# Patient Record
Sex: MALE | Race: WHITE | NOT HISPANIC OR LATINO | Employment: OTHER | URBAN - METROPOLITAN AREA
[De-identification: names, ages, dates, MRNs, and addresses within clinical notes are randomized per-mention and may not be internally consistent; named-entity substitution may affect disease eponyms.]

---

## 2017-01-31 ENCOUNTER — HOSPITAL ENCOUNTER (OUTPATIENT)
Dept: CARDIOLOGY | Facility: CLINIC | Age: 72
Discharge: HOME | End: 2017-01-31
Payer: MEDICARE

## 2017-01-31 ENCOUNTER — OFFICE VISIT (OUTPATIENT)
Dept: CARDIOLOGY | Facility: CLINIC | Age: 72
End: 2017-01-31
Payer: MEDICARE

## 2017-01-31 VITALS
SYSTOLIC BLOOD PRESSURE: 145 MMHG | WEIGHT: 235.88 LBS | BODY MASS INDEX: 31.26 KG/M2 | DIASTOLIC BLOOD PRESSURE: 74 MMHG | HEIGHT: 73 IN | HEART RATE: 67 BPM

## 2017-01-31 DIAGNOSIS — M51.36 DDD (DEGENERATIVE DISC DISEASE), LUMBAR: ICD-10-CM

## 2017-01-31 DIAGNOSIS — I10 HTN (HYPERTENSION), BENIGN: ICD-10-CM

## 2017-01-31 DIAGNOSIS — G70.00 MYASTHENIA GRAVIS: ICD-10-CM

## 2017-01-31 DIAGNOSIS — E66.09 NON MORBID OBESITY DUE TO EXCESS CALORIES: ICD-10-CM

## 2017-01-31 DIAGNOSIS — E03.4 HYPOTHYROIDISM DUE TO ACQUIRED ATROPHY OF THYROID: ICD-10-CM

## 2017-01-31 DIAGNOSIS — E78.5 HYPERLIPIDEMIA: ICD-10-CM

## 2017-01-31 DIAGNOSIS — N52.01 ERECTILE DYSFUNCTION DUE TO ARTERIAL INSUFFICIENCY: ICD-10-CM

## 2017-01-31 DIAGNOSIS — E78.2 MIXED HYPERLIPIDEMIA: ICD-10-CM

## 2017-01-31 DIAGNOSIS — G62.9 NEUROPATHY: ICD-10-CM

## 2017-01-31 DIAGNOSIS — I10 HTN (HYPERTENSION), BENIGN: Primary | ICD-10-CM

## 2017-01-31 PROCEDURE — 1159F MED LIST DOCD IN RCRD: CPT | Mod: S$GLB,,, | Performed by: INTERNAL MEDICINE

## 2017-01-31 PROCEDURE — 1157F ADVNC CARE PLAN IN RCRD: CPT | Mod: S$GLB,,, | Performed by: INTERNAL MEDICINE

## 2017-01-31 PROCEDURE — 3077F SYST BP >= 140 MM HG: CPT | Mod: S$GLB,,, | Performed by: INTERNAL MEDICINE

## 2017-01-31 PROCEDURE — 1160F RVW MEDS BY RX/DR IN RCRD: CPT | Mod: S$GLB,,, | Performed by: INTERNAL MEDICINE

## 2017-01-31 PROCEDURE — 93000 ELECTROCARDIOGRAM COMPLETE: CPT | Mod: S$GLB,,, | Performed by: INTERNAL MEDICINE

## 2017-01-31 PROCEDURE — 99999 PR PBB SHADOW E&M-EST. PATIENT-LVL III: CPT | Mod: PBBFAC,,, | Performed by: INTERNAL MEDICINE

## 2017-01-31 PROCEDURE — 1126F AMNT PAIN NOTED NONE PRSNT: CPT | Mod: S$GLB,,, | Performed by: INTERNAL MEDICINE

## 2017-01-31 PROCEDURE — 99499 UNLISTED E&M SERVICE: CPT | Mod: S$GLB,,, | Performed by: INTERNAL MEDICINE

## 2017-01-31 PROCEDURE — 99215 OFFICE O/P EST HI 40 MIN: CPT | Mod: S$GLB,,, | Performed by: INTERNAL MEDICINE

## 2017-01-31 PROCEDURE — 3078F DIAST BP <80 MM HG: CPT | Mod: S$GLB,,, | Performed by: INTERNAL MEDICINE

## 2017-01-31 NOTE — PATIENT INSTRUCTIONS
Discussed diet , achieving and maintaining ideal body weight, and exercise.   We reviewed meds in detail.  Reassured  Discussed goals  Increase Carvedilol to whole and half-need more meds if BP still > 125

## 2017-01-31 NOTE — PROGRESS NOTES
Subjective:   Patient ID:  Maximilian Tavera Jr. is a 71 y.o. male who presents for follow-up of Hyperlipidemia (1 yr fu) and Hypertension      HPI: Patient is here for evaluation and treatment of hypertension.   The patient has no chest pain, SOB, TIA, palpitations, syncope or pre-syncope.Patient does not exercise as much as directed especially with sciatica.BP typically lower than today AND 130S.        Review of Systems   Constitution: Negative for chills, decreased appetite, diaphoresis, fever, weakness, malaise/fatigue, night sweats, weight gain and weight loss.   HENT: Negative for congestion, headaches, hoarse voice, nosebleeds, sore throat and tinnitus.    Eyes: Negative for blurred vision, double vision, vision loss in left eye, vision loss in right eye, visual disturbance and visual halos.   Cardiovascular: Negative for chest pain, claudication, cyanosis, dyspnea on exertion, irregular heartbeat, leg swelling, near-syncope, orthopnea, palpitations, paroxysmal nocturnal dyspnea and syncope.   Respiratory: Negative for cough, hemoptysis, shortness of breath, sleep disturbances due to breathing, snoring, sputum production and wheezing.    Endocrine: Negative for cold intolerance, heat intolerance, polydipsia, polyphagia and polyuria.   Hematologic/Lymphatic: Negative for adenopathy and bleeding problem. Does not bruise/bleed easily.   Skin: Negative for color change, dry skin, flushing, itching, nail changes, poor wound healing, rash, skin cancer, suspicious lesions and unusual hair distribution.   Musculoskeletal: Positive for back pain. Negative for arthritis, falls, gout, joint pain, joint swelling, muscle cramps, muscle weakness, myalgias and stiffness.   Gastrointestinal: Negative for abdominal pain, anorexia, change in bowel habit, constipation, diarrhea, dysphagia, heartburn, hematemesis, hematochezia, melena and vomiting.   Genitourinary: Negative for decreased libido, dysuria, hematuria, hesitancy and  "urgency.   Neurological: Negative for excessive daytime sleepiness, dizziness, focal weakness, light-headedness, loss of balance, numbness, paresthesias, seizures, sensory change, tremors and vertigo.   Psychiatric/Behavioral: Negative for altered mental status, depression, hallucinations, memory loss, substance abuse and suicidal ideas. The patient does not have insomnia and is not nervous/anxious.    Allergic/Immunologic: Negative for environmental allergies and hives.       Objective:   Visit Vitals    BP (!) 145/74 (BP Location: Left arm, Patient Position: Sitting, BP Method: Automatic)    Pulse 67    Ht 6' 1" (1.854 m)    Wt 107 kg (235 lb 14.3 oz)    BMI 31.12 kg/m2        Physical Exam   Constitutional: He is oriented to person, place, and time. He appears well-developed and well-nourished. No distress.   HENT:   Head: Normocephalic.   Eyes: EOM are normal. Pupils are equal, round, and reactive to light.   Neck: Normal range of motion. No thyromegaly present.   Cardiovascular: Normal rate, regular rhythm, normal heart sounds and intact distal pulses.  Exam reveals no gallop and no friction rub.    No murmur heard.  Pulses:       Carotid pulses are 3+ on the right side, and 3+ on the left side.       Radial pulses are 3+ on the right side, and 3+ on the left side.        Femoral pulses are 3+ on the right side, and 3+ on the left side.       Popliteal pulses are 3+ on the right side, and 3+ on the left side.        Dorsalis pedis pulses are 3+ on the right side, and 3+ on the left side.        Posterior tibial pulses are 3+ on the right side, and 3+ on the left side.   Pulmonary/Chest: Effort normal and breath sounds normal. No respiratory distress. He has no wheezes. He has no rales. He exhibits no tenderness.   Abdominal: Soft. He exhibits no distension and no mass. There is no tenderness.   Musculoskeletal: Normal range of motion.   Lymphadenopathy:     He has no cervical adenopathy.   Neurological: He " is alert and oriented to person, place, and time.   Skin: Skin is warm. He is not diaphoretic. No cyanosis. Nails show no clubbing.   Psychiatric: He has a normal mood and affect. His speech is normal and behavior is normal. Judgment and thought content normal. Cognition and memory are normal.       Assessment:     1. HTN (hypertension), benign    2. Mixed hyperlipidemia    3. Non morbid obesity due to excess calories    4. Hypothyroidism due to acquired atrophy of thyroid    5. Erectile dysfunction due to arterial insufficiency    6. DDD (degenerative disc disease), lumbar    7. Myasthenia gravis    8. Neuropathy        Plan:   Discussed diet , achieving and maintaining ideal body weight, and exercise.   We reviewed meds in detail.  Reassured  Discussed goals  Increase Carvedilol to whole and half-need more meds if BP still > 125    Maximilian was seen today for hyperlipidemia and hypertension.    Diagnoses and all orders for this visit:    HTN (hypertension), benign  -     CT Cardiac Scoring; Future; Expected date: 2/1/17  -     CAR CT Cardiac Scoring; Future; Expected date: 2/1/17  -     Comprehensive metabolic panel; Future; Expected date: 1/31/18  -     EKG 12-lead; Future; Expected date: 1/31/18    Mixed hyperlipidemia  -     CT Cardiac Scoring; Future; Expected date: 2/1/17  -     CAR CT Cardiac Scoring; Future; Expected date: 2/1/17  -     Lipid panel; Future; Expected date: 1/31/18  -     Comprehensive metabolic panel; Future; Expected date: 1/31/18  -     EKG 12-lead; Future; Expected date: 1/31/18    Non morbid obesity due to excess calories  -     CT Cardiac Scoring; Future; Expected date: 2/1/17  -     CAR CT Cardiac Scoring; Future; Expected date: 2/1/17  -     Comprehensive metabolic panel; Future; Expected date: 1/31/18    Hypothyroidism due to acquired atrophy of thyroid  -     Comprehensive metabolic panel; Future; Expected date: 1/31/18  -     TSH; Future; Expected date: 1/31/18  -     T4, free;  Future; Expected date: 1/31/18    Erectile dysfunction due to arterial insufficiency    DDD (degenerative disc disease), lumbar    Myasthenia gravis  -     EKG 12-lead; Future; Expected date: 1/31/18    Neuropathy            Return in about 1 year (around 1/31/2018) for with ECG and labs;CAC soon.

## 2017-01-31 NOTE — MR AVS SNAPSHOT
[facility]  [address]  [facility] [address]  Phone: [phone]                  Maximilian Tavera Jr.   2017 11:30 AM   Office Visit    Description:  Male : 1945   Provider:  Miguel Altamirano MD   Department:  [facility]           Reason for Visit     Hyperlipidemia     Hypertension           Diagnoses this Visit        Comments    HTN (hypertension), benign    -  Primary     Mixed hyperlipidemia         Non morbid obesity due to excess calories         Hypothyroidism due to acquired atrophy of thyroid         Erectile dysfunction due to arterial insufficiency         DDD (degenerative disc disease), lumbar         Myasthenia gravis         Neuropathy                To Do List           Future Appointments        Provider Department Dept Phone    3/21/2017 10:00 AM MD Brian Moreno Counts include 234 beds at the [facility] - Neurology [phone]    2017 8:00 AM Brian Marroquin MD [facility] [phone]      Goals (5 Years of Data)     None      [facility] On Call     [facility] On Call Nurse Care Line -  Assistance  Registered nurses in the [facility] On Call Center provide clinical advisement, health education, appointment booking, and other advisory services.  Call for this free service at [phone].             Medications           Message regarding Medications     Verify the changes and/or additions to your medication regime listed below are the same as discussed with your clinician today.  If any of these changes or additions are incorrect, please notify your healthcare provider.        STOP taking these medications     methylPREDNISolone (MEDROL DOSEPACK) 4 mg tablet use as directed           Verify that the below list of medications is an accurate representation of the medications you are currently taking.  If none reported, the list may be blank. If incorrect, please contact your healthcare provider. Carry this list with you in case of emergency.            Current Medications     albuterol 90 mcg/actuation inhaler Inhale 2 puffs into the lungs every 6 (six) hours as needed.    atorvastatin (LIPITOR) 80 MG tablet Take 1 tablet (80 mg total) by mouth once daily. Every day    calcium citrate-vitamin D (CITRACAL + D) 315-200 mg-unit per tablet Twice a day    carvedilol (COREG) 25 MG tablet Take .5-1 tablets by mouth twice daily or as directed    cetirizine (ZYRTEC) 10 MG tablet Take 1 tablet by mouth daily as needed.    chlorthalidone (HYGROTEN) 25 MG Tab TAKE ONE TABLET BY MOUTH ONCE DAILY.    cholecalciferol, vitamin D3, (VITAMIN D) 2,000 unit Cap Every day    coenzyme Q10 (CO Q-10) 100 mg capsule Take 400 mg by mouth once daily.     cyanocobalamin, vitamin B-12, (VITAMIN B-12) 5,000 mcg Subl Every day    doxycycline (MONODOX) 100 MG capsule Take 1 capsule (100 mg total) by mouth 2 (two) times daily.    fluticasone (FLONASE) 50 mcg/actuation nasal spray 1 spray by Each Nare route 2 (two) times daily as needed for Rhinitis.    gabapentin (NEURONTIN) 300 MG capsule Start 1 cap at night for 5 days, then bid thereafter.    ibuprofen (ADVIL,MOTRIN) 800 MG tablet Take 1 tablet (800 mg total) by mouth daily as needed for Pain.    levothyroxine (SYNTHROID) 50 MCG tablet Take 1 tablet (50 mcg total) by mouth once daily.    lisinopril (PRINIVIL,ZESTRIL) 40 MG tablet Take 1 tablet (40 mg total) by mouth once daily.    methylsulfonylmethane (MSM) 1,000 mg Tab Every day    milk thistle 200 mg Cap Twice a day    omega-3 fatty acids 1,000 mg Cap Twice a day    potassium chloride (KLOR-CON) 8 MEQ TbSR Take 1 tablet (8 mEq total) by mouth 2 (two) times daily.    sildenafil (VIAGRA) 100 MG tablet Take 1 tablet (100 mg total) by mouth daily as needed for Erectile Dysfunction. Every day PRN           Clinical Reference Information           Vital Signs - Last Recorded  Most recent update: 1/31/2017 11:05 AM by Michelle Lal MA    BP Pulse Ht Wt BMI    (!) 145/74 (BP Location: Left arm,  "Patient Position: Sitting, BP Method: Automatic) 67 6' 1" (1.854 m) 107 kg (235 lb 14.3 oz) 31.12 kg/m2      Blood Pressure          Most Recent Value    Right Arm BP - Sitting  157/90    Left Arm BP - Sitting  145/74    BP  (!)  145/74      Allergies as of 1/31/2017     No Known Drug Allergies      Immunizations Administered on Date of Encounter - 1/31/2017     None      Instructions    Discussed diet , achieving and maintaining ideal body weight, and exercise.   We reviewed meds in detail.  Reassured  Discussed goals         "

## 2017-02-08 ENCOUNTER — HOSPITAL ENCOUNTER (OUTPATIENT)
Dept: RADIOLOGY | Facility: HOSPITAL | Age: 72
Discharge: HOME | End: 2017-02-08
Attending: INTERNAL MEDICINE
Payer: MEDICARE

## 2017-02-08 DIAGNOSIS — E66.09 NON MORBID OBESITY DUE TO EXCESS CALORIES: ICD-10-CM

## 2017-02-08 DIAGNOSIS — E78.2 MIXED HYPERLIPIDEMIA: ICD-10-CM

## 2017-02-08 DIAGNOSIS — I10 HTN (HYPERTENSION), BENIGN: ICD-10-CM

## 2017-02-08 PROCEDURE — 75571 CT HRT W/O DYE W/CA TEST: CPT | Mod: TC

## 2017-02-08 PROCEDURE — 75571 CT HRT W/O DYE W/CA TEST: CPT | Mod: 26,,, | Performed by: RADIOLOGY

## 2017-02-10 ENCOUNTER — OFFICE VISIT (OUTPATIENT)
Dept: PAIN MEDICINE | Facility: CLINIC | Age: 72
End: 2017-02-10
Payer: MEDICARE

## 2017-02-10 VITALS
HEIGHT: 73 IN | HEART RATE: 63 BPM | DIASTOLIC BLOOD PRESSURE: 77 MMHG | BODY MASS INDEX: 31.14 KG/M2 | WEIGHT: 235 LBS | SYSTOLIC BLOOD PRESSURE: 137 MMHG

## 2017-02-10 DIAGNOSIS — M51.36 DDD (DEGENERATIVE DISC DISEASE), LUMBAR: Primary | ICD-10-CM

## 2017-02-10 DIAGNOSIS — M54.16 LUMBAR RADICULITIS: ICD-10-CM

## 2017-02-10 PROCEDURE — 3075F SYST BP GE 130 - 139MM HG: CPT | Mod: S$GLB,,, | Performed by: ANESTHESIOLOGY

## 2017-02-10 PROCEDURE — 1125F AMNT PAIN NOTED PAIN PRSNT: CPT | Mod: S$GLB,,, | Performed by: ANESTHESIOLOGY

## 2017-02-10 PROCEDURE — 1159F MED LIST DOCD IN RCRD: CPT | Mod: S$GLB,,, | Performed by: ANESTHESIOLOGY

## 2017-02-10 PROCEDURE — 1160F RVW MEDS BY RX/DR IN RCRD: CPT | Mod: S$GLB,,, | Performed by: ANESTHESIOLOGY

## 2017-02-10 PROCEDURE — 1157F ADVNC CARE PLAN IN RCRD: CPT | Mod: S$GLB,,, | Performed by: ANESTHESIOLOGY

## 2017-02-10 PROCEDURE — 99999 PR PBB SHADOW E&M-EST. PATIENT-LVL III: CPT | Mod: PBBFAC,,, | Performed by: ANESTHESIOLOGY

## 2017-02-10 PROCEDURE — 99214 OFFICE O/P EST MOD 30 MIN: CPT | Mod: S$GLB,,, | Performed by: ANESTHESIOLOGY

## 2017-02-10 PROCEDURE — 3078F DIAST BP <80 MM HG: CPT | Mod: S$GLB,,, | Performed by: ANESTHESIOLOGY

## 2017-02-10 NOTE — PROGRESS NOTES
PCP: Brian Marroquin MD      CC: right leg pain    Interval history: Mr. Tavera is known patient to our clinic.  He has history of lumbar radiculitis.  He is s/p right L4-5 and L5-S1 TFESI on 10/9/2014 and reports 75% relief of his low back and right leg pain.  This provided him relief for over a year.  Pain has since returned.   Pain is a throbbing pain in her posterior thigh and travels down to his ankle.  He denies any lower back pain.  No leg weakness or numbness.  Pain worsens with standing and walking.  Pain is relieved with rest.  He takes ibuprofen with mild benefits.  He desires repeat procedure.   Prior HPI:   Mr. Tavera is a 68 year old male with PMH of HTN referred by Dr. Campbell for right leg pain.  He states having constant right leg pain for the past two years.  Pain is a throbbing pain in her posterior thigh and travels down to his ankle.  He denies any lower back pain.  No leg weakness or numbness.  Pain worsens with standing and walking.  Pain is relieved with rest.  He takes ibuprofen with mild benefits.  Physical therapy has not been helpful.      Pain intervention history: s/p right L4-5 and L5-S1 TFESI on 10/9/2014 and reports 75% relief of his low back and right leg pain.     ROS:  CONSTITUTIONAL: No fevers, chills, night sweats, wt. loss, appetite changes  SKIN: no rashes or itching  ENT: No headaches, head trauma, vision changes, or eye pain  LYMPH NODES: None noticed   CV: No chest pain, palpitations.   RESP: No shortness of breath, dyspnea on exertion, cough, wheezing, or hemoptysis  GI: No nausea, emesis, diarrhea, constipation, melena, hematochezia, pain.    : No dysuria, hematuria, urgency, or frequency   HEME: No easy bruising, bleeding problems  PSYCHIATRIC: No depression, anxiety, psychosis, hallucinations.  NEURO: No seizures, memory loss, dizziness or difficulty sleeping  MSK: + right leg pain      Past Medical History   Diagnosis Date    Arthritis     Back pain     Cataract   "   Chest pain, musculoskeletal     Hyperlipidemia     Hypertension     Hypothyroidism     Knee fracture     Myasthenia gravis     Polyneuropathy     Squamous cell carcinoma 2014     left forearm    Thyroid disease      Past Surgical History   Procedure Laterality Date    Appendectomy      Tonsillectomy      Neck surgery      Hemorrhoid surgery      Knee arthroplasty Bilateral     Cataract extraction w/  intraocular lens implant Bilateral      Family History   Problem Relation Age of Onset    Cataracts Mother     Heart disease Mother      CHF    Hypertension Mother     Hyperlipidemia Mother     Cataracts Father     Glaucoma Father     Heart disease Father     Hyperlipidemia Sister     Hypertension Sister     No Known Problems Daughter     No Known Problems Daughter     Collagen disease Neg Hx     Amblyopia Neg Hx     Blindness Neg Hx     Macular degeneration Neg Hx     Retinal detachment Neg Hx     Strabismus Neg Hx     Cancer Neg Hx      Social History     Social History    Marital status:      Spouse name: N/A    Number of children: N/A    Years of education: N/A     Social History Main Topics    Smoking status: Passive Smoke Exposure - Never Smoker    Smokeless tobacco: Never Used      Comment: when a child    Alcohol use Yes      Comment: rarely    Drug use: No    Sexual activity: Yes     Partners: Female     Other Topics Concern    None     Social History Narrative         Medications/Allergies: See med card    Vitals:    02/10/17 1133   BP: 137/77   Pulse: 63   Weight: 106.6 kg (235 lb)   Height: 6' 1" (1.854 m)   PainSc:   6   PainLoc: Leg         Physical exam:    GENERAL: A and O x3, the patient appears well groomed and is in no acute distress.  Skin: No rashes or obvious lesions  HEENT: normocephalic, atraumatic  CARDIOVASCULAR:  Palpable peripheral pulses  LUNGS: easy work of breathing  ABDOMEN: soft, nontender   UPPER EXTREMITIES: Normal alignment, normal " range of motion, no atrophy, no skin changes,  hair growth and nail growth normal and equal bilaterally. No swelling, no tenderness.    LOWER EXTREMITIES:  Normal alignment, normal range of motion, no atrophy, no skin changes,  hair growth and nail growth normal and equal bilaterally. No swelling, no tenderness.    LUMBAR SPINE  Lumbar spine: ROM is full with flexion extension and oblique extension with no increased pain.    Porter's test causes no increased pain on either side.    Supine straight leg raise is negative bilaterally.    Internal and external rotation of the hip causes no increased pain on either side.  Myofascial exam: No tenderness to palpation across lumbar paraspinous muscles.      MENTAL STATUS: normal orientation, speech, language, and fund of knowledge for social situation.  Emotional state appropriate.    CRANIAL NERVES:  II:  PERRL bilaterally,   III,IV,VI: EOMI.    V:  Facial sensation equal bilaterally  VII:  Facial motor function normal.  VIII:  Hearing equal to finger rub bilaterally  IX/X: Gag normal, palate symmetric  XI:  Shoulder shrug equal, head turn equal  XII:  Tongue midline without fasciculations      MOTOR: Tone and bulk: normal bilateral upper and lower Strength: normal    IP ADD ABD Quad TA Gas HAM  R 5 5 5 5 5 5 5  L 5 5 5 5 5 5 5    SENSATION: Light touch and pinprick intact bilaterally  REFLEXES: normal, symmetric, nonbrisk.  Toes down, no clonus. No hoffmans.  GAIT: normal rise, base, steps, and arm swing.        Imaging:  None    Assessment:  Mr. Tavera is referred by Dr. Campbell for right leg pain  1. DDD (degenerative disc disease), lumbar    2. Lumbar radiculitis          Plan:  - I have stressed the importance of physical activity and exercise to improve overall health  - Schedule repeat right L4-5 and L5-S1 TFESI to help with right leg pain   - Follow up after procedure

## 2017-02-13 DIAGNOSIS — M54.16 LUMBAR RADICULOPATHY: Primary | ICD-10-CM

## 2017-02-14 ENCOUNTER — PATIENT MESSAGE (OUTPATIENT)
Dept: CARDIOLOGY | Facility: CLINIC | Age: 72
End: 2017-02-14

## 2017-02-15 DIAGNOSIS — I35.0 AORTIC VALVE STENOSIS, UNSPECIFIED ETIOLOGY: Primary | ICD-10-CM

## 2017-02-15 PROBLEM — R93.1 AGATSTON CAC SCORE, <100: Status: ACTIVE | Noted: 2017-02-15

## 2017-02-15 PROBLEM — I35.9 AORTIC VALVE DISEASE: Status: ACTIVE | Noted: 2017-02-15

## 2017-02-21 ENCOUNTER — SURGERY (OUTPATIENT)
Age: 72
End: 2017-02-21

## 2017-02-21 ENCOUNTER — HOSPITAL ENCOUNTER (OUTPATIENT)
Facility: AMBULARY SURGERY CENTER | Age: 72
Discharge: HOME | End: 2017-02-21
Attending: ANESTHESIOLOGY | Admitting: ANESTHESIOLOGY
Payer: MEDICARE

## 2017-02-21 DIAGNOSIS — M51.36 DDD (DEGENERATIVE DISC DISEASE), LUMBAR: Primary | ICD-10-CM

## 2017-02-21 PROCEDURE — 64483 NJX AA&/STRD TFRM EPI L/S 1: CPT | Performed by: ANESTHESIOLOGY

## 2017-02-21 PROCEDURE — 64483 NJX AA&/STRD TFRM EPI L/S 1: CPT | Mod: RT,,, | Performed by: ANESTHESIOLOGY

## 2017-02-21 PROCEDURE — 64484 NJX AA&/STRD TFRM EPI L/S EA: CPT | Performed by: ANESTHESIOLOGY

## 2017-02-21 PROCEDURE — 64484 NJX AA&/STRD TFRM EPI L/S EA: CPT | Mod: RT,,, | Performed by: ANESTHESIOLOGY

## 2017-02-21 RX ORDER — ALPRAZOLAM 1 MG/1
1 TABLET, ORALLY DISINTEGRATING ORAL
Status: DISCONTINUED | OUTPATIENT
Start: 2017-02-21 | End: 2017-02-21

## 2017-02-21 RX ORDER — LIDOCAINE HYDROCHLORIDE 10 MG/ML
INJECTION, SOLUTION EPIDURAL; INFILTRATION; INTRACAUDAL; PERINEURAL
Status: DISCONTINUED | OUTPATIENT
Start: 2017-02-21 | End: 2017-02-21 | Stop reason: HOSPADM

## 2017-02-21 RX ORDER — DEXAMETHASONE SODIUM PHOSPHATE 10 MG/ML
INJECTION INTRAMUSCULAR; INTRAVENOUS
Status: DISPENSED
Start: 2017-02-21 | End: 2017-02-22

## 2017-02-21 RX ORDER — LIDOCAINE HYDROCHLORIDE 10 MG/ML
INJECTION, SOLUTION EPIDURAL; INFILTRATION; INTRACAUDAL; PERINEURAL
Status: DISPENSED
Start: 2017-02-21 | End: 2017-02-22

## 2017-02-21 RX ORDER — BUPIVACAINE HYDROCHLORIDE 2.5 MG/ML
INJECTION, SOLUTION EPIDURAL; INFILTRATION; INTRACAUDAL
Status: DISCONTINUED | OUTPATIENT
Start: 2017-02-21 | End: 2017-02-21 | Stop reason: HOSPADM

## 2017-02-21 RX ORDER — DEXAMETHASONE SODIUM PHOSPHATE 10 MG/ML
INJECTION INTRAMUSCULAR; INTRAVENOUS
Status: DISCONTINUED | OUTPATIENT
Start: 2017-02-21 | End: 2017-02-21 | Stop reason: HOSPADM

## 2017-02-21 RX ORDER — SODIUM CHLORIDE, SODIUM LACTATE, POTASSIUM CHLORIDE, CALCIUM CHLORIDE 600; 310; 30; 20 MG/100ML; MG/100ML; MG/100ML; MG/100ML
INJECTION, SOLUTION INTRAVENOUS ONCE AS NEEDED
Status: DISCONTINUED | OUTPATIENT
Start: 2017-02-21 | End: 2017-02-21 | Stop reason: HOSPADM

## 2017-02-21 RX ORDER — ALPRAZOLAM 0.25 MG/1
1 TABLET ORAL
Status: COMPLETED | OUTPATIENT
Start: 2017-02-21 | End: 2017-02-21

## 2017-02-21 RX ADMIN — LIDOCAINE HYDROCHLORIDE 10 ML: 10 INJECTION, SOLUTION EPIDURAL; INFILTRATION; INTRACAUDAL; PERINEURAL at 12:02

## 2017-02-21 RX ADMIN — DEXAMETHASONE SODIUM PHOSPHATE 10 MG: 10 INJECTION INTRAMUSCULAR; INTRAVENOUS at 12:02

## 2017-02-21 RX ADMIN — BUPIVACAINE HYDROCHLORIDE 3 ML: 2.5 INJECTION, SOLUTION EPIDURAL; INFILTRATION; INTRACAUDAL at 12:02

## 2017-02-21 RX ADMIN — ALPRAZOLAM 1 MG: 0.25 TABLET ORAL at 11:02

## 2017-02-21 NOTE — IP AVS SNAPSHOT
[facility]  [address] [facility] [address]           Patient Discharge Instructions     Our goal is to set you up for success. This packet includes information on your condition, medications, and your home care. It will help you to care for yourself so you don't get sicker and need to go back to the hospital.     Please ask your nurse if you have any questions.        There are many details to remember when preparing to leave the hospital. Here is what you will need to do:    1. Take your medicine. If you are prescribed medications, review your Medication List in the following pages. You may have new medications to  at the pharmacy and others that you'll need to stop taking. Review the instructions for how and when to take your medications. Talk with your doctor or nurses if you are unsure of what to do.     2. Go to your follow-up appointments. Specific follow-up information is listed in the following pages. Your may be contacted by a transition nurse or clinical provider about future appointments. Be sure we have all of the phone numbers to reach you, if needed. Please contact your provider's office if you are unable to make an appointment.     3. Watch for warning signs. Your doctor or nurse will give you detailed warning signs to watch for and when to call for assistance. These instructions may also include educational information about your condition. If you experience any of warning signs to your health, call your doctor.               [facility]  Unless otherwise directed by your provider, please contact [facility], our nurse care line that is available for 24/7 assistance.     [phone] (toll-free)    Registered nurses in the [facility] provide clinical advisement, health education, appointment booking, and other advisory services.                    ** Verify the list of medication(s) below is accurate and up  to date. Carry this with you in case of emergency. If your medications have changed, please notify your healthcare provider.             Medication List      CONTINUE taking these medications        Additional Info                      atorvastatin 80 MG tablet   Commonly known as:  LIPITOR   Quantity:  90 tablet   Refills:  3   Dose:  80 mg    Instructions:  Take 1 tablet (80 mg total) by mouth once daily. Every day     Begin Date    AM    Noon    PM    Bedtime       carvedilol 25 MG tablet   Commonly known as:  COREG   Quantity:  180 tablet   Refills:  3    Instructions:  Take .5-1 tablets by mouth twice daily or as directed     Begin Date    AM    Noon    PM    Bedtime       cetirizine 10 MG tablet   Commonly known as:  ZYRTEC   Refills:  0   Dose:  1 tablet    Instructions:  Take 1 tablet by mouth daily as needed.     Begin Date    AM    Noon    PM    Bedtime       chlorthalidone 25 MG Tab   Commonly known as:  HYGROTEN   Quantity:  90 tablet   Refills:  3   Comments:  **Patient requests 90 days supply**    Instructions:  TAKE ONE TABLET BY MOUTH ONCE DAILY.     Begin Date    AM    Noon    PM    Bedtime       CITRACAL PLUS D 315-200 mg-unit per tablet   Refills:  0   Generic drug:  calcium citrate-vitamin D3 315-200 mg    Instructions:  Twice a day     Begin Date    AM    Noon    PM    Bedtime       CO Q-10 100 mg capsule   Refills:  0   Dose:  400 mg   Generic drug:  coenzyme Q10    Instructions:  Take 400 mg by mouth once daily.     Begin Date    AM    Noon    PM    Bedtime       fluticasone 50 mcg/actuation nasal spray   Commonly known as:  FLONASE   Quantity:  3 Bottle   Refills:  3   Dose:  1 spray    Instructions:  1 spray by Each Nare route 2 (two) times daily as needed for Rhinitis.     Begin Date    AM    Noon    PM    Bedtime       gabapentin 300 MG capsule   Commonly known as:  NEURONTIN   Quantity:  90 capsule   Refills:  11    Instructions:  Start 1 cap at night for 5 days, then bid thereafter.      Begin Date    AM    Noon    PM    Bedtime       ibuprofen 800 MG tablet   Commonly known as:  ADVIL,MOTRIN   Quantity:  30 tablet   Refills:  11   Dose:  800 mg    Instructions:  Take 1 tablet (800 mg total) by mouth daily as needed for Pain.     Begin Date    AM    Noon    PM    Bedtime       levothyroxine 50 MCG tablet   Commonly known as:  SYNTHROID   Quantity:  90 tablet   Refills:  3   Dose:  50 mcg    Instructions:  Take 1 tablet (50 mcg total) by mouth once daily.     Begin Date    AM    Noon    PM    Bedtime       lisinopril 40 MG tablet   Commonly known as:  PRINIVIL,ZESTRIL   Quantity:  90 tablet   Refills:  3   Dose:  40 mg    Instructions:  Take 1 tablet (40 mg total) by mouth once daily.     Begin Date    AM    Noon    PM    Bedtime       milk thistle 200 mg Cap   Refills:  0    Instructions:  Twice a day     Begin Date    AM    Noon    PM    Bedtime       MSM 1,000 mg Tab   Refills:  0   Generic drug:  methylsulfonylmethane    Instructions:  Every day     Begin Date    AM    Noon    PM    Bedtime       omega-3 fatty acids 1,000 mg Cap   Refills:  0    Instructions:  Twice a day     Begin Date    AM    Noon    PM    Bedtime       potassium chloride 8 MEQ Tbsr   Commonly known as:  KLOR-CON   Quantity:  180 tablet   Refills:  3   Dose:  8 mEq    Instructions:  Take 1 tablet (8 mEq total) by mouth 2 (two) times daily.     Begin Date    AM    Noon    PM    Bedtime       sildenafil 100 MG tablet   Commonly known as:  VIAGRA   Quantity:  10 tablet   Refills:  11   Dose:  100 mg    Instructions:  Take 1 tablet (100 mg total) by mouth daily as needed for Erectile Dysfunction. Every day PRN     Begin Date    AM    Noon    PM    Bedtime       VITAMIN B-12 5,000 mcg Subl   Refills:  0   Generic drug:  cyanocobalamin (vitamin B-12)    Instructions:  Every day     Begin Date    AM    Noon    PM    Bedtime       VITAMIN D 2,000 unit Cap   Refills:  0   Generic drug:  cholecalciferol (vitamin D3)    Instructions:   Every day     Begin Date    AM    Noon    PM    Bedtime                  Please bring to all follow up appointments:    1. A copy of your discharge instructions.  2. All medicines you are currently taking in their original bottles.  3. Identification and insurance card.    Please arrive 15 minutes ahead of scheduled appointment time.    Please call 24 hours in advance if you must reschedule your appointment and/or time.        Your Scheduled Appointments     Mar 03, 2017 10:15 AM CST   Color Flow Doppler Echo with ECHO, [facility] - Echo/Stress Lab ([facility] )    [address]  [place] [address]   [phone]            Mar 21, 2017 10:00 AM CDT   Neurology - Established Patient with MD Brian Moreno [facility] - Neurology ([facility] )    [address]  [place] [address]   [phone]            Mar 22, 2017 10:00 AM CDT   Established Patient Visit with JEFFY Eldridge - Pain Management ([facility] [address]  [facility] [address]   [phone]            May 08, 2017  8:00 AM CDT   Established Patient Visit with MD Jay Mikell - Family Medicine ([facility]    [address] [place] [address] E  [facility] [address]   [phone]                Discharge Instructions     Future Orders    Activity as tolerated     Call MD for:  difficulty breathing or increased cough     Call MD for:  persistent nausea and vomiting or diarrhea     Call MD for:  redness, tenderness, or signs of infection (pain, swelling, redness, odor or green/yellow discharge around incision site)     Call MD for:  severe persistent headache     Call MD for:  severe uncontrolled pain     Call MD for:  temperature >100.4     Diet general     Questions:    Total calories:      Fat restriction, if any:      Protein restriction, if any:      Na restriction, if any:      Fluid restriction:      Additional restrictions:          Discharge  "Instructions       Pain injection instructions:       No driving for 24 hrs   Activity as tolerated- gradually increase activities.  Dont lift over 10 lbs for 24 hrs  No heat at injection sites x 2 days  Use ice for mild swelling and for comfort.  May shower today. No baths for two days.      Resume Aspirin, Plavix, or Coumadin the day after the procedure unless otherwise instructed.   If diabetic,monitor your glucose carefully as steroids can increase glucose level    Seek immediate medical help for:   Severe increase in your usual pain or appearance of new pain.  Prolonged or increasing weakness or numbness in the legs or arms.  Fever above 101 ,Drainage,redness,active bleeding, or increased swelling at the injection site.  Headache, shortness of breath, chest pain, or breathing problems.            Primary Diagnosis     Your primary diagnosis was:  Degeneration Of Lumbar Or Lumbosacral Intervertebral Disc      Admission Information     Date & Time Provider Department CSN    2/21/2017 11:06 AM Devan Watt MD [facility] [id number]      Care Providers     Provider Role Specialty Primary office phone    Devan Watt MD Attending Provider Pain Medicine [phone]    Devan Watt MD Surgeon  Pain Medicine [phone]      Your Vitals Were     BP Pulse Temp Resp Height Weight    121/62 62 97.9 °F (36.6 °C) (Oral) 18 6' 1" (1.854 m) 106.6 kg (235 lb)    SpO2 BMI             94% 31 kg/m2         Recent Lab Values     No lab values to display.      Allergies as of 2/21/2017        Reactions    No Known Drug Allergies       Smoking Cessation     If you would like to quit smoking:   You may be eligible for free services if you are a [place] resident and started smoking cigarettes before September 1, 1988.  Call the [employer]oking [employer]ess[employer] ([employer]) toll free at (984) 702-3454 or (725) 623-4809.   Call 2-793-QUIT-NOW if you do not meet the above criteria.            Language Assistance Services     " ATTENTION: Language assistance services are available, free of charge. Please call [phone].      ATENCIÓN: Si habla español, tiene a mojica disposición servicios gratuitos de asistencia lingüística. Llame al [phone].     CHÚ Ý: N?u b?n nói Ti?ng Vi?t, có các d?ch v? h? tr? ngôn ng? mi?n phí dành cho b?n. G?i s? [phone].         [facility] complies with applicable Federal civil rights laws and does not discriminate on the basis of race, color, national origin, age, disability, or sex.

## 2017-02-21 NOTE — DISCHARGE SUMMARY
[facility]  Discharge Note  Short Stay    Admit Date: 2/21/2017    Discharge Date and Time: 2/21/2017    Attending Physician: Devan Watt MD     Discharge Provider: Devan Watt    Diagnoses:  Active Hospital Problems    Diagnosis  POA    *DDD (degenerative disc disease), lumbar [M51.36]  Yes      Resolved Hospital Problems    Diagnosis Date Resolved POA   No resolved problems to display.       Hospital Course: Lumbar SONIA  Discharged Condition: Good    Final Diagnoses:   Active Hospital Problems    Diagnosis  POA    *DDD (degenerative disc disease), lumbar [M51.36]  Yes      Resolved Hospital Problems    Diagnosis Date Resolved POA   No resolved problems to display.       Disposition: Home or Self Care    Follow up/Patient Instructions:    Medications:  Reconciled Home Medications:   Current Discharge Medication List      CONTINUE these medications which have NOT CHANGED    Details   carvedilol (COREG) 25 MG tablet Take .5-1 tablets by mouth twice daily or as directed  Qty: 180 tablet, Refills: 3    Associated Diagnoses: HTN (hypertension), benign      chlorthalidone (HYGROTEN) 25 MG Tab TAKE ONE TABLET BY MOUTH ONCE DAILY.  Qty: 90 tablet, Refills: 3    Comments: **Patient requests 90 days supply**      levothyroxine (SYNTHROID) 50 MCG tablet Take 1 tablet (50 mcg total) by mouth once daily.  Qty: 90 tablet, Refills: 3    Associated Diagnoses: Congenital hypothyroidism without goiter; Unspecified hypothyroidism      atorvastatin (LIPITOR) 80 MG tablet Take 1 tablet (80 mg total) by mouth once daily. Every day  Qty: 90 tablet, Refills: 3    Associated Diagnoses: Hyperlipidemia      calcium citrate-vitamin D (CITRACAL + D) 315-200 mg-unit per tablet Twice a day      cetirizine (ZYRTEC) 10 MG tablet Take 1 tablet by mouth daily as needed.      cholecalciferol, vitamin D3, (VITAMIN D) 2,000 unit Cap Every day      coenzyme Q10 (CO Q-10) 100 mg capsule Take 400 mg by mouth once daily.       cyanocobalamin,  vitamin B-12, (VITAMIN B-12) 5,000 mcg Subl Every day      fluticasone (FLONASE) 50 mcg/actuation nasal spray 1 spray by Each Nare route 2 (two) times daily as needed for Rhinitis.  Qty: 3 Bottle, Refills: 3    Associated Diagnoses: Allergic rhinitis, unspecified allergic rhinitis type      gabapentin (NEURONTIN) 300 MG capsule Start 1 cap at night for 5 days, then bid thereafter.  Qty: 90 capsule, Refills: 11      ibuprofen (ADVIL,MOTRIN) 800 MG tablet Take 1 tablet (800 mg total) by mouth daily as needed for Pain.  Qty: 30 tablet, Refills: 11      lisinopril (PRINIVIL,ZESTRIL) 40 MG tablet Take 1 tablet (40 mg total) by mouth once daily.  Qty: 90 tablet, Refills: 3    Associated Diagnoses: Essential hypertension; HTN (hypertension), benign      methylsulfonylmethane (MSM) 1,000 mg Tab Every day      milk thistle 200 mg Cap Twice a day      omega-3 fatty acids 1,000 mg Cap Twice a day      potassium chloride (KLOR-CON) 8 MEQ TbSR Take 1 tablet (8 mEq total) by mouth 2 (two) times daily.  Qty: 180 tablet, Refills: 3    Associated Diagnoses: HTN (hypertension), benign      sildenafil (VIAGRA) 100 MG tablet Take 1 tablet (100 mg total) by mouth daily as needed for Erectile Dysfunction. Every day PRN  Qty: 10 tablet, Refills: 11             Discharge Procedure Orders  Diet general     Activity as tolerated     Call MD for:  temperature >100.4     Call MD for:  persistent nausea and vomiting or diarrhea     Call MD for:  severe uncontrolled pain     Call MD for:  redness, tenderness, or signs of infection (pain, swelling, redness, odor or green/yellow discharge around incision site)     Call MD for:  difficulty breathing or increased cough     Call MD for:  severe persistent headache          Follow up with MD in 2-3 weeks    Discharge Procedure Orders (must include Diet, Follow-up, Activity):    Discharge Procedure Orders (must include Diet, Follow-up, Activity)  Diet general     Activity as tolerated     Call MD for:   temperature >100.4     Call MD for:  persistent nausea and vomiting or diarrhea     Call MD for:  severe uncontrolled pain     Call MD for:  redness, tenderness, or signs of infection (pain, swelling, redness, odor or green/yellow discharge around incision site)     Call MD for:  difficulty breathing or increased cough     Call MD for:  severe persistent headache

## 2017-02-21 NOTE — INTERVAL H&P NOTE
The patient has been examined and the H&P has been reviewed:    I concur with the findings and no changes have occurred since H&P was written.   ASA 3    Anesthesia/Surgery risks, benefits and alternative options discussed and understood by patient/family.          There are no hospital problems to display for this patient.

## 2017-02-21 NOTE — OP NOTE
PROCEDURE DATE: 2/21/2017    PROCEDURE: Right L4-5 and L5-S1 transforaminal epidural steroid injection under fluoroscopy    DIAGNOSIS: Lumbar disc displacement without myelopathy  Post op diagnosis: Same    PHYSICIAN: Devan Watt MD    MEDICATIONS INJECTED:  Dexamethasone 5mg (0.5ml) and 1.5ml 0.25% bupivicaine at each nerve root.     LOCAL ANESTHETIC INJECTED:  Lidocaine 1%. 2 ml per site.    SEDATION MEDICATIONS: RN IV sedation    ESTIMATED BLOOD LOSS:  None    COMPLICATIONS:  None    TECHNIQUE:   A time-out was taken to identify patient and procedure side prior to starting the procedure. The patient was placed in a prone position, prepped and draped in the usual sterile fashion using ChloraPrep and sterile towels.  The area to be injected was determined under fluoroscopic guidance in AP and oblique view.  Local anesthetic was given by raising a wheal and going down to the hub of a 25-gauge 1.5 inch needle.  In oblique view, a 5 inch 22-gauge bent-tip spinal needle was introduced towards 6 oclock position of the pedicle of each above named nerve root level.  The needle was walked medially then hinged into the neural foramen and position was confirmed in AP and lateral views.  1ml contrast dye was injected to confirm appropriate placement and that there was no vascular uptake.  After negative aspiration for blood or CSF, the medication was then injected. This was performed at the right L4-5 and L5-S1 level(s). The patient tolerated the procedure well.    The patient was monitored after the procedure.  Patient was given post procedure and discharge instructions to follow at home. The patient was discharged in a stable condition.

## 2017-02-21 NOTE — PLAN OF CARE
Patient unsteady on feet. Patient instructed to sit at edge of bed. His wife will be driving him home. They do not have stairs at home.

## 2017-02-23 VITALS
RESPIRATION RATE: 18 BRPM | DIASTOLIC BLOOD PRESSURE: 69 MMHG | HEIGHT: 73 IN | SYSTOLIC BLOOD PRESSURE: 120 MMHG | OXYGEN SATURATION: 92 % | WEIGHT: 235 LBS | TEMPERATURE: 99 F | BODY MASS INDEX: 31.14 KG/M2 | HEART RATE: 62 BPM

## 2017-03-02 ENCOUNTER — PATIENT MESSAGE (OUTPATIENT)
Dept: CARDIOLOGY | Facility: CLINIC | Age: 72
End: 2017-03-02

## 2017-03-02 ENCOUNTER — OFFICE VISIT (OUTPATIENT)
Dept: FAMILY MEDICINE | Facility: CLINIC | Age: 72
End: 2017-03-02
Payer: MEDICARE

## 2017-03-02 ENCOUNTER — TELEPHONE (OUTPATIENT)
Dept: CARDIOLOGY | Facility: CLINIC | Age: 72
End: 2017-03-02

## 2017-03-02 ENCOUNTER — DOCUMENTATION ONLY (OUTPATIENT)
Dept: FAMILY MEDICINE | Facility: CLINIC | Age: 72
End: 2017-03-02

## 2017-03-02 VITALS
HEIGHT: 73 IN | SYSTOLIC BLOOD PRESSURE: 126 MMHG | DIASTOLIC BLOOD PRESSURE: 72 MMHG | WEIGHT: 234.38 LBS | BODY MASS INDEX: 31.06 KG/M2 | HEART RATE: 87 BPM | TEMPERATURE: 100 F

## 2017-03-02 DIAGNOSIS — J40 BRONCHITIS: Primary | ICD-10-CM

## 2017-03-02 DIAGNOSIS — J40 BRONCHITIS: ICD-10-CM

## 2017-03-02 DIAGNOSIS — R05.9 COUGH: Primary | ICD-10-CM

## 2017-03-02 LAB
CTP QC/QA: YES
FLUAV AG NPH QL: NEGATIVE
FLUBV AG NPH QL: NEGATIVE

## 2017-03-02 PROCEDURE — 3078F DIAST BP <80 MM HG: CPT | Mod: S$GLB,,, | Performed by: PHYSICIAN ASSISTANT

## 2017-03-02 PROCEDURE — 1160F RVW MEDS BY RX/DR IN RCRD: CPT | Mod: S$GLB,,, | Performed by: PHYSICIAN ASSISTANT

## 2017-03-02 PROCEDURE — 99213 OFFICE O/P EST LOW 20 MIN: CPT | Mod: S$GLB,,, | Performed by: PHYSICIAN ASSISTANT

## 2017-03-02 PROCEDURE — 3074F SYST BP LT 130 MM HG: CPT | Mod: S$GLB,,, | Performed by: PHYSICIAN ASSISTANT

## 2017-03-02 PROCEDURE — 1125F AMNT PAIN NOTED PAIN PRSNT: CPT | Mod: S$GLB,,, | Performed by: PHYSICIAN ASSISTANT

## 2017-03-02 PROCEDURE — 99999 PR PBB SHADOW E&M-EST. PATIENT-LVL IV: CPT | Mod: PBBFAC,,, | Performed by: PHYSICIAN ASSISTANT

## 2017-03-02 PROCEDURE — 1159F MED LIST DOCD IN RCRD: CPT | Mod: S$GLB,,, | Performed by: PHYSICIAN ASSISTANT

## 2017-03-02 PROCEDURE — 1157F ADVNC CARE PLAN IN RCRD: CPT | Mod: S$GLB,,, | Performed by: PHYSICIAN ASSISTANT

## 2017-03-02 PROCEDURE — 87804 INFLUENZA ASSAY W/OPTIC: CPT | Mod: QW,S$GLB,, | Performed by: PHYSICIAN ASSISTANT

## 2017-03-02 RX ORDER — METHYLPREDNISOLONE 4 MG/1
4 TABLET ORAL DAILY
Qty: 21 TABLET | Refills: 0 | Status: SHIPPED | OUTPATIENT
Start: 2017-03-02 | End: 2017-03-11

## 2017-03-02 RX ORDER — AZITHROMYCIN 250 MG/1
TABLET, FILM COATED ORAL
Qty: 6 TABLET | Refills: 0 | Status: SHIPPED | OUTPATIENT
Start: 2017-03-02 | End: 2017-03-07

## 2017-03-02 RX ORDER — PROMETHAZINE HYDROCHLORIDE AND CODEINE PHOSPHATE 6.25; 1 MG/5ML; MG/5ML
5 SOLUTION ORAL EVERY 4 HOURS PRN
Qty: 180 ML | Refills: 0 | Status: SHIPPED | OUTPATIENT
Start: 2017-03-02 | End: 2017-03-11

## 2017-03-02 NOTE — PROGRESS NOTES
Subjective:       Patient ID: Maximilian Tavera Jr. is a 71 y.o. male.    Chief Complaint: Cough; Sore Throat; and Headache    Cough   This is a new problem. The current episode started yesterday. The problem has been rapidly worsening. The cough is productive of sputum. Associated symptoms include chills, ear congestion, ear pain, a fever, headaches, nasal congestion, postnasal drip, rhinorrhea, a sore throat and wheezing. Pertinent negatives include no chest pain, eye redness or shortness of breath. The symptoms are aggravated by lying down and exercise. He has tried OTC cough suppressant, ipratropium inhaler and steroid inhaler for the symptoms. The treatment provided mild relief. His past medical history is significant for asthma.     Review of Systems   Constitutional: Positive for chills and fever. Negative for activity change, appetite change and fatigue.   HENT: Positive for congestion, ear pain, postnasal drip, rhinorrhea, sinus pressure and sore throat. Negative for ear discharge, facial swelling, hearing loss, mouth sores, nosebleeds, tinnitus and trouble swallowing.    Eyes: Negative for discharge, redness and visual disturbance.   Respiratory: Positive for cough and wheezing. Negative for chest tightness and shortness of breath.    Cardiovascular: Negative for chest pain, palpitations and leg swelling.   Gastrointestinal: Negative for abdominal pain, nausea and vomiting.   Musculoskeletal: Negative for neck stiffness.   Neurological: Positive for headaches.       Objective:      Physical Exam   Constitutional: He appears well-developed and well-nourished. No distress.   HENT:   Head: Normocephalic and atraumatic.   Right Ear: External ear normal.   Left Ear: External ear normal.   Mouth/Throat: Uvula is midline and mucous membranes are normal. No uvula swelling. No oropharyngeal exudate, posterior oropharyngeal edema, posterior oropharyngeal erythema or tonsillar abscesses.   Eyes: Conjunctivae and EOM  are normal. Pupils are equal, round, and reactive to light. Right eye exhibits no discharge. Left eye exhibits no discharge.   Neck: Normal range of motion. Neck supple. No thyromegaly present.   Cardiovascular: Normal rate, regular rhythm and normal heart sounds.  Exam reveals no gallop and no friction rub.    No murmur heard.  Pulmonary/Chest: Effort normal and breath sounds normal. No respiratory distress. He has no wheezes. He has no rales.   Abdominal: Soft. Bowel sounds are normal. There is no tenderness.   Lymphadenopathy:     He has no cervical adenopathy.   Skin: He is not diaphoretic.       Assessment:       1. Cough    2. Bronchitis        Plan:       Maximilian was seen today for cough, sore throat and headache.    Diagnoses and all orders for this visit:    Cough  -     POCT Influenza A/B    Bronchitis  -     methylPREDNISolone (MEDROL, COLLEEN,) 4 mg tablet; Take 1 tablet (4 mg total) by mouth once daily. follow package directions  -     azithromycin (Z-COLLEEN) 250 MG tablet; Take 2 tablets by mouth on day 1; Take 1 tablet by mouth on days 2-5    Promethazine with Codeine

## 2017-03-02 NOTE — PROGRESS NOTES
Pre-Visit Chart Review  For Appointment Scheduled on 03/02/2017      Health Maintenance Due   Topic Date Due    Pneumococcal (65+) (2 of 2 - PPSV23) 03/01/2017    Colonoscopy  03/01/2017

## 2017-03-02 NOTE — TELEPHONE ENCOUNTER
Returned patient's call. He has a severe cold and has to cancel the echocardiogram that was scheduled for tomorrow (has a severe cough). He will call us when he feels better to reschedule.

## 2017-03-02 NOTE — TELEPHONE ENCOUNTER
----- Message from Obdulia Monahan MA sent at 3/2/2017  8:17 AM CST -----  Contact: Pt called  Please call patient at [phone].  He would like to speak to you in reference to being scheudled for an Echo on 03/03/2017.  He states he have a cold and is coughing a lot.  He can't lie still.  He would like to know whether or not he should reschedule his appointment.  LOV 01/31/2017    Thank You,  Marcia

## 2017-03-11 ENCOUNTER — HOSPITAL ENCOUNTER (OUTPATIENT)
Dept: RADIOLOGY | Facility: CLINIC | Age: 72
Discharge: HOME | End: 2017-03-11
Attending: FAMILY MEDICINE
Payer: MEDICARE

## 2017-03-11 ENCOUNTER — OFFICE VISIT (OUTPATIENT)
Dept: FAMILY MEDICINE | Facility: CLINIC | Age: 72
End: 2017-03-11
Payer: MEDICARE

## 2017-03-11 VITALS
HEART RATE: 69 BPM | BODY MASS INDEX: 30.71 KG/M2 | TEMPERATURE: 98 F | DIASTOLIC BLOOD PRESSURE: 76 MMHG | WEIGHT: 232.81 LBS | SYSTOLIC BLOOD PRESSURE: 134 MMHG

## 2017-03-11 DIAGNOSIS — J40 BRONCHITIS: ICD-10-CM

## 2017-03-11 DIAGNOSIS — J40 BRONCHITIS: Primary | ICD-10-CM

## 2017-03-11 PROCEDURE — 1157F ADVNC CARE PLAN IN RCRD: CPT | Mod: S$GLB,,, | Performed by: FAMILY MEDICINE

## 2017-03-11 PROCEDURE — 99999 PR PBB SHADOW E&M-EST. PATIENT-LVL III: CPT | Mod: PBBFAC,,, | Performed by: FAMILY MEDICINE

## 2017-03-11 PROCEDURE — 99214 OFFICE O/P EST MOD 30 MIN: CPT | Mod: S$GLB,,, | Performed by: FAMILY MEDICINE

## 2017-03-11 PROCEDURE — 71020 XR CHEST PA AND LATERAL: CPT | Mod: TC,PO

## 2017-03-11 PROCEDURE — 3078F DIAST BP <80 MM HG: CPT | Mod: S$GLB,,, | Performed by: FAMILY MEDICINE

## 2017-03-11 PROCEDURE — 3075F SYST BP GE 130 - 139MM HG: CPT | Mod: S$GLB,,, | Performed by: FAMILY MEDICINE

## 2017-03-11 PROCEDURE — 1160F RVW MEDS BY RX/DR IN RCRD: CPT | Mod: S$GLB,,, | Performed by: FAMILY MEDICINE

## 2017-03-11 PROCEDURE — 1159F MED LIST DOCD IN RCRD: CPT | Mod: S$GLB,,, | Performed by: FAMILY MEDICINE

## 2017-03-11 PROCEDURE — 71020 XR CHEST PA AND LATERAL: CPT | Mod: 26,,, | Performed by: RADIOLOGY

## 2017-03-11 PROCEDURE — 1126F AMNT PAIN NOTED NONE PRSNT: CPT | Mod: S$GLB,,, | Performed by: FAMILY MEDICINE

## 2017-03-11 RX ORDER — AMOXICILLIN AND CLAVULANATE POTASSIUM 500; 125 MG/1; MG/1
1 TABLET, FILM COATED ORAL 3 TIMES DAILY
Qty: 30 TABLET | Refills: 0 | Status: SHIPPED | OUTPATIENT
Start: 2017-03-11 | End: 2017-03-22 | Stop reason: SDUPTHER

## 2017-03-11 RX ORDER — PROMETHAZINE HYDROCHLORIDE AND CODEINE PHOSPHATE 6.25; 1 MG/5ML; MG/5ML
5 SOLUTION ORAL EVERY 4 HOURS PRN
Qty: 180 ML | Refills: 0 | Status: SHIPPED | OUTPATIENT
Start: 2017-03-11 | End: 2017-03-22 | Stop reason: SDUPTHER

## 2017-03-11 NOTE — PATIENT INSTRUCTIONS
Bronchitis, Antibiotic Treatment (Adult)    Bronchitis is an infection of the air passages (bronchial tubes) in your lungs. It often occurs when you have a cold. This illness is contagious during the first few days and is spread through the air by coughing and sneezing, or by direct contact (touching the sick person and then touching your own eyes, nose, or mouth).  Symptoms of bronchitis include cough with mucus (phlegm) and low-grade fever. Bronchitis usually lasts 7 to 14 days. Mild cases can be treated with simple home remedies. More severe infection is treated with an antibiotic.  Home care  Follow these guidelines when caring for yourself at home:  · If your symptoms are severe, rest at home for the first 2 to 3 days. When you go back to your usual activities, don't let yourself get too tired.  · Do not smoke. Also avoid being exposed to secondhand smoke.  · You may use over-the-counter medicines to control fever or pain, unless another medicine was prescribed. (Note: If you have chronic liver or kidney disease or have ever had a stomach ulcer or gastrointestinal bleeding, talk with your healthcare provider before using these medicines. Also talk to your provider if you are taking medicine to prevent blood clots.) Aspirin should never be given to anyone younger than 18 years of age who is ill with a viral infection or fever. It may cause severe liver or brain damage.  · Your appetite may be poor, so a light diet is fine. Avoid dehydration by drinking 6 to 8 glasses of fluids per day (such as water, soft drinks, sports drinks, juices, tea, or soup). Extra fluids will help loosen secretions in the nose and lungs.  · Over-the-counter cough, cold, and sore-throat medicines will not shorten the length of the illness, but they may be helpful to reduce symptoms. (Note: Do not use decongestants if you have high blood pressure.)  · Finish all antibiotic medicine. Do this even if you are feeling better after only a  few days.  Follow-up care  Follow up with your healthcare provider, or as advised. If you had an X-ray or ECG (electrocardiogram), a specialist will review it. You will be notified of any new findings that may affect your care.  Note: If you are age 65 or older, or if you have a chronic lung disease or condition that affects your immune system, or you smoke, talk to your healthcare provider about having pneumococcal vaccinations and a yearly influenza vaccination (flu shot).  When to seek medical advice  Call your healthcare provider right away if any of these occur:  · Fever of 100.4°F (38°C) or higher  · Coughing up increased amounts of colored sputum  · Weakness, drowsiness, headache, facial pain, ear pain, or a stiff neck  Call 911, or get immediate medical care  Contact emergency services right away if any of these occur.  · Coughing up blood  · Worsening weakness, drowsiness, headache, or stiff neck  · Trouble breathing, wheezing, or pain with breathing  Date Last Reviewed: 9/13/2015 © 2000-2016 [employer]. [address] [place] [address], [place] [address]. All rights reserved. This information is not intended as a substitute for professional medical care. Always follow your healthcare professional's instructions.

## 2017-03-11 NOTE — PROGRESS NOTES
SUBJECTIVE:   Maximilian Tavera Jr. is a 71 y.o. male who complains of coryza, sore throat and productive cough for 15 days. He denies a history of anorexia, chest pain, dizziness, fatigue and fevers. He denies a history of asthma. Patient does not smoke cigarettes.     OBJECTIVE:  Vitals as noted above.  Appearance: alert, well appearing, and in no distress, oriented to person, place, and time and acyanotic, in no respiratory distress.   ENT- ENT exam normal, no neck nodes or sinus tenderness, neck without nodes, throat normal without erythema or exudate and nasal mucosa congested.   Chest - clear to auscultation, no wheezes, rales or rhonchi, symmetric air entry, no tachypnea, retractions or cyanosis.    ASSESSMENT:   bronchitis  Bronchitis  -     X-Ray Chest PA And Lateral; Future  -     amoxicillin-clavulanate 500-125mg (AUGMENTIN) 500-125 mg Tab; Take 1 tablet (500 mg total) by mouth 3 (three) times daily.  Dispense: 30 tablet; Refill: 0  -     promethazine-codeine 6.25-10 mg/5 ml (PHENERGAN WITH CODEINE) 6.25-10 mg/5 mL syrup; Take 5 mLs by mouth every 4 (four) hours as needed for Cough.  Dispense: 180 mL; Refill: 0        PLAN:  Symptomatic therapy suggested: push fluids, rest, gargle warm salt water and return office visit prn if symptoms persist or worsen. Call or return to clinic prn if these symptoms worsen or fail to improve as anticipated.

## 2017-03-11 NOTE — MR AVS SNAPSHOT
[place] [facility]  [address]  [place] [address]  Phone: [phone]  Fax: [phone]                  Maximilian Tavera Jr.   3/11/2017 9:00 AM   Office Visit    Description:  Male : 1945   Provider:  Brian Marroquin MD   Department:  [place] [facility]           Reason for Visit     Cough     Sore Throat     Hypertension           Diagnoses this Visit        Comments    Bronchitis    -  Primary            To Do List           Future Appointments        Provider Department Dept Phone    3/21/2017 10:00 AM Lance Gill MD [facility] [phone]    3/22/2017 10:00 AM Michelle Tejeda PA-C [place] [facility] [phone]    2017 8:00 AM Brian Marroquin MD [facility] [phone]      Goals (5 Years of Data)     None       These Medications        Disp Refills Start End    amoxicillin-clavulanate 500-125mg (AUGMENTIN) 500-125 mg Tab 30 tablet 0 3/11/2017 3/21/2017    Take 1 tablet (500 mg total) by mouth 3 (three) times daily. - Oral    Pharmacy: [facility] [address] [place], [place] [address], [place] [address] [facility] [address] [place] [address] Ph #: [phone]       promethazine-codeine 6.25-10 mg/5 ml (PHENERGAN WITH CODEINE) 6.25-10 mg/5 mL syrup 180 mL 0 3/11/2017 3/21/2017    Take 5 mLs by mouth every 4 (four) hours as needed for Cough. - Oral    Pharmacy: [facility] [id number]  [place], [place] - [address] [facility] [address] Ph #: [phone]         [facility]     [facility] Nurse Care Line -  Assistance  Registered nurses in the [facility] provide clinical advisement, health education, appointment booking, and other advisory services.  Call for this free service at [phone].             Medications           Message regarding Medications     Verify the changes and/or additions to your medication regime listed below are the same as discussed with your clinician today.  If any of  these changes or additions are incorrect, please notify your healthcare provider.        START taking these NEW medications        Refills    amoxicillin-clavulanate 500-125mg (AUGMENTIN) 500-125 mg Tab 0    Sig: Take 1 tablet (500 mg total) by mouth 3 (three) times daily.    Class: Normal    Route: Oral      STOP taking these medications     methylPREDNISolone (MEDROL, COLLEEN,) 4 mg tablet Take 1 tablet (4 mg total) by mouth once daily. follow package directions           Verify that the below list of medications is an accurate representation of the medications you are currently taking.  If none reported, the list may be blank. If incorrect, please contact your healthcare provider. Carry this list with you in case of emergency.           Current Medications     atorvastatin (LIPITOR) 80 MG tablet Take 1 tablet (80 mg total) by mouth once daily. Every day    calcium citrate-vitamin D (CITRACAL + D) 315-200 mg-unit per tablet Twice a day    carvedilol (COREG) 25 MG tablet Take .5-1 tablets by mouth twice daily or as directed    cetirizine (ZYRTEC) 10 MG tablet Take 1 tablet by mouth daily as needed.    chlorthalidone (HYGROTEN) 25 MG Tab TAKE ONE TABLET BY MOUTH ONCE DAILY.    cholecalciferol, vitamin D3, (VITAMIN D) 2,000 unit Cap Every day    coenzyme Q10 (CO Q-10) 100 mg capsule Take 400 mg by mouth once daily.     cyanocobalamin, vitamin B-12, (VITAMIN B-12) 5,000 mcg Subl Every day    fluticasone (FLONASE) 50 mcg/actuation nasal spray 1 spray by Each Nare route 2 (two) times daily as needed for Rhinitis.    gabapentin (NEURONTIN) 300 MG capsule Start 1 cap at night for 5 days, then bid thereafter.    ibuprofen (ADVIL,MOTRIN) 800 MG tablet Take 1 tablet (800 mg total) by mouth daily as needed for Pain.    levothyroxine (SYNTHROID) 50 MCG tablet Take 1 tablet (50 mcg total) by mouth once daily.    lisinopril (PRINIVIL,ZESTRIL) 40 MG tablet Take 1 tablet (40 mg total) by mouth once daily.    methylsulfonylmethane  (MSM) 1,000 mg Tab Every day    milk thistle 200 mg Cap Twice a day    omega-3 fatty acids 1,000 mg Cap Twice a day    potassium chloride (KLOR-CON) 8 MEQ TbSR Take 1 tablet (8 mEq total) by mouth 2 (two) times daily.    sildenafil (VIAGRA) 100 MG tablet Take 1 tablet (100 mg total) by mouth daily as needed for Erectile Dysfunction. Every day PRN    amoxicillin-clavulanate 500-125mg (AUGMENTIN) 500-125 mg Tab Take 1 tablet (500 mg total) by mouth 3 (three) times daily.    promethazine-codeine 6.25-10 mg/5 ml (PHENERGAN WITH CODEINE) 6.25-10 mg/5 mL syrup Take 5 mLs by mouth every 4 (four) hours as needed for Cough.           Clinical Reference Information           Your Vitals Were     BP Pulse Temp Weight BMI    134/76 (BP Location: Right arm, Patient Position: Sitting, BP Method: Automatic) 69 98.3 °F (36.8 °C) (Oral) 105.6 kg (232 lb 12.9 oz) 30.71 kg/m2      Blood Pressure          Most Recent Value    BP  134/76      Allergies as of 3/11/2017     No Known Drug Allergies      Immunizations Administered on Date of Encounter - 3/11/2017     None      Orders Placed During Today's Visit     Future Labs/Procedures Expected by Expires    X-Ray Chest PA And Lateral  3/11/2017 3/11/2018      Instructions      Bronchitis, Antibiotic Treatment (Adult)    Bronchitis is an infection of the air passages (bronchial tubes) in your lungs. It often occurs when you have a cold. This illness is contagious during the first few days and is spread through the air by coughing and sneezing, or by direct contact (touching the sick person and then touching your own eyes, nose, or mouth).  Symptoms of bronchitis include cough with mucus (phlegm) and low-grade fever. Bronchitis usually lasts 7 to 14 days. Mild cases can be treated with simple home remedies. More severe infection is treated with an antibiotic.  Home care  Follow these guidelines when caring for yourself at home:  · If your symptoms are severe, rest at home for the first 2  to 3 days. When you go back to your usual activities, don't let yourself get too tired.  · Do not smoke. Also avoid being exposed to secondhand smoke.  · You may use over-the-counter medicines to control fever or pain, unless another medicine was prescribed. (Note: If you have chronic liver or kidney disease or have ever had a stomach ulcer or gastrointestinal bleeding, talk with your healthcare provider before using these medicines. Also talk to your provider if you are taking medicine to prevent blood clots.) Aspirin should never be given to anyone younger than 18 years of age who is ill with a viral infection or fever. It may cause severe liver or brain damage.  · Your appetite may be poor, so a light diet is fine. Avoid dehydration by drinking 6 to 8 glasses of fluids per day (such as water, soft drinks, sports drinks, juices, tea, or soup). Extra fluids will help loosen secretions in the nose and lungs.  · Over-the-counter cough, cold, and sore-throat medicines will not shorten the length of the illness, but they may be helpful to reduce symptoms. (Note: Do not use decongestants if you have high blood pressure.)  · Finish all antibiotic medicine. Do this even if you are feeling better after only a few days.  Follow-up care  Follow up with your healthcare provider, or as advised. If you had an X-ray or ECG (electrocardiogram), a specialist will review it. You will be notified of any new findings that may affect your care.  Note: If you are age 65 or older, or if you have a chronic lung disease or condition that affects your immune system, or you smoke, talk to your healthcare provider about having pneumococcal vaccinations and a yearly influenza vaccination (flu shot).  When to seek medical advice  Call your healthcare provider right away if any of these occur:  · Fever of 100.4°F (38°C) or higher  · Coughing up increased amounts of colored sputum  · Weakness, drowsiness, headache, facial pain, ear pain, or a  stiff neck  Call 911, or get immediate medical care  Contact emergency services right away if any of these occur.  · Coughing up blood  · Worsening weakness, drowsiness, headache, or stiff neck  · Trouble breathing, wheezing, or pain with breathing  Date Last Reviewed: 9/13/2015  © 3009-8644 [employer]. [address] [place] [address]. All rights reserved. This information is not intended as a substitute for professional medical care. Always follow your healthcare professional's instructions.             Language Assistance Services     ATTENTION: Language assistance services are available, free of charge. Please call [phone].      ATENCIÓN: Si habla español, tiene a mojica disposición servicios gratuitos de asistencia lingüística. Llame al [phone].     CHÚ Ý: N?u b?n nói Ti?ng Vi?t, có các d?ch v? h? tr? ngôn ng? mi?n phí dành cho b?n. G?i s? [phone].         [facility] complies with applicable Federal civil rights laws and does not discriminate on the basis of race, color, national origin, age, disability, or sex.

## 2017-03-21 ENCOUNTER — OFFICE VISIT (OUTPATIENT)
Dept: NEUROLOGY | Facility: CLINIC | Age: 72
End: 2017-03-21
Payer: MEDICARE

## 2017-03-21 VITALS
SYSTOLIC BLOOD PRESSURE: 118 MMHG | WEIGHT: 232.38 LBS | HEIGHT: 73 IN | DIASTOLIC BLOOD PRESSURE: 71 MMHG | BODY MASS INDEX: 30.8 KG/M2 | HEART RATE: 65 BPM

## 2017-03-21 DIAGNOSIS — G70.00 MYASTHENIA GRAVIS: Primary | ICD-10-CM

## 2017-03-21 PROCEDURE — 3078F DIAST BP <80 MM HG: CPT | Mod: S$GLB,,, | Performed by: PSYCHIATRY & NEUROLOGY

## 2017-03-21 PROCEDURE — 1157F ADVNC CARE PLAN IN RCRD: CPT | Mod: S$GLB,,, | Performed by: PSYCHIATRY & NEUROLOGY

## 2017-03-21 PROCEDURE — 1126F AMNT PAIN NOTED NONE PRSNT: CPT | Mod: S$GLB,,, | Performed by: PSYCHIATRY & NEUROLOGY

## 2017-03-21 PROCEDURE — 99214 OFFICE O/P EST MOD 30 MIN: CPT | Mod: S$GLB,,, | Performed by: PSYCHIATRY & NEUROLOGY

## 2017-03-21 PROCEDURE — 99999 PR PBB SHADOW E&M-EST. PATIENT-LVL III: CPT | Mod: PBBFAC,,, | Performed by: PSYCHIATRY & NEUROLOGY

## 2017-03-21 PROCEDURE — 3074F SYST BP LT 130 MM HG: CPT | Mod: S$GLB,,, | Performed by: PSYCHIATRY & NEUROLOGY

## 2017-03-21 PROCEDURE — 1159F MED LIST DOCD IN RCRD: CPT | Mod: S$GLB,,, | Performed by: PSYCHIATRY & NEUROLOGY

## 2017-03-21 PROCEDURE — 1160F RVW MEDS BY RX/DR IN RCRD: CPT | Mod: S$GLB,,, | Performed by: PSYCHIATRY & NEUROLOGY

## 2017-03-21 PROCEDURE — 99499 UNLISTED E&M SERVICE: CPT | Mod: S$GLB,,, | Performed by: PSYCHIATRY & NEUROLOGY

## 2017-03-21 NOTE — LETTER
March 21, 2017      Brian Marroquin MD  [address] [facility] [address]           [facility]  [address]  [place] [address]  Phone: [phone]  Fax: [phone]          Patient: Maximilian Tavera Jr.   MR Number: [id number]   YOB: 1945   Date of Visit: 3/21/2017       Dear Dr. Brian Marroquin:    Thank you for referring Maximilian Tavera to me for evaluation. Attached you will find relevant portions of my assessment and plan of care.    If you have questions, please do not hesitate to call me. I look forward to following Maximilian Tavera along with you.    Sincerely,    Lance Gill MD    Enclosure  CC:  No Recipients    If you would like to receive this communication electronically, please contact [email or web address] or [phone] to request more information on [employer] access.    For providers and/or their staff who would like to refer a patient to [facility], please contact us through our one-stop-shop provider referral line, [facility], at [phone].    If you feel you have received this communication in error or would no longer like to receive these types of communications, please e-mail [email or web address]

## 2017-03-21 NOTE — PROGRESS NOTES
"[facility], Department of Neurology   [facility] [place] [address]  Phone:[phone]  Fax: [phone]    Patient Name: Maximilian Tavera Jr.  : 1945  MRN:  [id number]    3/21/2017     chief complaint: myasthenia gravis     PCP: Brian Marroquin MD.    DX: Ab+ MG dx'ed 3/2015  Thymic status: CT chest 4/15 negative for thymoma   Maintenance: prednisone 7 mg qOD   Last dose change: 16: 8mg qOD -> 7mg qOD; : 10mg qOD to 8mg qOD; 6/15 prednisone 5 mg qd --> 10 mg q OD   Rescue: none   Prior immunemodulatory agents: none     Interval 3/21/17: Doing well from an MG standpoint on low-dose prednisone. He still has a occasional diplopia at night when looking down. Has rare choking - mostly liquids - when he's not paying attention. Had a number of URIs last year. Also noticed that his sense of taste is off, although sense of smell is preserved.     Interval history 16:  Doing well. Last visit, dropped prednisone from 20 to 8mg qOD. No new symptoms. Still having diplopia on downgaze and only rarely at other times. No chewing or swallowing issues.    Interval history 16  Doing well. Still with mild baseline diplopia and rare difficulty with swallowing (this does not last more than a few seconds). No issues with speaking, breathing, or generalized weakness. Heat "wears me out".       HPI 16  Maximilian Tavera Jr. is a 69 yo male with Ab+ myasthenia gravis. Doing well, stable. Occasional diplopia, mostly with laying back too far in his recliner while watching tv. Mentions 6-8 mos ago began noticing rhinorrhea with eating. Denies ptosis, difficulty chewing/swallowing/breathing. Denies weight changes, sweats, alternating diarrhea/constipation.     Prednisone 10 mg q OD    Interval hx  16:  Maximilian Tavera Jr. is a 69 yo  male with myasthenia gravis. Still has occasional diplopia, but is improved- occuring less frequently. Occurs most when watching TV. Occasional "funny " "feeling" when drinking soft drinks. Denies weakness, SOB, dysarthria.     Taking prednisone 5mg/day.   Stopped Mestion- GI side effects     Interval history 6/17/15:  Taking mestinon at 1/2, 1/2, 1 and not finding much improvement in diplopia, and having a decent amount of loose stools and gas.     HPI: Maximilian Tavera Jr. is a 69 y.o. male with 7-8mos of diplopia. Was seen by Dr Jimenez, who did some blood work and found that he was Ab+ for AchR. He was referred here for further care.     No prior symptoms. No difficulty chewing/swallowing, breathing. Arm/leg strength is fine. No orthopnea.     Diplopia is most noticeable when reading or watching tv. Driving is occasionally problematic. No clearly diurnal. No real problem with ptosis.     He has not tried medications for this. He has prisms, which help.     Looks like he started carvedilol in 3/14.     Medications:   Current Outpatient Prescriptions   Medication Sig Dispense Refill    amoxicillin-clavulanate 500-125mg (AUGMENTIN) 500-125 mg Tab Take 1 tablet (500 mg total) by mouth 3 (three) times daily. 30 tablet 0    atorvastatin (LIPITOR) 80 MG tablet Take 1 tablet (80 mg total) by mouth once daily. Every day 90 tablet 3    calcium citrate-vitamin D (CITRACAL + D) 315-200 mg-unit per tablet Twice a day      carvedilol (COREG) 25 MG tablet Take .5-1 tablets by mouth twice daily or as directed 180 tablet 3    cetirizine (ZYRTEC) 10 MG tablet Take 1 tablet by mouth daily as needed.      chlorthalidone (HYGROTEN) 25 MG Tab TAKE ONE TABLET BY MOUTH ONCE DAILY. 90 tablet 3    cholecalciferol, vitamin D3, (VITAMIN D) 2,000 unit Cap Every day      coenzyme Q10 (CO Q-10) 100 mg capsule Take 400 mg by mouth once daily.       cyanocobalamin, vitamin B-12, (VITAMIN B-12) 5,000 mcg Subl Every day      fluticasone (FLONASE) 50 mcg/actuation nasal spray 1 spray by Each Nare route 2 (two) times daily as needed for Rhinitis. 3 Bottle 3    gabapentin (NEURONTIN) " 300 MG capsule Start 1 cap at night for 5 days, then bid thereafter. 90 capsule 11    ibuprofen (ADVIL,MOTRIN) 800 MG tablet Take 1 tablet (800 mg total) by mouth daily as needed for Pain. 30 tablet 11    levothyroxine (SYNTHROID) 50 MCG tablet Take 1 tablet (50 mcg total) by mouth once daily. 90 tablet 3    lisinopril (PRINIVIL,ZESTRIL) 40 MG tablet Take 1 tablet (40 mg total) by mouth once daily. 90 tablet 3    methylsulfonylmethane (MSM) 1,000 mg Tab Every day      milk thistle 200 mg Cap Twice a day      omega-3 fatty acids 1,000 mg Cap Twice a day      potassium chloride (KLOR-CON) 8 MEQ TbSR Take 1 tablet (8 mEq total) by mouth 2 (two) times daily. 180 tablet 3    promethazine-codeine 6.25-10 mg/5 ml (PHENERGAN WITH CODEINE) 6.25-10 mg/5 mL syrup Take 5 mLs by mouth every 4 (four) hours as needed for Cough. 180 mL 0    sildenafil (VIAGRA) 100 MG tablet Take 1 tablet (100 mg total) by mouth daily as needed for Erectile Dysfunction. Every day PRN 10 tablet 11    [DISCONTINUED] esomeprazole (NEXIUM) 40 MG capsule Take 1 capsule (40 mg total) by mouth before breakfast. 30 capsule 0     No current facility-administered medications for this visit.        Allergies:  Review of patient's allergies indicates:   Allergen Reactions    No known drug allergies        PMHx:  Past Medical History:   Diagnosis Date    Arthritis     Back pain     Cataract     Chest pain, musculoskeletal     Hyperlipidemia     Hypertension     Hypothyroidism     Knee fracture     Myasthenia gravis     Polyneuropathy     Squamous cell carcinoma 2014    left forearm    Thyroid disease      Past Surgical History:   Procedure Laterality Date    APPENDECTOMY      CATARACT EXTRACTION W/  INTRAOCULAR LENS IMPLANT Bilateral     HEMORRHOID SURGERY      KNEE ARTHROPLASTY Bilateral     NECK SURGERY      TONSILLECTOMY         Fhx:  Family History   Problem Relation Age of Onset    Cataracts Mother     Heart disease Mother      " CHF    Hypertension Mother     Hyperlipidemia Mother     Cataracts Father     Glaucoma Father     Heart disease Father     Hyperlipidemia Sister     Hypertension Sister     No Known Problems Daughter     No Known Problems Daughter     Collagen disease Neg Hx     Amblyopia Neg Hx     Blindness Neg Hx     Macular degeneration Neg Hx     Retinal detachment Neg Hx     Strabismus Neg Hx     Cancer Neg Hx        Shx:   Social History     Social History    Marital status:      Spouse name: N/A    Number of children: N/A    Years of education: N/A     Occupational History    Not on file.     Social History Main Topics    Smoking status: Passive Smoke Exposure - Never Smoker    Smokeless tobacco: Never Used      Comment: when a child    Alcohol use Yes      Comment: rarely    Drug use: No    Sexual activity: Yes     Partners: Female     Other Topics Concern    Not on file     Social History Narrative       ROS:  Review of Systems (Questions asked; positives or additions noted in BOLD)  Gen Malaise/fatigue, weight change   HEENT Hearing loss, blurred vision, diplopia   Card CP, palpitations   Pulm SOB, cough    Vasc Easy bruising, easy bleeding    GI Nausea, vomiting, constipation    Frequency, urgency   M/S Joint pain, myalgias, falls    Endocrine Temperature intolerance, polyuria, polydipsia   Neuro Dizziness, tremors, seizures, focal weakness, Others- as noted in HPI   Psych Memory loss, depression, anxiety, hallucinations     PHYSICAL EXAM:     PHYSICAL EXAM:   /71  Pulse 65  Ht 6' 1" (1.854 m)  Wt 105.4 kg (232 lb 5.8 oz)  BMI 30.66 kg/m2   GEN:  NAD, well-developed, well-groomed.  HEENT: No cervical lymphadenopathy noted.  CARDIOVASCULAR: Radial pulses 2+ bilaterally. No LE edema noted.  NEURO:  Mental Status: Awake, alert, and oriented. Normal attention and concentration.  Speech is fluent and appropriate language function.    Cranial Nerves:  II Pupils are round and " reactive to direct and consensual light and accommodation. Visual fields full to confrontation.   III, IV, VI Extraocular eye movements full bilaterally. No ptosis noted. He has mild orbicularis oculi bilaterally.   V Normal facial sensation to pinprick and light touch.   VII Symmetric facial expressions.   VIII Hearing intact to finger rub bilaterally.   IX, X Palate elevates midline and symmetrically.   XI Shoulder elevation is symmetric and full strength bilaterally. Neck rotation strength is normal bilaterally.   XII Tongue is midline.     Sensory: Sensation is normal to light touch in the upper and lower extremities bilaterally.    Motor: Extremities demonstrate normal bulk and tone in all four limbs. Mild left ocular weakness with sustained upward gaze. Good orbicularis oculi and orbicularis oris strength.  Strength-       RUE: 5/5                LUE: 5/5                RLE: 5/5                LLE: 5/5     Deep Tendon Relfexes: 2+ and symmetric in the upper and lower extremities bilaterally. Babinski mute bilaterally.    Coordination: Finger to nose WNL.     Gait: Normal casual gait.      ASSESSMENT:     ICD-10-CM ICD-9-CM   1. Myasthenia gravis G70.00 358.00       PLAN:  Ab+ MG dx'ed 3/15, stable.     -maintain prednisone 7mg q OD  -RTC 5 mos      Jam Gill MD, AURA, FAAN  Department of Neurology   [facility] [place], [place]

## 2017-03-22 ENCOUNTER — OFFICE VISIT (OUTPATIENT)
Dept: PAIN MEDICINE | Facility: CLINIC | Age: 72
End: 2017-03-22
Payer: MEDICARE

## 2017-03-22 VITALS
WEIGHT: 232 LBS | HEART RATE: 69 BPM | DIASTOLIC BLOOD PRESSURE: 68 MMHG | BODY MASS INDEX: 30.75 KG/M2 | SYSTOLIC BLOOD PRESSURE: 108 MMHG | HEIGHT: 73 IN

## 2017-03-22 DIAGNOSIS — M54.16 LUMBAR RADICULOPATHY: Primary | ICD-10-CM

## 2017-03-22 DIAGNOSIS — M51.36 DDD (DEGENERATIVE DISC DISEASE), LUMBAR: ICD-10-CM

## 2017-03-22 PROCEDURE — 99999 PR PBB SHADOW E&M-EST. PATIENT-LVL III: CPT | Mod: PBBFAC,,, | Performed by: PHYSICIAN ASSISTANT

## 2017-03-22 PROCEDURE — 3074F SYST BP LT 130 MM HG: CPT | Mod: S$GLB,,, | Performed by: PHYSICIAN ASSISTANT

## 2017-03-22 PROCEDURE — 1157F ADVNC CARE PLAN IN RCRD: CPT | Mod: S$GLB,,, | Performed by: PHYSICIAN ASSISTANT

## 2017-03-22 PROCEDURE — 1159F MED LIST DOCD IN RCRD: CPT | Mod: S$GLB,,, | Performed by: PHYSICIAN ASSISTANT

## 2017-03-22 PROCEDURE — 3078F DIAST BP <80 MM HG: CPT | Mod: S$GLB,,, | Performed by: PHYSICIAN ASSISTANT

## 2017-03-22 PROCEDURE — 1125F AMNT PAIN NOTED PAIN PRSNT: CPT | Mod: S$GLB,,, | Performed by: PHYSICIAN ASSISTANT

## 2017-03-22 PROCEDURE — 1160F RVW MEDS BY RX/DR IN RCRD: CPT | Mod: S$GLB,,, | Performed by: PHYSICIAN ASSISTANT

## 2017-03-22 PROCEDURE — 99214 OFFICE O/P EST MOD 30 MIN: CPT | Mod: S$GLB,,, | Performed by: PHYSICIAN ASSISTANT

## 2017-03-22 NOTE — PROGRESS NOTES
PCP: Brian Marroquin MD      CC: right leg pain    Interval history: Mr. Tavera is a 71 y.o. male with lumbar radiculopathy who presents today for f/u s/p right L4-5 and L5-S1 TFESI on 2/21/17 and reports 70% relief of his low back and right leg pain.  Previous SONIA provided him relief for over a year.     Pain was throbbing in  posterior thigh and traveled down to his ankle.  He didn't have any lower back pain.  No leg weakness or numbness.   Pain is relieved with rest.  He takes ibuprofen with mild benefits.  Pain today is rated 3/10.  Prior HPI:   Mr. Tavera is a 68 year old male with PMH of HTN referred by Dr. Campbell for right leg pain.  He states having constant right leg pain for the past two years.  Pain is a throbbing pain in her posterior thigh and travels down to his ankle.  He denies any lower back pain.  No leg weakness or numbness.  Pain worsens with standing and walking.  Pain is relieved with rest.  He takes ibuprofen with mild benefits.  Physical therapy has not been helpful.      Pain intervention history: s/p right L4-5 and L5-S1 TFESI on 10/9/2014 and reports 75% relief of his low back and right leg pain.   s/p right L4-5 and L5-S1 TFESI on 2/21/17 and reports 70% relief of his right leg pain.  ROS:  CONSTITUTIONAL: No fevers, chills, night sweats, wt. loss, appetite changes  SKIN: no rashes or itching  ENT: No headaches, head trauma, vision changes, or eye pain  LYMPH NODES: None noticed   CV: No chest pain, palpitations.   RESP: No shortness of breath, dyspnea on exertion, cough, wheezing, or hemoptysis  GI: No nausea, emesis, diarrhea, constipation, melena, hematochezia, pain.    : No dysuria, hematuria, urgency, or frequency   HEME: No easy bruising, bleeding problems  PSYCHIATRIC: No depression, anxiety, psychosis, hallucinations.  NEURO: No seizures, memory loss, dizziness or difficulty sleeping  MSK: + right leg pain      Past Medical History:   Diagnosis Date    Arthritis     Back pain  "    Cataract     Chest pain, musculoskeletal     Hyperlipidemia     Hypertension     Hypothyroidism     Knee fracture     Myasthenia gravis     Polyneuropathy     Squamous cell carcinoma 2014    left forearm    Thyroid disease      Past Surgical History:   Procedure Laterality Date    APPENDECTOMY      CATARACT EXTRACTION W/  INTRAOCULAR LENS IMPLANT Bilateral     HEMORRHOID SURGERY      KNEE ARTHROPLASTY Bilateral     NECK SURGERY      TONSILLECTOMY       Family History   Problem Relation Age of Onset    Cataracts Mother     Heart disease Mother      CHF    Hypertension Mother     Hyperlipidemia Mother     Cataracts Father     Glaucoma Father     Heart disease Father     Hyperlipidemia Sister     Hypertension Sister     No Known Problems Daughter     No Known Problems Daughter     Collagen disease Neg Hx     Amblyopia Neg Hx     Blindness Neg Hx     Macular degeneration Neg Hx     Retinal detachment Neg Hx     Strabismus Neg Hx     Cancer Neg Hx      Social History     Social History    Marital status:      Spouse name: N/A    Number of children: N/A    Years of education: N/A     Social History Main Topics    Smoking status: Passive Smoke Exposure - Never Smoker    Smokeless tobacco: Never Used      Comment: when a child    Alcohol use Yes      Comment: rarely    Drug use: No    Sexual activity: Yes     Partners: Female     Other Topics Concern    None     Social History Narrative         Medications/Allergies: See med card    Vitals:    03/22/17 1003   BP: 108/68   Pulse: 69   Weight: 105.2 kg (232 lb)   Height: 6' 1" (1.854 m)   PainSc:   3   PainLoc: Back         Physical exam:    GENERAL: A and O x3, the patient appears well groomed and is in no acute distress.  Skin: No rashes or obvious lesions  HEENT: normocephalic, atraumatic  CARDIOVASCULAR:  RRR  LUNGS: non labored breathing  ABDOMEN: soft, nontender   UPPER EXTREMITIES: Normal alignment, normal range of " motion, no atrophy, no skin changes,  hair growth and nail growth normal and equal bilaterally. No swelling, no tenderness.    LOWER EXTREMITIES:  Normal alignment, normal range of motion, no atrophy, no skin changes,  hair growth and nail growth normal and equal bilaterally. No swelling, no tenderness.    LUMBAR SPINE  Lumbar spine: ROM is full with flexion extension and oblique extension with no increased pain.    Porter's test causes no increased pain on either side.    Supine straight leg raise is negative bilaterally.    Internal and external rotation of the hip causes no increased pain on either side.  Myofascial exam: No tenderness to palpation across lumbar paraspinous muscles.      MENTAL STATUS: normal orientation, speech, language, and fund of knowledge for social situation.  Emotional state appropriate.    CRANIAL NERVES:  II:  PERRL bilaterally,   III,IV,VI: EOMI.    V:  Facial sensation equal bilaterally  VII:  Facial motor function normal.  VIII:  Hearing equal to finger rub bilaterally  IX/X: Gag normal, palate symmetric  XI:  Shoulder shrug equal, head turn equal  XII:  Tongue midline without fasciculations      MOTOR: Tone and bulk: normal bilateral upper and lower Strength: normal    IP ADD ABD Quad TA Gas HAM  R 5 5 5 5 5 5 5  L 5 5 5 5 5 5 5    SENSATION: Light touch and pinprick intact bilaterally  REFLEXES: normal, symmetric, nonbrisk.  Toes down, no clonus. No hoffmans.  GAIT: normal rise, base, steps, and arm swing.        Imaging:  None    Assessment:  Mr. Tavera is a 71 y.o. male with   1. Lumbar radiculopathy    2. DDD (degenerative disc disease), lumbar        Plan:  1.  I have stressed the importance of physical activity and exercise to improve overall health  2. Will monitor progress and consider repeat right L4-5 and L5-S1 TFESI if right leg pain worsens  3. F/u prn

## 2017-03-26 ENCOUNTER — PATIENT MESSAGE (OUTPATIENT)
Dept: CARDIOLOGY | Facility: CLINIC | Age: 72
End: 2017-03-26

## 2017-03-29 ENCOUNTER — PATIENT MESSAGE (OUTPATIENT)
Dept: CARDIOLOGY | Facility: CLINIC | Age: 72
End: 2017-03-29

## 2017-03-29 DIAGNOSIS — I10 HTN (HYPERTENSION), BENIGN: ICD-10-CM

## 2017-03-30 RX ORDER — POTASSIUM CHLORIDE 600 MG/1
8 TABLET, FILM COATED, EXTENDED RELEASE ORAL 2 TIMES DAILY
Qty: 180 TABLET | Refills: 3 | Status: SHIPPED | OUTPATIENT
Start: 2017-03-30 | End: 2018-01-14 | Stop reason: SDUPTHER

## 2017-04-03 DIAGNOSIS — E78.5 HYPERLIPIDEMIA: ICD-10-CM

## 2017-04-03 DIAGNOSIS — I10 HTN (HYPERTENSION), BENIGN: ICD-10-CM

## 2017-04-03 DIAGNOSIS — I10 ESSENTIAL HYPERTENSION: ICD-10-CM

## 2017-04-03 RX ORDER — LISINOPRIL 40 MG/1
40 TABLET ORAL DAILY
Qty: 90 TABLET | Refills: 3 | Status: SHIPPED | OUTPATIENT
Start: 2017-04-03 | End: 2018-01-14 | Stop reason: SDUPTHER

## 2017-04-03 RX ORDER — ATORVASTATIN CALCIUM 80 MG/1
80 TABLET, FILM COATED ORAL DAILY
Qty: 90 TABLET | Refills: 3 | Status: SHIPPED | OUTPATIENT
Start: 2017-04-03 | End: 2018-01-14 | Stop reason: SDUPTHER

## 2017-04-05 ENCOUNTER — PATIENT MESSAGE (OUTPATIENT)
Dept: NEUROLOGY | Facility: CLINIC | Age: 72
End: 2017-04-05

## 2017-04-10 ENCOUNTER — HOSPITAL ENCOUNTER (OUTPATIENT)
Dept: CARDIOLOGY | Facility: CLINIC | Age: 72
Discharge: HOME | End: 2017-04-10
Payer: MEDICARE

## 2017-04-10 ENCOUNTER — TELEPHONE (OUTPATIENT)
Dept: CARDIOLOGY | Facility: CLINIC | Age: 72
End: 2017-04-10

## 2017-04-10 DIAGNOSIS — I35.0 AORTIC VALVE STENOSIS, UNSPECIFIED ETIOLOGY: ICD-10-CM

## 2017-04-10 LAB
AORTIC VALVE REGURGITATION: NORMAL
DIASTOLIC DYSFUNCTION: NO
ESTIMATED PA SYSTOLIC PRESSURE: 29
GLOBAL PERICARDIAL EFFUSION: NORMAL
MITRAL VALVE MOBILITY: NORMAL
MITRAL VALVE REGURGITATION: NORMAL
RETIRED EF AND QEF - SEE NOTES: 60 (ref 55–65)
TRICUSPID VALVE REGURGITATION: NORMAL

## 2017-04-10 PROCEDURE — 93306 TTE W/DOPPLER COMPLETE: CPT | Mod: S$GLB,,, | Performed by: INTERNAL MEDICINE

## 2017-04-10 NOTE — TELEPHONE ENCOUNTER
Dr Altamirano reviewed the echo on patient and said that the heart looks good. Patient verbalized understanding.

## 2017-04-21 RX ORDER — CHLORTHALIDONE 25 MG/1
TABLET ORAL
Qty: 90 TABLET | Refills: 3 | Status: SHIPPED | OUTPATIENT
Start: 2017-04-21 | End: 2018-01-14 | Stop reason: SDUPTHER

## 2017-04-26 ENCOUNTER — DOCUMENTATION ONLY (OUTPATIENT)
Dept: FAMILY MEDICINE | Facility: CLINIC | Age: 72
End: 2017-04-26

## 2017-04-26 NOTE — PROGRESS NOTES
Pre-Visit Chart Review  For Appointment Scheduled on 05*/08/2017    Health Maintenance Due   Topic Date Due    Pneumococcal (65+) (2 of 2 - PPSV23) 03/01/2017    Colonoscopy  03/01/2017

## 2017-04-30 DIAGNOSIS — I10 HTN (HYPERTENSION), BENIGN: ICD-10-CM

## 2017-04-30 RX ORDER — CARVEDILOL 25 MG/1
TABLET ORAL
Qty: 180 TABLET | Refills: 3 | Status: SHIPPED | OUTPATIENT
Start: 2017-04-30 | End: 2018-01-14 | Stop reason: SDUPTHER

## 2017-05-02 RX ORDER — PREDNISONE 1 MG/1
TABLET ORAL
Qty: 135 TABLET | Refills: 3 | Status: SHIPPED | OUTPATIENT
Start: 2017-05-02 | End: 2018-02-13 | Stop reason: SDUPTHER

## 2017-05-02 RX ORDER — PREDNISONE 5 MG/1
TABLET ORAL
Qty: 45 TABLET | Refills: 3 | Status: SHIPPED | OUTPATIENT
Start: 2017-05-02 | End: 2017-05-29 | Stop reason: SDUPTHER

## 2017-05-08 ENCOUNTER — OFFICE VISIT (OUTPATIENT)
Dept: FAMILY MEDICINE | Facility: CLINIC | Age: 72
End: 2017-05-08
Payer: MEDICARE

## 2017-05-08 VITALS
SYSTOLIC BLOOD PRESSURE: 120 MMHG | BODY MASS INDEX: 31.26 KG/M2 | DIASTOLIC BLOOD PRESSURE: 76 MMHG | WEIGHT: 235.88 LBS | HEIGHT: 73 IN | HEART RATE: 63 BPM | TEMPERATURE: 98 F

## 2017-05-08 DIAGNOSIS — M51.36 DDD (DEGENERATIVE DISC DISEASE), LUMBAR: ICD-10-CM

## 2017-05-08 DIAGNOSIS — N52.1 ERECTILE DISORDER DUE TO MEDICAL CONDITION IN MALE PATIENT: ICD-10-CM

## 2017-05-08 DIAGNOSIS — E03.4 HYPOTHYROIDISM DUE TO ACQUIRED ATROPHY OF THYROID: ICD-10-CM

## 2017-05-08 DIAGNOSIS — I10 HTN (HYPERTENSION), BENIGN: ICD-10-CM

## 2017-05-08 DIAGNOSIS — E66.09 NON MORBID OBESITY DUE TO EXCESS CALORIES: ICD-10-CM

## 2017-05-08 DIAGNOSIS — G62.9 NEUROPATHY: Primary | ICD-10-CM

## 2017-05-08 DIAGNOSIS — R93.1 AGATSTON CAC SCORE, <100: ICD-10-CM

## 2017-05-08 PROCEDURE — 3078F DIAST BP <80 MM HG: CPT | Mod: S$GLB,,, | Performed by: FAMILY MEDICINE

## 2017-05-08 PROCEDURE — 3074F SYST BP LT 130 MM HG: CPT | Mod: S$GLB,,, | Performed by: FAMILY MEDICINE

## 2017-05-08 PROCEDURE — 1125F AMNT PAIN NOTED PAIN PRSNT: CPT | Mod: S$GLB,,, | Performed by: FAMILY MEDICINE

## 2017-05-08 PROCEDURE — 99214 OFFICE O/P EST MOD 30 MIN: CPT | Mod: S$GLB,,, | Performed by: FAMILY MEDICINE

## 2017-05-08 PROCEDURE — 99999 PR PBB SHADOW E&M-EST. PATIENT-LVL III: CPT | Mod: PBBFAC,,, | Performed by: FAMILY MEDICINE

## 2017-05-08 PROCEDURE — 1160F RVW MEDS BY RX/DR IN RCRD: CPT | Mod: S$GLB,,, | Performed by: FAMILY MEDICINE

## 2017-05-08 PROCEDURE — 99499 UNLISTED E&M SERVICE: CPT | Mod: S$GLB,,, | Performed by: FAMILY MEDICINE

## 2017-05-08 PROCEDURE — 1157F ADVNC CARE PLAN IN RCRD: CPT | Mod: S$GLB,,, | Performed by: FAMILY MEDICINE

## 2017-05-08 PROCEDURE — 1159F MED LIST DOCD IN RCRD: CPT | Mod: S$GLB,,, | Performed by: FAMILY MEDICINE

## 2017-05-08 RX ORDER — TADALAFIL 20 MG/1
20 TABLET ORAL DAILY
Qty: 9 TABLET | Refills: 11 | Status: SHIPPED | OUTPATIENT
Start: 2017-05-08 | End: 2017-11-14 | Stop reason: ALTCHOICE

## 2017-05-08 RX ORDER — SILDENAFIL 100 MG/1
100 TABLET, FILM COATED ORAL DAILY PRN
Qty: 10 TABLET | Refills: 11 | Status: SHIPPED | OUTPATIENT
Start: 2017-05-08 | End: 2017-11-14

## 2017-05-08 NOTE — MR AVS SNAPSHOT
[facility]  [address]  [place] [address]  Phone: [phone]  Fax: [phone]                  Maximilian Tavera Jr.   2017 11:20 AM   Office Visit    Description:  Male : 1945   Provider:  Brian Marroquin MD   Department:  [facility]           Reason for Visit     Hypertension     Sciatica           Diagnoses this Visit        Comments    Neuropathy    -  Primary     HTN (hypertension), benign         Hypothyroidism due to acquired atrophy of thyroid         Non morbid obesity due to excess calories         DDD (degenerative disc disease), lumbar         Agatston CAC score, <100         Erectile disorder due to medical condition in male patient                To Do List           Future Appointments        Provider Department Dept Phone    2017 10:45 AM [facility] [phone]    2017 10:40 AM Brian Marroquin MD [facility] [phone]      Goals (5 Years of Data)     None      Follow-Up and Disposition     Return in about 6 months (around 2017).       These Medications        Disp Refills Start End    sildenafil (VIAGRA) 100 MG tablet 10 tablet 11 2017     Take 1 tablet (100 mg total) by mouth daily as needed for Erectile Dysfunction. Every day PRN - Oral    Pharmacy: [facility] - [place] [id number] [facility] Ph #: [phone]       tadalafil (CIALIS) 20 MG Tab 9 tablet 11 2017    Take 1 tablet (20 mg total) by mouth once daily. - Oral    Pharmacy: [facility] - [place] [id number] [facility] Ph #: [phone]         [facility]     [facility] Care [facility] -  Assistance  Unless otherwise directed by your provider, please contact [facility], our nurse care line that is available for  assistance.     Registered nurses in the [facility] provide: appointment scheduling, clinical advisement,  health education, and other advisory services.  Call: [phone] (toll free)               Medications           Message regarding Medications     Verify the changes and/or additions to your medication regime listed below are the same as discussed with your clinician today.  If any of these changes or additions are incorrect, please notify your healthcare provider.        START taking these NEW medications        Refills    tadalafil (CIALIS) 20 MG Tab 11    Sig: Take 1 tablet (20 mg total) by mouth once daily.    Class: Print    Route: Oral           Verify that the below list of medications is an accurate representation of the medications you are currently taking.  If none reported, the list may be blank. If incorrect, please contact your healthcare provider. Carry this list with you in case of emergency.           Current Medications     atorvastatin (LIPITOR) 80 MG tablet Take 1 tablet (80 mg total) by mouth once daily. Every day    calcium citrate-vitamin D (CITRACAL + D) 315-200 mg-unit per tablet Twice a day    carvedilol (COREG) 25 MG tablet TAKE 1/2 TO 1 TABLET TWICE DAILY  OR AS DIRECTED    cetirizine (ZYRTEC) 10 MG tablet Take 1 tablet by mouth daily as needed.    chlorthalidone (HYGROTEN) 25 MG Tab TAKE 1 TABLET ONE TIME DAILY    cholecalciferol, vitamin D3, (VITAMIN D) 2,000 unit Cap Every day    coenzyme Q10 (CO Q-10) 100 mg capsule Take 400 mg by mouth once daily.     cyanocobalamin, vitamin B-12, (VITAMIN B-12) 5,000 mcg Subl Every day    fluticasone (FLONASE) 50 mcg/actuation nasal spray 1 spray by Each Nare route 2 (two) times daily as needed for Rhinitis.    gabapentin (NEURONTIN) 300 MG capsule Start 1 cap at night for 5 days, then bid thereafter.    ibuprofen (ADVIL,MOTRIN) 800 MG tablet Take 1 tablet (800 mg total) by mouth daily as needed for Pain.    levothyroxine (SYNTHROID) 50 MCG tablet Take 1 tablet (50 mcg total) by mouth once daily.    lisinopril (PRINIVIL,ZESTRIL) 40 MG tablet Take  "1 tablet (40 mg total) by mouth once daily.    methylsulfonylmethane (MSM) 1,000 mg Tab Every day    milk thistle 200 mg Cap Twice a day    omega-3 fatty acids 1,000 mg Cap Twice a day    potassium chloride (KLOR-CON) 8 MEQ TbSR Take 1 tablet (8 mEq total) by mouth 2 (two) times daily.    predniSONE (DELTASONE) 1 MG tablet TAKE 3 TABLETS  EVERY OTHER DAY    predniSONE (DELTASONE) 5 MG tablet TAKE 1 TABLET EVERY OTHER DAY    sildenafil (VIAGRA) 100 MG tablet Take 1 tablet (100 mg total) by mouth daily as needed for Erectile Dysfunction. Every day PRN    tadalafil (CIALIS) 20 MG Tab Take 1 tablet (20 mg total) by mouth once daily.           Clinical Reference Information           Your Vitals Were     BP Pulse Temp Height Weight BMI    120/76 (BP Location: Right arm, Patient Position: Sitting, BP Method: Automatic) 63 98 °F (36.7 °C) (Oral) 6' 1" (1.854 m) 107 kg (235 lb 14.3 oz) 31.12 kg/m2      Blood Pressure          Most Recent Value    BP  120/76      Allergies as of 5/8/2017     No Known Drug Allergies      Immunizations Administered on Date of Encounter - 5/8/2017     None      Orders Placed During Today's Visit      Normal Orders This Visit    MICROALBUMIN / CREATININE RATIO URINE     Future Labs/Procedures Expected by Expires    Basic metabolic panel  5/8/2017 7/7/2018    CBC auto differential  5/8/2017 7/7/2018    T3, free  5/8/2017 7/7/2018    TSH  5/8/2017 7/7/2018      Instructions      Relieving Back Pain  Back pain is a common problem. You can strain back muscles by lifting too much weight or just by moving the wrong way. Back strain can be uncomfortable, even painful. And it can take weeks or months to improve. To help yourself feel better and prevent future back strains, try these tips.  Important Note: Do not give aspirin to children or teens without first discussing it with your healthcare provider.      ? Ice    Ice reduces muscle pain and swelling. It helps most during the first 24 to 48 hours " after an injury.  · Wrap an ice pack or a bag of frozen peas in a thin towel. (Never place ice directly on your skin.)  · Place the ice where your back hurts the most.  · Dont ice for more than 20 minutes at a time.  · You can use ice several times a day.  ? Medicines  Over-the-counter pain relievers can include acetaminophen and anti-inflammatory medicines, which includes aspirin or ibuprofen. They can help ease discomfort. Some also reduce swelling.  · Tell your healthcare provider about any medicines you are already taking.  · Take medicines only as directed.  ? Heat  After the first 48 hours, heat can relax sore muscles and improve blood flow.  · Try a warm bath or shower. Or use a heating pad set on low. To prevent a burn, keep a cloth between you and the heating pad.  · Dont use a heating pad for more than 15 minutes at a time. Never sleep on a heating pad.  Date Last Reviewed: 9/1/2015  © 0910-3252 [employer]. [address] [place] [address]. All rights reserved. This information is not intended as a substitute for professional medical care. Always follow your healthcare professional's instructions.        Exercises for Shoulder Flexibility: Back Scratch  Improving your flexibility can reduce pain. Stretching exercises also can help increase your range of pain-free motion. Breathe normally when you exercise. Try to use smooth, fluid movements. Never force a stretch.  Note: Follow any special instructions you are given. If you feel pain, stop the exercise. If the pain continues after stopping, call your healthcare provider:  · Stand straight, placing the back of your hand on the side you want to stretch flat against your lower back.  · Throw one end of a towel over your shoulder. Grab it behind your back with your other hand.  · Pull down gently on the towel with your front arm. Let your back arm slide up as high as is comfortable. Youll feel a stretch in your shoulder. Hold the  stretch for a few seconds.  · Repeat 3 to 5 times. Build up to holding each stretch for 30 to 60 seconds.  For your safety, check with your healthcare provider before starting an exercise program.   Date Last Reviewed: 8/26/2015 © 2000-2016 [employer]. [address] [place] [address]. All rights reserved. This information is not intended as a substitute for professional medical care. Always follow your healthcare professional's instructions.        Back Exercises: Hip Lift        To start, lie on your back with your knees bent and feet flat on the floor. Dont press your neck or lower back to the floor. Breathe deeply. You should feel comfortable and relaxed in this position:  · Tighten your abdomen and buttocks.  · Slowly raise your hips upward. Be careful not to arch your back.  · Hold for 5 seconds. Lower your hips to the floor.  · Repeat 10 times.  For your safety, check with your healthcare provider before starting an exercise program.   Date Last Reviewed: 8/16/2015 © 2000-2016 [employer] [address] [place] [address]. All rights reserved. This information is not intended as a substitute for professional medical care. Always follow your healthcare professional's instructions.        Back Exercises: Leg Pull        To start, lie on your back with your knees bent and feet flat on the floor. Dont press your neck or lower back to the floor. Breathe deeply. You should feel comfortable and relaxed in this position.  · Pull one knee to your chest.  · Hold for 30 to 60 seconds. Return to starting position.  · Repeat 2 times.  · Switch legs.  · For a double leg pull, pull both legs to your chest at the same time. Repeat 2 times.  For your safety, check with your healthcare provider before starting an exercise program.   Date Last Reviewed: 8/16/2015 © 2000-2016 [employer] [address] [place] [address]. All rights reserved. This  information is not intended as a substitute for professional medical care. Always follow your healthcare professional's instructions.        Back Exercises: Knee Lift        To start, lie on your back with your knees bent and feet flat on the floor. Dont press your neck or lower back to the floor. Breathe deeply. You should feel comfortable and relaxed in this position:  · Start by tightening your abdominal muscles.  · Lift one bent knee off the floor 2 to 4 inches.  · Hold for 10 seconds. Return to start position.  · Repeat 3 times.  · Switch legs.  Date Last Reviewed: 8/16/2015  © 9559-8573 [employer]. [address] [place] [address]. All rights reserved. This information is not intended as a substitute for professional medical care. Always follow your healthcare professional's instructions.        Back Exercises: Seated Rotation  To start, sit in a chair with your feet flat on the floor. Shift your weight slightly forward to avoid rounding your back. Relax, and keep your ears, shoulders, and hips aligned:  · Fold your arms and elbows just below shoulder height.  · Turn from the waist with hips forward. Turn your head last. Do not push through the pain.  · Hold for a count of 10 to 30. Return to starting position.  · Repeat 3 to 5 times on one side. Then switch sides.    Date Last Reviewed: 10/11/2015  © 1865-4329 [employer]. [address] [place] [address]. All rights reserved. This information is not intended as a substitute for professional medical care. Always follow your healthcare professional's instructions.        Back Exercises: Pelvic Tilt    To start, lie on your back with your knees bent and feet flat on the floor. Dont press your neck or lower back to the floor. Breathe deeply. You should feel comfortable and relaxed in this position:  · Tighten your stomach and buttocks, and press your lower back toward the floor. This should be a small, subtle  movement. This should not increase your pain.  · Hold for 5 to 15 seconds. Release.  · Repeat 2 to 5 times.  Date Last Reviewed: 10/11/2015  © 0341-3685 [employer]. [address] [place] [address]. All rights reserved. This information is not intended as a substitute for professional medical care. Always follow your healthcare professional's instructions.        Back Exercises: Side Stretch      To start, sit in a chair with your feet flat on the floor. Shift your weight slightly forward to avoid rounding your back. Relax. Keep your ears, shoulders, and hips aligned:  · Stretch your right arm overhead.  · Slowly bend to the left. Dont twist your torso. Stay within your pain limits.  · Hold for 20 seconds. Return to starting position.  · Repeat 2 to 5 times. Then, switch to the other side.  Date Last Reviewed: 10/13/2015  © 1610-9003 [employer]. [address] [place] [address]. All rights reserved. This information is not intended as a substitute for professional medical care. Always follow your healthcare professional's instructions.        Wall Squats  This exercise stretches and strengthens your lower body to help your back. As you work out, dont rush or strain.   · Stand with hips and shoulders touching a wall. Keep your feet hip-width apart and your ears, shoulders, hips, and feet in a line. If needed, place a rolled-up towel behind the small of your back.  · Step forward about 2 feet, keeping your back against the wall. Slide down into a sitting position. Dont let your hips go below your knees.  · Hold for 5 seconds, then slide up. As you get stronger, hold the position longer.   · Repeat ___ times per day.  Date Last Reviewed: 8/16/2015  © 2691-9927 [employer]. [address] [place] [address]. All rights reserved. This information is not intended as a substitute for professional medical care. Always follow your healthcare  professional's instructions.             Language Assistance Services     ATTENTION: Language assistance services are available, free of charge. Please call [phone].      ATENCIÓN: Si habla aamir, tiene a mojica disposición servicios gratuitos de asistencia lingüística. Llame al [phone].     CHÚ Ý: N?u b?n nói Ti?ng Vi?t, có các d?ch v? h? tr? ngôn ng? mi?n phí dành cho b?n. G?i s? [phone].         [facility] complies with applicable Federal civil rights laws and does not discriminate on the basis of race, color, national origin, age, disability, or sex.

## 2017-05-08 NOTE — PROGRESS NOTES
Answers for HPI/ROS submitted by the patient on 5/7/2017   neck pain: No, No  unexpected weight change: No  hearing loss: No  rhinorrhea: No  trouble swallowing: No  eye discharge: No  visual disturbance: No  chest tightness: No  wheezing: No  chest pain: No  palpitations: No  blood in stool: No  constipation: No  vomiting: No  diarrhea: No  polydipsia: No  polyuria: Yes  difficulty urinating: No  urgency: No  hematuria: No  joint swelling: No  headaches: Yes  weakness: No  confusion: No  dysphoric mood: No  Subjective:   Maximilian Tavera Jr. is a 71 y.o. male with hypertension.  Current Outpatient Prescriptions   Medication Sig Dispense Refill    atorvastatin (LIPITOR) 80 MG tablet Take 1 tablet (80 mg total) by mouth once daily. Every day 90 tablet 3    calcium citrate-vitamin D (CITRACAL + D) 315-200 mg-unit per tablet Twice a day      carvedilol (COREG) 25 MG tablet TAKE 1/2 TO 1 TABLET TWICE DAILY  OR AS DIRECTED 180 tablet 3    cetirizine (ZYRTEC) 10 MG tablet Take 1 tablet by mouth daily as needed.      chlorthalidone (HYGROTEN) 25 MG Tab TAKE 1 TABLET ONE TIME DAILY 90 tablet 3    cholecalciferol, vitamin D3, (VITAMIN D) 2,000 unit Cap Every day      coenzyme Q10 (CO Q-10) 100 mg capsule Take 400 mg by mouth once daily.       cyanocobalamin, vitamin B-12, (VITAMIN B-12) 5,000 mcg Subl Every day      fluticasone (FLONASE) 50 mcg/actuation nasal spray 1 spray by Each Nare route 2 (two) times daily as needed for Rhinitis. 3 Bottle 3    gabapentin (NEURONTIN) 300 MG capsule Start 1 cap at night for 5 days, then bid thereafter. 90 capsule 11    ibuprofen (ADVIL,MOTRIN) 800 MG tablet Take 1 tablet (800 mg total) by mouth daily as needed for Pain. 30 tablet 11    levothyroxine (SYNTHROID) 50 MCG tablet Take 1 tablet (50 mcg total) by mouth once daily. 90 tablet 3    lisinopril (PRINIVIL,ZESTRIL) 40 MG tablet Take 1 tablet (40 mg total) by mouth once daily. 90 tablet 3    methylsulfonylmethane (MSM)  1,000 mg Tab Every day      milk thistle 200 mg Cap Twice a day      omega-3 fatty acids 1,000 mg Cap Twice a day      potassium chloride (KLOR-CON) 8 MEQ TbSR Take 1 tablet (8 mEq total) by mouth 2 (two) times daily. 180 tablet 3    predniSONE (DELTASONE) 1 MG tablet TAKE 3 TABLETS  EVERY OTHER  tablet 3    predniSONE (DELTASONE) 5 MG tablet TAKE 1 TABLET EVERY OTHER DAY 45 tablet 3    sildenafil (VIAGRA) 100 MG tablet Take 1 tablet (100 mg total) by mouth daily as needed for Erectile Dysfunction. Every day PRN 10 tablet 11    tadalafil (CIALIS) 20 MG Tab Take 1 tablet (20 mg total) by mouth once daily. 9 tablet 11    [DISCONTINUED] esomeprazole (NEXIUM) 40 MG capsule Take 1 capsule (40 mg total) by mouth before breakfast. 30 capsule 0     No current facility-administered medications for this visit.       Hypertension ROS: taking medications as instructed, no medication side effects noted, home BP monitoring in range of 120-130's systolic over 78-80's diastolic, no TIA's, no chest pain on exertion, no dyspnea on exertion, no swelling of ankles, no orthostatic dizziness or lightheadedness, no orthopnea or paroxysmal nocturnal dyspnea, no palpitations, no intermittent claudication symptoms and thigh pain rt at standing, relieved  after walking 20., is relieved by resting.   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     Objective:   /76 (BP Location: Right arm, Patient Position: Sitting, BP Method: Automatic)  Pulse 63  Temp 98 °F (36.7  "°C) (Oral)   Ht 6' 1" (1.854 m)  Wt 107 kg (235 lb 14.3 oz)  BMI 31.12 kg/m2   Appearance alert, well appearing, and in no distress, oriented to person, place, and time, obese and acyanotic, in no respiratory distress.  General exam BP noted to be well controlled today in office, S1, S2 normal, no gallop, no murmur, chest clear, no JVD, no HSM, no edema, fundi - discs sharp, no papilledema, no background diabetic retinopathy, no hemorrhages or exudates and no hypertensive retinopathy, CVS exam  - normal rate, regular rhythm, normal S1, S2, no murmurs, rubs, clicks or gallops, normal rate and regular rhythm, S1 and S2 normal, no murmurs noted, no gallops noted, no JVD.   Lab review: labs are reviewed, up to date and normal.     Assessment:    Hypertension well controlled, improved, needs to follow diet more regularly and needs to exercise..   Neuropathy    HTN (hypertension), benign  -     Basic metabolic panel; Future; Expected date: 5/8/17  -     CBC auto differential; Future; Expected date: 5/8/17  -     MICROALBUMIN / CREATININE RATIO URINE    Hypothyroidism due to acquired atrophy of thyroid  -     TSH; Future; Expected date: 5/8/17  -     T3, free; Future; Expected date: 5/8/17    Non morbid obesity due to excess calories    DDD (degenerative disc disease), lumbar    Agatston CAC score, <100    Erectile disorder due to medical condition in male patient  -     sildenafil (VIAGRA) 100 MG tablet; Take 1 tablet (100 mg total) by mouth daily as needed for Erectile Dysfunction. Every day PRN  Dispense: 10 tablet; Refill: 11  -     tadalafil (CIALIS) 20 MG Tab; Take 1 tablet (20 mg total) by mouth once daily.  Dispense: 9 tablet; Refill: 11      The patient desires Viagra to treat his erectile dysfunction. History and physical exam has not disclosed any obvious treatable cause of this complaint. He is informed that Viagra is sometimes not covered by insurance. It is available on a fee-for-service cost basis, and is " relatively expensive. He can start with 50 mg dose, and increase to 100 mg if necessary. The method of use 1 hour prior to anticipated intercourse is explained. He should not use any more than one tablet in a 24 hour period. The side effects of possible headache, flushing, dyspepsia and transient changes in vision have been explained.     The patient is not taking nitrates, and denies he has access to nitrates in any form at any time. I have counseled him that taking Viagra with nitrates of any form can cause death. Additionally, Viagra serum concentrations can be increased by the following: cimetidine, erythromycin, itraconazole or ketoconazole. This patient does not take these drugs, but I have counseled him to avoid Viagra if he does take any of these.    We have also discussed the fact that there have been some deaths in patients after taking Viagra, felt due to the exertion of intercourse rather than the drug itself. The patient is aware of this, and accepts whatever unknown degree of risk there is in this aspect.  Patient readiness: acceptance and barriers:readiness    During the course of the visit the patient was educated and counseled about the following:     Hypertension:   Dietary sodium restriction.  Regular aerobic exercise.  Check blood pressures daily and record.  Obesity:   General weight loss/lifestyle modification strategies discussed (elicit support from others; identify saboteurs; non-food rewards, etc).  Behavioral treatment: Slim Fast.    Goals: Hypertension: Reduce Blood Pressure    Did patient meet goals/outcomes: Yes    The following self management tools provided: blood pressure log  excercise log    Patient Instructions (the written plan) was given to the patient/family.     Time spent with patient: 30 minutes            Plan:   Current treatment plan is effective, no change in therapy.  Lab results and schedule of future lab studies reviewed with patient.  Reviewed diet, exercise and  weight control.  Copy of written low fat low cholesterol diet provided and reviewed.  Cardiovascular risk and specific lipid/LDL goals reviewed.  Use of aspirin to prevent MI and TIA's discussed.  Herbal and alternative therapies discussed with patient.  Follow up: 4 months and as needed..

## 2017-05-08 NOTE — PATIENT INSTRUCTIONS
Relieving Back Pain  Back pain is a common problem. You can strain back muscles by lifting too much weight or just by moving the wrong way. Back strain can be uncomfortable, even painful. And it can take weeks or months to improve. To help yourself feel better and prevent future back strains, try these tips.  Important Note: Do not give aspirin to children or teens without first discussing it with your healthcare provider.      ? Ice    Ice reduces muscle pain and swelling. It helps most during the first 24 to 48 hours after an injury.  · Wrap an ice pack or a bag of frozen peas in a thin towel. (Never place ice directly on your skin.)  · Place the ice where your back hurts the most.  · Dont ice for more than 20 minutes at a time.  · You can use ice several times a day.  ? Medicines  Over-the-counter pain relievers can include acetaminophen and anti-inflammatory medicines, which includes aspirin or ibuprofen. They can help ease discomfort. Some also reduce swelling.  · Tell your healthcare provider about any medicines you are already taking.  · Take medicines only as directed.  ? Heat  After the first 48 hours, heat can relax sore muscles and improve blood flow.  · Try a warm bath or shower. Or use a heating pad set on low. To prevent a burn, keep a cloth between you and the heating pad.  · Dont use a heating pad for more than 15 minutes at a time. Never sleep on a heating pad.  Date Last Reviewed: 9/1/2015  © 8080-3837 [employer]. [address] [place] [address], [place] [address]. All rights reserved. This information is not intended as a substitute for professional medical care. Always follow your healthcare professional's instructions.        Exercises for Shoulder Flexibility: Back Scratch  Improving your flexibility can reduce pain. Stretching exercises also can help increase your range of pain-free motion. Breathe normally when you exercise. Try to use smooth, fluid movements. Never force a  stretch.  Note: Follow any special instructions you are given. If you feel pain, stop the exercise. If the pain continues after stopping, call your healthcare provider:  · Stand straight, placing the back of your hand on the side you want to stretch flat against your lower back.  · Throw one end of a towel over your shoulder. Grab it behind your back with your other hand.  · Pull down gently on the towel with your front arm. Let your back arm slide up as high as is comfortable. Youll feel a stretch in your shoulder. Hold the stretch for a few seconds.  · Repeat 3 to 5 times. Build up to holding each stretch for 30 to 60 seconds.  For your safety, check with your healthcare provider before starting an exercise program.   Date Last Reviewed: 8/26/2015  © 7068-4613 [employer]. [address] [place] [address]. All rights reserved. This information is not intended as a substitute for professional medical care. Always follow your healthcare professional's instructions.        Back Exercises: Hip Lift        To start, lie on your back with your knees bent and feet flat on the floor. Dont press your neck or lower back to the floor. Breathe deeply. You should feel comfortable and relaxed in this position:  · Tighten your abdomen and buttocks.  · Slowly raise your hips upward. Be careful not to arch your back.  · Hold for 5 seconds. Lower your hips to the floor.  · Repeat 10 times.  For your safety, check with your healthcare provider before starting an exercise program.   Date Last Reviewed: 8/16/2015  © 4292-1820 [employer]. [address] [place] [address]. All rights reserved. This information is not intended as a substitute for professional medical care. Always follow your healthcare professional's instructions.        Back Exercises: Leg Pull        To start, lie on your back with your knees bent and feet flat on the floor. Dont press your neck or lower back to the  floor. Breathe deeply. You should feel comfortable and relaxed in this position.  · Pull one knee to your chest.  · Hold for 30 to 60 seconds. Return to starting position.  · Repeat 2 times.  · Switch legs.  · For a double leg pull, pull both legs to your chest at the same time. Repeat 2 times.  For your safety, check with your healthcare provider before starting an exercise program.   Date Last Reviewed: 8/16/2015  © 2168-7222 [employer]. [address] [place] [address]. All rights reserved. This information is not intended as a substitute for professional medical care. Always follow your healthcare professional's instructions.        Back Exercises: Knee Lift        To start, lie on your back with your knees bent and feet flat on the floor. Dont press your neck or lower back to the floor. Breathe deeply. You should feel comfortable and relaxed in this position:  · Start by tightening your abdominal muscles.  · Lift one bent knee off the floor 2 to 4 inches.  · Hold for 10 seconds. Return to start position.  · Repeat 3 times.  · Switch legs.  Date Last Reviewed: 8/16/2015  © 5206-4949 [employer]. [address] [place] [address]. All rights reserved. This information is not intended as a substitute for professional medical care. Always follow your healthcare professional's instructions.        Back Exercises: Seated Rotation  To start, sit in a chair with your feet flat on the floor. Shift your weight slightly forward to avoid rounding your back. Relax, and keep your ears, shoulders, and hips aligned:  · Fold your arms and elbows just below shoulder height.  · Turn from the waist with hips forward. Turn your head last. Do not push through the pain.  · Hold for a count of 10 to 30. Return to starting position.  · Repeat 3 to 5 times on one side. Then switch sides.    Date Last Reviewed: 10/11/2015  © 7802-8827 [employer]. [address] [place] [address],  [place] [address]. All rights reserved. This information is not intended as a substitute for professional medical care. Always follow your healthcare professional's instructions.        Back Exercises: Pelvic Tilt    To start, lie on your back with your knees bent and feet flat on the floor. Dont press your neck or lower back to the floor. Breathe deeply. You should feel comfortable and relaxed in this position:  · Tighten your stomach and buttocks, and press your lower back toward the floor. This should be a small, subtle movement. This should not increase your pain.  · Hold for 5 to 15 seconds. Release.  · Repeat 2 to 5 times.  Date Last Reviewed: 10/11/2015  © 0290-6513 [employer]. [address] [place] [address]. All rights reserved. This information is not intended as a substitute for professional medical care. Always follow your healthcare professional's instructions.        Back Exercises: Side Stretch      To start, sit in a chair with your feet flat on the floor. Shift your weight slightly forward to avoid rounding your back. Relax. Keep your ears, shoulders, and hips aligned:  · Stretch your right arm overhead.  · Slowly bend to the left. Dont twist your torso. Stay within your pain limits.  · Hold for 20 seconds. Return to starting position.  · Repeat 2 to 5 times. Then, switch to the other side.  Date Last Reviewed: 10/13/2015  © 8282-1255 [employer]. [address] [place] [address]. All rights reserved. This information is not intended as a substitute for professional medical care. Always follow your healthcare professional's instructions.        Wall Squats  This exercise stretches and strengthens your lower body to help your back. As you work out, dont rush or strain.   · Stand with hips and shoulders touching a wall. Keep your feet hip-width apart and your ears, shoulders, hips, and feet in a line. If needed, place a rolled-up towel behind the  small of your back.  · Step forward about 2 feet, keeping your back against the wall. Slide down into a sitting position. Dont let your hips go below your knees.  · Hold for 5 seconds, then slide up. As you get stronger, hold the position longer.   · Repeat ___ times per day.  Date Last Reviewed: 8/16/2015  © 7070-1772 [employer]. [address] [place] [address], [place] [address]. All rights reserved. This information is not intended as a substitute for professional medical care. Always follow your healthcare professional's instructions.

## 2017-05-11 NOTE — PROGRESS NOTES
Patient, Maximilian Tavera Jr. (MRN #[id number]), presented with a recent Platelet count less than 150 K/uL consistent with the definition of thrombocytopenia (ICD10 - D69.6).    Platelets   Date Value Ref Range Status   09/12/2016 138 (L) 150 - 350 K/uL Final     The patient's thrombocytopenia was monitored, evaluated, addressed and/or treated. This addendum to the medical record is made on 05/11/2017.

## 2017-05-16 ENCOUNTER — LAB VISIT (OUTPATIENT)
Dept: LAB | Facility: HOSPITAL | Age: 72
End: 2017-05-16
Attending: FAMILY MEDICINE
Payer: MEDICARE

## 2017-05-16 ENCOUNTER — TELEPHONE (OUTPATIENT)
Dept: CARDIOLOGY | Facility: CLINIC | Age: 72
End: 2017-05-16

## 2017-05-16 ENCOUNTER — PATIENT MESSAGE (OUTPATIENT)
Dept: CARDIOLOGY | Facility: CLINIC | Age: 72
End: 2017-05-16

## 2017-05-16 DIAGNOSIS — E03.4 HYPOTHYROIDISM DUE TO ACQUIRED ATROPHY OF THYROID: ICD-10-CM

## 2017-05-16 DIAGNOSIS — I10 HTN (HYPERTENSION), BENIGN: ICD-10-CM

## 2017-05-16 LAB
ANION GAP SERPL CALC-SCNC: 9 MMOL/L
BASOPHILS # BLD AUTO: 0.02 K/UL
BASOPHILS NFR BLD: 0.3 %
BUN SERPL-MCNC: 13 MG/DL
CALCIUM SERPL-MCNC: 9.1 MG/DL
CHLORIDE SERPL-SCNC: 105 MMOL/L
CO2 SERPL-SCNC: 27 MMOL/L
CREAT SERPL-MCNC: 0.9 MG/DL
DIFFERENTIAL METHOD: ABNORMAL
EOSINOPHIL # BLD AUTO: 0.2 K/UL
EOSINOPHIL NFR BLD: 3.4 %
ERYTHROCYTE [DISTWIDTH] IN BLOOD BY AUTOMATED COUNT: 13.6 %
EST. GFR  (AFRICAN AMERICAN): >60 ML/MIN/1.73 M^2
EST. GFR  (NON AFRICAN AMERICAN): >60 ML/MIN/1.73 M^2
GLUCOSE SERPL-MCNC: 105 MG/DL
HCT VFR BLD AUTO: 44.7 %
HGB BLD-MCNC: 14.7 G/DL
LYMPHOCYTES # BLD AUTO: 1.6 K/UL
LYMPHOCYTES NFR BLD: 24 %
MCH RBC QN AUTO: 29.9 PG
MCHC RBC AUTO-ENTMCNC: 32.9 %
MCV RBC AUTO: 91 FL
MONOCYTES # BLD AUTO: 0.6 K/UL
MONOCYTES NFR BLD: 8.7 %
NEUTROPHILS # BLD AUTO: 4.3 K/UL
NEUTROPHILS NFR BLD: 63.5 %
PLATELET # BLD AUTO: 132 K/UL
PMV BLD AUTO: 11.3 FL
POTASSIUM SERPL-SCNC: 3.6 MMOL/L
RBC # BLD AUTO: 4.92 M/UL
SODIUM SERPL-SCNC: 141 MMOL/L
T3FREE SERPL-MCNC: 2.3 PG/ML
TSH SERPL DL<=0.005 MIU/L-ACNC: 2.16 UIU/ML
WBC # BLD AUTO: 6.79 K/UL

## 2017-05-16 PROCEDURE — 36415 COLL VENOUS BLD VENIPUNCTURE: CPT | Mod: PO

## 2017-05-16 PROCEDURE — 85025 COMPLETE CBC W/AUTO DIFF WBC: CPT

## 2017-05-16 PROCEDURE — 80048 BASIC METABOLIC PNL TOTAL CA: CPT

## 2017-05-16 PROCEDURE — 84481 FREE ASSAY (FT-3): CPT

## 2017-05-16 PROCEDURE — 84443 ASSAY THYROID STIM HORMONE: CPT

## 2017-05-16 NOTE — TELEPHONE ENCOUNTER
Called patient to give him results but no answer. Left detailed  message on recorder and also sent message through [facility].    Notes Recorded by Miguel Altamirano MD on 5/16/2017 at 3:08 PM  K low increase to 3 per day if on 2

## 2017-05-28 ENCOUNTER — PATIENT MESSAGE (OUTPATIENT)
Dept: NEUROLOGY | Facility: CLINIC | Age: 72
End: 2017-05-28

## 2017-05-28 DIAGNOSIS — G70.00 MYASTHENIA GRAVIS: Primary | ICD-10-CM

## 2017-05-29 RX ORDER — PREDNISONE 5 MG/1
5 TABLET ORAL EVERY OTHER DAY
Qty: 45 TABLET | Refills: 3 | Status: CANCELLED | OUTPATIENT
Start: 2017-05-29

## 2017-05-29 RX ORDER — PREDNISONE 5 MG/1
5 TABLET ORAL EVERY OTHER DAY
Qty: 45 TABLET | Refills: 3 | Status: SHIPPED | OUTPATIENT
Start: 2017-05-29 | End: 2018-02-13 | Stop reason: SDUPTHER

## 2017-05-29 RX ORDER — PREDNISONE 5 MG/1
TABLET ORAL
Qty: 45 TABLET | Refills: 3 | OUTPATIENT
Start: 2017-05-29

## 2017-07-12 DIAGNOSIS — E03.1 CONGENITAL HYPOTHYROIDISM WITHOUT GOITER: ICD-10-CM

## 2017-07-12 DIAGNOSIS — E03.9 UNSPECIFIED HYPOTHYROIDISM: ICD-10-CM

## 2017-07-12 DIAGNOSIS — J30.9 ALLERGIC RHINITIS: ICD-10-CM

## 2017-07-12 RX ORDER — LEVOTHYROXINE SODIUM 50 UG/1
TABLET ORAL
Qty: 90 TABLET | Refills: 3 | Status: SHIPPED | OUTPATIENT
Start: 2017-07-12 | End: 2018-05-03 | Stop reason: SDUPTHER

## 2017-07-14 RX ORDER — FLUTICASONE PROPIONATE 50 MCG
SPRAY, SUSPENSION (ML) NASAL
Qty: 48 G | Refills: 3 | Status: SHIPPED | OUTPATIENT
Start: 2017-07-14 | End: 2018-09-07 | Stop reason: SDUPTHER

## 2017-07-27 ENCOUNTER — OFFICE VISIT (OUTPATIENT)
Dept: FAMILY MEDICINE | Facility: CLINIC | Age: 72
End: 2017-07-27
Payer: MEDICARE

## 2017-07-27 ENCOUNTER — DOCUMENTATION ONLY (OUTPATIENT)
Dept: FAMILY MEDICINE | Facility: CLINIC | Age: 72
End: 2017-07-27

## 2017-07-27 VITALS
HEART RATE: 60 BPM | SYSTOLIC BLOOD PRESSURE: 126 MMHG | HEIGHT: 73 IN | WEIGHT: 237.88 LBS | DIASTOLIC BLOOD PRESSURE: 74 MMHG | TEMPERATURE: 99 F | BODY MASS INDEX: 31.53 KG/M2

## 2017-07-27 DIAGNOSIS — I10 HTN (HYPERTENSION), BENIGN: ICD-10-CM

## 2017-07-27 DIAGNOSIS — E66.9 OBESITY, UNSPECIFIED OBESITY SEVERITY, UNSPECIFIED OBESITY TYPE: ICD-10-CM

## 2017-07-27 DIAGNOSIS — S39.012A LUMBAR STRAIN, INITIAL ENCOUNTER: Primary | ICD-10-CM

## 2017-07-27 PROCEDURE — 1159F MED LIST DOCD IN RCRD: CPT | Mod: S$GLB,,, | Performed by: PHYSICIAN ASSISTANT

## 2017-07-27 PROCEDURE — 1125F AMNT PAIN NOTED PAIN PRSNT: CPT | Mod: S$GLB,,, | Performed by: PHYSICIAN ASSISTANT

## 2017-07-27 PROCEDURE — 99214 OFFICE O/P EST MOD 30 MIN: CPT | Mod: S$GLB,,, | Performed by: PHYSICIAN ASSISTANT

## 2017-07-27 PROCEDURE — 99999 PR PBB SHADOW E&M-EST. PATIENT-LVL V: CPT | Mod: PBBFAC,,, | Performed by: PHYSICIAN ASSISTANT

## 2017-07-27 RX ORDER — ASPIRIN 81 MG
81 TABLET, DELAYED RELEASE (ENTERIC COATED) ORAL DAILY
COMMUNITY
Start: 2017-07-01 | End: 2018-10-17

## 2017-07-27 RX ORDER — CEPHALEXIN 500 MG/1
500 CAPSULE ORAL EVERY 6 HOURS
COMMUNITY
Start: 2017-07-21 | End: 2017-08-31

## 2017-07-27 RX ORDER — BLACK COHOSH ROOT 200 MG
CAPSULE ORAL
COMMUNITY
Start: 2017-07-01 | End: 2017-07-27 | Stop reason: SDUPTHER

## 2017-07-27 RX ORDER — TIZANIDINE 4 MG/1
4 TABLET ORAL EVERY 8 HOURS
Qty: 30 TABLET | Refills: 0 | Status: SHIPPED | OUTPATIENT
Start: 2017-07-27 | End: 2017-08-06

## 2017-07-27 RX ORDER — NICOTINE POLACRILEX 2 MG
LOZENGE BUCCAL
COMMUNITY
Start: 2017-07-01 | End: 2017-07-27 | Stop reason: SDUPTHER

## 2017-07-27 RX ORDER — NAPROXEN 500 MG/1
500 TABLET ORAL 2 TIMES DAILY WITH MEALS
Qty: 28 TABLET | Refills: 0 | Status: SHIPPED | OUTPATIENT
Start: 2017-07-27 | End: 2017-08-10

## 2017-07-27 NOTE — PATIENT INSTRUCTIONS
Controlling High Blood Pressure  High blood pressure (hypertension) is often called the silent killer. This is because many people who have it dont know it. High blood pressure is defined as 140/90 mm Hg or higher. Know your blood pressure and remember to check it regularly. Doing so can save your life. Here are some things you can do to help control your blood pressure.    Choose heart-healthy foods  · Select low-salt, low-fat foods. Limit sodium intake to 2,400 mg per day or the amount suggested by your healthcare provider.  · Limit canned, dried, cured, packaged, and fast foods. These can contain a lot of salt.  · Eat 8 to 10 servings of fruits and vegetables every day.  · Choose lean meats, fish, or chicken.  · Eat whole-grain pasta, brown rice, and beans.  · Eat 2 to 3 servings of low-fat or fat-free dairy products.  · Ask your doctor about the DASH eating plan. This plan helps reduce blood pressure.  · When you go to a restaurant, ask that your meal be prepared with no added salt.  Maintain a healthy weight  · Ask your healthcare provider how many calories to eat a day. Then stick to that number.  · Ask your healthcare provider what weight range is healthiest for you. If you are overweight, a weight loss of only 3% to 5% of your body weight can help lower blood pressure. Generally, a good weight loss goal is to lose 10% of your body weight in a year.  · Limit snacks and sweets.  · Get regular exercise.  Get up and get active  · Choose activities you enjoy. Find ones you can do with friends or family. This includes bicycling, dancing, walking, and jogging.  · Park farther away from building entrances.  · Use stairs instead of the elevator.  · When you can, walk or bike instead of driving.  · [place] leaves, garden, or do household repairs.  · Be active at a moderate to vigorous level of physical activity for at least 40 minutes for a minimum of 3 to 4 days a week.   Manage stress  · Make time to relax and enjoy  life. Find time to laugh.  · Communicate your concerns with your loved ones and your healthcare provider.  · Visit with family and friends, and keep up with hobbies.  Limit alcohol and quit smoking  · Men should have no more than 2 drinks per day.  · Women should have no more than 1 drink per day.  · Talk with your healthcare provider about quitting smoking. Smoking significantly increases your risk for heart disease and stroke. Ask your healthcare provider about community smoking cessation programs and other options.  Medicines  If lifestyle changes arent enough, your healthcare provider may prescribe high blood pressure medicine. Take all medicines as prescribed. If you have any questions about your medicines, ask your healthcare provider before stopping or changing them.   Date Last Reviewed: 4/27/2016 © 2000-2016 [employer]. [address] [place] [address], [place] [address]. All rights reserved. This information is not intended as a substitute for professional medical care. Always follow your healthcare professional's instructions.        Weight Management: Getting Started  Healthy bodies come in all shapes and sizes. Not all bodies are made to be thin. For some people, a healthy weight is higher than the average weight listed on weight charts. Your healthcare provider can help you decide on a healthy weight for you.    Reasons to lose weight  Losing weight can help with some health problems, such as high blood pressure, heart disease, diabetes, sleep apnea, and arthritis. You may also feel more energy.  Set your long-term goal  Your goal doesn't even have to be a specific weight. You may decide on a fitness goal (such as being able to walk 10 miles a week), or a health goal (such as lowering your blood pressure). Choose a goal that is measurable and reasonable, so you know when you've reached it. A goal of reaching a BMI of less than 25 is not always reasonable (or possible).   Make an action  plan  Habits dont change overnight. Setting your goals too high can leave you feeling discouraged if you cant reach them. Be realistic. Choose one or two small changes you can make now. Set an action plan for how you are going to make these changes. When you can stick to this plan, keep making a few more small changes. Taking small steps will help you stay on the path to success.  Track your progress  Write down your goals. Then, keep a daily record of your progress. Write down what you eat and how active you are. This record lets you look back on how much youve done. It may also help when youre feeling frustrated. Reward yourself for success. Even if you dont reach every goal, give yourself credit for what you do get done.  Get support  Encouragement from others can help make losing weight easier. Ask your family members and friends for support. They may even want to join you. Also look to your healthcare provider, registered dietitian, and  for help. Your local hospital can give you more information about nutrition, exercise, and weight loss.  Date Last Reviewed: 1/31/2016 © 2000-2016 [employer]. [address] [place] [address]. All rights reserved. This information is not intended as a substitute for professional medical care. Always follow your healthcare professional's instructions.        Walking for Fitness  Fitness walking has something for everyone, even people who are already fit. Walking is one of the safest ways to condition your body aerobically. It can boost energy, help you lose weight, and reduce stress.    Physical benefits  · Walking strengthens your heart and lungs, and tones your muscles.  · When walking, your feet land with less impact than in other sports. This reduces chances of muscle, bone, and joint injury.  · Regular walking improves your cholesterol levels and lowers your risk of heart disease. And it helps you control your blood sugar if  you have diabetes.  · Walking is a weight-bearing activity, which helps maintain bone density. This can help prevent osteoporosis.  Personal rewards  · Taking walks can help you relax and manage stress. And fitness walking may make you feel better about yourself.  · Walking can help you sleep better at night and make you less likely to be depressed.  · Regular walking may help maintain your memory as you get older.  · Walking is a great way to spend extra time with friends and family members. Be sure to invite your dog along!  Q&A about fitness walking  Q: Will walking keep me fit?  A: Yes. Regular walking at the right pace gives you all the benefits of other aerobic activities, such as jogging and swimming.  Q: Will walking help me lose weight and keep it off?  A: Yes. Per mile, walking can burn as many calories as jogging. Your health care provider can help work walking into your weight-loss plan.  Q: Is walking safe for my health?  A: Yes. Walking is safe if you have high blood pressure, diabetes, heart disease, or other conditions. Talk to your health care provider before you start.  Date Last Reviewed: 5/9/2015  © 5031-4194 [employer]. [address] [place] [address], [place] [address]. All rights reserved. This information is not intended as a substitute for professional medical care. Always follow your healthcare professional's instructions.

## 2017-07-27 NOTE — PROGRESS NOTES
Pre-Visit Chart Review  For Appointment Scheduled on 07/27/17    Health Maintenance Due   Topic Date Due    Pneumococcal (65+) (2 of 2 - PPSV23) 03/01/2017    Colonoscopy  03/01/2017

## 2017-07-27 NOTE — PROGRESS NOTES
Subjective:       Patient ID: Maximilian Tavera Jr. is a 71 y.o. male.    Chief Complaint: Back Pain (lower back pain mostly on right side )    Back Pain   This is a new problem. The current episode started in the past 7 days. The problem occurs constantly. The problem has been waxing and waning since onset. The pain is present in the lumbar spine. The quality of the pain is described as aching. The pain does not radiate. The pain is at a severity of 7/10. The pain is the same all the time. The symptoms are aggravated by bending, position, lying down, sitting and twisting. Pertinent negatives include no abdominal pain, chest pain, dysuria, fever, headaches or weakness.     Review of Systems   Constitutional: Positive for activity change. Negative for appetite change, chills, fatigue and fever.   HENT: Negative for hearing loss.    Respiratory: Negative for cough, shortness of breath and wheezing.    Cardiovascular: Negative for chest pain, palpitations and leg swelling.   Gastrointestinal: Negative for abdominal pain, constipation, diarrhea, nausea and vomiting.   Genitourinary: Negative for dysuria, flank pain and urgency.   Musculoskeletal: Positive for back pain. Negative for arthralgias.   Neurological: Negative for dizziness, weakness, light-headedness and headaches.       Objective:      Vitals:    07/27/17 0914   BP: 126/74   Pulse: 60   Temp: 98.5 °F (36.9 °C)     Physical Exam   Constitutional: He is oriented to person, place, and time. He appears well-developed.   Obese body habitus.   HENT:   Head: Normocephalic and atraumatic.   Eyes: Conjunctivae and EOM are normal. Pupils are equal, round, and reactive to light.   Cardiovascular: Normal rate, regular rhythm, normal heart sounds and intact distal pulses.    Pulmonary/Chest: Effort normal and breath sounds normal.   Musculoskeletal:        Lumbar back: He exhibits decreased range of motion, tenderness (right lumbar paraspinal muscles) and spasm. He  exhibits no bony tenderness, no swelling and no edema.   Patient declined full musculoskeletal exam due to pain.   Neurological: He is alert and oriented to person, place, and time.   Skin: Skin is warm and dry.   Psychiatric: He has a normal mood and affect.       Assessment:       1. Lumbar strain, initial encounter    2. HTN (hypertension), benign    3. Obesity, unspecified obesity severity, unspecified obesity type        Plan:       Lumbar strain, initial encounter  -     tizanidine (ZANAFLEX) 4 MG tablet; Take 1 tablet (4 mg total) by mouth every 8 (eight) hours.  Dispense: 30 tablet; Refill: 0  -     naproxen (EC NAPROSYN) 500 MG EC tablet; Take 1 tablet (500 mg total) by mouth 2 (two) times daily with meals.  Dispense: 28 tablet; Refill: 0  - Apply superficial heat x 2 daily  - Bed rest is not recommended  - Return to clinic if symptoms worsen or do not improve with treatment in 3-4 weeks    HTN (hypertension), benign        - Controlled, continue current medication    Obesity, unspecified obesity severity, unspecified obesity type        - See below    Patient readiness: acceptance and barriers:none    During the course of the visit the patient was educated and counseled about the following:     Hypertension:   Medication: no change.  Obesity:   Not addressed at today's urgent care visit.     Goals: Hypertension: Reduce Blood Pressure and Obesity: Reduce calorie intake and BMI    Did patient meet goals/outcomes: Yes    The following self management tools provided: declined    Patient Instructions (the written plan) was given to the patient/family.     Time spent with patient: 15 minutes

## 2017-08-21 ENCOUNTER — TELEPHONE (OUTPATIENT)
Dept: PAIN MEDICINE | Facility: CLINIC | Age: 72
End: 2017-08-21

## 2017-08-23 DIAGNOSIS — M54.16 LUMBAR RADICULOPATHY: Primary | ICD-10-CM

## 2017-08-30 RX ORDER — GABAPENTIN 300 MG/1
CAPSULE ORAL
Qty: 180 CAPSULE | Refills: 11 | Status: SHIPPED | OUTPATIENT
Start: 2017-08-30 | End: 2017-11-14 | Stop reason: SDUPTHER

## 2017-08-30 NOTE — PROGRESS NOTES
"[facility], Department of Neurology   [facility] [place] [address]  Phone:[phone]  Fax: [phone]    Patient Name: Maximilian Tavera Jr.  : 1945  MRN:  [id number]    2017     chief complaint: myasthenia gravis     PCP: Brian Marroquin MD.    DX: Ab+ MG dx'ed 3/2015  Thymic status: CT chest 4/15 negative for thymoma   Maintenance: prednisone 7 mg qOD   Last dose change: 16: 8mg qOD -> 7mg qOD; : 10mg qOD to 8mg qOD; 6/15 prednisone 5 mg qd --> 10 mg q OD   Rescue: none   Prior immunemodulatory agents: none     Interval history 17:   Mr Tavera is a 72yo male with Ab+ myasthenia gravis diagnosed in , maintained on 7mg prednisone qOD. Has been doing well since his last visit. Minimal diplopia at the end of the day. Has some generalized fatigue when it's hot, but tries to do all his chores early in the day. Has started exercising.      Interval 3/21/17: Doing well from an MG standpoint on low-dose prednisone. He still has a occasional diplopia at night when looking down. Has rare choking - mostly liquids - when he's not paying attention. Had a number of URIs last year. Also noticed that his sense of taste is off, although sense of smell is preserved.     Interval history 16:  Doing well. Last visit, dropped prednisone from 20 to 8mg qOD. No new symptoms. Still having diplopia on downgaze and only rarely at other times. No chewing or swallowing issues.    Interval history 16  Doing well. Still with mild baseline diplopia and rare difficulty with swallowing (this does not last more than a few seconds). No issues with speaking, breathing, or generalized weakness. Heat "wears me out".       HPI 16  Maximilian Tavera Jr. is a 71 yo male with Ab+ myasthenia gravis. Doing well, stable. Occasional diplopia, mostly with laying back too far in his recliner while watching tv. Mentions 6-8 mos ago began noticing rhinorrhea with eating. Denies " "ptosis, difficulty chewing/swallowing/breathing. Denies weight changes, sweats, alternating diarrhea/constipation.     Prednisone 10 mg q OD    Interval hx  2/12/16:  Maximilian Tavera Jr. is a 69 yo  male with myasthenia gravis. Still has occasional diplopia, but is improved- occuring less frequently. Occurs most when watching TV. Occasional "funny feeling" when drinking soft drinks. Denies weakness, SOB, dysarthria.     Taking prednisone 5mg/day.   Stopped Mestion- GI side effects     Interval history 6/17/15:  Taking mestinon at 1/2, 1/2, 1 and not finding much improvement in diplopia, and having a decent amount of loose stools and gas.     HPI: Maximilian Tavera Jr. is a 69 y.o. male with 7-8mos of diplopia. Was seen by Dr Jimenez, who did some blood work and found that he was Ab+ for AchR. He was referred here for further care.     No prior symptoms. No difficulty chewing/swallowing, breathing. Arm/leg strength is fine. No orthopnea.     Diplopia is most noticeable when reading or watching tv. Driving is occasionally problematic. No clearly diurnal. No real problem with ptosis.     He has not tried medications for this. He has prisms, which help.     Looks like he started carvedilol in 3/14.     Medications:   Current Outpatient Prescriptions   Medication Sig Dispense Refill    ASPIRIN LOW DOSE 81 mg EC tablet Take 81 mg by mouth once daily.       atorvastatin (LIPITOR) 80 MG tablet Take 1 tablet (80 mg total) by mouth once daily. Every day 90 tablet 3    calcium citrate-vitamin D (CITRACAL + D) 315-200 mg-unit per tablet Take 1 tablet by mouth once daily. Twice a day      carvedilol (COREG) 25 MG tablet TAKE 1/2 TO 1 TABLET TWICE DAILY  OR AS DIRECTED 180 tablet 3    cetirizine (ZYRTEC) 10 MG tablet Take 1 tablet by mouth daily as needed.      chlorthalidone (HYGROTEN) 25 MG Tab TAKE 1 TABLET ONE TIME DAILY 90 tablet 3    cholecalciferol, vitamin D3, (VITAMIN D) 2,000 unit Cap 1 capsule once " daily. Every day      coenzyme Q10 (CO Q-10) 100 mg capsule Take 400 mg by mouth once daily.       cyanocobalamin, vitamin B-12, (VITAMIN B-12) 5,000 mcg Subl Take 5,000 mcg by mouth once daily. Every day      fluticasone (FLONASE) 50 mcg/actuation nasal spray USE 1 SPRAY IN EACH NOSTRIL TWICE DAILY AS NEEDED  FOR  RHINITIS 48 g 3    gabapentin (NEURONTIN) 300 MG capsule TAKE 1 CAPSULE EVERY NIGHT  FOR  5  DAYS  THEN TWICE DAILY  THEREAFTER 180 capsule 11    ibuprofen (ADVIL,MOTRIN) 800 MG tablet Take 1 tablet (800 mg total) by mouth daily as needed for Pain. 30 tablet 11    levothyroxine (SYNTHROID) 50 MCG tablet TAKE 1 TABLET ONE TIME DAILY 90 tablet 3    lisinopril (PRINIVIL,ZESTRIL) 40 MG tablet Take 1 tablet (40 mg total) by mouth once daily. 90 tablet 3    methylsulfonylmethane (MSM) 1,000 mg Tab Take 1,000 mg by mouth once daily. Every day      milk thistle 200 mg Cap Take 200 mg by mouth 2 (two) times daily. Twice a day      multivitamin with minerals tablet Take 1 tablet by mouth once daily.       omega-3 fatty acids 1,000 mg Cap Twice a day      potassium chloride (KLOR-CON) 8 MEQ TbSR Take 1 tablet (8 mEq total) by mouth 2 (two) times daily. 180 tablet 3    predniSONE (DELTASONE) 1 MG tablet TAKE 3 TABLETS  EVERY OTHER DAY (Patient taking differently: TAKE 2 TABLETS  EVERY OTHER DAY) 135 tablet 3    predniSONE (DELTASONE) 5 MG tablet Take 1 tablet (5 mg total) by mouth every other day. 45 tablet 3    sildenafil (VIAGRA) 100 MG tablet Take 1 tablet (100 mg total) by mouth daily as needed for Erectile Dysfunction. Every day PRN 10 tablet 11    tadalafil (CIALIS) 20 MG Tab Take 1 tablet (20 mg total) by mouth once daily. 9 tablet 11     No current facility-administered medications for this visit.        Allergies:  Review of patient's allergies indicates:   Allergen Reactions    No known drug allergies        PMHx:  Past Medical History:   Diagnosis Date    Arthritis     Back pain      Cataract     Chest pain, musculoskeletal     Hyperlipidemia     Hypertension     Hypothyroidism     Knee fracture     Myasthenia gravis     Polyneuropathy     Squamous cell carcinoma 2014    left forearm    Thyroid disease      Past Surgical History:   Procedure Laterality Date    APPENDECTOMY      CATARACT EXTRACTION W/  INTRAOCULAR LENS IMPLANT Bilateral     HEMORRHOID SURGERY      KNEE ARTHROPLASTY Bilateral     NECK SURGERY      TONSILLECTOMY         Fhx:  Family History   Problem Relation Age of Onset    Cataracts Mother     Heart disease Mother      CHF    Hypertension Mother     Hyperlipidemia Mother     Cataracts Father     Glaucoma Father     Heart disease Father     Hyperlipidemia Sister     Hypertension Sister     No Known Problems Daughter     No Known Problems Daughter     Collagen disease Neg Hx     Amblyopia Neg Hx     Blindness Neg Hx     Macular degeneration Neg Hx     Retinal detachment Neg Hx     Strabismus Neg Hx     Cancer Neg Hx        Shx:   Social History     Social History    Marital status:      Spouse name: N/A    Number of children: N/A    Years of education: N/A     Occupational History    Not on file.     Social History Main Topics    Smoking status: Passive Smoke Exposure - Never Smoker    Smokeless tobacco: Never Used      Comment: when a child    Alcohol use Yes      Comment: rarely    Drug use: No    Sexual activity: Yes     Partners: Female     Other Topics Concern    Not on file     Social History Narrative    No narrative on file       ROS:  Review of Systems (Questions asked; positives or additions noted in BOLD)  Gen Malaise/fatigue, weight change   HEENT Hearing loss, blurred vision, diplopia   Card CP, palpitations   Pulm SOB, cough    Vasc Easy bruising, easy bleeding    GI Nausea, vomiting, constipation    Frequency, urgency   M/S Joint pain, myalgias, falls    Endocrine Temperature intolerance, polyuria, polydipsia  "  Neuro Dizziness, tremors, seizures, focal weakness, Others- as noted in HPI   Psych Memory loss, depression, anxiety, hallucinations       PHYSICAL EXAM:   /78   Pulse 65   Ht 6' 1" (1.854 m)   Wt 107.2 kg (236 lb 5.3 oz)   BMI 31.18 kg/m²    GEN:  NAD, well-developed, well-groomed.  HEENT: No cervical lymphadenopathy noted.  CARDIOVASCULAR: Radial pulses 2+ bilaterally. No LE edema noted.  NEURO:  Mental Status: Awake, alert, and oriented. Normal attention and concentration.  Speech is fluent and appropriate language function.    Cranial Nerves:  II Pupils are round and reactive to direct and consensual light and accommodation. Visual fields full to confrontation.   III, IV, VI Extraocular eye movements full bilaterally. No ptosis noted. He has mild orbicularis oculi bilaterally.   V Normal facial sensation to pinprick and light touch.   VII Symmetric facial expressions.   VIII Hearing intact to finger rub bilaterally.   IX, X Palate elevates midline and symmetrically.   XI Shoulder elevation is symmetric and full strength bilaterally. Neck rotation strength is normal bilaterally.   XII Tongue is midline.     Sensory: Sensation is normal to light touch in the upper and lower extremities bilaterally.    Motor: Extremities demonstrate normal bulk and tone in all four limbs. Mild left ocular weakness with sustained upward gaze. Good orbicularis oculi and orbicularis oris strength.  Strength-       RUE: 5/5                LUE: 5/5                RLE: 5/5                LLE: 5/5     Deep Tendon Relfexes: 2+ and symmetric in the upper and lower extremities bilaterally. Babinski mute bilaterally.    Coordination: Finger to nose WNL.     Gait: Normal casual gait.      ASSESSMENT:     ICD-10-CM ICD-9-CM   1. Myasthenia gravis, AChR antibody positive G70.00 358.00       PLAN:  Ab+ MG dx'ed 3/15, stable.     -maintain prednisone 7mg q OD  -follow up with his ophthalmologist semi-annually  -RTC 6 mos      Jam " MD Bridget, AURA, FAAN  Department of Neurology   [facility] [place], [place]

## 2017-08-31 ENCOUNTER — OFFICE VISIT (OUTPATIENT)
Dept: NEUROLOGY | Facility: CLINIC | Age: 72
End: 2017-08-31
Payer: MEDICARE

## 2017-08-31 VITALS
HEIGHT: 73 IN | SYSTOLIC BLOOD PRESSURE: 115 MMHG | WEIGHT: 236.31 LBS | HEART RATE: 65 BPM | BODY MASS INDEX: 31.32 KG/M2 | DIASTOLIC BLOOD PRESSURE: 78 MMHG

## 2017-08-31 DIAGNOSIS — G70.00 MYASTHENIA GRAVIS, ACHR ANTIBODY POSITIVE: Primary | ICD-10-CM

## 2017-08-31 PROCEDURE — 3074F SYST BP LT 130 MM HG: CPT | Mod: S$GLB,,, | Performed by: PSYCHIATRY & NEUROLOGY

## 2017-08-31 PROCEDURE — 3078F DIAST BP <80 MM HG: CPT | Mod: S$GLB,,, | Performed by: PSYCHIATRY & NEUROLOGY

## 2017-08-31 PROCEDURE — 1159F MED LIST DOCD IN RCRD: CPT | Mod: S$GLB,,, | Performed by: PSYCHIATRY & NEUROLOGY

## 2017-08-31 PROCEDURE — 3008F BODY MASS INDEX DOCD: CPT | Mod: S$GLB,,, | Performed by: PSYCHIATRY & NEUROLOGY

## 2017-08-31 PROCEDURE — 99215 OFFICE O/P EST HI 40 MIN: CPT | Mod: S$GLB,,, | Performed by: PSYCHIATRY & NEUROLOGY

## 2017-08-31 PROCEDURE — 99999 PR PBB SHADOW E&M-EST. PATIENT-LVL III: CPT | Mod: PBBFAC,,, | Performed by: PSYCHIATRY & NEUROLOGY

## 2017-08-31 PROCEDURE — 1125F AMNT PAIN NOTED PAIN PRSNT: CPT | Mod: S$GLB,,, | Performed by: PSYCHIATRY & NEUROLOGY

## 2017-08-31 PROCEDURE — 99499 UNLISTED E&M SERVICE: CPT | Mod: S$GLB,,, | Performed by: PSYCHIATRY & NEUROLOGY

## 2017-08-31 NOTE — LETTER
August 31, 2017      Brian Marroquin MD  [address] [facility] [address]           [facility]  [address] [facility] [address]  Phone: [phone]  Fax: [phone]          Patient: Maximilian Tavera Jr.   MR Number: [id number]   YOB: 1945   Date of Visit: 8/31/2017       Dear Dr. Brian Marroquin:    Thank you for referring Maximilian Tavera to me for evaluation. Attached you will find relevant portions of my assessment and plan of care.    If you have questions, please do not hesitate to call me. I look forward to following Maximilian Tavera along with you.    Sincerely,    Lance Gill MD    Enclosure  CC:  No Recipients    If you would like to receive this communication electronically, please contact [email or web address] or [phone] to request more information on [employer] access.    For providers and/or their staff who would like to refer a patient to [facility], please contact us through our one-stop-shop provider referral line, [facility], at [phone].    If you feel you have received this communication in error or would no longer like to receive these types of communications, please e-mail [email or web address]

## 2017-09-01 ENCOUNTER — SURGERY (OUTPATIENT)
Age: 72
End: 2017-09-01

## 2017-09-01 ENCOUNTER — HOSPITAL ENCOUNTER (OUTPATIENT)
Facility: AMBULARY SURGERY CENTER | Age: 72
Discharge: HOME | End: 2017-09-01
Attending: ANESTHESIOLOGY | Admitting: ANESTHESIOLOGY
Payer: MEDICARE

## 2017-09-01 DIAGNOSIS — M51.36 DDD (DEGENERATIVE DISC DISEASE), LUMBAR: Primary | ICD-10-CM

## 2017-09-01 PROCEDURE — 64484 NJX AA&/STRD TFRM EPI L/S EA: CPT | Mod: RT,,, | Performed by: ANESTHESIOLOGY

## 2017-09-01 PROCEDURE — 64484 NJX AA&/STRD TFRM EPI L/S EA: CPT | Performed by: ANESTHESIOLOGY

## 2017-09-01 PROCEDURE — 64483 NJX AA&/STRD TFRM EPI L/S 1: CPT | Performed by: ANESTHESIOLOGY

## 2017-09-01 PROCEDURE — 64483 NJX AA&/STRD TFRM EPI L/S 1: CPT | Mod: RT,,, | Performed by: ANESTHESIOLOGY

## 2017-09-01 PROCEDURE — 99152 MOD SED SAME PHYS/QHP 5/>YRS: CPT | Mod: ,,, | Performed by: ANESTHESIOLOGY

## 2017-09-01 RX ORDER — DEXAMETHASONE SODIUM PHOSPHATE 10 MG/ML
INJECTION INTRAMUSCULAR; INTRAVENOUS
Status: DISCONTINUED | OUTPATIENT
Start: 2017-09-01 | End: 2017-09-01 | Stop reason: HOSPADM

## 2017-09-01 RX ORDER — BUPIVACAINE HYDROCHLORIDE 2.5 MG/ML
INJECTION, SOLUTION EPIDURAL; INFILTRATION; INTRACAUDAL
Status: DISCONTINUED | OUTPATIENT
Start: 2017-09-01 | End: 2017-09-01 | Stop reason: HOSPADM

## 2017-09-01 RX ORDER — LIDOCAINE HYDROCHLORIDE 10 MG/ML
INJECTION, SOLUTION EPIDURAL; INFILTRATION; INTRACAUDAL; PERINEURAL
Status: DISCONTINUED | OUTPATIENT
Start: 2017-09-01 | End: 2017-09-01 | Stop reason: HOSPADM

## 2017-09-01 RX ORDER — SODIUM CHLORIDE, SODIUM LACTATE, POTASSIUM CHLORIDE, CALCIUM CHLORIDE 600; 310; 30; 20 MG/100ML; MG/100ML; MG/100ML; MG/100ML
INJECTION, SOLUTION INTRAVENOUS ONCE AS NEEDED
Status: DISCONTINUED | OUTPATIENT
Start: 2017-09-01 | End: 2017-09-01 | Stop reason: HOSPADM

## 2017-09-01 RX ORDER — ALPRAZOLAM 0.25 MG/1
1 TABLET ORAL ONCE AS NEEDED
Status: COMPLETED | OUTPATIENT
Start: 2017-09-01 | End: 2017-09-01

## 2017-09-01 RX ADMIN — ALPRAZOLAM 1 MG: 0.25 TABLET ORAL at 11:09

## 2017-09-01 RX ADMIN — BUPIVACAINE HYDROCHLORIDE 3 ML: 2.5 INJECTION, SOLUTION EPIDURAL; INFILTRATION; INTRACAUDAL at 12:09

## 2017-09-01 RX ADMIN — DEXAMETHASONE SODIUM PHOSPHATE 10 MG: 10 INJECTION INTRAMUSCULAR; INTRAVENOUS at 12:09

## 2017-09-01 RX ADMIN — LIDOCAINE HYDROCHLORIDE 3 ML: 10 INJECTION, SOLUTION EPIDURAL; INFILTRATION; INTRACAUDAL; PERINEURAL at 12:09

## 2017-09-01 NOTE — OP NOTE
PROCEDURE DATE: 9/1/2017    PROCEDURE: Right L4-5 and L5-S1 transforaminal epidural steroid injection under fluoroscopy    DIAGNOSIS: Lumbar disc displacement without myelopathy  Post op diagnosis: Same    PHYSICIAN: Devan Watt MD    MEDICATIONS INJECTED:  Dexamethasone 5mg (0.5ml) and 1.5ml 0.25% bupivicaine at each nerve root.     LOCAL ANESTHETIC INJECTED:  Lidocaine 1%. 2 ml per site.    SEDATION MEDICATIONS: RN IV sedation    ESTIMATED BLOOD LOSS:  None    COMPLICATIONS:  None    TECHNIQUE:   A time-out was taken to identify patient and procedure side prior to starting the procedure. The patient was placed in a prone position, prepped and draped in the usual sterile fashion using ChloraPrep and sterile towels.  The area to be injected was determined under fluoroscopic guidance in AP and oblique view.  Local anesthetic was given by raising a wheal and going down to the hub of a 25-gauge 1.5 inch needle.  In oblique view, a 3.5 inch 22-gauge bent-tip spinal needle was introduced towards 6 oclock position of the pedicle of each above named nerve root level.  The needle was walked medially then hinged into the neural foramen and position was confirmed in AP and lateral views.  1ml contrast dye was injected to confirm appropriate placement and that there was no vascular uptake.  After negative aspiration for blood or CSF, the medication was then injected. This was performed at the right L4-5 and L5-S1 level(s). The patient tolerated the procedure well.    The patient was monitored after the procedure.  Patient was given post procedure and discharge instructions to follow at home. The patient was discharged in a stable condition.

## 2017-09-01 NOTE — DISCHARGE INSTRUCTIONS
Pain injection instructions:     Steroids take about a week to relieve pain.  Initially you may get pain relief from the local anesthetic but this may wear off before the steroid works.    No driving for 24 hrs   Activity as tolerated- gradually increase activities.  Dont lift over 10 lbs for 24 hrs   No heat at injection sites x 2 days  Use ice for mild swelling and for comfort.  May shower today. No baths for two days.      Resume Aspirin, Plavix, or Coumadin the day after the procedure unless otherwise instructed.   If diabetic,monitor your glucose carefully as steroids can increase glucose level    Seek immediate medical help for:   Severe increase in your usual pain or appearance of new pain.  Prolonged or increasing weakness or numbness in the legs or arms.    - Numbing medicine was injected that affects nerves that carry information from       muscles to brain and vice versa.  This numbness can last 4-6 hrs so be very careful walking.    Fever above 101 ,Drainage,redness,active bleeding, or increased swelling at the injection site.  Headache, shortness of breath, chest pain, or breathing problems.

## 2017-09-01 NOTE — DISCHARGE SUMMARY
[facility]  Discharge Note  Short Stay    Admit Date: 9/1/2017    Discharge Date and Time: 9/1/2017    Attending Physician: Devan Watt MD     Discharge Provider: Devan Watt    Diagnoses:  Active Hospital Problems    Diagnosis  POA    *DDD (degenerative disc disease), lumbar [M51.36]  Yes      Resolved Hospital Problems    Diagnosis Date Resolved POA   No resolved problems to display.       Hospital Course: Lumbar SONIA  Discharged Condition: Good    Final Diagnoses:   Active Hospital Problems    Diagnosis  POA    *DDD (degenerative disc disease), lumbar [M51.36]  Yes      Resolved Hospital Problems    Diagnosis Date Resolved POA   No resolved problems to display.       Disposition: Home or Self Care    Follow up/Patient Instructions:    Medications:  Reconciled Home Medications:   Current Discharge Medication List      CONTINUE these medications which have NOT CHANGED    Details   carvedilol (COREG) 25 MG tablet TAKE 1/2 TO 1 TABLET TWICE DAILY  OR AS DIRECTED  Qty: 180 tablet, Refills: 3    Associated Diagnoses: HTN (hypertension), benign      chlorthalidone (HYGROTEN) 25 MG Tab TAKE 1 TABLET ONE TIME DAILY  Qty: 90 tablet, Refills: 3      levothyroxine (SYNTHROID) 50 MCG tablet TAKE 1 TABLET ONE TIME DAILY  Qty: 90 tablet, Refills: 3    Associated Diagnoses: Congenital hypothyroidism without goiter; Unspecified hypothyroidism      ASPIRIN LOW DOSE 81 mg EC tablet Take 81 mg by mouth once daily.       atorvastatin (LIPITOR) 80 MG tablet Take 1 tablet (80 mg total) by mouth once daily. Every day  Qty: 90 tablet, Refills: 3    Associated Diagnoses: Hyperlipidemia      calcium citrate-vitamin D (CITRACAL + D) 315-200 mg-unit per tablet Take 1 tablet by mouth once daily. Twice a day      cetirizine (ZYRTEC) 10 MG tablet Take 1 tablet by mouth daily as needed.      cholecalciferol, vitamin D3, (VITAMIN D) 2,000 unit Cap 1 capsule once daily. Every day      coenzyme Q10 (CO Q-10) 100 mg capsule Take 400 mg by  mouth once daily.       cyanocobalamin, vitamin B-12, (VITAMIN B-12) 5,000 mcg Subl Take 5,000 mcg by mouth once daily. Every day      fluticasone (FLONASE) 50 mcg/actuation nasal spray USE 1 SPRAY IN EACH NOSTRIL TWICE DAILY AS NEEDED  FOR  RHINITIS  Qty: 48 g, Refills: 3    Associated Diagnoses: Allergic rhinitis      gabapentin (NEURONTIN) 300 MG capsule TAKE 1 CAPSULE EVERY NIGHT  FOR  5  DAYS  THEN TWICE DAILY  THEREAFTER  Qty: 180 capsule, Refills: 11      ibuprofen (ADVIL,MOTRIN) 800 MG tablet Take 1 tablet (800 mg total) by mouth daily as needed for Pain.  Qty: 30 tablet, Refills: 11      lisinopril (PRINIVIL,ZESTRIL) 40 MG tablet Take 1 tablet (40 mg total) by mouth once daily.  Qty: 90 tablet, Refills: 3    Associated Diagnoses: Essential hypertension; HTN (hypertension), benign      methylsulfonylmethane (MSM) 1,000 mg Tab Take 1,000 mg by mouth once daily. Every day      milk thistle 200 mg Cap Take 200 mg by mouth 2 (two) times daily. Twice a day      multivitamin with minerals tablet Take 1 tablet by mouth once daily.       omega-3 fatty acids 1,000 mg Cap Twice a day      potassium chloride (KLOR-CON) 8 MEQ TbSR Take 1 tablet (8 mEq total) by mouth 2 (two) times daily.  Qty: 180 tablet, Refills: 3    Associated Diagnoses: HTN (hypertension), benign      !! predniSONE (DELTASONE) 1 MG tablet TAKE 3 TABLETS  EVERY OTHER DAY  Qty: 135 tablet, Refills: 3      !! predniSONE (DELTASONE) 5 MG tablet Take 1 tablet (5 mg total) by mouth every other day.  Qty: 45 tablet, Refills: 3      sildenafil (VIAGRA) 100 MG tablet Take 1 tablet (100 mg total) by mouth daily as needed for Erectile Dysfunction. Every day PRN  Qty: 10 tablet, Refills: 11    Associated Diagnoses: Erectile disorder due to medical condition in male patient      tadalafil (CIALIS) 20 MG Tab Take 1 tablet (20 mg total) by mouth once daily.  Qty: 9 tablet, Refills: 11    Associated Diagnoses: Erectile disorder due to medical condition in male  patient       !! - Potential duplicate medications found. Please discuss with provider.          Discharge Procedure Orders  Diet general     Activity as tolerated     Call MD for:  temperature >100.4     Call MD for:  persistent nausea and vomiting or diarrhea     Call MD for:  severe uncontrolled pain     Call MD for:  redness, tenderness, or signs of infection (pain, swelling, redness, odor or green/yellow discharge around incision site)     Call MD for:  difficulty breathing or increased cough     Call MD for:  severe persistent headache          Follow up with MD in 2-3 weeks    Discharge Procedure Orders (must include Diet, Follow-up, Activity):    Discharge Procedure Orders (must include Diet, Follow-up, Activity)  Diet general     Activity as tolerated     Call MD for:  temperature >100.4     Call MD for:  persistent nausea and vomiting or diarrhea     Call MD for:  severe uncontrolled pain     Call MD for:  redness, tenderness, or signs of infection (pain, swelling, redness, odor or green/yellow discharge around incision site)     Call MD for:  difficulty breathing or increased cough     Call MD for:  severe persistent headache

## 2017-09-01 NOTE — PLAN OF CARE
Stable, States ready to go home, koko po fluids, pain tolerable, able to lift legs off bed and stand, ambulatory to car

## 2017-09-01 NOTE — INTERVAL H&P NOTE
The patient has been examined and the H&P has been reviewed:    I concur with the findings and no changes have occurred since H&P was written.   This patient has been cleared for surgery in an ambulatory surgical facility    ASA 3,  MP3  No history of anesthetic complications  Plan for RN IV sedation      Anesthesia/Surgery risks, benefits and alternative options discussed and understood by patient/family.          Active Hospital Problems    Diagnosis  POA    DDD (degenerative disc disease), lumbar [M51.36]  Yes      Resolved Hospital Problems    Diagnosis Date Resolved POA   No resolved problems to display.

## 2017-09-01 NOTE — H&P (VIEW-ONLY)
"[facility], Department of Neurology   [facility] [place] [address]  Phone:[phone]  Fax: [phone]    Patient Name: Maximilian Tavera Jr.  : 1945  MRN:  [id number]    2017     chief complaint: myasthenia gravis     PCP: Brian Marroquin MD.    DX: Ab+ MG dx'ed 3/2015  Thymic status: CT chest 4/15 negative for thymoma   Maintenance: prednisone 7 mg qOD   Last dose change: 16: 8mg qOD -> 7mg qOD; : 10mg qOD to 8mg qOD; 6/15 prednisone 5 mg qd --> 10 mg q OD   Rescue: none   Prior immunemodulatory agents: none     Interval history 17:   Mr Tavera is a 72yo male with Ab+ myasthenia gravis diagnosed in , maintained on 7mg prednisone qOD. Has been doing well since his last visit. Minimal diplopia at the end of the day. Has some generalized fatigue when it's hot, but tries to do all his chores early in the day. Has started exercising.      Interval 3/21/17: Doing well from an MG standpoint on low-dose prednisone. He still has a occasional diplopia at night when looking down. Has rare choking - mostly liquids - when he's not paying attention. Had a number of URIs last year. Also noticed that his sense of taste is off, although sense of smell is preserved.     Interval history 16:  Doing well. Last visit, dropped prednisone from 20 to 8mg qOD. No new symptoms. Still having diplopia on downgaze and only rarely at other times. No chewing or swallowing issues.    Interval history 16  Doing well. Still with mild baseline diplopia and rare difficulty with swallowing (this does not last more than a few seconds). No issues with speaking, breathing, or generalized weakness. Heat "wears me out".       HPI 16  Maximilian Tavera Jr. is a 71 yo male with Ab+ myasthenia gravis. Doing well, stable. Occasional diplopia, mostly with laying back too far in his recliner while watching tv. Mentions 6-8 mos ago began noticing rhinorrhea with eating. Denies " "ptosis, difficulty chewing/swallowing/breathing. Denies weight changes, sweats, alternating diarrhea/constipation.     Prednisone 10 mg q OD    Interval hx  2/12/16:  Maximilian Tavera Jr. is a 69 yo  male with myasthenia gravis. Still has occasional diplopia, but is improved- occuring less frequently. Occurs most when watching TV. Occasional "funny feeling" when drinking soft drinks. Denies weakness, SOB, dysarthria.     Taking prednisone 5mg/day.   Stopped Mestion- GI side effects     Interval history 6/17/15:  Taking mestinon at 1/2, 1/2, 1 and not finding much improvement in diplopia, and having a decent amount of loose stools and gas.     HPI: Maximilian Tavera Jr. is a 69 y.o. male with 7-8mos of diplopia. Was seen by Dr Jimenez, who did some blood work and found that he was Ab+ for AchR. He was referred here for further care.     No prior symptoms. No difficulty chewing/swallowing, breathing. Arm/leg strength is fine. No orthopnea.     Diplopia is most noticeable when reading or watching tv. Driving is occasionally problematic. No clearly diurnal. No real problem with ptosis.     He has not tried medications for this. He has prisms, which help.     Looks like he started carvedilol in 3/14.     Medications:   Current Outpatient Prescriptions   Medication Sig Dispense Refill    ASPIRIN LOW DOSE 81 mg EC tablet Take 81 mg by mouth once daily.       atorvastatin (LIPITOR) 80 MG tablet Take 1 tablet (80 mg total) by mouth once daily. Every day 90 tablet 3    calcium citrate-vitamin D (CITRACAL + D) 315-200 mg-unit per tablet Take 1 tablet by mouth once daily. Twice a day      carvedilol (COREG) 25 MG tablet TAKE 1/2 TO 1 TABLET TWICE DAILY  OR AS DIRECTED 180 tablet 3    cetirizine (ZYRTEC) 10 MG tablet Take 1 tablet by mouth daily as needed.      chlorthalidone (HYGROTEN) 25 MG Tab TAKE 1 TABLET ONE TIME DAILY 90 tablet 3    cholecalciferol, vitamin D3, (VITAMIN D) 2,000 unit Cap 1 capsule once " daily. Every day      coenzyme Q10 (CO Q-10) 100 mg capsule Take 400 mg by mouth once daily.       cyanocobalamin, vitamin B-12, (VITAMIN B-12) 5,000 mcg Subl Take 5,000 mcg by mouth once daily. Every day      fluticasone (FLONASE) 50 mcg/actuation nasal spray USE 1 SPRAY IN EACH NOSTRIL TWICE DAILY AS NEEDED  FOR  RHINITIS 48 g 3    gabapentin (NEURONTIN) 300 MG capsule TAKE 1 CAPSULE EVERY NIGHT  FOR  5  DAYS  THEN TWICE DAILY  THEREAFTER 180 capsule 11    ibuprofen (ADVIL,MOTRIN) 800 MG tablet Take 1 tablet (800 mg total) by mouth daily as needed for Pain. 30 tablet 11    levothyroxine (SYNTHROID) 50 MCG tablet TAKE 1 TABLET ONE TIME DAILY 90 tablet 3    lisinopril (PRINIVIL,ZESTRIL) 40 MG tablet Take 1 tablet (40 mg total) by mouth once daily. 90 tablet 3    methylsulfonylmethane (MSM) 1,000 mg Tab Take 1,000 mg by mouth once daily. Every day      milk thistle 200 mg Cap Take 200 mg by mouth 2 (two) times daily. Twice a day      multivitamin with minerals tablet Take 1 tablet by mouth once daily.       omega-3 fatty acids 1,000 mg Cap Twice a day      potassium chloride (KLOR-CON) 8 MEQ TbSR Take 1 tablet (8 mEq total) by mouth 2 (two) times daily. 180 tablet 3    predniSONE (DELTASONE) 1 MG tablet TAKE 3 TABLETS  EVERY OTHER DAY (Patient taking differently: TAKE 2 TABLETS  EVERY OTHER DAY) 135 tablet 3    predniSONE (DELTASONE) 5 MG tablet Take 1 tablet (5 mg total) by mouth every other day. 45 tablet 3    sildenafil (VIAGRA) 100 MG tablet Take 1 tablet (100 mg total) by mouth daily as needed for Erectile Dysfunction. Every day PRN 10 tablet 11    tadalafil (CIALIS) 20 MG Tab Take 1 tablet (20 mg total) by mouth once daily. 9 tablet 11     No current facility-administered medications for this visit.        Allergies:  Review of patient's allergies indicates:   Allergen Reactions    No known drug allergies        PMHx:  Past Medical History:   Diagnosis Date    Arthritis     Back pain      Cataract     Chest pain, musculoskeletal     Hyperlipidemia     Hypertension     Hypothyroidism     Knee fracture     Myasthenia gravis     Polyneuropathy     Squamous cell carcinoma 2014    left forearm    Thyroid disease      Past Surgical History:   Procedure Laterality Date    APPENDECTOMY      CATARACT EXTRACTION W/  INTRAOCULAR LENS IMPLANT Bilateral     HEMORRHOID SURGERY      KNEE ARTHROPLASTY Bilateral     NECK SURGERY      TONSILLECTOMY         Fhx:  Family History   Problem Relation Age of Onset    Cataracts Mother     Heart disease Mother      CHF    Hypertension Mother     Hyperlipidemia Mother     Cataracts Father     Glaucoma Father     Heart disease Father     Hyperlipidemia Sister     Hypertension Sister     No Known Problems Daughter     No Known Problems Daughter     Collagen disease Neg Hx     Amblyopia Neg Hx     Blindness Neg Hx     Macular degeneration Neg Hx     Retinal detachment Neg Hx     Strabismus Neg Hx     Cancer Neg Hx        Shx:   Social History     Social History    Marital status:      Spouse name: N/A    Number of children: N/A    Years of education: N/A     Occupational History    Not on file.     Social History Main Topics    Smoking status: Passive Smoke Exposure - Never Smoker    Smokeless tobacco: Never Used      Comment: when a child    Alcohol use Yes      Comment: rarely    Drug use: No    Sexual activity: Yes     Partners: Female     Other Topics Concern    Not on file     Social History Narrative    No narrative on file       ROS:  Review of Systems (Questions asked; positives or additions noted in BOLD)  Gen Malaise/fatigue, weight change   HEENT Hearing loss, blurred vision, diplopia   Card CP, palpitations   Pulm SOB, cough    Vasc Easy bruising, easy bleeding    GI Nausea, vomiting, constipation    Frequency, urgency   M/S Joint pain, myalgias, falls    Endocrine Temperature intolerance, polyuria, polydipsia  "  Neuro Dizziness, tremors, seizures, focal weakness, Others- as noted in HPI   Psych Memory loss, depression, anxiety, hallucinations       PHYSICAL EXAM:   /78   Pulse 65   Ht 6' 1" (1.854 m)   Wt 107.2 kg (236 lb 5.3 oz)   BMI 31.18 kg/m²    GEN:  NAD, well-developed, well-groomed.  HEENT: No cervical lymphadenopathy noted.  CARDIOVASCULAR: Radial pulses 2+ bilaterally. No LE edema noted.  NEURO:  Mental Status: Awake, alert, and oriented. Normal attention and concentration.  Speech is fluent and appropriate language function.    Cranial Nerves:  II Pupils are round and reactive to direct and consensual light and accommodation. Visual fields full to confrontation.   III, IV, VI Extraocular eye movements full bilaterally. No ptosis noted. He has mild orbicularis oculi bilaterally.   V Normal facial sensation to pinprick and light touch.   VII Symmetric facial expressions.   VIII Hearing intact to finger rub bilaterally.   IX, X Palate elevates midline and symmetrically.   XI Shoulder elevation is symmetric and full strength bilaterally. Neck rotation strength is normal bilaterally.   XII Tongue is midline.     Sensory: Sensation is normal to light touch in the upper and lower extremities bilaterally.    Motor: Extremities demonstrate normal bulk and tone in all four limbs. Mild left ocular weakness with sustained upward gaze. Good orbicularis oculi and orbicularis oris strength.  Strength-       RUE: 5/5                LUE: 5/5                RLE: 5/5                LLE: 5/5     Deep Tendon Relfexes: 2+ and symmetric in the upper and lower extremities bilaterally. Babinski mute bilaterally.    Coordination: Finger to nose WNL.     Gait: Normal casual gait.      ASSESSMENT:     ICD-10-CM ICD-9-CM   1. Myasthenia gravis, AChR antibody positive G70.00 358.00       PLAN:  Ab+ MG dx'ed 3/15, stable.     -maintain prednisone 7mg q OD  -follow up with his ophthalmologist semi-annually  -RTC 6 mos      Jam " MD Bridget, AURA, FAAN  Department of Neurology   [facility] [place], [place]

## 2017-09-05 VITALS
WEIGHT: 237.88 LBS | SYSTOLIC BLOOD PRESSURE: 157 MMHG | TEMPERATURE: 98 F | RESPIRATION RATE: 20 BRPM | HEART RATE: 72 BPM | OXYGEN SATURATION: 96 % | BODY MASS INDEX: 31.53 KG/M2 | DIASTOLIC BLOOD PRESSURE: 82 MMHG | HEIGHT: 73 IN

## 2017-09-12 DIAGNOSIS — M25.561 RIGHT KNEE PAIN, UNSPECIFIED CHRONICITY: Primary | ICD-10-CM

## 2017-09-14 ENCOUNTER — OFFICE VISIT (OUTPATIENT)
Dept: ORTHOPEDICS | Facility: CLINIC | Age: 72
End: 2017-09-14
Payer: MEDICARE

## 2017-09-14 ENCOUNTER — HOSPITAL ENCOUNTER (OUTPATIENT)
Dept: RADIOLOGY | Facility: HOSPITAL | Age: 72
Discharge: HOME | End: 2017-09-14
Attending: ORTHOPAEDIC SURGERY
Payer: MEDICARE

## 2017-09-14 VITALS
HEIGHT: 73 IN | DIASTOLIC BLOOD PRESSURE: 67 MMHG | HEART RATE: 73 BPM | BODY MASS INDEX: 31.28 KG/M2 | SYSTOLIC BLOOD PRESSURE: 122 MMHG | WEIGHT: 236 LBS

## 2017-09-14 DIAGNOSIS — M17.0 PRIMARY OSTEOARTHRITIS OF BOTH KNEES: Primary | ICD-10-CM

## 2017-09-14 DIAGNOSIS — M25.561 RIGHT KNEE PAIN, UNSPECIFIED CHRONICITY: ICD-10-CM

## 2017-09-14 PROCEDURE — 1125F AMNT PAIN NOTED PAIN PRSNT: CPT | Mod: S$GLB,,, | Performed by: ORTHOPAEDIC SURGERY

## 2017-09-14 PROCEDURE — 99999 PR PBB SHADOW E&M-EST. PATIENT-LVL III: CPT | Mod: PBBFAC,,, | Performed by: ORTHOPAEDIC SURGERY

## 2017-09-14 PROCEDURE — 3008F BODY MASS INDEX DOCD: CPT | Mod: S$GLB,,, | Performed by: ORTHOPAEDIC SURGERY

## 2017-09-14 PROCEDURE — 73564 X-RAY EXAM KNEE 4 OR MORE: CPT | Mod: TC,50,PN

## 2017-09-14 PROCEDURE — 3078F DIAST BP <80 MM HG: CPT | Mod: S$GLB,,, | Performed by: ORTHOPAEDIC SURGERY

## 2017-09-14 PROCEDURE — 3074F SYST BP LT 130 MM HG: CPT | Mod: S$GLB,,, | Performed by: ORTHOPAEDIC SURGERY

## 2017-09-14 PROCEDURE — 1159F MED LIST DOCD IN RCRD: CPT | Mod: S$GLB,,, | Performed by: ORTHOPAEDIC SURGERY

## 2017-09-14 PROCEDURE — 99213 OFFICE O/P EST LOW 20 MIN: CPT | Mod: 25,S$GLB,, | Performed by: ORTHOPAEDIC SURGERY

## 2017-09-14 PROCEDURE — 20610 DRAIN/INJ JOINT/BURSA W/O US: CPT | Mod: 50,S$GLB,, | Performed by: ORTHOPAEDIC SURGERY

## 2017-09-14 PROCEDURE — 73564 X-RAY EXAM KNEE 4 OR MORE: CPT | Mod: 26,50,, | Performed by: RADIOLOGY

## 2017-09-14 RX ORDER — TRIAMCINOLONE ACETONIDE 40 MG/ML
40 INJECTION, SUSPENSION INTRA-ARTICULAR; INTRAMUSCULAR
Status: DISCONTINUED | OUTPATIENT
Start: 2017-09-14 | End: 2017-09-14 | Stop reason: HOSPADM

## 2017-09-14 RX ADMIN — TRIAMCINOLONE ACETONIDE 40 MG: 40 INJECTION, SUSPENSION INTRA-ARTICULAR; INTRAMUSCULAR at 03:09

## 2017-09-14 NOTE — PROCEDURES
Large Joint Aspiration/Injection  Date/Time: 9/14/2017 3:50 PM  Performed by: PEG REGAN  Authorized by: PEG REGAN     Consent Done?:  Yes (Verbal)  Indications:  Pain  Procedure site marked: Yes    Timeout: Prior to procedure the correct patient, procedure, and site was verified      Location:  Knee  Site:  R knee and L knee  Prep: Patient was prepped and draped in usual sterile fashion    Needle size:  20 G  Approach:  Anterolateral  Medications:  40 mg triamcinolone acetonide 40 mg/mL  Patient tolerance:  Patient tolerated the procedure well with no immediate complications

## 2017-09-14 NOTE — PROGRESS NOTES
Past Medical History:   Diagnosis Date    Arthritis     Back pain     Cataract     Chest pain, musculoskeletal     Hyperlipidemia     Hypertension     Hypothyroidism     Knee fracture     Myasthenia gravis     Polyneuropathy     Squamous cell carcinoma 2014    left forearm    Thyroid disease        Past Surgical History:   Procedure Laterality Date    APPENDECTOMY      CATARACT EXTRACTION W/  INTRAOCULAR LENS IMPLANT Bilateral     HEMORRHOID SURGERY      KNEE ARTHROPLASTY Bilateral     NECK SURGERY      TONSILLECTOMY         Current Outpatient Prescriptions   Medication Sig    ASPIRIN LOW DOSE 81 mg EC tablet Take 81 mg by mouth once daily.     atorvastatin (LIPITOR) 80 MG tablet Take 1 tablet (80 mg total) by mouth once daily. Every day    calcium citrate-vitamin D (CITRACAL + D) 315-200 mg-unit per tablet Take 1 tablet by mouth once daily. Twice a day    carvedilol (COREG) 25 MG tablet TAKE 1/2 TO 1 TABLET TWICE DAILY  OR AS DIRECTED    cetirizine (ZYRTEC) 10 MG tablet Take 1 tablet by mouth daily as needed.    chlorthalidone (HYGROTEN) 25 MG Tab TAKE 1 TABLET ONE TIME DAILY    cholecalciferol, vitamin D3, (VITAMIN D) 2,000 unit Cap 1 capsule once daily. Every day    coenzyme Q10 (CO Q-10) 100 mg capsule Take 400 mg by mouth once daily.     cyanocobalamin, vitamin B-12, (VITAMIN B-12) 5,000 mcg Subl Take 5,000 mcg by mouth once daily. Every day    fluticasone (FLONASE) 50 mcg/actuation nasal spray USE 1 SPRAY IN EACH NOSTRIL TWICE DAILY AS NEEDED  FOR  RHINITIS    gabapentin (NEURONTIN) 300 MG capsule TAKE 1 CAPSULE EVERY NIGHT  FOR  5  DAYS  THEN TWICE DAILY  THEREAFTER    ibuprofen (ADVIL,MOTRIN) 800 MG tablet Take 1 tablet (800 mg total) by mouth daily as needed for Pain.    levothyroxine (SYNTHROID) 50 MCG tablet TAKE 1 TABLET ONE TIME DAILY    lisinopril (PRINIVIL,ZESTRIL) 40 MG tablet Take 1 tablet (40 mg total) by mouth once daily.    methylsulfonylmethane (MSM) 1,000 mg Tab  Take 1,000 mg by mouth once daily. Every day    milk thistle 200 mg Cap Take 200 mg by mouth 2 (two) times daily. Twice a day    multivitamin with minerals tablet Take 1 tablet by mouth once daily.     omega-3 fatty acids 1,000 mg Cap Twice a day    potassium chloride (KLOR-CON) 8 MEQ TbSR Take 1 tablet (8 mEq total) by mouth 2 (two) times daily.    predniSONE (DELTASONE) 1 MG tablet TAKE 3 TABLETS  EVERY OTHER DAY (Patient taking differently: TAKE 2 TABLETS  EVERY OTHER DAY)    predniSONE (DELTASONE) 5 MG tablet Take 1 tablet (5 mg total) by mouth every other day.    sildenafil (VIAGRA) 100 MG tablet Take 1 tablet (100 mg total) by mouth daily as needed for Erectile Dysfunction. Every day PRN    tadalafil (CIALIS) 20 MG Tab Take 1 tablet (20 mg total) by mouth once daily.     No current facility-administered medications for this visit.        Review of patient's allergies indicates:   Allergen Reactions    No known drug allergies        Family History   Problem Relation Age of Onset    Cataracts Mother     Heart disease Mother      CHF    Hypertension Mother     Hyperlipidemia Mother     Cataracts Father     Glaucoma Father     Heart disease Father     Hyperlipidemia Sister     Hypertension Sister     No Known Problems Daughter     No Known Problems Daughter     Collagen disease Neg Hx     Amblyopia Neg Hx     Blindness Neg Hx     Macular degeneration Neg Hx     Retinal detachment Neg Hx     Strabismus Neg Hx     Cancer Neg Hx        Social History     Social History    Marital status:      Spouse name: N/A    Number of children: N/A    Years of education: N/A     Occupational History    Not on file.     Social History Main Topics    Smoking status: Passive Smoke Exposure - Never Smoker    Smokeless tobacco: Never Used      Comment: when a child    Alcohol use Yes      Comment: rarely    Drug use: No    Sexual activity: Yes     Partners: Female     Other Topics Concern     Not on file     Social History Narrative    No narrative on file       Chief Complaint:   Chief Complaint   Patient presents with    Knee Pain     bilateral       History of present illness: 71-year-old male who I've seen in the past he comes back in for bilateral knee pain.  Patient's right knee is the worst of the 2.  Left knee gives out at times.  We did a Euflexxa series on him back in August 2014 that lasted up until a few months ago.  Pain is currently a 4 out of 10.  They're about to go on [place].      Review of Systems:    Constitution: Negative for chills, fever, and sweats.  Negative for unexplained weight loss.    HENT:  Negative for headaches and blurry vision.    Cardiovascular:Negative for chest pain or irregular heart beat. Negative for hypertension.    Respiratory:  Negative for cough and shortness of breath.    Gastrointestinal: Negative for abdominal pain, heartburn, melena, nausea, and vomitting.    Genitourinary:  Negative bladder incontinence and dysuria.    Musculoskeletal:  See HPI    Neurological: Negative for numbness.    Psychiatric/Behavioral: Negative for depression.  The patient is not nervous/anxious.      Endocrine: Negative for polyuria    Hematologic/Lymphatic: Negative for bleeding problem.  Does not bruise/bleed easily.    Skin: Negative for poor would healing and rash      Physical Examination:    Vital Signs:  There were no vitals filed for this visit.    Body mass index is 31.14 kg/m².    This a well-developed, well nourished patient in no acute distress.  They are alert and oriented and cooperative to examination.  Pt. walks without an antalgic gait.      Examination of bilateral knees shows no rashes or erythema. There are no masses ecchymosis or effusion. Patient has full range of motion from 0-130°. Patient is moderately tender to palpation over lateral joint line and nontender to palpation over the medial joint line. Patient has a - Lachman exam, -  anterior drawer exam, and - posterior drawer exam. - Fabián's exam. Knee is stable to varus and valgus stress. 5 out of 5 motor strength. Palpable distal pulses. Intact light touch sensation. Negative Patellofemoral crepitus      X-rays: X-rays of both knees are ordered and reviewed which show complete lateral joint space narrowing of the right knee and moderate medial joint space narrowing of the left knee.  Old patella fracture on the left.     Assessment:: Bilateral knee arthritis    Plan:  I reviewed the findings and the x-rays with him today.  Patient did very well from the Euflexxa last time.  He leaves to send to be able to complete a series.  I recommended cortisone in both knees today.  We will do Euflexxa in both knees when he returns from his vacation.    This note was created using Dragon voice recognition software that occasionally misinterpreted phrases or words.    Consult note is delivered via Epic messaging service.

## 2017-09-18 ENCOUNTER — IMMUNIZATION (OUTPATIENT)
Dept: FAMILY MEDICINE | Facility: CLINIC | Age: 72
End: 2017-09-18
Payer: MEDICARE

## 2017-09-18 PROCEDURE — G0008 ADMIN INFLUENZA VIRUS VAC: HCPCS | Mod: S$GLB,,, | Performed by: INTERNAL MEDICINE

## 2017-09-18 PROCEDURE — 90662 IIV NO PRSV INCREASED AG IM: CPT | Mod: S$GLB,,, | Performed by: INTERNAL MEDICINE

## 2017-10-09 ENCOUNTER — OFFICE VISIT (OUTPATIENT)
Dept: ORTHOPEDICS | Facility: CLINIC | Age: 72
End: 2017-10-09
Payer: MEDICARE

## 2017-10-09 DIAGNOSIS — M17.0 PRIMARY OSTEOARTHRITIS OF BOTH KNEES: Primary | ICD-10-CM

## 2017-10-09 PROCEDURE — 99499 UNLISTED E&M SERVICE: CPT | Mod: S$GLB,,, | Performed by: ORTHOPAEDIC SURGERY

## 2017-10-09 PROCEDURE — 20610 DRAIN/INJ JOINT/BURSA W/O US: CPT | Mod: 50,S$GLB,, | Performed by: ORTHOPAEDIC SURGERY

## 2017-10-09 PROCEDURE — 99999 PR PBB SHADOW E&M-EST. PATIENT-LVL I: CPT | Mod: PBBFAC,,, | Performed by: ORTHOPAEDIC SURGERY

## 2017-10-09 RX ORDER — HYALURONATE SODIUM 20 MG/2 ML
20 SYRINGE (ML) INTRAARTICULAR
Status: DISCONTINUED | OUTPATIENT
Start: 2017-10-09 | End: 2017-10-09 | Stop reason: HOSPADM

## 2017-10-09 RX ADMIN — Medication 20 MG: at 11:10

## 2017-10-09 NOTE — PROGRESS NOTES
Past Medical History:   Diagnosis Date    Arthritis     Back pain     Cataract     Chest pain, musculoskeletal     Hyperlipidemia     Hypertension     Hypothyroidism     Knee fracture     Myasthenia gravis     Polyneuropathy     Squamous cell carcinoma 2014    left forearm    Thyroid disease        Past Surgical History:   Procedure Laterality Date    APPENDECTOMY      CATARACT EXTRACTION W/  INTRAOCULAR LENS IMPLANT Bilateral     HEMORRHOID SURGERY      KNEE ARTHROPLASTY Bilateral     NECK SURGERY      TONSILLECTOMY         Current Outpatient Prescriptions   Medication Sig    ASPIRIN LOW DOSE 81 mg EC tablet Take 81 mg by mouth once daily.     atorvastatin (LIPITOR) 80 MG tablet Take 1 tablet (80 mg total) by mouth once daily. Every day    calcium citrate-vitamin D (CITRACAL + D) 315-200 mg-unit per tablet Take 1 tablet by mouth once daily. Twice a day    carvedilol (COREG) 25 MG tablet TAKE 1/2 TO 1 TABLET TWICE DAILY  OR AS DIRECTED    cetirizine (ZYRTEC) 10 MG tablet Take 1 tablet by mouth daily as needed.    chlorthalidone (HYGROTEN) 25 MG Tab TAKE 1 TABLET ONE TIME DAILY    cholecalciferol, vitamin D3, (VITAMIN D) 2,000 unit Cap 1 capsule once daily. Every day    coenzyme Q10 (CO Q-10) 100 mg capsule Take 400 mg by mouth once daily.     cyanocobalamin, vitamin B-12, (VITAMIN B-12) 5,000 mcg Subl Take 5,000 mcg by mouth once daily. Every day    fluticasone (FLONASE) 50 mcg/actuation nasal spray USE 1 SPRAY IN EACH NOSTRIL TWICE DAILY AS NEEDED  FOR  RHINITIS    gabapentin (NEURONTIN) 300 MG capsule TAKE 1 CAPSULE EVERY NIGHT  FOR  5  DAYS  THEN TWICE DAILY  THEREAFTER    ibuprofen (ADVIL,MOTRIN) 800 MG tablet Take 1 tablet (800 mg total) by mouth daily as needed for Pain.    levothyroxine (SYNTHROID) 50 MCG tablet TAKE 1 TABLET ONE TIME DAILY    lisinopril (PRINIVIL,ZESTRIL) 40 MG tablet Take 1 tablet (40 mg total) by mouth once daily.    methylsulfonylmethane (MSM) 1,000 mg Tab  Take 1,000 mg by mouth once daily. Every day    milk thistle 200 mg Cap Take 200 mg by mouth 2 (two) times daily. Twice a day    multivitamin with minerals tablet Take 1 tablet by mouth once daily.     omega-3 fatty acids 1,000 mg Cap Twice a day    potassium chloride (KLOR-CON) 8 MEQ TbSR Take 1 tablet (8 mEq total) by mouth 2 (two) times daily.    predniSONE (DELTASONE) 1 MG tablet TAKE 3 TABLETS  EVERY OTHER DAY (Patient taking differently: TAKE 2 TABLETS  EVERY OTHER DAY)    predniSONE (DELTASONE) 5 MG tablet Take 1 tablet (5 mg total) by mouth every other day.    sildenafil (VIAGRA) 100 MG tablet Take 1 tablet (100 mg total) by mouth daily as needed for Erectile Dysfunction. Every day PRN    tadalafil (CIALIS) 20 MG Tab Take 1 tablet (20 mg total) by mouth once daily.     No current facility-administered medications for this visit.        Review of patient's allergies indicates:   Allergen Reactions    No known drug allergies        Family History   Problem Relation Age of Onset    Cataracts Mother     Heart disease Mother      CHF    Hypertension Mother     Hyperlipidemia Mother     Cataracts Father     Glaucoma Father     Heart disease Father     Hyperlipidemia Sister     Hypertension Sister     No Known Problems Daughter     No Known Problems Daughter     Collagen disease Neg Hx     Amblyopia Neg Hx     Blindness Neg Hx     Macular degeneration Neg Hx     Retinal detachment Neg Hx     Strabismus Neg Hx     Cancer Neg Hx        Social History     Social History    Marital status:      Spouse name: N/A    Number of children: N/A    Years of education: N/A     Occupational History    Not on file.     Social History Main Topics    Smoking status: Passive Smoke Exposure - Never Smoker    Smokeless tobacco: Never Used      Comment: when a child    Alcohol use Yes      Comment: rarely    Drug use: No    Sexual activity: Yes     Partners: Female     Other Topics Concern     Not on file     Social History Narrative    No narrative on file       Chief Complaint:   No chief complaint on file.      Interval illness: 71-year-old male who I've seen in the past he comes back in for bilateral knee pain.  Patient's right knee is the worst of the 2.  Left knee gives out at times.  We did a Euflexxa series on him back in August 2014 that lasted up until a few months ago.  Pain is currently a 4 out of 10.  They're about to go on [place].      History of present illness: The cortisone injections really helped him.  Here 4 Euflexxa.  Pain of 2 out of 10.      Review of Systems:    Constitution: Negative for chills, fever, and sweats.  Negative for unexplained weight loss.    HENT:  Negative for headaches and blurry vision.    Cardiovascular:Negative for chest pain or irregular heart beat. Negative for hypertension.    Respiratory:  Negative for cough and shortness of breath.    Gastrointestinal: Negative for abdominal pain, heartburn, melena, nausea, and vomitting.    Genitourinary:  Negative bladder incontinence and dysuria.    Musculoskeletal:  See HPI    Neurological: Negative for numbness.    Psychiatric/Behavioral: Negative for depression.  The patient is not nervous/anxious.      Endocrine: Negative for polyuria    Hematologic/Lymphatic: Negative for bleeding problem.  Does not bruise/bleed easily.    Skin: Negative for poor would healing and rash      Physical Examination:    Vital Signs:  There were no vitals filed for this visit.    There is no height or weight on file to calculate BMI.    This a well-developed, well nourished patient in no acute distress.  They are alert and oriented and cooperative to examination.  Pt. walks without an antalgic gait.      Examination of bilateral knees shows no rashes or erythema. There are no masses ecchymosis or effusion. Patient has full range of motion from 0-130°. Patient is moderately tender to palpation over lateral joint line  and nontender to palpation over the medial joint line. Patient has a - Lachman exam, - anterior drawer exam, and - posterior drawer exam. - Fabián's exam. Knee is stable to varus and valgus stress. 5 out of 5 motor strength. Palpable distal pulses. Intact light touch sensation. Negative Patellofemoral crepitus      X-rays: X-rays of both knees are reviewed which show complete lateral joint space narrowing of the right knee and moderate medial joint space narrowing of the left knee.  Old patella fracture on the left.     Assessment:: Bilateral knee arthritis    Plan:  I injected both knees with Euflexxa 1 of 3.  Follow-up next week.    This note was created using Dragon voice recognition software that occasionally misinterpreted phrases or words.    Consult note is delivered via Epic messaging service.

## 2017-10-09 NOTE — PROCEDURES
Large Joint Aspiration/Injection  Date/Time: 10/9/2017 11:02 AM  Performed by: PEG REGAN  Authorized by: PEG REGAN     Consent Done?:  Yes (Verbal)  Indications:  Pain  Procedure site marked: Yes    Timeout: Prior to procedure the correct patient, procedure, and site was verified      Location:  Knee  Site:  L knee and R knee  Prep: Patient was prepped and draped in usual sterile fashion    Needle size:  20 G  Approach:  Anterolateral  Medications:  20 mg EUFLEXXA 10 mg/mL(mw 2.4 -3.6 million); 20 mg EUFLEXXA 10 mg/mL(mw 2.4 -3.6 million)  Patient tolerance:  Patient tolerated the procedure well with no immediate complications

## 2017-10-16 ENCOUNTER — OFFICE VISIT (OUTPATIENT)
Dept: ORTHOPEDICS | Facility: CLINIC | Age: 72
End: 2017-10-16
Payer: MEDICARE

## 2017-10-16 VITALS — WEIGHT: 236 LBS | BODY MASS INDEX: 31.28 KG/M2 | HEIGHT: 73 IN

## 2017-10-16 DIAGNOSIS — M17.0 PRIMARY OSTEOARTHRITIS OF BOTH KNEES: Primary | ICD-10-CM

## 2017-10-16 PROCEDURE — 99999 PR PBB SHADOW E&M-EST. PATIENT-LVL II: CPT | Mod: PBBFAC,,, | Performed by: ORTHOPAEDIC SURGERY

## 2017-10-16 PROCEDURE — 99499 UNLISTED E&M SERVICE: CPT | Mod: S$GLB,,, | Performed by: ORTHOPAEDIC SURGERY

## 2017-10-16 PROCEDURE — 20610 DRAIN/INJ JOINT/BURSA W/O US: CPT | Mod: 50,S$GLB,, | Performed by: ORTHOPAEDIC SURGERY

## 2017-10-16 RX ADMIN — Medication 20 MG: at 09:10

## 2017-10-17 RX ORDER — HYALURONATE SODIUM 20 MG/2 ML
20 SYRINGE (ML) INTRAARTICULAR
Status: DISCONTINUED | OUTPATIENT
Start: 2017-10-16 | End: 2017-10-17 | Stop reason: HOSPADM

## 2017-10-17 NOTE — PROGRESS NOTES
Past Medical History:   Diagnosis Date    Arthritis     Back pain     Cataract     Chest pain, musculoskeletal     Hyperlipidemia     Hypertension     Hypothyroidism     Knee fracture     Myasthenia gravis     Polyneuropathy     Squamous cell carcinoma 2014    left forearm    Thyroid disease        Past Surgical History:   Procedure Laterality Date    APPENDECTOMY      CATARACT EXTRACTION W/  INTRAOCULAR LENS IMPLANT Bilateral     HEMORRHOID SURGERY      KNEE ARTHROPLASTY Bilateral     NECK SURGERY      TONSILLECTOMY         Current Outpatient Prescriptions   Medication Sig    ASPIRIN LOW DOSE 81 mg EC tablet Take 81 mg by mouth once daily.     atorvastatin (LIPITOR) 80 MG tablet Take 1 tablet (80 mg total) by mouth once daily. Every day    calcium citrate-vitamin D (CITRACAL + D) 315-200 mg-unit per tablet Take 1 tablet by mouth once daily. Twice a day    carvedilol (COREG) 25 MG tablet TAKE 1/2 TO 1 TABLET TWICE DAILY  OR AS DIRECTED    cetirizine (ZYRTEC) 10 MG tablet Take 1 tablet by mouth daily as needed.    chlorthalidone (HYGROTEN) 25 MG Tab TAKE 1 TABLET ONE TIME DAILY    cholecalciferol, vitamin D3, (VITAMIN D) 2,000 unit Cap 1 capsule once daily. Every day    coenzyme Q10 (CO Q-10) 100 mg capsule Take 400 mg by mouth once daily.     cyanocobalamin, vitamin B-12, (VITAMIN B-12) 5,000 mcg Subl Take 5,000 mcg by mouth once daily. Every day    fluticasone (FLONASE) 50 mcg/actuation nasal spray USE 1 SPRAY IN EACH NOSTRIL TWICE DAILY AS NEEDED  FOR  RHINITIS    gabapentin (NEURONTIN) 300 MG capsule TAKE 1 CAPSULE EVERY NIGHT  FOR  5  DAYS  THEN TWICE DAILY  THEREAFTER    ibuprofen (ADVIL,MOTRIN) 800 MG tablet Take 1 tablet (800 mg total) by mouth daily as needed for Pain.    levothyroxine (SYNTHROID) 50 MCG tablet TAKE 1 TABLET ONE TIME DAILY    lisinopril (PRINIVIL,ZESTRIL) 40 MG tablet Take 1 tablet (40 mg total) by mouth once daily.    methylsulfonylmethane (MSM) 1,000 mg Tab  Take 1,000 mg by mouth once daily. Every day    milk thistle 200 mg Cap Take 200 mg by mouth 2 (two) times daily. Twice a day    multivitamin with minerals tablet Take 1 tablet by mouth once daily.     omega-3 fatty acids 1,000 mg Cap Twice a day    potassium chloride (KLOR-CON) 8 MEQ TbSR Take 1 tablet (8 mEq total) by mouth 2 (two) times daily.    predniSONE (DELTASONE) 1 MG tablet TAKE 3 TABLETS  EVERY OTHER DAY (Patient taking differently: TAKE 2 TABLETS  EVERY OTHER DAY)    predniSONE (DELTASONE) 5 MG tablet Take 1 tablet (5 mg total) by mouth every other day.    sildenafil (VIAGRA) 100 MG tablet Take 1 tablet (100 mg total) by mouth daily as needed for Erectile Dysfunction. Every day PRN    tadalafil (CIALIS) 20 MG Tab Take 1 tablet (20 mg total) by mouth once daily.     No current facility-administered medications for this visit.        Review of patient's allergies indicates:   Allergen Reactions    No known drug allergies        Family History   Problem Relation Age of Onset    Cataracts Mother     Heart disease Mother      CHF    Hypertension Mother     Hyperlipidemia Mother     Cataracts Father     Glaucoma Father     Heart disease Father     Hyperlipidemia Sister     Hypertension Sister     No Known Problems Daughter     No Known Problems Daughter     Collagen disease Neg Hx     Amblyopia Neg Hx     Blindness Neg Hx     Macular degeneration Neg Hx     Retinal detachment Neg Hx     Strabismus Neg Hx     Cancer Neg Hx        Social History     Social History    Marital status:      Spouse name: N/A    Number of children: N/A    Years of education: N/A     Occupational History    Not on file.     Social History Main Topics    Smoking status: Passive Smoke Exposure - Never Smoker    Smokeless tobacco: Never Used      Comment: when a child    Alcohol use Yes      Comment: rarely    Drug use: No    Sexual activity: Yes     Partners: Female     Other Topics Concern     Not on file     Social History Narrative    No narrative on file       Chief Complaint:   Chief Complaint   Patient presents with    Knee Pain     bilateral knee - Euflexxa 2/3        Interval illness: 71-year-old male who I've seen in the past he comes back in for bilateral knee pain.  Patient's right knee is the worst of the 2.  Left knee gives out at times.  We did a Euflexxa series on him back in August 2014 that lasted up until a few months ago.  Pain is currently a 4 out of 10.  They're about to go [place] [employer].      History of present illness: The cortisone injections really helped him.  Here 4 Euflexxa.  Pain of 2 out of 10.      Review of Systems:    Constitution: Negative for chills, fever, and sweats.  Negative for unexplained weight loss.    HENT:  Negative for headaches and blurry vision.    Cardiovascular:Negative for chest pain or irregular heart beat. Negative for hypertension.    Respiratory:  Negative for cough and shortness of breath.    Gastrointestinal: Negative for abdominal pain, heartburn, melena, nausea, and vomitting.    Genitourinary:  Negative bladder incontinence and dysuria.    Musculoskeletal:  See HPI    Neurological: Negative for numbness.    Psychiatric/Behavioral: Negative for depression.  The patient is not nervous/anxious.      Endocrine: Negative for polyuria    Hematologic/Lymphatic: Negative for bleeding problem.  Does not bruise/bleed easily.    Skin: Negative for poor would healing and rash      Physical Examination:    Vital Signs:  There were no vitals filed for this visit.    Body mass index is 31.14 kg/m².    This a well-developed, well nourished patient in no acute distress.  They are alert and oriented and cooperative to examination.  Pt. walks without an antalgic gait.      Examination of bilateral knees shows no rashes or erythema. There are no masses ecchymosis or effusion. Patient has full range of motion from 0-130°. Patient is moderately  tender to palpation over lateral joint line and nontender to palpation over the medial joint line. Patient has a - Lachman exam, - anterior drawer exam, and - posterior drawer exam. - Fabián's exam. Knee is stable to varus and valgus stress. 5 out of 5 motor strength. Palpable distal pulses. Intact light touch sensation. Negative Patellofemoral crepitus      X-rays: X-rays of both knees are reviewed which show complete lateral joint space narrowing of the right knee and moderate medial joint space narrowing of the left knee.  Old patella fracture on the left.     Assessment:: Bilateral knee arthritis    Plan:  I injected both knees with Euflexxa 2 of 3.  Follow-up next week.    This note was created using Dragon voice recognition software that occasionally misinterpreted phrases or words.    Consult note is delivered via Epic messaging service.

## 2017-10-17 NOTE — PROCEDURES
Large Joint Aspiration/Injection  Date/Time: 10/16/2017 9:33 AM  Performed by: PEG REGAN  Authorized by: PEG REGAN     Consent Done?:  Yes (Verbal)  Indications:  Pain  Procedure site marked: Yes    Timeout: Prior to procedure the correct patient, procedure, and site was verified      Location:  Knee  Site:  L knee and R knee  Prep: Patient was prepped and draped in usual sterile fashion    Needle size:  20 G  Approach:  Anteromedial  Medications:  20 mg EUFLEXXA 10 mg/mL(mw 2.4 -3.6 million); 20 mg EUFLEXXA 10 mg/mL(mw 2.4 -3.6 million)  Patient tolerance:  Patient tolerated the procedure well with no immediate complications

## 2017-10-23 ENCOUNTER — OFFICE VISIT (OUTPATIENT)
Dept: ORTHOPEDICS | Facility: CLINIC | Age: 72
End: 2017-10-23
Payer: MEDICARE

## 2017-10-23 DIAGNOSIS — M17.0 PRIMARY OSTEOARTHRITIS OF BOTH KNEES: Primary | ICD-10-CM

## 2017-10-23 PROCEDURE — 99499 UNLISTED E&M SERVICE: CPT | Mod: S$GLB,,, | Performed by: ORTHOPAEDIC SURGERY

## 2017-10-23 PROCEDURE — 20610 DRAIN/INJ JOINT/BURSA W/O US: CPT | Mod: 50,S$GLB,, | Performed by: ORTHOPAEDIC SURGERY

## 2017-10-23 PROCEDURE — 99999 PR PBB SHADOW E&M-EST. PATIENT-LVL III: CPT | Mod: PBBFAC,,, | Performed by: ORTHOPAEDIC SURGERY

## 2017-10-23 RX ORDER — HYALURONATE SODIUM 20 MG/2 ML
20 SYRINGE (ML) INTRAARTICULAR
Status: DISCONTINUED | OUTPATIENT
Start: 2017-10-23 | End: 2017-10-23 | Stop reason: HOSPADM

## 2017-10-23 RX ADMIN — Medication 20 MG: at 03:10

## 2017-10-23 NOTE — PROGRESS NOTES
Past Medical History:   Diagnosis Date    Arthritis     Back pain     Cataract     Chest pain, musculoskeletal     Hyperlipidemia     Hypertension     Hypothyroidism     Knee fracture     Myasthenia gravis     Polyneuropathy     Squamous cell carcinoma 2014    left forearm    Thyroid disease        Past Surgical History:   Procedure Laterality Date    APPENDECTOMY      CATARACT EXTRACTION W/  INTRAOCULAR LENS IMPLANT Bilateral     HEMORRHOID SURGERY      KNEE ARTHROPLASTY Bilateral     NECK SURGERY      TONSILLECTOMY         Current Outpatient Prescriptions   Medication Sig    ASPIRIN LOW DOSE 81 mg EC tablet Take 81 mg by mouth once daily.     atorvastatin (LIPITOR) 80 MG tablet Take 1 tablet (80 mg total) by mouth once daily. Every day    calcium citrate-vitamin D (CITRACAL + D) 315-200 mg-unit per tablet Take 1 tablet by mouth once daily. Twice a day    carvedilol (COREG) 25 MG tablet TAKE 1/2 TO 1 TABLET TWICE DAILY  OR AS DIRECTED    cetirizine (ZYRTEC) 10 MG tablet Take 1 tablet by mouth daily as needed.    chlorthalidone (HYGROTEN) 25 MG Tab TAKE 1 TABLET ONE TIME DAILY    cholecalciferol, vitamin D3, (VITAMIN D) 2,000 unit Cap 1 capsule once daily. Every day    coenzyme Q10 (CO Q-10) 100 mg capsule Take 400 mg by mouth once daily.     cyanocobalamin, vitamin B-12, (VITAMIN B-12) 5,000 mcg Subl Take 5,000 mcg by mouth once daily. Every day    fluticasone (FLONASE) 50 mcg/actuation nasal spray USE 1 SPRAY IN EACH NOSTRIL TWICE DAILY AS NEEDED  FOR  RHINITIS    gabapentin (NEURONTIN) 300 MG capsule TAKE 1 CAPSULE EVERY NIGHT  FOR  5  DAYS  THEN TWICE DAILY  THEREAFTER    ibuprofen (ADVIL,MOTRIN) 800 MG tablet Take 1 tablet (800 mg total) by mouth daily as needed for Pain.    levothyroxine (SYNTHROID) 50 MCG tablet TAKE 1 TABLET ONE TIME DAILY    lisinopril (PRINIVIL,ZESTRIL) 40 MG tablet Take 1 tablet (40 mg total) by mouth once daily.    methylsulfonylmethane (MSM) 1,000 mg Tab  Take 1,000 mg by mouth once daily. Every day    milk thistle 200 mg Cap Take 200 mg by mouth 2 (two) times daily. Twice a day    multivitamin with minerals tablet Take 1 tablet by mouth once daily.     omega-3 fatty acids 1,000 mg Cap Twice a day    potassium chloride (KLOR-CON) 8 MEQ TbSR Take 1 tablet (8 mEq total) by mouth 2 (two) times daily.    predniSONE (DELTASONE) 1 MG tablet TAKE 3 TABLETS  EVERY OTHER DAY (Patient taking differently: TAKE 2 TABLETS  EVERY OTHER DAY)    predniSONE (DELTASONE) 5 MG tablet Take 1 tablet (5 mg total) by mouth every other day.    sildenafil (VIAGRA) 100 MG tablet Take 1 tablet (100 mg total) by mouth daily as needed for Erectile Dysfunction. Every day PRN    tadalafil (CIALIS) 20 MG Tab Take 1 tablet (20 mg total) by mouth once daily.     No current facility-administered medications for this visit.        Review of patient's allergies indicates:   Allergen Reactions    No known drug allergies        Family History   Problem Relation Age of Onset    Cataracts Mother     Heart disease Mother      CHF    Hypertension Mother     Hyperlipidemia Mother     Cataracts Father     Glaucoma Father     Heart disease Father     Hyperlipidemia Sister     Hypertension Sister     No Known Problems Daughter     No Known Problems Daughter     Collagen disease Neg Hx     Amblyopia Neg Hx     Blindness Neg Hx     Macular degeneration Neg Hx     Retinal detachment Neg Hx     Strabismus Neg Hx     Cancer Neg Hx        Social History     Social History    Marital status:      Spouse name: N/A    Number of children: N/A    Years of education: N/A     Occupational History    Not on file.     Social History Main Topics    Smoking status: Passive Smoke Exposure - Never Smoker    Smokeless tobacco: Never Used      Comment: when a child    Alcohol use Yes      Comment: rarely    Drug use: No    Sexual activity: Yes     Partners: Female     Other Topics Concern     Not on file     Social History Narrative    No narrative on file       Chief Complaint:   No chief complaint on file.      Interval illness: 71-year-old male who I've seen in the past he comes back in for bilateral knee pain.  Patient's right knee is the worst of the 2.  Left knee gives out at times.  We did a Euflexxa series on him back in August 2014 that lasted up until a few months ago.  Pain is currently a 4 out of 10.  They're about to go on [employer].      History of present illness: The cortisone injections really helped him.  Here for Euflexxa.  Pain of 2 out of 10.      Review of Systems:    Constitution: Negative for chills, fever, and sweats.  Negative for unexplained weight loss.    HENT:  Negative for headaches and blurry vision.    Cardiovascular:Negative for chest pain or irregular heart beat. Negative for hypertension.    Respiratory:  Negative for cough and shortness of breath.    Gastrointestinal: Negative for abdominal pain, heartburn, melena, nausea, and vomitting.    Genitourinary:  Negative bladder incontinence and dysuria.    Musculoskeletal:  See HPI    Neurological: Negative for numbness.    Psychiatric/Behavioral: Negative for depression.  The patient is not nervous/anxious.      Endocrine: Negative for polyuria    Hematologic/Lymphatic: Negative for bleeding problem.  Does not bruise/bleed easily.    Skin: Negative for poor would healing and rash      Physical Examination:    Vital Signs:  There were no vitals filed for this visit.    There is no height or weight on file to calculate BMI.    This a well-developed, well nourished patient in no acute distress.  They are alert and oriented and cooperative to examination.  Pt. walks without an antalgic gait.      Examination of bilateral knees shows no rashes or erythema. There are no masses ecchymosis or effusion. Patient has full range of motion from 0-130°. Patient is moderately tender to palpation over lateral joint  line and nontender to palpation over the medial joint line. Patient has a - Lachman exam, - anterior drawer exam, and - posterior drawer exam. - Fabián's exam. Knee is stable to varus and valgus stress. 5 out of 5 motor strength. Palpable distal pulses. Intact light touch sensation. Negative Patellofemoral crepitus      X-rays: X-rays of both knees are reviewed which show complete lateral joint space narrowing of the right knee and moderate medial joint space narrowing of the left knee.  Old patella fracture on the left.     Assessment:: Bilateral knee arthritis    Plan:  I injected both knees with Euflexxa 3 of 3.  Follow-up about 6 weeks if needed.    This note was created using Dragon voice recognition software that occasionally misinterpreted phrases or words.    Consult note is delivered via Epic messaging service.

## 2017-10-23 NOTE — PROCEDURES
Large Joint Aspiration/Injection  Date/Time: 10/23/2017 3:45 PM  Performed by: PEG REGAN  Authorized by: PEG REGAN     Consent Done?:  Yes (Verbal)  Indications:  Pain  Procedure site marked: Yes    Timeout: Prior to procedure the correct patient, procedure, and site was verified      Location:  Knee  Site:  L knee and R knee  Prep: Patient was prepped and draped in usual sterile fashion    Needle size:  20 G  Approach:  Anterolateral  Medications:  20 mg EUFLEXXA 10 mg/mL(mw 2.4 -3.6 million); 20 mg EUFLEXXA 10 mg/mL(mw 2.4 -3.6 million)  Patient tolerance:  Patient tolerated the procedure well with no immediate complications

## 2017-11-09 DIAGNOSIS — Z12.11 COLON CANCER SCREENING: Primary | ICD-10-CM

## 2017-11-13 ENCOUNTER — DOCUMENTATION ONLY (OUTPATIENT)
Dept: FAMILY MEDICINE | Facility: CLINIC | Age: 72
End: 2017-11-13

## 2017-11-13 NOTE — PROGRESS NOTES
Pre-Visit Chart Review  For Appointment Scheduled on 11/14/2017    Health Maintenance Due   Topic Date Due    Pneumococcal (65+) (2 of 2 - PPSV23) 03/01/2017    Colonoscopy  03/01/2017    Lipid Panel  09/12/2017

## 2017-11-14 ENCOUNTER — OFFICE VISIT (OUTPATIENT)
Dept: FAMILY MEDICINE | Facility: CLINIC | Age: 72
End: 2017-11-14
Payer: MEDICARE

## 2017-11-14 VITALS
HEIGHT: 73 IN | HEART RATE: 75 BPM | SYSTOLIC BLOOD PRESSURE: 135 MMHG | DIASTOLIC BLOOD PRESSURE: 69 MMHG | BODY MASS INDEX: 31.26 KG/M2 | TEMPERATURE: 99 F | WEIGHT: 235.88 LBS

## 2017-11-14 DIAGNOSIS — M51.36 DDD (DEGENERATIVE DISC DISEASE), LUMBAR: ICD-10-CM

## 2017-11-14 DIAGNOSIS — I10 HTN (HYPERTENSION), BENIGN: ICD-10-CM

## 2017-11-14 DIAGNOSIS — E03.4 HYPOTHYROIDISM DUE TO ACQUIRED ATROPHY OF THYROID: ICD-10-CM

## 2017-11-14 DIAGNOSIS — M17.0 PRIMARY OSTEOARTHRITIS OF BOTH KNEES: ICD-10-CM

## 2017-11-14 DIAGNOSIS — N52.1 ERECTILE DISORDER DUE TO MEDICAL CONDITION IN MALE PATIENT: Primary | ICD-10-CM

## 2017-11-14 DIAGNOSIS — N39.0 URINARY TRACT INFECTION WITHOUT HEMATURIA, SITE UNSPECIFIED: Primary | ICD-10-CM

## 2017-11-14 PROCEDURE — 99214 OFFICE O/P EST MOD 30 MIN: CPT | Mod: S$GLB,,, | Performed by: FAMILY MEDICINE

## 2017-11-14 PROCEDURE — 99999 PR PBB SHADOW E&M-EST. PATIENT-LVL III: CPT | Mod: PBBFAC,,, | Performed by: FAMILY MEDICINE

## 2017-11-14 PROCEDURE — 99499 UNLISTED E&M SERVICE: CPT | Mod: S$GLB,,, | Performed by: FAMILY MEDICINE

## 2017-11-14 RX ORDER — GABAPENTIN 300 MG/1
300 CAPSULE ORAL 2 TIMES DAILY
Qty: 180 CAPSULE | Refills: 11
Start: 2017-11-14 | End: 2018-09-07 | Stop reason: SDUPTHER

## 2017-11-14 RX ORDER — SILDENAFIL CITRATE 20 MG/1
TABLET ORAL
Qty: 30 TABLET | Refills: 3 | Status: SHIPPED | OUTPATIENT
Start: 2017-11-14 | End: 2020-11-06 | Stop reason: SDUPTHER

## 2017-11-14 NOTE — PATIENT INSTRUCTIONS
Arthritis: Exercise     Look for exercise classes for arthritis in your community.     Exercise is important to your overall health. It is especially important in people with arthritis. Regular exercise can:  · Keep your heart and blood vessels healthy  · Help with weight management, or weight loss  · Improve your mood  · Help prevent and manage health problems such as:  ¨ Diabetes  ¨ High blood pressure  ¨ High cholesterol  ¨ Depression  In people with arthritis, it offers all of those benefits and it can:  · Lessen pain and stiffness  · Strengthen muscles that support your joints  · Help you to be able to do the things you enjoy  Exercise and arthritis  Exercise is an important part of any arthritis treatment plan. A complete program consists of the following three types of exercises:  · Aerobic exercises for cardiovascular health and overall fitness.   · Strengthening exercises to build up muscles to help prevent injury and keep joints stable.  · Range-of-motion exercises to keep muscles and joints flexible.  Getting started  Talk with your healthcare provider about what is safe for you. Make sure you:  · Learn how to do exercises properly and safely. Consider talking with a physical therapist or  used to working with people with arthritis.  · Start gradually and build. If you haven't been exercising, start slowly. Don't exercise too hard or too long.  · Create a routine. Set aside specific times for exercise every day.  · Warm up carefully. Take 5 to 10 minutes at the beginning and end of exercising to warm up and cool down. Just do the same exercises at a slower pace for 5 to 10 minutes.  · Work at a comfortable, smooth pace. Move your joints gently to prevent injury.  · Pay attention to your body. Don't exercise a painful or swollen joint; switch to another activity. Follow the 2-hour pain rule: You did too much if your joint or muscle pain lasts 2 hours or more after exercising, or is worse the next  day. This doesn't mean you should stop exercising. Just do less.  Aerobic exercise  Aerobic exercise improves overall health and helps control weight. Choose those that don't add extra stress to your joints. For example, walking, swimming, or bicycling.  Most people should exercise for at least 30 minutes. most days of the week. You don't have to exercise all at once. Try exercising for 10 minutes, 3 times a day, for example.  Strengthening exercises  Strengthening your muscles help to protect your joints and prevent injuries. Try to do strengthening exercises 2 to 3 times a week:  · These exercises can be done with exercise or resistance bands (inexpensive exercise aids that add resistance), or with light weights. Some people use soup cans as weights.  · Isometric exercises are done by tightening the muscles without moving the joint. This may be a good way to strengthen the muscles around a stiff joint.  A physical therapist or  can teach you how to do these exercises.  Range-of-motion (ROM) exercises  Range-of-motion (ROM) exercises allow you to move each of your joints in every way they are intended to move. You should do ROM exercises for each joint 2 to 3 times a day. This will help you maintain full use of all of your joints.  Sample ROM exercises  The following are just a sample of ROM exercises--one for your neck, shoulders, elbows, hips, knees, and ankles. To completely move each joint through its full range of motion, you will have to do a few exercises for each joint. A physical therapist or  can teach you how to do full ROM exercises for each joint.   Repeat each for these exercises 5 to 10 times. Make sure you move slowly:  1. Neck turns. Sit in a straight-backed chair. Look straight ahead. Slowly turn your head to the right, then return it to center. Repeat. Do the same thing, turning your head to the left. Repeat.  2. Shoulder raise. Lie on your back or sit in a chair. Raise one arm over  your head, keeping your elbow straight. Keep the arm close to your ear. Return it slowly to your side. Repeat with your other arm.  3. Elbow stretch. Sit in a chair. If you are able, put both arms out to your sides to form a T. Slowly touch your shoulders with the tips of your fingers. Then return to the T-position. Repeat.  4. Hip stretch. Lie on your back with your legs straight and about 6 inches apart. With your foot flexed, slide your leg out to the side, then slide it back to the starting position. Repeat with your other leg.  5. Knee bend. Sit in a chair with your legs bent at the knees in front of you. Straighten one leg as much as you can, then bring it back to the floor. Repeat this 5 to 10 times. Then do the same thing with the other leg.   6. Ankle stretch. Sit with your feet flat on the floor. Lift your toes off of the floor while your heels stay down. Repeat. Then lift your heels off the floor while your toes stay down. Repeat.  Other exercise  Many other exercise and activities benefit people with arthritis. It is most important to find exercise and activities that you enjoy. You might try:  · Yoga, including chair yoga, helps to keep your joints strong and flexible.   · Nicolas Chi, an ancient type of exercise with slow, gentle movements  · Water exercise, including water walking  For more information on exercise for arthritis go to the [employer] website: www.arthritis.org.  Date Last Reviewed: 2/14/2016  © 1374-2643 [employer]. [address] [place] [address], [place] [address]. All rights reserved. This information is not intended as a substitute for professional medical care. Always follow your healthcare professional's instructions.

## 2017-11-14 NOTE — PROGRESS NOTES
Subjective:       Patient ID: Maximilian Tavera Jr. is a 71 y.o. male.    Chief Complaint: Hypertension    Hypertension   This is a chronic problem. The problem is controlled. Associated symptoms include headaches. Pertinent negatives include no anxiety, blurred vision, chest pain, malaise/fatigue, neck pain, orthopnea or palpitations.     Review of Systems   Constitutional: Negative for activity change, malaise/fatigue and unexpected weight change.   HENT: Negative for hearing loss, rhinorrhea and trouble swallowing.    Eyes: Negative for blurred vision, discharge and visual disturbance.   Respiratory: Negative for chest tightness and wheezing.    Cardiovascular: Negative for chest pain, palpitations and orthopnea.   Gastrointestinal: Negative for blood in stool, constipation, diarrhea and vomiting.   Endocrine: Positive for polyuria. Negative for polydipsia.   Genitourinary: Positive for urgency. Negative for difficulty urinating and hematuria.   Musculoskeletal: Positive for arthralgias. Negative for joint swelling and neck pain.   Neurological: Positive for headaches. Negative for weakness.   Psychiatric/Behavioral: Negative for confusion and dysphoric mood.       Patient Active Problem List   Diagnosis    Hypothyroid    Hyperlipidemia    HTN (hypertension), benign    Neuropathy    Carpal tunnel syndrome    ED (erectile dysfunction)    DDD (degenerative disc disease), lumbar    Diplopia    Cataract, left eye    Pseudophakia, right eye    Obesity    Myasthenia gravis, AChR antibody positive    Umbilical hernia without obstruction and without gangrene    Agatston CAC score, <100    Aortic valve disease    Primary osteoarthritis of both knees       Objective:      Physical Exam   Constitutional: He is oriented to person, place, and time. He appears well-developed and well-nourished.   Cardiovascular: Normal rate, regular rhythm and normal heart sounds.    Pulmonary/Chest: Effort normal and  breath sounds normal.   Abdominal: Soft. Bowel sounds are normal.   Musculoskeletal: He exhibits no edema.        Right knee: He exhibits decreased range of motion and effusion.        Left knee: He exhibits decreased range of motion and effusion.   Neurological: He is alert and oriented to person, place, and time.   Skin: Skin is warm and dry.   Psychiatric: He has a normal mood and affect.   Nursing note and vitals reviewed.      Lab Results   Component Value Date    WBC 6.79 05/16/2017    HGB 14.7 05/16/2017    HCT 44.7 05/16/2017     (L) 05/16/2017    CHOL 138 09/12/2016    TRIG 108 09/12/2016    HDL 33 (L) 09/12/2016    ALT 21 09/12/2016    AST 16 09/12/2016     05/16/2017    K 3.6 05/16/2017     05/16/2017    CREATININE 0.9 05/16/2017    BUN 13 05/16/2017    CO2 27 05/16/2017    TSH 2.161 05/16/2017    PSA 1.1 11/18/2014     The 10-year ASCVD risk score (Lodi RENEE Jr., et al., 2013) is: 27.6%    Values used to calculate the score:      Age: 71 years      Sex: Male      Is Non- : No      Diabetic: No      Tobacco smoker: No      Systolic Blood Pressure: 135 mmHg      Is BP treated: Yes      HDL Cholesterol: 33 mg/dL      Total Cholesterol: 138 mg/dL    Assessment:       1. Erectile disorder due to medical condition in male patient    2. Hypothyroidism due to acquired atrophy of thyroid    3. Primary osteoarthritis of both knees    4. DDD (degenerative disc disease), lumbar    5. HTN (hypertension), benign        Plan:       Erectile disorder due to medical condition in male patient  -     sildenafil (REVATIO) 20 mg Tab; Take 1 to 3 tabs daily as needed.  Dispense: 30 tablet; Refill: 3    Hypothyroidism due to acquired atrophy of thyroid    Primary osteoarthritis of both knees    DDD (degenerative disc disease), lumbar  -     gabapentin (NEURONTIN) 300 MG capsule; Take 1 capsule (300 mg total) by mouth 2 (two) times daily.  Dispense: 180 capsule; Refill: 11    HTN  (hypertension), benign  -     Lipid panel; Future; Expected date: 11/28/2017  -     Comprehensive metabolic panel; Future; Expected date: 11/28/2017  -     CBC auto differential; Future; Expected date: 11/28/2017  -     MICROALBUMIN / CREATININE RATIO URINE; Future; Expected date: 11/28/2017  -     URINALYSIS  -     gabapentin (NEURONTIN) 300 MG capsule; Take 1 capsule (300 mg total) by mouth 2 (two) times daily.  Dispense: 180 capsule; Refill: 11      Patient readiness: acceptance and barriers:none    During the course of the visit the patient was educated and counseled about the following:     Hypertension:   Medication: no change.  Dietary sodium restriction.  Regular aerobic exercise.  Check blood pressures daily and record.  Obesity:   General weight loss/lifestyle modification strategies discussed (elicit support from others; identify saboteurs; non-food rewards, etc).    Goals: Hypertension: Reduce Blood Pressure and Obesity: Reduce calorie intake and BMI    Did patient meet goals/outcomes: Yes    The following self management tools provided: blood pressure log  excercise log    Patient Instructions (the written plan) was given to the patient/family.     Time spent with patient: 30 minutes          30-minute visit. 20 minutes spent counseling patient on diet, exercise, and weight loss.  This has been fully explained to the patient, who indicates understanding.

## 2017-11-17 ENCOUNTER — DOCUMENTATION ONLY (OUTPATIENT)
Dept: FAMILY MEDICINE | Facility: CLINIC | Age: 72
End: 2017-11-17

## 2017-11-17 NOTE — PROGRESS NOTES
Pre-Visit Chart Review  For Appointment Scheduled on 11/20/2017      Health Maintenance Due   Topic Date Due    Pneumococcal (65+) (2 of 2 - PPSV23) 03/01/2017    Colonoscopy  03/01/2017    Lipid Panel  09/12/2017

## 2017-11-20 ENCOUNTER — LAB VISIT (OUTPATIENT)
Dept: LAB | Facility: HOSPITAL | Age: 72
End: 2017-11-20
Attending: FAMILY MEDICINE
Payer: MEDICARE

## 2017-11-20 ENCOUNTER — OFFICE VISIT (OUTPATIENT)
Dept: FAMILY MEDICINE | Facility: CLINIC | Age: 72
End: 2017-11-20
Payer: MEDICARE

## 2017-11-20 VITALS
HEIGHT: 73 IN | SYSTOLIC BLOOD PRESSURE: 98 MMHG | HEART RATE: 67 BPM | DIASTOLIC BLOOD PRESSURE: 57 MMHG | TEMPERATURE: 98 F | WEIGHT: 235.88 LBS | BODY MASS INDEX: 31.26 KG/M2

## 2017-11-20 DIAGNOSIS — I10 HTN (HYPERTENSION), BENIGN: ICD-10-CM

## 2017-11-20 DIAGNOSIS — E78.2 MIXED HYPERLIPIDEMIA: ICD-10-CM

## 2017-11-20 DIAGNOSIS — Z23 NEED FOR VACCINATION FOR STREP PNEUMONIAE: ICD-10-CM

## 2017-11-20 DIAGNOSIS — M51.36 DDD (DEGENERATIVE DISC DISEASE), LUMBAR: ICD-10-CM

## 2017-11-20 DIAGNOSIS — Z00.00 ENCOUNTER FOR PREVENTIVE HEALTH EXAMINATION: Primary | ICD-10-CM

## 2017-11-20 DIAGNOSIS — E03.4 HYPOTHYROIDISM DUE TO ACQUIRED ATROPHY OF THYROID: ICD-10-CM

## 2017-11-20 DIAGNOSIS — G70.00 MYASTHENIA GRAVIS, ACHR ANTIBODY POSITIVE: ICD-10-CM

## 2017-11-20 DIAGNOSIS — I70.0 ABDOMINAL AORTIC ATHEROSCLEROSIS: ICD-10-CM

## 2017-11-20 DIAGNOSIS — Z12.11 COLON CANCER SCREENING: ICD-10-CM

## 2017-11-20 LAB
ALBUMIN SERPL BCP-MCNC: 3.5 G/DL
ALP SERPL-CCNC: 75 U/L
ALT SERPL W/O P-5'-P-CCNC: 19 U/L
ANION GAP SERPL CALC-SCNC: 7 MMOL/L
AST SERPL-CCNC: 18 U/L
BASOPHILS # BLD AUTO: 0.01 K/UL
BASOPHILS NFR BLD: 0.2 %
BILIRUB SERPL-MCNC: 1.2 MG/DL
BUN SERPL-MCNC: 15 MG/DL
CALCIUM SERPL-MCNC: 9.1 MG/DL
CHLORIDE SERPL-SCNC: 101 MMOL/L
CHOLEST SERPL-MCNC: 159 MG/DL
CHOLEST/HDLC SERPL: 5 {RATIO}
CO2 SERPL-SCNC: 31 MMOL/L
CREAT SERPL-MCNC: 1 MG/DL
DIFFERENTIAL METHOD: ABNORMAL
EOSINOPHIL # BLD AUTO: 0.2 K/UL
EOSINOPHIL NFR BLD: 3.3 %
ERYTHROCYTE [DISTWIDTH] IN BLOOD BY AUTOMATED COUNT: 13.2 %
EST. GFR  (AFRICAN AMERICAN): >60 ML/MIN/1.73 M^2
EST. GFR  (NON AFRICAN AMERICAN): >60 ML/MIN/1.73 M^2
GLUCOSE SERPL-MCNC: 107 MG/DL
HCT VFR BLD AUTO: 43.3 %
HDLC SERPL-MCNC: 32 MG/DL
HDLC SERPL: 20.1 %
HGB BLD-MCNC: 14.5 G/DL
IMM GRANULOCYTES # BLD AUTO: 0.03 K/UL
IMM GRANULOCYTES NFR BLD AUTO: 0.6 %
LDLC SERPL CALC-MCNC: 100.6 MG/DL
LYMPHOCYTES # BLD AUTO: 1 K/UL
LYMPHOCYTES NFR BLD: 20.4 %
MCH RBC QN AUTO: 29.7 PG
MCHC RBC AUTO-ENTMCNC: 33.5 G/DL
MCV RBC AUTO: 89 FL
MONOCYTES # BLD AUTO: 0.6 K/UL
MONOCYTES NFR BLD: 12.3 %
NEUTROPHILS # BLD AUTO: 3.2 K/UL
NEUTROPHILS NFR BLD: 63.2 %
NONHDLC SERPL-MCNC: 127 MG/DL
NRBC BLD-RTO: 0 /100 WBC
PLATELET # BLD AUTO: 155 K/UL
PMV BLD AUTO: 10.8 FL
POTASSIUM SERPL-SCNC: 3.7 MMOL/L
PROT SERPL-MCNC: 6.8 G/DL
RBC # BLD AUTO: 4.89 M/UL
SODIUM SERPL-SCNC: 139 MMOL/L
TRIGL SERPL-MCNC: 132 MG/DL
WBC # BLD AUTO: 5.11 K/UL

## 2017-11-20 PROCEDURE — G0009 ADMIN PNEUMOCOCCAL VACCINE: HCPCS | Mod: S$GLB,,, | Performed by: FAMILY MEDICINE

## 2017-11-20 PROCEDURE — 80061 LIPID PANEL: CPT

## 2017-11-20 PROCEDURE — 99499 UNLISTED E&M SERVICE: CPT | Mod: S$GLB,,, | Performed by: PHYSICIAN ASSISTANT

## 2017-11-20 PROCEDURE — 90732 PPSV23 VACC 2 YRS+ SUBQ/IM: CPT | Mod: S$GLB,,, | Performed by: FAMILY MEDICINE

## 2017-11-20 PROCEDURE — 80053 COMPREHEN METABOLIC PANEL: CPT

## 2017-11-20 PROCEDURE — 85025 COMPLETE CBC W/AUTO DIFF WBC: CPT

## 2017-11-20 PROCEDURE — 36415 COLL VENOUS BLD VENIPUNCTURE: CPT | Mod: PO

## 2017-11-20 PROCEDURE — G0439 PPPS, SUBSEQ VISIT: HCPCS | Mod: S$GLB,,, | Performed by: PHYSICIAN ASSISTANT

## 2017-11-20 PROCEDURE — 99999 PR PBB SHADOW E&M-EST. PATIENT-LVL V: CPT | Mod: PBBFAC,,, | Performed by: PHYSICIAN ASSISTANT

## 2017-11-20 NOTE — PROGRESS NOTES
"Maximilian Tavera presented for a  Medicare AWV and comprehensive Health Risk Assessment today. The following components were reviewed and updated:    · Medical history  · Family History  · Social history  · Allergies and Current Medications  · Health Risk Assessment  · Health Maintenance  · Care Team     ** See Completed Assessments for Annual Wellness Visit within the encounter summary.**       The following assessments were completed:  · Living Situation  · CAGE  · Depression Screening  · Timed Get Up and Go  · Whisper Test  · Cognitive Function Screening  · Nutrition Screening  · ADL Screening  · PAQ Screening    Vitals:    11/20/17 1315   BP: (!) 98/57   BP Location: Right arm   Patient Position: Sitting   BP Method: Large (Automatic)   Pulse: 67   Temp: 97.9 °F (36.6 °C)   TempSrc: Oral   Weight: 107 kg (235 lb 14.3 oz)   Height: 6' 1" (1.854 m)     Body mass index is 31.12 kg/m².  Physical Exam   Constitutional: He is oriented to person, place, and time. He appears well-developed and well-nourished. No distress.   HENT:   Head: Normocephalic and atraumatic.   Eyes: Conjunctivae are normal. Pupils are equal, round, and reactive to light.   Neck: Normal range of motion. Neck supple. No JVD present. No thyromegaly present.   Cardiovascular: Normal rate and regular rhythm.  Exam reveals no gallop and no friction rub.    No murmur heard.  Pulmonary/Chest: Effort normal. No respiratory distress. He has no wheezes. He has no rales. He exhibits no tenderness.   Neurological: He is alert and oriented to person, place, and time.   Skin: He is not diaphoretic.             Diagnoses and health risks identified today and associated recommendations/orders:    Maximilian was seen today for health risk assessment.    Diagnoses and all orders for this visit:    Encounter for preventive health examination    Myasthenia gravis, AChR antibody positive  Comments:  stable; continue medications; followed by neuro    Abdominal aortic " atherosclerosis  Comments:  stable; continue to monitor    Hypothyroidism due to acquired atrophy of thyroid  Comments:  stble; continue medications    Mixed hyperlipidemia  Comments:  stable; continue meds; continue to monitor    HTN (hypertension), benign  Comments:  controlled; continue medications; continue to monitor    DDD (degenerative disc disease), lumbar  Comments:  stable; continue to monitor    Need for vaccination for Strep pneumoniae  -     (In Office Administered) Pneumococcal Polysaccharide Vaccine (23 Valent) (SQ/IM)    Colon cancer screening  -     Ambulatory referral to Gastroenterology        Provided Maximilian with a 5-10 year written screening schedule and personal prevention plan. Recommendations were developed using the USPSTF age appropriate recommendations. Education, counseling, and referrals were provided as needed. After Visit Summary printed and given to patient which includes a list of additional screenings\tests needed.    No Follow-up on file.    KEYA Pedraza     Patient readiness: acceptance and barriers:none    During the course of the visit the patient was educated and counseled about the following:     Hypertension:   Regular aerobic exercise.  Obesity:   General weight loss/lifestyle modification strategies discussed (elicit support from others; identify saboteurs; non-food rewards, etc).    Goals: Hypertension: Reduce Blood Pressure and Obesity: Reduce calorie intake and BMI    Did patient meet goals/outcomes: No    The following self management tools provided: blood pressure log    Patient Instructions (the written plan) was given to the patient/family.     Time spent with patient: 55 minutes

## 2017-11-20 NOTE — PATIENT INSTRUCTIONS
Weight Management: Getting Started  Healthy bodies come in all shapes and sizes. Not all bodies are made to be thin. For some people, a healthy weight is higher than the average weight listed on weight charts. Your healthcare provider can help you decide on a healthy weight for you.    Reasons to lose weight  Losing weight can help with some health problems, such as high blood pressure, heart disease, diabetes, sleep apnea, and arthritis. You may also feel more energy.  Set your long-term goal  Your goal doesn't even have to be a specific weight. You may decide on a fitness goal (such as being able to walk 10 miles a week), or a health goal (such as lowering your blood pressure). Choose a goal that is measurable and reasonable, so you know when you've reached it. A goal of reaching a BMI of less than 25 is not always reasonable (or possible).   Make an action plan  Habits dont change overnight. Setting your goals too high can leave you feeling discouraged if you cant reach them. Be realistic. Choose one or two small changes you can make now. Set an action plan for how you are going to make these changes. When you can stick to this plan, keep making a few more small changes. Taking small steps will help you stay on the path to success.  Track your progress  Write down your goals. Then, keep a daily record of your progress. Write down what you eat and how active you are. This record lets you look back on how much youve done. It may also help when youre feeling frustrated. Reward yourself for success. Even if you dont reach every goal, give yourself credit for what you do get done.  Get support  Encouragement from others can help make losing weight easier. Ask your family members and friends for support. They may even want to join you. Also look to your healthcare provider, registered dietitian, and  for help. Your local hospital can give you more information about nutrition, exercise, and  weight loss.  Date Last Reviewed: 1/31/2016 © 2000-2017 [employer]. [address] [place] [address], [place] [address]. All rights reserved. This information is not intended as a substitute for professional medical care. Always follow your healthcare professional's instructions.        Walking for Fitness  Fitness walking has something for everyone, even people who are already fit. Walking is one of the safest ways to condition your body aerobically. It can boost energy, help you lose weight, and reduce stress.    Physical benefits  · Walking strengthens your heart and lungs, and tones your muscles.  · When walking, your feet land with less impact than in other sports. This reduces chances of muscle, bone, and joint injury.  · Regular walking improves your cholesterol levels and lowers your risk of heart disease. And it helps you control your blood sugar if you have diabetes.  · Walking is a weight-bearing activity, which helps maintain bone density. This can help prevent osteoporosis.  Personal rewards  · Taking walks can help you relax and manage stress. And fitness walking may make you feel better about yourself.  · Walking can help you sleep better at night and make you less likely to be depressed.  · Regular walking may help maintain your memory as you get older.  · Walking is a great way to spend extra time with friends and family members. Be sure to invite your dog along!  Q&A about fitness walking  Q: Will walking keep me fit?  A: Yes. Regular walking at the right pace gives you all the benefits of other aerobic activities, such as jogging and swimming.  Q: Will walking help me lose weight and keep it off?  A: Yes. Per mile, walking can burn as many calories as jogging. Your health care provider can help work walking into your weight-loss plan.  Q: Is walking safe for my health?  A: Yes. Walking is safe if you have high blood pressure, diabetes, heart disease, or other conditions. Talk to your  healthcare provider before you start.  Date Last Reviewed: 4/1/2017  © 6780-0841 [employer]. [address] [place] [address], [place] [address]. All rights reserved. This information is not intended as a substitute for professional medical care. Always follow your healthcare professional's instructions.          Counseling and Referral of Other Preventative  (Italic type indicates deductible and co-insurance are waived)    Patient Name: Maximilian Tvaera  Today's Date: 11/20/2017      SERVICE LIMITATIONS RECOMMENDATION    Vaccines    · Pneumococcal (once after 65)    · Influenza (annually)    · Hepatitis B (if medium/high risk)    · Prevnar 13      Hepatitis B medium/high risk factors:       - End-stage renal disease       - Hemophiliacs who received Factor VII or         IX concentrates       - Clients of institutions for the mentally             retarded       - Persons who live in the same house as          a HepB carrier       - Homosexual men       - Illicit injectable drug abusers     Pneumococcal: Done, no repeat necessary   Second vaccine done today     Influenza: Done, repeat in one year     Hepatitis B: N/A     Prevnar 13: N/A    Prostate cancer screening (annually to age 75)     Prostate specific antigen (PSA) Shared decision making with Provider. Sometimes a co-pay may be required if the patient decides to have this test. The USPSTF no longer recommends prostate cancer screening routinely in medicine: every 1 year    Colorectal cancer screening (to age 75)    · Fecal occult blood test (annual)  · Flexible sigmoidoscopy (5y)  · Screening colonoscopy (10y)  · Barium enema   Last done 3/1/12, recommend to repeat every 5  years    Diabetes self-management training (no USPSTF recommendations)  Requires referral by treating physician for patient with diabetes or renal disease. 10 hours of initial DSMT sessions of no less than 30 minutes each in a continuous 12-month period. 2 hours of follow-up DSMT in  subsequent years.  N/A    Glaucoma screening (no USPSTF recommendation)  Diabetes mellitus, family history   , age 50 or over    American, age 65 or over  Done this year, repeat every year    Medical nutrition therapy for diabetes or renal disease (no recommended schedule)  Requires referral by treating physician for patient with diabetes or renal disease or kidney transplant within the past 3 years.  Can be provided in same year as diabetes self-management training (DSMT), and CMS recommends medical nutrition therapy take place after DSMT. Up to 3 hours for initial year and 2 hours in subsequent years.  N/A    Cardiovascular screening blood tests (every 5 years)  · Fasting lipid panel  Order as a panel if possible  Last done 9/12/16, recommend to repeat every 1  years    Diabetes screening tests (at least every 3 years, Medicare covers annually or at 6-month intervals for prediabetic patients)  · Fasting blood sugar (FBS) or glucose tolerance test (GTT)  Patient must be diagnosed with one of the following:       - Hypertension       - Dyslipidemia       - Obesity (BMI 30kg/m2)       - Previous elevated impaired FBS or GTT       ... or any two of the following:       - Overweight (BMI 25 but <30)       - Family history of diabetes       - Age 65 or older       - History of gestational diabetes or birth of baby weighing more than 9 pounds  N/A    Abdominal aortic aneurysm screening (once)  · Sonogram   Limited to patients who meet one of the following criteria:       - Men who are 65-75 years old and have smoked more than 100 cigarette in their lifetime       - Anyone with a family history of abdominal aortic aneurysm       - Anyone recommended for screening by the USPSTF  N/A    HIV screening (annually for increased risk patients)  · HIV-1 and HIV-2 by EIA, or TAMMY, rapid antibody test or oral mucosa transudate  Patients must be at increased risk for HIV infection per USPSTF guidelines or  pregnant. Tests covered annually for patient at increased risk or as requested by the patient. Pregnant patients may receive up to 3 tests during pregnancy.  Risks discussed, screening is not recommended    Smoking cessation counseling (up to 8 sessions per year)  Patients must be asymptomatic of tobacco-related conditions to receive as a preventative service.  Non-smoker    Subsequent annual wellness visit  At least 12 months since last AWV  Return in one year     The following information is provided to all patients.  This information is to help you find resources for any of the problems found today that may be affecting your health:                Living healthy guide: www.[facility].[place].[facility]      Understanding Diabetes: www.diabetes.org      Eating healthy: www.cdc.gov/healthyweight      CDC home safety checklist: www.cdc.gov/steadi/patient.html      Agency on Aging: www.goea.[place].[facility]      Alcoholics anonymous (AA): www.aa.org      Physical Activity: www.aristides.nih.gov/yr3vufq      Tobacco use: www.quitwithusla.org

## 2017-11-20 NOTE — Clinical Note
Primary Care Providers: Brian Marroquin MD, MD (General)  Your patient was seen today for a HRA visit. Gap(s) in care (HEDIS gaps) have been identified during this visit that require additional testing and possible follow up.  Orders Placed This Encounter     (In Office Administered) Pneumococcal Polysaccharide Vaccine (23 Valent) (SQ/IM)     Ambulatory referral to Gastroenterology         Referral Priority:Routine         Referral Type:Consultation         Referral Reason:Specialty Services Required         Requested Specialty:Gastroenterology         Number of Visits Requested:1   These orders were placed using [facility] approved protocol and any results will be forwarded to your office for appropriate follow up. I have included a copy of my visit note; please review the note and feel free to contact me with any questions.   Thank you for allowing me to participate in the care of your patients. KEYA Pedraza

## 2018-01-09 ENCOUNTER — PES CALL (OUTPATIENT)
Dept: ADMINISTRATIVE | Facility: CLINIC | Age: 73
End: 2018-01-09

## 2018-01-12 ENCOUNTER — OFFICE VISIT (OUTPATIENT)
Dept: CARDIOLOGY | Facility: CLINIC | Age: 73
End: 2018-01-12
Payer: MEDICARE

## 2018-01-12 ENCOUNTER — HOSPITAL ENCOUNTER (OUTPATIENT)
Dept: CARDIOLOGY | Facility: CLINIC | Age: 73
Discharge: HOME | End: 2018-01-12
Payer: MEDICARE

## 2018-01-12 VITALS
HEART RATE: 68 BPM | HEIGHT: 73 IN | SYSTOLIC BLOOD PRESSURE: 140 MMHG | DIASTOLIC BLOOD PRESSURE: 68 MMHG | BODY MASS INDEX: 32.23 KG/M2 | WEIGHT: 243.19 LBS

## 2018-01-12 DIAGNOSIS — E78.2 MIXED HYPERLIPIDEMIA: ICD-10-CM

## 2018-01-12 DIAGNOSIS — I10 HTN (HYPERTENSION), BENIGN: ICD-10-CM

## 2018-01-12 DIAGNOSIS — G70.00 MYASTHENIA GRAVIS, ACHR ANTIBODY POSITIVE: ICD-10-CM

## 2018-01-12 DIAGNOSIS — I70.0 ABDOMINAL AORTIC ATHEROSCLEROSIS: ICD-10-CM

## 2018-01-12 DIAGNOSIS — G62.9 NEUROPATHY: ICD-10-CM

## 2018-01-12 DIAGNOSIS — G70.00 MYASTHENIA GRAVIS: ICD-10-CM

## 2018-01-12 DIAGNOSIS — R93.1 AGATSTON CAC SCORE, <100: Primary | ICD-10-CM

## 2018-01-12 DIAGNOSIS — I45.10 RBBB: ICD-10-CM

## 2018-01-12 DIAGNOSIS — M51.36 DDD (DEGENERATIVE DISC DISEASE), LUMBAR: ICD-10-CM

## 2018-01-12 DIAGNOSIS — E03.4 HYPOTHYROIDISM DUE TO ACQUIRED ATROPHY OF THYROID: ICD-10-CM

## 2018-01-12 DIAGNOSIS — I35.9 AORTIC VALVE DISEASE: ICD-10-CM

## 2018-01-12 DIAGNOSIS — N52.01 ERECTILE DYSFUNCTION DUE TO ARTERIAL INSUFFICIENCY: ICD-10-CM

## 2018-01-12 DIAGNOSIS — E66.09 CLASS 1 OBESITY DUE TO EXCESS CALORIES WITH SERIOUS COMORBIDITY IN ADULT, UNSPECIFIED BMI: ICD-10-CM

## 2018-01-12 PROCEDURE — 99215 OFFICE O/P EST HI 40 MIN: CPT | Mod: S$GLB,,, | Performed by: INTERNAL MEDICINE

## 2018-01-12 PROCEDURE — 93000 ELECTROCARDIOGRAM COMPLETE: CPT | Mod: S$GLB,,, | Performed by: INTERNAL MEDICINE

## 2018-01-12 PROCEDURE — 99999 PR PBB SHADOW E&M-EST. PATIENT-LVL V: CPT | Mod: PBBFAC,,, | Performed by: INTERNAL MEDICINE

## 2018-01-12 PROCEDURE — 99499 UNLISTED E&M SERVICE: CPT | Mod: S$GLB,,, | Performed by: INTERNAL MEDICINE

## 2018-01-12 RX ORDER — EZETIMIBE 10 MG/1
10 TABLET ORAL DAILY
Qty: 90 TABLET | Refills: 3 | Status: SHIPPED | OUTPATIENT
Start: 2018-01-12 | End: 2018-08-24

## 2018-01-12 NOTE — PATIENT INSTRUCTIONS
Discussed diet , achieving and maintaining ideal body weight, and exercise.   We reviewed meds in detail.  Reassured-discussed goals, options , and plan.  Add half of Zetia, wt loss, and start exercising again.

## 2018-01-12 NOTE — PROGRESS NOTES
Subjective:   Patient ID:  Maximilian Tavera Jr. is a 72 y.o. male who presents for follow-up of CAD risk    HPI:The patient is here for CAD risk factors.    The patient has no chest pain, SOB, TIA, palpitations, syncope or pre-syncope.Patient does not exercise as much as directed but back better now and about to start. BP usually 120s, but drove in traffic early this am from [place].Gained weight over holidays        Review of Systems   Constitution: Positive for weight gain. Negative for chills, decreased appetite, diaphoresis, fever, weakness, malaise/fatigue, night sweats and weight loss.   HENT: Negative for congestion, hoarse voice, nosebleeds, sore throat and tinnitus.    Eyes: Negative for blurred vision, double vision, vision loss in left eye, vision loss in right eye, visual disturbance and visual halos.   Cardiovascular: Negative for chest pain, claudication, cyanosis, dyspnea on exertion, irregular heartbeat, leg swelling, near-syncope, orthopnea, palpitations, paroxysmal nocturnal dyspnea and syncope.   Respiratory: Negative for cough, hemoptysis, shortness of breath, sleep disturbances due to breathing, snoring, sputum production and wheezing.    Endocrine: Negative for cold intolerance, heat intolerance, polydipsia, polyphagia and polyuria.   Hematologic/Lymphatic: Negative for adenopathy and bleeding problem. Does not bruise/bleed easily.   Skin: Negative for color change, dry skin, flushing, itching, nail changes, poor wound healing, rash, skin cancer, suspicious lesions and unusual hair distribution.   Musculoskeletal: Positive for arthritis and back pain. Negative for falls, gout, joint pain, joint swelling, muscle cramps, muscle weakness, myalgias and stiffness.   Gastrointestinal: Negative for abdominal pain, anorexia, change in bowel habit, constipation, diarrhea, dysphagia, heartburn, hematemesis, hematochezia, melena and vomiting.   Genitourinary: Negative for decreased libido,  "dysuria, hematuria, hesitancy and urgency.   Neurological: Positive for numbness and paresthesias. Negative for excessive daytime sleepiness, dizziness, focal weakness, headaches, light-headedness, loss of balance, seizures, sensory change, tremors and vertigo.   Psychiatric/Behavioral: Negative for altered mental status, depression, hallucinations, memory loss, substance abuse and suicidal ideas. The patient does not have insomnia and is not nervous/anxious.    Allergic/Immunologic: Negative for environmental allergies and hives.       Objective: BP (!) 140/68 (BP Location: Left arm, Patient Position: Sitting, BP Method: X-Large (Automatic))   Pulse 68   Ht 6' 1" (1.854 m)   Wt 110.3 kg (243 lb 2.7 oz)   BMI 32.08 kg/m²      Physical Exam   Constitutional: He is oriented to person, place, and time. He appears well-developed and well-nourished. No distress.   HENT:   Head: Normocephalic.   Eyes: EOM are normal. Pupils are equal, round, and reactive to light.   Neck: Normal range of motion. No thyromegaly present.   Cardiovascular: Normal rate, regular rhythm, normal heart sounds and intact distal pulses.  Exam reveals no gallop and no friction rub.    No murmur heard.  Pulses:       Carotid pulses are 3+ on the right side, and 3+ on the left side.       Radial pulses are 3+ on the right side, and 3+ on the left side.        Femoral pulses are 3+ on the right side, and 3+ on the left side.       Popliteal pulses are 3+ on the right side, and 3+ on the left side.        Dorsalis pedis pulses are 3+ on the right side, and 3+ on the left side.        Posterior tibial pulses are 3+ on the right side, and 3+ on the left side.   Pulmonary/Chest: Effort normal and breath sounds normal. No respiratory distress. He has no wheezes. He has no rales. He exhibits no tenderness.   Abdominal: Soft. He exhibits no distension and no mass. There is no tenderness.   Musculoskeletal: Normal range of motion.   Lymphadenopathy:     He " has no cervical adenopathy.   Neurological: He is alert and oriented to person, place, and time.   Skin: Skin is warm. He is not diaphoretic. No cyanosis. Nails show no clubbing.   Psychiatric: He has a normal mood and affect. His speech is normal and behavior is normal. Judgment and thought content normal. Cognition and memory are normal.       Assessment:     1. Agatston CAC score, <100    2. Abdominal aortic atherosclerosis    3. Aortic valve disease    4. HTN (hypertension), benign    5. Mixed hyperlipidemia    6. Hypothyroidism due to acquired atrophy of thyroid    7. Neuropathy    8. Class 1 obesity due to excess calories with serious comorbidity in adult, unspecified BMI    9. Myasthenia gravis, AChR antibody positive    10. Erectile dysfunction due to arterial insufficiency    11. DDD (degenerative disc disease), lumbar    12. RBBB        Plan:   Discussed diet , achieving and maintaining ideal body weight, and exercise.   We reviewed meds in detail.  Reassured-discussed goals, options , and plan.  Add half of Zetia, wt loss, and start exercising again.      Maximilian was seen today for htn (hypertension), benign.    Diagnoses and all orders for this visit:    Agatston CAC score, <100  -     ezetimibe (ZETIA) 10 mg tablet; Take 1 tablet (10 mg total) by mouth once daily.  -     Comprehensive metabolic panel; Standing    Abdominal aortic atherosclerosis  -     ezetimibe (ZETIA) 10 mg tablet; Take 1 tablet (10 mg total) by mouth once daily.  -     Lipid panel; Standing  -     Comprehensive metabolic panel; Standing    Aortic valve disease    HTN (hypertension), benign  -     Lipid panel; Standing    Mixed hyperlipidemia  -     ezetimibe (ZETIA) 10 mg tablet; Take 1 tablet (10 mg total) by mouth once daily.  -     Lipid panel; Standing    Hypothyroidism due to acquired atrophy of thyroid  -     Comprehensive metabolic panel; Standing  -     TSH; Future; Expected date: 04/12/2019  -     T4, free; Future; Expected  date: 04/12/2019    Neuropathy    Class 1 obesity due to excess calories with serious comorbidity in adult, unspecified BMI    Myasthenia gravis, AChR antibody positive    Erectile dysfunction due to arterial insufficiency    DDD (degenerative disc disease), lumbar    RBBB            Return in about 15 months (around 4/12/2019) for with labs; labs 4 months.

## 2018-01-14 ENCOUNTER — PATIENT MESSAGE (OUTPATIENT)
Dept: CARDIOLOGY | Facility: CLINIC | Age: 73
End: 2018-01-14

## 2018-01-14 DIAGNOSIS — E78.5 HYPERLIPIDEMIA: ICD-10-CM

## 2018-01-14 DIAGNOSIS — I10 HTN (HYPERTENSION), BENIGN: ICD-10-CM

## 2018-01-14 DIAGNOSIS — I10 ESSENTIAL HYPERTENSION: ICD-10-CM

## 2018-01-14 RX ORDER — POTASSIUM CHLORIDE 600 MG/1
TABLET, FILM COATED, EXTENDED RELEASE ORAL
Qty: 180 TABLET | Refills: 3 | Status: SHIPPED | OUTPATIENT
Start: 2018-01-14 | End: 2018-04-19

## 2018-01-14 RX ORDER — LISINOPRIL 40 MG/1
TABLET ORAL
Qty: 90 TABLET | Refills: 3 | Status: SHIPPED | OUTPATIENT
Start: 2018-01-14 | End: 2018-04-09 | Stop reason: SDUPTHER

## 2018-01-14 RX ORDER — CHLORTHALIDONE 25 MG/1
TABLET ORAL
Qty: 90 TABLET | Refills: 3 | Status: SHIPPED | OUTPATIENT
Start: 2018-01-14 | End: 2018-10-12 | Stop reason: SDUPTHER

## 2018-01-14 RX ORDER — ATORVASTATIN CALCIUM 80 MG/1
TABLET, FILM COATED ORAL
Qty: 90 TABLET | Refills: 3 | Status: SHIPPED | OUTPATIENT
Start: 2018-01-14 | End: 2018-03-29 | Stop reason: SDUPTHER

## 2018-01-14 RX ORDER — CARVEDILOL 25 MG/1
TABLET ORAL
Qty: 180 TABLET | Refills: 3 | Status: SHIPPED | OUTPATIENT
Start: 2018-01-14 | End: 2018-04-07 | Stop reason: SDUPTHER

## 2018-02-13 DIAGNOSIS — G70.00 MYASTHENIA GRAVIS: Primary | ICD-10-CM

## 2018-02-14 RX ORDER — PREDNISONE 1 MG/1
TABLET ORAL
Qty: 135 TABLET | Refills: 3 | Status: ON HOLD | OUTPATIENT
Start: 2018-02-14 | End: 2018-11-10 | Stop reason: HOSPADM

## 2018-02-14 RX ORDER — PREDNISONE 5 MG/1
TABLET ORAL
Qty: 45 TABLET | Refills: 3 | Status: SHIPPED | OUTPATIENT
Start: 2018-02-14 | End: 2018-12-17 | Stop reason: SDUPTHER

## 2018-02-27 ENCOUNTER — OFFICE VISIT (OUTPATIENT)
Dept: NEUROLOGY | Facility: CLINIC | Age: 73
End: 2018-02-27
Payer: MEDICARE

## 2018-02-27 VITALS
HEART RATE: 61 BPM | WEIGHT: 239.44 LBS | DIASTOLIC BLOOD PRESSURE: 74 MMHG | HEIGHT: 72 IN | SYSTOLIC BLOOD PRESSURE: 128 MMHG | BODY MASS INDEX: 32.43 KG/M2

## 2018-02-27 DIAGNOSIS — G56.03 CARPAL TUNNEL SYNDROME ON BOTH SIDES: ICD-10-CM

## 2018-02-27 DIAGNOSIS — Z79.52 ON PREDNISONE THERAPY: ICD-10-CM

## 2018-02-27 DIAGNOSIS — G70.00 MYASTHENIA GRAVIS, ACHR ANTIBODY POSITIVE: Primary | ICD-10-CM

## 2018-02-27 PROCEDURE — 99214 OFFICE O/P EST MOD 30 MIN: CPT | Mod: S$GLB,,, | Performed by: PSYCHIATRY & NEUROLOGY

## 2018-02-27 PROCEDURE — 99999 PR PBB SHADOW E&M-EST. PATIENT-LVL III: CPT | Mod: PBBFAC,,, | Performed by: PSYCHIATRY & NEUROLOGY

## 2018-02-27 PROCEDURE — 1125F AMNT PAIN NOTED PAIN PRSNT: CPT | Mod: S$GLB,,, | Performed by: PSYCHIATRY & NEUROLOGY

## 2018-02-27 PROCEDURE — 99499 UNLISTED E&M SERVICE: CPT | Mod: S$GLB,,, | Performed by: PSYCHIATRY & NEUROLOGY

## 2018-02-27 PROCEDURE — 1159F MED LIST DOCD IN RCRD: CPT | Mod: S$GLB,,, | Performed by: PSYCHIATRY & NEUROLOGY

## 2018-02-27 PROCEDURE — 3008F BODY MASS INDEX DOCD: CPT | Mod: S$GLB,,, | Performed by: PSYCHIATRY & NEUROLOGY

## 2018-02-27 NOTE — PROGRESS NOTES
"[facility], Department of Neurology   [facility] [place] [address]  Phone:[phone]  Fax: [phone]    Patient Name: Maximilian Tavera Jr.  : 1945  MRN:  [id number]    2018     chief complaint: myasthenia gravis     PCP: Brian Marroquin MD.    DX: Ab+ MG dx'ed 3/2015  Thymic status: CT chest 4/15 negative for thymoma   Maintenance: prednisone 7 mg qOD   Last dose change: 16: 8mg qOD -> 7mg qOD; : 10mg qOD to 8mg qOD; 6/15 prednisone 5 mg qd --> 10 mg q OD   Rescue: none   Prior immunemodulatory agents: none     Interval history 18:  Doing well currently with respect to MG. Mild diplopia at end of day.     Waking up with numbness in his hands, bilateral. Relieved with movement. No numbness or weakness during the day.    Occasional tongue biting.     Interval history 17:   Mr Tavera is a 72yo male with Ab+ myasthenia gravis diagnosed in , maintained on 7mg prednisone qOD. Has been doing well since his last visit. Minimal diplopia at the end of the day. Has some generalized fatigue when it's hot, but tries to do all his chores early in the day. Has started exercising.      Interval 3/21/17: Doing well from an MG standpoint on low-dose prednisone. He still has a occasional diplopia at night when looking down. Has rare choking - mostly liquids - when he's not paying attention. Had a number of URIs last year. Also noticed that his sense of taste is off, although sense of smell is preserved.     Interval history 16:  Doing well. Last visit, dropped prednisone from 20 to 8mg qOD. No new symptoms. Still having diplopia on downgaze and only rarely at other times. No chewing or swallowing issues.    Interval history 16  Doing well. Still with mild baseline diplopia and rare difficulty with swallowing (this does not last more than a few seconds). No issues with speaking, breathing, or generalized weakness. Heat "wears me out".       HPI " "5/17/16  Maximilian Tavera Jr. is a 69 yo male with Ab+ myasthenia gravis. Doing well, stable. Occasional diplopia, mostly with laying back too far in his recliner while watching tv. Mentions 6-8 mos ago began noticing rhinorrhea with eating. Denies ptosis, difficulty chewing/swallowing/breathing. Denies weight changes, sweats, alternating diarrhea/constipation.     Prednisone 10 mg q OD    Interval hx  2/12/16:  Maximilian Tavera Jr. is a 69 yo  male with myasthenia gravis. Still has occasional diplopia, but is improved- occuring less frequently. Occurs most when watching TV. Occasional "funny feeling" when drinking soft drinks. Denies weakness, SOB, dysarthria.     Taking prednisone 5mg/day.   Stopped Mestion- GI side effects     Interval history 6/17/15:  Taking mestinon at 1/2, 1/2, 1 and not finding much improvement in diplopia, and having a decent amount of loose stools and gas.     HPI: Maximilian Tavera Jr. is a 69 y.o. male with 7-8mos of diplopia. Was seen by Dr Jimenez, who did some blood work and found that he was Ab+ for AchR. He was referred here for further care.     No prior symptoms. No difficulty chewing/swallowing, breathing. Arm/leg strength is fine. No orthopnea.     Diplopia is most noticeable when reading or watching tv. Driving is occasionally problematic. No clearly diurnal. No real problem with ptosis.     He has not tried medications for this. He has prisms, which help.     Looks like he started carvedilol in 3/14.     Medications:   Current Outpatient Prescriptions   Medication Sig Dispense Refill    ASPIRIN LOW DOSE 81 mg EC tablet Take 81 mg by mouth once daily.       atorvastatin (LIPITOR) 80 MG tablet TAKE 1 TABLET EVERY DAY 90 tablet 3    calcium citrate-vitamin D (CITRACAL + D) 315-200 mg-unit per tablet Take 1 tablet by mouth once daily. Twice a day      carvedilol (COREG) 25 MG tablet TAKE 1/2 TO 1 TABLET TWICE DAILY  OR AS DIRECTED 180 tablet 3    cetirizine " (ZYRTEC) 10 MG tablet Take 1 tablet by mouth daily as needed.      chlorthalidone (HYGROTEN) 25 MG Tab TAKE 1 TABLET EVERY DAY 90 tablet 3    cholecalciferol, vitamin D3, (VITAMIN D) 2,000 unit Cap 1 capsule once daily. Every day      coenzyme Q10 (CO Q-10) 100 mg capsule Take 400 mg by mouth once daily.       cyanocobalamin, vitamin B-12, (VITAMIN B-12) 5,000 mcg Subl Take 5,000 mcg by mouth once daily. Every day      fluticasone (FLONASE) 50 mcg/actuation nasal spray USE 1 SPRAY IN EACH NOSTRIL TWICE DAILY AS NEEDED  FOR  RHINITIS 48 g 3    gabapentin (NEURONTIN) 300 MG capsule Take 1 capsule (300 mg total) by mouth 2 (two) times daily. 180 capsule 11    ibuprofen (ADVIL,MOTRIN) 800 MG tablet Take 1 tablet (800 mg total) by mouth daily as needed for Pain. 30 tablet 11    levothyroxine (SYNTHROID) 50 MCG tablet TAKE 1 TABLET ONE TIME DAILY 90 tablet 3    lisinopril (PRINIVIL,ZESTRIL) 40 MG tablet TAKE 1 TABLET EVERY DAY 90 tablet 3    methylsulfonylmethane (MSM) 1,000 mg Tab Take 1,000 mg by mouth once daily. Every day      milk thistle 200 mg Cap Take 200 mg by mouth 2 (two) times daily. Twice a day      omega-3 fatty acids 1,000 mg Cap Twice a day      potassium chloride (KLOR-CON) 8 MEQ TbSR TAKE 1 TABLET TWICE DAILY 180 tablet 3    predniSONE (DELTASONE) 1 MG tablet TAKE 3 TABLETS  EVERY OTHER  tablet 3    predniSONE (DELTASONE) 5 MG tablet TAKE 1 TABLET EVERY OTHER DAY 45 tablet 3    sildenafil (REVATIO) 20 mg Tab Take 1 to 3 tabs daily as needed. 30 tablet 3    ezetimibe (ZETIA) 10 mg tablet Take 1 tablet (10 mg total) by mouth once daily. 90 tablet 3     No current facility-administered medications for this visit.        Allergies:  Review of patient's allergies indicates:   Allergen Reactions    No known drug allergies        PMHx:  Past Medical History:   Diagnosis Date    Arthritis     Back pain     Carpal tunnel syndrome 2/25/2013    Cataract     Chest pain, musculoskeletal      Hyperlipidemia     Hypertension     Hypothyroidism     Knee fracture     Myasthenia gravis     Polyneuropathy     Squamous cell carcinoma 2014    left forearm    Thyroid disease      Past Surgical History:   Procedure Laterality Date    APPENDECTOMY      CATARACT EXTRACTION W/  INTRAOCULAR LENS IMPLANT Bilateral     HEMORRHOID SURGERY      KNEE ARTHROPLASTY Bilateral     NECK SURGERY      TONSILLECTOMY         Fhx:  Family History   Problem Relation Age of Onset    Cataracts Mother     Heart disease Mother      CHF    Hypertension Mother     Hyperlipidemia Mother     Cataracts Father     Glaucoma Father     Heart disease Father     Hyperlipidemia Sister     Hypertension Sister     No Known Problems Daughter     No Known Problems Daughter     Collagen disease Neg Hx     Amblyopia Neg Hx     Blindness Neg Hx     Macular degeneration Neg Hx     Retinal detachment Neg Hx     Strabismus Neg Hx     Cancer Neg Hx        Shx:   Social History     Social History    Marital status:      Spouse name: N/A    Number of children: N/A    Years of education: N/A     Occupational History    Not on file.     Social History Main Topics    Smoking status: Passive Smoke Exposure - Never Smoker    Smokeless tobacco: Never Used      Comment: when a child    Alcohol use Yes      Comment: rarely    Drug use: No    Sexual activity: Yes     Partners: Female     Other Topics Concern    Not on file     Social History Narrative    No narrative on file       ROS:  Review of Systems (Questions asked; positives or additions noted in BOLD)  Gen Malaise/fatigue, weight change   HEENT Hearing loss, blurred vision, diplopia   Card CP, palpitations   Pulm SOB, cough    Vasc Easy bruising, easy bleeding    GI Nausea, vomiting, constipation    Frequency, urgency   M/S Joint pain, myalgias, falls    Endocrine Temperature intolerance, polyuria, polydipsia   Neuro Dizziness, tremors, seizures, focal  weakness, Others- as noted in HPI   Psych Memory loss, depression, anxiety, hallucinations       PHYSICAL EXAM:   /74   Pulse 61   Ht 6' (1.829 m)   Wt 108.6 kg (239 lb 6.7 oz)   BMI 32.47 kg/m²    GEN:  NAD, well-developed, well-groomed.  HEENT: No cervical lymphadenopathy noted.  CARDIOVASCULAR: Radial pulses 2+ bilaterally. No LE edema noted.  NEURO:  Mental Status: Awake, alert, and oriented. Normal attention and concentration.  Speech is fluent and appropriate language function.    Cranial Nerves:  II Pupils are round and reactive to direct and consensual light and accommodation. Visual fields full to confrontation.   III, IV, VI Extraocular eye movements full bilaterally. No ptosis noted. He has mild orbicularis oculi weakness bilaterally.   V Normal facial sensation to pinprick and light touch.   VII Symmetric facial expressions.   VIII Hearing intact to finger rub bilaterally.   IX, X Palate elevates midline and symmetrically.   XI Shoulder elevation is symmetric and full strength bilaterally. Neck rotation strength is normal bilaterally.   XII Tongue is midline.     Sensory: Sensation is normal to light touch in the upper and lower extremities bilaterally.    Motor: Extremities demonstrate normal bulk and tone in all four limbs. Mild left ocular weakness with sustained upward gaze. Good orbicularis oculi and orbicularis oris strength.  Strength-       RUE: 5/5                LUE: 5/5                RLE: 5/5                LLE: 5/5     Deep Tendon Relfexes: 2+ and symmetric in the upper and lower extremities bilaterally. Babinski mute bilaterally.    Coordination: Finger to nose WNL.     Gait: Normal casual gait.      ASSESSMENT and PLAN:   Problem List Items Addressed This Visit     Myasthenia gravis, AChR antibody positive - Primary    Overview     Ab+, thymoma negative, generalized MG, dx'ed 2015  Prednisone qOD; mestinon does not help symptoms  Rescue: none  Prior Immune: none         Current  Assessment & Plan     Maintain current prednisone dose of 7mg qOD  RTC 6 mos         On prednisone therapy    Carpal tunnel syndrome on both sides    Current Assessment & Plan     Recommend bilateral wrist splints to wear at night.                   Jam Gill MD, AURA, FAAN  Department of Neurology   [facility] [place], [place]

## 2018-03-23 ENCOUNTER — TELEPHONE (OUTPATIENT)
Dept: PAIN MEDICINE | Facility: CLINIC | Age: 73
End: 2018-03-23

## 2018-03-23 NOTE — TELEPHONE ENCOUNTER
----- Message from Nataliia Garner sent at 3/23/2018 11:11 AM CDT -----  Contact: [phone]  Patient is requesting a call back from the nurse want to know should he come in for treatment.  Please call the patient upon request at phone number [phone].

## 2018-03-26 ENCOUNTER — OFFICE VISIT (OUTPATIENT)
Dept: PAIN MEDICINE | Facility: CLINIC | Age: 73
End: 2018-03-26
Payer: MEDICARE

## 2018-03-26 VITALS
SYSTOLIC BLOOD PRESSURE: 128 MMHG | BODY MASS INDEX: 31.68 KG/M2 | HEART RATE: 72 BPM | DIASTOLIC BLOOD PRESSURE: 79 MMHG | HEIGHT: 73 IN | WEIGHT: 239 LBS

## 2018-03-26 DIAGNOSIS — M54.16 LUMBAR RADICULITIS: ICD-10-CM

## 2018-03-26 DIAGNOSIS — M51.36 DDD (DEGENERATIVE DISC DISEASE), LUMBAR: Primary | ICD-10-CM

## 2018-03-26 PROCEDURE — 99999 PR PBB SHADOW E&M-EST. PATIENT-LVL IV: CPT | Mod: PBBFAC,,, | Performed by: ANESTHESIOLOGY

## 2018-03-26 PROCEDURE — 3078F DIAST BP <80 MM HG: CPT | Mod: CPTII,S$GLB,, | Performed by: ANESTHESIOLOGY

## 2018-03-26 PROCEDURE — 99214 OFFICE O/P EST MOD 30 MIN: CPT | Mod: S$GLB,,, | Performed by: ANESTHESIOLOGY

## 2018-03-26 PROCEDURE — 3074F SYST BP LT 130 MM HG: CPT | Mod: CPTII,S$GLB,, | Performed by: ANESTHESIOLOGY

## 2018-03-26 NOTE — PROGRESS NOTES
PCP: Brian Marroquin MD      CC: right leg pain    Interval history: Mr. Tavera is a 72 y.o. male with lumbar radiculopathy who returns to our clinic.  He developed recurrence of his low back and right leg pain.  Previous SONIA provided him relief for over 5 months.     Pain was throbbing in  posterior thigh and traveled down to his ankle.  He does state having more lower back pain as well this time around.  No leg weakness or numbness.   Pain is relieved with rest.  He takes ibuprofen with mild benefits.    Prior HPI:   Mr. Tavera is a 68 year old male with PMH of HTN referred by Dr. Campbell for right leg pain.  He states having constant right leg pain for the past two years.  Pain is a throbbing pain in her posterior thigh and travels down to his ankle.  He denies any lower back pain.  No leg weakness or numbness.  Pain worsens with standing and walking.  Pain is relieved with rest.  He takes ibuprofen with mild benefits.  Physical therapy has not been helpful.      Pain intervention history: s/p right L4-5 and L5-S1 TFESI on 10/9/2014 and reports 75% relief of his low back and right leg pain.   s/p right L4-5 and L5-S1 TFESI on 2/21/17 and 9/2017 reports 70% relief of his right leg pain.      ROS:  CONSTITUTIONAL: No fevers, chills, night sweats, wt. loss, appetite changes  SKIN: no rashes or itching  ENT: No headaches, head trauma, vision changes, or eye pain  LYMPH NODES: None noticed   CV: No chest pain, palpitations.   RESP: No shortness of breath, dyspnea on exertion, cough, wheezing, or hemoptysis  GI: No nausea, emesis, diarrhea, constipation, melena, hematochezia, pain.    : No dysuria, hematuria, urgency, or frequency   HEME: No easy bruising, bleeding problems  PSYCHIATRIC: No depression, anxiety, psychosis, hallucinations.  NEURO: No seizures, memory loss, dizziness or difficulty sleeping  MSK: + right leg pain      Past Medical History:   Diagnosis Date    Arthritis     Back pain     Carpal tunnel  "syndrome 2/25/2013    Cataract     Chest pain, musculoskeletal     Hyperlipidemia     Hypertension     Hypothyroidism     Knee fracture     Myasthenia gravis     Polyneuropathy     Squamous cell carcinoma 2014    left forearm    Thyroid disease      Past Surgical History:   Procedure Laterality Date    APPENDECTOMY      CATARACT EXTRACTION W/  INTRAOCULAR LENS IMPLANT Bilateral     HEMORRHOID SURGERY      KNEE ARTHROPLASTY Bilateral     NECK SURGERY      TONSILLECTOMY       Family History   Problem Relation Age of Onset    Cataracts Mother     Heart disease Mother      CHF    Hypertension Mother     Hyperlipidemia Mother     Cataracts Father     Glaucoma Father     Heart disease Father     Hyperlipidemia Sister     Hypertension Sister     No Known Problems Daughter     No Known Problems Daughter     Collagen disease Neg Hx     Amblyopia Neg Hx     Blindness Neg Hx     Macular degeneration Neg Hx     Retinal detachment Neg Hx     Strabismus Neg Hx     Cancer Neg Hx      Social History     Social History    Marital status:      Spouse name: N/A    Number of children: N/A    Years of education: N/A     Social History Main Topics    Smoking status: Passive Smoke Exposure - Never Smoker    Smokeless tobacco: Never Used      Comment: when a child    Alcohol use Yes      Comment: rarely    Drug use: No    Sexual activity: Yes     Partners: Female     Other Topics Concern    None     Social History Narrative    None         Medications/Allergies: See med card    Vitals:    03/26/18 1055   BP: 128/79   Pulse: 72   Weight: 108.4 kg (239 lb)   Height: 6' 1" (1.854 m)   PainSc:   6   PainLoc: Back         Physical exam:    GENERAL: A and O x3, the patient appears well groomed and is in no acute distress.  Skin: No rashes or obvious lesions  HEENT: normocephalic, atraumatic  CARDIOVASCULAR:  RRR  LUNGS: non labored breathing  ABDOMEN: soft, nontender   UPPER EXTREMITIES: Normal " alignment, normal range of motion, no atrophy, no skin changes,  hair growth and nail growth normal and equal bilaterally. No swelling, no tenderness.    LOWER EXTREMITIES:  Normal alignment, normal range of motion, no atrophy, no skin changes,  hair growth and nail growth normal and equal bilaterally. No swelling, no tenderness.    LUMBAR SPINE  Lumbar spine: ROM is full with flexion extension and oblique extension with no increased pain.    Porter's test causes no increased pain on either side.    Supine straight leg raise is negative bilaterally.    Internal and external rotation of the hip causes no increased pain on either side.  Myofascial exam: No tenderness to palpation across lumbar paraspinous muscles.      MENTAL STATUS: normal orientation, speech, language, and fund of knowledge for social situation.  Emotional state appropriate.    CRANIAL NERVES:  II:  PERRL bilaterally,   III,IV,VI: EOMI.    V:  Facial sensation equal bilaterally  VII:  Facial motor function normal.  VIII:  Hearing equal to finger rub bilaterally  IX/X: Gag normal, palate symmetric  XI:  Shoulder shrug equal, head turn equal  XII:  Tongue midline without fasciculations      MOTOR: Tone and bulk: normal bilateral upper and lower Strength: normal    IP ADD ABD Quad TA Gas HAM  R 5 5 5 5 5 5 5  L 5 5 5 5 5 5 5    SENSATION: Light touch and pinprick intact bilaterally  REFLEXES: normal, symmetric, nonbrisk.  Toes down, no clonus. No hoffmans.  GAIT: normal rise, base, steps, and arm swing.        Imaging:  None    Assessment:  Mr. Tavera is a 72 y.o. male with   1. DDD (degenerative disc disease), lumbar    2. Lumbar radiculitis        Plan:  1.  I have stressed the importance of physical activity and exercise to improve overall health  2. I think that the patient's back pain and radicular leg symptoms are due to degenerative disc disease and have recommended a lumbar epidural steroid injection to the Right L4-5 and L5-S1 level(s).  3.  May consider lumbar MRI if above is not helpful  4. Follow up after procedure

## 2018-03-27 ENCOUNTER — DOCUMENTATION ONLY (OUTPATIENT)
Dept: FAMILY MEDICINE | Facility: CLINIC | Age: 73
End: 2018-03-27

## 2018-03-27 NOTE — PROGRESS NOTES
Pre-Visit Chart Review  For Appointment Scheduled on 03/29/2018    Health Maintenance Due   Topic Date Due    Colonoscopy  03/01/2017

## 2018-03-29 ENCOUNTER — OFFICE VISIT (OUTPATIENT)
Dept: FAMILY MEDICINE | Facility: CLINIC | Age: 73
End: 2018-03-29
Payer: MEDICARE

## 2018-03-29 ENCOUNTER — HOSPITAL ENCOUNTER (OUTPATIENT)
Dept: RADIOLOGY | Facility: CLINIC | Age: 73
Discharge: HOME | End: 2018-03-29
Attending: FAMILY MEDICINE
Payer: MEDICARE

## 2018-03-29 VITALS
BODY MASS INDEX: 31.85 KG/M2 | HEIGHT: 73 IN | TEMPERATURE: 99 F | DIASTOLIC BLOOD PRESSURE: 73 MMHG | WEIGHT: 240.31 LBS | SYSTOLIC BLOOD PRESSURE: 119 MMHG | HEART RATE: 73 BPM

## 2018-03-29 DIAGNOSIS — M54.50 LOW BACK PAIN, NON-SPECIFIC: ICD-10-CM

## 2018-03-29 DIAGNOSIS — Z12.5 PROSTATE CANCER SCREENING: Primary | ICD-10-CM

## 2018-03-29 DIAGNOSIS — E78.2 MIXED HYPERLIPIDEMIA: ICD-10-CM

## 2018-03-29 DIAGNOSIS — M54.16 LUMBAR RADICULOPATHY: Primary | ICD-10-CM

## 2018-03-29 PROCEDURE — 3074F SYST BP LT 130 MM HG: CPT | Mod: CPTII,S$GLB,, | Performed by: FAMILY MEDICINE

## 2018-03-29 PROCEDURE — 99999 PR PBB SHADOW E&M-EST. PATIENT-LVL IV: CPT | Mod: PBBFAC,,, | Performed by: FAMILY MEDICINE

## 2018-03-29 PROCEDURE — 3078F DIAST BP <80 MM HG: CPT | Mod: CPTII,S$GLB,, | Performed by: FAMILY MEDICINE

## 2018-03-29 PROCEDURE — 99499 UNLISTED E&M SERVICE: CPT | Mod: S$GLB,,, | Performed by: FAMILY MEDICINE

## 2018-03-29 PROCEDURE — 72100 X-RAY EXAM L-S SPINE 2/3 VWS: CPT | Mod: TC,FY,PO

## 2018-03-29 PROCEDURE — 72100 X-RAY EXAM L-S SPINE 2/3 VWS: CPT | Mod: 26,,, | Performed by: RADIOLOGY

## 2018-03-29 PROCEDURE — 99214 OFFICE O/P EST MOD 30 MIN: CPT | Mod: S$GLB,,, | Performed by: FAMILY MEDICINE

## 2018-03-29 RX ORDER — NAPROXEN 500 MG/1
500 TABLET ORAL 2 TIMES DAILY WITH MEALS
Qty: 60 TABLET | Refills: 3 | Status: SHIPPED | OUTPATIENT
Start: 2018-03-29 | End: 2018-03-29 | Stop reason: SDUPTHER

## 2018-03-29 RX ORDER — TIZANIDINE 4 MG/1
4 TABLET ORAL EVERY 8 HOURS
Qty: 30 TABLET | Refills: 2 | Status: SHIPPED | OUTPATIENT
Start: 2018-03-29 | End: 2018-03-29 | Stop reason: SDUPTHER

## 2018-03-29 RX ORDER — TIZANIDINE 4 MG/1
4 TABLET ORAL EVERY 8 HOURS
Qty: 30 TABLET | Refills: 1 | Status: SHIPPED | OUTPATIENT
Start: 2018-03-29 | End: 2018-03-29 | Stop reason: SDUPTHER

## 2018-03-29 RX ORDER — NAPROXEN 500 MG/1
500 TABLET ORAL 2 TIMES DAILY WITH MEALS
Qty: 60 TABLET | Refills: 1 | Status: SHIPPED | OUTPATIENT
Start: 2018-03-29 | End: 2018-03-29 | Stop reason: SDUPTHER

## 2018-03-29 RX ORDER — ATORVASTATIN CALCIUM 80 MG/1
40 TABLET, FILM COATED ORAL DAILY
Qty: 90 TABLET | Refills: 3
Start: 2018-03-29 | End: 2018-10-12 | Stop reason: SDUPTHER

## 2018-03-29 NOTE — PATIENT INSTRUCTIONS
Back Care Tips    Caring for your back  These are things you can do to prevent a recurrence of acute back pain and to reduce symptoms from chronic back pain:  · Maintain a healthy weight. If you are overweight, losing weight will help most types of back pain.  · Exercise is an important part of recovery from most types of back pain. The muscles behind and in front of the spine support the back. This means strengthening both the back muscles and the abdominal muscles will provide better support for your spine.   · Swimming and brisk walking are good overall exercises to improve your fitness level.  · Practice safe lifting methods (below).  · Practice good posture when sitting, standing and walking. Avoid prolonged sitting. This puts more stress on the lower back than standing or walking.  · Wear quality shoes with sufficient arch support. Foot and ankle alignment can affect back symptoms. Women should avoid wearing high heels.  · Therapeutic massage can help relax the back muscles without stretching them.  · During the first 24 to 72 hours after an acute injury or flare-up of chronic back pain, apply an ice pack to the painful area for 20 minutes and then remove it for 20 minutes, over a period of 60 to 90 minutes, or several times a day. As a safety precaution, do not use a heating pad at bedtime. Sleeping on a heating pad can lead to skin burns or tissue damage.  · You can alternate ice and heat therapies.  Medications  Talk to your healthcare provider before using medicines, especially if you have other medical problems or are taking other medicines.  · You may use acetaminophen or ibuprofen to control pain, unless your healthcare provider prescribed other pain medicine. If you have chronic conditions like diabetes, liver or kidney disease, stomach ulcers, or gastrointestinal bleeding, or are taking blood thinners, talk with your healthcare provider before taking any medicines.  · Be careful if you are given  prescription pain medicines, narcotics, or medicine for muscle spasm. They can cause drowsiness, affect your coordination, reflexes, and judgment. Do not drive or operate heavy machinery while taking these types of medicines. Take prescription pain medicine only as prescribed by your healthcare provider.  Lumbar stretch  Here is a simple stretching exercise that will help relax muscle spasm and keep your back more limber. If exercise makes your back pain worse, dont do it.  · Lie on your back with your knees bent and both feet on the ground.  · Slowly raise your left knee to your chest as you flatten your lower back against the floor. Hold for 5 seconds.  · Relax and repeat the exercise with your right knee.  · Do 10 of these exercises for each leg.  Safe lifting method  · Dont bend over at the waist to lift an object off the floor.  Instead, bend your knees and hips in a squat.   · Keep your back and head upright  · Hold the object close to your body, directly in front of you.  · Straighten your legs to lift the object.   · Lower the object to the floor in the reverse fashion.  · If you must slide something across the floor, push it.  Posture tips  Sitting  Sit in chairs with straight backs or low-back support. Keep your knees lower than your hips, with your feet flat on the floor.  When driving, sit up straight. Adjust the seat forward so you are not leaning toward the steering wheel.  A small pillow or rolled towel behind your lower back may help if you are driving long distances.   Standing  When standing for long periods, shift most of your weight to one leg at a time. Alternate legs every few minutes.   Sleeping  The best way to sleep is on your side with your knees bent. Put a low pillow under your head to support your neck in a neutral spine position. Avoid thick pillows that bend your neck to one side. Put a pillow between your legs to further relax your lower back. If you sleep on your back, put pillows  under your knees to support your legs in a slightly flexed position. Use a firm mattress. If your mattress sags, replace it, or use a 1/2-inch plywood board under the mattress to add support.  Follow-up care  Follow up with your healthcare provider, or as advised.  If X-rays, a CT scan or an MRI scan were taken, they will be reviewed by a radiologist. You will be notified of any new findings that may affect your care.  Call 911  Seek emergency medical care if any of the following occur:  · Trouble breathing  · Confusion  · Very drowsy  · Fainting or loss of consciousness  · Rapid or very slow heart rate  · Loss of  bowel or bladder control  When to seek medical care  Call your healthcare provider if any of the following occur:  · Pain becomes worse or spreads to your arms or legs  · Weakness or numbness in one or both arms or legs  · Numbness in the groin area  Date Last Reviewed: 6/1/2016  © 7482-1219 [employer]. [address] [place] [address]. All rights reserved. This information is not intended as a substitute for professional medical care. Always follow your healthcare professional's instructions.        Back Contusion     You have a contusion to your back. A contusion is also called a bruise. There is swelling and some bleeding under the skin. The skin may be purplish. You may have muscle aching and stiffness in the area of the bruise. There are no broken bones.  Contusions heal on their own, without further treatment. However, pain and skin discoloration may take weeks to months to go away.   Home care  · Rest. Avoid heavy lifting, strenuous exertion, or any activity that causes pain.  · Ice the area to reduce pain and swelling. Put ice cubes in a plastic bag or use a cold pack. (Wrap the cold source in a thin towel. Do not place it directly on your skin.) Ice the injured area for 20 minutes every 1-2 hours the first day. Continue with ice packs 3-4 times a day for the next two days,  then as needed for the relief of pain and swelling.  · Take any prescribed pain medication. If none was prescribed, take acetaminophen, ibuprofen, or naproxen to control pain.  Follow-up care  Follow up with your healthcare provider, or as directed. Call if you are not better in 1-2 weeks.  When to seek medical advice  Call your healthcare provider for any of the following:  · New or worsening pain  · Increased swelling around the bruise  · Pain spreads to one or both legs  · Weakness or numbness in one or both legs   · Loss of bowel or bladder control  · Numbness in the groin or genital area  · Fever of 100.4°F (38ºC) or higher, or as directed by your healthcare provider  Date Last Reviewed: 6/26/2015  © 3456-0452 [employer]. [address] [place] [address], [place] [address]. All rights reserved. This information is not intended as a substitute for professional medical care. Always follow your healthcare professional's instructions.        Exercises to Strengthen Your Lower Back  Strong lower back and abdominal muscles work together to support your spine. The exercises below will help strengthen the lower back. It is important that you begin exercising slowly and increase levels gradually.  Always begin any exercise program with stretching. If you feel pain while doing any of these exercises, stop and talk to your doctor about a more specific exercise program that better suits your condition.   Low back stretch  The point of stretching is to make you more flexible and increase your range of motion. Stretch only as much as you are able. Stretch slowly. Do not push your stretch to the limit. If at any point you feel pain while stretching, this is your (temporary) limit.  · Lie on your back with your knees bent and both feet on the ground.  · Slowly raise your left knee to your chest as you flatten your lower back against the floor. Hold for 5 seconds.  · Relax and repeat the exercise with your right  knee.  · Do 10 of these exercises for each leg.  · Repeat hugging both knees to your chest at the same time.  Building lower back strength  Start your exercise routine with 10 to 30 minutes a day, 1 to 3 times a day.  Initial exercises  Lying on your back:  1. Ankle pumps: Move your foot up and down, towards your head, and then away. Repeat 10 times with each foot.  2. Heel slides: Slowly bend your knee, drawing the heel of your foot towards you. Then slide your heel/foot from you, straightening your knee. Do not lift your foot off the floor (this is not a leg lift).  3. Abdominal contraction: Bend your knees and put your hands on your stomach. Tighten your stomach muscles. Hold for 5 seconds, then relax. Repeat 10 times.  4. Straight leg raise: Bend one leg at the knee and keep the other leg straight. Tighten your stomach muscles. Slowly lift your straight leg 6 to 12 inches off the floor and hold for up to 5 seconds. Repeat 10 times on each side.  Standin. Wall squats: Stand with your back against the wall. Move your feet about 12 inches away from the wall. Tighten your stomach muscles, and slowly bend your knees until they are at about a 45 degree angle. Do not go down too far. Hold about 5 seconds. Then slowly return to your starting position. Repeat 10 times.  2. Heel raises: Stand facing the wall. Slowly raise the heels of your feet up and down, while keeping your toes on the floor. If you have trouble balancing, you can touch the wall with your hands. Repeat 10 times.  More advanced exercises  When you feel comfortable enough, try these exercises.  1. Kneeling lumbar extension: Begin on your hands and knees. At the same time, raise and straighten your right arm and left leg until they are parallel to the ground. Hold for 2 seconds and come back slowly to a starting position. Repeat with left arm and right leg, alternating 10 times.  2. Prone lumbar extension: Lie face down, arms extended overhead, palms  on the floor. At the same time, raise your right arm and left leg as high as comfortably possible. Hold for 10 seconds and slowly return to start. Repeat with left arm and right leg, alternating 10 times. Gradually build up to 20 times. (Advanced: Repeat this exercise raising both arms and both legs a few inches off the floor at the same time. Hold for 5 seconds and release.)  3. Pelvic tilt: Lie on the floor on your back with your knees bent at 90 degrees. Your feet should be flat on the floor. Inhale, exhale, then slowly contract your abdominal muscles bringing your navel toward your spine. Let your pelvis rock back until your lower back is flat on the floor. Hold for 10 seconds while breathing smoothly.  4. Abdominal crunch: Perform a pelvic tilt (above) flattening your lower back against the floor. Holding the tension in your abdominal muscles, take another breath and raise your shoulder blades off the ground (this is not a full sit-up). Keep your head in line with your body (dont bend your neck forward). Hold for 2 seconds, then slowly lower.  Date Last Reviewed: 6/1/2016  © 9960-8194 [employer]. [address] [place] [address]. All rights reserved. This information is not intended as a substitute for professional medical care. Always follow your healthcare professional's instructions.        Controlling Your Cholesterol  Cholesterol is a waxy substance. It travels in your blood through the blood vessels. When you have high cholesterol, it builds up in the walls of the blood vessels. This makes the vessels narrower. Blood flow decreases. You are then at greater risk for having a heart attack or a stroke.  Good and bad cholesterol  Lipids are fats. Blood is mostly water. Fat and water don't mix. So our bodies need lipoproteins (lipids inside a protein shell) to carry the lipids. The protein shell carries its lipids through the bloodstream. There are two main kinds of  "lipoproteins:  · LDL (low-density lipoprotein) is known as "bad cholesterol." It mainly carries cholesterol. It delivers this cholesterol to body cells. Excess LDL cholesterol will build up in artery walls. This increases your risk for heart disease and stroke.  · HDL (high-density lipoprotein) is known as "good cholesterol." This protein shell collects excess cholesterol that LDLs have left behind on blood vessel walls. That's why high levels of HDL cholesterol can decrease your risk of heart disease and stroke.  Controlling cholesterol levels  Total cholesterol includes LDL and HDL cholesterol, as well as other fats in the bloodstream. If your total cholesterol is high, follow the steps below to help lower your total cholesterol level:  · Eat less unhealthy fat:  ¨ Cut back on saturated fats and trans (also called hydrogenated) fats by selecting lean cuts of meat, low-fat dairy, and using oils instead of solid fats. Limit baked goods, processed meats, and fried foods. A diet thats high in these fats increases your bad cholesterol. It's not enough to just cut back on foods containing cholesterol.  ¨ Eat about 2 servings of fish per week. Most fish contain omega-3 fatty acids. These help lower blood cholesterol.  ¨ Eat more whole grains and soluble fiber (such as oat bran). These lower overall cholesterol.  · Be active:  ¨ Choose an activity you enjoy. Walking, swimming, and riding a bike are some good ways to be active.  ¨ Start at a level where you feel comfortable. Increase your time and pace a little each week.  ¨ Work up to 40 minutes of moderate to high intensity physical activity at least 3 to 4 days per week.  ¨ Remember, some activity is better than none.  ¨ If you haven't been exercising regularly, start slowly. Check with your doctor to make sure the exercise plan is right for you.  · Quit smoking. Quitting smoking can improve your lipid levels. It also lowers your risk for heart disease and " stroke.  · Weight management. If you are overweight or obese, your health care provider will work with you to lose weight and lower your BMI (body mass index) to a normal or near-normal level. Making diet changes and increasing physical activity can help.  · Take medication as directed. Many people need medication to get their LDL levels to a safe level. Medication to lower cholesterol levels is effective and safe. (But taking medication is not a substitute for exercise or watching your diet!) Your doctor can tell you whether you might benefit from a cholesterol-lowering medication.  Date Last Reviewed: 5/11/2015  © 9698-0931 [employer]. [address] [place] [address], [place] [address]. All rights reserved. This information is not intended as a substitute for professional medical care. Always follow your healthcare professional's instructions.        Lifestyle Changes to Control Cholesterol  You can control your cholesterol through diet, exercise, weight management, quitting smoking, stress management, and taking your medicines right. These things can also lower your risk for cardiovascular disease.    Eating healthy  Your healthcare provider will give you information on diet changes you may need to make. Your provider may recommend that you see a registered dietitian for help with diet changes. Changes may include:  · Cutting back on the amount of fat and cholesterol in your meals  · Eating less salt (sodium). This is especially important if you have high blood pressure.  · Eating more fresh vegetables and fruits  · Eating lean proteins such as fish, poultry, beans, and peas  · Eating less red meat and processed meats  · Using low-fat dairy products  · Using vegetable and nut oils in limited amounts  · Limiting how many sweets and processed foods like chips, cookies, and baked goods that you eat   · Limiting how many sugar-sweetened beverages you drink  · Limiting how often you eat out  Getting  exercise  Regular exercise is a good way to help your body control cholesterol. Regular exercise can help in many ways. It can:  · Raise your good cholesterol  · Help lower your bad cholesterol  · Let blood flow better through your body  · Give more oxygen to your muscles and tissues  · Help you manage your weight  · Help your heart pump better  · Lower your blood pressure  Your healthcare provider may recommend that you get more physical activity if you haven't been active. Your provider may recommend that you get moderate to vigorous physical activity for at least 40 minutes each day. You should do this for at least 3 to 4 days each week. A few examples of moderate to vigorous activity are:  · Walking at a brisk pace. This is about 3 to 4 miles per hour.  · Jogging or running  · Swimming or water aerobics  · Hiking  · Dancing  · Martial arts  · Tennis  · Riding a bicycle or stationary bike  · Dancing  Managing your weight  If you are overweight or obese, your healthcare provider will work with you to help you lose weight and lower your BMI (body mass index). Making diet changes and getting more physical activity can help. Changing your diet will help you lose weight more easily than adding exercise.  Quitting smoking  Smoking and other tobacco use can raise cholesterol and make it harder to control. Quitting is tough. But millions of people have given up tobacco for good. You can quit, too! Think about some of the reasons below to quit smoking. Do any of them make you think twice about your smoking habit?  Stop smoking because it:  · Keeps your cholesterol high, even if you make all the other changes youre supposed to  · Damages your body. It especially harms your heart, lungs, skin, and blood vessels.  · Makes you more likely to have a heart attack (acute myocardial infarction), stroke, or cancer  · Stains your teeth  · Makes your skin, clothes, and breath smell bad  · Costs a lot of money  Controlling  stress   Learn ways to control stress. This will help you deal with stress in your home and work life. Controlling stress can greatly lower your risk of getting cardiovascular disease.  Making the most of medicines  Healthy eating and exercise are a good start to keeping your cholesterol down. But you may need some extra help from medicine. If your doctor prescribes medicine, be sure to take it exactly as directed. Remember:  · Tell your healthcare provider about all other medicines you take. This includes vitamins and herbs.  · Tell your healthcare provider if you have any side effects after starting to take a medicine. Examples of side effects to watch for include muscle aches, weakness, blurred vision, rust-colored urine, yellowing of eyes or skin (jaundice), and headache.  · Dont skip a dose or stop taking your medicine because you feel better or because your cholesterol numbers go down. Never stop taking your medicine unless your healthcare provider has told you its OK.  · Ask your healthcare provider if you have any questions about your medicines.  High risk groups  Some people may need to take medicines called statins to control their cholesterol. This is in addition to eating a healthy diet and getting regular exercise.  Statins can help you stay healthy. They can also help prevent a heart attack or stroke. You may need to take a statin if you are in one of these groups:  · Adults who have had a heart attack or stroke. Or adults who have had peripheral vascular disease, a ministroke (transient ischemic attack), or stable or unstable angina. This group also includes people who have had a procedure to restore blood flow through a blocked artery. These procedures include percutaneous coronary intervention, angioplasty, stent, and open-heart bypass surgery.  · Adults who have diabetes. Or adults who are at higher risk of having a heart attack or stroke and have an LDL cholesterol level of 70 to 189  "mg/dL  · Adults who are 21 years old or older and have an LDL cholesterol level of 190 mg/dL or higher.  If you are in a high-risk group, talk with your healthcare provider about your treatment goals. Make sure you understand why these goals are important, based on your own health history and your family history of heart disease or high cholesterol.  Make a plan to have regular cholesterol checks. You may need to fast before getting this test. Also ask your provider about any side effects your medicines may cause. Let your provider know about any side effects you have. You may need to take more than one medicine to reach the cholesterol goals that you and your provider decide on.  Date Last Reviewed: 10/1/2016  © 7057-7081 [employer]. [address] [place] [address], [place] [address]. All rights reserved. This information is not intended as a substitute for professional medical care. Always follow your healthcare professional's instructions.        Facts About Dietary Fat     Olive oil is a good source of unsaturated fat.     Eating less saturated and trans fat is one of the best things you can do for your heart. Start by finding out which fats are better to use. Then always try to use as little "bad" fat as you can.  Why eat less fat?  · Cutting down on the fat you eat can lower your blood cholesterol levels. This may help prevent clogged arteries from buildup of plaque.  · A low-fat diet can help you lose excess weight. Doing so can lower your blood pressure and reduce your chances of getting diabetes.  · A low-fat diet reduces your risk for stroke and for some cancers.  Unsaturated fat is most healthy  · When you must add fat, use unsaturated fat.  · Unsaturated fats come from plants. They include olive, canola, peanut, corn, avocado, safflower, and sunflower oils.  · Liquid (squeezable) margarine is also mostly unsaturated fat.  · In moderate amounts, unsaturated fat can even be good for your " heart.  Saturated fat is less healthy  · Avoid eating saturated fat because it raises your blood cholesterol levels.  · Most saturated fat comes from animals. Foods such as butter, lard, cheese, cream, whole milk, and fatty cuts of meat are high in saturated fat.  · Some oils, such as palm and coconut oils, are also saturated fats.  Trans fat is least healthy  · Also avoid trans fat whenever possible. Even if it's not listed on the food label, look for it in the ingredients in the form of hydrogenated or partially hydrogenated oils.  · This is found in snack foods, shortening, french fries, and stick margarines.  Add flavor without fat  · Sprinkle herbs on fish, chicken, and meat, and in soups.  · Try herbs, lemon juice, or flavored vinegar on vegetables.  · Add chopped onions, garlic, and peppers to flavor beans and rice.   Date Last Reviewed: 5/11/2015  © 8561-4642 [employer]. [address] [place] [address], [place] [address]. All rights reserved. This information is not intended as a substitute for professional medical care. Always follow your healthcare professional's instructions.        Preventing Cancer  Many cancer cases are linked to lifestyle. Healthy lifestyle choices can help lower your risk for cancer and many other diseases. They can also improve your overall health.    Stop smoking  · Talk with your healthcare provider about aids for quitting, such as nicotine patches and some prescription medicines.  · Get help from ex-smokers.  · Create a plan for quitting.  · Pick a quit date and stick to it.  Stay at a healthy weight  · Get to and stay at a healthy weight all your life.  · If you're overweight, losing even a little weight is good for you.  Keep active  · Get regular physical activity.  · Take walks, garden, or do other activities you enjoy each day.  · Do errands on foot or bike, not by car.  · Join a walking or biking club.  · Limit the time you spend sitting to do things like watch TV,  play video games, or use a computer.  Eat a healthy diet  · Eat fewer red meats and processed meats.  · Eat at least 2.5 cups of fruits and vegetables daily, especially leafy greens.  · Eat more whole grains instead of refined grain products.  · Limit alcohol to 2 drinks a day for men and 1 drink a day for women.  · Limit high-calorie foods and drinks.  · Read food labels to be more aware of calories and portion sizes.  Protect yourself from hazards  · When outside during the day, use sunscreen that is broad-spectrum and SPF 30 or greater.  · When out in sunlight, wear a hat and sunglasses.  · Seek shade in the middle of the day when the sun is hottest.  · Be aware of all hazardous products at work or in your home.  · When working with hazardous products, wear protective clothing  Talk with your provider about cancer screenings  Regular screening can help prevent some types of cancer, such as cervical and colorectal cancer. Regular screening for these cancers can find and remove abnormal areas before they become cancer. For some other types of cancer, screening may help find cancer early, when it's small. This is when treatment is most likely to be effective. Here are some ways you can screen for certain cancers:  · Breast cancer. Breast self-awareness and mammogram.  · Skin cancer. Self-exam, professional exam, biopsy of any changes that might be cancer.  · Cervical cancer. Pap test, HPV test.  · Colorectal cancer. Screening for blood or DNA in stool, colonoscopy or other tests to look inside the colon.  · Prostate cancer. PSA blood test with or without a digital rectal exam.  · Testicular cancer. Self-exam, professional exam.  Talk with your healthcare provider about your family history and your cancer risk. Together you can decide on the cancer screening plan that's best for you.  Date Last Reviewed: 11/18/2015  © 6883-8760 [employer]. [address] [place] [address], [place] [address]. All rights reserved.  This information is not intended as a substitute for professional medical care. Always follow your healthcare professional's instructions.        Back Safety: Sleeping Positions  Good posture protects your back when you sit, stand, and walk. It is also important while sleeping. Keep your ears, shoulders, and hips in line. Try the tips below. Also, be sure to follow any guidelines from your health care provider.  If you lie on your back  Safe sleeping     Place pillows under your legs from your thighs to your ankles.     Ask your healthcare provider how firm your mattress should be.  · Find a position that keeps your back aligned and comfortable.  · Fill gaps between your body and the mattress with pillows.  · Never sleep on your back without bending your legs.  · Never sleep on your stomach.  If you lie on your side  Turning in bed     Support your upper body and top leg with pillows.    · If you change positions, you will need to move your pillows. This can become so natural that you hardly wake up.  · When you turn in bed, move your whole body as one unit.  · Tighten your stomach muscles. Bend your knees slightly toward your chest.  · Roll to one side, keeping your ears, shoulders, and hips in line. Keep a pillow between your legs.  · Be careful not to bend or twist at the waist.  Date Last Reviewed: 8/31/2015  © 4406-7001 [employer]. [address] [place] [address], [place] [address]. All rights reserved. This information is not intended as a substitute for professional medical care. Always follow your healthcare professional's instructions.        Back Safety: Lifting  Lifting can strain or even injure your back. Follow these tips to keep your back safe while you bend, lift, and carry.      Protect Your Back While Lifting       Step 1:  · Face the object.  · With your back straight, get down on one knee.  · If you can, tilt the object so one side lifts off the ground.  · Keep the object close to you. Step  2:  · Tighten your stomach muscles.  · Use your legs, arms, and buttocks to lift, not your back.  · Avoid twisting.  · Lift the object to your knee.  · Grasp the object firmly. Step 3:  · Lift with your arms and legs, not your back.  · Move quickly to help make this easier.   To carry an object:  · Hold it close to your body.  · Bend your knees slightly as you walk. The heavier the object, the more you should bend your knees.  · Get help with heavy or unbalanced objects.  Date Last Reviewed: 8/31/2015 © 2000-2017 [employer] [address] [place] [address]. All rights reserved. This information is not intended as a substitute for professional medical care. Always follow your healthcare professional's instructions.        Back Safety: Bending  Bending can strain or even injure your back. Follow the tips below to move safely and protect your back as you perform everyday activities.  Bending over    · Keep your feet shoulder-width apart.  · Move your whole body as one unit.  · Bend at your hips and knees, not at your waist.  · Flatten your stomach and tighten your leg muscles.  · To keep your spine straight, let your buttocks move out behind you. Dont try to tuck them under.  · If you need to, place one hand on a sturdy object for support.     Bending to the floor  · Lower yourself to one knee. If you can, rest one hand on a sturdy object to help lower yourself.  · Rest one arm on your raised knee.  · Dont bend at the waist.  · Do not hunch your back or neck to reach to the floor. Instead, bend more at your hips and knees to get closer.  Date Last Reviewed: 8/31/2015 © 2000-2017 [employer] [address] [place] [address]. All rights reserved. This information is not intended as a substitute for professional medical care. Always follow your healthcare professional's instructions.        Back Safety: Standing    Good posture decreases back pain by reducing strain on  your muscles. Remember to check your posture, using the self-help tips below, every time you move or adjust position.  Standing  · To help keep your spine straight, line up your ears, shoulders, and hips.  · Stand with your feet shoulder-width apart. Or, place one foot slightly in front of the other.  · Keep your knees relaxed and stomach muscles slightly flattened.  Bending over  · Bend at your hips and knees.  · Dont bend at your waist or round your back.  · Rest your weight on your arms if possible.  Working  · When standing for a long time, put one foot on a footrest. This may help ease strain on your back. The footrest should be about 5 to 8 inches high.  · When reaching for objects over your head, use a stepladder. When you cant, be sure to tighten your stomach muscles to keep your back from arching.   Date Last Reviewed: 9/1/2015  © 3787-3101 [employer]. [address] [place] [address]. All rights reserved. This information is not intended as a substitute for professional medical care. Always follow your healthcare professional's instructions.        Losing Weight for Heart Health  Excess weight is a major risk factor for heart disease. Losing weight has many benefits including lowering your blood pressure, improving your cholesterol level, and decreasing your risk for diseases such as diabetes and heart disease. It may help keep your arteries open so that your heart can get the oxygen-rich blood it needs. All in all, losing weight makes you healthier.     Exercise with a friend. When activity is fun, you're more likely to stick with it.   Calories and weight loss  · Calories are the fuel your body burns for energy. You get the calories you need from the food you eat. For healthy weight loss, women should eat at least 1,200 calories a day, men at least 1,500.  · When you eat more calories than you need, your body stores the extra calories as fat. One pound of fat equals 3,500  calories.  · To lose weight, try to reduce your total calorie intake by 500 calories. To do this, eat 250 calories less each day. Add activity to burn the other 250 calories. Walking 2.5 miles burns about 250 calories. Other more intense activities can burn more calories in the time you spend doing them, such as swimming and running. It is important to understand that reducing calorie intake is much more effective at weight loss than is exercise.  · Eat a variety of healthy foods to get the nutrients you need.  Tips for losing weight  · Drink 8 to 10 glasses of water a day.  · Dont skip meals. Instead, eat smaller portions.  · Eat your meals earlier in the day.  · Cut out sugary drinks such as soda and fruit juices.  · Make your later meals lighter than your earlier meals.   Brisk activity is best  Brisk activity gets your heart pumping faster and it makes it healthier. Its also a great way to burn calories. In fact, your body may keep burning calories for hours after you stop a brisk activity:  · Begin by walking 10 minutes most days.  · Add more time and speed to your walk. Build up as you feel able.  · Aim for 3 to 4 sessions of aerobic exercise a week. Each session should last about 40 minutes and include moderate to vigorous physical activity.  · The most important part of the activity is that you break a sweat. This indicates your heart is working hard enough to burn fat.  Date Last Reviewed: 6/1/2016  © 6593-9207 [employer]. [address] [place] [address], [place] [address]. All rights reserved. This information is not intended as a substitute for professional medical care. Always follow your healthcare professional's instructions.        Relieving Tension in Your Back  Being relaxed helps keep your mind healthy and your back ready to move. Take short breaks often. Walk around. Stretch. Switch tasks. Also give the following a try.  Make time to relax. Start by setting aside 5 minutes daily.   Deep  breathing    Deep breathing is a simple way to reduce stress. You can do it almost any time you need to relax.  · Inhale slowly through your nose. Let your lungs and stomach expand.  · Hold your breath for 2 to 3 seconds.  · Exhale slowly through your mouth until your lungs feel empty. Repeat 3 to 4 times.  Relieve tension  Muscle tension can create tender spots called trigger points. The tips below may help relieve muscle tension.  · Press the trigger point if you can reach it. If not, lie on a soft tennis ball, or ask a friend to press the spot. Use steady pressure for 10 to 15 seconds. Breathe deeply. Repeat a few times.  · Massage trigger points with ice for 2 to 5 minutes. Press lightly at first. Slowly increase firmness.  Date Last Reviewed: 10/18/2015  © 1619-5970 [employer]. [address] [place] [address]. All rights reserved. This information is not intended as a substitute for professional medical care. Always follow your healthcare professional's instructions.        Back Exercises: Knee Lift         To start, lie on your back with your knees bent and feet flat on the floor. Dont press your neck or lower back to the floor. Breathe deeply. You should feel comfortable and relaxed in this position:  · Start by tightening your abdominal muscles.  · Lift one bent knee off the floor 2 to 4 inches.  · Hold for 10 seconds. Return to start position.  · Repeat 3 times.  · Switch legs.  Date Last Reviewed: 8/16/2015  © 3650-3659 [employer]. [address] [place] [address]. All rights reserved. This information is not intended as a substitute for professional medical care. Always follow your healthcare professional's instructions.        Back Exercises: Hip Lift    To start, lie on your back with your knees bent and feet flat on the floor. Dont press your neck or lower back to the floor. Breathe deeply. You should feel comfortable and relaxed in this  position:  · Tighten your abdomen and buttocks.  · Slowly raise your hips upward. Be careful not to arch your back.  · Hold for 5 seconds. Lower your hips to the floor.  · Repeat 10 times.  For your safety, check with your healthcare provider before starting an exercise program.   Date Last Reviewed: 8/16/2015  © 1247-5588 [employer]. [address] [place] [address]. All rights reserved. This information is not intended as a substitute for professional medical care. Always follow your healthcare professional's instructions.        Lumbar Rotation    1. Lie on your back on the floor, with your knees bent and your feet flat on the floor. Dont press your neck or lower back to the floor.  2. Lean both of your knees to one side. Turn your head in the opposite direction. Keep your shoulders flat on the floor. Be gentle and dont push through pain.  3. Hold for 20 seconds. Then slowly move your knees and head in the other direction.  4. Repeat 2 to 5 times, or as instructed.   Date Last Reviewed: 3/10/2016  © 3037-6701 [employer]. [address] [place] [address]. All rights reserved. This information is not intended as a substitute for professional medical care. Always follow your healthcare professional's instructions.        Back Exercises: Seated Rotation    To start, sit in a chair with your feet flat on the floor. Shift your weight slightly forward to avoid rounding your back. Relax, and keep your ears, shoulders, and hips aligned:  · Fold your arms and elbows just below shoulder height.  · Turn from the waist with hips forward. Turn your head last. Do not push through the pain.  · Hold for a count of 10 to 30. Return to starting position.  · Repeat 3 to 5 times on one side. Then switch sides.  Date Last Reviewed: 10/11/2015  © 4772-6896 [employer]. [address] [place] [address]. All rights reserved. This information is not intended as a  substitute for professional medical care. Always follow your healthcare professional's instructions.        Back Exercises: Side Stretch      To start, sit in a chair with your feet flat on the floor. Shift your weight slightly forward to avoid rounding your back. Relax. Keep your ears, shoulders, and hips aligned:  · Stretch your right arm overhead.  · Slowly bend to the left. Dont twist your torso. Stay within your pain limits.  · Hold for 20 seconds. Return to starting position.  · Repeat 2 to 5 times. Then, switch to the other side.  Date Last Reviewed: 10/13/2015  © 8906-1816 [employer]. [address] [place] [address]. All rights reserved. This information is not intended as a substitute for professional medical care. Always follow your healthcare professional's instructions.        Wall Squats    This exercise stretches and strengthens your lower body to help your back. As you work out, dont rush or strain.  · Stand with hips and shoulders touching a wall. Keep your feet hip-width apart and your ears, shoulders, hips, and feet in a line. If needed, place a rolled-up towel behind the small of your back.  · Step forward about 2 feet, keeping your back against the wall. Slide down into a sitting position. Dont let your hips go below your knees.  · Hold for 5 seconds, then slide up. As you get stronger, hold the position longer.   · Repeat ___ times per day.  Date Last Reviewed: 8/16/2015  © 5327-9535 [employer]. [address] [place] [address]. All rights reserved. This information is not intended as a substitute for professional medical care. Always follow your healthcare professional's instructions.        Back Exercises: Pelvic Tilt    To start, lie on your back with your knees bent and feet flat on the floor. Dont press your neck or lower back to the floor. Breathe deeply. You should feel comfortable and relaxed in this position:  · Tighten your stomach  and buttocks, and press your lower back toward the floor. This should be a small, subtle movement. This should not increase your pain.  · Hold for 5 to 15 seconds. Release.  · Repeat 2 to 5 times.  Date Last Reviewed: 10/11/2015  © 3909-9453 [employer]. [address] [place] [address]. All rights reserved. This information is not intended as a substitute for professional medical care. Always follow your healthcare professional's instructions.        Back Exercises: Lower Back Stretch    To start, sit in a chair with your feet flat on the floor. Shift your weight slightly forward. Relax, and keep your ears, shoulders, and hips aligned.  · Sit with your feet well apart.  · Bend forward and touch the floor with the backs of your hands. Relax and let your body drop.  · Hold for 20 seconds. Return to starting position.  · Repeat 2 times.   Date Last Reviewed: 8/16/2015  © 8810-3501 [employer]. [address] [place] [address]. All rights reserved. This information is not intended as a substitute for professional medical care. Always follow your healthcare professional's instructions.

## 2018-03-30 RX ORDER — TIZANIDINE 4 MG/1
TABLET ORAL
Qty: 270 TABLET | Refills: 0 | Status: SHIPPED | OUTPATIENT
Start: 2018-03-30 | End: 2018-08-14 | Stop reason: ALTCHOICE

## 2018-03-30 RX ORDER — NAPROXEN 500 MG/1
TABLET ORAL
Qty: 180 TABLET | Refills: 0 | Status: SHIPPED | OUTPATIENT
Start: 2018-03-30 | End: 2018-05-14 | Stop reason: SINTOL

## 2018-03-31 NOTE — PROGRESS NOTES
Subjective:       Patient ID: Maximilian Tavera Jr. is a 72 y.o. male.    Chief Complaint: No chief complaint on file.    He haqs ddd of lumbar spine and has been with Dr Watt and had decided to hold with the [facility].      Back Pain   This is a chronic problem. The current episode started more than 1 month ago. The problem occurs constantly. The problem is unchanged. The pain is present in the lumbar spine and sacro-iliac. The quality of the pain is described as shooting and stabbing. The pain radiates to the right thigh. The pain is at a severity of 7/10. The pain is moderate. The pain is worse during the day. The symptoms are aggravated by bending and standing. Pertinent negatives include no abdominal pain, bladder incontinence, bowel incontinence, chest pain, dysuria, headaches, numbness, tingling or weakness. Risk factors include poor posture, sedentary lifestyle and obesity. He has tried NSAIDs, muscle relaxant and heat for the symptoms. The treatment provided mild relief.     Review of Systems   Constitutional: Negative for fatigue and unexpected weight change.   Respiratory: Negative for chest tightness and shortness of breath.    Cardiovascular: Negative for chest pain, palpitations and leg swelling.   Gastrointestinal: Negative for abdominal pain and bowel incontinence.   Genitourinary: Negative for bladder incontinence and dysuria.   Musculoskeletal: Positive for back pain. Negative for arthralgias.   Neurological: Negative for dizziness, tingling, syncope, weakness, light-headedness, numbness and headaches.       Patient Active Problem List   Diagnosis    Hypothyroid    Hyperlipidemia    HTN (hypertension), benign    Neuropathy    ED (erectile dysfunction)    DDD (degenerative disc disease), lumbar    Diplopia    Cataract, left eye    Pseudophakia, right eye    Obesity    Myasthenia gravis, AChR antibody positive    Umbilical hernia without obstruction and without gangrene    [facility]  score, <100    Aortic valve disease    Primary osteoarthritis of both knees    Abdominal aortic atherosclerosis    RBBB    On prednisone therapy    Carpal tunnel syndrome on both sides       Objective:      Physical Exam   Constitutional: He is oriented to person, place, and time. He appears well-developed and well-nourished.   Cardiovascular: Normal rate, regular rhythm and normal heart sounds.    Pulmonary/Chest: Effort normal and breath sounds normal.   Musculoskeletal: He exhibits no edema.   Back exam: limited range of motion, pain with motion noted during exam, tenderness noted rt sacral at S 1,, positive straight-leg raise rt side, normal reflexes and strength bilateral lower extremities, sensory exam intact bilateral lower extremities.     Neurological: He is alert and oriented to person, place, and time.   Skin: Skin is warm and dry.   Psychiatric: He has a normal mood and affect.   Nursing note and vitals reviewed.      Lab Results   Component Value Date    WBC 5.11 11/20/2017    HGB 14.5 11/20/2017    HCT 43.3 11/20/2017     11/20/2017    CHOL 156 03/29/2018    TRIG 250 (H) 03/29/2018    HDL 33 (L) 03/29/2018    ALT 21 03/29/2018    AST 21 03/29/2018     03/29/2018    K 3.4 (L) 03/29/2018     03/29/2018    CREATININE 1.1 03/29/2018    BUN 17 03/29/2018    CO2 30 (H) 03/29/2018    TSH 2.161 05/16/2017    PSA 2.4 03/29/2018     The 10-year ASCVD risk score (Adsilvana DURAN Jr., et al., 2013) is: 25.2%    Values used to calculate the score:      Age: 72 years      Sex: Male      Is Non- : No      Diabetic: No      Tobacco smoker: No      Systolic Blood Pressure: 119 mmHg      Is BP treated: Yes      HDL Cholesterol: 33 mg/dL      Total Cholesterol: 156 mg/dL    Assessment:       1. Prostate cancer screening    2. Low back pain, non-specific    3. Mixed hyperlipidemia        Plan:       Prostate cancer screening  -     PSA, SCREENING; Future; Expected date:  03/29/2018    Low back pain, non-specific  -     X-Ray Lumbar Spine AP And Lateral; Future; Expected date: 03/29/2018  -     Discontinue: tiZANidine (ZANAFLEX) 4 MG tablet; Take 1 tablet (4 mg total) by mouth every 8 (eight) hours.  Dispense: 30 tablet; Refill: 2  -     Discontinue: naproxen (EC NAPROSYN) 500 MG EC tablet; Take 1 tablet (500 mg total) by mouth 2 (two) times daily with meals.  Dispense: 60 tablet; Refill: 3  -     Cancel: Ambulatory Referral to Physical/Occupational Therapy  -     Ambulatory Referral to Physical/Occupational Therapy    Mixed hyperlipidemia  -     atorvastatin (LIPITOR) 80 MG tablet; Take 0.5 tablets (40 mg total) by mouth once daily.  Dispense: 90 tablet; Refill: 3  -     Lipid panel; Future; Expected date: 03/29/2018  -     CK; Future; Expected date: 03/29/2018  -     Comprehensive metabolic panel; Future; Expected date: 03/29/2018      Patient readiness: acceptance and barriers:readiness    During the course of the visit the patient was educated and counseled about the following:     Hypertension:   Screening for causes of secondary hypertension: GFR (chronic kidney disease).  Dietary sodium restriction.  Regular aerobic exercise.  Check blood pressures daily and record.  Obesity:   General weight loss/lifestyle modification strategies discussed (elicit support from others; identify saboteurs; non-food rewards, etc).  Regular aerobic exercise program discussed.    Goals: Hypertension: Reduce Blood Pressure and Obesity: Reduce calorie intake and BMI    Did patient meet goals/outcomes: Yes    The following self management tools provided: blood pressure log    Patient Instructions (the written plan) was given to the patient/family.     Time spent with patient: 30 minutes    Barriers to medications present (no )    Adverse reactions to current medications (yes)    Over the counter medications reviewed (yes)        30-minute visit. 20 minutes spent counseling patient on diet, exercise,  and weight loss.  This has been fully explained to the patient, who indicates understanding.

## 2018-04-03 ENCOUNTER — TELEPHONE (OUTPATIENT)
Dept: FAMILY MEDICINE | Facility: CLINIC | Age: 73
End: 2018-04-03

## 2018-04-03 ENCOUNTER — DOCUMENTATION ONLY (OUTPATIENT)
Dept: FAMILY MEDICINE | Facility: CLINIC | Age: 73
End: 2018-04-03

## 2018-04-03 NOTE — TELEPHONE ENCOUNTER
----- Message from Michael Duffy sent at 4/3/2018 10:33 AM CDT -----  Contact: Pt  Pt is calling states he has a weakened immune system. Pt states he needs to be seen today. Pt an be reached at [phone] (home)

## 2018-04-03 NOTE — TELEPHONE ENCOUNTER
"Patient states he is experiencing dry cough and headache associated with sinus pressure. Denies fever. States symptoms started on yesterday evening. Patient states "I have a compromised immune system and have to stay on top of these types of symptoms."  No available appointments noted today. Appointment in [place] [facility] offered and accepted by patient on tomorrow's date.   "

## 2018-04-04 ENCOUNTER — OFFICE VISIT (OUTPATIENT)
Dept: FAMILY MEDICINE | Facility: CLINIC | Age: 73
End: 2018-04-04
Payer: MEDICARE

## 2018-04-04 VITALS
BODY MASS INDEX: 32.06 KG/M2 | TEMPERATURE: 99 F | HEART RATE: 70 BPM | WEIGHT: 241.88 LBS | HEIGHT: 73 IN | DIASTOLIC BLOOD PRESSURE: 69 MMHG | SYSTOLIC BLOOD PRESSURE: 120 MMHG | OXYGEN SATURATION: 95 %

## 2018-04-04 DIAGNOSIS — J02.9 SORE THROAT: ICD-10-CM

## 2018-04-04 DIAGNOSIS — J40 BRONCHITIS: ICD-10-CM

## 2018-04-04 DIAGNOSIS — J02.0 STREP SORE THROAT: Primary | ICD-10-CM

## 2018-04-04 DIAGNOSIS — Z79.52 LONG TERM SYSTEMIC STEROID USER: ICD-10-CM

## 2018-04-04 LAB
CTP QC/QA: YES
S PYO RRNA THROAT QL PROBE: NEGATIVE

## 2018-04-04 PROCEDURE — 87880 STREP A ASSAY W/OPTIC: CPT | Mod: QW,,, | Performed by: NURSE PRACTITIONER

## 2018-04-04 PROCEDURE — 99213 OFFICE O/P EST LOW 20 MIN: CPT | Mod: S$GLB,,, | Performed by: NURSE PRACTITIONER

## 2018-04-04 PROCEDURE — 3078F DIAST BP <80 MM HG: CPT | Mod: CPTII,S$GLB,, | Performed by: NURSE PRACTITIONER

## 2018-04-04 PROCEDURE — 3074F SYST BP LT 130 MM HG: CPT | Mod: CPTII,S$GLB,, | Performed by: NURSE PRACTITIONER

## 2018-04-04 RX ORDER — AMOXICILLIN 875 MG/1
875 TABLET, FILM COATED ORAL 2 TIMES DAILY
Qty: 20 TABLET | Refills: 0 | Status: SHIPPED | OUTPATIENT
Start: 2018-04-04 | End: 2018-04-07 | Stop reason: HOSPADM

## 2018-04-04 RX ORDER — PROMETHAZINE HYDROCHLORIDE AND CODEINE PHOSPHATE 6.25; 1 MG/5ML; MG/5ML
5 SOLUTION ORAL EVERY 6 HOURS PRN
Qty: 240 ML | Refills: 0 | Status: SHIPPED | OUTPATIENT
Start: 2018-04-04 | End: 2018-04-18 | Stop reason: ALTCHOICE

## 2018-04-04 NOTE — PROGRESS NOTES
Subjective:       Patient ID: Maximilian Tavera Jr. is a 72 y.o. male.    Chief Complaint: Cough and Sore Throat    Cough   This is a recurrent problem. The current episode started yesterday. The problem has been unchanged. The problem occurs hourly. The cough is non-productive. Associated symptoms include headaches, myalgias, nasal congestion, a sore throat, shortness of breath and wheezing. Pertinent negatives include no chest pain, chills, ear congestion, ear pain, fever, heartburn, hemoptysis, postnasal drip, rash, rhinorrhea, sweats or weight loss. The symptoms are aggravated by other. He has tried rest for the symptoms. The treatment provided mild relief. His past medical history is significant for bronchitis.   Sore Throat    This is a new problem. The current episode started in the past 7 days. The problem has been rapidly worsening. Maximum temperature: is immune suppressed, but has 99.1 temperature. Associated symptoms include coughing, headaches, a hoarse voice, shortness of breath and trouble swallowing. Pertinent negatives include no ear pain. He has had no exposure to strep. He has tried gargles for the symptoms. The treatment provided mild relief.     Is chronically immune suppressed due to having myasthenia gravis and is on oral steroids every other day.   Review of Systems   Constitutional: Negative for chills, fever and weight loss.   HENT: Positive for hoarse voice, sore throat and trouble swallowing. Negative for ear pain, postnasal drip and rhinorrhea.    Respiratory: Positive for cough, shortness of breath and wheezing. Negative for hemoptysis.    Cardiovascular: Negative for chest pain.   Gastrointestinal: Negative for heartburn.   Musculoskeletal: Positive for myalgias.   Skin: Negative for rash.   Neurological: Positive for headaches.       Objective:      Physical Exam   Constitutional: He is oriented to person, place, and time. He appears well-developed and well-nourished. No distress.    HENT:   Head: Normocephalic and atraumatic.   Oropharynx mildly erythematous.    Eyes: Conjunctivae are normal. Right eye exhibits no discharge. Left eye exhibits no discharge. No scleral icterus.   Cardiovascular: Normal rate, regular rhythm and normal heart sounds.  Exam reveals no gallop and no friction rub.    No murmur heard.  Pulmonary/Chest: Effort normal and breath sounds normal. No respiratory distress. He has no wheezes. He has no rales.   Neurological: He is alert and oriented to person, place, and time.   Skin: Skin is warm and dry. He is not diaphoretic.   Psychiatric: He has a normal mood and affect. His behavior is normal.   Nursing note and vitals reviewed.      Assessment:       1. Strep sore throat    2. Bronchitis    3. Sore throat    4. Long term systemic steroid user        Plan:       Strep sore throat  -     amoxicillin (AMOXIL) 875 MG tablet; Take 1 tablet (875 mg total) by mouth 2 (two) times daily.  Dispense: 20 tablet; Refill: 0    Bronchitis  -     promethazine-codeine 6.25-10 mg/5 ml (PHENERGAN WITH CODEINE) 6.25-10 mg/5 mL syrup; Take 5 mLs by mouth every 6 (six) hours as needed for Cough.  Dispense: 240 mL; Refill: 0    Sore throat  -     POCT Rapid Strep A    Long term systemic steroid user         1 week if not better

## 2018-04-05 ENCOUNTER — PATIENT MESSAGE (OUTPATIENT)
Dept: FAMILY MEDICINE | Facility: CLINIC | Age: 73
End: 2018-04-05

## 2018-04-06 ENCOUNTER — TELEPHONE (OUTPATIENT)
Dept: CARDIOLOGY | Facility: CLINIC | Age: 73
End: 2018-04-06

## 2018-04-07 ENCOUNTER — OFFICE VISIT (OUTPATIENT)
Dept: FAMILY MEDICINE | Facility: CLINIC | Age: 73
End: 2018-04-07
Payer: MEDICARE

## 2018-04-07 ENCOUNTER — HOSPITAL ENCOUNTER (OUTPATIENT)
Dept: RADIOLOGY | Facility: CLINIC | Age: 73
Discharge: HOME | End: 2018-04-07
Attending: NURSE PRACTITIONER
Payer: MEDICARE

## 2018-04-07 VITALS
HEART RATE: 43 BPM | TEMPERATURE: 99 F | SYSTOLIC BLOOD PRESSURE: 109 MMHG | BODY MASS INDEX: 32.11 KG/M2 | DIASTOLIC BLOOD PRESSURE: 53 MMHG | HEIGHT: 73 IN | OXYGEN SATURATION: 94 % | WEIGHT: 242.31 LBS

## 2018-04-07 DIAGNOSIS — R94.31 ABNORMAL EKG: ICD-10-CM

## 2018-04-07 DIAGNOSIS — R00.1 BRADYCARDIA: ICD-10-CM

## 2018-04-07 DIAGNOSIS — I10 HTN (HYPERTENSION), BENIGN: ICD-10-CM

## 2018-04-07 DIAGNOSIS — J18.9 PNEUMONIA DUE TO INFECTIOUS ORGANISM, UNSPECIFIED LATERALITY, UNSPECIFIED PART OF LUNG: Primary | ICD-10-CM

## 2018-04-07 DIAGNOSIS — R06.02 SHORTNESS OF BREATH: ICD-10-CM

## 2018-04-07 DIAGNOSIS — G70.00 MYASTHENIA GRAVIS, ACHR ANTIBODY POSITIVE: ICD-10-CM

## 2018-04-07 PROCEDURE — 71046 X-RAY EXAM CHEST 2 VIEWS: CPT | Mod: TC,FY,PO

## 2018-04-07 PROCEDURE — 99499 UNLISTED E&M SERVICE: CPT | Mod: S$PBB,,, | Performed by: NURSE PRACTITIONER

## 2018-04-07 PROCEDURE — 93000 ELECTROCARDIOGRAM COMPLETE: CPT | Mod: S$GLB,,, | Performed by: INTERNAL MEDICINE

## 2018-04-07 PROCEDURE — 99999 PR PBB SHADOW E&M-EST. PATIENT-LVL V: CPT | Mod: PBBFAC,,, | Performed by: NURSE PRACTITIONER

## 2018-04-07 PROCEDURE — 99214 OFFICE O/P EST MOD 30 MIN: CPT | Mod: S$GLB,,, | Performed by: NURSE PRACTITIONER

## 2018-04-07 PROCEDURE — 3074F SYST BP LT 130 MM HG: CPT | Mod: CPTII,S$GLB,, | Performed by: NURSE PRACTITIONER

## 2018-04-07 PROCEDURE — 71046 X-RAY EXAM CHEST 2 VIEWS: CPT | Mod: 26,,, | Performed by: RADIOLOGY

## 2018-04-07 PROCEDURE — 3078F DIAST BP <80 MM HG: CPT | Mod: CPTII,S$GLB,, | Performed by: NURSE PRACTITIONER

## 2018-04-07 RX ORDER — CARVEDILOL 25 MG/1
12.5 TABLET ORAL 2 TIMES DAILY
Qty: 180 TABLET | Refills: 3
Start: 2018-04-07 | End: 2019-01-08 | Stop reason: ALTCHOICE

## 2018-04-07 RX ORDER — AMOXICILLIN AND CLAVULANATE POTASSIUM 562.5; 437.5; 62.5 MG/1; MG/1; MG/1
2 TABLET, FILM COATED, EXTENDED RELEASE ORAL 2 TIMES DAILY
Qty: 20 TABLET | Refills: 0 | Status: SHIPPED | OUTPATIENT
Start: 2018-04-07 | End: 2018-04-12

## 2018-04-07 RX ORDER — AZITHROMYCIN 250 MG/1
TABLET, FILM COATED ORAL
Qty: 6 TABLET | Refills: 0 | Status: SHIPPED | OUTPATIENT
Start: 2018-04-07 | End: 2018-04-12

## 2018-04-07 NOTE — TELEPHONE ENCOUNTER
Results of recent labs given to patient. He verbalized understanding. He prefers to stay with the 8 meq tablets. Will send refill Rx to [facility] at patient's request.    Notes recorded by Miguel Altamirano MD on 4/1/2018 at 8:56 PM CDT  TGs high so keep sweets and carbs low. K very low and lower than before-triple the K dose for a week and then double-it may be easier to get 20 meq pills long term instead of the 8's. Repeat chem 7 in 4 weeks.

## 2018-04-07 NOTE — PROGRESS NOTES
Subjective:       Patient ID: Maximilian Tavera Jr. is a 72 y.o. male.    Chief Complaint: Cough and Headache    Mr. Tavera presents to the clinic today for cough, headache, shortness of breath. He started with cough and sore throat on 4/4 and was prescribed amoxicillin 875 BID and promethazine DM cough syrup.  He began to feel worse after this and is now short of breath, with chills and subjective fever.  Also of note, his heart rate is 43 (usually this is in the 70's) and he does take carvedilol 25 mg just at night time.  He sees Dr. Altamirano, Cardiology.  He endorses occasional light headedness.      Cough   This is a new problem. The current episode started in the past 7 days. The problem has been rapidly worsening. The problem occurs every few minutes. The cough is non-productive. Associated symptoms include chills, a fever, headaches, a sore throat, shortness of breath and wheezing. Pertinent negatives include no chest pain, hemoptysis or sweats. He has tried prescription cough suppressant (amoxicillin) for the symptoms. The treatment provided no relief. There is no history of asthma or COPD.     Review of Systems   Constitutional: Positive for chills and fever.   HENT: Positive for sore throat.    Respiratory: Positive for cough, shortness of breath and wheezing. Negative for hemoptysis.    Cardiovascular: Negative for chest pain and palpitations.   Neurological: Positive for headaches.       Objective:      Physical Exam   Constitutional: He is oriented to person, place, and time. He appears well-developed and well-nourished. No distress.   HENT:   Head: Normocephalic and atraumatic.   Right Ear: External ear normal.   Left Ear: External ear normal.   Mouth/Throat: Oropharynx is clear and moist. No oropharyngeal exudate.   Eyes: Pupils are equal, round, and reactive to light. Right eye exhibits no discharge. Left eye exhibits no discharge.   Neck: Neck supple. No thyromegaly present.   Cardiovascular:  Regular rhythm.  Bradycardia present.  Exam reveals no gallop and no friction rub.    No murmur heard.  Pulmonary/Chest: Effort normal. No respiratory distress. He has no wheezes. He has rales (RLL).   Abdominal: Soft. He exhibits no distension. There is no tenderness.   Lymphadenopathy:     He has no cervical adenopathy.   Neurological: He is alert and oriented to person, place, and time. Coordination normal.   Skin: Skin is warm and dry.   Psychiatric: He has a normal mood and affect. His behavior is normal. Thought content normal.   Vitals reviewed.          Current Outpatient Prescriptions:     amoxicillin (AMOXIL) 875 MG tablet, Take 1 tablet (875 mg total) by mouth 2 (two) times daily., Disp: 20 tablet, Rfl: 0    ASPIRIN LOW DOSE 81 mg EC tablet, Take 81 mg by mouth once daily. , Disp: , Rfl:     atorvastatin (LIPITOR) 80 MG tablet, Take 0.5 tablets (40 mg total) by mouth once daily., Disp: 90 tablet, Rfl: 3    calcium citrate-vitamin D (CITRACAL + D) 315-200 mg-unit per tablet, Take 1 tablet by mouth once daily. Twice a day, Disp: , Rfl:     carvedilol (COREG) 25 MG tablet, TAKE 1/2 TO 1 TABLET TWICE DAILY  OR AS DIRECTED, Disp: 180 tablet, Rfl: 3    cetirizine (ZYRTEC) 10 MG tablet, Take 1 tablet by mouth daily as needed., Disp: , Rfl:     chlorthalidone (HYGROTEN) 25 MG Tab, TAKE 1 TABLET EVERY DAY, Disp: 90 tablet, Rfl: 3    cholecalciferol, vitamin D3, (VITAMIN D) 2,000 unit Cap, 1 capsule once daily. Every day, Disp: , Rfl:     coenzyme Q10 (CO Q-10) 100 mg capsule, Take 400 mg by mouth once daily. , Disp: , Rfl:     cyanocobalamin, vitamin B-12, (VITAMIN B-12) 5,000 mcg Subl, Take 5,000 mcg by mouth once daily. Every day, Disp: , Rfl:     ezetimibe (ZETIA) 10 mg tablet, Take 1 tablet (10 mg total) by mouth once daily., Disp: 90 tablet, Rfl: 3    fluticasone (FLONASE) 50 mcg/actuation nasal spray, USE 1 SPRAY IN EACH NOSTRIL TWICE DAILY AS NEEDED  FOR  RHINITIS, Disp: 48 g, Rfl: 3     gabapentin (NEURONTIN) 300 MG capsule, Take 1 capsule (300 mg total) by mouth 2 (two) times daily., Disp: 180 capsule, Rfl: 11    levothyroxine (SYNTHROID) 50 MCG tablet, TAKE 1 TABLET ONE TIME DAILY, Disp: 90 tablet, Rfl: 3    lisinopril (PRINIVIL,ZESTRIL) 40 MG tablet, TAKE 1 TABLET EVERY DAY, Disp: 90 tablet, Rfl: 3    methylsulfonylmethane (MSM) 1,000 mg Tab, Take 1,000 mg by mouth once daily. Every day, Disp: , Rfl:     milk thistle 200 mg Cap, Take 200 mg by mouth 2 (two) times daily. Twice a day, Disp: , Rfl:     naproxen (EC NAPROSYN) 500 MG EC tablet, TAKE 1 TABLET(500 MG) BY MOUTH TWICE DAILY WITH MEALS, Disp: 180 tablet, Rfl: 0    omega-3 fatty acids 1,000 mg Cap, Twice a day, Disp: , Rfl:     potassium chloride (KLOR-CON) 8 MEQ TbSR, TAKE 1 TABLET TWICE DAILY, Disp: 180 tablet, Rfl: 3    predniSONE (DELTASONE) 1 MG tablet, TAKE 3 TABLETS  EVERY OTHER DAY, Disp: 135 tablet, Rfl: 3    predniSONE (DELTASONE) 5 MG tablet, TAKE 1 TABLET EVERY OTHER DAY, Disp: 45 tablet, Rfl: 3    promethazine-codeine 6.25-10 mg/5 ml (PHENERGAN WITH CODEINE) 6.25-10 mg/5 mL syrup, Take 5 mLs by mouth every 6 (six) hours as needed for Cough., Disp: 240 mL, Rfl: 0    sildenafil (REVATIO) 20 mg Tab, Take 1 to 3 tabs daily as needed., Disp: 30 tablet, Rfl: 3    tiZANidine (ZANAFLEX) 4 MG tablet, TAKE 1 TABLET(4 MG) BY MOUTH EVERY 8 HOURS, Disp: 270 tablet, Rfl: 0  Assessment:       1. Pneumonia due to infectious organism, unspecified laterality, unspecified part of lung    2. Bradycardia    3. HTN (hypertension), benign    4. Myasthenia gravis, AChR antibody positive    5. Abnormal EKG        Plan:       Maximilian was seen today for cough and headache.    Diagnoses and all orders for this visit:    Pneumonia due to infectious organism, unspecified laterality, unspecified part of lung  Patient has taken azithromycin in the past without problems.  Advised can rarely cause worsening of MG.  Follow up 4/9/18.  -     X-Ray  Chest PA And Lateral; Future  -     amoxicillin-clavulanate 1,000-62.5 mg (AUGMENTIN XR) 1,000-62.5 mg per tablet; Take 2 tablets by mouth 2 (two) times daily.  -     azithromycin (Z-COLLEEN) 250 MG tablet; Take 2 tablets by mouth on day 1; Take 1 tablet by mouth on days 2-5    Bradycardia  Having occasional lightheadedness.  Take carvedilol 12.5 mg BID for now until appt with Cardiology.  -     IN OFFICE EKG 12-LEAD (to [place])  -     Ambulatory referral to Cardiology    HTN (hypertension), benign  BP lower than usual today.  Hold lisinopril for BP <120/80.  Could be due to dehydration from pneumonia--hydrate aggressively.  -     carvedilol (COREG) 25 MG tablet; Take 0.5 tablets (12.5 mg total) by mouth 2 (two) times daily.    Myasthenia gravis, AChR antibody positive  Stable, follow up Neurology.    Abnormal EKG  -     Ambulatory referral to Cardiology        Patient readiness: acceptance and barriers:none    During the course of the visit the patient was educated and counseled about the following:     Hypertension:   Medication: see note.    Goals: Hypertension: Reduce Blood Pressure    Did patient meet goals/outcomes: Yes    The following self management tools provided: declined    Patient Instructions (the written plan) was given to the patient/family.     Time spent with patient: 30 minutes    Barriers to medications present (yes )    Adverse reactions to current medications (no)    Over the counter medications reviewed (Yes)

## 2018-04-09 ENCOUNTER — OFFICE VISIT (OUTPATIENT)
Dept: FAMILY MEDICINE | Facility: CLINIC | Age: 73
End: 2018-04-09
Payer: MEDICARE

## 2018-04-09 VITALS
WEIGHT: 238.13 LBS | HEIGHT: 73 IN | HEART RATE: 72 BPM | DIASTOLIC BLOOD PRESSURE: 61 MMHG | RESPIRATION RATE: 16 BRPM | OXYGEN SATURATION: 95 % | SYSTOLIC BLOOD PRESSURE: 99 MMHG | TEMPERATURE: 99 F | BODY MASS INDEX: 31.56 KG/M2

## 2018-04-09 DIAGNOSIS — J18.9 PNEUMONIA OF RIGHT LUNG DUE TO INFECTIOUS ORGANISM, UNSPECIFIED PART OF LUNG: Primary | ICD-10-CM

## 2018-04-09 DIAGNOSIS — I10 ESSENTIAL HYPERTENSION: ICD-10-CM

## 2018-04-09 DIAGNOSIS — I95.9 HYPOTENSION, UNSPECIFIED HYPOTENSION TYPE: ICD-10-CM

## 2018-04-09 DIAGNOSIS — I10 HTN (HYPERTENSION), BENIGN: ICD-10-CM

## 2018-04-09 DIAGNOSIS — R06.2 WHEEZING: ICD-10-CM

## 2018-04-09 PROCEDURE — 3078F DIAST BP <80 MM HG: CPT | Mod: CPTII,S$GLB,, | Performed by: PHYSICIAN ASSISTANT

## 2018-04-09 PROCEDURE — 94640 AIRWAY INHALATION TREATMENT: CPT | Mod: S$GLB,,, | Performed by: FAMILY MEDICINE

## 2018-04-09 PROCEDURE — 99214 OFFICE O/P EST MOD 30 MIN: CPT | Mod: S$GLB,,, | Performed by: PHYSICIAN ASSISTANT

## 2018-04-09 PROCEDURE — 99499 UNLISTED E&M SERVICE: CPT | Mod: S$PBB,,, | Performed by: PHYSICIAN ASSISTANT

## 2018-04-09 PROCEDURE — 3074F SYST BP LT 130 MM HG: CPT | Mod: CPTII,S$GLB,, | Performed by: PHYSICIAN ASSISTANT

## 2018-04-09 PROCEDURE — 99999 PR PBB SHADOW E&M-EST. PATIENT-LVL III: CPT | Mod: PBBFAC,,, | Performed by: PHYSICIAN ASSISTANT

## 2018-04-09 RX ORDER — LISINOPRIL 40 MG/1
20 TABLET ORAL DAILY
Qty: 90 TABLET | Refills: 3
Start: 2018-04-09 | End: 2018-10-12 | Stop reason: SDUPTHER

## 2018-04-09 RX ORDER — ALBUTEROL SULFATE 90 UG/1
2 AEROSOL, METERED RESPIRATORY (INHALATION) EVERY 6 HOURS PRN
Qty: 1 EACH | Refills: 1 | Status: SHIPPED | OUTPATIENT
Start: 2018-04-09 | End: 2018-10-16 | Stop reason: SDUPTHER

## 2018-04-09 RX ORDER — ALBUTEROL SULFATE 1.25 MG/3ML
1.25 SOLUTION RESPIRATORY (INHALATION)
Status: COMPLETED | OUTPATIENT
Start: 2018-04-09 | End: 2018-04-09

## 2018-04-09 RX ADMIN — ALBUTEROL SULFATE 1.25 MG: 1.25 SOLUTION RESPIRATORY (INHALATION) at 02:04

## 2018-04-09 NOTE — PROGRESS NOTES
2 Patent identifiers used (name and ). Administered 1.25mg Albuterol Nebulizer Solution. Patient tolerated well. No SOB noted.

## 2018-04-09 NOTE — PROGRESS NOTES
Subjective:       Patient ID: Maximilian Tavera Jr. is a 72 y.o. male.    Chief Complaint: Follow-up (F/U Pneumonia)    Mr. Crump comes to clinic today for follow up of pneumonia. He reports he is feeling better. He has been afebrile. He denies chills. He reports he continues to cough; he also admits to wheezing. The patient is tolerating the medication well. The patient also complains of sciatic pain for which he is followed by Dr. Watt. He was to have an epidural injection but he had to cancel this due to his constant coughing related to his pneumonia.       Review of Systems   Constitutional: Negative for activity change, appetite change and fever.   HENT: Negative for postnasal drip, rhinorrhea and sinus pressure.    Eyes: Negative for visual disturbance.   Respiratory: Positive for cough and wheezing. Negative for shortness of breath.    Cardiovascular: Negative for chest pain.   Gastrointestinal: Negative for abdominal distention and abdominal pain.   Genitourinary: Negative for difficulty urinating and dysuria.   Musculoskeletal: Positive for arthralgias and back pain. Negative for myalgias.   Neurological: Negative for headaches.   Hematological: Negative for adenopathy.   Psychiatric/Behavioral: The patient is not nervous/anxious.        Objective:      Physical Exam   Constitutional: He is oriented to person, place, and time.   HENT:   Mouth/Throat: Oropharynx is clear and moist.   Eyes: Conjunctivae are normal. Pupils are equal, round, and reactive to light.   Cardiovascular: Normal rate and regular rhythm.    Pulmonary/Chest: Effort normal. He has wheezes.   Wheezes of upper lung fields. Wheezing improved after breathing treatment.    Abdominal: Soft. Bowel sounds are normal. There is no tenderness.   Musculoskeletal: He exhibits no edema.   Lymphadenopathy:     He has no cervical adenopathy.   Neurological: He is alert and oriented to person, place, and time.   Skin: No erythema.   Psychiatric: His  behavior is normal.       Assessment:       1. Hypotension, unspecified hypotension type    2. Essential hypertension    3. HTN (hypertension), benign    4. Pneumonia of right lung due to infectious organism, unspecified part of lung    5. Wheezing        Plan:   Maximilian was seen today for follow-up.    Diagnoses and all orders for this visit:    Pneumonia of right lung due to infectious organism, unspecified part of lung  -     X-Ray Chest PA And Lateral; Future  -     albuterol nebulizer solution 1.25 mg; Take 3 mLs (1.25 mg total) by nebulization one time.  -     albuterol 90 mcg/actuation inhaler; Inhale 2 puffs into the lungs every 6 (six) hours as needed for Wheezing.  Recheck cxr in 1 month  Follow up in 7-10 days for reassessment after completion of antibiotics.   Essential hypertension  -     lisinopril (PRINIVIL,ZESTRIL) 40 MG tablet; Take 0.5 tablets (20 mg total) by mouth once daily.    HTN (hypertension), benign  -     lisinopril (PRINIVIL,ZESTRIL) 40 MG tablet; Take 0.5 tablets (20 mg total) by mouth once daily.    Hypotension, unspecified hypotension type  -     lisinopril (PRINIVIL,ZESTRIL) 40 MG tablet; Take 0.5 tablets (20 mg total) by mouth once daily.  Decrease lisinopril to 20mg  Wheezing  -     albuterol nebulizer solution 1.25 mg; Take 3 mLs (1.25 mg total) by nebulization one time.  -     albuterol 90 mcg/actuation inhaler; Inhale 2 puffs into the lungs every 6 (six) hours as needed for Wheezing.

## 2018-04-12 ENCOUNTER — PES CALL (OUTPATIENT)
Dept: ADMINISTRATIVE | Facility: CLINIC | Age: 73
End: 2018-04-12

## 2018-04-18 ENCOUNTER — OFFICE VISIT (OUTPATIENT)
Dept: FAMILY MEDICINE | Facility: CLINIC | Age: 73
End: 2018-04-18
Payer: MEDICARE

## 2018-04-18 ENCOUNTER — TELEPHONE (OUTPATIENT)
Dept: CARDIOLOGY | Facility: CLINIC | Age: 73
End: 2018-04-18

## 2018-04-18 VITALS
SYSTOLIC BLOOD PRESSURE: 118 MMHG | OXYGEN SATURATION: 98 % | WEIGHT: 234.13 LBS | HEART RATE: 75 BPM | DIASTOLIC BLOOD PRESSURE: 75 MMHG | TEMPERATURE: 98 F | BODY MASS INDEX: 31.03 KG/M2 | RESPIRATION RATE: 14 BRPM | HEIGHT: 73 IN

## 2018-04-18 DIAGNOSIS — R06.2 WHEEZING: ICD-10-CM

## 2018-04-18 DIAGNOSIS — R05.9 COUGH: ICD-10-CM

## 2018-04-18 DIAGNOSIS — J40 BRONCHITIS: ICD-10-CM

## 2018-04-18 DIAGNOSIS — J18.9 PNEUMONIA OF RIGHT LUNG DUE TO INFECTIOUS ORGANISM, UNSPECIFIED PART OF LUNG: Primary | ICD-10-CM

## 2018-04-18 PROCEDURE — 3074F SYST BP LT 130 MM HG: CPT | Mod: CPTII,S$GLB,, | Performed by: PHYSICIAN ASSISTANT

## 2018-04-18 PROCEDURE — 99999 PR PBB SHADOW E&M-EST. PATIENT-LVL V: CPT | Mod: PBBFAC,,, | Performed by: PHYSICIAN ASSISTANT

## 2018-04-18 PROCEDURE — 99213 OFFICE O/P EST LOW 20 MIN: CPT | Mod: S$GLB,,, | Performed by: PHYSICIAN ASSISTANT

## 2018-04-18 PROCEDURE — 3078F DIAST BP <80 MM HG: CPT | Mod: CPTII,S$GLB,, | Performed by: PHYSICIAN ASSISTANT

## 2018-04-18 NOTE — PATIENT INSTRUCTIONS
Walking for Fitness  Fitness walking has something for everyone, even people who are already fit. Walking is one of the safest ways to condition your body aerobically. It can boost energy, help you lose weight, and reduce stress.    Physical benefits  · Walking strengthens your heart and lungs, and tones your muscles.  · When walking, your feet land with less impact than in other sports. This reduces chances of muscle, bone, and joint injury.  · Regular walking improves your cholesterol levels and lowers your risk of heart disease. And it helps you control your blood sugar if you have diabetes.  · Walking is a weight-bearing activity, which helps maintain bone density. This can help prevent osteoporosis.  Personal rewards  · Taking walks can help you relax and manage stress. And fitness walking may make you feel better about yourself.  · Walking can help you sleep better at night and make you less likely to be depressed.  · Regular walking may help maintain your memory as you get older.  · Walking is a great way to spend extra time with friends and family members. Be sure to invite your dog along!  Q&A about fitness walking  Q: Will walking keep me fit?  A: Yes. Regular walking at the right pace gives you all the benefits of other aerobic activities, such as jogging and swimming.  Q: Will walking help me lose weight and keep it off?  A: Yes. Per mile, walking can burn as many calories as jogging. Your health care provider can help work walking into your weight-loss plan.  Q: Is walking safe for my health?  A: Yes. Walking is safe if you have high blood pressure, diabetes, heart disease, or other conditions. Talk to your healthcare provider before you start.  Date Last Reviewed: 4/1/2017 © 2000-2017 [employer]. [address] [place] [address], [place] [address]. All rights reserved. This information is not intended as a substitute for professional medical care. Always follow your healthcare professional's  instructions.          Established High Blood Pressure    High blood pressure (hypertension) is a chronic disease. Often, healthcare providers dont know what causes it. But it can be caused by certain health conditions and medicines.  If you have high blood pressure, you may not have any symptoms. If you do have symptoms, they may include headache, dizziness, changes in your vision, chest pain, and shortness of breath. But even without symptoms, high blood pressure thats not treated raises your risk for heart attack and stroke. High blood pressure is a serious health risk and shouldnt be ignored.  A blood pressure reading is made up of two numbers: a higher number over a lower number. The top number is the systolic pressure. The bottom number is the diastolic pressure. A normal blood pressure is a systolic pressure of  less than 120 over a diastolic pressure of less than 80. You will see your blood pressure readings written together. For example, a person with a systolic pressure of 188 and a diastolic pressure of 78 will have 118/78 written in the medical record.  High blood pressure is when either the top number is 140 or higher, or the bottom number is 90 or higher. This must be the result when taking your blood pressure a number of times. The blood pressures between normal and high are called prehypertension.  Home care  If you have high blood pressure, you should do what is listed below to lower your blood pressure. If you are taking medicines for high blood pressure, these methods may reduce or end your need for medicines in the future.  · Begin a weight-loss program if you are overweight.  · Cut back on how much salt you get in your diet. Heres how to do this:  ¨ Dont eat foods that have a lot of salt. These include olives, pickles, smoked meats, and salted potato chips.  ¨ Dont add salt to your food at the table.  ¨ Use only small amounts of salt when cooking.  · Start an exercise program. Talk with  your healthcare provider about the type of exercise program that would be best for you. It doesn't have to be hard. Even brisk walking for 20 minutes 3 times a week is a good form of exercise.  · Dont take medicines that stimulate the heart. This includes many over-the-counter cold and sinus decongestant pills and sprays, as well as diet pills. Check the warnings about hypertension on the label. Before buying any over-the-counter medicines or supplements, always ask the pharmacist about the product's potential interaction with your high blood pressure and your high blood pressure medicines.  · Stimulants such as amphetamine or cocaine could be deadly for someone with high blood pressure. Never take these.  · Limit how much caffeine you get in your diet. Switch to caffeine-free products.  · Stop smoking. If you are a long-time smoker, this can be hard. Talk to your healthcare provider about medicines and nicotine replacement options to help you. Also, enroll in a stop-smoking program to make it more likely that you will quit for good.  · Learn how to handle stress. This is an important part of any program to lower blood pressure. Learn about relaxation methods like meditation, yoga, or biofeedback.  · If your provider prescribed medicines, take them exactly as directed. Missing doses may cause your blood pressure get out of control.  · If you miss a dose or doses, check with your healthcare provider or pharmacist about what to do.  · Consider buying an automatic blood pressure machine. Ask your provider for a recommendation. You can get one of these at most pharmacies.     The American Heart Association recommends the following guidelines for home blood pressure monitoring:  · Don't smoke or drink coffee for 30 minutes before taking your blood pressure.  · Go to the bathroom before the test.  · Relax for 5 minutes before taking the measurement.  · Sit with your back supported (don't sit on a couch or soft chair);  keep your feet on the floor uncrossed. Place your arm on a solid flat surface (like a table) with the upper part of the arm at heart level. Place the middle of the cuff directly above the eye of the elbow. Check the monitor's instruction manual for an illustration.  · Take multiple readings. When you measure, take 2 to 3 readings one minute apart and record all of the results.  · Take your blood pressure at the same time every day, or as your healthcare provider recommends.  · Record the date, time, and blood pressure reading.  · Take the record with you to your next medical appointment. If your blood pressure monitor has a built-in memory, simply take the monitor with you to your next appointment.  · Call your provider if you have several high readings. Don't be frightened by a single high blood pressure reading, but if you get several high readings, check in with your healthcare provider.  · Note: When blood pressure reaches a systolic (top number) of 180 or higher OR diastolic (bottom number) of 110 or higher, seek emergency medical treatment.  Follow-up care  You will need to see your healthcare provider regularly. This is to check your blood pressure and to make changes to your medicines. Make a follow-up appointment as directed. Bring the record of your home blood pressure readings to the appointment.  When to seek medical advice  Call your healthcare provider right away if any of these occur:  · Blood pressure reaches a systolic (upper number) of 180 or higher OR a diastolic (bottom number) of 110 or higher  · Chest pain or shortness of breath  · Severe headache  · Throbbing or rushing sound in the ears  · Nosebleed  · Sudden severe pain in your belly (abdomen)  · Extreme drowsiness, confusion, or fainting  · Dizziness or spinning sensation (vertigo)  · Weakness of an arm or leg or one side of the face  · You have problems speaking or seeing   Date Last Reviewed: 12/1/2016  © 7830-9793 [employer],  [employer]. [address] [place] [address]. All rights reserved. This information is not intended as a substitute for professional medical care. Always follow your healthcare professional's instructions.            Weight Management: Getting Started  Healthy bodies come in all shapes and sizes. Not all bodies are made to be thin. For some people, a healthy weight is higher than the average weight listed on weight charts. Your healthcare provider can help you decide on a healthy weight for you.    Reasons to lose weight  Losing weight can help with some health problems, such as high blood pressure, heart disease, diabetes, sleep apnea, and arthritis. You may also feel more energy.  Set your long-term goal  Your goal doesn't even have to be a specific weight. You may decide on a fitness goal (such as being able to walk 10 miles a week), or a health goal (such as lowering your blood pressure). Choose a goal that is measurable and reasonable, so you know when you've reached it. A goal of reaching a BMI of less than 25 is not always reasonable (or possible).   Make an action plan  Habits dont change overnight. Setting your goals too high can leave you feeling discouraged if you cant reach them. Be realistic. Choose one or two small changes you can make now. Set an action plan for how you are going to make these changes. When you can stick to this plan, keep making a few more small changes. Taking small steps will help you stay on the path to success.  Track your progress  Write down your goals. Then, keep a daily record of your progress. Write down what you eat and how active you are. This record lets you look back on how much youve done. It may also help when youre feeling frustrated. Reward yourself for success. Even if you dont reach every goal, give yourself credit for what you do get done.  Get support  Encouragement from others can help make losing weight easier. Ask your family members and friends for  support. They may even want to join you. Also look to your healthcare provider, registered dietitian, and  for help. Your local hospital can give you more information about nutrition, exercise, and weight loss.  Date Last Reviewed: 1/31/2016  © 5071-0165 [employer], [employer]. [address] [place] [address], [place] [address]. All rights reserved. This information is not intended as a substitute for professional medical care. Always follow your healthcare professional's instructions.

## 2018-04-18 NOTE — PROGRESS NOTES
Subjective:       Patient ID: Maximilian Tavera Jr. is a 72 y.o. male.    Chief Complaint: Follow-up (10day f/u)    Mr. Tavera comes to clinic today for follow up of pneumonia. The patient is recovering well. He reports he is feeling significantly better. The patient reports that he does continue to cough; this is intermittent and improving. The patient also reports wheezing is improving with use of albuterol inhaler.       Cough   This is a recurrent problem. The current episode started 1 to 4 weeks ago. The problem has been gradually improving. The problem occurs every few hours. The cough is non-productive. Associated symptoms include headaches, myalgias and wheezing. Pertinent negatives include no chest pain, chills, ear congestion, ear pain, fever, heartburn, hemoptysis, nasal congestion, postnasal drip, rash, rhinorrhea, sore throat, shortness of breath, sweats or weight loss. The symptoms are aggravated by other. He has tried prescription cough suppressant for the symptoms. The treatment provided moderate relief. His past medical history is significant for bronchitis and pneumonia.     Review of Systems   Constitutional: Negative for chills, fever and weight loss.   HENT: Negative for ear pain, postnasal drip, rhinorrhea and sore throat.    Respiratory: Positive for cough and wheezing. Negative for hemoptysis and shortness of breath.         Cough and wheezing improved   Cardiovascular: Negative for chest pain.   Gastrointestinal: Negative for heartburn.   Musculoskeletal: Positive for myalgias.   Skin: Negative for rash.   Neurological: Positive for headaches.        Headache x 1 resolved with tylenol       Objective:      Physical Exam   Constitutional: He is oriented to person, place, and time.   HENT:   Mouth/Throat: Oropharynx is clear and moist.   Eyes: Conjunctivae are normal. Pupils are equal, round, and reactive to light.   Cardiovascular: Normal rate and regular rhythm.    Pulmonary/Chest:  Effort normal. He has no wheezes.   Wheezes now resolved   Abdominal: Soft. Bowel sounds are normal. There is no tenderness.   Musculoskeletal: He exhibits no edema.   Lymphadenopathy:     He has no cervical adenopathy.   Neurological: He is alert and oriented to person, place, and time.   Skin: No erythema.   Psychiatric: His behavior is normal.       Assessment:       1. Pneumonia of right lung due to infectious organism, unspecified part of lung    2. Wheezing    3. Cough        Plan:   Maximilian was seen today for follow-up.    Diagnoses and all orders for this visit:    Pneumonia of right lung due to infectious organism, unspecified part of lung  Patient recovering well  Patient advised to seek urgent medical attention if new concerning symptoms develop or if worsening of cough develops. Patient verbalized understanding  If CXR not clear at follow up, will need CT chest  Wheezing  Resolved  Albuterol as needed  Cough  Cough syrup rx sent to Dr. Marroquin

## 2018-04-19 ENCOUNTER — PATIENT MESSAGE (OUTPATIENT)
Dept: CARDIOLOGY | Facility: CLINIC | Age: 73
End: 2018-04-19

## 2018-04-19 DIAGNOSIS — R00.2 PALPITATIONS: Primary | ICD-10-CM

## 2018-04-19 RX ORDER — POTASSIUM CHLORIDE 600 MG/1
8 TABLET, FILM COATED, EXTENDED RELEASE ORAL 3 TIMES DAILY
COMMUNITY
End: 2018-04-19 | Stop reason: SDUPTHER

## 2018-04-19 RX ORDER — PROMETHAZINE HYDROCHLORIDE AND CODEINE PHOSPHATE 6.25; 1 MG/5ML; MG/5ML
5 SOLUTION ORAL EVERY 6 HOURS PRN
Qty: 240 ML | Refills: 0 | Status: SHIPPED | OUTPATIENT
Start: 2018-04-19 | End: 2018-08-14 | Stop reason: ALTCHOICE

## 2018-04-19 NOTE — TELEPHONE ENCOUNTER
Results of recent labs given to patient prior to today.he has made the adjustments on his potassium . Is taking two 8 meq tablets three times a day ( see tel. Note from 4/6 / he prefers to keep the 8 meq tablets instead of the 20 mg for now ). He will call or message this week to let us know when to book the next chem7 and ekg Dr. crump wants. He lives in [place] so can't come any time.       MD Amanda García MA; Bethany Son MA             Release-ECG reading is incorrect as there is not ST but sinus rate is well < 100 in the 80s-90ish but there is very substantial VIN. Also, I see recent K is low. Please get KCL 20 meq and take 3 per day for 5 days and then 2 per day for 7 days then one daily. In 3 weeks, repeat chem 7 and ECG with rhythm strip and if still a lot of ectopy ( I realize he has infection now), we may repeat CFD, and get

## 2018-04-20 DIAGNOSIS — R00.2 PALPITATIONS: Primary | ICD-10-CM

## 2018-04-20 RX ORDER — POTASSIUM CHLORIDE 600 MG/1
8 TABLET, FILM COATED, EXTENDED RELEASE ORAL 3 TIMES DAILY
Qty: 540 TABLET | Refills: 3 | Status: SHIPPED | OUTPATIENT
Start: 2018-04-20 | End: 2018-04-20 | Stop reason: SDUPTHER

## 2018-04-22 RX ORDER — POTASSIUM CHLORIDE 600 MG/1
TABLET, FILM COATED, EXTENDED RELEASE ORAL
Qty: 540 TABLET | Refills: 3 | Status: SHIPPED | OUTPATIENT
Start: 2018-04-22 | End: 2018-12-17 | Stop reason: SDUPTHER

## 2018-05-01 ENCOUNTER — TELEPHONE (OUTPATIENT)
Dept: FAMILY MEDICINE | Facility: CLINIC | Age: 73
End: 2018-05-01

## 2018-05-01 DIAGNOSIS — M51.36 DDD (DEGENERATIVE DISC DISEASE), LUMBAR: Primary | ICD-10-CM

## 2018-05-01 NOTE — TELEPHONE ENCOUNTER
----- Message from Carolyn Bassett sent at 5/1/2018  9:28 AM CDT -----  Contact: [facility]  A referral needed through Dr. Marroquin for physical therapy for them to continue. Please process through his [employer]. Please contact [phone] if any other questions are needed and fax is [phone]. She said their tax Id [id number] and NPI [id number] if necessary. Thanks!

## 2018-05-03 DIAGNOSIS — E03.1 CONGENITAL HYPOTHYROIDISM WITHOUT GOITER: ICD-10-CM

## 2018-05-03 DIAGNOSIS — M54.16 LUMBAR RADICULOPATHY: Primary | ICD-10-CM

## 2018-05-03 DIAGNOSIS — E03.9 HYPOTHYROIDISM: ICD-10-CM

## 2018-05-03 RX ORDER — LEVOTHYROXINE SODIUM 50 UG/1
TABLET ORAL
Qty: 90 TABLET | Refills: 3 | Status: SHIPPED | OUTPATIENT
Start: 2018-05-03 | End: 2019-03-17 | Stop reason: SDUPTHER

## 2018-05-06 ENCOUNTER — PATIENT MESSAGE (OUTPATIENT)
Dept: CARDIOLOGY | Facility: CLINIC | Age: 73
End: 2018-05-06

## 2018-05-14 ENCOUNTER — HOSPITAL ENCOUNTER (OUTPATIENT)
Dept: RADIOLOGY | Facility: CLINIC | Age: 73
Discharge: HOME | End: 2018-05-14
Attending: PHYSICIAN ASSISTANT
Payer: MEDICARE

## 2018-05-14 ENCOUNTER — OFFICE VISIT (OUTPATIENT)
Dept: FAMILY MEDICINE | Facility: CLINIC | Age: 73
End: 2018-05-14
Payer: MEDICARE

## 2018-05-14 VITALS
HEART RATE: 80 BPM | BODY MASS INDEX: 31.47 KG/M2 | WEIGHT: 237.44 LBS | HEIGHT: 73 IN | DIASTOLIC BLOOD PRESSURE: 73 MMHG | OXYGEN SATURATION: 96 % | TEMPERATURE: 98 F | RESPIRATION RATE: 14 BRPM | SYSTOLIC BLOOD PRESSURE: 131 MMHG

## 2018-05-14 DIAGNOSIS — J18.9 PNEUMONIA OF RIGHT LUNG DUE TO INFECTIOUS ORGANISM, UNSPECIFIED PART OF LUNG: ICD-10-CM

## 2018-05-14 DIAGNOSIS — R00.2 PALPITATIONS: ICD-10-CM

## 2018-05-14 DIAGNOSIS — M54.41 CHRONIC BILATERAL LOW BACK PAIN WITH BILATERAL SCIATICA: ICD-10-CM

## 2018-05-14 DIAGNOSIS — G89.29 CHRONIC BILATERAL LOW BACK PAIN WITH BILATERAL SCIATICA: ICD-10-CM

## 2018-05-14 DIAGNOSIS — M54.42 CHRONIC BILATERAL LOW BACK PAIN WITH BILATERAL SCIATICA: ICD-10-CM

## 2018-05-14 DIAGNOSIS — I10 HTN (HYPERTENSION), BENIGN: ICD-10-CM

## 2018-05-14 DIAGNOSIS — J18.9 PNEUMONIA DUE TO INFECTIOUS ORGANISM, UNSPECIFIED LATERALITY, UNSPECIFIED PART OF LUNG: Primary | ICD-10-CM

## 2018-05-14 PROCEDURE — 3078F DIAST BP <80 MM HG: CPT | Mod: CPTII,S$GLB,, | Performed by: PHYSICIAN ASSISTANT

## 2018-05-14 PROCEDURE — 93005 ELECTROCARDIOGRAM TRACING: CPT | Mod: ,,, | Performed by: INTERNAL MEDICINE

## 2018-05-14 PROCEDURE — 93010 ELECTROCARDIOGRAM REPORT: CPT | Mod: ,,, | Performed by: INTERNAL MEDICINE

## 2018-05-14 PROCEDURE — 71046 X-RAY EXAM CHEST 2 VIEWS: CPT | Mod: TC,FY,PO

## 2018-05-14 PROCEDURE — 99214 OFFICE O/P EST MOD 30 MIN: CPT | Mod: S$GLB,,, | Performed by: PHYSICIAN ASSISTANT

## 2018-05-14 PROCEDURE — 99499 UNLISTED E&M SERVICE: CPT | Mod: S$PBB,,, | Performed by: PHYSICIAN ASSISTANT

## 2018-05-14 PROCEDURE — 99999 PR PBB SHADOW E&M-EST. PATIENT-LVL V: CPT | Mod: PBBFAC,,, | Performed by: PHYSICIAN ASSISTANT

## 2018-05-14 PROCEDURE — 3075F SYST BP GE 130 - 139MM HG: CPT | Mod: CPTII,S$GLB,, | Performed by: PHYSICIAN ASSISTANT

## 2018-05-14 PROCEDURE — 71046 X-RAY EXAM CHEST 2 VIEWS: CPT | Mod: 26,,, | Performed by: RADIOLOGY

## 2018-05-14 RX ORDER — IBUPROFEN 800 MG/1
800 TABLET ORAL DAILY PRN
Qty: 30 TABLET | Refills: 0 | Status: ON HOLD | OUTPATIENT
Start: 2018-05-14 | End: 2018-11-10 | Stop reason: HOSPADM

## 2018-05-14 RX ORDER — IBUPROFEN 800 MG/1
800 TABLET ORAL DAILY PRN
Qty: 90 TABLET | Refills: 0 | Status: SHIPPED | OUTPATIENT
Start: 2018-05-14 | End: 2018-05-14

## 2018-05-14 NOTE — PATIENT INSTRUCTIONS
Walking for Fitness  Fitness walking has something for everyone, even people who are already fit. Walking is one of the safest ways to condition your body aerobically. It can boost energy, help you lose weight, and reduce stress.    Physical benefits  · Walking strengthens your heart and lungs, and tones your muscles.  · When walking, your feet land with less impact than in other sports. This reduces chances of muscle, bone, and joint injury.  · Regular walking improves your cholesterol levels and lowers your risk of heart disease. And it helps you control your blood sugar if you have diabetes.  · Walking is a weight-bearing activity, which helps maintain bone density. This can help prevent osteoporosis.  Personal rewards  · Taking walks can help you relax and manage stress. And fitness walking may make you feel better about yourself.  · Walking can help you sleep better at night and make you less likely to be depressed.  · Regular walking may help maintain your memory as you get older.  · Walking is a great way to spend extra time with friends and family members. Be sure to invite your dog along!  Q&A about fitness walking  Q: Will walking keep me fit?  A: Yes. Regular walking at the right pace gives you all the benefits of other aerobic activities, such as jogging and swimming.  Q: Will walking help me lose weight and keep it off?  A: Yes. Per mile, walking can burn as many calories as jogging. Your health care provider can help work walking into your weight-loss plan.  Q: Is walking safe for my health?  A: Yes. Walking is safe if you have high blood pressure, diabetes, heart disease, or other conditions. Talk to your healthcare provider before you start.  Date Last Reviewed: 4/1/2017 © 2000-2017 [employer]. [address] [place] [address], [place] [address]. All rights reserved. This information is not intended as a substitute for professional medical care. Always follow your healthcare professional's  instructions.            Established High Blood Pressure    High blood pressure (hypertension) is a chronic disease. Often, healthcare providers dont know what causes it. But it can be caused by certain health conditions and medicines.  If you have high blood pressure, you may not have any symptoms. If you do have symptoms, they may include headache, dizziness, changes in your vision, chest pain, and shortness of breath. But even without symptoms, high blood pressure thats not treated raises your risk for heart attack and stroke. High blood pressure is a serious health risk and shouldnt be ignored.  A blood pressure reading is made up of two numbers: a higher number over a lower number. The top number is the systolic pressure. The bottom number is the diastolic pressure. A normal blood pressure is a systolic pressure of  less than 120 over a diastolic pressure of less than 80. You will see your blood pressure readings written together. For example, a person with a systolic pressure of 188 and a diastolic pressure of 78 will have 118/78 written in the medical record.  High blood pressure is when either the top number is 140 or higher, or the bottom number is 90 or higher. This must be the result when taking your blood pressure a number of times. The blood pressures between normal and high are called prehypertension.  Home care  If you have high blood pressure, you should do what is listed below to lower your blood pressure. If you are taking medicines for high blood pressure, these methods may reduce or end your need for medicines in the future.  · Begin a weight-loss program if you are overweight.  · Cut back on how much salt you get in your diet. Heres how to do this:  ¨ Dont eat foods that have a lot of salt. These include olives, pickles, smoked meats, and salted potato chips.  ¨ Dont add salt to your food at the table.  ¨ Use only small amounts of salt when cooking.  · Start an exercise program. Talk with  your healthcare provider about the type of exercise program that would be best for you. It doesn't have to be hard. Even brisk walking for 20 minutes 3 times a week is a good form of exercise.  · Dont take medicines that stimulate the heart. This includes many over-the-counter cold and sinus decongestant pills and sprays, as well as diet pills. Check the warnings about hypertension on the label. Before buying any over-the-counter medicines or supplements, always ask the pharmacist about the product's potential interaction with your high blood pressure and your high blood pressure medicines.  · Stimulants such as amphetamine or cocaine could be deadly for someone with high blood pressure. Never take these.  · Limit how much caffeine you get in your diet. Switch to caffeine-free products.  · Stop smoking. If you are a long-time smoker, this can be hard. Talk to your healthcare provider about medicines and nicotine replacement options to help you. Also, enroll in a stop-smoking program to make it more likely that you will quit for good.  · Learn how to handle stress. This is an important part of any program to lower blood pressure. Learn about relaxation methods like meditation, yoga, or biofeedback.  · If your provider prescribed medicines, take them exactly as directed. Missing doses may cause your blood pressure get out of control.  · If you miss a dose or doses, check with your healthcare provider or pharmacist about what to do.  · Consider buying an automatic blood pressure machine. Ask your provider for a recommendation. You can get one of these at most pharmacies.     The American Heart Association recommends the following guidelines for home blood pressure monitoring:  · Don't smoke or drink coffee for 30 minutes before taking your blood pressure.  · Go to the bathroom before the test.  · Relax for 5 minutes before taking the measurement.  · Sit with your back supported (don't sit on a couch or soft chair);  keep your feet on the floor uncrossed. Place your arm on a solid flat surface (like a table) with the upper part of the arm at heart level. Place the middle of the cuff directly above the eye of the elbow. Check the monitor's instruction manual for an illustration.  · Take multiple readings. When you measure, take 2 to 3 readings one minute apart and record all of the results.  · Take your blood pressure at the same time every day, or as your healthcare provider recommends.  · Record the date, time, and blood pressure reading.  · Take the record with you to your next medical appointment. If your blood pressure monitor has a built-in memory, simply take the monitor with you to your next appointment.  · Call your provider if you have several high readings. Don't be frightened by a single high blood pressure reading, but if you get several high readings, check in with your healthcare provider.  · Note: When blood pressure reaches a systolic (top number) of 180 or higher OR diastolic (bottom number) of 110 or higher, seek emergency medical treatment.  Follow-up care  You will need to see your healthcare provider regularly. This is to check your blood pressure and to make changes to your medicines. Make a follow-up appointment as directed. Bring the record of your home blood pressure readings to the appointment.  When to seek medical advice  Call your healthcare provider right away if any of these occur:  · Blood pressure reaches a systolic (upper number) of 180 or higher OR a diastolic (bottom number) of 110 or higher  · Chest pain or shortness of breath  · Severe headache  · Throbbing or rushing sound in the ears  · Nosebleed  · Sudden severe pain in your belly (abdomen)  · Extreme drowsiness, confusion, or fainting  · Dizziness or spinning sensation (vertigo)  · Weakness of an arm or leg or one side of the face  · You have problems speaking or seeing   Date Last Reviewed: 12/1/2016  © 5395-7073 [employer],  [employer]. [address] [place] [address]. All rights reserved. This information is not intended as a substitute for professional medical care. Always follow your healthcare professional's instructions.            Weight Management: Getting Started  Healthy bodies come in all shapes and sizes. Not all bodies are made to be thin. For some people, a healthy weight is higher than the average weight listed on weight charts. Your healthcare provider can help you decide on a healthy weight for you.    Reasons to lose weight  Losing weight can help with some health problems, such as high blood pressure, heart disease, diabetes, sleep apnea, and arthritis. You may also feel more energy.  Set your long-term goal  Your goal doesn't even have to be a specific weight. You may decide on a fitness goal (such as being able to walk 10 miles a week), or a health goal (such as lowering your blood pressure). Choose a goal that is measurable and reasonable, so you know when you've reached it. A goal of reaching a BMI of less than 25 is not always reasonable (or possible).   Make an action plan  Habits dont change overnight. Setting your goals too high can leave you feeling discouraged if you cant reach them. Be realistic. Choose one or two small changes you can make now. Set an action plan for how you are going to make these changes. When you can stick to this plan, keep making a few more small changes. Taking small steps will help you stay on the path to success.  Track your progress  Write down your goals. Then, keep a daily record of your progress. Write down what you eat and how active you are. This record lets you look back on how much youve done. It may also help when youre feeling frustrated. Reward yourself for success. Even if you dont reach every goal, give yourself credit for what you do get done.  Get support  Encouragement from others can help make losing weight easier. Ask your family members and friends for  support. They may even want to join you. Also look to your healthcare provider, registered dietitian, and  for help. Your local hospital can give you more information about nutrition, exercise, and weight loss.  Date Last Reviewed: 1/31/2016  © 7442-6263 [employer], [employer]. [address] [place] [address], [place] [address]. All rights reserved. This information is not intended as a substitute for professional medical care. Always follow your healthcare professional's instructions.

## 2018-05-14 NOTE — PROGRESS NOTES
Subjective:       Patient ID: Maximilian Tavera Jr. is a 72 y.o. male.    Chief Complaint: Follow-up (6wk f/u)    Mr. Tavera comes to clinic today for follow up. The patient had a repeat CXR completed today which revealed resolution of pneumonia. The patient is scheduled for an epidural with Dr. Watt this week to help with low back pain and sciatica. He also requests a refill of ibuprofen 800mg. He reports he takes this as needed once daily. The patient requests a refill as he is leaving for a trip to [place] in a few weeks and would like to have the medication if needed. The patient did go to the initial PT assessment. The patient reports that he did not go back for additional sessions as he was awaiting approval from [employer]. The patient now has approval and he plans to restart physical therapy. The patient has an order for a repeat EKG from his cardiologist, Dr. Altamirano. He requests to have this completed today in our clinic so he does not have to drive to main [place]. .       Review of Systems   Constitutional: Negative for activity change, appetite change and fever.   HENT: Negative for postnasal drip, rhinorrhea and sinus pressure.    Eyes: Negative for visual disturbance.   Respiratory: Negative for cough and shortness of breath.    Cardiovascular: Negative for chest pain.   Gastrointestinal: Negative for abdominal distention and abdominal pain.   Genitourinary: Negative for difficulty urinating and dysuria.   Musculoskeletal: Positive for back pain and myalgias. Negative for arthralgias.   Neurological: Negative for headaches.   Hematological: Negative for adenopathy.   Psychiatric/Behavioral: The patient is not nervous/anxious.        Objective:      Physical Exam   Constitutional: He is oriented to person, place, and time.   HENT:   Mouth/Throat: Oropharynx is clear and moist.   Eyes: Conjunctivae are normal. Pupils are equal, round, and reactive to light.   Cardiovascular: Normal rate and regular rhythm.     Pulmonary/Chest: Effort normal. He has no wheezes.   Wheezes now resolved   Abdominal: Soft. Bowel sounds are normal. There is no tenderness.   Musculoskeletal: He exhibits no edema.   Abnormal gait due to low back pain with sciatica.  Pain in low back with changing position    Lymphadenopathy:     He has no cervical adenopathy.   Neurological: He is alert and oriented to person, place, and time.   Skin: No erythema.   Psychiatric: His behavior is normal.       Assessment:       1. Pneumonia due to infectious organism, unspecified laterality, unspecified part of lung    2. HTN (hypertension), benign    3. Chronic bilateral low back pain with bilateral sciatica        Plan:   Maximilian was seen today for follow-up.    Diagnoses and all orders for this visit:    Pneumonia due to infectious organism, unspecified laterality, unspecified part of lung  Resolved  Patient's symptoms resolved  HTN (hypertension), benign  Well controlled  Low salt diet  Continue current medication  Chronic bilateral low back pain with bilateral sciatica  Follow up with Dr. Watt  Follow up with PT  Other orders  -     ibuprofen (ADVIL,MOTRIN) 800 MG tablet; Take 1 tablet (800 mg total) by mouth daily as needed for Pain. Take sparingly. Take with food     EKG ordered by Dr. Altamirano completed in clinic today.

## 2018-05-17 ENCOUNTER — HOSPITAL ENCOUNTER (OUTPATIENT)
Facility: AMBULARY SURGERY CENTER | Age: 73
Discharge: HOME | End: 2018-05-17
Attending: ANESTHESIOLOGY | Admitting: ANESTHESIOLOGY
Payer: MEDICARE

## 2018-05-17 ENCOUNTER — SURGERY (OUTPATIENT)
Age: 73
End: 2018-05-17

## 2018-05-17 DIAGNOSIS — M51.36 DDD (DEGENERATIVE DISC DISEASE), LUMBAR: Primary | ICD-10-CM

## 2018-05-17 DIAGNOSIS — M54.16 LUMBAR RADICULITIS: ICD-10-CM

## 2018-05-17 PROCEDURE — 64484 NJX AA&/STRD TFRM EPI L/S EA: CPT | Performed by: ANESTHESIOLOGY

## 2018-05-17 PROCEDURE — 64483 NJX AA&/STRD TFRM EPI L/S 1: CPT | Performed by: ANESTHESIOLOGY

## 2018-05-17 PROCEDURE — 64484 NJX AA&/STRD TFRM EPI L/S EA: CPT | Mod: RT,,, | Performed by: ANESTHESIOLOGY

## 2018-05-17 PROCEDURE — 64483 NJX AA&/STRD TFRM EPI L/S 1: CPT | Mod: RT,,, | Performed by: ANESTHESIOLOGY

## 2018-05-17 PROCEDURE — 99152 MOD SED SAME PHYS/QHP 5/>YRS: CPT | Mod: ,,, | Performed by: ANESTHESIOLOGY

## 2018-05-17 RX ORDER — SODIUM CHLORIDE, SODIUM LACTATE, POTASSIUM CHLORIDE, CALCIUM CHLORIDE 600; 310; 30; 20 MG/100ML; MG/100ML; MG/100ML; MG/100ML
INJECTION, SOLUTION INTRAVENOUS ONCE AS NEEDED
Status: DISCONTINUED | OUTPATIENT
Start: 2018-05-17 | End: 2018-05-17 | Stop reason: HOSPADM

## 2018-05-17 RX ORDER — LIDOCAINE HYDROCHLORIDE 10 MG/ML
INJECTION, SOLUTION EPIDURAL; INFILTRATION; INTRACAUDAL; PERINEURAL
Status: DISCONTINUED
Start: 2018-05-17 | End: 2018-05-17 | Stop reason: HOSPADM

## 2018-05-17 RX ORDER — BUPIVACAINE HYDROCHLORIDE 2.5 MG/ML
INJECTION, SOLUTION EPIDURAL; INFILTRATION; INTRACAUDAL
Status: DISCONTINUED
Start: 2018-05-17 | End: 2018-05-17 | Stop reason: HOSPADM

## 2018-05-17 RX ORDER — ALPRAZOLAM 0.25 MG/1
1 TABLET ORAL
Status: COMPLETED | OUTPATIENT
Start: 2018-05-17 | End: 2018-05-17

## 2018-05-17 RX ORDER — ALPRAZOLAM 1 MG/1
TABLET ORAL
Status: DISPENSED
Start: 2018-05-17 | End: 2018-05-17

## 2018-05-17 RX ORDER — LIDOCAINE HYDROCHLORIDE 10 MG/ML
INJECTION, SOLUTION EPIDURAL; INFILTRATION; INTRACAUDAL; PERINEURAL
Status: DISCONTINUED | OUTPATIENT
Start: 2018-05-17 | End: 2018-05-17 | Stop reason: HOSPADM

## 2018-05-17 RX ORDER — DEXAMETHASONE SODIUM PHOSPHATE 10 MG/ML
INJECTION INTRAMUSCULAR; INTRAVENOUS
Status: DISCONTINUED | OUTPATIENT
Start: 2018-05-17 | End: 2018-05-17 | Stop reason: HOSPADM

## 2018-05-17 RX ORDER — BUPIVACAINE HYDROCHLORIDE 2.5 MG/ML
INJECTION, SOLUTION EPIDURAL; INFILTRATION; INTRACAUDAL
Status: DISCONTINUED | OUTPATIENT
Start: 2018-05-17 | End: 2018-05-17 | Stop reason: HOSPADM

## 2018-05-17 RX ORDER — DEXAMETHASONE SODIUM PHOSPHATE 10 MG/ML
INJECTION INTRAMUSCULAR; INTRAVENOUS
Status: DISCONTINUED
Start: 2018-05-17 | End: 2018-05-17 | Stop reason: HOSPADM

## 2018-05-17 RX ADMIN — ALPRAZOLAM 1 MG: 0.25 TABLET ORAL at 11:05

## 2018-05-17 RX ADMIN — BUPIVACAINE HYDROCHLORIDE 3 ML: 2.5 INJECTION, SOLUTION EPIDURAL; INFILTRATION; INTRACAUDAL at 12:05

## 2018-05-17 RX ADMIN — LIDOCAINE HYDROCHLORIDE 5 ML: 10 INJECTION, SOLUTION EPIDURAL; INFILTRATION; INTRACAUDAL; PERINEURAL at 12:05

## 2018-05-17 RX ADMIN — DEXAMETHASONE SODIUM PHOSPHATE 10 MG: 10 INJECTION INTRAMUSCULAR; INTRAVENOUS at 12:05

## 2018-05-17 NOTE — H&P (VIEW-ONLY)
Subjective:       Patient ID: Maximilian Tavera Jr. is a 72 y.o. male.    Chief Complaint: Follow-up (6wk f/u)    Mr. Tavera comes to clinic today for follow up. The patient had a repeat CXR completed today which revealed resolution of pneumonia. The patient is scheduled for an epidural with Dr. Watt this week to help with low back pain and sciatica. He also requests a refill of ibuprofen 800mg. He reports he takes this as needed once daily. The patient requests a refill as he is leaving for a trip to [place] in a few weeks and would like to have the medication if needed. The patient did go to the initial PT assessment. The patient reports that he did not go back for additional sessions as he was awaiting approval from [employer]. The patient now has approval and he plans to restart physical therapy. The patient has an order for a repeat EKG from his cardiologist, Dr. Altamirano. He requests to have this completed today in our clinic so he does not have to drive to main [place]. .       Review of Systems   Constitutional: Negative for activity change, appetite change and fever.   HENT: Negative for postnasal drip, rhinorrhea and sinus pressure.    Eyes: Negative for visual disturbance.   Respiratory: Negative for cough and shortness of breath.    Cardiovascular: Negative for chest pain.   Gastrointestinal: Negative for abdominal distention and abdominal pain.   Genitourinary: Negative for difficulty urinating and dysuria.   Musculoskeletal: Positive for back pain and myalgias. Negative for arthralgias.   Neurological: Negative for headaches.   Hematological: Negative for adenopathy.   Psychiatric/Behavioral: The patient is not nervous/anxious.        Objective:      Physical Exam   Constitutional: He is oriented to person, place, and time.   HENT:   Mouth/Throat: Oropharynx is clear and moist.   Eyes: Conjunctivae are normal. Pupils are equal, round, and reactive to light.   Cardiovascular: Normal rate and regular rhythm.     Pulmonary/Chest: Effort normal. He has no wheezes.   Wheezes now resolved   Abdominal: Soft. Bowel sounds are normal. There is no tenderness.   Musculoskeletal: He exhibits no edema.   Abnormal gait due to low back pain with sciatica.  Pain in low back with changing position    Lymphadenopathy:     He has no cervical adenopathy.   Neurological: He is alert and oriented to person, place, and time.   Skin: No erythema.   Psychiatric: His behavior is normal.       Assessment:       1. Pneumonia due to infectious organism, unspecified laterality, unspecified part of lung    2. HTN (hypertension), benign    3. Chronic bilateral low back pain with bilateral sciatica        Plan:   Maximilian was seen today for follow-up.    Diagnoses and all orders for this visit:    Pneumonia due to infectious organism, unspecified laterality, unspecified part of lung  Resolved  Patient's symptoms resolved  HTN (hypertension), benign  Well controlled  Low salt diet  Continue current medication  Chronic bilateral low back pain with bilateral sciatica  Follow up with Dr. Watt  Follow up with PT  Other orders  -     ibuprofen (ADVIL,MOTRIN) 800 MG tablet; Take 1 tablet (800 mg total) by mouth daily as needed for Pain. Take sparingly. Take with food     EKG ordered by Dr. Altamirano completed in clinic today.

## 2018-05-17 NOTE — PLAN OF CARE
Pt states ready to go home , stable, koko po fluids, ambulatory, denies pain, Leg raises performed in bed without diff and instructed on fall risk. PT and spouse verbalized understanding

## 2018-05-17 NOTE — OP NOTE
PROCEDURE DATE: 5/17/2018    PROCEDURE: Right L4-5 and L5-S1 transforaminal epidural steroid injection under fluoroscopy    DIAGNOSIS: Lumbar disc displacement without myelopathy  Post op diagnosis: Same    PHYSICIAN: Devan Watt MD    MEDICATIONS INJECTED:  Dexamethasone 5mg (0.5ml) and 1.5ml 0.25% bupivicaine at each nerve root.     LOCAL ANESTHETIC INJECTED:  Lidocaine 1%. 2 ml per site.    SEDATION MEDICATIONS: RN IV sedation    ESTIMATED BLOOD LOSS:  None    COMPLICATIONS:  None    TECHNIQUE:   A time-out was taken to identify patient and procedure side prior to starting the procedure. The patient was placed in a prone position, prepped and draped in the usual sterile fashion using ChloraPrep and sterile towels.  The area to be injected was determined under fluoroscopic guidance in AP and oblique view.  Local anesthetic was given by raising a wheal and going down to the hub of a 25-gauge 1.5 inch needle.  In oblique view, a 5 inch 22-gauge bent-tip spinal needle was introduced towards 6 oclock position of the pedicle of each above named nerve root level.  The needle was walked medially then hinged into the neural foramen and position was confirmed in AP and lateral views.  1ml contrast dye was injected to confirm appropriate placement and that there was no vascular uptake.  After negative aspiration for blood or CSF, the medication was then injected. This was performed at the right L4-5 and L5-S1 level(s). The patient tolerated the procedure well.    The patient was monitored after the procedure.  Patient was given post procedure and discharge instructions to follow at home. The patient was discharged in a stable condition.

## 2018-05-17 NOTE — DISCHARGE SUMMARY
[facility]  Discharge Note  Short Stay    Admit Date: 5/17/2018    Discharge Date and Time: 5/17/2018    Attending Physician: Devan Watt MD     Discharge Provider: Devan Watt    Diagnoses:  Active Hospital Problems    Diagnosis  POA    *Lumbar radiculitis [M54.16]  Yes      Resolved Hospital Problems    Diagnosis Date Resolved POA   No resolved problems to display.       Hospital Course: Lumbar SONIA  Discharged Condition: Good    Final Diagnoses:   Active Hospital Problems    Diagnosis  POA    *Lumbar radiculitis [M54.16]  Yes      Resolved Hospital Problems    Diagnosis Date Resolved POA   No resolved problems to display.       Disposition: Home or Self Care    Follow up/Patient Instructions:    Medications:  Reconciled Home Medications:      Medication List      CONTINUE taking these medications    albuterol 90 mcg/actuation inhaler  Inhale 2 puffs into the lungs every 6 (six) hours as needed for Wheezing.     ASPIRIN LOW DOSE 81 MG EC tablet  Generic drug:  aspirin  Take 81 mg by mouth once daily.     atorvastatin 80 MG tablet  Commonly known as:  LIPITOR  Take 0.5 tablets (40 mg total) by mouth once daily.     carvedilol 25 MG tablet  Commonly known as:  COREG  Take 0.5 tablets (12.5 mg total) by mouth 2 (two) times daily.     cetirizine 10 MG tablet  Commonly known as:  ZYRTEC  Take 1 tablet by mouth daily as needed.     chlorthalidone 25 MG Tab  Commonly known as:  HYGROTEN  TAKE 1 TABLET EVERY DAY     CITRACAL PLUS D 315-200 mg-unit per tablet  Generic drug:  calcium citrate-vitamin D3 315-200 mg  Take 1 tablet by mouth once daily. Twice a day     CO Q-10 100 mg capsule  Generic drug:  coenzyme Q10  Take 400 mg by mouth once daily.     ezetimibe 10 mg tablet  Commonly known as:  ZETIA  Take 1 tablet (10 mg total) by mouth once daily.     fluticasone 50 mcg/actuation nasal spray  Commonly known as:  FLONASE  USE 1 SPRAY IN EACH NOSTRIL TWICE DAILY AS NEEDED  FOR  RHINITIS     gabapentin 300 MG  capsule  Commonly known as:  NEURONTIN  Take 1 capsule (300 mg total) by mouth 2 (two) times daily.     ibuprofen 800 MG tablet  Commonly known as:  ADVIL,MOTRIN  Take 1 tablet (800 mg total) by mouth daily as needed for Pain.     levothyroxine 50 MCG tablet  Commonly known as:  SYNTHROID  TAKE 1 TABLET EVERY DAY     lisinopril 40 MG tablet  Commonly known as:  PRINIVIL,ZESTRIL  Take 0.5 tablets (20 mg total) by mouth once daily.     milk thistle 200 mg Cap  Take 200 mg by mouth 2 (two) times daily. Twice a day     MSM 1,000 mg Tab  Generic drug:  methylsulfonylmethane  Take 1,000 mg by mouth once daily. Every day     omega-3 fatty acids 1,000 mg Cap  Twice a day     potassium chloride 8 MEQ Tbsr  Commonly known as:  KLOR-CON  Two tablets by mouth three times a day or as directed     * predniSONE 1 MG tablet  Commonly known as:  DELTASONE  TAKE 3 TABLETS  EVERY OTHER DAY     * predniSONE 5 MG tablet  Commonly known as:  DELTASONE  TAKE 1 TABLET EVERY OTHER DAY     promethazine-codeine 6.25-10 mg/5 ml 6.25-10 mg/5 mL syrup  Commonly known as:  PHENERGAN with CODEINE  Take 5 mLs by mouth every 6 (six) hours as needed for Cough.     sildenafil (antihypertensive) 20 mg Tab  Commonly known as:  REVATIO  Take 1 to 3 tabs daily as needed.     tiZANidine 4 MG tablet  Commonly known as:  ZANAFLEX  TAKE 1 TABLET(4 MG) BY MOUTH EVERY 8 HOURS     VITAMIN B-12 5,000 mcg Subl  Generic drug:  cyanocobalamin (vitamin B-12)  Take 5,000 mcg by mouth once daily. Every day     VITAMIN D 2,000 unit Cap  Generic drug:  cholecalciferol (vitamin D3)  1 capsule once daily. Every day        * This list has 2 medication(s) that are the same as other medications prescribed for you. Read the directions carefully, and ask your doctor or other care provider to review them with you.                Discharge Procedure Orders  Call MD for:  temperature >100.4     Call MD for:  persistent nausea and vomiting or diarrhea     Call MD for:  severe  uncontrolled pain     Call MD for:  redness, tenderness, or signs of infection (pain, swelling, redness, odor or green/yellow discharge around incision site)     Call MD for:  difficulty breathing or increased cough     Call MD for:  severe persistent headache          Follow up with MD in 2-3 weeks    Discharge Procedure Orders (must include Diet, Follow-up, Activity):    Discharge Procedure Orders (must include Diet, Follow-up, Activity)  Call MD for:  temperature >100.4     Call MD for:  persistent nausea and vomiting or diarrhea     Call MD for:  severe uncontrolled pain     Call MD for:  redness, tenderness, or signs of infection (pain, swelling, redness, odor or green/yellow discharge around incision site)     Call MD for:  difficulty breathing or increased cough     Call MD for:  severe persistent headache

## 2018-05-17 NOTE — DISCHARGE INSTRUCTIONS
Recovery After Procedural Sedation (Adult)  You have been given medicine by vein to make you sleep during your surgery. This may have included both a pain medicine and sleeping medicine. Most of the effects have worn off. But you may still have some drowsiness for the next 6 to 8 hours.  Home care  Follow these guidelines when you get home:  · For the next 8 hours, you should be watched by a responsible adult. This person should make sure your condition is not getting worse.  · Don't drink any alcohol for the next 24 hours.  · Don't drive, operate dangerous machinery, or make important business or personal decisions during the next 24 hours.  Note: Your healthcare provider may tell you not to take any medicine by mouth for pain or sleep in the next 4 hours. These medicines may react with the medicines you were given in the hospital. This could cause a much stronger response than usual.  Follow-up care  Follow up with your healthcare provider if you are not alert and back to your usual level of activity within 12 hours.  When to seek medical advice  Call your healthcare provider right away if any of these occur:  · Drowsiness gets worse  · Weakness or dizziness gets worse  · Repeated vomiting  · You can't be awakened   Date Last Reviewed: 10/18/2016  © 7090-1535 [employer]. [address] [place] [address], [place] [address]. All rights reserved. This information is not intended as a substitute for professional medical care. Always follow your healthcare professional's instructions.      Pain injection instructions:     Steroids take about a 2 weeks to relieve pain.  Initially you may get pain relief from the local anesthetic but this may wear off before the steroid works.    No driving for 24 hrs.   Activity as tolerated- gradually increase activities.  Dont lift over 10 lbs for 24 hrs   No heat at injection sites x 2 days. No heating pads, hot tubs, saunas, or swimming in any body of water or pool for 2  days.  Use ice pack for mild swelling and for comfort , apply for 20 minutes, remove for 20 minute intervals. No direct contact of ice itself  to skin.  May shower today. No tub baths for two days.      Resume Aspirin, Plavix, or Coumadin the day after the procedure unless otherwise instructed.   If diabetic,monitor your glucose carefully as steroids can increase your glucose level    Seek immediate medical help for:   Severe increase in your usual pain or appearance of new pain.  Prolonged (mor than 8 hours) or increasing weakness or numbness in the legs or arms.    - Numbing medicine was injected that affects nerves that carry information from       muscles to the  brain and the brain to the muscles.  This numbness can last 6-8 hrs so be very careful and get assistance when standing or walking.    Fever above 101 ,Drainage,redness,active bleeding, or increased swelling at the injection site.  Headache, shortness of breath, chest pain, or breathing problems.

## 2018-05-21 VITALS
DIASTOLIC BLOOD PRESSURE: 81 MMHG | WEIGHT: 237.44 LBS | HEIGHT: 73 IN | HEART RATE: 69 BPM | RESPIRATION RATE: 18 BRPM | TEMPERATURE: 98 F | BODY MASS INDEX: 31.47 KG/M2 | SYSTOLIC BLOOD PRESSURE: 141 MMHG | OXYGEN SATURATION: 94 %

## 2018-06-28 ENCOUNTER — OFFICE VISIT (OUTPATIENT)
Dept: PAIN MEDICINE | Facility: CLINIC | Age: 73
End: 2018-06-28
Payer: MEDICARE

## 2018-06-28 VITALS
SYSTOLIC BLOOD PRESSURE: 143 MMHG | WEIGHT: 237 LBS | HEIGHT: 73 IN | DIASTOLIC BLOOD PRESSURE: 74 MMHG | HEART RATE: 67 BPM | BODY MASS INDEX: 31.41 KG/M2

## 2018-06-28 DIAGNOSIS — M54.16 LUMBAR RADICULOPATHY: Primary | ICD-10-CM

## 2018-06-28 DIAGNOSIS — M79.18 MYOFASCIAL PAIN: ICD-10-CM

## 2018-06-28 DIAGNOSIS — M51.36 DDD (DEGENERATIVE DISC DISEASE), LUMBAR: ICD-10-CM

## 2018-06-28 PROCEDURE — 99213 OFFICE O/P EST LOW 20 MIN: CPT | Mod: S$GLB,,, | Performed by: PHYSICIAN ASSISTANT

## 2018-06-28 PROCEDURE — 3078F DIAST BP <80 MM HG: CPT | Mod: CPTII,S$GLB,, | Performed by: PHYSICIAN ASSISTANT

## 2018-06-28 PROCEDURE — 3077F SYST BP >= 140 MM HG: CPT | Mod: CPTII,S$GLB,, | Performed by: PHYSICIAN ASSISTANT

## 2018-06-28 PROCEDURE — 99999 PR PBB SHADOW E&M-EST. PATIENT-LVL V: CPT | Mod: PBBFAC,,, | Performed by: PHYSICIAN ASSISTANT

## 2018-06-28 NOTE — PROGRESS NOTES
PCP: Brian Marroquin MD      CC: right leg pain    Interval history: Mr. Tavera is a 72 y.o. male with lumbar radiculopathy who presents today for f/u s/p right TFESI at L4-5 and L5-S1. Reports 40-50% improvement of right LE pain. Low back pain has worsened on the right.   Previous SONIA provided him relief for over 5 months.    No leg weakness or numbness.   Pain is relieved with rest.  He takes ibuprofen with mild benefits.  He takes Gabapentin 300 mg TID with no relief. He has a new c/o left hand numbness in his 4th and 5th digit. He woke up with numbness a couple of weeks ago. Numbness has not improved. Pain today is rated 6/10.      Prior HPI:   Mr. Tavera is a 68 year old male with PMH of HTN referred by Dr. Campbell for right leg pain.  He states having constant right leg pain for the past two years.  Pain is a throbbing pain in her posterior thigh and travels down to his ankle.  He denies any lower back pain.  No leg weakness or numbness.  Pain worsens with standing and walking.  Pain is relieved with rest.  He takes ibuprofen with mild benefits.  Physical therapy has not been helpful.      Pain intervention history: s/p right L4-5 and L5-S1 TFESI on 10/9/2014 and reports 75% relief of his low back and right leg pain.   s/p right L4-5 and L5-S1 TFESI on 2/21/17 and 9/2017 reports 70% relief of his right leg pain.  S/p right IESI at L4-5 and L5-S1 on 5/17/2018     ROS:  CONSTITUTIONAL: No fevers, chills, night sweats, wt. loss, appetite changes  SKIN: no rashes or itching  ENT: No headaches, head trauma, vision changes, or eye pain  LYMPH NODES: None noticed   CV: No chest pain, palpitations.   RESP: No shortness of breath, dyspnea on exertion, cough, wheezing, or hemoptysis  GI: No nausea, emesis, diarrhea, constipation, melena, hematochezia, pain.    : No dysuria, hematuria, urgency, or frequency   HEME: No easy bruising, bleeding problems  PSYCHIATRIC: No depression, anxiety, psychosis,  "hallucinations.  NEURO: No seizures, memory loss, dizziness or difficulty sleeping  MSK: + right leg pain      Past Medical History:   Diagnosis Date    Arthritis     Back pain     Carpal tunnel syndrome 2/25/2013    Cataract     Chest pain, musculoskeletal     Hyperlipidemia     Hypertension     Hypothyroidism     Knee fracture     Myasthenia gravis     Polyneuropathy     Squamous cell carcinoma 2014    left forearm    Thyroid disease      Past Surgical History:   Procedure Laterality Date    APPENDECTOMY      CATARACT EXTRACTION W/  INTRAOCULAR LENS IMPLANT Bilateral     HEMORRHOID SURGERY      KNEE ARTHROPLASTY Bilateral     NECK SURGERY      TONSILLECTOMY       Family History   Problem Relation Age of Onset    Cataracts Mother     Heart disease Mother         CHF    Hypertension Mother     Hyperlipidemia Mother     Cataracts Father     Glaucoma Father     Heart disease Father     Hyperlipidemia Sister     Hypertension Sister     No Known Problems Daughter     No Known Problems Daughter     Collagen disease Neg Hx     Amblyopia Neg Hx     Blindness Neg Hx     Macular degeneration Neg Hx     Retinal detachment Neg Hx     Strabismus Neg Hx     Cancer Neg Hx      Social History     Social History    Marital status:      Spouse name: N/A    Number of children: N/A    Years of education: N/A     Social History Main Topics    Smoking status: Passive Smoke Exposure - Never Smoker    Smokeless tobacco: Never Used      Comment: when a child    Alcohol use Yes      Comment: rarely    Drug use: No    Sexual activity: Yes     Partners: Female     Other Topics Concern    None     Social History Narrative    None         Medications/Allergies: See med card    Vitals:    06/28/18 1003   BP: (!) 143/74   Pulse: 67   Weight: 107.5 kg (237 lb)   Height: 6' 1" (1.854 m)   PainSc:   6   PainLoc: Back         Physical exam:    GENERAL: A and O x3, the patient appears well groomed " and is in no acute distress.  Skin: No rashes or obvious lesions  HEENT: normocephalic, atraumatic  CARDIOVASCULAR:  RRR  LUNGS: non labored breathing  ABDOMEN: soft, nontender   UPPER EXTREMITIES: Normal alignment, normal range of motion, no atrophy, no skin changes,  hair growth and nail growth normal and equal bilaterally. No swelling, no tenderness.    LOWER EXTREMITIES:  Normal alignment, normal range of motion, no atrophy, no skin changes,  hair growth and nail growth normal and equal bilaterally. No swelling, no tenderness.    LUMBAR SPINE  Lumbar spine: ROM is full with flexion extension and oblique extension with no increased pain.    Porter's test causes no increased pain on either side.    Supine straight leg raise is negative bilaterally.    Internal and external rotation of the hip causes no increased pain on either side.  Myofascial exam: No tenderness to palpation across lumbar paraspinous muscles.      MENTAL STATUS: normal orientation, speech, language, and fund of knowledge for social situation.  Emotional state appropriate.    CRANIAL NERVES:  II:  PERRL bilaterally,   III,IV,VI: EOMI.    V:  Facial sensation equal bilaterally  VII:  Facial motor function normal.  VIII:  Hearing equal to finger rub bilaterally  IX/X: Gag normal, palate symmetric  XI:  Shoulder shrug equal, head turn equal  XII:  Tongue midline without fasciculations      MOTOR: Tone and bulk: normal bilateral upper and lower Strength: normal    IP ADD ABD Quad TA Gas HAM  R 5 5 5 5 5 5 5  L 5 5 5 5 5 5 5    SENSATION: Light touch and pinprick intact bilaterally  REFLEXES: normal, symmetric, nonbrisk.  Toes down, no clonus. No hoffmans.  GAIT: normal rise, base, steps, and arm swing.      Imaging:  None    Assessment:  Mr. Tavera is a 72 y.o. male with   1. Lumbar radiculopathy    2. DDD (degenerative disc disease), lumbar    3. Myofascial pain      Plan:  1.  I have stressed the importance of physical activity and exercise to  improve overall health  2. Monitor progress and consider repeat lumbar epidural steroid injection to the Right L4-5 and L5-S1 level(s) in the future.  3. Order Lumbar MRI  4. Increase Gabapentin to 1200  Mg in 3 divided doses.   5. Ordered PT to focus on back. Will be beneifical to prevent further muscle atrophy and increase mobility and strength   6. If no improvement in Left UE hand numbness, will order cervical EMG to evaluate  7. F/u s/p completion of PT. Will call with MRI results.

## 2018-07-02 ENCOUNTER — PATIENT MESSAGE (OUTPATIENT)
Dept: NEUROLOGY | Facility: CLINIC | Age: 73
End: 2018-07-02

## 2018-07-02 DIAGNOSIS — G56.03 BILATERAL CARPAL TUNNEL SYNDROME: Primary | ICD-10-CM

## 2018-07-02 NOTE — TELEPHONE ENCOUNTER
Patient states that he just returned from 3 week trip in [place]. The first night that he arrived in [place] the pain started. Mr. Yao states that he is not experiencing the neck pain so he's not sure if problem is related to previous complaint of 2/27/18.

## 2018-07-06 NOTE — TELEPHONE ENCOUNTER
Called to speak with patient. Still having bilateral hand numbness when sleeping.     Also has had numbness in left 4th and 5th digits since he took a 20min nap 3-4 weeks ago. No weakness and doesn't extend into forearm.     Imp: query cts and ulnar neuropathies     Plan: wrist splints and emg    Jam Gill MD, ARUA, FAAN  Department of Neurology   [facility] [place], [place]

## 2018-07-06 NOTE — TELEPHONE ENCOUNTER
Spoke with patient regarding EMG appt on Monday, July 9th at 8:30AM. Patient stated that he does not have his calendar with him and the appt is too early in the morning for him. Patient informed that RN will speak with Aravind EMG Tech, regarding appt and follow-up with him on Monday.

## 2018-07-18 ENCOUNTER — PATIENT MESSAGE (OUTPATIENT)
Dept: PAIN MEDICINE | Facility: CLINIC | Age: 73
End: 2018-07-18

## 2018-07-18 NOTE — TELEPHONE ENCOUNTER
Eva spoke to pt regarding referral being [facility] due to pt having MRI at an outside facility.

## 2018-07-22 ENCOUNTER — PATIENT MESSAGE (OUTPATIENT)
Dept: PAIN MEDICINE | Facility: CLINIC | Age: 73
End: 2018-07-22

## 2018-07-22 DIAGNOSIS — M54.12 CERVICAL RADICULOPATHY: Primary | ICD-10-CM

## 2018-07-23 NOTE — TELEPHONE ENCOUNTER
Ok to order Cervical MRI to evaluate cervical Radiculitis. It also looks like he is going to have a EMG with Dr. Gill.

## 2018-07-23 NOTE — TELEPHONE ENCOUNTER
Pt called in c/o neck pain. He would like to have a MRI of his neck the same day as his lower back. He is scheduled this Friday.

## 2018-07-25 ENCOUNTER — TELEPHONE (OUTPATIENT)
Dept: PAIN MEDICINE | Facility: CLINIC | Age: 73
End: 2018-07-25

## 2018-07-25 NOTE — TELEPHONE ENCOUNTER
----- Message from Rocio Phillips sent at 7/25/2018  1:30 PM CDT -----  Contact: self  Needs a call back regarding stand up MRI of the neck. Please call back at [phone] ([phone])

## 2018-07-26 ENCOUNTER — PATIENT MESSAGE (OUTPATIENT)
Dept: PAIN MEDICINE | Facility: CLINIC | Age: 73
End: 2018-07-26

## 2018-08-01 ENCOUNTER — PATIENT MESSAGE (OUTPATIENT)
Dept: PAIN MEDICINE | Facility: CLINIC | Age: 73
End: 2018-08-01

## 2018-08-03 ENCOUNTER — DOCUMENTATION ONLY (OUTPATIENT)
Dept: PAIN MEDICINE | Facility: CLINIC | Age: 73
End: 2018-08-03

## 2018-08-03 NOTE — PROGRESS NOTES
Lumbar MRI: Disc bulging or herniation and facet arthropathy at all levels. We can repeat SONIA on right with the addition of L3 level.    Cervical MRI: Central canal stenosis above level of fusion at C3/4 and C4/5. We can try a Cervical SONIA for symptomatic relief. Recommend an evaluation with neurosurgery as well.

## 2018-08-06 ENCOUNTER — PATIENT MESSAGE (OUTPATIENT)
Dept: PAIN MEDICINE | Facility: CLINIC | Age: 73
End: 2018-08-06

## 2018-08-06 DIAGNOSIS — M54.12 CERVICAL RADICULOPATHY: Primary | ICD-10-CM

## 2018-08-07 NOTE — TELEPHONE ENCOUNTER
Per patient request cancelled procedure on 8/20/18 and placed referral to Dr. Ryan Medina. Patient accepted and voiced understanding.

## 2018-08-09 ENCOUNTER — TELEPHONE (OUTPATIENT)
Dept: SPINE | Facility: CLINIC | Age: 73
End: 2018-08-09

## 2018-08-09 ENCOUNTER — OFFICE VISIT (OUTPATIENT)
Dept: NEUROLOGY | Facility: CLINIC | Age: 73
End: 2018-08-09
Payer: MEDICARE

## 2018-08-09 VITALS
SYSTOLIC BLOOD PRESSURE: 142 MMHG | HEART RATE: 68 BPM | BODY MASS INDEX: 29.57 KG/M2 | DIASTOLIC BLOOD PRESSURE: 85 MMHG | HEIGHT: 73 IN | WEIGHT: 223.13 LBS

## 2018-08-09 DIAGNOSIS — G70.00 MYASTHENIA GRAVIS, ACHR ANTIBODY POSITIVE: ICD-10-CM

## 2018-08-09 DIAGNOSIS — G70.00 MYASTHENIA GRAVIS: Primary | ICD-10-CM

## 2018-08-09 DIAGNOSIS — M48.02 CERVICAL STENOSIS OF SPINE: ICD-10-CM

## 2018-08-09 PROCEDURE — 3077F SYST BP >= 140 MM HG: CPT | Mod: CPTII,S$GLB,, | Performed by: PSYCHIATRY & NEUROLOGY

## 2018-08-09 PROCEDURE — 99214 OFFICE O/P EST MOD 30 MIN: CPT | Mod: S$GLB,,, | Performed by: PSYCHIATRY & NEUROLOGY

## 2018-08-09 PROCEDURE — 3079F DIAST BP 80-89 MM HG: CPT | Mod: CPTII,S$GLB,, | Performed by: PSYCHIATRY & NEUROLOGY

## 2018-08-09 PROCEDURE — 99999 PR PBB SHADOW E&M-EST. PATIENT-LVL III: CPT | Mod: PBBFAC,,, | Performed by: PSYCHIATRY & NEUROLOGY

## 2018-08-09 RX ORDER — PREDNISONE 20 MG/1
20 TABLET ORAL DAILY
Qty: 30 TABLET | Refills: 1 | Status: SHIPPED | OUTPATIENT
Start: 2018-08-09 | End: 2019-01-08 | Stop reason: ALTCHOICE

## 2018-08-09 NOTE — PROGRESS NOTES
[facility], Department of Neurology   [facility] [place] [address]  Phone:[phone]  Fax: [phone]    Patient Name: Maximilian Tavera Jr.  : 1945  MRN:  [id number]    2018     chief complaint: myasthenia gravis     PCP: Brian Marroquin MD.    DX: Ab+ MG dx'ed 3/2015  Thymic status: CT chest 4/15 negative for thymoma   Maintenance: prednisone 7 mg qOD; mestinon SE - diarrhea  Last dose change: 16: 8mg qOD -> 7mg qOD; : 10mg qOD to 8mg qOD; 6/15 prednisone 5 mg qd --> 10 mg q OD   Rescue: none   Prior immunemodulatory agents: none     Interval history 18:  Underwent MRI Cspine and Lspine for new onset neck pain, chronic hand numbness and LBP. 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     Having trouble with head drop, and some mild chewing issues. No diplopia, ptosis or UE/LE weakness. Still on prednisone 7mg qOD. Has numbness in arms that wakes him up at night.    Interval history 18:  Doing well currently with respect to MG. Mild diplopia at end of day.     Waking up with numbness in his hands, bilateral. Relieved with movement. No numbness or weakness during the day.    Occasional tongue biting.     Interval history 17:   Mr Tavera is a 70yo male with Ab+ myasthenia gravis diagnosed in , maintained on 7mg prednisone qOD. Has been doing well since his last visit. Minimal diplopia at the end of the day. Has some generalized fatigue when it's hot, but tries to do all his chores early in the day. Has started exercising.      Interval 3/21/17: Doing well from an MG standpoint on low-dose prednisone. He still has a occasional diplopia at night when looking down. Has rare choking - mostly liquids - when he's not paying attention. Had a number of URIs last year. Also noticed that his sense of taste is off, although sense of smell is preserved.     Interval history 16:  Doing well. Last visit, dropped  "prednisone from 20 to 8mg qOD. No new symptoms. Still having diplopia on downgaze and only rarely at other times. No chewing or swallowing issues.    Interval history 8/23/16  Doing well. Still with mild baseline diplopia and rare difficulty with swallowing (this does not last more than a few seconds). No issues with speaking, breathing, or generalized weakness. Heat "wears me out".       HPI 5/17/16  Maximilian Tavera Jr. is a 71 yo male with Ab+ myasthenia gravis. Doing well, stable. Occasional diplopia, mostly with laying back too far in his recliner while watching tv. Mentions 6-8 mos ago began noticing rhinorrhea with eating. Denies ptosis, difficulty chewing/swallowing/breathing. Denies weight changes, sweats, alternating diarrhea/constipation.     Prednisone 10 mg q OD    Interval hx  2/12/16:  Maximilian Tavera Jr. is a 71 yo  male with myasthenia gravis. Still has occasional diplopia, but is improved- occuring less frequently. Occurs most when watching TV. Occasional "funny feeling" when drinking soft drinks. Denies weakness, SOB, dysarthria.     Taking prednisone 5mg/day.   Stopped Mestion- GI side effects     Interval history 6/17/15:  Taking mestinon at 1/2, 1/2, 1 and not finding much improvement in diplopia, and having a decent amount of loose stools and gas.     HPI: Maximilian Tavera Jr. is a 69 y.o. male with 7-8mos of diplopia. Was seen by Dr Jimenez, who did some blood work and found that he was Ab+ for AchR. He was referred here for further care.     No prior symptoms. No difficulty chewing/swallowing, breathing. Arm/leg strength is fine. No orthopnea.     Diplopia is most noticeable when reading or watching tv. Driving is occasionally problematic. No clearly diurnal. No real problem with ptosis.     He has not tried medications for this. He has prisms, which help.     Looks like he started carvedilol in 3/14.     Medications:   Current Outpatient Prescriptions   Medication Sig " Dispense Refill    albuterol 90 mcg/actuation inhaler Inhale 2 puffs into the lungs every 6 (six) hours as needed for Wheezing. 1 each 1    ASPIRIN LOW DOSE 81 mg EC tablet Take 81 mg by mouth once daily.       atorvastatin (LIPITOR) 80 MG tablet Take 0.5 tablets (40 mg total) by mouth once daily. 90 tablet 3    calcium citrate-vitamin D (CITRACAL + D) 315-200 mg-unit per tablet Take 1 tablet by mouth once daily. Twice a day      carvedilol (COREG) 25 MG tablet Take 0.5 tablets (12.5 mg total) by mouth 2 (two) times daily. 180 tablet 3    cetirizine (ZYRTEC) 10 MG tablet Take 1 tablet by mouth daily as needed.      chlorthalidone (HYGROTEN) 25 MG Tab TAKE 1 TABLET EVERY DAY 90 tablet 3    cholecalciferol, vitamin D3, (VITAMIN D) 2,000 unit Cap 1 capsule once daily. Every day      coenzyme Q10 (CO Q-10) 100 mg capsule Take 400 mg by mouth once daily.       cyanocobalamin, vitamin B-12, (VITAMIN B-12) 5,000 mcg Subl Take 5,000 mcg by mouth once daily. Every day      fluticasone (FLONASE) 50 mcg/actuation nasal spray USE 1 SPRAY IN EACH NOSTRIL TWICE DAILY AS NEEDED  FOR  RHINITIS 48 g 3    gabapentin (NEURONTIN) 300 MG capsule Take 1 capsule (300 mg total) by mouth 2 (two) times daily. 180 capsule 11    ibuprofen (ADVIL,MOTRIN) 800 MG tablet Take 1 tablet (800 mg total) by mouth daily as needed for Pain. 30 tablet 0    levothyroxine (SYNTHROID) 50 MCG tablet TAKE 1 TABLET EVERY DAY 90 tablet 3    lisinopril (PRINIVIL,ZESTRIL) 40 MG tablet Take 0.5 tablets (20 mg total) by mouth once daily. 90 tablet 3    methylsulfonylmethane (MSM) 1,000 mg Tab Take 1,000 mg by mouth once daily. Every day      milk thistle 200 mg Cap Take 200 mg by mouth 2 (two) times daily. Twice a day      omega-3 fatty acids 1,000 mg Cap Twice a day      potassium chloride (KLOR-CON) 8 MEQ TbSR Two tablets by mouth three times a day or as directed 540 tablet 3    predniSONE (DELTASONE) 1 MG tablet TAKE 3 TABLETS  EVERY OTHER DAY  135 tablet 3    predniSONE (DELTASONE) 5 MG tablet TAKE 1 TABLET EVERY OTHER DAY 45 tablet 3    promethazine-codeine 6.25-10 mg/5 ml (PHENERGAN WITH CODEINE) 6.25-10 mg/5 mL syrup Take 5 mLs by mouth every 6 (six) hours as needed for Cough. 240 mL 0    sildenafil (REVATIO) 20 mg Tab Take 1 to 3 tabs daily as needed. 30 tablet 3    tiZANidine (ZANAFLEX) 4 MG tablet TAKE 1 TABLET(4 MG) BY MOUTH EVERY 8 HOURS 270 tablet 0    ezetimibe (ZETIA) 10 mg tablet Take 1 tablet (10 mg total) by mouth once daily. 90 tablet 3    predniSONE (DELTASONE) 20 MG tablet Take 1 tablet (20 mg total) by mouth once daily. 30 tablet 1     No current facility-administered medications for this visit.        Allergies:  Review of patient's allergies indicates:   Allergen Reactions    No known drug allergies        PMHx:  Past Medical History:   Diagnosis Date    Arthritis     Back pain     Carpal tunnel syndrome 2/25/2013    Cataract     Chest pain, musculoskeletal     Hyperlipidemia     Hypertension     Hypothyroidism     Knee fracture     Myasthenia gravis     Polyneuropathy     Squamous cell carcinoma 2014    left forearm    Thyroid disease      Past Surgical History:   Procedure Laterality Date    APPENDECTOMY      CATARACT EXTRACTION W/  INTRAOCULAR LENS IMPLANT Bilateral     HEMORRHOID SURGERY      KNEE ARTHROPLASTY Bilateral     NECK SURGERY      TONSILLECTOMY         Fhx:  Family History   Problem Relation Age of Onset    Cataracts Mother     Heart disease Mother         CHF    Hypertension Mother     Hyperlipidemia Mother     Cataracts Father     Glaucoma Father     Heart disease Father     Hyperlipidemia Sister     Hypertension Sister     No Known Problems Daughter     No Known Problems Daughter     Collagen disease Neg Hx     Amblyopia Neg Hx     Blindness Neg Hx     Macular degeneration Neg Hx     Retinal detachment Neg Hx     Strabismus Neg Hx     Cancer Neg Hx        Shx:   Social  "History     Social History    Marital status:      Spouse name: N/A    Number of children: N/A    Years of education: N/A     Occupational History    Not on file.     Social History Main Topics    Smoking status: Passive Smoke Exposure - Never Smoker    Smokeless tobacco: Never Used      Comment: when a child    Alcohol use Yes      Comment: rarely    Drug use: No    Sexual activity: Yes     Partners: Female     Other Topics Concern    Not on file     Social History Narrative    No narrative on file       ROS:  Review of Systems (Questions asked; positives or additions noted in BOLD)  Gen Malaise/fatigue, weight change   HEENT Hearing loss, blurred vision, diplopia; Head drop   Card CP, palpitations   Pulm SOB, cough    Vasc Easy bruising, easy bleeding    GI Nausea, vomiting, constipation    Frequency, urgency   M/S Joint pain, myalgias, falls    Endocrine Temperature intolerance, polyuria, polydipsia   Neuro Dizziness, tremors, seizures, focal weakness, Others- as noted in HPI   Psych Memory loss, depression, anxiety, hallucinations       PHYSICAL EXAM:   BP (!) 142/85   Pulse 68   Ht 6' 1" (1.854 m)   Wt 101.2 kg (223 lb 1.7 oz)   BMI 29.44 kg/m²    GEN:  NAD, well-developed, well-groomed.  HEENT: No cervical lymphadenopathy noted.  CARDIOVASCULAR: Radial pulses 2+ bilaterally. No LE edema noted.  NEURO:  Mental Status: Awake, alert, and oriented. Normal attention and concentration.  Speech is fluent and appropriate language function.    Cranial Nerves:  II Pupils are round and reactive to direct and consensual light and accommodation. Visual fields full to confrontation.   III, IV, VI Extraocular eye movements full bilaterally. Mild left ptosis noted. He has mild orbicularis oculi weakness bilaterally.   V Normal facial sensation to pinprick and light touch.   VII Symmetric facial expressions.   VIII Hearing intact to finger rub bilaterally.   IX, X Palate elevates midline and " symmetrically.   XI Shoulder elevation is symmetric and full strength bilaterally. Neck rotation strength is normal bilaterally. Mild neck extension weakness   XII Tongue is midline.     Sensory: Sensation is normal to light touch in the upper and lower extremities bilaterally.    Motor: Extremities demonstrate normal bulk and tone in all four limbs. Mild left ocular weakness with sustained upward gaze. Good orbicularis oculi and orbicularis oris strength.  Strength-       RUE: 5/5                LUE: 5/5                RLE: 5/5                LLE: 5/5     Deep Tendon Relfexes: 2+ and symmetric in the upper and lower extremities bilaterally. Babinski mute bilaterally.    Coordination: Finger to nose WNL.     Gait: Normal casual gait.      ASSESSMENT and PLAN:   Problem List Items Addressed This Visit     Myasthenia gravis, AChR antibody positive    Overview     Ab+, thymoma negative, generalized MG, dx'ed 2015  Prednisone qOD; mestinon does not help symptoms  Rescue: none  Prior Immune: none         Current Assessment & Plan     Increase prednisone to 20mg, query whether MG is flaring up a bit (head drop).            Relevant Medications    predniSONE (DELTASONE) 20 MG tablet    Cervical stenosis of spine    Current Assessment & Plan     Refer to Dr Turner for evaluation. Asked patient to bring films with him to that appointment           Other Visit Diagnoses     Myasthenia gravis    -  Primary    Relevant Medications    predniSONE (DELTASONE) 20 MG tablet              Jam Gill MD, AURA, FAAN  Department of Neurology   [facility] [place], [place]

## 2018-08-09 NOTE — LETTER
August 9, 2018      Brian Marroquin MD  [address] [facility] [address]           [facility]  [address] [facility] [address]  Phone: [phone]  Fax: [phone]          Patient: Maximilian Tavera Jr.   MR Number: [id number]   YOB: 1945   Date of Visit: 8/9/2018       Dear Dr. Brian Marroquin:    Thank you for referring Maximilian Tavera to me for evaluation. Attached you will find relevant portions of my assessment and plan of care.    If you have questions, please do not hesitate to call me. I look forward to following Maximilian Tavera along with you.    Sincerely,    Lance Gill MD    Enclosure  CC:  No Recipients    If you would like to receive this communication electronically, please contact [email or web address] or [phone] to request more information on [employer] access.    For providers and/or their staff who would like to refer a patient to [facility], please contact us through our one-stop-shop provider referral line, [facility], at [phone].    If you feel you have received this communication in error or would no longer like to receive these types of communications, please e-mail [email or web address]

## 2018-08-10 ENCOUNTER — DOCUMENTATION ONLY (OUTPATIENT)
Dept: FAMILY MEDICINE | Facility: CLINIC | Age: 73
End: 2018-08-10

## 2018-08-10 NOTE — PROGRESS NOTES
Pre-Visit Chart Review  For Appointment Scheduled on 08/13/2018    Health Maintenance Due   Topic Date Due    Influenza Vaccine  08/01/2018

## 2018-08-13 ENCOUNTER — OFFICE VISIT (OUTPATIENT)
Dept: FAMILY MEDICINE | Facility: CLINIC | Age: 73
End: 2018-08-13
Payer: MEDICARE

## 2018-08-13 VITALS
SYSTOLIC BLOOD PRESSURE: 118 MMHG | HEIGHT: 73 IN | TEMPERATURE: 98 F | OXYGEN SATURATION: 95 % | RESPIRATION RATE: 17 BRPM | BODY MASS INDEX: 29.51 KG/M2 | HEART RATE: 75 BPM | DIASTOLIC BLOOD PRESSURE: 60 MMHG | WEIGHT: 222.69 LBS

## 2018-08-13 VITALS
SYSTOLIC BLOOD PRESSURE: 118 MMHG | HEART RATE: 75 BPM | BODY MASS INDEX: 29.51 KG/M2 | WEIGHT: 222.69 LBS | HEIGHT: 73 IN | TEMPERATURE: 98 F | DIASTOLIC BLOOD PRESSURE: 60 MMHG

## 2018-08-13 DIAGNOSIS — M48.02 CERVICAL STENOSIS OF SPINE: ICD-10-CM

## 2018-08-13 DIAGNOSIS — I77.1 TORTUOUS AORTA: ICD-10-CM

## 2018-08-13 DIAGNOSIS — E78.2 MIXED HYPERLIPIDEMIA: ICD-10-CM

## 2018-08-13 DIAGNOSIS — M51.36 DDD (DEGENERATIVE DISC DISEASE), LUMBAR: ICD-10-CM

## 2018-08-13 DIAGNOSIS — I10 HTN (HYPERTENSION), BENIGN: ICD-10-CM

## 2018-08-13 DIAGNOSIS — G62.9 NEUROPATHY: ICD-10-CM

## 2018-08-13 DIAGNOSIS — G70.00 MYASTHENIA GRAVIS, ACHR ANTIBODY POSITIVE: Primary | ICD-10-CM

## 2018-08-13 DIAGNOSIS — Z00.00 ENCOUNTER FOR PREVENTIVE HEALTH EXAMINATION: Primary | ICD-10-CM

## 2018-08-13 DIAGNOSIS — I70.0 ABDOMINAL AORTIC ATHEROSCLEROSIS: ICD-10-CM

## 2018-08-13 DIAGNOSIS — E03.4 HYPOTHYROIDISM DUE TO ACQUIRED ATROPHY OF THYROID: ICD-10-CM

## 2018-08-13 DIAGNOSIS — G70.00 MYASTHENIA GRAVIS, ACHR ANTIBODY POSITIVE: ICD-10-CM

## 2018-08-13 PROBLEM — M54.16 LUMBAR RADICULITIS: Status: RESOLVED | Noted: 2018-05-17 | Resolved: 2018-08-13

## 2018-08-13 PROCEDURE — 3074F SYST BP LT 130 MM HG: CPT | Mod: CPTII,S$GLB,, | Performed by: PHYSICIAN ASSISTANT

## 2018-08-13 PROCEDURE — 3078F DIAST BP <80 MM HG: CPT | Mod: CPTII,S$GLB,, | Performed by: PHYSICIAN ASSISTANT

## 2018-08-13 PROCEDURE — 99214 OFFICE O/P EST MOD 30 MIN: CPT | Mod: S$GLB,,, | Performed by: PHYSICIAN ASSISTANT

## 2018-08-13 PROCEDURE — 99999 PR PBB SHADOW E&M-EST. PATIENT-LVL V: CPT | Mod: PBBFAC,,, | Performed by: PHYSICIAN ASSISTANT

## 2018-08-13 PROCEDURE — G0439 PPPS, SUBSEQ VISIT: HCPCS | Mod: S$GLB,,, | Performed by: PHYSICIAN ASSISTANT

## 2018-08-13 NOTE — PROGRESS NOTES
"Maximilian Tavera presented for a  Medicare AWV and comprehensive Health Risk Assessment today. The following components were reviewed and updated:    · Medical history  · Family History  · Social history  · Allergies and Current Medications  · Health Risk Assessment  · Health Maintenance  · Care Team     ** See Completed Assessments for Annual Wellness Visit within the encounter summary.**       The following assessments were completed:  · Living Situation  · CAGE  · Depression Screening  · Timed Get Up and Go  · Whisper Test  · Cognitive Function Screening  · Nutrition Screening  · ADL Screening  · PAQ Screening    Vitals:    08/13/18 0935   BP: 118/60   BP Location: Right arm   Patient Position: Sitting   BP Method: Large (Manual)   Pulse: 75   Temp: 98.4 °F (36.9 °C)   TempSrc: Oral   Weight: 101 kg (222 lb 10.6 oz)   Height: 6' 1" (1.854 m)     Body mass index is 29.38 kg/m².  Physical Exam   Constitutional: He is oriented to person, place, and time. He appears well-developed and well-nourished. No distress.   HENT:   Head: Normocephalic and atraumatic.   Neck: Normal range of motion. Neck supple. No JVD present.   Pulmonary/Chest: Effort normal.   Neurological: He is alert and oriented to person, place, and time.   Skin: He is not diaphoretic.               Diagnoses and health risks identified today and associated recommendations/orders:    Maximilian was seen today for medicare awv.    Diagnoses and all orders for this visit:    Abdominal aortic atherosclerosis  Comments:  stable, continue to monitor    Myasthenia gravis, AChR antibody positive  Comments:  uncontrolled, followed by neurology    Tortuous aorta  Comments:  stable, continue to monitor    Hypothyroidism due to acquired atrophy of thyroid  Comments:  controlled, continue meds    Mixed hyperlipidemia  Comments:  controlled, contineu regimen    HTN (hypertension), benign  Comments:  controlled, continue meds    Neuropathy  Comments:  uncontrolled, followed " by back and spine    DDD (degenerative disc disease), lumbar  Comments:  uncontrolled, followed by back and spine    Cervical stenosis of spine  Comments:  uncontrolled, followed by back and spine        Provided Maximilian with a 5-10 year written screening schedule and personal prevention plan. Recommendations were developed using the USPSTF age appropriate recommendations. Education, counseling, and referrals were provided as needed. After Visit Summary printed and given to patient which includes a list of additional screenings\tests needed.    No Follow-up on file.    KEYA Pedraza     Patient readiness: acceptance and barriers:none    During the course of the visit the patient was educated and counseled about the following:     Hypertension:   Regular aerobic exercise.    Goals: Hypertension: Reduce Blood Pressure    Did patient meet goals/outcomes: Yes    The following self management tools provided: blood pressure log    Patient Instructions (the written plan) was given to the patient/family.     Time spent with patient: 55 minutes    Barriers to medications present (no )    Adverse reactions to current medications (no)    Over the counter medications reviewed (Yes)        I offered to discuss end of life issues, including information on how to make advance directives that the patient could use to name someone who would make medical decisions on their behalf if they became too ill to make themselves.    ___Patient declined  _X_Patient is interested, I provided paper work and offered to discuss.

## 2018-08-13 NOTE — PROGRESS NOTES
Subjective:       Patient ID: Maximilian Tavera Jr. is a 72 y.o. male.    Chief Complaint: Follow-up (3 month )    Mr. Crump comes to clinic today for follow up. He has recently been having more difficulty with his MG. The patient is followed by Dr. Gill, neurologist. The patient is having problems with head drop and chewing. It is felt that this is multifactorial- possibly MG exacerbation vs cervical spine stenosis. The patient's prednisone was increased to 20mg daily. The patient was also referred to neurosurgeon, Dr. Turner. The patient will follow up with him later this week to review his recent cervical and lumbar MRI. ( see impressions below). The patient reports he is currently attending PT; this is helping his lumbar spine. PT for his cervical spine has not started at this time due to recent issues with the head drop. The patient is due for colonoscopy; he is scheduled to see Dr. Hernandez tomorrow.  The patient is followed by cardiologist, Dr. Altamirano. The patient has lost approximately 20 pounds since April. The patient reports that this was not intentional. He does report that when he pneumonia he had a reduced appetite. The patient also reports that since the MG exacerbation, his appetite has been suppressed despite the increase in his prednisone dose.           MRI lumbar spine (see full report scanned into media)    MRI cervical spine (see full report scanned into media)    Review of Systems   Constitutional: Positive for activity change and unexpected weight change.   HENT: Positive for trouble swallowing. Negative for hearing loss and rhinorrhea.    Eyes: Negative for discharge and visual disturbance.   Respiratory: Negative for chest tightness and wheezing.    Cardiovascular: Negative for chest pain and palpitations.   Gastrointestinal: Negative for blood in stool, constipation, diarrhea and vomiting.   Endocrine: Negative for polydipsia.   Genitourinary: Positive for urgency. Negative for  difficulty urinating and hematuria.   Musculoskeletal: Positive for neck pain. Negative for arthralgias and joint swelling.   Neurological: Positive for headaches. Negative for weakness.   Psychiatric/Behavioral: Negative for confusion and dysphoric mood.       Objective:      Physical Exam   Constitutional: He is oriented to person, place, and time.   22 lb weight loss since April   HENT:   Mouth/Throat: Oropharynx is clear and moist. No oropharyngeal exudate.   Eyes: Conjunctivae are normal. Pupils are equal, round, and reactive to light.   Cardiovascular: Normal rate and regular rhythm.   Pulmonary/Chest: Effort normal and breath sounds normal. He has no wheezes.   Abdominal: Soft. Bowel sounds are normal. There is no tenderness.   Musculoskeletal: He exhibits no edema.   Pain in low back with changing position (improving)   Lymphadenopathy:     He has no cervical adenopathy.   Neurological: He is alert and oriented to person, place, and time.   Skin: No erythema.   Psychiatric: His behavior is normal.       Assessment:       1. Myasthenia gravis, AChR antibody positive    2. HTN (hypertension), benign    3. DDD (degenerative disc disease), lumbar    4. Cervical stenosis of spine        Plan:       Maximilina was seen today for follow-up.    Diagnoses and all orders for this visit:    Myasthenia gravis, AChR antibody positive  Continue follow up with neurology as scheduled  HTN (hypertension), benign  Well controlled  Low salt diet  Continue current medication  DDD (degenerative disc disease), lumbar  Continue PT  Follow up with Dr. Watt  Cervical stenosis of spine  Follow up with Dr. Myers, neurosurgery to review MRI reports

## 2018-08-13 NOTE — Clinical Note
Primary Care Providers:Brian Marroquin MD, MD (General)Your patient was seen today for a HRA visit. Gap(s) in care (HEDIS gaps) have been identified during this visit that require additional testing and possible follow up.No orders of the defined types were placed in this encounter.These orders were placed using [facility] approved protocol and any results will be forwarded to your office for appropriate follow up. I have included a copy of my visit note; please review the note and feel free to contact me with any questions. Thank you for allowing me to participate in the care of your patients.KEYA Pedraza

## 2018-08-13 NOTE — PATIENT INSTRUCTIONS
Counseling and Referral of Other Preventative  (Italic type indicates deductible and co-insurance are waived)    Patient Name: Maximilian Tavera  Today's Date: 8/13/2018    Health Maintenance       Date Due Completion Date    Colonoscopy 08/14/2018 (Originally 3/1/2017) 3/1/2012    Influenza Vaccine 09/03/2018 (Originally 8/1/2018) 9/18/2017    Lipid Panel 05/14/2019 5/14/2018    TETANUS VACCINE 08/31/2025 8/31/2015        No orders of the defined types were placed in this encounter.    The following information is provided to all patients.  This information is to help you find resources for any of the problems found today that may be affecting your health:                Living healthy guide: www.[facility].[place].gov      Understanding Diabetes: www.diabetes.org      Eating healthy: www.cdc.gov/healthyweight      [facility] home safety checklist: www.cdc.gov/steadi/patient.html      Agency on Aging: www.goea.[place].[facility]      Alcoholics anonymous (AA): www.aa.org      Physical Activity: www.aristides.nih.gov/ou4jhig      Tobacco use: www.quitwithusla.org

## 2018-08-14 ENCOUNTER — OFFICE VISIT (OUTPATIENT)
Dept: GASTROENTEROLOGY | Facility: CLINIC | Age: 73
End: 2018-08-14
Payer: MEDICARE

## 2018-08-14 VITALS
BODY MASS INDEX: 29.77 KG/M2 | HEIGHT: 73 IN | HEART RATE: 80 BPM | DIASTOLIC BLOOD PRESSURE: 83 MMHG | WEIGHT: 224.63 LBS | SYSTOLIC BLOOD PRESSURE: 134 MMHG

## 2018-08-14 DIAGNOSIS — G70.00 MYASTHENIA GRAVIS: ICD-10-CM

## 2018-08-14 DIAGNOSIS — R68.81 EARLY SATIETY: ICD-10-CM

## 2018-08-14 DIAGNOSIS — R13.10 DYSPHAGIA, UNSPECIFIED TYPE: ICD-10-CM

## 2018-08-14 DIAGNOSIS — R10.30 LOWER ABDOMINAL PAIN: Primary | ICD-10-CM

## 2018-08-14 DIAGNOSIS — R19.4 CHANGE IN BOWEL HABITS: ICD-10-CM

## 2018-08-14 DIAGNOSIS — R10.13 EPIGASTRIC PAIN: ICD-10-CM

## 2018-08-14 PROCEDURE — 99204 OFFICE O/P NEW MOD 45 MIN: CPT | Mod: S$GLB,,, | Performed by: INTERNAL MEDICINE

## 2018-08-14 PROCEDURE — 3075F SYST BP GE 130 - 139MM HG: CPT | Mod: CPTII,S$GLB,, | Performed by: INTERNAL MEDICINE

## 2018-08-14 PROCEDURE — 99999 PR PBB SHADOW E&M-EST. PATIENT-LVL III: CPT | Mod: PBBFAC,,, | Performed by: INTERNAL MEDICINE

## 2018-08-14 PROCEDURE — 3079F DIAST BP 80-89 MM HG: CPT | Mod: CPTII,S$GLB,, | Performed by: INTERNAL MEDICINE

## 2018-08-14 NOTE — PROGRESS NOTES
Subjective:       Patient ID: Maximilian Tavera Jr. is a 72 y.o. male.    This is an established patient who has seen Dr. Esteban in the past.    Chief Complaint: Abdominal pain    Abdominal Pain   This is a new problem. The current episode started more than 1 month ago. The onset quality is undetermined. The problem occurs intermittently. Duration: variable. The pain is located in the generalized abdominal region (lower more so than upper abdomen, crampy). The pain is at a severity of 5/10. The pain is moderate. The quality of the pain is aching and cramping. The abdominal pain does not radiate. Associated symptoms include constipation. Pertinent negatives include no arthralgias, diarrhea (intermittent), fever, myalgias, nausea or vomiting. Nothing aggravates the pain. The pain is relieved by bowel movements. He has tried nothing for the symptoms. The treatment provided mild relief. Prior diagnostic workup includes lower endoscopy (Last colonoscopy about 5 years ago with one hyperplastic polyp noted.  Last EGD in 2001.). His past medical history is significant for GERD.     Review of Systems   Constitutional: Negative for chills, fatigue and fever.   HENT: Positive for trouble swallowing.    Respiratory: Negative for cough, shortness of breath and wheezing.    Cardiovascular: Negative for chest pain and palpitations.   Gastrointestinal: Positive for abdominal pain and constipation. Negative for diarrhea (intermittent), nausea and vomiting.        + change in bowel habits     Musculoskeletal: Negative for arthralgias and myalgias.   Skin: Negative for color change and rash.   Neurological: Positive for weakness (known history of MG). Negative for dizziness and numbness.   Psychiatric/Behavioral: Negative for confusion. The patient is not nervous/anxious.    All other systems reviewed and are negative.      Objective:       Vitals:    08/14/18 1415   BP: 134/83   Pulse: 80   Weight: 101.9 kg (224 lb 10.4 oz)  "  Height: 6' 1" (1.854 m)       Physical Exam   Constitutional: He is oriented to person, place, and time. He appears well-developed and well-nourished.   HENT:   Head: Normocephalic and atraumatic.   Mouth/Throat: Oropharynx is clear and moist.   Eyes: Conjunctivae are normal. Pupils are equal, round, and reactive to light. No scleral icterus.   Neck: Normal range of motion. Neck supple. No thyromegaly present.   Cardiovascular: Normal rate and regular rhythm.   No murmur heard.  Pulmonary/Chest: Effort normal and breath sounds normal. He has no wheezes.   Abdominal: Soft. Bowel sounds are normal. He exhibits no distension. There is tenderness (mild, epigastric).   Musculoskeletal: He exhibits no edema.   Lymphadenopathy:     He has no cervical adenopathy.   Neurological: He is alert and oriented to person, place, and time.   Skin: Skin is warm and dry. No rash noted. No erythema.   Psychiatric: He has a normal mood and affect. His behavior is normal.   Vitals reviewed.        Lab Results   Component Value Date    WBC 5.11 11/20/2017    HGB 14.5 11/20/2017    HCT 43.3 11/20/2017    MCV 89 11/20/2017     11/20/2017       CMP  Sodium   Date Value Ref Range Status   05/14/2018 143 136 - 145 mmol/L Final     Potassium   Date Value Ref Range Status   05/14/2018 3.9 3.5 - 5.1 mmol/L Final     Chloride   Date Value Ref Range Status   05/14/2018 107 95 - 110 mmol/L Final     CO2   Date Value Ref Range Status   05/14/2018 28 23 - 29 mmol/L Final     Glucose   Date Value Ref Range Status   05/14/2018 120 (H) 70 - 110 mg/dL Final     BUN, Bld   Date Value Ref Range Status   05/14/2018 16 8 - 23 mg/dL Final     Creatinine   Date Value Ref Range Status   05/14/2018 0.9 0.5 - 1.4 mg/dL Final     Calcium   Date Value Ref Range Status   05/14/2018 9.4 8.7 - 10.5 mg/dL Final     Total Protein   Date Value Ref Range Status   05/14/2018 6.3 6.0 - 8.4 g/dL Final     Albumin   Date Value Ref Range Status   05/14/2018 3.5 3.5 - " 5.2 g/dL Final     Total Bilirubin   Date Value Ref Range Status   05/14/2018 1.3 (H) 0.1 - 1.0 mg/dL Final     Comment:     For infants and newborns, interpretation of results should be based  on gestational age, weight and in agreement with clinical  observations.  Premature Infant recommended reference ranges:  Up to 24 hours.............<8.0 mg/dL  Up to 48 hours............<12.0 mg/dL  3-5 days..................<15.0 mg/dL  6-29 days.................<15.0 mg/dL       Alkaline Phosphatase   Date Value Ref Range Status   05/14/2018 72 55 - 135 U/L Final     AST   Date Value Ref Range Status   05/14/2018 21 10 - 40 U/L Final     ALT   Date Value Ref Range Status   05/14/2018 20 10 - 44 U/L Final     Anion Gap   Date Value Ref Range Status   05/14/2018 8 8 - 16 mmol/L Final     eGFR if    Date Value Ref Range Status   05/14/2018 >60.0 >60 mL/min/1.73 m^2 Final     eGFR if non    Date Value Ref Range Status   05/14/2018 >60.0 >60 mL/min/1.73 m^2 Final     Comment:     Calculation used to obtain the estimated glomerular filtration  rate (eGFR) is the CKD-EPI equation.          CXR was independently visualized and reviewed by me and showed interval improvement from prior pneumonia.    Old records from Dr. Esteban reviewed and are as summarized above in the HPI.    Assessment:       1. Lower abdominal pain    2. Change in bowel habits    3. Dysphagia, unspecified type    4. Myasthenia gravis    5. Epigastric pain    6. Early satiety        Plan:       1.  Colonoscopy for change in bowel habits  2.  Schedule EGD for evaluation of above  3.  Further recommendations to follow after above.

## 2018-08-16 ENCOUNTER — OFFICE VISIT (OUTPATIENT)
Dept: NEUROSURGERY | Facility: CLINIC | Age: 73
End: 2018-08-16
Payer: MEDICARE

## 2018-08-16 VITALS
WEIGHT: 225.69 LBS | HEART RATE: 81 BPM | DIASTOLIC BLOOD PRESSURE: 79 MMHG | BODY MASS INDEX: 29.78 KG/M2 | TEMPERATURE: 99 F | SYSTOLIC BLOOD PRESSURE: 165 MMHG

## 2018-08-16 DIAGNOSIS — G95.9 CERVICAL MYELOPATHY: Primary | ICD-10-CM

## 2018-08-16 PROCEDURE — 99999 PR PBB SHADOW E&M-EST. PATIENT-LVL III: CPT | Mod: PBBFAC,,, | Performed by: NEUROLOGICAL SURGERY

## 2018-08-16 PROCEDURE — 3077F SYST BP >= 140 MM HG: CPT | Mod: CPTII,S$GLB,, | Performed by: NEUROLOGICAL SURGERY

## 2018-08-16 PROCEDURE — 99499 UNLISTED E&M SERVICE: CPT | Mod: S$GLB,,, | Performed by: NEUROLOGICAL SURGERY

## 2018-08-16 PROCEDURE — 3078F DIAST BP <80 MM HG: CPT | Mod: CPTII,S$GLB,, | Performed by: NEUROLOGICAL SURGERY

## 2018-08-16 PROCEDURE — 99204 OFFICE O/P NEW MOD 45 MIN: CPT | Mod: S$GLB,,, | Performed by: NEUROLOGICAL SURGERY

## 2018-08-16 NOTE — LETTER
August 19, 2018      Michelle Tejeda PA-C  [address] [place] [address] [address]  [address]  [place] [address]           [facility] Neurosurgery 7th Fl  [address]  [facility] [address]  Phone: [phone]          Patient: Maximilian Tavera Jr.   MR Number: [id number]   YOB: 1945   Date of Visit: 8/16/2018       Dear Michelle Tejeda:    Thank you for referring Maximilian Tavera to me for evaluation. Attached you will find relevant portions of my assessment and plan of care.    If you have questions, please do not hesitate to call me. I look forward to following Maximilian Tavera along with you.    Sincerely,    Kishore Turner MD    Enclosure  CC:  No Recipients    If you would like to receive this communication electronically, please contact [email or web address] or [phone] to request more information on [employer] access.    For providers and/or their staff who would like to refer a patient to [facility], please contact us through our one-stop-shop provider referral line, [facility], at [phone].    If you feel you have received this communication in error or would no longer like to receive these types of communications, please e-mail [email or web address]

## 2018-08-16 NOTE — PROGRESS NOTES
Dear KEYA Leong,       Thank you for referring this patient to my clinic. The full details of my evaluation will also be forthcoming to you by letter.    CHIEF COMPLAINT:  Consult for low back pain    I, Khris Hunter, attest that this documentation has been prepared under the direction and in the presence of Kishore Turner MD.    HPI:  Maximilian Tavera Jr. is a 72 y.o.  male with history of myasthenia gravis, who is referred to me by Michelle Tejeda for evaluation of low back pain. Pt reports neck and back pain. He states that he has myasthenia gravis and began having difficulty holding his head up in May. He saw Dr. Gill who increased his prednisone dosage to from 7mg every other day to 20mg daily with some improvement in head drop. He was diagnosed with myasthenia gravis 3-4 years ago and is primarily affected by head drop. He notes two previous neck surgeries in 1991 and 1995 from a left and right approach. He did not experience any significant swallowing difficulty or hoarseness following his prior surgeries.  His neck pain began in May and worsens throughout the day. Pt denies any shooting arm pain, b/b dysfunction, or recent falls. He notes numbness in his 4th and 5th fingers of his left hand. He states that he feels unsteady on his feet and experiences intermittent dizziness. He has also noticed dropping objects with his hands. Pt has been participating in physical therapy for his back with some improvement. He received an SONIA in May for his right sciatic pain with significant relief. Pt has been experiencing low back pain for several years. His right sciatic pain is worse than his low back pain, however the RLE pain has improved since the SONIA. His RLE pain radiates down his posterior thigh sometimes reaching his posterior calf.       Review of patient's allergies indicates:   Allergen Reactions    No known drug allergies        Past Medical History:   Diagnosis Date    Arthritis      Back pain     Carpal tunnel syndrome 2/25/2013    Cataract     Cataract, left eye 11/10/2014    Chest pain, musculoskeletal     Hyperlipidemia     Hypertension     Hypothyroidism     Knee fracture     Myasthenia gravis     Neuropathy 1/3/2013    Obesity 1/29/2015    Polyneuropathy     Squamous cell carcinoma 2014    left forearm    Thyroid disease      Past Surgical History:   Procedure Laterality Date    APPENDECTOMY      CATARACT EXTRACTION W/  INTRAOCULAR LENS IMPLANT Bilateral     HEMORRHOID SURGERY      KNEE ARTHROPLASTY Bilateral     NECK SURGERY      TONSILLECTOMY       Family History   Problem Relation Age of Onset    Cataracts Mother     Heart disease Mother         CHF    Hypertension Mother     Hyperlipidemia Mother     Cataracts Father     Glaucoma Father     Heart disease Father     Hyperlipidemia Sister     Hypertension Sister     No Known Problems Daughter     No Known Problems Daughter     Collagen disease Neg Hx     Amblyopia Neg Hx     Blindness Neg Hx     Macular degeneration Neg Hx     Retinal detachment Neg Hx     Strabismus Neg Hx     Cancer Neg Hx      Social History     Tobacco Use    Smoking status: Never Smoker    Smokeless tobacco: Never Used    Tobacco comment: when a child   Substance Use Topics    Alcohol use: Yes     Comment: rarely    Drug use: No        Review of Systems   Constitutional: Negative.    HENT: Negative.    Eyes: Negative.    Respiratory: Negative.    Cardiovascular: Negative.    Gastrointestinal: Negative.    Endocrine: Negative.    Genitourinary: Negative.    Musculoskeletal: Positive for back pain, myalgias (RLE) and neck pain. Negative for gait problem.   Skin: Negative.    Allergic/Immunologic: Negative.    Neurological: Positive for dizziness (intermittent) and numbness (4th & 5th fingers of left hand). Negative for weakness, light-headedness and headaches.        Imbalance       Hand clumsiness   Hematological: Negative.     Psychiatric/Behavioral: Negative.        OBJECTIVE:   Vital Signs:  Temp: 98.7 °F (37.1 °C) (08/16/18 1249)  Pulse: 81 (08/16/18 1249)  BP: (!) 165/79 (08/16/18 1249)    Physical Exam:    Vital signs: All nursing notes and vital signs reviewed -- afebrile, vital signs stable.  Constitutional: Patient sitting comfortably in chair. Appears well developed and well nourished.  Skin: Exposed areas are intact without abnormal markings, rashes or other lesions.    HEENT: Normocephalic. Normal conjunctivae.  Cardiovascular: Normal rate and regular rhythm.  Respiratory: Chest wall rises and falls symmetrically, without signs of respiratory distress.  Abdomen: Soft and non-tender.  Extremities: Warm and without edema. Calves supple, non-tender.  Psych/Behavior: Normal affect.    Neurological:    Mental status: Alert and oriented. Conversational and appropriate.       Cranial Nerves: Grossly intact.     Motor:    Upper:  Deltoids Triceps Biceps WE WF     R 5/5 5/5 5/5 5/5 5/5 5/5    L 5/5 5/5 5/5 5/5 5/5 5/5      Lower:  HF KE KF DF PF EHL    R 5/5 5/5 5/5 5/5 5/5 5/5    L 5/5 5/5 5/5 5/5 5/5 5/5     Sensory: Intact sensation to light touch in all extremities. Romberg negative.    Reflexes:          DTR: 2+ symmetrically throughout.     Mcneill's: Negative.     Babinski's: Negative.     Clonus: Negative.    Cerebellar: Finger-to-nose and rapid alternating movements normal. Gait stable, fluid.    Spine:    Posture: Head well aligned over pelvis in front and side views.  No focal or global spinal deformity visible on inspection. Shoulders and hips even. No obvious leg length discrepancy. No scapula winging.    Bending: Full ROM with forward, back and lateral bending. No rib prominence with forward bend.    Cervical:      ROM: Full with flexion, extension, lateral rotation and ear-to-shoulder bend.      Midline TTP: Negative.     Spurling's test: Negative.     Lhermitte's: Negative.    Thoracic:     Midline TTP:  Negative    Lumbar:     Midline TTP: Negative     Straight Leg Test: Negative     Crossed Straight Leg Test: Negative     Sciatic notch tenderness: Negative.    Other:     SI joint TTP: Negative.     Greater trochanter TTP: Negative.     Tenderness with external/internal hip rotation: Negative.    Diagnostic Results:  All imaging was independently reviewed by me.    MRI C-spine, dated 7/30/2018:  1. Adjacent level disease at C3/4 and C4/5 with disc herniations resulting in severe spinal canal stenosis and cord compression    MRI L-spine, dated 7/30/2018:  1. Multilevel degenerative changes throughout entirety of lumbar spine  2. Moderate to severe central canal stenosis    ASSESSMENT/PLAN:     Maximilian Tavera Jr. is a 71 y/o male with chronic neck and back pain and neurogenic claudication. His MRI cervical shows significant adjacent segment disease with spinal cord compression. We feel that his neck should be addressed prior to the lumbar spine. At this time, pt would like to follow up in 4-6 weeks to discuss surgery. We instructed him to follow up sooner if he notices any worsening imbalance, b/b dysfunction or weakness.     The patient understands and agrees with the plan of care. All questions were answered.     1. RTC 6 weeks       I, Dr. Kishore Turner personally performed the services described in this documentation. All medical record entries made by the scribe, Khris Hunter, were at my direction and in my presence.  I have reviewed the chart and agree that the record reflects my personal performance and is accurate and complete.      Kishore Turner M.D.  Department of Neurosurgery  [facility]      .

## 2018-08-20 ENCOUNTER — TELEPHONE (OUTPATIENT)
Dept: GASTROENTEROLOGY | Facility: CLINIC | Age: 73
End: 2018-08-20

## 2018-08-24 ENCOUNTER — OFFICE VISIT (OUTPATIENT)
Dept: FAMILY MEDICINE | Facility: CLINIC | Age: 73
End: 2018-08-24
Payer: MEDICARE

## 2018-08-24 VITALS
HEART RATE: 78 BPM | BODY MASS INDEX: 29.69 KG/M2 | SYSTOLIC BLOOD PRESSURE: 126 MMHG | WEIGHT: 224 LBS | HEIGHT: 73 IN | OXYGEN SATURATION: 94 % | TEMPERATURE: 99 F | DIASTOLIC BLOOD PRESSURE: 79 MMHG

## 2018-08-24 DIAGNOSIS — J32.1 FRONTAL SINUSITIS, UNSPECIFIED CHRONICITY: Primary | ICD-10-CM

## 2018-08-24 DIAGNOSIS — B37.0 THRUSH: ICD-10-CM

## 2018-08-24 DIAGNOSIS — J40 BRONCHITIS: ICD-10-CM

## 2018-08-24 PROCEDURE — 99213 OFFICE O/P EST LOW 20 MIN: CPT | Mod: S$GLB,,, | Performed by: NURSE PRACTITIONER

## 2018-08-24 RX ORDER — FLUCONAZOLE 100 MG/1
TABLET ORAL
Qty: 11 TABLET | Refills: 0 | Status: SHIPPED | OUTPATIENT
Start: 2018-08-24 | End: 2018-10-01

## 2018-08-24 RX ORDER — PROMETHAZINE HYDROCHLORIDE AND CODEINE PHOSPHATE 6.25; 1 MG/5ML; MG/5ML
5 SOLUTION ORAL EVERY 4 HOURS PRN
Qty: 240 ML | Refills: 0 | Status: SHIPPED | OUTPATIENT
Start: 2018-08-24 | End: 2018-09-03

## 2018-08-24 RX ORDER — AMOXICILLIN AND CLAVULANATE POTASSIUM 875; 125 MG/1; MG/1
1 TABLET, FILM COATED ORAL 2 TIMES DAILY
Qty: 20 TABLET | Refills: 0 | Status: SHIPPED | OUTPATIENT
Start: 2018-08-24 | End: 2018-09-03

## 2018-08-24 NOTE — PROGRESS NOTES
Subjective:       Patient ID: Maximilian Tavera Jr. is a 72 y.o. male.    Chief Complaint: Cough; Sore Throat; and Chest Congestion    Sinusitis   This is a new problem. The current episode started 1 to 4 weeks ago. Maximum temperature: low grade fever 99. Associated symptoms include congestion, coughing, sinus pressure and a sore throat. Pertinent negatives include no shortness of breath. (Thick greenish drainage with epistaxis. ) Past treatments include nothing.   Has cough and feels as though there is something in his throat stopping him from swallowing well.   Review of Systems   HENT: Positive for congestion, sinus pressure and sore throat.    Respiratory: Positive for cough. Negative for shortness of breath.        Objective:      Physical Exam   Constitutional: He is oriented to person, place, and time. He appears well-developed and well-nourished. No distress.   HENT:   Head: Normocephalic and atraumatic.   Right Ear: External ear normal.   Left Ear: External ear normal.   Mouth/Throat: No oropharyngeal exudate.   TMs pearly grey with light reflex bilaterally.   Oropharynx mild erythema with white coating on tongue. Nares congested.    Eyes: Conjunctivae are normal. Right eye exhibits no discharge. Left eye exhibits no discharge. No scleral icterus.   Neck: Normal range of motion. Neck supple.   Cardiovascular: Normal rate, regular rhythm and normal heart sounds. Exam reveals no gallop and no friction rub.   No murmur heard.  Pulmonary/Chest: Effort normal and breath sounds normal. No respiratory distress. He has no wheezes. He has no rales.   Lymphadenopathy:     He has no cervical adenopathy.   Neurological: He is alert and oriented to person, place, and time.   Skin: Skin is warm and dry. He is not diaphoretic.   Psychiatric: He has a normal mood and affect. His behavior is normal.   Nursing note and vitals reviewed.      Assessment:       1. Frontal sinusitis, unspecified chronicity    2. Bronchitis     3. Thrush        Plan:       Frontal sinusitis, unspecified chronicity  -     amoxicillin-clavulanate 875-125mg (AUGMENTIN) 875-125 mg per tablet; Take 1 tablet by mouth 2 (two) times daily. 1 tab po with food bid for 10 days  Dispense: 20 tablet; Refill: 0    Bronchitis  -     promethazine-codeine 6.25-10 mg/5 ml (PHENERGAN WITH CODEINE) 6.25-10 mg/5 mL syrup; Take 5 mLs by mouth every 4 (four) hours as needed for Cough.  Dispense: 240 mL; Refill: 0    Thrush  -     fluconazole (DIFLUCAN) 100 MG tablet; 2 tabs po 1st day, then 1 tab po remaining 9 days. Do not take atorvastin while on this medication.  Dispense: 11 tablet; Refill: 0       1 week if not improved

## 2018-09-05 ENCOUNTER — PATIENT MESSAGE (OUTPATIENT)
Dept: NEUROLOGY | Facility: CLINIC | Age: 73
End: 2018-09-05

## 2018-09-07 DIAGNOSIS — I10 HTN (HYPERTENSION), BENIGN: ICD-10-CM

## 2018-09-07 DIAGNOSIS — M51.36 DDD (DEGENERATIVE DISC DISEASE), LUMBAR: ICD-10-CM

## 2018-09-07 DIAGNOSIS — J30.9 ALLERGIC RHINITIS: ICD-10-CM

## 2018-09-07 RX ORDER — FLUTICASONE PROPIONATE 50 MCG
SPRAY, SUSPENSION (ML) NASAL
Qty: 48 G | Refills: 3 | Status: SHIPPED | OUTPATIENT
Start: 2018-09-07

## 2018-09-07 RX ORDER — GABAPENTIN 300 MG/1
CAPSULE ORAL
Qty: 180 CAPSULE | Refills: 11 | Status: SHIPPED | OUTPATIENT
Start: 2018-09-07 | End: 2019-01-11

## 2018-09-10 NOTE — TELEPHONE ENCOUNTER
Patient informed per Dr. Hernández to decrease Prednisone to 10mg daily. Call back in one month to discuss decreasing to 7mg every other day. Mr. Yao verbalized understanding.

## 2018-09-12 ENCOUNTER — HOSPITAL ENCOUNTER (OUTPATIENT)
Facility: HOSPITAL | Age: 73
Discharge: HOME | End: 2018-09-12
Attending: INTERNAL MEDICINE | Admitting: INTERNAL MEDICINE
Payer: MEDICARE

## 2018-09-12 ENCOUNTER — ANESTHESIA EVENT (OUTPATIENT)
Dept: ENDOSCOPY | Facility: HOSPITAL | Age: 73
End: 2018-09-12
Payer: MEDICARE

## 2018-09-12 ENCOUNTER — ANESTHESIA (OUTPATIENT)
Dept: ENDOSCOPY | Facility: HOSPITAL | Age: 73
End: 2018-09-12
Payer: MEDICARE

## 2018-09-12 VITALS
BODY MASS INDEX: 29.16 KG/M2 | OXYGEN SATURATION: 99 % | HEART RATE: 67 BPM | WEIGHT: 220 LBS | SYSTOLIC BLOOD PRESSURE: 135 MMHG | TEMPERATURE: 98 F | DIASTOLIC BLOOD PRESSURE: 74 MMHG | HEIGHT: 73 IN | RESPIRATION RATE: 16 BRPM

## 2018-09-12 DIAGNOSIS — R10.13 EPIGASTRIC PAIN: ICD-10-CM

## 2018-09-12 DIAGNOSIS — K29.70 GASTRITIS, PRESENCE OF BLEEDING UNSPECIFIED, UNSPECIFIED CHRONICITY, UNSPECIFIED GASTRITIS TYPE: Primary | ICD-10-CM

## 2018-09-12 PROCEDURE — 88342 IMHCHEM/IMCYTCHM 1ST ANTB: CPT | Performed by: PATHOLOGY

## 2018-09-12 PROCEDURE — 88305 TISSUE EXAM BY PATHOLOGIST: CPT | Mod: 59 | Performed by: PATHOLOGY

## 2018-09-12 PROCEDURE — D9220A PRA ANESTHESIA: Mod: CRNA,,, | Performed by: NURSE ANESTHETIST, CERTIFIED REGISTERED

## 2018-09-12 PROCEDURE — 43239 EGD BIOPSY SINGLE/MULTIPLE: CPT | Mod: 22,,, | Performed by: INTERNAL MEDICINE

## 2018-09-12 PROCEDURE — 37000009 HC ANESTHESIA EA ADD 15 MINS: Performed by: INTERNAL MEDICINE

## 2018-09-12 PROCEDURE — 25000003 PHARM REV CODE 250: Performed by: INTERNAL MEDICINE

## 2018-09-12 PROCEDURE — 88342 IMHCHEM/IMCYTCHM 1ST ANTB: CPT | Mod: 26,,, | Performed by: PATHOLOGY

## 2018-09-12 PROCEDURE — 88305 TISSUE EXAM BY PATHOLOGIST: CPT | Mod: 26,,, | Performed by: PATHOLOGY

## 2018-09-12 PROCEDURE — 37000008 HC ANESTHESIA 1ST 15 MINUTES: Performed by: INTERNAL MEDICINE

## 2018-09-12 PROCEDURE — 63600175 PHARM REV CODE 636 W HCPCS: Performed by: NURSE ANESTHETIST, CERTIFIED REGISTERED

## 2018-09-12 PROCEDURE — 27201012 HC FORCEPS, HOT/COLD, DISP: Performed by: INTERNAL MEDICINE

## 2018-09-12 PROCEDURE — D9220A PRA ANESTHESIA: Mod: ANES,,, | Performed by: ANESTHESIOLOGY

## 2018-09-12 PROCEDURE — 43239 EGD BIOPSY SINGLE/MULTIPLE: CPT | Performed by: INTERNAL MEDICINE

## 2018-09-12 RX ORDER — PROPOFOL 10 MG/ML
INJECTION, EMULSION INTRAVENOUS
Status: COMPLETED
Start: 2018-09-12 | End: 2018-09-12

## 2018-09-12 RX ORDER — LIDOCAINE HCL/PF 100 MG/5ML
SYRINGE (ML) INTRAVENOUS
Status: DISCONTINUED | OUTPATIENT
Start: 2018-09-12 | End: 2018-09-12

## 2018-09-12 RX ORDER — PANTOPRAZOLE SODIUM 40 MG/1
40 TABLET, DELAYED RELEASE ORAL DAILY
Qty: 90 TABLET | Refills: 3 | Status: SHIPPED | OUTPATIENT
Start: 2018-09-12 | End: 2019-04-30

## 2018-09-12 RX ORDER — SODIUM CHLORIDE 9 MG/ML
INJECTION, SOLUTION INTRAVENOUS CONTINUOUS
Status: DISCONTINUED | OUTPATIENT
Start: 2018-09-12 | End: 2018-09-12 | Stop reason: HOSPADM

## 2018-09-12 RX ORDER — PROPOFOL 10 MG/ML
INJECTION, EMULSION INTRAVENOUS
Status: DISCONTINUED | OUTPATIENT
Start: 2018-09-12 | End: 2018-09-12

## 2018-09-12 RX ADMIN — SODIUM CHLORIDE: 0.9 INJECTION, SOLUTION INTRAVENOUS at 08:09

## 2018-09-12 RX ADMIN — PROPOFOL 50 MG: 10 INJECTION, EMULSION INTRAVENOUS at 10:09

## 2018-09-12 RX ADMIN — LIDOCAINE HYDROCHLORIDE 100 MG: 20 INJECTION, SOLUTION INTRAVENOUS at 09:09

## 2018-09-12 RX ADMIN — PROPOFOL 50 MG: 10 INJECTION, EMULSION INTRAVENOUS at 09:09

## 2018-09-12 NOTE — DISCHARGE INSTRUCTIONS
What Is a Hiatal Hernia?    Hiatal hernia is when the area where the stomach and esophagus meet bulges up through the diaphragm into the chest cavity. In some cases, part of the stomach may bulge above the diaphragm. Stomach acid may move up into the esophagus and cause symptoms. The symptoms are often blamed on gastroesophageal reflux disease (GERD). You may only know about the hernia when it shows up on an X-ray taken for other reasons.   What you may feel  The hiatus is a normal hole in the diaphragm. The esophagus passes through this hole and leads to the stomach. In some cases, part of the stomach may bulge above the diaphragm. This bulge is called a hernia. Stomach acid may move up into the esophagus and cause symptoms.  When you eat, the muscle at the hiatus relaxes to allow food to pass into the stomach. It tightens again to keep food and digestive acids in the stomach.  Many people with hiatal hernias have mild symptoms. You may notice the following GERD symptoms:  · Heartburn or other chest discomfort  · A feeling of chest fullness after a meal  · Frequent burping  · Acid taste in the mouth  · Trouble swallowing  Treating symptoms  If you have been diagnosed with hiatal hernia, these suggestions may help improve symptoms:  · Lose excess weight. Extra weight puts pressure on the stomach and esophagus.  · Dont lie down after eating. Sit up for at least an hour after eating. Lying down after eating can increase symptoms.  · Avoid certain foods and drinks. These include fatty foods, chocolate, coffee, mint, and other foods that cause symptoms for you.  · Dont smoke or drink alcohol. These can worsen symptoms.  · Look at your medicines. Discuss your medicines with your healthcare provider. Many medicines can cause symptoms.  · Consider an antacid medicine. Ask your healthcare provider about over-the-counter and prescription medicines that may help.  · Ask about surgery, if needed. Surgery is a treatment  choice for some people. Your healthcare provider can determine if surgery is an option for you.    Date Last Reviewed: 10/1/2016  © 7245-5668 [employer]. [address] [place] [address], [place] [address]. All rights reserved. This information is not intended as a substitute for professional medical care. Always follow your healthcare professional's instructions.        Gastritis (Adult)    Gastritis is inflammation and irritation of the stomach lining. It can be present for a short time (acute) or be long lasting (chronic). Gastritis is often caused by infection with bacteria called H pylori. More than a third of people in the [place] have this bacteria in their bodies. In many cases, H pylori causes no problems or symptoms. In some people, though, the infection irritates the stomach lining and causes gastritis. Other causes of stomach irritation include drinking alcohol or taking pain-relieving medicines called NSAIDs (such as aspirin or ibuprofen).   Symptoms of gastritis can include:  · Abdominal pain or bloating  · Loss of appetite  · Nausea or vomiting  · Vomiting blood or having black stools  · Feeling more tired than usual  An inflamed and irritated stomach lining is more likely to develop a sore called an ulcer. To help prevent this, gastritis should be treated.  Home care  If needed, medicines may be prescribed. If you have H pylori infection, treating it will likely relieve your symptoms. Other changes can help reduce stomach irritation and help it heal.  · If you have been prescribed medicines for H pylori infection, take them as directed. Take all of the medicine until it is finished or your healthcare provider tells you to stop, even if you feel better.  · Your healthcare provider may recommend avoiding NSAIDs. If you take daily aspirin for your heart or other medical reasons, do not stop without talking to your healthcare provider first.  · Avoid drinking alcohol.  · Stop smoking. Smoking can  irritate the stomach and delay healing. As much as possible, stay away from second hand smoke.  Follow-up care  Follow up with your healthcare provider, or as advised by our staff. Testing may be needed to check for inflammation or an ulcer.  When to seek medical advice  Call your healthcare provider for any of the following:  · Stomach pain that gets worse or moves to the lower right abdomen (appendix area)  · Chest pain that appears or gets worse, or spreads to the back, neck, shoulder, or arm  · Frequent vomiting (cant keep down liquids)  · Blood in the stool or vomit (red or black in color)  · Feeling weak or dizzy  · Fever of 100.4ºF (38ºC) or higher, or as directed by your healthcare provider  Date Last Reviewed: 6/22/2015 © 2000-2017 [employer]. [address] [place] [address], [place] [address]. All rights reserved. This information is not intended as a substitute for professional medical care. Always follow your healthcare professional's instructions.        Upper GI Endoscopy     During endoscopy, a long, flexible tube is used to view the inside of your upper GI tract.      Upper GI endoscopy allows your healthcare provider to look directly into the beginning of your gastrointestinal (GI) tract. The esophagus, stomach, and duodenum (the first part of the small intestine) make up the upper GI tract.   Before the exam  Follow these and any other instructions you are given before your endoscopy. If you dont follow the healthcare providers instructions carefully, the test may need to be canceled or done over:  · Don't eat or drink anything after midnight the night before your exam. If your exam is in the afternoon, drink only clear liquids in the morning. Don't eat or drink anything for 8 hours before the exam. In some cases, you may be able to take medicines with sips of water until 2 hours before the procedure. Speak with your healthcare provider about this.   · Bring your X-rays and any other test  results you have.  · Because you will be sedated, arrange for an adult to drive you home after the exam.  · Tell your healthcare provider before the exam if you are taking any medicines or have any medical problems.  The procedure  Here is what to expect:  · You will lie on the endoscopy table. Usually patients lie on the left side.  · You will be monitored and given oxygen.  · Your throat may be numbed with a spray or gargle. You are given medicine through an intravenous (IV) line that will help you relax and remain comfortable. You may be awake or asleep during the procedure.  · The healthcare provider will put the endoscope in your mouth and down your esophagus. It is thinner than most pieces of food that you swallow. It will not affect your breathing. The medicine helps keep you from gagging.  · Air is put into your GI tract to expand it. It can make you burp.  · During the procedure, the healthcare provider can take biopsies (tissue samples), remove abnormalities, such as polyps, or treat abnormalities through a variety of devices placed through the endoscope. You will not feel this.   · The endoscope carries images of your upper GI tract to a video screen. If you are awake, you may be able to look at the images.  · After the procedure is done, you will rest for a time. An adult must drive you home.  When to call your healthcare provider  Contact your healthcare provider if you have:  · Black or tarry stools, or blood in your stool  · Fever  · Pain in your belly that does not go away  · Nausea and vomiting, or vomiting blood   Date Last Reviewed: 7/1/2016  © 9017-6238 [employer]. [address] [place] [address], [place] [address]. All rights reserved. This information is not intended as a substitute for professional medical care. Always follow your healthcare professional's instructions.

## 2018-09-12 NOTE — ANESTHESIA PREPROCEDURE EVALUATION
09/12/2018  Maximilian Tavera Jr. is a 72 y.o., male.    Anesthesia Evaluation    I have reviewed the Patient Summary Reports.    I have reviewed the Nursing Notes.   I have reviewed the Medications.     Review of Systems  Anesthesia Hx:  No problems with previous Anesthesia    Social:  Non-Smoker    Hematology/Oncology:         -- Cancer in past history (squamous):   Cardiovascular:   Hypertension, well controlled Dysrhythmias hyperlipidemia    Pulmonary:  Pulmonary Normal    Renal/:  Renal/ Normal     Musculoskeletal:   Arthritis     Neurological:   Neuromuscular Disease, Myasthenia Gravis  Peripheral Neuropathy    Endocrine:   Hypothyroidism        Physical Exam  General:  Well nourished    Airway/Jaw/Neck:  Airway Findings: Mouth Opening: Normal Tongue: Normal  General Airway Assessment: Adult  Oropharynx Findings:  Mallampati: II  Jaw/Neck Findings:  Neck ROM: Normal ROM     Eyes/Ears/Nose:  Eyes/Ears/Nose Findings:    Dental:  Dental Findings:   Chest/Lungs:  Chest/Lungs Findings: Normal Respiratory Rate     Heart/Vascular:  Heart Findings: Rate: Normal  Rhythm: Regular Rhythm        Mental Status:  Mental Status Findings:  Cooperative, Alert and Oriented         Anesthesia Plan  Type of Anesthesia, risks & benefits discussed:  Anesthesia Type:  general  Patient's Preference:   Intra-op Monitoring Plan: standard ASA monitors  Intra-op Monitoring Plan Comments:   Post Op Pain Control Plan: multimodal analgesia  Post Op Pain Control Plan Comments:   Induction:   IV  Beta Blocker:  Patient is on a Beta-Blocker and has received one dose within the past 24 hours (No further documentation required).       Informed Consent: Patient understands risks and agrees with Anesthesia plan.  Questions answered. Anesthesia consent signed with patient.  ASA Score: 3     Day of Surgery Review of History &  Physical:  There are no significant changes.   H&P completed by Anesthesiologist.       Ready For Surgery From Anesthesia Perspective.

## 2018-09-12 NOTE — TRANSFER OF CARE
"Anesthesia Transfer of Care Note    Patient: Maximilian Tavera Jr.    Procedure(s) Performed: Procedure(s) (LRB):  EGD (ESOPHAGOGASTRODUODENOSCOPY) (N/A)    Patient location: PACU    Anesthesia Type: general    Transport from OR: Transported from OR on 2-3 L/min O2 by NC with adequate spontaneous ventilation    Post pain: adequate analgesia    Post assessment: no apparent anesthetic complications and tolerated procedure well    Post vital signs: stable    Level of consciousness: sedated and responds to stimulation    Nausea/Vomiting: no nausea/vomiting    Complications: none    Transfer of care protocol was followed      Last vitals:   Visit Vitals  BP (!) 144/89   Pulse 60   Temp 36.8 °C (98.2 °F)   Resp 17   Ht 6' 1" (1.854 m)   Wt 99.8 kg (220 lb)   SpO2 (!) 93%   BMI 29.03 kg/m²     "

## 2018-09-12 NOTE — ANESTHESIA POSTPROCEDURE EVALUATION
"Anesthesia Post Evaluation    Patient: Maximilian Tavera Jr.    Procedure(s) Performed: Procedure(s) (LRB):  EGD (ESOPHAGOGASTRODUODENOSCOPY) (N/A)    Final Anesthesia Type: general  Patient location during evaluation: PACU  Patient participation: Yes- Able to Participate  Level of consciousness: awake and alert and oriented  Post-procedure vital signs: reviewed and stable  Pain management: adequate  Airway patency: patent  PONV status at discharge: No PONV  Anesthetic complications: no      Cardiovascular status: blood pressure returned to baseline and stable  Respiratory status: unassisted and spontaneous ventilation  Hydration status: euvolemic  Follow-up not needed.        Visit Vitals  /74   Pulse 67   Temp 36.4 °C (97.6 °F)   Resp 16   Ht 6' 1" (1.854 m)   Wt 99.8 kg (220 lb)   SpO2 99%   BMI 29.03 kg/m²       Pain/Regina Score: Pain Assessment Performed: Yes (9/12/2018 10:35 AM)  Presence of Pain: denies (9/12/2018 10:35 AM)  Pain Rating Prior to Med Admin: 1 (9/12/2018 10:35 AM)  Pain Rating Post Med Admin: 1 (9/12/2018 10:35 AM)  Regina Score: 10 (9/12/2018 10:35 AM)        "

## 2018-09-12 NOTE — [employer] PATIENT INSTRUCTIONS
Discharge Summary/Instructions after an Endoscopic Procedure  Patient Name: Maximilian Tavera  Patient MRN: [id number]  Patient YOB: 1945 Wednesday, September 12, 2018  Gabriel Adhikari MD  RESTRICTIONS:  During your procedure today, you received medications for sedation.  These   medications may affect your judgment, balance and coordination.  Therefore,   for 24 hours, you have the following restrictions:   - DO NOT drive a car, operate machinery, make legal/financial decisions,   sign important papers or drink alcohol.    ACTIVITY:  Today: no heavy lifting, straining or running due to procedural   sedation/anesthesia.  The following day: return to full activity including work.  DIET:  Eat and drink normally unless instructed otherwise.     TREATMENT FOR COMMON SIDE EFFECTS:  - Mild abdominal pain, nausea, belching, bloating or excessive gas:  rest,   eat lightly and use a heating pad.  - Sore Throat: treat with throat lozenges and/or gargle with warm salt   water.  - Because air was used during the procedure, expelling large amounts of air   from your rectum or belching is normal.  - If a bowel prep was taken, you may not have a bowel movement for 1-3 days.    This is normal.  SYMPTOMS TO WATCH FOR AND REPORT TO YOUR PHYSICIAN:  1. Abdominal pain or bloating, other than gas cramps.  2. Chest pain.  3. Back pain.  4. Signs of infection such as: chills or fever occurring within 24 hours   after the procedure.  5. Rectal bleeding, which would show as bright red, maroon, or black stools.   (A tablespoon of blood from the rectum is not serious, especially if   hemorrhoids are present.)  6. Vomiting.  7. Weakness or dizziness.  GO DIRECTLY TO THE NEAREST EMERGENCY ROOM IF YOU HAVE ANY OF THE FOLLOWING:      Difficulty breathing              Chills and/or fever over 101 F   Persistent vomiting and/or vomiting blood   Severe abdominal pain   Severe chest pain   Black, tarry stools   Bleeding- more than one  tablespoon   Any other symptom or condition that you feel may need urgent attention  Your doctor recommends these additional instructions:  If any biopsies were taken, your doctors clinic will contact you in 1 to 2   weeks with any results.  - Patient has a contact number available for emergencies.  The signs and   symptoms of potential delayed complications were discussed with the   patient.  Return to normal activities tomorrow.  Written discharge   instructions were provided to the patient.   - Resume previous diet.   - Continue present medications.   - No aspirin, ibuprofen, naproxen, or other non-steroidal anti-inflammatory   drugs.   - Await pathology results.   - Discharge patient to home (ambulatory).   - Follow an antireflux regimen.   - Use Protonix (pantoprazole) 40 mg PO daily.   - Perform a CT scan (computed tomography) of chest with contrast, abdomen   with contrast and pelvis with contrast at appointment to be scheduled.   - Return to my office after studies are complete.  For questions, problems or results please call your physician - Gabriel Adhikari MD at Work:  [phone].  [facility], EMERGENCY ROOM PHONE NUMBER: [phone]  IF A COMPLICATION OR EMERGENCY SITUATION ARISES AND YOU ARE UNABLE TO REACH   YOUR PHYSICIAN - GO DIRECTLY TO THE EMERGENCY ROOM.  Gabriel Adhikari MD  9/12/2018 10:20:01 AM  This report has been verified and signed electronically.  [employer]

## 2018-09-14 ENCOUNTER — HOSPITAL ENCOUNTER (OUTPATIENT)
Dept: RADIOLOGY | Facility: HOSPITAL | Age: 73
Discharge: HOME | End: 2018-09-14
Attending: INTERNAL MEDICINE
Payer: MEDICARE

## 2018-09-14 DIAGNOSIS — K29.70 GASTRITIS, PRESENCE OF BLEEDING UNSPECIFIED, UNSPECIFIED CHRONICITY, UNSPECIFIED GASTRITIS TYPE: ICD-10-CM

## 2018-09-14 DIAGNOSIS — R10.13 EPIGASTRIC PAIN: ICD-10-CM

## 2018-09-14 PROCEDURE — 74177 CT ABD & PELVIS W/CONTRAST: CPT | Mod: TC

## 2018-09-14 PROCEDURE — 74177 CT ABD & PELVIS W/CONTRAST: CPT | Mod: 26,,, | Performed by: RADIOLOGY

## 2018-09-14 PROCEDURE — 25500020 PHARM REV CODE 255

## 2018-09-14 PROCEDURE — 71260 CT THORAX DX C+: CPT | Mod: 26,,, | Performed by: RADIOLOGY

## 2018-09-14 RX ORDER — SODIUM CHLORIDE 9 MG/ML
INJECTION, SOLUTION INTRAVENOUS
Status: DISPENSED
Start: 2018-09-14 | End: 2018-09-14

## 2018-09-14 RX ADMIN — IOHEXOL 30 ML: 350 INJECTION, SOLUTION INTRAVENOUS at 10:09

## 2018-09-14 RX ADMIN — IOHEXOL 100 ML: 350 INJECTION, SOLUTION INTRAVENOUS at 10:09

## 2018-09-20 ENCOUNTER — TELEPHONE (OUTPATIENT)
Dept: GASTROENTEROLOGY | Facility: CLINIC | Age: 73
End: 2018-09-20

## 2018-09-20 DIAGNOSIS — N40.0 ENLARGED PROSTATE: ICD-10-CM

## 2018-09-20 DIAGNOSIS — R93.5 ABNORMAL CT OF THE ABDOMEN: Primary | ICD-10-CM

## 2018-09-20 NOTE — TELEPHONE ENCOUNTER
Left message phone is documented under imaging result note.   Patient notified and understands ct  Results.referral placed to urology and patient will call to schedule.

## 2018-09-20 NOTE — TELEPHONE ENCOUNTER
----- Message from Natacha Clements sent at 9/20/2018  9:42 AM CDT -----  Type:  Patient Returning Call    Who Called:  Patient  Who Left Message for Patient:  Not documented  Does the patient know what this is regarding?:  No  Best Call Back Number:  [phone]  Additional Information:  This phone call was not documented in file.

## 2018-10-01 ENCOUNTER — PATIENT MESSAGE (OUTPATIENT)
Dept: CARDIOLOGY | Facility: CLINIC | Age: 73
End: 2018-10-01

## 2018-10-01 ENCOUNTER — OFFICE VISIT (OUTPATIENT)
Dept: SPINE | Facility: CLINIC | Age: 73
End: 2018-10-01
Payer: MEDICARE

## 2018-10-01 ENCOUNTER — HOSPITAL ENCOUNTER (OUTPATIENT)
Dept: RADIOLOGY | Facility: OTHER | Age: 73
Discharge: HOME | End: 2018-10-01
Attending: NEUROLOGICAL SURGERY
Payer: MEDICARE

## 2018-10-01 VITALS
BODY MASS INDEX: 29.03 KG/M2 | DIASTOLIC BLOOD PRESSURE: 86 MMHG | TEMPERATURE: 99 F | WEIGHT: 220 LBS | SYSTOLIC BLOOD PRESSURE: 146 MMHG | HEART RATE: 74 BPM

## 2018-10-01 DIAGNOSIS — G95.9 CERVICAL MYELOPATHY: Primary | ICD-10-CM

## 2018-10-01 DIAGNOSIS — G95.9 CERVICAL MYELOPATHY: ICD-10-CM

## 2018-10-01 PROCEDURE — 72050 X-RAY EXAM NECK SPINE 4/5VWS: CPT | Mod: TC,FY

## 2018-10-01 PROCEDURE — 1101F PT FALLS ASSESS-DOCD LE1/YR: CPT | Mod: CPTII,,, | Performed by: NEUROLOGICAL SURGERY

## 2018-10-01 PROCEDURE — 3079F DIAST BP 80-89 MM HG: CPT | Mod: CPTII,,, | Performed by: NEUROLOGICAL SURGERY

## 2018-10-01 PROCEDURE — 99999 PR PBB SHADOW E&M-EST. PATIENT-LVL III: CPT | Mod: PBBFAC,,, | Performed by: NEUROLOGICAL SURGERY

## 2018-10-01 PROCEDURE — 3077F SYST BP >= 140 MM HG: CPT | Mod: CPTII,,, | Performed by: NEUROLOGICAL SURGERY

## 2018-10-01 PROCEDURE — 99213 OFFICE O/P EST LOW 20 MIN: CPT | Mod: PBBFAC | Performed by: NEUROLOGICAL SURGERY

## 2018-10-01 PROCEDURE — 99214 OFFICE O/P EST MOD 30 MIN: CPT | Mod: S$PBB,,, | Performed by: NEUROLOGICAL SURGERY

## 2018-10-01 PROCEDURE — 72050 X-RAY EXAM NECK SPINE 4/5VWS: CPT | Mod: 26,,, | Performed by: RADIOLOGY

## 2018-10-01 PROCEDURE — 99499 UNLISTED E&M SERVICE: CPT | Mod: HCNC,S$GLB,, | Performed by: NEUROLOGICAL SURGERY

## 2018-10-01 RX ORDER — EZETIMIBE 10 MG/1
10 TABLET ORAL DAILY
COMMUNITY
Start: 2018-09-07 | End: 2019-04-22 | Stop reason: SDUPTHER

## 2018-10-01 NOTE — PROGRESS NOTES
CHIEF COMPLAINT:  Follow up evaluation of neck and low back pain    I, Khris Hunter, attest that this documentation has been prepared under the direction and in the presence of Kishore Turner MD.    HPI:  Maximilian Tavera Jr. is a 72 y.o.  male with history of myasthenia gravis, who presents today for follow up evaluation of neck and low back pain. Pt reports no changes in his symptoms since his last visit. He notes continued neck and back pain as well as radicular RLE pain. Pt has noticed new globus sensation. Pt has not received general anaesthesia since being diagnosed with myasthenia gravis. Pt currently takes aspirin 81 mg. He states that he has not been taking aspirin recently due to his increased prednisone dosage. Pt has recently experienced shortness of breath. He states that he has been diagnosed with PVCs.  Pt's second neck surgery was performed by Dr. Devan Jansen.       Review of patient's allergies indicates:   Allergen Reactions    No known drug allergies        Past Medical History:   Diagnosis Date    Arthritis     Back pain     Carpal tunnel syndrome 2/25/2013    Cataract     Cataract, left eye 11/10/2014    Chest pain, musculoskeletal     Hyperlipidemia     Hypertension     Hypothyroidism     Knee fracture     Myasthenia gravis     Neuropathy 1/3/2013    Obesity 1/29/2015    Polyneuropathy     Squamous cell carcinoma 2014    left forearm    Thyroid disease      Past Surgical History:   Procedure Laterality Date    APPENDECTOMY      CATARACT EXTRACTION W/  INTRAOCULAR LENS IMPLANT Bilateral     COLONOSCOPY N/A 3/1/2012    [facility]an [facility] at [facility]    EGD (ESOPHAGOGASTRODUODENOSCOPY) N/A 9/12/2018    Per[facility] by Gabriel Hernandez [facility] at [facility]    ESOPHAGOGASTRODUODENOSCOPY N/A 9/12/2018    Procedure: EGD (ESOPHAGOGASTRODUODENOSCOPY);  Surgeon: Gabriel Hernandez MD;  Location: [facility];  Service: Endoscopy;  Laterality: N/A;     EXTRACTION-CATARACT-IOL Left 12/9/2014    Performed by [facility] Montague [facility] at [facility] OR    HEMORRHOID SURGERY      INJECTION-STEROID-EPIDURAL-TRANSFORAMINAL Right 5/17/2018    [facility]formed by [facility] Watt [facility] at [facility] OR    INJECTION-STEROID-EPIDURAL-TRANSFORAMINAL Right 9/1/2017    [facility] by [facility]att [facility] at [facility] OR    INJECTION-STEROID-EPIDURAL-TRANSFORAMINAL Right 2/21/2017    [facility] by [facility] at [facility] OR    INJECTION-STEROID-EPIDURAL-TRANSFORAMINAL Right 10/9/2014    [facility] by [facility] at [facility] OR    KNEE ARTHROPLASTY Bilateral     NECK SURGERY      TONSILLECTOMY       Family History   Problem Relation Age of Onset    Cataracts Mother     Heart disease Mother         CHF    Hypertension Mother     Hyperlipidemia Mother     Cataracts Father     Glaucoma Father     Heart disease Father     Hyperlipidemia Sister     Hypertension Sister     No Known Problems Daughter     No Known Problems Daughter     Collagen disease Neg Hx     Amblyopia Neg Hx     Blindness Neg Hx     Macular degeneration Neg Hx     Retinal detachment Neg Hx     Strabismus Neg Hx     Cancer Neg Hx      Social History     Tobacco Use    Smoking status: Never Smoker    Smokeless tobacco: Never Used    Tobacco comment: when a child   Substance Use Topics    Alcohol use: Yes     Comment: rarely    Drug use: No        Review of Systems   Constitutional: Negative.    HENT:        Globus sensation   Eyes: Negative.    Respiratory: Positive for shortness of breath.    Cardiovascular: Negative.    Gastrointestinal: Negative.    Endocrine: Negative.    Genitourinary: Negative.    Musculoskeletal: Positive for back pain (low back), myalgias (RLE) and neck pain. Negative for gait problem.   Skin: Negative.    Allergic/Immunologic: Negative.    Neurological: Positive for dizziness (intermittent) and numbness (left 4th and 5th digits). Negative for weakness, light-headedness and headaches.        Imbalance        Hand clumsiness   Hematological: Negative.    Psychiatric/Behavioral: Negative.        OBJECTIVE:   Vital Signs:  Temp: 98.7 °F (37.1 °C) (10/01/18 1317)  Pulse: 74 (10/01/18 1317)  BP: (!) 146/86 (10/01/18 1317)    Physical Exam:    Vital signs: All nursing notes and vital signs reviewed -- afebrile, vital signs stable.  Constitutional: Patient sitting comfortably in chair. Appears well developed and well nourished.  Skin: Exposed areas are intact without abnormal markings, rashes or other lesions.  HEENT: Normocephalic. Normal conjunctivae.  Cardiovascular: Normal rate and regular rhythm.  Respiratory: Chest wall rises and falls symmetrically, without signs of respiratory distress.  Abdomen: Soft and non-tender.  Extremities: Warm and without edema. Calves supple, non-tender.  Psych/Behavior: Normal affect.    Neurological:    Mental status: Alert and oriented. Conversational and appropriate.       Cranial Nerves: Grossly intact.     Motor:    Upper:  Deltoids Triceps Biceps WE WF     R 5/5 5/5 5/5 5/5 5/5 5/5    L 5/5 5/5 5/5 5/5 5/5 5/5      Lower:  HF KE KF DF PF EHL    R 5/5 5/5 5/5 5/5 5/5 5/5    L 5/5 5/5 5/5 5/5 5/5 5/5     Sensory: Intact sensation to light touch in all extremities. Romberg negative.    Reflexes:          DTR: 2+ symmetrically throughout.     Mcneill's: Negative.     Babinski's: Negative.     Clonus: Negative.    Cerebellar: Finger-to-nose and rapid alternating movements normal. Gait stable, fluid.    Spine:    Posture: Head well aligned over pelvis in front and side views.  No focal or global spinal deformity visible on inspection. Shoulders and hips even. No obvious leg length discrepancy. No scapula winging.    Bending: Full ROM with forward, back and lateral bending. No rib prominence with forward bend.    Cervical:      ROM: Full with flexion, extension, lateral rotation and ear-to-shoulder bend.      Midline TTP: Negative.     Spurling's test: Negative.     Lhermitte's:  Negative.    Thoracic:     Midline TTP: Negative    Lumbar:     Midline TTP: Negative     Straight Leg Test: Negative     Crossed Straight Leg Test: Negative     Sciatic notch tenderness: Negative.    Other:     SI joint TTP: Negative.     Greater trochanter TTP: Negative.     Tenderness with external/internal hip rotation: Negative.    Diagnostic Results:  All imaging was independently reviewed by me.    No new imaging for my review.      ASSESSMENT/PLAN:     Maximilian Tavera Jr. has degenerative cervical myelopathy and severe cervical stenosis at C3-4 and C4-5. I have recommended a posterior cervical laminectomy and instrumented fusion, probably C2-C6 but levels will pend a flex ex C spine and CT C spine. He has Myesthenia Gravis and difficulty holding his head upright, which favors extending the instrumentation to C2. He is also on steroids now; my preference would be total taper at least 1 week prior to surgery, but if this isnt' possible we will give an intraoperative bolus. He will need thorough medical and cardiologic workup prior to surgery.    The patient understands and agrees with the plan of care. All questions were answered.     1. CT C-spine  2. Flex/Ex C-spine  3. C2-C6 PCF      I, Dr. Kishore Turner personally performed the services described in this documentation. All medical record entries made by the scribe, Khris Hunter, were at my direction and in my presence.  I have reviewed the chart and agree that the record reflects my personal performance and is accurate and complete.      Kishore Turner M.D.  Department of Neurosurgery  [facility]      .

## 2018-10-03 ENCOUNTER — TELEPHONE (OUTPATIENT)
Dept: NEUROSURGERY | Facility: CLINIC | Age: 73
End: 2018-10-03

## 2018-10-03 DIAGNOSIS — Z98.1 S/P CERVICAL SPINAL FUSION: ICD-10-CM

## 2018-10-03 DIAGNOSIS — G95.9 CERVICAL MYELOPATHY: Primary | ICD-10-CM

## 2018-10-03 DIAGNOSIS — M48.02 CERVICAL STENOSIS OF SPINE: ICD-10-CM

## 2018-10-04 ENCOUNTER — TELEPHONE (OUTPATIENT)
Dept: UROLOGY | Facility: CLINIC | Age: 73
End: 2018-10-04

## 2018-10-04 NOTE — TELEPHONE ENCOUNTER
Called patient to offer appt.  He says he has a lot of medical stuff going on right now.  Focused on need for neck surgery right now.    Discussed probable symptoms of BPH.  He did say he is having nocturia >2, frequency, and decreased urine flow.  Advised when he is ready to call for appt, and MD can discuss treatment to help with/resolve symptoms.

## 2018-10-04 NOTE — TELEPHONE ENCOUNTER
----- Message from Simba Cheung MD sent at 10/3/2018 11:22 PM CDT -----  Please offer next avail NP appointment with me - no urgency as is incidental finding on CT

## 2018-10-08 ENCOUNTER — TELEPHONE (OUTPATIENT)
Dept: PREADMISSION TESTING | Facility: HOSPITAL | Age: 73
End: 2018-10-08

## 2018-10-08 ENCOUNTER — ANESTHESIA EVENT (OUTPATIENT)
Dept: SURGERY | Facility: HOSPITAL | Age: 73
DRG: 472 | End: 2018-10-08
Payer: MEDICARE

## 2018-10-08 ENCOUNTER — TELEPHONE (OUTPATIENT)
Dept: FAMILY MEDICINE | Facility: CLINIC | Age: 73
End: 2018-10-08

## 2018-10-08 DIAGNOSIS — M79.604 PAIN OF RIGHT LOWER EXTREMITY: ICD-10-CM

## 2018-10-08 DIAGNOSIS — Z01.818 PREOPERATIVE TESTING: Primary | ICD-10-CM

## 2018-10-08 NOTE — TELEPHONE ENCOUNTER
----- Message from Romy Pan RN sent at 10/8/2018 12:03 PM CDT -----  Please schedule poc and labs no sooner than 10/24, because of T&S.Thanks!

## 2018-10-08 NOTE — TELEPHONE ENCOUNTER
----- Message from Romy Pan RN sent at 10/8/2018 11:53 AM CDT -----  Patient is scheduled for C2-C6 Posterior Cervical Laminectomy and Instrumental Fusion on 11/7/2018 with Dr. Turner.( approximately 330 minutes of general anesthesia). Patient will need medical clearance for this surgery. Please schedule an appointment for him. Thanks!

## 2018-10-08 NOTE — PRE-PROCEDURE INSTRUCTIONS
Stated has not had any problems with anesthesia in the past. Will need to make an appt with your PCP, Dr. Brian Marroquin for medical clearance and your Cardiologist, Dr. Altamirano for cardiology clearance.Will also need poc appt and labs.Our  will call to set up these appts.Preop instructions given. Hold asa, asa containing products, nsaids, vitamins and supplements one week prior to surgery. Verbalizes understanding.

## 2018-10-08 NOTE — ANESTHESIA PREPROCEDURE EVALUATION
Anesthesia Assessment: Preoperative EQUATION     Planned Procedure: Procedure(s) (LRB):  C2-C6 Posterior Cervical Laminectomy & Instrumental Fusion (N/A)  Requested Anesthesia Type:General  Surgeon: Kishore Turner MD  Service: Neurosurgery  Known or anticipated Date of Surgery:11/7/2018     Surgeon notes: reviewed     Electronic QUestionnaire Assessment completed via nurse interview with patient.     NO AQ  Triage considerations:      Previous anesthesia records:MAC and No problems  9/12/2018 ESOPHAGOGASTRODUODENOSCOPY  Airway/Jaw/Neck:  Airway Findings: Mouth Opening: Normal Tongue: Normal  General Airway Assessment: Adult  Oropharynx Findings:  Mallampati: II  Jaw/Neck Findings:  Neck ROM: Normal ROM           Last PCP note: within 3 months , within [facility]   Subspecialty notes: Cardiology: General, Gastroenterology, Neurology, Pain Management, SPINE SERVICE     Other important co-morbidities: PER Epic: HLD, HTN, HYPOTHYROID, GERD,& MYASTHENIA GRAVIS      Tests already available:  Available tests,  within 1 month , within [facility] .10/1/2018 XRAY CERVICAL SPINE AP LAT WITH FLEX AND EXT, 5/4/2018 EKG                Instructions given. (See in Nurse's note)     Optimization:  Anesthesia Preop Clinic Assessment  Indicated    Medical Opinion Indicated                            Sub-specialist consult indicated:   TBD                             Plan:              Testing:  CBC, BMP, PT/INR and T&S   Pre-anesthesia  visit                                        Visit focus: concerns in complex and/or prolonged anesthesia                           Consultation:Patient's PCP for re-evaluation Patient's PCP for a statement of optimization                            Patient  has previously scheduled Medical Appointment:10/23/2018 CT     Navigation: Tests Scheduled. TBD                        Consults scheduled.TBD                        Results will be tracked by Preop Clinic.                                   Electronically  signed by Romy Pan RN at 10/8/2018 11:44 AM       Pre-admit on 11/7/2018            Detailed Report   10/17 Cleared by Cardiology Dr. Gan/Dr. Tyler Cornelius on 10/11 note.    Romy Pan RN BSN  [facility]                                                                                                                 10/08/2018  Maximilian Tavera Jr. is a 72 y.o., male.    Anesthesia Evaluation      I have reviewed the Medications.   Steroids Taken In Past Year: Prednisone    Review of Systems  Anesthesia Hx:  Hx of Anesthetic complications (Myasthenia gravis) H/o Myasthenia gravis History of prior surgery of interest to airway management or planning: Previous anesthesia: General EGD 9/12/18 with general anesthesia. Procedure performed at an [facility]. Denies Family Hx of Anesthesia complications.    Social:  Non-Smoker, No Alcohol Use    Hematology/Oncology:  Hematology Normal   Oncology Normal     EENT/Dental:   Reading glasses   Reports double vision on occasion  Denies dysphagia but reports some difficulty swallowing pills   Cardiovascular:   Hypertension Dysrhythmias (RBBB)  Functional Capacity good / => 4 METS, climb 1 FOS, working out 3x weekly up until 5/2018; denies CP, reports GOLD which recovers with rest    Pulmonary:   Pneumonia (5/2018) Shortness of breath (scheduled for PFTs 11/2/18 per PCP)    Renal/:   renal calculi    Hepatic/GI:   GERD (on protonix since EGD in 9/2018)    Musculoskeletal:   Arthritis  SONIA lumbar Spine Disorders: lumbar and cervical Disc disease and Degenerative disease    Neurological:   Denies CVA. Denies Seizures. MYASTHENIA GRAVIS (dx 3-4 yrs ago)-last exacerbation 6/2018-was unable to hold head up-the Prednisone dose was increased and has tapered down now Pain , onset is chronic , location of neck  , quality of aching/dull , severity is a 3    Endocrine:   Denies Diabetes. Hypothyroidism    Psych:  Psychiatric Normal           Physical Exam  General:  Well  nourished    Airway/Jaw/Neck:  Airway Findings: Mouth Opening: Normal Tongue: Normal  General Airway Assessment: Adult  Jaw/Neck Findings:     Neck ROM: Decreased Lateral Motion, to the right, to the left, Extension Decreased, Mild      Dental:  Dental Findings: molar caps   Chest/Lungs:  Chest/Lungs Findings: Clear to auscultation, Normal Respiratory Rate     Heart/Vascular:  Heart Findings: Rate: Normal  Rhythm: Regular Rhythm  Sounds: Normal        Mental Status:  Mental Status Findings:  Cooperative, Alert and Oriented       Pt was seen in POC 10/30/18; clearance by PCP and cardiologist; pending PFTs' on 11/2/18/Patrica Enamorado RN        Anesthesia Plan  Type of Anesthesia, risks & benefits discussed:  Anesthesia Type:  general  Patient's Preference: General  Intra-op Monitoring Plan: arterial line  Intra-op Monitoring Plan Comments:   Post Op Pain Control Plan:   Post Op Pain Control Plan Comments:   Induction:   IV  Beta Blocker:  Patient is on a Beta-Blocker and has received one dose within the past 24 hours (No further documentation required).       Informed Consent: Patient understands risks and agrees with Anesthesia plan.  Questions answered. Anesthesia consent signed with patient.  ASA Score: 3     Day of Surgery Review of History & Physical: I have interviewed and examined the patient. I have reviewed the patient's H&P dated:  There are no significant changes.          Ready For Surgery From Anesthesia Perspective.

## 2018-10-08 NOTE — PRE ADMISSION SCREENING
Anesthesia Assessment: Preoperative EQUATION    Planned Procedure: Procedure(s) (LRB):  C2-C6 Posterior Cervical Laminectomy & Instrumental Fusion (N/A)  Requested Anesthesia Type:General  Surgeon: Kishore Turner MD  Service: Neurosurgery  Known or anticipated Date of Surgery:11/7/2018    Surgeon notes: reviewed    Electronic QUestionnaire Assessment completed via nurse interview with patient.    NO AQ  Triage considerations:     Previous anesthesia records:MAC and No problems  9/12/2018 ESOPHAGOGASTRODUODENOSCOPY  Airway/Jaw/Neck:  Airway Findings: Mouth Opening: Normal Tongue: Normal  General Airway Assessment: Adult  Oropharynx Findings:  Mallampati: II  Jaw/Neck Findings:  Neck ROM: Normal ROM         Last PCP note: within 3 months , within [facility]   Subspecialty notes: Cardiology: General, Gastroenterology, Neurology, Pain Management, SPINE SERVICE    Other important co-morbidities: PER Epic: HLD, HTN, HYPOTHYROID, GERD,& MYASTHENIA GRAVIS      Tests already available:  Available tests,  within 1 month , within [facility] .10/1/2018 XRAY CERVICAL SPINE AP LAT WITH FLEX AND EXT, 5/4/2018 EKG        Instructions given. (See in Nurse's note)    Optimization:  Anesthesia Preop Clinic Assessment  Indicated    Medical Opinion Indicated       Sub-specialist consult indicated:   TBD       Plan:    Testing:  CBC, BMP, PT/INR and T&S   Pre-anesthesia  visit       Visit focus: concerns in complex and/or prolonged anesthesia     Consultation:Patient's PCP for re-evaluation Patient's PCP for a statement of optimization      Patient  has previously scheduled Medical Appointment:10/23/2018 CT    Navigation: Tests Scheduled. TBD             Consults scheduled.TBD             Results will be tracked by Preop Clinic.

## 2018-10-09 ENCOUNTER — TELEPHONE (OUTPATIENT)
Dept: FAMILY MEDICINE | Facility: CLINIC | Age: 73
End: 2018-10-09

## 2018-10-09 DIAGNOSIS — Z01.810 PREOP CARDIOVASCULAR EXAM: Primary | ICD-10-CM

## 2018-10-09 NOTE — TELEPHONE ENCOUNTER
----- Message from Delia Lawrence LPN sent at 10/8/2018  3:29 PM CDT -----      ----- Message -----  From: Romy Pan RN  Sent: 10/8/2018  11:53 AM  To: Brian Marroquin MD, Romy Pan, ECHO, #    Patient is scheduled for C2-C6 Posterior Cervical Laminectomy and Instrumental Fusion on 11/7/2018 with Dr. Turner.( approximately 330 minutes of general anesthesia). Patient will need medical clearance for this surgery. Please schedule an appointment for him. Thanks!

## 2018-10-09 NOTE — TELEPHONE ENCOUNTER
----- Message from Delia Lawrence LPN sent at 10/8/2018  3:28 PM CDT -----      ----- Message -----  From: Romy Pan RN  Sent: 10/8/2018  11:53 AM  To: Brian Marroquin MD, Romy Pan, ECHO, #    Patient is scheduled for C2-C6 Posterior Cervical Laminectomy and Instrumental Fusion on 11/7/2018 with Dr. Turner.( approximately 330 minutes of general anesthesia). Patient will need medical clearance for this surgery. Please schedule an appointment for him. Thanks!

## 2018-10-10 ENCOUNTER — TELEPHONE (OUTPATIENT)
Dept: CARDIOLOGY | Facility: CLINIC | Age: 73
End: 2018-10-10

## 2018-10-10 DIAGNOSIS — Z01.818 PRE-OP EXAMINATION: Primary | ICD-10-CM

## 2018-10-11 ENCOUNTER — HOSPITAL ENCOUNTER (OUTPATIENT)
Dept: CARDIOLOGY | Facility: CLINIC | Age: 73
Discharge: HOME | End: 2018-10-11
Payer: MEDICARE

## 2018-10-11 ENCOUNTER — OFFICE VISIT (OUTPATIENT)
Dept: CARDIOLOGY | Facility: CLINIC | Age: 73
End: 2018-10-11
Payer: MEDICARE

## 2018-10-11 VITALS
DIASTOLIC BLOOD PRESSURE: 78 MMHG | HEIGHT: 73 IN | WEIGHT: 229.75 LBS | SYSTOLIC BLOOD PRESSURE: 121 MMHG | HEART RATE: 70 BPM | BODY MASS INDEX: 30.45 KG/M2

## 2018-10-11 DIAGNOSIS — Z01.810 PRE-OPERATIVE CARDIOVASCULAR EXAMINATION: Primary | ICD-10-CM

## 2018-10-11 DIAGNOSIS — Z01.818 PRE-OP EXAMINATION: ICD-10-CM

## 2018-10-11 PROCEDURE — 99215 OFFICE O/P EST HI 40 MIN: CPT | Mod: PBBFAC

## 2018-10-11 PROCEDURE — 1101F PT FALLS ASSESS-DOCD LE1/YR: CPT | Mod: CPTII,,, | Performed by: INTERNAL MEDICINE

## 2018-10-11 PROCEDURE — 93005 ELECTROCARDIOGRAM TRACING: CPT | Mod: PBBFAC | Performed by: INTERNAL MEDICINE

## 2018-10-11 PROCEDURE — 3074F SYST BP LT 130 MM HG: CPT | Mod: CPTII,,, | Performed by: INTERNAL MEDICINE

## 2018-10-11 PROCEDURE — 99999 PR PBB SHADOW E&M-EST. PATIENT-LVL V: CPT | Mod: PBBFAC,,,

## 2018-10-11 PROCEDURE — 99213 OFFICE O/P EST LOW 20 MIN: CPT | Mod: S$PBB,,, | Performed by: INTERNAL MEDICINE

## 2018-10-11 PROCEDURE — 3078F DIAST BP <80 MM HG: CPT | Mod: CPTII,,, | Performed by: INTERNAL MEDICINE

## 2018-10-11 PROCEDURE — 93010 ELECTROCARDIOGRAM REPORT: CPT | Mod: S$PBB,,, | Performed by: INTERNAL MEDICINE

## 2018-10-11 NOTE — PROGRESS NOTES
Subjective:      Patient ID: Maximilian Tavera Jr. is a 72 y.o. male.    Chief Complaint: Pre-op Exam (Neck Surgery )    Cardiovascular Risk Assessment:  Non-emergent surgery low risk surgery:  cervical spine fusion on 11/7/18   No active cardiac problems no previus cardiac disease history, patient has HTN controlled with  Coreg, chlorthalidone and lisinopril   Functional Status: able to climb a flight of stairs (> 4 METS)  Her revised cardiac risk index is 0.     1 pt Each: Ischemic Heart Disease, Cerebrovascular Disease,                     CHF, DM, Creatinine > 2             Other Issues: MG on prednisone 10 mg , diagnosed 3 years ago, had a flair last month controlled with higher prednisone dose and tapered down to 10, patient was instruction to stop taking Asprin at that time likely to prevent gastric ulcer disease.         Anticoagulation: non      Coronary Stenting: non              Review of Systems   Constitutional: Negative for activity change and fatigue.   HENT: Negative for congestion and sore throat.    Eyes: Negative for photophobia and visual disturbance.   Respiratory: Positive for shortness of breath (2/2 to MG). Negative for chest tightness.    Gastrointestinal: Negative for abdominal pain and anal bleeding.   Endocrine: Negative for polydipsia, polyphagia and polyuria.   Genitourinary: Negative for dysuria and flank pain.   Musculoskeletal: Negative for back pain and gait problem.   Skin: Negative for pallor and rash.   Neurological: Negative for dizziness and facial asymmetry.   Psychiatric/Behavioral: Negative for agitation and behavioral problems.     Objective:     Vitals:    10/11/18 1058   BP: 121/78   Pulse: 70     Physical Exam   Constitutional: He is oriented to person, place, and time. He appears well-developed and well-nourished. No distress.   HENT:   Head: Normocephalic and atraumatic.   Eyes: Conjunctivae are normal. Pupils are equal, round, and reactive to light. No scleral  icterus.   Neck: Neck supple.   Cardiovascular: Normal rate and regular rhythm.   Pulmonary/Chest: Effort normal and breath sounds normal. No respiratory distress.   Abdominal: Soft. He exhibits distension.   Musculoskeletal: He exhibits no edema.   Neurological: He is alert and oriented to person, place, and time.   Skin: He is not diaphoretic. No pallor.   Psychiatric: He has a normal mood and affect. His behavior is normal. Thought content normal.   Vitals reviewed.    Assessment and Plan :      1. Pre-operative cardiovascular examination        Maximilian was seen today for pre-op exam.    Diagnoses and all orders for this visit:    Pre-operative cardiovascular examination    Recommendation:  - No further cardiac work up needed prior to the surgery.   - No need to restart Asprin prior or post surgery, no cardiovascular benefit from primary prevention           Matty Gan  Internal Medicine, PGY 2  [phone]

## 2018-10-12 ENCOUNTER — PATIENT MESSAGE (OUTPATIENT)
Dept: CARDIOLOGY | Facility: CLINIC | Age: 73
End: 2018-10-12

## 2018-10-12 DIAGNOSIS — I10 HTN (HYPERTENSION), BENIGN: ICD-10-CM

## 2018-10-12 DIAGNOSIS — E78.2 MIXED HYPERLIPIDEMIA: ICD-10-CM

## 2018-10-12 DIAGNOSIS — I95.9 HYPOTENSION, UNSPECIFIED HYPOTENSION TYPE: ICD-10-CM

## 2018-10-12 DIAGNOSIS — I10 ESSENTIAL HYPERTENSION: ICD-10-CM

## 2018-10-12 RX ORDER — CHLORTHALIDONE 25 MG/1
TABLET ORAL
Qty: 90 TABLET | Refills: 3 | Status: SHIPPED | OUTPATIENT
Start: 2018-10-12 | End: 2019-05-02 | Stop reason: SINTOL

## 2018-10-14 RX ORDER — LISINOPRIL 40 MG/1
TABLET ORAL
Qty: 90 TABLET | Refills: 3 | Status: SHIPPED | OUTPATIENT
Start: 2018-10-14 | End: 2019-02-25 | Stop reason: ALTCHOICE

## 2018-10-14 RX ORDER — ATORVASTATIN CALCIUM 80 MG/1
TABLET, FILM COATED ORAL
Qty: 90 TABLET | Refills: 3 | Status: SHIPPED | OUTPATIENT
Start: 2018-10-14 | End: 2019-06-26 | Stop reason: SDUPTHER

## 2018-10-16 ENCOUNTER — HOSPITAL ENCOUNTER (OUTPATIENT)
Dept: RADIOLOGY | Facility: HOSPITAL | Age: 73
Discharge: HOME | End: 2018-10-16
Attending: NEUROLOGICAL SURGERY
Payer: MEDICARE

## 2018-10-16 ENCOUNTER — OFFICE VISIT (OUTPATIENT)
Dept: FAMILY MEDICINE | Facility: CLINIC | Age: 73
End: 2018-10-16
Payer: MEDICARE

## 2018-10-16 VITALS
WEIGHT: 233.69 LBS | OXYGEN SATURATION: 91 % | HEART RATE: 43 BPM | HEIGHT: 73 IN | SYSTOLIC BLOOD PRESSURE: 119 MMHG | DIASTOLIC BLOOD PRESSURE: 59 MMHG | TEMPERATURE: 98 F | BODY MASS INDEX: 30.97 KG/M2

## 2018-10-16 DIAGNOSIS — M51.36 DDD (DEGENERATIVE DISC DISEASE), LUMBAR: ICD-10-CM

## 2018-10-16 DIAGNOSIS — G95.9 CERVICAL MYELOPATHY: ICD-10-CM

## 2018-10-16 DIAGNOSIS — R73.9 HYPERGLYCEMIA: ICD-10-CM

## 2018-10-16 DIAGNOSIS — R06.2 WHEEZING: ICD-10-CM

## 2018-10-16 DIAGNOSIS — G70.00 MYASTHENIA GRAVIS, ACHR ANTIBODY POSITIVE: ICD-10-CM

## 2018-10-16 DIAGNOSIS — R63.5 ABNORMAL WEIGHT GAIN: Primary | ICD-10-CM

## 2018-10-16 DIAGNOSIS — J18.9 PNEUMONIA OF RIGHT LUNG DUE TO INFECTIOUS ORGANISM, UNSPECIFIED PART OF LUNG: ICD-10-CM

## 2018-10-16 DIAGNOSIS — M48.02 CERVICAL STENOSIS OF SPINE: ICD-10-CM

## 2018-10-16 DIAGNOSIS — E78.2 MIXED HYPERLIPIDEMIA: ICD-10-CM

## 2018-10-16 DIAGNOSIS — R06.02 SOB (SHORTNESS OF BREATH): ICD-10-CM

## 2018-10-16 PROCEDURE — 3074F SYST BP LT 130 MM HG: CPT | Mod: CPTII,,, | Performed by: FAMILY MEDICINE

## 2018-10-16 PROCEDURE — 99499 UNLISTED E&M SERVICE: CPT | Mod: HCNC,S$GLB,, | Performed by: FAMILY MEDICINE

## 2018-10-16 PROCEDURE — 99214 OFFICE O/P EST MOD 30 MIN: CPT | Mod: S$PBB,,, | Performed by: FAMILY MEDICINE

## 2018-10-16 PROCEDURE — 99999 PR PBB SHADOW E&M-EST. PATIENT-LVL IV: CPT | Mod: PBBFAC,,, | Performed by: FAMILY MEDICINE

## 2018-10-16 PROCEDURE — 1101F PT FALLS ASSESS-DOCD LE1/YR: CPT | Mod: CPTII,,, | Performed by: FAMILY MEDICINE

## 2018-10-16 PROCEDURE — 3078F DIAST BP <80 MM HG: CPT | Mod: CPTII,,, | Performed by: FAMILY MEDICINE

## 2018-10-16 PROCEDURE — 72125 CT NECK SPINE W/O DYE: CPT | Mod: TC

## 2018-10-16 PROCEDURE — 72125 CT NECK SPINE W/O DYE: CPT | Mod: 26,,, | Performed by: RADIOLOGY

## 2018-10-16 PROCEDURE — 99214 OFFICE O/P EST MOD 30 MIN: CPT | Mod: PBBFAC,25,PO | Performed by: FAMILY MEDICINE

## 2018-10-16 RX ORDER — ALBUTEROL SULFATE 90 UG/1
2 AEROSOL, METERED RESPIRATORY (INHALATION) EVERY 6 HOURS PRN
Qty: 1 EACH | Refills: 1 | Status: SHIPPED | OUTPATIENT
Start: 2018-10-16 | End: 2019-05-03

## 2018-10-16 NOTE — PATIENT INSTRUCTIONS

## 2018-10-16 NOTE — PROGRESS NOTES
Subjective:       Patient ID: Maximilian Tavera Jr. is a 72 y.o. male.    Chief Complaint: Pre-op Exam    Pre op for Cervical Spine fusion surgery. This the third cervical surgery. Previous surgeries were anterior approach. He has persistent neck pain and unable to work at his back yard.   He has estimated low risk for an adverse cardiac outcome (myocardial infarction, pulmonary edema, ventricular fibrillation, cardiac arrest, or complete heart block) with the proposed surgery is very low (Revised Cardiac Risk Index (RCRI) - Gabriel Criteria - 0.4%).  Patient not taking aspirin at this time.He will stop taking Motrin and Omage 3 supplements.  Would not restart the aspirin after surgery as patient does not need to be on aspirin  For primary prevention.  He has HT well controlled. He has mild SOB with exertion but able to walk 2 flights. He had hx asbestos exposure with excellent exercise capacity. He has hx of reactive airway disease with no limitations to activity.Denies Sleep apnea despite 27 weight gain.No hx CHF, nor diabetes.    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    No allergies nor hx of anesthesia complications       Review of Systems   Constitutional: Negative for activity change and unexpected weight change.   HENT: Negative for hearing loss, rhinorrhea and trouble swallowing.    Eyes: Negative for discharge and visual disturbance.   Respiratory: Positive for shortness of breath. Negative for chest tightness and wheezing.    Cardiovascular: Negative for chest pain and palpitations.   Gastrointestinal: Negative for blood in stool, constipation, diarrhea and vomiting.   Endocrine: Negative for polyuria.   Genitourinary: Positive for urgency. Negative for difficulty urinating and hematuria.   Musculoskeletal: Positive for myalgias, neck pain and neck stiffness.  Negative for arthralgias and joint swelling.   Neurological: Positive for weakness and headaches.   Psychiatric/Behavioral: Negative for confusion and dysphoric mood.       Patient Active Problem List   Diagnosis    Hypothyroid    Hyperlipidemia    HTN (hypertension), benign    Neuropathy    ED (erectile dysfunction)    DDD (degenerative disc disease), lumbar    Diplopia    Pseudophakia, right eye    Myasthenia gravis, AChR antibody positive    Umbilical hernia without obstruction and without gangrene    Agatston CAC score, <100    Aortic valve disease    Primary osteoarthritis of both knees    Abdominal aortic atherosclerosis    RBBB    On prednisone therapy    Carpal tunnel syndrome on both sides    Cervical stenosis of spine    Tortuous aorta    Epigastric pain       Objective:      Physical Exam   Constitutional: He is oriented to person, place, and time. Vital signs are normal. He appears well-developed. No distress.   HENT:   Head: Normocephalic and atraumatic.   Right Ear: External ear normal.   Left Ear: External ear normal.   Nose: Nose normal.   Mouth/Throat: Oropharynx is clear and moist. No oropharyngeal exudate.   Eyes: Conjunctivae and EOM are normal. Pupils are equal, round, and reactive to light. Right eye exhibits no discharge. Left eye exhibits no discharge. No scleral icterus.   Neck: No JVD present. Neck rigidity present. No tracheal deviation present. No thyromegaly present.   Cardiovascular: Normal rate, normal heart sounds and intact distal pulses.   No murmur heard.  Pulmonary/Chest: Effort normal. No respiratory distress. He has wheezes. He has no rales.   RA rest oxymetry at 95%, after 5 minutes walk 93%. Referred intermittent wheezes from laryngeal area, no stridor.   Abdominal: Soft. Bowel sounds are normal. He exhibits no distension and no mass. There is no tenderness. There is no rebound and no guarding.   Musculoskeletal: He exhibits no edema.        Right shoulder:  Normal.        Left shoulder: Normal.        Right wrist: Normal.        Left wrist: Normal.        Lumbar back: He exhibits decreased range of motion and tenderness. He exhibits no pain, no spasm and normal pulse.          Lymphadenopathy:     He has no cervical adenopathy.   Neurological: He is alert and oriented to person, place, and time. No cranial nerve deficit. Coordination normal.   Skin: Skin is warm and dry. No rash noted. He is not diaphoretic. No erythema. No pallor.   Psychiatric: He has a normal mood and affect. His behavior is normal. Judgment and thought content normal.   Vitals reviewed.      Lab Results   Component Value Date    WBC 5.11 11/20/2017    HGB 14.5 11/20/2017    HCT 43.3 11/20/2017     11/20/2017    CHOL 147 05/14/2018    TRIG 127 05/14/2018    HDL 34 (L) 05/14/2018    ALT 20 05/14/2018    AST 21 05/14/2018     05/14/2018    K 3.9 05/14/2018     05/14/2018    CREATININE 0.8 09/12/2018    BUN 16 05/14/2018    CO2 28 05/14/2018    TSH 2.161 05/16/2017    PSA 2.4 03/29/2018     The 10-year ASCVD risk score (Ad RENEE Jr., et al., 2013) is: 24.5%    Values used to calculate the score:      Age: 72 years      Sex: Male      Is Non- : No      Diabetic: No      Tobacco smoker: No      Systolic Blood Pressure: 119 mmHg      Is BP treated: Yes      HDL Cholesterol: 34 mg/dL      Total Cholesterol: 147 mg/dL    Assessment:       1. Abnormal weight gain    2. SOB (shortness of breath)    3. Hyperglycemia    4. Cervical stenosis of spine    5. DDD (degenerative disc disease), lumbar    6. Myasthenia gravis, AChR antibody positive    7. Mixed hyperlipidemia        Plan:       Abnormal weight gain  -     Basic metabolic panel; Future; Expected date: 10/16/2018  -     Hemoglobin A1c; Future; Expected date: 10/16/2018  -     T3, free; Future; Expected date: 10/16/2018  -     TSH; Future; Expected date: 10/16/2018    SOB (shortness of breath)  -     Complete PFT  with bronchodilator; Future  -     PULSE OXIMETRY WITH REST - PULM; Future    Hyperglycemia  -     Basic metabolic panel; Future; Expected date: 10/16/2018  -     Hemoglobin A1c; Future; Expected date: 10/16/2018    Cervical stenosis of spine    DDD (degenerative disc disease), lumbar    Myasthenia gravis, AChR antibody positive    Mixed hyperlipidemia    Pending labs. CT chest reviewed and recent ECG with occasional PVC, no ST changes.  Patient readiness: acceptance and barriers:none    During the course of the visit the patient was educated and counseled about the following:     Hypertension:   Dietary sodium restriction.  Check blood pressures daily and record.  Follow up: 6 weeks and as needed.  Obesity:   General weight loss/lifestyle modification strategies discussed (elicit support from others; identify saboteurs; non-food rewards, etc).  Behavioral treatment: Weight watchers.  Diet interventions: low calorie (1000 kCal/d) deficit diet, ovo-lactarian diet and qualitative changes (increase low-fat,  high-fiber foods).  Informal exercise measures discussed, e.g. taking stairs instead of elevator.    Goals: Hypertension: Reduce Blood Pressure and Obesity: Reduce calorie intake and BMI    Did patient meet goals/outcomes: Yes    The following self management tools provided: blood pressure log    Patient Instructions (the written plan) was given to the patient/family.     Time spent with patient: 30 minutes    Barriers to medications present (no )    Adverse reactions to current medications (no)    Over the counter medications reviewed (Yes)        30-minute visit. 20 minutes spent counseling patient on diet, exercise, and weight loss.  This has been fully explained to the patient, who indicates understanding.

## 2018-10-17 NOTE — PRE-PROCEDURE INSTRUCTIONS
Spoke with patient to make sure he knows that he can discontinue his asa, no longer needed per Dr. Cornelius, Cardiology. He said he knew. Verbalized understanding.

## 2018-10-18 ENCOUNTER — PATIENT MESSAGE (OUTPATIENT)
Dept: NEUROLOGY | Facility: CLINIC | Age: 73
End: 2018-10-18

## 2018-10-30 ENCOUNTER — HOSPITAL ENCOUNTER (OUTPATIENT)
Dept: PREADMISSION TESTING | Facility: HOSPITAL | Age: 73
Discharge: HOME | End: 2018-10-30
Attending: ANESTHESIOLOGY
Payer: MEDICARE

## 2018-10-30 VITALS
WEIGHT: 228 LBS | BODY MASS INDEX: 30.22 KG/M2 | HEART RATE: 55 BPM | HEIGHT: 73 IN | RESPIRATION RATE: 18 BRPM | OXYGEN SATURATION: 96 % | TEMPERATURE: 99 F | DIASTOLIC BLOOD PRESSURE: 62 MMHG | SYSTOLIC BLOOD PRESSURE: 108 MMHG

## 2018-10-30 NOTE — DISCHARGE INSTRUCTIONS
Your surgery has been scheduled for:__________________________________________    You should report to:  ____[facility], located on the [place] side of the first floor of the           [facility] ([phone])  ____[facility], located on the [facility] side of the            Second floor of the [facility] ([phone])  ____3rd Floor [facility] located on the [facility] side of the [facility] [phone]  Please Note   - Tell your doctor if you take Aspirin, products containing Aspirin, herbal medications  or blood thinners, such as Coumadin, Ticlid, or Plavix.  (Consult your provider regarding holding or stopping before surgery).  - Arrange for someone to drive you home following surgery.  You will not be allowed to leave the surgical facility alone or drive yourself home following sedation and anesthesia.  Before Surgery  - Stop taking all herbal medications 14days prior to surgery  - No Motrin/Advil (Ibuprofen) 7 days before surgery  - No Aleve (Naproxen) 7 days before surgery  - Stop Taking Asprin, products containing Asprin _____days before surgery  - Stop taking blood thinners_______days before surgery  - No Goody's/BC  Powder 7 days before surgery  - Refrain from drinking alcoholic beverages for 24hours before and after surgery  - Stop or limit smoking _________days before surgery  - You may take Tylenol for pain    Night before Surgery  NOTHING TO EAT OR DRINK AFTER MIDNIGHT OR FOLLOW SURGEON'S INSTRUCTIONS  - Take a shower or bath (shower is recommended).  Bathe with Hibiclens soap or an antibacterial soap from the neck down.  If not supplied by your surgeon, hibiclens soap will need to be purchased over the counter in pharmacy.  Rinse soap off thoroughly.  - Shampoo your hair with your regular shampoo  The Day of Surgery  ·  If you are told to take medication on the morning of surgery, it may be taken with  a sip of water.   - Take another bath or shower with hibiclens or any antibacterial soap, to reduce the chance of infection.  - Take heart and blood pressure medications with a small sip of water, as advised by the perioperative team.  - Do not take fluid pills  - You may brush your teeth and rinse your mouth, but do not swall any additional water.   - Do not apply perfumes, powder, body lotions or deodorant on the day of surgery.  - Nail polish should be removed.  - Do not wear makeup or moisturizer  - Wear comfortable clothes, such as a button front shirt and loose fitting pants.  - Leave all jewelry, including body piercings, and valuables at home.    - Bring any devices you will neeed after surgery such as crutches or canes.  - If you have sleep apnea, please bring your CPAP machine  In the event that your physical condition changes including the onset of a cold or respiratory illness, or if you have to delay or cancel your surgery, please notify your surgeon.      Anesthesia: General Anesthesia  Youre due to have surgery. During surgery, youll be given medication called anesthesia. (It is also called anesthetic.) This will keep you comfortable and pain-free. Your anesthesia provider will use general anesthesia. This sheet tells you more about it.  What is general anesthesia?     You are watched continuously during your procedure by the anesthesia provider   General anesthesia puts you into a state like deep sleep. It goes into the bloodstream (IV anesthetics), into the lungs (gas anesthetics), or both. You feel nothing during the procedure. You will not remember it. During the procedure, the anesthesia provider monitors you continuously. He or she checks your heart rate and rhythm, blood pressure, breathing, and blood oxygen.  · IV Anesthetics. IV anesthetics are given through an IV line in your arm. Theyre often given first. This is so you are asleep before a gas anesthetic is started. Some kinds of IV  anesthetics relieve pain. Others relax you. Your doctor will decide which kind is best in your case.  · Gas Anesthetics. Gas anesthetics are breathed into the lungs. They are often used to keep you asleep. They can be given through a facemask or a tube placed in your larynx or trachea (breathing tube).  ? If you have a facemask, your anesthesia provider will most likely place it over your nose and mouth while youre still awake. Youll breathe oxygen through the mask as your IV anesthetic is started. Gas anesthetic may be added through the mask.  ? If you have a tube in the larynx or trachea, it will be inserted into your throat after youre asleep.  Anesthesia tools and medications  You will likely have:  · IV anesthetics. These are put into an IV line into your bloodstream.  · Gas anesthetics. You breathe these anesthetics into your lungs, where they pass into your bloodstream.  · Pulse oximeter. This is a small clip that is attached to the end of your finger. This measures your blood oxygen level.  · Electrocardiography leads (electrodes). These are small sticky pads that are placed on your chest. They record your heart rate and rhythm.  · Blood pressure cuff. This reads your blood pressure.  Risks and possible complications  General anesthesia has some risks. These include:  · Breathing problems  · Nausea and vomiting  · Sore throat or hoarseness (usually temporary)  · Allergic reaction to the anesthetic  · Irregular heartbeat (rare)  · Cardiac arrest (rare)   Anesthesia safety  · Follow all instructions you are given for how long not to eat or drink before your procedure.  · Be sure your doctor knows what medications and drugs you take. This includes over-the-counter medications, herbs, supplements, alcohol or other drugs. You will be asked when those were last taken.  · Have an adult family member or friend drive you home after the procedure.  · For the first 24 hours after your surgery:  ? Do not drive or use  heavy equipment.  ? Have a trusted family member or spouse make important decisions or sign documents.  ? Avoid alcohol.  ? Have a responsible adult stay with you. He or she can watch for problems and help keep you safe.  Date Last Reviewed: 10/16/2014  © 8356-0610 [employer]. [address] [place] [address]. All rights reserved. This information is not intended as a substitute for professional medical care. Always follow your healthcare professional's instructions.

## 2018-11-02 ENCOUNTER — HOSPITAL ENCOUNTER (OUTPATIENT)
Dept: RESPIRATORY THERAPY | Facility: HOSPITAL | Age: 73
Discharge: HOME | End: 2018-11-02
Attending: FAMILY MEDICINE
Payer: MEDICARE

## 2018-11-02 DIAGNOSIS — R06.02 SOB (SHORTNESS OF BREATH): ICD-10-CM

## 2018-11-02 PROCEDURE — 94727 GAS DIL/WSHOT DETER LNG VOL: CPT

## 2018-11-02 PROCEDURE — 94060 EVALUATION OF WHEEZING: CPT

## 2018-11-02 PROCEDURE — 94760 N-INVAS EAR/PLS OXIMETRY 1: CPT

## 2018-11-02 PROCEDURE — 94729 DIFFUSING CAPACITY: CPT

## 2018-11-05 LAB
BRPFT: ABNORMAL
DLCO ADJ PRE: 21.63 ML/(MIN*MMHG) (ref 21.86–35.71)
DLCO SINGLE BREATH LLN: 21.86
DLCO SINGLE BREATH PRE REF: 75.1 %
DLCO SINGLE BREATH REF: 28.78
DLCOC SBVA LLN: 2.68
DLCOC SBVA PRE REF: 137.2 %
DLCOC SBVA REF: 3.74
DLCOC SINGLE BREATH LLN: 21.86
DLCOC SINGLE BREATH PRE REF: 75.1 %
DLCOC SINGLE BREATH REF: 28.78
DLCOVA LLN: 2.68
DLCOVA PRE REF: 137.2 %
DLCOVA PRE: 5.13 ML/(MIN*MMHG*L) (ref 2.68–4.8)
DLCOVA REF: 3.74
DLVAADJ PRE: 5.13 ML/(MIN*MMHG*L) (ref 2.68–4.8)
ERV LLN: 1.08
ERV REF: 1.08
ERVN2 LLN: 1.08
ERVN2 PRE REF: 21.9 %
ERVN2 PRE: 0.24 L (ref 1.08–1.08)
ERVN2 REF: 1.08
FEF 25 75 CHG: 16 %
FEF 25 75 LLN: 0.97
FEF 25 75 POST REF: 104.7 %
FEF 25 75 PRE REF: 90.3 %
FEF 25 75 REF: 2.38
FET100 CHG: 11.8 %
FEV1 CHG: 6.5 %
FEV1 FVC CHG: 3.1 %
FEV1 FVC LLN: 61
FEV1 FVC POST REF: 108.7 %
FEV1 FVC PRE REF: 105.4 %
FEV1 FVC REF: 75
FEV1 LLN: 2.35
FEV1 POST REF: 67.2 %
FEV1 PRE REF: 63.1 %
FEV1 REF: 3.32
FEV6 CHG: 4.1 %
FEV6 LLN: 3.51
FEV6 POST REF: 60.3 %
FEV6 POST: 2.71 L (ref 3.51–5.47)
FEV6 PRE REF: 57.9 %
FEV6 PRE: 2.6 L (ref 3.51–5.47)
FEV6 REF: 4.49
FRCN2 LLN: 2.92
FRCN2 PRE REF: 51.5 %
FRCN2 REF: 3.9
FRCPLETH LLN: 2.92
FRCPLETH REF: 3.9
FVC CHG: 3.3 %
FVC LLN: 3.28
FVC POST REF: 61.3 %
FVC PRE REF: 59.4 %
FVC REF: 4.48
IVC PRE: 2.87 L (ref 3.28–5.68)
IVC SINGLE BREATH LLN: 3.28
IVC SINGLE BREATH PRE REF: 64.1 %
IVC SINGLE BREATH REF: 4.48
MVV LLN: 113
MVV PRE REF: 29.4 %
MVV REF: 133
PEF CHG: 4 %
PEF LLN: 6.08
PEF POST REF: 70.3 %
PEF PRE REF: 67.6 %
PEF REF: 8.59
POST FEF 25 75: 2.49 L/S (ref 0.97–3.79)
POST FET 100: 8.51 SEC
POST FEV1 FVC: 81.35 % (ref 60.85–88.8)
POST FEV1: 2.23 L (ref 2.35–4.3)
POST FVC: 2.75 L (ref 3.28–5.68)
POST PEF: 6.04 L/S (ref 6.08–11.1)
PRE DLCO: 21.63 ML/(MIN*MMHG) (ref 21.86–35.71)
PRE FEF 25 75: 2.15 L/S (ref 0.97–3.79)
PRE FET 100: 7.62 SEC
PRE FEV1 FVC: 78.89 % (ref 60.85–88.8)
PRE FEV1: 2.1 L (ref 2.35–4.3)
PRE FRC N2: 2.01 L
PRE FVC: 2.66 L (ref 3.28–5.68)
PRE MVV: 39 L/MIN (ref 112.85–152.67)
PRE PEF: 5.81 L/S (ref 6.08–11.1)
RAW LLN: 3.06
RAW REF: 3.06
RV LLN: 2.15
RV REF: 2.82
RVN2 LLN: 2.15
RVN2 PRE REF: 59.4 %
RVN2 PRE: 1.68 L (ref 2.15–3.5)
RVN2 REF: 2.82
RVN2TLCN2 LLN: 34
RVN2TLCN2 PRE REF: 85.5 %
RVN2TLCN2 PRE: 36.63 % (ref 33.84–51.8)
RVN2TLCN2 REF: 43
RVTLC LLN: 34
RVTLC REF: 43
TLC LLN: 6.55
TLC REF: 7.7
TLCN2 LLN: 6.55
TLCN2 PRE REF: 59.4 %
TLCN2 PRE: 4.57 L (ref 6.55–8.85)
TLCN2 REF: 7.7
VA PRE: 4.22 L (ref 7.55–7.55)
VA SINGLE BREATH LLN: 7.55
VA SINGLE BREATH PRE REF: 55.9 %
VA SINGLE BREATH REF: 7.55
VC LLN: 3.28
VC REF: 4.48
VCMAXN2 LLN: 3.28
VCMAXN2 PRE REF: 64.7 %
VCMAXN2 PRE: 2.9 L (ref 3.28–5.68)
VCMAXN2 REF: 4.48

## 2018-11-07 ENCOUNTER — ANESTHESIA (OUTPATIENT)
Dept: SURGERY | Facility: HOSPITAL | Age: 73
DRG: 472 | End: 2018-11-07
Payer: MEDICARE

## 2018-11-07 ENCOUNTER — HOSPITAL ENCOUNTER (INPATIENT)
Facility: HOSPITAL | Age: 73
LOS: 4 days | Discharge: HOME HEALTH | DRG: 472 | End: 2018-11-11
Attending: NEUROLOGICAL SURGERY | Admitting: NEUROLOGICAL SURGERY
Payer: MEDICARE

## 2018-11-07 DIAGNOSIS — M48.02 CERVICAL STENOSIS OF SPINAL CANAL: Primary | ICD-10-CM

## 2018-11-07 PROCEDURE — 63015 REMOVE SPINE LAMINA >2 CRVCL: CPT | Mod: HCWC,,, | Performed by: NEUROLOGICAL SURGERY

## 2018-11-07 PROCEDURE — 63600175 PHARM REV CODE 636 W HCPCS: Mod: HCWC

## 2018-11-07 PROCEDURE — 36000711: Mod: HCWC | Performed by: NEUROLOGICAL SURGERY

## 2018-11-07 PROCEDURE — 36620 INSERTION CATHETER ARTERY: CPT | Mod: 59,HCWC,, | Performed by: ANESTHESIOLOGY

## 2018-11-07 PROCEDURE — 27000221 HC OXYGEN, UP TO 24 HOURS: Mod: HCWC

## 2018-11-07 PROCEDURE — 36000710: Mod: HCWC | Performed by: NEUROLOGICAL SURGERY

## 2018-11-07 PROCEDURE — 22614 ARTHRD PST TQ 1NTRSPC EA ADD: CPT | Mod: HCWC,,, | Performed by: NEUROLOGICAL SURGERY

## 2018-11-07 PROCEDURE — C1713 ANCHOR/SCREW BN/BN,TIS/BN: HCPCS | Mod: HCWC | Performed by: NEUROLOGICAL SURGERY

## 2018-11-07 PROCEDURE — 27201423 OPTIME MED/SURG SUP & DEVICES STERILE SUPPLY: Mod: HCWC | Performed by: NEUROLOGICAL SURGERY

## 2018-11-07 PROCEDURE — 63600175 PHARM REV CODE 636 W HCPCS: Mod: HCWC | Performed by: NEUROLOGICAL SURGERY

## 2018-11-07 PROCEDURE — 22600 ARTHRD PST TQ 1NTRSPC CRV: CPT | Mod: 51,HCWC,, | Performed by: NEUROLOGICAL SURGERY

## 2018-11-07 PROCEDURE — C1729 CATH, DRAINAGE: HCPCS | Mod: HCWC | Performed by: NEUROLOGICAL SURGERY

## 2018-11-07 PROCEDURE — 71000033 HC RECOVERY, INTIAL HOUR: Mod: HCWC | Performed by: NEUROLOGICAL SURGERY

## 2018-11-07 PROCEDURE — 25000003 PHARM REV CODE 250: Mod: HCWC | Performed by: NEUROLOGICAL SURGERY

## 2018-11-07 PROCEDURE — 63600175 PHARM REV CODE 636 W HCPCS: Mod: HCWC | Performed by: PHYSICIAN ASSISTANT

## 2018-11-07 PROCEDURE — 37000009 HC ANESTHESIA EA ADD 15 MINS: Mod: HCWC | Performed by: NEUROLOGICAL SURGERY

## 2018-11-07 PROCEDURE — 25000003 PHARM REV CODE 250: Mod: HCWC | Performed by: NURSE ANESTHETIST, CERTIFIED REGISTERED

## 2018-11-07 PROCEDURE — 20600001 HC STEP DOWN PRIVATE ROOM: Mod: HCWC

## 2018-11-07 PROCEDURE — 37000008 HC ANESTHESIA 1ST 15 MINUTES: Mod: HCWC | Performed by: NEUROLOGICAL SURGERY

## 2018-11-07 PROCEDURE — 22842 INSERT SPINE FIXATION DEVICE: CPT | Mod: HCWC,,, | Performed by: NEUROLOGICAL SURGERY

## 2018-11-07 PROCEDURE — D9220A PRA ANESTHESIA: Mod: HCWC,ANES,, | Performed by: ANESTHESIOLOGY

## 2018-11-07 PROCEDURE — 63600175 PHARM REV CODE 636 W HCPCS: Mod: HCWC | Performed by: ANESTHESIOLOGY

## 2018-11-07 PROCEDURE — 63600175 PHARM REV CODE 636 W HCPCS: Mod: HCWC | Performed by: NURSE ANESTHETIST, CERTIFIED REGISTERED

## 2018-11-07 PROCEDURE — 94761 N-INVAS EAR/PLS OXIMETRY MLT: CPT | Mod: HCWC

## 2018-11-07 PROCEDURE — 71000039 HC RECOVERY, EACH ADD'L HOUR: Mod: HCWC | Performed by: NEUROLOGICAL SURGERY

## 2018-11-07 PROCEDURE — 25000003 PHARM REV CODE 250: Mod: HCWC

## 2018-11-07 PROCEDURE — 20930 SP BONE ALGRFT MORSEL ADD-ON: CPT | Mod: HCWC,,, | Performed by: NEUROLOGICAL SURGERY

## 2018-11-07 PROCEDURE — 20936 SP BONE AGRFT LOCAL ADD-ON: CPT | Mod: HCWC,,, | Performed by: NEUROLOGICAL SURGERY

## 2018-11-07 PROCEDURE — 27800903 OPTIME MED/SURG SUP & DEVICES OTHER IMPLANTS: Mod: HCWC | Performed by: NEUROLOGICAL SURGERY

## 2018-11-07 PROCEDURE — 4A11X4G MONITORING OF PERIPHERAL NERVOUS ELECTRICAL ACTIVITY, INTRAOPERATIVE, EXTERNAL APPROACH: ICD-10-PCS | Performed by: NEUROLOGICAL SURGERY

## 2018-11-07 PROCEDURE — 0RG2071 FUSION OF 2 OR MORE CERVICAL VERTEBRAL JOINTS WITH AUTOLOGOUS TISSUE SUBSTITUTE, POSTERIOR APPROACH, POSTERIOR COLUMN, OPEN APPROACH: ICD-10-PCS | Performed by: NEUROLOGICAL SURGERY

## 2018-11-07 DEVICE — SCREW BONE SPINAL 3.5 X 16MM: Type: IMPLANTABLE DEVICE | Site: SPINE CERVICAL | Status: FUNCTIONAL

## 2018-11-07 DEVICE — CONNECTOR SPINAL CROSS 35MM: Type: IMPLANTABLE DEVICE | Site: SPINE CERVICAL | Status: FUNCTIONAL

## 2018-11-07 DEVICE — SET SCREW BONE SPINAL CROSS: Type: IMPLANTABLE DEVICE | Site: SPINE CERVICAL | Status: FUNCTIONAL

## 2018-11-07 DEVICE — IMPLANTABLE DEVICE: Type: IMPLANTABLE DEVICE | Site: SPINE CERVICAL | Status: FUNCTIONAL

## 2018-11-07 DEVICE — SCREW BONE SPINAL 3.5 X 14MM: Type: IMPLANTABLE DEVICE | Site: SPINE CERVICAL | Status: FUNCTIONAL

## 2018-11-07 DEVICE — NUT SPINAL OUTER CONNECTOR: Type: IMPLANTABLE DEVICE | Site: SPINE CERVICAL | Status: FUNCTIONAL

## 2018-11-07 DEVICE — MATRIX BONE CELLR VIVIGEN 1CC: Type: IMPLANTABLE DEVICE | Site: SPINE CERVICAL | Status: FUNCTIONAL

## 2018-11-07 DEVICE — SET SCREW SPINAL INNER NUT: Type: IMPLANTABLE DEVICE | Site: SPINE CERVICAL | Status: FUNCTIONAL

## 2018-11-07 RX ORDER — CEFAZOLIN SODIUM 1 G/3ML
2 INJECTION, POWDER, FOR SOLUTION INTRAMUSCULAR; INTRAVENOUS
Status: DISCONTINUED | OUTPATIENT
Start: 2018-11-07 | End: 2018-11-11 | Stop reason: HOSPADM

## 2018-11-07 RX ORDER — HYDROMORPHONE HYDROCHLORIDE 1 MG/ML
0.2 INJECTION, SOLUTION INTRAMUSCULAR; INTRAVENOUS; SUBCUTANEOUS EVERY 5 MIN PRN
Status: COMPLETED | OUTPATIENT
Start: 2018-11-07 | End: 2018-11-07

## 2018-11-07 RX ORDER — SODIUM CHLORIDE 9 MG/ML
INJECTION, SOLUTION INTRAVENOUS CONTINUOUS PRN
Status: DISCONTINUED | OUTPATIENT
Start: 2018-11-07 | End: 2018-11-07

## 2018-11-07 RX ORDER — LIDOCAINE HYDROCHLORIDE AND EPINEPHRINE 15; 5 MG/ML; UG/ML
INJECTION, SOLUTION EPIDURAL
Status: DISCONTINUED | OUTPATIENT
Start: 2018-11-07 | End: 2018-11-07 | Stop reason: HOSPADM

## 2018-11-07 RX ORDER — BACITRACIN 50000 [IU]/1
INJECTION, POWDER, FOR SOLUTION INTRAMUSCULAR
Status: DISCONTINUED | OUTPATIENT
Start: 2018-11-07 | End: 2018-11-07 | Stop reason: HOSPADM

## 2018-11-07 RX ORDER — MUPIROCIN 20 MG/G
OINTMENT TOPICAL
Status: DISCONTINUED | OUTPATIENT
Start: 2018-11-07 | End: 2018-11-07 | Stop reason: HOSPADM

## 2018-11-07 RX ORDER — MUPIROCIN 20 MG/G
1 OINTMENT TOPICAL 2 TIMES DAILY
Status: DISCONTINUED | OUTPATIENT
Start: 2018-11-07 | End: 2018-11-11 | Stop reason: HOSPADM

## 2018-11-07 RX ORDER — PHENYLEPHRINE HYDROCHLORIDE 10 MG/ML
INJECTION INTRAVENOUS
Status: DISCONTINUED | OUTPATIENT
Start: 2018-11-07 | End: 2018-11-07

## 2018-11-07 RX ORDER — KETAMINE HCL IN 0.9 % NACL 50 MG/5 ML
SYRINGE (ML) INTRAVENOUS
Status: DISCONTINUED | OUTPATIENT
Start: 2018-11-07 | End: 2018-11-07

## 2018-11-07 RX ORDER — EPHEDRINE SULFATE 50 MG/ML
INJECTION, SOLUTION INTRAVENOUS
Status: DISCONTINUED | OUTPATIENT
Start: 2018-11-07 | End: 2018-11-07

## 2018-11-07 RX ORDER — AMOXICILLIN 250 MG
2 CAPSULE ORAL NIGHTLY PRN
Status: DISCONTINUED | OUTPATIENT
Start: 2018-11-07 | End: 2018-11-11 | Stop reason: HOSPADM

## 2018-11-07 RX ORDER — MIDAZOLAM HYDROCHLORIDE 1 MG/ML
INJECTION INTRAMUSCULAR; INTRAVENOUS
Status: DISCONTINUED | OUTPATIENT
Start: 2018-11-07 | End: 2018-11-07

## 2018-11-07 RX ORDER — CEFAZOLIN SODIUM 1 G/3ML
INJECTION, POWDER, FOR SOLUTION INTRAMUSCULAR; INTRAVENOUS
Status: DISCONTINUED | OUTPATIENT
Start: 2018-11-07 | End: 2018-11-07

## 2018-11-07 RX ORDER — PROPOFOL 10 MG/ML
VIAL (ML) INTRAVENOUS
Status: DISCONTINUED | OUTPATIENT
Start: 2018-11-07 | End: 2018-11-07

## 2018-11-07 RX ORDER — NEOSTIGMINE METHYLSULFATE 1 MG/ML
INJECTION, SOLUTION INTRAVENOUS
Status: DISCONTINUED | OUTPATIENT
Start: 2018-11-07 | End: 2018-11-07

## 2018-11-07 RX ORDER — ONDANSETRON 8 MG/1
8 TABLET, ORALLY DISINTEGRATING ORAL EVERY 6 HOURS PRN
Status: DISCONTINUED | OUTPATIENT
Start: 2018-11-07 | End: 2018-11-11 | Stop reason: HOSPADM

## 2018-11-07 RX ORDER — ACETAMINOPHEN 325 MG/1
650 TABLET ORAL EVERY 4 HOURS PRN
Status: DISCONTINUED | OUTPATIENT
Start: 2018-11-07 | End: 2018-11-11 | Stop reason: HOSPADM

## 2018-11-07 RX ORDER — HYDROCODONE BITARTRATE AND ACETAMINOPHEN 5; 325 MG/1; MG/1
1 TABLET ORAL EVERY 4 HOURS PRN
Status: DISCONTINUED | OUTPATIENT
Start: 2018-11-07 | End: 2018-11-11 | Stop reason: HOSPADM

## 2018-11-07 RX ORDER — MUPIROCIN 20 MG/G
1 OINTMENT TOPICAL
Status: COMPLETED | OUTPATIENT
Start: 2018-11-07 | End: 2018-11-07

## 2018-11-07 RX ORDER — HYDROMORPHONE HYDROCHLORIDE 1 MG/ML
0.5 INJECTION, SOLUTION INTRAMUSCULAR; INTRAVENOUS; SUBCUTANEOUS
Status: DISCONTINUED | OUTPATIENT
Start: 2018-11-07 | End: 2018-11-09

## 2018-11-07 RX ORDER — GLYCOPYRROLATE 0.2 MG/ML
INJECTION INTRAMUSCULAR; INTRAVENOUS
Status: DISCONTINUED | OUTPATIENT
Start: 2018-11-07 | End: 2018-11-07

## 2018-11-07 RX ORDER — MORPHINE SULFATE 4 MG/ML
2 INJECTION, SOLUTION INTRAMUSCULAR; INTRAVENOUS
Status: DISCONTINUED | OUTPATIENT
Start: 2018-11-07 | End: 2018-11-07

## 2018-11-07 RX ORDER — HEPARIN SODIUM 5000 [USP'U]/ML
5000 INJECTION, SOLUTION INTRAVENOUS; SUBCUTANEOUS EVERY 8 HOURS
Status: DISCONTINUED | OUTPATIENT
Start: 2018-11-08 | End: 2018-11-11 | Stop reason: HOSPADM

## 2018-11-07 RX ORDER — ACETAMINOPHEN 325 MG/1
650 TABLET ORAL EVERY 6 HOURS PRN
Status: DISCONTINUED | OUTPATIENT
Start: 2018-11-07 | End: 2018-11-11 | Stop reason: HOSPADM

## 2018-11-07 RX ORDER — LORAZEPAM 2 MG/ML
0.25 INJECTION INTRAMUSCULAR ONCE AS NEEDED
Status: DISCONTINUED | OUTPATIENT
Start: 2018-11-07 | End: 2018-11-07 | Stop reason: HOSPADM

## 2018-11-07 RX ORDER — BISACODYL 10 MG
10 SUPPOSITORY, RECTAL RECTAL DAILY
Status: DISCONTINUED | OUTPATIENT
Start: 2018-11-07 | End: 2018-11-11 | Stop reason: HOSPADM

## 2018-11-07 RX ORDER — DEXAMETHASONE SODIUM PHOSPHATE 4 MG/ML
INJECTION, SOLUTION INTRA-ARTICULAR; INTRALESIONAL; INTRAMUSCULAR; INTRAVENOUS; SOFT TISSUE
Status: DISCONTINUED | OUTPATIENT
Start: 2018-11-07 | End: 2018-11-07

## 2018-11-07 RX ORDER — ROCURONIUM BROMIDE 10 MG/ML
INJECTION, SOLUTION INTRAVENOUS
Status: DISCONTINUED | OUTPATIENT
Start: 2018-11-07 | End: 2018-11-07

## 2018-11-07 RX ORDER — ONDANSETRON 2 MG/ML
INJECTION INTRAMUSCULAR; INTRAVENOUS
Status: DISCONTINUED | OUTPATIENT
Start: 2018-11-07 | End: 2018-11-07

## 2018-11-07 RX ORDER — PROPOFOL 10 MG/ML
VIAL (ML) INTRAVENOUS CONTINUOUS PRN
Status: DISCONTINUED | OUTPATIENT
Start: 2018-11-07 | End: 2018-11-07

## 2018-11-07 RX ORDER — MAG HYDROX/ALUMINUM HYD/SIMETH 200-200-20
30 SUSPENSION, ORAL (FINAL DOSE FORM) ORAL EVERY 4 HOURS PRN
Status: DISCONTINUED | OUTPATIENT
Start: 2018-11-07 | End: 2018-11-11 | Stop reason: HOSPADM

## 2018-11-07 RX ORDER — BACITRACIN ZINC 500 UNIT/G
OINTMENT (GRAM) TOPICAL
Status: DISCONTINUED | OUTPATIENT
Start: 2018-11-07 | End: 2018-11-07 | Stop reason: HOSPADM

## 2018-11-07 RX ORDER — SODIUM CHLORIDE 0.9 % (FLUSH) 0.9 %
3 SYRINGE (ML) INJECTION
Status: DISCONTINUED | OUTPATIENT
Start: 2018-11-07 | End: 2018-11-11 | Stop reason: HOSPADM

## 2018-11-07 RX ORDER — FENTANYL CITRATE 50 UG/ML
INJECTION, SOLUTION INTRAMUSCULAR; INTRAVENOUS
Status: DISCONTINUED | OUTPATIENT
Start: 2018-11-07 | End: 2018-11-07

## 2018-11-07 RX ORDER — CEFAZOLIN SODIUM 1 G/3ML
2 INJECTION, POWDER, FOR SOLUTION INTRAMUSCULAR; INTRAVENOUS
Status: DISCONTINUED | OUTPATIENT
Start: 2018-11-07 | End: 2018-11-07 | Stop reason: HOSPADM

## 2018-11-07 RX ORDER — ACETAMINOPHEN 10 MG/ML
INJECTION, SOLUTION INTRAVENOUS
Status: DISCONTINUED | OUTPATIENT
Start: 2018-11-07 | End: 2018-11-07

## 2018-11-07 RX ORDER — SUCCINYLCHOLINE CHLORIDE 20 MG/ML
INJECTION INTRAMUSCULAR; INTRAVENOUS
Status: DISCONTINUED | OUTPATIENT
Start: 2018-11-07 | End: 2018-11-07

## 2018-11-07 RX ORDER — SODIUM CHLORIDE AND POTASSIUM CHLORIDE 150; 900 MG/100ML; MG/100ML
INJECTION, SOLUTION INTRAVENOUS CONTINUOUS
Status: DISCONTINUED | OUTPATIENT
Start: 2018-11-07 | End: 2018-11-11 | Stop reason: HOSPADM

## 2018-11-07 RX ADMIN — Medication 0.2 MG: at 06:11

## 2018-11-07 RX ADMIN — SODIUM CHLORIDE: 0.9 INJECTION, SOLUTION INTRAVENOUS at 11:11

## 2018-11-07 RX ADMIN — PHENYLEPHRINE HYDROCHLORIDE 200 MCG: 10 INJECTION INTRAVENOUS at 12:11

## 2018-11-07 RX ADMIN — SODIUM CHLORIDE 0.5 MCG/KG/MIN: 9 INJECTION, SOLUTION INTRAVENOUS at 01:11

## 2018-11-07 RX ADMIN — EPHEDRINE SULFATE 10 MG: 50 INJECTION, SOLUTION INTRAMUSCULAR; INTRAVENOUS; SUBCUTANEOUS at 02:11

## 2018-11-07 RX ADMIN — HYDROCODONE BITARTRATE AND ACETAMINOPHEN 1 TABLET: 5; 325 TABLET ORAL at 11:11

## 2018-11-07 RX ADMIN — ACETAMINOPHEN 1000 MG: 10 INJECTION, SOLUTION INTRAVENOUS at 04:11

## 2018-11-07 RX ADMIN — PROPOFOL 100 MCG/KG/MIN: 10 INJECTION, EMULSION INTRAVENOUS at 12:11

## 2018-11-07 RX ADMIN — EPHEDRINE SULFATE 5 MG: 50 INJECTION, SOLUTION INTRAMUSCULAR; INTRAVENOUS; SUBCUTANEOUS at 04:11

## 2018-11-07 RX ADMIN — PROPOFOL 150 MG: 10 INJECTION, EMULSION INTRAVENOUS at 12:11

## 2018-11-07 RX ADMIN — MUPIROCIN 1 G: 20 OINTMENT TOPICAL at 09:11

## 2018-11-07 RX ADMIN — SODIUM CHLORIDE AND POTASSIUM CHLORIDE: 9; 1.49 INJECTION, SOLUTION INTRAVENOUS at 06:11

## 2018-11-07 RX ADMIN — PROPOFOL 50 MG: 10 INJECTION, EMULSION INTRAVENOUS at 02:11

## 2018-11-07 RX ADMIN — Medication 0.2 MG: at 05:11

## 2018-11-07 RX ADMIN — REMIFENTANIL HYDROCHLORIDE 0.1 MCG/KG/MIN: 1 INJECTION, POWDER, LYOPHILIZED, FOR SOLUTION INTRAVENOUS at 01:11

## 2018-11-07 RX ADMIN — NEOSTIGMINE METHYLSULFATE 5 MG: 1 INJECTION INTRAVENOUS at 04:11

## 2018-11-07 RX ADMIN — SODIUM CHLORIDE, SODIUM GLUCONATE, SODIUM ACETATE, POTASSIUM CHLORIDE, MAGNESIUM CHLORIDE, SODIUM PHOSPHATE, DIBASIC, AND POTASSIUM PHOSPHATE: .53; .5; .37; .037; .03; .012; .00082 INJECTION, SOLUTION INTRAVENOUS at 04:11

## 2018-11-07 RX ADMIN — DEXAMETHASONE SODIUM PHOSPHATE 8 MG: 4 INJECTION, SOLUTION INTRAMUSCULAR; INTRAVENOUS at 12:11

## 2018-11-07 RX ADMIN — SODIUM CHLORIDE, SODIUM GLUCONATE, SODIUM ACETATE, POTASSIUM CHLORIDE, MAGNESIUM CHLORIDE, SODIUM PHOSPHATE, DIBASIC, AND POTASSIUM PHOSPHATE: .53; .5; .37; .037; .03; .012; .00082 INJECTION, SOLUTION INTRAVENOUS at 01:11

## 2018-11-07 RX ADMIN — Medication 0.5 MG: at 09:11

## 2018-11-07 RX ADMIN — ONDANSETRON 4 MG: 2 INJECTION INTRAMUSCULAR; INTRAVENOUS at 04:11

## 2018-11-07 RX ADMIN — GLYCOPYRROLATE 0.6 MG: 0.2 INJECTION, SOLUTION INTRAMUSCULAR; INTRAVENOUS at 04:11

## 2018-11-07 RX ADMIN — HYDROCODONE BITARTRATE AND ACETAMINOPHEN 1 TABLET: 5; 325 TABLET ORAL at 06:11

## 2018-11-07 RX ADMIN — ROCURONIUM BROMIDE 30 MG: 10 INJECTION, SOLUTION INTRAVENOUS at 02:11

## 2018-11-07 RX ADMIN — PROPOFOL 125 MCG/KG/MIN: 10 INJECTION, EMULSION INTRAVENOUS at 01:11

## 2018-11-07 RX ADMIN — CEFAZOLIN 2 G: 330 INJECTION, POWDER, FOR SOLUTION INTRAMUSCULAR; INTRAVENOUS at 01:11

## 2018-11-07 RX ADMIN — FENTANYL CITRATE 50 MCG: 50 INJECTION, SOLUTION INTRAMUSCULAR; INTRAVENOUS at 02:11

## 2018-11-07 RX ADMIN — CEFAZOLIN 2 G: 1 INJECTION, POWDER, FOR SOLUTION INTRAMUSCULAR; INTRAVENOUS at 09:11

## 2018-11-07 RX ADMIN — FENTANYL CITRATE 50 MCG: 50 INJECTION, SOLUTION INTRAMUSCULAR; INTRAVENOUS at 12:11

## 2018-11-07 RX ADMIN — SUCCINYLCHOLINE CHLORIDE 200 MG: 20 INJECTION, SOLUTION INTRAMUSCULAR; INTRAVENOUS at 12:11

## 2018-11-07 RX ADMIN — PHENYLEPHRINE HYDROCHLORIDE 200 MCG: 10 INJECTION INTRAVENOUS at 01:11

## 2018-11-07 RX ADMIN — MIDAZOLAM HYDROCHLORIDE 2 MG: 1 INJECTION, SOLUTION INTRAMUSCULAR; INTRAVENOUS at 12:11

## 2018-11-07 RX ADMIN — Medication 30 MG: at 01:11

## 2018-11-07 RX ADMIN — Medication 20 MG: at 04:11

## 2018-11-07 RX ADMIN — REMIFENTANIL HYDROCHLORIDE 0.1 MCG/KG/MIN: 1 INJECTION, POWDER, LYOPHILIZED, FOR SOLUTION INTRAVENOUS at 12:11

## 2018-11-07 NOTE — ANESTHESIA PROCEDURE NOTES
Arterial    Diagnosis: cervical stenosis    Patient location during procedure: done in OR  Procedure start time: 11/7/2018 12:30 PM  Procedure end time: 11/7/2018 12:35 PM  Staffing  Resident/CRNA: Kelly Muir CRNA  Performed: resident/CRNA   Anesthesiologist was present at the time of the procedure.  Preanesthetic Checklist  Completed: patient identified, site marked and risks and benefits discussedArterial  Skin Prep: chlorhexidine gluconate  Local Infiltration: none  Orientation: right  Location: radial  Catheter Size: 20 G  Catheter placement by Anatomical landmarks. Heme positive aspiration all ports.Insertion Attempts: 1  Assessment  Dressing: secured with tape and tegaderm

## 2018-11-07 NOTE — NURSING TRANSFER
Nursing Transfer Note      11/7/2018     Transfer To: [id number]    Transfer via stretcher    Transfer with cervical collar    Transported by pct    Medicines sent: yes    Chart send with patient: Yes    Notified: spouse

## 2018-11-07 NOTE — BRIEF OP NOTE
[facility]  Neurosurgery  Operative Note    SUMMARY      Date of Procedure: 11/7/2018     Procedure: Procedure(s) (LRB):  C2-C6 Posterior Cervical Laminectomy & Instrumental Fusion (N/A)     Surgeon(s) and Role:     * Kishore Turner MD - Primary    Assisting Surgeon: Farideh Alfaro PA-C; Solis Rooney MD    Pre-Operative Diagnosis: Cervical myelopathy [G95.9]  Cervical stenosis of spine [M48.02]    Post-Operative Diagnosis: Post-Op Diagnosis Codes:     * Cervical myelopathy [G95.9]     * Cervical stenosis of spine [M48.02]    Anesthesia: General    Technical Procedures Used: C3-6 lami PCF    Description of the Findings of the Procedure: see full op note    Complications: No    Estimated Blood Loss (EBL): 425 mL           Specimens:   Specimen (12h ago, onward)    None           Implants:   Implant Name Type Inv. Item Serial No.  Lot No. LRB No. Used   MATRIX BONE CELLR VIVIGEN 1CC - [id number]  MATRIX BONE CELLR VIVIGEN 1CC [id number] [employer]  N/A 1   SCREW BONE SPINAL 3.5 X 16MM - [id number]  SCREW BONE SPINAL 3.5 X 16MM  [employer]  N/A 2   SCREW BONE SPINAL 3.5 X 14MM - [id number]  SCREW BONE SPINAL 3.5 X 14MM  [employer]  N/A 8   LAWRENCE SPINAL MOUNTAINEER 3.5X80 - [id number]  LAWRENCE SPINAL MOUNTAINEER 3.5X80  [employer]  N/A 1   Rods 85mm    [employer]  N/A 1   SET SCREW BONE SPINAL CROSS - [id number]  SET SCREW BONE SPINAL CROSS  [employer]  N/A 8   CONNECTOR SPINAL CROSS 35MM - [id number]  CONNECTOR SPINAL CROSS 35MM  [employer]  N/A 1   SET SCREW SPINAL INNER NUT - [id number]  SET SCREW SPINAL INNER NUT  [employer]  N/A 2   NUT SPINAL OUTER CONNECTOR - [id number]  NUT SPINAL OUTER CONNECTOR  [employer]  N/A 2              Condition: Good    Disposition: ICU - extubated and stable.

## 2018-11-07 NOTE — TRANSFER OF CARE
Anesthesia Transfer of Care Note    Patient: Maximilian Tavera JrMaye    Procedure(s) Performed: Procedure(s) (LRB):  C2-C6 Posterior Cervical Laminectomy & Instrumental Fusion (N/A)    Patient location: PACU    Anesthesia Type: general    Transport from OR: Transported from OR on 100% O2 by closed face mask with adequate spontaneous ventilation    Post pain: adequate analgesia    Post assessment: no apparent anesthetic complications and tolerated procedure well    Post vital signs: stable    Level of consciousness: responds to stimulation and sedated    Nausea/Vomiting: no nausea/vomiting    Complications: none    Transfer of care protocol was followed      Last vitals:   Visit Vitals  /69   Pulse (!) 40   Temp 36.8 °C (98.3 °F) (Oral)   Resp 18   Wt 104.3 kg (230 lb)   SpO2 98%   BMI 30.34 kg/m²

## 2018-11-08 ENCOUNTER — PATIENT MESSAGE (OUTPATIENT)
Dept: CARDIOLOGY | Facility: CLINIC | Age: 73
End: 2018-11-08

## 2018-11-08 LAB
ANION GAP SERPL CALC-SCNC: 7 MMOL/L
BASOPHILS # BLD AUTO: 0.01 K/UL
BASOPHILS NFR BLD: 0.1 %
BUN SERPL-MCNC: 17 MG/DL
CALCIUM SERPL-MCNC: 8.4 MG/DL
CHLORIDE SERPL-SCNC: 105 MMOL/L
CO2 SERPL-SCNC: 25 MMOL/L
CREAT SERPL-MCNC: 0.8 MG/DL
CREAT SERPL-MCNC: 0.8 MG/DL
DIFFERENTIAL METHOD: ABNORMAL
EOSINOPHIL # BLD AUTO: 0 K/UL
EOSINOPHIL NFR BLD: 0 %
ERYTHROCYTE [DISTWIDTH] IN BLOOD BY AUTOMATED COUNT: 13.1 %
EST. GFR  (AFRICAN AMERICAN): >60 ML/MIN/1.73 M^2
EST. GFR  (AFRICAN AMERICAN): >60 ML/MIN/1.73 M^2
EST. GFR  (NON AFRICAN AMERICAN): >60 ML/MIN/1.73 M^2
EST. GFR  (NON AFRICAN AMERICAN): >60 ML/MIN/1.73 M^2
GLUCOSE SERPL-MCNC: 122 MG/DL
HCT VFR BLD AUTO: 42.5 %
HGB BLD-MCNC: 13.2 G/DL
IMM GRANULOCYTES # BLD AUTO: 0.05 K/UL
IMM GRANULOCYTES NFR BLD AUTO: 0.4 %
LYMPHOCYTES # BLD AUTO: 1.3 K/UL
LYMPHOCYTES NFR BLD: 11.3 %
MCH RBC QN AUTO: 30.1 PG
MCHC RBC AUTO-ENTMCNC: 31.1 G/DL
MCV RBC AUTO: 97 FL
MONOCYTES # BLD AUTO: 1.1 K/UL
MONOCYTES NFR BLD: 9.5 %
NEUTROPHILS # BLD AUTO: 9.1 K/UL
NEUTROPHILS NFR BLD: 78.7 %
NRBC BLD-RTO: 0 /100 WBC
PLATELET # BLD AUTO: 126 K/UL
PMV BLD AUTO: 11.5 FL
POTASSIUM SERPL-SCNC: 3.9 MMOL/L
RBC # BLD AUTO: 4.39 M/UL
SODIUM SERPL-SCNC: 137 MMOL/L
WBC # BLD AUTO: 11.61 K/UL

## 2018-11-08 PROCEDURE — 94799 UNLISTED PULMONARY SVC/PX: CPT | Mod: HCWC

## 2018-11-08 PROCEDURE — 25000003 PHARM REV CODE 250: Mod: HCWC | Performed by: PHYSICIAN ASSISTANT

## 2018-11-08 PROCEDURE — 85025 COMPLETE CBC W/AUTO DIFF WBC: CPT | Mod: HCWC

## 2018-11-08 PROCEDURE — G8978 MOBILITY CURRENT STATUS: HCPCS | Mod: CK,HCWC

## 2018-11-08 PROCEDURE — 99024 POSTOP FOLLOW-UP VISIT: CPT | Mod: HCWC,,, | Performed by: PHYSICIAN ASSISTANT

## 2018-11-08 PROCEDURE — 63600175 PHARM REV CODE 636 W HCPCS: Mod: HCWC | Performed by: NEUROLOGICAL SURGERY

## 2018-11-08 PROCEDURE — 36415 COLL VENOUS BLD VENIPUNCTURE: CPT | Mod: HCWC

## 2018-11-08 PROCEDURE — 82565 ASSAY OF CREATININE: CPT | Mod: HCWC

## 2018-11-08 PROCEDURE — 97161 PT EVAL LOW COMPLEX 20 MIN: CPT | Mod: HCWC

## 2018-11-08 PROCEDURE — 20600001 HC STEP DOWN PRIVATE ROOM: Mod: HCWC

## 2018-11-08 PROCEDURE — 80048 BASIC METABOLIC PNL TOTAL CA: CPT | Mod: HCWC

## 2018-11-08 PROCEDURE — 63600175 PHARM REV CODE 636 W HCPCS: Mod: HCWC | Performed by: PHYSICIAN ASSISTANT

## 2018-11-08 PROCEDURE — 97165 OT EVAL LOW COMPLEX 30 MIN: CPT | Mod: HCWC

## 2018-11-08 PROCEDURE — 25000003 PHARM REV CODE 250: Mod: HCWC | Performed by: NEUROLOGICAL SURGERY

## 2018-11-08 PROCEDURE — 99900035 HC TECH TIME PER 15 MIN (STAT): Mod: HCWC

## 2018-11-08 PROCEDURE — G8979 MOBILITY GOAL STATUS: HCPCS | Mod: CJ,HCWC

## 2018-11-08 RX ORDER — ALBUTEROL SULFATE 90 UG/1
2 AEROSOL, METERED RESPIRATORY (INHALATION) EVERY 6 HOURS PRN
Status: DISCONTINUED | OUTPATIENT
Start: 2018-11-08 | End: 2018-11-11 | Stop reason: HOSPADM

## 2018-11-08 RX ORDER — PREDNISONE 1 MG/1
3 TABLET ORAL DAILY
Status: DISCONTINUED | OUTPATIENT
Start: 2018-11-09 | End: 2018-11-09

## 2018-11-08 RX ORDER — CHOLECALCIFEROL (VITAMIN D3) 25 MCG
2000 TABLET ORAL DAILY
Status: DISCONTINUED | OUTPATIENT
Start: 2018-11-08 | End: 2018-11-11 | Stop reason: HOSPADM

## 2018-11-08 RX ORDER — ATORVASTATIN CALCIUM 20 MG/1
80 TABLET, FILM COATED ORAL DAILY
Status: DISCONTINUED | OUTPATIENT
Start: 2018-11-08 | End: 2018-11-11 | Stop reason: HOSPADM

## 2018-11-08 RX ORDER — EZETIMIBE 10 MG/1
10 TABLET ORAL DAILY
Status: DISCONTINUED | OUTPATIENT
Start: 2018-11-08 | End: 2018-11-11 | Stop reason: HOSPADM

## 2018-11-08 RX ORDER — FLUTICASONE PROPIONATE 50 MCG
2 SPRAY, SUSPENSION (ML) NASAL DAILY
Status: DISCONTINUED | OUTPATIENT
Start: 2018-11-08 | End: 2018-11-11 | Stop reason: HOSPADM

## 2018-11-08 RX ORDER — LEVOTHYROXINE SODIUM 50 UG/1
50 TABLET ORAL DAILY
Status: DISCONTINUED | OUTPATIENT
Start: 2018-11-08 | End: 2018-11-11 | Stop reason: HOSPADM

## 2018-11-08 RX ORDER — LISINOPRIL 20 MG/1
40 TABLET ORAL DAILY
Status: DISCONTINUED | OUTPATIENT
Start: 2018-11-08 | End: 2018-11-11 | Stop reason: HOSPADM

## 2018-11-08 RX ORDER — GABAPENTIN 300 MG/1
300 CAPSULE ORAL 2 TIMES DAILY
Status: DISCONTINUED | OUTPATIENT
Start: 2018-11-08 | End: 2018-11-11 | Stop reason: HOSPADM

## 2018-11-08 RX ORDER — EPINEPHRINE 0.22MG
400 AEROSOL WITH ADAPTER (ML) INHALATION DAILY
Status: DISCONTINUED | OUTPATIENT
Start: 2018-11-08 | End: 2018-11-11 | Stop reason: HOSPADM

## 2018-11-08 RX ORDER — CETIRIZINE HYDROCHLORIDE 10 MG/1
10 TABLET ORAL DAILY
Status: DISCONTINUED | OUTPATIENT
Start: 2018-11-08 | End: 2018-11-11 | Stop reason: HOSPADM

## 2018-11-08 RX ORDER — PANTOPRAZOLE SODIUM 40 MG/1
40 TABLET, DELAYED RELEASE ORAL DAILY
Status: DISCONTINUED | OUTPATIENT
Start: 2018-11-08 | End: 2018-11-11 | Stop reason: HOSPADM

## 2018-11-08 RX ORDER — CARVEDILOL 6.25 MG/1
12.5 TABLET ORAL 2 TIMES DAILY
Status: DISCONTINUED | OUTPATIENT
Start: 2018-11-08 | End: 2018-11-11 | Stop reason: HOSPADM

## 2018-11-08 RX ORDER — CHLORTHALIDONE 25 MG/1
25 TABLET ORAL DAILY
Status: DISCONTINUED | OUTPATIENT
Start: 2018-11-08 | End: 2018-11-11 | Stop reason: HOSPADM

## 2018-11-08 RX ADMIN — HEPARIN SODIUM 5000 UNITS: 5000 INJECTION, SOLUTION INTRAVENOUS; SUBCUTANEOUS at 08:11

## 2018-11-08 RX ADMIN — Medication 0.5 MG: at 04:11

## 2018-11-08 RX ADMIN — SODIUM CHLORIDE AND POTASSIUM CHLORIDE: 9; 1.49 INJECTION, SOLUTION INTRAVENOUS at 02:11

## 2018-11-08 RX ADMIN — ONDANSETRON 8 MG: 8 TABLET, ORALLY DISINTEGRATING ORAL at 09:11

## 2018-11-08 RX ADMIN — MUPIROCIN 1 G: 20 OINTMENT TOPICAL at 08:11

## 2018-11-08 RX ADMIN — Medication 0.5 MG: at 01:11

## 2018-11-08 RX ADMIN — CEFAZOLIN 2 G: 1 INJECTION, POWDER, FOR SOLUTION INTRAMUSCULAR; INTRAVENOUS at 10:11

## 2018-11-08 RX ADMIN — PANTOPRAZOLE SODIUM 40 MG: 40 TABLET, DELAYED RELEASE ORAL at 02:11

## 2018-11-08 RX ADMIN — GABAPENTIN 300 MG: 300 CAPSULE ORAL at 08:11

## 2018-11-08 RX ADMIN — PROMETHAZINE HYDROCHLORIDE 6.25 MG: 25 INJECTION INTRAMUSCULAR; INTRAVENOUS at 08:11

## 2018-11-08 RX ADMIN — CEFAZOLIN 2 G: 1 INJECTION, POWDER, FOR SOLUTION INTRAMUSCULAR; INTRAVENOUS at 01:11

## 2018-11-08 RX ADMIN — Medication 0.5 MG: at 12:11

## 2018-11-08 RX ADMIN — SODIUM CHLORIDE AND POTASSIUM CHLORIDE: 9; 1.49 INJECTION, SOLUTION INTRAVENOUS at 12:11

## 2018-11-08 RX ADMIN — ACETAMINOPHEN 650 MG: 325 TABLET ORAL at 08:11

## 2018-11-08 RX ADMIN — CEFAZOLIN 2 G: 1 INJECTION, POWDER, FOR SOLUTION INTRAMUSCULAR; INTRAVENOUS at 05:11

## 2018-11-08 RX ADMIN — EZETIMIBE 10 MG: 10 TABLET ORAL at 01:11

## 2018-11-08 RX ADMIN — GABAPENTIN 300 MG: 300 CAPSULE ORAL at 01:11

## 2018-11-08 RX ADMIN — HEPARIN SODIUM 5000 UNITS: 5000 INJECTION, SOLUTION INTRAVENOUS; SUBCUTANEOUS at 01:11

## 2018-11-08 RX ADMIN — SODIUM CHLORIDE AND POTASSIUM CHLORIDE: 9; 1.49 INJECTION, SOLUTION INTRAVENOUS at 10:11

## 2018-11-08 RX ADMIN — HEPARIN SODIUM 5000 UNITS: 5000 INJECTION, SOLUTION INTRAVENOUS; SUBCUTANEOUS at 05:11

## 2018-11-08 RX ADMIN — HYDROCODONE BITARTRATE AND ACETAMINOPHEN 1 TABLET: 5; 325 TABLET ORAL at 03:11

## 2018-11-08 NOTE — PT/OT/SLP EVAL
Physical Therapy Evaluation    Patient Name:  Maximilian Taevra Jr.   MRN:  [id number]    Recommendations:     Discharge Recommendations:  home health PT   Discharge Equipment Recommendations: none   Barriers to discharge: Inaccessible home and Decreased caregiver support    Assessment:     Maximilian Tavera Jr. is a 72 y.o. male admitted with a medical diagnosis of <principal problem not specified>.  He presents with the following impairments/functional limitations:  impaired endurance, gait instability, weakness, pain, orthopedic precautions .Patient tolerated evaluation  well. Patient limited at this time by increased dizziness with standing/gait. Pt was SBA with bed mobility, CGA for sit to stand and x 5 steps with HHA  but further functional mobility  limited 2/2 increased dizziness.  Patient will continue to require skilled PT services to address the above impairments to return to prior level of function as independent as possible. Discharge recommendation home with home health PT in order to maximize mobility, decrease caregiver burden and increase  functional independence while in the home setting.        Rehab Prognosis:  good; patient would benefit from acute skilled PT services to address these deficits and reach maximum level of function.      Recent Surgery: Procedure(s) (LRB):  C2-C6 Posterior Cervical Laminectomy & Instrumental Fusion (N/A) 1 Day Post-Op    Plan:     During this hospitalization, patient to be seen 3 x/week to address the above listed problems via gait training, therapeutic activities, therapeutic exercises, neuromuscular re-education  · Plan of Care Expires:  12/17/18   Plan of Care Reviewed with: patient    Subjective     Communicated with RN  prior to session.  Patient found supine upon PT entry to room, agreeable to evaluation.      Chief Complaint: pain/dizziness  Patient comments/goals: to return home and enjoy his correction  Pain/Comfort:  · Pain Rating 1:  3/10  · Location - Side 1: Bilateral  · Location - Orientation 1: posterior  · Location 1: neck  · Pain Addressed 1: Distraction, Cessation of Activity  · Pain Rating Post-Intervention 1: 3/10    Patients cultural, spiritual, Islam conflicts given the current situation: none stated    Living Environment:  Pt lives with his wife in a H with TH to enter.   Prior to admission, patients level of function was ( I) , driving and community ambulatory. Pt was using SPC PRN 2/2 chronic back pain.  Patient has the following equipment: cane, straight.  DME owned (not currently used): none.  Upon discharge, patient will have assistance from wife  .    Objective:     Patient found with: SCD, hemovac(cervical collar)     General Precautions: Standard, fall   Orthopedic Precautions:spinal precautions(cervical)   Braces: Aspen collar       Exams    Cognition:  Patient is Oriented X 4, Alert and Cooperative  Patient Follows multistep  commands 100 % of the time.     Coordination  · WFL  · Other noted coordination deficits: none    Sensation  Patient's sensation was Intact to light touch  bilaterally at UE/LE    Skin integrity    · -       Skin integrity: Visible skin intact     Posture  · -       Rounded shoulders  · -       Forward head    ROM and Strength   LLE MMT RLE MMT   Hip flexion 4+/5 4+/5   Knee ext 4+/5 4+/5   Knee flex 4+/5 4+/5   Ankle DF 4+/5 4+/5   Ankle PF 4+/5 4+/5   Other          LLE ROM RLE ROM   Hip flexion WFL WFL   Knee ext WFL WFL   Knee flex WFL WFL   Ankle PF WFL WFL   Ankle DF WFL WFL     Functional Mobilty    Bed Mobility:  Rolling Right: contact guard assistance  Supine to Sit: contact guard assistance  Sit to Supine: stand by assistance  Transfers:  Sit to Stand:  contact guard assistance with hand-held assist    Gait    · Patient ambulated FWB/WBAT: bilateral lower extremity 5  feet Contact Guard Assistance on level tile with Hand held assist  .   · Pdemonstarting a  2-point gait and swing-to  gait with decreased cathy, increased time in double stance and decreased velocity of limb motion.Impairments contributing to gait deviations include pain and impaired postural control  · Patient given  verbal cues for safety    Sitting Balance Static  Dynamic     Supervision Standby Assistance}   Standing Balance Contact Guard Assistance Contact Guard Assistance         AM-PAC 6 CLICK MOBILITY  Total Score:18       Therapeutic Activities and Exercises:  · Pt able to performance all bed mobility with appropriate spinal precautions     Patient education  · Patient educated on the role of PT and POC  · Patient educated on importance  activity while in the hosptial per tolerance for improved endurance and to limit deconditioning   · Patient educated on safe transfers with nursing as appropriate  · Patient educated on energy conservation, pursed lip breathing  · Patient educated on proper transfer mechanics and safety  · All of patients questions were answered within the scope of PT    Pt edu on spinal precautions and log rolling technique during bed mobility.         Patient left HOB elevated with all lines intact, call button in reach, RN notified and wife present.    GOALS:   Multidisciplinary Problems     Physical Therapy Goals        Problem: Physical Therapy Goal    Goal Priority Disciplines Outcome Goal Variances Interventions   Physical Therapy Goal     PT, PT/OT Ongoing (interventions implemented as appropriate)     Description:  Goals to be met by: 18     Patient will increase functional independence with mobility by performin. Supine to sit with Modified [place]  2. Sit to supine with Modified [place]  3. Sit to stand transfer with Supervision  4. Bed to chair transfer with Contact Guard Assistance step transfer using Rolling Walker or most appropriate device.   5. Gait  x 50 feet with Contact Guard Assistance using Rolling Walker or most appropriate device.   6. Lower extremity  exercise program x20 reps per handout, with independence                      History:     Past Medical History:   Diagnosis Date    Arthritis     Back pain     Carpal tunnel syndrome 2/25/2013    Cataract     Cataract, left eye 11/10/2014    Chest pain, musculoskeletal     COPD (chronic obstructive pulmonary disease) 11/052018    Emphysema lung 11/05/2018    Hyperlipidemia     Hypertension     Hypothyroidism     Knee fracture     Myasthenia gravis     Neuropathy 1/3/2013    Obesity 1/29/2015    Polyneuropathy     PVC (premature ventricular contraction)     Squamous cell carcinoma 2014    left forearm    Thyroid disease        Past Surgical History:   Procedure Laterality Date    APPENDECTOMY      C2-C6 Posterior Cervical Laminectomy & Instrumental Fusion N/A 11/7/2018    Per[facility] at [facility] 2ND FLR    CATARACT EXTRACTION W/  INTRAOCULAR LENS IMPLANT Bilateral     COLONOSCOPY N/A 3/1/2012    [facility]    EGD (ESOPHAGOGASTRODUODENOSCOPY) N/A 9/12/2018    Performed by Gabriel [facility] at [facility] ENDO    ESOPHAGOGASTRODUODENOSCOPY N/A 9/12/2018    Procedure: EGD (ESOPHAGOGASTRODUODENOSCOPY);  Surgeon: Gabriel Hernandez MD;  Location: [facility];  Service: Endoscopy;  Laterality: N/A;    EXTRACTION-CATARACT-IOL Left 12/9/2014    [facility]    HEMORRHOID SURGERY      INJECTION-STEROID-EPIDURAL-TRANSFORAMINAL Right 5/17/2018    [facility]an [facility]att [facility]    INJECTION-STEROID-EPIDURAL-TRANSFORAMINAL Right 9/1/2017    [facility]    INJECTION-STEROID-EPIDURAL-TRANSFORAMINAL Right 2/21/2017    [facility]    INJECTION-STEROID-EPIDURAL-TRANSFORAMINAL Right 10/9/2014    [facility]    KNEE ARTHROPLASTY Bilateral     NECK SURGERY      POSTERIOR FUSION OF CERVICAL SPINE WITH LAMINECTOMY N/A 11/7/2018     Procedure: C2-C6 Posterior Cervical Laminectomy & Instrumental Fusion;  Surgeon: Kishore Turner MD;  Location: [facility] OR [address] [place] [address];  Service: Neurosurgery;  Laterality: N/A;    TONSILLECTOMY         Clinical Decision Making:     History  Co-morbidities and personal factors that may impact the plan of care Examination  Body Structures and Functions, activity limitations and participation restrictions that may impact the plan of care Clinical Presentation   Decision Making/ Complexity Score   Co-morbidities:   [] Time since onset of injury / illness / exacerbation  [] Status of current condition  []Patient's cognitive status and safety concerns    [] Multiple Medical Problems (see med hx)  Personal Factors:   [] Patient's age  [] Prior Level of function   [] Patient's home situation (environment and family support)  [] Patient's level of motivation  [] Expected progression of patient      HISTORY:(criteria)    [] 29779 - no personal factors/history    [] 63588 - has 1-2 personal factor/comorbidity     [] 86533 - has >3 personal factor/comorbidity     Body Regions:  [] Objective examination findings  [] Head     []  Neck  [] Trunk   [] Upper Extremity  [] Lower Extremity    Body Systems:  [] For communication ability, affect, cognition, language, and learning style: the assessment of the ability to make needs known, consciousness, orientation (person, place, and time), expected emotional /behavioral responses, and learning preferences (eg, learning barriers, education  needs)  [] For the neuromuscular system: a general assessment of gross coordinated movement (eg, balance, gait, locomotion, transfers, and transitions) and motor function  (motor control and motor learning)  [] For the musculoskeletal system: the assessment of gross symmetry, gross range of motion, gross strength, height, and weight  [] For the integumentary system: the assessment of pliability(texture), presence of scar formation, skin color, and skin  integrity  [] For cardiovascular/pulmonary system: the assessment of heart rate, respiratory rate, blood pressure, and edema     Activity limitations:    [] Patient's cognitive status and saf ety concerns          [] Status of current condition      [] Weight bearing restriction  [] Cardiopulmunary Restriction    Participation Restrictions:   [] Goals and goal agreement with the patient     [] Rehab potential (prognosis) and probable outcome      Examination of Body System: (criteria)    [] 61510 - addressing 1-2 elements    [] 38038 - addressing a total of 3 or more elements     [] 84530 -  Addressing a total of 4 or more elements         Clinical Presentation: (criteria)  Choose one     On examination of body system using standardized tests and measures patient presents with (CHOOSE ONE) elements from any of the following: body structures and functions, activity limitations, and/or participation restrictions.  Leading to a clinical presentation that is considered (CHOOSE ONE)                              Clinical Decision Making  (Eval Complexity):  Choose One     Time Tracking:     PT Received On: 11/08/18  PT Start Time: 0951     PT Stop Time: 1008  PT Total Time (min): 17 min     Billable Minutes: Evaluation 17 min  Co Eval with OT      Olvin Dotson, PT  11/08/2018

## 2018-11-08 NOTE — PT/OT/SLP EVAL
Occupational Therapy   Evaluation    Name: Maximilian Tavera Jr.  MRN: [id number]  Admitting Diagnosis:  <principal problem not specified> 1 Day Post-Op    Recommendations:     Discharge Recommendations: home with home health  Discharge Equipment Recommendations:     Barriers to discharge:  None    History:     Occupational Profile:  Living Environment: Pt lives with his wife in a H with both types of bathroom setups  Previous level of function: (I) with ADLS/IADLs/driving/walking  Equipment Used at Home:  cane, straight  Assistance upon Discharge: wife    Past Medical History:   Diagnosis Date    Arthritis     Back pain     Carpal tunnel syndrome 2/25/2013    Cataract     Cataract, left eye 11/10/2014    Chest pain, musculoskeletal     COPD (chronic obstructive pulmonary disease) 11/052018    Emphysema lung 11/05/2018    Hyperlipidemia     Hypertension     Hypothyroidism     Knee fracture     Myasthenia gravis     Neuropathy 1/3/2013    Obesity 1/29/2015    Polyneuropathy     PVC (premature ventricular contraction)     Squamous cell carcinoma 2014    left forearm    Thyroid disease        Past Surgical History:   Procedure Laterality Date    APPENDECTOMY      C2-C6 Posterior Cervical Laminectomy & Instrumental Fusion N/A 11/7/2018    [facility]    CATARACT EXTRACTION W/  INTRAOCULAR LENS IMPLANT Bilateral     COLONOSCOPY N/A 3/1/2012    [facility] at [facility]    EGD (ESOPHAGOGASTRODUODENOSCOPY) N/A 9/12/2018    Performed by [facility] at [facility] ENDO    ESOPHAGOGASTRODUODENOSCOPY N/A 9/12/2018    Procedure: EGD (ESOPHAGOGASTRODUODENOSCOPY);  Surgeon: Gabriel Hernandez MD;  Location: [facility];  Service: Endoscopy;  Laterality: N/A;    EXTRACTION-CATARACT-IOL Left 12/9/2014    [facility] OR    HEMORRHOID SURGERY      INJECTION-STEROID-EPIDURAL-TRANSFORAMINAL Right 5/17/2018     [facility] at [facility] OR    INJECTION-STEROID-EPIDURAL-TRANSFORAMINAL Right 9/1/2017    [facility] at [facility] OR    INJECTION-STEROID-EPIDURAL-TRANSFORAMINAL Right 2/21/2017    [facility] at [facility] OR    INJECTION-STEROID-EPIDURAL-TRANSFORAMINAL Right 10/9/2014    [facility] at [facility] OR    KNEE ARTHROPLASTY Bilateral     NECK SURGERY      POSTERIOR FUSION OF CERVICAL SPINE WITH LAMINECTOMY N/A 11/7/2018    Procedure: C2-C6 Posterior Cervical Laminectomy & Instrumental Fusion;  Surgeon: Kishore Turner MD;  Location: [facility] OR [address] [place] [address];  Service: Neurosurgery;  Laterality: N/A;    TONSILLECTOMY         Subjective     Pain/Comfort:  · Pain Rating 1: 3/10  · Location 1: neck    Patients cultural, spiritual, Oriental orthodox conflicts given the current situation:      Objective:     Communicated with: RN prior to session.  Patient found all lines intact and call button in reach and hemovac, peripheral IV upon OT entry to room.    General Precautions: Standard, fall   Orthopedic Precautions:    Braces: Aspen collar     Occupational Performance:    Bed Mobility:    · Patient completed Rolling/Turning to Right with supervision  · Patient completed Scooting/Bridging with stand by assistance  · Patient completed Supine to Sit with contact guard assistance  · Patient completed Sit to Supine with contact guard assistance    Functional Mobility/Transfers:  · Patient completed Sit <> Stand Transfer with contact guard assistance  with  no assistive device   · Functional Mobility: Pt took 5 steps with HHA then had increasing dizziness so returned to supine.    Activities of Daily Living:  · Grooming: supervision   · Upper Body Dressing: supervision   · Lower Body Dressing: stand by assistance     Cognitive/Visual Perceptual:  Cognitive/Psychosocial Skills:     -       Oriented to: Person, Place, Time and Situation   -       Safety awareness/insight to disability: intact  "    Physical Exam:  BUE AROM/MMT: WNL    AMPA 6 Click ADL:  [facility] Total Score: 21    Treatment & Education:  UE ROM/MMT  Bed mobility training / assessment  Functional mobility assessment  Sit/standing balance assessment  Educated on importance of sitting OOB in bedside chair to promote increased strength, endurance & breathing.  Discussed OT POC / Post-acute plan  Education:    Patient left supine with all lines intact and call button in reach    Assessment:     Maximilian Tavera Jr. is a 72 y.o. male with a medical diagnosis of <principal problem not specified>.  He presents with the following performance deficits affecting function: impaired endurance, impaired self care skills, impaired functional mobilty, pain.  Pt limited this session due to onset of dizziness upon sitting/standing. Continue OT to maximize functional ability.    Rehab Prognosis: Good; patient would benefit from acute skilled OT services to address these deficits and reach maximum level of function.         Clinical Decision Makin.  OT Low:  "Pt evaluation falls under low complexity for evaluation coding due to performance deficits noted in 1-3 areas as stated above and 0 co-morbities affecting current functional status. Data obtained from problem focused assessments. No modifications or assistance was required for completion of evaluation. Only brief occupational profile and history review completed."     Plan:     Patient to be seen 2 x/week to address the above listed problems via self-care/home management, therapeutic activities  · Plan of Care Expires: 18  · Plan of Care Reviewed with: patient, spouse    This Plan of care has been discussed with the patient who was involved in its development and understands and is in agreement with the identified goals and treatment plan    GOALS:   Multidisciplinary Problems     Occupational Therapy Goals        Problem: Occupational Therapy Goal    Goal Priority Disciplines Outcome " Interventions   Occupational Therapy Goal     OT, PT/OT Ongoing (interventions implemented as appropriate)    Description:  Goals to be met by: 11/15/18    Patient will increase functional independence with ADLs by performing:    LE Dressing with Set-up Assistance.  Grooming while standing at sink with Set-up Assistance.  Toileting from toilet with Set-up Assistance for hygiene and clothing management.   Supine to sit with [place].  Toilet transfer to toilet with Supervision.                      Time Tracking:     OT Date of Treatment: 11/08/18  OT Start Time: 0951  OT Stop Time: 1010  OT Total Time (min): 19 min    Billable Minutes:Evaluation 19    MILE Corrales  11/8/2018

## 2018-11-08 NOTE — PLAN OF CARE
Problem: Patient Care Overview  Goal: Plan of Care Review  Outcome: Ongoing (interventions implemented as appropriate)  AAO x4. Pt received from PACU at approximately 1945 . VSS.  Incentive spirometer done with encouragement throughout shift.  Cervical Collar in place. Hemovac in place 0000 output- 100 , 0600 output- 150.  Diaz removed at 0630. Pain currently managed with PRNS. TEDS and SCD's in place. Callbell placed within reach and use encouraged.

## 2018-11-08 NOTE — NURSING
Call out to Neurosurgery  due to complaints of 6-8  / 10 pain hourly despite PRNs. Requested increase in dose for PO pain med. Awaiting new orders.

## 2018-11-08 NOTE — PLAN OF CARE
PCP  Brian Marroquin MD  [address] [place] [address]  Phone: [phone]    Pharmacy  For 90 day supply-[facility]  [address], [place] [address]  Phone: [phone]    Insurance  [employer] HMO    Emergency Contact  Radha Tavera-wife  [phone]       11/08/18 1554   Discharge Assessment   Assessment Type Discharge Planning Assessment   Confirmed/corrected address and phone number on facesheet? Yes   Assessment information obtained from? Patient   Communicated expected length of stay with patient/caregiver no   Prior to hospitilization cognitive status: Alert/Oriented   Prior to hospitalization functional status: Independent   Current cognitive status: Alert/Oriented   Current Functional Status: (lyla)   Able to Return to Prior Arrangements yes   Is patient able to care for self after discharge? Unable to determine at this time (comments)   Who are your caregiver(s) and their phone number(s)? Radha Schafer-wife [phone]   Patient's perception of discharge disposition home health   Readmission Within The Last 30 Days no previous admission in last 30 days   Patient currently being followed by outpatient case management? No   Patient currently receives any other outside agency services? No   Equipment Currently Used at Home none   Do you have any problems affording any of your prescribed medications? No   Is the patient taking medications as prescribed? yes   Does the patient have transportation home? Yes   Transportation Available family or friend will provide   Does the patient receive services at the [facility]? No   Discharge Plan A Home Health   Discharge Plan B Home Health   Patient/Family In Agreement With Plan yes     Pt is a 72 year old male, lives with his wife. Prior to admit pt was independent with ADLs, no HH or DME. Pt s wife will be able to provide transport upon d/c. Per pt, therapy is recommending HH. Pt does not have a preference for a provider.

## 2018-11-08 NOTE — ANESTHESIA POSTPROCEDURE EVALUATION
"Anesthesia Post Evaluation    Patient: Maximilian Tavera Jr.    Procedure(s) Performed: Procedure(s) (LRB):  C2-C6 Posterior Cervical Laminectomy & Instrumental Fusion (N/A)    Final Anesthesia Type: general  Patient location during evaluation: PACU  Patient participation: Yes- Able to Participate  Level of consciousness: awake and alert and oriented  Post-procedure vital signs: reviewed and stable  Pain management: adequate  Airway patency: patent  PONV status at discharge: No PONV  Anesthetic complications: no      Cardiovascular status: blood pressure returned to baseline  Respiratory status: unassisted  Hydration status: euvolemic  Follow-up not needed.    Patient in cervical collar    Visit Vitals  BP (!) 150/82 (Patient Position: Lying)   Pulse 80   Temp 36.4 °C (97.5 °F) (Oral)   Resp 16   Ht 6' 1" (1.854 m)   Wt 107 kg (235 lb 14.3 oz)   SpO2 (!) 92%   BMI 31.12 kg/m²       Pain/Regina Score: Pain Assessment Performed: Yes (11/7/2018 11:10 PM)  Presence of Pain: complains of pain/discomfort (11/7/2018 11:10 PM)  Pain Rating Prior to Med Admin: 7 (11/8/2018  4:37 AM)  Pain Rating Post Med Admin: 3 (11/8/2018  4:11 AM)  Regina Score: 9 (11/7/2018  5:30 PM)        "

## 2018-11-08 NOTE — PLAN OF CARE
Problem: Occupational Therapy Goal  Goal: Occupational Therapy Goal  Goals to be met by: 11/15/18    Patient will increase functional independence with ADLs by performing:    LE Dressing with Set-up Assistance.  Grooming while standing at sink with Set-up Assistance.  Toileting from toilet with Set-up Assistance for hygiene and clothing management.   Supine to sit with [place].  Toilet transfer to toilet with Supervision.    Outcome: Ongoing (interventions implemented as appropriate)  Evaluation completed and POC established.    MILE Carlson

## 2018-11-08 NOTE — PLAN OF CARE
Problem: Physical Therapy Goal  Goal: Physical Therapy Goal  Goals to be met by: 18     Patient will increase functional independence with mobility by performin. Supine to sit with Modified [place]  2. Sit to supine with Modified [place]  3. Sit to stand transfer with Supervision  4. Bed to chair transfer with Contact Guard Assistance step transfer using Rolling Walker or most appropriate device.   5. Gait  x 50 feet with Contact Guard Assistance using Rolling Walker or most appropriate device.   6. Lower extremity exercise program x20 reps per handout, with independence    Outcome: Ongoing (interventions implemented as appropriate)  Patient evaluated today. All goals established are appropriate for patient progression at this time.   Olvin Dotson PT, DPT  2018  Pager: [phone]

## 2018-11-09 ENCOUNTER — PATIENT MESSAGE (OUTPATIENT)
Dept: CARDIOLOGY | Facility: CLINIC | Age: 73
End: 2018-11-09

## 2018-11-09 LAB
ANION GAP SERPL CALC-SCNC: 6 MMOL/L
BASOPHILS # BLD AUTO: 0.02 K/UL
BASOPHILS NFR BLD: 0.2 %
BUN SERPL-MCNC: 13 MG/DL
CALCIUM SERPL-MCNC: 8 MG/DL
CHLORIDE SERPL-SCNC: 106 MMOL/L
CO2 SERPL-SCNC: 26 MMOL/L
CREAT SERPL-MCNC: 0.8 MG/DL
DIFFERENTIAL METHOD: ABNORMAL
EOSINOPHIL # BLD AUTO: 0.1 K/UL
EOSINOPHIL NFR BLD: 0.6 %
ERYTHROCYTE [DISTWIDTH] IN BLOOD BY AUTOMATED COUNT: 13.1 %
EST. GFR  (AFRICAN AMERICAN): >60 ML/MIN/1.73 M^2
EST. GFR  (NON AFRICAN AMERICAN): >60 ML/MIN/1.73 M^2
GLUCOSE SERPL-MCNC: 99 MG/DL
HCT VFR BLD AUTO: 34.9 %
HGB BLD-MCNC: 11.4 G/DL
IMM GRANULOCYTES # BLD AUTO: 0.03 K/UL
IMM GRANULOCYTES NFR BLD AUTO: 0.3 %
LYMPHOCYTES # BLD AUTO: 1.3 K/UL
LYMPHOCYTES NFR BLD: 14.9 %
MCH RBC QN AUTO: 30 PG
MCHC RBC AUTO-ENTMCNC: 32.7 G/DL
MCV RBC AUTO: 92 FL
MONOCYTES # BLD AUTO: 1 K/UL
MONOCYTES NFR BLD: 11.7 %
NEUTROPHILS # BLD AUTO: 6.3 K/UL
NEUTROPHILS NFR BLD: 72.3 %
NRBC BLD-RTO: 0 /100 WBC
PLATELET # BLD AUTO: 123 K/UL
PMV BLD AUTO: 11.2 FL
POTASSIUM SERPL-SCNC: 3.8 MMOL/L
RBC # BLD AUTO: 3.8 M/UL
SODIUM SERPL-SCNC: 138 MMOL/L
WBC # BLD AUTO: 8.75 K/UL

## 2018-11-09 PROCEDURE — 20600001 HC STEP DOWN PRIVATE ROOM: Mod: HCWC

## 2018-11-09 PROCEDURE — 63600175 PHARM REV CODE 636 W HCPCS: Mod: HCWC | Performed by: NEUROLOGICAL SURGERY

## 2018-11-09 PROCEDURE — 97530 THERAPEUTIC ACTIVITIES: CPT | Mod: HCWC

## 2018-11-09 PROCEDURE — 25000003 PHARM REV CODE 250: Mod: HCWC | Performed by: PHYSICIAN ASSISTANT

## 2018-11-09 PROCEDURE — 63600175 PHARM REV CODE 636 W HCPCS: Mod: HCWC | Performed by: PHYSICIAN ASSISTANT

## 2018-11-09 PROCEDURE — 97116 GAIT TRAINING THERAPY: CPT | Mod: HCWC

## 2018-11-09 PROCEDURE — 25000003 PHARM REV CODE 250: Mod: HCWC | Performed by: NEUROLOGICAL SURGERY

## 2018-11-09 PROCEDURE — 36415 COLL VENOUS BLD VENIPUNCTURE: CPT | Mod: HCWC

## 2018-11-09 PROCEDURE — 85025 COMPLETE CBC W/AUTO DIFF WBC: CPT | Mod: HCWC

## 2018-11-09 PROCEDURE — 80048 BASIC METABOLIC PNL TOTAL CA: CPT | Mod: HCWC

## 2018-11-09 PROCEDURE — 25000242 PHARM REV CODE 250 ALT 637 W/ HCPCS: Mod: HCWC | Performed by: PHYSICIAN ASSISTANT

## 2018-11-09 PROCEDURE — 99024 POSTOP FOLLOW-UP VISIT: CPT | Mod: HCWC,,, | Performed by: PHYSICIAN ASSISTANT

## 2018-11-09 RX ORDER — PREDNISONE 1 MG/1
4 TABLET ORAL ONCE
Status: COMPLETED | OUTPATIENT
Start: 2018-11-09 | End: 2018-11-09

## 2018-11-09 RX ADMIN — ATORVASTATIN CALCIUM 80 MG: 20 TABLET, FILM COATED ORAL at 10:11

## 2018-11-09 RX ADMIN — ACETAMINOPHEN 650 MG: 325 TABLET ORAL at 05:11

## 2018-11-09 RX ADMIN — HEPARIN SODIUM 5000 UNITS: 5000 INJECTION, SOLUTION INTRAVENOUS; SUBCUTANEOUS at 10:11

## 2018-11-09 RX ADMIN — HEPARIN SODIUM 5000 UNITS: 5000 INJECTION, SOLUTION INTRAVENOUS; SUBCUTANEOUS at 01:11

## 2018-11-09 RX ADMIN — CEFAZOLIN 2 G: 1 INJECTION, POWDER, FOR SOLUTION INTRAMUSCULAR; INTRAVENOUS at 10:11

## 2018-11-09 RX ADMIN — GABAPENTIN 300 MG: 300 CAPSULE ORAL at 10:11

## 2018-11-09 RX ADMIN — CEFAZOLIN 2 G: 1 INJECTION, POWDER, FOR SOLUTION INTRAMUSCULAR; INTRAVENOUS at 05:11

## 2018-11-09 RX ADMIN — MUPIROCIN 1 G: 20 OINTMENT TOPICAL at 09:11

## 2018-11-09 RX ADMIN — Medication 200 MG: at 10:11

## 2018-11-09 RX ADMIN — LEVOTHYROXINE SODIUM 50 MCG: 50 TABLET ORAL at 09:11

## 2018-11-09 RX ADMIN — ACETAMINOPHEN 650 MG: 325 TABLET ORAL at 11:11

## 2018-11-09 RX ADMIN — SODIUM CHLORIDE AND POTASSIUM CHLORIDE: 9; 1.49 INJECTION, SOLUTION INTRAVENOUS at 09:11

## 2018-11-09 RX ADMIN — CARVEDILOL 12.5 MG: 6.25 TABLET, FILM COATED ORAL at 10:11

## 2018-11-09 RX ADMIN — SODIUM CHLORIDE AND POTASSIUM CHLORIDE: 9; 1.49 INJECTION, SOLUTION INTRAVENOUS at 10:11

## 2018-11-09 RX ADMIN — ACETAMINOPHEN 650 MG: 325 TABLET ORAL at 10:11

## 2018-11-09 RX ADMIN — GABAPENTIN 300 MG: 300 CAPSULE ORAL at 09:11

## 2018-11-09 RX ADMIN — CEFAZOLIN 2 G: 1 INJECTION, POWDER, FOR SOLUTION INTRAMUSCULAR; INTRAVENOUS at 01:11

## 2018-11-09 RX ADMIN — EZETIMIBE 10 MG: 10 TABLET ORAL at 01:11

## 2018-11-09 RX ADMIN — PANTOPRAZOLE SODIUM 40 MG: 40 TABLET, DELAYED RELEASE ORAL at 09:11

## 2018-11-09 RX ADMIN — CHLORTHALIDONE 25 MG: 25 TABLET ORAL at 09:11

## 2018-11-09 RX ADMIN — MUPIROCIN 1 G: 20 OINTMENT TOPICAL at 10:11

## 2018-11-09 RX ADMIN — PREDNISONE 4 MG: 1 TABLET ORAL at 11:11

## 2018-11-09 RX ADMIN — HEPARIN SODIUM 5000 UNITS: 5000 INJECTION, SOLUTION INTRAVENOUS; SUBCUTANEOUS at 05:11

## 2018-11-09 RX ADMIN — PREDNISONE 3 MG: 1 TABLET ORAL at 09:11

## 2018-11-09 RX ADMIN — LISINOPRIL 40 MG: 20 TABLET ORAL at 10:11

## 2018-11-09 RX ADMIN — FLUTICASONE PROPIONATE 100 MCG: 50 SPRAY, METERED NASAL at 09:11

## 2018-11-09 NOTE — HOSPITAL COURSE
11/8: Drain output 250, nausea, f/u xrays, PT/OT recs HH  11/9: Drain output 190. F/u xrays pending.   11/10: NAEON. Patient sitting in chair eating lunch with wife at bedside. He reports of tolerable incisional pain that he takes PO tylenol for. Denies any weakness or new complaints. Ambulating in room with walker. Therapy recommending . Drain >100cc so plan to keep today. Dc drain tomorrow. Patient and family was counseled about activity restriction, wound care, follow up appointment, and other discharge instructions. Patient and family verbalized understanding. All of their questions were answered. They were encouraged to call clinic with any concerns.   11/11: dc drain and dc home.

## 2018-11-09 NOTE — PT/OT/SLP PROGRESS
"Physical Therapy Treatment    Patient Name:  Maximilian Tavera Jr.   MRN:  [id number]  Admitting Diagnosis: <principal problem not specified>  Recent Surgery: Procedure(s) (LRB):  C2-C6 Posterior Cervical Laminectomy & Instrumental Fusion (N/A) 2 Days Post-Op    Recommendations:     Discharge Recommendations:  home with home health   Discharge Equipment Recommendations: none   Barriers to discharge: Inaccessible home and Decreased caregiver support    Plan:     During this hospitalization, patient to be seen 3 x/week to address the above listed problems via gait training, therapeutic activities, therapeutic exercises, neuromuscular re-education  · Plan of Care Expires:  12/17/18   Plan of Care Reviewed with: patient, spouse    This Plan of care has been discussed with the patient who was involved in its development and understands and is in agreement with the identified goals and treatment plan    Subjective     Communicated with RN (Yun) prior to session.     Patient comments: "my bed at home is high"  Pain/Comfort:  · Pain Rating 1: 4/10  · Location - Side 1: Bilateral  · Location 1: neck  · Pain Addressed 1: Pre-medicate for activity  · Pain Rating Post-Intervention 1: (No rating provided)    Objective:     Patient found with: hemovac, peripheral IV, telemetry(ALYX to B LEs)  Aspen collar in place  Patient found UIC upon PT entry to room, agreeable to treatment.  NO family present in the room.    General Precautions: Standard, Cardiac fall   Orthopedic Precautions:spinal precautions   Braces:       Bed was raised to simulate home environment     BED MOBILITY (vc's for hand placement sequencing of task):        Rolling to the L with SBA with no use of bedrail       Sup > sit at the EOB with SBA from L side lying        Sit > sup with SBA       Scooting hips to the EOB upon sitting with close sup x2 scoots          SITTING AT THE EDGE OF THE BED    Assistance Level Required: sup for safety      TRANSFERS  (vc's " for hand placement, sequencing of task and safety)   Patient completed Sit <> Stand Transfer from BS chair with CGA then SBA;  From raised EOB with SBA for balance and safety with rolling walker xmultiple trials   Patient completed Stand <> Sit Transfer to BS chair/EOB with SBA for safety with rolling walker    BS chair seat was raised by adding blankets and pillow 2* low surface    GAIT: in hallway   Patient ambulated: 200ft   Patient required: SBA   Patient used:  Rolling Walker   Gait Pattern observed: reciprocal gait   Gait Deviation(s): mild instability    Comments: vc's for safety    EDUCATION  Patient provided with daily orientation and goals of this PT session.   Pt was re-educated on spinal precautions with 3/3 recall.      Whiteboard updated with correct mobility information. RN/PCT notified.  Pt safe to transfer and amb with RN/PCT: Use RW with SBA of 1 person.    Patient left up in chair, with  all lines intact, call button in reach, RN  notified and wife present    AM-PAC 6 CLICK MOBILITY  Turning over in bed (including adjusting bedclothes, sheets and blankets)?: 3  Sitting down on and standing up from a chair with arms (e.g., wheelchair, bedside commode, etc.): 3  Moving from lying on back to sitting on the side of the bed?: 3  Moving to and from a bed to a chair (including a wheelchair)?: 3  Need to walk in hospital room?: 3  Climbing 3-5 steps with a railing?: 2  Basic Mobility Total Score: 17     Assessment:     Maximilian Tavera Jr. is a 72 y.o. male admitted with a medical diagnosis of <principal problem not specified>.  He presents with the following impairments/functional limitations:  weakness, impaired self care skills, impaired functional mobilty, pain. requiring assistance and verbal cues for bed mob, transfers and gait to ensure compliance with spinal prec and for safety.   Pt will cont to benefit from skilled PT intervention to address deficits and improve functional mobility.      Rehab Prognosis:  Good; patient would benefit from acute skilled PT services to address these deficits and reach maximum level of function.      GOALS:   Multidisciplinary Problems     Physical Therapy Goals        Problem: Physical Therapy Goal    Goal Priority Disciplines Outcome Goal Variances Interventions   Physical Therapy Goal     PT, PT/OT Ongoing (interventions implemented as appropriate)     Description:  Goals to be met by: 18     Patient will increase functional independence with mobility by performin. Supine to sit with Modified [place]  2. Sit to supine with Modified [place]  3. Sit to stand transfer with Supervision  4. Bed to chair transfer with Contact Guard Assistance step transfer using Rolling Walker or most appropriate device.   5. Gait  x 50 feet with Contact Guard Assistance using Rolling Walker or most appropriate device.   6. Lower extremity exercise program x20 reps per handout, with independence                      Time Tracking:     PT Received On: 18  PT Start Time: 1332     PT Stop Time: 1412  PT Total Time (min): 40 min     Billable Minutes: Gait Training 15 and Therapeutic Activity 25    Treatment Type: Treatment  PT/PTA: PTA     PTA Visit Number: 1       Isis Dougherty PTA.  Pager [phone]    2018    .

## 2018-11-09 NOTE — PLAN OF CARE
Problem: Patient Care Overview  Goal: Plan of Care Review  Outcome: Ongoing (interventions implemented as appropriate)  No acute events overnight.  All vital signs stable.  No complaints preferring to take tylenol for pain as needed rather than narcotics due to side effects.  Complaints of nausea and phenergan given iv x1 with full relief obtained.  Patient refused to take his atorvastatin, coreg and coq10 overnight in fear of becoming nauseated with so many pills.  AAOx4. Neurologically and vascularly intact.  C-collar in place at all times.  Post neck incision c/d/i with hemovac output 50ml overnight.  Ambulated with nurse in room and to the bathroom no complaints of dizziness and he was steady on his feet. Safety maintained.  WCTM.

## 2018-11-09 NOTE — SUBJECTIVE & OBJECTIVE
Interval History: NAEON. AFVSS. No new complaints. Patient admits to flatulence. Denies n/v, new weakness, urinary retention/incontinence, bowel incontinence.     Medications:  Continuous Infusions:   0/9% NACL & POTASSIUM CHLORIDE 20 MEQ/L 100 mL/hr at 11/08/18 2243     Scheduled Meds:   atorvastatin  80 mg Oral Daily    bisacodyl  10 mg Rectal Daily    carvedilol  12.5 mg Oral BID    ceFAZolin (ANCEF) IVPB  2 g Intravenous Q8H    cetirizine  10 mg Oral Daily    chlorthalidone  25 mg Oral Daily    coenzyme Q10  400 mg Oral Daily    ezetimibe  10 mg Oral Daily    fluticasone  2 spray Each Nare Daily    gabapentin  300 mg Oral BID    heparin (porcine)  5,000 Units Subcutaneous Q8H    levothyroxine  50 mcg Oral Daily    lisinopril  40 mg Oral Daily    mupirocin  1 g Nasal BID    pantoprazole  40 mg Oral Daily    predniSONE  3 mg Oral Daily    vitamin D  2,000 Units Oral Daily     PRN Meds:acetaminophen, acetaminophen, albuterol, aluminum-magnesium hydroxide-simethicone, HYDROcodone-acetaminophen, ondansetron, promethazine (PHENERGAN) IVPB, senna-docusate 8.6-50 mg, sodium chloride 0.9%     Review of Systems  Objective:     Weight: 107 kg (235 lb 14.3 oz)  Body mass index is 31.12 kg/m².  Vital Signs (Most Recent):  Temp: 96.6 °F (35.9 °C) (11/09/18 0800)  Pulse: 84 (11/09/18 0800)  Resp: 20 (11/09/18 0800)  BP: 124/70 (11/09/18 0800)  SpO2: (!) 92 % (11/09/18 0800) Vital Signs (24h Range):  Temp:  [96.6 °F (35.9 °C)-99.5 °F (37.5 °C)] 96.6 °F (35.9 °C)  Pulse:  [48-86] 84  Resp:  [16-20] 20  SpO2:  [90 %-96 %] 92 %  BP: (106-143)/(53-95) 124/70                           Closed/Suction Drain 11/07/18 1621 Right;Posterior Neck Accordion 10 Fr. (Active)   Site Description Unable to view 11/8/2018  8:00 PM   [facility] 11/8/2018  8:00 PM   Dressing Status Clean;Dry;Intact 11/8/2018  8:00 PM   Drainage Bloody 11/8/2018  8:00 PM   Status To bulb suction 11/8/2018  8:00 PM   Output (mL) 60  mL 11/9/2018  6:00 AM       Neurosurgery Physical Exam  General: well developed, well nourished, no distress  Head: normocephalic, atraumatic  Neurologic: Alert and oriented. Thought content appropriate  GCS: Motor: 6/Verbal: 5/Eyes: 4 GCS Total: 15  Mental Status: Awake, Alert, Oriented x 4  Language: No aphasia  Speech: No dysarthria  Cranial nerves: face symmetric, tongue midline, CN II-XII grossly intact.   Eyes: pupils equal, round, reactive to light with accommodation, EOMI  Pulmonary: normal respirations, not labored, no accessory muscles used  Sensory: intact to light touch throughout  Motor Strength: Moves all extremities spontaneously with good tone. Full strength upper and lower extremities. No abnormal movements seen.    Pronator Drift: no drift noted  Finger-to-nose: Intact bilaterally  Mcneill: absent  Pulses: 2+ and symmetric radial and dorsalis pedis  Skin: warm, no rashes. Dorsal neck incision dressing in place.     Drain output 190.   C-collar in place.     Significant Labs:  Recent Labs   Lab 11/08/18  0808 11/08/18  1214 11/09/18  0542   GLU  --  122* 99   NA  --  137 138   K  --  3.9 3.8   CL  --  105 106   CO2  --  25 26   BUN  --  17 13   CREATININE 0.8 0.8 0.8   CALCIUM  --  8.4* 8.0*     Recent Labs   Lab 11/08/18  0856 11/09/18  0542   WBC 11.61 8.75   HGB 13.2* 11.4*   HCT 42.5 34.9*   * 123*     No results for input(s): LABPT, INR, APTT in the last 48 hours.  Microbiology Results (last 7 days)     ** No results found for the last 168 hours. **        All pertinent labs from the last 24 hours have been reviewed.    Significant Diagnostics:  Will get f/u XR c spine today. Was unable to obtain yesterday 2/2 n/v.

## 2018-11-09 NOTE — PLAN OF CARE
Problem: Patient Care Overview  Goal: Plan of Care Review  Outcome: Ongoing (interventions implemented as appropriate)  Pt admitted 11/7 for Post Cervical laminectomy and fusion c3-c6. IVF with KCL infusing. Pain well controlled. PRN Dilaudid and Norco. Nausea has been an issue today. Also c/o mild dizziness when up. No numbness or tingling noted. Xrays were ordered for today, but pt did not feel well enough to go downstairs- he states he spoke with the MD and said he could go in the am. Hemovac drain to post neck- measuring output Q6. Up to bathroom with assist.

## 2018-11-09 NOTE — PROGRESS NOTES
[facility]  Neurosurgery  Progress Note    Subjective:     History of Present Illness: This the third cervical surgery. Previous surgeries were anterior approach. He has persistent neck pain and unable to work at his back yard.   He has estimated low risk for an adverse cardiac outcome (myocardial infarction, pulmonary edema, ventricular fibrillation, cardiac arrest, or complete heart block) with the proposed surgery is very low (Revised Cardiac Risk Index (RCRI) - Gabriel Criteria - 0.4%).  Patient not taking aspirin at this time.He will stop taking Motrin and Omage 3 supplements.  Would not restart the aspirin after surgery as patient does not need to be on aspirin  For primary prevention.  He has HT well controlled. He has mild SOB with exertion but able to walk 2 flights. He had hx asbestos exposure with excellent exercise capacity. He has hx of reactive airway disease with no limitations to activity.Denies Sleep apnea despite 27 weight gain.No hx CHF, nor diabetes.     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     No allergies nor hx of anesthesia complications            Post-Op Info:  Procedure(s) (LRB):  C2-C6 Posterior Cervical Laminectomy & Instrumental Fusion (N/A)   2 Days Post-Op     Interval History: NAEON. AFVSS. No new complaints. Patient admits to flatulence. Denies n/v, new weakness, urinary retention/incontinence, bowel incontinence.     Medications:  Continuous Infusions:   0/9% NACL & POTASSIUM CHLORIDE 20 MEQ/L 100 mL/hr at 11/08/18 3304     Scheduled Meds:   atorvastatin  80 mg Oral Daily    bisacodyl  10 mg Rectal Daily    carvedilol  12.5 mg Oral BID    ceFAZolin (ANCEF) IVPB  2 g Intravenous Q8H    cetirizine  10 mg Oral Daily    chlorthalidone  25 mg Oral Daily    coenzyme Q10  400 mg Oral Daily    ezetimibe  10 mg Oral  Daily    fluticasone  2 spray Each Nare Daily    gabapentin  300 mg Oral BID    heparin (porcine)  5,000 Units Subcutaneous Q8H    levothyroxine  50 mcg Oral Daily    lisinopril  40 mg Oral Daily    mupirocin  1 g Nasal BID    pantoprazole  40 mg Oral Daily    predniSONE  3 mg Oral Daily    vitamin D  2,000 Units Oral Daily     PRN Meds:acetaminophen, acetaminophen, albuterol, aluminum-magnesium hydroxide-simethicone, HYDROcodone-acetaminophen, ondansetron, promethazine (PHENERGAN) IVPB, senna-docusate 8.6-50 mg, sodium chloride 0.9%     Review of Systems  Objective:     Weight: 107 kg (235 lb 14.3 oz)  Body mass index is 31.12 kg/m².  Vital Signs (Most Recent):  Temp: 96.6 °F (35.9 °C) (11/09/18 0800)  Pulse: 84 (11/09/18 0800)  Resp: 20 (11/09/18 0800)  BP: 124/70 (11/09/18 0800)  SpO2: (!) 92 % (11/09/18 0800) Vital Signs (24h Range):  Temp:  [96.6 °F (35.9 °C)-99.5 °F (37.5 °C)] 96.6 °F (35.9 °C)  Pulse:  [48-86] 84  Resp:  [16-20] 20  SpO2:  [90 %-96 %] 92 %  BP: (106-143)/(53-95) 124/70                           Closed/Suction Drain 11/07/18 1621 Right;Posterior Neck Accordion 10 Fr. (Active)   Site Description Unable to view 11/8/2018  8:00 PM   Dressing Type [facility] 11/8/2018  8:00 PM   Dressing Status Clean;Dry;Intact 11/8/2018  8:00 PM   Drainage Bloody 11/8/2018  8:00 PM   Status To bulb suction 11/8/2018  8:00 PM   Output (mL) 60 mL 11/9/2018  6:00 AM       Neurosurgery Physical Exam  General: well developed, well nourished, no distress  Head: normocephalic, atraumatic  Neurologic: Alert and oriented. Thought content appropriate  GCS: Motor: 6/Verbal: 5/Eyes: 4 GCS Total: 15  Mental Status: Awake, Alert, Oriented x 4  Language: No aphasia  Speech: No dysarthria  Cranial nerves: face symmetric, tongue midline, CN II-XII grossly intact.   Eyes: pupils equal, round, reactive to light with accommodation, EOMI  Pulmonary: normal respirations, not labored, no accessory muscles used  Sensory:  intact to light touch throughout  Motor Strength: Moves all extremities spontaneously with good tone. Full strength upper and lower extremities. No abnormal movements seen.    Pronator Drift: no drift noted  Finger-to-nose: Intact bilaterally  Mcneill: absent  Pulses: 2+ and symmetric radial and dorsalis pedis  Skin: warm, no rashes. Dorsal neck incision dressing in place.     Drain output 190.   C-collar in place.     Significant Labs:  Recent Labs   Lab 11/08/18  0808 11/08/18  1214 11/09/18  0542   GLU  --  122* 99   NA  --  137 138   K  --  3.9 3.8   CL  --  105 106   CO2  --  25 26   BUN  --  17 13   CREATININE 0.8 0.8 0.8   CALCIUM  --  8.4* 8.0*     Recent Labs   Lab 11/08/18  0856 11/09/18  0542   WBC 11.61 8.75   HGB 13.2* 11.4*   HCT 42.5 34.9*   * 123*     No results for input(s): LABPT, INR, APTT in the last 48 hours.  Microbiology Results (last 7 days)     ** No results found for the last 168 hours. **        All pertinent labs from the last 24 hours have been reviewed.    Significant Diagnostics:  Will get f/u XR c spine today. Was unable to obtain yesterday 2/2 n/v.    Assessment/Plan:     Cervical stenosis of spinal canal    Mr. Tavera is a 72yoM with PMHx cervical stenosis with myelopathy, now s/p C2-6 PCF, POD#2  -Neurologically stable  -Leave dressing to incision. Will remove dressing tomorrow.  -Xrays pending. Discussed with radiology and will get today  -Drain output 190cc, continue drain until <100 cc output. Will remove tomorrow.  -Cont abx while drain in place  -prednisone 7mg home dose, started this am  -d/c hydromorphone. Continue norco 5  -C collar when up/OOB. Ok to take off briefly while eating. Discussed with pt and voiced understanding  -PT/OT recs for HH  -SQH, TEDs, SCDs for DVT ppx  -Discussed with Dr. Yue Del Castillo PA-C  Neurosurgery  [facility]

## 2018-11-09 NOTE — PLAN OF CARE
Problem: Physical Therapy Goal  Goal: Physical Therapy Goal  Goals to be met by: 18     Patient will increase functional independence with mobility by performin. Supine to sit with Modified [place]  2. Sit to supine with Modified [place]  3. Sit to stand transfer with Supervision  4. Bed to chair transfer with Contact Guard Assistance step transfer using Rolling Walker or most appropriate device.   5. Gait  x 50 feet with Contact Guard Assistance using Rolling Walker or most appropriate device.   6. Lower extremity exercise program x20 reps per handout, with independence       Discharge Recommendations: HH PT    Pt safe to transfer and amb with RN/PCT: Use RW with SBA of 1 person.    Goals remain appropriate.     Isis Dougherty, PTA.   [phone]   2018

## 2018-11-09 NOTE — ASSESSMENT & PLAN NOTE
Mr. Tavera is a 72yoM with PMHx cervical stenosis with myelopathy, now s/p C2-6 PCF, POD#1  -Neurologically stable  -Leave dressing to incision  -Xrays pending  -Drain output 190cc, continue drain until <100 cc output.  -Cont abx while drain in place  -d/c hydromorphone. Continue norco 5  -C collar when up/OOB  -PT/OT recs for HH  -SQH, TEDs, SCDs for DVT ppx  -Discussed with Dr. Turner

## 2018-11-09 NOTE — HPI
This the third cervical surgery. Previous surgeries were anterior approach. He has persistent neck pain and unable to work at his back yard.   He has estimated low risk for an adverse cardiac outcome (myocardial infarction, pulmonary edema, ventricular fibrillation, cardiac arrest, or complete heart block) with the proposed surgery is very low (Revised Cardiac Risk Index (RCRI) - Gabriel Criteria - 0.4%).  Patient not taking aspirin at this time.He will stop taking Motrin and Omage 3 supplements.  Would not restart the aspirin after surgery as patient does not need to be on aspirin  For primary prevention.  He has HT well controlled. He has mild SOB with exertion but able to walk 2 flights. He had hx asbestos exposure with excellent exercise capacity. He has hx of reactive airway disease with no limitations to activity.Denies Sleep apnea despite 27 weight gain.No hx CHF, nor diabetes.     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     No allergies nor hx of anesthesia complications

## 2018-11-09 NOTE — PLAN OF CARE
SW met with patient and wife at the bedside and provided them with a Home Health list.     Patient's preference of home health agency is [facility].    SW will send orders once written.    Chuyita Valenzuela LMSW  [facility]  [phone]

## 2018-11-09 NOTE — ASSESSMENT & PLAN NOTE
Mr. Tavera is a 72yoM with PMHx cervical stenosis with myelopathy, now s/p C2-6 PCF, POD#3    -Neurologically stable  -dressing removed. Keep open to air. Bacitracin to incision BID.   -Post Xrays done. Appropriate placement of hardware.   -Drain output >100cc, continue drain until <100 cc output. Plan to dc drain tomorrow.   -Cont abx while drain in place  -prednisone 7mg home dose, restarted while admitted.   -d/c hydromorphone. Continue norco 5 for severe pain and use tylenol prn mild ain.   -C collar when up/OOB  -PT/OT recs for HH. Orders placed.   -SQH, TEDs, SCDs for DVT ppx  -Resumed home meds for HTN, HLD, GERD, COPD, hypothyroid, and Vit D def.       Dispo: Dc drain tomorrow then Dc home. Pain rx was e-scribed to home pharmacy in [place].     Patient and family was counseled about activity restriction, wound care, follow up appointment, and other discharge instructions. Patient and family verbalized understanding. All of their questions were answered. They were encouraged to call clinic with any concerns.      Discussed with Dr. Turner

## 2018-11-09 NOTE — SUBJECTIVE & OBJECTIVE
Interval History: No new weakness, paresthesias, B/B dysfunction.  Pain controlled. C collar in place.     Medications:  Continuous Infusions:   0/9% NACL & POTASSIUM CHLORIDE 20 MEQ/L 100 mL/hr at 11/08/18 1229     Scheduled Meds:   atorvastatin  80 mg Oral Daily    bisacodyl  10 mg Rectal Daily    carvedilol  12.5 mg Oral BID    ceFAZolin (ANCEF) IVPB  2 g Intravenous Q8H    cetirizine  10 mg Oral Daily    chlorthalidone  25 mg Oral Daily    coenzyme Q10  400 mg Oral Daily    ezetimibe  10 mg Oral Daily    fluticasone  2 spray Each Nare Daily    gabapentin  300 mg Oral BID    heparin (porcine)  5,000 Units Subcutaneous Q8H    levothyroxine  50 mcg Oral Daily    lisinopril  40 mg Oral Daily    mupirocin  1 g Nasal BID    pantoprazole  40 mg Oral Daily    [START ON 11/9/2018] predniSONE  3 mg Oral Daily    vitamin D  2,000 Units Oral Daily     PRN Meds:acetaminophen, acetaminophen, albuterol, aluminum-magnesium hydroxide-simethicone, HYDROcodone-acetaminophen, HYDROmorphone, ondansetron, promethazine (PHENERGAN) IVPB, senna-docusate 8.6-50 mg, sodium chloride 0.9%     Review of Systems  Objective:     Weight: 107 kg (235 lb 14.3 oz)  Body mass index is 31.12 kg/m².  Vital Signs (Most Recent):  Temp: 98.5 °F (36.9 °C) (11/08/18 2033)  Pulse: 86 (11/08/18 2033)  Resp: 20 (11/08/18 2033)  BP: 135/68 (11/08/18 2033)  SpO2: (!) 91 % (11/08/18 2033) Vital Signs (24h Range):  Temp:  [97.5 °F (36.4 °C)-98.6 °F (37 °C)] 98.5 °F (36.9 °C)  Pulse:  [48-86] 86  Resp:  [16-20] 20  SpO2:  [91 %-96 %] 91 %  BP: (135-150)/(63-95) 135/68     Date 11/08/18 0700 - 11/09/18 0659   Shift 6826-0690 2319-4405 3821-3678 24 Hour Total   INTAKE   P.O. 480 240  720   I.V.(mL/kg)  1300(12.1)  1300(12.1)   Shift Total(mL/kg) 480(4.5) 1540(14.4)  2020(18.9)   OUTPUT   Urine(mL/kg/hr) 300(0.4) 300  600   Drains 90 40  130   Shift Total(mL/kg) 390(3.6) 340(3.2)  730(6.8)   Weight (kg) 107 107 107 107                         Closed/Suction Drain 11/07/18 1621 Right;Posterior Neck Accordion 10 Fr. (Active)   Site Description Unable to view 11/8/2018  8:30 AM   Dressing Type [place] 11/8/2018  8:30 AM   Dressing Status Clean;Dry;Intact 11/8/2018  8:30 AM   Drainage Bloody;Dark red 11/8/2018  8:30 AM   Status To bulb suction 11/8/2018  8:30 AM   Output (mL) 40 mL 11/8/2018  6:00 PM       Neurosurgery Physical Exam   General: well developed, well nourished, no distress  Head: normocephalic, atraumatic  Neurologic: Alert and oriented. Thought content appropriate  GCS: Motor: 6/Verbal: 5/Eyes: 4 GCS Total: 15  Mental Status: Awake, Alert, Oriented x 4  Language: No aphasia  Speech: No dysarthria  Cranial nerves: face symmetric, tongue midline, CN II-XII grossly intact.   Eyes: pupils equal, round, reactive to light with accommodation, EOMI  Pulmonary: normal respirations, not labored, no accessory muscles used  Abdomen: soft, non-distended, not tender to palpation  Sensory: intact to light touch throughout  Motor Strength: Moves all extremities spontaneously with good tone.  Full strength upper and lower extremities. No abnormal movements seen.     Strength  Deltoids Triceps Biceps Wrist Extension Wrist Flexion Hand    Upper: R 5/5 5/5 5/5 5/5 5/5 5/5    L 5/5 5/5 5/5 5/5 5/5 5/5     Iliopsoas Quadriceps Knee  Flexion Tibialis  anterior Gastro- cnemius EHL   Lower: R 5/5 5/5 5/5 5/5 5/5 5/5    L 5/5 5/5 5/5 5/5 5/5 5/5     Pronator Drift: no drift noted  Finger-to-nose: Intact bilaterally  Mcneill: absent  Clonus: absent  Babinski: absent  Pulses: 2+ and symmetric radial and dorsalis pedis  Skin: warm, dry and intact, no rashes  Dressing is clean, dry, and intact.  There is no drainage, erythema, or edema.        Significant Labs:  Recent Labs   Lab 11/08/18  0808 11/08/18  1214   GLU  --  122*   NA  --  137   K  --  3.9   CL  --  105   CO2  --  25   BUN  --  17   CREATININE 0.8 0.8   CALCIUM  --  8.4*     Recent Labs   Lab  11/08/18  0856   WBC 11.61   HGB 13.2*   HCT 42.5   *     No results for input(s): LABPT, INR, APTT in the last 48 hours.  Microbiology Results (last 7 days)     ** No results found for the last 168 hours. **          Significant Diagnostics:  No new imaging

## 2018-11-09 NOTE — ASSESSMENT & PLAN NOTE
Mr. Tavera is a 72yoM with PMHx cervical stenosis with myelopathy, now s/p C2-6 PCF, POD#1  -Neurologically stable  -Leave dressing to incision  -Xrays pending  -Drain output 250cc, continue drain  -Cont abx while drain in place  -C collar when up/OOB  -PT/OT recs for HH  -SQH, TEDs, SCDs, for DVTP  -Discussed with Dr. Turner

## 2018-11-09 NOTE — PROGRESS NOTES
[facility]  Neurosurgery  Progress Note    Subjective:     History of Present Illness: This the third cervical surgery. Previous surgeries were anterior approach. He has persistent neck pain and unable to work at his back yard.   He has estimated low risk for an adverse cardiac outcome (myocardial infarction, pulmonary edema, ventricular fibrillation, cardiac arrest, or complete heart block) with the proposed surgery is very low (Revised Cardiac Risk Index (RCRI) - Gabriel Criteria - 0.4%).  Patient not taking aspirin at this time.He will stop taking Motrin and Omage 3 supplements.  Would not restart the aspirin after surgery as patient does not need to be on aspirin  For primary prevention.  He has HT well controlled. He has mild SOB with exertion but able to walk 2 flights. He had hx asbestos exposure with excellent exercise capacity. He has hx of reactive airway disease with no limitations to activity.Denies Sleep apnea despite 27 weight gain.No hx CHF, nor diabetes.     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     No allergies nor hx of anesthesia complications            Post-Op Info:  Procedure(s) (LRB):  C2-C6 Posterior Cervical Laminectomy & Instrumental Fusion (N/A)   1 Day Post-Op     Interval History: No new weakness, paresthesias, B/B dysfunction.  Pain controlled. C collar in place.     Medications:  Continuous Infusions:   0/9% NACL & POTASSIUM CHLORIDE 20 MEQ/L 100 mL/hr at 11/08/18 1229     Scheduled Meds:   atorvastatin  80 mg Oral Daily    bisacodyl  10 mg Rectal Daily    carvedilol  12.5 mg Oral BID    ceFAZolin (ANCEF) IVPB  2 g Intravenous Q8H    cetirizine  10 mg Oral Daily    chlorthalidone  25 mg Oral Daily    coenzyme Q10  400 mg Oral Daily    ezetimibe  10 mg Oral Daily    fluticasone  2 spray Each Nare Daily    gabapentin   300 mg Oral BID    heparin (porcine)  5,000 Units Subcutaneous Q8H    levothyroxine  50 mcg Oral Daily    lisinopril  40 mg Oral Daily    mupirocin  1 g Nasal BID    pantoprazole  40 mg Oral Daily    [START ON 11/9/2018] predniSONE  3 mg Oral Daily    vitamin D  2,000 Units Oral Daily     PRN Meds:acetaminophen, acetaminophen, albuterol, aluminum-magnesium hydroxide-simethicone, HYDROcodone-acetaminophen, HYDROmorphone, ondansetron, promethazine (PHENERGAN) IVPB, senna-docusate 8.6-50 mg, sodium chloride 0.9%     Review of Systems  Objective:     Weight: 107 kg (235 lb 14.3 oz)  Body mass index is 31.12 kg/m².  Vital Signs (Most Recent):  Temp: 98.5 °F (36.9 °C) (11/08/18 2033)  Pulse: 86 (11/08/18 2033)  Resp: 20 (11/08/18 2033)  BP: 135/68 (11/08/18 2033)  SpO2: (!) 91 % (11/08/18 2033) Vital Signs (24h Range):  Temp:  [97.5 °F (36.4 °C)-98.6 °F (37 °C)] 98.5 °F (36.9 °C)  Pulse:  [48-86] 86  Resp:  [16-20] 20  SpO2:  [91 %-96 %] 91 %  BP: (135-150)/(63-95) 135/68     Date 11/08/18 0700 - 11/09/18 0659   Shift 4767-6060 0552-1866 0646-9271 24 Hour Total   INTAKE   P.O. 480 240  720   I.V.(mL/kg)  1300(12.1)  1300(12.1)   Shift Total(mL/kg) 480(4.5) 1540(14.4)  2020(18.9)   OUTPUT   Urine(mL/kg/hr) 300(0.4) 300  600   Drains 90 40  130   Shift Total(mL/kg) 390(3.6) 340(3.2)  730(6.8)   Weight (kg) 107 107 107 107                        Closed/Suction Drain 11/07/18 1621 Right;Posterior Neck Accordion 10 Fr. (Active)   Site Description Unable to view 11/8/2018  8:30 AM   Dressing Type [place] 11/8/2018  8:30 AM   Dressing Status Clean;Dry;Intact 11/8/2018  8:30 AM   Drainage Bloody;Dark red 11/8/2018  8:30 AM   Status To bulb suction 11/8/2018  8:30 AM   Output (mL) 40 mL 11/8/2018  6:00 PM       Neurosurgery Physical Exam   General: well developed, well nourished, no distress  Head: normocephalic, atraumatic  Neurologic: Alert and oriented. Thought content appropriate  GCS: Motor: 6/Verbal: 5/Eyes: 4  GCS Total: 15  Mental Status: Awake, Alert, Oriented x 4  Language: No aphasia  Speech: No dysarthria  Cranial nerves: face symmetric, tongue midline, CN II-XII grossly intact.   Eyes: pupils equal, round, reactive to light with accommodation, EOMI  Pulmonary: normal respirations, not labored, no accessory muscles used  Abdomen: soft, non-distended, not tender to palpation  Sensory: intact to light touch throughout  Motor Strength: Moves all extremities spontaneously with good tone.  Full strength upper and lower extremities. No abnormal movements seen.     Strength  Deltoids Triceps Biceps Wrist Extension Wrist Flexion Hand    Upper: R 5/5 5/5 5/5 5/5 5/5 5/5    L 5/5 5/5 5/5 5/5 5/5 5/5     Iliopsoas Quadriceps Knee  Flexion Tibialis  anterior Gastro- cnemius EHL   Lower: R 5/5 5/5 5/5 5/5 5/5 5/5    L 5/5 5/5 5/5 5/5 5/5 5/5     Pronator Drift: no drift noted  Finger-to-nose: Intact bilaterally  Mcneill: absent  Clonus: absent  Babinski: absent  Pulses: 2+ and symmetric radial and dorsalis pedis  Skin: warm, dry and intact, no rashes  Dressing is clean, dry, and intact.  There is no drainage, erythema, or edema.        Significant Labs:  Recent Labs   Lab 11/08/18  0808 11/08/18  1214   GLU  --  122*   NA  --  137   K  --  3.9   CL  --  105   CO2  --  25   BUN  --  17   CREATININE 0.8 0.8   CALCIUM  --  8.4*     Recent Labs   Lab 11/08/18  0856   WBC 11.61   HGB 13.2*   HCT 42.5   *     No results for input(s): LABPT, INR, APTT in the last 48 hours.  Microbiology Results (last 7 days)     ** No results found for the last 168 hours. **          Significant Diagnostics:  No new imaging     Assessment/Plan:     Cervical stenosis of spinal canal    Mr. Tavera is a 72yoM with PMHx cervical stenosis with myelopathy, now s/p C2-6 PCF, POD#1  -Neurologically stable  -Leave dressing to incision  -Xrays pending  -Drain output 250cc, continue drain  -Cont abx while drain in place  -C collar when up/OOB  -PT/OT  recs for HH  -SQH, TEDs, SCDs, for DVTP  -Discussed with Dr. Yue Alfaro, PA-C  Neurosurgery  [facility]

## 2018-11-10 LAB
ANION GAP SERPL CALC-SCNC: 8 MMOL/L
BASOPHILS # BLD AUTO: 0.02 K/UL
BASOPHILS NFR BLD: 0.3 %
BUN SERPL-MCNC: 12 MG/DL
CALCIUM SERPL-MCNC: 8.2 MG/DL
CHLORIDE SERPL-SCNC: 106 MMOL/L
CO2 SERPL-SCNC: 28 MMOL/L
CREAT SERPL-MCNC: 0.8 MG/DL
DIFFERENTIAL METHOD: ABNORMAL
EOSINOPHIL # BLD AUTO: 0.1 K/UL
EOSINOPHIL NFR BLD: 1.6 %
ERYTHROCYTE [DISTWIDTH] IN BLOOD BY AUTOMATED COUNT: 13.2 %
EST. GFR  (AFRICAN AMERICAN): >60 ML/MIN/1.73 M^2
EST. GFR  (NON AFRICAN AMERICAN): >60 ML/MIN/1.73 M^2
GLUCOSE SERPL-MCNC: 104 MG/DL
HCT VFR BLD AUTO: 35 %
HGB BLD-MCNC: 11.1 G/DL
IMM GRANULOCYTES # BLD AUTO: 0.02 K/UL
IMM GRANULOCYTES NFR BLD AUTO: 0.3 %
LYMPHOCYTES # BLD AUTO: 1.4 K/UL
LYMPHOCYTES NFR BLD: 19.2 %
MCH RBC QN AUTO: 29.1 PG
MCHC RBC AUTO-ENTMCNC: 31.7 G/DL
MCV RBC AUTO: 92 FL
MONOCYTES # BLD AUTO: 0.9 K/UL
MONOCYTES NFR BLD: 12.3 %
NEUTROPHILS # BLD AUTO: 4.8 K/UL
NEUTROPHILS NFR BLD: 66.3 %
NRBC BLD-RTO: 0 /100 WBC
PLATELET # BLD AUTO: 115 K/UL
PMV BLD AUTO: 11.7 FL
POTASSIUM SERPL-SCNC: 3.4 MMOL/L
RBC # BLD AUTO: 3.82 M/UL
SODIUM SERPL-SCNC: 142 MMOL/L
WBC # BLD AUTO: 7.29 K/UL

## 2018-11-10 PROCEDURE — 80048 BASIC METABOLIC PNL TOTAL CA: CPT | Mod: HCWC

## 2018-11-10 PROCEDURE — 63600175 PHARM REV CODE 636 W HCPCS: Mod: HCWC | Performed by: NEUROLOGICAL SURGERY

## 2018-11-10 PROCEDURE — 25000003 PHARM REV CODE 250: Mod: HCWC | Performed by: NEUROLOGICAL SURGERY

## 2018-11-10 PROCEDURE — 85025 COMPLETE CBC W/AUTO DIFF WBC: CPT | Mod: HCWC

## 2018-11-10 PROCEDURE — 63600175 PHARM REV CODE 636 W HCPCS: Mod: HCWC | Performed by: PHYSICIAN ASSISTANT

## 2018-11-10 PROCEDURE — 20600001 HC STEP DOWN PRIVATE ROOM: Mod: HCWC

## 2018-11-10 PROCEDURE — 25000003 PHARM REV CODE 250: Mod: HCWC | Performed by: PHYSICIAN ASSISTANT

## 2018-11-10 PROCEDURE — 99024 POSTOP FOLLOW-UP VISIT: CPT | Mod: HCWC,,, | Performed by: PHYSICIAN ASSISTANT

## 2018-11-10 PROCEDURE — 36415 COLL VENOUS BLD VENIPUNCTURE: CPT | Mod: HCWC

## 2018-11-10 RX ORDER — ACETAMINOPHEN 325 MG/1
650 TABLET ORAL EVERY 4 HOURS PRN
Refills: 0 | COMMUNITY
Start: 2018-11-10 | End: 2019-04-30 | Stop reason: ALTCHOICE

## 2018-11-10 RX ORDER — AMOXICILLIN 250 MG
2 CAPSULE ORAL NIGHTLY PRN
COMMUNITY
Start: 2018-11-10 | End: 2019-04-30

## 2018-11-10 RX ORDER — BACITRACIN ZINC 500 UNIT/G
OINTMENT (GRAM) TOPICAL 3 TIMES DAILY
Status: DISCONTINUED | OUTPATIENT
Start: 2018-11-10 | End: 2018-11-10

## 2018-11-10 RX ORDER — BACITRACIN 500 [USP'U]/G
OINTMENT TOPICAL 3 TIMES DAILY
Status: DISCONTINUED | OUTPATIENT
Start: 2018-11-10 | End: 2018-11-11 | Stop reason: HOSPADM

## 2018-11-10 RX ORDER — HYDROCODONE BITARTRATE AND ACETAMINOPHEN 5; 325 MG/1; MG/1
1 TABLET ORAL EVERY 6 HOURS PRN
Qty: 40 TABLET | Refills: 0 | Status: SHIPPED | OUTPATIENT
Start: 2018-11-10 | End: 2018-11-21 | Stop reason: SDUPTHER

## 2018-11-10 RX ADMIN — CEFAZOLIN 2 G: 1 INJECTION, POWDER, FOR SOLUTION INTRAMUSCULAR; INTRAVENOUS at 08:11

## 2018-11-10 RX ADMIN — MUPIROCIN 1 G: 20 OINTMENT TOPICAL at 10:11

## 2018-11-10 RX ADMIN — GABAPENTIN 300 MG: 300 CAPSULE ORAL at 10:11

## 2018-11-10 RX ADMIN — PREDNISONE 7 MG: 1 TABLET ORAL at 10:11

## 2018-11-10 RX ADMIN — SODIUM CHLORIDE AND POTASSIUM CHLORIDE: 9; 1.49 INJECTION, SOLUTION INTRAVENOUS at 09:11

## 2018-11-10 RX ADMIN — MUPIROCIN 1 G: 20 OINTMENT TOPICAL at 08:11

## 2018-11-10 RX ADMIN — EZETIMIBE 10 MG: 10 TABLET ORAL at 02:11

## 2018-11-10 RX ADMIN — BACITRACIN: 500 OINTMENT TOPICAL at 08:11

## 2018-11-10 RX ADMIN — LISINOPRIL 40 MG: 20 TABLET ORAL at 08:11

## 2018-11-10 RX ADMIN — GABAPENTIN 300 MG: 300 CAPSULE ORAL at 08:11

## 2018-11-10 RX ADMIN — ACETAMINOPHEN 650 MG: 325 TABLET ORAL at 06:11

## 2018-11-10 RX ADMIN — HEPARIN SODIUM 5000 UNITS: 5000 INJECTION, SOLUTION INTRAVENOUS; SUBCUTANEOUS at 06:11

## 2018-11-10 RX ADMIN — LEVOTHYROXINE SODIUM 50 MCG: 50 TABLET ORAL at 07:11

## 2018-11-10 RX ADMIN — VITAMIN D, TAB 1000IU (100/BT) 2000 UNITS: 25 TAB at 10:11

## 2018-11-10 RX ADMIN — BACITRACIN: 500 OINTMENT TOPICAL at 02:11

## 2018-11-10 RX ADMIN — FLUTICASONE PROPIONATE 100 MCG: 50 SPRAY, METERED NASAL at 10:11

## 2018-11-10 RX ADMIN — CEFAZOLIN 2 G: 1 INJECTION, POWDER, FOR SOLUTION INTRAMUSCULAR; INTRAVENOUS at 06:11

## 2018-11-10 RX ADMIN — PANTOPRAZOLE SODIUM 40 MG: 40 TABLET, DELAYED RELEASE ORAL at 10:11

## 2018-11-10 RX ADMIN — CHLORTHALIDONE 25 MG: 25 TABLET ORAL at 10:11

## 2018-11-10 RX ADMIN — CARVEDILOL 12.5 MG: 6.25 TABLET, FILM COATED ORAL at 08:11

## 2018-11-10 RX ADMIN — CARVEDILOL 12.5 MG: 6.25 TABLET, FILM COATED ORAL at 10:11

## 2018-11-10 RX ADMIN — HEPARIN SODIUM 5000 UNITS: 5000 INJECTION, SOLUTION INTRAVENOUS; SUBCUTANEOUS at 02:11

## 2018-11-10 RX ADMIN — CEFAZOLIN 2 G: 1 INJECTION, POWDER, FOR SOLUTION INTRAMUSCULAR; INTRAVENOUS at 02:11

## 2018-11-10 RX ADMIN — ATORVASTATIN CALCIUM 80 MG: 20 TABLET, FILM COATED ORAL at 08:11

## 2018-11-10 RX ADMIN — ACETAMINOPHEN 650 MG: 325 TABLET ORAL at 02:11

## 2018-11-10 RX ADMIN — HEPARIN SODIUM 5000 UNITS: 5000 INJECTION, SOLUTION INTRAVENOUS; SUBCUTANEOUS at 10:11

## 2018-11-10 RX ADMIN — SODIUM CHLORIDE AND POTASSIUM CHLORIDE: 9; 1.49 INJECTION, SOLUTION INTRAVENOUS at 10:11

## 2018-11-10 RX ADMIN — Medication 400 MG: at 08:11

## 2018-11-10 NOTE — PROGRESS NOTES
[facility]  Neurosurgery  Progress Note    Subjective:     History of Present Illness: This the third cervical surgery. Previous surgeries were anterior approach. He has persistent neck pain and unable to work at his back yard.   He has estimated low risk for an adverse cardiac outcome (myocardial infarction, pulmonary edema, ventricular fibrillation, cardiac arrest, or complete heart block) with the proposed surgery is very low (Revised Cardiac Risk Index (RCRI) - Gabriel Criteria - 0.4%).  Patient not taking aspirin at this time.He will stop taking Motrin and Omage 3 supplements.  Would not restart the aspirin after surgery as patient does not need to be on aspirin  For primary prevention.  He has HT well controlled. He has mild SOB with exertion but able to walk 2 flights. He had hx asbestos exposure with excellent exercise capacity. He has hx of reactive airway disease with no limitations to activity.Denies Sleep apnea despite 27 weight gain.No hx CHF, nor diabetes.     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     No allergies nor hx of anesthesia complications            Post-Op Info:  Procedure(s) (LRB):  C2-C6 Posterior Cervical Laminectomy & Instrumental Fusion (N/A)   3 Days Post-Op     Interval History: NAEON. Patient sitting in chair eating lunch with wife at bedside. He reports of tolerable incisional pain that he takes PO tylenol for. Denies any weakness or new complaints. Ambulating in room with walker. Therapy recommending HH. Drain >100cc so plan to keep today. Dc drain and DC home tomorrow. Patient and family was counseled about activity restriction, wound care, follow up appointment, and other discharge instructions. Patient and family verbalized understanding. All of their questions were answered. They were encouraged to call clinic with  any concerns.    Medications:  Continuous Infusions:   0/9% NACL & POTASSIUM CHLORIDE 20 MEQ/L 100 mL/hr at 11/10/18 1022     Scheduled Meds:   atorvastatin  80 mg Oral Daily    bisacodyl  10 mg Rectal Daily    carvedilol  12.5 mg Oral BID    ceFAZolin (ANCEF) IVPB  2 g Intravenous Q8H    cetirizine  10 mg Oral Daily    chlorthalidone  25 mg Oral Daily    coenzyme Q10  400 mg Oral Daily    ezetimibe  10 mg Oral Daily    fluticasone  2 spray Each Nare Daily    gabapentin  300 mg Oral BID    heparin (porcine)  5,000 Units Subcutaneous Q8H    levothyroxine  50 mcg Oral Daily    lisinopril  40 mg Oral Daily    mupirocin  1 g Nasal BID    pantoprazole  40 mg Oral Daily    predniSONE  7 mg Oral Daily    vitamin D  2,000 Units Oral Daily     PRN Meds:acetaminophen, acetaminophen, albuterol, aluminum-magnesium hydroxide-simethicone, HYDROcodone-acetaminophen, ondansetron, promethazine (PHENERGAN) IVPB, senna-docusate 8.6-50 mg, sodium chloride 0.9%     Review of Systems  Objective:     Weight: 107 kg (235 lb 14.3 oz)  Body mass index is 31.12 kg/m².  Vital Signs (Most Recent):  Temp: 98 °F (36.7 °C) (11/10/18 0801)  Pulse: 61 (11/10/18 0801)  Resp: 17 (11/10/18 0801)  BP: 135/75 (11/10/18 0801)  SpO2: (!) 94 % (11/10/18 0801) Vital Signs (24h Range):  Temp:  [97.4 °F (36.3 °C)-99.5 °F (37.5 °C)] 98 °F (36.7 °C)  Pulse:  [54-92] 61  Resp:  [17-18] 17  SpO2:  [90 %-94 %] 94 %  BP: (135-165)/(64-97) 135/75     Date 11/10/18 0700 - 11/11/18 0659   Shift 5229-3385 3372-5163 8011-7348 24 Hour Total   INTAKE   P.O. 250   250   I.V.(mL/kg) 388.3(3.6)   388.3(3.6)   Shift Total(mL/kg) 638.3(6)   638.3(6)   OUTPUT   Shift Total(mL/kg)       Weight (kg) 107 107 107 107                        Closed/Suction Drain 11/07/18 1621 Right;Posterior Neck Accordion 10 Fr. (Active)   Site Description Unable to view 11/9/2018  8:00 PM   [facility] 11/9/2018  8:00 AM   Dressing Status Clean;Dry;Intact 11/9/2018   8:00 PM   Drainage Bloody 11/9/2018  8:00 PM   Status To bulb suction 11/9/2018  8:00 PM   Output (mL) 40 mL 11/10/2018  6:29 AM       Neurosurgery Physical Exam  General: well developed, well nourished. no acute distress. Comfortable.   Neurologic: Awake, alert and oriented x3. Thought content appropriate.  Head: normocephalic, atraumatic   GCS: Motor: 6/Verbal: 5/Eyes: 4 GCS Total: 15  Cranial nerves:face symmetric and sensation intact bilaterally, tongue midline, pupils equal, round, reactive to light with accomodation, extraocular muscles intact. CN II-XII grossly intact. Shoulder shrug strength equal. Palate rises symmetrically.  Uvula midline. Hearing equal with finger rub. Gag and taste not tested.   Language: no aphasia  Speech: no dysarthria   Sensory: response to light touch throughout  Motor Strength: Moves all extremities spontaneously with good tone. Full strength upper and lower extremities. No abnormal movements seen.             Strength  Deltoids Triceps Biceps Wrist Extension Wrist Flexion Hand    Upper: R 5/5 5/5 5/5 5/5 5/5 5/5    L 5/5 5/5 5/5 5/5 5/5 5/5     Iliopsoas Quadriceps Knee  Flexion Tibialis  anterior Gastro- cnemius EHL   Lower: R 5/5 5/5 5/5 5/5 5/5 5/5    L 5/5 5/5 5/5 5/5 5/5 5/5   Interossi muscle strength- intact    No focal numbness or weakness  No midline or paraspinal tenderness to palpation    ENT: normal hearing with finger rub  Heart: RRR, no cyanosis, pallor, or edema.   Lungs:  normal respiratory effort  Abdomen: soft, non-tender and symmetric  Extremities: warm with no cyanosis, edema, or clubbing  Pulses: palpable distal pulses  Skin: warm, dry and intact. No visible rashes or lesions.       wound is c/d/i, no signs of infection, no erythema, warmth, edema, ttp,  no drainage.  wound edges are well approximated with staples. Surgical drain intact.       Significant Labs:  Recent Labs   Lab 11/08/18  1214 11/09/18  0542 11/10/18  0355   * 99 104    138  142   K 3.9 3.8 3.4*    106 106   CO2 25 26 28   BUN 17 13 12   CREATININE 0.8 0.8 0.8   CALCIUM 8.4* 8.0* 8.2*     Recent Labs   Lab 11/09/18  0542 11/10/18  0355   WBC 8.75 7.29   HGB 11.4* 11.1*   HCT 34.9* 35.0*   * 115*     No results for input(s): LABPT, INR, APTT in the last 48 hours.  Microbiology Results (last 7 days)     ** No results found for the last 168 hours. **        Significant Diagnostics:  None new    Assessment/Plan:     * Cervical stenosis of spinal canal    Mr. Tavera is a 72yoM with PMHx cervical stenosis with myelopathy, now s/p C2-6 PCF, POD#3    -Neurologically stable  -dressing removed. Keep open to air. Bacitracin to incision BID.   -Post Xrays done. Appropriate placement of hardware.   -Drain output >100cc, continue drain until <100 cc output. Plan to dc drain tomorrow.   -Cont abx while drain in place  -prednisone 7mg home dose, restarted while admitted.   -d/c hydromorphone. Continue norco 5 for severe pain and use tylenol prn mild ain.   -C collar when up/OOB  -PT/OT recs for HH. Orders placed.   -SQH, TEDs, SCDs for DVT ppx  -Resumed home meds for HTN, HLD, GERD, COPD, hypothyroid, and Vit D def.       Dispo: Dc drain tomorrow then Dc home. Pain rx was e-scribed to home pharmacy in [place].     Patient and family was counseled about activity restriction, wound care, follow up appointment, and other discharge instructions. Patient and family verbalized understanding. All of their questions were answered. They were encouraged to call clinic with any concerns.      Discussed with Dr. Yue Jones, PA-C  Neurosurgery  [facility]

## 2018-11-10 NOTE — SUBJECTIVE & OBJECTIVE
Interval History: NAEON. Patient sitting in chair eating lunch with wife at bedside. He reports of tolerable incisional pain that he takes PO tylenol for. Denies any weakness or new complaints. Ambulating in room with walker. Therapy recommending HH. Drain >100cc so plan to keep today. Dc drain and DC home tomorrow. Patient and family was counseled about activity restriction, wound care, follow up appointment, and other discharge instructions. Patient and family verbalized understanding. All of their questions were answered. They were encouraged to call clinic with any concerns.    Medications:  Continuous Infusions:   0/9% NACL & POTASSIUM CHLORIDE 20 MEQ/L 100 mL/hr at 11/10/18 1022     Scheduled Meds:   atorvastatin  80 mg Oral Daily    bisacodyl  10 mg Rectal Daily    carvedilol  12.5 mg Oral BID    ceFAZolin (ANCEF) IVPB  2 g Intravenous Q8H    cetirizine  10 mg Oral Daily    chlorthalidone  25 mg Oral Daily    coenzyme Q10  400 mg Oral Daily    ezetimibe  10 mg Oral Daily    fluticasone  2 spray Each Nare Daily    gabapentin  300 mg Oral BID    heparin (porcine)  5,000 Units Subcutaneous Q8H    levothyroxine  50 mcg Oral Daily    lisinopril  40 mg Oral Daily    mupirocin  1 g Nasal BID    pantoprazole  40 mg Oral Daily    predniSONE  7 mg Oral Daily    vitamin D  2,000 Units Oral Daily     PRN Meds:acetaminophen, acetaminophen, albuterol, aluminum-magnesium hydroxide-simethicone, HYDROcodone-acetaminophen, ondansetron, promethazine (PHENERGAN) IVPB, senna-docusate 8.6-50 mg, sodium chloride 0.9%     Review of Systems  Objective:     Weight: 107 kg (235 lb 14.3 oz)  Body mass index is 31.12 kg/m².  Vital Signs (Most Recent):  Temp: 98 °F (36.7 °C) (11/10/18 0801)  Pulse: 61 (11/10/18 0801)  Resp: 17 (11/10/18 0801)  BP: 135/75 (11/10/18 0801)  SpO2: (!) 94 % (11/10/18 0801) Vital Signs (24h Range):  Temp:  [97.4 °F (36.3 °C)-99.5 °F (37.5 °C)] 98 °F (36.7 °C)  Pulse:  [54-92] 61  Resp:   [17-18] 17  SpO2:  [90 %-94 %] 94 %  BP: (135-165)/(64-97) 135/75     Date 11/10/18 0700 - 11/11/18 0659   Shift 8560-3611 0132-3622 9688-3934 24 Hour Total   INTAKE   P.O. 250   250   I.V.(mL/kg) 388.3(3.6)   388.3(3.6)   Shift Total(mL/kg) 638.3(6)   638.3(6)   OUTPUT   Shift Total(mL/kg)       Weight (kg) 107 107 107 107                        Closed/Suction Drain 11/07/18 1621 Right;Posterior Neck Accordion 10 Fr. (Active)   Site Description Unable to view 11/9/2018  8:00 PM   Dressing Type [facility] 11/9/2018  8:00 AM   Dressing Status Clean;Dry;Intact 11/9/2018  8:00 PM   Drainage Bloody 11/9/2018  8:00 PM   Status To bulb suction 11/9/2018  8:00 PM   Output (mL) 40 mL 11/10/2018  6:29 AM       Neurosurgery Physical Exam  General: well developed, well nourished. no acute distress. Comfortable.   Neurologic: Awake, alert and oriented x3. Thought content appropriate.  Head: normocephalic, atraumatic   GCS: Motor: 6/Verbal: 5/Eyes: 4 GCS Total: 15  Cranial nerves:face symmetric and sensation intact bilaterally, tongue midline, pupils equal, round, reactive to light with accomodation, extraocular muscles intact. CN II-XII grossly intact. Shoulder shrug strength equal. Palate rises symmetrically.  Uvula midline. Hearing equal with finger rub. Gag and taste not tested.   Language: no aphasia  Speech: no dysarthria   Sensory: response to light touch throughout  Motor Strength: Moves all extremities spontaneously with good tone. Full strength upper and lower extremities. No abnormal movements seen.             Strength  Deltoids Triceps Biceps Wrist Extension Wrist Flexion Hand    Upper: R 5/5 5/5 5/5 5/5 5/5 5/5    L 5/5 5/5 5/5 5/5 5/5 5/5     Iliopsoas Quadriceps Knee  Flexion Tibialis  anterior Gastro- cnemius EHL   Lower: R 5/5 5/5 5/5 5/5 5/5 5/5    L 5/5 5/5 5/5 5/5 5/5 5/5   Interossi muscle strength- intact    No focal numbness or weakness  No midline or paraspinal tenderness to palpation    ENT:  normal hearing with finger rub  Heart: RRR, no cyanosis, pallor, or edema.   Lungs:  normal respiratory effort  Abdomen: soft, non-tender and symmetric  Extremities: warm with no cyanosis, edema, or clubbing  Pulses: palpable distal pulses  Skin: warm, dry and intact. No visible rashes or lesions.       wound is c/d/i, no signs of infection, no erythema, warmth, edema, ttp,  no drainage.  wound edges are well approximated with staples. Surgical drain intact.       Significant Labs:  Recent Labs   Lab 11/08/18  1214 11/09/18  0542 11/10/18  0355   * 99 104    138 142   K 3.9 3.8 3.4*    106 106   CO2 25 26 28   BUN 17 13 12   CREATININE 0.8 0.8 0.8   CALCIUM 8.4* 8.0* 8.2*     Recent Labs   Lab 11/09/18  0542 11/10/18  0355   WBC 8.75 7.29   HGB 11.4* 11.1*   HCT 34.9* 35.0*   * 115*     No results for input(s): LABPT, INR, APTT in the last 48 hours.  Microbiology Results (last 7 days)     ** No results found for the last 168 hours. **        Significant Diagnostics:  None new

## 2018-11-10 NOTE — PLAN OF CARE
Problem: Patient Care Overview  Goal: Plan of Care Review  Outcome: Ongoing (interventions implemented as appropriate)  Improved drainage on hemovac. Activity increased- doing well ambulating with supervision and walker. Doing well with Tylenol for pain control. Post op Xrays done.

## 2018-11-10 NOTE — DISCHARGE INSTRUCTIONS
Please follow ONLY the instructions that are checked below.    Activity Restrictions:  [x]  Return to work will be determined on an individual basis.  [x]  No lifting greater than 10 pounds.  [x]  Avoid bending and twisting the area of your surgery more than 45 degrees from neutral position in any direction.  [x]  No driving or operating machinery:  [x]  until cleared by your surgeon.  [x]  while taking narcotic pain medications or muscle relaxants.  [x]  Wear your brace at all times except when flat in bed.  [x]  Increase ambulation over the next 2 weeks so that you are walking 2 miles per day at 2 weeks post-operatively.  [x]  Walk on paved surfaces only. It is okay to walk up and down stairs while holding onto a side rail.  [x]  No sexual activity for 2-3 weeks.    Discharge Medication/Follow-up:  [x]  Please refer to discharge medication reconciliation form.  [x]  Do not take ANY non-steroidal anti-inflammatory drugs (NSAIDS), including the following: ibuprofen, naprosyn, Aleve, Advil, Indocin, Mobic, or Celebrex for:  [x]  8 weeks  [x]  Prescriptions for appropriate medication will be given upon discharge.   [x]  Pain control:   norco        [x]  Take docusate (Colace 100 mg): take one capsule a day as needed for constipation. You can get this over the counter.  [x]  Follow-up appointment:  [x]  10-14 days post-op for wound check by physician assistant  [x]  6 weeks with MD:  [x]  with x-rays  [x]  An appointment will be mailed to you.    Wound Care:  [x]  No bandage required. Keep your incision open to the air.  [x]  You may shower on the 2nd day after your surgery. Have the force of water hit you opposite from the incision. Pat the incision dry after your shower; do not scrub the incision.  [x]  You cannot take a bath until 8 weeks after surgery.  [x]  Apply bacitracin to incision twice a day for    more days.    Call your doctor or go to the Emergency Room for any signs of infection, including: increased  redness, drainage, pain, or fever (temperature ?101.5 for 24 hours). Call your doctor or go to the Emergency Room if there are any localized neurological changes; problems with speech, vision, numbness, tingling, weakness, or severe headache; or for other concerns.    Special Instructions:  []  No use of tobacco products.  []  Diet: Please eat a regular diet as tolerated.  []  Other diet:              Specific physician instructions:           Physicians need 3 days' notice for pain medicine to be refilled. Pain medicine will only be refilled between 8 AM and 5 PM, Monday through Friday, due to Food and Drug Administration regulation of documentation.    If you have any questions about this form, please call [phone].    Form No. 16908 (Revised 10/31/2013)

## 2018-11-11 VITALS
OXYGEN SATURATION: 92 % | SYSTOLIC BLOOD PRESSURE: 151 MMHG | BODY MASS INDEX: 31.26 KG/M2 | HEART RATE: 77 BPM | DIASTOLIC BLOOD PRESSURE: 83 MMHG | WEIGHT: 235.88 LBS | TEMPERATURE: 98 F | RESPIRATION RATE: 20 BRPM | HEIGHT: 73 IN

## 2018-11-11 LAB
ANION GAP SERPL CALC-SCNC: 8 MMOL/L
BASOPHILS # BLD AUTO: 0.02 K/UL
BASOPHILS NFR BLD: 0.3 %
BUN SERPL-MCNC: 10 MG/DL
CALCIUM SERPL-MCNC: 8.3 MG/DL
CHLORIDE SERPL-SCNC: 106 MMOL/L
CO2 SERPL-SCNC: 27 MMOL/L
CREAT SERPL-MCNC: 0.8 MG/DL
DIFFERENTIAL METHOD: ABNORMAL
EOSINOPHIL # BLD AUTO: 0.3 K/UL
EOSINOPHIL NFR BLD: 4.2 %
ERYTHROCYTE [DISTWIDTH] IN BLOOD BY AUTOMATED COUNT: 13.2 %
EST. GFR  (AFRICAN AMERICAN): >60 ML/MIN/1.73 M^2
EST. GFR  (NON AFRICAN AMERICAN): >60 ML/MIN/1.73 M^2
GLUCOSE SERPL-MCNC: 112 MG/DL
HCT VFR BLD AUTO: 34.5 %
HGB BLD-MCNC: 11.3 G/DL
IMM GRANULOCYTES # BLD AUTO: 0.02 K/UL
IMM GRANULOCYTES NFR BLD AUTO: 0.3 %
LYMPHOCYTES # BLD AUTO: 1.5 K/UL
LYMPHOCYTES NFR BLD: 23.1 %
MCH RBC QN AUTO: 30.7 PG
MCHC RBC AUTO-ENTMCNC: 32.8 G/DL
MCV RBC AUTO: 94 FL
MONOCYTES # BLD AUTO: 0.7 K/UL
MONOCYTES NFR BLD: 11 %
NEUTROPHILS # BLD AUTO: 3.9 K/UL
NEUTROPHILS NFR BLD: 61.1 %
NRBC BLD-RTO: 0 /100 WBC
PLATELET # BLD AUTO: 123 K/UL
PMV BLD AUTO: 10.9 FL
POTASSIUM SERPL-SCNC: 3.6 MMOL/L
RBC # BLD AUTO: 3.68 M/UL
SODIUM SERPL-SCNC: 141 MMOL/L
WBC # BLD AUTO: 6.37 K/UL

## 2018-11-11 PROCEDURE — 80048 BASIC METABOLIC PNL TOTAL CA: CPT | Mod: HCWC

## 2018-11-11 PROCEDURE — 85025 COMPLETE CBC W/AUTO DIFF WBC: CPT | Mod: HCWC

## 2018-11-11 PROCEDURE — 36415 COLL VENOUS BLD VENIPUNCTURE: CPT | Mod: HCWC

## 2018-11-11 PROCEDURE — 25000003 PHARM REV CODE 250: Mod: HCWC | Performed by: NEUROLOGICAL SURGERY

## 2018-11-11 PROCEDURE — 25000003 PHARM REV CODE 250: Mod: HCWC | Performed by: PHYSICIAN ASSISTANT

## 2018-11-11 PROCEDURE — 63600175 PHARM REV CODE 636 W HCPCS: Mod: HCWC | Performed by: NEUROLOGICAL SURGERY

## 2018-11-11 PROCEDURE — 63600175 PHARM REV CODE 636 W HCPCS: Mod: HCWC | Performed by: PHYSICIAN ASSISTANT

## 2018-11-11 RX ADMIN — HEPARIN SODIUM 5000 UNITS: 5000 INJECTION, SOLUTION INTRAVENOUS; SUBCUTANEOUS at 05:11

## 2018-11-11 RX ADMIN — VITAMIN D, TAB 1000IU (100/BT) 2000 UNITS: 25 TAB at 09:11

## 2018-11-11 RX ADMIN — ACETAMINOPHEN 650 MG: 325 TABLET ORAL at 01:11

## 2018-11-11 RX ADMIN — BACITRACIN: 500 OINTMENT TOPICAL at 09:11

## 2018-11-11 RX ADMIN — FLUTICASONE PROPIONATE 100 MCG: 50 SPRAY, METERED NASAL at 09:11

## 2018-11-11 RX ADMIN — CHLORTHALIDONE 25 MG: 25 TABLET ORAL at 09:11

## 2018-11-11 RX ADMIN — GABAPENTIN 300 MG: 300 CAPSULE ORAL at 09:11

## 2018-11-11 RX ADMIN — CARVEDILOL 12.5 MG: 6.25 TABLET, FILM COATED ORAL at 09:11

## 2018-11-11 RX ADMIN — PANTOPRAZOLE SODIUM 40 MG: 40 TABLET, DELAYED RELEASE ORAL at 09:11

## 2018-11-11 RX ADMIN — CETIRIZINE HYDROCHLORIDE 10 MG: 10 TABLET, FILM COATED ORAL at 09:11

## 2018-11-11 RX ADMIN — CEFAZOLIN 2 G: 1 INJECTION, POWDER, FOR SOLUTION INTRAMUSCULAR; INTRAVENOUS at 05:11

## 2018-11-11 RX ADMIN — MUPIROCIN 1 G: 20 OINTMENT TOPICAL at 09:11

## 2018-11-11 RX ADMIN — PREDNISONE 7 MG: 1 TABLET ORAL at 09:11

## 2018-11-11 RX ADMIN — SODIUM CHLORIDE AND POTASSIUM CHLORIDE: 9; 1.49 INJECTION, SOLUTION INTRAVENOUS at 07:11

## 2018-11-11 RX ADMIN — LEVOTHYROXINE SODIUM 50 MCG: 50 TABLET ORAL at 09:11

## 2018-11-11 RX ADMIN — ACETAMINOPHEN 650 MG: 325 TABLET ORAL at 09:11

## 2018-11-11 NOTE — NURSING
PIV removed. Orders reviewed. Prescriptions with patient. Pt ready for discharge. Awaiting arrival of transport.

## 2018-11-11 NOTE — NURSING
Dr. Diego with Neurosurgery returned page. Notified of need for signed admit orders for patient in order to discharge pt. Dr. Diego said he will let the attending on-call Dr. Lewis know so he can complete the order asap.

## 2018-11-11 NOTE — PLAN OF CARE
Problem: Fall Risk (Adult)  Goal: Absence of Falls  Patient will demonstrate the desired outcomes by discharge/transition of care.  Outcome: Ongoing (interventions implemented as appropriate)  Remained free of falls/injury this shift.

## 2018-11-11 NOTE — PLAN OF CARE
Problem: Patient Care Overview  Goal: Plan of Care Review  Outcome: Ongoing (interventions implemented as appropriate)  POC reviewed w/patient and wife. Questions and concerns addressed. Staples to posterior neck incision. Redness present. Compressed Hemovac with bloody drainage. Hard CCollar on. IVF as ordered. VSS. Safety maintained. Bed in low and locked position. Call light within reach. Side rails up x2. Frequent rounds. Wife slept at bedside.

## 2018-11-11 NOTE — PLAN OF CARE
Problem: Patient Care Overview  Goal: Plan of Care Review  Outcome: Ongoing (interventions implemented as appropriate)  Pt VSS. Pt up ad shania with walker in cota. Pt pain well controlled with prn Tylenol. Drain to neck. IV fluids continued

## 2018-11-12 ENCOUNTER — TELEPHONE (OUTPATIENT)
Dept: SPINE | Facility: CLINIC | Age: 73
End: 2018-11-12

## 2018-11-12 NOTE — PLAN OF CARE
DEANN following for DC needs. SW in communication with CM.    SW spoke to patient. DEANN informed patient that [facility] is not in network with the patient's insurance. DEANN read where the outpatient nurse arranged HH with [facility]. Patient agreeable to [facility]. DEANN sent referral via [facility]. [facility] accepted.     Chuyita Valenzuela LMSW  [facility]  [id number]

## 2018-11-12 NOTE — PLAN OF CARE
11/12/18 0800   Final Note   Assessment Type Final Discharge Note   Anticipated Discharge Disposition Home-Health   Hospital Follow Up  Appt(s) scheduled? Yes   Discharge plans and expectations educations in teach back method with documentation complete? Yes   Patient discharged on 11/11.  Referral sent to [facility] on 11/12.

## 2018-11-12 NOTE — OP NOTE
DATE OF PROCEDURE: 11/7/18    PREOPERATIVE DIAGNOSIS:  1. Degenerative cervical stenosis and myelopathy  2. History of C5-7 anterior cervical fusion     POSTOPERATIVE DIAGNOSIS:  Same.     PROCEDURE PERFORMED:  1. Posterior spinal fusion, C2-C6  2. Posterior segmental spinal fixation, C2-C6 (Depuy)  3. Decompression of thecal sac and nerve roots via laminectomy, C3-6  4. Use of intraoperative flouroscopy  5. Use of intraoperative neuromonitoring with MEPs  6. Local and Vivigen bone grafting     PRIMARY SURGEON: Kishore Turner M.D.    ASSISTANT: Solis Rooney M.D.     ANESTHESIA: GETA     ESTIMATED BLOOD LOSS: 425mL     COMPLICATIONS: None     DRAINS: One deep.     SPECIMENS SENT: None     FINDINGS: None     INDICATIONS:     72M with signs and symptoms of progressive degenerative cervical myelopathy. Imaging showed his previous anterior fusion from C5-C7, and severe degenerative stenosis from C3-C6. I recommended a posterior cervical decompression and fusion from C2-C6.     I explained the risks, benefits, alternatives, indications and methods of the procedure in detail. The patient voiced understanding and all questions were answered. No guarantees were made. The patient agreed to proceed as planned.    PROCEDURE:     The patient was brought into the operating room where he was intubated and placed under general anesthesia without difficulty. All lines were placed.  A Capellan clamp was placed bitemporally and he was repositioned prone onto the hospital bed with the head fixed to the table in capital flexion and neck extension. Appropriate padding of all pressure points was placed.  Xrays were used to localize the region of interest. It also showed adequate spinal alignment in the sagittal plane. The posterior cervical spine was marked prepped and draped in the usual sterile fashion.  A timeout was performed prior to the procedure.  10mL of Lidocaine with epinephrine were injected in the skin.     A linear incision  was made with a 10 blade from approximately C2-C6.  Supra and subfascial dissection was carried out in the midline with Bovie electrocautery. Subfascial dissection was carried out with Bovie electrocautery and Martinez elevators to expose the posterior elements from C2-C6. Levels were confirmed with lateral xray.     First, bilateral C2 pars screws were placed using freehand technique and anatomic landmarks. Next, lateral mass screw tracts were prepared bilaterally from C3-C6 using freehand technique and anatomic landmarks. A decompressive laminectomy was then performed from C3-C6 using the Leksell rongeur, high speed drill and Kerrison punches. Adequate decompression was confirmed with the Parmer probe.     Lateral mass screws were then placed in the previously prepared tracts. AP and lateral xray showed excellent position of all screws. Titanium rods were sized, contoured and reduced into the tulip heads. Set screws were finally tightened. A crosslink was placed and tightened at C3. Final xrays showed excellent spinal alignment and hardware position.     The wound was copiously irrigated with a dilute Betadine solution and normal saline.   Exposed bony surfaces from C2-C6 were decorticated with a high-speed drill.  Local bone from the laminectomy and Vivigen was placed bilaterally for arthrodesis from C2-C6.  One gram of vancomycin powder was placed into the wound. A subfascial Hemovac drains was placed and tunneled through a separate incision, where it was secured with Vicryl sutures.  A watertight fascial closure was achieved with interrupted 0 Ethibond sutures and a running Stratafix suture.  The soft tissues were closed in layers and the skin was closed with staples.  A silver nitrate dressing was applied.     The patient appeared to tolerate the procedure well from a hemodynamic and neuromonitoring standpoint.  Motor evoked potentials were present and stable in all extremities throughout the case. I was present  for all critical portions of the case, and at the end of the case all counts were correct. He was removed from the Capellan clamp and repositioned supine onto the hospital bed where he was extubated and allowed to emerge from anesthesia without difficulty.  He was sent to the PACU in stable condition for recovery.

## 2018-11-12 NOTE — TELEPHONE ENCOUNTER
Arranged to have Home Health covered by a sister company ([facility]).  Told pt to expect to be contacted by them within the next day or two.  V/U.   ----- Message from Alex Li sent at 11/12/2018 10:37 AM CST -----  Contact: Haylie Lawton @  [phone]  Caller is calling to get an update on the order for Home Health, pls call to discuss

## 2018-11-15 NOTE — DISCHARGE SUMMARY
[facility]  Neurosurgery  Discharge Summary      Patient Name: Maximilian Tavera Jr.  MRN: [id number]  Admission Date: 11/7/2018  Hospital Length of Stay: 4 days  Discharge Date and Time: 11/11/2018  1:20 PM  Attending Physician: No att. providers found   Discharging Provider: Maykel Mccann MD  Primary Care Provider: Brian Marroquin MD    HPI:   This the third cervical surgery. Previous surgeries were anterior approach. He has persistent neck pain and unable to work at his back yard.   He has estimated low risk for an adverse cardiac outcome (myocardial infarction, pulmonary edema, ventricular fibrillation, cardiac arrest, or complete heart block) with the proposed surgery is very low (Revised Cardiac Risk Index (RCRI) - Gabriel Criteria - 0.4%).  Patient not taking aspirin at this time.He will stop taking Motrin and Omage 3 supplements.  Would not restart the aspirin after surgery as patient does not need to be on aspirin  For primary prevention.  He has HT well controlled. He has mild SOB with exertion but able to walk 2 flights. He had hx asbestos exposure with excellent exercise capacity. He has hx of reactive airway disease with no limitations to activity.Denies Sleep apnea despite 27 weight gain.No hx CHF, nor diabetes.     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     No allergies nor hx of anesthesia complications            Procedure(s) (LRB):  C2-C6 Posterior Cervical Laminectomy & Instrumental Fusion (N/A)     Hospital Course: 11/8: Drain output 250, nausea, f/u xrays, PT/OT recs HH  11/9: Drain output 190. F/u xrays pending.   11/10: NAEON. Patient sitting in chair eating lunch with wife at bedside. He reports of tolerable incisional pain that he takes PO tylenol for. Denies any weakness or new complaints. Ambulating in room with walker.  "Therapy recommending HH. Drain >100cc so plan to keep today. Dc drain tomorrow. Patient and family was counseled about activity restriction, wound care, follow up appointment, and other discharge instructions. Patient and family verbalized understanding. All of their questions were answered. They were encouraged to call clinic with any concerns.   11/11: dc drain and dc home.     Consults:     Significant Diagnostic Studies: Labs: BMP: No results for input(s): GLU, NA, K, CL, CO2, BUN, CREATININE, CALCIUM, MG in the last 48 hours. and CBC No results for input(s): WBC, HGB, HCT, PLT in the last 48 hours.    Pending Diagnostic Studies:     None        Final Active Diagnoses:    Diagnosis Date Noted POA    PRINCIPAL PROBLEM:  Cervical stenosis of spinal canal [M48.02] 11/07/2018 Yes      Problems Resolved During this Admission:      Discharged Condition: stable    Disposition: Home or Self Care    Follow Up:  Follow-up Information     KEAY Mcmanus On 11/21/2018.    Specialty:  Neurosurgery  Why:  12:30pm  Contact information:  [facility] [id number] [phone]                 Patient Instructions:      WALKER FOR HOME USE     Order Specific Question Answer Comments   Type of Walker: Adult (5'4"-6'6")    With wheels? Yes    Height: 6' 1" (1.854 m)    Weight: 107 kg (235 lb 14.3 oz)    Length of need (1-99 months): 99    Does patient have medical equipment at home? none    Please check all that apply: Patient's condition impairs ambulation.    Vendor: [facility]    Expected Date of Delivery: 11/9/2018      Ambulatory referral to Home Health   Referral Priority: Routine Referral Type: Home Health   Referral Reason: Specialty Services Required   Requested Specialty: Home Health Services   Number of Visits Requested: 1     Diet Adult Regular     Notify your health care provider if you experience any of the following:  increased confusion or weakness     Notify your health care provider if you " experience any of the following:  persistent dizziness, light-headedness, or visual disturbances     Notify your health care provider if you experience any of the following:  worsening rash     Notify your health care provider if you experience any of the following:  severe persistent headache     Notify your health care provider if you experience any of the following:  difficulty breathing or increased cough     Notify your health care provider if you experience any of the following:  redness, tenderness, or signs of infection (pain, swelling, redness, odor or green/yellow discharge around incision site)     Notify your health care provider if you experience any of the following:  severe uncontrolled pain     Notify your health care provider if you experience any of the following:  persistent nausea and vomiting or diarrhea     Notify your health care provider if you experience any of the following:  temperature >100.4     Activity as tolerated     Medications:  Reconciled Home Medications:      Medication List      START taking these medications    acetaminophen 325 MG tablet  Commonly known as:  TYLENOL  Take 2 tablets (650 mg total) by mouth every 4 (four) hours as needed (pain score 1-2/10).     HYDROcodone-acetaminophen 5-325 mg per tablet  Commonly known as:  NORCO  Take 1 tablet by mouth every 6 (six) hours as needed (severe pain not relieved by OTC tylenol).     senna-docusate 8.6-50 mg 8.6-50 mg per tablet  Commonly known as:  PERICOLACE  Take 2 tablets by mouth nightly as needed for Constipation.        CHANGE how you take these medications    carvedilol 25 MG tablet  Commonly known as:  COREG  Take 0.5 tablets (12.5 mg total) by mouth 2 (two) times daily.  What changed:  when to take this     pantoprazole 40 MG tablet  Commonly known as:  PROTONIX  Take 1 tablet (40 mg total) by mouth once daily.  What changed:  when to take this     * predniSONE 5 MG tablet  Commonly known as:  DELTASONE  TAKE 1 TABLET  EVERY OTHER DAY  What changed:  Another medication with the same name was removed. Continue taking this medication, and follow the directions you see here.     * predniSONE 20 MG tablet  Commonly known as:  DELTASONE  Take 1 tablet (20 mg total) by mouth once daily.  What changed:  Another medication with the same name was removed. Continue taking this medication, and follow the directions you see here.         * This list has 2 medication(s) that are the same as other medications prescribed for you. Read the directions carefully, and ask your doctor or other care provider to review them with you.            CONTINUE taking these medications    albuterol 90 mcg/actuation inhaler  Commonly known as:  PROVENTIL/VENTOLIN HFA  Inhale 2 puffs into the lungs every 6 (six) hours as needed for Wheezing.     atorvastatin 80 MG tablet  Commonly known as:  LIPITOR  TAKE 1 TABLET EVERY DAY     cetirizine 10 MG tablet  Commonly known as:  ZYRTEC  Take 1 tablet by mouth daily as needed.     chlorthalidone 25 MG Tab  Commonly known as:  HYGROTEN  TAKE 1 TABLET EVERY DAY     CO Q-10 100 mg capsule  Generic drug:  coenzyme Q10  Take 400 mg by mouth once daily.     ezetimibe 10 mg tablet  Commonly known as:  ZETIA  Take 10 mg by mouth once daily.     fluticasone 50 mcg/actuation nasal spray  Commonly known as:  FLONASE  USE 1 SPRAY IN EACH NOSTRIL TWICE DAILY AS NEEDED  FOR  RHINITIS     gabapentin 300 MG capsule  Commonly known as:  NEURONTIN  TAKE 1 CAPSULE EVERY NIGHT  FOR  5  DAYS  THEN TWICE DAILY  THEREAFTER     levothyroxine 50 MCG tablet  Commonly known as:  SYNTHROID  TAKE 1 TABLET EVERY DAY     lisinopril 40 MG tablet  Commonly known as:  PRINIVIL,ZESTRIL  TAKE 1 TABLET EVERY DAY     milk thistle 200 mg Cap  Take 200 mg by mouth 2 (two) times daily. Twice a day     omega-3 fatty acids 1,000 mg Cap  Twice a day     potassium chloride 8 MEQ Tbsr  Commonly known as:  KLOR-CON  Two tablets by mouth three times a day or as  directed     sildenafil 20 mg Tab  Commonly known as:  REVATIO  Take 1 to 3 tabs daily as needed.     VITAMIN B-12 5,000 mcg Subl  Generic drug:  cyanocobalamin (vitamin B-12)  Take 5,000 mcg by mouth once daily. Every day     VITAMIN D 2,000 unit Cap  Generic drug:  cholecalciferol (vitamin D3)  1 capsule once daily. Every day        STOP taking these medications    CITRACAL PLUS D 315-200 mg-unit per tablet  Generic drug:  calcium citrate-vitamin D3 315-200 mg     ibuprofen 800 MG tablet  Commonly known as:  ADVIL,MOTRIN        ASK your doctor about these medications    MSM 1,000 mg Tab  Generic drug:  methylsulfonylmethane  Take 1,000 mg by mouth once daily. Every day            Maykel Mccann MD  Neurosurgery  [facility]

## 2018-11-15 NOTE — ASSESSMENT & PLAN NOTE
Mr. Tavera is a 72yoM with PMHx cervical stenosis with myelopathy, now s/p C2-6 PCF, POD#3    -Neurologically stable  -dressing removed. Keep open to air. Bacitracin to incision BID.   -Post Xrays done. Appropriate placement of hardware.   -Drain out today  -Cont abx while drain in place  -prednisone 7mg home dose, restarted while admitted.   -d/c hydromorphone. Continue norco 5 for severe pain and use tylenol prn mild ain.   -C collar when up/OOB  -PT/OT recs for HH. Orders placed.   -Resumed home meds for HTN, HLD, GERD, COPD, hypothyroid, and Vit D def.        Pain rx was e-scribed to home pharmacy in [place].     Patient and family was counseled about activity restriction, wound care, follow up appointment, and other discharge instructions. Patient and family verbalized understanding. All of their questions were answered. They were encouraged to call clinic with any concerns.

## 2018-11-21 ENCOUNTER — LAB VISIT (OUTPATIENT)
Dept: LAB | Facility: HOSPITAL | Age: 73
End: 2018-11-21
Attending: FAMILY MEDICINE
Payer: MEDICARE

## 2018-11-21 ENCOUNTER — OFFICE VISIT (OUTPATIENT)
Dept: NEUROSURGERY | Facility: CLINIC | Age: 73
End: 2018-11-21
Payer: MEDICARE

## 2018-11-21 VITALS — SYSTOLIC BLOOD PRESSURE: 106 MMHG | HEART RATE: 81 BPM | DIASTOLIC BLOOD PRESSURE: 64 MMHG

## 2018-11-21 DIAGNOSIS — M48.02 CERVICAL STENOSIS OF SPINE: Primary | ICD-10-CM

## 2018-11-21 DIAGNOSIS — R63.5 ABNORMAL WEIGHT GAIN: ICD-10-CM

## 2018-11-21 DIAGNOSIS — R73.9 HYPERGLYCEMIA: ICD-10-CM

## 2018-11-21 DIAGNOSIS — Z98.1 S/P CERVICAL SPINAL FUSION: ICD-10-CM

## 2018-11-21 LAB
ANION GAP SERPL CALC-SCNC: 8 MMOL/L
BUN SERPL-MCNC: 20 MG/DL
CALCIUM SERPL-MCNC: 9.2 MG/DL
CHLORIDE SERPL-SCNC: 104 MMOL/L
CO2 SERPL-SCNC: 29 MMOL/L
CREAT SERPL-MCNC: 0.9 MG/DL
EST. GFR  (AFRICAN AMERICAN): >60 ML/MIN/1.73 M^2
EST. GFR  (NON AFRICAN AMERICAN): >60 ML/MIN/1.73 M^2
ESTIMATED AVG GLUCOSE: 111 MG/DL
GLUCOSE SERPL-MCNC: 99 MG/DL
HBA1C MFR BLD HPLC: 5.5 %
POTASSIUM SERPL-SCNC: 4 MMOL/L
SODIUM SERPL-SCNC: 141 MMOL/L
T3FREE SERPL-MCNC: 2.8 PG/ML
TSH SERPL DL<=0.005 MIU/L-ACNC: 1.9 UIU/ML

## 2018-11-21 PROCEDURE — 84443 ASSAY THYROID STIM HORMONE: CPT | Mod: HCWC

## 2018-11-21 PROCEDURE — 36415 COLL VENOUS BLD VENIPUNCTURE: CPT | Mod: HCWC,PO

## 2018-11-21 PROCEDURE — 80048 BASIC METABOLIC PNL TOTAL CA: CPT | Mod: HCWC

## 2018-11-21 PROCEDURE — 99024 POSTOP FOLLOW-UP VISIT: CPT | Mod: HCWC,S$GLB,, | Performed by: PHYSICIAN ASSISTANT

## 2018-11-21 PROCEDURE — 99999 PR PBB SHADOW E&M-EST. PATIENT-LVL IV: CPT | Mod: PBBFAC,HCWC,, | Performed by: PHYSICIAN ASSISTANT

## 2018-11-21 PROCEDURE — 83036 HEMOGLOBIN GLYCOSYLATED A1C: CPT | Mod: HCWC

## 2018-11-21 PROCEDURE — 84481 FREE ASSAY (FT-3): CPT | Mod: HCWC

## 2018-11-21 RX ORDER — METHOCARBAMOL 500 MG/1
500 TABLET, FILM COATED ORAL 3 TIMES DAILY PRN
Qty: 45 TABLET | Refills: 0 | Status: SHIPPED | OUTPATIENT
Start: 2018-11-21 | End: 2018-11-21

## 2018-11-21 RX ORDER — HYDROCODONE BITARTRATE AND ACETAMINOPHEN 5; 325 MG/1; MG/1
1 TABLET ORAL EVERY 6 HOURS PRN
Qty: 60 TABLET | Refills: 0 | Status: SHIPPED | OUTPATIENT
Start: 2018-11-21 | End: 2019-01-08 | Stop reason: ALTCHOICE

## 2018-11-21 RX ORDER — CYCLOBENZAPRINE HCL 5 MG
5 TABLET ORAL 3 TIMES DAILY PRN
Qty: 45 TABLET | Refills: 0 | Status: SHIPPED | OUTPATIENT
Start: 2018-11-21 | End: 2018-12-01

## 2018-11-21 NOTE — PROGRESS NOTES
Wound Check   Neurosurgery     Mr. Maximilian Tavera is 72 year old male with a history of progressive degenerative cervical myelopathy. Imaging showed severe degenerative stenosis from C3-C6. On 11/7/18, he underwent a C2-C6 decompression and fusion. He presents to clinic today for his 2 week wound check and states that he has been doing well since discharge. Complains of some soreness around his chin due to the brace that he has worn at all times. Says pain is well controlled but does report some muscle soreness and spasms in his neck and upper back. Denies fevers, chills, night sweats or N/V. Denies any new or worsening weakness or bladder/bowel incontinence.       Physical Exam:   General: well developed, well nourished, no distress  Neurologic: Alert and oriented. Thought content appropriate.   GCS: Motor: 6/Verbal: 5/Eyes: 4 GCS Total: 15   Mental Status: Awake, Alert, Oriented x3   Cranial nerves: face symmetric, tongue midline, pupils equal, round, reactive to light with accomodation, EOMI.   Motor Strength: moves all extremities with good strength and tone   Sensation: response to light touch throughout  No gait disturbances     Incision is clean, dry and intact with no signs of erythema, swelling or purulent drainage. Staples sutures are intact. All skin edges are completely approximated.       Vitals:    11/21/18 1216   BP: 106/64   Pulse: 81         Assessment/Plan:   Mr. Maximilian Tavera is 72 year old male with a history of progressive degenerative cervical myelopathy. Imaging showed severe degenerative stenosis from C3-C6. On 11/7/18, he underwent a C2-C6 decompression and fusion.    -Patient neurologically stable on exam  -OK to take collar off when reclining or lying down. Must continue to wear when completely upright.  -Keep incision open to air   -No need to continue putting bacitracin ointment on wound   -Can shower and get incision wet, just pat dry and no vigorous scrubbing. Do not submerge incision  for another 4 weeks.   -No lifting more than 10 lbs or excessive bending/twisting.   -Refill for Norco 5 mg given to patient. Attempted to prescribe Robaxin but it is not covered by his insurance without a prior auth. Will prescribe Flexeril 5 mg TID PRN for muscle spasms.  -Can drive after 4 weeks when no longer taking narcotics   -Follow up with Dr. Turner in 4 weeks   -Encouraged patient to call if they have any questions or concerns prior to next follow up appt      Monet Leborn PA-C   Neurosurgery   Pager: [phone]

## 2018-11-26 ENCOUNTER — TELEPHONE (OUTPATIENT)
Dept: ADMINISTRATIVE | Facility: CLINIC | Age: 73
End: 2018-11-26

## 2018-11-26 NOTE — TELEPHONE ENCOUNTER
Home Health SOC 11/13/2018 - 01/11/2019 with [facility] - Dr. Kishore Turner. Patient received SN, PT and OT services.

## 2018-11-27 ENCOUNTER — OFFICE VISIT (OUTPATIENT)
Dept: FAMILY MEDICINE | Facility: CLINIC | Age: 73
End: 2018-11-27
Payer: MEDICARE

## 2018-11-27 VITALS
HEART RATE: 79 BPM | DIASTOLIC BLOOD PRESSURE: 62 MMHG | RESPIRATION RATE: 17 BRPM | SYSTOLIC BLOOD PRESSURE: 122 MMHG | BODY MASS INDEX: 30.36 KG/M2 | TEMPERATURE: 98 F | WEIGHT: 229.06 LBS | OXYGEN SATURATION: 96 % | HEIGHT: 73 IN

## 2018-11-27 DIAGNOSIS — E66.9 OBESITY (BMI 30.0-34.9): ICD-10-CM

## 2018-11-27 DIAGNOSIS — Z12.11 COLON CANCER SCREENING: ICD-10-CM

## 2018-11-27 DIAGNOSIS — Z98.1 S/P CERVICAL SPINAL FUSION: ICD-10-CM

## 2018-11-27 DIAGNOSIS — R19.7 DIARRHEA, UNSPECIFIED TYPE: Primary | ICD-10-CM

## 2018-11-27 DIAGNOSIS — E78.2 MIXED HYPERLIPIDEMIA: ICD-10-CM

## 2018-11-27 DIAGNOSIS — I10 HTN (HYPERTENSION), BENIGN: Primary | ICD-10-CM

## 2018-11-27 DIAGNOSIS — G70.00 MYASTHENIA GRAVIS, ACHR ANTIBODY POSITIVE: ICD-10-CM

## 2018-11-27 DIAGNOSIS — E03.4 HYPOTHYROIDISM DUE TO ACQUIRED ATROPHY OF THYROID: ICD-10-CM

## 2018-11-27 PROCEDURE — 99999 PR PBB SHADOW E&M-EST. PATIENT-LVL V: CPT | Mod: PBBFAC,HCWC,, | Performed by: PHYSICIAN ASSISTANT

## 2018-11-27 PROCEDURE — 99214 OFFICE O/P EST MOD 30 MIN: CPT | Mod: HCWC,S$GLB,, | Performed by: PHYSICIAN ASSISTANT

## 2018-11-27 PROCEDURE — 3078F DIAST BP <80 MM HG: CPT | Mod: CPTII,HCWC,S$GLB, | Performed by: PHYSICIAN ASSISTANT

## 2018-11-27 PROCEDURE — 3074F SYST BP LT 130 MM HG: CPT | Mod: CPTII,HCWC,S$GLB, | Performed by: PHYSICIAN ASSISTANT

## 2018-11-27 PROCEDURE — 1101F PT FALLS ASSESS-DOCD LE1/YR: CPT | Mod: CPTII,HCWC,S$GLB, | Performed by: PHYSICIAN ASSISTANT

## 2018-11-27 RX ORDER — DIPHENOXYLATE HYDROCHLORIDE AND ATROPINE SULFATE 2.5; .025 MG/1; MG/1
1 TABLET ORAL 4 TIMES DAILY PRN
Qty: 90 TABLET | Refills: 0 | Status: SHIPPED | OUTPATIENT
Start: 2018-11-27 | End: 2018-12-07

## 2018-11-27 NOTE — PROGRESS NOTES
Subjective:       Patient ID: Maximilian Tavera Jr. is a 72 y.o. male.    Chief Complaint: Follow-up (6 wk )    Mr. Tavera comes to clinic today for 6 week follow up. He had cervical spinal fusion with Dr. Turner on 11/0718. He remains in a hard immobilizer. The patient reports that the limited mobility and slow recover is bothersome as he will have to be in the immobilizer for 90 days post op. The patient had labs 6 weeks ago that returned within normal range. The patient had a PFT that did show some obstruction and restriction. The patient reports that he has been using his incentive spirometry at home with improvement in his performance. The patient would like to repeat the PFT after he has recovered from his spinal surgery. The patient is also due for a colonoscopy. The patient reports he will schedule this once recovered from spinal surgery also. The patient has an appointment with Dr. Turner for follow up January 2019.       Review of Systems   Constitutional: Positive for activity change. Negative for unexpected weight change.   HENT: Positive for trouble swallowing. Negative for hearing loss and rhinorrhea.    Eyes: Negative for discharge and visual disturbance.   Respiratory: Negative for chest tightness and wheezing.    Cardiovascular: Negative for chest pain and palpitations.   Gastrointestinal: Negative for blood in stool, constipation, diarrhea and vomiting.   Endocrine: Negative for polydipsia and polyuria.   Genitourinary: Positive for urgency. Negative for difficulty urinating and hematuria.   Musculoskeletal: Positive for neck pain. Negative for arthralgias and joint swelling.   Neurological: Negative for weakness and headaches.   Psychiatric/Behavioral: Negative for confusion and dysphoric mood.       Objective:      Physical Exam   Constitutional: He is oriented to person, place, and time.   Neck:   Patient in c- spine immobilizer   Cardiovascular: Normal rate and regular rhythm.    Pulmonary/Chest: Effort normal and breath sounds normal. He has no wheezes.   Musculoskeletal: He exhibits no edema.   Lymphadenopathy:     He has no cervical adenopathy.   Neurological: He is alert and oriented to person, place, and time.   Skin: No erythema.   Psychiatric: His behavior is normal.       Assessment:       1. HTN (hypertension), benign    2. Colon cancer screening    3. S/P cervical spinal fusion    4. Hypothyroidism due to acquired atrophy of thyroid    5. Mixed hyperlipidemia    6. Myasthenia gravis, AChR antibody positive    7. Obesity (BMI 30.0-34.9)        Plan:   Maximilian was seen today for follow-up.    Diagnoses and all orders for this visit:    HTN (hypertension), benign  Controlled  Low salt diet  Continue current medication  Colon cancer screening  Patient to schedule colonoscopy when recovered from C spine surgery  S/P cervical spinal fusion  Continue follow up with neurosurgery  Hypothyroidism due to acquired atrophy of thyroid  Labs stable  Continue current medication  Mixed hyperlipidemia  High fiber diet  Continue current medication  Myasthenia gravis, AChR antibody positive  Continue follow up with Dr. Gill    Obesity (BMI 30.0-34.9)  Low carbohydrate, high fiber diet      Patient readiness: acceptance and barriers:none    During the course of the visit the patient was educated and counseled about the following:     Hypertension:   Medication: no change.  Dietary sodium restriction.  Regular aerobic exercise.  Obesity:   General weight loss/lifestyle modification strategies discussed (elicit support from others; identify saboteurs; non-food rewards, etc).    Goals: Hypertension: Reduce Blood Pressure and Obesity: Reduce calorie intake and BMI    Did patient meet goals/outcomes: Yes    The following self management tools provided: declined    Patient Instructions (the written plan) was given to the patient/family.     Time spent with patient: 30 minutes    Barriers to  medications present (no )    Adverse reactions to current medications (no)    Over the counter medications reviewed (Yes)

## 2018-11-30 ENCOUNTER — TELEPHONE (OUTPATIENT)
Dept: NEUROSURGERY | Facility: CLINIC | Age: 73
End: 2018-11-30

## 2018-11-30 NOTE — TELEPHONE ENCOUNTER
Spoke w/pt.  He would like to have his appt moved up if possible.  Also inquired if he can remove his brace to attend a party on 01.21, told him it would be fine to attend the party w/o the brace.   Pt claims the left side is more tight than his right in the neck and shoulder areas and notices it worse when he changes shirts.  Advised pt to try to adjust the way he puts on his shirts to redistribute the pulling motion to the other side.  Asked that he call me next week to let me know how he is doing.   V/U.

## 2018-12-04 ENCOUNTER — TELEPHONE (OUTPATIENT)
Dept: SPINE | Facility: CLINIC | Age: 73
End: 2018-12-04

## 2018-12-04 NOTE — TELEPHONE ENCOUNTER
Spoke w/Tamie from [facility].  She will reduce her visits with the pt to every other week as the pt is doing well.  V/U. ----- Message from Alex Li sent at 12/4/2018 12:10 PM CST -----  Contact: Tamie gomez [facility] @ [phone]  Caller calling to get an order to decrease the visit frequency, pls fax to: [phone]

## 2018-12-06 ENCOUNTER — TELEPHONE (OUTPATIENT)
Dept: NEUROSURGERY | Facility: CLINIC | Age: 73
End: 2018-12-06

## 2018-12-06 NOTE — TELEPHONE ENCOUNTER
Spoke w/pt and he states that his neck and shoulder pain are challenging.  Advised pt to alternate his pain meds and muscle relaxers.  Pt asked if it would be jaxon to eat without his collar, told him that would be fine but to put it on after he finishes his meal.  V/U.

## 2018-12-17 DIAGNOSIS — G70.00 MYASTHENIA GRAVIS: ICD-10-CM

## 2018-12-17 RX ORDER — PREDNISONE 5 MG/1
TABLET ORAL
Qty: 45 TABLET | Refills: 3 | Status: SHIPPED | OUTPATIENT
Start: 2018-12-17 | End: 2019-07-23 | Stop reason: SDUPTHER

## 2018-12-17 RX ORDER — POTASSIUM CHLORIDE 600 MG/1
TABLET, FILM COATED, EXTENDED RELEASE ORAL
Qty: 540 TABLET | Refills: 3 | Status: SHIPPED | OUTPATIENT
Start: 2018-12-17 | End: 2019-05-02

## 2019-01-02 ENCOUNTER — TELEPHONE (OUTPATIENT)
Dept: UROLOGY | Facility: CLINIC | Age: 74
End: 2019-01-02

## 2019-01-07 ENCOUNTER — OFFICE VISIT (OUTPATIENT)
Dept: SPINE | Facility: CLINIC | Age: 74
End: 2019-01-07
Payer: MEDICARE

## 2019-01-07 ENCOUNTER — HOSPITAL ENCOUNTER (OUTPATIENT)
Dept: RADIOLOGY | Facility: OTHER | Age: 74
Discharge: HOME | End: 2019-01-07
Attending: NEUROLOGICAL SURGERY
Payer: MEDICARE

## 2019-01-07 VITALS — HEART RATE: 82 BPM | DIASTOLIC BLOOD PRESSURE: 65 MMHG | SYSTOLIC BLOOD PRESSURE: 117 MMHG

## 2019-01-07 DIAGNOSIS — Z98.1 HISTORY OF FUSION OF CERVICAL SPINE: Primary | ICD-10-CM

## 2019-01-07 DIAGNOSIS — Z98.1 S/P CERVICAL SPINAL FUSION: ICD-10-CM

## 2019-01-07 DIAGNOSIS — G95.9 CERVICAL MYELOPATHY: ICD-10-CM

## 2019-01-07 DIAGNOSIS — M48.02 CERVICAL STENOSIS OF SPINE: ICD-10-CM

## 2019-01-07 PROCEDURE — 72040 X-RAY EXAM NECK SPINE 2-3 VW: CPT | Mod: TC,FY

## 2019-01-07 PROCEDURE — 99999 PR PBB SHADOW E&M-EST. PATIENT-LVL III: ICD-10-PCS | Mod: PBBFAC,,, | Performed by: NEUROLOGICAL SURGERY

## 2019-01-07 PROCEDURE — 99024 PR POST-OP FOLLOW-UP VISIT: ICD-10-PCS | Mod: S$GLB,,, | Performed by: NEUROLOGICAL SURGERY

## 2019-01-07 PROCEDURE — 99024 POSTOP FOLLOW-UP VISIT: CPT | Mod: S$GLB,,, | Performed by: NEUROLOGICAL SURGERY

## 2019-01-07 PROCEDURE — 72040 XR CERVICAL SPINE AP LATERAL: ICD-10-PCS | Mod: 26,,, | Performed by: RADIOLOGY

## 2019-01-07 PROCEDURE — 99999 PR PBB SHADOW E&M-EST. PATIENT-LVL III: CPT | Mod: PBBFAC,,, | Performed by: NEUROLOGICAL SURGERY

## 2019-01-07 PROCEDURE — 72040 X-RAY EXAM NECK SPINE 2-3 VW: CPT | Mod: 26,,, | Performed by: RADIOLOGY

## 2019-01-07 NOTE — PROGRESS NOTES
[facility] Urology New Patient/H&P:    Maximilian Tavera Jr. is a 73 y.o. male who presents for prostate check    11/6/18: C2-C6 Posterior spinal fusion/fixation, and C3-6 decompression of thecal sac and nerve roots via laminectomy (cervical myelopathy, history of C5-7 fusion)  PCP Dr Marroquin - last seen in 10/18 for preop eval and noted to have HTN well controlled and mild SOB/rxtive airway disease with hx asbestos exposure, and has been treated for 3 yrs for myasthenia gravis with recent exacerbation treated with prednisone. He did note urinary urgency at that visit.    Currently having 8/10 pain during urination  No blood in urine  UA: leuk pos, nit pos, lg leuks, small blood    Reports AUA SS 23/4 mostly dissat (5: weak stream; 3: emptying, freq, int, urg, strain, nocturia)  The above was his 3rd neck surgery.   Did have aguilera in postop for a few days while in hospital  Was on heavy pain meds so didn't notice discomfort urinating, then burning started with urgency to void and with painful urge wouldn't go very much and stream was weak  Had previously scheduled this appt for ncoturia/enlarged prostate eval.  No prior history of UTI/prostatitis  Was not given abx postop  No hematuria    PVR 21    Past Medical History:   Diagnosis Date    Arthritis     Back pain     Carpal tunnel syndrome 2/25/2013    Cataract     Cataract, left eye 11/10/2014    Chest pain, musculoskeletal     COPD (chronic obstructive pulmonary disease) 11/052018    Emphysema lung 11/05/2018    Hyperlipidemia     Hypertension     Hypothyroidism     Knee fracture     Myasthenia gravis     Neuropathy 1/3/2013    Obesity 1/29/2015    Polyneuropathy     PVC (premature ventricular contraction)     Squamous cell carcinoma 2014    left forearm    Thyroid disease        Past Surgical History:   Procedure Laterality Date    APPENDECTOMY      C2-C6 Posterior Cervical Laminectomy & Instrumental Fusion N/A 11/7/2018    Performed by Kishore  [facility] at [facility] 2ND FLR    CATARACT EXTRACTION W/  INTRAOCULAR LENS IMPLANT Bilateral     COLONOSCOPY N/A 3/1/2012    [facility]    EGD (ESOPHAGOGASTRODUODENOSCOPY) N/A 9/12/2018    Performed by Gabriel Hernandez [facility] at [facility] ENDO    EXTRACTION-CATARACT-IOL Left 12/9/2014    [facility]    HEMORRHOID SURGERY      INJECTION-STEROID-EPIDURAL-TRANSFORAMINAL Right 5/17/2018    [facility]    INJECTION-STEROID-EPIDURAL-TRANSFORAMINAL Right 9/1/2017    [facility]    INJECTION-STEROID-EPIDURAL-TRANSFORAMINAL Right 2/21/2017    [facility]    INJECTION-STEROID-EPIDURAL-TRANSFORAMINAL Right 10/9/2014    [facility]    KNEE ARTHROPLASTY Bilateral     NECK SURGERY      TONSILLECTOMY         Family History   Problem Relation Age of Onset    Cataracts Mother     Heart disease Mother         CHF    Hypertension Mother     Hyperlipidemia Mother     Cataracts Father     Glaucoma Father     Heart disease Father     Hyperlipidemia Sister     Hypertension Sister     No Known Problems Daughter     No Known Problems Daughter     Collagen disease Neg Hx     Amblyopia Neg Hx     Blindness Neg Hx     Macular degeneration Neg Hx     Retinal detachment Neg Hx     Strabismus Neg Hx     Cancer Neg Hx        Social History     Socioeconomic History    Marital status:      Spouse name: Not on file    Number of children: Not on file    Years of education: Not on file    Highest education level: Not on file   Social Needs    Financial resource strain: Not on file    Food insecurity - worry: Not on file    Food insecurity - inability: Not on file    Transportation needs - medical: Not on file    Transportation needs - non-medical: Not on file   Occupational History    Not on file   Tobacco Use    Smoking status: Never  "Smoker    Smokeless tobacco: Never Used    Tobacco comment: when a child   Substance and Sexual Activity    Alcohol use: Yes     Comment: rarely    Drug use: No    Sexual activity: Yes     Partners: Female   Other Topics Concern    Not on file   Social History Narrative    Not on file       Review of patient's allergies indicates:   Allergen Reactions    No known drug allergies        Medications Reviewed: see MAR    ROS:    Constitutional: denies fevers, chills, night sweats, fatigue, malaise  Respiratory: negative for cough, shortness of breath, wheezing, dyspnea.  Cardiovascular: + for high blood pressure, negative for chest pain, varicose veins, ankle swelling, palpitations, syncope.  GI: negative for abdominal pain, heartburn, indigestion, nausea, vomiting, constipation, diarrhea, blood in stool.   Urology: as noted above in HPI  Endocrinology: negative for cold intolerance, excessive thirst, not feeling tired/sluggish, no heat intolerance.   Hematology/Lymph: negative for easy bleeding, easy bruising, swollen glands.  Musculoskeletal: + neck/back pain, negative joint pain, joint swelling  Allergy-Immunology: negative for seasonal allergies, negative for unusual infections.   Skin: negative for boils, breast lumps, hives, itching, rash.   Neurology: negative for, dizziness, headache, tingling/numbness, tremors.   Psych: satisfied with life; negative for, anxiety, depression, suicidal thoughts.     PHYSICAL EXAM:    Vitals:    01/08/19 1038   BP: 113/68   Pulse: 83   Resp: 18   Temp: 98.4 °F (36.9 °C)     Body mass index is 30.25 kg/m². Weight: 104 kg (229 lb 4.5 oz) Height: 6' 1" (185.4 cm)       General: Alert, cooperative, no distress, appears stated age  Head: Normocephalic, without obvious abnormality, atraumatic  Neck: no masses, no thyromegaly, no lymphadenopathy  Eyes: PERRL, conjunctiva/corneas clear  Lungs: Respirations unlabored, normal effort, no accessory muscle use  CV: Warm and well " perfused extremities  Abdomen: Soft, non-tender, no CVA tenderness, no hepatosplenomegaly, no hernia  Penis: phallus normal, circumcised, well cared for, no plaques or lesions.   Scrotum: no cysts, no lesions, no rash, no hydrocele.   Epididymes: normal, nontender, symmetrical, no masses or cysts.   Testes: normal, both descended, no masses.   Urethra: palpably normal with orthotopic meatus of normal size    MIKY: normal sphincter tone, no masses, no hemmorrhoids   PROSTATE: 30-35g, wide, flat, NTTP, no bogginess, ?able nodule L vs firmness   Extremities: Extremities normal, atraumatic, no cyanosis or edema  Skin: Normal color, texture, and turgor, no rashes or lesions  Psych: Appropriate, well oriented, normal affect, normal mood  Neuro: Non-focal    Lab Results   Component Value Date    PSA 2.4 03/29/2018    PSA 1.1 11/18/2014    PSA 0.60 05/15/2013       LABS:    Recent Results (from the past 336 hour(s))   POCT URINE DIPSTICK WITHOUT MICROSCOPE    Collection Time: 01/08/19 11:04 AM   Result Value Ref Range    Color, UA yellow     Spec Grav UA 1.025     pH, UA 6     WBC, UA large     Nitrite, UA pos     Protein neg     Glucose, UA neg     Ketones, UA neg     Urobilinogen, UA neg     Bilirubin neg     Blood, UA small          Assessment/Diagnosis:    1. BPH without obstruction/lower urinary tract symptoms     2. Dysuria  POCT URINE DIPSTICK WITHOUT MICROSCOPE    Urine culture       Plans:  Seems to have active UTI given LUTS, dysuria, nit+ urine.   Possibly CAUTI longstanding from hospitalization.  Will empirically treat with bactrim pendint Ucx results, start flomax for LUTS and emptying, and have him RTC 3-4 months for uroflow/pvr, repeat AUASS, as office based assessment of obstruction on alpha blocker before considering further workup.  Assumin Ucx+ and treated, will return for test of cure to make sure clears given likely longstanding nature.  Discussed conservative measures to control urgency and frequency  including avoiding bladder irritants, timed voiding, not postponing voiding, and bowel regimen (as distended bowel has extrinsic compressive effect on bladder. Discussed bladder irritants include coffe (even decaf), tea, alcohol, soda, spicy foods, acidic juices (orange, tomato), vinegar, and artificial sweeteners.

## 2019-01-07 NOTE — PROGRESS NOTES
CHIEF COMPLAINT:  Post op evaluation 8 weeks after C2-C6 PCF    LENA, Khris Hunter, attest that this documentation has been prepared under the direction and in the presence of Kishore Turner MD.    HPI:  Maximilian Tavera Jr. is a 73 y.o.  male who presents today for an 8 week post op evaluation. Pt is s/p C2-C6 PCF on 11/7/2018. Pt reports that he is doing well. He is experiencing tightness in his left shoulder. He notes pain as well in that area. This pain and tightness is exacerbated by moving his left arm. Since surgery his neck pain has resolved. He has noticed some tingling in his left hand. Pt has been weaning himself off of pain medication. Pt presents today wearing a cervical collar. He has some irritation under his chin from the cervical collar.       Review of patient's allergies indicates:   Allergen Reactions    No known drug allergies        Past Medical History:   Diagnosis Date    Arthritis     Back pain     Carpal tunnel syndrome 2/25/2013    Cataract     Cataract, left eye 11/10/2014    Chest pain, musculoskeletal     COPD (chronic obstructive pulmonary disease) 11/052018    Emphysema lung 11/05/2018    Hyperlipidemia     Hypertension     Hypothyroidism     Knee fracture     Myasthenia gravis     Neuropathy 1/3/2013    Obesity 1/29/2015    Polyneuropathy     PVC (premature ventricular contraction)     Squamous cell carcinoma 2014    left forearm    Thyroid disease      Past Surgical History:   Procedure Laterality Date    APPENDECTOMY      C2-C6 Posterior Cervical Laminectomy & Instrumental Fusion N/A 11/7/2018    Performed by Kishore Turner MD at [facility] OR 2ND FLR    CATARACT EXTRACTION W/  INTRAOCULAR LENS IMPLANT Bilateral     COLONOSCOPY N/A 3/1/2012    [facility] at [facility] ENDO    EGD (ESOPHAGOGASTRODUODENOSCOPY) N/A 9/12/2018    Performed by Gabriel Hernandez [facility] at [facility] ENDO    EXTRACTION-CATARACT-IOL Left 12/9/2014    Performed by Adam SAUCEDO  MD Eddi at [facility]    HEMORRHOID SURGERY      INJECTION-STEROID-EPIDURAL-TRANSFORAMINAL Right 5/17/2018    Performed by Devan Watt MD at [facility] OR    INJECTION-STEROID-EPIDURAL-TRANSFORAMINAL Right 9/1/2017    Performed by [facility] Watt MD at [facility]    INJECTION-STEROID-EPIDURAL-TRANSFORAMINAL Right 2/21/2017    Performed by [facility]an Watt MD at [facility]    INJECTION-STEROID-EPIDURAL-TRANSFORAMINAL Right 10/9/2014    Performed by [facility]an Watt MD at [facility]    KNEE ARTHROPLASTY Bilateral     NECK SURGERY      TONSILLECTOMY       Family History   Problem Relation Age of Onset    Cataracts Mother     Heart disease Mother         CHF    Hypertension Mother     Hyperlipidemia Mother     Cataracts Father     Glaucoma Father     Heart disease Father     Hyperlipidemia Sister     Hypertension Sister     No Known Problems Daughter     No Known Problems Daughter     Collagen disease Neg Hx     Amblyopia Neg Hx     Blindness Neg Hx     Macular degeneration Neg Hx     Retinal detachment Neg Hx     Strabismus Neg Hx     Cancer Neg Hx      Social History     Tobacco Use    Smoking status: Never Smoker    Smokeless tobacco: Never Used    Tobacco comment: when a child   Substance Use Topics    Alcohol use: Yes     Comment: rarely    Drug use: No        Review of Systems   Constitutional: Negative.    HENT: Negative.    Eyes: Negative.    Respiratory: Negative.    Cardiovascular: Negative.    Gastrointestinal: Negative.    Endocrine: Negative.    Genitourinary: Negative.    Musculoskeletal: Positive for myalgias (left shoulder). Negative for back pain, gait problem and neck pain.   Skin: Negative.    Allergic/Immunologic: Negative.    Neurological: Negative for weakness, light-headedness, numbness and headaches.        Left hand paraesthesias   Hematological: Negative.    Psychiatric/Behavioral: Negative.        OBJECTIVE:   Vital Signs:  Pulse: 82 (01/07/19 1118)  BP: 117/65 (01/07/19 1118)    Physical  Exam:    Vital signs: All nursing notes and vital signs reviewed -- afebrile, vital signs stable.  Constitutional: Patient sitting comfortably in chair. Appears well developed and well nourished.  Skin: Exposed areas are intact without abnormal markings, rashes or other lesions. Well healed posterior cervical incision.   HEENT: Normocephalic. Normal conjunctivae.  Cardiovascular: Normal rate and regular rhythm.  Respiratory: Chest wall rises and falls symmetrically, without signs of respiratory distress.  Abdomen: Soft and non-tender.  Extremities: Warm and without edema. Calves supple, non-tender. Tenderness of left AC joint.   Psych/Behavior: Normal affect.    Neurological:    Mental status: Alert and oriented. Conversational and appropriate.       Cranial Nerves: Grossly intact.     Motor:    Upper:  Deltoids Triceps Biceps WE WF     R 5/5 5/5 5/5 5/5 5/5 5/5    L 4+/5 5/5 5/5 5/5 5/5 5/5      Lower:  HF KE KF DF PF EHL    R 5/5 5/5 5/5 5/5 5/5 5/5    L 5/5 5/5 5/5 5/5 5/5 5/5     Sensory: Intact sensation to light touch in all extremities. Romberg negative.    Reflexes:          DTR: 2+ symmetrically throughout.     Mcneill's: Negative.     Babinski's: Negative.     Clonus: Negative.    Cerebellar: Finger-to-nose and rapid alternating movements normal. Gait stable, fluid.    Spine:    Posture: Head well aligned over pelvis in front and side views.  No focal or global spinal deformity visible on inspection. Shoulders and hips even. No obvious leg length discrepancy. No scapula winging.    Bending: Full ROM with forward, back and lateral bending. No rib prominence with forward bend.    Cervical:      ROM: Full with flexion, extension, lateral rotation and ear-to-shoulder bend.      Midline TTP: Negative.     Spurling's test: Negative.     Lhermitte's: Negative.    Thoracic:     Midline TTP: Negative    Lumbar:     Midline TTP: Negative     Straight Leg Test: Negative     Crossed Straight Leg Test: Negative      Sciatic notch tenderness: Negative.    Other:     SI joint TTP: Negative.     Greater trochanter TTP: Negative.     Tenderness with external/internal hip rotation: Negative.    Diagnostic Results:  All imaging was independently reviewed by me.    AP and Lateral X-ray C-spine, dated 01/07/2019:  1. Good spinal alignment and hardware position    ASSESSMENT/PLAN:     Maximilian Tavera Jr. is doing well 8 weeks after posterior cervical decompression and fusion for myelopathy. He has mild left shoulder weakness and stiffness and pain which I believe is coming from the shoulder itself. It may be a unilateral C5 palsy, in which case it should resolve on its own. His xrays look good. He will follow up in 4-6 weeks with a CT C spine to assess bony fusion and we will address his lumbar spine at that time.    The patient understands and agrees with the plan of care. All questions were answered.     1. RTC 4-6 weeks with CT C-spine      I, Dr. Kishore Turner personally performed the services described in this documentation. All medical record entries made by the scribe, Khris Hunter, were at my direction and in my presence.  I have reviewed the chart and agree that the record reflects my personal performance and is accurate and complete.      Kishore Turner M.D.  Department of Neurosurgery  [facility]      .

## 2019-01-08 ENCOUNTER — OFFICE VISIT (OUTPATIENT)
Dept: UROLOGY | Facility: CLINIC | Age: 74
End: 2019-01-08
Payer: MEDICARE

## 2019-01-08 ENCOUNTER — PATIENT MESSAGE (OUTPATIENT)
Dept: FAMILY MEDICINE | Facility: CLINIC | Age: 74
End: 2019-01-08

## 2019-01-08 VITALS
SYSTOLIC BLOOD PRESSURE: 113 MMHG | WEIGHT: 229.25 LBS | TEMPERATURE: 98 F | DIASTOLIC BLOOD PRESSURE: 68 MMHG | BODY MASS INDEX: 30.38 KG/M2 | RESPIRATION RATE: 18 BRPM | HEART RATE: 83 BPM | HEIGHT: 73 IN

## 2019-01-08 DIAGNOSIS — R30.0 DYSURIA: ICD-10-CM

## 2019-01-08 DIAGNOSIS — M51.36 DDD (DEGENERATIVE DISC DISEASE), LUMBAR: ICD-10-CM

## 2019-01-08 DIAGNOSIS — I10 HTN (HYPERTENSION), BENIGN: ICD-10-CM

## 2019-01-08 DIAGNOSIS — N40.0 BPH WITHOUT OBSTRUCTION/LOWER URINARY TRACT SYMPTOMS: Primary | ICD-10-CM

## 2019-01-08 LAB
BILIRUB SERPL-MCNC: ABNORMAL MG/DL
BLOOD URINE, POC: ABNORMAL
COLOR, POC UA: YELLOW
GLUCOSE UR QL STRIP: ABNORMAL
KETONES UR QL STRIP: ABNORMAL
LEUKOCYTE ESTERASE URINE, POC: ABNORMAL
NITRITE, POC UA: ABNORMAL
PH, POC UA: 6
PROTEIN, POC: ABNORMAL
SPECIFIC GRAVITY, POC UA: 1.02
UROBILINOGEN, POC UA: ABNORMAL

## 2019-01-08 PROCEDURE — 99999 PR PBB SHADOW E&M-EST. PATIENT-LVL IV: ICD-10-PCS | Mod: PBBFAC,,, | Performed by: UROLOGY

## 2019-01-08 PROCEDURE — 99999 PR PBB SHADOW E&M-EST. PATIENT-LVL IV: CPT | Mod: PBBFAC,,, | Performed by: UROLOGY

## 2019-01-08 PROCEDURE — 1101F PT FALLS ASSESS-DOCD LE1/YR: CPT | Mod: CPTII,S$GLB,, | Performed by: UROLOGY

## 2019-01-08 PROCEDURE — 3074F SYST BP LT 130 MM HG: CPT | Mod: CPTII,S$GLB,, | Performed by: UROLOGY

## 2019-01-08 PROCEDURE — 87088 URINE BACTERIA CULTURE: CPT

## 2019-01-08 PROCEDURE — 99203 OFFICE O/P NEW LOW 30 MIN: CPT | Mod: S$GLB,,, | Performed by: UROLOGY

## 2019-01-08 PROCEDURE — 87186 SC STD MICRODIL/AGAR DIL: CPT

## 2019-01-08 PROCEDURE — 99499 RISK ADDL DX/OHS AUDIT: ICD-10-PCS | Mod: HCNC,S$GLB,, | Performed by: UROLOGY

## 2019-01-08 PROCEDURE — 1101F PR PT FALLS ASSESS DOC 0-1 FALLS W/OUT INJ PAST YR: ICD-10-PCS | Mod: CPTII,S$GLB,, | Performed by: UROLOGY

## 2019-01-08 PROCEDURE — 3078F PR MOST RECENT DIASTOLIC BLOOD PRESSURE < 80 MM HG: ICD-10-PCS | Mod: CPTII,S$GLB,, | Performed by: UROLOGY

## 2019-01-08 PROCEDURE — 3074F PR MOST RECENT SYSTOLIC BLOOD PRESSURE < 130 MM HG: ICD-10-PCS | Mod: CPTII,S$GLB,, | Performed by: UROLOGY

## 2019-01-08 PROCEDURE — 99203 PR OFFICE/OUTPT VISIT, NEW, LEVL III, 30-44 MIN: ICD-10-PCS | Mod: S$GLB,,, | Performed by: UROLOGY

## 2019-01-08 PROCEDURE — 87077 CULTURE AEROBIC IDENTIFY: CPT

## 2019-01-08 PROCEDURE — 87086 URINE CULTURE/COLONY COUNT: CPT

## 2019-01-08 PROCEDURE — 99499 UNLISTED E&M SERVICE: CPT | Mod: HCNC,S$GLB,, | Performed by: UROLOGY

## 2019-01-08 PROCEDURE — 3078F DIAST BP <80 MM HG: CPT | Mod: CPTII,S$GLB,, | Performed by: UROLOGY

## 2019-01-08 RX ORDER — TAMSULOSIN HYDROCHLORIDE 0.4 MG/1
0.4 CAPSULE ORAL DAILY
Qty: 30 CAPSULE | Refills: 11 | Status: SHIPPED | OUTPATIENT
Start: 2019-01-08 | End: 2019-01-08 | Stop reason: SDUPTHER

## 2019-01-08 RX ORDER — TAMSULOSIN HYDROCHLORIDE 0.4 MG/1
CAPSULE ORAL
Qty: 90 CAPSULE | Refills: 3 | Status: SHIPPED | OUTPATIENT
Start: 2019-01-08 | End: 2019-01-26

## 2019-01-08 RX ORDER — SULFAMETHOXAZOLE AND TRIMETHOPRIM 800; 160 MG/1; MG/1
1 TABLET ORAL 2 TIMES DAILY
Qty: 14 TABLET | Refills: 0 | Status: SHIPPED | OUTPATIENT
Start: 2019-01-08 | End: 2019-01-15

## 2019-01-08 NOTE — PATIENT INSTRUCTIONS
Discussed conservative measures to control urgency and frequency including   1. avoiding bladder irritants (see below) and increase water intake    2. timed voiding - empty on a schedule (approx ~2-3 hours)    3. dont postponing voiding - dont hold it on purpose     4. bowel regimen as distended bowel has extrinsic compressive effect on bladder.   - any or all of the following in any combination, titrate to soft daily bowel movement without pushing or straining  - colace/stool softener capsule - once to twice daily  - miralax - 1 capful daily to start, can increase to 2x daily (or decrease to 1/2 cap daily)  - increase dietary fibery  - fiber supplements, such as metamucil    Discussed bladder irritants include coffe (even decaf), tea, alcohol, soda, spicy foods, acidic juices (orange, tomato), vinegar, and artificial sweeteners/sugary beverages.

## 2019-01-09 RX ORDER — GABAPENTIN 300 MG/1
CAPSULE ORAL
Qty: 180 CAPSULE | Refills: 11 | Status: CANCELLED | OUTPATIENT
Start: 2019-01-09

## 2019-01-11 LAB — BACTERIA UR CULT: NORMAL

## 2019-01-11 RX ORDER — GABAPENTIN 300 MG/1
300 CAPSULE ORAL 3 TIMES DAILY
Qty: 270 CAPSULE | Refills: 5 | Status: SHIPPED | OUTPATIENT
Start: 2019-01-11 | End: 2020-03-01 | Stop reason: SDUPTHER

## 2019-01-15 ENCOUNTER — TELEPHONE (OUTPATIENT)
Dept: NEUROSURGERY | Facility: CLINIC | Age: 74
End: 2019-01-15

## 2019-01-15 NOTE — TELEPHONE ENCOUNTER
Pt inquired about imaging on file. Provided info for MR so he can request the imaging he is interested in.  V/U.  ----- Message from Alex Li sent at 1/15/2019 10:56 AM CST -----  Contact: Patient @ [phone]  Patient requesting a return call from May  regarding the previous request he didn't go any further with details, pls call

## 2019-01-21 ENCOUNTER — CLINICAL SUPPORT (OUTPATIENT)
Dept: UROLOGY | Facility: CLINIC | Age: 74
End: 2019-01-21
Payer: MEDICARE

## 2019-01-21 DIAGNOSIS — R82.998 CELLS AND CASTS IN URINE: Primary | ICD-10-CM

## 2019-01-21 LAB
BILIRUB SERPL-MCNC: NEGATIVE MG/DL
BLOOD URINE, POC: NEGATIVE
COLOR, POC UA: ABNORMAL
GLUCOSE UR QL STRIP: NEGATIVE
KETONES UR QL STRIP: ABNORMAL
LEUKOCYTE ESTERASE URINE, POC: ABNORMAL
NITRITE, POC UA: NEGATIVE
PH, POC UA: 7
PROTEIN, POC: NEGATIVE
SPECIFIC GRAVITY, POC UA: 1.2
UROBILINOGEN, POC UA: 1

## 2019-01-21 PROCEDURE — 87086 URINE CULTURE/COLONY COUNT: CPT

## 2019-01-21 PROCEDURE — 99999 PR PBB SHADOW E&M-EST. PATIENT-LVL II: CPT | Mod: PBBFAC,,,

## 2019-01-21 PROCEDURE — 81002 POCT URINE DIPSTICK WITHOUT MICROSCOPE: ICD-10-PCS | Mod: S$GLB,,, | Performed by: UROLOGY

## 2019-01-21 PROCEDURE — 99999 PR PBB SHADOW E&M-EST. PATIENT-LVL II: ICD-10-PCS | Mod: PBBFAC,,,

## 2019-01-21 PROCEDURE — 81002 URINALYSIS NONAUTO W/O SCOPE: CPT | Mod: S$GLB,,, | Performed by: UROLOGY

## 2019-01-23 ENCOUNTER — PATIENT MESSAGE (OUTPATIENT)
Dept: UROLOGY | Facility: CLINIC | Age: 74
End: 2019-01-23

## 2019-01-23 LAB — BACTERIA UR CULT: NO GROWTH

## 2019-01-24 ENCOUNTER — CLINICAL SUPPORT (OUTPATIENT)
Dept: UROLOGY | Facility: CLINIC | Age: 74
End: 2019-01-24
Payer: MEDICARE

## 2019-01-24 ENCOUNTER — PATIENT MESSAGE (OUTPATIENT)
Dept: UROLOGY | Facility: CLINIC | Age: 74
End: 2019-01-24

## 2019-01-24 DIAGNOSIS — R30.0 DYSURIA: Primary | ICD-10-CM

## 2019-01-24 LAB
BILIRUB SERPL-MCNC: NEGATIVE MG/DL
BLOOD URINE, POC: NEGATIVE
COLOR, POC UA: ABNORMAL
GLUCOSE UR QL STRIP: NEGATIVE
KETONES UR QL STRIP: NEGATIVE
LEUKOCYTE ESTERASE URINE, POC: ABNORMAL
NITRITE, POC UA: NEGATIVE
PH, POC UA: 6
PROTEIN, POC: NEGATIVE
SPECIFIC GRAVITY, POC UA: 1.02
UROBILINOGEN, POC UA: 0.2

## 2019-01-24 PROCEDURE — 81002 POCT URINE DIPSTICK WITHOUT MICROSCOPE: ICD-10-PCS | Mod: HCNC,S$GLB,, | Performed by: UROLOGY

## 2019-01-24 PROCEDURE — 87086 URINE CULTURE/COLONY COUNT: CPT | Mod: HCNC

## 2019-01-24 PROCEDURE — 81002 URINALYSIS NONAUTO W/O SCOPE: CPT | Mod: HCNC,S$GLB,, | Performed by: UROLOGY

## 2019-01-24 PROCEDURE — 87088 URINE BACTERIA CULTURE: CPT | Mod: HCNC

## 2019-01-24 PROCEDURE — 99999 PR PBB SHADOW E&M-EST. PATIENT-LVL II: CPT | Mod: PBBFAC,,,

## 2019-01-24 PROCEDURE — 87186 SC STD MICRODIL/AGAR DIL: CPT | Mod: HCNC

## 2019-01-24 PROCEDURE — 99999 PR PBB SHADOW E&M-EST. PATIENT-LVL II: ICD-10-PCS | Mod: PBBFAC,,,

## 2019-01-24 PROCEDURE — 87077 CULTURE AEROBIC IDENTIFY: CPT | Mod: HCNC

## 2019-01-24 NOTE — TELEPHONE ENCOUNTER
Patient scheduled to come in today for udip/culture.    He says he stopped the Flomax because it caused a severe/nauseating/bitter taste in his mouth that he just could not tolerate.

## 2019-01-26 DIAGNOSIS — N39.0 RECURRENT UTI: Primary | ICD-10-CM

## 2019-01-26 RX ORDER — SULFAMETHOXAZOLE AND TRIMETHOPRIM 800; 160 MG/1; MG/1
1 TABLET ORAL 2 TIMES DAILY
Qty: 14 TABLET | Refills: 0 | Status: SHIPPED | OUTPATIENT
Start: 2019-01-26 | End: 2019-02-02

## 2019-01-26 RX ORDER — SULFAMETHOXAZOLE AND TRIMETHOPRIM 800; 160 MG/1; MG/1
1 TABLET ORAL DAILY
Qty: 30 TABLET | Refills: 2 | Status: SHIPPED | OUTPATIENT
Start: 2019-01-26 | End: 2019-04-26

## 2019-01-26 RX ORDER — ALFUZOSIN HYDROCHLORIDE 10 MG/1
10 TABLET, EXTENDED RELEASE ORAL
Qty: 30 TABLET | Refills: 11 | Status: SHIPPED | OUTPATIENT
Start: 2019-01-26 | End: 2019-04-30

## 2019-01-28 LAB — BACTERIA UR CULT: NORMAL

## 2019-01-28 NOTE — TELEPHONE ENCOUNTER
Spoke with patient.  Scheduled repeat urine and labs.  He will bring his calendar to that appt and will schedule CT then.

## 2019-01-31 ENCOUNTER — TELEPHONE (OUTPATIENT)
Dept: UROLOGY | Facility: CLINIC | Age: 74
End: 2019-01-31

## 2019-01-31 NOTE — TELEPHONE ENCOUNTER
Patient not feeling so good when I called back.  He will bring his calendar to his [place] Tuesday to schedule other appts with me.

## 2019-01-31 NOTE — TELEPHONE ENCOUNTER
----- Message from Sisi Araujo sent at 1/31/2019  1:27 PM CST -----  Contact: Self  Patient spoke to Karma a couple of days ago and you wanted to schedule him for a test but he didn't have his schedule and he does now. Call back at [phone].  Thanks

## 2019-02-04 ENCOUNTER — HOSPITAL ENCOUNTER (OUTPATIENT)
Dept: RADIOLOGY | Facility: OTHER | Age: 74
Discharge: HOME | End: 2019-02-04
Attending: NEUROLOGICAL SURGERY
Payer: MEDICARE

## 2019-02-04 ENCOUNTER — OFFICE VISIT (OUTPATIENT)
Dept: SPINE | Facility: CLINIC | Age: 74
End: 2019-02-04
Payer: MEDICARE

## 2019-02-04 VITALS — TEMPERATURE: 99 F | HEART RATE: 74 BPM | DIASTOLIC BLOOD PRESSURE: 72 MMHG | SYSTOLIC BLOOD PRESSURE: 117 MMHG

## 2019-02-04 DIAGNOSIS — M25.512 ACUTE PAIN OF LEFT SHOULDER: ICD-10-CM

## 2019-02-04 DIAGNOSIS — Z98.1 HISTORY OF FUSION OF CERVICAL SPINE: ICD-10-CM

## 2019-02-04 DIAGNOSIS — Z98.1 HISTORY OF FUSION OF CERVICAL SPINE: Primary | ICD-10-CM

## 2019-02-04 PROCEDURE — 99213 PR OFFICE/OUTPT VISIT, EST, LEVL III, 20-29 MIN: ICD-10-PCS | Mod: 24,HCNC,S$GLB, | Performed by: NEUROLOGICAL SURGERY

## 2019-02-04 PROCEDURE — 1101F PT FALLS ASSESS-DOCD LE1/YR: CPT | Mod: HCNC,CPTII,S$GLB, | Performed by: NEUROLOGICAL SURGERY

## 2019-02-04 PROCEDURE — 3074F PR MOST RECENT SYSTOLIC BLOOD PRESSURE < 130 MM HG: ICD-10-PCS | Mod: HCNC,CPTII,S$GLB, | Performed by: NEUROLOGICAL SURGERY

## 2019-02-04 PROCEDURE — 99999 PR PBB SHADOW E&M-EST. PATIENT-LVL III: ICD-10-PCS | Mod: PBBFAC,HCNC,, | Performed by: NEUROLOGICAL SURGERY

## 2019-02-04 PROCEDURE — 3078F DIAST BP <80 MM HG: CPT | Mod: HCNC,CPTII,S$GLB, | Performed by: NEUROLOGICAL SURGERY

## 2019-02-04 PROCEDURE — 72125 CT CERVICAL SPINE WITHOUT CONTRAST: ICD-10-PCS | Mod: 26,HCNC,, | Performed by: RADIOLOGY

## 2019-02-04 PROCEDURE — 3074F SYST BP LT 130 MM HG: CPT | Mod: HCNC,CPTII,S$GLB, | Performed by: NEUROLOGICAL SURGERY

## 2019-02-04 PROCEDURE — 1101F PR PT FALLS ASSESS DOC 0-1 FALLS W/OUT INJ PAST YR: ICD-10-PCS | Mod: HCNC,CPTII,S$GLB, | Performed by: NEUROLOGICAL SURGERY

## 2019-02-04 PROCEDURE — 99213 OFFICE O/P EST LOW 20 MIN: CPT | Mod: 24,HCNC,S$GLB, | Performed by: NEUROLOGICAL SURGERY

## 2019-02-04 PROCEDURE — 72125 CT NECK SPINE W/O DYE: CPT | Mod: 26,HCNC,, | Performed by: RADIOLOGY

## 2019-02-04 PROCEDURE — 72125 CT NECK SPINE W/O DYE: CPT | Mod: TC,HCNC

## 2019-02-04 PROCEDURE — 99999 PR PBB SHADOW E&M-EST. PATIENT-LVL III: CPT | Mod: PBBFAC,HCNC,, | Performed by: NEUROLOGICAL SURGERY

## 2019-02-04 PROCEDURE — 3078F PR MOST RECENT DIASTOLIC BLOOD PRESSURE < 80 MM HG: ICD-10-PCS | Mod: HCNC,CPTII,S$GLB, | Performed by: NEUROLOGICAL SURGERY

## 2019-02-04 NOTE — PROGRESS NOTES
CHIEF COMPLAINT:  Post op evaluation 3 months after C2-C6 PCF    Khris PAREDES, attest that this documentation has been prepared under the direction and in the presence of Kishore Turner MD.    HPI:  Maximilian Tavera Jr. is a 73 y.o.  male who presents today for an 3 month post op evaluation. Pt is s/p C2-C6 PCF on 11/7/2018. Pt reports continued left sided shoulder pain that seems to diminish throughout the day. He states that it feels like a stretching in his left shoulder and into his left trapezius. Pt presents today wearing his cervical collar. Pt had a UTI after leaving the hospital and is completing his second round of antibiotics.       Review of patient's allergies indicates:   Allergen Reactions    No known drug allergies        Past Medical History:   Diagnosis Date    Arthritis     Back pain     Carpal tunnel syndrome 2/25/2013    Cataract     Cataract, left eye 11/10/2014    Chest pain, musculoskeletal     COPD (chronic obstructive pulmonary disease) 11/052018    Emphysema lung 11/05/2018    Hyperlipidemia     Hypertension     Hypothyroidism     Knee fracture     Myasthenia gravis     Neuropathy 1/3/2013    Obesity 1/29/2015    Polyneuropathy     PVC (premature ventricular contraction)     Squamous cell carcinoma 2014    left forearm    Thyroid disease      Past Surgical History:   Procedure Laterality Date    APPENDECTOMY      C2-C6 Posterior Cervical Laminectomy & Instrumental Fusion N/A 11/7/2018    Performed [facility] Turner [facility] 2ND FLR    CATARACT EXTRACTION W/  INTRAOCULAR LENS IMPLANT Bilateral     COLONOSCOPY N/A 3/1/2012    [facility] at [facility]    EGD (ESOPHAGOGASTRODUODENOSCOPY) N/A 9/12/2018    Performed by Gabriel Hernandez MD at [facility] ENDO    EXTRACTION-CATARACT-IOL Left 12/9/2014    Per[facility] by [facility]ague [facility] at [facility] OR    HEMORRHOID SURGERY      INJECTION-STEROID-EPIDURAL-TRANSFORAMINAL Right 5/17/2018     [facility] at [facility]    INJECTION-STEROID-EPIDURAL-TRANSFORAMINAL Right 9/1/2017    [facility] at [facility] OR    INJECTION-STEROID-EPIDURAL-TRANSFORAMINAL Right 2/21/2017    [facility] at [facility] OR    INJECTION-STEROID-EPIDURAL-TRANSFORAMINAL Right 10/9/2014    [facility] by [facility] at [facility] OR    KNEE ARTHROPLASTY Bilateral     NECK SURGERY      TONSILLECTOMY       Family History   Problem Relation Age of Onset    Cataracts Mother     Heart disease Mother         CHF    Hypertension Mother     Hyperlipidemia Mother     Cataracts Father     Glaucoma Father     Heart disease Father     Hyperlipidemia Sister     Hypertension Sister     No Known Problems Daughter     No Known Problems Daughter     Collagen disease Neg Hx     Amblyopia Neg Hx     Blindness Neg Hx     Macular degeneration Neg Hx     Retinal detachment Neg Hx     Strabismus Neg Hx     Cancer Neg Hx      Social History     Tobacco Use    Smoking status: Never Smoker    Smokeless tobacco: Never Used    Tobacco comment: when a child   Substance Use Topics    Alcohol use: Yes     Comment: rarely    Drug use: No        Review of Systems   Constitutional: Negative.    HENT: Negative.    Eyes: Negative.    Respiratory: Negative.    Cardiovascular: Negative.    Gastrointestinal: Negative.    Endocrine: Negative.    Genitourinary: Negative.    Musculoskeletal: Positive for arthralgias (left shoulder). Negative for back pain, gait problem and neck pain.   Skin: Negative.    Allergic/Immunologic: Negative.    Neurological: Negative for weakness, light-headedness, numbness and headaches.   Hematological: Negative.    Psychiatric/Behavioral: Negative.        OBJECTIVE:   Vital Signs:  Temp: 98.5 °F (36.9 °C) (02/04/19 1500)  Pulse: 74 (02/04/19 1500)  BP: 117/72 (02/04/19 1500)    Physical Exam:    Vital signs: All nursing notes and vital signs reviewed -- afebrile, vital signs  stable.  Constitutional: Patient sitting comfortably in chair. Appears well developed and well nourished.  Skin: Exposed areas are intact without abnormal markings, rashes or other lesions. Well healed posterior cervical incision.   HEENT: Normocephalic. Normal conjunctivae.  Cardiovascular: Normal rate and regular rhythm.  Respiratory: Chest wall rises and falls symmetrically, without signs of respiratory distress.  Abdomen: Soft and non-tender.  Extremities: Warm and without edema. Calves supple, non-tender. Pain left shoulder with passive motion.  Psych/Behavior: Normal affect.    Neurological:    Mental status: Alert and oriented. Conversational and appropriate.       Cranial Nerves: Grossly intact.     Motor:    Upper:  Deltoids Triceps Biceps WE WF     R 5/5 5/5 5/5 5/5 5/5 5/5    L 4+/5 5/5 5/5 5/5 5/5 5/5      Lower:  HF KE KF DF PF EHL    R 5/5 5/5 5/5 5/5 5/5 5/5    L 5/5 5/5 5/5 5/5 5/5 5/5     Sensory: Intact sensation to light touch in all extremities. Romberg negative.    Reflexes:          DTR: 2+ symmetrically throughout.     Mcneill's: Negative.     Babinski's: Negative.     Clonus: Negative.    Cerebellar: Finger-to-nose and rapid alternating movements normal. Gait stable, fluid.    Spine:    Posture: Head well aligned over pelvis in front and side views.  No focal or global spinal deformity visible on inspection. Shoulders and hips even. No obvious leg length discrepancy. No scapula winging.    Bending: Full ROM with forward, back and lateral bending. No rib prominence with forward bend.    Cervical:      ROM: Full with flexion, extension, lateral rotation and ear-to-shoulder bend.      Midline TTP: Negative.     Spurling's test: Negative.     Lhermitte's: Negative.    Thoracic:     Midline TTP: Negative    Lumbar:     Midline TTP: Negative     Straight Leg Test: Negative     Crossed Straight Leg Test: Negative     Sciatic notch tenderness: Negative.    Other:     SI joint TTP: Negative.      Greater trochanter TTP: Negative.     Tenderness with external/internal hip rotation: Negative.    Diagnostic Results:  All imaging was independently reviewed by me.    CT C-spine, dated 02/04/2019:  1. Solid fusion from C2 to C6    ASSESSMENT/PLAN:     Maximilian Tavera Jr. is doing great 3 months after PCF for myelopathy. He has some persistent axial neck pain and left shoulder pain. I do not think his shoulder pain is radicular or coming from the neck. His CT shows solid bony fusion. I recommend weaning cervical collar, PT for neck and left shoulder, and referral to ortho for evaluation of left shoulder. He will follow up at 1 year postop.    The patient understands and agrees with the plan of care. All questions were answered.     1. Referral to Orthopedic Surgery for left shoulder  2. Referral to Physical Therapy   3. RTC 1 year post op       I, Dr. Kishore Turner personally performed the services described in this documentation. All medical record entries made by the scribe, Khris Hunter, were at my direction and in my presence.  I have reviewed the chart and agree that the record reflects my personal performance and is accurate and complete.      Kishore Turner M.D.  Department of Neurosurgery  [facility]      .

## 2019-02-05 ENCOUNTER — LAB VISIT (OUTPATIENT)
Dept: LAB | Facility: HOSPITAL | Age: 74
End: 2019-02-05
Attending: UROLOGY
Payer: MEDICARE

## 2019-02-05 ENCOUNTER — CLINICAL SUPPORT (OUTPATIENT)
Dept: UROLOGY | Facility: CLINIC | Age: 74
End: 2019-02-05
Payer: MEDICARE

## 2019-02-05 DIAGNOSIS — R82.998 CELLS AND CASTS IN URINE: Primary | ICD-10-CM

## 2019-02-05 DIAGNOSIS — N39.0 RECURRENT UTI: ICD-10-CM

## 2019-02-05 LAB
ANION GAP SERPL CALC-SCNC: 7 MMOL/L
BUN SERPL-MCNC: 18 MG/DL
CALCIUM SERPL-MCNC: 9.7 MG/DL
CHLORIDE SERPL-SCNC: 104 MMOL/L
CO2 SERPL-SCNC: 27 MMOL/L
CREAT SERPL-MCNC: 1.2 MG/DL
EST. GFR  (AFRICAN AMERICAN): >60 ML/MIN/1.73 M^2
EST. GFR  (NON AFRICAN AMERICAN): 59 ML/MIN/1.73 M^2
GLUCOSE SERPL-MCNC: 112 MG/DL
POTASSIUM SERPL-SCNC: 3.9 MMOL/L
SODIUM SERPL-SCNC: 138 MMOL/L

## 2019-02-05 PROCEDURE — 99999 PR PBB SHADOW E&M-EST. PATIENT-LVL II: CPT | Mod: PBBFAC,HCNC,,

## 2019-02-05 PROCEDURE — 87086 URINE CULTURE/COLONY COUNT: CPT | Mod: HCNC

## 2019-02-05 PROCEDURE — 80048 BASIC METABOLIC PNL TOTAL CA: CPT | Mod: HCNC

## 2019-02-05 PROCEDURE — 99999 PR PBB SHADOW E&M-EST. PATIENT-LVL II: ICD-10-PCS | Mod: PBBFAC,HCNC,,

## 2019-02-05 PROCEDURE — 36415 COLL VENOUS BLD VENIPUNCTURE: CPT | Mod: HCNC

## 2019-02-07 LAB — BACTERIA UR CULT: NO GROWTH

## 2019-02-08 DIAGNOSIS — M25.511 RIGHT SHOULDER PAIN, UNSPECIFIED CHRONICITY: Primary | ICD-10-CM

## 2019-02-11 ENCOUNTER — HOSPITAL ENCOUNTER (OUTPATIENT)
Dept: RADIOLOGY | Facility: HOSPITAL | Age: 74
Discharge: HOME | End: 2019-02-11
Attending: UROLOGY
Payer: MEDICARE

## 2019-02-11 DIAGNOSIS — N39.0 RECURRENT UTI: ICD-10-CM

## 2019-02-11 PROCEDURE — 74178 CT UROGRAM ABD PELVIS W WO: ICD-10-PCS | Mod: 26,HCNC,, | Performed by: RADIOLOGY

## 2019-02-11 PROCEDURE — 25500020 PHARM REV CODE 255: Mod: HCNC

## 2019-02-11 PROCEDURE — 74178 CT ABD&PLV WO CNTR FLWD CNTR: CPT | Mod: 26,HCNC,, | Performed by: RADIOLOGY

## 2019-02-11 PROCEDURE — 74178 CT ABD&PLV WO CNTR FLWD CNTR: CPT | Mod: TC,HCNC

## 2019-02-11 RX ADMIN — IOHEXOL 125 ML: 350 INJECTION, SOLUTION INTRAVENOUS at 09:02

## 2019-02-12 ENCOUNTER — PATIENT MESSAGE (OUTPATIENT)
Dept: CARDIOLOGY | Facility: CLINIC | Age: 74
End: 2019-02-12

## 2019-02-13 ENCOUNTER — TELEPHONE (OUTPATIENT)
Dept: NEUROLOGY | Facility: CLINIC | Age: 74
End: 2019-02-13

## 2019-02-14 ENCOUNTER — OFFICE VISIT (OUTPATIENT)
Dept: ORTHOPEDICS | Facility: CLINIC | Age: 74
End: 2019-02-14
Payer: MEDICARE

## 2019-02-14 ENCOUNTER — HOSPITAL ENCOUNTER (OUTPATIENT)
Dept: RADIOLOGY | Facility: HOSPITAL | Age: 74
Discharge: HOME | End: 2019-02-14
Attending: ORTHOPAEDIC SURGERY
Payer: MEDICARE

## 2019-02-14 VITALS
BODY MASS INDEX: 30.35 KG/M2 | HEART RATE: 84 BPM | WEIGHT: 229 LBS | SYSTOLIC BLOOD PRESSURE: 153 MMHG | DIASTOLIC BLOOD PRESSURE: 83 MMHG | HEIGHT: 73 IN

## 2019-02-14 DIAGNOSIS — M25.512 LEFT SHOULDER PAIN, UNSPECIFIED CHRONICITY: ICD-10-CM

## 2019-02-14 DIAGNOSIS — G89.29 CHRONIC LEFT SHOULDER PAIN: ICD-10-CM

## 2019-02-14 DIAGNOSIS — M25.511 RIGHT SHOULDER PAIN, UNSPECIFIED CHRONICITY: ICD-10-CM

## 2019-02-14 DIAGNOSIS — M25.512 CHRONIC LEFT SHOULDER PAIN: ICD-10-CM

## 2019-02-14 DIAGNOSIS — Z98.1 S/P CERVICAL SPINAL FUSION: Primary | ICD-10-CM

## 2019-02-14 PROCEDURE — 99999 PR PBB SHADOW E&M-EST. PATIENT-LVL III: CPT | Mod: PBBFAC,HCNC,, | Performed by: ORTHOPAEDIC SURGERY

## 2019-02-14 PROCEDURE — 73030 XR SHOULDER TRAUMA 3 VIEW LEFT: ICD-10-PCS | Mod: 26,HCNC,LT, | Performed by: RADIOLOGY

## 2019-02-14 PROCEDURE — 73030 X-RAY EXAM OF SHOULDER: CPT | Mod: TC,HCNC,PN,LT

## 2019-02-14 PROCEDURE — 1101F PT FALLS ASSESS-DOCD LE1/YR: CPT | Mod: HCNC,CPTII,S$GLB, | Performed by: ORTHOPAEDIC SURGERY

## 2019-02-14 PROCEDURE — 99214 OFFICE O/P EST MOD 30 MIN: CPT | Mod: HCNC,S$GLB,, | Performed by: ORTHOPAEDIC SURGERY

## 2019-02-14 PROCEDURE — 73030 X-RAY EXAM OF SHOULDER: CPT | Mod: 26,HCNC,LT, | Performed by: RADIOLOGY

## 2019-02-14 PROCEDURE — 99999 PR PBB SHADOW E&M-EST. PATIENT-LVL III: ICD-10-PCS | Mod: PBBFAC,HCNC,, | Performed by: ORTHOPAEDIC SURGERY

## 2019-02-14 PROCEDURE — 3077F SYST BP >= 140 MM HG: CPT | Mod: HCNC,CPTII,S$GLB, | Performed by: ORTHOPAEDIC SURGERY

## 2019-02-14 PROCEDURE — 3077F PR MOST RECENT SYSTOLIC BLOOD PRESSURE >= 140 MM HG: ICD-10-PCS | Mod: HCNC,CPTII,S$GLB, | Performed by: ORTHOPAEDIC SURGERY

## 2019-02-14 PROCEDURE — 3079F DIAST BP 80-89 MM HG: CPT | Mod: HCNC,CPTII,S$GLB, | Performed by: ORTHOPAEDIC SURGERY

## 2019-02-14 PROCEDURE — 1101F PR PT FALLS ASSESS DOC 0-1 FALLS W/OUT INJ PAST YR: ICD-10-PCS | Mod: HCNC,CPTII,S$GLB, | Performed by: ORTHOPAEDIC SURGERY

## 2019-02-14 PROCEDURE — 99214 PR OFFICE/OUTPT VISIT, EST, LEVL IV, 30-39 MIN: ICD-10-PCS | Mod: HCNC,S$GLB,, | Performed by: ORTHOPAEDIC SURGERY

## 2019-02-14 PROCEDURE — 3079F PR MOST RECENT DIASTOLIC BLOOD PRESSURE 80-89 MM HG: ICD-10-PCS | Mod: HCNC,CPTII,S$GLB, | Performed by: ORTHOPAEDIC SURGERY

## 2019-02-14 NOTE — LETTER
February 15, 2019      Kishore Turner MD  [address]  [facility] [address]           [address] [address]  Phone: [phone]          Patient: Maximilian Tavera Jr.   MR Number: [id number]   YOB: 1945   Date of Visit: 2/14/2019       Dear Dr. Kishore Turner:    Thank you for referring Maximilian Tavera to me for evaluation. Attached you will find relevant portions of my assessment and plan of care.    If you have questions, please do not hesitate to call me. I look forward to following Maximilian Tavera along with you.    Sincerely,    Haroldo Campbell MD    Enclosure  CC:  No Recipients    If you would like to receive this communication electronically, please contact [email or web address] or [phone] to request more information on [facility] Link access.    For providers and/or their staff who would like to refer a patient to [facility], please contact us through our one-stop-shop provider referral line, Rudolph Wu, at [phone].    If you feel you have received this communication in error or would no longer like to receive these types of communications, please e-mail [email or web address]

## 2019-02-14 NOTE — TELEPHONE ENCOUNTER
Left a message for the patient to let him know the soonest available for Dr. Hoang is May 15 at 1:00. The patient was asked to call back to confirm or to reschedule for a sooner appointment with a resident doctor.

## 2019-02-14 NOTE — TELEPHONE ENCOUNTER
----- Message from Alex Li sent at 2/13/2019  2:10 PM CST -----  Contact: Patient @ [phone]  Patient calling to schedule a NP appt to consult for Myasthenia Gravis former pt of Dr OSWALD pls call to advise

## 2019-02-14 NOTE — PROGRESS NOTES
Past Medical History:   Diagnosis Date    Arthritis     Back pain     Carpal tunnel syndrome 2/25/2013    Cataract     Cataract, left eye 11/10/2014    Chest pain, musculoskeletal     COPD (chronic obstructive pulmonary disease) 11/052018    Emphysema lung 11/05/2018    Hyperlipidemia     Hypertension     Hypothyroidism     Knee fracture     Myasthenia gravis     Neuropathy 1/3/2013    Obesity 1/29/2015    Polyneuropathy     PVC (premature ventricular contraction)     Squamous cell carcinoma 2014    left forearm    Thyroid disease        Past Surgical History:   Procedure Laterality Date    APPENDECTOMY      C2-C6 Posterior Cervical Laminectomy & Instrumental Fusion N/A 11/7/2018    [facility] at [facility] 2ND FLR    CATARACT EXTRACTION W/  INTRAOCULAR LENS IMPLANT Bilateral     COLONOSCOPY N/A 3/1/2012    Performed by [facility] at [facility]O    EGD (ESOPHAGOGASTRODUODENOSCOPY) N/A 9/12/2018    Performed by Gabriel Hernandez MD at [facility] ENDO    EXTRACTION-CATARACT-IOL Left 12/9/2014    [facility] at [facility] OR    HEMORRHOID SURGERY      INJECTION-STEROID-EPIDURAL-TRANSFORAMINAL Right 5/17/2018    [facility]att [facility] at [facility] OR    INJECTION-STEROID-EPIDURAL-TRANSFORAMINAL Right 9/1/2017    [facility] at [facility] OR    INJECTION-STEROID-EPIDURAL-TRANSFORAMINAL Right 2/21/2017    [facility] at [facility] OR    INJECTION-STEROID-EPIDURAL-TRANSFORAMINAL Right 10/9/2014    [facility] by [facility] at [facility] OR    KNEE ARTHROPLASTY Bilateral     NECK SURGERY      TONSILLECTOMY         Current Outpatient Medications   Medication Sig    acetaminophen (TYLENOL) 325 MG tablet Take 2 tablets (650 mg total) by mouth every 4 (four) hours as needed (pain score 1-2/10).    albuterol (PROVENTIL/VENTOLIN HFA) 90 mcg/actuation inhaler Inhale 2 puffs into the lungs every 6 (six) hours as needed for Wheezing.     alfuzosin (UROXATRAL) 10 mg Tb24 Take 1 tablet (10 mg total) by mouth daily with breakfast.    atorvastatin (LIPITOR) 80 MG tablet TAKE 1 TABLET EVERY DAY    cetirizine (ZYRTEC) 10 MG tablet Take 1 tablet by mouth daily as needed.    chlorthalidone (HYGROTEN) 25 MG Tab TAKE 1 TABLET EVERY DAY    cholecalciferol, vitamin D3, (VITAMIN D) 2,000 unit Cap 1 capsule once daily. Every day    coenzyme Q10 (CO Q-10) 100 mg capsule Take 400 mg by mouth once daily.     cyanocobalamin, vitamin B-12, (VITAMIN B-12) 5,000 mcg Subl Take 5,000 mcg by mouth once daily. Every day    ezetimibe (ZETIA) 10 mg tablet Take 10 mg by mouth once daily.     fluticasone (FLONASE) 50 mcg/actuation nasal spray USE 1 SPRAY IN EACH NOSTRIL TWICE DAILY AS NEEDED  FOR  RHINITIS    gabapentin (NEURONTIN) 300 MG capsule Take 1 capsule (300 mg total) by mouth 3 (three) times daily.    levothyroxine (SYNTHROID) 50 MCG tablet TAKE 1 TABLET EVERY DAY    lisinopril (PRINIVIL,ZESTRIL) 40 MG tablet TAKE 1 TABLET EVERY DAY    methylsulfonylmethane (MSM) 1,000 mg Tab Take 1,000 mg by mouth once daily. Every day    milk thistle 200 mg Cap Take 200 mg by mouth 2 (two) times daily. Twice a day    omega-3 fatty acids 1,000 mg Cap Twice a day    pantoprazole (PROTONIX) 40 MG tablet Take 1 tablet (40 mg total) by mouth once daily. (Patient taking differently: Take 40 mg by mouth every morning. )    potassium chloride (KLOR-CON) 8 MEQ TbSR TAKE 2 TABLETS THREE TIMES DAILY  OR AS DIRECTED    predniSONE (DELTASONE) 5 MG tablet TAKE 1 TABLET EVERY OTHER DAY    senna-docusate 8.6-50 mg (PERICOLACE) 8.6-50 mg per tablet Take 2 tablets by mouth nightly as needed for Constipation.    sildenafil (REVATIO) 20 mg Tab Take 1 to 3 tabs daily as needed.    sulfamethoxazole-trimethoprim 800-160mg (BACTRIM DS) 800-160 mg Tab Take 1 tablet by mouth once daily. After completing 1 week course 2x/day     No current facility-administered medications for this visit.         Review of patient's allergies indicates:   Allergen Reactions    No known drug allergies        Family History   Problem Relation Age of Onset    Cataracts Mother     Heart disease Mother         CHF    Hypertension Mother     Hyperlipidemia Mother     Cataracts Father     Glaucoma Father     Heart disease Father     Hyperlipidemia Sister     Hypertension Sister     No Known Problems Daughter     No Known Problems Daughter     Collagen disease Neg Hx     Amblyopia Neg Hx     Blindness Neg Hx     Macular degeneration Neg Hx     Retinal detachment Neg Hx     Strabismus Neg Hx     Cancer Neg Hx        Social History     Socioeconomic History    Marital status:      Spouse name: Not on file    Number of children: Not on file    Years of education: Not on file    Highest education level: Not on file   Social Needs    Financial resource strain: Not on file    Food insecurity - worry: Not on file    Food insecurity - inability: Not on file    Transportation needs - medical: Not on file    Transportation needs - non-medical: Not on file   Occupational History    Not on file   Tobacco Use    Smoking status: Never Smoker    Smokeless tobacco: Never Used    Tobacco comment: when a child   Substance and Sexual Activity    Alcohol use: Yes     Comment: rarely    Drug use: No    Sexual activity: Yes     Partners: Female   Other Topics Concern    Not on file   Social History Narrative    Not on file       Chief Complaint:   Chief Complaint   Patient presents with    Left Shoulder - Pain    Other     left side of neck stiff into L shoulder       History of present illness:  This is a 73-year-old male seen for left shoulder and neck pain. Patient had a very big neck fusion done in November of 2018.  Since he has had some pain on the left side of his neck and shoulder.  Feels very tight and he describes it as like a bungee cord being stretched.  Pain is up to 6/10.  Located mainly  in the trapezius area. No previous treatment specifically for the shoulder.  Has had therapy for his neck.    Answers for HPI/ROS submitted by the patient on 2/9/2019   Arm pain  unexpected weight change: No  appetite change : No  sleep disturbance: No  IMMUNOCOMPROMISED: No  nervous/ anxious: No  dysphoric mood: No  rash: No  visual disturbance: No  eye redness: No  eye pain: No  ear pain: No  tinnitus: Yes  hearing loss: No  sinus pressure : Yes  nosebleeds: No  enviro allergies: No  food allergies: No  cough: No  shortness of breath: Yes  sweating: No  frequency: Yes  difficulty urinating: Yes  hematuria: No  chest pain: No  palpitations: No  nausea: No  vomiting: No  diarrhea: No  blood in stool: No  constipation: No  headaches: No  dizziness: No  numbness: No  seizures: No  joint swelling: No  myalgia: Yes  weakness: Yes  back pain: Yes  Pain Chronicity: new  History of trauma: No  Onset: more than 1 month ago  Frequency: constantly  Progression since onset: unchanged  Injury mechanism: lifting  injury location: at home  pain- numeric: 4/10  pain location: left shoulder  pain quality: tightness  Radiating Pain: Yes  If your pain is radiating, to what part of the body?: left shoulder  Aggravating factors: extension  fever: No  inability to bear weight: No  itching: No  joint locking: No  limited range of motion: Yes  stiffness: Yes  tingling: Yes  Treatments tried: heat  physical therapy: not tried  Improvement on treatment: no relief        Physical Examination:    Vital Signs:    Vitals:    02/14/19 1521   BP: (!) 153/83   Pulse: 84       Body mass index is 30.21 kg/m².    This a well-developed, well nourished patient in no acute distress.  They are alert and oriented and cooperative to examination.  Pt. walks without an antalgic gait.      Examination of the left shoulder shows no rashes or erythema. There are no masses, ecchymosis, or atrophy.  He has some tightness up in his trapezius with a trigger point  within the muscle as well. The patient has full range of motion in forward flexion, external rotation, and internal rotation to the mid T-spine. The patient has - impingement signs. - [place]'s test. - Speeds test. Nontender to palpation over a.c. joint. Normal stability anteriorly, posteriorly, and negative sulcus sign. Passive range of motion: Forward flexion of 180°, external rotation at 90° of 90°, internal rotation of 50°, and external rotation at 0° of 50°. 2+ radial pulse. Intact axillary, radial, median and ulnar sensation. 5 out of 5 resisted forward flexion, external rotation, and negative lift off test.    Examination of the right shoulder shows no rashes or erythema. There are no masses, ecchymosis, or atrophy. The patient has full range of motion in forward flexion, external rotation, and internal rotation to the mid T-spine. The patient has - impingement signs. - [place]'s test. - Speeds test. Nontender to palpation over a.c. joint. Normal stability anteriorly, posteriorly, and negative sulcus sign. Passive range of motion: Forward flexion of 180°, external rotation at 90° of 90°, internal rotation of 50°, and external rotation at 0° of 50°. 2+ radial pulse. Intact axillary, radial, median and ulnar sensation. 5 out of 5 resisted forward flexion, external rotation, and negative lift off test.        X-rays:  X-rays of the left shoulder ordered and reviewed which show some mild degenerative change of the AC joint.     Assessment::  Left trapezius pain And tightness    Plan:  I reviewed the finding with him today.  The x-rays but shoulder look good.  He does not have findings consistent with either rotator cuff injury or labral pathology.  I think some of it is due to his muscular imbalance after his neck surgery. Recommended formal physical therapy for his shoulder and neck.  Might benefit from some dry needling.  Follow up in about eight weeks.    This note was created using [employer] voice recognition  software that occasionally misinterpreted phrases or words.    Consult note is delivered via Epic messaging service.

## 2019-02-15 ENCOUNTER — TELEPHONE (OUTPATIENT)
Dept: UROLOGY | Facility: CLINIC | Age: 74
End: 2019-02-15

## 2019-02-15 DIAGNOSIS — Z98.890 HISTORY OF CERVICAL SPINAL SURGERY: Primary | ICD-10-CM

## 2019-02-15 NOTE — TELEPHONE ENCOUNTER
----- Message from Kelli Brown sent at 2/15/2019  4:33 PM CST -----  Type: Needs Medical Advice    Who Called: Patient  Best Call Back Number:   Additional Information: Patient is calling to see how long he should stay on the sulfamethoxazole-trimethoprim 800-160mg (BACTRIM DS) 800-160 mg Tab and also wants to know the results CT scan.Please call back and advise.

## 2019-02-15 NOTE — TELEPHONE ENCOUNTER
Patient advised that I will send message to MD.    Please review CT.     He has MG and is concerned about daily abx use.

## 2019-02-18 DIAGNOSIS — N40.1 BPH WITH URINARY OBSTRUCTION: Primary | ICD-10-CM

## 2019-02-18 DIAGNOSIS — N40.1 BPH WITH OBSTRUCTION/LOWER URINARY TRACT SYMPTOMS: ICD-10-CM

## 2019-02-18 DIAGNOSIS — N13.8 BPH WITH OBSTRUCTION/LOWER URINARY TRACT SYMPTOMS: ICD-10-CM

## 2019-02-18 DIAGNOSIS — N13.8 BPH WITH URINARY OBSTRUCTION: Primary | ICD-10-CM

## 2019-02-18 RX ORDER — LIDOCAINE HYDROCHLORIDE 20 MG/ML
JELLY TOPICAL ONCE
Status: CANCELLED | OUTPATIENT
Start: 2019-02-18 | End: 2019-02-18

## 2019-02-19 NOTE — TELEPHONE ENCOUNTER
CT negative except for enlarged prostate and associated bladder wall thickening. Needs cysto/trus to complete workup. Booked for 2.26. Emailed patient.

## 2019-02-21 ENCOUNTER — TELEPHONE (OUTPATIENT)
Dept: UROLOGY | Facility: CLINIC | Age: 74
End: 2019-02-21

## 2019-02-21 NOTE — TELEPHONE ENCOUNTER
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 Informed pt ASC will call to verify time for procedure the day before and to let them know if that's a ok time or not pt voiced ok

## 2019-02-21 NOTE — TELEPHONE ENCOUNTER
----- Message from Jalen Mcconnell sent at 2/21/2019 11:39 AM CST -----  Type:  Patient Call Back    Who Called:  Pt   Does the patient know what this is regarding?: has procedure questions   Best Call Back Number:  [phone]  Additional Information:  Please call pt and leave a detailed message if there is no answer.

## 2019-02-25 ENCOUNTER — OFFICE VISIT (OUTPATIENT)
Dept: FAMILY MEDICINE | Facility: CLINIC | Age: 74
End: 2019-02-25
Payer: MEDICARE

## 2019-02-25 ENCOUNTER — TELEPHONE (OUTPATIENT)
Dept: FAMILY MEDICINE | Facility: CLINIC | Age: 74
End: 2019-02-25

## 2019-02-25 VITALS
TEMPERATURE: 98 F | WEIGHT: 233.69 LBS | OXYGEN SATURATION: 95 % | HEIGHT: 73 IN | DIASTOLIC BLOOD PRESSURE: 68 MMHG | HEART RATE: 80 BPM | BODY MASS INDEX: 30.97 KG/M2 | SYSTOLIC BLOOD PRESSURE: 117 MMHG

## 2019-02-25 DIAGNOSIS — G70.00 MYASTHENIA GRAVIS, ACHR ANTIBODY POSITIVE: ICD-10-CM

## 2019-02-25 DIAGNOSIS — R00.0 RACING HEART BEAT: ICD-10-CM

## 2019-02-25 DIAGNOSIS — E66.9 OBESITY (BMI 30.0-34.9): ICD-10-CM

## 2019-02-25 DIAGNOSIS — R06.09 DOE (DYSPNEA ON EXERTION): ICD-10-CM

## 2019-02-25 DIAGNOSIS — I70.0 ABDOMINAL AORTIC ATHEROSCLEROSIS: ICD-10-CM

## 2019-02-25 DIAGNOSIS — I10 HTN (HYPERTENSION), BENIGN: Primary | ICD-10-CM

## 2019-02-25 PROCEDURE — 1101F PT FALLS ASSESS-DOCD LE1/YR: CPT | Mod: HCNC,CPTII,S$GLB, | Performed by: PHYSICIAN ASSISTANT

## 2019-02-25 PROCEDURE — 3078F PR MOST RECENT DIASTOLIC BLOOD PRESSURE < 80 MM HG: ICD-10-PCS | Mod: HCNC,CPTII,S$GLB, | Performed by: PHYSICIAN ASSISTANT

## 2019-02-25 PROCEDURE — 99999 PR PBB SHADOW E&M-EST. PATIENT-LVL V: CPT | Mod: PBBFAC,HCNC,, | Performed by: PHYSICIAN ASSISTANT

## 2019-02-25 PROCEDURE — 99214 PR OFFICE/OUTPT VISIT, EST, LEVL IV, 30-39 MIN: ICD-10-PCS | Mod: HCNC,S$GLB,, | Performed by: PHYSICIAN ASSISTANT

## 2019-02-25 PROCEDURE — 3074F PR MOST RECENT SYSTOLIC BLOOD PRESSURE < 130 MM HG: ICD-10-PCS | Mod: HCNC,CPTII,S$GLB, | Performed by: PHYSICIAN ASSISTANT

## 2019-02-25 PROCEDURE — 3074F SYST BP LT 130 MM HG: CPT | Mod: HCNC,CPTII,S$GLB, | Performed by: PHYSICIAN ASSISTANT

## 2019-02-25 PROCEDURE — 99999 PR PBB SHADOW E&M-EST. PATIENT-LVL V: ICD-10-PCS | Mod: PBBFAC,HCNC,, | Performed by: PHYSICIAN ASSISTANT

## 2019-02-25 PROCEDURE — 1101F PR PT FALLS ASSESS DOC 0-1 FALLS W/OUT INJ PAST YR: ICD-10-PCS | Mod: HCNC,CPTII,S$GLB, | Performed by: PHYSICIAN ASSISTANT

## 2019-02-25 PROCEDURE — 3078F DIAST BP <80 MM HG: CPT | Mod: HCNC,CPTII,S$GLB, | Performed by: PHYSICIAN ASSISTANT

## 2019-02-25 PROCEDURE — 99214 OFFICE O/P EST MOD 30 MIN: CPT | Mod: HCNC,S$GLB,, | Performed by: PHYSICIAN ASSISTANT

## 2019-02-25 RX ORDER — OLMESARTAN MEDOXOMIL 20 MG/1
20 TABLET ORAL DAILY
Qty: 90 TABLET | Refills: 3 | Status: SHIPPED | OUTPATIENT
Start: 2019-02-25 | End: 2019-11-11 | Stop reason: SDUPTHER

## 2019-02-25 NOTE — TELEPHONE ENCOUNTER
----- Message from Tuan Garrido sent at 2/25/2019 10:15 AM CST -----  Patient scheduled to see Dr Altamirano on 3-20-19, Farideh needs to put orders in

## 2019-02-25 NOTE — PROGRESS NOTES
Subjective:       Patient ID: Maximilian Tavera Jr. is a 73 y.o. male.    Chief Complaint: Follow-up (3 month)    Mr. Tavera comes to clinic today for follow up. He had cervical spinal fusion with Dr. Turner on 11/07/18.  The patient is no longer wearing the immobilizer. He reports he will start attending physical therapy today. He reports he has been experiencing some pain in his left upper back since the surgery. Dr. Turner and Dr. Lewis have both recommended that the patient start physical therapy for this. The patient has a history of pneumonia in April of last year. He reports that since the pneumonia he has not had his normal stamina. He reports that he will become short of breath with walking short distances. He also reports that his blood pressure will drop and his heart will race.  He had a PFT that revealed some restriction. He is interested in seeing a pulmonologist. The patient is followed by cardiologist, Dr. Altamirano. The patient reports he has been trying to get an appointment with his cardiologist but he has had difficulty scheduling this. The patient is due for colonoscopy. The patient is aware that he needs to complete this study. He would prefer to wait until he has recovered more from his cervical spine surgery. The patient is currently being followed by Dr. Cheung, urologist. He has a procedure scheduled tomorrow to help with his BPH symptoms.       Review of Systems   Constitutional: Positive for activity change. Negative for unexpected weight change.   HENT: Negative for hearing loss, rhinorrhea and trouble swallowing.    Eyes: Negative for discharge and visual disturbance.   Respiratory: Positive for wheezing. Negative for chest tightness.    Cardiovascular: Negative for chest pain and palpitations.   Gastrointestinal: Negative for blood in stool, constipation, diarrhea and vomiting.   Endocrine: Negative for polydipsia and polyuria.   Genitourinary: Positive for difficulty urinating and  urgency. Negative for hematuria.   Musculoskeletal: Positive for neck pain. Negative for arthralgias and joint swelling.   Neurological: Negative for headaches.   Psychiatric/Behavioral: Negative for confusion and dysphoric mood.       Objective:      Physical Exam   Constitutional: He is oriented to person, place, and time.   HENT:   Mouth/Throat: Oropharynx is clear and moist. No oropharyngeal exudate.   Eyes: Conjunctivae are normal. Pupils are equal, round, and reactive to light.   Cardiovascular: Normal rate and regular rhythm.   Pulmonary/Chest: Effort normal and breath sounds normal. He has no wheezes.   Abdominal: Soft. Bowel sounds are normal. There is no tenderness.   Musculoskeletal: He exhibits no edema.   Decreased mobility of cervical spine. S/p cervical spine surgery. Patient to start PT today   Lymphadenopathy:     He has no cervical adenopathy.   Neurological: He is alert and oriented to person, place, and time.   Skin: No erythema.   Psychiatric: His behavior is normal.       Assessment:       1. HTN (hypertension), benign    2. Myasthenia gravis, AChR antibody positive    3. GOLD (dyspnea on exertion)    4. Abdominal aortic atherosclerosis    5. Racing heart beat    6. Obesity (BMI 30.0-34.9)        Plan:   Maximilian was seen today for follow-up.    Diagnoses and all orders for this visit:    HTN (hypertension), benign  -     olmesartan (BENICAR) 20 MG tablet; Take 1 tablet (20 mg total) by mouth once daily.  -     Ambulatory referral to Cardiology  -     Echocardiogram stress test with CFD (Cupid Only); Future  Controlled  Low salt diet  Stop lisinopril.  Start benicar  Myasthenia gravis, AChR antibody positive  Continue follow up with neurology at [facility]  GOLD (dyspnea on exertion)  -     Ambulatory referral to Cardiology  -     Holter monitor - 48 hour (Cupid Only); Future  -     Echocardiogram stress test with CFD (Cupid Only); Future  Cardiac studies.  Consider pulmonology referral.    Abdominal aortic atherosclerosis  US is up to date  Racing heart beat  -     Holter monitor - 48 hour (Cupid Only); Future  -     Echocardiogram stress test with CFD (Cupid Only); Future    Obesity (BMI 30.0-34.9)  Low carbohydrate, high fiber diet  Increase exercise as able      Patient readiness: acceptance and barriers:none    During the course of the visit the patient was educated and counseled about the following:     Hypertension:   Medication: no change.  Dietary sodium restriction.  Regular aerobic exercise.  Obesity:   General weight loss/lifestyle modification strategies discussed (elicit support from others; identify saboteurs; non-food rewards, etc).    Goals: Hypertension: Reduce Blood Pressure and Obesity: Reduce calorie intake and BMI    Did patient meet goals/outcomes: No    The following self management tools provided: declined    Patient Instructions (the written plan) was given to the patient/family.     Time spent with patient: 30 minutes    Barriers to medications present (no )    Adverse reactions to current medications (no)    Over the counter medications reviewed (Yes)

## 2019-02-26 ENCOUNTER — HOSPITAL ENCOUNTER (OUTPATIENT)
Facility: AMBULARY SURGERY CENTER | Age: 74
Discharge: HOME | End: 2019-02-26
Attending: UROLOGY | Admitting: UROLOGY
Payer: MEDICARE

## 2019-02-26 DIAGNOSIS — N40.1 BPH WITH OBSTRUCTION/LOWER URINARY TRACT SYMPTOMS: ICD-10-CM

## 2019-02-26 DIAGNOSIS — N13.8 BPH WITH URINARY OBSTRUCTION: ICD-10-CM

## 2019-02-26 DIAGNOSIS — N13.8 BPH WITH OBSTRUCTION/LOWER URINARY TRACT SYMPTOMS: ICD-10-CM

## 2019-02-26 DIAGNOSIS — N40.1 BPH WITH URINARY OBSTRUCTION: ICD-10-CM

## 2019-02-26 PROCEDURE — 76872 US TRANSRECTAL: CPT | Performed by: UROLOGY

## 2019-02-26 PROCEDURE — 87086 URINE CULTURE/COLONY COUNT: CPT

## 2019-02-26 PROCEDURE — 76872 PR US TRANSRECTAL: ICD-10-PCS | Mod: 26,,, | Performed by: UROLOGY

## 2019-02-26 PROCEDURE — 52000 CYSTOURETHROSCOPY: CPT | Mod: ,,, | Performed by: UROLOGY

## 2019-02-26 PROCEDURE — 52000 PR CYSTOURETHROSCOPY: ICD-10-PCS | Mod: ,,, | Performed by: UROLOGY

## 2019-02-26 PROCEDURE — 52000 CYSTOURETHROSCOPY: CPT | Performed by: UROLOGY

## 2019-02-26 PROCEDURE — 76872 US TRANSRECTAL: CPT | Mod: 26,,, | Performed by: UROLOGY

## 2019-02-26 RX ORDER — LIDOCAINE HYDROCHLORIDE 20 MG/ML
JELLY TOPICAL ONCE
Status: DISCONTINUED | OUTPATIENT
Start: 2019-02-26 | End: 2019-02-26 | Stop reason: HOSPADM

## 2019-02-26 RX ORDER — LIDOCAINE HYDROCHLORIDE 20 MG/ML
JELLY TOPICAL
Status: DISPENSED
Start: 2019-02-26 | End: 2019-02-27

## 2019-02-26 RX ORDER — WATER 1 ML/ML
IRRIGANT IRRIGATION
Status: DISCONTINUED | OUTPATIENT
Start: 2019-02-26 | End: 2019-02-26 | Stop reason: HOSPADM

## 2019-02-26 NOTE — PLAN OF CARE
Patient and his wife spoke with Dr. Cheung about plan of care. Patient denies any pain with urination post procedure.He did not see any blood in his urine. He is eager to go home.

## 2019-02-26 NOTE — TELEPHONE ENCOUNTER
Log reviewed. There were 2 days when the patient had low blood pressures with associated mildly increased pulse. We adjusted patient's blood pressure medication yesterday so hopefully they patient will not have further episodes of the low blood pressure. I also want the patient to be sure he is very well hydrated and drinking enough water. Please be sure cardiac studies I ordered yesterday are scheduled. Also, please schedule blood pressure check in 2 weeks. Patient should bring log to nurse visit appt.

## 2019-02-26 NOTE — INTERVAL H&P NOTE
The patient has been examined and the H&P has been reviewed:    Has cleared infecton x2 and been on ppx dosing pending eval  Ct with bladder wall thickening and enlarged prostate  Here for cysto trus  Pt is acceptable casndidate for procedure at asc    Anesthesia/Surgery risks, benefits and alternative options discussed and understood by patient/family.          Active Hospital Problems    Diagnosis  POA    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, N13.8]  Yes      Resolved Hospital Problems   No resolved problems to display.

## 2019-02-26 NOTE — H&P
[facility] New Patient/H&P:     Maximilian Tavera Jr. is a 73 y.o. male who presents for prostate check     11/6/18: C2-C6 Posterior spinal fusion/fixation, and C3-6 decompression of thecal sac and nerve roots via laminectomy (cervical myelopathy, history of C5-7 fusion)  PCP Dr Marroquin - last seen in 10/18 for preop eval and noted to have HTN well controlled and mild SOB/rxtive airway disease with hx asbestos exposure, and has been treated for 3 yrs for myasthenia gravis with recent exacerbation treated with prednisone. He did note urinary urgency at that visit.     Currently having 8/10 pain during urination  No blood in urine  UA: leuk pos, nit pos, lg leuks, small blood     Reports AUA SS 23/4 mostly dissat (5: weak stream; 3: emptying, freq, int, urg, strain, nocturia)  The above was his 3rd neck surgery.   Did have aguilera in postop for a few days while in hospital  Was on heavy pain meds so didn't notice discomfort urinating, then burning started with urgency to void and with painful urge wouldn't go very much and stream was weak  Had previously scheduled this appt for ncoturia/enlarged prostate eval.  No prior history of UTI/prostatitis  Was not given abx postop  No hematuria     PVR 21     Past Medical History:   Diagnosis Date    Arthritis      Back pain      Carpal tunnel syndrome 2/25/2013    Cataract      Cataract, left eye 11/10/2014    Chest pain, musculoskeletal      COPD (chronic obstructive pulmonary disease) 11/052018    Emphysema lung 11/05/2018    Hyperlipidemia      Hypertension      Hypothyroidism      Knee fracture      Myasthenia gravis      Neuropathy 1/3/2013    Obesity 1/29/2015    Polyneuropathy      PVC (premature ventricular contraction)      Squamous cell carcinoma 2014     left forearm    Thyroid disease                 Past Surgical History:   Procedure Laterality Date    APPENDECTOMY        C2-C6 Posterior Cervical Laminectomy & Instrumental Fusion N/A  11/7/2018     [facility] 2ND F[facility]    CATARACT EXTRACTION W/  INTRAOCULAR LENS IMPLANT Bilateral      COLONOSCOPY N/A 3/1/2012     [facility]    EGD (ESOPHAGOGASTRODUODENOSCOPY) N/A 9/12/2018     [facility] by Gabriel Hernandez [facility] at [facility]    EXTRACTION-CATARACT-IOL Left 12/9/2014     Performed by Adam Montague [facility] at [facility] OR    HEMORRHOID SURGERY        INJECTION-STEROID-EPIDURAL-TRANSFORAMINAL Right 5/17/2018     Performed by Devan Watt [facility] at [facility] OR    INJECTION-STEROID-EPIDURAL-TRANSFORAMINAL Right 9/1/2017     Performed by [facility] Watt [facility] at [facility] OR    INJECTION-STEROID-EPIDURAL-TRANSFORAMINAL Right 2/21/2017     Performed by [facility] Watt [facility] at [facility] OR    INJECTION-STEROID-EPIDURAL-TRANSFORAMINAL Right 10/9/2014     Performed by [facility] Watt [facility] at [facility] OR    KNEE ARTHROPLASTY Bilateral      NECK SURGERY        TONSILLECTOMY                   Family History   Problem Relation Age of Onset    Cataracts Mother      Heart disease Mother           CHF    Hypertension Mother      Hyperlipidemia Mother      Cataracts Father      Glaucoma Father      Heart disease Father      Hyperlipidemia Sister      Hypertension Sister      No Known Problems Daughter      No Known Problems Daughter      Collagen disease Neg Hx      Amblyopia Neg Hx      Blindness Neg Hx      Macular degeneration Neg Hx      Retinal detachment Neg Hx      Strabismus Neg Hx      Cancer Neg Hx           Social History               Socioeconomic History    Marital status:        Spouse name: Not on file    Number of children: Not on file    Years of education: Not on file    Highest education level: Not on file   Social Needs    Financial resource strain: Not on file    Food insecurity - worry: Not on file    Food insecurity - inability: Not on file    Transportation needs - medical: Not on file    Transportation needs - non-medical: Not  "on file   Occupational History    Not on file   Tobacco Use    Smoking status: Never Smoker    Smokeless tobacco: Never Used    Tobacco comment: when a child   Substance and Sexual Activity    Alcohol use: Yes       Comment: rarely    Drug use: No    Sexual activity: Yes       Partners: Female   Other Topics Concern    Not on file   Social History Narrative    Not on file                 Review of patient's allergies indicates:   Allergen Reactions    No known drug allergies           Medications Reviewed: see MAR     ROS:     Constitutional: denies fevers, chills, night sweats, fatigue, malaise  Respiratory: negative for cough, shortness of breath, wheezing, dyspnea.  Cardiovascular: + for high blood pressure, negative for chest pain, varicose veins, ankle swelling, palpitations, syncope.  GI: negative for abdominal pain, heartburn, indigestion, nausea, vomiting, constipation, diarrhea, blood in stool.   Urology: as noted above in HPI  Endocrinology: negative for cold intolerance, excessive thirst, not feeling tired/sluggish, no heat intolerance.   Hematology/Lymph: negative for easy bleeding, easy bruising, swollen glands.  Musculoskeletal: + neck/back pain, negative joint pain, joint swelling  Allergy-Immunology: negative for seasonal allergies, negative for unusual infections.   Skin: negative for boils, breast lumps, hives, itching, rash.   Neurology: negative for, dizziness, headache, tingling/numbness, tremors.   Psych: satisfied with life; negative for, anxiety, depression, suicidal thoughts.      PHYSICAL EXAM:         Vitals:     01/08/19 1038   BP: 113/68   Pulse: 83   Resp: 18   Temp: 98.4 °F (36.9 °C)      Body mass index is 30.25 kg/m². Weight: 104 kg (229 lb 4.5 oz) Height: 6' 1" (185.4 cm)         General: Alert, cooperative, no distress, appears stated age  Head: Normocephalic, without obvious abnormality, atraumatic  Neck: no masses, no thyromegaly, no lymphadenopathy  Eyes: PERRL, " conjunctiva/corneas clear  Lungs: Respirations unlabored, normal effort, no accessory muscle use  CV: Warm and well perfused extremities  Abdomen: Soft, non-tender, no CVA tenderness, no hepatosplenomegaly, no hernia  Penis: phallus normal, circumcised, well cared for, no plaques or lesions.   Scrotum: no cysts, no lesions, no rash, no hydrocele.   Epididymes: normal, nontender, symmetrical, no masses or cysts.   Testes: normal, both descended, no masses.   Urethra: palpably normal with orthotopic meatus of normal size    MIKY: normal sphincter tone, no masses, no hemmorrhoids   PROSTATE: 30-35g, wide, flat, NTTP, no bogginess, ?able nodule L vs firmness   Extremities: Extremities normal, atraumatic, no cyanosis or edema  Skin: Normal color, texture, and turgor, no rashes or lesions  Psych: Appropriate, well oriented, normal affect, normal mood  Neuro: Non-focal           Lab Results   Component Value Date     PSA 2.4 03/29/2018     PSA 1.1 11/18/2014     PSA 0.60 05/15/2013         LABS:           Recent Results (from the past 336 hour(s))   POCT URINE DIPSTICK WITHOUT MICROSCOPE     Collection Time: 01/08/19 11:04 AM   Result Value Ref Range     Color, UA yellow       Spec Grav UA 1.025       pH, UA 6       WBC, UA large       Nitrite, UA pos       Protein neg       Glucose, UA neg       Ketones, UA neg       Urobilinogen, UA neg       Bilirubin neg       Blood, UA small              Assessment/Diagnosis:     1. BPH without obstruction/lower urinary tract symptoms      2. Dysuria  POCT URINE DIPSTICK WITHOUT MICROSCOPE     Urine culture         Plans:  Seems to have active UTI given LUTS, dysuria, nit+ urine.   Possibly CAUTI longstanding from hospitalization.  Will empirically treat with bactrim pendint Ucx results, start flomax for LUTS and emptying, and have him RTC 3-4 months for uroflow/pvr, repeat AUASS, as office based assessment of obstruction on alpha blocker before considering further workup.  Assumin  Ucx+ and treated, will return for test of cure to make sure clears given likely longstanding nature.  Discussed conservative measures to control urgency and frequency including avoiding bladder irritants, timed voiding, not postponing voiding, and bowel regimen (as distended bowel has extrinsic compressive effect on bladder. Discussed bladder irritants include coffe (even decaf), tea, alcohol, soda, spicy foods, acidic juices (orange, tomato), vinegar, and artificial sweeteners.

## 2019-02-27 VITALS
HEART RATE: 92 BPM | HEIGHT: 73 IN | RESPIRATION RATE: 18 BRPM | OXYGEN SATURATION: 100 % | SYSTOLIC BLOOD PRESSURE: 126 MMHG | TEMPERATURE: 99 F | DIASTOLIC BLOOD PRESSURE: 63 MMHG | WEIGHT: 229 LBS | BODY MASS INDEX: 30.35 KG/M2

## 2019-02-27 NOTE — BRIEF OP NOTE
[facility] Brief Op Report/ Discharge Note     Date: 2/26/19     Staff Surgeon: Simba Cheung MD     Pre-Op Diagnosis:   BPH with refractory LUTS     Post-Op Diagnosis: same     Procedure(s) Performed:   Cystoscopy, flexible  Transrectal ultrasound with volumetric prostate measurement     Specimen(s): none     Anesthesia: Local: 2% xylocaine jelly per urethra (urojet)     Findings:   TRUS: volume 50.7cm3 (W 53mm, H 32mm, L 57mm), no median lobe, mild pvr  Cysto: Bilateral lateral lobe ingrowth causing mdoerate obstruction especially when viewed from verumontanum, with kissing lobes especially with flow of water off. No median lobe. Mild erythema over impression of prostate at bladder base centrally. Mild trabeculations     Estimated Blood Loss: none     Drains: none     Complications: none      Disposition: home  Will schedule urolift in next few months      Discharge home today status post uncomplicated procedure as above  Diet - resume home diet  Follow up: Urolift TBD  Instructions: drink plenty of water, may see blood in urine, take abx as directed  Meds:     Medication List      CHANGE how you take these medications    pantoprazole 40 MG tablet  Commonly known as:  PROTONIX  Take 1 tablet (40 mg total) by mouth once daily.  What changed:  when to take this        CONTINUE taking these medications    acetaminophen 325 MG tablet  Commonly known as:  TYLENOL  Take 2 tablets (650 mg total) by mouth every 4 (four) hours as needed (pain score 1-2/10).     albuterol 90 mcg/actuation inhaler  Commonly known as:  PROVENTIL/VENTOLIN HFA  Inhale 2 puffs into the lungs every 6 (six) hours as needed for Wheezing.     alfuzosin 10 mg Tb24  Commonly known as:  UROXATRAL  Take 1 tablet (10 mg total) by mouth daily with breakfast.     atorvastatin 80 MG tablet  Commonly known as:  LIPITOR  TAKE 1 TABLET EVERY DAY     cetirizine 10 MG tablet  Commonly known as:  ZYRTEC     chlorthalidone 25 MG Tab  Commonly known as:   HYGROTEN  TAKE 1 TABLET EVERY DAY     CO Q-10 100 mg capsule  Generic drug:  coenzyme Q10     ezetimibe 10 mg tablet  Commonly known as:  ZETIA     fluticasone 50 mcg/actuation nasal spray  Commonly known as:  FLONASE  USE 1 SPRAY IN EACH NOSTRIL TWICE DAILY AS NEEDED  FOR  RHINITIS     gabapentin 300 MG capsule  Commonly known as:  NEURONTIN  Take 1 capsule (300 mg total) by mouth 3 (three) times daily.     levothyroxine 50 MCG tablet  Commonly known as:  SYNTHROID  TAKE 1 TABLET EVERY DAY     milk thistle 200 mg Cap     MSM 1,000 mg Tab  Generic drug:  methylsulfonylmethane     olmesartan 20 MG tablet  Commonly known as:  BENICAR  Take 1 tablet (20 mg total) by mouth once daily.     omega-3 fatty acids 1,000 mg Cap     potassium chloride 8 MEQ Tbsr  Commonly known as:  KLOR-CON  TAKE 2 TABLETS THREE TIMES DAILY  OR AS DIRECTED     predniSONE 5 MG tablet  Commonly known as:  DELTASONE  TAKE 1 TABLET EVERY OTHER DAY     senna-docusate 8.6-50 mg 8.6-50 mg per tablet  Commonly known as:  PERICOLACE  Take 2 tablets by mouth nightly as needed for Constipation.     sildenafil 20 mg Tab  Commonly known as:  REVATIO  Take 1 to 3 tabs daily as needed.     sulfamethoxazole-trimethoprim 800-160mg 800-160 mg Tab  Commonly known as:  BACTRIM DS  Take 1 tablet by mouth once daily. After completing 1 week course 2x/day     VITAMIN B-12 5,000 mcg Subl  Generic drug:  cyanocobalamin (vitamin B-12)     VITAMIN D 2,000 unit Cap  Generic drug:  cholecalciferol (vitamin D3)

## 2019-02-28 LAB — BACTERIA UR CULT: NO GROWTH

## 2019-03-01 NOTE — TELEPHONE ENCOUNTER
Spoke to patient , patient verbalized he understood his recommendations . Nurse visit has been set up 03/21/19

## 2019-03-05 NOTE — OP NOTE
[facility] Operative Report     Date: 2/26/19     Staff Surgeon: Simba Cheung MD     Pre-Op Diagnosis:   BPH with LUTS     Post-Op Diagnosis: same     Procedure(s) Performed:   Cystoscopy, flexible  Transrectal ultrasound with volumetric prostate measurement     Specimen(s): none     Anesthesia: Local: 2% xylocaine jelly per urethra (urojet)     Findings:   TRUS: volume 50.7cm3 (W 53mm, H 32mm, L 57mm), no median lobe, mild pvr  Cysto: Bilateral lateral lobe ingrowth causing mdoerate obstruction especially when viewed from verumontanum, with kissing lobes especially with flow of water off. No median lobe. Mild erythema over impression of prostate at bladder base centrally. Mild trabeculations     Estimated Blood Loss: none     Drains: none     Complications: none      Indications for procedure:  72 yo M with myasthenia gravis and enlarged prostate with AUA SS 23/4. Also history of C-spine surgery in November with aguilera in for a few days after and subsequent difficulty emptying and UTI which required 2 treatment courses of antibiotics to clear and he has been on prophylactic daily abx. Unable to tolerate flomax 2/2 side effects and has since been on alfuzosin.    Procedure in detail:  After informed consent, the patient was placed in left lateral decubitus position. Transrectal ultrasound probe was passed into the rectum and the prostate was visualized on the screen.  Three-dimensional measurements taken as above with a total prostate volume of 50.7g.  On sagittal view there was no intravesical extension or ultrasound visualized abnormality. Transverse and saggital image captured.  The ultrasound probe was removed.     He was then placed in supine position and prepped and drapped in standard cystoscopic fashion and 2% xylocaine jelly was instilled into the urethra. A flexible cystoscope was passed into the bladder via the urethra.      Anterior urethra appeared normal without any lesions or narrowings. The  prostatic urethra demonstrated significant obstructing ingrowth of bilateral lateral lobes with visual obstruction, especially when viewed from verumontanum, seen to be kissing with flow of water off.       Bladder otherwise systematically inspected and no mucosal lesions or tumors seen. He did have some mild diffuse trabeculations though no median lobe extension.  Bilateral ureteral orifices seen in orthotopic position on trigone bilaterally with clear efflux.      On retroflexion, no median lobe, though there was some erythema centrally over the impression of the prostate at the bladder base with no median lobe or obstruction     Patient tolerated the procedure well. No complications     Disposition:  We did discuss options for his symptomatic obstruction despite alpha blocker therapy with recent UTI, unclear if catheter related or related to obstruction. He has been intolerant of flomax and tolerated alfuzosin better but minimal change in his LUTS. Discussed adding 5ari but he prefers to be on less medication, and therefore discussed interventions such as urolift vs turp. After extensive discussion of both procedures and medical therapy, he elected to proceed with Urolift.     Procedure in detail discussed, including risks benefits and alternatives (such as TURP and continued medical management). Discussed risks including but not limited to hematuria, postop retention, pain, infection, injury to bladder or urethra, and worsening urgency, latent pelvic pain, no guarantee of symptomatic relief, prostate regrowth, need for future procedures. We did discuss the non-incidence of ejaculatory dysfunction, as well as the 1-2% retreatment rate in 5 year studies. Also discussed about 20% of people may need a catheter after (due to retention or hematuria) but not required if voiding postop.      Did have calvin discussion about his baseline urgency and potential not only for worsening urgency frequency after relief of  obstruction, usually transient, but may persist requiring further workup, especially given his back/neck problems.     All questions answered, and he would like to proceed with Urolift in a few months as has a few things going on and needs to find new neurology neuromuscular specialist to monitor his MG    Will need medical clearance from Dr Marroquin before proceeding

## 2019-03-06 ENCOUNTER — PATIENT MESSAGE (OUTPATIENT)
Dept: UROLOGY | Facility: CLINIC | Age: 74
End: 2019-03-06

## 2019-03-06 ENCOUNTER — TELEPHONE (OUTPATIENT)
Dept: UROLOGY | Facility: CLINIC | Age: 74
End: 2019-03-06

## 2019-03-06 NOTE — TELEPHONE ENCOUNTER
Dr Hoang, et al.  This is a patient of mine with myasthenia gravis who used to see Dr Gill and is due for follow up.    Dr OSWALD had recommended you to another patient of mine with MG, so hoping yall could get him scheduled for follow up.    From a urologic standpoint, he will be undergoing Urolift in a few months (a brief procedure under general LMA vs MAC anesthesia to relieve prostatic obstruction).  Would appreciate any recs you have re: his MG if any. I do know he had recent Cspine surgery and did well from it    Thanks.

## 2019-03-08 ENCOUNTER — PATIENT MESSAGE (OUTPATIENT)
Dept: UROLOGY | Facility: CLINIC | Age: 74
End: 2019-03-08

## 2019-03-08 DIAGNOSIS — R30.0 DYSURIA: Primary | ICD-10-CM

## 2019-03-08 NOTE — TELEPHONE ENCOUNTER
Patient contacted.  He will go to hospital for urine tests tomorrow.   He is asking if urolift will get in the way of colonoscopy.

## 2019-03-09 ENCOUNTER — LAB VISIT (OUTPATIENT)
Dept: LAB | Facility: HOSPITAL | Age: 74
End: 2019-03-09
Attending: UROLOGY
Payer: MEDICARE

## 2019-03-09 DIAGNOSIS — R30.0 DYSURIA: ICD-10-CM

## 2019-03-09 LAB
BACTERIA #/AREA URNS HPF: NORMAL /HPF
BILIRUB UR QL STRIP: NEGATIVE
CLARITY UR: CLEAR
COLOR UR: YELLOW
GLUCOSE UR QL STRIP: NEGATIVE
HGB UR QL STRIP: NEGATIVE
KETONES UR QL STRIP: NEGATIVE
LEUKOCYTE ESTERASE UR QL STRIP: NEGATIVE
MICROSCOPIC COMMENT: NORMAL
NITRITE UR QL STRIP: NEGATIVE
PH UR STRIP: 7 [PH] (ref 5–8)
PROT UR QL STRIP: NEGATIVE
RBC #/AREA URNS HPF: 1 /HPF (ref 0–4)
SP GR UR STRIP: 1.01 (ref 1–1.03)
URN SPEC COLLECT METH UR: NORMAL
UROBILINOGEN UR STRIP-ACNC: 1 EU/DL
WBC #/AREA URNS HPF: 1 /HPF (ref 0–5)

## 2019-03-09 PROCEDURE — 81000 URINALYSIS NONAUTO W/SCOPE: CPT | Mod: HCNC

## 2019-03-09 PROCEDURE — 87086 URINE CULTURE/COLONY COUNT: CPT | Mod: HCNC

## 2019-03-10 LAB — BACTERIA UR CULT: NO GROWTH

## 2019-03-11 ENCOUNTER — TELEPHONE (OUTPATIENT)
Dept: UROLOGY | Facility: CLINIC | Age: 74
End: 2019-03-11

## 2019-03-11 NOTE — TELEPHONE ENCOUNTER
Spoke w pt informed of neg rox culture/  Pt expressed that he woke up Sat morning with painful urination at 3 am. But when he urinated again at 7 am the pain was gone he thinks he may have passed a kidney stone. Wanted to let you know

## 2019-03-11 NOTE — TELEPHONE ENCOUNTER
----- Message from Vibha Whitehead sent at 3/11/2019 12:39 PM CDT -----  Contact: pt [phone]  Call placed to pod. Patient called stating he was returning a call he missed from your office.

## 2019-03-16 ENCOUNTER — PATIENT MESSAGE (OUTPATIENT)
Dept: UROLOGY | Facility: CLINIC | Age: 74
End: 2019-03-16

## 2019-03-17 DIAGNOSIS — E03.9 HYPOTHYROIDISM: ICD-10-CM

## 2019-03-17 DIAGNOSIS — E03.1 CONGENITAL HYPOTHYROIDISM WITHOUT GOITER: ICD-10-CM

## 2019-03-18 RX ORDER — LEVOTHYROXINE SODIUM 50 UG/1
TABLET ORAL
Qty: 90 TABLET | Refills: 3 | Status: SHIPPED | OUTPATIENT
Start: 2019-03-18 | End: 2019-11-18 | Stop reason: SDUPTHER

## 2019-03-19 PROBLEM — Z01.810 PREOP CARDIOVASCULAR EXAM: Status: ACTIVE | Noted: 2019-03-19

## 2019-03-19 NOTE — PROGRESS NOTES
Subjective:   Patient ID:  Maximilian Tavera Jr. is a 73 y.o. male who presents for follow-up of preop     HPI: The patient is here for CAD risk factors and now pre-op.   The patient has no chest pain,TIA, yncope or pre-syncope.Patient does not exercise.BP at times very low with HRs 1i 110ish with palpitations several per month-occurred even before carvedilol stopped.He is having more SOB/GOLD ever since pneumonia and then COPD diagnosis.        Review of Systems   Constitution: Negative for chills, decreased appetite, diaphoresis, fever, weakness, malaise/fatigue, night sweats, weight gain and weight loss.   HENT: Negative for congestion, hoarse voice, nosebleeds, sore throat and tinnitus.    Eyes: Negative for blurred vision, double vision, vision loss in left eye, vision loss in right eye, visual disturbance and visual halos.   Cardiovascular: Positive for dyspnea on exertion and palpitations. Negative for chest pain, claudication, cyanosis, irregular heartbeat, leg swelling, near-syncope, orthopnea, paroxysmal nocturnal dyspnea and syncope.   Respiratory: Positive for shortness of breath. Negative for cough, hemoptysis, sleep disturbances due to breathing, snoring, sputum production and wheezing.    Endocrine: Negative for cold intolerance, heat intolerance, polydipsia, polyphagia and polyuria.   Hematologic/Lymphatic: Negative for adenopathy and bleeding problem. Does not bruise/bleed easily.   Skin: Negative for color change, dry skin, flushing, itching, nail changes, poor wound healing, rash, skin cancer, suspicious lesions and unusual hair distribution.   Musculoskeletal: Positive for arthritis and back pain. Negative for falls, gout, joint pain, joint swelling, muscle cramps, muscle weakness, myalgias and stiffness.   Gastrointestinal: Negative for abdominal pain, anorexia, change in bowel habit, constipation, diarrhea, dysphagia, heartburn, hematemesis, hematochezia, melena and vomiting.  "  Genitourinary: Negative for decreased libido, dysuria, hematuria, hesitancy and urgency.   Neurological: Negative for excessive daytime sleepiness, dizziness, focal weakness, headaches, light-headedness, loss of balance, numbness, paresthesias, seizures, sensory change, tremors and vertigo.   Psychiatric/Behavioral: Negative for altered mental status, depression, hallucinations, memory loss, substance abuse and suicidal ideas. The patient does not have insomnia and is not nervous/anxious.    Allergic/Immunologic: Negative for environmental allergies and hives.       Objective: /68 (BP Location: Left arm, Patient Position: Sitting, BP Method: Large (Automatic))   Pulse 80   Ht 6' 1" (1.854 m)   Wt 107.6 kg (237 lb 3.4 oz)   SpO2 95%   BMI 31.30 kg/m²      Physical Exam   Constitutional: He is oriented to person, place, and time. He appears well-developed and well-nourished. No distress.   HENT:   Head: Normocephalic.   Eyes: EOM are normal. Pupils are equal, round, and reactive to light.   Neck: Normal range of motion. No thyromegaly present.   Cardiovascular: Normal rate, regular rhythm, normal heart sounds and intact distal pulses. Exam reveals no gallop and no friction rub.   No murmur heard.  Pulses:       Carotid pulses are 3+ on the right side, and 3+ on the left side.       Radial pulses are 3+ on the right side, and 3+ on the left side.        Femoral pulses are 3+ on the right side, and 3+ on the left side.       Popliteal pulses are 3+ on the right side, and 3+ on the left side.        Dorsalis pedis pulses are 3+ on the right side, and 3+ on the left side.        Posterior tibial pulses are 3+ on the right side, and 3+ on the left side.   Pulmonary/Chest: Effort normal and breath sounds normal. No respiratory distress. He has no wheezes. He has no rales. He exhibits no tenderness.   Abdominal: Soft. He exhibits no distension and no mass. There is no tenderness.   Musculoskeletal: Normal range " of motion.   Lymphadenopathy:     He has no cervical adenopathy.   Neurological: He is alert and oriented to person, place, and time.   Skin: Skin is warm. He is not diaphoretic. No cyanosis. Nails show no clubbing.   Psychiatric: He has a normal mood and affect. His speech is normal and behavior is normal. Judgment and thought content normal. Cognition and memory are normal.       Assessment:     1. Agatston CAC score, <100    2. Abdominal aortic atherosclerosis    3. Erectile dysfunction due to arterial insufficiency    4. HTN (hypertension), benign    5. Mixed hyperlipidemia    6. Other specified hypothyroidism    7. Myasthenia gravis, AChR antibody positive    8. Neuropathy    9. RBBB    10. S/P cervical spinal fusion    11. BPH with obstruction/lower urinary tract symptoms    12. Cervical stenosis of spinal canal    13. Preop cardiovascular exam    14. Palpitations    15. Dyspnea on exertion        Plan:   Discussed diet , achieving and maintaining ideal body weight, and exercise.   We reviewed meds in detail.  Reassured-Discussed goals, options, plan  Cleared for A/S.    Maximilian was seen today for hypotension, shortness of breath, palpitations and dizziness.    Diagnoses and all orders for this visit:    Agatston CAC score, <100  -     Lipid panel; Future; Expected date: 09/20/2019  -     Comprehensive metabolic panel; Future; Expected date: 09/20/2019  -     TSH; Future; Expected date: 03/20/2019  -     Echocardiogram stress test with CFD (Cupid Only); Future; Expected date: 03/21/2019    Abdominal aortic atherosclerosis  -     Echocardiogram stress test with CFD (Cupid Only); Future; Expected date: 03/21/2019    Erectile dysfunction due to arterial insufficiency  -     Echocardiogram stress test with CFD (Cupid Only); Future; Expected date: 03/21/2019    HTN (hypertension), benign  -     Lipid panel; Future; Expected date: 09/20/2019  -     Comprehensive metabolic panel; Future; Expected date: 09/20/2019  -      Echocardiogram stress test with CFD (Cupid Only); Future; Expected date: 03/21/2019    Mixed hyperlipidemia  -     Lipid panel; Future; Expected date: 09/20/2019  -     Comprehensive metabolic panel; Future; Expected date: 09/20/2019    Other specified hypothyroidism  -     TSH; Future; Expected date: 03/20/2019  -     T4, free; Future; Expected date: 09/20/2019    Myasthenia gravis, AChR antibody positive  -     T4, free; Future; Expected date: 09/20/2019    Neuropathy    RBBB    S/P cervical spinal fusion    BPH with obstruction/lower urinary tract symptoms    Cervical stenosis of spinal canal    Preop cardiovascular exam    Palpitations  -     Echocardiogram stress test with CFD (Cupid Only); Future; Expected date: 03/21/2019  -     Cardiac event monitor (Cupid Only); Future; Expected date: 03/21/2019  -     T4, free; Future; Expected date: 09/20/2019    Dyspnea on exertion  -     Echocardiogram stress test with CFD (Cupid Only); Future; Expected date: 03/21/2019  -     Cardiac event monitor (Cupid Only); Future; Expected date: 03/21/2019    Other orders  -     predniSONE (DELTASONE) 1 MG tablet; Take 2 mg by mouth once daily.            Follow-up in about 6 months (around 9/20/2019) for with labs; Ex CFD Evelia Altamirano to read and cardiobeeper soon.

## 2019-03-20 ENCOUNTER — OFFICE VISIT (OUTPATIENT)
Dept: CARDIOLOGY | Facility: CLINIC | Age: 74
End: 2019-03-20
Payer: MEDICARE

## 2019-03-20 ENCOUNTER — LAB VISIT (OUTPATIENT)
Dept: LAB | Facility: HOSPITAL | Age: 74
End: 2019-03-20
Attending: INTERNAL MEDICINE
Payer: MEDICARE

## 2019-03-20 VITALS
HEIGHT: 73 IN | BODY MASS INDEX: 31.44 KG/M2 | DIASTOLIC BLOOD PRESSURE: 68 MMHG | SYSTOLIC BLOOD PRESSURE: 124 MMHG | WEIGHT: 237.19 LBS | OXYGEN SATURATION: 95 % | HEART RATE: 80 BPM

## 2019-03-20 DIAGNOSIS — M48.02 CERVICAL STENOSIS OF SPINAL CANAL: ICD-10-CM

## 2019-03-20 DIAGNOSIS — Z01.810 PREOP CARDIOVASCULAR EXAM: ICD-10-CM

## 2019-03-20 DIAGNOSIS — Z98.1 S/P CERVICAL SPINAL FUSION: ICD-10-CM

## 2019-03-20 DIAGNOSIS — E78.2 MIXED HYPERLIPIDEMIA: ICD-10-CM

## 2019-03-20 DIAGNOSIS — R93.1 AGATSTON CAC SCORE, <100: Primary | ICD-10-CM

## 2019-03-20 DIAGNOSIS — N40.1 BPH WITH OBSTRUCTION/LOWER URINARY TRACT SYMPTOMS: ICD-10-CM

## 2019-03-20 DIAGNOSIS — I70.0 ABDOMINAL AORTIC ATHEROSCLEROSIS: ICD-10-CM

## 2019-03-20 DIAGNOSIS — N13.8 BPH WITH OBSTRUCTION/LOWER URINARY TRACT SYMPTOMS: ICD-10-CM

## 2019-03-20 DIAGNOSIS — N52.01 ERECTILE DYSFUNCTION DUE TO ARTERIAL INSUFFICIENCY: ICD-10-CM

## 2019-03-20 DIAGNOSIS — G62.9 NEUROPATHY: ICD-10-CM

## 2019-03-20 DIAGNOSIS — E03.8 OTHER SPECIFIED HYPOTHYROIDISM: ICD-10-CM

## 2019-03-20 DIAGNOSIS — R93.1 AGATSTON CAC SCORE, <100: ICD-10-CM

## 2019-03-20 DIAGNOSIS — G70.00 MYASTHENIA GRAVIS, ACHR ANTIBODY POSITIVE: ICD-10-CM

## 2019-03-20 DIAGNOSIS — I10 HTN (HYPERTENSION), BENIGN: ICD-10-CM

## 2019-03-20 DIAGNOSIS — R00.2 PALPITATIONS: ICD-10-CM

## 2019-03-20 DIAGNOSIS — R06.09 DYSPNEA ON EXERTION: ICD-10-CM

## 2019-03-20 DIAGNOSIS — I45.10 RBBB: ICD-10-CM

## 2019-03-20 LAB — TSH SERPL DL<=0.005 MIU/L-ACNC: 1.4 UIU/ML

## 2019-03-20 PROCEDURE — 3074F SYST BP LT 130 MM HG: CPT | Mod: HCNC,CPTII,S$GLB, | Performed by: INTERNAL MEDICINE

## 2019-03-20 PROCEDURE — 99999 PR PBB SHADOW E&M-EST. PATIENT-LVL IV: ICD-10-PCS | Mod: PBBFAC,HCNC,, | Performed by: INTERNAL MEDICINE

## 2019-03-20 PROCEDURE — 36415 COLL VENOUS BLD VENIPUNCTURE: CPT | Mod: HCNC

## 2019-03-20 PROCEDURE — 99499 RISK ADDL DX/OHS AUDIT: ICD-10-PCS | Mod: HCNC,S$GLB,, | Performed by: INTERNAL MEDICINE

## 2019-03-20 PROCEDURE — 3078F DIAST BP <80 MM HG: CPT | Mod: HCNC,CPTII,S$GLB, | Performed by: INTERNAL MEDICINE

## 2019-03-20 PROCEDURE — 1101F PR PT FALLS ASSESS DOC 0-1 FALLS W/OUT INJ PAST YR: ICD-10-PCS | Mod: HCNC,CPTII,S$GLB, | Performed by: INTERNAL MEDICINE

## 2019-03-20 PROCEDURE — 99999 PR PBB SHADOW E&M-EST. PATIENT-LVL IV: CPT | Mod: PBBFAC,HCNC,, | Performed by: INTERNAL MEDICINE

## 2019-03-20 PROCEDURE — 3078F PR MOST RECENT DIASTOLIC BLOOD PRESSURE < 80 MM HG: ICD-10-PCS | Mod: HCNC,CPTII,S$GLB, | Performed by: INTERNAL MEDICINE

## 2019-03-20 PROCEDURE — 99499 UNLISTED E&M SERVICE: CPT | Mod: HCNC,S$GLB,, | Performed by: INTERNAL MEDICINE

## 2019-03-20 PROCEDURE — 84443 ASSAY THYROID STIM HORMONE: CPT | Mod: HCNC

## 2019-03-20 PROCEDURE — 99215 PR OFFICE/OUTPT VISIT, EST, LEVL V, 40-54 MIN: ICD-10-PCS | Mod: HCNC,S$GLB,, | Performed by: INTERNAL MEDICINE

## 2019-03-20 PROCEDURE — 99215 OFFICE O/P EST HI 40 MIN: CPT | Mod: HCNC,S$GLB,, | Performed by: INTERNAL MEDICINE

## 2019-03-20 PROCEDURE — 3074F PR MOST RECENT SYSTOLIC BLOOD PRESSURE < 130 MM HG: ICD-10-PCS | Mod: HCNC,CPTII,S$GLB, | Performed by: INTERNAL MEDICINE

## 2019-03-20 PROCEDURE — 1101F PT FALLS ASSESS-DOCD LE1/YR: CPT | Mod: HCNC,CPTII,S$GLB, | Performed by: INTERNAL MEDICINE

## 2019-03-20 RX ORDER — PREDNISONE 1 MG/1
2 TABLET ORAL DAILY
COMMUNITY
End: 2019-07-23 | Stop reason: SDUPTHER

## 2019-03-20 NOTE — PATIENT INSTRUCTIONS
Discussed diet , achieving and maintaining ideal body weight, and exercise.   We reviewed meds in detail.  Reassured-Discussed goals, options, plan  Cleared for A/S.  Reduce chlorthalidone to half

## 2019-03-20 NOTE — LETTER
March 20, 2019      Farideh Thompson PA-C  [address] [facility] [address]           [facility]  [address]  [place] [address]  Phone: [phone]          Patient: Maximilian Tavera Jr.   MR Number: [id number]   YOB: 1945   Date of Visit: 3/20/2019       Dear Farideh Thompson:    Thank you for referring Maximilian Tavera to me for evaluation. Attached you will find relevant portions of my assessment and plan of care.    If you have questions, please do not hesitate to call me. I look forward to following Maximilian Tavera along with you.    Sincerely,    Miguel Altamirano MD    Enclosure  CC:  No Recipients    If you would like to receive this communication electronically, please contact [email or web address] or [phone] to request more information on [facility] Link access.    For providers and/or their staff who would like to refer a patient to [facility], please contact us through our one-stop-shop provider referral line, [facility] , at [phone].    If you feel you have received this communication in error or would no longer like to receive these types of communications, please e-mail [email or web address]

## 2019-03-21 ENCOUNTER — CLINICAL SUPPORT (OUTPATIENT)
Dept: FAMILY MEDICINE | Facility: CLINIC | Age: 74
End: 2019-03-21
Payer: MEDICARE

## 2019-03-21 VITALS — DIASTOLIC BLOOD PRESSURE: 55 MMHG | SYSTOLIC BLOOD PRESSURE: 89 MMHG | HEART RATE: 94 BPM

## 2019-03-21 PROCEDURE — 99999 PR PBB SHADOW E&M-EST. PATIENT-LVL II: CPT | Mod: PBBFAC,HCNC,, | Performed by: PHYSICIAN ASSISTANT

## 2019-03-21 PROCEDURE — 99999 PR PBB SHADOW E&M-EST. PATIENT-LVL II: ICD-10-PCS | Mod: PBBFAC,HCNC,, | Performed by: PHYSICIAN ASSISTANT

## 2019-03-21 NOTE — PROGRESS NOTES
Patient here for BP check. Reading today is 89/55  P 95. Patient states that he has had some dizziness today.  Patient reports that he saw his cardiologist on yesterday and his chlorthalidone dosage was decreased by half. Ms Thompson was informed and recommends that he continue to follow with his cardiologist r/t this BP issue. Patient verbalized understanding.

## 2019-03-29 ENCOUNTER — PATIENT MESSAGE (OUTPATIENT)
Dept: CARDIOLOGY | Facility: CLINIC | Age: 74
End: 2019-03-29

## 2019-03-29 ENCOUNTER — HOSPITAL ENCOUNTER (OUTPATIENT)
Dept: CARDIOLOGY | Facility: CLINIC | Age: 74
Discharge: HOME | End: 2019-03-29
Attending: INTERNAL MEDICINE
Payer: MEDICARE

## 2019-03-29 ENCOUNTER — CLINICAL SUPPORT (OUTPATIENT)
Dept: CARDIOLOGY | Facility: HOSPITAL | Age: 74
End: 2019-03-29
Attending: INTERNAL MEDICINE
Payer: MEDICARE

## 2019-03-29 VITALS — HEIGHT: 73 IN | WEIGHT: 235 LBS | BODY MASS INDEX: 31.14 KG/M2

## 2019-03-29 DIAGNOSIS — R00.2 PALPITATIONS: ICD-10-CM

## 2019-03-29 DIAGNOSIS — I70.0 ABDOMINAL AORTIC ATHEROSCLEROSIS: ICD-10-CM

## 2019-03-29 DIAGNOSIS — R06.09 DYSPNEA ON EXERTION: ICD-10-CM

## 2019-03-29 DIAGNOSIS — R93.1 AGATSTON CAC SCORE, <100: ICD-10-CM

## 2019-03-29 DIAGNOSIS — M25.511 RIGHT SHOULDER PAIN, UNSPECIFIED CHRONICITY: Primary | ICD-10-CM

## 2019-03-29 DIAGNOSIS — N52.01 ERECTILE DYSFUNCTION DUE TO ARTERIAL INSUFFICIENCY: ICD-10-CM

## 2019-03-29 DIAGNOSIS — I10 HTN (HYPERTENSION), BENIGN: ICD-10-CM

## 2019-03-29 LAB
ASCENDING AORTA: 3.8 CM
AV INDEX (PROSTH): 0.7
AV MEAN GRADIENT: 4.17 MMHG
AV PEAK GRADIENT: 8.41 MMHG
AV VALVE AREA: 2.76 CM2
AV VELOCITY RATIO: 0.67
BSA FOR ECHO PROCEDURE: 2.34 M2
CV ECHO LV RWT: 0.43 CM
CV STRESS BASE HR: 87 BPM
DIASTOLIC BLOOD PRESSURE: 70 MMHG
DOP CALC AO PEAK VEL: 1.45 M/S
DOP CALC AO VTI: 27.3 CM
DOP CALC LVOT AREA: 3.94 CM2
DOP CALC LVOT DIAMETER: 2.24 CM
DOP CALC LVOT PEAK VEL: 0.97 M/S
DOP CALC LVOT STROKE VOLUME: 75.27 CM3
DOP CALCLVOT PEAK VEL VTI: 19.11 CM
E WAVE DECELERATION TIME: 218.33 MSEC
E/A RATIO: 0.77
E/E' RATIO: 9.17
ECHO LV POSTERIOR WALL: 1.06 CM (ref 0.6–1.1)
FRACTIONAL SHORTENING: 34 % (ref 28–44)
INTERVENTRICULAR SEPTUM: 0.77 CM (ref 0.6–1.1)
IVRT: 0.12 MSEC
LA MAJOR: 5.54 CM
LA MINOR: 5.46 CM
LA WIDTH: 4.7 CM
LEFT ATRIUM SIZE: 4.3 CM
LEFT ATRIUM VOLUME INDEX: 41 ML/M2
LEFT ATRIUM VOLUME: 94.48 CM3
LEFT INTERNAL DIMENSION IN SYSTOLE: 3.27 CM (ref 2.1–4)
LEFT VENTRICLE DIASTOLIC VOLUME INDEX: 49.68 ML/M2
LEFT VENTRICLE DIASTOLIC VOLUME: 114.49 ML
LEFT VENTRICLE MASS INDEX: 68.5 G/M2
LEFT VENTRICLE SYSTOLIC VOLUME INDEX: 18.7 ML/M2
LEFT VENTRICLE SYSTOLIC VOLUME: 43.11 ML
LEFT VENTRICULAR INTERNAL DIMENSION IN DIASTOLE: 4.93 CM (ref 3.5–6)
LEFT VENTRICULAR MASS: 157.93 G
LV LATERAL E/E' RATIO: 9.17
LV SEPTAL E/E' RATIO: 9.17
MV PEAK A VEL: 0.71 M/S
MV PEAK E VEL: 0.55 M/S
OHS CV CPX 1 MINUTE RECOVERY HEART RATE: 110 BPM
OHS CV CPX 85 PERCENT MAX PREDICTED HEART RATE MALE: 123
OHS CV CPX ESTIMATED METS: 9
OHS CV CPX MAX PREDICTED HEART RATE: 145
OHS CV CPX PATIENT IS FEMALE: 0
OHS CV CPX PATIENT IS MALE: 1
OHS CV CPX PEAK DIASTOLIC BLOOD PRESSURE: 74 MMHG
OHS CV CPX PEAK HEAR RATE: 129 BPM
OHS CV CPX PEAK RATE PRESSURE PRODUCT: NORMAL
OHS CV CPX PEAK SYSTOLIC BLOOD PRESSURE: 165 MMHG
OHS CV CPX PERCENT MAX PREDICTED HEART RATE ACHIEVED: 89
OHS CV CPX PERCENT TARGET HEART RATE ACHIEVED: 104.88
OHS CV CPX RATE PRESSURE PRODUCT PRESENTING: NORMAL
OHS CV CPX TARGET HEART RATE: 123
PISA TR MAX VEL: 2.34 M/S
PULM VEIN S/D RATIO: 1.34
PV PEAK D VEL: 0.35 M/S
PV PEAK S VEL: 0.47 M/S
RA MAJOR: 5.35 CM
RA PRESSURE: 3 MMHG
RA WIDTH: 4.68 CM
RIGHT VENTRICULAR END-DIASTOLIC DIMENSION: 4.2 CM
RV TISSUE DOPPLER FREE WALL SYSTOLIC VELOCITY 1 (APICAL 4 CHAMBER VIEW): 12.83 M/S
SINUS: 3.79 CM
STJ: 2.96 CM
STRESS ECHO POST EXERCISE DUR MIN: 5 MIN
STRESS ECHO POST EXERCISE DUR SEC: 52
SYSTOLIC BLOOD PRESSURE: 149 MMHG
TDI LATERAL: 0.06
TDI SEPTAL: 0.06
TDI: 0.06
TR MAX PG: 21.9 MMHG
TRICUSPID ANNULAR PLANE SYSTOLIC EXCURSION: 2.19 CM
TV REST PULMONARY ARTERY PRESSURE: 25 MMHG

## 2019-03-29 PROCEDURE — 93272 CARDIAC EVENT MONITOR (CUPID ONLY): ICD-10-PCS | Mod: HCNC,,, | Performed by: INTERNAL MEDICINE

## 2019-03-29 PROCEDURE — 93272 ECG/REVIEW INTERPRET ONLY: CPT | Mod: HCNC,,, | Performed by: INTERNAL MEDICINE

## 2019-03-29 PROCEDURE — 93351 STRESS TTE COMPLETE: CPT | Mod: HCNC,S$GLB,, | Performed by: INTERNAL MEDICINE

## 2019-03-29 PROCEDURE — 93351 ECHOCARDIOGRAM STRESS TEST WITH COLOR FLOW DOPPLER (CUPID ONLY): ICD-10-PCS | Mod: HCNC,S$GLB,, | Performed by: INTERNAL MEDICINE

## 2019-03-29 PROCEDURE — 93271 ECG/MONITORING AND ANALYSIS: CPT | Mod: HCNC

## 2019-04-01 ENCOUNTER — OFFICE VISIT (OUTPATIENT)
Dept: ORTHOPEDICS | Facility: CLINIC | Age: 74
End: 2019-04-01
Payer: MEDICARE

## 2019-04-01 VITALS
BODY MASS INDEX: 31.14 KG/M2 | SYSTOLIC BLOOD PRESSURE: 121 MMHG | HEART RATE: 85 BPM | DIASTOLIC BLOOD PRESSURE: 64 MMHG | WEIGHT: 235 LBS | HEIGHT: 73 IN

## 2019-04-01 DIAGNOSIS — M75.102 ROTATOR CUFF SYNDROME, LEFT: Primary | ICD-10-CM

## 2019-04-01 PROCEDURE — 3074F SYST BP LT 130 MM HG: CPT | Mod: HCNC,CPTII,S$GLB, | Performed by: ORTHOPAEDIC SURGERY

## 2019-04-01 PROCEDURE — 3078F DIAST BP <80 MM HG: CPT | Mod: HCNC,CPTII,S$GLB, | Performed by: ORTHOPAEDIC SURGERY

## 2019-04-01 PROCEDURE — 1101F PT FALLS ASSESS-DOCD LE1/YR: CPT | Mod: HCNC,CPTII,S$GLB, | Performed by: ORTHOPAEDIC SURGERY

## 2019-04-01 PROCEDURE — 99999 PR PBB SHADOW E&M-EST. PATIENT-LVL III: CPT | Mod: PBBFAC,HCNC,, | Performed by: ORTHOPAEDIC SURGERY

## 2019-04-01 PROCEDURE — 3074F PR MOST RECENT SYSTOLIC BLOOD PRESSURE < 130 MM HG: ICD-10-PCS | Mod: HCNC,CPTII,S$GLB, | Performed by: ORTHOPAEDIC SURGERY

## 2019-04-01 PROCEDURE — 99213 PR OFFICE/OUTPT VISIT, EST, LEVL III, 20-29 MIN: ICD-10-PCS | Mod: HCNC,S$GLB,, | Performed by: ORTHOPAEDIC SURGERY

## 2019-04-01 PROCEDURE — 99213 OFFICE O/P EST LOW 20 MIN: CPT | Mod: HCNC,S$GLB,, | Performed by: ORTHOPAEDIC SURGERY

## 2019-04-01 PROCEDURE — 99999 PR PBB SHADOW E&M-EST. PATIENT-LVL III: ICD-10-PCS | Mod: PBBFAC,HCNC,, | Performed by: ORTHOPAEDIC SURGERY

## 2019-04-01 PROCEDURE — 1101F PR PT FALLS ASSESS DOC 0-1 FALLS W/OUT INJ PAST YR: ICD-10-PCS | Mod: HCNC,CPTII,S$GLB, | Performed by: ORTHOPAEDIC SURGERY

## 2019-04-01 PROCEDURE — 3078F PR MOST RECENT DIASTOLIC BLOOD PRESSURE < 80 MM HG: ICD-10-PCS | Mod: HCNC,CPTII,S$GLB, | Performed by: ORTHOPAEDIC SURGERY

## 2019-04-01 NOTE — PROGRESS NOTES
Past Medical History:   Diagnosis Date    Arthritis     Back pain     Carpal tunnel syndrome 2/25/2013    Cataract     Cataract, left eye 11/10/2014    Chest pain, musculoskeletal     COPD (chronic obstructive pulmonary disease) 11/052018    Emphysema lung 11/05/2018    Hyperlipidemia     Hypertension     Hypothyroidism     Knee fracture     Myasthenia gravis     Neuropathy 1/3/2013    Obesity 1/29/2015    Polyneuropathy     PVC (premature ventricular contraction)     Squamous cell carcinoma 2014    left forearm    Thyroid disease        Past Surgical History:   Procedure Laterality Date    APPENDECTOMY      C2-C6 Posterior Cervical Laminectomy & Instrumental Fusion N/A 11/7/2018    Performed by K[facility]ore Turner [facility] at [facility] OR 2ND FLR    CATARACT EXTRACTION W/  INTRAOCULAR LENS IMPLANT Bilateral     COLONOSCOPY N/A 3/1/2012    Performed by Simba [facility]eban [facility] at [facility] ENDO    CYSTOSCOPY N/A 2/26/2019    Performed by Simba Cheung MD at [facility] OR    EGD (ESOPHAGOGASTRODUODENOSCOPY) N/A 9/12/2018    Performed by Gabriel Hernandez MD at [facility] ENDO    EXTRACTION-CATARACT-IOL Left 12/9/2014    Performed by Adam Montague MD at [facility] OR    HEMORRHOID SURGERY      INJECTION-STEROID-EPIDURAL-TRANSFORAMINAL Right 5/17/2018    Performed by Devan Watt MD at [facility] OR    INJECTION-STEROID-EPIDURAL-TRANSFORAMINAL Right 9/1/2017    Performed by Devan Watt [facility] at [facility]    INJECTION-STEROID-EPIDURAL-TRANSFORAMINAL Right 2/21/2017    Performed by Devan Watt [facility] at [facility]    INJECTION-STEROID-EPIDURAL-TRANSFORAMINAL Right 10/9/2014    Performed by Devan Watt [facility] at [facility]    KNEE ARTHROPLASTY Bilateral     NECK SURGERY      TONSILLECTOMY      ULTRASOUND, RECTAL APPROACH N/A 2/26/2019    Performed by Simba Cheung MD at [facility] OR       Current Outpatient Medications   Medication Sig    acetaminophen (TYLENOL) 325 MG tablet Take 2 tablets (650 mg total) by mouth every 4 (four) hours as  needed (pain score 1-2/10).    albuterol (PROVENTIL/VENTOLIN HFA) 90 mcg/actuation inhaler Inhale 2 puffs into the lungs every 6 (six) hours as needed for Wheezing.    alfuzosin (UROXATRAL) 10 mg Tb24 Take 1 tablet (10 mg total) by mouth daily with breakfast.    atorvastatin (LIPITOR) 80 MG tablet TAKE 1 TABLET EVERY DAY    cetirizine (ZYRTEC) 10 MG tablet Take 1 tablet by mouth daily as needed.    chlorthalidone (HYGROTEN) 25 MG Tab TAKE 1 TABLET EVERY DAY    cholecalciferol, vitamin D3, (VITAMIN D) 2,000 unit Cap 1 capsule once daily. Every day    coenzyme Q10 (CO Q-10) 100 mg capsule Take 400 mg by mouth once daily.     cyanocobalamin, vitamin B-12, (VITAMIN B-12) 5,000 mcg Subl Take 5,000 mcg by mouth once daily. Every day    ezetimibe (ZETIA) 10 mg tablet Take 10 mg by mouth once daily.     fluticasone (FLONASE) 50 mcg/actuation nasal spray USE 1 SPRAY IN EACH NOSTRIL TWICE DAILY AS NEEDED  FOR  RHINITIS    gabapentin (NEURONTIN) 300 MG capsule Take 1 capsule (300 mg total) by mouth 3 (three) times daily.    levothyroxine (SYNTHROID) 50 MCG tablet TAKE 1 TABLET EVERY DAY    methylsulfonylmethane (MSM) 1,000 mg Tab Take 1,000 mg by mouth once daily. Every day    milk thistle 200 mg Cap Take 200 mg by mouth 2 (two) times daily. Twice a day    olmesartan (BENICAR) 20 MG tablet Take 1 tablet (20 mg total) by mouth once daily.    omega-3 fatty acids 1,000 mg Cap Twice a day    pantoprazole (PROTONIX) 40 MG tablet Take 1 tablet (40 mg total) by mouth once daily. (Patient taking differently: Take 40 mg by mouth every morning. )    potassium chloride (KLOR-CON) 8 MEQ TbSR TAKE 2 TABLETS THREE TIMES DAILY  OR AS DIRECTED    predniSONE (DELTASONE) 1 MG tablet Take 2 mg by mouth once daily.    predniSONE (DELTASONE) 5 MG tablet TAKE 1 TABLET EVERY OTHER DAY (Patient taking differently: TAKE 1 TABLET EVERY DAY)    senna-docusate 8.6-50 mg (PERICOLACE) 8.6-50 mg per tablet Take 2 tablets by mouth  nightly as needed for Constipation.    sildenafil (REVATIO) 20 mg Tab Take 1 to 3 tabs daily as needed.    sulfamethoxazole-trimethoprim 800-160mg (BACTRIM DS) 800-160 mg Tab Take 1 tablet by mouth once daily. After completing 1 week course 2x/day     No current facility-administered medications for this visit.        Review of patient's allergies indicates:   Allergen Reactions    No known drug allergies        Family History   Problem Relation Age of Onset    Cataracts Mother     Heart disease Mother         CHF    Hypertension Mother     Hyperlipidemia Mother     Cataracts Father     Glaucoma Father     Heart disease Father     Hyperlipidemia Sister     Hypertension Sister     No Known Problems Daughter     No Known Problems Daughter     Collagen disease Neg Hx     Amblyopia Neg Hx     Blindness Neg Hx     Macular degeneration Neg Hx     Retinal detachment Neg Hx     Strabismus Neg Hx     Cancer Neg Hx        Social History     Socioeconomic History    Marital status:      Spouse name: Not on file    Number of children: Not on file    Years of education: Not on file    Highest education level: Not on file   Occupational History    Not on file   Social Needs    Financial resource strain: Not on file    Food insecurity:     Worry: Not on file     Inability: Not on file    Transportation needs:     Medical: Not on file     Non-medical: Not on file   Tobacco Use    Smoking status: Never Smoker    Smokeless tobacco: Never Used    Tobacco comment: when a child   Substance and Sexual Activity    Alcohol use: Yes     Comment: rarely    Drug use: No    Sexual activity: Yes     Partners: Female   Lifestyle    Physical activity:     Days per week: Not on file     Minutes per session: Not on file    Stress: Not on file   Relationships    Social connections:     Talks on phone: Not on file     Gets together: Not on file     Attends Buddhist service: Not on file     Active member  of club or organization: Not on file     Attends meetings of clubs or organizations: Not on file     Relationship status: Not on file    Intimate partner violence:     Fear of current or ex partner: Not on file     Emotionally abused: Not on file     Physically abused: Not on file     Forced sexual activity: Not on file   Other Topics Concern    Not on file   Social History Narrative    Not on file       Chief Complaint:   Chief Complaint   Patient presents with    Shoulder Pain     left shoulder pain follow up       History of present illness:  This is a 73-year-old male seen for left shoulder and neck pain. Patient had a very big neck fusion done in November of 2018.  Since he has had some pain on the left side of his neck and shoulder.  Feels very tight and he describes it as like a bungee cord being stretched.  Pain is up to 6/10.  Located mainly in the trapezius area. No previous treatment specifically for the shoulder.  Has had therapy for his neck and shoulder but not getting any better.     Answers for HPI/ROS submitted by the patient on 3/30/2019   Neck stiffness  unexpected weight change: No  appetite change : No  sleep disturbance: No  IMMUNOCOMPROMISED: No  nervous/ anxious: No  dysphoric mood: No  rash: No  visual disturbance: No  eye redness: No  eye pain: No  ear pain: No  tinnitus: Yes  hearing loss: No  sinus pressure : No  nosebleeds: No  enviro allergies: No  food allergies: No  cough: No  shortness of breath: Yes  sweating: No  frequency: Yes  difficulty urinating: Yes  hematuria: No  chest pain: Yes  palpitations: Yes  nausea: No  vomiting: No  diarrhea: No  blood in stool: No  constipation: No  headaches: No  dizziness: Yes  numbness: No  seizures: No  joint swelling: No  myalgia: Yes  weakness: No  back pain: No  Pain Chronicity: chronic  Onset: more than 1 month ago  Frequency: constantly  Progression since onset: unchanged  abdominal pain: No  anorexia: No  arthralgias: No  Change in  bowel movement: No  chills: No  congestion: No  fever: No  neck pain: Yes  sore throat: No  swollen glands: No  Urinary symptoms: Yes  Vertigo: No  Visual change: No  Aggravating factors: twisting  Treatments tried: heat, exercise, OTC ointments  Improvement on treatment: no relief        Physical Examination:    Vital Signs:    Vitals:    04/01/19 0951   BP: 121/64   Pulse: 85       Body mass index is 31 kg/m².    This a well-developed, well nourished patient in no acute distress.  They are alert and oriented and cooperative to examination.  Pt. walks without an antalgic gait.      Examination of the left shoulder shows no rashes or erythema. There are no masses, ecchymosis, or atrophy.  He has some tightness up in his trapezius with a trigger point within the muscle as well. The patient has full range of motion in forward flexion, external rotation, and internal rotation to the mid T-spine. The patient has - impingement signs. - [place]'s test. - Speeds test. Nontender to palpation over a.c. joint. Normal stability anteriorly, posteriorly, and negative sulcus sign. Passive range of motion: Forward flexion of 180°, external rotation at 90° of 90°, internal rotation of 50°, and external rotation at 0° of 50°. 2+ radial pulse. Intact axillary, radial, median and ulnar sensation. 5 out of 5 resisted forward flexion, external rotation, and negative lift off test.      X-rays:  X-rays of the left shoulder reviewed which show some mild degenerative change of the AC joint.     Assessment::  Left trapezius pain and tightness    Plan:  I reviewed the finding with him today.  I recommended an MRI dedicated to his left shoulder since he does not seem to be getting better.  He is over 6 months out from his neck surgery and is done about 3 months of physical therapy already with no improvement.  Follow up after the MRI is completed.    This note was created using [email or web address] Modal voice recognition software that occasionally  misinterpreted phrases or words.    Consult note is delivered via Epic messaging service.

## 2019-04-01 NOTE — LETTER
April 1, 2019      Kishore Turner MD  [address]  [facility] [address]           [address] [address]  Phone: [phone]          Patient: Maximilian Tavera Jr.   MR Number: [id number]   YOB: 1945   Date of Visit: 4/1/2019       Dear Dr. Kishore Turner:    Thank you for referring Maximilian Tavera to me for evaluation. Attached you will find relevant portions of my assessment and plan of care.    If you have questions, please do not hesitate to call me. I look forward to following Maximilian Tavera along with you.    Sincerely,    Haroldo Campbell MD    Enclosure  CC:  No Recipients    If you would like to receive this communication electronically, please contact [email or web address] or [phone] to request more information on [employer] Link access.    For providers and/or their staff who would like to refer a patient to [facility], please contact us through our one-stop-shop provider referral line, [facility], at [phone].    If you feel you have received this communication in error or would no longer like to receive these types of communications, please e-mail [email or web address]

## 2019-04-04 ENCOUNTER — DOCUMENTATION ONLY (OUTPATIENT)
Dept: CARDIOLOGY | Facility: HOSPITAL | Age: 74
End: 2019-04-04

## 2019-04-04 NOTE — PROGRESS NOTES
Tele rhythmics alert notification on cardiac event monitor.Strips will be placed under this encounter for review.

## 2019-04-05 ENCOUNTER — PATIENT MESSAGE (OUTPATIENT)
Dept: CARDIOLOGY | Facility: CLINIC | Age: 74
End: 2019-04-05

## 2019-04-08 DIAGNOSIS — R00.2 PALPITATIONS: Primary | ICD-10-CM

## 2019-04-10 ENCOUNTER — TELEPHONE (OUTPATIENT)
Dept: ORTHOPEDICS | Facility: CLINIC | Age: 74
End: 2019-04-10

## 2019-04-10 NOTE — TELEPHONE ENCOUNTER
Called pt and advised will re-send orders to stand up mri per request. Pt verbalized understanding.

## 2019-04-10 NOTE — TELEPHONE ENCOUNTER
----- Message from Gilda Martinez sent at 4/10/2019  9:05 AM CDT -----  Contact: Patient  Type: Needs Medical Advice    Who Called:  Patient  Best Call Back Number:   Additional Information: Calling to speak with the Nurse about the MRI order for his left shoulder. Please advise.

## 2019-04-11 ENCOUNTER — HOSPITAL ENCOUNTER (OUTPATIENT)
Dept: CARDIOLOGY | Facility: CLINIC | Age: 74
Discharge: HOME | End: 2019-04-11
Payer: MEDICARE

## 2019-04-11 ENCOUNTER — INITIAL CONSULT (OUTPATIENT)
Dept: ELECTROPHYSIOLOGY | Facility: CLINIC | Age: 74
End: 2019-04-11
Payer: MEDICARE

## 2019-04-11 VITALS
HEART RATE: 76 BPM | WEIGHT: 232.38 LBS | BODY MASS INDEX: 30.8 KG/M2 | SYSTOLIC BLOOD PRESSURE: 117 MMHG | DIASTOLIC BLOOD PRESSURE: 79 MMHG | HEIGHT: 73 IN

## 2019-04-11 DIAGNOSIS — I47.29 NONSUSTAINED VENTRICULAR TACHYCARDIA: ICD-10-CM

## 2019-04-11 DIAGNOSIS — R00.2 PALPITATIONS: ICD-10-CM

## 2019-04-11 DIAGNOSIS — I49.3 PVC'S (PREMATURE VENTRICULAR CONTRACTIONS): Primary | ICD-10-CM

## 2019-04-11 DIAGNOSIS — I10 HTN (HYPERTENSION), BENIGN: ICD-10-CM

## 2019-04-11 DIAGNOSIS — G70.00 MYASTHENIA GRAVIS, ACHR ANTIBODY POSITIVE: ICD-10-CM

## 2019-04-11 PROCEDURE — 1101F PR PT FALLS ASSESS DOC 0-1 FALLS W/OUT INJ PAST YR: ICD-10-PCS | Mod: HCNC,CPTII,S$GLB, | Performed by: INTERNAL MEDICINE

## 2019-04-11 PROCEDURE — 1101F PT FALLS ASSESS-DOCD LE1/YR: CPT | Mod: HCNC,CPTII,S$GLB, | Performed by: INTERNAL MEDICINE

## 2019-04-11 PROCEDURE — 99204 PR OFFICE/OUTPT VISIT, NEW, LEVL IV, 45-59 MIN: ICD-10-PCS | Mod: HCNC,S$GLB,, | Performed by: INTERNAL MEDICINE

## 2019-04-11 PROCEDURE — 99999 PR PBB SHADOW E&M-EST. PATIENT-LVL III: ICD-10-PCS | Mod: PBBFAC,HCNC,, | Performed by: INTERNAL MEDICINE

## 2019-04-11 PROCEDURE — 3074F SYST BP LT 130 MM HG: CPT | Mod: HCNC,CPTII,S$GLB, | Performed by: INTERNAL MEDICINE

## 2019-04-11 PROCEDURE — 99499 UNLISTED E&M SERVICE: CPT | Mod: HCNC,S$GLB,, | Performed by: INTERNAL MEDICINE

## 2019-04-11 PROCEDURE — 93000 ELECTROCARDIOGRAM COMPLETE: CPT | Mod: HCNC,S$GLB,, | Performed by: INTERNAL MEDICINE

## 2019-04-11 PROCEDURE — 99999 PR PBB SHADOW E&M-EST. PATIENT-LVL III: CPT | Mod: PBBFAC,HCNC,, | Performed by: INTERNAL MEDICINE

## 2019-04-11 PROCEDURE — 3074F PR MOST RECENT SYSTOLIC BLOOD PRESSURE < 130 MM HG: ICD-10-PCS | Mod: HCNC,CPTII,S$GLB, | Performed by: INTERNAL MEDICINE

## 2019-04-11 PROCEDURE — 3078F PR MOST RECENT DIASTOLIC BLOOD PRESSURE < 80 MM HG: ICD-10-PCS | Mod: HCNC,CPTII,S$GLB, | Performed by: INTERNAL MEDICINE

## 2019-04-11 PROCEDURE — 99204 OFFICE O/P NEW MOD 45 MIN: CPT | Mod: HCNC,S$GLB,, | Performed by: INTERNAL MEDICINE

## 2019-04-11 PROCEDURE — 3078F DIAST BP <80 MM HG: CPT | Mod: HCNC,CPTII,S$GLB, | Performed by: INTERNAL MEDICINE

## 2019-04-11 PROCEDURE — 99499 RISK ADDL DX/OHS AUDIT: ICD-10-PCS | Mod: HCNC,S$GLB,, | Performed by: INTERNAL MEDICINE

## 2019-04-11 PROCEDURE — 93000 EKG 12-LEAD: ICD-10-PCS | Mod: HCNC,S$GLB,, | Performed by: INTERNAL MEDICINE

## 2019-04-11 NOTE — PROGRESS NOTES
Subjective:    Patient ID:  Maximilian Tavera Jr. is a 73 y.o. male who presents for evaluation of Palpitations    Referring Cardiologist: Alphonso Altamirano MD  Primary Care Physician: Brian Marroquin MD    HPI  I had the pleasure of seeing Mr. Tavera today in our electrophysiology clinic in consultation for his palpitations. As you are aware he is a pleasant 73 year-old man with hypertension, myasthenia gravis on chronic prednisone therapy, and degenerative joint disease with myelopathy s/p decompression and fusion of C2-6. He reports being diagnosed with PVCs 5 years ago however over the past 1-2 months have become more prevalent. No syncope but has been having intermittent episodes of symptomatic hypotension with heart rate in the 110s which seemed to increase when started on BPH meds. He saw Dr. Altamirano in cardiology clinic who ordered an exercise stress echo and a 30 day event monitor. Stress test noted a structurally normal heart without ischemia. The ECG portion of his stress test noted sinus rhythm with occasional unifocal PVCs (LBBB but with V2 pattern break, +Rs II/aVF, rS in III, R in 1) at times with PVC couplets. His 30 day event monitor noted frequent PVCs with PVC couplets, triplets and occasional 4 beat NSVT. He denies any near syncope or syncope.    He was on carvedilol for years however this was stopped 2 months ago when he was started on Flomax for BPH (prescribing physician suggested stopping carvedilol to avoid hypotension). He stopped this and changed to alfuzosin however he stopped this due to symptomatic hypotension. For HTN he is on olmesartan and chlorthalidone.    ECG from 10/2018 notes a dimorphic couplet with 1 similar to PVC noted on stress test and the other inferiorly directed PVC in inferior leads. Other ECGs show sinus rhythm with RBBB.    My interpretation of today's in clinic ECG is sinus rhythm with RBBB and no PVCs    Review of Systems   Constitution: Negative for fever and  malaise/fatigue.   HENT: Negative for congestion and nosebleeds.    Eyes: Negative for blurred vision and visual disturbance.   Cardiovascular: Positive for dyspnea on exertion and palpitations. Negative for chest pain, irregular heartbeat, leg swelling, near-syncope, orthopnea, paroxysmal nocturnal dyspnea and syncope.   Respiratory: Negative for cough and shortness of breath.    Hematologic/Lymphatic: Negative for bleeding problem. Does not bruise/bleed easily.   Skin: Negative.    Musculoskeletal: Negative.    Gastrointestinal: Negative for bloating and abdominal pain.   Neurological: Negative for dizziness and focal weakness.        Objective:    Physical Exam   Constitutional: He is oriented to person, place, and time. He appears well-developed and well-nourished. No distress.   HENT:   Head: Normocephalic and atraumatic.   Eyes: Conjunctivae are normal. Right eye exhibits no discharge. Left eye exhibits no discharge.   Neck: Neck supple. No JVD present.   Cardiovascular: Normal rate and regular rhythm. Exam reveals no gallop and no friction rub.   No murmur heard.  Pulmonary/Chest: Effort normal and breath sounds normal. No respiratory distress. He has no wheezes. He has no rales.   Abdominal: Soft. Bowel sounds are normal. He exhibits no distension. There is no tenderness. There is no rebound.   Musculoskeletal: He exhibits no edema.   Neurological: He is alert and oriented to person, place, and time.   Skin: Skin is warm and dry. He is not diaphoretic.   Psychiatric: He has a normal mood and affect. His behavior is normal. Judgment and thought content normal.   Vitals reviewed.        Assessment:       1. PVC's (premature ventricular contractions)    2. Nonsustained ventricular tachycardia    3. HTN (hypertension), benign    4. Myasthenia gravis, AChR antibody positive         Plan:       In summary, Mr. Tavera is a pleasant 73 year-old man with hypertension, myasthenia gravis on chronic prednisone therapy,  and degenerative joint disease with myelopathy s/p decompression and fusion of C2-6, chronic PVCs however over the past 1-2 months have become more prevalent, and intermittent episodes of symptomatic hypotension with heart rate in the 110s which seemed to increase when started on BPH meds. He is wearing a 30 day monitor which notes at times frequent PVCs, couplets and 4 beat NSVT. He feels this has increased after stopping carvedilol (was on 25mg carvedilol for years). No indication that carvedilol worsened his MG symptoms. Discussed that he has a structurally normal heart and PVCs of unknown burden. It is unclear if PVCs are related to his MG. Recommend finishing the 30 day monitor and to specifically send a transmission if he notes periods of hypotension so we can get a symptom rhythm correlate. After his 30 day monitor is completed I would recommend a 48 hour holter to quantify PVC burden. In the interim I have messaged Dr. Hoang with Neurology, who will be his new Neurologist starting next month, to get his input on resuming beta-blocker as he reports his symptoms worsened after stopping it. Discussed issues regarding beta-blocker, calcium channel blocker, and anti-arrhythmic therapy in patients with myasthenia gravis. Dofetilide may be an option. His focus is consistent with an inferoseptal focus, possibly epicardial. Will see him back in 4 weeks and hopefully will be able to begin a treatment plan once all data is gathered.    Thank you for allowing me to participate in the care of this patient. Please do not hesitate to call me with any questions or concerns.    Sathya Zamudio MD, PhD  Cardiac Electrophysiology

## 2019-04-11 NOTE — PATIENT INSTRUCTIONS
Understanding Premature Ventricular Contractions (PVCs)  Premature ventricular contractions (PVCs) are a type of abnormal heartbeat (arrhythmia). They are very common. They can occur in people of all ages from time to time. They usually cause only mild symptoms.  How PVCs happen    Your heart has 4 chambers: 2 upper atria and 2 lower ventricles. Normally, a special group of cells begins the signal to start your heartbeat. These cells are in the sinoatrial (SA) node in the right atrium. The signal quickly travels down your hearts conducting system. It travels to the left and right ventricle. As it travels, the signal triggers nearby parts of your heart to contract. This allows your heart to squeeze in a coordinated way.  During a premature ventricular contraction, the signal to start your heartbeat instead comes from one of the ventricles. This signal is premature, meaning it happens before the SA node has had a chance to fire. The signal spreads through the rest of your heart, causing a heartbeat. If this happens very soon after the previous heartbeat, your heart will push out very little blood. This causes a feeling of a pause between beats. If it happens a little later, your heart pushes out an almost normal amount of blood. This leads to a feeling of an extra heartbeat. So, the heart has a premature heartbeat in between normal heartbeats.  What causes PVCs?  Certain things can help set off a premature signal in the ventricles. These include:  · Reduced blood flow to your heart  · Scarring after a heart attack (myocardial infarction)  · Electrolyte problems, such as low sodium or potassium levels  · Increased adrenaline, such as with anxiety  · Certain medicines, like digoxin  Many heart conditions raise the risk for PVCs. These include:  · Mitral valve prolapse  · High blood pressure  · Heart attack  · Coronary heart disease  · cardiomyopathy  · Hypertrophic cardiomyopathy  · Congenital heart disease  They  often happen in people without any heart disease. However, PVCs are somewhat more common in people with some kind of heart disease.  Symptoms of PVCs  Most people with occasional PVCs dont have symptoms. You are also more likely to have symptoms if you have PVCs often. When symptoms do happen, they are usually minor. Symptoms may include:  · An awareness of the heart beating  · A fluttering or flip-flop feeling in your chest  · Feeling of a skipped or extra heartbeat  · Dizziness and near-fainting  · A pulsing sensation in the neck  PVCs may cause more severe symptoms if you have another heart problem, such as heart failure.  Diagnosing PVCs  Your healthcare provider will ask about your health history and give you a physical exam. An electrocardiogram (ECG) is the main test for diagnosis. This test allows your provider to look at the signal of your heartbeat for a brief time. Any PVCs that occur during this time will show up on the ECG. In some cases, your healthcare provider might advise ECG monitoring over a day or more, up to 30 days. This can help to catch PVCs that dont happen often. This is done with a monitor you wear night and day for the test period.  These may be the only tests your healthcare provider will need. You may need more testing if you have PVCs often, or many in a row. Your provider may look at other causes, including possible heart problems. These tests might include:  · Echocardiography, to look at your hearts structure and function  · Cardiac stress testing, to see how your heart responds to exercise and to evaluate blood flow through your heart  · Blood tests, to check potassium and thyroid levels  Date Last Reviewed: 2/17/2015  © 9489-0871 [employer]. [address] [place] [address], [place] [address]. All rights reserved. This information is not intended as a substitute for professional medical care. Always follow your healthcare professional's instructions.

## 2019-04-11 NOTE — LETTER
April 11, 2019      Miguel Altamirano MD  [address]  [facility] [address]           Brian Teodoro - Arrhythmia  [address]  [facility] [address]  Phone: [phone]  Fax: [phone]          Patient: Maximilian Tavera Jr.   MR Number: [id number]   YOB: 1945   Date of Visit: 4/11/2019       Dear Dr. Miguel Altamirano:    Thank you for referring Maximilian Tavera to me for evaluation. Attached you will find relevant portions of my assessment and plan of care.    If you have questions, please do not hesitate to call me. I look forward to following Maximilian Tavera along with you.    Sincerely,    Sathya Zamudio MD    Enclosure  CC:  No Recipients    If you would like to receive this communication electronically, please contact [email or web address] or [phone] to request more information on [employer] Link access.    For providers and/or their staff who would like to refer a patient to [facility], please contact us through our one-stop-shop provider referral line, [facility], at [phone].    If you feel you have received this communication in error or would no longer like to receive these types of communications, please e-mail [email or web address]

## 2019-04-15 ENCOUNTER — PATIENT OUTREACH (OUTPATIENT)
Dept: ADMINISTRATIVE | Facility: HOSPITAL | Age: 74
End: 2019-04-15

## 2019-04-20 ENCOUNTER — PATIENT MESSAGE (OUTPATIENT)
Dept: CARDIOLOGY | Facility: CLINIC | Age: 74
End: 2019-04-20

## 2019-04-22 ENCOUNTER — TELEPHONE (OUTPATIENT)
Dept: ORTHOPEDICS | Facility: CLINIC | Age: 74
End: 2019-04-22

## 2019-04-22 RX ORDER — EZETIMIBE 10 MG/1
10 TABLET ORAL DAILY
Qty: 90 TABLET | Refills: 3 | Status: SHIPPED | OUTPATIENT
Start: 2019-04-22 | End: 2020-10-06

## 2019-04-22 NOTE — TELEPHONE ENCOUNTER
Patient called to verify his MRI was put in I verified pateints MRI was put in for his neck left shoulder also provided patient with referral number. Patient verbalized understanding

## 2019-04-30 ENCOUNTER — OFFICE VISIT (OUTPATIENT)
Dept: FAMILY MEDICINE | Facility: CLINIC | Age: 74
End: 2019-04-30
Payer: MEDICARE

## 2019-04-30 VITALS
HEART RATE: 77 BPM | BODY MASS INDEX: 30.97 KG/M2 | TEMPERATURE: 99 F | SYSTOLIC BLOOD PRESSURE: 127 MMHG | WEIGHT: 233.69 LBS | DIASTOLIC BLOOD PRESSURE: 73 MMHG | HEIGHT: 73 IN

## 2019-04-30 DIAGNOSIS — Z12.5 ENCOUNTER FOR SCREENING FOR MALIGNANT NEOPLASM OF PROSTATE: ICD-10-CM

## 2019-04-30 DIAGNOSIS — E78.2 MIXED HYPERLIPIDEMIA: ICD-10-CM

## 2019-04-30 DIAGNOSIS — R00.0 RACING HEART BEAT: ICD-10-CM

## 2019-04-30 DIAGNOSIS — D63.8 ANEMIA, CHRONIC DISEASE: ICD-10-CM

## 2019-04-30 DIAGNOSIS — G70.00 MYASTHENIA GRAVIS, ACHR ANTIBODY POSITIVE: Primary | ICD-10-CM

## 2019-04-30 DIAGNOSIS — R35.1 NOCTURIA: ICD-10-CM

## 2019-04-30 DIAGNOSIS — R73.9 HYPERGLYCEMIA: ICD-10-CM

## 2019-04-30 PROCEDURE — 3078F PR MOST RECENT DIASTOLIC BLOOD PRESSURE < 80 MM HG: ICD-10-PCS | Mod: HCNC,CPTII,S$GLB, | Performed by: FAMILY MEDICINE

## 2019-04-30 PROCEDURE — 99214 OFFICE O/P EST MOD 30 MIN: CPT | Mod: HCNC,S$GLB,, | Performed by: FAMILY MEDICINE

## 2019-04-30 PROCEDURE — 3078F DIAST BP <80 MM HG: CPT | Mod: HCNC,CPTII,S$GLB, | Performed by: FAMILY MEDICINE

## 2019-04-30 PROCEDURE — 3074F SYST BP LT 130 MM HG: CPT | Mod: HCNC,CPTII,S$GLB, | Performed by: FAMILY MEDICINE

## 2019-04-30 PROCEDURE — 99499 UNLISTED E&M SERVICE: CPT | Mod: HCNC,S$GLB,, | Performed by: FAMILY MEDICINE

## 2019-04-30 PROCEDURE — 1101F PR PT FALLS ASSESS DOC 0-1 FALLS W/OUT INJ PAST YR: ICD-10-PCS | Mod: HCNC,CPTII,S$GLB, | Performed by: FAMILY MEDICINE

## 2019-04-30 PROCEDURE — 99214 PR OFFICE/OUTPT VISIT, EST, LEVL IV, 30-39 MIN: ICD-10-PCS | Mod: HCNC,S$GLB,, | Performed by: FAMILY MEDICINE

## 2019-04-30 PROCEDURE — 3074F PR MOST RECENT SYSTOLIC BLOOD PRESSURE < 130 MM HG: ICD-10-PCS | Mod: HCNC,CPTII,S$GLB, | Performed by: FAMILY MEDICINE

## 2019-04-30 PROCEDURE — 1101F PT FALLS ASSESS-DOCD LE1/YR: CPT | Mod: HCNC,CPTII,S$GLB, | Performed by: FAMILY MEDICINE

## 2019-04-30 PROCEDURE — 99999 PR PBB SHADOW E&M-EST. PATIENT-LVL III: ICD-10-PCS | Mod: PBBFAC,HCNC,, | Performed by: FAMILY MEDICINE

## 2019-04-30 PROCEDURE — 99499 RISK ADDL DX/OHS AUDIT: ICD-10-PCS | Mod: HCNC,S$GLB,, | Performed by: FAMILY MEDICINE

## 2019-04-30 PROCEDURE — 99999 PR PBB SHADOW E&M-EST. PATIENT-LVL III: CPT | Mod: PBBFAC,HCNC,, | Performed by: FAMILY MEDICINE

## 2019-04-30 NOTE — PATIENT INSTRUCTIONS

## 2019-05-02 ENCOUNTER — TELEPHONE (OUTPATIENT)
Dept: FAMILY MEDICINE | Facility: CLINIC | Age: 74
End: 2019-05-02

## 2019-05-02 ENCOUNTER — HOSPITAL ENCOUNTER (OUTPATIENT)
Dept: CARDIOLOGY | Facility: HOSPITAL | Age: 74
Discharge: HOME | End: 2019-05-02
Attending: INTERNAL MEDICINE
Payer: MEDICARE

## 2019-05-02 ENCOUNTER — LAB VISIT (OUTPATIENT)
Dept: LAB | Facility: HOSPITAL | Age: 74
End: 2019-05-02
Attending: FAMILY MEDICINE
Payer: MEDICARE

## 2019-05-02 ENCOUNTER — TELEPHONE (OUTPATIENT)
Dept: CARDIOLOGY | Facility: CLINIC | Age: 74
End: 2019-05-02

## 2019-05-02 DIAGNOSIS — E87.6 DIURETIC-INDUCED HYPOKALEMIA: Primary | ICD-10-CM

## 2019-05-02 DIAGNOSIS — T50.2X5A DIURETIC-INDUCED HYPOKALEMIA: Primary | ICD-10-CM

## 2019-05-02 DIAGNOSIS — I47.29 NONSUSTAINED VENTRICULAR TACHYCARDIA: ICD-10-CM

## 2019-05-02 DIAGNOSIS — I49.3 PVC'S (PREMATURE VENTRICULAR CONTRACTIONS): ICD-10-CM

## 2019-05-02 DIAGNOSIS — R35.1 NOCTURIA: ICD-10-CM

## 2019-05-02 DIAGNOSIS — R00.0 RACING HEART BEAT: ICD-10-CM

## 2019-05-02 DIAGNOSIS — Z12.5 ENCOUNTER FOR SCREENING FOR MALIGNANT NEOPLASM OF PROSTATE: ICD-10-CM

## 2019-05-02 DIAGNOSIS — G70.00 MYASTHENIA GRAVIS, ACHR ANTIBODY POSITIVE: ICD-10-CM

## 2019-05-02 DIAGNOSIS — R97.20 ABNORMAL PSA: ICD-10-CM

## 2019-05-02 DIAGNOSIS — E78.2 MIXED HYPERLIPIDEMIA: ICD-10-CM

## 2019-05-02 DIAGNOSIS — D63.8 ANEMIA, CHRONIC DISEASE: ICD-10-CM

## 2019-05-02 LAB
ALBUMIN SERPL BCP-MCNC: 3.7 G/DL (ref 3.5–5.2)
ALP SERPL-CCNC: 81 U/L (ref 55–135)
ALT SERPL W/O P-5'-P-CCNC: 25 U/L (ref 10–44)
ANION GAP SERPL CALC-SCNC: 7 MMOL/L (ref 8–16)
AST SERPL-CCNC: 31 U/L (ref 10–40)
BASOPHILS # BLD AUTO: 0.01 K/UL (ref 0–0.2)
BASOPHILS NFR BLD: 0.2 % (ref 0–1.9)
BILIRUB SERPL-MCNC: 1.6 MG/DL (ref 0.1–1)
BUN SERPL-MCNC: 14 MG/DL (ref 8–23)
CALCIUM SERPL-MCNC: 9.5 MG/DL (ref 8.7–10.5)
CHLORIDE SERPL-SCNC: 105 MMOL/L (ref 95–110)
CHOLEST SERPL-MCNC: 120 MG/DL (ref 120–199)
CHOLEST/HDLC SERPL: 3.3 {RATIO} (ref 2–5)
CO2 SERPL-SCNC: 30 MMOL/L (ref 23–29)
COMPLEXED PSA SERPL-MCNC: 3.3 NG/ML (ref 0–4)
CREAT SERPL-MCNC: 1 MG/DL (ref 0.5–1.4)
DIFFERENTIAL METHOD: ABNORMAL
EOSINOPHIL # BLD AUTO: 0.1 K/UL (ref 0–0.5)
EOSINOPHIL NFR BLD: 2.3 % (ref 0–8)
ERYTHROCYTE [DISTWIDTH] IN BLOOD BY AUTOMATED COUNT: 14.8 % (ref 11.5–14.5)
EST. GFR  (AFRICAN AMERICAN): >60 ML/MIN/1.73 M^2
EST. GFR  (NON AFRICAN AMERICAN): >60 ML/MIN/1.73 M^2
GLUCOSE SERPL-MCNC: 106 MG/DL (ref 70–110)
HCT VFR BLD AUTO: 44.1 % (ref 40–54)
HDLC SERPL-MCNC: 36 MG/DL (ref 40–75)
HDLC SERPL: 30 % (ref 20–50)
HGB BLD-MCNC: 13.9 G/DL (ref 14–18)
IMM GRANULOCYTES # BLD AUTO: 0.02 K/UL (ref 0–0.04)
IMM GRANULOCYTES NFR BLD AUTO: 0.3 % (ref 0–0.5)
LDLC SERPL CALC-MCNC: 66.8 MG/DL (ref 63–159)
LYMPHOCYTES # BLD AUTO: 1 K/UL (ref 1–4.8)
LYMPHOCYTES NFR BLD: 16.2 % (ref 18–48)
MAGNESIUM SERPL-MCNC: 2.1 MG/DL (ref 1.6–2.6)
MCH RBC QN AUTO: 29 PG (ref 27–31)
MCHC RBC AUTO-ENTMCNC: 31.5 G/DL (ref 32–36)
MCV RBC AUTO: 92 FL (ref 82–98)
MONOCYTES # BLD AUTO: 0.8 K/UL (ref 0.3–1)
MONOCYTES NFR BLD: 12.5 % (ref 4–15)
NEUTROPHILS # BLD AUTO: 4.2 K/UL (ref 1.8–7.7)
NEUTROPHILS NFR BLD: 68.5 % (ref 38–73)
NONHDLC SERPL-MCNC: 84 MG/DL
NRBC BLD-RTO: 0 /100 WBC
PLATELET # BLD AUTO: 145 K/UL (ref 150–350)
PMV BLD AUTO: 10.9 FL (ref 9.2–12.9)
POTASSIUM SERPL-SCNC: 3.3 MMOL/L (ref 3.5–5.1)
PROT SERPL-MCNC: 6.6 G/DL (ref 6–8.4)
RBC # BLD AUTO: 4.8 M/UL (ref 4.6–6.2)
SODIUM SERPL-SCNC: 142 MMOL/L (ref 136–145)
TRIGL SERPL-MCNC: 86 MG/DL (ref 30–150)
WBC # BLD AUTO: 6.16 K/UL (ref 3.9–12.7)

## 2019-05-02 PROCEDURE — 80053 COMPREHEN METABOLIC PANEL: CPT | Mod: HCNC

## 2019-05-02 PROCEDURE — 85025 COMPLETE CBC W/AUTO DIFF WBC: CPT | Mod: HCNC

## 2019-05-02 PROCEDURE — 83735 ASSAY OF MAGNESIUM: CPT | Mod: HCNC

## 2019-05-02 PROCEDURE — 93227 HOLTER MONITOR - 48 HOUR (CUPID ONLY): ICD-10-PCS | Mod: HCNC,,, | Performed by: INTERNAL MEDICINE

## 2019-05-02 PROCEDURE — 93227 XTRNL ECG REC<48 HR R&I: CPT | Mod: HCNC,,, | Performed by: INTERNAL MEDICINE

## 2019-05-02 PROCEDURE — 80061 LIPID PANEL: CPT | Mod: HCNC

## 2019-05-02 PROCEDURE — 93226 XTRNL ECG REC<48 HR SCAN A/R: CPT | Mod: HCNC

## 2019-05-02 PROCEDURE — 36415 COLL VENOUS BLD VENIPUNCTURE: CPT | Mod: HCNC,PO

## 2019-05-02 PROCEDURE — 84153 ASSAY OF PSA TOTAL: CPT | Mod: HCNC

## 2019-05-02 RX ORDER — POTASSIUM CHLORIDE 600 MG/1
TABLET, FILM COATED, EXTENDED RELEASE ORAL
Qty: 540 TABLET | Refills: 3
Start: 2019-05-02 | End: 2019-05-06 | Stop reason: DRUGHIGH

## 2019-05-02 NOTE — TELEPHONE ENCOUNTER
1.Low potasium. Increase to 4 times a day potassium supplement. Retest in 1 weeks. Increase protein intake.  2.PSA mild elevated compare to last year by 1.2. Plan to repeat level in 3 months.Aug/2019  This has been fully explained to the patient, who indicates understanding.

## 2019-05-03 ENCOUNTER — TELEPHONE (OUTPATIENT)
Dept: FAMILY MEDICINE | Facility: CLINIC | Age: 74
End: 2019-05-03

## 2019-05-03 DIAGNOSIS — I10 HYPERTENSION, UNSPECIFIED TYPE: Primary | ICD-10-CM

## 2019-05-03 DIAGNOSIS — R06.09 DOE (DYSPNEA ON EXERTION): Primary | ICD-10-CM

## 2019-05-03 RX ORDER — ALBUTEROL SULFATE 90 UG/1
2 AEROSOL, METERED RESPIRATORY (INHALATION) EVERY 6 HOURS PRN
Qty: 1 INHALER | Refills: 3 | Status: SHIPPED | OUTPATIENT
Start: 2019-05-03 | End: 2022-01-10 | Stop reason: SDUPTHER

## 2019-05-03 NOTE — TELEPHONE ENCOUNTER
Patient submitted letter from [employer] stating Albuterol Sulfat HFA 90 mcg Inhaler is not covered by insurance. Please advise.

## 2019-05-03 NOTE — TELEPHONE ENCOUNTER
3 month follow up appointment scheduled for the date of 8/2/19.  BMP scheduled for the date of 5/17/19.  Patient states he will have PSA performed on walk in basis in July.

## 2019-05-03 NOTE — TELEPHONE ENCOUNTER
Results of labs done today were given to patient. He currently takes six  8 meq tablets daily and his PCP, Dr. Marroquin , called him today too and told him to take two extra 8 meq tablets daily and also told him he probably should get off the Chlorthalidone because that may be depleating his Potassium.  Patient wants Dr. altamirano's advice.     Notes recorded by Miguel Altamirano MD on 5/2/2019 at 5:17 PM CDT  Release K very low-let's change to 20 meq 3 per day for 2 days, 2 per day for 5 days then one daily and check chem 7 in 5 weeks

## 2019-05-05 ENCOUNTER — PATIENT MESSAGE (OUTPATIENT)
Dept: CARDIOLOGY | Facility: CLINIC | Age: 74
End: 2019-05-05

## 2019-05-06 RX ORDER — POTASSIUM CHLORIDE 20 MEQ/1
20 TABLET, EXTENDED RELEASE ORAL 3 TIMES DAILY
COMMUNITY
End: 2020-01-10 | Stop reason: SDUPTHER

## 2019-05-09 ENCOUNTER — PES CALL (OUTPATIENT)
Dept: ADMINISTRATIVE | Facility: CLINIC | Age: 74
End: 2019-05-09

## 2019-05-09 LAB
OHS CV EVENT MONITOR DAY: 0
OHS CV HOLTER LENGTH DECIMAL HOURS: 47.98
OHS CV HOLTER LENGTH HOURS: 47
OHS CV HOLTER LENGTH MINUTES: 59

## 2019-05-09 NOTE — PROGRESS NOTES
Subjective:       Patient ID: Maximilian Tavera Jr. is a 73 y.o. male.    Chief Complaint: Hypertension and Annual Exam    Hypertension   This is a chronic problem. The current episode started more than 1 year ago. The problem has been resolved since onset. The problem is controlled. Associated symptoms include anxiety, chest pain, neck pain and palpitations. Pertinent negatives include no headaches, malaise/fatigue, peripheral edema or PND. Agents associated with hypertension include thyroid hormones and steroids. Risk factors for coronary artery disease include male gender, obesity, dyslipidemia and sedentary lifestyle. Past treatments include angiotensin blockers. Compliance problems include diet and exercise.  Hypertensive end-organ damage includes angina and kidney disease. Identifiable causes of hypertension include renovascular disease.     Review of Systems   Constitutional: Negative for activity change, malaise/fatigue and unexpected weight change.   HENT: Negative for hearing loss, rhinorrhea and trouble swallowing.    Eyes: Negative for discharge and visual disturbance.   Respiratory: Positive for chest tightness and wheezing.    Cardiovascular: Positive for chest pain and palpitations. Negative for PND.   Gastrointestinal: Negative for blood in stool, constipation, diarrhea and vomiting.   Endocrine: Negative for polydipsia and polyuria.   Genitourinary: Positive for difficulty urinating and urgency. Negative for hematuria.   Musculoskeletal: Positive for neck pain. Negative for arthralgias and joint swelling.   Neurological: Negative for weakness and headaches.   Psychiatric/Behavioral: Negative for confusion and dysphoric mood.       Patient Active Problem List   Diagnosis    Hypothyroid    Hyperlipidemia    HTN (hypertension), benign    Neuropathy    ED (erectile dysfunction)    DDD (degenerative disc disease), lumbar    Diplopia    Pseudophakia, right eye    Myasthenia gravis, AChR  antibody positive    Umbilical hernia without obstruction and without gangrene    Agatston CAC score, <100    Aortic valve disease    Primary osteoarthritis of both knees    Abdominal aortic atherosclerosis    RBBB    On prednisone therapy    Carpal tunnel syndrome on both sides    Cervical stenosis of spine    Tortuous aorta    Epigastric pain    Cervical stenosis of spinal canal    S/P cervical spinal fusion    BPH with obstruction/lower urinary tract symptoms    Preop cardiovascular exam    PVC's (premature ventricular contractions)    Nonsustained ventricular tachycardia       Objective:      Physical Exam   Constitutional: He is oriented to person, place, and time. He appears well-developed. No distress.   HENT:   Head: Normocephalic and atraumatic.   Right Ear: External ear normal.   Left Ear: External ear normal.   Nose: Nose normal.   Mouth/Throat: Oropharynx is clear and moist. No oropharyngeal exudate.   Eyes: Pupils are equal, round, and reactive to light. Conjunctivae and EOM are normal. Right eye exhibits no discharge. Left eye exhibits no discharge. No scleral icterus.   Neck: Normal range of motion. Neck supple. No JVD present. No tracheal deviation present. No thyromegaly present.   Cardiovascular: Normal rate, normal heart sounds and intact distal pulses.   No murmur heard.  Pulmonary/Chest: Effort normal and breath sounds normal. No respiratory distress. He has no wheezes. He has no rales. He exhibits tenderness.   Abdominal: Soft. Bowel sounds are normal. He exhibits no distension and no mass. There is no tenderness. There is no rebound and no guarding.   Musculoskeletal: He exhibits tenderness. He exhibits no edema.          Lymphadenopathy:     He has no cervical adenopathy.   Neurological: He is alert and oriented to person, place, and time. No cranial nerve deficit. Coordination normal.   Skin: Skin is warm and dry. No rash noted. He is not diaphoretic. No erythema. No pallor.    Psychiatric: He has a normal mood and affect. His behavior is normal. Judgment and thought content normal.   Vitals reviewed.      Lab Results   Component Value Date    WBC 6.16 05/02/2019    HGB 13.9 (L) 05/02/2019    HCT 44.1 05/02/2019     (L) 05/02/2019    CHOL 120 05/02/2019    TRIG 86 05/02/2019    HDL 36 (L) 05/02/2019    ALT 25 05/02/2019    AST 31 05/02/2019     05/02/2019    K 3.3 (L) 05/02/2019     05/02/2019    CREATININE 1.0 05/02/2019    BUN 14 05/02/2019    CO2 30 (H) 05/02/2019    TSH 1.395 03/20/2019    PSA 3.3 05/02/2019    INR 1.0 10/30/2018    HGBA1C 5.5 11/21/2018     The ASCVD Risk score (Ad DURAN Jr., et al., 2013) failed to calculate for the following reasons:    The valid total cholesterol range is 130 to 320 mg/dL    Assessment:       1. Myasthenia gravis, AChR antibody positive    2. Racing heart beat    3. Hyperglycemia    4. Mixed hyperlipidemia    5. Anemia, chronic disease    6. Nocturia    7. Encounter for screening for malignant neoplasm of prostate         Plan:       Myasthenia gravis, AChR antibody positive  -     Comprehensive metabolic panel; Future; Expected date: 04/30/2019    Racing heart beat  -     Magnesium; Future; Expected date: 04/30/2019    Hyperglycemia    Mixed hyperlipidemia  -     Lipid panel; Future; Expected date: 04/30/2019    Anemia, chronic disease  -     CBC auto differential; Future; Expected date: 04/30/2019    Nocturia  -     PSA, Screening; Future; Expected date: 04/30/2019    Encounter for screening for malignant neoplasm of prostate   -     PSA, Screening; Future; Expected date: 04/30/2019    ECG reviewed as negative.  Patient readiness: acceptance and barriers:none    During the course of the visit the patient was educated and counseled about the following:     Hypertension:   Dietary sodium restriction.  Check blood pressures daily and record.  Follow up: 4 months and as needed.  Obesity:   General weight loss/lifestyle  modification strategies discussed (elicit support from others; identify saboteurs; non-food rewards, etc).  Regular aerobic exercise program discussed.  Medication: bulk-forming agents.    Goals: Hypertension: Reduce Blood Pressure and Obesity: Reduce calorie intake and BMI    Did patient meet goals/outcomes: Yes    The following self management tools provided: blood pressure log    Patient Instructions (the written plan) was given to the patient/family.     Time spent with patient: 30 minutes    Barriers to medications present (no )    Adverse reactions to current medications (no)    Over the counter medications reviewed (No)        30minutes spent counseling patient on diet, exercise, and weight loss.  This has been fully explained to the patient, who indicates understanding.

## 2019-05-10 ENCOUNTER — TELEPHONE (OUTPATIENT)
Dept: ORTHOPEDICS | Facility: CLINIC | Age: 74
End: 2019-05-10

## 2019-05-10 NOTE — TELEPHONE ENCOUNTER
----- Message from Devan Barr sent at 5/10/2019  9:24 AM CDT -----  Contact: Patient  Type: Needs Medical Advice    Patient want information on his test results  Best Call Back Number:   Additional Information: Please call

## 2019-05-13 ENCOUNTER — OFFICE VISIT (OUTPATIENT)
Dept: ORTHOPEDICS | Facility: CLINIC | Age: 74
End: 2019-05-13
Payer: MEDICARE

## 2019-05-13 VITALS
HEIGHT: 73 IN | SYSTOLIC BLOOD PRESSURE: 117 MMHG | WEIGHT: 233 LBS | BODY MASS INDEX: 30.88 KG/M2 | DIASTOLIC BLOOD PRESSURE: 56 MMHG | HEART RATE: 83 BPM

## 2019-05-13 DIAGNOSIS — M77.8 SHOULDER TENDINITIS, LEFT: Primary | ICD-10-CM

## 2019-05-13 PROCEDURE — 99213 OFFICE O/P EST LOW 20 MIN: CPT | Mod: 25,HCNC,S$GLB, | Performed by: ORTHOPAEDIC SURGERY

## 2019-05-13 PROCEDURE — 20610 DRAIN/INJ JOINT/BURSA W/O US: CPT | Mod: HCNC,LT,S$GLB, | Performed by: ORTHOPAEDIC SURGERY

## 2019-05-13 PROCEDURE — 3074F SYST BP LT 130 MM HG: CPT | Mod: HCNC,CPTII,S$GLB, | Performed by: ORTHOPAEDIC SURGERY

## 2019-05-13 PROCEDURE — 99999 PR PBB SHADOW E&M-EST. PATIENT-LVL III: ICD-10-PCS | Mod: PBBFAC,HCNC,, | Performed by: ORTHOPAEDIC SURGERY

## 2019-05-13 PROCEDURE — 3078F DIAST BP <80 MM HG: CPT | Mod: HCNC,CPTII,S$GLB, | Performed by: ORTHOPAEDIC SURGERY

## 2019-05-13 PROCEDURE — 3074F PR MOST RECENT SYSTOLIC BLOOD PRESSURE < 130 MM HG: ICD-10-PCS | Mod: HCNC,CPTII,S$GLB, | Performed by: ORTHOPAEDIC SURGERY

## 2019-05-13 PROCEDURE — 99999 PR PBB SHADOW E&M-EST. PATIENT-LVL III: CPT | Mod: PBBFAC,HCNC,, | Performed by: ORTHOPAEDIC SURGERY

## 2019-05-13 PROCEDURE — 99213 PR OFFICE/OUTPT VISIT, EST, LEVL III, 20-29 MIN: ICD-10-PCS | Mod: 25,HCNC,S$GLB, | Performed by: ORTHOPAEDIC SURGERY

## 2019-05-13 PROCEDURE — 1101F PT FALLS ASSESS-DOCD LE1/YR: CPT | Mod: HCNC,CPTII,S$GLB, | Performed by: ORTHOPAEDIC SURGERY

## 2019-05-13 PROCEDURE — 1101F PR PT FALLS ASSESS DOC 0-1 FALLS W/OUT INJ PAST YR: ICD-10-PCS | Mod: HCNC,CPTII,S$GLB, | Performed by: ORTHOPAEDIC SURGERY

## 2019-05-13 PROCEDURE — 20610 LARGE JOINT ASPIRATION/INJECTION: L SUBACROMIAL BURSA: ICD-10-PCS | Mod: HCNC,LT,S$GLB, | Performed by: ORTHOPAEDIC SURGERY

## 2019-05-13 PROCEDURE — 3078F PR MOST RECENT DIASTOLIC BLOOD PRESSURE < 80 MM HG: ICD-10-PCS | Mod: HCNC,CPTII,S$GLB, | Performed by: ORTHOPAEDIC SURGERY

## 2019-05-13 RX ADMIN — TRIAMCINOLONE ACETONIDE 40 MG: 40 INJECTION, SUSPENSION INTRA-ARTICULAR; INTRAMUSCULAR at 10:05

## 2019-05-13 NOTE — PROGRESS NOTES
Patient, Maximilian Tavera Jr. (MRN #[id number]), presented with a recent Platelet count less than 150 K/uL consistent with the definition of thrombocytopenia (ICD10 - D69.6).    Platelets   Date Value Ref Range Status   05/02/2019 145 (L) 150 - 350 K/uL Final     The patient's thrombocytopenia was monitored, evaluated, addressed and/or treated. This addendum to the medical record is made on 05/13/2019.

## 2019-05-14 RX ORDER — TRIAMCINOLONE ACETONIDE 40 MG/ML
40 INJECTION, SUSPENSION INTRA-ARTICULAR; INTRAMUSCULAR
Status: DISCONTINUED | OUTPATIENT
Start: 2019-05-13 | End: 2019-05-14 | Stop reason: HOSPADM

## 2019-05-14 NOTE — PROGRESS NOTES
Past Medical History:   Diagnosis Date    Arthritis     Back pain     Carpal tunnel syndrome 2/25/2013    Cataract     Cataract, left eye 11/10/2014    Chest pain, musculoskeletal     COPD (chronic obstructive pulmonary disease) 11/052018    Emphysema lung 11/05/2018    Hyperlipidemia     Hypertension     Hypothyroidism     Knee fracture     Myasthenia gravis     Neuropathy 1/3/2013    Obesity 1/29/2015    Polyneuropathy     PVC (premature ventricular contraction)     Squamous cell carcinoma 2014    left forearm    Thyroid disease        Past Surgical History:   Procedure Laterality Date    APPENDECTOMY      C2-C6 Posterior Cervical Laminectomy & Instrumental Fusion N/A 11/7/2018    Performed by Kishore Turner MD at [facility] OR 2ND FLR    CATARACT EXTRACTION W/  INTRAOCULAR LENS IMPLANT Bilateral     COLONOSCOPY N/A 3/1/2012    [facility]formed by [facility]eban [facility] at [facility] ENDO    CYSTOSCOPY N/A 2/26/2019    Performed by [facility]ung [facility] at [facility] OR    EGD (ESOPHAGOGASTRODUODENOSCOPY) N/A 9/12/2018    Performed by Gabriel Hernandez MD at [facility] ENDO    EXTRACTION-CATARACT-IOL Left 12/9/2014    Performed by Adam Montague MD at [facility] OR    HEMORRHOID SURGERY      INJECTION-STEROID-EPIDURAL-TRANSFORAMINAL Right 5/17/2018    Performed by Devan Watt MD at [facility] OR    INJECTION-STEROID-EPIDURAL-TRANSFORAMINAL Right 9/1/2017    Performed by [facility]an Watt MD at [facility] OR    INJECTION-STEROID-EPIDURAL-TRANSFORAMINAL Right 2/21/2017    Performed by [facility] Watt MD at [facility] OR    INJECTION-STEROID-EPIDURAL-TRANSFORAMINAL Right 10/9/2014    Performed by [facility] Watt [facility] at [facility] OR    KNEE ARTHROPLASTY Bilateral     NECK SURGERY      TONSILLECTOMY      ULTRASOUND, RECTAL APPROACH N/A 2/26/2019    Performed by Simba Cheung MD at [facility] OR       Current Outpatient Medications   Medication Sig    atorvastatin (LIPITOR) 80 MG tablet TAKE 1 TABLET EVERY DAY    cetirizine (ZYRTEC) 10 MG tablet Take 1  tablet by mouth daily as needed.    cholecalciferol, vitamin D3, (VITAMIN D) 2,000 unit Cap 1 capsule once daily. Every day    coenzyme Q10 (CO Q-10) 100 mg capsule Take 400 mg by mouth once daily.     cyanocobalamin, vitamin B-12, (VITAMIN B-12) 5,000 mcg Subl Take 5,000 mcg by mouth once daily. Every day    ezetimibe (ZETIA) 10 mg tablet Take 1 tablet (10 mg total) by mouth once daily.    fluticasone (FLONASE) 50 mcg/actuation nasal spray USE 1 SPRAY IN EACH NOSTRIL TWICE DAILY AS NEEDED  FOR  RHINITIS    gabapentin (NEURONTIN) 300 MG capsule Take 1 capsule (300 mg total) by mouth 3 (three) times daily.    levothyroxine (SYNTHROID) 50 MCG tablet TAKE 1 TABLET EVERY DAY    methylsulfonylmethane (MSM) 1,000 mg Tab Take 1,000 mg by mouth once daily. Every day    milk thistle 200 mg Cap Take 200 mg by mouth 2 (two) times daily. Twice a day    olmesartan (BENICAR) 20 MG tablet Take 1 tablet (20 mg total) by mouth once daily.    omega-3 fatty acids 1,000 mg Cap Twice a day    potassium chloride SA (K-DUR,KLOR-CON) 20 MEQ tablet Take 20 mEq by mouth 2 (two) times daily. Take 4 tablets daily for 5 days, then 3 daily for 2 weeks, then 2 daily thereafter.    predniSONE (DELTASONE) 1 MG tablet Take 2 mg by mouth once daily.    predniSONE (DELTASONE) 5 MG tablet TAKE 1 TABLET EVERY OTHER DAY (Patient taking differently: TAKE 1 TABLET EVERY DAY)    sildenafil (REVATIO) 20 mg Tab Take 1 to 3 tabs daily as needed.    VENTOLIN HFA 90 mcg/actuation inhaler Inhale 2 puffs into the lungs every 6 (six) hours as needed for Wheezing. Rescue     No current facility-administered medications for this visit.        Review of patient's allergies indicates:   Allergen Reactions    No known drug allergies        Family History   Problem Relation Age of Onset    Cataracts Mother     Heart disease Mother         CHF    Hypertension Mother     Hyperlipidemia Mother     Cataracts Father     Glaucoma Father     Heart  disease Father     Hyperlipidemia Sister     Hypertension Sister     No Known Problems Daughter     No Known Problems Daughter     Collagen disease Neg Hx     Amblyopia Neg Hx     Blindness Neg Hx     Macular degeneration Neg Hx     Retinal detachment Neg Hx     Strabismus Neg Hx     Cancer Neg Hx        Social History     Socioeconomic History    Marital status:      Spouse name: Not on file    Number of children: Not on file    Years of education: Not on file    Highest education level: Not on file   Occupational History    Not on file   Social Needs    Financial resource strain: Not hard at all    Food insecurity:     Worry: Never true     Inability: Never true    Transportation needs:     Medical: No     Non-medical: No   Tobacco Use    Smoking status: Never Smoker    Smokeless tobacco: Never Used    Tobacco comment: when a child   Substance and Sexual Activity    Alcohol use: Yes     Frequency: Never     Binge frequency: Never     Comment: rarely    Drug use: No    Sexual activity: Yes     Partners: Female   Lifestyle    Physical activity:     Days per week: 0 days     Minutes per session: 0 min    Stress: Not at all   Relationships    Social connections:     Talks on phone: More than three times a week     Gets together: More than three times a week     Attends Denominational service: Not on file     Active member of club or organization: Yes     Attends meetings of clubs or organizations: More than 4 times per year     Relationship status:    Other Topics Concern    Not on file   Social History Narrative    Not on file       Chief Complaint:   Chief Complaint   Patient presents with    Shoulder Pain     L shoulder mri results        History of present illness:  This is a 73-year-old male seen for left shoulder and neck pain. Patient had a very big neck fusion done in November of 2018.  Since he has had some pain on the left side of his neck and shoulder.  Feels very  tight and he describes it as like a bungee cord being stretched.  Pain is up to 6/10.  Located mainly in the trapezius area. No previous treatment specifically for the shoulder.  Has had therapy for his neck and shoulder but not getting any better.  MRI showed no significant cuff pathology.     Answers for HPI/ROS submitted by the patient on 5/10/2019   Arm pain  unexpected weight change: No  appetite change : No  sleep disturbance: Yes  IMMUNOCOMPROMISED: No  nervous/ anxious: No  dysphoric mood: No  rash: No  visual disturbance: No  eye redness: No  eye pain: No  ear pain: No  tinnitus: No  hearing loss: No  sinus pressure : No  nosebleeds: No  enviro allergies: No  food allergies: No  cough: No  shortness of breath: No  sweating: No  frequency: Yes  difficulty urinating: Yes  hematuria: No  chest pain: No  palpitations: Yes  nausea: No  vomiting: No  diarrhea: No  blood in stool: No  constipation: No  headaches: No  dizziness: No  numbness: No  seizures: No  joint swelling: No  myalgia: Yes  weakness: Yes  back pain: No  Pain Chronicity: chronic  History of trauma: No  Onset: more than 1 month ago  Frequency: constantly  Progression since onset: unchanged  injury location: at home  pain- numeric: 5/10  pain location: left shoulder  pain quality: generalized  Radiating Pain: Yes  If your pain is radiating, to what part of the body?: left neck, left shoulder  Aggravating factors: extension, rotation  fever: No  inability to bear weight: No  itching: No  joint locking: No  limited range of motion: Yes  stiffness: Yes  tingling: No  Treatments tried: cold, heat, exercise, OTC ointments, OTC pain meds  physical therapy: ineffective  Improvement on treatment: no relief        Physical Examination:    Vital Signs:    Vitals:    05/13/19 1528   BP: (!) 117/56   Pulse: 83       Body mass index is 30.74 kg/m².    This a well-developed, well nourished patient in no acute distress.  They are alert and oriented and  cooperative to examination.  Pt. walks without an antalgic gait.      Examination of the left shoulder shows no rashes or erythema. There are no masses, ecchymosis, or atrophy.  He has some tightness up in his trapezius with a trigger point within the muscle as well. The patient has full range of motion in forward flexion, external rotation, and internal rotation to the mid T-spine. The patient has - impingement signs.Nontender to palpation over a.c. joint. Normal stability anteriorly, posteriorly, and negative sulcus sign. Passive range of motion: Forward flexion of 180°, external rotation at 90° of 90°, internal rotation of 50°, and external rotation at 0° of 50°. 2+ radial pulse. Intact axillary, radial, median and ulnar sensation. 5 out of 5 resisted forward flexion, external rotation, and negative lift off test.    X-rays:  X-rays of the left shoulder reviewed which show some mild degenerative change of the AC joint.    MRI of the left shoulder from outside facility:  No evidence for high-grade or full-thickness rotator cuff tear.  Moderate AC arthritis.  Nonspecific marrow edema in the humeral head.     Assessment::  Left trapezius pain and tightness    Plan:  I reviewed the finding with him today.  I recommended trying a cortisone injection in his shoulder to see if it helps at all.  If it does not, most of this is probably from his prior neck surgery and would require referral back to Neurosurgery.  Follow up in 4 weeks to see if it helped.    This note was created using [employer] voice recognition software that occasionally misinterpreted phrases or words.    Consult note is delivered via Epic messaging service.

## 2019-05-14 NOTE — PROCEDURES
Large Joint Aspiration/Injection: L subacromial bursa  Date/Time: 5/13/2019 10:10 AM  Performed by: Haroldo Campbell MD  Authorized by: Haroldo Campbell MD     Consent Done?:  Yes (Verbal)  Indications:  Pain  Procedure site marked: Yes    Timeout: Prior to procedure the correct patient, procedure, and site was verified      Location:  Shoulder  Site:  L subacromial bursa  Prep: Patient was prepped and draped in usual sterile fashion    Ultrasonic Guidance for needle placement: No  Needle size:  20 G  Approach:  Posterior  Medications:  40 mg triamcinolone acetonide 40 mg/mL  Patient tolerance:  Patient tolerated the procedure well with no immediate complications

## 2019-05-15 ENCOUNTER — OFFICE VISIT (OUTPATIENT)
Dept: NEUROLOGY | Facility: CLINIC | Age: 74
End: 2019-05-15
Payer: MEDICARE

## 2019-05-15 VITALS
HEIGHT: 73 IN | WEIGHT: 233.69 LBS | DIASTOLIC BLOOD PRESSURE: 72 MMHG | BODY MASS INDEX: 30.97 KG/M2 | SYSTOLIC BLOOD PRESSURE: 130 MMHG | HEART RATE: 75 BPM

## 2019-05-15 DIAGNOSIS — G70.00 MYASTHENIA GRAVIS, ACHR ANTIBODY POSITIVE: ICD-10-CM

## 2019-05-15 DIAGNOSIS — M48.02 CERVICAL STENOSIS OF SPINAL CANAL: Primary | ICD-10-CM

## 2019-05-15 DIAGNOSIS — I10 HTN (HYPERTENSION), BENIGN: ICD-10-CM

## 2019-05-15 DIAGNOSIS — Z79.52 CURRENT CHRONIC USE OF SYSTEMIC STEROIDS: Chronic | ICD-10-CM

## 2019-05-15 DIAGNOSIS — E78.2 MIXED HYPERLIPIDEMIA: ICD-10-CM

## 2019-05-15 DIAGNOSIS — H53.2 DIPLOPIA: ICD-10-CM

## 2019-05-15 PROCEDURE — 3078F DIAST BP <80 MM HG: CPT | Mod: HCNC,CPTII,S$GLB, | Performed by: PSYCHIATRY & NEUROLOGY

## 2019-05-15 PROCEDURE — 1101F PT FALLS ASSESS-DOCD LE1/YR: CPT | Mod: HCNC,CPTII,S$GLB, | Performed by: PSYCHIATRY & NEUROLOGY

## 2019-05-15 PROCEDURE — 1101F PR PT FALLS ASSESS DOC 0-1 FALLS W/OUT INJ PAST YR: ICD-10-PCS | Mod: HCNC,CPTII,S$GLB, | Performed by: PSYCHIATRY & NEUROLOGY

## 2019-05-15 PROCEDURE — 3075F SYST BP GE 130 - 139MM HG: CPT | Mod: HCNC,CPTII,S$GLB, | Performed by: PSYCHIATRY & NEUROLOGY

## 2019-05-15 PROCEDURE — 99999 PR PBB SHADOW E&M-EST. PATIENT-LVL III: CPT | Mod: PBBFAC,HCNC,, | Performed by: PSYCHIATRY & NEUROLOGY

## 2019-05-15 PROCEDURE — 99214 PR OFFICE/OUTPT VISIT, EST, LEVL IV, 30-39 MIN: ICD-10-PCS | Mod: HCNC,S$GLB,, | Performed by: PSYCHIATRY & NEUROLOGY

## 2019-05-15 PROCEDURE — 99214 OFFICE O/P EST MOD 30 MIN: CPT | Mod: HCNC,S$GLB,, | Performed by: PSYCHIATRY & NEUROLOGY

## 2019-05-15 PROCEDURE — 3078F PR MOST RECENT DIASTOLIC BLOOD PRESSURE < 80 MM HG: ICD-10-PCS | Mod: HCNC,CPTII,S$GLB, | Performed by: PSYCHIATRY & NEUROLOGY

## 2019-05-15 PROCEDURE — 3075F PR MOST RECENT SYSTOLIC BLOOD PRESS GE 130-139MM HG: ICD-10-PCS | Mod: HCNC,CPTII,S$GLB, | Performed by: PSYCHIATRY & NEUROLOGY

## 2019-05-15 PROCEDURE — 99999 PR PBB SHADOW E&M-EST. PATIENT-LVL III: ICD-10-PCS | Mod: PBBFAC,HCNC,, | Performed by: PSYCHIATRY & NEUROLOGY

## 2019-05-15 NOTE — LETTER
May 20, 2019      Lance Gill MD  [address] [facility] [address] [facility] [address]           [facility]  [address]  [facility] [address]  Phone: [phone]  Fax: [phone]          Patient: Maximilian Tavera Jr.   MR Number: [id number]   YOB: 1945   Date of Visit: 5/15/2019       Dear Dr. Lance Gill:    Thank you for referring Maximilian Tavera to me for evaluation. Attached you will find relevant portions of my assessment and plan of care.    If you have questions, please do not hesitate to call me. I look forward to following Maximilian Tavera along with you.    Sincerely,    César Hoang MD    Enclosure  CC:  No Recipients    If you would like to receive this communication electronically, please contact [email or web address] or [phone] to request more information on [employer] access.    For providers and/or their staff who would like to refer a patient to [facility], please contact us through our one-stop-shop provider referral line, [facility], at [phone].    If you feel you have received this communication in error or would no longer like to receive these types of communications, please e-mail [email or web address]

## 2019-05-17 ENCOUNTER — LAB VISIT (OUTPATIENT)
Dept: LAB | Facility: HOSPITAL | Age: 74
End: 2019-05-17
Attending: FAMILY MEDICINE
Payer: MEDICARE

## 2019-05-17 DIAGNOSIS — E87.6 DIURETIC-INDUCED HYPOKALEMIA: ICD-10-CM

## 2019-05-17 DIAGNOSIS — T50.2X5A DIURETIC-INDUCED HYPOKALEMIA: ICD-10-CM

## 2019-05-17 LAB
ANION GAP SERPL CALC-SCNC: 7 MMOL/L (ref 8–16)
BUN SERPL-MCNC: 29 MG/DL (ref 8–23)
CALCIUM SERPL-MCNC: 9.5 MG/DL (ref 8.7–10.5)
CHLORIDE SERPL-SCNC: 105 MMOL/L (ref 95–110)
CO2 SERPL-SCNC: 27 MMOL/L (ref 23–29)
CREAT SERPL-MCNC: 1.3 MG/DL (ref 0.5–1.4)
EST. GFR  (AFRICAN AMERICAN): >60 ML/MIN/1.73 M^2
EST. GFR  (NON AFRICAN AMERICAN): 53.4 ML/MIN/1.73 M^2
GLUCOSE SERPL-MCNC: 91 MG/DL (ref 70–110)
POTASSIUM SERPL-SCNC: 4.3 MMOL/L (ref 3.5–5.1)
SODIUM SERPL-SCNC: 139 MMOL/L (ref 136–145)

## 2019-05-17 PROCEDURE — 36415 COLL VENOUS BLD VENIPUNCTURE: CPT | Mod: HCNC,PO

## 2019-05-17 PROCEDURE — 80048 BASIC METABOLIC PNL TOTAL CA: CPT | Mod: HCNC

## 2019-05-20 PROBLEM — Z79.52 CURRENT CHRONIC USE OF SYSTEMIC STEROIDS: Chronic | Status: ACTIVE | Noted: 2019-05-20

## 2019-05-20 NOTE — ASSESSMENT & PLAN NOTE
Well-managed with Prednisone 7 mg daily. Minimally symptomatic with only occasional vertical diplopia while watching TV while reclined.    - Continue Prednisone 7 mg daily   - Does not tolerate Mestinon (GI side effects). He was instructed to increase Prednisone if symptoms escalate.

## 2019-05-20 NOTE — ASSESSMENT & PLAN NOTE
Currently on Prednisone 7 mg daily for MG control. Glycemic index well-managed. Needs to keep updated on bone mineral density and vitamin D levels.

## 2019-05-20 NOTE — PROGRESS NOTES
Subjective:     Chief Complaint:  Eye Problem (MG-related diplopia)        History of Present Illness:  I have reviewed all relevant medical records in Epic. Maximilian Tavera Jr. is a 73 y.o. male who presents today for initial visit with me, previously followed by Dr. Gill. 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 Several months ago he had a fall [place] and had a serious neck injury (previously had two ACDF procedures) resulting in spinal stenosis and need for surgery. He is now post-op and is recovering well. He is is c-collar. He has not had MG-related weaknesses post-op and has continued his steroids, currently on 7 mg daily. He has only occasional vertical diplopia that occurs mostly when he is watching TV while lying down. No other bulbar or extremity weakness complaints. Cannot tolerate Mestinon (diarrhea).    From Dr. Gill's Notes:    DX: Ab+ MG dx'ed 3/2015  Thymic status: CT chest 4/15 negative for thymoma   Maintenance: prednisone 7 mg qOD; mestinon SE - diarrhea  Last dose change: 12/20/16: 8mg qOD -> 7mg qOD; 8/16: 10mg qOD to 8mg qOD; 6/15 prednisone 5 mg qd --> 10 mg q OD   Rescue: none   Prior immunemodulatory agents: none      Interval history 8/9/18:  Underwent MRI Cspine and Lspine for new onset neck pain, chronic hand numbness and LBP. 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   Having trouble with head drop, and some mild chewing issues. No diplopia, ptosis or UE/LE weakness. Still on prednisone 7mg qOD. Has numbness in arms that wakes him up at night.     Interval history 2/27/18:  Doing well currently with respect to MG. Mild diplopia at end of day. Waking up with numbness in his hands, bilateral. Relieved with movement. No numbness or weakness during the day. Occasional tongue biting.      Interval history 8/31/17:   Mr Tavera is a 70yo male with Ab+ myasthenia gravis diagnosed in 2015, maintained on 7mg  "prednisone qOD. Has been doing well since his last visit. Minimal diplopia at the end of the day. Has some generalized fatigue when it's hot, but tries to do all his chores early in the day. Has started exercising.     Interval 3/21/17: Doing well from an MG standpoint on low-dose prednisone. He still has a occasional diplopia at night when looking down. Has rare choking - mostly liquids - when he's not paying attention. Had a number of URIs last year. Also noticed that his sense of taste is off, although sense of smell is preserved.      Interval history 12/20/16:  Doing well. Last visit, dropped prednisone from 20 to 8mg qOD. No new symptoms. Still having diplopia on downgaze and only rarely at other times. No chewing or swallowing issues.     Interval history 8/23/16  Doing well. Still with mild baseline diplopia and rare difficulty with swallowing (this does not last more than a few seconds). No issues with speaking, breathing, or generalized weakness. Heat "wears me out".      HPI 5/17/16  Maximilian Kumariana luisa Tavera Jr. is a 69 yo male with Ab+ myasthenia gravis. Doing well, stable. Occasional diplopia, mostly with laying back too far in his recliner while watching tv. Mentions 6-8 mos ago began noticing rhinorrhea with eating. Denies ptosis, difficulty chewing/swallowing/breathing. Denies weight changes, sweats, alternating diarrhea/constipation.  Prednisone 10 mg q OD     Interval hx  2/12/16:  Maximilian Kumariana luisa Tavera Jr. is a 69 yo  male with myasthenia gravis. Still has occasional diplopia, but is improved- occuring less frequently. Occurs most when watching TV. Occasional "funny feeling" when drinking soft drinks. Denies weakness, SOB, dysarthria. Taking prednisone 5mg/day. Stopped Mestion- GI side effects      Interval history 6/17/15:  Taking mestinon at 1/2, 1/2, 1 and not finding much improvement in diplopia, and having a decent amount of loose stools and gas.      HPI: Maximilian David Tavera Jr. is a 69 y.o. " "male with 7-8mos of diplopia. Was seen by Dr Jimenez, who did some blood work and found that he was Ab+ for AchR. He was referred here for further care. No prior symptoms. No difficulty chewing/swallowing, breathing. Arm/leg strength is fine. No orthopnea. Diplopia is most noticeable when reading or watching tv. Driving is occasionally problematic. No clearly diurnal. No real problem with ptosis. He has not tried medications for this. He has prisms, which help. Looks like he started carvedilol in 3/14.     Review of Systems  Review of Systems   Constitutional: Negative for activity change, fatigue and fever.   HENT: Negative for hearing loss, trouble swallowing and voice change.    Eyes: Positive for visual disturbance. Negative for pain and redness.   Respiratory: Negative for choking, chest tightness and shortness of breath.    Cardiovascular: Negative for chest pain.   Gastrointestinal: Negative for abdominal pain, nausea and vomiting.   Endocrine: Negative for cold intolerance.   Genitourinary: Negative for frequency.   Musculoskeletal: Positive for neck pain. Negative for arthralgias, back pain, gait problem, joint swelling and myalgias.   Skin: Negative for color change.   Allergic/Immunologic: Negative for immunocompromised state.   Neurological: Negative for dizziness, seizures, speech difficulty, weakness, numbness and headaches.   Hematological: Negative for adenopathy.   Psychiatric/Behavioral: Negative for agitation, behavioral problems, dysphoric mood and suicidal ideas.        Objective:     Vitals:    05/15/19 1322   BP: 130/72   Pulse: 75   Weight: 106 kg (233 lb 11 oz)   Height: 6' 1" (1.854 m)   PainSc:   5   PainLoc: Neck       Neurologic Exam     Mental Status   Oriented to person, place, and time.   Attention: normal.   Speech: speech is normal   Level of consciousness: alert  Knowledge: good.     Cranial Nerves     CN II   Visual fields full to confrontation.     CN III, IV, VI   Pupils are " equal, round, and reactive to light.  Extraocular motions are normal.   Diplopia: none    CN V   Facial sensation intact.     CN VII   Facial expression full, symmetric.     CN VIII   Hearing: intact    CN IX, X   CN IX normal.     CN XI   CN XI normal.     CN XII   CN XII normal.     No ptosis noted     Motor Exam   Muscle bulk: normal  Overall muscle tone: normal    Strength   Strength 5/5 throughout. Cervical flexion and extension and rotation deferred     Sensory Exam   Light touch normal.   Vibration normal.     Gait, Coordination, and Reflexes     Gait  Gait: normal    Tremor   Resting tremor: absent  Intention tremor: absent  Action tremor: absent    Reflexes   Right brachioradialis: 2+  Left brachioradialis: 2+  Right biceps: 2+  Left biceps: 2+  Right triceps: 2+  Left triceps: 2+  Right patellar: 2+  Left patellar: 2+  Right achilles: 2+  Left achilles: 2+      Physical Exam   Constitutional: He is oriented to person, place, and time. He appears well-developed and well-nourished.   HENT:   Head: Normocephalic and atraumatic.   Eyes: Pupils are equal, round, and reactive to light. EOM are normal.   Neck: Normal range of motion. Neck supple. No thyromegaly present.   Cardiovascular: Normal rate.   Pulmonary/Chest: Effort normal.   Abdominal: Soft.   Lymphadenopathy:     He has no cervical adenopathy.   Neurological: He is oriented to person, place, and time. He has normal strength. Gait normal.   Reflex Scores:       Tricep reflexes are 2+ on the right side and 2+ on the left side.       Bicep reflexes are 2+ on the right side and 2+ on the left side.       Brachioradialis reflexes are 2+ on the right side and 2+ on the left side.       Patellar reflexes are 2+ on the right side and 2+ on the left side.       Achilles reflexes are 2+ on the right side and 2+ on the left side.  Skin: Skin is warm and dry.   Psychiatric: He has a normal mood and affect. His speech is normal and behavior is normal. Thought  content normal.   Vitals reviewed.        Lab Results   Component Value Date    WBC 6.16 05/02/2019    HGB 13.9 (L) 05/02/2019    HCT 44.1 05/02/2019     (L) 05/02/2019    CHOL 120 05/02/2019    TRIG 86 05/02/2019    HDL 36 (L) 05/02/2019    ALT 25 05/02/2019    AST 31 05/02/2019     05/17/2019    K 4.3 05/17/2019     05/17/2019    CREATININE 1.3 05/17/2019    BUN 29 (H) 05/17/2019    CO2 27 05/17/2019    TSH 1.395 03/20/2019    HGBA1C 5.5 11/21/2018         Assessment and Plan:     Problem List Items Addressed This Visit     Current chronic use of systemic steroids (Chronic)    Current Assessment & Plan     Currently on Prednisone 7 mg daily for MG control. Glycemic index well-managed. Needs to keep updated on bone mineral density and vitamin D levels.         Myasthenia gravis, AChR antibody positive    Overview     Ab+, thymoma negative, generalized MG, dx'ed 2015  Prednisone qOD; mestinon does not help symptoms  Rescue: none  Prior Immune: none         Current Assessment & Plan     Well-managed with Prednisone 7 mg daily. Minimally symptomatic with only occasional vertical diplopia while watching TV while reclined.    - Continue Prednisone 7 mg daily   - Does not tolerate Mestinon (GI side effects). He was instructed to increase Prednisone if symptoms escalate.          Diplopia    Current Assessment & Plan     Only occasional and mostly occurs when lying down watching TV. Plan as per MG.          Hyperlipidemia    Current Assessment & Plan     On appropriate medications and managed by PCP. We discussed the importance of managing all secondary stroke risk factors, including hyperlipidemia.           HTN (hypertension), benign    Current Assessment & Plan     On appropriate medications and managed by PCP. We discussed the importance of managing all secondary stroke risk factors, including hypertension.           Cervical stenosis of spinal canal - Primary        RTC 6 months or as  needed.    Ned Hoang MD  [facility]

## 2019-05-23 ENCOUNTER — PES CALL (OUTPATIENT)
Dept: ADMINISTRATIVE | Facility: CLINIC | Age: 74
End: 2019-05-23

## 2019-05-23 ENCOUNTER — PATIENT MESSAGE (OUTPATIENT)
Dept: CARDIOLOGY | Facility: CLINIC | Age: 74
End: 2019-05-23

## 2019-05-23 ENCOUNTER — PATIENT MESSAGE (OUTPATIENT)
Dept: ELECTROPHYSIOLOGY | Facility: CLINIC | Age: 74
End: 2019-05-23

## 2019-05-24 NOTE — TELEPHONE ENCOUNTER
Spoke to Mr. Yao who reports he feels well overall.  Let him know we have received Holter results that did show PVCs as expected.  Let him know Dr. Zamudio is in communication with neurology regarding possibly starting beta blocker (due to myasthenia gravis) and they will discuss further at his upcoming appt on 6/11.    Mr. Yao verbalizes understanding of above and will call our office if he needs anything prior to his appt.

## 2019-06-03 ENCOUNTER — TELEPHONE (OUTPATIENT)
Dept: ELECTROPHYSIOLOGY | Facility: CLINIC | Age: 74
End: 2019-06-03

## 2019-06-03 RX ORDER — CARVEDILOL 6.25 MG/1
TABLET ORAL
Qty: 180 TABLET | Refills: 1 | Status: SHIPPED | OUTPATIENT
Start: 2019-06-03 | End: 2019-06-11

## 2019-06-03 RX ORDER — CARVEDILOL 6.25 MG/1
6.25 TABLET ORAL 2 TIMES DAILY WITH MEALS
Qty: 60 TABLET | Refills: 1 | Status: SHIPPED | OUTPATIENT
Start: 2019-06-03 | End: 2019-06-03 | Stop reason: SDUPTHER

## 2019-06-03 NOTE — TELEPHONE ENCOUNTER
Spoke with Mr. Tavera regarding his holter monitor, PVCs and beta-blocker therapy. Corresponded with Dr. Hoang in neurology who is okay with resuming beta-blocker therapy. Patient was on carvedilol for years and had no issues with this and his MG. Will resume at 6.25mg bid and if tolerating it at our next visit will titrate upwards. He was previously on 25mg bid.

## 2019-06-04 ENCOUNTER — TELEPHONE (OUTPATIENT)
Dept: CARDIOLOGY | Facility: CLINIC | Age: 74
End: 2019-06-04

## 2019-06-04 ENCOUNTER — PATIENT MESSAGE (OUTPATIENT)
Dept: CARDIOLOGY | Facility: CLINIC | Age: 74
End: 2019-06-04

## 2019-06-04 NOTE — TELEPHONE ENCOUNTER
Results of recent event monitor given to patient via [facility].    Notes recorded by Miguel Altamirano MD on 6/2/2019 at 11:28 AM CDT  Tell him a lot of skipped beats sometimes with several in a row-I would agree with Dr Zamudio starting low dose beta blocker , but I know this can sometimes be a problem with Myasthenia Gravis and Dr Zamudio will be in touch with Neurology for their OK.

## 2019-06-11 ENCOUNTER — HOSPITAL ENCOUNTER (OUTPATIENT)
Dept: CARDIOLOGY | Facility: CLINIC | Age: 74
Discharge: HOME | End: 2019-06-11
Payer: MEDICARE

## 2019-06-11 ENCOUNTER — OFFICE VISIT (OUTPATIENT)
Dept: ELECTROPHYSIOLOGY | Facility: CLINIC | Age: 74
End: 2019-06-11
Payer: MEDICARE

## 2019-06-11 VITALS
HEART RATE: 67 BPM | WEIGHT: 238 LBS | DIASTOLIC BLOOD PRESSURE: 70 MMHG | SYSTOLIC BLOOD PRESSURE: 122 MMHG | BODY MASS INDEX: 31.54 KG/M2 | HEIGHT: 73 IN

## 2019-06-11 DIAGNOSIS — R00.2 PALPITATIONS: ICD-10-CM

## 2019-06-11 DIAGNOSIS — I49.3 PVC (PREMATURE VENTRICULAR CONTRACTION): Primary | ICD-10-CM

## 2019-06-11 DIAGNOSIS — G70.00 MYASTHENIA GRAVIS, ACHR ANTIBODY POSITIVE: ICD-10-CM

## 2019-06-11 DIAGNOSIS — I10 HTN (HYPERTENSION), BENIGN: ICD-10-CM

## 2019-06-11 DIAGNOSIS — I47.29 NONSUSTAINED VENTRICULAR TACHYCARDIA: ICD-10-CM

## 2019-06-11 DIAGNOSIS — I49.3 PVC'S (PREMATURE VENTRICULAR CONTRACTIONS): Primary | ICD-10-CM

## 2019-06-11 PROCEDURE — 3074F SYST BP LT 130 MM HG: CPT | Mod: HCNC,CPTII,S$GLB, | Performed by: INTERNAL MEDICINE

## 2019-06-11 PROCEDURE — 1101F PR PT FALLS ASSESS DOC 0-1 FALLS W/OUT INJ PAST YR: ICD-10-PCS | Mod: HCNC,CPTII,S$GLB, | Performed by: INTERNAL MEDICINE

## 2019-06-11 PROCEDURE — 93010 EKG 12-LEAD: ICD-10-PCS | Mod: HCNC,S$GLB,, | Performed by: INTERNAL MEDICINE

## 2019-06-11 PROCEDURE — 99999 PR PBB SHADOW E&M-EST. PATIENT-LVL IV: ICD-10-PCS | Mod: PBBFAC,HCNC,, | Performed by: INTERNAL MEDICINE

## 2019-06-11 PROCEDURE — 99999 PR PBB SHADOW E&M-EST. PATIENT-LVL IV: CPT | Mod: PBBFAC,HCNC,, | Performed by: INTERNAL MEDICINE

## 2019-06-11 PROCEDURE — 99499 UNLISTED E&M SERVICE: CPT | Mod: HCNC,S$GLB,, | Performed by: INTERNAL MEDICINE

## 2019-06-11 PROCEDURE — 99499 RISK ADDL DX/OHS AUDIT: ICD-10-PCS | Mod: HCNC,S$GLB,, | Performed by: INTERNAL MEDICINE

## 2019-06-11 PROCEDURE — 99214 OFFICE O/P EST MOD 30 MIN: CPT | Mod: HCNC,S$GLB,, | Performed by: INTERNAL MEDICINE

## 2019-06-11 PROCEDURE — 1101F PT FALLS ASSESS-DOCD LE1/YR: CPT | Mod: HCNC,CPTII,S$GLB, | Performed by: INTERNAL MEDICINE

## 2019-06-11 PROCEDURE — 3078F PR MOST RECENT DIASTOLIC BLOOD PRESSURE < 80 MM HG: ICD-10-PCS | Mod: HCNC,CPTII,S$GLB, | Performed by: INTERNAL MEDICINE

## 2019-06-11 PROCEDURE — 93005 EKG 12-LEAD: ICD-10-PCS | Mod: HCNC,S$GLB,, | Performed by: INTERNAL MEDICINE

## 2019-06-11 PROCEDURE — 93010 ELECTROCARDIOGRAM REPORT: CPT | Mod: HCNC,S$GLB,, | Performed by: INTERNAL MEDICINE

## 2019-06-11 PROCEDURE — 99214 PR OFFICE/OUTPT VISIT, EST, LEVL IV, 30-39 MIN: ICD-10-PCS | Mod: HCNC,S$GLB,, | Performed by: INTERNAL MEDICINE

## 2019-06-11 PROCEDURE — 93005 ELECTROCARDIOGRAM TRACING: CPT | Mod: HCNC,S$GLB,, | Performed by: INTERNAL MEDICINE

## 2019-06-11 PROCEDURE — 3074F PR MOST RECENT SYSTOLIC BLOOD PRESSURE < 130 MM HG: ICD-10-PCS | Mod: HCNC,CPTII,S$GLB, | Performed by: INTERNAL MEDICINE

## 2019-06-11 PROCEDURE — 3078F DIAST BP <80 MM HG: CPT | Mod: HCNC,CPTII,S$GLB, | Performed by: INTERNAL MEDICINE

## 2019-06-11 RX ORDER — CARVEDILOL 12.5 MG/1
12.5 TABLET ORAL 2 TIMES DAILY WITH MEALS
Qty: 180 TABLET | Refills: 3 | Status: SHIPPED | OUTPATIENT
Start: 2019-06-11 | End: 2019-07-23

## 2019-06-11 NOTE — PROGRESS NOTES
Subjective:    Patient ID:  Maximilian Tavera Jr. is a 73 y.o. male who presents for evaluation of PVCs    Referring Cardiologist: Alphonso Altamirano MD  Primary Care Physician: Brian Marroquin MD  Neurologist: César Hoang MD    HPI  Prior Hx:  I had the pleasure of seeing Mr. Tavera today in our electrophysiology clinic in consultation for his palpitations. As you are aware he is a pleasant 73 year-old man with hypertension, myasthenia gravis on chronic prednisone therapy, and degenerative joint disease with myelopathy s/p decompression and fusion of C2-6. He reports being diagnosed with PVCs 5 years ago however over the past 1-2 months have become more prevalent. No syncope but has been having intermittent episodes of symptomatic hypotension with heart rate in the 110s which seemed to increase when started on BPH meds. He saw Dr. Altamiraon in cardiology clinic who ordered an exercise stress echo and a 30 day event monitor. Stress test noted a structurally normal heart without ischemia. The ECG portion of his stress test noted sinus rhythm with occasional unifocal PVCs (LBBB but with V2 pattern break, +Rs II/aVF, rS in III, R in 1) at times with PVC couplets. His 30 day event monitor noted frequent PVCs with PVC couplets, triplets and occasional 4 beat NSVT. He denies any near syncope or syncope.    He was on carvedilol for years however this was stopped 2 months ago when he was started on Flomax for BPH (prescribing physician suggested stopping carvedilol to avoid hypotension). He stopped this and changed to alfuzosin however he stopped this due to symptomatic hypotension. For HTN he is on olmesartan and chlorthalidone.    ECG from 10/2018 notes a dimorphic couplet with 1 similar to PVC noted on stress test and the other inferiorly directed PVC in inferior leads. Other ECGs show sinus rhythm with RBBB.    Interim Hx:  Holter monitor noted 2.4% PVC burden but with 14 runs of nonsustained VT up to 33 beats. We  discussed on the phone and resumed carvedilol 6.25mg q12h. He presents for follow-up. He has no complaints. No MG symptoms since starting carvedilol. He has no symptoms from the ventricular arrhythmias.    My interpretation of today's in clinic ECG is sinus rhythm with RBBB and no PVCs    Review of Systems   Constitution: Negative for fever and malaise/fatigue.   HENT: Negative for congestion and nosebleeds.    Eyes: Negative for blurred vision and visual disturbance.   Cardiovascular: Negative for chest pain, dyspnea on exertion, irregular heartbeat, leg swelling, near-syncope, orthopnea, palpitations, paroxysmal nocturnal dyspnea and syncope.   Respiratory: Negative for cough and shortness of breath.    Hematologic/Lymphatic: Negative for bleeding problem. Does not bruise/bleed easily.   Skin: Negative.    Musculoskeletal: Negative.    Gastrointestinal: Negative for bloating and abdominal pain.   Neurological: Negative for dizziness and focal weakness.        Objective:    Physical Exam   Constitutional: He is oriented to person, place, and time. He appears well-developed and well-nourished. No distress.   HENT:   Head: Normocephalic and atraumatic.   Eyes: Conjunctivae are normal. Right eye exhibits no discharge. Left eye exhibits no discharge.   Neck: Neck supple. No JVD present.   Cardiovascular: Normal rate and regular rhythm. Exam reveals no gallop and no friction rub.   No murmur heard.  Pulmonary/Chest: Effort normal and breath sounds normal. No respiratory distress. He has no wheezes. He has no rales.   Abdominal: Soft. Bowel sounds are normal. He exhibits no distension. There is no tenderness. There is no rebound.   Musculoskeletal: He exhibits no edema.   Neurological: He is alert and oriented to person, place, and time.   Skin: Skin is warm and dry. He is not diaphoretic.   Psychiatric: He has a normal mood and affect. His behavior is normal. Judgment and thought content normal.   Vitals reviewed.         Assessment:       1. PVC's (premature ventricular contractions)    2. HTN (hypertension), benign    3. Nonsustained ventricular tachycardia    4. Myasthenia gravis, AChR antibody positive         Plan:       In summary, Mr. Tavera is a pleasant 73 year-old man with hypertension, myasthenia gravis on chronic prednisone therapy, and degenerative joint disease with myelopathy s/p decompression and fusion of C2-6, chronic PVCs however since the beginning of this year have become more prevalent, and intermittent episodes of symptomatic hypotension with heart rate in the 110s which seemed to increase when started on BPH meds. He feels this has increased after stopping carvedilol (was on 25mg carvedilol for years). Holter monitor noted ~2% PVC burden but with 14 episodes of NSVT up to 33 beats. These are now asymptomatic.  No indication that carvedilol worsened his MG symptoms. After discussion with Dr. Hoang in Neurology we have resumed carvedilol. He is tolerating 6.25mg bid without issues. Will increase to 12.5mg bid. He will keep a home BP log. If his systolic BP decreases consistently into the low 100s he will stop olmesartan. Will see him in 4-6 weeks and if BP doing ok and tolerating 12.5mg carvedilol will up to 25mg bid. Would then plan on getting a holter monitor after being on that for 4-6 weeks.    RTC in 4-6 weeks. Ok to see Rocio Burciaga.    Thank you for allowing me to participate in the care of this patient. Please do not hesitate to call me with any questions or concerns.    Sathya Zamudio MD, PhD  Cardiac Electrophysiology

## 2019-06-13 ENCOUNTER — OFFICE VISIT (OUTPATIENT)
Dept: ORTHOPEDICS | Facility: CLINIC | Age: 74
End: 2019-06-13
Payer: MEDICARE

## 2019-06-13 VITALS
DIASTOLIC BLOOD PRESSURE: 71 MMHG | SYSTOLIC BLOOD PRESSURE: 134 MMHG | WEIGHT: 238 LBS | BODY MASS INDEX: 31.54 KG/M2 | HEIGHT: 73 IN | HEART RATE: 73 BPM

## 2019-06-13 DIAGNOSIS — M54.2 NECK PAIN ON LEFT SIDE: Primary | ICD-10-CM

## 2019-06-13 PROCEDURE — 99999 PR PBB SHADOW E&M-EST. PATIENT-LVL III: ICD-10-PCS | Mod: PBBFAC,HCNC,, | Performed by: ORTHOPAEDIC SURGERY

## 2019-06-13 PROCEDURE — 1101F PR PT FALLS ASSESS DOC 0-1 FALLS W/OUT INJ PAST YR: ICD-10-PCS | Mod: HCNC,CPTII,S$GLB, | Performed by: ORTHOPAEDIC SURGERY

## 2019-06-13 PROCEDURE — 3078F PR MOST RECENT DIASTOLIC BLOOD PRESSURE < 80 MM HG: ICD-10-PCS | Mod: HCNC,CPTII,S$GLB, | Performed by: ORTHOPAEDIC SURGERY

## 2019-06-13 PROCEDURE — 3078F DIAST BP <80 MM HG: CPT | Mod: HCNC,CPTII,S$GLB, | Performed by: ORTHOPAEDIC SURGERY

## 2019-06-13 PROCEDURE — 99999 PR PBB SHADOW E&M-EST. PATIENT-LVL III: CPT | Mod: PBBFAC,HCNC,, | Performed by: ORTHOPAEDIC SURGERY

## 2019-06-13 PROCEDURE — 99212 PR OFFICE/OUTPT VISIT, EST, LEVL II, 10-19 MIN: ICD-10-PCS | Mod: HCNC,S$GLB,, | Performed by: ORTHOPAEDIC SURGERY

## 2019-06-13 PROCEDURE — 1101F PT FALLS ASSESS-DOCD LE1/YR: CPT | Mod: HCNC,CPTII,S$GLB, | Performed by: ORTHOPAEDIC SURGERY

## 2019-06-13 PROCEDURE — 99212 OFFICE O/P EST SF 10 MIN: CPT | Mod: HCNC,S$GLB,, | Performed by: ORTHOPAEDIC SURGERY

## 2019-06-13 PROCEDURE — 3075F PR MOST RECENT SYSTOLIC BLOOD PRESS GE 130-139MM HG: ICD-10-PCS | Mod: HCNC,CPTII,S$GLB, | Performed by: ORTHOPAEDIC SURGERY

## 2019-06-13 PROCEDURE — 3075F SYST BP GE 130 - 139MM HG: CPT | Mod: HCNC,CPTII,S$GLB, | Performed by: ORTHOPAEDIC SURGERY

## 2019-06-13 NOTE — PROGRESS NOTES
Past Medical History:   Diagnosis Date    Arthritis     Back pain     Carpal tunnel syndrome 2/25/2013    Cataract     Cataract, left eye 11/10/2014    Chest pain, musculoskeletal     COPD (chronic obstructive pulmonary disease) 11/052018    Emphysema lung 11/05/2018    Hyperlipidemia     Hypertension     Hypothyroidism     Knee fracture     Myasthenia gravis     Neuropathy 1/3/2013    Obesity 1/29/2015    Polyneuropathy     PVC (premature ventricular contraction)     Squamous cell carcinoma 2014    left forearm    Thyroid disease        Past Surgical History:   Procedure Laterality Date    APPENDECTOMY      C2-C6 Posterior Cervical Laminectomy & Instrumental Fusion N/A 11/7/2018    [facility] 2ND F[facility]    CATARACT EXTRACTION W/  INTRAOCULAR LENS IMPLANT Bilateral     COLONOSCOPY N/A 3/1/2012    [facility]    CYSTOSCOPY N/A 2/26/2019    [facility]    EGD (ESOPHAGOGASTRODUODENOSCOPY) N/A 9/12/2018    Performed by Gabriel Hernandez [facility] at [facility]    EXTRACTION-CATARACT-IOL Left 12/9/2014    [facility] Montague [facility] at [facility] OR    HEMORRHOID SURGERY      INJECTION-STEROID-EPIDURAL-TRANSFORAMINAL Right 5/17/2018    [facility]an Watt [facility] at [facility] OR    INJECTION-STEROID-EPIDURAL-TRANSFORAMINAL Right 9/1/2017    [facility]    INJECTION-STEROID-EPIDURAL-TRANSFORAMINAL Right 2/21/2017    [facility]    INJECTION-STEROID-EPIDURAL-TRANSFORAMINAL Right 10/9/2014    [facility] OR    KNEE ARTHROPLASTY Bilateral     NECK SURGERY      TONSILLECTOMY      ULTRASOUND, RECTAL APPROACH N/A 2/26/2019    Performed [facility]ung [facility] at [facility] OR       Current Outpatient Medications   Medication Sig    atorvastatin (LIPITOR) 80 MG tablet TAKE 1 TABLET EVERY DAY    carvedilol (COREG) 12.5 MG tablet Take 1  tablet (12.5 mg total) by mouth 2 (two) times daily with meals.    cetirizine (ZYRTEC) 10 MG tablet Take 1 tablet by mouth daily as needed.    cholecalciferol, vitamin D3, (VITAMIN D) 2,000 unit Cap 1 capsule once daily. Every day    coenzyme Q10 (CO Q-10) 100 mg capsule Take 400 mg by mouth once daily.     cyanocobalamin, vitamin B-12, (VITAMIN B-12) 5,000 mcg Subl Take 5,000 mcg by mouth once daily. Every day    ezetimibe (ZETIA) 10 mg tablet Take 1 tablet (10 mg total) by mouth once daily.    fluticasone (FLONASE) 50 mcg/actuation nasal spray USE 1 SPRAY IN EACH NOSTRIL TWICE DAILY AS NEEDED  FOR  RHINITIS    gabapentin (NEURONTIN) 300 MG capsule Take 1 capsule (300 mg total) by mouth 3 (three) times daily.    levothyroxine (SYNTHROID) 50 MCG tablet TAKE 1 TABLET EVERY DAY    methylsulfonylmethane (MSM) 1,000 mg Tab Take 1,000 mg by mouth once daily. Every day    milk thistle 200 mg Cap Take 200 mg by mouth 2 (two) times daily. Twice a day    olmesartan (BENICAR) 20 MG tablet Take 1 tablet (20 mg total) by mouth once daily.    omega-3 fatty acids 1,000 mg Cap Twice a day    potassium chloride SA (K-DUR,KLOR-CON) 20 MEQ tablet Take 20 mEq by mouth 2 (two) times daily. Take 4 tablets daily for 5 days, then 3 daily for 2 weeks, then 2 daily thereafter.    predniSONE (DELTASONE) 1 MG tablet Take 2 mg by mouth once daily.    predniSONE (DELTASONE) 5 MG tablet TAKE 1 TABLET EVERY OTHER DAY (Patient taking differently: TAKE 1 TABLET EVERY DAY)    sildenafil (REVATIO) 20 mg Tab Take 1 to 3 tabs daily as needed.    VENTOLIN HFA 90 mcg/actuation inhaler Inhale 2 puffs into the lungs every 6 (six) hours as needed for Wheezing. Rescue     No current facility-administered medications for this visit.        Review of patient's allergies indicates:   Allergen Reactions    No known drug allergies        Family History   Problem Relation Age of Onset    Cataracts Mother     Heart disease Mother         CHF     Hypertension Mother     Hyperlipidemia Mother     Cataracts Father     Glaucoma Father     Heart disease Father     Hyperlipidemia Sister     Hypertension Sister     No Known Problems Daughter     No Known Problems Daughter     Collagen disease Neg Hx     Amblyopia Neg Hx     Blindness Neg Hx     Macular degeneration Neg Hx     Retinal detachment Neg Hx     Strabismus Neg Hx     Cancer Neg Hx        Social History     Socioeconomic History    Marital status:      Spouse name: Not on file    Number of children: Not on file    Years of education: Not on file    Highest education level: Not on file   Occupational History    Not on file   Social Needs    Financial resource strain: Not hard at all    Food insecurity:     Worry: Never true     Inability: Never true    Transportation needs:     Medical: No     Non-medical: No   Tobacco Use    Smoking status: Never Smoker    Smokeless tobacco: Never Used    Tobacco comment: when a child   Substance and Sexual Activity    Alcohol use: Yes     Frequency: Never     Binge frequency: Never     Comment: rarely    Drug use: No    Sexual activity: Yes     Partners: Female   Lifestyle    Physical activity:     Days per week: 0 days     Minutes per session: 0 min    Stress: Not at all   Relationships    Social connections:     Talks on phone: More than three times a week     Gets together: More than three times a week     Attends Church service: Not on file     Active member of club or organization: Yes     Attends meetings of clubs or organizations: More than 4 times per year     Relationship status:    Other Topics Concern    Not on file   Social History Narrative    Not on file       Chief Complaint:   Chief Complaint   Patient presents with    Shoulder Pain     L shoulder 4wk f/u       History of present illness:  This is a 73-year-old male seen for left shoulder and neck pain. Patient had a very big neck fusion done in November  of 2018.  Since he has had some pain on the left side of his neck and shoulder.  Feels very tight and he describes it as like a bungee cord being stretched.  Pain is up to 6/10.  Located mainly in the trapezius area. No previous treatment specifically for the shoulder.  Has had therapy for his neck and shoulder but not getting any better.  MRI showed no significant cuff pathology. Cortisone injection did really not help.    Answers for HPI/ROS submitted by the patient on 6/11/2019   Arm pain  unexpected weight change: No  appetite change : No  sleep disturbance: No  IMMUNOCOMPROMISED: No  nervous/ anxious: No  dysphoric mood: No  rash: No  visual disturbance: No  eye redness: No  eye pain: No  ear pain: No  tinnitus: Yes  hearing loss: No  sinus pressure : Yes  nosebleeds: Yes  enviro allergies: No  food allergies: No  cough: No  shortness of breath: Yes  sweating: No  frequency: Yes  difficulty urinating: Yes  hematuria: No  chest pain: No  palpitations: Yes  nausea: No  vomiting: No  diarrhea: No  blood in stool: No  constipation: No  headaches: Yes  numbness: No  seizures: No  joint swelling: No  myalgia: No  weakness: No  back pain: Yes  Pain Chronicity: chronic  History of trauma: No  Onset: more than 1 month ago  Frequency: constantly  Progression since onset: unchanged  Injury mechanism: twisting  injury location: at home  pain- numeric: 4/10  pain location: left shoulder  pain quality: generalized  Radiating Pain: Yes  If your pain is radiating, to what part of the body?: left arm  Aggravating factors: extension  fever: No  inability to bear weight: Yes  itching: No  joint locking: No  limited range of motion: Yes  stiffness: Yes  tingling: No  Treatments tried: cold, heat, exercise, OTC ointments  physical therapy: ineffective  Improvement on treatment: no relief        Physical Examination:    Vital Signs:    Vitals:    06/13/19 0939   BP: 134/71   Pulse: 73       Body mass index is 31.4 kg/m².    This a  well-developed, well nourished patient in no acute distress.  They are alert and oriented and cooperative to examination.  Pt. walks without an antalgic gait.      Examination of the left shoulder shows no rashes or erythema. There are no masses, ecchymosis, or atrophy.  He has some tightness up in his trapezius with a trigger point within the muscle as well. The patient has full range of motion in forward flexion, external rotation, and internal rotation to the mid T-spine. The patient has - impingement signs.Nontender to palpation over a.c. joint. Normal stability anteriorly, posteriorly, and negative sulcus sign. Passive range of motion: Forward flexion of 180°, external rotation at 90° of 90°, internal rotation of 50°, and external rotation at 0° of 50°. 2+ radial pulse. Intact axillary, radial, median and ulnar sensation. 5 out of 5 resisted forward flexion, external rotation, and negative lift off test.    X-rays:  X-rays of the left shoulder reviewed which show some mild degenerative change of the AC joint.    MRI of the left shoulder from outside facility:  No evidence for high-grade or full-thickness rotator cuff tear.  Moderate AC arthritis.  Nonspecific marrow edema in the humeral head.     Assessment::  Left trapezius pain and tightness    Plan:  I reviewed the finding with him today. The shot did really help shows most likely from his neck and some residual from the surgery. Recommended just continued observation at this point. He will follow up with me if the pain near his shoulder itself should worsen or if his knees are other issues that we had seen him previously for recur    This note was created using [email or web address] voice recognition software that occasionally misinterpreted phrases or words.    Consult note is delivered via Epic messaging service.

## 2019-06-26 DIAGNOSIS — E78.2 MIXED HYPERLIPIDEMIA: ICD-10-CM

## 2019-06-26 RX ORDER — ATORVASTATIN CALCIUM 80 MG/1
TABLET, FILM COATED ORAL
Qty: 90 TABLET | Refills: 3 | Status: SHIPPED | OUTPATIENT
Start: 2019-06-26 | End: 2020-03-27

## 2019-06-27 RX ORDER — CHLORTHALIDONE 25 MG/1
TABLET ORAL
Qty: 90 TABLET | Refills: 3 | Status: SHIPPED | OUTPATIENT
Start: 2019-06-27 | End: 2020-03-27

## 2019-07-09 DIAGNOSIS — M25.561 RIGHT KNEE PAIN, UNSPECIFIED CHRONICITY: Primary | ICD-10-CM

## 2019-07-11 ENCOUNTER — HOSPITAL ENCOUNTER (OUTPATIENT)
Dept: RADIOLOGY | Facility: HOSPITAL | Age: 74
Discharge: HOME | End: 2019-07-11
Attending: ORTHOPAEDIC SURGERY
Payer: MEDICARE

## 2019-07-11 ENCOUNTER — OFFICE VISIT (OUTPATIENT)
Dept: ORTHOPEDICS | Facility: CLINIC | Age: 74
End: 2019-07-11
Payer: MEDICARE

## 2019-07-11 VITALS — BODY MASS INDEX: 31.54 KG/M2 | RESPIRATION RATE: 19 BRPM | WEIGHT: 238 LBS | HEIGHT: 73 IN

## 2019-07-11 DIAGNOSIS — M17.0 PRIMARY OSTEOARTHRITIS OF BOTH KNEES: Primary | ICD-10-CM

## 2019-07-11 DIAGNOSIS — M25.561 RIGHT KNEE PAIN, UNSPECIFIED CHRONICITY: ICD-10-CM

## 2019-07-11 DIAGNOSIS — M17.11 PRIMARY OSTEOARTHRITIS OF RIGHT KNEE: Primary | ICD-10-CM

## 2019-07-11 PROCEDURE — 20610 LARGE JOINT ASPIRATION/INJECTION: R KNEE: ICD-10-PCS | Mod: HCNC,RT,S$GLB, | Performed by: ORTHOPAEDIC SURGERY

## 2019-07-11 PROCEDURE — 73562 X-RAY EXAM OF KNEE 3: CPT | Mod: 26,59,HCNC,LT | Performed by: RADIOLOGY

## 2019-07-11 PROCEDURE — 20610 DRAIN/INJ JOINT/BURSA W/O US: CPT | Mod: HCNC,RT,S$GLB, | Performed by: ORTHOPAEDIC SURGERY

## 2019-07-11 PROCEDURE — 73562 XR KNEE ORTHO RIGHT WITH FLEXION: ICD-10-PCS | Mod: 26,59,HCNC,LT | Performed by: RADIOLOGY

## 2019-07-11 PROCEDURE — 99213 PR OFFICE/OUTPT VISIT, EST, LEVL III, 20-29 MIN: ICD-10-PCS | Mod: 25,HCNC,S$GLB, | Performed by: ORTHOPAEDIC SURGERY

## 2019-07-11 PROCEDURE — 1101F PR PT FALLS ASSESS DOC 0-1 FALLS W/OUT INJ PAST YR: ICD-10-PCS | Mod: HCNC,CPTII,S$GLB, | Performed by: ORTHOPAEDIC SURGERY

## 2019-07-11 PROCEDURE — 99999 PR PBB SHADOW E&M-EST. PATIENT-LVL III: ICD-10-PCS | Mod: PBBFAC,HCNC,, | Performed by: ORTHOPAEDIC SURGERY

## 2019-07-11 PROCEDURE — 73564 XR KNEE ORTHO RIGHT WITH FLEXION: ICD-10-PCS | Mod: 26,HCNC,RT, | Performed by: RADIOLOGY

## 2019-07-11 PROCEDURE — 1101F PT FALLS ASSESS-DOCD LE1/YR: CPT | Mod: HCNC,CPTII,S$GLB, | Performed by: ORTHOPAEDIC SURGERY

## 2019-07-11 PROCEDURE — 73564 X-RAY EXAM KNEE 4 OR MORE: CPT | Mod: 26,HCNC,RT, | Performed by: RADIOLOGY

## 2019-07-11 PROCEDURE — 73562 X-RAY EXAM OF KNEE 3: CPT | Mod: TC,HCNC,PN,59,LT

## 2019-07-11 PROCEDURE — 99999 PR PBB SHADOW E&M-EST. PATIENT-LVL III: CPT | Mod: PBBFAC,HCNC,, | Performed by: ORTHOPAEDIC SURGERY

## 2019-07-11 PROCEDURE — 99213 OFFICE O/P EST LOW 20 MIN: CPT | Mod: 25,HCNC,S$GLB, | Performed by: ORTHOPAEDIC SURGERY

## 2019-07-11 RX ORDER — TIZANIDINE HYDROCHLORIDE 4 MG/1
CAPSULE, GELATIN COATED ORAL
Qty: 270 CAPSULE | Refills: 0 | Status: SHIPPED | OUTPATIENT
Start: 2019-07-11 | End: 2019-07-18

## 2019-07-11 RX ORDER — NAPROXEN 500 MG/1
500 TABLET ORAL 2 TIMES DAILY WITH MEALS
Qty: 60 TABLET | Refills: 0 | Status: SHIPPED | OUTPATIENT
Start: 2019-07-11 | End: 2019-07-11 | Stop reason: SDUPTHER

## 2019-07-11 RX ORDER — NAPROXEN 500 MG/1
TABLET ORAL
Qty: 180 TABLET | Refills: 0 | Status: SHIPPED | OUTPATIENT
Start: 2019-07-11 | End: 2019-08-30 | Stop reason: ALTCHOICE

## 2019-07-11 RX ORDER — TIZANIDINE HYDROCHLORIDE 4 MG/1
1 CAPSULE, GELATIN COATED ORAL EVERY 8 HOURS
Qty: 30 CAPSULE | Refills: 0 | Status: SHIPPED | OUTPATIENT
Start: 2019-07-11 | End: 2019-07-11 | Stop reason: SDUPTHER

## 2019-07-11 NOTE — PROGRESS NOTES
Past Medical History:   Diagnosis Date    Arthritis     Back pain     Carpal tunnel syndrome 2/25/2013    Cataract     Cataract, left eye 11/10/2014    Chest pain, musculoskeletal     COPD (chronic obstructive pulmonary disease) 11/052018    Emphysema lung 11/05/2018    Hyperlipidemia     Hypertension     Hypothyroidism     Knee fracture     Myasthenia gravis     Neuropathy 1/3/2013    Obesity 1/29/2015    Polyneuropathy     PVC (premature ventricular contraction)     Squamous cell carcinoma 2014    left forearm    Thyroid disease        Past Surgical History:   Procedure Laterality Date    APPENDECTOMY      C2-C6 Posterior Cervical Laminectomy & Instrumental Fusion N/A 11/7/2018    [facility] 2ND FLR    CATARACT EXTRACTION W/  INTRAOCULAR LENS IMPLANT Bilateral     COLONOSCOPY N/A 3/1/2012    [facility]eban [facility] at [facility] ENDO    CYSTOSCOPY N/A 2/26/2019    [facility]    EGD (ESOPHAGOGASTRODUODENOSCOPY) N/A 9/12/2018    Performed by Gabriel Hernandez [facility] at [facility] END[facility]    EXTRACTION-CATARACT-IOL Left 12/9/2014    [facility]    HEMORRHOID SURGERY      INJECTION-STEROID-EPIDURAL-TRANSFORAMINAL Right 5/17/2018    [facility]    INJECTION-STEROID-EPIDURAL-TRANSFORAMINAL Right 9/1/2017    [facility]    INJECTION-STEROID-EPIDURAL-TRANSFORAMINAL Right 2/21/2017    [facility]    INJECTION-STEROID-EPIDURAL-TRANSFORAMINAL Right 10/9/2014    [facility]    KNEE ARTHROPLASTY Bilateral     NECK SURGERY      TONSILLECTOMY      ULTRASOUND, RECTAL APPROACH N/A 2/26/2019    [facility]       Current Outpatient Medications   Medication Sig    atorvastatin (LIPITOR) 80 MG tablet TAKE 1 TABLET EVERY DAY    carvedilol (COREG) 12.5 MG tablet Take 1  tablet (12.5 mg total) by mouth 2 (two) times daily with meals.    cetirizine (ZYRTEC) 10 MG tablet Take 1 tablet by mouth daily as needed.    chlorthalidone (HYGROTEN) 25 MG Tab TAKE 1 TABLET EVERY DAY    cholecalciferol, vitamin D3, (VITAMIN D) 2,000 unit Cap 1 capsule once daily. Every day    coenzyme Q10 (CO Q-10) 100 mg capsule Take 400 mg by mouth once daily.     cyanocobalamin, vitamin B-12, (VITAMIN B-12) 5,000 mcg Subl Take 5,000 mcg by mouth once daily. Every day    ezetimibe (ZETIA) 10 mg tablet Take 1 tablet (10 mg total) by mouth once daily.    fluticasone (FLONASE) 50 mcg/actuation nasal spray USE 1 SPRAY IN EACH NOSTRIL TWICE DAILY AS NEEDED  FOR  RHINITIS    gabapentin (NEURONTIN) 300 MG capsule Take 1 capsule (300 mg total) by mouth 3 (three) times daily.    levothyroxine (SYNTHROID) 50 MCG tablet TAKE 1 TABLET EVERY DAY    methylsulfonylmethane (MSM) 1,000 mg Tab Take 1,000 mg by mouth once daily. Every day    milk thistle 200 mg Cap Take 200 mg by mouth 2 (two) times daily. Twice a day    olmesartan (BENICAR) 20 MG tablet Take 1 tablet (20 mg total) by mouth once daily.    omega-3 fatty acids 1,000 mg Cap Twice a day    potassium chloride SA (K-DUR,KLOR-CON) 20 MEQ tablet Take 20 mEq by mouth 2 (two) times daily. Take 4 tablets daily for 5 days, then 3 daily for 2 weeks, then 2 daily thereafter.    predniSONE (DELTASONE) 1 MG tablet Take 2 mg by mouth once daily.    predniSONE (DELTASONE) 5 MG tablet TAKE 1 TABLET EVERY OTHER DAY (Patient taking differently: TAKE 1 TABLET EVERY DAY)    sildenafil (REVATIO) 20 mg Tab Take 1 to 3 tabs daily as needed.    VENTOLIN HFA 90 mcg/actuation inhaler Inhale 2 puffs into the lungs every 6 (six) hours as needed for Wheezing. Rescue    naproxen (NAPROSYN) 500 MG tablet TAKE 1 TABLET(500 MG) BY MOUTH TWICE DAILY WITH MEALS    tiZANidine 4 mg Cap TAKE 1 CAPSULE BY MOUTH EVERY 8 HOURS FOR 10 DAYS     No current facility-administered medications  for this visit.        Review of patient's allergies indicates:   Allergen Reactions    No known drug allergies        Family History   Problem Relation Age of Onset    Cataracts Mother     Heart disease Mother         CHF    Hypertension Mother     Hyperlipidemia Mother     Cataracts Father     Glaucoma Father     Heart disease Father     Hyperlipidemia Sister     Hypertension Sister     No Known Problems Daughter     No Known Problems Daughter     Collagen disease Neg Hx     Amblyopia Neg Hx     Blindness Neg Hx     Macular degeneration Neg Hx     Retinal detachment Neg Hx     Strabismus Neg Hx     Cancer Neg Hx        Social History     Socioeconomic History    Marital status:      Spouse name: Not on file    Number of children: Not on file    Years of education: Not on file    Highest education level: Not on file   Occupational History    Not on file   Social Needs    Financial resource strain: Not hard at all    Food insecurity:     Worry: Never true     Inability: Never true    Transportation needs:     Medical: No     Non-medical: No   Tobacco Use    Smoking status: Never Smoker    Smokeless tobacco: Never Used    Tobacco comment: when a child   Substance and Sexual Activity    Alcohol use: Yes     Frequency: Never     Binge frequency: Never     Comment: rarely    Drug use: No    Sexual activity: Yes     Partners: Female   Lifestyle    Physical activity:     Days per week: 0 days     Minutes per session: 0 min    Stress: Not at all   Relationships    Social connections:     Talks on phone: More than three times a week     Gets together: More than three times a week     Attends Hinduism service: Not on file     Active member of club or organization: Yes     Attends meetings of clubs or organizations: More than 4 times per year     Relationship status:    Other Topics Concern    Not on file   Social History Narrative    Not on file       Chief Complaint:    Chief Complaint   Patient presents with    Knee Pain     right knee pain       History of present illness:  This is a 73-year-old male seen for recurrent right knee pain. I last saw back in 2017.  We did a Euflexxa series on him and it worked great.  Pain started again a couple months ago.  Pain is back up to 5/10.  No new injury or trauma.    Answers for HPI/ROS submitted by the patient on 7/10/2019   Leg pain  unexpected weight change: No  appetite change : No  sleep disturbance: No  IMMUNOCOMPROMISED: No  nervous/ anxious: No  dysphoric mood: No  rash: No  visual disturbance: No  eye redness: No  eye pain: No  ear pain: No  tinnitus: Yes  hearing loss: No  sinus pressure : Yes  nosebleeds: Yes  enviro allergies: No  food allergies: No  cough: No  shortness of breath: Yes  sweating: No  frequency: Yes  difficulty urinating: Yes  hematuria: No  chest pain: No  palpitations: Yes  nausea: No  vomiting: No  diarrhea: No  blood in stool: No  constipation: No  headaches: Yes  dizziness: No  numbness: No  seizures: No  joint swelling: No  myalgia: Yes  weakness: Yes  back pain: Yes  Pain Chronicity: recurrent  History of trauma: No  Onset: more than 1 year ago  Frequency: intermittently  Progression since onset: waxing and waning  Injury mechanism: twisting  injury location: at home  pain- numeric: 6/10  pain location: right knee  pain quality: aching, burning  Radiating Pain: No  Aggravating factors: bending, bearing weight  fever: No  inability to bear weight: Yes  itching: No  joint locking: No  limited range of motion: Yes  stiffness: Yes  tingling: No  Treatments tried: heat, injection treatment, NSAIDs  physical therapy: not tried  Improvement on treatment: no relief        Physical Examination:    Vital Signs:    Vitals:    07/11/19 0907   Resp: 19       Body mass index is 31.4 kg/m².    This a well-developed, well nourished patient in no acute distress.  They are alert and oriented and cooperative to  examination.  Pt. walks without an antalgic gait.      Examination of the right knee shows no rashes or erythema. There are no masses ecchymosis or effusion. Patient has full range of motion from 0-130°. Patient is moderately tender to palpation over lateral joint line and nontender to palpation over the medial joint line. Patient has a - Lachman exam, - anterior drawer exam, and - posterior drawer exam. - Fabián's exam. Knee is stable to varus and valgus stress. 5 out of 5 motor strength. Palpable distal pulses. Intact light touch sensation. Negative Patellofemoral crepitus    X-rays:  X-rays of the right knee is ordered and reviewed which show severe lateral joint space narrowing on the right and more moderate to severe medial joint space wear on the left.    MRI of the left shoulder from outside facility:  No evidence for high-grade or full-thickness rotator cuff tear.  Moderate AC arthritis.  Nonspecific marrow edema in the humeral head.     Assessment::  Right knee arthritis    Plan:  I reviewed the finding with him today.  He wanted to try a repeat of his Euflexxa series which I agreed to.  I injected his right knee with Euflexxa 1 of 3.  Follow up next week.  Also refilled his naproxen and his Zanaflex    This note was created using [employer] voice recognition software that occasionally misinterpreted phrases or words.    Consult note is delivered via Epic messaging service.

## 2019-07-11 NOTE — PROCEDURES
Large Joint Aspiration/Injection: R knee  Date/Time: 7/11/2019 2:30 PM  Performed by: Haroldo Campbell MD  Authorized by: Haroldo Campbell MD     Consent Done?:  Yes (Verbal)  Indications:  Pain  Procedure site marked: Yes    Timeout: Prior to procedure the correct patient, procedure, and site was verified      Location:  Knee  Site:  R knee  Prep: Patient was prepped and draped in usual sterile fashion    Needle size:  20 G  Approach:  Anterolateral  Medications:  20 mg sodium hyaluronate (EUFLEXXA) 10 mg/mL(mw 2.4 -3.6 million)  Patient tolerance:  Patient tolerated the procedure well with no immediate complications

## 2019-07-18 ENCOUNTER — TELEPHONE (OUTPATIENT)
Dept: GASTROENTEROLOGY | Facility: CLINIC | Age: 74
End: 2019-07-18

## 2019-07-18 ENCOUNTER — OFFICE VISIT (OUTPATIENT)
Dept: ORTHOPEDICS | Facility: CLINIC | Age: 74
End: 2019-07-18
Payer: MEDICARE

## 2019-07-18 DIAGNOSIS — M17.0 PRIMARY OSTEOARTHRITIS OF BOTH KNEES: Primary | ICD-10-CM

## 2019-07-18 PROCEDURE — 20610 LARGE JOINT ASPIRATION/INJECTION: R KNEE: ICD-10-PCS | Mod: HCNC,RT,S$GLB, | Performed by: ORTHOPAEDIC SURGERY

## 2019-07-18 PROCEDURE — 99999 PR PBB SHADOW E&M-EST. PATIENT-LVL II: ICD-10-PCS | Mod: PBBFAC,HCNC,, | Performed by: ORTHOPAEDIC SURGERY

## 2019-07-18 PROCEDURE — 20610 DRAIN/INJ JOINT/BURSA W/O US: CPT | Mod: HCNC,RT,S$GLB, | Performed by: ORTHOPAEDIC SURGERY

## 2019-07-18 PROCEDURE — 99999 PR PBB SHADOW E&M-EST. PATIENT-LVL II: CPT | Mod: PBBFAC,HCNC,, | Performed by: ORTHOPAEDIC SURGERY

## 2019-07-18 PROCEDURE — 99499 UNLISTED E&M SERVICE: CPT | Mod: HCNC,S$GLB,, | Performed by: ORTHOPAEDIC SURGERY

## 2019-07-18 PROCEDURE — 99499 NO LOS: ICD-10-PCS | Mod: HCNC,S$GLB,, | Performed by: ORTHOPAEDIC SURGERY

## 2019-07-18 RX ORDER — TIZANIDINE 4 MG/1
TABLET ORAL
Qty: 385 TABLET | Refills: 0 | Status: SHIPPED | OUTPATIENT
Start: 2019-07-18 | End: 2019-12-09 | Stop reason: ALTCHOICE

## 2019-07-18 RX ORDER — TIZANIDINE 4 MG/1
4 TABLET ORAL EVERY 6 HOURS PRN
Qty: 30 TABLET | Refills: 0 | Status: SHIPPED | OUTPATIENT
Start: 2019-07-18 | End: 2019-07-18 | Stop reason: SDUPTHER

## 2019-07-18 NOTE — PROCEDURES
Large Joint Aspiration/Injection: R knee  Date/Time: 7/18/2019 12:30 PM  Performed by: Haroldo Campbell MD  Authorized by: Haroldo Campbell MD     Consent Done?:  Yes (Verbal)  Indications:  Pain  Procedure site marked: Yes    Timeout: Prior to procedure the correct patient, procedure, and site was verified      Location:  Knee  Site:  R knee  Prep: Patient was prepped and draped in usual sterile fashion    Needle size:  20 G  Approach:  Anterolateral  Medications:  20 mg sodium hyaluronate (EUFLEXXA) 10 mg/mL(mw 2.4 -3.6 million)  Patient tolerance:  Patient tolerated the procedure well with no immediate complications

## 2019-07-18 NOTE — TELEPHONE ENCOUNTER
----- Message from Sierra Martínez sent at 7/18/2019  8:40 AM CDT -----  Contact: self  Typ Appointment Request    Patient need to speak with nurse.   Patient states experiencing stomach problems after eating need appointment for Thursday 7/25/2019 if possible    Name of Caller:Patient   When is the first available appointment?  Symptoms:  Best Call Back Number:[phone]  Additional Information:

## 2019-07-18 NOTE — PROGRESS NOTES
Past Medical History:   Diagnosis Date    Arthritis     Back pain     Carpal tunnel syndrome 2/25/2013    Cataract     Cataract, left eye 11/10/2014    Chest pain, musculoskeletal     COPD (chronic obstructive pulmonary disease) 11/052018    Emphysema lung 11/05/2018    Hyperlipidemia     Hypertension     Hypothyroidism     Knee fracture     Myasthenia gravis     Neuropathy 1/3/2013    Obesity 1/29/2015    Polyneuropathy     PVC (premature ventricular contraction)     Squamous cell carcinoma 2014    left forearm    Thyroid disease        Past Surgical History:   Procedure Laterality Date    APPENDECTOMY      C2-C6 Posterior Cervical Laminectomy & Instrumental Fusion N/A 11/7/2018    [facility]formed [facility] 2ND [facility]    CATARACT EXTRACTION W/  INTRAOCULAR LENS IMPLANT Bilateral     COLONOSCOPY N/A 3/1/2012    [facility]formed [facility] at [facility] ENDO    CYSTOSCOPY N/A 2/26/2019    [facility]    EGD (ESOPHAGOGASTRODUODENOSCOPY) N/A 9/12/2018    Performed by Gabriel Hernandez [facility] at [facility] END[facility]    EXTRACTION-CATARACT-IOL Left 12/9/2014    [facility]    HEMORRHOID SURGERY      INJECTION-STEROID-EPIDURAL-TRANSFORAMINAL Right 5/17/2018    [facility]    INJECTION-STEROID-EPIDURAL-TRANSFORAMINAL Right 9/1/2017    [facility]    INJECTION-STEROID-EPIDURAL-TRANSFORAMINAL Right 2/21/2017    [facility]    INJECTION-STEROID-EPIDURAL-TRANSFORAMINAL Right 10/9/2014    [facility]    KNEE ARTHROPLASTY Bilateral     NECK SURGERY      TONSILLECTOMY      ULTRASOUND, RECTAL APPROACH N/A 2/26/2019    [facility]       Current Outpatient Medications   Medication Sig    atorvastatin (LIPITOR) 80 MG tablet TAKE 1 TABLET EVERY DAY    carvedilol (COREG) 12.5 MG tablet Take 1  tablet (12.5 mg total) by mouth 2 (two) times daily with meals.    cetirizine (ZYRTEC) 10 MG tablet Take 1 tablet by mouth daily as needed.    chlorthalidone (HYGROTEN) 25 MG Tab TAKE 1 TABLET EVERY DAY    cholecalciferol, vitamin D3, (VITAMIN D) 2,000 unit Cap 1 capsule once daily. Every day    coenzyme Q10 (CO Q-10) 100 mg capsule Take 400 mg by mouth once daily.     cyanocobalamin, vitamin B-12, (VITAMIN B-12) 5,000 mcg Subl Take 5,000 mcg by mouth once daily. Every day    ezetimibe (ZETIA) 10 mg tablet Take 1 tablet (10 mg total) by mouth once daily.    fluticasone (FLONASE) 50 mcg/actuation nasal spray USE 1 SPRAY IN EACH NOSTRIL TWICE DAILY AS NEEDED  FOR  RHINITIS    gabapentin (NEURONTIN) 300 MG capsule Take 1 capsule (300 mg total) by mouth 3 (three) times daily.    levothyroxine (SYNTHROID) 50 MCG tablet TAKE 1 TABLET EVERY DAY    methylsulfonylmethane (MSM) 1,000 mg Tab Take 1,000 mg by mouth once daily. Every day    milk thistle 200 mg Cap Take 200 mg by mouth 2 (two) times daily. Twice a day    naproxen (NAPROSYN) 500 MG tablet TAKE 1 TABLET(500 MG) BY MOUTH TWICE DAILY WITH MEALS    olmesartan (BENICAR) 20 MG tablet Take 1 tablet (20 mg total) by mouth once daily.    omega-3 fatty acids 1,000 mg Cap Twice a day    potassium chloride SA (K-DUR,KLOR-CON) 20 MEQ tablet Take 20 mEq by mouth 2 (two) times daily. Take 4 tablets daily for 5 days, then 3 daily for 2 weeks, then 2 daily thereafter.    predniSONE (DELTASONE) 1 MG tablet Take 2 mg by mouth once daily.    predniSONE (DELTASONE) 5 MG tablet TAKE 1 TABLET EVERY OTHER DAY (Patient taking differently: TAKE 1 TABLET EVERY DAY)    sildenafil (REVATIO) 20 mg Tab Take 1 to 3 tabs daily as needed.    tiZANidine (ZANAFLEX) 4 MG tablet TAKE 1 TABLET(4 MG) BY MOUTH EVERY 6 HOURS AS NEEDED    VENTOLIN HFA 90 mcg/actuation inhaler Inhale 2 puffs into the lungs every 6 (six) hours as needed for Wheezing. Rescue     No current  facility-administered medications for this visit.        Review of patient's allergies indicates:   Allergen Reactions    No known drug allergies        Family History   Problem Relation Age of Onset    Cataracts Mother     Heart disease Mother         CHF    Hypertension Mother     Hyperlipidemia Mother     Cataracts Father     Glaucoma Father     Heart disease Father     Hyperlipidemia Sister     Hypertension Sister     No Known Problems Daughter     No Known Problems Daughter     Collagen disease Neg Hx     Amblyopia Neg Hx     Blindness Neg Hx     Macular degeneration Neg Hx     Retinal detachment Neg Hx     Strabismus Neg Hx     Cancer Neg Hx        Social History     Socioeconomic History    Marital status:      Spouse name: Not on file    Number of children: Not on file    Years of education: Not on file    Highest education level: Not on file   Occupational History    Not on file   Social Needs    Financial resource strain: Not hard at all    Food insecurity:     Worry: Never true     Inability: Never true    Transportation needs:     Medical: No     Non-medical: No   Tobacco Use    Smoking status: Never Smoker    Smokeless tobacco: Never Used    Tobacco comment: when a child   Substance and Sexual Activity    Alcohol use: Yes     Frequency: Never     Binge frequency: Never     Comment: rarely    Drug use: No    Sexual activity: Yes     Partners: Female   Lifestyle    Physical activity:     Days per week: 0 days     Minutes per session: 0 min    Stress: Not at all   Relationships    Social connections:     Talks on phone: More than three times a week     Gets together: More than three times a week     Attends Baptism service: Not on file     Active member of club or organization: Yes     Attends meetings of clubs or organizations: More than 4 times per year     Relationship status:    Other Topics Concern    Not on file   Social History Narrative    Not  on file       Chief Complaint:   Chief Complaint   Patient presents with    Knee Pain     R knee euflexxa 2/3       History of present illness:  This is a 73-year-old male seen for recurrent right knee pain. I last saw back in 2017.  We did a Euflexxa series on him and it worked great.  Pain started again a couple months ago.  Pain is back up to 5/10.  No new injury or trauma.    Answers for HPI/ROS submitted by the patient on 7/10/2019   Leg pain  unexpected weight change: No  appetite change : No  sleep disturbance: No  IMMUNOCOMPROMISED: No  nervous/ anxious: No  dysphoric mood: No  rash: No  visual disturbance: No  eye redness: No  eye pain: No  ear pain: No  tinnitus: Yes  hearing loss: No  sinus pressure : Yes  nosebleeds: Yes  enviro allergies: No  food allergies: No  cough: No  shortness of breath: Yes  sweating: No  frequency: Yes  difficulty urinating: Yes  hematuria: No  chest pain: No  palpitations: Yes  nausea: No  vomiting: No  diarrhea: No  blood in stool: No  constipation: No  headaches: Yes  dizziness: No  numbness: No  seizures: No  joint swelling: No  myalgia: Yes  weakness: Yes  back pain: Yes  Pain Chronicity: recurrent  History of trauma: No  Onset: more than 1 year ago  Frequency: intermittently  Progression since onset: waxing and waning  Injury mechanism: twisting  injury location: at home  pain- numeric: 6/10  pain location: right knee  pain quality: aching, burning  Radiating Pain: No  Aggravating factors: bending, bearing weight  fever: No  inability to bear weight: Yes  itching: No  joint locking: No  limited range of motion: Yes  stiffness: Yes  tingling: No  Treatments tried: heat, injection treatment, NSAIDs  physical therapy: not tried  Improvement on treatment: no relief        Physical Examination:    Vital Signs:    There were no vitals filed for this visit.    There is no height or weight on file to calculate BMI.    This a well-developed, well nourished patient in no acute  distress.  They are alert and oriented and cooperative to examination.  Pt. walks without an antalgic gait.      Examination of the right knee shows no rashes or erythema. There are no masses ecchymosis or effusion. Patient has full range of motion from 0-130°. Patient is moderately tender to palpation over lateral joint line and nontender to palpation over the medial joint line. Patient has a - Lachman exam, - anterior drawer exam, and - posterior drawer exam. - Fabián's exam. Knee is stable to varus and valgus stress. 5 out of 5 motor strength. Palpable distal pulses. Intact light touch sensation. Negative Patellofemoral crepitus    X-rays:  X-rays of the right knee is ordered and reviewed which show severe lateral joint space narrowing on the right and more moderate to severe medial joint space wear on the left.    MRI of the left shoulder from outside facility:  No evidence for high-grade or full-thickness rotator cuff tear.  Moderate AC arthritis.  Nonspecific marrow edema in the humeral head.     Assessment::  Right knee arthritis    Plan: I injected his right knee with Euflexxa 2 of 3.  Follow up next week.      This note was created using "[employer] voice recognition software that occasionally misinterpreted phrases or words.    Consult note is delivered via Epic messaging service.

## 2019-07-21 ENCOUNTER — PATIENT MESSAGE (OUTPATIENT)
Dept: NEUROLOGY | Facility: CLINIC | Age: 74
End: 2019-07-21

## 2019-07-22 DIAGNOSIS — G70.00 MYASTHENIA GRAVIS: ICD-10-CM

## 2019-07-23 ENCOUNTER — OFFICE VISIT (OUTPATIENT)
Dept: ELECTROPHYSIOLOGY | Facility: CLINIC | Age: 74
End: 2019-07-23
Payer: MEDICARE

## 2019-07-23 ENCOUNTER — HOSPITAL ENCOUNTER (OUTPATIENT)
Dept: CARDIOLOGY | Facility: CLINIC | Age: 74
Discharge: HOME | End: 2019-07-23
Payer: MEDICARE

## 2019-07-23 VITALS
DIASTOLIC BLOOD PRESSURE: 73 MMHG | HEIGHT: 73 IN | HEART RATE: 64 BPM | BODY MASS INDEX: 31.41 KG/M2 | SYSTOLIC BLOOD PRESSURE: 151 MMHG | WEIGHT: 237 LBS

## 2019-07-23 DIAGNOSIS — G70.00 MYASTHENIA GRAVIS, ACHR ANTIBODY POSITIVE: ICD-10-CM

## 2019-07-23 DIAGNOSIS — I10 HTN (HYPERTENSION), BENIGN: ICD-10-CM

## 2019-07-23 DIAGNOSIS — I49.3 PVC'S (PREMATURE VENTRICULAR CONTRACTIONS): Primary | ICD-10-CM

## 2019-07-23 DIAGNOSIS — I47.29 NONSUSTAINED VENTRICULAR TACHYCARDIA: ICD-10-CM

## 2019-07-23 DIAGNOSIS — I49.3 PVC (PREMATURE VENTRICULAR CONTRACTION): ICD-10-CM

## 2019-07-23 PROCEDURE — 1101F PR PT FALLS ASSESS DOC 0-1 FALLS W/OUT INJ PAST YR: ICD-10-PCS | Mod: HCNC,CPTII,S$GLB, | Performed by: INTERNAL MEDICINE

## 2019-07-23 PROCEDURE — 93010 RHYTHM STRIP: ICD-10-PCS | Mod: HCNC,S$GLB,, | Performed by: INTERNAL MEDICINE

## 2019-07-23 PROCEDURE — 93005 ELECTROCARDIOGRAM TRACING: CPT | Mod: HCNC,S$GLB,, | Performed by: INTERNAL MEDICINE

## 2019-07-23 PROCEDURE — 99999 PR PBB SHADOW E&M-EST. PATIENT-LVL IV: ICD-10-PCS | Mod: PBBFAC,HCNC,, | Performed by: INTERNAL MEDICINE

## 2019-07-23 PROCEDURE — 99214 PR OFFICE/OUTPT VISIT, EST, LEVL IV, 30-39 MIN: ICD-10-PCS | Mod: HCNC,S$GLB,, | Performed by: INTERNAL MEDICINE

## 2019-07-23 PROCEDURE — 99214 OFFICE O/P EST MOD 30 MIN: CPT | Mod: HCNC,S$GLB,, | Performed by: INTERNAL MEDICINE

## 2019-07-23 PROCEDURE — 3078F DIAST BP <80 MM HG: CPT | Mod: HCNC,CPTII,S$GLB, | Performed by: INTERNAL MEDICINE

## 2019-07-23 PROCEDURE — 3078F PR MOST RECENT DIASTOLIC BLOOD PRESSURE < 80 MM HG: ICD-10-PCS | Mod: HCNC,CPTII,S$GLB, | Performed by: INTERNAL MEDICINE

## 2019-07-23 PROCEDURE — 3077F SYST BP >= 140 MM HG: CPT | Mod: HCNC,CPTII,S$GLB, | Performed by: INTERNAL MEDICINE

## 2019-07-23 PROCEDURE — 93005 RHYTHM STRIP: ICD-10-PCS | Mod: HCNC,S$GLB,, | Performed by: INTERNAL MEDICINE

## 2019-07-23 PROCEDURE — 99999 PR PBB SHADOW E&M-EST. PATIENT-LVL IV: CPT | Mod: PBBFAC,HCNC,, | Performed by: INTERNAL MEDICINE

## 2019-07-23 PROCEDURE — 99499 RISK ADDL DX/OHS AUDIT: ICD-10-PCS | Mod: HCNC,S$GLB,, | Performed by: INTERNAL MEDICINE

## 2019-07-23 PROCEDURE — 99499 UNLISTED E&M SERVICE: CPT | Mod: HCNC,S$GLB,, | Performed by: INTERNAL MEDICINE

## 2019-07-23 PROCEDURE — 1101F PT FALLS ASSESS-DOCD LE1/YR: CPT | Mod: HCNC,CPTII,S$GLB, | Performed by: INTERNAL MEDICINE

## 2019-07-23 PROCEDURE — 3077F PR MOST RECENT SYSTOLIC BLOOD PRESSURE >= 140 MM HG: ICD-10-PCS | Mod: HCNC,CPTII,S$GLB, | Performed by: INTERNAL MEDICINE

## 2019-07-23 PROCEDURE — 93010 ELECTROCARDIOGRAM REPORT: CPT | Mod: HCNC,S$GLB,, | Performed by: INTERNAL MEDICINE

## 2019-07-23 RX ORDER — PREDNISONE 5 MG/1
5 TABLET ORAL DAILY
Qty: 90 TABLET | Refills: 3 | Status: SHIPPED | OUTPATIENT
Start: 2019-07-23 | End: 2019-09-26 | Stop reason: SDUPTHER

## 2019-07-23 RX ORDER — CARVEDILOL 25 MG/1
25 TABLET ORAL 2 TIMES DAILY WITH MEALS
Qty: 180 TABLET | Refills: 3 | Status: SHIPPED | OUTPATIENT
Start: 2019-07-23 | End: 2019-07-29 | Stop reason: SDUPTHER

## 2019-07-23 RX ORDER — PREDNISONE 1 MG/1
2 TABLET ORAL DAILY
Qty: 180 TABLET | Refills: 3 | Status: SHIPPED | OUTPATIENT
Start: 2019-07-23 | End: 2019-09-26 | Stop reason: SDUPTHER

## 2019-07-23 NOTE — PROGRESS NOTES
Subjective:    Patient ID:  Maximilian Tavera Jr. is a 73 y.o. male who presents for evaluation of PVCs    Referring Cardiologist: Alphonso Altamirano MD  Primary Care Physician: Brian Marroquin MD  Neurologist: César oHang MD    HPI  Prior Hx:  I had the pleasure of seeing Mr. Tavera today in our electrophysiology clinic in consultation for his palpitations. As you are aware he is a pleasant 73 year-old man with hypertension, myasthenia gravis on chronic prednisone therapy, and degenerative joint disease with myelopathy s/p decompression and fusion of C2-6. He reports being diagnosed with PVCs 5 years ago however over the past 1-2 months have become more prevalent. No syncope but has been having intermittent episodes of symptomatic hypotension with heart rate in the 110s which seemed to increase when started on BPH meds. He saw Dr. Altamirano in cardiology clinic who ordered an exercise stress echo and a 30 day event monitor. Stress test noted a structurally normal heart without ischemia. The ECG portion of his stress test noted sinus rhythm with occasional unifocal PVCs (LBBB but with V2 pattern break, +Rs II/aVF, rS in III, R in 1) at times with PVC couplets. His 30 day event monitor noted frequent PVCs with PVC couplets, triplets and occasional 4 beat NSVT. He denies any near syncope or syncope.    He was on carvedilol for years however this was stopped 2 months ago when he was started on Flomax for BPH (prescribing physician suggested stopping carvedilol to avoid hypotension). He stopped this and changed to alfuzosin however he stopped this due to symptomatic hypotension. For HTN he is on olmesartan and chlorthalidone.    ECG from 10/2018 notes a dimorphic couplet with 1 similar to PVC noted on stress test and the other inferiorly directed PVC in inferior leads. Other ECGs show sinus rhythm with RBBB.    Holter monitor 5/2019 noted 2.4% PVC burden but with 14 runs of nonsustained VT up to 33 beats. We discussed  on the phone and resumed carvedilol 6.25mg q12h. He presented for follow-up shortly after and had no complaints. No MG symptoms since starting carvedilol. He has no symptoms from the ventricular arrhythmias. We increased his carvedilol to 12.5mg q12h.    Interim Hx:  Mr. Tavera returns for follow-up. He denies any issues related to his MG with 12.5mg carvedilol q12h. Home BP log shows BP mostly 120s-130s. A few were in the low 100s and upper 90s.    My interpretation of today's in clinic ECG is sinus rhythm with RBBB and no PVCs    Review of Systems   Constitution: Negative for fever and malaise/fatigue.   HENT: Negative for congestion and nosebleeds.    Eyes: Negative for blurred vision and visual disturbance.   Cardiovascular: Negative for chest pain, dyspnea on exertion, irregular heartbeat, leg swelling, near-syncope, orthopnea, palpitations, paroxysmal nocturnal dyspnea and syncope.   Respiratory: Negative for cough and shortness of breath.    Hematologic/Lymphatic: Negative for bleeding problem. Does not bruise/bleed easily.   Skin: Negative.    Musculoskeletal: Negative.    Gastrointestinal: Negative for bloating and abdominal pain.   Neurological: Negative for dizziness and focal weakness.        Objective:    Physical Exam   Constitutional: He is oriented to person, place, and time. He appears well-developed and well-nourished. No distress.   HENT:   Head: Normocephalic and atraumatic.   Eyes: Conjunctivae are normal. Right eye exhibits no discharge. Left eye exhibits no discharge.   Neck: Neck supple. No JVD present.   Cardiovascular: Normal rate and regular rhythm. Exam reveals no gallop and no friction rub.   No murmur heard.  Pulmonary/Chest: Effort normal and breath sounds normal. No respiratory distress. He has no wheezes. He has no rales.   Abdominal: Soft. Bowel sounds are normal. He exhibits no distension. There is no tenderness. There is no rebound.   Musculoskeletal: He exhibits no edema.    Neurological: He is alert and oriented to person, place, and time.   Skin: Skin is warm and dry. He is not diaphoretic.   Psychiatric: He has a normal mood and affect. His behavior is normal. Judgment and thought content normal.   Vitals reviewed.        Assessment:       1. PVC's (premature ventricular contractions)    2. Nonsustained ventricular tachycardia    3. HTN (hypertension), benign    4. Myasthenia gravis, AChR antibody positive         Plan:       In summary, Mr. Tavera is a pleasant 73 year-old man with hypertension, myasthenia gravis on chronic prednisone therapy, and degenerative joint disease with myelopathy s/p decompression and fusion of C2-6, chronic PVCs however since the beginning of this year have become more prevalent, and intermittent episodes of symptomatic hypotension with heart rate in the 110s which seemed to increase when started on BPH meds. He feels this has increased after stopping carvedilol (was on 25mg carvedilol for years). Holter monitor noted ~2% PVC burden but with 14 episodes of NSVT up to 33 beats. These are asymptomatic.  No indication that carvedilol worsened his MG symptoms. After discussion with Dr. Hoang in Neurology we have resumed carvedilol. He is tolerating 12.5mg bid without issues. Will increase to 25mg bid. He will keep a home BP log. If his systolic BP decreases consistently into the low 100s he will stop olmesartan. Will see him in 3 months with a holter just prior to visit.    RTC in 3 months with previsit holter.    Thank you for allowing me to participate in the care of this patient. Please do not hesitate to call me with any questions or concerns.    Sathya Zamudio MD, PhD  Cardiac Electrophysiology               Yes

## 2019-07-25 ENCOUNTER — OFFICE VISIT (OUTPATIENT)
Dept: ORTHOPEDICS | Facility: CLINIC | Age: 74
End: 2019-07-25
Payer: MEDICARE

## 2019-07-25 ENCOUNTER — OFFICE VISIT (OUTPATIENT)
Dept: GASTROENTEROLOGY | Facility: CLINIC | Age: 74
End: 2019-07-25
Payer: MEDICARE

## 2019-07-25 VITALS — BODY MASS INDEX: 32.2 KG/M2 | HEIGHT: 73 IN | WEIGHT: 242.94 LBS

## 2019-07-25 VITALS — WEIGHT: 237 LBS | HEIGHT: 73 IN | BODY MASS INDEX: 31.41 KG/M2

## 2019-07-25 DIAGNOSIS — Z86.2 HISTORY OF ANEMIA: ICD-10-CM

## 2019-07-25 DIAGNOSIS — Z86.010 HISTORY OF COLON POLYPS: ICD-10-CM

## 2019-07-25 DIAGNOSIS — R10.13 EPIGASTRIC PAIN: Primary | ICD-10-CM

## 2019-07-25 DIAGNOSIS — M17.0 PRIMARY OSTEOARTHRITIS OF BOTH KNEES: Primary | ICD-10-CM

## 2019-07-25 DIAGNOSIS — K21.9 GERD WITHOUT ESOPHAGITIS: ICD-10-CM

## 2019-07-25 DIAGNOSIS — R13.14 PHARYNGOESOPHAGEAL DYSPHAGIA: ICD-10-CM

## 2019-07-25 PROCEDURE — 99214 OFFICE O/P EST MOD 30 MIN: CPT | Mod: HCNC,S$GLB,, | Performed by: NURSE PRACTITIONER

## 2019-07-25 PROCEDURE — 20610 LARGE JOINT ASPIRATION/INJECTION: R KNEE: ICD-10-PCS | Mod: HCNC,RT,S$GLB, | Performed by: ORTHOPAEDIC SURGERY

## 2019-07-25 PROCEDURE — 99214 PR OFFICE/OUTPT VISIT, EST, LEVL IV, 30-39 MIN: ICD-10-PCS | Mod: HCNC,S$GLB,, | Performed by: NURSE PRACTITIONER

## 2019-07-25 PROCEDURE — 99999 PR PBB SHADOW E&M-EST. PATIENT-LVL III: ICD-10-PCS | Mod: PBBFAC,HCNC,, | Performed by: ORTHOPAEDIC SURGERY

## 2019-07-25 PROCEDURE — 20610 DRAIN/INJ JOINT/BURSA W/O US: CPT | Mod: HCNC,RT,S$GLB, | Performed by: ORTHOPAEDIC SURGERY

## 2019-07-25 PROCEDURE — 1101F PT FALLS ASSESS-DOCD LE1/YR: CPT | Mod: HCNC,CPTII,S$GLB, | Performed by: NURSE PRACTITIONER

## 2019-07-25 PROCEDURE — 1101F PR PT FALLS ASSESS DOC 0-1 FALLS W/OUT INJ PAST YR: ICD-10-PCS | Mod: HCNC,CPTII,S$GLB, | Performed by: NURSE PRACTITIONER

## 2019-07-25 PROCEDURE — 99999 PR PBB SHADOW E&M-EST. PATIENT-LVL III: CPT | Mod: PBBFAC,HCNC,, | Performed by: ORTHOPAEDIC SURGERY

## 2019-07-25 PROCEDURE — 99999 PR PBB SHADOW E&M-EST. PATIENT-LVL V: ICD-10-PCS | Mod: PBBFAC,HCNC,, | Performed by: NURSE PRACTITIONER

## 2019-07-25 PROCEDURE — 99499 UNLISTED E&M SERVICE: CPT | Mod: HCNC,S$GLB,, | Performed by: ORTHOPAEDIC SURGERY

## 2019-07-25 PROCEDURE — 99499 NO LOS: ICD-10-PCS | Mod: HCNC,S$GLB,, | Performed by: ORTHOPAEDIC SURGERY

## 2019-07-25 PROCEDURE — 99999 PR PBB SHADOW E&M-EST. PATIENT-LVL V: CPT | Mod: PBBFAC,HCNC,, | Performed by: NURSE PRACTITIONER

## 2019-07-25 RX ORDER — PANTOPRAZOLE SODIUM 40 MG/1
40 TABLET, DELAYED RELEASE ORAL DAILY
Status: ON HOLD | COMMUNITY
End: 2019-08-19 | Stop reason: SDUPTHER

## 2019-07-25 NOTE — PROGRESS NOTES
"Subjective:       Patient ID: Maximilian Tavera Jr. is a 73 y.o. male, Body mass index is 32.05 kg/m².    Chief Complaint: Abdominal Pain (epigastric) and Gastroesophageal Reflux      Patient is new to me. Established patient of Dr Hernandez.    Abdominal Pain   This is a new problem. The current episode started 1 to 4 weeks ago (Started ~ 7/10/19). The onset quality is sudden. The problem occurs daily (on and off every day). The problem has been unchanged. The pain is located in the epigastric region. The pain is at a severity of 6/10. The pain is moderate. Quality: describes as "a fist pressing in my stomach" The abdominal pain radiates to the periumbilical region and right flank. Pertinent negatives include no belching, constipation, diarrhea, dysuria, fever, hematochezia, melena, nausea, vomiting or weight loss. The pain is aggravated by eating. The pain is relieved by nothing. Treatments tried: Currently on Pantoprazole 40 mg once daily. The treatment provided no relief. His past medical history is significant for GERD.     Review of Systems   Constitutional: Negative for appetite change, fever, unexpected weight change and weight loss.   HENT: Positive for trouble swallowing (occasionally has trouble swallowing liquids and pills).    Respiratory: Positive for shortness of breath (chronic problem; recently diagnosed with COPD). Negative for cough.    Cardiovascular: Negative for chest pain.   Gastrointestinal: Positive for abdominal pain (Chief complaint). Negative for blood in stool, constipation, diarrhea, hematochezia, melena, nausea and vomiting.   Genitourinary: Negative for difficulty urinating and dysuria.   Skin: Negative for rash.   Neurological: Negative for speech difficulty.   Psychiatric/Behavioral: Negative for confusion.       Past Medical History:   Diagnosis Date    Arthritis     Back pain     Carpal tunnel syndrome 2/25/2013    Cataract     Cataract, left eye 11/10/2014    Chest pain, " musculoskeletal     COPD (chronic obstructive pulmonary disease) 11/052018    Emphysema lung 11/05/2018    Hyperlipidemia     Hypertension     Hypothyroidism     Knee fracture     Myasthenia gravis     Neuropathy 1/3/2013    Obesity 1/29/2015    Polyneuropathy     PVC (premature ventricular contraction)     Squamous cell carcinoma 2014    left forearm    Thyroid disease       Past Surgical History:   Procedure Laterality Date    APPENDECTOMY      C2-C6 Posterior Cervical Laminectomy & Instrumental Fusion N/A 11/7/2018    [facility] 2ND FLR    CATARACT EXTRACTION W/  INTRAOCULAR LENS IMPLANT Bilateral     COLONOSCOPY  03/01/2012    Dr Esteban; hyperplastic polyp; repeat in 5 years    COLONOSCOPY N/A 3/1/2012    [facility] at [facility]    CYSTOSCOPY N/A 2/26/2019    [facility] at [facility] OR    EGD (ESOPHAGOGASTRODUODENOSCOPY) N/A 9/12/2018    Performed by Gabriel Hernandez MD at [facility] END[facility]    EXTRACTION-CATARACT-IOL Left 12/9/2014    Performed by Adam Montague [facility] at [facility] OR    HEMORRHOID SURGERY      INJECTION-STEROID-EPIDURAL-TRANSFORAMINAL Right 5/17/2018    Performed by Devan Watt [facility] at [facility] OR    INJECTION-STEROID-EPIDURAL-TRANSFORAMINAL Right 9/1/2017    Performed by [facility] Watt [facility] at [facility] OR    INJECTION-STEROID-EPIDURAL-TRANSFORAMINAL Right 2/21/2017    Per[facility]att [facility] at [facility] OR    INJECTION-STEROID-EPIDURAL-TRANSFORAMINAL Right 10/9/2014    Per[facility] by [facility] at [facility] OR    KNEE ARTHROPLASTY Bilateral     NECK SURGERY      TONSILLECTOMY      ULTRASOUND, RECTAL APPROACH N/A 2/26/2019    Performed by [facility]ung [facility] at [facility] OR    UPPER GASTROINTESTINAL ENDOSCOPY  09/12/2018    Dr Hernandez; gastritis; extensive intestinal metaplasia; repeat in 1-2- years      Family History   Problem Relation Age of Onset    Cataracts Mother     Heart disease Mother         CHF     Hypertension Mother     Hyperlipidemia Mother     Cataracts Father     Glaucoma Father     Heart disease Father     Hyperlipidemia Sister     Hypertension Sister     No Known Problems Daughter     No Known Problems Daughter     Collagen disease Neg Hx     Amblyopia Neg Hx     Blindness Neg Hx     Macular degeneration Neg Hx     Retinal detachment Neg Hx     Strabismus Neg Hx     Cancer Neg Hx       Wt Readings from Last 10 Encounters:   07/25/19 110.2 kg (242 lb 15.2 oz)   07/25/19 107.5 kg (237 lb)   07/23/19 107.5 kg (237 lb)   07/11/19 108 kg (238 lb)   06/13/19 108 kg (238 lb)   06/11/19 108 kg (238 lb)   05/15/19 106 kg (233 lb 11 oz)   05/13/19 105.7 kg (233 lb)   04/30/19 106 kg (233 lb 11 oz)   04/11/19 105.4 kg (232 lb 5.8 oz)     Lab Results   Component Value Date    WBC 6.16 05/02/2019    HGB 13.9 (L) 05/02/2019    HCT 44.1 05/02/2019    MCV 92 05/02/2019     (L) 05/02/2019     CMP  Sodium   Date Value Ref Range Status   05/17/2019 139 136 - 145 mmol/L Final     Potassium   Date Value Ref Range Status   05/17/2019 4.3 3.5 - 5.1 mmol/L Final     Chloride   Date Value Ref Range Status   05/17/2019 105 95 - 110 mmol/L Final     CO2   Date Value Ref Range Status   05/17/2019 27 23 - 29 mmol/L Final     Glucose   Date Value Ref Range Status   05/17/2019 91 70 - 110 mg/dL Final     BUN, Bld   Date Value Ref Range Status   05/17/2019 29 (H) 8 - 23 mg/dL Final     Creatinine   Date Value Ref Range Status   05/17/2019 1.3 0.5 - 1.4 mg/dL Final     Calcium   Date Value Ref Range Status   05/17/2019 9.5 8.7 - 10.5 mg/dL Final     Total Protein   Date Value Ref Range Status   05/02/2019 6.6 6.0 - 8.4 g/dL Final     Albumin   Date Value Ref Range Status   05/02/2019 3.7 3.5 - 5.2 g/dL Final     Total Bilirubin   Date Value Ref Range Status   05/02/2019 1.6 (H) 0.1 - 1.0 mg/dL Final     Comment:     For infants and newborns, interpretation of results should be based  on gestational age, weight and  in agreement with clinical  observations.  Premature Infant recommended reference ranges:  Up to 24 hours.............<8.0 mg/dL  Up to 48 hours............<12.0 mg/dL  3-5 days..................<15.0 mg/dL  6-29 days.................<15.0 mg/dL       Alkaline Phosphatase   Date Value Ref Range Status   05/02/2019 81 55 - 135 U/L Final     AST   Date Value Ref Range Status   05/02/2019 31 10 - 40 U/L Final     ALT   Date Value Ref Range Status   05/02/2019 25 10 - 44 U/L Final     Anion Gap   Date Value Ref Range Status   05/17/2019 7 (L) 8 - 16 mmol/L Final     eGFR if    Date Value Ref Range Status   05/17/2019 >60.0 >60 mL/min/1.73 m^2 Final     eGFR if non    Date Value Ref Range Status   05/17/2019 53.4 (A) >60 mL/min/1.73 m^2 Final     Comment:     Calculation used to obtain the estimated glomerular filtration  rate (eGFR) is the CKD-EPI equation.        Lab Results   Component Value Date    AMYLASE 44 10/29/2011     Lab Results   Component Value Date    LIPASE 27 10/29/2011               Reviewed prior medical records including radiology report of CT urogram abdomen and pelvis 2/11/19 & endoscopy history (see surgical history).     Objective:      Physical Exam   Constitutional: He is oriented to person, place, and time. He appears well-developed and well-nourished.   HENT:   Head: Normocephalic.   Eyes: Pupils are equal, round, and reactive to light.   Neck: Normal range of motion.   Cardiovascular: Normal rate, regular rhythm and normal heart sounds.   No murmur heard.  Pulmonary/Chest: Breath sounds normal. No respiratory distress. He has no wheezes.   Abdominal: Soft. Bowel sounds are normal. He exhibits no distension. There is tenderness in the epigastric area and periumbilical area.   Musculoskeletal: Normal range of motion.   Neurological: He is alert and oriented to person, place, and time.   Skin: Skin is warm and dry. No rash noted.   Non jaundiced   Psychiatric: He  has a normal mood and affect. His speech is normal.         Assessment:       1. Epigastric pain    2. GERD without esophagitis    3. Pharyngoesophageal dysphagia    4. History of anemia    5. History of colon polyps           Plan:   All diagnoses and orders for this visit:    Epigastric pain  -     Lipase; Future; Expected date: 07/25/2019  -      Abdomen Complete; Future; Expected date: 07/25/2019  -  Schedule EGD, discussed procedure with patient, including risks and benefits, patient verbalized understanding    GERD without esophagitis   - Continue Pantoprazole 40 mg once daily as directed    Pharyngoesophageal dysphagia   - Schedule EGD, discussed procedure with patient and possible esophageal dilation may be performed during procedure if indicated, patient verbalized understanding   - Educated patient to eat smaller more frequent meals and to eat slowly and advised to eat a soft diet.   - Possible UGI/esophagram/esophageal manometry if symptoms persist    History of anemia   - Follow-up with PCP and/or hematology for continued evaluation and management    History of colon polyps   - Due for colonoscopy; patient will schedule after EGD completed    If no improvement in symptoms or symptoms worsen, call/follow-up at clinic or go to ER

## 2019-07-25 NOTE — PATIENT INSTRUCTIONS
Abdominal Pain    Abdominal pain is pain in the stomach or belly area. Everyone has this pain from time to time. In many cases it goes away on its own. But abdominal pain can sometimes be due to a serious problem, such as appendicitis. So its important to know when to seek help.  Causes of abdominal pain  There are many possible causes of abdominal pain. Common causes in adults include:  · Constipation, diarrhea, or gas  · Stomach acid flowing back up into the esophagus (acid reflux or heartburn)  · Severe acid reflux, called GERD (gastroesophageal reflux disease)  · A sore in the lining of the stomach or small intestine (peptic ulcer)  · Inflammation of the gallbladder, liver, or pancreas  · Gallstones or kidney stones  · Appendicitis   · Intestinal blockage   · An internal organ pushing through a muscle or other tissue (hernia)  · Urinary tract infections  · In women, menstrual cramps, fibroids, or endometriosis  · Inflammation or infection of the intestines  Diagnosing the cause of abdominal pain  Your healthcare provider will do a physical exam help find the cause of your pain. If needed, tests will be ordered. Belly pain has many possible causes. So it can be hard to find the reason for your pain. Giving details about your pain can help. Tell your provider where and when you feel the pain, and what makes it better or worse. Also let your provider know if you have other symptoms such as:  · Fever  · Tiredness  · Upset stomach (nausea)  · Vomiting  · Changes in bathroom habits  Treating abdominal pain  Some causes of pain need emergency medical treatment right away. These include appendicitis or a bowel blockage. Other problems can be treated with rest, fluids, or medicines. Your healthcare provider can give you specific instructions for treatment or self-care based on what is causing your pain.  If you have vomiting or diarrhea, sip water or other clear fluids. When you are ready to eat solid foods again,  start with small amounts of easy-to-digest, low-fat foods. These include apple sauce, toast, or crackers.   When to seek medical care  Call 911 or go to the hospital right away if you:  · Cant pass stool and are vomiting  · Are vomiting blood or have bloody diarrhea or black, tarry diarrhea  · Have chest, neck, or shoulder pain  · Feel like you might pass out  · Have pain in your shoulder blades with nausea  · Have sudden, severe belly pain  · Have new, severe pain unlike any you have felt before  · Have a belly that is rigid, hard, and tender to touch  Call your healthcare provider if you have:  · Pain for more than 5 days  · Bloating for more than 2 days  · Diarrhea for more than 5 days  · A fever of 100.4°F (38.0°C) or higher, or as directed by your provider  · Pain that gets worse  · Weight loss for no reason  · Continued lack of appetite  · Blood in your stool  How to prevent abdominal pain  Here are some tips to help prevent abdominal pain:  · Eat smaller amounts of food at one time.  · Avoid greasy, fried, or other high-fat foods.  · Avoid foods that give you gas.  · Exercise regularly.  · Drink plenty of fluids.  To help prevent GERD symptoms:  · Quit smoking.  · Reduce alcohol and certain foods that increase stomach acid.  · Avoid aspirin and over-the-counter pain and fever medicines (NSAIDS or nonsteroidal anti-inflammatory drugs), if possible  · Lose extra weight.  · Finish eating at least 2 hours before you go to bed or lie down.  · Raise the head of your bed.  Date Last Reviewed: 7/1/2016  © 2175-5113 [employer]. [address] [place] [address], [place] [address]. All rights reserved. This information is not intended as a substitute for professional medical care. Always follow your healthcare professional's instructions.

## 2019-07-25 NOTE — PROGRESS NOTES
Past Medical History:   Diagnosis Date    Arthritis     Back pain     Carpal tunnel syndrome 2/25/2013    Cataract     Cataract, left eye 11/10/2014    Chest pain, musculoskeletal     COPD (chronic obstructive pulmonary disease) 11/052018    Emphysema lung 11/05/2018    Hyperlipidemia     Hypertension     Hypothyroidism     Knee fracture     Myasthenia gravis     Neuropathy 1/3/2013    Obesity 1/29/2015    Polyneuropathy     PVC (premature ventricular contraction)     Squamous cell carcinoma 2014    left forearm    Thyroid disease        Past Surgical History:   Procedure Laterality Date    APPENDECTOMY      C2-C6 Posterior Cervical Laminectomy & Instrumental Fusion N/A 11/7/2018    Performed by Kishore Turner [facility] at [facility] OR 2ND FLR    CATARACT EXTRACTION W/  INTRAOCULAR LENS IMPLANT Bilateral     COLONOSCOPY N/A 3/1/2012    [facility]eban [facility] at [facility]    CYSTOSCOPY N/A 2/26/2019    [facility]    EGD (ESOPHAGOGASTRODUODENOSCOPY) N/A 9/12/2018    Performed by Gabriel Hernandez MD at [facility] END[facility]    EXTRACTION-CATARACT-IOL Left 12/9/2014    [facility]    HEMORRHOID SURGERY      INJECTION-STEROID-EPIDURAL-TRANSFORAMINAL Right 5/17/2018    [facility]    INJECTION-STEROID-EPIDURAL-TRANSFORAMINAL Right 9/1/2017    [facility]    INJECTION-STEROID-EPIDURAL-TRANSFORAMINAL Right 2/21/2017    [facility]    INJECTION-STEROID-EPIDURAL-TRANSFORAMINAL Right 10/9/2014    [facility]    KNEE ARTHROPLASTY Bilateral     NECK SURGERY      TONSILLECTOMY      ULTRASOUND, RECTAL APPROACH N/A 2/26/2019    [facility]       Current Outpatient Medications   Medication Sig    atorvastatin (LIPITOR) 80 MG tablet TAKE 1 TABLET EVERY DAY    carvedilol (COREG) 25 MG tablet Take 1  tablet (25 mg total) by mouth 2 (two) times daily with meals.    cetirizine (ZYRTEC) 10 MG tablet Take 1 tablet by mouth daily as needed.    chlorthalidone (HYGROTEN) 25 MG Tab TAKE 1 TABLET EVERY DAY    cholecalciferol, vitamin D3, (VITAMIN D) 2,000 unit Cap 1 capsule once daily. Every day    coenzyme Q10 (CO Q-10) 100 mg capsule Take 400 mg by mouth once daily.     cyanocobalamin, vitamin B-12, (VITAMIN B-12) 5,000 mcg Subl Take 5,000 mcg by mouth once daily. Every day    ezetimibe (ZETIA) 10 mg tablet Take 1 tablet (10 mg total) by mouth once daily.    fluticasone (FLONASE) 50 mcg/actuation nasal spray USE 1 SPRAY IN EACH NOSTRIL TWICE DAILY AS NEEDED  FOR  RHINITIS    gabapentin (NEURONTIN) 300 MG capsule Take 1 capsule (300 mg total) by mouth 3 (three) times daily.    levothyroxine (SYNTHROID) 50 MCG tablet TAKE 1 TABLET EVERY DAY    methylsulfonylmethane (MSM) 1,000 mg Tab Take 1,000 mg by mouth once daily. Every day    milk thistle 200 mg Cap Take 200 mg by mouth 2 (two) times daily. Twice a day    naproxen (NAPROSYN) 500 MG tablet TAKE 1 TABLET(500 MG) BY MOUTH TWICE DAILY WITH MEALS    olmesartan (BENICAR) 20 MG tablet Take 1 tablet (20 mg total) by mouth once daily.    omega-3 fatty acids 1,000 mg Cap Twice a day    potassium chloride SA (K-DUR,KLOR-CON) 20 MEQ tablet Take 20 mEq by mouth 2 (two) times daily. Take 4 tablets daily for 5 days, then 3 daily for 2 weeks, then 2 daily thereafter.    predniSONE (DELTASONE) 1 MG tablet Take 2 tablets (2 mg total) by mouth once daily.    predniSONE (DELTASONE) 5 MG tablet Take 1 tablet (5 mg total) by mouth once daily.    sildenafil (REVATIO) 20 mg Tab Take 1 to 3 tabs daily as needed.    tiZANidine (ZANAFLEX) 4 MG tablet TAKE 1 TABLET(4 MG) BY MOUTH EVERY 6 HOURS AS NEEDED    VENTOLIN HFA 90 mcg/actuation inhaler Inhale 2 puffs into the lungs every 6 (six) hours as needed for Wheezing. Rescue     No current facility-administered medications  for this visit.        Review of patient's allergies indicates:   Allergen Reactions    No known drug allergies        Family History   Problem Relation Age of Onset    Cataracts Mother     Heart disease Mother         CHF    Hypertension Mother     Hyperlipidemia Mother     Cataracts Father     Glaucoma Father     Heart disease Father     Hyperlipidemia Sister     Hypertension Sister     No Known Problems Daughter     No Known Problems Daughter     Collagen disease Neg Hx     Amblyopia Neg Hx     Blindness Neg Hx     Macular degeneration Neg Hx     Retinal detachment Neg Hx     Strabismus Neg Hx     Cancer Neg Hx        Social History     Socioeconomic History    Marital status:      Spouse name: Not on file    Number of children: Not on file    Years of education: Not on file    Highest education level: Not on file   Occupational History    Not on file   Social Needs    Financial resource strain: Not hard at all    Food insecurity:     Worry: Never true     Inability: Never true    Transportation needs:     Medical: No     Non-medical: No   Tobacco Use    Smoking status: Never Smoker    Smokeless tobacco: Never Used    Tobacco comment: when a child   Substance and Sexual Activity    Alcohol use: Yes     Frequency: Never     Binge frequency: Never     Comment: rarely    Drug use: No    Sexual activity: Yes     Partners: Female   Lifestyle    Physical activity:     Days per week: 0 days     Minutes per session: 0 min    Stress: Not at all   Relationships    Social connections:     Talks on phone: More than three times a week     Gets together: More than three times a week     Attends [employer] service: Not on file     Active member of club or organization: Yes     Attends meetings of clubs or organizations: More than 4 times per year     Relationship status:    Other Topics Concern    Not on file   Social History Narrative    Not on file       Chief Complaint:    Chief Complaint   Patient presents with    Knee Pain     right knee-Euflexxa 3/3        History of present illness:  This is a 73-year-old male seen for recurrent right knee pain. I last saw back in 2017.  We did a Euflexxa series on him and it worked great.  Pain started again a couple months ago.  Pain is back up to 5/10.  No new injury or trauma.    Answers for HPI/ROS submitted by the patient on 7/10/2019   Leg pain  unexpected weight change: No  appetite change : No  sleep disturbance: No  IMMUNOCOMPROMISED: No  nervous/ anxious: No  dysphoric mood: No  rash: No  visual disturbance: No  eye redness: No  eye pain: No  ear pain: No  tinnitus: Yes  hearing loss: No  sinus pressure : Yes  nosebleeds: Yes  enviro allergies: No  food allergies: No  cough: No  shortness of breath: Yes  sweating: No  frequency: Yes  difficulty urinating: Yes  hematuria: No  chest pain: No  palpitations: Yes  nausea: No  vomiting: No  diarrhea: No  blood in stool: No  constipation: No  headaches: Yes  dizziness: No  numbness: No  seizures: No  joint swelling: No  myalgia: Yes  weakness: Yes  back pain: Yes  Pain Chronicity: recurrent  History of trauma: No  Onset: more than 1 year ago  Frequency: intermittently  Progression since onset: waxing and waning  Injury mechanism: twisting  injury location: at home  pain- numeric: 6/10  pain location: right knee  pain quality: aching, burning  Radiating Pain: No  Aggravating factors: bending, bearing weight  fever: No  inability to bear weight: Yes  itching: No  joint locking: No  limited range of motion: Yes  stiffness: Yes  tingling: No  Treatments tried: heat, injection treatment, NSAIDs  physical therapy: not tried  Improvement on treatment: no relief        Physical Examination:    Vital Signs:    There were no vitals filed for this visit.    Body mass index is 31.27 kg/m².    This a well-developed, well nourished patient in no acute distress.  They are alert and oriented and  cooperative to examination.  Pt. walks without an antalgic gait.      Examination of the right knee shows no rashes or erythema. There are no masses ecchymosis or effusion. Patient has full range of motion from 0-130°. Patient is moderately tender to palpation over lateral joint line and nontender to palpation over the medial joint line. Patient has a - Lachman exam, - anterior drawer exam, and - posterior drawer exam. - Fabián's exam. Knee is stable to varus and valgus stress. 5 out of 5 motor strength. Palpable distal pulses. Intact light touch sensation. Negative Patellofemoral crepitus    X-rays:  X-rays of the right knee is ordered and reviewed which show severe lateral joint space narrowing on the right and more moderate to severe medial joint space wear on the left.    MRI of the left shoulder from outside facility:  No evidence for high-grade or full-thickness rotator cuff tear.  Moderate AC arthritis.  Nonspecific marrow edema in the humeral head.     Assessment::  Right knee arthritis    Plan: I injected his right knee with Euflexxa 3 of 3.  Follow up as needed.    This note was created using [employer] voice recognition software that occasionally misinterpreted phrases or words.    Consult note is delivered via Epic messaging service.

## 2019-07-25 NOTE — PROCEDURES
Large Joint Aspiration/Injection: R knee  Date/Time: 7/25/2019 11:31 AM  Performed by: Haroldo Campbell MD  Authorized by: Haroldo Campbell MD     Consent Done?:  Yes (Verbal)  Indications:  Pain  Procedure site marked: Yes    Timeout: Prior to procedure the correct patient, procedure, and site was verified      Location:  Knee  Site:  R knee  Prep: Patient was prepped and draped in usual sterile fashion    Needle size:  20 G  Approach:  Anterolateral  Medications:  20 mg sodium hyaluronate (EUFLEXXA) 10 mg/mL(mw 2.4 -3.6 million)  Patient tolerance:  Patient tolerated the procedure well with no immediate complications

## 2019-07-26 ENCOUNTER — TELEPHONE (OUTPATIENT)
Dept: GASTROENTEROLOGY | Facility: CLINIC | Age: 74
End: 2019-07-26

## 2019-07-26 NOTE — TELEPHONE ENCOUNTER
----- Message from Madelyn Hawkins sent at 7/26/2019  2:39 PM CDT -----  Type: Needs Medical Advice    Who Called:  Patient     Best Call Back Number:   [phone]   Additional Information: pt stated he saw the NP and Aravind told him to call and schedule the colonoscopy and blood work

## 2019-07-29 ENCOUNTER — PATIENT MESSAGE (OUTPATIENT)
Dept: ELECTROPHYSIOLOGY | Facility: CLINIC | Age: 74
End: 2019-07-29

## 2019-07-29 DIAGNOSIS — I49.3 PVC'S (PREMATURE VENTRICULAR CONTRACTIONS): ICD-10-CM

## 2019-07-29 DIAGNOSIS — I10 HTN (HYPERTENSION), BENIGN: ICD-10-CM

## 2019-07-29 RX ORDER — CARVEDILOL 25 MG/1
25 TABLET ORAL 2 TIMES DAILY WITH MEALS
Qty: 180 TABLET | Refills: 3 | Status: SHIPPED | OUTPATIENT
Start: 2019-07-29 | End: 2020-03-27

## 2019-07-31 ENCOUNTER — PATIENT MESSAGE (OUTPATIENT)
Dept: GASTROENTEROLOGY | Facility: CLINIC | Age: 74
End: 2019-07-31

## 2019-07-31 ENCOUNTER — TELEPHONE (OUTPATIENT)
Dept: GASTROENTEROLOGY | Facility: CLINIC | Age: 74
End: 2019-07-31

## 2019-07-31 NOTE — TELEPHONE ENCOUNTER
----- Message from Kamla Roy sent at 7/31/2019  1:07 PM CDT -----  Contact: pt  Pt calling states he would like for Nurse Aravind to give him a call concerning an appointment..[phone] (home)

## 2019-08-02 ENCOUNTER — HOSPITAL ENCOUNTER (OUTPATIENT)
Dept: RADIOLOGY | Facility: CLINIC | Age: 74
Discharge: HOME | End: 2019-08-02
Attending: NURSE PRACTITIONER
Payer: MEDICARE

## 2019-08-02 ENCOUNTER — OFFICE VISIT (OUTPATIENT)
Dept: FAMILY MEDICINE | Facility: CLINIC | Age: 74
End: 2019-08-02
Payer: MEDICARE

## 2019-08-02 ENCOUNTER — TELEPHONE (OUTPATIENT)
Dept: GASTROENTEROLOGY | Facility: CLINIC | Age: 74
End: 2019-08-02

## 2019-08-02 VITALS
TEMPERATURE: 98 F | DIASTOLIC BLOOD PRESSURE: 82 MMHG | HEIGHT: 73 IN | WEIGHT: 242.5 LBS | BODY MASS INDEX: 32.14 KG/M2 | SYSTOLIC BLOOD PRESSURE: 128 MMHG | OXYGEN SATURATION: 95 % | HEART RATE: 61 BPM

## 2019-08-02 DIAGNOSIS — N40.1 BPH WITH OBSTRUCTION/LOWER URINARY TRACT SYMPTOMS: ICD-10-CM

## 2019-08-02 DIAGNOSIS — G70.00 MYASTHENIA GRAVIS, ACHR ANTIBODY POSITIVE: ICD-10-CM

## 2019-08-02 DIAGNOSIS — R10.13 EPIGASTRIC PAIN: ICD-10-CM

## 2019-08-02 DIAGNOSIS — M48.02 CERVICAL STENOSIS OF SPINAL CANAL: ICD-10-CM

## 2019-08-02 DIAGNOSIS — I10 HTN (HYPERTENSION), BENIGN: Primary | ICD-10-CM

## 2019-08-02 DIAGNOSIS — E78.2 MIXED HYPERLIPIDEMIA: ICD-10-CM

## 2019-08-02 DIAGNOSIS — N13.8 BPH WITH OBSTRUCTION/LOWER URINARY TRACT SYMPTOMS: ICD-10-CM

## 2019-08-02 DIAGNOSIS — I49.3 PVC'S (PREMATURE VENTRICULAR CONTRACTIONS): ICD-10-CM

## 2019-08-02 DIAGNOSIS — E03.8 OTHER SPECIFIED HYPOTHYROIDISM: ICD-10-CM

## 2019-08-02 PROCEDURE — 76700 US EXAM ABDOM COMPLETE: CPT | Mod: 26,HCNC,, | Performed by: RADIOLOGY

## 2019-08-02 PROCEDURE — 3079F PR MOST RECENT DIASTOLIC BLOOD PRESSURE 80-89 MM HG: ICD-10-PCS | Mod: HCNC,CPTII,S$GLB, | Performed by: PHYSICIAN ASSISTANT

## 2019-08-02 PROCEDURE — 76700 US ABDOMEN COMPLETE: ICD-10-PCS | Mod: 26,HCNC,, | Performed by: RADIOLOGY

## 2019-08-02 PROCEDURE — 76700 US EXAM ABDOM COMPLETE: CPT | Mod: TC,HCNC,PO

## 2019-08-02 PROCEDURE — 99999 PR PBB SHADOW E&M-EST. PATIENT-LVL V: ICD-10-PCS | Mod: PBBFAC,HCNC,, | Performed by: PHYSICIAN ASSISTANT

## 2019-08-02 PROCEDURE — 99999 PR PBB SHADOW E&M-EST. PATIENT-LVL V: CPT | Mod: PBBFAC,HCNC,, | Performed by: PHYSICIAN ASSISTANT

## 2019-08-02 PROCEDURE — 3074F SYST BP LT 130 MM HG: CPT | Mod: HCNC,CPTII,S$GLB, | Performed by: PHYSICIAN ASSISTANT

## 2019-08-02 PROCEDURE — 3074F PR MOST RECENT SYSTOLIC BLOOD PRESSURE < 130 MM HG: ICD-10-PCS | Mod: HCNC,CPTII,S$GLB, | Performed by: PHYSICIAN ASSISTANT

## 2019-08-02 PROCEDURE — 1101F PR PT FALLS ASSESS DOC 0-1 FALLS W/OUT INJ PAST YR: ICD-10-PCS | Mod: HCNC,CPTII,S$GLB, | Performed by: PHYSICIAN ASSISTANT

## 2019-08-02 PROCEDURE — 1101F PT FALLS ASSESS-DOCD LE1/YR: CPT | Mod: HCNC,CPTII,S$GLB, | Performed by: PHYSICIAN ASSISTANT

## 2019-08-02 PROCEDURE — 99214 OFFICE O/P EST MOD 30 MIN: CPT | Mod: HCNC,S$GLB,, | Performed by: PHYSICIAN ASSISTANT

## 2019-08-02 PROCEDURE — 3079F DIAST BP 80-89 MM HG: CPT | Mod: HCNC,CPTII,S$GLB, | Performed by: PHYSICIAN ASSISTANT

## 2019-08-02 PROCEDURE — 99214 PR OFFICE/OUTPT VISIT, EST, LEVL IV, 30-39 MIN: ICD-10-PCS | Mod: HCNC,S$GLB,, | Performed by: PHYSICIAN ASSISTANT

## 2019-08-02 NOTE — TELEPHONE ENCOUNTER
Spoke with pt. Informed of of results & recommendations per TYRONE Hanson NP. Pt verbalized understanding.     Pt would like a call back from Aravind to set up EGD.   Please call back to schedule. [phone]

## 2019-08-02 NOTE — TELEPHONE ENCOUNTER
Please call to inform & review the results with the patient- radiology report of the US abdomen showed normal findings.   Continue with previous recommendations. If no improvement in symptoms or symptoms worsen, call/follow-up at clinic or go to ER.  Please release results to patient's mychart once you have discussed results and recommendations with patient.  Thanks,

## 2019-08-02 NOTE — PROGRESS NOTES
Subjective:       Patient ID: Maximilian Tavera Jr. is a 73 y.o. male.    Chief Complaint: Follow-up    Mr. Tavera comes to clinic today for routine follow up. The patient has seen several specialists recently. The patient did see Dr. Lewis, orthopedist. The patient received an injection in his knee. He reports this helps with his pain. The patient is followed by Dr. Zamudio, cardiologist/ arrhythmia specialists. He was recently started back on coreg. He is tolerating this well. The patient's neurologist is Dr. Hoang. His MG is stable at this time. The patient is concerned that his recent PSA increased; he is to have this rechecked. Orders are in the system of PSA free and total. The patient is currently having some epigastric abdominal pain; this is being evaluated by GI. He had an ultrasound completed today.     Review of Systems   Constitutional: Negative for activity change, appetite change and fever.   HENT: Negative for postnasal drip, rhinorrhea and sinus pressure.    Eyes: Negative for visual disturbance.   Respiratory: Negative for cough and shortness of breath.    Cardiovascular: Negative for chest pain.   Gastrointestinal: Positive for abdominal pain. Negative for abdominal distention.   Genitourinary: Negative for difficulty urinating and dysuria.   Musculoskeletal: Positive for arthralgias, back pain and myalgias.   Neurological: Negative for headaches.   Hematological: Negative for adenopathy.   Psychiatric/Behavioral: The patient is not nervous/anxious.        Objective:      Physical Exam   Constitutional: He is oriented to person, place, and time.   HENT:   Mouth/Throat: Oropharynx is clear and moist. No oropharyngeal exudate.   Eyes: Pupils are equal, round, and reactive to light. Conjunctivae are normal.   Cardiovascular: Normal rate and regular rhythm.   Pulmonary/Chest: Effort normal and breath sounds normal. He has no wheezes.   Abdominal: Soft. Bowel sounds are normal. There is no  tenderness.   Musculoskeletal: He exhibits no edema.   Decreased mobility of cervical spine.    Lymphadenopathy:     He has no cervical adenopathy.   Neurological: He is alert and oriented to person, place, and time.   Skin: No erythema.   Psychiatric: His behavior is normal.       Assessment:       1. HTN (hypertension), benign    2. BPH with obstruction/lower urinary tract symptoms    3. Myasthenia gravis, AChR antibody positive    4. PVC's (premature ventricular contractions)    5. Epigastric pain    6. Mixed hyperlipidemia    7. Other specified hypothyroidism    8. Cervical stenosis of spinal canal        Plan:     Maximilian was seen today for follow-up.    Diagnoses and all orders for this visit:    HTN (hypertension), benign  -     Cancel: Lipid panel; Future  -     Comprehensive metabolic panel; Future  Controlled  Low salt diet continue current medication  BPH with obstruction/lower urinary tract symptoms  PSA free and total today  Myasthenia gravis, AChR antibody positive  Stable  Continue follow up with Dr. Hoang  PVC's (premature ventricular contractions)  Controlled with current medication  Continue follow up with Dr. Zamudio  Epigastric pain  Currently being evaluated by GI  Continue follow up with GI  Mixed hyperlipidemia  -     Cancel: Lipid panel; Future  -     Comprehensive metabolic panel; Future  High fiber diet  Continue current medication  Other specified hypothyroidism  -     Cancel: TSH; Future  Labs stable  Continue current medication  Cervical stenosis of spinal canal  Continue follow up with neurosurgery/ pain management.     Follow up as scheduled with Dr. Marroquin later this month.

## 2019-08-05 ENCOUNTER — PATIENT MESSAGE (OUTPATIENT)
Dept: CARDIOLOGY | Facility: CLINIC | Age: 74
End: 2019-08-05

## 2019-08-06 ENCOUNTER — PATIENT MESSAGE (OUTPATIENT)
Dept: GASTROENTEROLOGY | Facility: CLINIC | Age: 74
End: 2019-08-06

## 2019-08-07 ENCOUNTER — PATIENT MESSAGE (OUTPATIENT)
Dept: GASTROENTEROLOGY | Facility: CLINIC | Age: 74
End: 2019-08-07

## 2019-08-07 DIAGNOSIS — R10.13 EPIGASTRIC PAIN: Primary | ICD-10-CM

## 2019-08-07 DIAGNOSIS — K21.9 GERD WITHOUT ESOPHAGITIS: ICD-10-CM

## 2019-08-19 ENCOUNTER — ANESTHESIA (OUTPATIENT)
Dept: ENDOSCOPY | Facility: HOSPITAL | Age: 74
End: 2019-08-19
Payer: MEDICARE

## 2019-08-19 ENCOUNTER — HOSPITAL ENCOUNTER (OUTPATIENT)
Facility: HOSPITAL | Age: 74
Discharge: HOME | End: 2019-08-19
Attending: INTERNAL MEDICINE | Admitting: INTERNAL MEDICINE
Payer: MEDICARE

## 2019-08-19 ENCOUNTER — ANESTHESIA EVENT (OUTPATIENT)
Dept: ENDOSCOPY | Facility: HOSPITAL | Age: 74
End: 2019-08-19
Payer: MEDICARE

## 2019-08-19 DIAGNOSIS — K44.9 HIATAL HERNIA: ICD-10-CM

## 2019-08-19 DIAGNOSIS — R10.13 EPIGASTRIC PAIN: ICD-10-CM

## 2019-08-19 DIAGNOSIS — K25.9 GASTRIC ULCER, UNSPECIFIED CHRONICITY, UNSPECIFIED WHETHER GASTRIC ULCER HEMORRHAGE OR PERFORATION PRESENT: Primary | ICD-10-CM

## 2019-08-19 DIAGNOSIS — K29.70 GASTRITIS, PRESENCE OF BLEEDING UNSPECIFIED, UNSPECIFIED CHRONICITY, UNSPECIFIED GASTRITIS TYPE: ICD-10-CM

## 2019-08-19 PROCEDURE — 88342 TISSUE SPECIMEN TO PATHOLOGY - SURGERY: ICD-10-PCS | Mod: 26,HCNC,, | Performed by: PATHOLOGY

## 2019-08-19 PROCEDURE — 43239 EGD BIOPSY SINGLE/MULTIPLE: CPT | Mod: HCNC,,, | Performed by: INTERNAL MEDICINE

## 2019-08-19 PROCEDURE — D9220A PRA ANESTHESIA: Mod: HCNC,ANES,, | Performed by: ANESTHESIOLOGY

## 2019-08-19 PROCEDURE — 27201012 HC FORCEPS, HOT/COLD, DISP: Mod: HCNC | Performed by: INTERNAL MEDICINE

## 2019-08-19 PROCEDURE — 43239 EGD BIOPSY SINGLE/MULTIPLE: CPT | Mod: HCNC | Performed by: INTERNAL MEDICINE

## 2019-08-19 PROCEDURE — 25000003 PHARM REV CODE 250: Mod: HCNC | Performed by: NURSE ANESTHETIST, CERTIFIED REGISTERED

## 2019-08-19 PROCEDURE — 88305 TISSUE EXAM BY PATHOLOGIST: CPT | Mod: HCNC | Performed by: PATHOLOGY

## 2019-08-19 PROCEDURE — 37000009 HC ANESTHESIA EA ADD 15 MINS: Mod: HCNC | Performed by: INTERNAL MEDICINE

## 2019-08-19 PROCEDURE — 43239 PR EGD, FLEX, W/BIOPSY, SGL/MULTI: ICD-10-PCS | Mod: HCNC,,, | Performed by: INTERNAL MEDICINE

## 2019-08-19 PROCEDURE — 37000008 HC ANESTHESIA 1ST 15 MINUTES: Mod: HCNC | Performed by: INTERNAL MEDICINE

## 2019-08-19 PROCEDURE — 63600175 PHARM REV CODE 636 W HCPCS: Mod: HCNC | Performed by: NURSE ANESTHETIST, CERTIFIED REGISTERED

## 2019-08-19 PROCEDURE — D9220A PRA ANESTHESIA: ICD-10-PCS | Mod: HCNC,ANES,, | Performed by: ANESTHESIOLOGY

## 2019-08-19 PROCEDURE — 88342 IMHCHEM/IMCYTCHM 1ST ANTB: CPT | Mod: 26,HCNC,, | Performed by: PATHOLOGY

## 2019-08-19 PROCEDURE — 63600175 PHARM REV CODE 636 W HCPCS: Mod: HCNC | Performed by: INTERNAL MEDICINE

## 2019-08-19 PROCEDURE — 88305 TISSUE SPECIMEN TO PATHOLOGY - SURGERY: ICD-10-PCS | Mod: 26,HCNC,, | Performed by: PATHOLOGY

## 2019-08-19 RX ORDER — SODIUM CHLORIDE 9 MG/ML
INJECTION, SOLUTION INTRAVENOUS CONTINUOUS
Status: DISCONTINUED | OUTPATIENT
Start: 2019-08-19 | End: 2019-08-19 | Stop reason: HOSPADM

## 2019-08-19 RX ORDER — LIDOCAINE HCL/PF 100 MG/5ML
SYRINGE (ML) INTRAVENOUS
Status: DISCONTINUED | OUTPATIENT
Start: 2019-08-19 | End: 2019-08-19

## 2019-08-19 RX ORDER — SUCRALFATE 1 G/10ML
1 SUSPENSION ORAL 4 TIMES DAILY
Qty: 400 ML | Refills: 0 | Status: SHIPPED | OUTPATIENT
Start: 2019-08-19 | End: 2019-09-03 | Stop reason: SDUPTHER

## 2019-08-19 RX ORDER — PANTOPRAZOLE SODIUM 40 MG/1
40 TABLET, DELAYED RELEASE ORAL 2 TIMES DAILY
Qty: 180 TABLET | Refills: 3 | Status: SHIPPED | OUTPATIENT
Start: 2019-08-19 | End: 2022-12-15 | Stop reason: SDUPTHER

## 2019-08-19 RX ORDER — ONDANSETRON 2 MG/ML
INJECTION INTRAMUSCULAR; INTRAVENOUS
Status: DISCONTINUED | OUTPATIENT
Start: 2019-08-19 | End: 2019-08-19

## 2019-08-19 RX ORDER — PROPOFOL 10 MG/ML
VIAL (ML) INTRAVENOUS
Status: DISCONTINUED | OUTPATIENT
Start: 2019-08-19 | End: 2019-08-19

## 2019-08-19 RX ORDER — GLYCOPYRROLATE 0.2 MG/ML
INJECTION INTRAMUSCULAR; INTRAVENOUS
Status: DISCONTINUED | OUTPATIENT
Start: 2019-08-19 | End: 2019-08-19

## 2019-08-19 RX ADMIN — PROPOFOL 20 MG: 10 INJECTION, EMULSION INTRAVENOUS at 10:08

## 2019-08-19 RX ADMIN — GLYCOPYRROLATE 0.2 MG: 0.2 INJECTION, SOLUTION INTRAMUSCULAR; INTRAVENOUS at 09:08

## 2019-08-19 RX ADMIN — LIDOCAINE HYDROCHLORIDE 100 MG: 20 INJECTION, SOLUTION INTRAVENOUS at 09:08

## 2019-08-19 RX ADMIN — ONDANSETRON 4 MG: 2 INJECTION, SOLUTION INTRAMUSCULAR; INTRAVENOUS at 09:08

## 2019-08-19 RX ADMIN — SODIUM CHLORIDE: 0.9 INJECTION, SOLUTION INTRAVENOUS at 09:08

## 2019-08-19 RX ADMIN — PROPOFOL 150 MG: 10 INJECTION, EMULSION INTRAVENOUS at 09:08

## 2019-08-19 NOTE — ANESTHESIA PREPROCEDURE EVALUATION
08/19/2019  Maximilian Tavera Jr. is a 73 y.o., male.    Pre-op Assessment    I have reviewed the Patient Summary Reports.     I have reviewed the Nursing Notes.   I have reviewed the Medications.     Review of Systems  Anesthesia Hx:  No problems with previous Anesthesia    Social:  Non-Smoker    Hematology/Oncology:         -- Cancer in past history (squamous):   Cardiovascular:   Hypertension, well controlled Dysrhythmias hyperlipidemia    Pulmonary:   COPD    Renal/:  Renal/ Normal     Musculoskeletal:   Arthritis     Neurological:   Neuromuscular Disease, Myasthenia Gravis  Peripheral Neuropathy    Endocrine:   Hypothyroidism        Physical Exam  General:  Well nourished    Airway/Jaw/Neck:  Airway Findings: Mouth Opening: Normal Tongue: Normal  General Airway Assessment: Adult  Oropharynx Findings:  Mallampati: II  Jaw/Neck Findings:  Neck ROM: Normal ROM     Eyes/Ears/Nose:  Eyes/Ears/Nose Findings:    Dental:  Dental Findings:   Chest/Lungs:  Chest/Lungs Findings: Normal Respiratory Rate     Heart/Vascular:  Heart Findings: Rate: Normal  Rhythm: Regular Rhythm        Mental Status:  Mental Status Findings:  Cooperative, Alert and Oriented         Anesthesia Plan  Type of Anesthesia, risks & benefits discussed:  Anesthesia Type:  general  Patient's Preference:   Intra-op Monitoring Plan: standard ASA monitors  Intra-op Monitoring Plan Comments:   Post Op Pain Control Plan: multimodal analgesia  Post Op Pain Control Plan Comments:   Induction:   IV  Beta Blocker:  Patient is on a Beta-Blocker and has received one dose within the past 24 hours (No further documentation required).       Informed Consent: Patient understands risks and agrees with Anesthesia plan.  Questions answered. Anesthesia consent signed with patient.  ASA Score: 3     Day of Surgery Review of History & Physical:  There are  no significant changes.   H&P completed by Anesthesiologist.       Ready For Surgery From Anesthesia Perspective.

## 2019-08-19 NOTE — TRANSFER OF CARE
"Anesthesia Transfer of Care Note    Patient: Maximilian Tavera Jr.    Procedure(s) Performed: Procedure(s) (LRB):  EGD (ESOPHAGOGASTRODUODENOSCOPY) (N/A)    Patient location: PACU    Anesthesia Type: general    Transport from OR: Transported from OR on room air with adequate spontaneous ventilation    Post pain: adequate analgesia    Post assessment: no apparent anesthetic complications and tolerated procedure well    Post vital signs: stable    Level of consciousness: sedated    Nausea/Vomiting: no nausea/vomiting    Complications: none    Transfer of care protocol was followed      Last vitals:   Visit Vitals  /79   Pulse 67   Temp 36.9 °C (98.4 °F)   Resp 15   Ht 6' 1" (1.854 m)   Wt 107.5 kg (237 lb)   SpO2 97%   BMI 31.27 kg/m²     "

## 2019-08-19 NOTE — ANESTHESIA POSTPROCEDURE EVALUATION
Anesthesia Post Evaluation    Patient: Maximilian Tavera     Procedure(s) Performed: Procedure(s) (LRB):  EGD (ESOPHAGOGASTRODUODENOSCOPY) (N/A)    Final Anesthesia Type: general  Patient location during evaluation: PACU  Patient participation: Yes- Able to Participate  Level of consciousness: awake and alert  Post-procedure vital signs: reviewed and stable  Pain management: adequate  Airway patency: patent  PONV status at discharge: No PONV  Anesthetic complications: no      Cardiovascular status: hemodynamically stable  Respiratory status: unassisted and room air  Hydration status: euvolemic  Follow-up not needed.          Vitals Value Taken Time   /70 8/19/2019 10:23 AM   Temp 36.7 °C (98 °F) 8/19/2019 10:45 AM   Pulse 72 8/19/2019 10:45 AM   Resp 20 8/19/2019 10:45 AM   SpO2 92 % 8/19/2019 10:23 AM         Event Time     Out of Recovery 10:50:00          Pain/Regina Score: Modified Regina Score: 20 (8/19/2019 10:45 AM)

## 2019-08-19 NOTE — H&P
CC: Epigastric pain    73 year old male with above. States that symptoms are stable, no alleviating/exacerbating factors. No family history of CA. No bleeding or weight loss.     ROS:  No headache, no fever/chills, no chest pain/SOB, no nausea/vomiting/diarrhea/constipation/GI bleeding/abdominal pain, no dysuria/hematuria.    VSSAF   Exam:   Alert and oriented x 3; no apparent distress   PERRLA, sclera anicteric  CV: Regular rate/rhythm, normal PMI   Lungs: Clear bilaterally with no wheeze/rales   Abdomen: Soft, NT/ND, normal bowel sounds   Ext: No cyanosis, clubbing     Impression:   As above    Plan:   Proceed with endoscopy. Further recs to follow.

## 2019-08-19 NOTE — DISCHARGE INSTRUCTIONS
Anesthesia information    Anesthesia Safety      You have been given medicine  to sedate you during your procedure today. This may have included both a pain medicine and sleeping medicine. Most of the effects have worn off; however, you may continue to have some drowsiness for the next  24 hours. Anesthesia and pain medicines can cause nausea, sleepiness, dizziness and  constipation.    HOME CARE:  1) For the next EIGHT HOURS, you should be watched by a responsible adult to look for any worsening of your condition.  2) DO NOT DRINK any ALCOHOL for the next 24 HOURS.  3) DO NOT DRIVE or operate dangerous machinery during the next 24 HOURS.  FOLLOW UP with your doctor or this facility if you are not alert and back to your usual level of activity within 24 hrs.  GET PROMPT MEDICAL ATTENTION if any of the following occur:  -- Increased drowsiness  -- Increased weakness or dizziness  -- Repeated vomiting  -- If you cannot be awakened      Upper GI Endoscopy     During endoscopy, a long, flexible tube is used to view the inside of your upper GI tract.      Upper GI endoscopy allows your healthcare provider to look directly into the beginning of your gastrointestinal (GI) tract. The esophagus, stomach, and duodenum (the first part of the small intestine) make up the upper GI tract.   Before the exam  Follow these and any other instructions you are given before your endoscopy. If you dont follow the healthcare providers instructions carefully, the test may need to be canceled or done over:  · Don't eat or drink anything after midnight the night before your exam. If your exam is in the afternoon, drink only clear liquids in the morning. Don't eat or drink anything for 8 hours before the exam. In some cases, you may be able to take medicines with sips of water until 2 hours before the procedure. Speak with your healthcare provider about this.   · Bring your X-rays and any other test results you have.  · Because you will be  sedated, arrange for an adult to drive you home after the exam.  · Tell your healthcare provider before the exam if you are taking any medicines or have any medical problems.  The procedure  Here is what to expect:  · You will lie on the endoscopy table. Usually patients lie on the left side.  · You will be monitored and given oxygen.  · Your throat may be numbed with a spray or gargle. You are given medicine through an intravenous (IV) line that will help you relax and remain comfortable. You may be awake or asleep during the procedure.  · The healthcare provider will put the endoscope in your mouth and down your esophagus. It is thinner than most pieces of food that you swallow. It will not affect your breathing. The medicine helps keep you from gagging.  · Air is put into your GI tract to expand it. It can make you burp.  · During the procedure, the healthcare provider can take biopsies (tissue samples), remove abnormalities, such as polyps, or treat abnormalities through a variety of devices placed through the endoscope. You will not feel this.   · The endoscope carries images of your upper GI tract to a video screen. If you are awake, you may be able to look at the images.  · After the procedure is done, you will rest for a time. An adult must drive you home.  When to call your healthcare provider  Contact your healthcare provider if you have:  · Black or tarry stools, or blood in your stool  · Fever  · Pain in your belly that does not go away  · Nausea and vomiting, or vomiting blood   Date Last Reviewed: 7/1/2016  © 3319-7657 [employer]. [address] [place] [address], [place] [address]. All rights reserved. This information is not intended as a substitute for professional medical care. Always follow your healthcare professional's instructions.

## 2019-08-19 NOTE — PROVATION PATIENT INSTRUCTIONS
Discharge Summary/Instructions after an Endoscopic Procedure  Patient Name: Maximilian Tavera  Patient MRN: [id number]  Patient YOB: 1945 Monday, August 19, 2019  Gabriel Adhikari MD  RESTRICTIONS:  During your procedure today, you received medications for sedation.  These   medications may affect your judgment, balance and coordination.  Therefore,   for 24 hours, you have the following restrictions:   - DO NOT drive a car, operate machinery, make legal/financial decisions,   sign important papers or drink alcohol.    ACTIVITY:  Today: no heavy lifting, straining or running due to procedural   sedation/anesthesia.  The following day: return to full activity including work.  DIET:  Eat and drink normally unless instructed otherwise.     TREATMENT FOR COMMON SIDE EFFECTS:  - Mild abdominal pain, nausea, belching, bloating or excessive gas:  rest,   eat lightly and use a heating pad.  - Sore Throat: treat with throat lozenges and/or gargle with warm salt   water.  - Because air was used during the procedure, expelling large amounts of air   from your rectum or belching is normal.  - If a bowel prep was taken, you may not have a bowel movement for 1-3 days.    This is normal.  SYMPTOMS TO WATCH FOR AND REPORT TO YOUR PHYSICIAN:  1. Abdominal pain or bloating, other than gas cramps.  2. Chest pain.  3. Back pain.  4. Signs of infection such as: chills or fever occurring within 24 hours   after the procedure.  5. Rectal bleeding, which would show as bright red, maroon, or black stools.   (A tablespoon of blood from the rectum is not serious, especially if   hemorrhoids are present.)  6. Vomiting.  7. Weakness or dizziness.  GO DIRECTLY TO THE NEAREST EMERGENCY ROOM IF YOU HAVE ANY OF THE FOLLOWING:      Difficulty breathing              Chills and/or fever over 101 F   Persistent vomiting and/or vomiting blood   Severe abdominal pain   Severe chest pain   Black, tarry stools   Bleeding- more than one  tablespoon   Any other symptom or condition that you feel may need urgent attention  Your doctor recommends these additional instructions:  If any biopsies were taken, your doctors clinic will contact you in 1 to 2   weeks with any results.  - Patient has a contact number available for emergencies.  The signs and   symptoms of potential delayed complications were discussed with the   patient.  Return to normal activities tomorrow.  Written discharge   instructions were provided to the patient.   - Resume previous diet.   - Continue present medications.   - No aspirin, ibuprofen, naproxen, or other non-steroidal anti-inflammatory   drugs.   - Await pathology results.   - Repeat upper endoscopy in 8 weeks to check healing.   - Discharge patient to home (ambulatory).   - Use Protonix (pantoprazole) 40 mg PO daily.   - Use sucralfate suspension 1 gram PO QID for 10 days.   - Return to my office in 4 weeks.  For questions, problems or results please call your physician - Gabriel Adhikari MD at Work:  [phone].  [facility], EMERGENCY ROOM PHONE NUMBER: [phone]  IF A COMPLICATION OR EMERGENCY SITUATION ARISES AND YOU ARE UNABLE TO REACH   YOUR PHYSICIAN - GO DIRECTLY TO THE EMERGENCY ROOM.  Gabriel Adhikari MD  8/19/2019 10:13:49 AM  This report has been verified and signed electronically.  [employer]

## 2019-08-19 NOTE — PLAN OF CARE
Stable, states ready to go home, koko po fluids, Dr Camargo talked to pt and wife, to car per [facility] by transporter to wife

## 2019-08-20 ENCOUNTER — PATIENT MESSAGE (OUTPATIENT)
Dept: GASTROENTEROLOGY | Facility: CLINIC | Age: 74
End: 2019-08-20

## 2019-08-20 ENCOUNTER — PATIENT OUTREACH (OUTPATIENT)
Dept: ADMINISTRATIVE | Facility: HOSPITAL | Age: 74
End: 2019-08-20

## 2019-08-20 ENCOUNTER — TELEPHONE (OUTPATIENT)
Dept: GASTROENTEROLOGY | Facility: CLINIC | Age: 74
End: 2019-08-20

## 2019-08-20 VITALS
HEART RATE: 72 BPM | HEIGHT: 73 IN | OXYGEN SATURATION: 92 % | TEMPERATURE: 98 F | SYSTOLIC BLOOD PRESSURE: 126 MMHG | BODY MASS INDEX: 31.41 KG/M2 | RESPIRATION RATE: 20 BRPM | WEIGHT: 237 LBS | DIASTOLIC BLOOD PRESSURE: 70 MMHG

## 2019-08-20 NOTE — TELEPHONE ENCOUNTER
Patient notified and understands needs the liquid carafate prior authorization sent to insurance awaiting response.

## 2019-08-20 NOTE — TELEPHONE ENCOUNTER
----- Message from Vibha Whitehead sent at 8/20/2019  1:26 PM CDT -----  Contact: pt [phone]  Patient called to let you know if you will call in some other medication besides the Carafate he states the medication is too expensive it cost $220.00

## 2019-08-20 NOTE — TELEPHONE ENCOUNTER
----- Message from Nancy Pedraza sent at 8/20/2019  1:31 PM CDT -----  Contact: pt  Type:  Needs Medical Advice    Who Called: Pt    Symptoms (please be specific  :How long has patient had these symptoms:    Pharmacy name and phone #:  Would the patient rather a call back or a response via [facility]?  Best Call Back Number: [phone]  Additional Information: Pt called about RX  received 8/19/2019 Pt want generic not brand name

## 2019-08-20 NOTE — TELEPHONE ENCOUNTER
Patient notified and understands no substitute for carafate I will send hes insurance company a prior authorization and may take up to 72 hours will notify as soon as I hear back from them

## 2019-08-21 ENCOUNTER — PATIENT MESSAGE (OUTPATIENT)
Dept: GASTROENTEROLOGY | Facility: CLINIC | Age: 74
End: 2019-08-21

## 2019-08-23 ENCOUNTER — PATIENT MESSAGE (OUTPATIENT)
Dept: GASTROENTEROLOGY | Facility: CLINIC | Age: 74
End: 2019-08-23

## 2019-08-30 ENCOUNTER — PATIENT MESSAGE (OUTPATIENT)
Dept: GASTROENTEROLOGY | Facility: CLINIC | Age: 74
End: 2019-08-30

## 2019-08-30 ENCOUNTER — OFFICE VISIT (OUTPATIENT)
Dept: FAMILY MEDICINE | Facility: CLINIC | Age: 74
End: 2019-08-30
Payer: MEDICARE

## 2019-08-30 VITALS
SYSTOLIC BLOOD PRESSURE: 110 MMHG | BODY MASS INDEX: 31.56 KG/M2 | HEART RATE: 72 BPM | HEIGHT: 73 IN | OXYGEN SATURATION: 96 % | DIASTOLIC BLOOD PRESSURE: 70 MMHG | TEMPERATURE: 98 F | WEIGHT: 238.13 LBS

## 2019-08-30 DIAGNOSIS — I10 HTN (HYPERTENSION), BENIGN: ICD-10-CM

## 2019-08-30 DIAGNOSIS — E78.2 MIXED HYPERLIPIDEMIA: ICD-10-CM

## 2019-08-30 DIAGNOSIS — K21.9 GASTROESOPHAGEAL REFLUX DISEASE, ESOPHAGITIS PRESENCE NOT SPECIFIED: ICD-10-CM

## 2019-08-30 DIAGNOSIS — R97.20 ABNORMAL PSA: Primary | ICD-10-CM

## 2019-08-30 DIAGNOSIS — Z12.5 ENCOUNTER FOR SCREENING FOR MALIGNANT NEOPLASM OF PROSTATE: ICD-10-CM

## 2019-08-30 PROCEDURE — 99214 PR OFFICE/OUTPT VISIT, EST, LEVL IV, 30-39 MIN: ICD-10-PCS | Mod: HCNC,S$GLB,, | Performed by: FAMILY MEDICINE

## 2019-08-30 PROCEDURE — 3074F PR MOST RECENT SYSTOLIC BLOOD PRESSURE < 130 MM HG: ICD-10-PCS | Mod: HCNC,CPTII,S$GLB, | Performed by: FAMILY MEDICINE

## 2019-08-30 PROCEDURE — 99214 OFFICE O/P EST MOD 30 MIN: CPT | Mod: HCNC,S$GLB,, | Performed by: FAMILY MEDICINE

## 2019-08-30 PROCEDURE — 99999 PR PBB SHADOW E&M-EST. PATIENT-LVL III: ICD-10-PCS | Mod: PBBFAC,HCNC,, | Performed by: FAMILY MEDICINE

## 2019-08-30 PROCEDURE — 99999 PR PBB SHADOW E&M-EST. PATIENT-LVL III: CPT | Mod: PBBFAC,HCNC,, | Performed by: FAMILY MEDICINE

## 2019-08-30 PROCEDURE — 3078F DIAST BP <80 MM HG: CPT | Mod: HCNC,CPTII,S$GLB, | Performed by: FAMILY MEDICINE

## 2019-08-30 PROCEDURE — 3074F SYST BP LT 130 MM HG: CPT | Mod: HCNC,CPTII,S$GLB, | Performed by: FAMILY MEDICINE

## 2019-08-30 PROCEDURE — 3078F PR MOST RECENT DIASTOLIC BLOOD PRESSURE < 80 MM HG: ICD-10-PCS | Mod: HCNC,CPTII,S$GLB, | Performed by: FAMILY MEDICINE

## 2019-08-30 PROCEDURE — 1101F PT FALLS ASSESS-DOCD LE1/YR: CPT | Mod: HCNC,CPTII,S$GLB, | Performed by: FAMILY MEDICINE

## 2019-08-30 PROCEDURE — 1101F PR PT FALLS ASSESS DOC 0-1 FALLS W/OUT INJ PAST YR: ICD-10-PCS | Mod: HCNC,CPTII,S$GLB, | Performed by: FAMILY MEDICINE

## 2019-08-30 RX ORDER — SUCRALFATE 1 G/1
1 TABLET ORAL
Qty: 360 TABLET | Refills: 1 | Status: SHIPPED | OUTPATIENT
Start: 2019-08-30 | End: 2020-11-06 | Stop reason: ALTCHOICE

## 2019-08-30 NOTE — PATIENT INSTRUCTIONS

## 2019-09-03 RX ORDER — SUCRALFATE 1 G/10ML
1 SUSPENSION ORAL 4 TIMES DAILY
Qty: 400 ML | Refills: 0 | Status: SHIPPED | OUTPATIENT
Start: 2019-09-03 | End: 2019-09-13

## 2019-09-05 ENCOUNTER — APPOINTMENT (OUTPATIENT)
Dept: LAB | Facility: HOSPITAL | Age: 74
End: 2019-09-05
Attending: UROLOGY
Payer: MEDICARE

## 2019-09-05 ENCOUNTER — PATIENT MESSAGE (OUTPATIENT)
Dept: UROLOGY | Facility: CLINIC | Age: 74
End: 2019-09-05

## 2019-09-05 ENCOUNTER — CLINICAL SUPPORT (OUTPATIENT)
Dept: UROLOGY | Facility: CLINIC | Age: 74
End: 2019-09-05
Payer: MEDICARE

## 2019-09-05 DIAGNOSIS — R30.0 DYSURIA: Primary | ICD-10-CM

## 2019-09-05 LAB
BILIRUB SERPL-MCNC: NEGATIVE MG/DL
BLOOD URINE, POC: ABNORMAL
COLOR, POC UA: ABNORMAL
GLUCOSE UR QL STRIP: NEGATIVE
GRAM STN SPEC: NORMAL
GRAM STN SPEC: NORMAL
KETONES UR QL STRIP: NEGATIVE
LEUKOCYTE ESTERASE URINE, POC: ABNORMAL
NITRITE, POC UA: NEGATIVE
PH, POC UA: 6.5
PROTEIN, POC: NEGATIVE
SPECIFIC GRAVITY, POC UA: 1.02
UROBILINOGEN, POC UA: 0.1

## 2019-09-05 PROCEDURE — 81002 POCT URINE DIPSTICK WITHOUT MICROSCOPE: ICD-10-PCS | Mod: HCNC,S$GLB,, | Performed by: UROLOGY

## 2019-09-05 PROCEDURE — 87205 SMEAR GRAM STAIN: CPT | Mod: HCNC

## 2019-09-05 PROCEDURE — 81002 URINALYSIS NONAUTO W/O SCOPE: CPT | Mod: HCNC,S$GLB,, | Performed by: UROLOGY

## 2019-09-05 PROCEDURE — 87086 URINE CULTURE/COLONY COUNT: CPT | Mod: HCNC

## 2019-09-05 NOTE — TELEPHONE ENCOUNTER
Patient c/o dysuria.  Urinary frequency as well.    Patient will come in for udip this afternoon

## 2019-09-07 LAB — BACTERIA UR CULT: NO GROWTH

## 2019-09-10 NOTE — PROGRESS NOTES
Subjective:       Patient ID: Maximilian Tavera Jr. is a 73 y.o. male.    Chief Complaint: Hypertension    PSA mild elevated, mild BPH sx, no hematuria, no CVA.    Hypertension   This is a chronic problem. The current episode started more than 1 year ago. The problem is controlled. Pertinent negatives include no anxiety, blurred vision, chest pain, headaches, malaise/fatigue, neck pain, orthopnea or palpitations. There are no associated agents to hypertension. Risk factors for coronary artery disease include sedentary lifestyle, male gender, obesity and dyslipidemia. Past treatments include beta blockers and diuretics. The current treatment provides moderate improvement. Compliance problems include exercise.      Review of Systems   Constitutional: Negative for activity change, malaise/fatigue and unexpected weight change.   HENT: Positive for trouble swallowing. Negative for hearing loss and rhinorrhea.    Eyes: Negative for blurred vision, discharge and visual disturbance.   Respiratory: Positive for wheezing. Negative for chest tightness.    Cardiovascular: Negative for chest pain, palpitations and orthopnea.   Gastrointestinal: Positive for abdominal pain. Negative for blood in stool, constipation, diarrhea and vomiting.        GERD SX,   Endocrine: Negative for polydipsia and polyuria.   Genitourinary: Positive for urgency. Negative for difficulty urinating and hematuria.   Musculoskeletal: Negative for arthralgias, joint swelling and neck pain.   Neurological: Negative for weakness and headaches.   Psychiatric/Behavioral: Negative for confusion and dysphoric mood.       Patient Active Problem List   Diagnosis    Hypothyroid    Hyperlipidemia    HTN (hypertension), benign    Neuropathy    ED (erectile dysfunction)    DDD (degenerative disc disease), lumbar    Diplopia    Pseudophakia, right eye    Myasthenia gravis, AChR antibody positive    Umbilical hernia without obstruction and without  gangrene    Agatston CAC score, <100    Aortic valve disease    Primary osteoarthritis of both knees    Abdominal aortic atherosclerosis    RBBB    On prednisone therapy    Carpal tunnel syndrome on both sides    Cervical stenosis of spine    Tortuous aorta    Epigastric pain    Cervical stenosis of spinal canal    S/P cervical spinal fusion    BPH with obstruction/lower urinary tract symptoms    Preop cardiovascular exam    PVC's (premature ventricular contractions)    Nonsustained ventricular tachycardia    Current chronic use of systemic steroids       Objective:      Physical Exam   Constitutional: He is oriented to person, place, and time. He appears well-developed and well-nourished. No distress.   HENT:   Head: Normocephalic and atraumatic.   Right Ear: External ear normal.   Left Ear: External ear normal.   Nose: Nose normal.   Mouth/Throat: Oropharynx is clear and moist. No oropharyngeal exudate.   Eyes: Pupils are equal, round, and reactive to light. Conjunctivae and EOM are normal. Right eye exhibits no discharge. Left eye exhibits no discharge. No scleral icterus.   Neck: Normal range of motion. Neck supple. No JVD present. No tracheal deviation present. No thyromegaly present.   Cardiovascular: Normal rate, normal heart sounds and intact distal pulses.   No murmur heard.  Pulmonary/Chest: Effort normal. No respiratory distress. He has wheezes. He has no rales.   Abdominal: Soft. Bowel sounds are normal. He exhibits no distension and no mass. There is no tenderness. There is no rebound and no guarding.   Musculoskeletal: He exhibits no edema or tenderness.          Lymphadenopathy:     He has no cervical adenopathy.   Neurological: He is alert and oriented to person, place, and time. No cranial nerve deficit. Coordination normal.   Skin: Skin is warm and dry. No rash noted. He is not diaphoretic. No erythema. No pallor.   Psychiatric: He has a normal mood and affect. His behavior is  normal. Judgment and thought content normal.   Vitals reviewed.      Lab Results   Component Value Date    WBC 6.16 05/02/2019    HGB 13.9 (L) 05/02/2019    HCT 44.1 05/02/2019     (L) 05/02/2019    CHOL 120 05/02/2019    TRIG 86 05/02/2019    HDL 36 (L) 05/02/2019    ALT 19 08/02/2019    AST 19 08/02/2019     08/02/2019    K 3.8 08/02/2019     08/02/2019    CREATININE 1.0 08/02/2019    BUN 20 08/02/2019    CO2 32 (H) 08/02/2019    TSH 1.395 03/20/2019    PSA 3.3 05/02/2019    INR 1.0 10/30/2018    HGBA1C 5.5 11/21/2018     The ASCVD Risk score (Ad DURAN Jr., et al., 2013) failed to calculate for the following reasons:    The valid total cholesterol range is 130 to 320 mg/dL  The current medical regimen is effective;  continue present plan and medications.    Assessment:       1. Abnormal PSA    2. Gastroesophageal reflux disease, esophagitis presence not specified    3. Encounter for screening for malignant neoplasm of prostate     4. HTN (hypertension), benign    5. Mixed hyperlipidemia        Plan:       Abnormal PSA  -     PSA, Screening; Future; Expected date: 08/30/2019    Gastroesophageal reflux disease, esophagitis presence not specified  -     sucralfate (CARAFATE) 1 gram tablet; Take 1 tablet (1 g total) by mouth 4 (four) times daily before meals and nightly.  Dispense: 360 tablet; Refill: 1    Encounter for screening for malignant neoplasm of prostate   -     PSA, Screening; Future; Expected date: 08/30/2019    HTN (hypertension), benign  -     CBC auto differential; Future; Expected date: 08/30/2019    Mixed hyperlipidemia      Patient readiness: acceptance and barriers:none    During the course of the visit the patient was educated and counseled about the following:     Hypertension:   Screening for causes of secondary hypertension: none indicated.  Dietary sodium restriction.  Regular aerobic exercise.  Check blood pressures 3 times weekly and record.  Obesity:   General weight  loss/lifestyle modification strategies discussed (elicit support from others; identify saboteurs; non-food rewards, etc).  Informal exercise measures discussed, e.g. taking stairs instead of elevator.  Regular aerobic exercise program discussed.    Goals: Hypertension: Reduce Blood Pressure and Obesity: Reduce calorie intake and BMI    Did patient meet goals/outcomes: Yes    The following self management tools provided: blood pressure log  excercise log    Patient Instructions (the written plan) was given to the patient/family.     Time spent with patient: 30 minutes    Barriers to medications present (no )    Adverse reactions to current medications (no)    Over the counter medications reviewed (Yes)        30-35-minute visit. 20 minutes spent counseling patient on diet, exercise, and weight loss.  This has been fully explained to the patient, who indicates understanding.

## 2019-09-11 ENCOUNTER — PATIENT MESSAGE (OUTPATIENT)
Dept: UROLOGY | Facility: CLINIC | Age: 74
End: 2019-09-11

## 2019-09-19 ENCOUNTER — LAB VISIT (OUTPATIENT)
Dept: LAB | Facility: HOSPITAL | Age: 74
End: 2019-09-19
Attending: FAMILY MEDICINE
Payer: MEDICARE

## 2019-09-19 ENCOUNTER — OFFICE VISIT (OUTPATIENT)
Dept: GASTROENTEROLOGY | Facility: CLINIC | Age: 74
End: 2019-09-19
Payer: MEDICARE

## 2019-09-19 VITALS
SYSTOLIC BLOOD PRESSURE: 128 MMHG | BODY MASS INDEX: 31.94 KG/M2 | WEIGHT: 242.06 LBS | HEART RATE: 64 BPM | DIASTOLIC BLOOD PRESSURE: 77 MMHG

## 2019-09-19 DIAGNOSIS — Z12.5 ENCOUNTER FOR SCREENING FOR MALIGNANT NEOPLASM OF PROSTATE: ICD-10-CM

## 2019-09-19 DIAGNOSIS — R97.20 ABNORMAL PSA: ICD-10-CM

## 2019-09-19 DIAGNOSIS — K25.9: ICD-10-CM

## 2019-09-19 DIAGNOSIS — I10 HTN (HYPERTENSION), BENIGN: ICD-10-CM

## 2019-09-19 DIAGNOSIS — K27.9 PEPTIC ULCER DISEASE: ICD-10-CM

## 2019-09-19 DIAGNOSIS — R10.13 EPIGASTRIC PAIN: Primary | ICD-10-CM

## 2019-09-19 DIAGNOSIS — D64.9 ANEMIA, UNSPECIFIED TYPE: ICD-10-CM

## 2019-09-19 LAB
BASOPHILS # BLD AUTO: 0.03 K/UL (ref 0–0.2)
BASOPHILS NFR BLD: 0.4 % (ref 0–1.9)
DIFFERENTIAL METHOD: ABNORMAL
EOSINOPHIL # BLD AUTO: 0.1 K/UL (ref 0–0.5)
EOSINOPHIL NFR BLD: 1.5 % (ref 0–8)
ERYTHROCYTE [DISTWIDTH] IN BLOOD BY AUTOMATED COUNT: 13.2 % (ref 11.5–14.5)
HCT VFR BLD AUTO: 44.5 % (ref 40–54)
HGB BLD-MCNC: 13.6 G/DL (ref 14–18)
IMM GRANULOCYTES # BLD AUTO: 0.02 K/UL (ref 0–0.04)
IMM GRANULOCYTES NFR BLD AUTO: 0.3 % (ref 0–0.5)
LYMPHOCYTES # BLD AUTO: 1.5 K/UL (ref 1–4.8)
LYMPHOCYTES NFR BLD: 19.7 % (ref 18–48)
MCH RBC QN AUTO: 28.8 PG (ref 27–31)
MCHC RBC AUTO-ENTMCNC: 30.6 G/DL (ref 32–36)
MCV RBC AUTO: 94 FL (ref 82–98)
MONOCYTES # BLD AUTO: 0.6 K/UL (ref 0.3–1)
MONOCYTES NFR BLD: 7.2 % (ref 4–15)
NEUTROPHILS # BLD AUTO: 5.6 K/UL (ref 1.8–7.7)
NEUTROPHILS NFR BLD: 70.9 % (ref 38–73)
NRBC BLD-RTO: 0 /100 WBC
PLATELET # BLD AUTO: 135 K/UL (ref 150–350)
PMV BLD AUTO: 11.3 FL (ref 9.2–12.9)
RBC # BLD AUTO: 4.72 M/UL (ref 4.6–6.2)
WBC # BLD AUTO: 7.82 K/UL (ref 3.9–12.7)

## 2019-09-19 PROCEDURE — 36415 COLL VENOUS BLD VENIPUNCTURE: CPT | Mod: HCNC,PO

## 2019-09-19 PROCEDURE — 99214 OFFICE O/P EST MOD 30 MIN: CPT | Mod: HCNC,S$GLB,, | Performed by: INTERNAL MEDICINE

## 2019-09-19 PROCEDURE — 3074F SYST BP LT 130 MM HG: CPT | Mod: HCNC,CPTII,S$GLB, | Performed by: INTERNAL MEDICINE

## 2019-09-19 PROCEDURE — 1101F PR PT FALLS ASSESS DOC 0-1 FALLS W/OUT INJ PAST YR: ICD-10-PCS | Mod: HCNC,CPTII,S$GLB, | Performed by: INTERNAL MEDICINE

## 2019-09-19 PROCEDURE — 99999 PR PBB SHADOW E&M-EST. PATIENT-LVL III: ICD-10-PCS | Mod: PBBFAC,HCNC,, | Performed by: INTERNAL MEDICINE

## 2019-09-19 PROCEDURE — 84153 ASSAY OF PSA TOTAL: CPT | Mod: HCNC

## 2019-09-19 PROCEDURE — 3074F PR MOST RECENT SYSTOLIC BLOOD PRESSURE < 130 MM HG: ICD-10-PCS | Mod: HCNC,CPTII,S$GLB, | Performed by: INTERNAL MEDICINE

## 2019-09-19 PROCEDURE — 99999 PR PBB SHADOW E&M-EST. PATIENT-LVL III: CPT | Mod: PBBFAC,HCNC,, | Performed by: INTERNAL MEDICINE

## 2019-09-19 PROCEDURE — 85025 COMPLETE CBC W/AUTO DIFF WBC: CPT | Mod: HCNC

## 2019-09-19 PROCEDURE — 3078F PR MOST RECENT DIASTOLIC BLOOD PRESSURE < 80 MM HG: ICD-10-PCS | Mod: HCNC,CPTII,S$GLB, | Performed by: INTERNAL MEDICINE

## 2019-09-19 PROCEDURE — 3078F DIAST BP <80 MM HG: CPT | Mod: HCNC,CPTII,S$GLB, | Performed by: INTERNAL MEDICINE

## 2019-09-19 PROCEDURE — 99214 PR OFFICE/OUTPT VISIT, EST, LEVL IV, 30-39 MIN: ICD-10-PCS | Mod: HCNC,S$GLB,, | Performed by: INTERNAL MEDICINE

## 2019-09-19 PROCEDURE — 1101F PT FALLS ASSESS-DOCD LE1/YR: CPT | Mod: HCNC,CPTII,S$GLB, | Performed by: INTERNAL MEDICINE

## 2019-09-19 NOTE — PROGRESS NOTES
Subjective:       Patient ID: Maximilian Tavera Jr. is a 73 y.o. male.    This is an established patient.    Chief Complaint: Abdominal pain    PAST HISTORY:    Abdominal Pain   This is a new problem. The current episode started more than 1 month ago. The onset quality is undetermined. The problem occurs intermittently. Duration: variable. The pain is located in the generalized abdominal region (lower more so than upper abdomen, crampy). The pain is at a severity of 5/10. The pain is moderate. The quality of the pain is aching and cramping. The abdominal pain does not radiate. Associated symptoms include constipation. Pertinent negatives include no diarrhea (intermittent). Nothing aggravates the pain. The pain is relieved by bowel movements. He has tried nothing for the symptoms. The treatment provided mild relief. Prior diagnostic workup includes lower endoscopy (Last colonoscopy about 5 years ago with one hyperplastic polyp noted.  Last EGD in 2001.). His past medical history is significant for GERD.     INTERVAL HISTORY:  Since last visit, patient had repeat EGD with ulcer noted at previous area of mucosal abnormality.  He reports that his symptoms are better with aggressive antisecretory therapy.  He denies weight loss.  He has a mild chronic anemia without iron studies on file.  No change in bowel habits.  Discussed unusual location of ulcer and recommendation for repeat EGD as well as EUS.  Patient agrees.    Review of Systems   HENT: Positive for trouble swallowing.    Respiratory: Negative for shortness of breath and wheezing.    Cardiovascular: Negative for palpitations.   Gastrointestinal: Positive for constipation. Negative for diarrhea (intermittent).        + change in bowel habits     Skin: Negative for color change.   Neurological: Negative for dizziness.   Psychiatric/Behavioral: Negative for confusion. The patient is not nervous/anxious.    All other systems reviewed and are negative.       Objective:       Vitals:    09/19/19 1504   BP: 128/77   Pulse: 64   Weight: 109.8 kg (242 lb 1 oz)       Physical Exam   Constitutional: He is oriented to person, place, and time. He appears well-developed and well-nourished.   HENT:   Head: Normocephalic and atraumatic.   Mouth/Throat: Oropharynx is clear and moist.   Eyes: Pupils are equal, round, and reactive to light. Conjunctivae are normal. No scleral icterus.   Neck: Normal range of motion. Neck supple. No thyromegaly present.   Cardiovascular: Normal rate and regular rhythm.   No murmur heard.  Pulmonary/Chest: Effort normal and breath sounds normal. He has no wheezes.   Abdominal: Soft. Bowel sounds are normal. He exhibits no distension. There is tenderness (mild, epigastric).   Musculoskeletal: He exhibits no edema.   Lymphadenopathy:     He has no cervical adenopathy.   Neurological: He is alert and oriented to person, place, and time.   Skin: Skin is warm and dry. No rash noted. No erythema.   Psychiatric: He has a normal mood and affect. His behavior is normal.   Vitals reviewed.        Lab Results   Component Value Date    WBC 6.16 05/02/2019    HGB 13.9 (L) 05/02/2019    HCT 44.1 05/02/2019    MCV 92 05/02/2019     (L) 05/02/2019       CMP  Sodium   Date Value Ref Range Status   08/02/2019 140 136 - 145 mmol/L Final     Potassium   Date Value Ref Range Status   08/02/2019 3.8 3.5 - 5.1 mmol/L Final     Chloride   Date Value Ref Range Status   08/02/2019 102 95 - 110 mmol/L Final     CO2   Date Value Ref Range Status   08/02/2019 32 (H) 23 - 29 mmol/L Final     Glucose   Date Value Ref Range Status   08/02/2019 109 70 - 110 mg/dL Final     BUN, Bld   Date Value Ref Range Status   08/02/2019 20 8 - 23 mg/dL Final     Creatinine   Date Value Ref Range Status   08/02/2019 1.0 0.5 - 1.4 mg/dL Final     Calcium   Date Value Ref Range Status   08/02/2019 9.3 8.7 - 10.5 mg/dL Final     Total Protein   Date Value Ref Range Status   08/02/2019 6.4 6.0  - 8.4 g/dL Final     Albumin   Date Value Ref Range Status   08/02/2019 3.7 3.5 - 5.2 g/dL Final     Total Bilirubin   Date Value Ref Range Status   08/02/2019 1.1 (H) 0.1 - 1.0 mg/dL Final     Comment:     For infants and newborns, interpretation of results should be based  on gestational age, weight and in agreement with clinical  observations.  Premature Infant recommended reference ranges:  Up to 24 hours.............<8.0 mg/dL  Up to 48 hours............<12.0 mg/dL  3-5 days..................<15.0 mg/dL  6-29 days.................<15.0 mg/dL       Alkaline Phosphatase   Date Value Ref Range Status   08/02/2019 74 55 - 135 U/L Final     AST   Date Value Ref Range Status   08/02/2019 19 10 - 40 U/L Final     ALT   Date Value Ref Range Status   08/02/2019 19 10 - 44 U/L Final     Anion Gap   Date Value Ref Range Status   08/02/2019 6 (L) 8 - 16 mmol/L Final     eGFR if    Date Value Ref Range Status   08/02/2019 >60.0 >60 mL/min/1.73 m^2 Final     eGFR if non    Date Value Ref Range Status   08/02/2019 >60.0 >60 mL/min/1.73 m^2 Final     Comment:     Calculation used to obtain the estimated glomerular filtration  rate (eGFR) is the CKD-EPI equation.          CXR was independently visualized and reviewed by me and showed interval improvement from prior pneumonia.    Old records from Dr. Esteban reviewed and are as summarized above in the HPI.    Assessment:       1. Epigastric pain    2. Peptic ulcer disease    3. Anemia, unspecified type        Plan:       1.  Colonoscopy for change in bowel habits previously recommended but bowel issues now resolved and he had only a single small hyperplastic polyp in 2012.  Repeat recommended in 2022.  2.  Schedule repeat EGD  3.  Referral for EUS   4.  Further recommendations to follow after above.

## 2019-09-20 LAB — COMPLEXED PSA SERPL-MCNC: 3.5 NG/ML (ref 0–4)

## 2019-09-26 DIAGNOSIS — G70.00 MYASTHENIA GRAVIS: ICD-10-CM

## 2019-09-27 RX ORDER — PREDNISONE 5 MG/1
TABLET ORAL
Qty: 90 TABLET | Refills: 3 | Status: SHIPPED | OUTPATIENT
Start: 2019-09-27 | End: 2020-06-13 | Stop reason: SDUPTHER

## 2019-09-27 RX ORDER — PREDNISONE 1 MG/1
TABLET ORAL
Qty: 180 TABLET | Refills: 3 | Status: SHIPPED | OUTPATIENT
Start: 2019-09-27 | End: 2020-06-13 | Stop reason: SDUPTHER

## 2019-10-07 ENCOUNTER — HOSPITAL ENCOUNTER (OUTPATIENT)
Facility: HOSPITAL | Age: 74
Discharge: HOME | End: 2019-10-07
Attending: INTERNAL MEDICINE | Admitting: INTERNAL MEDICINE
Payer: MEDICARE

## 2019-10-07 ENCOUNTER — TELEPHONE (OUTPATIENT)
Dept: GASTROENTEROLOGY | Facility: CLINIC | Age: 74
End: 2019-10-07

## 2019-10-07 ENCOUNTER — ANESTHESIA (OUTPATIENT)
Dept: ENDOSCOPY | Facility: HOSPITAL | Age: 74
End: 2019-10-07
Payer: MEDICARE

## 2019-10-07 ENCOUNTER — ANESTHESIA EVENT (OUTPATIENT)
Dept: ENDOSCOPY | Facility: HOSPITAL | Age: 74
End: 2019-10-07
Payer: MEDICARE

## 2019-10-07 DIAGNOSIS — K25.9: Primary | ICD-10-CM

## 2019-10-07 DIAGNOSIS — R10.13 EPIGASTRIC PAIN: ICD-10-CM

## 2019-10-07 DIAGNOSIS — K27.9 PEPTIC ULCER DISEASE: ICD-10-CM

## 2019-10-07 DIAGNOSIS — D64.9 ANEMIA, UNSPECIFIED TYPE: ICD-10-CM

## 2019-10-07 LAB
BASOPHILS # BLD AUTO: 0.03 K/UL (ref 0–0.2)
BASOPHILS NFR BLD: 0.4 % (ref 0–1.9)
DIFFERENTIAL METHOD: ABNORMAL
EOSINOPHIL # BLD AUTO: 0.2 K/UL (ref 0–0.5)
EOSINOPHIL NFR BLD: 2.7 % (ref 0–8)
ERYTHROCYTE [DISTWIDTH] IN BLOOD BY AUTOMATED COUNT: 13.2 % (ref 11.5–14.5)
FERRITIN SERPL-MCNC: 28 NG/ML (ref 20–300)
HCT VFR BLD AUTO: 41.3 % (ref 40–54)
HGB BLD-MCNC: 13.2 G/DL (ref 14–18)
IMM GRANULOCYTES # BLD AUTO: 0.01 K/UL (ref 0–0.04)
IRON SERPL-MCNC: 70 UG/DL (ref 45–160)
LYMPHOCYTES # BLD AUTO: 1.6 K/UL (ref 1–4.8)
LYMPHOCYTES NFR BLD: 22.4 % (ref 18–48)
MCH RBC QN AUTO: 29.2 PG (ref 27–31)
MCHC RBC AUTO-ENTMCNC: 32 G/DL (ref 32–36)
MCV RBC AUTO: 91 FL (ref 82–98)
MONOCYTES # BLD AUTO: 0.8 K/UL (ref 0.3–1)
MONOCYTES NFR BLD: 11.6 % (ref 4–15)
NEUTROPHILS # BLD AUTO: 4.5 K/UL (ref 1.8–7.7)
NEUTROPHILS NFR BLD: 62.8 % (ref 38–73)
NRBC BLD-RTO: 0 /100 WBC
PLATELET # BLD AUTO: 128 K/UL (ref 150–350)
PMV BLD AUTO: 9.9 FL (ref 9.2–12.9)
RBC # BLD AUTO: 4.52 M/UL (ref 4.6–6.2)
SATURATED IRON: 20 % (ref 20–50)
TOTAL IRON BINDING CAPACITY: 345 UG/DL (ref 250–450)
TRANSFERRIN SERPL-MCNC: 233 MG/DL (ref 200–375)
WBC # BLD AUTO: 7.13 K/UL (ref 3.9–12.7)

## 2019-10-07 PROCEDURE — 88342 IMHCHEM/IMCYTCHM 1ST ANTB: CPT | Mod: 26,HCNC,, | Performed by: PATHOLOGY

## 2019-10-07 PROCEDURE — 88342 IMHCHEM/IMCYTCHM 1ST ANTB: CPT | Mod: HCNC | Performed by: PATHOLOGY

## 2019-10-07 PROCEDURE — 43239 EGD BIOPSY SINGLE/MULTIPLE: CPT | Mod: HCNC | Performed by: INTERNAL MEDICINE

## 2019-10-07 PROCEDURE — D9220A PRA ANESTHESIA: ICD-10-PCS | Mod: HCNC,ANES,, | Performed by: ANESTHESIOLOGY

## 2019-10-07 PROCEDURE — 63600175 PHARM REV CODE 636 W HCPCS: Mod: HCNC | Performed by: NURSE ANESTHETIST, CERTIFIED REGISTERED

## 2019-10-07 PROCEDURE — 43239 PR EGD, FLEX, W/BIOPSY, SGL/MULTI: ICD-10-PCS | Mod: HCNC,,, | Performed by: INTERNAL MEDICINE

## 2019-10-07 PROCEDURE — 88305 TISSUE EXAM BY PATHOLOGIST: CPT | Mod: 26,HCNC,, | Performed by: PATHOLOGY

## 2019-10-07 PROCEDURE — 37000008 HC ANESTHESIA 1ST 15 MINUTES: Mod: HCNC | Performed by: INTERNAL MEDICINE

## 2019-10-07 PROCEDURE — 37000009 HC ANESTHESIA EA ADD 15 MINS: Mod: HCNC | Performed by: INTERNAL MEDICINE

## 2019-10-07 PROCEDURE — 88342 TISSUE SPECIMEN TO PATHOLOGY - SURGERY: ICD-10-PCS | Mod: 26,HCNC,, | Performed by: PATHOLOGY

## 2019-10-07 PROCEDURE — 88305 TISSUE SPECIMEN TO PATHOLOGY - SURGERY: ICD-10-PCS | Mod: 26,HCNC,, | Performed by: PATHOLOGY

## 2019-10-07 PROCEDURE — 27201012 HC FORCEPS, HOT/COLD, DISP: Mod: HCNC | Performed by: INTERNAL MEDICINE

## 2019-10-07 PROCEDURE — 83540 ASSAY OF IRON: CPT | Mod: HCNC

## 2019-10-07 PROCEDURE — 36415 COLL VENOUS BLD VENIPUNCTURE: CPT | Mod: HCNC

## 2019-10-07 PROCEDURE — D9220A PRA ANESTHESIA: Mod: HCNC,ANES,, | Performed by: ANESTHESIOLOGY

## 2019-10-07 PROCEDURE — 82728 ASSAY OF FERRITIN: CPT | Mod: HCNC

## 2019-10-07 PROCEDURE — 63600175 PHARM REV CODE 636 W HCPCS: Mod: HCNC | Performed by: INTERNAL MEDICINE

## 2019-10-07 PROCEDURE — 88305 TISSUE EXAM BY PATHOLOGIST: CPT | Mod: HCNC | Performed by: PATHOLOGY

## 2019-10-07 PROCEDURE — 85025 COMPLETE CBC W/AUTO DIFF WBC: CPT | Mod: HCNC

## 2019-10-07 PROCEDURE — 43239 EGD BIOPSY SINGLE/MULTIPLE: CPT | Mod: HCNC,,, | Performed by: INTERNAL MEDICINE

## 2019-10-07 RX ORDER — SODIUM CHLORIDE 9 MG/ML
INJECTION, SOLUTION INTRAVENOUS CONTINUOUS
Status: DISCONTINUED | OUTPATIENT
Start: 2019-10-07 | End: 2019-10-07 | Stop reason: HOSPADM

## 2019-10-07 RX ORDER — PROPOFOL 10 MG/ML
VIAL (ML) INTRAVENOUS
Status: DISCONTINUED | OUTPATIENT
Start: 2019-10-07 | End: 2019-10-07

## 2019-10-07 RX ADMIN — PROPOFOL 40 MG: 10 INJECTION, EMULSION INTRAVENOUS at 09:10

## 2019-10-07 RX ADMIN — PROPOFOL 120 MG: 10 INJECTION, EMULSION INTRAVENOUS at 09:10

## 2019-10-07 RX ADMIN — SODIUM CHLORIDE 1000 ML: 0.9 INJECTION, SOLUTION INTRAVENOUS at 09:10

## 2019-10-07 NOTE — PLAN OF CARE
Dr. Amos notified of persistent cough and o2 sats. Instructed to watch and notify if not improving.

## 2019-10-07 NOTE — [employer] PATIENT INSTRUCTIONS
Discharge Summary/Instructions after an Endoscopic Procedure  Patient Name: Maximilian Tavera  Patient MRN: [id number]  Patient YOB: 1945 Monday, October 07, 2019  Gabriel Adhikari MD  RESTRICTIONS:  During your procedure today, you received medications for sedation.  These   medications may affect your judgment, balance and coordination.  Therefore,   for 24 hours, you have the following restrictions:   - DO NOT drive a car, operate machinery, make legal/financial decisions,   sign important papers or drink alcohol.    ACTIVITY:  Today: no heavy lifting, straining or running due to procedural   sedation/anesthesia.  The following day: return to full activity including work.  DIET:  Eat and drink normally unless instructed otherwise.     TREATMENT FOR COMMON SIDE EFFECTS:  - Mild abdominal pain, nausea, belching, bloating or excessive gas:  rest,   eat lightly and use a heating pad.  - Sore Throat: treat with throat lozenges and/or gargle with warm salt   water.  - Because air was used during the procedure, expelling large amounts of air   from your rectum or belching is normal.  - If a bowel prep was taken, you may not have a bowel movement for 1-3 days.    This is normal.  SYMPTOMS TO WATCH FOR AND REPORT TO YOUR PHYSICIAN:  1. Abdominal pain or bloating, other than gas cramps.  2. Chest pain.  3. Back pain.  4. Signs of infection such as: chills or fever occurring within 24 hours   after the procedure.  5. Rectal bleeding, which would show as bright red, maroon, or black stools.   (A tablespoon of blood from the rectum is not serious, especially if   hemorrhoids are present.)  6. Vomiting.  7. Weakness or dizziness.  GO DIRECTLY TO THE NEAREST EMERGENCY ROOM IF YOU HAVE ANY OF THE FOLLOWING:      Difficulty breathing              Chills and/or fever over 101 F   Persistent vomiting and/or vomiting blood   Severe abdominal pain   Severe chest pain   Black, tarry stools   Bleeding- more than one  tablespoon   Any other symptom or condition that you feel may need urgent attention  Your doctor recommends these additional instructions:  If any biopsies were taken, your doctors clinic will contact you in 1 to 2   weeks with any results.  - Patient has a contact number available for emergencies.  The signs and   symptoms of potential delayed complications were discussed with the   patient.  Return to normal activities tomorrow.  Written discharge   instructions were provided to the patient.   - Resume previous diet.   - Continue present medications.   - No aspirin, ibuprofen, naproxen, or other non-steroidal anti-inflammatory   drugs.   - Await pathology results.   - Repeat upper endoscopy (date not yet determined) for surveillance.   - Discharge patient to home (ambulatory).   - Perform an upper endoscopic ultrasound (UEUS) at appointment to be   scheduled.   - Refer to a gastroenterologist.  For questions, problems or results please call your physician - Gabriel Adhikari MD at Work:  [phone].  [facility], EMERGENCY ROOM PHONE NUMBER: [phone]  IF A COMPLICATION OR EMERGENCY SITUATION ARISES AND YOU ARE UNABLE TO REACH   YOUR PHYSICIAN - GO DIRECTLY TO THE EMERGENCY ROOM.  Gabriel Adhikari MD  10/7/2019 9:40:31 AM  This report has been verified and signed electronically.  [employer]

## 2019-10-07 NOTE — DISCHARGE INSTRUCTIONS
Understanding Gastric Ulcers    A gastric ulcer is an open sore in the stomach lining. It is sometimes called a peptic ulcer. This is a more general term for ulcers that may be in the stomach or the upper part of the small intestine. Ulcers can cause pain. But they may also have no symptoms for a long time.  What causes gastric ulcers?  Gastric ulcers have a few common causes. To find the cause of your ulcer, your healthcare provider will give you an exam and take your health history. He or she may also order some tests. The main causes of gastric ulcers include:  · Infection with the H. pylori (Helicobacter pylori) bacteria. This damages the stomach lining. Digestive juices can then harm the digestive tract.  · Long-term use of some over-the-counter pain medicines. This reduces the bodys ability to protect the stomach from damage.  Other causes of gastric ulcers include:  · Heavy alcohol use  · Having a family history of ulcers  · In rare cases, a tumor in the digestive tract may cause an ulcer  Symptoms of a gastric ulcer  Ulcer symptoms may appear and then go away for a time. Symptoms of a gastric ulcer may include:  · Stomach pain, often a dull or burning feeling toward the top of your belly  · Feeling full or bloated  · Heartburn or acid reflux  · Upset stomach (nausea) or vomiting  · Vomiting blood  · Lack of appetite  · Weight loss  · Black stool  · Red blood in the stool  Treatment for a gastric ulcer  Treatment for gastric ulcers may depend on what is causing them. Treatment may include:  · Not using over-the-counter medicines. You will likely need to stop taking these medicines. But in some cases these medicines cant be safely stopped. Check with your healthcare provider to see what is best for you.   · Taking medicines to ease symptoms. These medicines may help to reduce the amount of acid your stomach makes. They also may help coat your stomach lining.  · Taking antibiotics. If your ulcer was caused  by H. pylori, your provider will likely prescribe antibiotics to get rid of the infection.  · Having an endoscopy. This is often done to check the stomach and diagnose the ulcer. In some cases it can also treat the ulcer. It involves passing a flexible tube through your mouth into your stomach and small intestine.  · Using a tube (catheter) to stop the bleeding. A thin tube is passed into one of your blood vessels. Special tools are used to help stop the bleeding.  · Having surgery. You often may need this if the ulcer has caused severe symptoms.  Making some lifestyle changes can reduce ulcer symptoms. It may also prevent more damage to your digestive tract. These changes include:  · Not taking over-the-counter pain medicines. Talk with your provider about using another type of pain reliever.  · Not taking aspirin unless your provider has advised it  · Limiting the amount of alcohol you drink  · Quitting smoking  Possible complications of a gastric ulcer  Gastric ulcers can have serious complications. These can include:  · Bleeding into the stomach  · A hole (perforation) in the stomach  · A blockage that interferes with food moving from your stomach to the small intestine  An ongoing infection with H. pylori may be a risk factor for stomach cancer. This is one reason it is important to get rid of this bacteria.  When to call your healthcare provider  Call your healthcare provider right away if you have any of these:  · Vomiting blood, or vomit that looks like coffee grounds  · Bloody, black, or tarry-looking stools  · Fever of 100.4°F (38°C) or higher, or as directed  · Pain that gets worse  · Symptoms that dont get better with treatment, or symptoms that get worse  · New symptoms   Date Last Reviewed: 5/1/2016  © 1819-6501 Rev. [address] [place] [address], [place] [address]. All rights reserved. This information is not intended as a substitute for professional medical care. Always follow your  healthcare professional's instructions.        Upper GI Endoscopy     During endoscopy, a long, flexible tube is used to view the inside of your upper GI tract.      Upper GI endoscopy allows your healthcare provider to look directly into the beginning of your gastrointestinal (GI) tract. The esophagus, stomach, and duodenum (the first part of the small intestine) make up the upper GI tract.   Before the exam  Follow these and any other instructions you are given before your endoscopy. If you dont follow the healthcare providers instructions carefully, the test may need to be canceled or done over:  · Don't eat or drink anything after midnight the night before your exam. If your exam is in the afternoon, drink only clear liquids in the morning. Don't eat or drink anything for 8 hours before the exam. In some cases, you may be able to take medicines with sips of water until 2 hours before the procedure. Speak with your healthcare provider about this.   · Bring your X-rays and any other test results you have.  · Because you will be sedated, arrange for an adult to drive you home after the exam.  · Tell your healthcare provider before the exam if you are taking any medicines or have any medical problems.  The procedure  Here is what to expect:  · You will lie on the endoscopy table. Usually patients lie on the left side.  · You will be monitored and given oxygen.  · Your throat may be numbed with a spray or gargle. You are given medicine through an intravenous (IV) line that will help you relax and remain comfortable. You may be awake or asleep during the procedure.  · The healthcare provider will put the endoscope in your mouth and down your esophagus. It is thinner than most pieces of food that you swallow. It will not affect your breathing. The medicine helps keep you from gagging.  · Air is put into your GI tract to expand it. It can make you burp.  · During the procedure, the healthcare provider can take  biopsies (tissue samples), remove abnormalities, such as polyps, or treat abnormalities through a variety of devices placed through the endoscope. You will not feel this.   · The endoscope carries images of your upper GI tract to a video screen. If you are awake, you may be able to look at the images.  · After the procedure is done, you will rest for a time. An adult must drive you home.  When to call your healthcare provider  Contact your healthcare provider if you have:  · Black or tarry stools, or blood in your stool  · Fever  · Pain in your belly that does not go away  · Nausea and vomiting, or vomiting blood   Date Last Reviewed: 7/1/2016  © 8725-3643 [employer]. [address] [place] [address], [place] [address]. All rights reserved. This information is not intended as a substitute for professional medical care. Always follow your healthcare professional's instructions.        Discharge Instructions: After Your Surgery  Youve just had surgery. During surgery, you were given medicine called anesthesia to keep you relaxed and free of pain. After surgery, you may have some pain or nausea. This is common. Here are some tips for feeling better and getting well after surgery.     Stay on schedule with your medicine.   Going home  Your healthcare provider will show you how to take care of yourself when you go home. He or she will also answer your questions. Have an adult family member or friend drive you home. For the first 24 hours after your surgery:  · Do not drive or use heavy equipment.  · Do not make important decisions or sign legal papers.  · Do not drink alcohol.  · Have someone stay with you, if needed. He or she can watch for problems and help keep you safe.  Be sure to go to all follow-up visits with your healthcare provider. And rest after your surgery for as long as your healthcare provider tells you to.  Coping with pain  If you have pain after surgery, pain medicine will help you feel better.  Take it as told, before pain becomes severe. Also, ask your healthcare provider or pharmacist about other ways to control pain. This might be with heat, ice, or relaxation. And follow any other instructions your surgeon or nurse gives you.  Tips for taking pain medicine  To get the best relief possible, remember these points:  · Pain medicines can upset your stomach. Taking them with a little food may help.  · Most pain relievers taken by mouth need at least 20 to 30 minutes to start to work.  · Taking medicine on a schedule can help you remember to take it. Try to time your medicine so that you can take it before starting an activity. This might be before you get dressed, go for a walk, or sit down for dinner.  · Constipation is a common side effect of pain medicines. Call your healthcare provider before taking any medicines such as laxatives or stool softeners to help ease constipation. Also ask if you should skip any foods. Drinking lots of fluids and eating foods such as fruits and vegetables that are high in fiber can also help. Remember, do not take laxatives unless your surgeon has prescribed them.  · Drinking alcohol and taking pain medicine can cause dizziness and slow your breathing. It can even be deadly. Do not drink alcohol while taking pain medicine.  · Pain medicine can make you react more slowly to things. Do not drive or run machinery while taking pain medicine.  Your healthcare provider may tell you to take acetaminophen to help ease your pain. Ask him or her how much you are supposed to take each day. Acetaminophen or other pain relievers may interact with your prescription medicines or other over-the-counter (OTC) medicines. Some prescription medicines have acetaminophen and other ingredients. Using both prescription and OTC acetaminophen for pain can cause you to overdose. Read the labels on your OTC medicines with care. This will help you to clearly know the list of ingredients, how much to  take, and any warnings. It may also help you not take too much acetaminophen. If you have questions or do not understand the information, ask your pharmacist or healthcare provider to explain it to you before you take the OTC medicine.  Managing nausea  Some people have an upset stomach after surgery. This is often because of anesthesia, pain, or pain medicine, or the stress of surgery. These tips will help you handle nausea and eat healthy foods as you get better. If you were on a special food plan before surgery, ask your healthcare provider if you should follow it while you get better. These tips may help:  · Do not push yourself to eat. Your body will tell you when to eat and how much.  · Start off with clear liquids and soup. They are easier to digest.  · Next try semi-solid foods, such as mashed potatoes, applesauce, and gelatin, as you feel ready.  · Slowly move to solid foods. Dont eat fatty, rich, or spicy foods at first.  · Do not force yourself to have 3 large meals a day. Instead eat smaller amounts more often.  · Take pain medicines with a small amount of solid food, such as crackers or toast, to avoid nausea.     Call your surgeon if  · You still have pain an hour after taking medicine. The medicine may not be strong enough.  · You feel too sleepy, dizzy, or groggy. The medicine may be too strong.  · You have side effects like nausea, vomiting, or skin changes, such as rash, itching, or hives.       If you have obstructive sleep apnea  You were given anesthesia medicine during surgery to keep you comfortable and free of pain. After surgery, you may have more apnea spells because of this medicine and other medicines you were given. The spells may last longer than usual.   At home:  · Keep using the continuous positive airway pressure (CPAP) device when you sleep. Unless your healthcare provider tells you not to, use it when you sleep, day or night. CPAP is a common device used to treat obstructive  sleep apnea.  · Talk with your provider before taking any pain medicine, muscle relaxants, or sedatives. Your provider will tell you about the possible dangers of taking these medicines.  Date Last Reviewed: 12/1/2016  © 9107-6365 [employer]. [address] [place] [address]. All rights reserved. This information is not intended as a substitute for professional medical care. Always follow your healthcare professional's instructions.

## 2019-10-07 NOTE — TRANSFER OF CARE
"Anesthesia Transfer of Care Note    Patient: Maximilian Tavera Jr.    Procedure(s) Performed: Procedure(s) (LRB):  EGD (ESOPHAGOGASTRODUODENOSCOPY) (N/A)    Patient location: GI    Anesthesia Type: general    Transport from OR: Transported from OR on 2-3 L/min O2 by NC with adequate spontaneous ventilation    Post pain: adequate analgesia    Post assessment: no apparent anesthetic complications    Post vital signs: stable    Level of consciousness: awake    Nausea/Vomiting: no nausea/vomiting    Complications: none    Transfer of care protocol was followed      Last vitals:   Visit Vitals  BP (!) 160/76   Pulse 63   Temp 36.9 °C (98.4 °F) (Skin)   Resp 18   Ht 6' 1" (1.854 m)   Wt 107 kg (236 lb)   SpO2 97%   BMI 31.14 kg/m²     "

## 2019-10-07 NOTE — PLAN OF CARE
Pt. Discharged home via wheelchair with wife, no signs of distress. Improvement seen with coughing and o2 sats.

## 2019-10-07 NOTE — ANESTHESIA PREPROCEDURE EVALUATION
10/07/2019  Maximilian Tavera Jr. is a 73 y.o., male.    Pre-op Assessment    I have reviewed the Patient Summary Reports.     I have reviewed the Nursing Notes.   I have reviewed the Medications.     Review of Systems  Anesthesia Hx:  No problems with previous Anesthesia    Social:  Non-Smoker    Hematology/Oncology:         -- Cancer in past history (squamous):   Cardiovascular:   Hypertension, well controlled Dysrhythmias hyperlipidemia    Pulmonary:   COPD    Renal/:  Renal/ Normal     Musculoskeletal:   Arthritis     Neurological:   Neuromuscular Disease, Myasthenia Gravis  Peripheral Neuropathy    Endocrine:   Hypothyroidism        Physical Exam  General:  Well nourished    Airway/Jaw/Neck:  Airway Findings: Mouth Opening: Normal Tongue: Normal  General Airway Assessment: Adult  Oropharynx Findings:  Mallampati: II  Jaw/Neck Findings:  Neck ROM: Normal ROM     Eyes/Ears/Nose:  Eyes/Ears/Nose Findings:    Dental:  Dental Findings:   Chest/Lungs:  Chest/Lungs Findings: Normal Respiratory Rate     Heart/Vascular:  Heart Findings: Rate: Normal  Rhythm: Regular Rhythm        Mental Status:  Mental Status Findings:  Cooperative, Alert and Oriented         Anesthesia Plan  Type of Anesthesia, risks & benefits discussed:  Anesthesia Type:  general  Patient's Preference:   Intra-op Monitoring Plan: standard ASA monitors  Intra-op Monitoring Plan Comments:   Post Op Pain Control Plan: multimodal analgesia  Post Op Pain Control Plan Comments:   Induction:   IV  Beta Blocker:  Patient is on a Beta-Blocker and has received one dose within the past 24 hours (No further documentation required).       Informed Consent: Patient understands risks and agrees with Anesthesia plan.  Questions answered. Anesthesia consent signed with patient.  ASA Score: 3     Day of Surgery Review of History & Physical:    H&P  update referred to the provider.         Ready For Surgery From Anesthesia Perspective.

## 2019-10-08 ENCOUNTER — TELEPHONE (OUTPATIENT)
Dept: ENDOSCOPY | Facility: HOSPITAL | Age: 74
End: 2019-10-08

## 2019-10-08 ENCOUNTER — IMMUNIZATION (OUTPATIENT)
Dept: FAMILY MEDICINE | Facility: CLINIC | Age: 74
End: 2019-10-08
Payer: MEDICARE

## 2019-10-08 ENCOUNTER — HOSPITAL ENCOUNTER (OUTPATIENT)
Dept: CARDIOLOGY | Facility: HOSPITAL | Age: 74
Discharge: HOME | End: 2019-10-08
Attending: INTERNAL MEDICINE
Payer: MEDICARE

## 2019-10-08 VITALS
RESPIRATION RATE: 14 BRPM | OXYGEN SATURATION: 96 % | HEART RATE: 61 BPM | DIASTOLIC BLOOD PRESSURE: 79 MMHG | BODY MASS INDEX: 31.28 KG/M2 | TEMPERATURE: 98 F | SYSTOLIC BLOOD PRESSURE: 138 MMHG | HEIGHT: 73 IN | WEIGHT: 236 LBS

## 2019-10-08 VITALS — SYSTOLIC BLOOD PRESSURE: 108 MMHG | DIASTOLIC BLOOD PRESSURE: 58 MMHG | HEART RATE: 88 BPM

## 2019-10-08 DIAGNOSIS — I49.3 PVC'S (PREMATURE VENTRICULAR CONTRACTIONS): ICD-10-CM

## 2019-10-08 DIAGNOSIS — I47.29 NONSUSTAINED VENTRICULAR TACHYCARDIA: ICD-10-CM

## 2019-10-08 DIAGNOSIS — K25.9 GASTRIC ULCER, UNSPECIFIED CHRONICITY, UNSPECIFIED WHETHER GASTRIC ULCER HEMORRHAGE OR PERFORATION PRESENT: Primary | ICD-10-CM

## 2019-10-08 PROCEDURE — G0008 ADMIN INFLUENZA VIRUS VAC: HCPCS | Mod: HCNC,S$GLB,, | Performed by: FAMILY MEDICINE

## 2019-10-08 PROCEDURE — 93227 XTRNL ECG REC<48 HR R&I: CPT | Mod: HCNC,,, | Performed by: INTERNAL MEDICINE

## 2019-10-08 PROCEDURE — 93226 XTRNL ECG REC<48 HR SCAN A/R: CPT | Mod: HCNC

## 2019-10-08 PROCEDURE — 93227 HOLTER MONITOR - 24 HOUR (CUPID ONLY): ICD-10-PCS | Mod: HCNC,,, | Performed by: INTERNAL MEDICINE

## 2019-10-08 PROCEDURE — G0008 FLU VACCINE - HIGH DOSE (65+) PRESERVATIVE FREE IM: ICD-10-PCS | Mod: HCNC,S$GLB,, | Performed by: FAMILY MEDICINE

## 2019-10-08 PROCEDURE — 90662 FLU VACCINE - HIGH DOSE (65+) PRESERVATIVE FREE IM: ICD-10-PCS | Mod: HCNC,S$GLB,, | Performed by: FAMILY MEDICINE

## 2019-10-08 PROCEDURE — 90662 IIV NO PRSV INCREASED AG IM: CPT | Mod: HCNC,S$GLB,, | Performed by: FAMILY MEDICINE

## 2019-10-08 PROCEDURE — 99999 PR PBB SHADOW E&M-EST. PATIENT-LVL I: ICD-10-PCS | Mod: PBBFAC,HCNC,,

## 2019-10-08 PROCEDURE — 99999 PR PBB SHADOW E&M-EST. PATIENT-LVL I: CPT | Mod: PBBFAC,HCNC,,

## 2019-10-08 NOTE — TELEPHONE ENCOUNTER
----- Message from Devan Sheth MD sent at 10/7/2019  2:44 PM CDT -----  EGD an EUS for nonhealing gastric ulcer  ----- Message -----  From: Raquel Tafoya MA  Sent: 10/7/2019   2:29 PM CDT  To: Devan Sheth MD        ----- Message -----  From: Aravind Mckinney LPN  Sent: 10/7/2019  11:58 AM CDT  To: Richard Valerio    Needs referral to [facility] at [facility] for persistent ulcer in gastric fundus.   Referral sent please call patient to schedule. Thanks Aravind

## 2019-10-09 ENCOUNTER — TELEPHONE (OUTPATIENT)
Dept: ENDOSCOPY | Facility: HOSPITAL | Age: 74
End: 2019-10-09

## 2019-10-09 NOTE — TELEPHONE ENCOUNTER
Spoke with patient in regards to schedule EGD/EUS. He was unable to discuss at this time. Will call back

## 2019-10-10 ENCOUNTER — TELEPHONE (OUTPATIENT)
Dept: UROLOGY | Facility: CLINIC | Age: 74
End: 2019-10-10

## 2019-10-10 NOTE — TELEPHONE ENCOUNTER
Patient discussed having a Urolift surgery with Dr. Cheung 3/19.  Since then I have followed up with him on more than one occasion and he is not ready to plan for this.    Patient reports that he is still having lots of stomach problems and is still not ready to plan a Urolift.      Patient advised that he will be due an annual follow up in February.  He is not ready to schedule at this time.  I will place a reminder in January to schedule annual in Feb.  Patient is agreeable to this.

## 2019-10-10 NOTE — TELEPHONE ENCOUNTER
----- Message from Karma Sánchez LPN sent at 9/18/2019  2:31 PM CDT -----  Regarding: RE: 10/18/19  He says that he has some GI stuff going on that he needs to take care of, then he will call back.  Patient advised that I will place a reminder for me, and if I don't hear back with check on him in a month.  He is agreeable to that plan.  Reminder placed to me.   ----- Message -----  From: Karma Sánchez LPN  Sent: 9/18/2019   2:29 PM  To: Karma Sánchez LPN  Subject: 10/18/19                                         Patient needs to come in for UFS/PVR.  He says on 9/18 that he has some GI stuff going on that he needs to take care of, then he will call back.  Patient advised that I will place a reminder for me, and if I don't hear back with check on him in a month.

## 2019-10-11 ENCOUNTER — PATIENT MESSAGE (OUTPATIENT)
Dept: GASTROENTEROLOGY | Facility: CLINIC | Age: 74
End: 2019-10-11

## 2019-10-15 LAB
OHS CV EVENT MONITOR DAY: 0
OHS CV HOLTER LENGTH DECIMAL HOURS: 23.98
OHS CV HOLTER LENGTH HOURS: 23
OHS CV HOLTER LENGTH MINUTES: 59

## 2019-10-16 ENCOUNTER — OFFICE VISIT (OUTPATIENT)
Dept: FAMILY MEDICINE | Facility: CLINIC | Age: 74
End: 2019-10-16
Payer: MEDICARE

## 2019-10-16 VITALS
WEIGHT: 241.88 LBS | HEIGHT: 73 IN | BODY MASS INDEX: 32.06 KG/M2 | HEART RATE: 72 BPM | DIASTOLIC BLOOD PRESSURE: 60 MMHG | SYSTOLIC BLOOD PRESSURE: 108 MMHG | OXYGEN SATURATION: 95 %

## 2019-10-16 DIAGNOSIS — Z79.52 CURRENT CHRONIC USE OF SYSTEMIC STEROIDS: Chronic | ICD-10-CM

## 2019-10-16 DIAGNOSIS — I70.0 ABDOMINAL AORTIC ATHEROSCLEROSIS: ICD-10-CM

## 2019-10-16 DIAGNOSIS — I77.1 TORTUOUS AORTA: ICD-10-CM

## 2019-10-16 DIAGNOSIS — M48.02 CERVICAL STENOSIS OF SPINAL CANAL: ICD-10-CM

## 2019-10-16 DIAGNOSIS — I10 HTN (HYPERTENSION), BENIGN: ICD-10-CM

## 2019-10-16 DIAGNOSIS — G62.9 NEUROPATHY: ICD-10-CM

## 2019-10-16 DIAGNOSIS — G70.00 MYASTHENIA GRAVIS, ACHR ANTIBODY POSITIVE: ICD-10-CM

## 2019-10-16 DIAGNOSIS — I47.29 NONSUSTAINED VENTRICULAR TACHYCARDIA: ICD-10-CM

## 2019-10-16 DIAGNOSIS — Z00.00 ENCOUNTER FOR PREVENTIVE HEALTH EXAMINATION: Primary | ICD-10-CM

## 2019-10-16 DIAGNOSIS — I65.23 BILATERAL CAROTID ARTERY STENOSIS: ICD-10-CM

## 2019-10-16 PROCEDURE — 3074F PR MOST RECENT SYSTOLIC BLOOD PRESSURE < 130 MM HG: ICD-10-PCS | Mod: HCNC,CPTII,S$GLB, | Performed by: NURSE PRACTITIONER

## 2019-10-16 PROCEDURE — 3078F PR MOST RECENT DIASTOLIC BLOOD PRESSURE < 80 MM HG: ICD-10-PCS | Mod: HCNC,CPTII,S$GLB, | Performed by: NURSE PRACTITIONER

## 2019-10-16 PROCEDURE — 99999 PR PBB SHADOW E&M-EST. PATIENT-LVL V: CPT | Mod: PBBFAC,HCNC,, | Performed by: NURSE PRACTITIONER

## 2019-10-16 PROCEDURE — 99999 PR PBB SHADOW E&M-EST. PATIENT-LVL V: ICD-10-PCS | Mod: PBBFAC,HCNC,, | Performed by: NURSE PRACTITIONER

## 2019-10-16 PROCEDURE — G0439 PR MEDICARE ANNUAL WELLNESS SUBSEQUENT VISIT: ICD-10-PCS | Mod: HCNC,S$GLB,, | Performed by: NURSE PRACTITIONER

## 2019-10-16 PROCEDURE — 3078F DIAST BP <80 MM HG: CPT | Mod: HCNC,CPTII,S$GLB, | Performed by: NURSE PRACTITIONER

## 2019-10-16 PROCEDURE — 3074F SYST BP LT 130 MM HG: CPT | Mod: HCNC,CPTII,S$GLB, | Performed by: NURSE PRACTITIONER

## 2019-10-16 PROCEDURE — G0439 PPPS, SUBSEQ VISIT: HCPCS | Mod: HCNC,S$GLB,, | Performed by: NURSE PRACTITIONER

## 2019-10-16 NOTE — PROGRESS NOTES
"Maximilian Tavera presented for a  Medicare AWV and comprehensive Health Risk Assessment today. The following components were reviewed and updated:    · Medical history  · Family History  · Social history  · Allergies and Current Medications  · Health Risk Assessment  · Health Maintenance  · Care Team     ** See Completed Assessments for Annual Wellness Visit within the encounter summary.**       The following assessments were completed:  · Living Situation  · CAGE  · Depression Screening  · Timed Get Up and Go  · Whisper Test  · Cognitive Function Screening          · Nutrition Screening  · ADL Screening  · PAQ Screening    Vitals:    10/16/19 1405   BP: 108/60   BP Location: Left arm   Patient Position: Sitting   BP Method: Large (Manual)   Pulse: 72   SpO2: 95%   Weight: 109.7 kg (241 lb 13.5 oz)   Height: 6' 1" (1.854 m)     Body mass index is 31.91 kg/m².  Physical Exam   Constitutional: He appears well-developed. No distress.   Eyes: Conjunctivae are normal.   Cardiovascular: Normal rate, regular rhythm and normal heart sounds.   No murmur heard.  Pulmonary/Chest: Effort normal and breath sounds normal. He has no wheezes.   Neurological: He is alert.   Skin: Skin is warm.   Psychiatric: He has a normal mood and affect.         Diagnoses and health risks identified today and associated recommendations/orders:    1. Encounter for preventive health examination  Reviewed health maintenance and provided recommendations   Recommend discuss colonoscopy with pcp  Recommend shingrix vaccine     2. Cervical stenosis of spinal canal  Continue to monitor  Followed by Viktor.      3. Myasthenia gravis, AChR antibody positive  Continue to monitor  Followed by Brian Marroquin MD .      4. Neuropathy  Continue to monitor  Followed by Brian Marroquin MD .      5. Abdominal aortic atherosclerosis  Continue to monitor  Followed by Eddi  Taking statin.      6. HTN (hypertension), benign  Continue to monitor  Followed by Brian Marroquin, " MD .      7. Nonsustained ventricular tachycardia  Continue to monitor  Followed by Eddi.      8. Tortuous aorta  Continue to monitor  Followed by Eddi.      9. Current chronic use of systemic steroids  Continue to monitor  Followed by Brian Marroquin MD .      10.  Carotid artery disease  Continue to monitor  Followed by Eddi  Taking statin  c-spine 1/7/19.        Provided Maximilian with a 5-10 year written screening schedule and personal prevention plan. Recommendations were developed using the USPSTF age appropriate recommendations. Education, counseling, and referrals were provided as needed. After Visit Summary printed and given to patient which includes a list of additional screenings\tests needed.    Follow up in about 1 year (around 10/16/2020).    Yenni Valdovinos NP  I offered to discuss end of life issues, including information on how to make advance directives that the patient could use to name someone who would make medical decisions on their behalf if they became too ill to make themselves.    ___Patient declined  _X_Patient is interested, I provided paper work and offered to discuss.

## 2019-10-16 NOTE — PATIENT INSTRUCTIONS
Counseling and Referral of Other Preventative  (Italic type indicates deductible and co-insurance are waived)    Patient Name: Maximilian Tavera  Today's Date: 10/16/2019    Health Maintenance       Date Due Completion Date    Shingles Vaccine (2 of 3) 09/01/2015 7/7/2015    Colonoscopy 03/01/2017 3/1/2012    Lipid Panel 05/02/2020 5/2/2019    High Dose Statin 10/08/2020 10/8/2019    TETANUS VACCINE 08/31/2025 8/31/2015        No orders of the defined types were placed in this encounter.    The following information is provided to all patients.  This information is to help you find resources for any of the problems found today that may be affecting your health:                Living healthy guide: www.[facility].[place].gov      Understanding Diabetes: www.diabetes.org      Eating healthy: www.cdc.gov/healthyweight      [facility] home safety checklist: www.cdc.gov/steadi/patient.html      Agency on Aging: www.goea.[place].gov      Alcoholics anonymous (AA): www.aa.org      Physical Activity: www.aristides.nih.gov/yz5jgcx      Tobacco use: www.quitwithusla.org

## 2019-10-18 ENCOUNTER — TELEPHONE (OUTPATIENT)
Dept: ELECTROPHYSIOLOGY | Facility: CLINIC | Age: 74
End: 2019-10-18

## 2019-10-18 ENCOUNTER — TELEPHONE (OUTPATIENT)
Dept: ENDOSCOPY | Facility: HOSPITAL | Age: 74
End: 2019-10-18

## 2019-10-18 NOTE — TELEPHONE ENCOUNTER
Spoke with patient's wife. They are currently out of town, but will call me back. I provided my name and number.

## 2019-10-18 NOTE — TELEPHONE ENCOUNTER
----- Message from Sathya Zamudio MD sent at 10/18/2019 11:55 AM CDT -----  Please notify the patient that his holter monitor noted very rare PVCs now that he is on carvedilol.

## 2019-10-18 NOTE — PROGRESS NOTES
Please notify the patient that his holter monitor noted very rare PVCs now that he is on carvedilol.

## 2019-10-23 ENCOUNTER — TELEPHONE (OUTPATIENT)
Dept: CARDIOLOGY | Facility: CLINIC | Age: 74
End: 2019-10-23

## 2019-10-23 ENCOUNTER — PATIENT MESSAGE (OUTPATIENT)
Dept: CARDIOLOGY | Facility: CLINIC | Age: 74
End: 2019-10-23

## 2019-10-23 PROBLEM — I65.23 BILATERAL CAROTID ARTERY STENOSIS: Status: ACTIVE | Noted: 2019-10-23

## 2019-10-28 ENCOUNTER — PATIENT OUTREACH (OUTPATIENT)
Dept: ADMINISTRATIVE | Facility: OTHER | Age: 74
End: 2019-10-28

## 2019-10-29 ENCOUNTER — OFFICE VISIT (OUTPATIENT)
Dept: ELECTROPHYSIOLOGY | Facility: CLINIC | Age: 74
End: 2019-10-29
Payer: MEDICARE

## 2019-10-29 ENCOUNTER — HOSPITAL ENCOUNTER (OUTPATIENT)
Dept: CARDIOLOGY | Facility: CLINIC | Age: 74
Discharge: HOME | End: 2019-10-29
Payer: MEDICARE

## 2019-10-29 VITALS
HEIGHT: 73 IN | WEIGHT: 240 LBS | BODY MASS INDEX: 31.81 KG/M2 | HEART RATE: 60 BPM | SYSTOLIC BLOOD PRESSURE: 140 MMHG | DIASTOLIC BLOOD PRESSURE: 80 MMHG

## 2019-10-29 DIAGNOSIS — I49.3 PVC (PREMATURE VENTRICULAR CONTRACTION): ICD-10-CM

## 2019-10-29 DIAGNOSIS — I47.29 NONSUSTAINED VENTRICULAR TACHYCARDIA: ICD-10-CM

## 2019-10-29 DIAGNOSIS — I10 HTN (HYPERTENSION), BENIGN: ICD-10-CM

## 2019-10-29 DIAGNOSIS — I49.3 PVC'S (PREMATURE VENTRICULAR CONTRACTIONS): Primary | ICD-10-CM

## 2019-10-29 PROCEDURE — 93005 RHYTHM STRIP: ICD-10-PCS | Mod: HCNC,S$GLB,, | Performed by: INTERNAL MEDICINE

## 2019-10-29 PROCEDURE — 3077F SYST BP >= 140 MM HG: CPT | Mod: HCNC,CPTII,S$GLB, | Performed by: INTERNAL MEDICINE

## 2019-10-29 PROCEDURE — 99214 OFFICE O/P EST MOD 30 MIN: CPT | Mod: HCNC,S$GLB,, | Performed by: INTERNAL MEDICINE

## 2019-10-29 PROCEDURE — 99499 UNLISTED E&M SERVICE: CPT | Mod: HCNC,S$GLB,, | Performed by: INTERNAL MEDICINE

## 2019-10-29 PROCEDURE — 1101F PT FALLS ASSESS-DOCD LE1/YR: CPT | Mod: HCNC,CPTII,S$GLB, | Performed by: INTERNAL MEDICINE

## 2019-10-29 PROCEDURE — 1101F PR PT FALLS ASSESS DOC 0-1 FALLS W/OUT INJ PAST YR: ICD-10-PCS | Mod: HCNC,CPTII,S$GLB, | Performed by: INTERNAL MEDICINE

## 2019-10-29 PROCEDURE — 93010 ELECTROCARDIOGRAM REPORT: CPT | Mod: HCNC,S$GLB,, | Performed by: INTERNAL MEDICINE

## 2019-10-29 PROCEDURE — 99999 PR PBB SHADOW E&M-EST. PATIENT-LVL IV: CPT | Mod: PBBFAC,HCNC,, | Performed by: INTERNAL MEDICINE

## 2019-10-29 PROCEDURE — 3079F PR MOST RECENT DIASTOLIC BLOOD PRESSURE 80-89 MM HG: ICD-10-PCS | Mod: HCNC,CPTII,S$GLB, | Performed by: INTERNAL MEDICINE

## 2019-10-29 PROCEDURE — 99999 PR PBB SHADOW E&M-EST. PATIENT-LVL IV: ICD-10-PCS | Mod: PBBFAC,HCNC,, | Performed by: INTERNAL MEDICINE

## 2019-10-29 PROCEDURE — 3077F PR MOST RECENT SYSTOLIC BLOOD PRESSURE >= 140 MM HG: ICD-10-PCS | Mod: HCNC,CPTII,S$GLB, | Performed by: INTERNAL MEDICINE

## 2019-10-29 PROCEDURE — 99214 PR OFFICE/OUTPT VISIT, EST, LEVL IV, 30-39 MIN: ICD-10-PCS | Mod: HCNC,S$GLB,, | Performed by: INTERNAL MEDICINE

## 2019-10-29 PROCEDURE — 3079F DIAST BP 80-89 MM HG: CPT | Mod: HCNC,CPTII,S$GLB, | Performed by: INTERNAL MEDICINE

## 2019-10-29 PROCEDURE — 93005 ELECTROCARDIOGRAM TRACING: CPT | Mod: HCNC,S$GLB,, | Performed by: INTERNAL MEDICINE

## 2019-10-29 PROCEDURE — 99499 RISK ADDL DX/OHS AUDIT: ICD-10-PCS | Mod: HCNC,S$GLB,, | Performed by: INTERNAL MEDICINE

## 2019-10-29 PROCEDURE — 93010 RHYTHM STRIP: ICD-10-PCS | Mod: HCNC,S$GLB,, | Performed by: INTERNAL MEDICINE

## 2019-10-29 NOTE — PROGRESS NOTES
Subjective:    Patient ID:  Maximilian Tavera Jr. is a 73 y.o. male who presents for evaluation of PVCs    Referring Cardiologist: Alphonso Altamirano MD  Primary Care Physician: Brian Marroquin MD  Neurologist: César Hoang MD    HPI  Prior Hx:  I had the pleasure of seeing Mr. Tavera today in our electrophysiology clinic in consultation for his palpitations. As you are aware he is a pleasant 73 year-old man with hypertension, myasthenia gravis on chronic prednisone therapy, and degenerative joint disease with myelopathy s/p decompression and fusion of C2-6. He reports being diagnosed with PVCs 5 years ago however over the past 1-2 months have become more prevalent. No syncope but has been having intermittent episodes of symptomatic hypotension with heart rate in the 110s which seemed to increase when started on BPH meds. He saw Dr. Altamirano in cardiology clinic who ordered an exercise stress echo and a 30 day event monitor. Stress test noted a structurally normal heart without ischemia. The ECG portion of his stress test noted sinus rhythm with occasional unifocal PVCs (LBBB but with V2 pattern break, +Rs II/aVF, rS in III, R in 1) at times with PVC couplets. His 30 day event monitor noted frequent PVCs with PVC couplets, triplets and occasional 4 beat NSVT. He denies any near syncope or syncope.    He was on carvedilol for years however this was stopped 2 months ago when he was started on Flomax for BPH (prescribing physician suggested stopping carvedilol to avoid hypotension). He stopped this and changed to alfuzosin however he stopped this due to symptomatic hypotension. For HTN he is on olmesartan and chlorthalidone.    ECG from 10/2018 notes a dimorphic couplet with 1 similar to PVC noted on stress test and the other inferiorly directed PVC in inferior leads. Other ECGs show sinus rhythm with RBBB.    Holter monitor 5/2019 noted 2.4% PVC burden but with 14 runs of nonsustained VT up to 33 beats. We discussed  on the phone and resumed carvedilol 6.25mg q12h. He presented for follow-up shortly after and had no complaints. No MG symptoms since starting carvedilol. He has no symptoms from the ventricular arrhythmias. We increased his carvedilol to 12.5mg q12h.    Interim Hx:  Mr. Tavera returns for follow-up. He denies any issues related to his MG on 25mg carvedilol q12h. Recent holter monitor on this dosage indicated only 604 PVCs and no NSVT. He feels much better on carvedilol.    My interpretation of today's in clinic ECG is sinus rhythm with RBBB and no PVCs    Review of Systems   Constitution: Negative for fever and malaise/fatigue.   HENT: Negative for congestion and nosebleeds.    Eyes: Negative for blurred vision and visual disturbance.   Cardiovascular: Negative for chest pain, dyspnea on exertion, irregular heartbeat, leg swelling, near-syncope, orthopnea, palpitations, paroxysmal nocturnal dyspnea and syncope.   Respiratory: Negative for cough and shortness of breath.    Hematologic/Lymphatic: Negative for bleeding problem. Does not bruise/bleed easily.   Skin: Negative.    Musculoskeletal: Negative.    Gastrointestinal: Negative for bloating and abdominal pain.   Neurological: Negative for dizziness and focal weakness.        Objective:    Physical Exam   Constitutional: He is oriented to person, place, and time. He appears well-developed and well-nourished. No distress.   HENT:   Head: Normocephalic and atraumatic.   Eyes: Conjunctivae are normal. Right eye exhibits no discharge. Left eye exhibits no discharge.   Neck: Neck supple. No JVD present.   Cardiovascular: Normal rate and regular rhythm. Exam reveals no gallop and no friction rub.   No murmur heard.  Pulmonary/Chest: Effort normal and breath sounds normal. No respiratory distress. He has no wheezes. He has no rales.   Abdominal: Soft. Bowel sounds are normal. He exhibits no distension. There is no tenderness. There is no rebound.   Musculoskeletal: He  exhibits no edema.   Neurological: He is alert and oriented to person, place, and time.   Skin: Skin is warm and dry. He is not diaphoretic.   Psychiatric: He has a normal mood and affect. His behavior is normal. Judgment and thought content normal.   Vitals reviewed.        Assessment:       1. PVC's (premature ventricular contractions)    2. Nonsustained ventricular tachycardia    3. HTN (hypertension), benign         Plan:       In summary, Mr. Tavera is a pleasant 73 year-old man with hypertension, myasthenia gravis on chronic prednisone therapy, and degenerative joint disease with myelopathy s/p decompression and fusion of C2-6, chronic PVCs however since the beginning of this year have become more prevalent, that was related to stopping his carvedilol. Holter monitor noted ~2% PVC burden but with 14 episodes of NSVT up to 33 beats that were asymptomatic.  No indication that carvedilol worsened his MG symptoms. After discussion with Dr. Hoang in Neurology we resumed carvedilol. He is doing well back on 25mg bid with rare ectopy and no NSVT on monitoring.    RTC in 1 year with preclinic holter.    Thank you for allowing me to participate in the care of this patient. Please do not hesitate to call me with any questions or concerns.    Sathya Zamudio MD, PhD  Cardiac Electrophysiology

## 2019-11-04 NOTE — TELEPHONE ENCOUNTER
Spoke to Mr. Yao who reports he did receive our message and will call with any questions or concerns.

## 2019-11-11 DIAGNOSIS — I10 HTN (HYPERTENSION), BENIGN: ICD-10-CM

## 2019-11-13 ENCOUNTER — TELEPHONE (OUTPATIENT)
Dept: ENDOSCOPY | Facility: HOSPITAL | Age: 74
End: 2019-11-13

## 2019-11-13 RX ORDER — OLMESARTAN MEDOXOMIL 20 MG/1
TABLET ORAL
Qty: 90 TABLET | Refills: 4 | Status: SHIPPED | OUTPATIENT
Start: 2019-11-13 | End: 2019-12-15 | Stop reason: SDUPTHER

## 2019-11-13 NOTE — TELEPHONE ENCOUNTER
Spoke with patient. EGD/EUS scheduled for 12/6 at 10a. Reviewed prep instructions. Mr Tavera verbalized understanding.

## 2019-11-14 ENCOUNTER — TELEPHONE (OUTPATIENT)
Dept: ENDOSCOPY | Facility: HOSPITAL | Age: 74
End: 2019-11-14

## 2019-11-14 NOTE — TELEPHONE ENCOUNTER
Received request to schedule patient for EUS/EGD on 12/6/19 at 10:00am. Spoke with Patient. Reviewed medical history and medications. Instructed on procedure and prep. Patient verbalized understanding of instructions. E-mailed copy of instructions to address in chart.

## 2019-11-18 ENCOUNTER — PATIENT MESSAGE (OUTPATIENT)
Dept: FAMILY MEDICINE | Facility: CLINIC | Age: 74
End: 2019-11-18

## 2019-11-18 ENCOUNTER — PATIENT MESSAGE (OUTPATIENT)
Dept: CARDIOLOGY | Facility: CLINIC | Age: 74
End: 2019-11-18

## 2019-11-18 DIAGNOSIS — E03.9 HYPOTHYROIDISM: ICD-10-CM

## 2019-11-18 DIAGNOSIS — E03.1 CONGENITAL HYPOTHYROIDISM WITHOUT GOITER: ICD-10-CM

## 2019-11-18 RX ORDER — LEVOTHYROXINE SODIUM 50 UG/1
50 TABLET ORAL DAILY
Qty: 90 TABLET | Refills: 3 | Status: SHIPPED | OUTPATIENT
Start: 2019-11-18 | End: 2020-10-07 | Stop reason: SDUPTHER

## 2019-11-19 DIAGNOSIS — I10 HYPERTENSION, UNSPECIFIED TYPE: ICD-10-CM

## 2019-11-19 DIAGNOSIS — R06.02 SHORTNESS OF BREATH: Primary | ICD-10-CM

## 2019-11-19 DIAGNOSIS — M25.562 LEFT KNEE PAIN, UNSPECIFIED CHRONICITY: Primary | ICD-10-CM

## 2019-11-21 ENCOUNTER — HOSPITAL ENCOUNTER (OUTPATIENT)
Dept: RADIOLOGY | Facility: HOSPITAL | Age: 74
Discharge: HOME | End: 2019-11-21
Attending: ORTHOPAEDIC SURGERY
Payer: MEDICARE

## 2019-11-21 ENCOUNTER — OFFICE VISIT (OUTPATIENT)
Dept: PAIN MEDICINE | Facility: CLINIC | Age: 74
End: 2019-11-21
Payer: MEDICARE

## 2019-11-21 ENCOUNTER — OFFICE VISIT (OUTPATIENT)
Dept: ORTHOPEDICS | Facility: CLINIC | Age: 74
End: 2019-11-21
Payer: MEDICARE

## 2019-11-21 VITALS — WEIGHT: 240 LBS | HEIGHT: 73 IN | BODY MASS INDEX: 31.81 KG/M2

## 2019-11-21 VITALS
BODY MASS INDEX: 31.81 KG/M2 | HEART RATE: 71 BPM | DIASTOLIC BLOOD PRESSURE: 60 MMHG | WEIGHT: 240 LBS | HEIGHT: 73 IN | SYSTOLIC BLOOD PRESSURE: 130 MMHG

## 2019-11-21 DIAGNOSIS — M54.16 LUMBAR RADICULOPATHY: Primary | ICD-10-CM

## 2019-11-21 DIAGNOSIS — M17.0 PRIMARY OSTEOARTHRITIS OF BOTH KNEES: Primary | ICD-10-CM

## 2019-11-21 DIAGNOSIS — M77.11 LATERAL EPICONDYLITIS, RIGHT ELBOW: ICD-10-CM

## 2019-11-21 DIAGNOSIS — M51.36 DDD (DEGENERATIVE DISC DISEASE), LUMBAR: ICD-10-CM

## 2019-11-21 DIAGNOSIS — M25.511 RIGHT SHOULDER PAIN, UNSPECIFIED CHRONICITY: Primary | ICD-10-CM

## 2019-11-21 DIAGNOSIS — M25.511 RIGHT SHOULDER PAIN, UNSPECIFIED CHRONICITY: ICD-10-CM

## 2019-11-21 DIAGNOSIS — M17.12 PRIMARY OSTEOARTHRITIS OF LEFT KNEE: Primary | ICD-10-CM

## 2019-11-21 DIAGNOSIS — M25.562 LEFT KNEE PAIN, UNSPECIFIED CHRONICITY: ICD-10-CM

## 2019-11-21 PROCEDURE — 1159F MED LIST DOCD IN RCRD: CPT | Mod: HCNC,S$GLB,, | Performed by: ANESTHESIOLOGY

## 2019-11-21 PROCEDURE — 73030 XR SHOULDER TRAUMA 3 VIEW RIGHT: ICD-10-PCS | Mod: 26,HCNC,RT, | Performed by: RADIOLOGY

## 2019-11-21 PROCEDURE — 73564 X-RAY EXAM KNEE 4 OR MORE: CPT | Mod: 26,HCNC,LT, | Performed by: RADIOLOGY

## 2019-11-21 PROCEDURE — 99999 PR PBB SHADOW E&M-EST. PATIENT-LVL III: ICD-10-PCS | Mod: PBBFAC,HCNC,, | Performed by: ORTHOPAEDIC SURGERY

## 2019-11-21 PROCEDURE — 1159F PR MEDICATION LIST DOCUMENTED IN MEDICAL RECORD: ICD-10-PCS | Mod: HCNC,S$GLB,, | Performed by: ORTHOPAEDIC SURGERY

## 2019-11-21 PROCEDURE — 3078F PR MOST RECENT DIASTOLIC BLOOD PRESSURE < 80 MM HG: ICD-10-PCS | Mod: HCNC,CPTII,S$GLB, | Performed by: ORTHOPAEDIC SURGERY

## 2019-11-21 PROCEDURE — 1125F AMNT PAIN NOTED PAIN PRSNT: CPT | Mod: HCNC,S$GLB,, | Performed by: ORTHOPAEDIC SURGERY

## 2019-11-21 PROCEDURE — 3075F SYST BP GE 130 - 139MM HG: CPT | Mod: HCNC,CPTII,S$GLB, | Performed by: ORTHOPAEDIC SURGERY

## 2019-11-21 PROCEDURE — 99214 OFFICE O/P EST MOD 30 MIN: CPT | Mod: HCNC,S$GLB,, | Performed by: ANESTHESIOLOGY

## 2019-11-21 PROCEDURE — 20551 TENDON ORIGIN: R ELBOW: ICD-10-PCS | Mod: 59,51,HCNC,RT | Performed by: ORTHOPAEDIC SURGERY

## 2019-11-21 PROCEDURE — 73030 X-RAY EXAM OF SHOULDER: CPT | Mod: TC,HCNC,PN,RT

## 2019-11-21 PROCEDURE — 1159F PR MEDICATION LIST DOCUMENTED IN MEDICAL RECORD: ICD-10-PCS | Mod: HCNC,S$GLB,, | Performed by: ANESTHESIOLOGY

## 2019-11-21 PROCEDURE — 1125F AMNT PAIN NOTED PAIN PRSNT: CPT | Mod: HCNC,S$GLB,, | Performed by: ANESTHESIOLOGY

## 2019-11-21 PROCEDURE — 99999 PR PBB SHADOW E&M-EST. PATIENT-LVL III: CPT | Mod: PBBFAC,HCNC,, | Performed by: ORTHOPAEDIC SURGERY

## 2019-11-21 PROCEDURE — 20610 LARGE JOINT ASPIRATION/INJECTION: L KNEE: ICD-10-PCS | Mod: HCNC,LT,S$GLB, | Performed by: ORTHOPAEDIC SURGERY

## 2019-11-21 PROCEDURE — 73562 X-RAY EXAM OF KNEE 3: CPT | Mod: TC,HCNC,PN,59,RT

## 2019-11-21 PROCEDURE — 73030 X-RAY EXAM OF SHOULDER: CPT | Mod: 26,HCNC,RT, | Performed by: RADIOLOGY

## 2019-11-21 PROCEDURE — 73562 X-RAY EXAM OF KNEE 3: CPT | Mod: 26,59,HCNC,RT | Performed by: RADIOLOGY

## 2019-11-21 PROCEDURE — 99999 PR PBB SHADOW E&M-EST. PATIENT-LVL III: CPT | Mod: PBBFAC,HCNC,, | Performed by: ANESTHESIOLOGY

## 2019-11-21 PROCEDURE — 99999 PR PBB SHADOW E&M-EST. PATIENT-LVL III: ICD-10-PCS | Mod: PBBFAC,HCNC,, | Performed by: ANESTHESIOLOGY

## 2019-11-21 PROCEDURE — 99213 PR OFFICE/OUTPT VISIT, EST, LEVL III, 20-29 MIN: ICD-10-PCS | Mod: 25,HCNC,S$GLB, | Performed by: ORTHOPAEDIC SURGERY

## 2019-11-21 PROCEDURE — 1125F PR PAIN SEVERITY QUANTIFIED, PAIN PRESENT: ICD-10-PCS | Mod: HCNC,S$GLB,, | Performed by: ANESTHESIOLOGY

## 2019-11-21 PROCEDURE — 99214 PR OFFICE/OUTPT VISIT, EST, LEVL IV, 30-39 MIN: ICD-10-PCS | Mod: HCNC,S$GLB,, | Performed by: ANESTHESIOLOGY

## 2019-11-21 PROCEDURE — 1101F PR PT FALLS ASSESS DOC 0-1 FALLS W/OUT INJ PAST YR: ICD-10-PCS | Mod: HCNC,CPTII,S$GLB, | Performed by: ORTHOPAEDIC SURGERY

## 2019-11-21 PROCEDURE — 73564 XR KNEE ORTHO LEFT WITH FLEXION: ICD-10-PCS | Mod: 26,HCNC,LT, | Performed by: RADIOLOGY

## 2019-11-21 PROCEDURE — 3078F DIAST BP <80 MM HG: CPT | Mod: HCNC,CPTII,S$GLB, | Performed by: ORTHOPAEDIC SURGERY

## 2019-11-21 PROCEDURE — 1101F PT FALLS ASSESS-DOCD LE1/YR: CPT | Mod: HCNC,CPTII,S$GLB, | Performed by: ORTHOPAEDIC SURGERY

## 2019-11-21 PROCEDURE — 73562 XR KNEE ORTHO LEFT WITH FLEXION: ICD-10-PCS | Mod: 26,59,HCNC,RT | Performed by: RADIOLOGY

## 2019-11-21 PROCEDURE — 3075F PR MOST RECENT SYSTOLIC BLOOD PRESS GE 130-139MM HG: ICD-10-PCS | Mod: HCNC,CPTII,S$GLB, | Performed by: ORTHOPAEDIC SURGERY

## 2019-11-21 PROCEDURE — 1125F PR PAIN SEVERITY QUANTIFIED, PAIN PRESENT: ICD-10-PCS | Mod: HCNC,S$GLB,, | Performed by: ORTHOPAEDIC SURGERY

## 2019-11-21 PROCEDURE — 20610 DRAIN/INJ JOINT/BURSA W/O US: CPT | Mod: HCNC,LT,S$GLB, | Performed by: ORTHOPAEDIC SURGERY

## 2019-11-21 PROCEDURE — 1101F PT FALLS ASSESS-DOCD LE1/YR: CPT | Mod: HCNC,CPTII,S$GLB, | Performed by: ANESTHESIOLOGY

## 2019-11-21 PROCEDURE — 99213 OFFICE O/P EST LOW 20 MIN: CPT | Mod: 25,HCNC,S$GLB, | Performed by: ORTHOPAEDIC SURGERY

## 2019-11-21 PROCEDURE — 20551 NJX 1 TENDON ORIGIN/INSJ: CPT | Mod: 59,51,HCNC,RT | Performed by: ORTHOPAEDIC SURGERY

## 2019-11-21 PROCEDURE — 1101F PR PT FALLS ASSESS DOC 0-1 FALLS W/OUT INJ PAST YR: ICD-10-PCS | Mod: HCNC,CPTII,S$GLB, | Performed by: ANESTHESIOLOGY

## 2019-11-21 PROCEDURE — 1159F MED LIST DOCD IN RCRD: CPT | Mod: HCNC,S$GLB,, | Performed by: ORTHOPAEDIC SURGERY

## 2019-11-21 RX ORDER — POTASSIUM CHLORIDE 1500 MG/1
TABLET, EXTENDED RELEASE ORAL
COMMUNITY
Start: 2019-10-31 | End: 2020-01-10

## 2019-11-21 RX ORDER — TRIAMCINOLONE ACETONIDE 40 MG/ML
40 INJECTION, SUSPENSION INTRA-ARTICULAR; INTRAMUSCULAR
Status: DISCONTINUED | OUTPATIENT
Start: 2019-11-21 | End: 2019-11-21 | Stop reason: HOSPADM

## 2019-11-21 RX ADMIN — TRIAMCINOLONE ACETONIDE 40 MG: 40 INJECTION, SUSPENSION INTRA-ARTICULAR; INTRAMUSCULAR at 10:11

## 2019-11-21 NOTE — H&P (VIEW-ONLY)
PCP: Brian Marroquin MD      CC:  Low back and bilateral leg pain    Interval history: Mr. Tavera is a 73 y.o. male who is known patient to our clinic.  He was last seen in June 2018.  He underwent cervical spine surgery since his last visit with us.  Neck pain is currently tolerable.  He does have some residual left shoulder pain.  Main complaint today is worsening lower back and radiating bilateral leg pain, right greater than left.  Pain worsens standing, walking getting up.  Left-sided radicular pain is new compared to previous symptoms.  No recent traumatic incident.  He has tried physical therapy with minimal benefit.  He currently takes gabapentin with mild benefits.  He has not had any updated imaging.  He denies any worsening weakness.  No bowel bladder changes.   Prior HPI:   Mr. Tavera is a 68 year old male with PMH of HTN referred by Dr. Campbell for right leg pain.  He states having constant right leg pain for the past two years.  Pain is a throbbing pain in her posterior thigh and travels down to his ankle.  He denies any lower back pain.  No leg weakness or numbness.  Pain worsens with standing and walking.  Pain is relieved with rest.  He takes ibuprofen with mild benefits.  Physical therapy has not been helpful.      Pain intervention history: s/p right L4-5 and L5-S1 TFESI on 10/9/2014 and reports 75% relief of his low back and right leg pain.   s/p right L4-5 and L5-S1 TFESI on 2/21/17 and 9/2017 reports 70% relief of his right leg pain.  S/p right IESI at L4-5 and L5-S1 on 5/17/2018     ROS:  CONSTITUTIONAL: No fevers, chills, night sweats, wt. loss, appetite changes  SKIN: no rashes or itching  ENT: No headaches, head trauma, vision changes, or eye pain  LYMPH NODES: None noticed   CV: No chest pain, palpitations.   RESP: No shortness of breath, dyspnea on exertion, cough, wheezing, or hemoptysis  GI: No nausea, emesis, diarrhea, constipation, melena, hematochezia, pain.    : No dysuria,  hematuria, urgency, or frequency   HEME: No easy bruising, bleeding problems  PSYCHIATRIC: No depression, anxiety, psychosis, hallucinations.  NEURO: No seizures, memory loss, dizziness or difficulty sleeping  MSK: + right leg pain      Past Medical History:   Diagnosis Date    Arthritis     Back pain     Carpal tunnel syndrome 2/25/2013    Cataract     Cataract, left eye 11/10/2014    Chest pain, musculoskeletal     COPD (chronic obstructive pulmonary disease) 11/052018    Emphysema lung 11/05/2018    Hx of colonic polyp     Hyperlipidemia     Hypertension     Hypothyroidism     Knee fracture     Myasthenia gravis     Neuropathy 1/3/2013    Obesity 1/29/2015    Polyneuropathy     PVC (premature ventricular contraction)     Squamous cell carcinoma 2014    left forearm    Thyroid disease      Past Surgical History:   Procedure Laterality Date    APPENDECTOMY      CATARACT EXTRACTION W/  INTRAOCULAR LENS IMPLANT Bilateral     COLONOSCOPY  03/01/2012    Dr Esteban; hyperplastic polyp; repeat in 5 years    CYSTOSCOPY N/A 2/26/2019    Procedure: CYSTOSCOPY;  Surgeon: Simba Cheung MD;  Location: [facility];  Service: Urology;  Laterality: N/A;    ESOPHAGOGASTRODUODENOSCOPY N/A 9/12/2018    Procedure: EGD (ESOPHAGOGASTRODUODENOSCOPY);  Surgeon: Gabriel Hernandez MD;  Location: [facility];  Service: Endoscopy;  Laterality: N/A;    ESOPHAGOGASTRODUODENOSCOPY N/A 8/19/2019    Procedure: EGD (ESOPHAGOGASTRODUODENOSCOPY);  Surgeon: Gabriel Hernandez MD;  Location: [facility];  Service: Endoscopy;  Laterality: N/A;    ESOPHAGOGASTRODUODENOSCOPY N/A 10/7/2019    Procedure: EGD (ESOPHAGOGASTRODUODENOSCOPY);  Surgeon: Gabriel Hernandez MD;  Location: [facility];  Service: Endoscopy;  Laterality: N/A;    HEMORRHOID SURGERY      KNEE ARTHROPLASTY Bilateral     NECK SURGERY      POSTERIOR FUSION OF CERVICAL SPINE WITH LAMINECTOMY N/A 11/7/2018    Procedure: C2-C6 Posterior Cervical Laminectomy &  Instrumental Fusion;  Surgeon: Kishore Turner MD;  [facility];  Service: Neurosurgery;  Laterality: N/A;    TONSILLECTOMY      TRANSRECTAL ULTRASOUND EXAMINATION N/A 2/26/2019    Procedure: ULTRASOUND, RECTAL APPROACH;  Surgeon: Simba Cheung MD;  Location: [facility];  Service: Urology;  Laterality: N/A;    UPPER GASTROINTESTINAL ENDOSCOPY  09/12/2018    Dr Hernandez; gastritis; extensive intestinal metaplasia; repeat in 1-2- years     Family History   Problem Relation Age of Onset    Cataracts Mother     Heart disease Mother         CHF    Hypertension Mother     Hyperlipidemia Mother     Cataracts Father     Glaucoma Father     Heart disease Father     Hyperlipidemia Sister     Hypertension Sister     No Known Problems Daughter     No Known Problems Daughter     Collagen disease Neg Hx     Amblyopia Neg Hx     Blindness Neg Hx     Macular degeneration Neg Hx     Retinal detachment Neg Hx     Strabismus Neg Hx     Cancer Neg Hx     Colon cancer Neg Hx     Esophageal cancer Neg Hx     Stomach cancer Neg Hx     Crohn's disease Neg Hx     Ulcerative colitis Neg Hx      Social History     Socioeconomic History    Marital status:      Spouse name: Not on file    Number of children: Not on file    Years of education: Not on file    Highest education level: Not on file   Occupational History    Not on file   Social Needs    Financial resource strain: Not hard at all    Food insecurity:     Worry: Never true     Inability: Never true    Transportation needs:     Medical: No     Non-medical: No   Tobacco Use    Smoking status: Never Smoker    Smokeless tobacco: Never Used    Tobacco comment: when a child   Substance and Sexual Activity    Alcohol use: Yes     Frequency: Never     Binge frequency: Never     Comment: rarely    Drug use: No    Sexual activity: Yes     Partners: Female   Lifestyle    Physical activity:     Days per week: 0 days     Minutes per  "session: 0 min    Stress: Not at all   Relationships    Social connections:     Talks on phone: More than three times a week     Gets together: More than three times a week     Attends Church service: Not on file     Active member of club or organization: Yes     Attends meetings of clubs or organizations: More than 4 times per year     Relationship status:    Other Topics Concern    Not on file   Social History Narrative    Not on file         Medications/Allergies: See med card    Vitals:    11/21/19 0847   Weight: 108.9 kg (240 lb)   Height: 6' 1" (1.854 m)   PainSc:   5   PainLoc: Back         Physical exam:    GENERAL: A and O x3, the patient appears well groomed and is in no acute distress.  Skin: No rashes or obvious lesions  HEENT: normocephalic, atraumatic  CARDIOVASCULAR:  RRR  LUNGS: non labored breathing  ABDOMEN: soft, nontender   UPPER EXTREMITIES: Normal alignment, normal range of motion, no atrophy, no skin changes,  hair growth and nail growth normal and equal bilaterally. No swelling, no tenderness.    LOWER EXTREMITIES:  Normal alignment, normal range of motion, no atrophy, no skin changes,  hair growth and nail growth normal and equal bilaterally. No swelling, no tenderness.    LUMBAR SPINE  Lumbar spine: ROM is full with flexion extension and oblique extension with no increased pain.    Porter's test causes no increased pain on either side.    Supine straight leg raise is negative bilaterally.    Internal and external rotation of the hip causes no increased pain on either side.  Myofascial exam: No tenderness to palpation across lumbar paraspinous muscles.      MENTAL STATUS: normal orientation, speech, language, and fund of knowledge for social situation.  Emotional state appropriate.    CRANIAL NERVES:  II:  PERRL bilaterally,   III,IV,VI: EOMI.    V:  Facial sensation equal bilaterally  VII:  Facial motor function normal.  VIII:  Hearing equal to finger rub " bilaterally  IX/X: Gag normal, palate symmetric  XI:  Shoulder shrug equal, head turn equal  XII:  Tongue midline without fasciculations      MOTOR: Tone and bulk: normal bilateral upper and lower Strength: normal    IP ADD ABD Quad TA Gas HAM  R 5 5 5 5 5 5 5  L 5 5 5 5 5 5 5    SENSATION: Light touch and pinprick intact bilaterally  REFLEXES: normal, symmetric, nonbrisk.  Toes down, no clonus. No hoffmans.  GAIT: normal rise, base, steps, and arm swing.      Imaging:  MRI lumbar spine 09/2018 (outside open MRI)  Multilevel spondylosis.  Moderate neural foraminal narrowing at L3-4 and L4-5.      Assessment:  Mr. Tavera is a 73 y.o. male with   1. Lumbar radiculopathy    2. DDD (degenerative disc disease), lumbar      Plan:  1.  I have stressed the importance of physical activity and exercise to improve overall health  2.  Schedule updated lumbar MRI to evaluate new radicular symptoms.  3.  Will contact patient with MRI results and make further recommendations at that time.  Patient has had moderate benefit from lumbar SONIA performed in the past.

## 2019-11-21 NOTE — PROCEDURES
Large Joint Aspiration/Injection: L knee  Date/Time: 11/21/2019 10:15 AM  Performed by: Haroldo Campbell MD  Authorized by: Haroldo Campbell MD     Consent Done?:  Yes (Verbal)  Indications:  Pain  Procedure site marked: Yes    Timeout: Prior to procedure the correct patient, procedure, and site was verified    Anesthesia    Anesthetic: lidocaine 1% without epinephrine and bupivacaine 0.25% without epinephrine  Anesthetic total: 6mL    Location:  Knee  Site:  L knee  Prep: Patient was prepped and draped in usual sterile fashion    Needle size:  20 G  Approach:  Anterolateral  Medications:  40 mg triamcinolone acetonide 40 mg/mL  Patient tolerance:  Patient tolerated the procedure well with no immediate complications

## 2019-11-21 NOTE — PROGRESS NOTES
PCP: Brian Marroquin MD      CC:  Low back and bilateral leg pain    Interval history: Mr. Tavera is a 73 y.o. male who is known patient to our clinic.  He was last seen in June 2018.  He underwent cervical spine surgery since his last visit with us.  Neck pain is currently tolerable.  He does have some residual left shoulder pain.  Main complaint today is worsening lower back and radiating bilateral leg pain, right greater than left.  Pain worsens standing, walking getting up.  Left-sided radicular pain is new compared to previous symptoms.  No recent traumatic incident.  He has tried physical therapy with minimal benefit.  He currently takes gabapentin with mild benefits.  He has not had any updated imaging.  He denies any worsening weakness.  No bowel bladder changes.   Prior HPI:   Mr. Tavera is a 68 year old male with PMH of HTN referred by Dr. Campbell for right leg pain.  He states having constant right leg pain for the past two years.  Pain is a throbbing pain in her posterior thigh and travels down to his ankle.  He denies any lower back pain.  No leg weakness or numbness.  Pain worsens with standing and walking.  Pain is relieved with rest.  He takes ibuprofen with mild benefits.  Physical therapy has not been helpful.      Pain intervention history: s/p right L4-5 and L5-S1 TFESI on 10/9/2014 and reports 75% relief of his low back and right leg pain.   s/p right L4-5 and L5-S1 TFESI on 2/21/17 and 9/2017 reports 70% relief of his right leg pain.  S/p right IESI at L4-5 and L5-S1 on 5/17/2018     ROS:  CONSTITUTIONAL: No fevers, chills, night sweats, wt. loss, appetite changes  SKIN: no rashes or itching  ENT: No headaches, head trauma, vision changes, or eye pain  LYMPH NODES: None noticed   CV: No chest pain, palpitations.   RESP: No shortness of breath, dyspnea on exertion, cough, wheezing, or hemoptysis  GI: No nausea, emesis, diarrhea, constipation, melena, hematochezia, pain.    : No dysuria,  hematuria, urgency, or frequency   HEME: No easy bruising, bleeding problems  PSYCHIATRIC: No depression, anxiety, psychosis, hallucinations.  NEURO: No seizures, memory loss, dizziness or difficulty sleeping  MSK: + right leg pain      Past Medical History:   Diagnosis Date    Arthritis     Back pain     Carpal tunnel syndrome 2/25/2013    Cataract     Cataract, left eye 11/10/2014    Chest pain, musculoskeletal     COPD (chronic obstructive pulmonary disease) 11/052018    Emphysema lung 11/05/2018    Hx of colonic polyp     Hyperlipidemia     Hypertension     Hypothyroidism     Knee fracture     Myasthenia gravis     Neuropathy 1/3/2013    Obesity 1/29/2015    Polyneuropathy     PVC (premature ventricular contraction)     Squamous cell carcinoma 2014    left forearm    Thyroid disease      Past Surgical History:   Procedure Laterality Date    APPENDECTOMY      CATARACT EXTRACTION W/  INTRAOCULAR LENS IMPLANT Bilateral     COLONOSCOPY  03/01/2012    Dr Esteban; hyperplastic polyp; repeat in 5 years    CYSTOSCOPY N/A 2/26/2019    Procedure: CYSTOSCOPY;  Surgeon: Simba Cheung MD;  Location: [facility];  Service: Urology;  Laterality: N/A;    ESOPHAGOGASTRODUODENOSCOPY N/A 9/12/2018    Procedure: EGD (ESOPHAGOGASTRODUODENOSCOPY);  Surgeon: Gabriel Hernandez MD;  Location: [facility];  Service: Endoscopy;  Laterality: N/A;    ESOPHAGOGASTRODUODENOSCOPY N/A 8/19/2019    Procedure: EGD (ESOPHAGOGASTRODUODENOSCOPY);  Surgeon: Gabriel Hernandez MD;  Location: [facility];  Service: Endoscopy;  Laterality: N/A;    ESOPHAGOGASTRODUODENOSCOPY N/A 10/7/2019    Procedure: EGD (ESOPHAGOGASTRODUODENOSCOPY);  Surgeon: Gabriel Hernandez MD;  Location: [facility];  Service: Endoscopy;  Laterality: N/A;    HEMORRHOID SURGERY      KNEE ARTHROPLASTY Bilateral     NECK SURGERY      POSTERIOR FUSION OF CERVICAL SPINE WITH LAMINECTOMY N/A 11/7/2018    Procedure: C2-C6 Posterior Cervical Laminectomy &  Instrumental Fusion;  Surgeon: Kishore Turner MD;  [facility];  Service: Neurosurgery;  Laterality: N/A;    TONSILLECTOMY      TRANSRECTAL ULTRASOUND EXAMINATION N/A 2/26/2019    Procedure: ULTRASOUND, RECTAL APPROACH;  Surgeon: Simba Cheung MD;  Location: [facility] OR;  Service: Urology;  Laterality: N/A;    UPPER GASTROINTESTINAL ENDOSCOPY  09/12/2018    Dr Hernandez; gastritis; extensive intestinal metaplasia; repeat in 1-2- years     Family History   Problem Relation Age of Onset    Cataracts Mother     Heart disease Mother         CHF    Hypertension Mother     Hyperlipidemia Mother     Cataracts Father     Glaucoma Father     Heart disease Father     Hyperlipidemia Sister     Hypertension Sister     No Known Problems Daughter     No Known Problems Daughter     Collagen disease Neg Hx     Amblyopia Neg Hx     Blindness Neg Hx     Macular degeneration Neg Hx     Retinal detachment Neg Hx     Strabismus Neg Hx     Cancer Neg Hx     Colon cancer Neg Hx     Esophageal cancer Neg Hx     Stomach cancer Neg Hx     Crohn's disease Neg Hx     Ulcerative colitis Neg Hx      Social History     Socioeconomic History    Marital status:      Spouse name: Not on file    Number of children: Not on file    Years of education: Not on file    Highest education level: Not on file   Occupational History    Not on file   Social Needs    Financial resource strain: Not hard at all    Food insecurity:     Worry: Never true     Inability: Never true    Transportation needs:     Medical: No     Non-medical: No   Tobacco Use    Smoking status: Never Smoker    Smokeless tobacco: Never Used    Tobacco comment: when a child   Substance and Sexual Activity    Alcohol use: Yes     Frequency: Never     Binge frequency: Never     Comment: rarely    Drug use: No    Sexual activity: Yes     Partners: Female   Lifestyle    Physical activity:     Days per week: 0 days     Minutes per  "session: 0 min    Stress: Not at all   Relationships    Social connections:     Talks on phone: More than three times a week     Gets together: More than three times a week     Attends Sabianism service: Not on file     Active member of club or organization: Yes     Attends meetings of clubs or organizations: More than 4 times per year     Relationship status:    Other Topics Concern    Not on file   Social History Narrative    Not on file         Medications/Allergies: See med card    Vitals:    11/21/19 0847   Weight: 108.9 kg (240 lb)   Height: 6' 1" (1.854 m)   PainSc:   5   PainLoc: Back         Physical exam:    GENERAL: A and O x3, the patient appears well groomed and is in no acute distress.  Skin: No rashes or obvious lesions  HEENT: normocephalic, atraumatic  CARDIOVASCULAR:  RRR  LUNGS: non labored breathing  ABDOMEN: soft, nontender   UPPER EXTREMITIES: Normal alignment, normal range of motion, no atrophy, no skin changes,  hair growth and nail growth normal and equal bilaterally. No swelling, no tenderness.    LOWER EXTREMITIES:  Normal alignment, normal range of motion, no atrophy, no skin changes,  hair growth and nail growth normal and equal bilaterally. No swelling, no tenderness.    LUMBAR SPINE  Lumbar spine: ROM is full with flexion extension and oblique extension with no increased pain.    Porter's test causes no increased pain on either side.    Supine straight leg raise is negative bilaterally.    Internal and external rotation of the hip causes no increased pain on either side.  Myofascial exam: No tenderness to palpation across lumbar paraspinous muscles.      MENTAL STATUS: normal orientation, speech, language, and fund of knowledge for social situation.  Emotional state appropriate.    CRANIAL NERVES:  II:  PERRL bilaterally,   III,IV,VI: EOMI.    V:  Facial sensation equal bilaterally  VII:  Facial motor function normal.  VIII:  Hearing equal to finger rub " bilaterally  IX/X: Gag normal, palate symmetric  XI:  Shoulder shrug equal, head turn equal  XII:  Tongue midline without fasciculations      MOTOR: Tone and bulk: normal bilateral upper and lower Strength: normal    IP ADD ABD Quad TA Gas HAM  R 5 5 5 5 5 5 5  L 5 5 5 5 5 5 5    SENSATION: Light touch and pinprick intact bilaterally  REFLEXES: normal, symmetric, nonbrisk.  Toes down, no clonus. No hoffmans.  GAIT: normal rise, base, steps, and arm swing.      Imaging:  MRI lumbar spine 09/2018 (outside open MRI)  Multilevel spondylosis.  Moderate neural foraminal narrowing at L3-4 and L4-5.      Assessment:  Mr. Tavera is a 73 y.o. male with   1. Lumbar radiculopathy    2. DDD (degenerative disc disease), lumbar      Plan:  1.  I have stressed the importance of physical activity and exercise to improve overall health  2.  Schedule updated lumbar MRI to evaluate new radicular symptoms.  3.  Will contact patient with MRI results and make further recommendations at that time.  Patient has had moderate benefit from lumbar SONIA performed in the past.

## 2019-11-21 NOTE — PROGRESS NOTES
Past Medical History:   Diagnosis Date    Arthritis     Back pain     Carpal tunnel syndrome 2/25/2013    Cataract     Cataract, left eye 11/10/2014    Chest pain, musculoskeletal     COPD (chronic obstructive pulmonary disease) 11/052018    Emphysema lung 11/05/2018    Hx of colonic polyp     Hyperlipidemia     Hypertension     Hypothyroidism     Knee fracture     Myasthenia gravis     Neuropathy 1/3/2013    Obesity 1/29/2015    Polyneuropathy     PVC (premature ventricular contraction)     Squamous cell carcinoma 2014    left forearm    Thyroid disease        Past Surgical History:   Procedure Laterality Date    APPENDECTOMY      CATARACT EXTRACTION W/  INTRAOCULAR LENS IMPLANT Bilateral     COLONOSCOPY  03/01/2012    Dr Esteban; hyperplastic polyp; repeat in 5 years    CYSTOSCOPY N/A 2/26/2019    Procedure: CYSTOSCOPY;  Surgeon: Simba Cheung MD;  Location: [facility];  Service: Urology;  Laterality: N/A;    ESOPHAGOGASTRODUODENOSCOPY N/A 9/12/2018    Procedure: EGD (ESOPHAGOGASTRODUODENOSCOPY);  Surgeon: Gabriel Hernandez MD;  Location: [facility];  Service: Endoscopy;  Laterality: N/A;    ESOPHAGOGASTRODUODENOSCOPY N/A 8/19/2019    Procedure: EGD (ESOPHAGOGASTRODUODENOSCOPY);  Surgeon: Gabriel Hernandez MD;  Location: [facility];  Service: Endoscopy;  Laterality: N/A;    ESOPHAGOGASTRODUODENOSCOPY N/A 10/7/2019    Procedure: EGD (ESOPHAGOGASTRODUODENOSCOPY);  Surgeon: Gabriel Hernandez MD;  Location: [facility];  Service: Endoscopy;  Laterality: N/A;    HEMORRHOID SURGERY      KNEE ARTHROPLASTY Bilateral     NECK SURGERY      POSTERIOR FUSION OF CERVICAL SPINE WITH LAMINECTOMY N/A 11/7/2018    Procedure: C2-C6 Posterior Cervical Laminectomy & Instrumental Fusion;  Surgeon: Kishore Turner MD;  Location: [address];  Service: Neurosurgery;  Laterality: N/A;    TONSILLECTOMY      TRANSRECTAL ULTRASOUND EXAMINATION N/A 2/26/2019    Procedure: ULTRASOUND, RECTAL APPROACH;   Surgeon: Simba Cheung MD;  Location: [facility];  Service: Urology;  Laterality: N/A;    UPPER GASTROINTESTINAL ENDOSCOPY  09/12/2018    Dr Hernandez; gastritis; extensive intestinal metaplasia; repeat in 1-2- years       Current Outpatient Medications   Medication Sig    atorvastatin (LIPITOR) 80 MG tablet TAKE 1 TABLET EVERY DAY    carvedilol (COREG) 25 MG tablet Take 1 tablet (25 mg total) by mouth 2 (two) times daily with meals.    cetirizine (ZYRTEC) 10 MG tablet Take 1 tablet by mouth daily as needed.    chlorthalidone (HYGROTEN) 25 MG Tab TAKE 1 TABLET EVERY DAY    cholecalciferol, vitamin D3, (VITAMIN D) 2,000 unit Cap 1 capsule once daily. Every day    coenzyme Q10 (CO Q-10) 100 mg capsule Take 400 mg by mouth once daily.     cyanocobalamin, vitamin B-12, (VITAMIN B-12) 5,000 mcg Subl Take 5,000 mcg by mouth once daily. Every day    ezetimibe (ZETIA) 10 mg tablet Take 1 tablet (10 mg total) by mouth once daily.    fluticasone (FLONASE) 50 mcg/actuation nasal spray USE 1 SPRAY IN EACH NOSTRIL TWICE DAILY AS NEEDED  FOR  RHINITIS    gabapentin (NEURONTIN) 300 MG capsule Take 1 capsule (300 mg total) by mouth 3 (three) times daily.    levothyroxine (SYNTHROID) 50 MCG tablet Take 1 tablet (50 mcg total) by mouth once daily.    methylsulfonylmethane (MSM) 1,000 mg Tab Take 1,000 mg by mouth once daily. Every day    milk thistle 200 mg Cap Take 200 mg by mouth 2 (two) times daily. Twice a day    olmesartan (BENICAR) 20 MG tablet TAKE 1 TABLET EVERY DAY    omega-3 fatty acids 1,000 mg Cap Twice a day    pantoprazole (PROTONIX) 40 MG tablet Take 1 tablet (40 mg total) by mouth 2 (two) times daily.    potassium chloride (K-TAB) 20 mEq     potassium chloride SA (K-DUR,KLOR-CON) 20 MEQ tablet Take 20 mEq by mouth 3 (three) times daily. Take 4 tablets daily for 5 days, then 3 daily for 2 weeks, then 2 daily thereafter.     predniSONE (DELTASONE) 1 MG tablet TAKE 2 TABLETS EVERY DAY    predniSONE  (DELTASONE) 5 MG tablet TAKE 1 TABLET EVERY DAY    sildenafil (REVATIO) 20 mg Tab Take 1 to 3 tabs daily as needed.    sucralfate (CARAFATE) 1 gram tablet Take 1 tablet (1 g total) by mouth 4 (four) times daily before meals and nightly.    tiZANidine (ZANAFLEX) 4 MG tablet TAKE 1 TABLET(4 MG) BY MOUTH EVERY 6 HOURS AS NEEDED    VENTOLIN HFA 90 mcg/actuation inhaler Inhale 2 puffs into the lungs every 6 (six) hours as needed for Wheezing. Rescue     No current facility-administered medications for this visit.        Review of patient's allergies indicates:   Allergen Reactions    No known drug allergies        Family History   Problem Relation Age of Onset    Cataracts Mother     Heart disease Mother         CHF    Hypertension Mother     Hyperlipidemia Mother     Cataracts Father     Glaucoma Father     Heart disease Father     Hyperlipidemia Sister     Hypertension Sister     No Known Problems Daughter     No Known Problems Daughter     Collagen disease Neg Hx     Amblyopia Neg Hx     Blindness Neg Hx     Macular degeneration Neg Hx     Retinal detachment Neg Hx     Strabismus Neg Hx     Cancer Neg Hx     Colon cancer Neg Hx     Esophageal cancer Neg Hx     Stomach cancer Neg Hx     Crohn's disease Neg Hx     Ulcerative colitis Neg Hx        Social History     Socioeconomic History    Marital status:      Spouse name: Not on file    Number of children: Not on file    Years of education: Not on file    Highest education level: Not on file   Occupational History    Not on file   Social Needs    Financial resource strain: Not hard at all    Food insecurity:     Worry: Never true     Inability: Never true    Transportation needs:     Medical: No     Non-medical: No   Tobacco Use    Smoking status: Never Smoker    Smokeless tobacco: Never Used    Tobacco comment: when a child   Substance and Sexual Activity    Alcohol use: Yes     Frequency: Never     Binge frequency: Never      Comment: rarely    Drug use: No    Sexual activity: Yes     Partners: Female   Lifestyle    Physical activity:     Days per week: 0 days     Minutes per session: 0 min    Stress: Not at all   Relationships    Social connections:     Talks on phone: More than three times a week     Gets together: More than three times a week     Attends Congregational service: Not on file     Active member of club or organization: Yes     Attends meetings of clubs or organizations: More than 4 times per year     Relationship status:    Other Topics Concern    Not on file   Social History Narrative    Not on file       Chief Complaint:   Chief Complaint   Patient presents with    Knee Pain     left knee pain       History of present illness:  This is a 73-year-old male seen for left knee pain. Started a few weeks ago.  Pain with walking sitting standing or lying down.  Cannot take NSAIDs due to an ulcer.  Pain is a 5/10.  Also having more right lateral elbow and forearm pain. This started around the same time.  Cannot lift anything and hurts a lot at night.  Pain is about a 6/10 in the elbow.    Answers for HPI/ROS submitted by the patient on 7/10/2019   Leg pain  unexpected weight change: No  appetite change : No  sleep disturbance: No  IMMUNOCOMPROMISED: No  nervous/ anxious: No  dysphoric mood: No  rash: No  visual disturbance: No  eye redness: No  eye pain: No  ear pain: No  tinnitus: Yes  hearing loss: No  sinus pressure : Yes  nosebleeds: Yes  enviro allergies: No  food allergies: No  cough: No  shortness of breath: Yes  sweating: No  frequency: Yes  difficulty urinating: Yes  hematuria: No  chest pain: No  palpitations: Yes  nausea: No  vomiting: No  diarrhea: No  blood in stool: No  constipation: No  headaches: Yes  dizziness: No  numbness: No  seizures: No  joint swelling: No  myalgia: Yes  weakness: Yes  back pain: Yes  Pain Chronicity: recurrent  History of trauma: No  Onset: more than 1 year ago  Frequency:  intermittently  Progression since onset: waxing and waning  Injury mechanism: twisting  injury location: at home  pain- numeric: 6/10  pain location: right knee  pain quality: aching, burning  Radiating Pain: No  Aggravating factors: bending, bearing weight  fever: No  inability to bear weight: Yes  itching: No  joint locking: No  limited range of motion: Yes  stiffness: Yes  tingling: No  Treatments tried: heat, injection treatment, NSAIDs  physical therapy: not tried  Improvement on treatment: no relief        Physical Examination:    Vital Signs:    Vitals:    11/21/19 1006   BP: 130/60   Pulse: 71       Body mass index is 31.66 kg/m².    This a well-developed, well nourished patient in no acute distress.  They are alert and oriented and cooperative to examination.  Pt. walks without an antalgic gait.      Examination of the left knee shows no rashes or erythema. There are no masses ecchymosis or effusion. Patient has full range of motion from 0-130°. Patient is moderately tender to palpation over lateral joint line and moderately tender to palpation over the medial joint line. Patient has a - Lachman exam, - anterior drawer exam, and - posterior drawer exam. - Fabián's exam. Knee is stable to varus and valgus stress. 5 out of 5 motor strength. Palpable distal pulses. Intact light touch sensation. Negative Patellofemoral crepitus    Examination of the right elbow shows no signs of rashes or erythema. The patient has no masses, ecchymosis, or effusion. The patient has full range of motion from 0-160°. Patient has full pronation and supination. Patient is nontender along the medial epicondyle and moderately tender over the lateral epicondyle. Nontender over the olecranon process.  Nontender along the course of the UCL. Patient has a negative valgus stress test and milking maneuver. Negative Tinel's sign over the cubital tunnel. 2+ radial pulse. Intact light touch sensation.     X-rays:  X-rays of the left knee is  ordered and reviewed which show severe lateral joint space narrowing on the right and more moderate to severe medial joint space wear on the left.  X-rays of the right shoulder ordered and reviewed which show no atypical findings      Assessment::  Left knee arthritis  Right lateral epicondylitis    Plan: I reviewed the x-rays with him today.  We talked about treatment options for his arthritis. He agreed to try a cortisone injection today.  We will get authorization for Euflexxa in a couple weeks.  Also recommended an injection for his tennis elbow.  Follow-up in 2 weeks per    This note was created using [employer] voice recognition software that occasionally misinterpreted phrases or words.    Consult note is delivered via Epic messaging service.

## 2019-11-21 NOTE — PROCEDURES
Tendon Origin: R elbow  Date/Time: 11/21/2019 10:15 AM  Performed by: Haroldo Campbell MD  Authorized by: Haroldo Campbell MD     Consent Done?:  Yes (Verbal)  Timeout: prior to procedure the correct patient, procedure, and site was verified    Indications:  Pain  Site marked: the procedure site was marked    Timeout: prior to procedure the correct patient, procedure, and site was verified    Location:  Elbow  Site:  R elbow  Prep: patient was prepped and draped in usual sterile fashion    Ultrasonic Guidance for Needle Placement?: No    Needle size:  22 G  Approach:  Anterolateral  Medications:  40 mg triamcinolone acetonide 40 mg/mL  Patient tolerance:  Patient tolerated the procedure well with no immediate complications

## 2019-11-29 ENCOUNTER — TELEPHONE (OUTPATIENT)
Dept: PAIN MEDICINE | Facility: CLINIC | Age: 74
End: 2019-11-29

## 2019-11-29 NOTE — TELEPHONE ENCOUNTER
----- Message from Venice Cardona sent at 11/29/2019 10:04 AM CST -----  Contact: Patient  Type: Needs Medical Advice    Who Called:  Patient  Best Call Back Number:   Additional Information: Calling to speak to the nurse about his MRI orders. He advises they were sent to the wrong location. Should be going to [facility].

## 2019-12-02 ENCOUNTER — TELEPHONE (OUTPATIENT)
Dept: ENDOSCOPY | Facility: HOSPITAL | Age: 74
End: 2019-12-02

## 2019-12-03 ENCOUNTER — TELEPHONE (OUTPATIENT)
Dept: PAIN MEDICINE | Facility: CLINIC | Age: 74
End: 2019-12-03

## 2019-12-03 NOTE — TELEPHONE ENCOUNTER
----- Message from Jesus Morgan sent at 12/3/2019 10:49 AM CST -----  Contact: Kylah hawkins open [facility]  Type: Needs Medical Advice    Who Called:  Kylah    Best Call Back Number: [phone]  Additional Information: Melvi info is Darnell not for [facility]. Please provide infor for [facility]. Please call to advise.

## 2019-12-03 NOTE — TELEPHONE ENCOUNTER
----- Message from Rebecca Jeong sent at 12/3/2019 10:23 AM CST -----  Contact: Lisa gomez/Marcos open [phone]  The MRI requires a prior auth.  Please call her with that.  Thank you!

## 2019-12-05 ENCOUNTER — TELEPHONE (OUTPATIENT)
Dept: PAIN MEDICINE | Facility: CLINIC | Age: 74
End: 2019-12-05

## 2019-12-05 NOTE — TELEPHONE ENCOUNTER
----- Message from Ema Nino sent at 12/5/2019 11:13 AM CST -----  Type: Needs Medical Advice    Who Called:  patient  Symptoms (please be specific):  na  How long has patient had these symptoms:  [facility]  Pharmacy name and phone #:  [facility]  Best Call Back Number: [phone]  Additional Information: patient is calling to speak with Nurse Janneth to check status of MRI approval/please call

## 2019-12-09 ENCOUNTER — OFFICE VISIT (OUTPATIENT)
Dept: ORTHOPEDICS | Facility: CLINIC | Age: 74
End: 2019-12-09
Payer: MEDICARE

## 2019-12-09 ENCOUNTER — TELEPHONE (OUTPATIENT)
Dept: PAIN MEDICINE | Facility: CLINIC | Age: 74
End: 2019-12-09

## 2019-12-09 ENCOUNTER — PATIENT MESSAGE (OUTPATIENT)
Dept: PAIN MEDICINE | Facility: CLINIC | Age: 74
End: 2019-12-09

## 2019-12-09 VITALS — WEIGHT: 240 LBS | HEIGHT: 73 IN | BODY MASS INDEX: 31.81 KG/M2 | RESPIRATION RATE: 13 BRPM

## 2019-12-09 DIAGNOSIS — M17.0 PRIMARY OSTEOARTHRITIS OF BOTH KNEES: Primary | ICD-10-CM

## 2019-12-09 PROCEDURE — 99499 NO LOS: ICD-10-PCS | Mod: HCNC,S$GLB,, | Performed by: ORTHOPAEDIC SURGERY

## 2019-12-09 PROCEDURE — 99499 UNLISTED E&M SERVICE: CPT | Mod: HCNC,S$GLB,, | Performed by: ORTHOPAEDIC SURGERY

## 2019-12-09 PROCEDURE — 20610 DRAIN/INJ JOINT/BURSA W/O US: CPT | Mod: HCNC,LT,S$GLB, | Performed by: ORTHOPAEDIC SURGERY

## 2019-12-09 PROCEDURE — 99999 PR PBB SHADOW E&M-EST. PATIENT-LVL III: CPT | Mod: PBBFAC,HCNC,, | Performed by: ORTHOPAEDIC SURGERY

## 2019-12-09 PROCEDURE — 99999 PR PBB SHADOW E&M-EST. PATIENT-LVL III: ICD-10-PCS | Mod: PBBFAC,HCNC,, | Performed by: ORTHOPAEDIC SURGERY

## 2019-12-09 PROCEDURE — 20610 LARGE JOINT ASPIRATION/INJECTION: L KNEE: ICD-10-PCS | Mod: HCNC,LT,S$GLB, | Performed by: ORTHOPAEDIC SURGERY

## 2019-12-09 RX ORDER — METHOCARBAMOL 750 MG/1
750 TABLET, FILM COATED ORAL 4 TIMES DAILY PRN
Qty: 40 TABLET | Refills: 0 | Status: SHIPPED | OUTPATIENT
Start: 2019-12-09 | End: 2019-12-19

## 2019-12-09 NOTE — TELEPHONE ENCOUNTER
----- Message from Marc Wills sent at 12/9/2019  9:54 AM CST -----  Contact: pt  Type: Needs Medical Advice    Who Called:  Pt   Symptoms (please be specific):    How long has patient had these symptoms:    Pharmacy name and phone #:    Best Call Back Number:    Additional Information: needs his mri. Needs the correct facility

## 2019-12-09 NOTE — PROGRESS NOTES
Past Medical History:   Diagnosis Date    Arthritis     Back pain     Carpal tunnel syndrome 2/25/2013    Cataract     Cataract, left eye 11/10/2014    Chest pain, musculoskeletal     COPD (chronic obstructive pulmonary disease) 11/052018    Emphysema lung 11/05/2018    Hx of colonic polyp     Hyperlipidemia     Hypertension     Hypothyroidism     Knee fracture     Myasthenia gravis     Neuropathy 1/3/2013    Obesity 1/29/2015    Polyneuropathy     PVC (premature ventricular contraction)     Squamous cell carcinoma 2014    left forearm    Thyroid disease        Past Surgical History:   Procedure Laterality Date    APPENDECTOMY      CATARACT EXTRACTION W/  INTRAOCULAR LENS IMPLANT Bilateral     COLONOSCOPY  03/01/2012    Dr Esteban; hyperplastic polyp; repeat in 5 years    CYSTOSCOPY N/A 2/26/2019    Procedure: CYSTOSCOPY;  Surgeon: Simba Cheung MD;  Location: [facility];  Service: Urology;  Laterality: N/A;    ESOPHAGOGASTRODUODENOSCOPY N/A 9/12/2018    Procedure: EGD (ESOPHAGOGASTRODUODENOSCOPY);  Surgeon: Gabriel Hernandez MD;  Location: [facility];  Service: Endoscopy;  Laterality: N/A;    ESOPHAGOGASTRODUODENOSCOPY N/A 8/19/2019    Procedure: EGD (ESOPHAGOGASTRODUODENOSCOPY);  Surgeon: Gabriel Hernandez MD;  Location: [facility];  Service: Endoscopy;  Laterality: N/A;    ESOPHAGOGASTRODUODENOSCOPY N/A 10/7/2019    Procedure: EGD (ESOPHAGOGASTRODUODENOSCOPY);  Surgeon: Gabriel Hernandez MD;  Location: [facility];  Service: Endoscopy;  Laterality: N/A;    HEMORRHOID SURGERY      KNEE ARTHROPLASTY Bilateral     NECK SURGERY      POSTERIOR FUSION OF CERVICAL SPINE WITH LAMINECTOMY N/A 11/7/2018    Procedure: C2-C6 Posterior Cervical Laminectomy & Instrumental Fusion;  Surgeon: Kishore Turner MD;  Location: [address];  Service: Neurosurgery;  Laterality: N/A;    TONSILLECTOMY      TRANSRECTAL ULTRASOUND EXAMINATION N/A 2/26/2019    Procedure: ULTRASOUND, RECTAL APPROACH;   Surgeon: Simba Cheung MD;  [facility];  Service: Urology;  Laterality: N/A;    UPPER GASTROINTESTINAL ENDOSCOPY  09/12/2018    Dr Hernandez; gastritis; extensive intestinal metaplasia; repeat in 1-2- years       Current Outpatient Medications   Medication Sig    atorvastatin (LIPITOR) 80 MG tablet TAKE 1 TABLET EVERY DAY    carvedilol (COREG) 25 MG tablet Take 1 tablet (25 mg total) by mouth 2 (two) times daily with meals.    cetirizine (ZYRTEC) 10 MG tablet Take 1 tablet by mouth daily as needed.    chlorthalidone (HYGROTEN) 25 MG Tab TAKE 1 TABLET EVERY DAY    cholecalciferol, vitamin D3, (VITAMIN D) 2,000 unit Cap 1 capsule once daily. Every day    coenzyme Q10 (CO Q-10) 100 mg capsule Take 400 mg by mouth once daily.     cyanocobalamin, vitamin B-12, (VITAMIN B-12) 5,000 mcg Subl Take 5,000 mcg by mouth once daily. Every day    ezetimibe (ZETIA) 10 mg tablet Take 1 tablet (10 mg total) by mouth once daily.    fluticasone (FLONASE) 50 mcg/actuation nasal spray USE 1 SPRAY IN EACH NOSTRIL TWICE DAILY AS NEEDED  FOR  RHINITIS    gabapentin (NEURONTIN) 300 MG capsule Take 1 capsule (300 mg total) by mouth 3 (three) times daily.    levothyroxine (SYNTHROID) 50 MCG tablet Take 1 tablet (50 mcg total) by mouth once daily.    methylsulfonylmethane (MSM) 1,000 mg Tab Take 1,000 mg by mouth once daily. Every day    milk thistle 200 mg Cap Take 200 mg by mouth 2 (two) times daily. Twice a day    olmesartan (BENICAR) 20 MG tablet TAKE 1 TABLET EVERY DAY    omega-3 fatty acids 1,000 mg Cap Twice a day    pantoprazole (PROTONIX) 40 MG tablet Take 1 tablet (40 mg total) by mouth 2 (two) times daily.    potassium chloride (K-TAB) 20 mEq     potassium chloride SA (K-DUR,KLOR-CON) 20 MEQ tablet Take 20 mEq by mouth 3 (three) times daily. Take 4 tablets daily for 5 days, then 3 daily for 2 weeks, then 2 daily thereafter.     predniSONE (DELTASONE) 1 MG tablet TAKE 2 TABLETS EVERY DAY    predniSONE  (DELTASONE) 5 MG tablet TAKE 1 TABLET EVERY DAY    sildenafil (REVATIO) 20 mg Tab Take 1 to 3 tabs daily as needed.    sucralfate (CARAFATE) 1 gram tablet Take 1 tablet (1 g total) by mouth 4 (four) times daily before meals and nightly.    tiZANidine (ZANAFLEX) 4 MG tablet TAKE 1 TABLET(4 MG) BY MOUTH EVERY 6 HOURS AS NEEDED (Patient not taking: Reported on 12/9/2019)    VENTOLIN HFA 90 mcg/actuation inhaler Inhale 2 puffs into the lungs every 6 (six) hours as needed for Wheezing. Rescue     No current facility-administered medications for this visit.        Review of patient's allergies indicates:   Allergen Reactions    No known drug allergies        Family History   Problem Relation Age of Onset    Cataracts Mother     Heart disease Mother         CHF    Hypertension Mother     Hyperlipidemia Mother     Cataracts Father     Glaucoma Father     Heart disease Father     Hyperlipidemia Sister     Hypertension Sister     No Known Problems Daughter     No Known Problems Daughter     Collagen disease Neg Hx     Amblyopia Neg Hx     Blindness Neg Hx     Macular degeneration Neg Hx     Retinal detachment Neg Hx     Strabismus Neg Hx     Cancer Neg Hx     Colon cancer Neg Hx     Esophageal cancer Neg Hx     Stomach cancer Neg Hx     Crohn's disease Neg Hx     Ulcerative colitis Neg Hx        Social History     Socioeconomic History    Marital status:      Spouse name: Not on file    Number of children: Not on file    Years of education: Not on file    Highest education level: Not on file   Occupational History    Not on file   Social Needs    Financial resource strain: Not hard at all    Food insecurity:     Worry: Never true     Inability: Never true    Transportation needs:     Medical: No     Non-medical: No   Tobacco Use    Smoking status: Never Smoker    Smokeless tobacco: Never Used    Tobacco comment: when a child   Substance and Sexual Activity    Alcohol use: Yes      Frequency: Never     Binge frequency: Never     Comment: rarely    Drug use: No    Sexual activity: Yes     Partners: Female   Lifestyle    Physical activity:     Days per week: 0 days     Minutes per session: 0 min    Stress: Not at all   Relationships    Social connections:     Talks on phone: More than three times a week     Gets together: More than three times a week     Attends Oriental orthodox service: Not on file     Active member of club or organization: Yes     Attends meetings of clubs or organizations: More than 4 times per year     Relationship status:    Other Topics Concern    Not on file   Social History Narrative    Not on file       Chief Complaint:   Chief Complaint   Patient presents with    Left Knee - Pain       History of present illness:  This is a 73-year-old male seen for left knee pain. Started a few weeks ago.  Pain with walking sitting standing or lying down.  Cannot take NSAIDs due to an ulcer.  Pain is a 5/10.  Also having more right lateral elbow and forearm pain. This started around the same time.  Cannot lift anything and hurts a lot at night.  Pain is about a 6/10 in the elbow.    Answers for HPI/ROS submitted by the patient on 7/10/2019   Leg pain  unexpected weight change: No  appetite change : No  sleep disturbance: No  IMMUNOCOMPROMISED: No  nervous/ anxious: No  dysphoric mood: No  rash: No  visual disturbance: No  eye redness: No  eye pain: No  ear pain: No  tinnitus: Yes  hearing loss: No  sinus pressure : Yes  nosebleeds: Yes  enviro allergies: No  food allergies: No  cough: No  shortness of breath: Yes  sweating: No  frequency: Yes  difficulty urinating: Yes  hematuria: No  chest pain: No  palpitations: Yes  nausea: No  vomiting: No  diarrhea: No  blood in stool: No  constipation: No  headaches: Yes  dizziness: No  numbness: No  seizures: No  joint swelling: No  myalgia: Yes  weakness: Yes  back pain: Yes  Pain Chronicity: recurrent  History of trauma: No  Onset:  more than 1 year ago  Frequency: intermittently  Progression since onset: waxing and waning  Injury mechanism: twisting  injury location: at home  pain- numeric: 6/10  pain location: right knee  pain quality: aching, burning  Radiating Pain: No  Aggravating factors: bending, bearing weight  fever: No  inability to bear weight: Yes  itching: No  joint locking: No  limited range of motion: Yes  stiffness: Yes  tingling: No  Treatments tried: heat, injection treatment, NSAIDs  physical therapy: not tried  Improvement on treatment: no relief        Physical Examination:    Vital Signs:    Vitals:    12/09/19 0911   Resp: 13       Body mass index is 31.66 kg/m².    This a well-developed, well nourished patient in no acute distress.  They are alert and oriented and cooperative to examination.  Pt. walks without an antalgic gait.      Examination of the left knee shows no rashes or erythema. There are no masses ecchymosis or effusion. Patient has full range of motion from 0-130°. Patient is moderately tender to palpation over lateral joint line and moderately tender to palpation over the medial joint line. Patient has a - Lachman exam, - anterior drawer exam, and - posterior drawer exam. - Fabián's exam. Knee is stable to varus and valgus stress. 5 out of 5 motor strength. Palpable distal pulses. Intact light touch sensation. Negative Patellofemoral crepitus    Examination of the right elbow shows no signs of rashes or erythema. The patient has no masses, ecchymosis, or effusion. The patient has full range of motion from 0-160°. Patient has full pronation and supination. Patient is nontender along the medial epicondyle and moderately tender over the lateral epicondyle. Nontender over the olecranon process.  Nontender along the course of the UCL. Patient has a negative valgus stress test and milking maneuver. Negative Tinel's sign over the cubital tunnel. 2+ radial pulse. Intact light touch sensation.     X-rays:  X-rays  of the left knee is ordered and reviewed which show severe lateral joint space narrowing on the right and more moderate to severe medial joint space wear on the left.  X-rays of the right shoulder ordered and reviewed which show no atypical findings      Assessment::  Left knee arthritis  Right lateral epicondylitis    Plan: I injected his right knee with Orthovisc 1 of 3.  Follow up next week.  Also send in a prescription for methocarbamol    This note was created using [employer] voice recognition software that occasionally misinterpreted phrases or words.    Consult note is delivered via Epic messaging service.

## 2019-12-09 NOTE — PROCEDURES
Large Joint Aspiration/Injection: L knee  Date/Time: 12/9/2019 9:15 AM  Performed by: Haroldo Campbell MD  Authorized by: Haroldo Campbell MD     Consent Done?:  Yes (Verbal)  Indications:  Pain  Procedure site marked: Yes    Timeout: Prior to procedure the correct patient, procedure, and site was verified      Location:  Knee  Site:  L knee  Prep: Patient was prepped and draped in usual sterile fashion    Needle size:  20 G  Approach:  Anterolateral  Medications:  15 mg sodium hyaluronate (orthovisc) 30 mg/2 mL  Patient tolerance:  Patient tolerated the procedure well with no immediate complications

## 2019-12-11 ENCOUNTER — TELEPHONE (OUTPATIENT)
Dept: ENDOSCOPY | Facility: HOSPITAL | Age: 74
End: 2019-12-11

## 2019-12-11 NOTE — TELEPHONE ENCOUNTER
Called pt to review meds and medical hx prior to procedure on 1/7/20. Voicemail message with call back number provided.

## 2019-12-12 ENCOUNTER — TELEPHONE (OUTPATIENT)
Dept: ENDOSCOPY | Facility: HOSPITAL | Age: 74
End: 2019-12-12

## 2019-12-12 NOTE — TELEPHONE ENCOUNTER
Received request to schedule patient for EGD/EUS on 1/7/20 at 10:00am. Spoke with Patient. Reviewed medical history and medications. Instructed on procedure and prep. Patient verbalized understanding of instructions. Mailed copy of instructions to address in chart.

## 2019-12-15 DIAGNOSIS — I10 HTN (HYPERTENSION), BENIGN: ICD-10-CM

## 2019-12-16 ENCOUNTER — OFFICE VISIT (OUTPATIENT)
Dept: ORTHOPEDICS | Facility: CLINIC | Age: 74
End: 2019-12-16
Payer: MEDICARE

## 2019-12-16 DIAGNOSIS — M17.0 PRIMARY OSTEOARTHRITIS OF BOTH KNEES: Primary | ICD-10-CM

## 2019-12-16 PROCEDURE — 99499 UNLISTED E&M SERVICE: CPT | Mod: HCNC,S$GLB,, | Performed by: ORTHOPAEDIC SURGERY

## 2019-12-16 PROCEDURE — 20610 DRAIN/INJ JOINT/BURSA W/O US: CPT | Mod: HCNC,LT,S$GLB, | Performed by: ORTHOPAEDIC SURGERY

## 2019-12-16 PROCEDURE — 20610 LARGE JOINT ASPIRATION/INJECTION: L KNEE: ICD-10-PCS | Mod: HCNC,LT,S$GLB, | Performed by: ORTHOPAEDIC SURGERY

## 2019-12-16 PROCEDURE — 99999 PR PBB SHADOW E&M-EST. PATIENT-LVL II: CPT | Mod: PBBFAC,HCNC,, | Performed by: ORTHOPAEDIC SURGERY

## 2019-12-16 PROCEDURE — 99499 NO LOS: ICD-10-PCS | Mod: HCNC,S$GLB,, | Performed by: ORTHOPAEDIC SURGERY

## 2019-12-16 PROCEDURE — 99999 PR PBB SHADOW E&M-EST. PATIENT-LVL II: ICD-10-PCS | Mod: PBBFAC,HCNC,, | Performed by: ORTHOPAEDIC SURGERY

## 2019-12-16 RX ORDER — OLMESARTAN MEDOXOMIL 20 MG/1
TABLET ORAL
Qty: 90 TABLET | Refills: 0 | Status: SHIPPED | OUTPATIENT
Start: 2019-12-16 | End: 2020-12-28

## 2019-12-16 NOTE — LETTER
December 16, 2019      Farideh Thompson PA-C  [address]  [facility] [address]           [address]  [facility] [address]  Phone: [phone]          Patient: Maximilian Tavera Jr.   MR Number: [id number]   YOB: 1945   Date of Visit: 12/16/2019       Dear Farideh Thompson:    Thank you for referring Maximilian Tavera to me for evaluation. Attached you will find relevant portions of my assessment and plan of care.    If you have questions, please do not hesitate to call me. I look forward to following Maximilian Tavera along with you.    Sincerely,    Haroldo Campbell MD    Enclosure  CC:  No Recipients    If you would like to receive this communication electronically, please contact [email or web address] or [phone] to request more information on [employer] access.    For providers and/or their staff who would like to refer a patient to [facility], please contact us through our one-stop-shop provider referral line, [facility], at [phone].    If you feel you have received this communication in error or would no longer like to receive these types of communications, please e-mail [email or web address]

## 2019-12-16 NOTE — PROCEDURES
Large Joint Aspiration/Injection: L knee  Date/Time: 12/16/2019 9:30 AM  Performed by: Haroldo Campbell MD  Authorized by: Haroldo Campbell MD     Consent Done?:  Yes (Verbal)  Indications:  Pain  Procedure site marked: Yes    Timeout: Prior to procedure the correct patient, procedure, and site was verified      Location:  Knee  Site:  L knee  Prep: Patient was prepped and draped in usual sterile fashion    Needle size:  20 G  Approach:  Anterolateral  Medications:  15 mg sodium hyaluronate (orthovisc) 30 mg/2 mL  Patient tolerance:  Patient tolerated the procedure well with no immediate complications

## 2019-12-16 NOTE — PROGRESS NOTES
Past Medical History:   Diagnosis Date    Arthritis     Back pain     Carpal tunnel syndrome 2/25/2013    Cataract     Cataract, left eye 11/10/2014    Chest pain, musculoskeletal     COPD (chronic obstructive pulmonary disease) 11/052018    Emphysema lung 11/05/2018    Hx of colonic polyp     Hyperlipidemia     Hypertension     Hypothyroidism     Knee fracture     Myasthenia gravis     Neuropathy 1/3/2013    Obesity 1/29/2015    Polyneuropathy     PVC (premature ventricular contraction)     Squamous cell carcinoma 2014    left forearm    Thyroid disease        Past Surgical History:   Procedure Laterality Date    APPENDECTOMY      CATARACT EXTRACTION W/  INTRAOCULAR LENS IMPLANT Bilateral     COLONOSCOPY  03/01/2012    Dr Esteban; hyperplastic polyp; repeat in 5 years    CYSTOSCOPY N/A 2/26/2019    Procedure: CYSTOSCOPY;  Surgeon: Simba Cheung MD;  Location: [facility];  Service: Urology;  Laterality: N/A;    ESOPHAGOGASTRODUODENOSCOPY N/A 9/12/2018    Procedure: EGD (ESOPHAGOGASTRODUODENOSCOPY);  Surgeon: Gabriel Hernandez MD;  Location: [facility];  Service: Endoscopy;  Laterality: N/A;    ESOPHAGOGASTRODUODENOSCOPY N/A 8/19/2019    Procedure: EGD (ESOPHAGOGASTRODUODENOSCOPY);  Surgeon: Gabriel Hernandez MD;  Location: [facility];  Service: Endoscopy;  Laterality: N/A;    ESOPHAGOGASTRODUODENOSCOPY N/A 10/7/2019    Procedure: EGD (ESOPHAGOGASTRODUODENOSCOPY);  Surgeon: Gabriel Hernandez MD;  Location: [facility];  Service: Endoscopy;  Laterality: N/A;    HEMORRHOID SURGERY      KNEE ARTHROPLASTY Bilateral     NECK SURGERY      POSTERIOR FUSION OF CERVICAL SPINE WITH LAMINECTOMY N/A 11/7/2018    Procedure: C2-C6 Posterior Cervical Laminectomy & Instrumental Fusion;  Surgeon: Kishore Turner MD;  Location: [address];  Service: Neurosurgery;  Laterality: N/A;    TONSILLECTOMY      TRANSRECTAL ULTRASOUND EXAMINATION N/A 2/26/2019    Procedure: ULTRASOUND, RECTAL APPROACH;   Surgeon: Simba Cheung MD;  [facility];  Service: Urology;  Laterality: N/A;    UPPER GASTROINTESTINAL ENDOSCOPY  09/12/2018    Dr Hernandez; gastritis; extensive intestinal metaplasia; repeat in 1-2- years       Current Outpatient Medications   Medication Sig    atorvastatin (LIPITOR) 80 MG tablet TAKE 1 TABLET EVERY DAY    carvedilol (COREG) 25 MG tablet Take 1 tablet (25 mg total) by mouth 2 (two) times daily with meals.    cetirizine (ZYRTEC) 10 MG tablet Take 1 tablet by mouth daily as needed.    chlorthalidone (HYGROTEN) 25 MG Tab TAKE 1 TABLET EVERY DAY    cholecalciferol, vitamin D3, (VITAMIN D) 2,000 unit Cap 1 capsule once daily. Every day    coenzyme Q10 (CO Q-10) 100 mg capsule Take 400 mg by mouth once daily.     cyanocobalamin, vitamin B-12, (VITAMIN B-12) 5,000 mcg Subl Take 5,000 mcg by mouth once daily. Every day    ezetimibe (ZETIA) 10 mg tablet Take 1 tablet (10 mg total) by mouth once daily.    fluticasone (FLONASE) 50 mcg/actuation nasal spray USE 1 SPRAY IN EACH NOSTRIL TWICE DAILY AS NEEDED  FOR  RHINITIS    gabapentin (NEURONTIN) 300 MG capsule Take 1 capsule (300 mg total) by mouth 3 (three) times daily.    levothyroxine (SYNTHROID) 50 MCG tablet Take 1 tablet (50 mcg total) by mouth once daily.    methocarbamol (ROBAXIN) 750 MG Tab Take 1 tablet (750 mg total) by mouth 4 (four) times daily as needed (spasms).    methylsulfonylmethane (MSM) 1,000 mg Tab Take 1,000 mg by mouth once daily. Every day    milk thistle 200 mg Cap Take 200 mg by mouth 2 (two) times daily. Twice a day    olmesartan (BENICAR) 20 MG tablet TAKE 1 TABLET EVERY DAY    omega-3 fatty acids 1,000 mg Cap Twice a day    pantoprazole (PROTONIX) 40 MG tablet Take 1 tablet (40 mg total) by mouth 2 (two) times daily.    potassium chloride (K-TAB) 20 mEq     potassium chloride SA (K-DUR,KLOR-CON) 20 MEQ tablet Take 20 mEq by mouth 3 (three) times daily. Take 4 tablets daily for 5 days, then 3 daily  for 2 weeks, then 2 daily thereafter.     predniSONE (DELTASONE) 1 MG tablet TAKE 2 TABLETS EVERY DAY    predniSONE (DELTASONE) 5 MG tablet TAKE 1 TABLET EVERY DAY    sildenafil (REVATIO) 20 mg Tab Take 1 to 3 tabs daily as needed.    sucralfate (CARAFATE) 1 gram tablet Take 1 tablet (1 g total) by mouth 4 (four) times daily before meals and nightly.    VENTOLIN HFA 90 mcg/actuation inhaler Inhale 2 puffs into the lungs every 6 (six) hours as needed for Wheezing. Rescue     No current facility-administered medications for this visit.        Review of patient's allergies indicates:   Allergen Reactions    No known drug allergies        Family History   Problem Relation Age of Onset    Cataracts Mother     Heart disease Mother         CHF    Hypertension Mother     Hyperlipidemia Mother     Cataracts Father     Glaucoma Father     Heart disease Father     Hyperlipidemia Sister     Hypertension Sister     No Known Problems Daughter     No Known Problems Daughter     Collagen disease Neg Hx     Amblyopia Neg Hx     Blindness Neg Hx     Macular degeneration Neg Hx     Retinal detachment Neg Hx     Strabismus Neg Hx     Cancer Neg Hx     Colon cancer Neg Hx     Esophageal cancer Neg Hx     Stomach cancer Neg Hx     Crohn's disease Neg Hx     Ulcerative colitis Neg Hx        Social History     Socioeconomic History    Marital status:      Spouse name: Not on file    Number of children: Not on file    Years of education: Not on file    Highest education level: Not on file   Occupational History    Not on file   Social Needs    Financial resource strain: Not hard at all    Food insecurity:     Worry: Never true     Inability: Never true    Transportation needs:     Medical: No     Non-medical: No   Tobacco Use    Smoking status: Never Smoker    Smokeless tobacco: Never Used    Tobacco comment: when a child   Substance and Sexual Activity    Alcohol use: Yes     Frequency: Never      Binge frequency: Never     Comment: rarely    Drug use: No    Sexual activity: Yes     Partners: Female   Lifestyle    Physical activity:     Days per week: 0 days     Minutes per session: 0 min    Stress: Not at all   Relationships    Social connections:     Talks on phone: More than three times a week     Gets together: More than three times a week     Attends Christianity service: Not on file     Active member of club or organization: Yes     Attends meetings of clubs or organizations: More than 4 times per year     Relationship status:    Other Topics Concern    Not on file   Social History Narrative    Not on file       Chief Complaint:   Chief Complaint   Patient presents with    Knee Pain     left knee-orthovisc 2/3        History of present illness:  This is a 73-year-old male seen for left knee pain. Started a few weeks ago.  Pain with walking sitting standing or lying down.  Cannot take NSAIDs due to an ulcer.  Pain is a 5/10.  Also having more right lateral elbow and forearm pain. This started around the same time.  Cannot lift anything and hurts a lot at night.  Pain is about a 6/10 in the elbow.    Answers for HPI/ROS submitted by the patient on 7/10/2019   Leg pain  unexpected weight change: No  appetite change : No  sleep disturbance: No  IMMUNOCOMPROMISED: No  nervous/ anxious: No  dysphoric mood: No  rash: No  visual disturbance: No  eye redness: No  eye pain: No  ear pain: No  tinnitus: Yes  hearing loss: No  sinus pressure : Yes  nosebleeds: Yes  enviro allergies: No  food allergies: No  cough: No  shortness of breath: Yes  sweating: No  frequency: Yes  difficulty urinating: Yes  hematuria: No  chest pain: No  palpitations: Yes  nausea: No  vomiting: No  diarrhea: No  blood in stool: No  constipation: No  headaches: Yes  dizziness: No  numbness: No  seizures: No  joint swelling: No  myalgia: Yes  weakness: Yes  back pain: Yes  Pain Chronicity: recurrent  History of trauma:  No  Onset: more than 1 year ago  Frequency: intermittently  Progression since onset: waxing and waning  Injury mechanism: twisting  injury location: at home  pain- numeric: 6/10  pain location: right knee  pain quality: aching, burning  Radiating Pain: No  Aggravating factors: bending, bearing weight  fever: No  inability to bear weight: Yes  itching: No  joint locking: No  limited range of motion: Yes  stiffness: Yes  tingling: No  Treatments tried: heat, injection treatment, NSAIDs  physical therapy: not tried  Improvement on treatment: no relief        Physical Examination:    Vital Signs:    There were no vitals filed for this visit.    There is no height or weight on file to calculate BMI.    This a well-developed, well nourished patient in no acute distress.  They are alert and oriented and cooperative to examination.  Pt. walks without an antalgic gait.      Examination of the left knee shows no rashes or erythema. There are no masses ecchymosis or effusion. Patient has full range of motion from 0-130°. Patient is moderately tender to palpation over lateral joint line and moderately tender to palpation over the medial joint line. Patient has a - Lachman exam, - anterior drawer exam, and - posterior drawer exam. - Fabián's exam. Knee is stable to varus and valgus stress. 5 out of 5 motor strength. Palpable distal pulses. Intact light touch sensation. Negative Patellofemoral crepitus    Examination of the right elbow shows no signs of rashes or erythema. The patient has no masses, ecchymosis, or effusion. The patient has full range of motion from 0-160°. Patient has full pronation and supination. Patient is nontender along the medial epicondyle and moderately tender over the lateral epicondyle. Nontender over the olecranon process.  Nontender along the course of the UCL. Patient has a negative valgus stress test and milking maneuver. Negative Tinel's sign over the cubital tunnel. 2+ radial pulse. Intact light  touch sensation.     X-rays:  X-rays of the left knee is  reviewed which show severe lateral joint space narrowing on the right and more moderate to severe medial joint space wear on the left.  X-rays of the right shoulderreviewed which show no atypical findings      Assessment::  Left knee arthritis  Right lateral epicondylitis    Plan: I injected his right knee with Orthovisc 2 of 3.  Follow up next week.      This note was created using [employer] voice recognition software that occasionally misinterpreted phrases or words.    Consult note is delivered via Epic messaging service.

## 2019-12-17 ENCOUNTER — TELEPHONE (OUTPATIENT)
Dept: PAIN MEDICINE | Facility: CLINIC | Age: 74
End: 2019-12-17

## 2019-12-17 DIAGNOSIS — M54.16 LUMBAR RADICULOPATHY: Primary | ICD-10-CM

## 2019-12-17 NOTE — TELEPHONE ENCOUNTER
MRI lumbar spine without contrast 10/12/2019 (open MRI)    L1-2, stable foraminal disc protrusion.  Mild central canal stenosis.  Moderate left neuroforaminal narrowing.  L2-3, disc bulging with prominent posterior element hypertrophy.  Moderate central canal stenosis.  Bilateral recess narrowing as well as moderate left and mild right neural foraminal narrowing.  L3-4, cervical ventral disc bulge with facet disease.  Mild-to-moderate central canal narrowing.  Moderate right and mild left neural foraminal narrowing.  L4-5, service control disc bulge with bulky facet disease.  Mild-to-moderate central canal stenosis.  Moderate bilateral neural foraminal narrowing, right greater than left.  L5-S1 disc bulge and facet disease

## 2019-12-19 ENCOUNTER — HOSPITAL ENCOUNTER (OUTPATIENT)
Dept: CARDIOLOGY | Facility: CLINIC | Age: 74
Discharge: HOME | End: 2019-12-19
Attending: INTERNAL MEDICINE
Payer: MEDICARE

## 2019-12-19 ENCOUNTER — PATIENT MESSAGE (OUTPATIENT)
Dept: CARDIOLOGY | Facility: CLINIC | Age: 74
End: 2019-12-19

## 2019-12-19 VITALS — WEIGHT: 240 LBS | HEIGHT: 73 IN | BODY MASS INDEX: 31.81 KG/M2

## 2019-12-19 DIAGNOSIS — I10 HYPERTENSION, UNSPECIFIED TYPE: ICD-10-CM

## 2019-12-19 DIAGNOSIS — R06.02 SHORTNESS OF BREATH: ICD-10-CM

## 2019-12-19 LAB
ASCENDING AORTA: 3.35 CM
AV INDEX (PROSTH): 0.65
AV MEAN GRADIENT: 5 MMHG
AV PEAK GRADIENT: 10 MMHG
AV VALVE AREA: 2.62 CM2
AV VELOCITY RATIO: 0.64
BSA FOR ECHO PROCEDURE: 2.37 M2
CV ECHO LV RWT: 0.46 CM
CV STRESS BASE HR: 66 BPM
DIASTOLIC BLOOD PRESSURE: 91 MMHG
DOP CALC AO PEAK VEL: 1.6 M/S
DOP CALC AO VTI: 34.99 CM
DOP CALC LVOT AREA: 4 CM2
DOP CALC LVOT DIAMETER: 2.27 CM
DOP CALC LVOT PEAK VEL: 1.03 M/S
DOP CALC LVOT STROKE VOLUME: 91.54 CM3
DOP CALCLVOT PEAK VEL VTI: 22.63 CM
E WAVE DECELERATION TIME: 268.31 MSEC
E/A RATIO: 0.76
E/E' RATIO: 9.08 M/S
ECHO LV POSTERIOR WALL: 1 CM (ref 0.6–1.1)
FRACTIONAL SHORTENING: 30 % (ref 28–44)
INTERVENTRICULAR SEPTUM: 0.97 CM (ref 0.6–1.1)
LA MAJOR: 5.93 CM
LA MINOR: 6.39 CM
LA WIDTH: 4.5 CM
LEFT ATRIUM SIZE: 3.5 CM
LEFT ATRIUM VOLUME INDEX: 35.4 ML/M2
LEFT ATRIUM VOLUME: 82.35 CM3
LEFT INTERNAL DIMENSION IN SYSTOLE: 3.08 CM (ref 2.1–4)
LEFT VENTRICLE DIASTOLIC VOLUME INDEX: 37.54 ML/M2
LEFT VENTRICLE DIASTOLIC VOLUME: 87.28 ML
LEFT VENTRICLE MASS INDEX: 62 G/M2
LEFT VENTRICLE SYSTOLIC VOLUME INDEX: 16.1 ML/M2
LEFT VENTRICLE SYSTOLIC VOLUME: 37.38 ML
LEFT VENTRICULAR INTERNAL DIMENSION IN DIASTOLE: 4.39 CM (ref 3.5–6)
LEFT VENTRICULAR MASS: 144.25 G
LV LATERAL E/E' RATIO: 8.43 M/S
LV SEPTAL E/E' RATIO: 9.83 M/S
MV PEAK A VEL: 0.78 M/S
MV PEAK E VEL: 0.59 M/S
OHS CV CPX 1 MINUTE RECOVERY HEART RATE: 91 BPM
OHS CV CPX 85 PERCENT MAX PREDICTED HEART RATE MALE: 122
OHS CV CPX ESTIMATED METS: 9
OHS CV CPX MAX PREDICTED HEART RATE: 144
OHS CV CPX PATIENT IS FEMALE: 0
OHS CV CPX PATIENT IS MALE: 1
OHS CV CPX PEAK DIASTOLIC BLOOD PRESSURE: 53 MMHG
OHS CV CPX PEAK HEAR RATE: 121 BPM
OHS CV CPX PEAK RATE PRESSURE PRODUCT: NORMAL
OHS CV CPX PEAK SYSTOLIC BLOOD PRESSURE: 182 MMHG
OHS CV CPX PERCENT MAX PREDICTED HEART RATE ACHIEVED: 84
OHS CV CPX RATE PRESSURE PRODUCT PRESENTING: 9042
PISA TR MAX VEL: 1.54 M/S
PULM VEIN S/D RATIO: 1.08
PV PEAK D VEL: 0.5 M/S
PV PEAK S VEL: 0.54 M/S
RA MAJOR: 5.27 CM
RA PRESSURE: 3 MMHG
RA WIDTH: 3.72 CM
RIGHT VENTRICULAR END-DIASTOLIC DIMENSION: 2.96 CM
SINUS: 4 CM
STJ: 2.85 CM
STRESS ECHO POST EXERCISE DUR MIN: 6 MINUTES
STRESS ECHO POST EXERCISE DUR SEC: 0 SECONDS
STRESS ST DEPRESSION: 0.5 MM
SYSTOLIC BLOOD PRESSURE: 137 MMHG
TDI LATERAL: 0.07 M/S
TDI SEPTAL: 0.06 M/S
TDI: 0.07 M/S
TR MAX PG: 9 MMHG
TRICUSPID ANNULAR PLANE SYSTOLIC EXCURSION: 2.21 CM
TV REST PULMONARY ARTERY PRESSURE: 12 MMHG

## 2019-12-19 PROCEDURE — 93325 DOPPLER ECHO COLOR FLOW MAPG: CPT | Mod: 26,HCNC,, | Performed by: INTERNAL MEDICINE

## 2019-12-19 PROCEDURE — 93325 STRESS ECHO (CUPID ONLY): ICD-10-PCS | Mod: 26,HCNC,, | Performed by: INTERNAL MEDICINE

## 2019-12-19 PROCEDURE — 93320 DOPPLER ECHO COMPLETE: CPT | Mod: 26,HCNC,, | Performed by: INTERNAL MEDICINE

## 2019-12-19 PROCEDURE — 93325 DOPPLER ECHO COLOR FLOW MAPG: CPT | Mod: HCNC

## 2019-12-19 PROCEDURE — 93351 STRESS ECHO (CUPID ONLY): ICD-10-PCS | Mod: 26,HCNC,, | Performed by: INTERNAL MEDICINE

## 2019-12-19 PROCEDURE — 93351 STRESS TTE COMPLETE: CPT | Mod: 26,HCNC,, | Performed by: INTERNAL MEDICINE

## 2019-12-19 PROCEDURE — 93320 STRESS ECHO (CUPID ONLY): ICD-10-PCS | Mod: 26,HCNC,, | Performed by: INTERNAL MEDICINE

## 2019-12-20 ENCOUNTER — HOSPITAL ENCOUNTER (OUTPATIENT)
Facility: AMBULARY SURGERY CENTER | Age: 74
Discharge: HOME | End: 2019-12-20
Attending: ANESTHESIOLOGY | Admitting: ANESTHESIOLOGY
Payer: MEDICARE

## 2019-12-20 DIAGNOSIS — M54.16 LUMBAR RADICULITIS: Primary | ICD-10-CM

## 2019-12-20 PROCEDURE — 64483 PR EPIDURAL INJ, ANES/STEROID, TRANSFORAMINAL, LUMB/SACR, SNGL LEVL: ICD-10-PCS | Mod: HCNC,RT,, | Performed by: ANESTHESIOLOGY

## 2019-12-20 PROCEDURE — 64484 NJX AA&/STRD TFRM EPI L/S EA: CPT | Performed by: ANESTHESIOLOGY

## 2019-12-20 PROCEDURE — 64483 NJX AA&/STRD TFRM EPI L/S 1: CPT | Mod: HCNC,RT,, | Performed by: ANESTHESIOLOGY

## 2019-12-20 PROCEDURE — 64484 NJX AA&/STRD TFRM EPI L/S EA: CPT | Mod: HCNC,RT,, | Performed by: ANESTHESIOLOGY

## 2019-12-20 PROCEDURE — 99152 MOD SED SAME PHYS/QHP 5/>YRS: CPT | Mod: HCNC,,, | Performed by: ANESTHESIOLOGY

## 2019-12-20 PROCEDURE — 99152 PR MOD CONSCIOUS SEDATION, SAME PHYS, 5+ YRS, FIRST 15 MIN: ICD-10-PCS | Mod: HCNC,,, | Performed by: ANESTHESIOLOGY

## 2019-12-20 PROCEDURE — 64484 PRA INJECT ANES/STEROID FORAMEN LUMBAR/SACRAL W IMG GUIDE ,EA ADD LEVEL: ICD-10-PCS | Mod: HCNC,RT,, | Performed by: ANESTHESIOLOGY

## 2019-12-20 PROCEDURE — 64483 NJX AA&/STRD TFRM EPI L/S 1: CPT | Performed by: ANESTHESIOLOGY

## 2019-12-20 RX ORDER — BUPIVACAINE HYDROCHLORIDE 2.5 MG/ML
INJECTION, SOLUTION EPIDURAL; INFILTRATION; INTRACAUDAL
Status: DISCONTINUED | OUTPATIENT
Start: 2019-12-20 | End: 2019-12-20 | Stop reason: HOSPADM

## 2019-12-20 RX ORDER — SODIUM CHLORIDE, SODIUM LACTATE, POTASSIUM CHLORIDE, CALCIUM CHLORIDE 600; 310; 30; 20 MG/100ML; MG/100ML; MG/100ML; MG/100ML
INJECTION, SOLUTION INTRAVENOUS ONCE AS NEEDED
Status: DISCONTINUED | OUTPATIENT
Start: 2019-12-20 | End: 2019-12-20 | Stop reason: HOSPADM

## 2019-12-20 RX ORDER — DEXAMETHASONE SODIUM PHOSPHATE 10 MG/ML
INJECTION INTRAMUSCULAR; INTRAVENOUS
Status: DISCONTINUED | OUTPATIENT
Start: 2019-12-20 | End: 2019-12-20 | Stop reason: HOSPADM

## 2019-12-20 RX ORDER — ALPRAZOLAM 1 MG/1
1 TABLET, ORALLY DISINTEGRATING ORAL
Status: COMPLETED | OUTPATIENT
Start: 2019-12-20 | End: 2019-12-20

## 2019-12-20 RX ORDER — LIDOCAINE HYDROCHLORIDE 10 MG/ML
INJECTION, SOLUTION EPIDURAL; INFILTRATION; INTRACAUDAL; PERINEURAL
Status: DISCONTINUED | OUTPATIENT
Start: 2019-12-20 | End: 2019-12-20 | Stop reason: HOSPADM

## 2019-12-20 RX ADMIN — ALPRAZOLAM 1 MG: 1 TABLET, ORALLY DISINTEGRATING ORAL at 11:12

## 2019-12-20 NOTE — PLAN OF CARE
Stable, states ready to go home, koko po fluids, denies pain , no leg weakness, ambulated to car with RN to wife

## 2019-12-20 NOTE — OP NOTE
PROCEDURE DATE: 12/20/2019    PROCEDURE: Right L3-4, L4-5 transforaminal epidural steroid injection under fluoroscopy    DIAGNOSIS: Lumbar disc displacement without myelopathy  Post op diagnosis: Same    PHYSICIAN: Devan Watt MD    MEDICATIONS INJECTED:  Dexamethasone 5mg (0.5ml) and 1.5ml 0.25% bupivicaine at each nerve root.     LOCAL ANESTHETIC INJECTED:  Lidocaine 1%. 2 ml per site.    SEDATION MEDICATIONS: RN Iv sedation    ESTIMATED BLOOD LOSS:  None    COMPLICATIONS:  None    TECHNIQUE:   A time-out was taken to identify patient and procedure side prior to starting the procedure. The patient was placed in a prone position, prepped and draped in the usual sterile fashion using ChloraPrep and sterile towels.  The area to be injected was determined under fluoroscopic guidance in AP and oblique view.  Local anesthetic was given by raising a wheal and going down to the hub of a 25-gauge 1.5 inch needle.  In oblique view, a 5 inch 22-gauge bent-tip spinal needle was introduced towards 6 oclock position of the pedicle of each above named nerve root level.  The needle was walked medially then hinged into the neural foramen and position was confirmed in AP and lateral views.  1ml contrast dye was injected to confirm appropriate placement and that there was no vascular uptake.  After negative aspiration for blood or CSF, the medication was then injected. This was performed at the right L3-4, L4-5 level(s). The patient tolerated the procedure well.    The patient was monitored after the procedure.  Patient was given post procedure and discharge instructions to follow at home. The patient was discharged in a stable condition.

## 2019-12-20 NOTE — DISCHARGE SUMMARY
[facility]  Discharge Note  Short Stay    Admit Date: 12/20/2019    Discharge Date and Time: 12/20/2019    Attending Physician: Devan Watt MD     Discharge Provider: Devan Watt    Diagnoses:  Active Hospital Problems    Diagnosis  POA    *Lumbar radiculitis [M54.16]  Yes      Resolved Hospital Problems   No resolved problems to display.       Hospital Course: Lumbar Bee  Discharged Condition: Good    Final Diagnoses:   Active Hospital Problems    Diagnosis  POA    *Lumbar radiculitis [M54.16]  Yes      Resolved Hospital Problems   No resolved problems to display.       Disposition: Home or Self Care    Follow up/Patient Instructions:    Medications:  Reconciled Home Medications:      Medication List      CONTINUE taking these medications    atorvastatin 80 MG tablet  Commonly known as:  LIPITOR  TAKE 1 TABLET EVERY DAY     carvedilol 25 MG tablet  Commonly known as:  COREG  Take 1 tablet (25 mg total) by mouth 2 (two) times daily with meals.     cetirizine 10 MG tablet  Commonly known as:  ZYRTEC  Take 1 tablet by mouth daily as needed.     chlorthalidone 25 MG Tab  Commonly known as:  HYGROTEN  TAKE 1 TABLET EVERY DAY     Co Q-10 100 mg capsule  Generic drug:  coenzyme Q10  Take 400 mg by mouth once daily.     ezetimibe 10 mg tablet  Commonly known as:  ZETIA  Take 1 tablet (10 mg total) by mouth once daily.     fluticasone propionate 50 mcg/actuation nasal spray  Commonly known as:  FLONASE  USE 1 SPRAY IN EACH NOSTRIL TWICE DAILY AS NEEDED  FOR  RHINITIS     gabapentin 300 MG capsule  Commonly known as:  NEURONTIN  Take 1 capsule (300 mg total) by mouth 3 (three) times daily.     levothyroxine 50 MCG tablet  Commonly known as:  SYNTHROID  Take 1 tablet (50 mcg total) by mouth once daily.     milk thistle 200 mg Cap  Take 200 mg by mouth 2 (two) times daily. Twice a day     MSM 1,000 mg Tab  Generic drug:  methylsulfonylmethane  Take 1,000 mg by mouth once daily. Every day     olmesartan 20 MG  tablet  Commonly known as:  BENICAR  TAKE 1 TABLET EVERY DAY     omega-3 fatty acids 1,000 mg Cap  Twice a day     pantoprazole 40 MG tablet  Commonly known as:  PROTONIX  Take 1 tablet (40 mg total) by mouth 2 (two) times daily.     * potassium chloride SA 20 MEQ tablet  Commonly known as:  K-DUR,KLOR-CON  Take 20 mEq by mouth 3 (three) times daily. Take 4 tablets daily for 5 days, then 3 daily for 2 weeks, then 2 daily thereafter.     * potassium chloride 20 mEq  Commonly known as:  K-TAB     * predniSONE 5 MG tablet  Commonly known as:  DELTASONE  TAKE 1 TABLET EVERY DAY     * predniSONE 1 MG tablet  Commonly known as:  DELTASONE  TAKE 2 TABLETS EVERY DAY     sildenafil 20 mg Tab  Commonly known as:  REVATIO  Take 1 to 3 tabs daily as needed.     sucralfate 1 gram tablet  Commonly known as:  CARAFATE  Take 1 tablet (1 g total) by mouth 4 (four) times daily before meals and nightly.     Ventolin HFA 90 mcg/actuation inhaler  Generic drug:  albuterol  Inhale 2 puffs into the lungs every 6 (six) hours as needed for Wheezing. Rescue     Vitamin B-12 5,000 mcg Subl  Generic drug:  cyanocobalamin (vitamin B-12)  Take 5,000 mcg by mouth once daily. Every day     Vitamin D 50 mcg (2,000 unit) Cap  Generic drug:  cholecalciferol (vitamin D3)  1 capsule once daily. Every day         * This list has 4 medication(s) that are the same as other medications prescribed for you. Read the directions carefully, and ask your doctor or other care provider to review them with you.            ASK your doctor about these medications    methocarbamol 750 MG Tab  Commonly known as:  ROBAXIN  Take 1 tablet (750 mg total) by mouth 4 (four) times daily as needed (spasms).  Ask about: Should I take this medication?          Discharge Procedure Orders   Call MD for:  temperature >100.4     Call MD for:  persistent nausea and vomiting or diarrhea     Call MD for:  severe uncontrolled pain     Call MD for:  redness, tenderness, or signs of  infection (pain, swelling, redness, odor or green/yellow discharge around incision site)     Call MD for:  difficulty breathing or increased cough     Call MD for:  severe persistent headache        Follow up with MD in 2-3 weeks    Discharge Procedure Orders (must include Diet, Follow-up, Activity):   Discharge Procedure Orders (must include Diet, Follow-up, Activity)   Call MD for:  temperature >100.4     Call MD for:  persistent nausea and vomiting or diarrhea     Call MD for:  severe uncontrolled pain     Call MD for:  redness, tenderness, or signs of infection (pain, swelling, redness, odor or green/yellow discharge around incision site)     Call MD for:  difficulty breathing or increased cough     Call MD for:  severe persistent headache

## 2019-12-23 VITALS
HEIGHT: 73 IN | TEMPERATURE: 98 F | RESPIRATION RATE: 20 BRPM | DIASTOLIC BLOOD PRESSURE: 61 MMHG | SYSTOLIC BLOOD PRESSURE: 129 MMHG | OXYGEN SATURATION: 91 % | WEIGHT: 240 LBS | HEART RATE: 72 BPM | BODY MASS INDEX: 31.81 KG/M2

## 2019-12-26 ENCOUNTER — OFFICE VISIT (OUTPATIENT)
Dept: ORTHOPEDICS | Facility: CLINIC | Age: 74
End: 2019-12-26
Payer: MEDICARE

## 2019-12-26 VITALS — WEIGHT: 240.06 LBS | BODY MASS INDEX: 31.82 KG/M2 | HEIGHT: 73 IN

## 2019-12-26 DIAGNOSIS — M17.0 PRIMARY OSTEOARTHRITIS OF BOTH KNEES: Primary | ICD-10-CM

## 2019-12-26 PROCEDURE — 99499 UNLISTED E&M SERVICE: CPT | Mod: HCNC,S$GLB,, | Performed by: ORTHOPAEDIC SURGERY

## 2019-12-26 PROCEDURE — 99499 NO LOS: ICD-10-PCS | Mod: HCNC,S$GLB,, | Performed by: ORTHOPAEDIC SURGERY

## 2019-12-26 PROCEDURE — 99999 PR PBB SHADOW E&M-EST. PATIENT-LVL III: ICD-10-PCS | Mod: PBBFAC,HCNC,, | Performed by: ORTHOPAEDIC SURGERY

## 2019-12-26 PROCEDURE — 20610 DRAIN/INJ JOINT/BURSA W/O US: CPT | Mod: HCNC,LT,S$GLB, | Performed by: ORTHOPAEDIC SURGERY

## 2019-12-26 PROCEDURE — 20610 LARGE JOINT ASPIRATION/INJECTION: L KNEE: ICD-10-PCS | Mod: HCNC,LT,S$GLB, | Performed by: ORTHOPAEDIC SURGERY

## 2019-12-26 PROCEDURE — 99999 PR PBB SHADOW E&M-EST. PATIENT-LVL III: CPT | Mod: PBBFAC,HCNC,, | Performed by: ORTHOPAEDIC SURGERY

## 2019-12-26 NOTE — PROGRESS NOTES
Past Medical History:   Diagnosis Date    Arthritis     Back pain     Carpal tunnel syndrome 2/25/2013    Cataract     Cataract, left eye 11/10/2014    Chest pain, musculoskeletal     COPD (chronic obstructive pulmonary disease) 11/052018    Emphysema lung 11/05/2018    Hx of colonic polyp     Hyperlipidemia     Hypertension     Hypothyroidism     Knee fracture     Myasthenia gravis     Neuropathy 1/3/2013    Obesity 1/29/2015    Polyneuropathy     PVC (premature ventricular contraction)     Squamous cell carcinoma 2014    left forearm    Thyroid disease        Past Surgical History:   Procedure Laterality Date    APPENDECTOMY      CATARACT EXTRACTION W/  INTRAOCULAR LENS IMPLANT Bilateral     COLONOSCOPY  03/01/2012    Dr Esteban; hyperplastic polyp; repeat in 5 years    CYSTOSCOPY N/A 2/26/2019    Procedure: CYSTOSCOPY;  Surgeon: Simba Cheung MD;  Location: [facility];  Service: Urology;  Laterality: N/A;    ESOPHAGOGASTRODUODENOSCOPY N/A 9/12/2018    Procedure: EGD (ESOPHAGOGASTRODUODENOSCOPY);  Surgeon: Gabriel Hernandez MD;  Location: [facility];  Service: Endoscopy;  Laterality: N/A;    ESOPHAGOGASTRODUODENOSCOPY N/A 8/19/2019    Procedure: EGD (ESOPHAGOGASTRODUODENOSCOPY);  Surgeon: Gabriel Hernandez MD;  Location: [facility];  Service: Endoscopy;  Laterality: N/A;    ESOPHAGOGASTRODUODENOSCOPY N/A 10/7/2019    Procedure: EGD (ESOPHAGOGASTRODUODENOSCOPY);  Surgeon: Gabriel Hernandez MD;  Location: [facility];  Service: Endoscopy;  Laterality: N/A;    HEMORRHOID SURGERY      KNEE ARTHROPLASTY Bilateral     NECK SURGERY      POSTERIOR FUSION OF CERVICAL SPINE WITH LAMINECTOMY N/A 11/7/2018    Procedure: C2-C6 Posterior Cervical Laminectomy & Instrumental Fusion;  Surgeon: Kishore Turner MD;  Location: [address];  Service: Neurosurgery;  Laterality: N/A;    TONSILLECTOMY      TRANSFORAMINAL EPIDURAL INJECTION OF STEROID Right 12/20/2019    Procedure:  Injection,steroid,epidural,transforaminal approach;  Surgeon: Devan Watt MD;  Location: [facility];  Service: Pain Management;  Laterality: Right;  L3-4, L4-5    TRANSRECTAL ULTRASOUND EXAMINATION N/A 2/26/2019    Procedure: ULTRASOUND, RECTAL APPROACH;  Surgeon: Simba Cheung MD;  Location: [facility];  Service: Urology;  Laterality: N/A;    UPPER GASTROINTESTINAL ENDOSCOPY  09/12/2018    Dr Hernandez; gastritis; extensive intestinal metaplasia; repeat in 1-2- years       Current Outpatient Medications   Medication Sig    atorvastatin (LIPITOR) 80 MG tablet TAKE 1 TABLET EVERY DAY    carvedilol (COREG) 25 MG tablet Take 1 tablet (25 mg total) by mouth 2 (two) times daily with meals.    cetirizine (ZYRTEC) 10 MG tablet Take 1 tablet by mouth daily as needed.    chlorthalidone (HYGROTEN) 25 MG Tab TAKE 1 TABLET EVERY DAY    cholecalciferol, vitamin D3, (VITAMIN D) 2,000 unit Cap 1 capsule once daily. Every day    coenzyme Q10 (CO Q-10) 100 mg capsule Take 400 mg by mouth once daily.     cyanocobalamin, vitamin B-12, (VITAMIN B-12) 5,000 mcg Subl Take 5,000 mcg by mouth once daily. Every day    ezetimibe (ZETIA) 10 mg tablet Take 1 tablet (10 mg total) by mouth once daily.    fluticasone (FLONASE) 50 mcg/actuation nasal spray USE 1 SPRAY IN EACH NOSTRIL TWICE DAILY AS NEEDED  FOR  RHINITIS    gabapentin (NEURONTIN) 300 MG capsule Take 1 capsule (300 mg total) by mouth 3 (three) times daily.    levothyroxine (SYNTHROID) 50 MCG tablet Take 1 tablet (50 mcg total) by mouth once daily.    methylsulfonylmethane (MSM) 1,000 mg Tab Take 1,000 mg by mouth once daily. Every day    milk thistle 200 mg Cap Take 200 mg by mouth 2 (two) times daily. Twice a day    olmesartan (BENICAR) 20 MG tablet TAKE 1 TABLET EVERY DAY    omega-3 fatty acids 1,000 mg Cap Twice a day    pantoprazole (PROTONIX) 40 MG tablet Take 1 tablet (40 mg total) by mouth 2 (two) times daily.    potassium chloride (K-TAB) 20 mEq      potassium chloride SA (K-DUR,KLOR-CON) 20 MEQ tablet Take 20 mEq by mouth 3 (three) times daily. Take 4 tablets daily for 5 days, then 3 daily for 2 weeks, then 2 daily thereafter.     predniSONE (DELTASONE) 1 MG tablet TAKE 2 TABLETS EVERY DAY    predniSONE (DELTASONE) 5 MG tablet TAKE 1 TABLET EVERY DAY    sildenafil (REVATIO) 20 mg Tab Take 1 to 3 tabs daily as needed.    sucralfate (CARAFATE) 1 gram tablet Take 1 tablet (1 g total) by mouth 4 (four) times daily before meals and nightly.    VENTOLIN HFA 90 mcg/actuation inhaler Inhale 2 puffs into the lungs every 6 (six) hours as needed for Wheezing. Rescue     No current facility-administered medications for this visit.        Review of patient's allergies indicates:   Allergen Reactions    No known drug allergies        Family History   Problem Relation Age of Onset    Cataracts Mother     Heart disease Mother         CHF    Hypertension Mother     Hyperlipidemia Mother     Cataracts Father     Glaucoma Father     Heart disease Father     Hyperlipidemia Sister     Hypertension Sister     No Known Problems Daughter     No Known Problems Daughter     Collagen disease Neg Hx     Amblyopia Neg Hx     Blindness Neg Hx     Macular degeneration Neg Hx     Retinal detachment Neg Hx     Strabismus Neg Hx     Cancer Neg Hx     Colon cancer Neg Hx     Esophageal cancer Neg Hx     Stomach cancer Neg Hx     Crohn's disease Neg Hx     Ulcerative colitis Neg Hx        Social History     Socioeconomic History    Marital status:      Spouse name: Not on file    Number of children: Not on file    Years of education: Not on file    Highest education level: Not on file   Occupational History    Not on file   Social Needs    Financial resource strain: Not hard at all    Food insecurity:     Worry: Never true     Inability: Never true    Transportation needs:     Medical: No     Non-medical: No   Tobacco Use    Smoking status: Never  Smoker    Smokeless tobacco: Never Used    Tobacco comment: when a child   Substance and Sexual Activity    Alcohol use: Yes     Frequency: Never     Binge frequency: Never     Comment: rarely    Drug use: No    Sexual activity: Yes     Partners: Female   Lifestyle    Physical activity:     Days per week: 0 days     Minutes per session: 0 min    Stress: Not at all   Relationships    Social connections:     Talks on phone: More than three times a week     Gets together: More than three times a week     Attends [employer] service: Not on file     Active member of club or organization: Yes     Attends meetings of clubs or organizations: More than 4 times per year     Relationship status:    Other Topics Concern    Not on file   Social History Narrative    Not on file       Chief Complaint:   Chief Complaint   Patient presents with    Injections     ORTHOVISC 3/3 LEFT KNEE       History of present illness:  This is a 73-year-old male seen for left knee pain. Started a few weeks ago.  Pain with walking sitting standing or lying down.  Cannot take NSAIDs due to an ulcer.  Pain is a 5/10.  Also having more right lateral elbow and forearm pain. This started around the same time.  Cannot lift anything and hurts a lot at night.  Pain is about a 6/10 in the elbow.    Answers for HPI/ROS submitted by the patient on 12/23/2019   Leg pain  unexpected weight change: No  appetite change : No  sleep disturbance: No  IMMUNOCOMPROMISED: No  nervous/ anxious: No  dysphoric mood: No  rash: No  visual disturbance: No  eye redness: No  eye pain: No  ear pain: No  tinnitus: Yes  hearing loss: No  sinus pressure : Yes  nosebleeds: Yes  enviro allergies: No  food allergies: No  cough: No  shortness of breath: Yes  sweating: No  frequency: Yes  difficulty urinating: No  hematuria: No  chest pain: No  palpitations: No  nausea: No  vomiting: No  diarrhea: No  blood in stool: No  constipation: No  headaches: Yes  dizziness:  No  numbness: No  seizures: No  joint swelling: No  myalgia: No  weakness: No  back pain: Yes  Pain Chronicity: recurrent  History of trauma: No  Onset: more than 1 month ago  Frequency: daily  Progression since onset: waxing and waning  Injury mechanism: twisting  injury location: at home  pain- numeric: 3/10  pain location: left knee  pain quality: aching  Radiating Pain: No  Aggravating factors: bending, extension, twisting  fever: No  inability to bear weight: No  itching: No  joint locking: No  limited range of motion: Yes  stiffness: Yes  tingling: No  Treatments tried: cold, heat, exercise, injection treatment, NSAIDs, OTC ointments  physical therapy: not tried  Improvement on treatment: mild      Physical Examination:    Vital Signs:    There were no vitals filed for this visit.    Body mass index is 31.67 kg/m².    This a well-developed, well nourished patient in no acute distress.  They are alert and oriented and cooperative to examination.  Pt. walks without an antalgic gait.      Examination of the left knee shows no rashes or erythema. There are no masses ecchymosis or effusion. Patient has full range of motion from 0-130°. Patient is moderately tender to palpation over lateral joint line and moderately tender to palpation over the medial joint line. Patient has a - Lachman exam, - anterior drawer exam, and - posterior drawer exam. - Fabián's exam. Knee is stable to varus and valgus stress. 5 out of 5 motor strength. Palpable distal pulses. Intact light touch sensation. Negative Patellofemoral crepitus    Examination of the right elbow shows no signs of rashes or erythema. The patient has no masses, ecchymosis, or effusion. The patient has full range of motion from 0-160°. Patient has full pronation and supination. Patient is nontender along the medial epicondyle and moderately tender over the lateral epicondyle. Nontender over the olecranon process.  Nontender along the course of the UCL. Patient has  a negative valgus stress test and milking maneuver. Negative Tinel's sign over the cubital tunnel. 2+ radial pulse. Intact light touch sensation.     X-rays:  X-rays of the left knee is  reviewed which show severe lateral joint space narrowing on the right and more moderate to severe medial joint space wear on the left.  X-rays of the right shoulderreviewed which show no atypical findings      Assessment::  Left knee arthritis  Right lateral epicondylitis    Plan: I injected his right knee with Orthovisc 3 of 3.  Follow up as needed.    This note was created using [employer] voice recognition software that occasionally misinterpreted phrases or words.    Consult note is delivered via Epic messaging service.

## 2019-12-26 NOTE — PROCEDURES
Large Joint Aspiration/Injection: L knee  Date/Time: 12/26/2019 11:00 AM  Performed by: Haroldo Campbell MD  Authorized by: Haroldo Campbell MD     Consent Done?:  Yes (Verbal)  Indications:  Pain  Procedure site marked: Yes    Timeout: Prior to procedure the correct patient, procedure, and site was verified      Location:  Knee  Site:  L knee  Prep: Patient was prepped and draped in usual sterile fashion    Needle size:  20 G  Approach:  Anterolateral  Medications:  15 mg sodium hyaluronate (orthovisc) 30 mg/2 mL  Patient tolerance:  Patient tolerated the procedure well with no immediate complications

## 2019-12-30 NOTE — PROGRESS NOTES
Subjective:   Patient ID:  Maximilian Tavera Jr. is a 74 y.o. male who presents for follow-up of CVD    HPI: The patient is here for CAD risk factors other CVD.   The patient has no chest pain, SOB, TIA, palpitations, syncope or pre-syncope.Patient does not exercise.Alot of musculoskeletal issues lately.Edema worse and weight gain. BP usually 120s        Review of Systems   Constitution: Positive for weight gain. Negative for chills, decreased appetite, diaphoresis, fever, malaise/fatigue, night sweats and weight loss.   HENT: Negative for congestion, hoarse voice, nosebleeds, sore throat and tinnitus.    Eyes: Negative for blurred vision, double vision, vision loss in left eye, vision loss in right eye, visual disturbance and visual halos.   Cardiovascular: Positive for leg swelling. Negative for chest pain, claudication, cyanosis, dyspnea on exertion, irregular heartbeat, near-syncope, orthopnea, palpitations, paroxysmal nocturnal dyspnea and syncope.   Respiratory: Negative for cough, hemoptysis, shortness of breath, sleep disturbances due to breathing, snoring, sputum production and wheezing.    Endocrine: Negative for cold intolerance, heat intolerance, polydipsia, polyphagia and polyuria.   Hematologic/Lymphatic: Negative for adenopathy and bleeding problem. Does not bruise/bleed easily.   Skin: Negative for color change, dry skin, flushing, itching, nail changes, poor wound healing, rash, skin cancer, suspicious lesions and unusual hair distribution.   Musculoskeletal: Positive for arthritis, back pain, joint swelling, muscle cramps, muscle weakness, myalgias and stiffness. Negative for falls, gout and joint pain.   Gastrointestinal: Negative for abdominal pain, anorexia, change in bowel habit, constipation, diarrhea, dysphagia, heartburn, hematemesis, hematochezia, melena and vomiting.   Genitourinary: Negative for decreased libido, dysuria, hematuria, hesitancy and urgency.   Neurological: Negative for  "excessive daytime sleepiness, dizziness, focal weakness, headaches, light-headedness, loss of balance, numbness, paresthesias, seizures, sensory change, tremors, vertigo and weakness.   Psychiatric/Behavioral: Negative for altered mental status, depression, hallucinations, memory loss, substance abuse and suicidal ideas. The patient does not have insomnia and is not nervous/anxious.    Allergic/Immunologic: Negative for environmental allergies and hives.       Objective: BP (!) 146/78   Pulse 64   Ht 6' 1" (1.854 m)   Wt 111.2 kg (245 lb 2.4 oz)   BMI 32.34 kg/m²      Physical Exam   Constitutional: He is oriented to person, place, and time. He appears well-developed and well-nourished. No distress.   HENT:   Head: Normocephalic.   Eyes: Pupils are equal, round, and reactive to light. EOM are normal.   Neck: Normal range of motion. No thyromegaly present.   Cardiovascular: Normal rate, regular rhythm, normal heart sounds and intact distal pulses. Exam reveals no gallop and no friction rub.   No murmur heard.  Pulses:       Carotid pulses are 3+ on the right side, and 3+ on the left side.       Radial pulses are 3+ on the right side, and 3+ on the left side.        Femoral pulses are 3+ on the right side, and 3+ on the left side.       Popliteal pulses are 3+ on the right side, and 3+ on the left side.        Dorsalis pedis pulses are 3+ on the right side, and 3+ on the left side.        Posterior tibial pulses are 3+ on the right side, and 3+ on the left side.   Pulmonary/Chest: Effort normal and breath sounds normal. No respiratory distress. He has no wheezes. He has no rales. He exhibits no tenderness.   Abdominal: Soft. He exhibits no distension and no mass. There is no tenderness.   Musculoskeletal: Normal range of motion.   Lymphadenopathy:     He has no cervical adenopathy.   Neurological: He is alert and oriented to person, place, and time.   Skin: Skin is warm. He is not diaphoretic. No cyanosis. Nails " show no clubbing.   Psychiatric: He has a normal mood and affect. His speech is normal and behavior is normal. Judgment and thought content normal. Cognition and memory are normal.       Assessment:     1. Agatston CAC score, <100    2. Abdominal aortic atherosclerosis    3. Aortic valve disease    4. Bilateral carotid artery stenosis    5. Cervical stenosis of spinal canal    6. Erectile dysfunction due to arterial insufficiency    7. HTN (hypertension), benign    8. Mixed hyperlipidemia    9. Hypothyroidism due to acquired atrophy of thyroid    10. Neuropathy    11. Nonsustained ventricular tachycardia    12. On prednisone therapy    13. PVC's (premature ventricular contractions)    14. RBBB    15. S/P cervical spinal fusion    16. Impaired fasting blood sugar        Plan:   Discussed diet , achieving and maintaining ideal body weight, and exercise.   We reviewed meds in detail.  Reassured-Discussed goals, options, plan.  3 time per week take the whole chlorthalidone and on these days a third K and discussed KCL SALT  Maximilian was seen today for hypertension, hyperlipidemia and valvular heart disease.    Diagnoses and all orders for this visit:    Agatston CAC score, <100  -     Lipid panel; Future; Expected date: 03/10/2021  -     Comprehensive metabolic panel; Future; Expected date: 03/10/2021  -     TSH; Future; Expected date: 03/10/2021  -     CBC auto differential; Future; Expected date: 03/10/2021    Abdominal aortic atherosclerosis  -     Lipid panel; Future; Expected date: 03/10/2021  -     Comprehensive metabolic panel; Future; Expected date: 03/10/2021  -     TSH; Future; Expected date: 03/10/2021  -     EKG 12-lead; Future; Expected date: 03/10/2021    Aortic valve disease  -     CBC auto differential; Future; Expected date: 03/10/2021    Bilateral carotid artery stenosis  -     Lipid panel; Future; Expected date: 03/10/2021  -     Comprehensive metabolic panel; Future; Expected date: 03/10/2021  -      EKG 12-lead; Future; Expected date: 03/10/2021    Cervical stenosis of spinal canal    Erectile dysfunction due to arterial insufficiency  -     CBC auto differential; Future; Expected date: 03/10/2021    HTN (hypertension), benign  -     TSH; Future; Expected date: 03/10/2021  -     Basic metabolic panel; Future; Expected date: 04/17/2020    Mixed hyperlipidemia  -     TSH; Future; Expected date: 03/10/2021    Hypothyroidism due to acquired atrophy of thyroid  -     TSH; Future; Expected date: 03/10/2021  -     T4, free; Future; Expected date: 03/10/2021    Neuropathy    Nonsustained ventricular tachycardia    On prednisone therapy  -     Hemoglobin A1c; Future; Expected date: 03/10/2021    PVC's (premature ventricular contractions)  -     CBC auto differential; Future; Expected date: 03/10/2021    RBBB  -     CBC auto differential; Future; Expected date: 03/10/2021    S/P cervical spinal fusion    Impaired fasting blood sugar  -     Hemoglobin A1c; Future; Expected date: 03/10/2021            Follow up in about 15 months (around 4/10/2021) for With ECG and labs; chem 7 4 months.

## 2020-01-07 ENCOUNTER — HOSPITAL ENCOUNTER (OUTPATIENT)
Facility: HOSPITAL | Age: 75
Discharge: HOME | End: 2020-01-07
Attending: INTERNAL MEDICINE | Admitting: INTERNAL MEDICINE
Payer: MEDICARE

## 2020-01-07 ENCOUNTER — ANESTHESIA (OUTPATIENT)
Dept: ENDOSCOPY | Facility: HOSPITAL | Age: 75
End: 2020-01-07
Payer: MEDICARE

## 2020-01-07 ENCOUNTER — ANESTHESIA EVENT (OUTPATIENT)
Dept: ENDOSCOPY | Facility: HOSPITAL | Age: 75
End: 2020-01-07
Payer: MEDICARE

## 2020-01-07 VITALS
RESPIRATION RATE: 16 BRPM | BODY MASS INDEX: 31.54 KG/M2 | SYSTOLIC BLOOD PRESSURE: 138 MMHG | WEIGHT: 238 LBS | DIASTOLIC BLOOD PRESSURE: 68 MMHG | HEART RATE: 61 BPM | OXYGEN SATURATION: 97 % | TEMPERATURE: 98 F | HEIGHT: 73 IN

## 2020-01-07 DIAGNOSIS — K25.9 GASTRIC ULCER: ICD-10-CM

## 2020-01-07 DIAGNOSIS — R10.13 EPIGASTRIC PAIN: Primary | ICD-10-CM

## 2020-01-07 PROCEDURE — 88305 TISSUE EXAM BY PATHOLOGIST: CPT | Mod: 26,HCNC,, | Performed by: PATHOLOGY

## 2020-01-07 PROCEDURE — 88342 IMHCHEM/IMCYTCHM 1ST ANTB: CPT | Mod: HCNC | Performed by: PATHOLOGY

## 2020-01-07 PROCEDURE — D9220A PRA ANESTHESIA: ICD-10-PCS | Mod: HCNC,CRNA,, | Performed by: NURSE ANESTHETIST, CERTIFIED REGISTERED

## 2020-01-07 PROCEDURE — D9220A PRA ANESTHESIA: ICD-10-PCS | Mod: HCNC,ANES,, | Performed by: ANESTHESIOLOGY

## 2020-01-07 PROCEDURE — 43259 EGD US EXAM DUODENUM/JEJUNUM: CPT | Mod: HCNC | Performed by: INTERNAL MEDICINE

## 2020-01-07 PROCEDURE — 43239 EGD BIOPSY SINGLE/MULTIPLE: CPT | Mod: 59,HCNC,, | Performed by: INTERNAL MEDICINE

## 2020-01-07 PROCEDURE — 88342 CHG IMMUNOCYTOCHEMISTRY: ICD-10-PCS | Mod: 26,HCNC,, | Performed by: PATHOLOGY

## 2020-01-07 PROCEDURE — 88305 TISSUE EXAM BY PATHOLOGIST: ICD-10-PCS | Mod: 26,HCNC,, | Performed by: PATHOLOGY

## 2020-01-07 PROCEDURE — 63600175 PHARM REV CODE 636 W HCPCS: Mod: HCNC | Performed by: INTERNAL MEDICINE

## 2020-01-07 PROCEDURE — 43239 PR EGD, FLEX, W/BIOPSY, SGL/MULTI: ICD-10-PCS | Mod: 59,HCNC,, | Performed by: INTERNAL MEDICINE

## 2020-01-07 PROCEDURE — 37000008 HC ANESTHESIA 1ST 15 MINUTES: Mod: HCNC | Performed by: INTERNAL MEDICINE

## 2020-01-07 PROCEDURE — 88305 TISSUE EXAM BY PATHOLOGIST: CPT | Mod: HCNC | Performed by: PATHOLOGY

## 2020-01-07 PROCEDURE — 43239 EGD BIOPSY SINGLE/MULTIPLE: CPT | Mod: HCNC | Performed by: INTERNAL MEDICINE

## 2020-01-07 PROCEDURE — 88342 IMHCHEM/IMCYTCHM 1ST ANTB: CPT | Mod: 26,HCNC,, | Performed by: PATHOLOGY

## 2020-01-07 PROCEDURE — D9220A PRA ANESTHESIA: Mod: HCNC,ANES,, | Performed by: ANESTHESIOLOGY

## 2020-01-07 PROCEDURE — D9220A PRA ANESTHESIA: Mod: HCNC,CRNA,, | Performed by: NURSE ANESTHETIST, CERTIFIED REGISTERED

## 2020-01-07 PROCEDURE — 43259 PR ENDOSCOPIC ULTRASOUND EXAM: ICD-10-PCS | Mod: HCNC,,, | Performed by: INTERNAL MEDICINE

## 2020-01-07 PROCEDURE — 27201012 HC FORCEPS, HOT/COLD, DISP: Mod: HCNC | Performed by: INTERNAL MEDICINE

## 2020-01-07 PROCEDURE — 43259 EGD US EXAM DUODENUM/JEJUNUM: CPT | Mod: HCNC,,, | Performed by: INTERNAL MEDICINE

## 2020-01-07 PROCEDURE — 37000009 HC ANESTHESIA EA ADD 15 MINS: Mod: HCNC | Performed by: INTERNAL MEDICINE

## 2020-01-07 PROCEDURE — 63600175 PHARM REV CODE 636 W HCPCS: Mod: HCNC | Performed by: NURSE ANESTHETIST, CERTIFIED REGISTERED

## 2020-01-07 RX ORDER — SODIUM CHLORIDE 0.9 % (FLUSH) 0.9 %
3 SYRINGE (ML) INJECTION
Status: DISCONTINUED | OUTPATIENT
Start: 2020-01-07 | End: 2020-01-07 | Stop reason: HOSPADM

## 2020-01-07 RX ORDER — PROPOFOL 10 MG/ML
VIAL (ML) INTRAVENOUS
Status: DISCONTINUED | OUTPATIENT
Start: 2020-01-07 | End: 2020-01-07

## 2020-01-07 RX ORDER — FENTANYL CITRATE 50 UG/ML
INJECTION, SOLUTION INTRAMUSCULAR; INTRAVENOUS
Status: DISCONTINUED | OUTPATIENT
Start: 2020-01-07 | End: 2020-01-07

## 2020-01-07 RX ORDER — SODIUM CHLORIDE 9 MG/ML
INJECTION, SOLUTION INTRAVENOUS CONTINUOUS PRN
Status: DISCONTINUED | OUTPATIENT
Start: 2020-01-07 | End: 2020-01-07

## 2020-01-07 RX ORDER — SODIUM CHLORIDE 9 MG/ML
INJECTION, SOLUTION INTRAVENOUS CONTINUOUS
Status: DISCONTINUED | OUTPATIENT
Start: 2020-01-07 | End: 2020-01-07 | Stop reason: HOSPADM

## 2020-01-07 RX ORDER — LIDOCAINE HCL/PF 100 MG/5ML
SYRINGE (ML) INTRAVENOUS
Status: DISCONTINUED | OUTPATIENT
Start: 2020-01-07 | End: 2020-01-07

## 2020-01-07 RX ORDER — FENTANYL CITRATE 50 UG/ML
50 INJECTION, SOLUTION INTRAMUSCULAR; INTRAVENOUS EVERY 5 MIN PRN
Status: DISCONTINUED | OUTPATIENT
Start: 2020-01-07 | End: 2020-01-07 | Stop reason: HOSPADM

## 2020-01-07 RX ORDER — PROPOFOL 10 MG/ML
VIAL (ML) INTRAVENOUS CONTINUOUS PRN
Status: DISCONTINUED | OUTPATIENT
Start: 2020-01-07 | End: 2020-01-07

## 2020-01-07 RX ORDER — SODIUM CHLORIDE 0.9 % (FLUSH) 0.9 %
10 SYRINGE (ML) INJECTION
Status: DISCONTINUED | OUTPATIENT
Start: 2020-01-07 | End: 2020-01-07 | Stop reason: HOSPADM

## 2020-01-07 RX ADMIN — SODIUM CHLORIDE: 0.9 INJECTION, SOLUTION INTRAVENOUS at 09:01

## 2020-01-07 RX ADMIN — PROPOFOL 50 MG: 10 INJECTION, EMULSION INTRAVENOUS at 10:01

## 2020-01-07 RX ADMIN — LIDOCAINE HYDROCHLORIDE 100 MG: 20 INJECTION, SOLUTION INTRAVENOUS at 10:01

## 2020-01-07 RX ADMIN — PROPOFOL 75 MCG/KG/MIN: 10 INJECTION, EMULSION INTRAVENOUS at 10:01

## 2020-01-07 RX ADMIN — FENTANYL CITRATE 25 MCG: 50 INJECTION, SOLUTION INTRAMUSCULAR; INTRAVENOUS at 10:01

## 2020-01-07 NOTE — PLAN OF CARE
Pt and spouse given dc instructions. Dr Eric Ryan spoke with pt at bedside. Report given. Pt with no c/o pain, no nausea.

## 2020-01-07 NOTE — ANESTHESIA POSTPROCEDURE EVALUATION
Anesthesia Post Evaluation    Patient: Maximilian Tavera     Procedure(s) Performed: Procedure(s) (LRB):  EGD (ESOPHAGOGASTRODUODENOSCOPY) (N/A)  ULTRASOUND, UPPER GI TRACT, ENDOSCOPIC (N/A)    Final Anesthesia Type: general    Patient location during evaluation: PACU  Patient participation: Yes- Able to Participate  Level of consciousness: awake and alert  Post-procedure vital signs: reviewed and stable  Pain management: adequate  Airway patency: patent    PONV status at discharge: No PONV  Anesthetic complications: no      Cardiovascular status: stable  Respiratory status: unassisted and spontaneous ventilation  Hydration status: euvolemic  Follow-up not needed.          Vitals Value Taken Time   /68 1/7/2020 11:30 AM   Temp 36.7 °C (98.1 °F) 1/7/2020 11:30 AM   Pulse 61 1/7/2020 11:30 AM   Resp 16 1/7/2020 11:30 AM   SpO2 97 % 1/7/2020 11:30 AM         No case tracking events are documented in the log.      Pain/Regina Score: Regina Score: 9 (1/7/2020 10:50 AM)

## 2020-01-07 NOTE — TRANSFER OF CARE
"Anesthesia Transfer of Care Note    Patient: Maximilian Tavera Jr.    Procedure(s) Performed: Procedure(s) (LRB):  EGD (ESOPHAGOGASTRODUODENOSCOPY) (N/A)  ULTRASOUND, UPPER GI TRACT, ENDOSCOPIC (N/A)    Patient location: [facility]    Anesthesia Type: general    Transport from OR: Transported from OR on 2-3 L/min O2 by NC with adequate spontaneous ventilation    Post pain: adequate analgesia    Post assessment: no apparent anesthetic complications and tolerated procedure well    Post vital signs: stable    Level of consciousness: awake and alert    Nausea/Vomiting: no nausea/vomiting    Complications: none    Transfer of care protocol was followed      Last vitals:   Visit Vitals  BP (!) 166/88 (BP Location: Left arm, Patient Position: Lying)   Pulse (P) 63   Temp (P) 36.7 °C (98.1 °F) (Temporal)   Resp (P) 17   Ht 6' 1" (1.854 m)   Wt 108 kg (238 lb)   SpO2 (P) 98%   BMI 31.40 kg/m²     "

## 2020-01-07 NOTE — ANESTHESIA PREPROCEDURE EVALUATION
01/07/2020  Maximilian Tavera Jr. is a 74 y.o., male.    Anesthesia Evaluation    I have reviewed the Patient Summary Reports.    I have reviewed the Nursing Notes.   I have reviewed the Medications.     Review of Systems  Anesthesia Hx:  No problems with previous Anesthesia  History of prior surgery of interest to airway management or planning: Denies Family Hx of Anesthesia complications.   Denies Personal Hx of Anesthesia complications.   Cardiovascular:   Hypertension Denies MI.  Denies CAD.   Dysrhythmias (PVC/s s/p 30 day holter)  hyperlipidemia ECG has been reviewed.    Pulmonary:   COPD Denies Sleep Apnea.    Renal/:  Renal/ Normal     Hepatic/GI:  Hepatic/GI Normal    Musculoskeletal:   Arthritis   Spine Disorders: lumbar Degenerative disease and Disc disease    Neurological:   Denies CVA. Neuromuscular Disease, (Myasthenia gravis, well controlled on steroids only. asymptomatic currently)  Denies Seizures.    Endocrine:   Denies Diabetes. Hypothyroidism        Physical Exam  General:  Well nourished, Obesity    Airway/Jaw/Neck:  Airway Findings: Mouth Opening: Normal Tongue: Normal  General Airway Assessment: Adult  Mallampati: II  Improves to II with phonation.  TM Distance: Normal, at least 6 cm  Jaw/Neck Findings:  Micrognathia: Negative Neck ROM: Normal ROM      Dental:  Dental Findings: Upper partial dentures   Chest/Lungs:  Chest/Lungs Findings: Clear to auscultation, Normal Respiratory Rate     Heart/Vascular:  Heart Findings: Rate: Normal  Rhythm: Regular Rhythm  Sounds: Normal  Heart murmur: negative    Abdomen:  Abdomen Findings:  Normal, Nontender, Soft       Mental Status:  Mental Status Findings:  Cooperative, Alert and Oriented         Anesthesia Plan  Type of Anesthesia, risks & benefits discussed:  Anesthesia Type:  MAC, general  Patient's Preference:   Intra-op Monitoring  Plan:   Intra-op Monitoring Plan Comments:   Post Op Pain Control Plan: multimodal analgesia, IV/PO Opioids PRN and per primary service following discharge from PACU  Post Op Pain Control Plan Comments:   Induction:   IV  Beta Blocker:  Patient is on a Beta-Blocker and has received one dose within the past 24 hours (No further documentation required).       Informed Consent: Patient understands risks and agrees with Anesthesia plan.  Questions answered. Anesthesia consent signed with patient.  ASA Score: 3     Day of Surgery Review of History & Physical:    H&P update referred to the surgeon.         Ready For Surgery From Anesthesia Perspective.

## 2020-01-07 NOTE — [employer] PATIENT INSTRUCTIONS
Discharge Summary/Instructions after an Endoscopic Procedure  Patient Name: Maximilian Tavera  Patient MRN: [id number]  Patient YOB: 1945 Tuesday, January 07, 2020  Kati Ryan MD  RESTRICTIONS:  During your procedure today, you received medications for sedation.  These   medications may affect your judgment, balance and coordination.  Therefore,   for 24 hours, you have the following restrictions:   - DO NOT drive a car, operate machinery, make legal/financial decisions,   sign important papers or drink alcohol.    ACTIVITY:  Today: no heavy lifting, straining or running due to procedural   sedation/anesthesia.  The following day: return to full activity including work.  DIET:  Eat and drink normally unless instructed otherwise.     TREATMENT FOR COMMON SIDE EFFECTS:  - Mild abdominal pain, nausea, belching, bloating or excessive gas:  rest,   eat lightly and use a heating pad.  - Sore Throat: treat with throat lozenges and/or gargle with warm salt   water.  - Because air was used during the procedure, expelling large amounts of air   from your rectum or belching is normal.  - If a bowel prep was taken, you may not have a bowel movement for 1-3 days.    This is normal.  SYMPTOMS TO WATCH FOR AND REPORT TO YOUR PHYSICIAN:  1. Abdominal pain or bloating, other than gas cramps.  2. Chest pain.  3. Back pain.  4. Signs of infection such as: chills or fever occurring within 24 hours   after the procedure.  5. Rectal bleeding, which would show as bright red, maroon, or black stools.   (A tablespoon of blood from the rectum is not serious, especially if   hemorrhoids are present.)  6. Vomiting.  7. Weakness or dizziness.  GO DIRECTLY TO THE NEAREST EMERGENCY ROOM IF YOU HAVE ANY OF THE FOLLOWING:      Difficulty breathing              Chills and/or fever over 101 F   Persistent vomiting and/or vomiting blood   Severe abdominal pain   Severe chest pain   Black, tarry stools   Bleeding- more than one  tablespoon   Any other symptom or condition that you feel may need urgent attention  Your doctor recommends these additional instructions:  If any biopsies were taken, your doctors clinic will contact you in 1 to 2   weeks with any results.  - Discharge patient to home.   - Resume previous diet.   - Continue present medications.   - Await path results.   - Return to referring physician.   - Repeat the upper endoscopy for surveillance based on pathology results.  For questions, problems or results please call your physician - Kati Ryan MD at Work:  [phone].  [facility], EMERGENCY ROOM PHONE NUMBER: [phone]  IF A COMPLICATION OR EMERGENCY SITUATION ARISES AND YOU ARE UNABLE TO REACH   YOUR PHYSICIAN - GO DIRECTLY TO THE EMERGENCY ROOM.  Kati Ryan MD  1/7/2020 11:06:57 AM  This report has been verified and signed electronically.  [employer]

## 2020-01-07 NOTE — H&P
History & Physical - Short Stay  Gastroenterology      SUBJECTIVE:     Procedure: EGD and EUS    Chief Complaint/Indication for Procedure: gastric ulcer    History of Present Illness:  Patient is a 74 y.o. male with gastric ulcer coming for EGD and EUS.     PTA Medications   Medication Sig    atorvastatin (LIPITOR) 80 MG tablet TAKE 1 TABLET EVERY DAY    carvedilol (COREG) 25 MG tablet Take 1 tablet (25 mg total) by mouth 2 (two) times daily with meals.    chlorthalidone (HYGROTEN) 25 MG Tab TAKE 1 TABLET EVERY DAY    cholecalciferol, vitamin D3, (VITAMIN D) 2,000 unit Cap 1 capsule once daily. Every day    coenzyme Q10 (CO Q-10) 100 mg capsule Take 400 mg by mouth once daily.     cyanocobalamin, vitamin B-12, (VITAMIN B-12) 5,000 mcg Subl Take 5,000 mcg by mouth once daily. Every day    ezetimibe (ZETIA) 10 mg tablet Take 1 tablet (10 mg total) by mouth once daily.    fluticasone (FLONASE) 50 mcg/actuation nasal spray USE 1 SPRAY IN EACH NOSTRIL TWICE DAILY AS NEEDED  FOR  RHINITIS    gabapentin (NEURONTIN) 300 MG capsule Take 1 capsule (300 mg total) by mouth 3 (three) times daily.    levothyroxine (SYNTHROID) 50 MCG tablet Take 1 tablet (50 mcg total) by mouth once daily.    methylsulfonylmethane (MSM) 1,000 mg Tab Take 1,000 mg by mouth once daily. Every day    milk thistle 200 mg Cap Take 200 mg by mouth 2 (two) times daily. Twice a day    olmesartan (BENICAR) 20 MG tablet TAKE 1 TABLET EVERY DAY    omega-3 fatty acids 1,000 mg Cap Twice a day    pantoprazole (PROTONIX) 40 MG tablet Take 1 tablet (40 mg total) by mouth 2 (two) times daily.    potassium chloride SA (K-DUR,KLOR-CON) 20 MEQ tablet Take 20 mEq by mouth 3 (three) times daily. Take 4 tablets daily for 5 days, then 3 daily for 2 weeks, then 2 daily thereafter.     predniSONE (DELTASONE) 1 MG tablet TAKE 2 TABLETS EVERY DAY    predniSONE (DELTASONE) 5 MG tablet TAKE 1 TABLET EVERY DAY    sildenafil (REVATIO) 20 mg Tab Take 1 to 3  tabs daily as needed.    sucralfate (CARAFATE) 1 gram tablet Take 1 tablet (1 g total) by mouth 4 (four) times daily before meals and nightly.    VENTOLIN HFA 90 mcg/actuation inhaler Inhale 2 puffs into the lungs every 6 (six) hours as needed for Wheezing. Rescue    cetirizine (ZYRTEC) 10 MG tablet Take 1 tablet by mouth daily as needed.    potassium chloride (K-TAB) 20 mEq        Review of patient's allergies indicates:   Allergen Reactions    No known drug allergies         Past Medical History:   Diagnosis Date    Arthritis     Back pain     Carpal tunnel syndrome 2/25/2013    Cataract     Cataract, left eye 11/10/2014    Chest pain, musculoskeletal     COPD (chronic obstructive pulmonary disease) 11/052018    Emphysema lung 11/05/2018    Hx of colonic polyp     Hyperlipidemia     Hypertension     Hypothyroidism     Knee fracture     Myasthenia gravis     Neuropathy 1/3/2013    Obesity 1/29/2015    Polyneuropathy     PVC (premature ventricular contraction)     Squamous cell carcinoma 2014    left forearm    Thyroid disease      Past Surgical History:   Procedure Laterality Date    APPENDECTOMY      CATARACT EXTRACTION W/  INTRAOCULAR LENS IMPLANT Bilateral     COLONOSCOPY  03/01/2012    Dr Esteban; hyperplastic polyp; repeat in 5 years    CYSTOSCOPY N/A 2/26/2019    Procedure: CYSTOSCOPY;  Surgeon: Simba Cheung MD;  Location: [facility];  Service: Urology;  Laterality: N/A;    ESOPHAGOGASTRODUODENOSCOPY N/A 9/12/2018    Procedure: EGD (ESOPHAGOGASTRODUODENOSCOPY);  Surgeon: Gabriel Hernandez MD;  Location: [facility];  Service: Endoscopy;  Laterality: N/A;    ESOPHAGOGASTRODUODENOSCOPY N/A 8/19/2019    Procedure: EGD (ESOPHAGOGASTRODUODENOSCOPY);  Surgeon: Gabriel Hernandez MD;  Location: [facility];  Service: Endoscopy;  Laterality: N/A;    ESOPHAGOGASTRODUODENOSCOPY N/A 10/7/2019    Procedure: EGD (ESOPHAGOGASTRODUODENOSCOPY);  Surgeon: Gabriel Hernandez MD;  Location: [facility]  ENDO;  Service: Endoscopy;  Laterality: N/A;    HEMORRHOID SURGERY      KNEE ARTHROPLASTY Bilateral     NECK SURGERY      POSTERIOR FUSION OF CERVICAL SPINE WITH LAMINECTOMY N/A 11/7/2018    Procedure: C2-C6 Posterior Cervical Laminectomy & Instrumental Fusion;  Surgeon: Kishore Turner MD;  Location: [address];  Service: Neurosurgery;  Laterality: N/A;    TONSILLECTOMY      TRANSFORAMINAL EPIDURAL INJECTION OF STEROID Right 12/20/2019    Procedure: Injection,steroid,epidural,transforaminal approach;  Surgeon: Devan Watt MD;  Location: [facility];  Service: Pain Management;  Laterality: Right;  L3-4, L4-5    TRANSRECTAL ULTRASOUND EXAMINATION N/A 2/26/2019    Procedure: ULTRASOUND, RECTAL APPROACH;  Surgeon: Simba Cheung MD;  Location: [facility];  Service: Urology;  Laterality: N/A;    UPPER GASTROINTESTINAL ENDOSCOPY  09/12/2018    Dr Hernandez; gastritis; extensive intestinal metaplasia; repeat in 1-2- years     Family History   Problem Relation Age of Onset    Cataracts Mother     Heart disease Mother         CHF    Hypertension Mother     Hyperlipidemia Mother     Cataracts Father     Glaucoma Father     Heart disease Father     Hyperlipidemia Sister     Hypertension Sister     No Known Problems Daughter     No Known Problems Daughter     Collagen disease Neg Hx     Amblyopia Neg Hx     Blindness Neg Hx     Macular degeneration Neg Hx     Retinal detachment Neg Hx     Strabismus Neg Hx     Cancer Neg Hx     Colon cancer Neg Hx     Esophageal cancer Neg Hx     Stomach cancer Neg Hx     Crohn's disease Neg Hx     Ulcerative colitis Neg Hx      Social History     Tobacco Use    Smoking status: Never Smoker    Smokeless tobacco: Never Used    Tobacco comment: when a child   Substance Use Topics    Alcohol use: Yes     Frequency: Never     Binge frequency: Never     Comment: rarely    Drug use: No          OBJECTIVE:     Vital Signs (Most Recent)  Temp: 98.4 °F (36.9 °C)  (01/07/20 0915)  Pulse: 72 (01/07/20 0915)  Resp: 17 (01/07/20 0915)  BP: (!) 166/88 (01/07/20 0915)  SpO2: 96 % (01/07/20 0915)         ASSESSMENT/PLAN:     Patient is a 74 y.o. male with gastric ulcer coming for EGD and EUS.     Plan: EGD and EUS    Anesthesia Plan: Moderate Sedation    ASA Grade: ASA 2 - Patient with mild systemic disease with no functional limitations

## 2020-01-08 ENCOUNTER — PATIENT MESSAGE (OUTPATIENT)
Dept: CARDIOLOGY | Facility: CLINIC | Age: 75
End: 2020-01-08

## 2020-01-08 ENCOUNTER — PATIENT OUTREACH (OUTPATIENT)
Dept: ADMINISTRATIVE | Facility: OTHER | Age: 75
End: 2020-01-08

## 2020-01-09 ENCOUNTER — TELEPHONE (OUTPATIENT)
Dept: UROLOGY | Facility: CLINIC | Age: 75
End: 2020-01-09

## 2020-01-09 NOTE — TELEPHONE ENCOUNTER
----- Message from Karma Sánchez LPN sent at 10/10/2019  1:34 PM CDT -----  [email or web address]  Call patient to schedule f/u in Feb.

## 2020-01-09 NOTE — TELEPHONE ENCOUNTER
Called patient to schedule f/u appt in February.  He is in a meeting and will call me back to schedule.

## 2020-01-10 ENCOUNTER — OFFICE VISIT (OUTPATIENT)
Dept: CARDIOLOGY | Facility: CLINIC | Age: 75
End: 2020-01-10
Payer: MEDICARE

## 2020-01-10 ENCOUNTER — PATIENT MESSAGE (OUTPATIENT)
Dept: CARDIOLOGY | Facility: CLINIC | Age: 75
End: 2020-01-10

## 2020-01-10 VITALS
SYSTOLIC BLOOD PRESSURE: 146 MMHG | HEIGHT: 73 IN | DIASTOLIC BLOOD PRESSURE: 78 MMHG | HEART RATE: 64 BPM | WEIGHT: 245.13 LBS | BODY MASS INDEX: 32.49 KG/M2

## 2020-01-10 DIAGNOSIS — G62.9 NEUROPATHY: ICD-10-CM

## 2020-01-10 DIAGNOSIS — I35.9 AORTIC VALVE DISEASE: ICD-10-CM

## 2020-01-10 DIAGNOSIS — I49.3 PVC'S (PREMATURE VENTRICULAR CONTRACTIONS): ICD-10-CM

## 2020-01-10 DIAGNOSIS — M48.02 CERVICAL STENOSIS OF SPINAL CANAL: ICD-10-CM

## 2020-01-10 DIAGNOSIS — I65.23 BILATERAL CAROTID ARTERY STENOSIS: ICD-10-CM

## 2020-01-10 DIAGNOSIS — E03.4 HYPOTHYROIDISM DUE TO ACQUIRED ATROPHY OF THYROID: ICD-10-CM

## 2020-01-10 DIAGNOSIS — Z79.52 ON PREDNISONE THERAPY: ICD-10-CM

## 2020-01-10 DIAGNOSIS — R73.01 IMPAIRED FASTING BLOOD SUGAR: ICD-10-CM

## 2020-01-10 DIAGNOSIS — N52.01 ERECTILE DYSFUNCTION DUE TO ARTERIAL INSUFFICIENCY: ICD-10-CM

## 2020-01-10 DIAGNOSIS — R93.1 AGATSTON CAC SCORE, <100: Primary | ICD-10-CM

## 2020-01-10 DIAGNOSIS — Z98.1 S/P CERVICAL SPINAL FUSION: ICD-10-CM

## 2020-01-10 DIAGNOSIS — I10 HTN (HYPERTENSION), BENIGN: ICD-10-CM

## 2020-01-10 DIAGNOSIS — I47.29 NONSUSTAINED VENTRICULAR TACHYCARDIA: ICD-10-CM

## 2020-01-10 DIAGNOSIS — E87.6 HYPOKALEMIA: ICD-10-CM

## 2020-01-10 DIAGNOSIS — I70.0 ABDOMINAL AORTIC ATHEROSCLEROSIS: ICD-10-CM

## 2020-01-10 DIAGNOSIS — E78.2 MIXED HYPERLIPIDEMIA: ICD-10-CM

## 2020-01-10 DIAGNOSIS — I45.10 RBBB: ICD-10-CM

## 2020-01-10 LAB
FINAL PATHOLOGIC DIAGNOSIS: NORMAL
GROSS: NORMAL

## 2020-01-10 PROCEDURE — 1159F MED LIST DOCD IN RCRD: CPT | Mod: HCNC,S$GLB,, | Performed by: INTERNAL MEDICINE

## 2020-01-10 PROCEDURE — 1101F PR PT FALLS ASSESS DOC 0-1 FALLS W/OUT INJ PAST YR: ICD-10-PCS | Mod: HCNC,CPTII,S$GLB, | Performed by: INTERNAL MEDICINE

## 2020-01-10 PROCEDURE — 1125F AMNT PAIN NOTED PAIN PRSNT: CPT | Mod: HCNC,S$GLB,, | Performed by: INTERNAL MEDICINE

## 2020-01-10 PROCEDURE — 3078F DIAST BP <80 MM HG: CPT | Mod: HCNC,CPTII,S$GLB, | Performed by: INTERNAL MEDICINE

## 2020-01-10 PROCEDURE — 1159F PR MEDICATION LIST DOCUMENTED IN MEDICAL RECORD: ICD-10-PCS | Mod: HCNC,S$GLB,, | Performed by: INTERNAL MEDICINE

## 2020-01-10 PROCEDURE — 99999 PR PBB SHADOW E&M-EST. PATIENT-LVL V: ICD-10-PCS | Mod: PBBFAC,HCNC,, | Performed by: INTERNAL MEDICINE

## 2020-01-10 PROCEDURE — 99999 PR PBB SHADOW E&M-EST. PATIENT-LVL V: CPT | Mod: PBBFAC,HCNC,, | Performed by: INTERNAL MEDICINE

## 2020-01-10 PROCEDURE — 3077F PR MOST RECENT SYSTOLIC BLOOD PRESSURE >= 140 MM HG: ICD-10-PCS | Mod: HCNC,CPTII,S$GLB, | Performed by: INTERNAL MEDICINE

## 2020-01-10 PROCEDURE — 99215 PR OFFICE/OUTPT VISIT, EST, LEVL V, 40-54 MIN: ICD-10-PCS | Mod: HCNC,S$GLB,, | Performed by: INTERNAL MEDICINE

## 2020-01-10 PROCEDURE — 99499 UNLISTED E&M SERVICE: CPT | Mod: HCNC,S$GLB,, | Performed by: INTERNAL MEDICINE

## 2020-01-10 PROCEDURE — 3078F PR MOST RECENT DIASTOLIC BLOOD PRESSURE < 80 MM HG: ICD-10-PCS | Mod: HCNC,CPTII,S$GLB, | Performed by: INTERNAL MEDICINE

## 2020-01-10 PROCEDURE — 1101F PT FALLS ASSESS-DOCD LE1/YR: CPT | Mod: HCNC,CPTII,S$GLB, | Performed by: INTERNAL MEDICINE

## 2020-01-10 PROCEDURE — 99215 OFFICE O/P EST HI 40 MIN: CPT | Mod: HCNC,S$GLB,, | Performed by: INTERNAL MEDICINE

## 2020-01-10 PROCEDURE — 99499 RISK ADDL DX/OHS AUDIT: ICD-10-PCS | Mod: HCNC,S$GLB,, | Performed by: INTERNAL MEDICINE

## 2020-01-10 PROCEDURE — 3077F SYST BP >= 140 MM HG: CPT | Mod: HCNC,CPTII,S$GLB, | Performed by: INTERNAL MEDICINE

## 2020-01-10 PROCEDURE — 1125F PR PAIN SEVERITY QUANTIFIED, PAIN PRESENT: ICD-10-PCS | Mod: HCNC,S$GLB,, | Performed by: INTERNAL MEDICINE

## 2020-01-10 RX ORDER — POTASSIUM CHLORIDE 20 MEQ/1
20 TABLET, EXTENDED RELEASE ORAL 3 TIMES DAILY
Qty: 270 TABLET | Refills: 3 | Status: SHIPPED | OUTPATIENT
Start: 2020-01-10 | End: 2020-11-06

## 2020-01-10 NOTE — PATIENT INSTRUCTIONS
Discussed diet , achieving and maintaining ideal body weight, and exercise.   We reviewed meds in detail.  Reassured-Discussed goals, options, plan.  3 time per week take the whole chlorthalidone and on these days a third K and discussed KCL SALT       99.8 83.7 73

## 2020-01-11 NOTE — TELEPHONE ENCOUNTER
Already had cysto/trus in feb 2019 and was considering urolift. Deferred for gi issues.   Was then supposed to come or ufs/pvr in oct to check progress while deferring follow up. Did not.     Needs EP visit as annual follow up as clinic visit scheduled. No uroflow needed, just auass/pvr/udip with it.

## 2020-01-12 ENCOUNTER — PATIENT MESSAGE (OUTPATIENT)
Dept: ORTHOPEDICS | Facility: CLINIC | Age: 75
End: 2020-01-12

## 2020-01-13 RX ORDER — METHOCARBAMOL 750 MG/1
750 TABLET, FILM COATED ORAL 4 TIMES DAILY
Qty: 40 TABLET | Refills: 0 | Status: SHIPPED | OUTPATIENT
Start: 2020-01-13 | End: 2020-01-23

## 2020-01-13 NOTE — TELEPHONE ENCOUNTER
Spoke w pt was informed of Dr Cheung recommedation for further care pt declined at this time. States he is still having some medical issues with back and would like to get that taken care of first and has a cruise coming scheduled to take pt states he will call after and schedule a f/u appt.

## 2020-01-13 NOTE — TELEPHONE ENCOUNTER
Pt is requesting refill be sent to local pharmacy/ pt going on cruise and finds this medication helps with his muscle spasms. Pt last seen  12/26/19, last refilled 12/9/219

## 2020-01-15 ENCOUNTER — OFFICE VISIT (OUTPATIENT)
Dept: PAIN MEDICINE | Facility: CLINIC | Age: 75
End: 2020-01-15
Payer: MEDICARE

## 2020-01-15 VITALS
SYSTOLIC BLOOD PRESSURE: 122 MMHG | HEIGHT: 73 IN | WEIGHT: 245 LBS | HEART RATE: 56 BPM | BODY MASS INDEX: 32.47 KG/M2 | DIASTOLIC BLOOD PRESSURE: 64 MMHG

## 2020-01-15 DIAGNOSIS — M54.16 LUMBAR RADICULITIS: Primary | ICD-10-CM

## 2020-01-15 DIAGNOSIS — M51.36 DDD (DEGENERATIVE DISC DISEASE), LUMBAR: ICD-10-CM

## 2020-01-15 DIAGNOSIS — M79.18 MYOFASCIAL PAIN: ICD-10-CM

## 2020-01-15 PROCEDURE — 1101F PT FALLS ASSESS-DOCD LE1/YR: CPT | Mod: HCNC,CPTII,S$GLB, | Performed by: PHYSICIAN ASSISTANT

## 2020-01-15 PROCEDURE — 3078F PR MOST RECENT DIASTOLIC BLOOD PRESSURE < 80 MM HG: ICD-10-PCS | Mod: HCNC,CPTII,S$GLB, | Performed by: PHYSICIAN ASSISTANT

## 2020-01-15 PROCEDURE — 99214 PR OFFICE/OUTPT VISIT, EST, LEVL IV, 30-39 MIN: ICD-10-PCS | Mod: HCNC,S$GLB,, | Performed by: PHYSICIAN ASSISTANT

## 2020-01-15 PROCEDURE — 3078F DIAST BP <80 MM HG: CPT | Mod: HCNC,CPTII,S$GLB, | Performed by: PHYSICIAN ASSISTANT

## 2020-01-15 PROCEDURE — 1101F PR PT FALLS ASSESS DOC 0-1 FALLS W/OUT INJ PAST YR: ICD-10-PCS | Mod: HCNC,CPTII,S$GLB, | Performed by: PHYSICIAN ASSISTANT

## 2020-01-15 PROCEDURE — 1125F PR PAIN SEVERITY QUANTIFIED, PAIN PRESENT: ICD-10-PCS | Mod: HCNC,S$GLB,, | Performed by: PHYSICIAN ASSISTANT

## 2020-01-15 PROCEDURE — 99999 PR PBB SHADOW E&M-EST. PATIENT-LVL IV: CPT | Mod: PBBFAC,HCNC,, | Performed by: PHYSICIAN ASSISTANT

## 2020-01-15 PROCEDURE — 3074F SYST BP LT 130 MM HG: CPT | Mod: HCNC,CPTII,S$GLB, | Performed by: PHYSICIAN ASSISTANT

## 2020-01-15 PROCEDURE — 99214 OFFICE O/P EST MOD 30 MIN: CPT | Mod: HCNC,S$GLB,, | Performed by: PHYSICIAN ASSISTANT

## 2020-01-15 PROCEDURE — 3074F PR MOST RECENT SYSTOLIC BLOOD PRESSURE < 130 MM HG: ICD-10-PCS | Mod: HCNC,CPTII,S$GLB, | Performed by: PHYSICIAN ASSISTANT

## 2020-01-15 PROCEDURE — 99999 PR PBB SHADOW E&M-EST. PATIENT-LVL IV: ICD-10-PCS | Mod: PBBFAC,HCNC,, | Performed by: PHYSICIAN ASSISTANT

## 2020-01-15 PROCEDURE — 1159F MED LIST DOCD IN RCRD: CPT | Mod: HCNC,S$GLB,, | Performed by: PHYSICIAN ASSISTANT

## 2020-01-15 PROCEDURE — 1159F PR MEDICATION LIST DOCUMENTED IN MEDICAL RECORD: ICD-10-PCS | Mod: HCNC,S$GLB,, | Performed by: PHYSICIAN ASSISTANT

## 2020-01-15 PROCEDURE — 1125F AMNT PAIN NOTED PAIN PRSNT: CPT | Mod: HCNC,S$GLB,, | Performed by: PHYSICIAN ASSISTANT

## 2020-01-16 NOTE — PROGRESS NOTES
PCP: Brian Marroquin MD      CC:  Low back and bilateral leg pain    Interval history: Mr. Tavera is a 74 y.o. male with chronic neck and low back pain who presents today for f/u s/p right TF SONIA at L3-4 and L4-5. Continues to c/o pain bilaterally down to his calves. Pain worsens standing, walking getting up.  Left-sided radicular pain is new compared to previous symptoms.  No recent traumatic incident.  He has tried physical therapy with minimal benefit.  He currently takes gabapentin with mild benefits.   Pian is worsening. s/p cervical spine surgery since his last visit with us.  Neck pain is currently tolerable s/p cervical surgery.  He does have some residual left shoulder pain. He denies any worsening weakness.  No bowel bladder changes. Pain today is rated 6/10.    Prior HPI:   Mr. Tavera is a 68 year old male with PMH of HTN referred by Dr. Campbell for right leg pain.  He states having constant right leg pain for the past two years.  Pain is a throbbing pain in her posterior thigh and travels down to his ankle.  He denies any lower back pain.  No leg weakness or numbness.  Pain worsens with standing and walking.  Pain is relieved with rest.  He takes ibuprofen with mild benefits.  Physical therapy has not been helpful.      Pain intervention history: s/p right L4-5 and L5-S1 TFESI on 10/9/2014 and reports 75% relief of his low back and right leg pain.   s/p right L4-5 and L5-S1 TFESI on 2/21/17 and 9/2017 reports 70% relief of his right leg pain.  S/p right IESI at L4-5 and L5-S1 on 5/17/2018   S/p right TF SONIA at L4-5 and L5-S1 on 12/20/19  ROS:  CONSTITUTIONAL: No fevers, chills, night sweats, wt. loss, appetite changes  SKIN: no rashes or itching  ENT: No headaches, head trauma, vision changes, or eye pain  LYMPH NODES: None noticed   CV: No chest pain, palpitations.   RESP: No shortness of breath, dyspnea on exertion, cough, wheezing, or hemoptysis  GI: No nausea, emesis, diarrhea, constipation, melena,  hematochezia, pain.    : No dysuria, hematuria, urgency, or frequency   HEME: No easy bruising, bleeding problems  PSYCHIATRIC: No depression, anxiety, psychosis, hallucinations.  NEURO: No seizures, memory loss, dizziness or difficulty sleeping  MSK: + right leg pain      Past Medical History:   Diagnosis Date    Arthritis     Back pain     Carpal tunnel syndrome 2/25/2013    Cataract     Cataract, left eye 11/10/2014    Chest pain, musculoskeletal     COPD (chronic obstructive pulmonary disease) 11/052018    Emphysema lung 11/05/2018    Gastritis     Hx of colonic polyp     Hyperlipidemia     Hypertension     Hypothyroidism     Knee fracture     Myasthenia gravis     Neuropathy 1/3/2013    Obesity 1/29/2015    Polyneuropathy     PVC (premature ventricular contraction)     Squamous cell carcinoma 2014    left forearm    Thyroid disease      Past Surgical History:   Procedure Laterality Date    APPENDECTOMY      CATARACT EXTRACTION W/  INTRAOCULAR LENS IMPLANT Bilateral     COLONOSCOPY  03/01/2012    Dr Esteban; hyperplastic polyp; repeat in 5 years    CYSTOSCOPY N/A 2/26/2019    Procedure: CYSTOSCOPY;  Surgeon: Simba Cheung MD;  Location: [facility];  Service: Urology;  Laterality: N/A;    ENDOSCOPIC ULTRASOUND OF UPPER GASTROINTESTINAL TRACT N/A 1/7/2020    Procedure: ULTRASOUND, UPPER GI TRACT, ENDOSCOPIC;  Surgeon: Kati Ryan MD;  Location: [address]);  Service: Endoscopy;  Laterality: N/A;    ESOPHAGOGASTRODUODENOSCOPY N/A 9/12/2018    Procedure: EGD (ESOPHAGOGASTRODUODENOSCOPY);  Surgeon: Gabriel Hernandez MD;  Location: [facility];  Service: Endoscopy;  Laterality: N/A;    ESOPHAGOGASTRODUODENOSCOPY N/A 8/19/2019    Procedure: EGD (ESOPHAGOGASTRODUODENOSCOPY);  Surgeon: Gabriel Hernandez MD;  Location: [facility];  Service: Endoscopy;  Laterality: N/A;    ESOPHAGOGASTRODUODENOSCOPY N/A 10/7/2019    Procedure: EGD (ESOPHAGOGASTRODUODENOSCOPY);  Surgeon: Gabriel  CHRISTIAN Hernandez MD;  Location: [facility];  Service: Endoscopy;  Laterality: N/A;    ESOPHAGOGASTRODUODENOSCOPY N/A 1/7/2020    Procedure: EGD (ESOPHAGOGASTRODUODENOSCOPY);  Surgeon: Kati Ryan MD;  Location: [facility] (2ND FLR);  Service: Endoscopy;  Laterality: N/A;    HEMORRHOID SURGERY      KNEE ARTHROPLASTY Bilateral     NECK SURGERY      POSTERIOR FUSION OF CERVICAL SPINE WITH LAMINECTOMY N/A 11/7/2018    Procedure: C2-C6 Posterior Cervical Laminectomy & Instrumental Fusion;  Surgeon: Kishore Turner MD;  Location: [facility] 2ND FLR;  Service: Neurosurgery;  Laterality: N/A;    TONSILLECTOMY      TRANSFORAMINAL EPIDURAL INJECTION OF STEROID Right 12/20/2019    Procedure: Injection,steroid,epidural,transforaminal approach;  Surgeon: Devan Watt MD;  Location: [facility];  Service: Pain Management;  Laterality: Right;  L3-4, L4-5    TRANSRECTAL ULTRASOUND EXAMINATION N/A 2/26/2019    Procedure: ULTRASOUND, RECTAL APPROACH;  Surgeon: Simba Cheung MD;  Location: [facility];  Service: Urology;  Laterality: N/A;    UPPER GASTROINTESTINAL ENDOSCOPY  09/12/2018    Dr Hernandez; gastritis; extensive intestinal metaplasia; repeat in 1-2- years     Family History   Problem Relation Age of Onset    Cataracts Mother     Heart disease Mother         CHF    Hypertension Mother     Hyperlipidemia Mother     Cataracts Father     Glaucoma Father     Heart disease Father     Hyperlipidemia Sister     Hypertension Sister     No Known Problems Daughter     No Known Problems Daughter     Collagen disease Neg Hx     Amblyopia Neg Hx     Blindness Neg Hx     Macular degeneration Neg Hx     Retinal detachment Neg Hx     Strabismus Neg Hx     Cancer Neg Hx     Colon cancer Neg Hx     Esophageal cancer Neg Hx     Stomach cancer Neg Hx     Crohn's disease Neg Hx     Ulcerative colitis Neg Hx      Social History     Socioeconomic History    Marital status:      Spouse name: Not on file    Number of  "children: Not on file    Years of education: Not on file    Highest education level: Not on file   Occupational History    Not on file   Social Needs    Financial resource strain: Not hard at all    Food insecurity:     Worry: Never true     Inability: Never true    Transportation needs:     Medical: No     Non-medical: No   Tobacco Use    Smoking status: Never Smoker    Smokeless tobacco: Never Used    Tobacco comment: when a child   Substance and Sexual Activity    Alcohol use: Yes     Frequency: Never     Binge frequency: Never     Comment: rarely    Drug use: No    Sexual activity: Yes     Partners: Female   Lifestyle    Physical activity:     Days per week: 0 days     Minutes per session: 0 min    Stress: Not at all   Relationships    Social connections:     Talks on phone: More than three times a week     Gets together: More than three times a week     Attends Restorationist service: Not on file     Active member of club or organization: Yes     Attends meetings of clubs or organizations: More than 4 times per year     Relationship status:    Other Topics Concern    Not on file   Social History Narrative    Not on file         Medications/Allergies: See med card    Vitals:    01/15/20 1327   BP: 122/64   Pulse: (!) 56   Weight: 111.1 kg (245 lb)   Height: 6' 1" (1.854 m)   PainSc:   6   PainLoc: Back         Physical exam:    GENERAL: A and O x3, the patient appears well groomed and is in no acute distress.  Skin: No rashes or obvious lesions  HEENT: normocephalic, atraumatic  CARDIOVASCULAR:  Bradycardia   LUNGS: non labored breathing  ABDOMEN: soft, nontender   UPPER EXTREMITIES: Normal alignment, normal range of motion, no atrophy, no skin changes,  hair growth and nail growth normal and equal bilaterally. No swelling, no tenderness.    LOWER EXTREMITIES:  Normal alignment, normal range of motion, no atrophy, no skin changes,  hair growth and nail growth normal and equal bilaterally. No " swelling, no tenderness.    LUMBAR SPINE  Lumbar spine: ROM is full with flexion extension and oblique extension with no increased pain.    Porter's test causes no increased pain on either side.    Supine straight leg raise is negative bilaterally.    Internal and external rotation of the hip causes no increased pain on either side.  Myofascial exam: No tenderness to palpation across lumbar paraspinous muscles.      MENTAL STATUS: normal orientation, speech, language, and fund of knowledge for social situation.  Emotional state appropriate.    CRANIAL NERVES:  II:  PERRL bilaterally,   III,IV,VI: EOMI.    V:  Facial sensation equal bilaterally  VII:  Facial motor function normal.  VIII:  Hearing equal to finger rub bilaterally  IX/X: Gag normal, palate symmetric  XI:  Shoulder shrug equal, head turn equal  XII:  Tongue midline without fasciculations      MOTOR: Tone and bulk: normal bilateral upper and lower Strength: normal    IP ADD ABD Quad TA Gas HAM  R 5 5 5 5 5 5 5  L 5 5 5 5 5 5 5    SENSATION: Light touch and pinprick intact bilaterally  REFLEXES: normal, symmetric, nonbrisk.  Toes down, no clonus. No hoffmans.  GAIT: normal rise, base, steps, and arm swing.      Imaging:    MRI lumbar spine without contrast 10/12/2019 (open MRI)     L1-2, stable foraminal disc protrusion.  Mild central canal stenosis.  Moderate left neuroforaminal narrowing.  L2-3, disc bulging with prominent posterior element hypertrophy.  Moderate central canal stenosis.  Bilateral recess narrowing as well as moderate left and mild right neural foraminal narrowing.  L3-4, cervical ventral disc bulge with facet disease.  Mild-to-moderate central canal narrowing.  Moderate right and mild left neural foraminal narrowing.  L4-5, service control disc bulge with bulky facet disease.  Mild-to-moderate central canal stenosis.  Moderate bilateral neural foraminal narrowing, right greater than left.  L5-S1 disc bulge and facet disease  MRI lumbar  spine 09/2018 (outside open MRI)  Multilevel spondylosis.  Moderate neural foraminal narrowing at L3-4 and L4-5.      Assessment:  Mr. Tavera is a 74 y.o. male with   1. Lumbar radiculitis    2. DDD (degenerative disc disease), lumbar    3. Myofascial pain      Plan:  1.  I have stressed the importance of physical activity and exercise to improve overall health  2.  Reviewed lumbar MRI results with Mr. Tavera  3.  Titrate Gabapentin as tolerated to 1800 mg daily  4. Discussed bilateral L3-4 and L4-5 TF SONIA. He will consider this. He does not wish to consider lumbar surgery  5. F/u prn

## 2020-01-16 NOTE — H&P (VIEW-ONLY)
PCP: Brian Marroquin MD      CC:  Low back and bilateral leg pain    Interval history: Mr. Tavera is a 74 y.o. male with chronic neck and low back pain who presents today for f/u s/p right TF SONIA at L3-4 and L4-5. Continues to c/o pain bilaterally down to his calves. Pain worsens standing, walking getting up.  Left-sided radicular pain is new compared to previous symptoms.  No recent traumatic incident.  He has tried physical therapy with minimal benefit.  He currently takes gabapentin with mild benefits.   Pian is worsening. s/p cervical spine surgery since his last visit with us.  Neck pain is currently tolerable s/p cervical surgery.  He does have some residual left shoulder pain. He denies any worsening weakness.  No bowel bladder changes. Pain today is rated 6/10.    Prior HPI:   Mr. Tavera is a 68 year old male with PMH of HTN referred by Dr. Campbell for right leg pain.  He states having constant right leg pain for the past two years.  Pain is a throbbing pain in her posterior thigh and travels down to his ankle.  He denies any lower back pain.  No leg weakness or numbness.  Pain worsens with standing and walking.  Pain is relieved with rest.  He takes ibuprofen with mild benefits.  Physical therapy has not been helpful.      Pain intervention history: s/p right L4-5 and L5-S1 TFESI on 10/9/2014 and reports 75% relief of his low back and right leg pain.   s/p right L4-5 and L5-S1 TFESI on 2/21/17 and 9/2017 reports 70% relief of his right leg pain.  S/p right IESI at L4-5 and L5-S1 on 5/17/2018   S/p right TF SONIA at L4-5 and L5-S1 on 12/20/19  ROS:  CONSTITUTIONAL: No fevers, chills, night sweats, wt. loss, appetite changes  SKIN: no rashes or itching  ENT: No headaches, head trauma, vision changes, or eye pain  LYMPH NODES: None noticed   CV: No chest pain, palpitations.   RESP: No shortness of breath, dyspnea on exertion, cough, wheezing, or hemoptysis  GI: No nausea, emesis, diarrhea, constipation, melena,  hematochezia, pain.    : No dysuria, hematuria, urgency, or frequency   HEME: No easy bruising, bleeding problems  PSYCHIATRIC: No depression, anxiety, psychosis, hallucinations.  NEURO: No seizures, memory loss, dizziness or difficulty sleeping  MSK: + right leg pain      Past Medical History:   Diagnosis Date    Arthritis     Back pain     Carpal tunnel syndrome 2/25/2013    Cataract     Cataract, left eye 11/10/2014    Chest pain, musculoskeletal     COPD (chronic obstructive pulmonary disease) 11/052018    Emphysema lung 11/05/2018    Gastritis     Hx of colonic polyp     Hyperlipidemia     Hypertension     Hypothyroidism     Knee fracture     Myasthenia gravis     Neuropathy 1/3/2013    Obesity 1/29/2015    Polyneuropathy     PVC (premature ventricular contraction)     Squamous cell carcinoma 2014    left forearm    Thyroid disease      Past Surgical History:   Procedure Laterality Date    APPENDECTOMY      CATARACT EXTRACTION W/  INTRAOCULAR LENS IMPLANT Bilateral     COLONOSCOPY  03/01/2012    Dr Esteban; hyperplastic polyp; repeat in 5 years    CYSTOSCOPY N/A 2/26/2019    Procedure: CYSTOSCOPY;  Surgeon: Simba Cheung MD;  Location: [facility];  Service: Urology;  Laterality: N/A;    ENDOSCOPIC ULTRASOUND OF UPPER GASTROINTESTINAL TRACT N/A 1/7/2020    Procedure: ULTRASOUND, UPPER GI TRACT, ENDOSCOPIC;  Surgeon: Kati Ryan MD;  Location: [address]);  Service: Endoscopy;  Laterality: N/A;    ESOPHAGOGASTRODUODENOSCOPY N/A 9/12/2018    Procedure: EGD (ESOPHAGOGASTRODUODENOSCOPY);  Surgeon: Gabriel Hernandez MD;  Location: [facility];  Service: Endoscopy;  Laterality: N/A;    ESOPHAGOGASTRODUODENOSCOPY N/A 8/19/2019    Procedure: EGD (ESOPHAGOGASTRODUODENOSCOPY);  Surgeon: Gabriel Hernandez MD;  Location: [facility];  Service: Endoscopy;  Laterality: N/A;    ESOPHAGOGASTRODUODENOSCOPY N/A 10/7/2019    Procedure: EGD (ESOPHAGOGASTRODUODENOSCOPY);  Surgeon: Gabriel  CHRISTIAN Hernandez MD;  Location: [facility];  Service: Endoscopy;  Laterality: N/A;    ESOPHAGOGASTRODUODENOSCOPY N/A 1/7/2020    Procedure: EGD (ESOPHAGOGASTRODUODENOSCOPY);  Surgeon: Kati Ryan MD;  Location: [facility] (2ND FLR);  Service: Endoscopy;  Laterality: N/A;    HEMORRHOID SURGERY      KNEE ARTHROPLASTY Bilateral     NECK SURGERY      POSTERIOR FUSION OF CERVICAL SPINE WITH LAMINECTOMY N/A 11/7/2018    Procedure: C2-C6 Posterior Cervical Laminectomy & Instrumental Fusion;  Surgeon: Kishore Turner MD;  Location: [facility] 2ND FLR;  Service: Neurosurgery;  Laterality: N/A;    TONSILLECTOMY      TRANSFORAMINAL EPIDURAL INJECTION OF STEROID Right 12/20/2019    Procedure: Injection,steroid,epidural,transforaminal approach;  Surgeon: Devan Watt MD;  [facility]: [facility];  Service: Pain Management;  Laterality: Right;  L3-4, L4-5    TRANSRECTAL ULTRASOUND EXAMINATION N/A 2/26/2019    Procedure: ULTRASOUND, RECTAL APPROACH;  Surgeon: Simba Cheung MD;  Location: [facility];  Service: Urology;  Laterality: N/A;    UPPER GASTROINTESTINAL ENDOSCOPY  09/12/2018    Dr Hernandez; gastritis; extensive intestinal metaplasia; repeat in 1-2- years     Family History   Problem Relation Age of Onset    Cataracts Mother     Heart disease Mother         CHF    Hypertension Mother     Hyperlipidemia Mother     Cataracts Father     Glaucoma Father     Heart disease Father     Hyperlipidemia Sister     Hypertension Sister     No Known Problems Daughter     No Known Problems Daughter     Collagen disease Neg Hx     Amblyopia Neg Hx     Blindness Neg Hx     Macular degeneration Neg Hx     Retinal detachment Neg Hx     Strabismus Neg Hx     Cancer Neg Hx     Colon cancer Neg Hx     Esophageal cancer Neg Hx     Stomach cancer Neg Hx     Crohn's disease Neg Hx     Ulcerative colitis Neg Hx      Social History     Socioeconomic History    Marital status:      Spouse name: Not on file    Number of  "children: Not on file    Years of education: Not on file    Highest education level: Not on file   Occupational History    Not on file   Social Needs    Financial resource strain: Not hard at all    Food insecurity:     Worry: Never true     Inability: Never true    Transportation needs:     Medical: No     Non-medical: No   Tobacco Use    Smoking status: Never Smoker    Smokeless tobacco: Never Used    Tobacco comment: when a child   Substance and Sexual Activity    Alcohol use: Yes     Frequency: Never     Binge frequency: Never     Comment: rarely    Drug use: No    Sexual activity: Yes     Partners: Female   Lifestyle    Physical activity:     Days per week: 0 days     Minutes per session: 0 min    Stress: Not at all   Relationships    Social connections:     Talks on phone: More than three times a week     Gets together: More than three times a week     Attends Jain service: Not on file     Active member of club or organization: Yes     Attends meetings of clubs or organizations: More than 4 times per year     Relationship status:    Other Topics Concern    Not on file   Social History Narrative    Not on file         Medications/Allergies: See med card    Vitals:    01/15/20 1327   BP: 122/64   Pulse: (!) 56   Weight: 111.1 kg (245 lb)   Height: 6' 1" (1.854 m)   PainSc:   6   PainLoc: Back         Physical exam:    GENERAL: A and O x3, the patient appears well groomed and is in no acute distress.  Skin: No rashes or obvious lesions  HEENT: normocephalic, atraumatic  CARDIOVASCULAR:  Bradycardia   LUNGS: non labored breathing  ABDOMEN: soft, nontender   UPPER EXTREMITIES: Normal alignment, normal range of motion, no atrophy, no skin changes,  hair growth and nail growth normal and equal bilaterally. No swelling, no tenderness.    LOWER EXTREMITIES:  Normal alignment, normal range of motion, no atrophy, no skin changes,  hair growth and nail growth normal and equal bilaterally. No " swelling, no tenderness.    LUMBAR SPINE  Lumbar spine: ROM is full with flexion extension and oblique extension with no increased pain.    Porter's test causes no increased pain on either side.    Supine straight leg raise is negative bilaterally.    Internal and external rotation of the hip causes no increased pain on either side.  Myofascial exam: No tenderness to palpation across lumbar paraspinous muscles.      MENTAL STATUS: normal orientation, speech, language, and fund of knowledge for social situation.  Emotional state appropriate.    CRANIAL NERVES:  II:  PERRL bilaterally,   III,IV,VI: EOMI.    V:  Facial sensation equal bilaterally  VII:  Facial motor function normal.  VIII:  Hearing equal to finger rub bilaterally  IX/X: Gag normal, palate symmetric  XI:  Shoulder shrug equal, head turn equal  XII:  Tongue midline without fasciculations      MOTOR: Tone and bulk: normal bilateral upper and lower Strength: normal    IP ADD ABD Quad TA Gas HAM  R 5 5 5 5 5 5 5  L 5 5 5 5 5 5 5    SENSATION: Light touch and pinprick intact bilaterally  REFLEXES: normal, symmetric, nonbrisk.  Toes down, no clonus. No hoffmans.  GAIT: normal rise, base, steps, and arm swing.      Imaging:    MRI lumbar spine without contrast 10/12/2019 (open MRI)     L1-2, stable foraminal disc protrusion.  Mild central canal stenosis.  Moderate left neuroforaminal narrowing.  L2-3, disc bulging with prominent posterior element hypertrophy.  Moderate central canal stenosis.  Bilateral recess narrowing as well as moderate left and mild right neural foraminal narrowing.  L3-4, cervical ventral disc bulge with facet disease.  Mild-to-moderate central canal narrowing.  Moderate right and mild left neural foraminal narrowing.  L4-5, service control disc bulge with bulky facet disease.  Mild-to-moderate central canal stenosis.  Moderate bilateral neural foraminal narrowing, right greater than left.  L5-S1 disc bulge and facet disease  MRI lumbar  spine 09/2018 (outside open MRI)  Multilevel spondylosis.  Moderate neural foraminal narrowing at L3-4 and L4-5.      Assessment:  Mr. Tavera is a 74 y.o. male with   1. Lumbar radiculitis    2. DDD (degenerative disc disease), lumbar    3. Myofascial pain      Plan:  1.  I have stressed the importance of physical activity and exercise to improve overall health  2.  Reviewed lumbar MRI results with Mr. Tavera  3.  Titrate Gabapentin as tolerated to 1800 mg daily  4. Discussed bilateral L3-4 and L4-5 TF SONIA. He will consider this. He does not wish to consider lumbar surgery  5. F/u prn

## 2020-01-31 ENCOUNTER — TELEPHONE (OUTPATIENT)
Dept: PAIN MEDICINE | Facility: CLINIC | Age: 75
End: 2020-01-31

## 2020-01-31 DIAGNOSIS — M54.16 LUMBAR RADICULOPATHY: Primary | ICD-10-CM

## 2020-01-31 NOTE — TELEPHONE ENCOUNTER
----- Message from Lance Sanchez sent at 1/31/2020 11:58 AM CST -----  Contact: Patient  Type: Needs Medical Advice    Who Called:  Patient  Best Call Back Number: [phone]  Additional Information: Patient would like to schedule injection. Please call to advise. Thanks!

## 2020-02-03 ENCOUNTER — PATIENT OUTREACH (OUTPATIENT)
Dept: ADMINISTRATIVE | Facility: OTHER | Age: 75
End: 2020-02-03

## 2020-02-03 ENCOUNTER — HOSPITAL ENCOUNTER (OUTPATIENT)
Dept: RADIOLOGY | Facility: HOSPITAL | Age: 75
Discharge: HOME | End: 2020-02-03
Attending: ORTHOPAEDIC SURGERY
Payer: MEDICARE

## 2020-02-03 ENCOUNTER — OFFICE VISIT (OUTPATIENT)
Dept: ORTHOPEDICS | Facility: CLINIC | Age: 75
End: 2020-02-03
Payer: MEDICARE

## 2020-02-03 ENCOUNTER — TELEPHONE (OUTPATIENT)
Dept: ENDOSCOPY | Facility: HOSPITAL | Age: 75
End: 2020-02-03

## 2020-02-03 VITALS
BODY MASS INDEX: 32.47 KG/M2 | SYSTOLIC BLOOD PRESSURE: 120 MMHG | HEART RATE: 61 BPM | HEIGHT: 73 IN | WEIGHT: 245 LBS | DIASTOLIC BLOOD PRESSURE: 59 MMHG

## 2020-02-03 DIAGNOSIS — M77.11 LATERAL EPICONDYLITIS, RIGHT ELBOW: Primary | ICD-10-CM

## 2020-02-03 DIAGNOSIS — M25.521 RIGHT ELBOW PAIN: Primary | ICD-10-CM

## 2020-02-03 DIAGNOSIS — M25.521 RIGHT ELBOW PAIN: ICD-10-CM

## 2020-02-03 PROCEDURE — 1101F PT FALLS ASSESS-DOCD LE1/YR: CPT | Mod: HCNC,CPTII,S$GLB, | Performed by: ORTHOPAEDIC SURGERY

## 2020-02-03 PROCEDURE — 1159F PR MEDICATION LIST DOCUMENTED IN MEDICAL RECORD: ICD-10-PCS | Mod: HCNC,S$GLB,, | Performed by: ORTHOPAEDIC SURGERY

## 2020-02-03 PROCEDURE — 99213 OFFICE O/P EST LOW 20 MIN: CPT | Mod: HCNC,S$GLB,, | Performed by: ORTHOPAEDIC SURGERY

## 2020-02-03 PROCEDURE — 73080 X-RAY EXAM OF ELBOW: CPT | Mod: TC,HCNC,PN,RT

## 2020-02-03 PROCEDURE — 3074F PR MOST RECENT SYSTOLIC BLOOD PRESSURE < 130 MM HG: ICD-10-PCS | Mod: HCNC,CPTII,S$GLB, | Performed by: ORTHOPAEDIC SURGERY

## 2020-02-03 PROCEDURE — 99999 PR PBB SHADOW E&M-EST. PATIENT-LVL III: ICD-10-PCS | Mod: PBBFAC,HCNC,, | Performed by: ORTHOPAEDIC SURGERY

## 2020-02-03 PROCEDURE — 99999 PR PBB SHADOW E&M-EST. PATIENT-LVL III: CPT | Mod: PBBFAC,HCNC,, | Performed by: ORTHOPAEDIC SURGERY

## 2020-02-03 PROCEDURE — 1125F PR PAIN SEVERITY QUANTIFIED, PAIN PRESENT: ICD-10-PCS | Mod: HCNC,S$GLB,, | Performed by: ORTHOPAEDIC SURGERY

## 2020-02-03 PROCEDURE — 1101F PR PT FALLS ASSESS DOC 0-1 FALLS W/OUT INJ PAST YR: ICD-10-PCS | Mod: HCNC,CPTII,S$GLB, | Performed by: ORTHOPAEDIC SURGERY

## 2020-02-03 PROCEDURE — 3078F DIAST BP <80 MM HG: CPT | Mod: HCNC,CPTII,S$GLB, | Performed by: ORTHOPAEDIC SURGERY

## 2020-02-03 PROCEDURE — 3074F SYST BP LT 130 MM HG: CPT | Mod: HCNC,CPTII,S$GLB, | Performed by: ORTHOPAEDIC SURGERY

## 2020-02-03 PROCEDURE — 99213 PR OFFICE/OUTPT VISIT, EST, LEVL III, 20-29 MIN: ICD-10-PCS | Mod: HCNC,S$GLB,, | Performed by: ORTHOPAEDIC SURGERY

## 2020-02-03 PROCEDURE — 1125F AMNT PAIN NOTED PAIN PRSNT: CPT | Mod: HCNC,S$GLB,, | Performed by: ORTHOPAEDIC SURGERY

## 2020-02-03 PROCEDURE — 1159F MED LIST DOCD IN RCRD: CPT | Mod: HCNC,S$GLB,, | Performed by: ORTHOPAEDIC SURGERY

## 2020-02-03 PROCEDURE — 3078F PR MOST RECENT DIASTOLIC BLOOD PRESSURE < 80 MM HG: ICD-10-PCS | Mod: HCNC,CPTII,S$GLB, | Performed by: ORTHOPAEDIC SURGERY

## 2020-02-03 PROCEDURE — 73080 X-RAY EXAM OF ELBOW: CPT | Mod: 26,HCNC,RT, | Performed by: RADIOLOGY

## 2020-02-03 PROCEDURE — 73080 XR ELBOW COMPLETE 3 VIEW RIGHT: ICD-10-PCS | Mod: 26,HCNC,RT, | Performed by: RADIOLOGY

## 2020-02-03 RX ORDER — METHOCARBAMOL 750 MG/1
750 TABLET, FILM COATED ORAL 3 TIMES DAILY
Qty: 270 TABLET | Refills: 0 | Status: SHIPPED | OUTPATIENT
Start: 2020-02-03 | End: 2020-02-28

## 2020-02-03 NOTE — TELEPHONE ENCOUNTER
----- Message from Kati Ryan MD sent at 2/3/2020 12:25 PM CST -----  Benign biopsies. Follow up with Dr. Hernandez for surveillance in one month.

## 2020-02-03 NOTE — PROGRESS NOTES
Past Medical History:   Diagnosis Date    Arthritis     Back pain     Carpal tunnel syndrome 2/25/2013    Cataract     Cataract, left eye 11/10/2014    Chest pain, musculoskeletal     COPD (chronic obstructive pulmonary disease) 11/052018    Emphysema lung 11/05/2018    Gastritis     Hx of colonic polyp     Hyperlipidemia     Hypertension     Hypothyroidism     Knee fracture     Myasthenia gravis     Neuropathy 1/3/2013    Obesity 1/29/2015    Polyneuropathy     PVC (premature ventricular contraction)     Squamous cell carcinoma 2014    left forearm    Thyroid disease        Past Surgical History:   Procedure Laterality Date    APPENDECTOMY      CATARACT EXTRACTION W/  INTRAOCULAR LENS IMPLANT Bilateral     COLONOSCOPY  03/01/2012    Dr Esteban; hyperplastic polyp; repeat in 5 years    CYSTOSCOPY N/A 2/26/2019    Procedure: CYSTOSCOPY;  Surgeon: Simba Cheung MD;  Location: [facility];  Service: Urology;  Laterality: N/A;    ENDOSCOPIC ULTRASOUND OF UPPER GASTROINTESTINAL TRACT N/A 1/7/2020    Procedure: ULTRASOUND, UPPER GI TRACT, ENDOSCOPIC;  Surgeon: Kati Ryan MD;  [facility]  Service: Endoscopy;  Laterality: N/A;    ESOPHAGOGASTRODUODENOSCOPY N/A 9/12/2018    Procedure: EGD (ESOPHAGOGASTRODUODENOSCOPY);  Surgeon: Gabriel Hernandez MD;  Location: [facility];  Service: Endoscopy;  Laterality: N/A;    ESOPHAGOGASTRODUODENOSCOPY N/A 8/19/2019    Procedure: EGD (ESOPHAGOGASTRODUODENOSCOPY);  Surgeon: Gabriel Hernandez MD;  Location: [facility];  Service: Endoscopy;  Laterality: N/A;    ESOPHAGOGASTRODUODENOSCOPY N/A 10/7/2019    Procedure: EGD (ESOPHAGOGASTRODUODENOSCOPY);  Surgeon: Gabriel Hernandez MD;  Location: [facility];  Service: Endoscopy;  Laterality: N/A;    ESOPHAGOGASTRODUODENOSCOPY N/A 1/7/2020    Procedure: EGD (ESOPHAGOGASTRODUODENOSCOPY);  Surgeon: Kati Ryan MD;  Location: [facility] ([address] [place] [address]);  Service: Endoscopy;  Laterality: N/A;     HEMORRHOID SURGERY      KNEE ARTHROPLASTY Bilateral     NECK SURGERY      POSTERIOR FUSION OF CERVICAL SPINE WITH LAMINECTOMY N/A 11/7/2018    Procedure: C2-C6 Posterior Cervical Laminectomy & Instrumental Fusion;  Surgeon: Kishore Turner MD;  Location: [address];  Service: Neurosurgery;  Laterality: N/A;    TONSILLECTOMY      TRANSFORAMINAL EPIDURAL INJECTION OF STEROID Right 12/20/2019    Procedure: Injection,steroid,epidural,transforaminal approach;  Surgeon: Devan Watt MD;  [facility]: [facility];  Service: Pain Management;  Laterality: Right;  L3-4, L4-5    TRANSRECTAL ULTRASOUND EXAMINATION N/A 2/26/2019    Procedure: ULTRASOUND, RECTAL APPROACH;  Surgeon: Simba Cheung MD;  Location: [facility] OR;  Service: Urology;  Laterality: N/A;    UPPER GASTROINTESTINAL ENDOSCOPY  09/12/2018    Dr Hernandez; gastritis; extensive intestinal metaplasia; repeat in 1-2- years       Current Outpatient Medications   Medication Sig    atorvastatin (LIPITOR) 80 MG tablet TAKE 1 TABLET EVERY DAY    carvedilol (COREG) 25 MG tablet Take 1 tablet (25 mg total) by mouth 2 (two) times daily with meals.    cetirizine (ZYRTEC) 10 MG tablet Take 1 tablet by mouth daily as needed.    chlorthalidone (HYGROTEN) 25 MG Tab TAKE 1 TABLET EVERY DAY    cholecalciferol, vitamin D3, (VITAMIN D) 2,000 unit Cap 1 capsule once daily. Every day    coenzyme Q10 (CO Q-10) 100 mg capsule Take 400 mg by mouth once daily.     cyanocobalamin, vitamin B-12, (VITAMIN B-12) 5,000 mcg Subl Take 5,000 mcg by mouth once daily. Every day    ezetimibe (ZETIA) 10 mg tablet Take 1 tablet (10 mg total) by mouth once daily.    fluticasone (FLONASE) 50 mcg/actuation nasal spray USE 1 SPRAY IN EACH NOSTRIL TWICE DAILY AS NEEDED  FOR  RHINITIS    gabapentin (NEURONTIN) 300 MG capsule Take 1 capsule (300 mg total) by mouth 3 (three) times daily.    levothyroxine (SYNTHROID) 50 MCG tablet Take 1 tablet (50 mcg total) by mouth once daily.     methylsulfonylmethane (MSM) 1,000 mg Tab Take 1,000 mg by mouth once daily. Every day    milk thistle 200 mg Cap Take 200 mg by mouth 2 (two) times daily. Twice a day    olmesartan (BENICAR) 20 MG tablet TAKE 1 TABLET EVERY DAY    omega-3 fatty acids 1,000 mg Cap Twice a day    pantoprazole (PROTONIX) 40 MG tablet Take 1 tablet (40 mg total) by mouth 2 (two) times daily.    potassium chloride SA (K-DUR,KLOR-CON) 20 MEQ tablet Take 1 tablet (20 mEq total) by mouth 3 (three) times daily. Take 4 tablets daily for 5 days, then 3 daily for 2 weeks, then 2 daily thereafter.    predniSONE (DELTASONE) 1 MG tablet TAKE 2 TABLETS EVERY DAY    predniSONE (DELTASONE) 5 MG tablet TAKE 1 TABLET EVERY DAY    sildenafil (REVATIO) 20 mg Tab Take 1 to 3 tabs daily as needed.    sucralfate (CARAFATE) 1 gram tablet Take 1 tablet (1 g total) by mouth 4 (four) times daily before meals and nightly.    VENTOLIN HFA 90 mcg/actuation inhaler Inhale 2 puffs into the lungs every 6 (six) hours as needed for Wheezing. Rescue     No current facility-administered medications for this visit.        Review of patient's allergies indicates:   Allergen Reactions    No known drug allergies        Family History   Problem Relation Age of Onset    Cataracts Mother     Heart disease Mother         CHF    Hypertension Mother     Hyperlipidemia Mother     Cataracts Father     Glaucoma Father     Heart disease Father     Hyperlipidemia Sister     Hypertension Sister     No Known Problems Daughter     No Known Problems Daughter     Collagen disease Neg Hx     Amblyopia Neg Hx     Blindness Neg Hx     Macular degeneration Neg Hx     Retinal detachment Neg Hx     Strabismus Neg Hx     Cancer Neg Hx     Colon cancer Neg Hx     Esophageal cancer Neg Hx     Stomach cancer Neg Hx     Crohn's disease Neg Hx     Ulcerative colitis Neg Hx        Social History     Socioeconomic History    Marital status:      Spouse name: Not  on file    Number of children: Not on file    Years of education: Not on file    Highest education level: Not on file   Occupational History    Not on file   Social Needs    Financial resource strain: Not hard at all    Food insecurity:     Worry: Never true     Inability: Never true    Transportation needs:     Medical: No     Non-medical: No   Tobacco Use    Smoking status: Never Smoker    Smokeless tobacco: Never Used    Tobacco comment: when a child   Substance and Sexual Activity    Alcohol use: Yes     Frequency: Never     Binge frequency: Never     Comment: rarely    Drug use: No    Sexual activity: Yes     Partners: Female   Lifestyle    Physical activity:     Days per week: 0 days     Minutes per session: 0 min    Stress: Not at all   Relationships    Social connections:     Talks on phone: More than three times a week     Gets together: More than three times a week     Attends [employer] service: Not on file     Active member of club or organization: Yes     Attends meetings of clubs or organizations: More than 4 times per year     Relationship status:    Other Topics Concern    Not on file   Social History Narrative    Not on file       Chief Complaint:   Chief Complaint   Patient presents with    Elbow Pain     RIGHT ELBOW PAIN       History of present illness:  This is a 74-year-old male seen for right lateral elbow and forearm pain. This started back in the winter.  Cannot lift anything and hurts a lot at night.  Pain is about a 6/10 in the elbow.    Answers for HPI/ROS submitted by the patient on 12/23/2019   Leg pain  unexpected weight change: No  appetite change : No  sleep disturbance: No  IMMUNOCOMPROMISED: No  nervous/ anxious: No  dysphoric mood: No  rash: No  visual disturbance: No  eye redness: No  eye pain: No  ear pain: No  tinnitus: Yes  hearing loss: No  sinus pressure : Yes  nosebleeds: Yes  enviro allergies: No  food allergies: No  cough: No  shortness of breath:  Yes  sweating: No  frequency: Yes  difficulty urinating: No  hematuria: No  chest pain: No  palpitations: No  nausea: No  vomiting: No  diarrhea: No  blood in stool: No  constipation: No  headaches: Yes  dizziness: No  numbness: No  seizures: No  joint swelling: No  myalgia: No  weakness: No  back pain: Yes  Pain Chronicity: recurrent  History of trauma: No  Onset: more than 1 month ago  Frequency: daily  Progression since onset: waxing and waning  Injury mechanism: twisting  injury location: at home  pain- numeric: 3/10  pain location: left knee  pain quality: aching  Radiating Pain: No  Aggravating factors: bending, extension, twisting  fever: No  inability to bear weight: No  itching: No  joint locking: No  limited range of motion: Yes  stiffness: Yes  tingling: No  Treatments tried: cold, heat, exercise, injection treatment, NSAIDs, OTC ointments  physical therapy: not tried  Improvement on treatment: mild      Physical Examination:    Vital Signs:    Vitals:    02/03/20 1109   BP: (!) 120/59   Pulse: 61       Body mass index is 32.32 kg/m².    This a well-developed, well nourished patient in no acute distress.  They are alert and oriented and cooperative to examination.  Pt. walks without an antalgic gait.      Examination of the right elbow shows no signs of rashes or erythema. The patient has no masses, ecchymosis, or effusion. The patient has full range of motion from 0-160°. Patient has full pronation and supination. Patient is nontender along the medial epicondyle and moderately tender over the lateral epicondyle. Nontender over the olecranon process.  Nontender along the course of the UCL. Patient has a negative valgus stress test and milking maneuver. Negative Tinel's sign over the cubital tunnel. 2+ radial pulse. Intact light touch sensation.     X-rays:  X-rays of the left knee is  reviewed which show severe lateral joint space narrowing on the right and more moderate to severe medial joint space wear  on the left.  X-rays of the right elbow are ordered and reviewed which show a small olecranon spur.      Assessment:  Right lateral epicondylitis    Plan: I reviewed the x-ray with him today. We talked about lateral epicondylitis.  I got him a counterforce brace.  Also gave him a stretching and information guide.  We will get him set up for physical therapy.  Refilled his muscle relaxers.    This note was created using ROCKI voice recognition software that occasionally misinterpreted phrases or words.    Consult note is delivered via Epic messaging service.

## 2020-02-06 ENCOUNTER — HOSPITAL ENCOUNTER (OUTPATIENT)
Facility: AMBULARY SURGERY CENTER | Age: 75
Discharge: HOME | End: 2020-02-06
Attending: ANESTHESIOLOGY | Admitting: ANESTHESIOLOGY
Payer: MEDICARE

## 2020-02-06 DIAGNOSIS — M54.16 LUMBAR RADICULITIS: Primary | ICD-10-CM

## 2020-02-06 PROCEDURE — 64484 PRA INJECT ANES/STEROID FORAMEN LUMBAR/SACRAL W IMG GUIDE ,EA ADD LEVEL: ICD-10-PCS | Mod: 50,HCNC,, | Performed by: ANESTHESIOLOGY

## 2020-02-06 PROCEDURE — 64484 NJX AA&/STRD TFRM EPI L/S EA: CPT | Mod: LT | Performed by: ANESTHESIOLOGY

## 2020-02-06 PROCEDURE — 64483 NJX AA&/STRD TFRM EPI L/S 1: CPT | Mod: LT | Performed by: ANESTHESIOLOGY

## 2020-02-06 PROCEDURE — 99152 PR MOD CONSCIOUS SEDATION, SAME PHYS, 5+ YRS, FIRST 15 MIN: ICD-10-PCS | Mod: HCNC,,, | Performed by: ANESTHESIOLOGY

## 2020-02-06 PROCEDURE — 64483 PR EPIDURAL INJ, ANES/STEROID, TRANSFORAMINAL, LUMB/SACR, SNGL LEVL: ICD-10-PCS | Mod: 50,HCNC,, | Performed by: ANESTHESIOLOGY

## 2020-02-06 PROCEDURE — 64484 NJX AA&/STRD TFRM EPI L/S EA: CPT | Mod: 50,HCNC,, | Performed by: ANESTHESIOLOGY

## 2020-02-06 PROCEDURE — 99152 MOD SED SAME PHYS/QHP 5/>YRS: CPT | Mod: HCNC,,, | Performed by: ANESTHESIOLOGY

## 2020-02-06 PROCEDURE — 64483 NJX AA&/STRD TFRM EPI L/S 1: CPT | Mod: 50,HCNC,, | Performed by: ANESTHESIOLOGY

## 2020-02-06 RX ORDER — ALPRAZOLAM 1 MG/1
1 TABLET, ORALLY DISINTEGRATING ORAL ONCE
Status: COMPLETED | OUTPATIENT
Start: 2020-02-06 | End: 2020-02-06

## 2020-02-06 RX ORDER — BUPIVACAINE HYDROCHLORIDE 2.5 MG/ML
INJECTION, SOLUTION EPIDURAL; INFILTRATION; INTRACAUDAL
Status: DISCONTINUED | OUTPATIENT
Start: 2020-02-06 | End: 2020-02-06 | Stop reason: HOSPADM

## 2020-02-06 RX ORDER — SODIUM CHLORIDE, SODIUM LACTATE, POTASSIUM CHLORIDE, CALCIUM CHLORIDE 600; 310; 30; 20 MG/100ML; MG/100ML; MG/100ML; MG/100ML
INJECTION, SOLUTION INTRAVENOUS ONCE AS NEEDED
Status: DISCONTINUED | OUTPATIENT
Start: 2020-02-06 | End: 2020-02-06 | Stop reason: HOSPADM

## 2020-02-06 RX ORDER — SODIUM CHLORIDE 9 MG/ML
INJECTION, SOLUTION INTRAVENOUS CONTINUOUS
Status: DISCONTINUED | OUTPATIENT
Start: 2020-02-06 | End: 2022-06-16 | Stop reason: HOSPADM

## 2020-02-06 RX ORDER — DEXAMETHASONE SODIUM PHOSPHATE 10 MG/ML
INJECTION INTRAMUSCULAR; INTRAVENOUS
Status: DISCONTINUED | OUTPATIENT
Start: 2020-02-06 | End: 2020-02-06 | Stop reason: HOSPADM

## 2020-02-06 RX ORDER — LIDOCAINE HYDROCHLORIDE 10 MG/ML
INJECTION, SOLUTION EPIDURAL; INFILTRATION; INTRACAUDAL; PERINEURAL
Status: DISCONTINUED | OUTPATIENT
Start: 2020-02-06 | End: 2020-02-06 | Stop reason: HOSPADM

## 2020-02-06 RX ADMIN — ALPRAZOLAM 1 MG: 1 TABLET, ORALLY DISINTEGRATING ORAL at 11:02

## 2020-02-06 NOTE — OP NOTE
PROCEDURE DATE: 2/6/2020    PROCEDURE: Bilateral L3-4, L4-5 transforaminal epidural steroid injection under fluoroscopy    DIAGNOSIS: Lumbar disc displacement without myelopathy  Post op diagnosis: Same    PHYSICIAN: Devan Watt MD    MEDICATIONS INJECTED:  Dexamethasone 2.5mg and 1.0ml 0.25% bupivicaine at each nerve root.     LOCAL ANESTHETIC INJECTED:  Lidocaine 1%. 2 ml per site.    SEDATION MEDICATIONS: RN IV sedation    ESTIMATED BLOOD LOSS:  None    COMPLICATIONS:  None    TECHNIQUE:   A time-out was taken to identify patient and procedure side prior to starting the procedure. The patient was placed in a prone position, prepped and draped in the usual sterile fashion using ChloraPrep and sterile towels.  The area to be injected was determined under fluoroscopic guidance in AP and oblique view.  Local anesthetic was given by raising a wheal and going down to the hub of a 25-gauge 1.5 inch needle.  In oblique view, a 3.5 inch 22-gauge bent-tip spinal needle was introduced towards 6 oclock position of the pedicle of each above named nerve root level.  The needle was walked medially then hinged into the neural foramen and position was confirmed in AP and lateral views.  1ml contrast dye was injected to confirm appropriate placement and that there was no vascular uptake.  After negative aspiration for blood or CSF, the medication was then injected. This was performed at the bilateral L3-4, L4-5 level(s). The patient tolerated the procedure well.    The patient was monitored after the procedure.  Patient was given post procedure and discharge instructions to follow at home. The patient was discharged in a stable condition.

## 2020-02-06 NOTE — DISCHARGE SUMMARY
[facility]  Discharge Note  Short Stay    Admit Date: 2/6/2020    Discharge Date and Time: 2/6/2020    Attending Physician: Devan Watt MD     Discharge Provider: Devan Watt    Diagnoses:  Active Hospital Problems    Diagnosis  POA    *Lumbar radiculitis [M54.16]  Yes      Resolved Hospital Problems   No resolved problems to display.       Hospital Course: Lumbar SONIA  Discharged Condition: Good    Final Diagnoses:   Active Hospital Problems    Diagnosis  POA    *Lumbar radiculitis [M54.16]  Yes      Resolved Hospital Problems   No resolved problems to display.       Disposition: Home or Self Care    Follow up/Patient Instructions:    Medications:  Reconciled Home Medications:      Medication List      CONTINUE taking these medications    atorvastatin 80 MG tablet  Commonly known as:  LIPITOR  TAKE 1 TABLET EVERY DAY     carvediloL 25 MG tablet  Commonly known as:  COREG  Take 1 tablet (25 mg total) by mouth 2 (two) times daily with meals.     cetirizine 10 MG tablet  Commonly known as:  ZYRTEC  Take 1 tablet by mouth daily as needed.     chlorthalidone 25 MG Tab  Commonly known as:  HYGROTEN  TAKE 1 TABLET EVERY DAY     Co Q-10 100 mg capsule  Generic drug:  coenzyme Q10  Take 400 mg by mouth once daily.     ezetimibe 10 mg tablet  Commonly known as:  ZETIA  Take 1 tablet (10 mg total) by mouth once daily.     fluticasone propionate 50 mcg/actuation nasal spray  Commonly known as:  FLONASE  USE 1 SPRAY IN EACH NOSTRIL TWICE DAILY AS NEEDED  FOR  RHINITIS     gabapentin 300 MG capsule  Commonly known as:  NEURONTIN  Take 1 capsule (300 mg total) by mouth 3 (three) times daily.     levothyroxine 50 MCG tablet  Commonly known as:  SYNTHROID  Take 1 tablet (50 mcg total) by mouth once daily.     methocarbamol 750 MG Tab  Commonly known as:  ROBAXIN  Take 1 tablet (750 mg total) by mouth 3 (three) times daily.     milk thistle 200 mg Cap  Take 200 mg by mouth 2 (two) times daily. Twice a day     MSM 1,000 mg  Tab  Generic drug:  methylsulfonylmethane  Take 1,000 mg by mouth once daily. Every day     olmesartan 20 MG tablet  Commonly known as:  BENICAR  TAKE 1 TABLET EVERY DAY     omega-3 fatty acids 1,000 mg Cap  Twice a day     pantoprazole 40 MG tablet  Commonly known as:  PROTONIX  Take 1 tablet (40 mg total) by mouth 2 (two) times daily.     potassium chloride SA 20 MEQ tablet  Commonly known as:  K-DUR,KLOR-CON  Take 1 tablet (20 mEq total) by mouth 3 (three) times daily. Take 4 tablets daily for 5 days, then 3 daily for 2 weeks, then 2 daily thereafter.     * predniSONE 5 MG tablet  Commonly known as:  DELTASONE  TAKE 1 TABLET EVERY DAY     * predniSONE 1 MG tablet  Commonly known as:  DELTASONE  TAKE 2 TABLETS EVERY DAY     sildenafil 20 mg Tab  Commonly known as:  REVATIO  Take 1 to 3 tabs daily as needed.     sucralfate 1 gram tablet  Commonly known as:  CARAFATE  Take 1 tablet (1 g total) by mouth 4 (four) times daily before meals and nightly.     Ventolin HFA 90 mcg/actuation inhaler  Generic drug:  albuterol  Inhale 2 puffs into the lungs every 6 (six) hours as needed for Wheezing. Rescue     Vitamin B-12 5,000 mcg Subl  Generic drug:  cyanocobalamin (vitamin B-12)  Take 5,000 mcg by mouth once daily. Every day     Vitamin D 50 mcg (2,000 unit) Cap  Generic drug:  cholecalciferol (vitamin D3)  1 capsule once daily. Every day         * This list has 2 medication(s) that are the same as other medications prescribed for you. Read the directions carefully, and ask your doctor or other care provider to review them with you.              Discharge Procedure Orders   Call MD for:  temperature >100.4     Call MD for:  persistent nausea and vomiting or diarrhea     Call MD for:  severe uncontrolled pain     Call MD for:  redness, tenderness, or signs of infection (pain, swelling, redness, odor or green/yellow discharge around incision site)     Call MD for:  difficulty breathing or increased cough     Call MD for:   severe persistent headache        Follow up with MD in 2-3 weeks    Discharge Procedure Orders (must include Diet, Follow-up, Activity):   Discharge Procedure Orders (must include Diet, Follow-up, Activity)   Call MD for:  temperature >100.4     Call MD for:  persistent nausea and vomiting or diarrhea     Call MD for:  severe uncontrolled pain     Call MD for:  redness, tenderness, or signs of infection (pain, swelling, redness, odor or green/yellow discharge around incision site)     Call MD for:  difficulty breathing or increased cough     Call MD for:  severe persistent headache

## 2020-02-10 VITALS
RESPIRATION RATE: 18 BRPM | HEART RATE: 63 BPM | BODY MASS INDEX: 32.47 KG/M2 | OXYGEN SATURATION: 95 % | DIASTOLIC BLOOD PRESSURE: 68 MMHG | TEMPERATURE: 99 F | HEIGHT: 73 IN | SYSTOLIC BLOOD PRESSURE: 129 MMHG | WEIGHT: 245 LBS

## 2020-02-28 DIAGNOSIS — I10 HTN (HYPERTENSION), BENIGN: ICD-10-CM

## 2020-02-28 DIAGNOSIS — M51.36 DDD (DEGENERATIVE DISC DISEASE), LUMBAR: ICD-10-CM

## 2020-02-28 RX ORDER — METHOCARBAMOL 750 MG/1
750 TABLET, FILM COATED ORAL 3 TIMES DAILY
Qty: 270 TABLET | Refills: 0 | Status: SHIPPED | OUTPATIENT
Start: 2020-02-28 | End: 2020-05-28

## 2020-03-01 RX ORDER — GABAPENTIN 300 MG/1
CAPSULE ORAL
Qty: 270 CAPSULE | Refills: 5 | Status: SHIPPED | OUTPATIENT
Start: 2020-03-01 | End: 2021-03-03

## 2020-03-04 ENCOUNTER — PATIENT OUTREACH (OUTPATIENT)
Dept: ADMINISTRATIVE | Facility: OTHER | Age: 75
End: 2020-03-04

## 2020-03-05 ENCOUNTER — OFFICE VISIT (OUTPATIENT)
Dept: PAIN MEDICINE | Facility: CLINIC | Age: 75
End: 2020-03-05
Payer: MEDICARE

## 2020-03-05 VITALS
HEART RATE: 70 BPM | SYSTOLIC BLOOD PRESSURE: 131 MMHG | HEIGHT: 73 IN | BODY MASS INDEX: 32.47 KG/M2 | DIASTOLIC BLOOD PRESSURE: 71 MMHG | WEIGHT: 245 LBS

## 2020-03-05 DIAGNOSIS — M79.18 MYOFASCIAL PAIN: ICD-10-CM

## 2020-03-05 DIAGNOSIS — M54.16 LUMBAR RADICULITIS: Primary | ICD-10-CM

## 2020-03-05 DIAGNOSIS — M51.36 DDD (DEGENERATIVE DISC DISEASE), LUMBAR: ICD-10-CM

## 2020-03-05 PROCEDURE — 1101F PR PT FALLS ASSESS DOC 0-1 FALLS W/OUT INJ PAST YR: ICD-10-PCS | Mod: HCNC,CPTII,S$GLB, | Performed by: PHYSICIAN ASSISTANT

## 2020-03-05 PROCEDURE — 99999 PR PBB SHADOW E&M-EST. PATIENT-LVL V: CPT | Mod: PBBFAC,HCNC,, | Performed by: PHYSICIAN ASSISTANT

## 2020-03-05 PROCEDURE — 3078F PR MOST RECENT DIASTOLIC BLOOD PRESSURE < 80 MM HG: ICD-10-PCS | Mod: HCNC,CPTII,S$GLB, | Performed by: PHYSICIAN ASSISTANT

## 2020-03-05 PROCEDURE — 3075F SYST BP GE 130 - 139MM HG: CPT | Mod: HCNC,CPTII,S$GLB, | Performed by: PHYSICIAN ASSISTANT

## 2020-03-05 PROCEDURE — 99213 OFFICE O/P EST LOW 20 MIN: CPT | Mod: HCNC,S$GLB,, | Performed by: PHYSICIAN ASSISTANT

## 2020-03-05 PROCEDURE — 1125F AMNT PAIN NOTED PAIN PRSNT: CPT | Mod: HCNC,S$GLB,, | Performed by: PHYSICIAN ASSISTANT

## 2020-03-05 PROCEDURE — 3078F DIAST BP <80 MM HG: CPT | Mod: HCNC,CPTII,S$GLB, | Performed by: PHYSICIAN ASSISTANT

## 2020-03-05 PROCEDURE — 1159F MED LIST DOCD IN RCRD: CPT | Mod: HCNC,S$GLB,, | Performed by: PHYSICIAN ASSISTANT

## 2020-03-05 PROCEDURE — 1101F PT FALLS ASSESS-DOCD LE1/YR: CPT | Mod: HCNC,CPTII,S$GLB, | Performed by: PHYSICIAN ASSISTANT

## 2020-03-05 PROCEDURE — 99213 PR OFFICE/OUTPT VISIT, EST, LEVL III, 20-29 MIN: ICD-10-PCS | Mod: HCNC,S$GLB,, | Performed by: PHYSICIAN ASSISTANT

## 2020-03-05 PROCEDURE — 99999 PR PBB SHADOW E&M-EST. PATIENT-LVL V: ICD-10-PCS | Mod: PBBFAC,HCNC,, | Performed by: PHYSICIAN ASSISTANT

## 2020-03-05 PROCEDURE — 1159F PR MEDICATION LIST DOCUMENTED IN MEDICAL RECORD: ICD-10-PCS | Mod: HCNC,S$GLB,, | Performed by: PHYSICIAN ASSISTANT

## 2020-03-05 PROCEDURE — 3075F PR MOST RECENT SYSTOLIC BLOOD PRESS GE 130-139MM HG: ICD-10-PCS | Mod: HCNC,CPTII,S$GLB, | Performed by: PHYSICIAN ASSISTANT

## 2020-03-05 PROCEDURE — 1125F PR PAIN SEVERITY QUANTIFIED, PAIN PRESENT: ICD-10-PCS | Mod: HCNC,S$GLB,, | Performed by: PHYSICIAN ASSISTANT

## 2020-03-05 NOTE — PROGRESS NOTES
PCP: Brian Marrqouin MD      CC:  Low back and bilateral leg pain    Interval history: Mr. Tavera is a 74 y.o. male with chronic neck and low back pain who presents today for f/u s/p right TF SONIA at L3-4 and L4-5. Continues to c/o pain bilaterally down to his calves. Pain worsens standing, walking getting up.  Left-sided radicular pain is new compared to previous symptoms.  No recent traumatic incident.  He has tried physical therapy with minimal benefit.  He currently takes gabapentin with mild benefits.   Pian is worsening. s/p cervical spine surgery since his last visit with us.  Neck pain is currently tolerable s/p cervical surgery.  He does have some residual left shoulder pain. He denies any worsening weakness.  No bowel bladder changes. Pain today is rated 6/10.    Prior HPI:   Mr. Tavera is a 68 year old male with PMH of HTN referred by Dr. Campbell for right leg pain.  He states having constant right leg pain for the past two years.  Pain is a throbbing pain in her posterior thigh and travels down to his ankle.  He denies any lower back pain.  No leg weakness or numbness.  Pain worsens with standing and walking.  Pain is relieved with rest.  He takes ibuprofen with mild benefits.  Physical therapy has not been helpful.      Pain intervention history: s/p right L4-5 and L5-S1 TFESI on 10/9/2014 and reports 75% relief of his low back and right leg pain.   s/p right L4-5 and L5-S1 TFESI on 2/21/17 and 9/2017 reports 70% relief of his right leg pain.  S/p right IESI at L4-5 and L5-S1 on 5/17/2018   S/p right TF SONIA at L4-5 and L5-S1 on 12/20/19  ROS:  CONSTITUTIONAL: No fevers, chills, night sweats, wt. loss, appetite changes  SKIN: no rashes or itching  ENT: No headaches, head trauma, vision changes, or eye pain  LYMPH NODES: None noticed   CV: No chest pain, palpitations.   RESP: No shortness of breath, dyspnea on exertion, cough, wheezing, or hemoptysis  GI: No nausea, emesis, diarrhea, constipation, melena,  hematochezia, pain.    : No dysuria, hematuria, urgency, or frequency   HEME: No easy bruising, bleeding problems  PSYCHIATRIC: No depression, anxiety, psychosis, hallucinations.  NEURO: No seizures, memory loss, dizziness or difficulty sleeping  MSK: + right leg pain      Past Medical History:   Diagnosis Date    Arthritis     Back pain     Carpal tunnel syndrome 2/25/2013    Cataract     Cataract, left eye 11/10/2014    Chest pain, musculoskeletal     COPD (chronic obstructive pulmonary disease) 11/052018    Emphysema lung 11/05/2018    Gastritis     Hx of colonic polyp     Hyperlipidemia     Hypertension     Hypothyroidism     Knee fracture     Myasthenia gravis     Neuropathy 1/3/2013    Obesity 1/29/2015    Polyneuropathy     PVC (premature ventricular contraction)     Squamous cell carcinoma 2014    left forearm    Thyroid disease      Past Surgical History:   Procedure Laterality Date    APPENDECTOMY      CATARACT EXTRACTION W/  INTRAOCULAR LENS IMPLANT Bilateral     COLONOSCOPY  03/01/2012    Dr Esteban; hyperplastic polyp; repeat in 5 years    CYSTOSCOPY N/A 2/26/2019    Procedure: CYSTOSCOPY;  Surgeon: Simba Cheung MD;  Location: [facility];  Service: Urology;  Laterality: N/A;    ENDOSCOPIC ULTRASOUND OF UPPER GASTROINTESTINAL TRACT N/A 1/7/2020    Procedure: ULTRASOUND, UPPER GI TRACT, ENDOSCOPIC;  Surgeon: Kati Ryan MD;  Location: [address]);  Service: Endoscopy;  Laterality: N/A;    ESOPHAGOGASTRODUODENOSCOPY N/A 9/12/2018    Procedure: EGD (ESOPHAGOGASTRODUODENOSCOPY);  Surgeon: Gabriel Hernandez MD;  Location: [facility];  Service: Endoscopy;  Laterality: N/A;    ESOPHAGOGASTRODUODENOSCOPY N/A 8/19/2019    Procedure: EGD (ESOPHAGOGASTRODUODENOSCOPY);  Surgeon: Gabriel Hernandez MD;  Location: [facility];  Service: Endoscopy;  Laterality: N/A;    ESOPHAGOGASTRODUODENOSCOPY N/A 10/7/2019    Procedure: EGD (ESOPHAGOGASTRODUODENOSCOPY);  Surgeon: Gabriel  CHRISTIAN Hernandez MD;  Location: [facility];  Service: Endoscopy;  Laterality: N/A;    ESOPHAGOGASTRODUODENOSCOPY N/A 1/7/2020    Procedure: EGD (ESOPHAGOGASTRODUODENOSCOPY);  Surgeon: Kati Ryan MD;  Location: [facility] (2ND FLR);  Service: Endoscopy;  Laterality: N/A;    HEMORRHOID SURGERY      KNEE ARTHROPLASTY Bilateral     NECK SURGERY      POSTERIOR FUSION OF CERVICAL SPINE WITH LAMINECTOMY N/A 11/7/2018    Procedure: C2-C6 Posterior Cervical Laminectomy & Instrumental Fusion;  Surgeon: Kishore Turner MD;  Location: [facility] 2ND FLR;  Service: Neurosurgery;  Laterality: N/A;    TONSILLECTOMY      TRANSFORAMINAL EPIDURAL INJECTION OF STEROID Right 12/20/2019    Procedure: Injection,steroid,epidural,transforaminal approach;  Surgeon: Devan Watt MD;  Location: [facility];  Service: Pain Management;  Laterality: Right;  L3-4, L4-5    TRANSFORAMINAL EPIDURAL INJECTION OF STEROID Bilateral 2/6/2020    Procedure: Injection,steroid,epidural,transforaminal approach;  Surgeon: Devan Watt MD;  Location: [facility];  Service: Pain Management;  Laterality: Bilateral;  L3-L4,L4-L5    TRANSRECTAL ULTRASOUND EXAMINATION N/A 2/26/2019    Procedure: ULTRASOUND, RECTAL APPROACH;  Surgeon: Simba Cheung MD;  Location: [facility];  Service: Urology;  Laterality: N/A;    UPPER GASTROINTESTINAL ENDOSCOPY  09/12/2018    Dr Hernandez; gastritis; extensive intestinal metaplasia; repeat in 1-2- years     Family History   Problem Relation Age of Onset    Cataracts Mother     Heart disease Mother         CHF    Hypertension Mother     Hyperlipidemia Mother     Cataracts Father     Glaucoma Father     Heart disease Father     Hyperlipidemia Sister     Hypertension Sister     No Known Problems Daughter     No Known Problems Daughter     Collagen disease Neg Hx     Amblyopia Neg Hx     Blindness Neg Hx     Macular degeneration Neg Hx     Retinal detachment Neg Hx     Strabismus Neg Hx     Cancer Neg Hx     Colon  "cancer Neg Hx     Esophageal cancer Neg Hx     Stomach cancer Neg Hx     Crohn's disease Neg Hx     Ulcerative colitis Neg Hx      Social History     Socioeconomic History    Marital status:      Spouse name: Not on file    Number of children: Not on file    Years of education: Not on file    Highest education level: Not on file   Occupational History    Not on file   Social Needs    Financial resource strain: Not hard at all    Food insecurity:     Worry: Never true     Inability: Never true    Transportation needs:     Medical: No     Non-medical: No   Tobacco Use    Smoking status: Never Smoker    Smokeless tobacco: Never Used    Tobacco comment: when a child   Substance and Sexual Activity    Alcohol use: Yes     Frequency: Never     Binge frequency: Never     Comment: rarely    Drug use: No    Sexual activity: Yes     Partners: Female   Lifestyle    Physical activity:     Days per week: 0 days     Minutes per session: 0 min    Stress: Not at all   Relationships    Social connections:     Talks on phone: More than three times a week     Gets together: More than three times a week     Attends [employer] service: Not on file     Active member of club or organization: Yes     Attends meetings of clubs or organizations: More than 4 times per year     Relationship status:    Other Topics Concern    Not on file   Social History Narrative    Not on file         Medications/Allergies: See med card    Vitals:    03/05/20 1116   BP: 131/71   Pulse: 70   Weight: 111.1 kg (245 lb)   Height: 6' 1" (1.854 m)   PainSc:   5   PainLoc: Back         Physical exam:    GENERAL: A and O x3, the patient appears well groomed and is in no acute distress.  Skin: No rashes or obvious lesions  HEENT: normocephalic, atraumatic  CARDIOVASCULAR:  Bradycardia   LUNGS: non labored breathing  ABDOMEN: soft, nontender   UPPER EXTREMITIES: Normal alignment, normal range of motion, no atrophy, no skin changes,  " hair growth and nail growth normal and equal bilaterally. No swelling, no tenderness.    LOWER EXTREMITIES:  Normal alignment, normal range of motion, no atrophy, no skin changes,  hair growth and nail growth normal and equal bilaterally. No swelling, no tenderness.    LUMBAR SPINE  Lumbar spine: ROM is full with flexion extension and oblique extension with no increased pain.    Porter's test causes no increased pain on either side.    Supine straight leg raise is negative bilaterally.    Internal and external rotation of the hip causes no increased pain on either side.  Myofascial exam: No tenderness to palpation across lumbar paraspinous muscles.      MENTAL STATUS: normal orientation, speech, language, and fund of knowledge for social situation.  Emotional state appropriate.    CRANIAL NERVES:  II:  PERRL bilaterally,   III,IV,VI: EOMI.    V:  Facial sensation equal bilaterally  VII:  Facial motor function normal.  VIII:  Hearing equal to finger rub bilaterally  IX/X: Gag normal, palate symmetric  XI:  Shoulder shrug equal, head turn equal  XII:  Tongue midline without fasciculations      MOTOR: Tone and bulk: normal bilateral upper and lower Strength: normal    IP ADD ABD Quad TA Gas HAM  R 5 5 5 5 5 5 5  L 5 5 5 5 5 5 5    SENSATION: Light touch and pinprick intact bilaterally  REFLEXES: normal, symmetric, nonbrisk.  Toes down, no clonus. No hoffmans.  GAIT: normal rise, base, steps, and arm swing.      Imaging:    MRI lumbar spine without contrast 10/12/2019 (open MRI)     L1-2, stable foraminal disc protrusion.  Mild central canal stenosis.  Moderate left neuroforaminal narrowing.  L2-3, disc bulging with prominent posterior element hypertrophy.  Moderate central canal stenosis.  Bilateral recess narrowing as well as moderate left and mild right neural foraminal narrowing.  L3-4, cervical ventral disc bulge with facet disease.  Mild-to-moderate central canal narrowing.  Moderate right and mild left neural  foraminal narrowing.  L4-5, service control disc bulge with bulky facet disease.  Mild-to-moderate central canal stenosis.  Moderate bilateral neural foraminal narrowing, right greater than left.  L5-S1 disc bulge and facet disease  MRI lumbar spine 09/2018 (outside open MRI)  Multilevel spondylosis.  Moderate neural foraminal narrowing at L3-4 and L4-5.      Assessment:  Mr. Tavera is a 74 y.o. male with   No diagnosis found.  Plan:  1.  I have stressed the importance of physical activity and exercise to improve overall health  2.  Reviewed lumbar MRI results with Mr. Tavera  3. Titrate Gabapentin as tolerated to 1800 mg daily  4. Discussed bilateral L3-4 and L4-5 TF SONIA. He will consider this. He does not wish to consider lumbar surgery  5. F/u prn

## 2020-03-05 NOTE — PATIENT INSTRUCTIONS
Exercises To Strengthen Your Lower Back  Strong lower-back and abdominal muscles work together to support your spine. These exercises will help strengthen the muscles of the lower back. It is important that you begin exercising slowly and increase levels gradually.  Always begin any exercise program with stretching. If you feel pain while doing any of these exercises, stop and talk to your doctor about a more specific exercise program that suits your condition better.  Low Back Stretch  · Lie on your back with your knees bent and both feet on the ground.  ·   Slowly raise your left knee to your chest as you flatten your lower back against the floor. Hold for 5 seconds.  · Relax and repeat the exercise with your right knee.  · Do 10 of these exercises for each leg.  · Repeat hugging both knees to your chest at the same time.  Building Lower Back Strength  1. Kneeling Lumbar Extension: Begin on your hands and knees. Simultaneously raise and straighten your right arm and left leg until they are parallel to the ground. Hold for 2 seconds and come back slowly to a starting position. Repeat with left arm and right leg, alternating 10 times.  2. Prone Lumbar Extension: Lie face down, arms extended overhead, palms on the floor. Simultaneously raise your right arm and left leg as high as comfortably possible. Hold for 10 seconds and slowly return to start. Repeat with left arm and right leg, alternating 10 times. Gradually build up to 20 times. (Advanced: Repeat this exercise raising both arms and both legs a few inches off the floor at the same time. Hold for 5 seconds and release.)  3. Pelvic Tilt: Lie on the floor on your back with your knees bent at 90 degrees. Your feet should be flat on the floor. Inhale, exhale, then slowly contract your abdominal muscles bringing your navel toward your spine. Let your pelvis rock back until your lower back is flat on the floor. Hold for 10 seconds while breathing  smoothly.  4. Abdominal Crunch: Perform a Pelvic Tilt (above) flattening your lower back against the floor. Holding the tension in your abdominal muscles, take another breath and raise your shoulder blades off the ground (this is not a full sit-up). Keep your head in line with your body (dont bend your neck forward). Hold for 2 seconds, then slowly lower.      Back Exercises: Abdominal Lift  The Abdominal Lift strengthens your lower abdominal muscles, helping you keep your pelvis and back stable.  · Lie on the floor with both knees bent. Put your feet flat on the floor and your arms by your sides. Tighten your abdominal muscles.  · Lift one bent knee and move it toward your upper body. Keep your abdominal muscles tight and your back flat on the floor. Hold for 10 seconds.  · Repeat 3 times. Then, switch legs.         Back Exercises: Bridge  The Bridge exercise strengthens your abdominal, buttocks, and hamstring muscles. This helps keep your back stable and aligned when you walk.  · Lie on the floor with your back and palms flat. Bend your knees. Keep your feet flat on the floor.  · Contract your abdominal and buttocks muscles. Slowly lift your buttocks off the floor until there is a straight line from your knees to your shoulders.  · Hold for 5  seconds. Repeat 10 times.          Back Exercises: Hip Lift        To start, lie on your back with your knees bent and feet flat on the floor. Dont press your neck or lower back to the floor. Breathe deeply. You should feel comfortable and relaxed in this position.  · Slowly raise your hips upward.  · Tighten your abdomen and buttocks. Be careful not to arch your back.  · Hold for 5 seconds. Lower your hips to the floor.  · Repeat 10 times.  For your safety, check with your healthcare provider before starting an exercise program.       Back Exercises: Hip Rotator Stretch        To start, lie on your back with your knees bent and feet flat on the floor. Dont press your  neck or lower back to the floor. Breathe deeply. You should feel comfortable and relaxed in this position.  · Rest your right ankle on your left knee.  · Place a towel behind your left thigh and use it to pull the knee toward your chest. Feel the stretch in your buttocks.  · Hold for 30-60 seconds. Release.  · Repeat 2 times.  · Switch legs.   For your safety, check with your healthcare provider before starting an exercise program.       Back Exercises: Knee Lift        To start, lie on your back with your knees bent and feet flat on the floor. Dont press your neck or lower back to the floor. Breathe deeply. You should feel comfortable and relaxed in this position.  · Lift one bent knee and move it toward your upper body. Keep your abdominal muscles tight and your back flat on the floor.  · Hold for 10 seconds. Return to start position.  · Repeat 3 times.  · Switch legs.        Back Exercises: Leg Pull        To start, lie on your back with your knees bent and feet flat on the floor. Dont press your neck or lower back to the floor. Breathe deeply. You should feel comfortable and relaxed in this position.  · Pull one knee to your chest.  · Hold for 30-60 seconds. Return to starting position.  · Repeat 2 times.  · Switch legs.  · For a double leg pull, pull both legs to your chest at the same time. Repeat 2 times.  For your safety, check with your healthcare provider before starting an exercise program.       Back Exercises: Leg Reach        Do this exercise on your hands and knees. Keep your knees under your hips and your hands under your shoulders. Keep your spine in a neutral position (not arched or sagging). Be sure to maintain your necks natural curve.  · Extend one leg straight back. Dont arch your back or let your head or body sag.  · Hold for 5 seconds. Return to starting position.  · Repeat 5 times.  · Switch legs.       Back Exercises: Lower Back Rotation  To start, lie on your back with your knees bent  and feet flat on the floor. Dont press your neck or lower back to the floor. Breathe deeply. You should feel comfortable and relaxed in this position.  · Drop both knees to one side. Turn your head to the other side. Keep your shoulders flat on the floor.  · Hold for 20 seconds.  · Slowly switch sides.  · Repeat 2 times.                                   Back Exercises: Lower Back Stretch                        To start, sit in a chair with your feet flat on the floor. Shift your weight slightly forward to avoid rounding your back. Relax, and keep your ears, shoulders, and hips aligned.  · Sit with your feet well apart.  · Bend forward and touch the floor with the backs of your hands. Relax and let your body drop.  · Hold for 20 seconds. Return to starting position.  · Repeat 2 times.       Back Exercises: Partial Curl-Ups        To start, lie on your back with your knees bent and feet flat on the floor. Dont press your neck or lower back to the floor. Breathe deeply. You should feel comfortable and relaxed in this position.  · Cross your arms loosely.  · Tighten your abdomen and curl nursing home up, keeping your head in line with your shoulders.  · Hold for 5 seconds. Uncurl to lie down.  · Repeat 5 times.       Back Exercises: Pelvic Tilt  To start, lie on your back with your knees bent and feet flat on the floor. Dont press your neck or lower back to the floor. Breathe deeply. You should feel comfortable and relaxed in this position.  · Tighten your abdomen and buttocks, and press your lower back toward the floor. This should be a small, subtle movement.  · Hold for 5 seconds. Release.  · Repeat 5 times.                           Back Exercises: Seated Rotation      To start, sit in a chair with your feet flat on the floor. Shift your weight slightly forward to avoid rounding your back. Relax, and keep your ears, shoulders, and hips aligned.  · Fold your arms, elbows just below shoulder height.  · Turn from the  waist with hips forward. Turn your head last.  · Hold for a count of 5. Return to starting position.  · Repeat 5 times on one side. Then switch sides.          Back Exercises: Side Stretch  To start, sit in a chair with your feet flat on the floor. Shift your weight slightly forward to avoid rounding your back. Relax. Keep your ears, shoulders, and hips aligned.  · Stretch your right arm overhead.  · Slowly bend to the left. Dont twist your torso.  · Hold for 20 seconds. Return to starting position.  · Repeat 2 times. Then, switch to the other side.      © 1268-0865 [employer]. [address] [place] [address], [place] [address]. All rights reserved. This information is not intended as a substitute for professional medical care. Always follow your healthcare professional's instructions.

## 2020-03-05 NOTE — PROGRESS NOTES
PCP: Brian Marroquin MD      CC:  Low back and bilateral leg pain    Interval history: Mr. Tavera is a 74 y.o. male with chronic neck and low back pain who presents today for f/u s/p bilateral TF SONIA at L3-4 and L4-5. Reports no improvement. Continues to c/o pain bilaterally down to his calves. Pain worsens standing, walking getting up.  He has tried physical therapy with minimal benefit.  He currently takes gabapentin 1200 mg daily with mild benefits.   He is s/p cervical spine surgery and reports pain has worsened in left Paraspinous muscles into left shoulder. He completed PT but does not have a home exercises program. He denies any worsening weakness.  No bowel bladder changes. Pain today is rated 5/10.    Prior HPI:   Mr. Tavera is a 68 year old male with PMH of HTN referred by Dr. Campbell for right leg pain.  He states having constant right leg pain for the past two years.  Pain is a throbbing pain in her posterior thigh and travels down to his ankle.  He denies any lower back pain.  No leg weakness or numbness.  Pain worsens with standing and walking.  Pain is relieved with rest.  He takes ibuprofen with mild benefits.  Physical therapy has not been helpful.      Pain intervention history: s/p right L4-5 and L5-S1 TFESI on 10/9/2014 and reports 75% relief of his low back and right leg pain.   s/p right L4-5 and L5-S1 TFESI on 2/21/17 and 9/2017 reports 70% relief of his right leg pain.  S/p right IESI at L4-5 and L5-S1 on 5/17/2018   S/p right TF SONIA at L4-5 and L5-S1 on 12/20/19  ROS:  CONSTITUTIONAL: No fevers, chills, night sweats, wt. loss, appetite changes  SKIN: no rashes or itching  ENT: No headaches, head trauma, vision changes, or eye pain  LYMPH NODES: None noticed   CV: No chest pain, palpitations.   RESP: No shortness of breath, dyspnea on exertion, cough, wheezing, or hemoptysis  GI: No nausea, emesis, diarrhea, constipation, melena, hematochezia, pain.    : No dysuria, hematuria, urgency, or  frequency   HEME: No easy bruising, bleeding problems  PSYCHIATRIC: No depression, anxiety, psychosis, hallucinations.  NEURO: No seizures, memory loss, dizziness or difficulty sleeping  MSK: + right leg pain      Past Medical History:   Diagnosis Date    Arthritis     Back pain     Carpal tunnel syndrome 2/25/2013    Cataract     Cataract, left eye 11/10/2014    Chest pain, musculoskeletal     COPD (chronic obstructive pulmonary disease) 11/052018    Emphysema lung 11/05/2018    Gastritis     Hx of colonic polyp     Hyperlipidemia     Hypertension     Hypothyroidism     Knee fracture     Myasthenia gravis     Neuropathy 1/3/2013    Obesity 1/29/2015    Polyneuropathy     PVC (premature ventricular contraction)     Squamous cell carcinoma 2014    left forearm    Thyroid disease      Past Surgical History:   Procedure Laterality Date    APPENDECTOMY      CATARACT EXTRACTION W/  INTRAOCULAR LENS IMPLANT Bilateral     COLONOSCOPY  03/01/2012    Dr Esteban; hyperplastic polyp; repeat in 5 years    CYSTOSCOPY N/A 2/26/2019    Procedure: CYSTOSCOPY;  Surgeon: Simba Cheung MD;  Location: [facility];  Service: Urology;  Laterality: N/A;    ENDOSCOPIC ULTRASOUND OF UPPER GASTROINTESTINAL TRACT N/A 1/7/2020    Procedure: ULTRASOUND, UPPER GI TRACT, ENDOSCOPIC;  Surgeon: Kati Ryan MD;  Location: [facility] ([address] [place] [address]);  Service: Endoscopy;  Laterality: N/A;    ESOPHAGOGASTRODUODENOSCOPY N/A 9/12/2018    Procedure: EGD (ESOPHAGOGASTRODUODENOSCOPY);  Surgeon: Gabriel Hernandez MD;  Location: [facility];  Service: Endoscopy;  Laterality: N/A;    ESOPHAGOGASTRODUODENOSCOPY N/A 8/19/2019    Procedure: EGD (ESOPHAGOGASTRODUODENOSCOPY);  Surgeon: Gabriel Hernandez MD;  Location: [facility];  Service: Endoscopy;  Laterality: N/A;    ESOPHAGOGASTRODUODENOSCOPY N/A 10/7/2019    Procedure: EGD (ESOPHAGOGASTRODUODENOSCOPY);  Surgeon: Gabriel Hernandez MD;  Location: [facility];  Service: Endoscopy;   Laterality: N/A;    ESOPHAGOGASTRODUODENOSCOPY N/A 1/7/2020    Procedure: EGD (ESOPHAGOGASTRODUODENOSCOPY);  Surgeon: Kati Ryan MD;  Location: [address]);  Service: Endoscopy;  Laterality: N/A;    HEMORRHOID SURGERY      KNEE ARTHROPLASTY Bilateral     NECK SURGERY      POSTERIOR FUSION OF CERVICAL SPINE WITH LAMINECTOMY N/A 11/7/2018    Procedure: C2-C6 Posterior Cervical Laminectomy & Instrumental Fusion;  Surgeon: Kishore Turner MD;  Location: [address];  Service: Neurosurgery;  Laterality: N/A;    TONSILLECTOMY      TRANSFORAMINAL EPIDURAL INJECTION OF STEROID Right 12/20/2019    Procedure: Injection,steroid,epidural,transforaminal approach;  Surgeon: Devan Watt MD;  Location: [facility];  Service: Pain Management;  Laterality: Right;  L3-4, L4-5    TRANSFORAMINAL EPIDURAL INJECTION OF STEROID Bilateral 2/6/2020    Procedure: Injection,steroid,epidural,transforaminal approach;  Surgeon: Devan Watt MD;  Location: [facility];  Service: Pain Management;  Laterality: Bilateral;  L3-L4,L4-L5    TRANSRECTAL ULTRASOUND EXAMINATION N/A 2/26/2019    Procedure: ULTRASOUND, RECTAL APPROACH;  Surgeon: Simba Cheung MD;  Location: [facility];  Service: Urology;  Laterality: N/A;    UPPER GASTROINTESTINAL ENDOSCOPY  09/12/2018    Dr Hernandez; gastritis; extensive intestinal metaplasia; repeat in 1-2- years     Family History   Problem Relation Age of Onset    Cataracts Mother     Heart disease Mother         CHF    Hypertension Mother     Hyperlipidemia Mother     Cataracts Father     Glaucoma Father     Heart disease Father     Hyperlipidemia Sister     Hypertension Sister     No Known Problems Daughter     No Known Problems Daughter     Collagen disease Neg Hx     Amblyopia Neg Hx     Blindness Neg Hx     Macular degeneration Neg Hx     Retinal detachment Neg Hx     Strabismus Neg Hx     Cancer Neg Hx     Colon cancer Neg Hx     Esophageal cancer Neg Hx     Stomach cancer  "Neg Hx     Crohn's disease Neg Hx     Ulcerative colitis Neg Hx      Social History     Socioeconomic History    Marital status:      Spouse name: Not on file    Number of children: Not on file    Years of education: Not on file    Highest education level: Not on file   Occupational History    Not on file   Social Needs    Financial resource strain: Not hard at all    Food insecurity:     Worry: Never true     Inability: Never true    Transportation needs:     Medical: No     Non-medical: No   Tobacco Use    Smoking status: Never Smoker    Smokeless tobacco: Never Used    Tobacco comment: when a child   Substance and Sexual Activity    Alcohol use: Yes     Frequency: Never     Binge frequency: Never     Comment: rarely    Drug use: No    Sexual activity: Yes     Partners: Female   Lifestyle    Physical activity:     Days per week: 0 days     Minutes per session: 0 min    Stress: Not at all   Relationships    Social connections:     Talks on phone: More than three times a week     Gets together: More than three times a week     Attends Yazidism service: Not on file     Active member of club or organization: Yes     Attends meetings of clubs or organizations: More than 4 times per year     Relationship status:    Other Topics Concern    Not on file   Social History Narrative    Not on file         Medications/Allergies: See med card    Vitals:    03/05/20 1116   BP: 131/71   Pulse: 70   Weight: 111.1 kg (245 lb)   Height: 6' 1" (1.854 m)   PainSc:   5   PainLoc: Back         Physical exam:    GENERAL: A and O x3, the patient appears well groomed and is in no acute distress.  Skin: No rashes or obvious lesions  HEENT: normocephalic, atraumatic  CARDIOVASCULAR:  Bradycardia   LUNGS: non labored breathing  ABDOMEN: soft, nontender   UPPER EXTREMITIES: Normal alignment, normal range of motion, no atrophy, no skin changes,  hair growth and nail growth normal and equal bilaterally. No " swelling, no tenderness.    LOWER EXTREMITIES:  Normal alignment, normal range of motion, no atrophy, no skin changes,  hair growth and nail growth normal and equal bilaterally. No swelling, no tenderness.    LUMBAR SPINE  Lumbar spine: ROM is full with flexion extension and oblique extension with no increased pain.    Porter's test causes no increased pain on either side.    Supine straight leg raise is negative bilaterally.    Internal and external rotation of the hip causes no increased pain on either side.  Myofascial exam: No tenderness to palpation across lumbar paraspinous muscles.      MENTAL STATUS: normal orientation, speech, language, and fund of knowledge for social situation.  Emotional state appropriate.    CRANIAL NERVES:  II:  PERRL bilaterally,   III,IV,VI: EOMI.    V:  Facial sensation equal bilaterally  VII:  Facial motor function normal.  VIII:  Hearing equal to finger rub bilaterally  IX/X: Gag normal, palate symmetric  XI:  Shoulder shrug equal, head turn equal  XII:  Tongue midline without fasciculations      MOTOR: Tone and bulk: normal bilateral upper and lower Strength: normal    IP ADD ABD Quad TA Gas HAM  R 5 5 5 5 5 5 5  L 5 5 5 5 5 5 5    SENSATION: Light touch and pinprick intact bilaterally  REFLEXES: normal, symmetric, nonbrisk.  Toes down, no clonus. No hoffmans.  GAIT: normal rise, base, steps, and arm swing.      Imaging:    MRI lumbar spine without contrast 10/12/2019 (open MRI)     L1-2, stable foraminal disc protrusion.  Mild central canal stenosis.  Moderate left neuroforaminal narrowing.  L2-3, disc bulging with prominent posterior element hypertrophy.  Moderate central canal stenosis.  Bilateral recess narrowing as well as moderate left and mild right neural foraminal narrowing.  L3-4, cervical ventral disc bulge with facet disease.  Mild-to-moderate central canal narrowing.  Moderate right and mild left neural foraminal narrowing.  L4-5, service control disc bulge with  bulky facet disease.  Mild-to-moderate central canal stenosis.  Moderate bilateral neural foraminal narrowing, right greater than left.  L5-S1 disc bulge and facet disease  MRI lumbar spine 09/2018 (outside open MRI)  Multilevel spondylosis.  Moderate neural foraminal narrowing at L3-4 and L4-5.      Assessment:  Mr. Tavera is a 74 y.o. male with   1. Lumbar radiculitis    2. DDD (degenerative disc disease), lumbar    3. Myofascial pain      Plan:  1.  I have stressed the importance of physical activity and exercise to improve overall health. Home lumbar exercises provided. Discussed importance of resuming cervical PT exercises  2.  Reviewed lumbar MRI results with Mr. Tavera  3.  Titrate Gabapentin as tolerated to 1800 mg daily  4. Discussed neurosurgery consult. He does not wish to consider lumbar surgery  5. F/u prn

## 2020-03-09 ENCOUNTER — OFFICE VISIT (OUTPATIENT)
Dept: PODIATRY | Facility: CLINIC | Age: 75
End: 2020-03-09
Payer: MEDICARE

## 2020-03-09 VITALS — BODY MASS INDEX: 32.46 KG/M2 | HEIGHT: 73 IN | WEIGHT: 244.94 LBS

## 2020-03-09 DIAGNOSIS — M79.671 FOOT PAIN, RIGHT: Primary | ICD-10-CM

## 2020-03-09 DIAGNOSIS — B07.0 PLANTAR WART OF RIGHT FOOT: ICD-10-CM

## 2020-03-09 PROCEDURE — 99999 PR PBB SHADOW E&M-EST. PATIENT-LVL II: CPT | Mod: PBBFAC,HCNC,, | Performed by: PODIATRIST

## 2020-03-09 PROCEDURE — 1125F AMNT PAIN NOTED PAIN PRSNT: CPT | Mod: HCNC,S$GLB,, | Performed by: PODIATRIST

## 2020-03-09 PROCEDURE — 1159F PR MEDICATION LIST DOCUMENTED IN MEDICAL RECORD: ICD-10-PCS | Mod: HCNC,S$GLB,, | Performed by: PODIATRIST

## 2020-03-09 PROCEDURE — 99999 PR PBB SHADOW E&M-EST. PATIENT-LVL II: ICD-10-PCS | Mod: PBBFAC,HCNC,, | Performed by: PODIATRIST

## 2020-03-09 PROCEDURE — 1101F PT FALLS ASSESS-DOCD LE1/YR: CPT | Mod: HCNC,CPTII,S$GLB, | Performed by: PODIATRIST

## 2020-03-09 PROCEDURE — 1101F PR PT FALLS ASSESS DOC 0-1 FALLS W/OUT INJ PAST YR: ICD-10-PCS | Mod: HCNC,CPTII,S$GLB, | Performed by: PODIATRIST

## 2020-03-09 PROCEDURE — 1159F MED LIST DOCD IN RCRD: CPT | Mod: HCNC,S$GLB,, | Performed by: PODIATRIST

## 2020-03-09 PROCEDURE — 99203 PR OFFICE/OUTPT VISIT, NEW, LEVL III, 30-44 MIN: ICD-10-PCS | Mod: HCNC,S$GLB,, | Performed by: PODIATRIST

## 2020-03-09 PROCEDURE — 1125F PR PAIN SEVERITY QUANTIFIED, PAIN PRESENT: ICD-10-PCS | Mod: HCNC,S$GLB,, | Performed by: PODIATRIST

## 2020-03-09 PROCEDURE — 99203 OFFICE O/P NEW LOW 30 MIN: CPT | Mod: HCNC,S$GLB,, | Performed by: PODIATRIST

## 2020-03-10 NOTE — PROGRESS NOTES
Subjective:      Patient ID: Maximilian Tavera Jr. is a 74 y.o. male.    Chief Complaint: Callouses (growth on bottom of rt foot)  Patient presents to clinic with the chief complaint of a growth to the bottom of the Rt. Plantar forefoot that has been apparent for the past 8-9 months.  Describes pain from the lesion as sharp and rates as a 5/10.  Symptoms are exacerbated with direct pressure with weight bearing and alleviated with rest.  He has attempted to file the site regularly with minimal improvement in symptoms.  Denies noticing drainage from the affected area. Denies any additional pedal complaints.      Past Medical History:   Diagnosis Date    Arthritis     Back pain     Carpal tunnel syndrome 2/25/2013    Cataract     Cataract, left eye 11/10/2014    Chest pain, musculoskeletal     COPD (chronic obstructive pulmonary disease) 11/052018    Emphysema lung 11/05/2018    Gastritis     Hx of colonic polyp     Hyperlipidemia     Hypertension     Hypothyroidism     Knee fracture     Myasthenia gravis     Neuropathy 1/3/2013    Obesity 1/29/2015    Polyneuropathy     PVC (premature ventricular contraction)     Squamous cell carcinoma 2014    left forearm    Thyroid disease        Past Surgical History:   Procedure Laterality Date    APPENDECTOMY      CATARACT EXTRACTION W/  INTRAOCULAR LENS IMPLANT Bilateral     COLONOSCOPY  03/01/2012    Dr Esteban; hyperplastic polyp; repeat in 5 years    CYSTOSCOPY N/A 2/26/2019    Procedure: CYSTOSCOPY;  Surgeon: Simba Cheung MD;  Location: [facility];  Service: Urology;  Laterality: N/A;    ENDOSCOPIC ULTRASOUND OF UPPER GASTROINTESTINAL TRACT N/A 1/7/2020    Procedure: ULTRASOUND, UPPER GI TRACT, ENDOSCOPIC;  Surgeon: Kati Ryan MD;  Location: [address]);  Service: Endoscopy;  Laterality: N/A;    ESOPHAGOGASTRODUODENOSCOPY N/A 9/12/2018    Procedure: EGD (ESOPHAGOGASTRODUODENOSCOPY);  Surgeon: Gabriel Hernandez MD;   Location: [facility];  Service: Endoscopy;  Laterality: N/A;    ESOPHAGOGASTRODUODENOSCOPY N/A 8/19/2019    Procedure: EGD (ESOPHAGOGASTRODUODENOSCOPY);  Surgeon: Gabriel Hernandez MD;  Location: [facility];  Service: Endoscopy;  Laterality: N/A;    ESOPHAGOGASTRODUODENOSCOPY N/A 10/7/2019    Procedure: EGD (ESOPHAGOGASTRODUODENOSCOPY);  Surgeon: Gabriel Hernandez MD;  Location: [facility];  Service: Endoscopy;  Laterality: N/A;    ESOPHAGOGASTRODUODENOSCOPY N/A 1/7/2020    Procedure: EGD (ESOPHAGOGASTRODUODENOSCOPY);  Surgeon: Kati Ryan MD;  Location: [facility] ([address] [place] [address]);  Service: Endoscopy;  Laterality: N/A;    HEMORRHOID SURGERY      KNEE ARTHROPLASTY Bilateral     NECK SURGERY      POSTERIOR FUSION OF CERVICAL SPINE WITH LAMINECTOMY N/A 11/7/2018    Procedure: C2-C6 Posterior Cervical Laminectomy & Instrumental Fusion;  Surgeon: Kishore Turner MD;  Location: [address];  Service: Neurosurgery;  Laterality: N/A;    TONSILLECTOMY      TRANSFORAMINAL EPIDURAL INJECTION OF STEROID Right 12/20/2019    Procedure: Injection,steroid,epidural,transforaminal approach;  Surgeon: Devan Watt MD;  Location: [facility];  Service: Pain Management;  Laterality: Right;  L3-4, L4-5    TRANSFORAMINAL EPIDURAL INJECTION OF STEROID Bilateral 2/6/2020    Procedure: Injection,steroid,epidural,transforaminal approach;  Surgeon: Devan Watt MD;  Location: [facility];  Service: Pain Management;  Laterality: Bilateral;  L3-L4,L4-L5    TRANSRECTAL ULTRASOUND EXAMINATION N/A 2/26/2019    Procedure: ULTRASOUND, RECTAL APPROACH;  Surgeon: Simba Cheung MD;  Location: [facility];  Service: Urology;  Laterality: N/A;    UPPER GASTROINTESTINAL ENDOSCOPY  09/12/2018    Dr Hernandez; gastritis; extensive intestinal metaplasia; repeat in 1-2- years       Family History   Problem Relation Age of Onset    Cataracts Mother     Heart disease Mother         CHF    Hypertension Mother     Hyperlipidemia Mother     Cataracts Father      Glaucoma Father     Heart disease Father     Hyperlipidemia Sister     Hypertension Sister     No Known Problems Daughter     No Known Problems Daughter     Collagen disease Neg Hx     Amblyopia Neg Hx     Blindness Neg Hx     Macular degeneration Neg Hx     Retinal detachment Neg Hx     Strabismus Neg Hx     Cancer Neg Hx     Colon cancer Neg Hx     Esophageal cancer Neg Hx     Stomach cancer Neg Hx     Crohn's disease Neg Hx     Ulcerative colitis Neg Hx        Social History     Socioeconomic History    Marital status:      Spouse name: Not on file    Number of children: Not on file    Years of education: Not on file    Highest education level: Not on file   Occupational History    Not on file   Social Needs    Financial resource strain: Not hard at all    Food insecurity:     Worry: Never true     Inability: Never true    Transportation needs:     Medical: No     Non-medical: No   Tobacco Use    Smoking status: Never Smoker    Smokeless tobacco: Never Used    Tobacco comment: when a child   Substance and Sexual Activity    Alcohol use: Yes     Frequency: Never     Binge frequency: Never     Comment: rarely    Drug use: No    Sexual activity: Yes     Partners: Female   Lifestyle    Physical activity:     Days per week: 0 days     Minutes per session: 0 min    Stress: Not at all   Relationships    Social connections:     Talks on phone: More than three times a week     Gets together: More than three times a week     Attends Sikh service: Not on file     Active member of club or organization: Yes     Attends meetings of clubs or organizations: More than 4 times per year     Relationship status:    Other Topics Concern    Not on file   Social History Narrative    Not on file       Current Outpatient Medications   Medication Sig Dispense Refill    atorvastatin (LIPITOR) 80 MG tablet TAKE 1 TABLET EVERY DAY 90 tablet 3    carvedilol (COREG) 25 MG tablet  Take 1 tablet (25 mg total) by mouth 2 (two) times daily with meals. 180 tablet 3    cetirizine (ZYRTEC) 10 MG tablet Take 1 tablet by mouth daily as needed.      chlorthalidone (HYGROTEN) 25 MG Tab TAKE 1 TABLET EVERY DAY 90 tablet 3    cholecalciferol, vitamin D3, (VITAMIN D) 2,000 unit Cap 1 capsule once daily. Every day      coenzyme Q10 (CO Q-10) 100 mg capsule Take 400 mg by mouth once daily.       cyanocobalamin, vitamin B-12, (VITAMIN B-12) 5,000 mcg Subl Take 5,000 mcg by mouth once daily. Every day      ezetimibe (ZETIA) 10 mg tablet Take 1 tablet (10 mg total) by mouth once daily. 90 tablet 3    fluticasone (FLONASE) 50 mcg/actuation nasal spray USE 1 SPRAY IN EACH NOSTRIL TWICE DAILY AS NEEDED  FOR  RHINITIS 48 g 3    gabapentin (NEURONTIN) 300 MG capsule TAKE 1 CAPSULE THREE TIMES DAILY 270 capsule 5    levothyroxine (SYNTHROID) 50 MCG tablet Take 1 tablet (50 mcg total) by mouth once daily. 90 tablet 3    methocarbamol (ROBAXIN) 750 MG Tab TAKE 1 TABLET (750 MG TOTAL) BY MOUTH 3 (THREE) TIMES DAILY. 270 tablet 0    methylsulfonylmethane (MSM) 1,000 mg Tab Take 1,000 mg by mouth once daily. Every day      milk thistle 200 mg Cap Take 200 mg by mouth 2 (two) times daily. Twice a day      olmesartan (BENICAR) 20 MG tablet TAKE 1 TABLET EVERY DAY 90 tablet 0    omega-3 fatty acids 1,000 mg Cap Twice a day      pantoprazole (PROTONIX) 40 MG tablet Take 1 tablet (40 mg total) by mouth 2 (two) times daily. 180 tablet 3    potassium chloride SA (K-DUR,KLOR-CON) 20 MEQ tablet Take 1 tablet (20 mEq total) by mouth 3 (three) times daily. Take 4 tablets daily for 5 days, then 3 daily for 2 weeks, then 2 daily thereafter. 270 tablet 3    predniSONE (DELTASONE) 1 MG tablet TAKE 2 TABLETS EVERY  tablet 3    predniSONE (DELTASONE) 5 MG tablet TAKE 1 TABLET EVERY DAY 90 tablet 3    sildenafil (REVATIO) 20 mg Tab Take 1 to 3 tabs daily as needed. 30 tablet 3    sucralfate (CARAFATE) 1 gram  tablet Take 1 tablet (1 g total) by mouth 4 (four) times daily before meals and nightly. 360 tablet 1    VENTOLIN HFA 90 mcg/actuation inhaler Inhale 2 puffs into the lungs every 6 (six) hours as needed for Wheezing. Rescue 1 Inhaler 3     No current facility-administered medications for this visit.      Facility-Administered Medications Ordered in Other Visits   Medication Dose Route Frequency Provider Last Rate Last Dose    0.9%  NaCl infusion   Intravenous Continuous Devan Watt MD           Review of patient's allergies indicates:   Allergen Reactions    No known drug allergies        Review of Systems   Constitution: Negative for chills and fever.   Cardiovascular: Negative for claudication.   Skin: Positive for suspicious lesions. Negative for color change and nail changes.   Musculoskeletal: Positive for arthritis and back pain. Negative for joint swelling, muscle cramps and muscle weakness.   Neurological: Negative for numbness and paresthesias.   Psychiatric/Behavioral: Negative for altered mental status.           Objective:      Physical Exam   Constitutional: He is oriented to person, place, and time. He appears well-developed and well-nourished. No distress.   Cardiovascular:   Pulses:       Dorsalis pedis pulses are 2+ on the right side, and 2+ on the left side.        Posterior tibial pulses are 2+ on the right side, and 2+ on the left side.   CFT is < 3 seconds bilateral.  Pedal hair growth is present bilateral.  Varicosities noted bilateral.  Moderate nonpitting lower extremity edema noted bilateral.  Toes are warm to touch bilateral.     Musculoskeletal: He exhibits edema and tenderness.   Muscle strength 5/5 in all muscle groups bilateral.  No tenderness nor crepitation with ROM of foot/ankle joints bilateral.  Pain with palpation to the lesion of the Rt. Sub 5th met head.  Bilateral plantar forefoot fat pad atrophy.     Neurological: He is alert and oriented to person, place, and time. He has  normal strength. No sensory deficit.   Light touch is intact bilateral.     Skin: Skin is warm, dry and intact. Capillary refill takes 2 to 3 seconds. Lesion noted. No abrasion, no bruising, no burn, no ecchymosis, no laceration and no rash noted. He is not diaphoretic. No erythema. No pallor.   Pedal skin has normal turgor, temperature, and texture bilateral.  Toenails x 10 appear normotrophic.  Lesion noted to the Rt. Sub 5th met head.  Pinpoint capillaries are noted throughout the lesion and consists of divergent skin lines. Measures roughly 0.4 x 0.4cm.  No break noted in adjacent skin integrity.                 Assessment:       Encounter Diagnoses   Name Primary?    Foot pain, right Yes    Plantar wart of right foot          Plan:       Maximilian was seen today for [facility].    Diagnoses and all orders for this visit:    Foot pain, right    Plantar wart of right foot      I counseled the patient on his conditions, their implications and medical management.    Lesion of the Rt. Plantar forefoot is consistent with a plantar wart.  Discussed the etiology of said lesion.    Discussed a variety of treatment options to address this issue including applying compound W with an occlusive dressing, surgical excision, cryotherapy, and intradermal injection of candida antigen.  Patient will attempt use of Compound W and an occlusive dressing daily x 3 weeks.  Should this fail to resolve the lesion, he would be interested in the injection.    Given verbal and written information regarding 3/4 length insoles to offload the affected forefoot, as he has minimal fat pad at the site of pain.    RTC should the aforementioned conservative treatment fail to resolve pain symptoms.    Khris Valentin DPM

## 2020-03-27 DIAGNOSIS — I10 HTN (HYPERTENSION), BENIGN: ICD-10-CM

## 2020-03-27 DIAGNOSIS — I49.3 PVC'S (PREMATURE VENTRICULAR CONTRACTIONS): ICD-10-CM

## 2020-03-27 DIAGNOSIS — E78.2 MIXED HYPERLIPIDEMIA: ICD-10-CM

## 2020-03-27 RX ORDER — ATORVASTATIN CALCIUM 80 MG/1
TABLET, FILM COATED ORAL
Qty: 90 TABLET | Refills: 3 | Status: SHIPPED | OUTPATIENT
Start: 2020-03-27 | End: 2021-03-03

## 2020-03-27 RX ORDER — CARVEDILOL 25 MG/1
TABLET ORAL
Qty: 180 TABLET | Refills: 3 | Status: SHIPPED | OUTPATIENT
Start: 2020-03-27 | End: 2021-02-11 | Stop reason: SDUPTHER

## 2020-03-27 RX ORDER — CHLORTHALIDONE 25 MG/1
TABLET ORAL
Qty: 90 TABLET | Refills: 3 | Status: ON HOLD | OUTPATIENT
Start: 2020-03-27 | End: 2020-04-02 | Stop reason: HOSPADM

## 2020-03-28 ENCOUNTER — NURSE TRIAGE (OUTPATIENT)
Dept: ADMINISTRATIVE | Facility: CLINIC | Age: 75
End: 2020-03-28

## 2020-03-28 NOTE — TELEPHONE ENCOUNTER
Error encounter. Pt on Epic board for 2 hrs. Pt's Spouse spoke with Carolyn Gonzalez 40 min ago, but call was never pulled from Epic board.

## 2020-03-28 NOTE — TELEPHONE ENCOUNTER
Patient's wife called regarding his current symptoms of fever and nausea and past medical history of COPD and chronic steroids requesting a COVID test. Informed her of the places around her which she could go to for testing and the only place she was willing to go is [facility] which was already closed. Based on protocol urged her to go to Urgent Care for physician assessment however the one closest to her is currently closed. Mentioned to patient that she can take patient to ER but she wasn't sure if she wanted to go there either because of fear of infection. Explained to patient that she can wait until morning to go to urgent care by her house but to call back immediately if 's symptoms get worse; at that point will need to go to ER.        Reason for Disposition   [1] Fever > 100.0 F (37.8 C) AND [2] diabetes mellitus or weak immune system (e.g., HIV positive, cancer chemo, splenectomy, chronic steroids)    Additional Information   Negative: Shock suspected (e.g., cold/pale/clammy skin, too weak to stand, low BP, rapid pulse)   Negative: Difficult to awaken or acting confused (e.g., disoriented, slurred speech)   Negative: [1] Difficulty breathing AND [2] bluish lips, tongue or face   Negative: New onset rash with multiple purple (or blood-colored) spots or dots   Negative: Sounds like a life-threatening emergency to the triager   Negative: Fever in a cancer patient who is currently (or recently) receiving chemotherapy or radiation therapy, or cancer patient who has metastatic or end-stage cancer and is receiving palliative care   Negative: Pregnant   Negative: Fever onset within 24 hours of receiving vaccine   Negative: [1] Fever AND [2] within 21 days of Ebola EXPOSURE   Negative: Other symptom is present, see that guideline  (e.g., symptoms of cough, runny nose, sore throat, earache, abdominal pain, diarrhea, vomiting)   Negative: [1] Headache AND [2] stiff neck (can't touch chin to  chest)   Negative: Difficulty breathing   Negative: IV drug abuse   Negative: [1] Drinking very little AND [2] dehydration suspected (e.g., no urine > 12 hours, very dry mouth, very lightheaded)   Negative: Patient sounds very sick or weak to the triager  (Exception: mild weakness and hasn't taken fever medicine)   Negative: Fever > 104 F (40 C)   Negative: [1] Fever > 101 F (38.3 C) AND [2] age > 60   Negative: [1] Fever > 100.0 F (37.8 C) AND [2] bedridden (e.g., nursing home patient, CVA, chronic illness, recovering from surgery)   Negative: [1] Fever > 100.0 F (37.8 C) AND [2] indwelling urinary catheter (e.g., Diaz, Coude)   Negative: [1] Fever > 100.0 F (37.8 C) AND [2] has port (portacath), central line, or PICC line   Negative: Transplant patient (e.g., kidney, liver, lung, heart)    Protocols used: FEVER-A-AH

## 2020-03-29 ENCOUNTER — NURSE TRIAGE (OUTPATIENT)
Dept: ADMINISTRATIVE | Facility: CLINIC | Age: 75
End: 2020-03-29

## 2020-03-29 ENCOUNTER — HOSPITAL ENCOUNTER (INPATIENT)
Facility: HOSPITAL | Age: 75
LOS: 4 days | Discharge: HOME | DRG: 194 | End: 2020-04-02
Attending: EMERGENCY MEDICINE | Admitting: INTERNAL MEDICINE
Payer: MEDICARE

## 2020-03-29 DIAGNOSIS — N13.8 BPH WITH OBSTRUCTION/LOWER URINARY TRACT SYMPTOMS: ICD-10-CM

## 2020-03-29 DIAGNOSIS — G56.03 CARPAL TUNNEL SYNDROME ON BOTH SIDES: ICD-10-CM

## 2020-03-29 DIAGNOSIS — M54.16 LUMBAR RADICULITIS: ICD-10-CM

## 2020-03-29 DIAGNOSIS — G70.00 MYASTHENIA GRAVIS, ACHR ANTIBODY POSITIVE: ICD-10-CM

## 2020-03-29 DIAGNOSIS — I65.23 BILATERAL CAROTID ARTERY STENOSIS: ICD-10-CM

## 2020-03-29 DIAGNOSIS — I10 HTN (HYPERTENSION), BENIGN: Primary | ICD-10-CM

## 2020-03-29 DIAGNOSIS — Z98.1 S/P CERVICAL SPINAL FUSION: ICD-10-CM

## 2020-03-29 DIAGNOSIS — I35.9 AORTIC VALVE DISEASE: ICD-10-CM

## 2020-03-29 DIAGNOSIS — Z20.822 SUSPECTED COVID-19 VIRUS INFECTION: ICD-10-CM

## 2020-03-29 DIAGNOSIS — R10.13 EPIGASTRIC PAIN: ICD-10-CM

## 2020-03-29 DIAGNOSIS — R06.02 SOB (SHORTNESS OF BREATH): ICD-10-CM

## 2020-03-29 DIAGNOSIS — H53.2 DIPLOPIA: ICD-10-CM

## 2020-03-29 DIAGNOSIS — M17.0 PRIMARY OSTEOARTHRITIS OF BOTH KNEES: ICD-10-CM

## 2020-03-29 DIAGNOSIS — R93.1 AGATSTON CAC SCORE, <100: ICD-10-CM

## 2020-03-29 DIAGNOSIS — K42.9 UMBILICAL HERNIA WITHOUT OBSTRUCTION AND WITHOUT GANGRENE: ICD-10-CM

## 2020-03-29 DIAGNOSIS — G62.9 NEUROPATHY: ICD-10-CM

## 2020-03-29 DIAGNOSIS — I70.0 ABDOMINAL AORTIC ATHEROSCLEROSIS: ICD-10-CM

## 2020-03-29 DIAGNOSIS — M51.36 DDD (DEGENERATIVE DISC DISEASE), LUMBAR: ICD-10-CM

## 2020-03-29 DIAGNOSIS — I45.10 RBBB: ICD-10-CM

## 2020-03-29 DIAGNOSIS — I47.29 NONSUSTAINED VENTRICULAR TACHYCARDIA: ICD-10-CM

## 2020-03-29 DIAGNOSIS — I49.3 PVC'S (PREMATURE VENTRICULAR CONTRACTIONS): ICD-10-CM

## 2020-03-29 DIAGNOSIS — M48.02 CERVICAL STENOSIS OF SPINAL CANAL: ICD-10-CM

## 2020-03-29 DIAGNOSIS — I77.1 TORTUOUS AORTA: ICD-10-CM

## 2020-03-29 DIAGNOSIS — Z79.52 CURRENT CHRONIC USE OF SYSTEMIC STEROIDS: Chronic | ICD-10-CM

## 2020-03-29 DIAGNOSIS — Z96.1 PSEUDOPHAKIA, RIGHT EYE: ICD-10-CM

## 2020-03-29 DIAGNOSIS — N40.1 BPH WITH OBSTRUCTION/LOWER URINARY TRACT SYMPTOMS: ICD-10-CM

## 2020-03-29 DIAGNOSIS — Z79.52 ON PREDNISONE THERAPY: ICD-10-CM

## 2020-03-29 PROBLEM — J44.9 COPD (CHRONIC OBSTRUCTIVE PULMONARY DISEASE): Status: ACTIVE | Noted: 2020-03-29

## 2020-03-29 PROBLEM — J18.9 PNEUMONIA: Status: ACTIVE | Noted: 2020-03-29

## 2020-03-29 LAB
ALBUMIN SERPL BCP-MCNC: 3.2 G/DL (ref 3.5–5.2)
ALP SERPL-CCNC: 72 U/L (ref 55–135)
ALT SERPL W/O P-5'-P-CCNC: 17 U/L (ref 10–44)
ANION GAP SERPL CALC-SCNC: 11 MMOL/L (ref 8–16)
AST SERPL-CCNC: 14 U/L (ref 10–40)
BACTERIA #/AREA URNS HPF: ABNORMAL /HPF
BASOPHILS # BLD AUTO: 0.02 K/UL (ref 0–0.2)
BASOPHILS # BLD AUTO: 0.03 K/UL (ref 0–0.2)
BASOPHILS NFR BLD: 0.2 % (ref 0–1.9)
BASOPHILS NFR BLD: 0.3 % (ref 0–1.9)
BILIRUB SERPL-MCNC: 1.8 MG/DL (ref 0.1–1)
BILIRUB UR QL STRIP: NEGATIVE
BNP SERPL-MCNC: 274 PG/ML (ref 0–99)
BUN SERPL-MCNC: 32 MG/DL (ref 8–23)
CALCIUM SERPL-MCNC: 8.9 MG/DL (ref 8.7–10.5)
CHLORIDE SERPL-SCNC: 100 MMOL/L (ref 95–110)
CK SERPL-CCNC: 137 U/L (ref 20–200)
CLARITY UR: CLEAR
CO2 SERPL-SCNC: 29 MMOL/L (ref 23–29)
COLOR UR: YELLOW
CREAT SERPL-MCNC: 1.6 MG/DL (ref 0.5–1.4)
CRP SERPL-MCNC: 194.5 MG/L (ref 0–8.2)
DIFFERENTIAL METHOD: ABNORMAL
DIFFERENTIAL METHOD: ABNORMAL
EOSINOPHIL # BLD AUTO: 0 K/UL (ref 0–0.5)
EOSINOPHIL # BLD AUTO: 0 K/UL (ref 0–0.5)
EOSINOPHIL NFR BLD: 0.1 % (ref 0–8)
EOSINOPHIL NFR BLD: 0.1 % (ref 0–8)
ERYTHROCYTE [DISTWIDTH] IN BLOOD BY AUTOMATED COUNT: 13.3 % (ref 11.5–14.5)
ERYTHROCYTE [DISTWIDTH] IN BLOOD BY AUTOMATED COUNT: 13.5 % (ref 11.5–14.5)
ERYTHROCYTE [SEDIMENTATION RATE] IN BLOOD BY WESTERGREN METHOD: 8 MM/HR (ref 0–10)
EST. GFR  (AFRICAN AMERICAN): 48 ML/MIN/1.73 M^2
EST. GFR  (NON AFRICAN AMERICAN): 41 ML/MIN/1.73 M^2
GLUCOSE SERPL-MCNC: 144 MG/DL (ref 70–110)
GLUCOSE UR QL STRIP: NEGATIVE
HCT VFR BLD AUTO: 40.3 % (ref 40–54)
HCT VFR BLD AUTO: 43 % (ref 40–54)
HGB BLD-MCNC: 12.8 G/DL (ref 14–18)
HGB BLD-MCNC: 13.5 G/DL (ref 14–18)
HGB UR QL STRIP: NEGATIVE
IMM GRANULOCYTES # BLD AUTO: 0.05 K/UL (ref 0–0.04)
IMM GRANULOCYTES # BLD AUTO: 0.06 K/UL (ref 0–0.04)
IMM GRANULOCYTES NFR BLD AUTO: 0.4 % (ref 0–0.5)
IMM GRANULOCYTES NFR BLD AUTO: 0.6 % (ref 0–0.5)
INFLUENZA A, MOLECULAR: NEGATIVE
INFLUENZA B, MOLECULAR: NEGATIVE
KETONES UR QL STRIP: NEGATIVE
LACTATE SERPL-SCNC: 1.3 MMOL/L (ref 0.5–2.2)
LDH SERPL L TO P-CCNC: 168 U/L (ref 110–260)
LEUKOCYTE ESTERASE UR QL STRIP: NEGATIVE
LYMPHOCYTES # BLD AUTO: 0.8 K/UL (ref 1–4.8)
LYMPHOCYTES # BLD AUTO: 0.8 K/UL (ref 1–4.8)
LYMPHOCYTES NFR BLD: 7 % (ref 18–48)
LYMPHOCYTES NFR BLD: 7.9 % (ref 18–48)
MAGNESIUM SERPL-MCNC: 1.9 MG/DL (ref 1.6–2.6)
MAGNESIUM SERPL-MCNC: 2 MG/DL (ref 1.6–2.6)
MCH RBC QN AUTO: 29.5 PG (ref 27–31)
MCH RBC QN AUTO: 29.6 PG (ref 27–31)
MCHC RBC AUTO-ENTMCNC: 31.4 G/DL (ref 32–36)
MCHC RBC AUTO-ENTMCNC: 31.8 G/DL (ref 32–36)
MCV RBC AUTO: 93 FL (ref 82–98)
MCV RBC AUTO: 94 FL (ref 82–98)
MICROSCOPIC COMMENT: ABNORMAL
MONOCYTES # BLD AUTO: 0.7 K/UL (ref 0.3–1)
MONOCYTES # BLD AUTO: 1 K/UL (ref 0.3–1)
MONOCYTES NFR BLD: 7.2 % (ref 4–15)
MONOCYTES NFR BLD: 9 % (ref 4–15)
NEUTROPHILS # BLD AUTO: 8 K/UL (ref 1.8–7.7)
NEUTROPHILS # BLD AUTO: 9.5 K/UL (ref 1.8–7.7)
NEUTROPHILS NFR BLD: 83.2 % (ref 38–73)
NEUTROPHILS NFR BLD: 84 % (ref 38–73)
NITRITE UR QL STRIP: POSITIVE
NRBC BLD-RTO: 0 /100 WBC
NRBC BLD-RTO: 0 /100 WBC
PH UR STRIP: 6 [PH] (ref 5–8)
PHOSPHATE SERPL-MCNC: 3.4 MG/DL (ref 2.7–4.5)
PHOSPHATE SERPL-MCNC: 4.5 MG/DL (ref 2.7–4.5)
PLATELET # BLD AUTO: 109 K/UL (ref 150–350)
PLATELET # BLD AUTO: 118 K/UL (ref 150–350)
PMV BLD AUTO: 10.3 FL (ref 9.2–12.9)
PMV BLD AUTO: 10.5 FL (ref 9.2–12.9)
POTASSIUM SERPL-SCNC: 3.7 MMOL/L (ref 3.5–5.1)
PROCALCITONIN SERPL IA-MCNC: 0.38 NG/ML
PROT SERPL-MCNC: 6.3 G/DL (ref 6–8.4)
PROT UR QL STRIP: NEGATIVE
RBC # BLD AUTO: 4.32 M/UL (ref 4.6–6.2)
RBC # BLD AUTO: 4.57 M/UL (ref 4.6–6.2)
RBC #/AREA URNS HPF: 2 /HPF (ref 0–4)
SODIUM SERPL-SCNC: 140 MMOL/L (ref 136–145)
SP GR UR STRIP: >=1.03 (ref 1–1.03)
SPECIMEN SOURCE: NORMAL
SQUAMOUS #/AREA URNS HPF: 3 /HPF
TROPONIN I SERPL DL<=0.01 NG/ML-MCNC: 0.04 NG/ML (ref 0–0.03)
URN SPEC COLLECT METH UR: ABNORMAL
UROBILINOGEN UR STRIP-ACNC: 1 EU/DL
WBC # BLD AUTO: 11.37 K/UL (ref 3.9–12.7)
WBC # BLD AUTO: 9.47 K/UL (ref 3.9–12.7)
WBC #/AREA URNS HPF: 30 /HPF (ref 0–5)

## 2020-03-29 PROCEDURE — 63600175 PHARM REV CODE 636 W HCPCS: Mod: HCNC | Performed by: EMERGENCY MEDICINE

## 2020-03-29 PROCEDURE — 93005 ELECTROCARDIOGRAM TRACING: CPT | Mod: HCNC

## 2020-03-29 PROCEDURE — 99285 EMERGENCY DEPT VISIT HI MDM: CPT | Mod: 25,HCNC

## 2020-03-29 PROCEDURE — 27000221 HC OXYGEN, UP TO 24 HOURS: Mod: HCNC

## 2020-03-29 PROCEDURE — 85651 RBC SED RATE NONAUTOMATED: CPT | Mod: HCNC

## 2020-03-29 PROCEDURE — 94761 N-INVAS EAR/PLS OXIMETRY MLT: CPT | Mod: HCNC

## 2020-03-29 PROCEDURE — 25000003 PHARM REV CODE 250: Mod: HCNC | Performed by: INTERNAL MEDICINE

## 2020-03-29 PROCEDURE — 83880 ASSAY OF NATRIURETIC PEPTIDE: CPT | Mod: HCNC

## 2020-03-29 PROCEDURE — 36415 COLL VENOUS BLD VENIPUNCTURE: CPT | Mod: HCNC

## 2020-03-29 PROCEDURE — 87449 NOS EACH ORGANISM AG IA: CPT | Mod: HCNC

## 2020-03-29 PROCEDURE — 84484 ASSAY OF TROPONIN QUANT: CPT | Mod: HCNC

## 2020-03-29 PROCEDURE — 25000003 PHARM REV CODE 250: Mod: HCNC | Performed by: EMERGENCY MEDICINE

## 2020-03-29 PROCEDURE — 80053 COMPREHEN METABOLIC PANEL: CPT | Mod: HCNC

## 2020-03-29 PROCEDURE — 85025 COMPLETE CBC W/AUTO DIFF WBC: CPT | Mod: HCNC

## 2020-03-29 PROCEDURE — 83605 ASSAY OF LACTIC ACID: CPT | Mod: HCNC

## 2020-03-29 PROCEDURE — 96361 HYDRATE IV INFUSION ADD-ON: CPT | Mod: HCNC

## 2020-03-29 PROCEDURE — 84100 ASSAY OF PHOSPHORUS: CPT | Mod: HCNC

## 2020-03-29 PROCEDURE — 81000 URINALYSIS NONAUTO W/SCOPE: CPT | Mod: HCNC

## 2020-03-29 PROCEDURE — 83615 LACTATE (LD) (LDH) ENZYME: CPT | Mod: HCNC

## 2020-03-29 PROCEDURE — 86140 C-REACTIVE PROTEIN: CPT | Mod: HCNC

## 2020-03-29 PROCEDURE — 12000002 HC ACUTE/MED SURGE SEMI-PRIVATE ROOM: Mod: HCNC

## 2020-03-29 PROCEDURE — 63600175 PHARM REV CODE 636 W HCPCS: Mod: HCNC | Performed by: INTERNAL MEDICINE

## 2020-03-29 PROCEDURE — 83735 ASSAY OF MAGNESIUM: CPT | Mod: 91,HCNC

## 2020-03-29 PROCEDURE — 96374 THER/PROPH/DIAG INJ IV PUSH: CPT | Mod: HCNC

## 2020-03-29 PROCEDURE — 82550 ASSAY OF CK (CPK): CPT | Mod: HCNC

## 2020-03-29 PROCEDURE — 87086 URINE CULTURE/COLONY COUNT: CPT | Mod: HCNC

## 2020-03-29 PROCEDURE — U0002 COVID-19 LAB TEST NON-CDC: HCPCS | Mod: HCNC

## 2020-03-29 PROCEDURE — 87502 INFLUENZA DNA AMP PROBE: CPT | Mod: HCNC

## 2020-03-29 PROCEDURE — 83735 ASSAY OF MAGNESIUM: CPT | Mod: HCNC

## 2020-03-29 PROCEDURE — 84100 ASSAY OF PHOSPHORUS: CPT | Mod: 91,HCNC

## 2020-03-29 PROCEDURE — 84145 PROCALCITONIN (PCT): CPT | Mod: HCNC

## 2020-03-29 RX ORDER — GLUCAGON 1 MG
1 KIT INJECTION
Status: DISCONTINUED | OUTPATIENT
Start: 2020-03-29 | End: 2020-04-02 | Stop reason: HOSPADM

## 2020-03-29 RX ORDER — LEVOTHYROXINE SODIUM 50 UG/1
50 TABLET ORAL DAILY
Status: DISCONTINUED | OUTPATIENT
Start: 2020-03-29 | End: 2020-03-29

## 2020-03-29 RX ORDER — AZITHROMYCIN 250 MG/1
250 TABLET, FILM COATED ORAL DAILY
Status: DISCONTINUED | OUTPATIENT
Start: 2020-03-30 | End: 2020-04-02 | Stop reason: HOSPADM

## 2020-03-29 RX ORDER — IBUPROFEN 200 MG
16 TABLET ORAL
Status: DISCONTINUED | OUTPATIENT
Start: 2020-03-29 | End: 2020-04-02 | Stop reason: HOSPADM

## 2020-03-29 RX ORDER — CARVEDILOL 25 MG/1
25 TABLET ORAL 2 TIMES DAILY WITH MEALS
Status: DISCONTINUED | OUTPATIENT
Start: 2020-03-29 | End: 2020-04-02 | Stop reason: HOSPADM

## 2020-03-29 RX ORDER — METHOCARBAMOL 750 MG/1
750 TABLET, FILM COATED ORAL
Status: DISCONTINUED | OUTPATIENT
Start: 2020-03-29 | End: 2020-04-02 | Stop reason: HOSPADM

## 2020-03-29 RX ORDER — EZETIMIBE 10 MG/1
10 TABLET ORAL DAILY
Status: DISCONTINUED | OUTPATIENT
Start: 2020-03-29 | End: 2020-04-02 | Stop reason: HOSPADM

## 2020-03-29 RX ORDER — ACETAMINOPHEN 325 MG/1
650 TABLET ORAL EVERY 8 HOURS PRN
Status: DISCONTINUED | OUTPATIENT
Start: 2020-03-29 | End: 2020-04-02 | Stop reason: HOSPADM

## 2020-03-29 RX ORDER — CHLORTHALIDONE 25 MG/1
25 TABLET ORAL DAILY
Status: DISCONTINUED | OUTPATIENT
Start: 2020-03-29 | End: 2020-04-02

## 2020-03-29 RX ORDER — ENOXAPARIN SODIUM 100 MG/ML
40 INJECTION SUBCUTANEOUS EVERY 24 HOURS
Status: DISCONTINUED | OUTPATIENT
Start: 2020-03-29 | End: 2020-04-02 | Stop reason: HOSPADM

## 2020-03-29 RX ORDER — ATORVASTATIN CALCIUM 40 MG/1
80 TABLET, FILM COATED ORAL DAILY
Status: DISCONTINUED | OUTPATIENT
Start: 2020-03-29 | End: 2020-04-02 | Stop reason: HOSPADM

## 2020-03-29 RX ORDER — GABAPENTIN 300 MG/1
300 CAPSULE ORAL 3 TIMES DAILY
Status: DISCONTINUED | OUTPATIENT
Start: 2020-03-29 | End: 2020-04-02 | Stop reason: HOSPADM

## 2020-03-29 RX ORDER — IBUPROFEN 200 MG
24 TABLET ORAL
Status: DISCONTINUED | OUTPATIENT
Start: 2020-03-29 | End: 2020-04-02 | Stop reason: HOSPADM

## 2020-03-29 RX ORDER — LEVOTHYROXINE SODIUM 50 UG/1
50 TABLET ORAL
Status: DISCONTINUED | OUTPATIENT
Start: 2020-03-30 | End: 2020-04-02 | Stop reason: HOSPADM

## 2020-03-29 RX ORDER — OLMESARTAN MEDOXOMIL 20 MG/1
20 TABLET ORAL DAILY
Status: DISCONTINUED | OUTPATIENT
Start: 2020-03-29 | End: 2020-04-02 | Stop reason: HOSPADM

## 2020-03-29 RX ORDER — SODIUM CHLORIDE 0.9 % (FLUSH) 0.9 %
10 SYRINGE (ML) INJECTION
Status: DISCONTINUED | OUTPATIENT
Start: 2020-03-29 | End: 2020-04-02 | Stop reason: HOSPADM

## 2020-03-29 RX ORDER — PREDNISONE 1 MG/1
2 TABLET ORAL DAILY
Status: DISCONTINUED | OUTPATIENT
Start: 2020-03-29 | End: 2020-03-29

## 2020-03-29 RX ADMIN — CHLORTHALIDONE 25 MG: 25 TABLET ORAL at 05:03

## 2020-03-29 RX ADMIN — ENOXAPARIN SODIUM 40 MG: 100 INJECTION SUBCUTANEOUS at 08:03

## 2020-03-29 RX ADMIN — EZETIMIBE 10 MG: 10 TABLET ORAL at 05:03

## 2020-03-29 RX ADMIN — CEFTRIAXONE 1 G: 1 INJECTION, SOLUTION INTRAVENOUS at 01:03

## 2020-03-29 RX ADMIN — AZITHROMYCIN MONOHYDRATE 500 MG: 500 INJECTION, POWDER, LYOPHILIZED, FOR SOLUTION INTRAVENOUS at 02:03

## 2020-03-29 RX ADMIN — METHOCARBAMOL 750 MG: 750 TABLET, FILM COATED ORAL at 08:03

## 2020-03-29 RX ADMIN — ATORVASTATIN CALCIUM 80 MG: 40 TABLET, FILM COATED ORAL at 05:03

## 2020-03-29 RX ADMIN — GABAPENTIN 300 MG: 300 CAPSULE ORAL at 05:03

## 2020-03-29 RX ADMIN — ACETAMINOPHEN 650 MG: 325 TABLET ORAL at 08:03

## 2020-03-29 RX ADMIN — ACETAMINOPHEN 650 MG: 325 TABLET ORAL at 11:03

## 2020-03-29 RX ADMIN — PREDNISONE 7 MG: 1 TABLET ORAL at 05:03

## 2020-03-29 RX ADMIN — CARVEDILOL 25 MG: 25 TABLET, FILM COATED ORAL at 05:03

## 2020-03-29 RX ADMIN — OLMESARTAN MEDOXOMIL 20 MG: 20 TABLET, FILM COATED ORAL at 05:03

## 2020-03-29 RX ADMIN — SODIUM CHLORIDE 1000 ML: 0.9 INJECTION, SOLUTION INTRAVENOUS at 11:03

## 2020-03-29 NOTE — NURSING
Pt arrive to unit via wheelchair.  Pt alert and oriented.    Good historian.  Able to answer questions appropriately.  VSS.  Pt on room air.  Pt c/o SOB with exertion but that is his baseline.  Pt reports fever, nausea, and decreased appetite since Friday. Pt reports nausea has improved. No cough. No wheeze. Denies any recent travel.  Plan of care reviewed with patient.

## 2020-03-29 NOTE — ASSESSMENT & PLAN NOTE
Covid-19 Virus Infection  - Infection Control notified    - Isolation:   - Airborne and Droplet Precautions  - N95 masks must be fit tested, wear eye protection  - 20 second hand hygiene   - Limit visitors per hospital policy   - Consolidating lab draws, nursing care, and interventions    - Diagnostics: (rising CRP, persistent lymphopenia, hyponatremia, hyperferritinemia, elevated troponin, elevated d-dimer, age, and comorbidities are significant predictors of poor clinical outcome)   - CBC:   trend Q48hrs  - CMP:        trend Q48hrs  - Procalcitonin:  - D-dimer:  trend Q48hrs  - Ferritin:  repeat prior to discharge  - CRP:        trend Q48hrs  - LDH:  - BNP:  - Troponin:    - ECG:   - rapid Flu:   - RIP only if BMT/solid transplant:   - Legionella antigen:   - Blood culture x2:   - Sputum culture:   - CXR:   - UA and culture:      - Management:   - Bundle care as able to minimize in/out of room   - Supplemental O2 to maintain SpO2 >92%,   if requiring 6L NC or higher, place on nonrebreather and discuss case with MICU   - Telemetry & continuous Pulse Ox   - albuterol INHALER PRN 4puff Q6hr approximates a nebulizer (avoid nebulization of secretions)   - apap PRN fever   - Avoiding NIPPV to prevent aerosolization   (including home CPAP/BiPAP unless on a case-by-case basis and only in negative pressure room)   - Cautious use of NSAIDS for fever per WHO recommendations (3/16/2020)   - No new ACEi/ARB start or discontinuation of chronic med unless hypotensive (Esler et al. Journal of Hypertension 2020, 38:000-000)   - Careful use of steroids in the absence of other indications   - unless septic shock due to increased viral replication   - Fluid sparing resuscitation   - Empiric antibiotics per likely source & patient allergies    - CAP: x 5 day course  Ceftriaxone 1g IV Q24hrs            Azithromycin 500mg IV day #1, then 250mg PO daily x4 days                 If MRSA risk factors, add Vancomycin IV (PharmD  consult)   - If patient meets criteria per Hospital Protocol    - start statin (if CPK WNL)    - start HCQ 400mg PO BID x1 day, then 400mg PO daily x 4 days (check G6PD, ECG, and start Qshift POCT glucose)    Goals of care, counseling/discussion  - Reviewed the typical clinical course of COVID19  (patient name or relationship to patient), including the potential for acute decompensation requiring intubation and mechanical ventilation  - Discussed again as part of routine daily evaluation, patient/POA maintains code status of full code    VTE High Risk Prophylaxis: enoxaparin 40mg sq QHS @ 2100 (bundled care) if GFR >30    Patient's chronic/stable medical conditions noted in the problem list above will be managed with the patient's home medications as tolerated.

## 2020-03-29 NOTE — HPI
Patient is 74-year-old man with a past medical history of COPD, myasthenia gravis, hypertension, hypothyroidism hyperlipidemia cataract left eye carpal tunnel syndrome arthritis obesity polyneuropathy presented to the emergency room due to fever with the recorded temperature over 103.  Patient states that he has been having 3 days of fever with generalized malaise and fatigue and was the concern about possible COVID.   in addition he has had associated dysuria and suprapubic abdominal pain.  He denies any cough or new shortness of breath from his baseline COPD symptoms.  Patient states that he had a history of pneumonia 2 years ago during which he had a prolonged hospital stay.   He last took Tylenol this morning for his symptoms.  He endorses decreased activity and oral intake.  Denies any sick contacts.  Denies any recent travel

## 2020-03-29 NOTE — SUBJECTIVE & OBJECTIVE
Past Medical History:   Diagnosis Date    Arthritis     Back pain     Carpal tunnel syndrome 2/25/2013    Cataract     Cataract, left eye 11/10/2014    Chest pain, musculoskeletal     COPD (chronic obstructive pulmonary disease) 11/052018    Emphysema lung 11/05/2018    Gastritis     Hx of colonic polyp     Hyperlipidemia     Hypertension     Hypothyroidism     Knee fracture     Myasthenia gravis     Neuropathy 1/3/2013    Obesity 1/29/2015    Polyneuropathy     PVC (premature ventricular contraction)     Squamous cell carcinoma 2014    left forearm    Thyroid disease        Past Surgical History:   Procedure Laterality Date    APPENDECTOMY      CATARACT EXTRACTION W/  INTRAOCULAR LENS IMPLANT Bilateral     COLONOSCOPY  03/01/2012    Dr Esteban; hyperplastic polyp; repeat in 5 years    CYSTOSCOPY N/A 2/26/2019    Procedure: CYSTOSCOPY;  Surgeon: Simba Cheung MD;  Location: [facility];  Service: Urology;  Laterality: N/A;    ENDOSCOPIC ULTRASOUND OF UPPER GASTROINTESTINAL TRACT N/A 1/7/2020    Procedure: ULTRASOUND, UPPER GI TRACT, ENDOSCOPIC;  Surgeon: Kati Ryan MD;  [facility]  Service: Endoscopy;  Laterality: N/A;    ESOPHAGOGASTRODUODENOSCOPY N/A 9/12/2018    Procedure: EGD (ESOPHAGOGASTRODUODENOSCOPY);  Surgeon: Gabriel Hernandez MD;  Location: [facility];  Service: Endoscopy;  Laterality: N/A;    ESOPHAGOGASTRODUODENOSCOPY N/A 8/19/2019    Procedure: EGD (ESOPHAGOGASTRODUODENOSCOPY);  Surgeon: Gabriel Hernandez MD;  Location: [facility];  Service: Endoscopy;  Laterality: N/A;    ESOPHAGOGASTRODUODENOSCOPY N/A 10/7/2019    Procedure: EGD (ESOPHAGOGASTRODUODENOSCOPY);  Surgeon: Gabriel Hernandez MD;  Location: [facility];  Service: Endoscopy;  Laterality: N/A;    ESOPHAGOGASTRODUODENOSCOPY N/A 1/7/2020    Procedure: EGD (ESOPHAGOGASTRODUODENOSCOPY);  Surgeon: Kati Ryan MD;  Location: [facility] ([address] [place] [address]);  Service: Endoscopy;  Laterality: N/A;     HEMORRHOID SURGERY      KNEE ARTHROPLASTY Bilateral     NECK SURGERY      POSTERIOR FUSION OF CERVICAL SPINE WITH LAMINECTOMY N/A 11/7/2018    Procedure: C2-C6 Posterior Cervical Laminectomy & Instrumental Fusion;  Surgeon: Kishore Turner MD;  Location: [address];  Service: Neurosurgery;  Laterality: N/A;    TONSILLECTOMY      TRANSFORAMINAL EPIDURAL INJECTION OF STEROID Right 12/20/2019    Procedure: Injection,steroid,epidural,transforaminal approach;  Surgeon: Devan Watt MD;  Location: [facility];  Service: Pain Management;  Laterality: Right;  L3-4, L4-5    TRANSFORAMINAL EPIDURAL INJECTION OF STEROID Bilateral 2/6/2020    Procedure: Injection,steroid,epidural,transforaminal approach;  Surgeon: Devan Watt MD;  Location: [facility];  Service: Pain Management;  Laterality: Bilateral;  L3-L4,L4-L5    TRANSRECTAL ULTRASOUND EXAMINATION N/A 2/26/2019    Procedure: ULTRASOUND, RECTAL APPROACH;  Surgeon: Simba Cheung MD;  Location: [facility];  Service: Urology;  Laterality: N/A;    UPPER GASTROINTESTINAL ENDOSCOPY  09/12/2018    Dr Hernandez; gastritis; extensive intestinal metaplasia; repeat in 1-2- years       Review of patient's allergies indicates:   Allergen Reactions    No known drug allergies        Current Facility-Administered Medications on File Prior to Encounter   Medication    0.9%  NaCl infusion     Current Outpatient Medications on File Prior to Encounter   Medication Sig    atorvastatin (LIPITOR) 80 MG tablet TAKE 1 TABLET EVERY DAY    carvediloL (COREG) 25 MG tablet TAKE 1 TABLET TWICE DAILY WITH MEALS    cetirizine (ZYRTEC) 10 MG tablet Take 1 tablet by mouth daily as needed.    chlorthalidone (HYGROTEN) 25 MG Tab TAKE 1 TABLET EVERY DAY    cholecalciferol, vitamin D3, (VITAMIN D) 2,000 unit Cap 1 capsule once daily. Every day    coenzyme Q10 (CO Q-10) 100 mg capsule Take 400 mg by mouth once daily.     cyanocobalamin, vitamin B-12, (VITAMIN B-12) 5,000 mcg Subl Take 5,000 mcg  by mouth once daily. Every day    ezetimibe (ZETIA) 10 mg tablet Take 1 tablet (10 mg total) by mouth once daily.    fluticasone (FLONASE) 50 mcg/actuation nasal spray USE 1 SPRAY IN EACH NOSTRIL TWICE DAILY AS NEEDED  FOR  RHINITIS    gabapentin (NEURONTIN) 300 MG capsule TAKE 1 CAPSULE THREE TIMES DAILY    levothyroxine (SYNTHROID) 50 MCG tablet Take 1 tablet (50 mcg total) by mouth once daily.    methocarbamol (ROBAXIN) 750 MG Tab TAKE 1 TABLET (750 MG TOTAL) BY MOUTH 3 (THREE) TIMES DAILY.    methylsulfonylmethane (MSM) 1,000 mg Tab Take 1,000 mg by mouth once daily. Every day    milk thistle 200 mg Cap Take 200 mg by mouth 2 (two) times daily. Twice a day    olmesartan (BENICAR) 20 MG tablet TAKE 1 TABLET EVERY DAY    omega-3 fatty acids 1,000 mg Cap Twice a day    pantoprazole (PROTONIX) 40 MG tablet Take 1 tablet (40 mg total) by mouth 2 (two) times daily.    potassium chloride SA (K-DUR,KLOR-CON) 20 MEQ tablet Take 1 tablet (20 mEq total) by mouth 3 (three) times daily. Take 4 tablets daily for 5 days, then 3 daily for 2 weeks, then 2 daily thereafter.    predniSONE (DELTASONE) 1 MG tablet TAKE 2 TABLETS EVERY DAY    predniSONE (DELTASONE) 5 MG tablet TAKE 1 TABLET EVERY DAY    sildenafil (REVATIO) 20 mg Tab Take 1 to 3 tabs daily as needed.    sucralfate (CARAFATE) 1 gram tablet Take 1 tablet (1 g total) by mouth 4 (four) times daily before meals and nightly.    VENTOLIN HFA 90 mcg/actuation inhaler Inhale 2 puffs into the lungs every 6 (six) hours as needed for Wheezing. Rescue    [DISCONTINUED] esomeprazole (NEXIUM) 40 MG capsule Take 1 capsule (40 mg total) by mouth before breakfast.     Family History     Problem Relation (Age of Onset)    Cataracts Mother, Father    Glaucoma Father    Heart disease Mother, Father    Hyperlipidemia Mother, Sister    Hypertension Mother, Sister    No Known Problems Daughter, Daughter        Tobacco Use    Smoking status: Never Smoker    Smokeless  tobacco: Never Used    Tobacco comment: when a child   Substance and Sexual Activity    Alcohol use: Yes     Frequency: Never     Binge frequency: Never     Comment: rarely    Drug use: No    Sexual activity: Yes     Partners: Female     Review of Systems   Constitutional: Positive for fatigue and fever. Negative for appetite change and chills.   HENT: Negative for congestion, hearing loss, rhinorrhea, sore throat, trouble swallowing and voice change.    Eyes: Negative for visual disturbance.   Respiratory: Positive for cough and shortness of breath. Negative for chest tightness and wheezing.    Cardiovascular: Negative for chest pain, palpitations and leg swelling.   Gastrointestinal: Negative for abdominal pain, blood in stool, diarrhea, nausea and vomiting.   Genitourinary: Negative for difficulty urinating, frequency, hematuria and urgency.   Musculoskeletal: Positive for arthralgias, back pain and myalgias. Negative for joint swelling and neck stiffness.   Skin: Negative for pallor and rash.   Neurological: Negative for tremors, seizures, syncope, speech difficulty, weakness, numbness and headaches.   Hematological: Negative for adenopathy.   Psychiatric/Behavioral: Negative for agitation, behavioral problems, confusion and sleep disturbance.     Objective:     Vital Signs (Most Recent):  Temp: 98.2 °F (36.8 °C) (03/29/20 1504)  Pulse: 65 (03/29/20 1504)  Resp: 18 (03/29/20 1504)  BP: (!) 117/58 (03/29/20 1504)  SpO2: 97 % (03/29/20 1504) Vital Signs (24h Range):  Temp:  [97.9 °F (36.6 °C)-98.2 °F (36.8 °C)] 98.2 °F (36.8 °C)  Pulse:  [65-76] 65  Resp:  [18-20] 18  SpO2:  [92 %-97 %] 97 %  BP: ()/(55-58) 117/58     Weight: 108.4 kg (239 lb)  Body mass index is 31.53 kg/m².    Physical Exam   Nursing note and vitals reviewed.  PATIENT WAS SEEN AS A VIDEO VISIT WITH NURSE IN PATIENT ROOM WITH PATIENT TO ASSIST DURING THE VISIT. PHYSICAL EXAM FINDINGS ARE AS VIEWED BY MYSELF VIA VIDEO OR AS REPORTED BY  NURSE IF SPECIFIED AS SUCH, EXAM NOT DONE PERSONALLY BY MYSELF AT BEDSIDE.          Significant Labs:   BMP:   Recent Labs   Lab 03/29/20  1152   *      K 3.7      CO2 29   BUN 32*   CREATININE 1.6*   CALCIUM 8.9   MG 1.9     CBC:   Recent Labs   Lab 03/29/20  1152 03/29/20  1546   WBC 11.37 9.47   HGB 13.5* 12.8*   HCT 43.0 40.3   * 109*       Significant Imaging: I have reviewed all pertinent imaging results/findings within the past 24 hours.

## 2020-03-29 NOTE — ED PROVIDER NOTES
Encounter Date: 3/29/2020       History     Chief Complaint   Patient presents with    Shortness of Breath    Fever     HPI   Patient is 74-year-old man with a past medical history of COPD, myasthenia gravis, hypertension who presents emergency department for evaluation of 3 days of fever with generalized malaise and fatigue.  He has had associated dysuria and suprapubic abdominal pain.  He denies any cough or new shortness of breath from his baseline COPD symptoms.  He last took Tylenol this morning for his symptoms.  He endorses decreased activity and oral intake.  Denies any sick contacts.  Review of patient's allergies indicates:   Allergen Reactions    No known drug allergies      Past Medical History:   Diagnosis Date    Arthritis     Back pain     Carpal tunnel syndrome 2/25/2013    Cataract     Cataract, left eye 11/10/2014    Chest pain, musculoskeletal     COPD (chronic obstructive pulmonary disease) 11/052018    Emphysema lung 11/05/2018    Gastritis     Hx of colonic polyp     Hyperlipidemia     Hypertension     Hypothyroidism     Knee fracture     Myasthenia gravis     Neuropathy 1/3/2013    Obesity 1/29/2015    Pneumonia 3/29/2020    Polyneuropathy     PVC (premature ventricular contraction)     Squamous cell carcinoma 2014    left forearm    Thyroid disease      Past Surgical History:   Procedure Laterality Date    APPENDECTOMY      CATARACT EXTRACTION W/  INTRAOCULAR LENS IMPLANT Bilateral     COLONOSCOPY  03/01/2012    Dr Esteban; hyperplastic polyp; repeat in 5 years    CYSTOSCOPY N/A 2/26/2019    Procedure: CYSTOSCOPY;  Surgeon: Simba Cheung MD;  Location: [facility];  Service: Urology;  Laterality: N/A;    ENDOSCOPIC ULTRASOUND OF UPPER GASTROINTESTINAL TRACT N/A 1/7/2020    Procedure: ULTRASOUND, UPPER GI TRACT, ENDOSCOPIC;  Surgeon: Kati Ryan MD;  Location: [address]);  Service: Endoscopy;  Laterality: N/A;    ESOPHAGOGASTRODUODENOSCOPY N/A  9/12/2018    Procedure: EGD (ESOPHAGOGASTRODUODENOSCOPY);  Surgeon: Gabriel Hernandez MD;  Location: [facility] ENDO;  Service: Endoscopy;  Laterality: N/A;    ESOPHAGOGASTRODUODENOSCOPY N/A 8/19/2019    Procedure: EGD (ESOPHAGOGASTRODUODENOSCOPY);  Surgeon: Gabriel Hernandez MD;  Location: [facility] ENDO;  Service: Endoscopy;  Laterality: N/A;    ESOPHAGOGASTRODUODENOSCOPY N/A 10/7/2019    Procedure: EGD (ESOPHAGOGASTRODUODENOSCOPY);  Surgeon: Gabriel Hernandez MD;  Location: [facility] ENDO;  Service: Endoscopy;  Laterality: N/A;    ESOPHAGOGASTRODUODENOSCOPY N/A 1/7/2020    Procedure: EGD (ESOPHAGOGASTRODUODENOSCOPY);  Surgeon: Kati Ryan MD;  [facility]  Service: Endoscopy;  Laterality: N/A;    HEMORRHOID SURGERY      KNEE ARTHROPLASTY Bilateral     NECK SURGERY      POSTERIOR FUSION OF CERVICAL SPINE WITH LAMINECTOMY N/A 11/7/2018    Procedure: C2-C6 Posterior Cervical Laminectomy & Instrumental Fusion;  Surgeon: Kishore Turner MD;  Location: [address];  Service: Neurosurgery;  Laterality: N/A;    TONSILLECTOMY      TRANSFORAMINAL EPIDURAL INJECTION OF STEROID Right 12/20/2019    Procedure: Injection,steroid,epidural,transforaminal approach;  Surgeon: Devan Watt MD;  Location: [facility];  Service: Pain Management;  Laterality: Right;  L3-4, L4-5    TRANSFORAMINAL EPIDURAL INJECTION OF STEROID Bilateral 2/6/2020    Procedure: Injection,steroid,epidural,transforaminal approach;  Surgeon: Devan Watt MD;  Location: [facility];  Service: Pain Management;  Laterality: Bilateral;  L3-L4,L4-L5    TRANSRECTAL ULTRASOUND EXAMINATION N/A 2/26/2019    Procedure: ULTRASOUND, RECTAL APPROACH;  Surgeon: Simba Cheung MD;  Location: [facility];  Service: Urology;  Laterality: N/A;    UPPER GASTROINTESTINAL ENDOSCOPY  09/12/2018    Dr Hernandez; gastritis; extensive intestinal metaplasia; repeat in 1-2- years     Family History   Problem Relation Age of Onset    Cataracts Mother     Heart disease  Mother         CHF    Hypertension Mother     Hyperlipidemia Mother     Cataracts Father     Glaucoma Father     Heart disease Father     Hyperlipidemia Sister     Hypertension Sister     No Known Problems Daughter     No Known Problems Daughter     Collagen disease Neg Hx     Amblyopia Neg Hx     Blindness Neg Hx     Macular degeneration Neg Hx     Retinal detachment Neg Hx     Strabismus Neg Hx     Cancer Neg Hx     Colon cancer Neg Hx     Esophageal cancer Neg Hx     Stomach cancer Neg Hx     Crohn's disease Neg Hx     Ulcerative colitis Neg Hx      Social History     Tobacco Use    Smoking status: Never Smoker    Smokeless tobacco: Never Used    Tobacco comment: when a child   Substance Use Topics    Alcohol use: Yes     Frequency: Never     Binge frequency: Never     Comment: rarely    Drug use: No     Review of Systems   Constitutional: Positive for activity change, appetite change, fatigue and fever.   HENT: Negative for sore throat.    Respiratory: Negative for shortness of breath.    Cardiovascular: Negative for chest pain.   Gastrointestinal: Positive for abdominal pain (suprapubic). Negative for nausea.   Genitourinary: Positive for dysuria.   Musculoskeletal: Negative for back pain.   Skin: Negative for rash.   Neurological: Positive for headaches. Negative for weakness.   Hematological: Does not bruise/bleed easily.       Physical Exam     Initial Vitals [03/29/20 1030]   BP Pulse Resp Temp SpO2   (!) 93/55 76 20 97.9 °F (36.6 °C) (!) 92 %      MAP       --         Physical Exam    Nursing note and vitals reviewed.  Constitutional: He appears well-developed and well-nourished.  Non-toxic appearance. No distress.   HENT:   Head: Normocephalic and atraumatic.   Eyes: EOM are normal. Pupils are equal, round, and reactive to light.   Neck: Normal range of motion. Neck supple. No neck rigidity. No JVD present.   Cardiovascular: Normal rate, regular rhythm, normal heart sounds and  intact distal pulses. Exam reveals no gallop and no friction rub.    No murmur heard.  Pulmonary/Chest: Breath sounds normal. He has no wheezes. He has no rhonchi. He has no rales.   Abdominal: Soft. Bowel sounds are normal. He exhibits no distension. There is tenderness (suprapubic). There is no rebound and no guarding.   Musculoskeletal: Normal range of motion.   Neurological: He is alert and oriented to person, place, and time. He has normal strength and normal reflexes. No cranial nerve deficit or sensory deficit. He exhibits normal muscle tone. Coordination normal. GCS eye subscore is 4. GCS verbal subscore is 5. GCS motor subscore is 6.   Skin: Skin is warm and dry.   Psychiatric: He has a normal mood and affect. His speech is normal and behavior is normal. He is not actively hallucinating.         ED Course   Procedures  Labs Reviewed   CBC W/ AUTO DIFFERENTIAL - Abnormal; Notable for the following components:       Result Value    RBC 4.57 (*)     Hemoglobin 13.5 (*)     Mean Corpuscular Hemoglobin Conc 31.4 (*)     Platelets 118 (*)     Gran # (ANC) 9.5 (*)     Immature Grans (Abs) 0.05 (*)     Lymph # 0.8 (*)     Gran% 83.2 (*)     Lymph% 7.0 (*)     All other components within normal limits   COMPREHENSIVE METABOLIC PANEL - Abnormal; Notable for the following components:    Glucose 144 (*)     BUN, Bld 32 (*)     Creatinine 1.6 (*)     Albumin 3.2 (*)     Total Bilirubin 1.8 (*)     eGFR if  48 (*)     eGFR if non  41 (*)     All other components within normal limits   C-REACTIVE PROTEIN - Abnormal; Notable for the following components:    .5 (*)     All other components within normal limits   TROPONIN I - Abnormal; Notable for the following components:    Troponin I 0.044 (*)     All other components within normal limits   B-TYPE NATRIURETIC PEPTIDE - Abnormal; Notable for the following components:     (*)     All other components within normal limits    INFLUENZA A & B BY MOLECULAR   LACTATE DEHYDROGENASE   MAGNESIUM   PHOSPHORUS   SARS-COV-2 (COVID-19) QUALITATIVE PCR        ECG Results          EKG 12-lead (In process)  Result time 03/30/20 08:47:57    In process by Interface, Lab In [facility] (03/30/20 08:47:57)                 Narrative:    Test Reason : R06.02,    Vent. Rate : 070 BPM     Atrial Rate : 070 BPM     P-R Int : 156 ms          QRS Dur : 118 ms      QT Int : 422 ms       P-R-T Axes : 042 -40 -13 degrees     QTc Int : 455 ms    Normal sinus rhythm  Left axis deviation  Right bundle branch block  Abnormal ECG  When compared with ECG of 19-DEC-2019 12:54,  Previous ECG has undetermined rhythm, needs review  Inverted T waves have replaced nonspecific T wave abnormality in Inferior  leads  T wave inversion now evident in Anterior leads    Referred By: [email or web address]   SELF           Confirmed By:                             Imaging Results          X-Ray Chest AP Portable (Final result)  Result time 03/29/20 11:20:19    Final result by Judy Aden MD (03/29/20 11:20:19)                 Impression:      Mildly prominent markings right lung base compared to the prior exam suggesting mild infiltrate.  Mild atelectatic like stranding left lung base      Electronically signed by: Judy Aden MD  Date:    03/29/2020  Time:    11:20             Narrative:    EXAMINATION:  XR CHEST AP PORTABLE    CLINICAL HISTORY:  Shortness of breath    TECHNIQUE:  Single frontal view of the chest was performed.    COMPARISON:  05/14/2018    FINDINGS:  The cardiomediastinal silhouette is stable.  There is very slight atelectatic stranding left lung base.  There are mildly prominent markings in the right lung base suggesting mild right basilar infiltrate and peribronchial thickening.  No pleural effusion.                                 Medical Decision Making:   Initial Assessment:   Patient is 74-year-old man with a past medical history of COPD, myasthenia gravis,  hypertension who presents emergency department for evaluation of 3 days of fever with generalized malaise and fatigue.  Suspect COVID-19 infection.  Chest x-ray shows mild infiltrate in the right lung base with mild atelectatic like stranding in the left lung base.  Troponin is mildly elevated 0.044.  BNP is mildly elevated 274.  CRP is 194.5.  No evidence of leukopenia or elevated liver enzymes.  BUN and creatinine is elevated 32 and 1.6.  Patient is started on IV antibiotics as he has an elevated pneumonia severity index.  Discussed with Hospital Medicine who agrees to admit the patient for further treatment.                                 Clinical Impression:       ICD-10-CM ICD-9-CM   1. SOB (shortness of breath) R06.02 786.05             ED Disposition Condition    Admit                           Ru Brooke MD  03/30/20 [id number]

## 2020-03-29 NOTE — TELEPHONE ENCOUNTER
Patient's wife called us at the [facility] requesting Covid testing. Patient is a 75 y/o man with Myasthenia Gravis and COPD who has been having fevers since yesterday morning, with temperatures up to 102-103. He has been taking tylenol q4 since yesterday and still running temperatures. He also had nausea yesterday, which responded to ondansetron, but he has not eaten since Friday night with every little fluid intake. He denies cough of SOB. Without cough or SOB, he does not qualify for testing at this time. However, she reports that currently he is lethargic, clammy, and still febrile so our protocol recommends that he presents to the ED. Patient's wife plans on taking him to [facility] ED for treatment and further assessment. We have called them to let them know.     Reason for Disposition   Fever > 101 F (38.3 C) and over 60 years of age    Protocols used: FEVER-A-[place]

## 2020-03-29 NOTE — TELEPHONE ENCOUNTER
Reason for Disposition   General information question, no triage required and triager able to answer question    Protocols used: INFORMATION ONLY CALL-A-AH    Pt's Spouse called COVID 19 line and stated she wants Pt to be tested for COVID 19. Stated he had fever since yesterday. Stated temp was 103. Denies cough, but stated Pt had shallow breathing. Advised to take to ED for sob/difficulty breathing. Spouse stated he is breathing okay. Advised to take to [facility] location for screening by a Physician. Advised MD will assess and determine if Pt needs testing. Spouse verbalized understanding.

## 2020-03-29 NOTE — PLAN OF CARE
VSS.  Afebrile.  Tolerating regular diet.  Urine sent to lab. No respiratory distress noted. No N/V. Sats 92% and greater on 2 L NC.  Airborne precautions maintained. Plan of care reviewed with patient.

## 2020-03-30 PROCEDURE — 63600175 PHARM REV CODE 636 W HCPCS: Mod: HCNC | Performed by: INTERNAL MEDICINE

## 2020-03-30 PROCEDURE — 27000221 HC OXYGEN, UP TO 24 HOURS: Mod: HCNC

## 2020-03-30 PROCEDURE — 12000002 HC ACUTE/MED SURGE SEMI-PRIVATE ROOM: Mod: HCNC

## 2020-03-30 PROCEDURE — 25000003 PHARM REV CODE 250: Mod: HCNC | Performed by: INTERNAL MEDICINE

## 2020-03-30 PROCEDURE — 94761 N-INVAS EAR/PLS OXIMETRY MLT: CPT | Mod: HCNC

## 2020-03-30 RX ADMIN — GABAPENTIN 300 MG: 300 CAPSULE ORAL at 09:03

## 2020-03-30 RX ADMIN — CARVEDILOL 25 MG: 25 TABLET, FILM COATED ORAL at 04:03

## 2020-03-30 RX ADMIN — ATORVASTATIN CALCIUM 80 MG: 40 TABLET, FILM COATED ORAL at 08:03

## 2020-03-30 RX ADMIN — CHLORTHALIDONE 25 MG: 25 TABLET ORAL at 08:03

## 2020-03-30 RX ADMIN — METHOCARBAMOL 750 MG: 750 TABLET, FILM COATED ORAL at 09:03

## 2020-03-30 RX ADMIN — METHOCARBAMOL 750 MG: 750 TABLET, FILM COATED ORAL at 05:03

## 2020-03-30 RX ADMIN — CARVEDILOL 25 MG: 25 TABLET, FILM COATED ORAL at 08:03

## 2020-03-30 RX ADMIN — PREDNISONE 7 MG: 1 TABLET ORAL at 08:03

## 2020-03-30 RX ADMIN — AZITHROMYCIN 250 MG: 250 TABLET, FILM COATED ORAL at 08:03

## 2020-03-30 RX ADMIN — EZETIMIBE 10 MG: 10 TABLET ORAL at 08:03

## 2020-03-30 RX ADMIN — OLMESARTAN MEDOXOMIL 20 MG: 20 TABLET, FILM COATED ORAL at 08:03

## 2020-03-30 RX ADMIN — GABAPENTIN 300 MG: 300 CAPSULE ORAL at 04:03

## 2020-03-30 RX ADMIN — CEFTRIAXONE 1 G: 1 INJECTION, SOLUTION INTRAVENOUS at 08:03

## 2020-03-30 RX ADMIN — LEVOTHYROXINE SODIUM 50 MCG: 50 TABLET ORAL at 05:03

## 2020-03-30 RX ADMIN — GABAPENTIN 300 MG: 300 CAPSULE ORAL at 08:03

## 2020-03-30 RX ADMIN — METHOCARBAMOL 750 MG: 750 TABLET, FILM COATED ORAL at 02:03

## 2020-03-30 RX ADMIN — ENOXAPARIN SODIUM 40 MG: 100 INJECTION SUBCUTANEOUS at 09:03

## 2020-03-30 NOTE — ASSESSMENT & PLAN NOTE
Covid-19 Virus Infection  - Infection Control notified  -Results pending    - Isolation:   - Airborne and Droplet Precautions  - N95 masks must be fit tested, wear eye protection  - 20 second hand hygiene   - Limit visitors per hospital policy   - Consolidating lab draws, nursing care, and interventions    - Diagnostics: (rising CRP, persistent lymphopenia, hyponatremia, hyperferritinemia, elevated troponin, elevated d-dimer, age, and comorbidities are significant predictors of poor clinical outcome)   - CBC:   trend Q48hrs  - CMP:        trend Q48hrs  - Procalcitonin:  - D-dimer:  trend Q48hrs  - Ferritin:  repeat prior to discharge  - CRP:        trend Q48hrs  - LDH:  - BNP:  - Troponin:    - ECG:   - rapid Flu:   - RIP only if BMT/solid transplant:   - Legionella antigen:   - Blood culture x2:   - Sputum culture:   - CXR:   - UA and culture:      - Management:   - Bundle care as able to minimize in/out of room   - Supplemental O2 to maintain SpO2 >92%,   if requiring 6L NC or higher, place on nonrebreather and discuss case with MICU   - Telemetry & continuous Pulse Ox   - albuterol INHALER PRN 4puff Q6hr approximates a nebulizer (avoid nebulization of secretions)   - apap PRN fever   - Avoiding NIPPV to prevent aerosolization   (including home CPAP/BiPAP unless on a case-by-case basis and only in negative pressure room)   - Cautious use of NSAIDS for fever per WHO recommendations (3/16/2020)   - No new ACEi/ARB start or discontinuation of chronic med unless hypotensive (Esler et al. Journal of Hypertension 2020, 38:000-000)   - Careful use of steroids in the absence of other indications   - unless septic shock due to increased viral replication   - Fluid sparing resuscitation   - Empiric antibiotics per likely source & patient allergies    - CAP: x 5 day course  Ceftriaxone 1g IV Q24hrs            Azithromycin 500mg IV day #1, then 250mg PO daily x4 days                 If MRSA risk factors, add Vancomycin IV  (PharmD consult)   - If patient meets criteria per Hospital Protocol    - start statin (if CPK WNL)    - start HCQ 400mg PO BID x1 day, then 400mg PO daily x 4 days (check G6PD, ECG, and start Qshift POCT glucose)    Goals of care, counseling/discussion  - Reviewed the typical clinical course of COVID19  (patient name or relationship to patient), including the potential for acute decompensation requiring intubation and mechanical ventilation  - Discussed again as part of routine daily evaluation, patient/POA maintains code status of full code    VTE High Risk Prophylaxis: enoxaparin 40mg sq QHS @ 2100 (bundled care) if GFR >30    Patient's chronic/stable medical conditions noted in the problem list above will be managed with the patient's home medications as tolerated.

## 2020-03-30 NOTE — PLAN OF CARE
SW spoke with patient via telephone to complete discharge planning assessment.  Patient alert and oriented xs 4.  Patient verified all demographic information on facesheet is correct.  Patient verified PCP is Dr. Brian Marroquin.  Patient verified primary health insurance is [employer].  Patient with NO home health or DME.  Patient with NO POA or Living Will.  Patient not on dialysis or medication coumadin.  Patient with no 30 day admission.  Patient with no financial issues at this time.  Patient family will provide transportation upon discharge from facility.  Patient independent with ADLs, live with spouse (Radha Tavera), drives self.  Patient uses [id number] - [place] - [address] [place] [facility] [place] [address]        03/30/20 1358   Discharge Assessment   Assessment Type Discharge Planning Assessment   Confirmed/corrected address and phone number on facesheet? Yes   Assessment information obtained from? Patient   Prior to hospitilization cognitive status: Alert/Oriented   Prior to hospitalization functional status: Independent   Current cognitive status: Alert/Oriented   Current Functional Status: Independent   Lives With spouse   Able to Return to Prior Arrangements yes   Is patient able to care for self after discharge? Yes   Patient's perception of discharge disposition home or selfcare   Readmission Within the Last 30 Days no previous admission in last 30 days   Patient currently being followed by outpatient case management? No   Patient currently receives any other outside agency services? No   Equipment Currently Used at Home none   Do you have any problems affording any of your prescribed medications? No   Does the patient have transportation home? Yes   Transportation Anticipated family or friend will provide   Does the patient receive services at the Coumadin Clinic? No   Discharge Plan A Home   Discharge Plan B Home with family   DME Needed Upon Discharge  none    Patient/Family in Agreement with Plan yes

## 2020-03-30 NOTE — PLAN OF CARE
Plan of care reviewed, patient verbalized understanding. AAOx4. Up ab shania independently. PIV CDI. IV abx infusing per order. VS addressed. Contious pulse ox and tele monitored. O2 @ 3L NC. SOB exhibited on execration . Redness noted to face - flush looked. Cardiology consulted. Low grade temps note through shift. Moderate pain control with PRN meds. Bed in lowest position, SRx2, brakes locked, call light within reach. Patient remains free from fall and injury. Will continue to monitor.

## 2020-03-30 NOTE — ASSESSMENT & PLAN NOTE
Will continue treating as community-acquired pneumonia  Will follow-up with the culture result  COVID results pending

## 2020-03-30 NOTE — PLAN OF CARE
03/29/20 1920   Patient Assessment/Suction   Level of Consciousness (AVPU) alert   Respiratory Effort Unlabored   Expansion/Accessory Muscles/Retractions no use of accessory muscles   PRE-TX-O2   O2 Device (Oxygen Therapy) nasal cannula   $ Is the patient on Low Flow Oxygen? Yes   Flow (L/min) 2   Oxygen Concentration (%) 28   SpO2 95 %   Pulse Oximetry Type Continuous   $ Pulse Oximetry - Multiple Charge Pulse Oximetry - Multiple   Pulse 65   Resp 18   Ready to Wean/Extubation Screen   FIO2<=50 (chart decimal) 0.28

## 2020-03-30 NOTE — NURSING
Received phone call from Monitor room that pt. Had a run of A.fib that started at 1151 lasted 21 seconds and stopped at 1155 that ended with a total of 51 seconds. Rate was 150/170 and through a few PVCs, then converted back to normal sinus.     Contacted Dr. Lane regarding pt's run of a.fib who does not have a history of this. Orders to put in a cardiology consult.

## 2020-03-30 NOTE — PLAN OF CARE
Afebrile. VSS. 3L O2 via nasal cannula. Breath sounds diminished to bases. Cont. Pulse ox and tele monitoring in use. Drinking. Voided to urinal. Plan of care reviewed with patient with verbal understanding. Side rails up x2, call light in reach, bed alarm on, items on bedside table within reach.

## 2020-03-30 NOTE — SUBJECTIVE & OBJECTIVE
Interval History: Patient resting in bed. Currently has no complaints other than shortness of breath with exertion.    Review of Systems   Constitutional: Negative for chills and fever.   Respiratory: Positive for shortness of breath.    Cardiovascular: Negative for chest pain.   Gastrointestinal: Negative for abdominal pain, nausea and vomiting.   Genitourinary: Negative for dysuria.   Neurological: Negative for headaches.     Objective:     Vital Signs (Most Recent):  Temp: 99.3 °F (37.4 °C) (03/30/20 0846)  Pulse: 73 (03/30/20 0846)  Resp: 18 (03/30/20 0846)  BP: 129/73 (03/30/20 0846)  SpO2: (!) 93 % (03/30/20 0846) Vital Signs (24h Range):  Temp:  [98.2 °F (36.8 °C)-99.5 °F (37.5 °C)] 99.3 °F (37.4 °C)  Pulse:  [65-82] 73  Resp:  [18-19] 18  SpO2:  [90 %-97 %] 93 %  BP: ()/(54-73) 129/73     Weight: 108.3 kg (238 lb 12.1 oz)  Body mass index is 31.5 kg/m².    Intake/Output Summary (Last 24 hours) at 3/30/2020 1344  Last data filed at 3/30/2020 0600  Gross per 24 hour   Intake 290 ml   Output 310 ml   Net -20 ml      Physical Exam   Constitutional: He appears well-developed and well-nourished.   HENT:   Head: Normocephalic and atraumatic.   Eyes: Conjunctivae are normal.   Pulmonary/Chest: Effort normal.   Neurological: He is alert.   Psychiatric: He has a normal mood and affect. His behavior is normal.   Vitals reviewed.        Significant Imaging: I have reviewed all pertinent imaging results/findings within the past 24 hours.

## 2020-03-30 NOTE — PROGRESS NOTES
[facility] Hospital Medicine  Progress Note    Patient Name: Maximilian Tavera Jr.  MRN: [id number]  Patient Class: IP- Inpatient   Admission Date: 3/29/2020  Length of Stay: 1 days  Attending Physician: Ashley Vallecillo MD  Primary Care Provider: Brina Marroquin MD        Subjective:     Principal Problem:Pneumonia        HPI:  Patient is 74-year-old man with a past medical history of COPD, myasthenia gravis, hypertension, hypothyroidism hyperlipidemia cataract left eye carpal tunnel syndrome arthritis obesity polyneuropathy presented to the emergency room due to fever with the recorded temperature over 103.  Patient states that he has been having 3 days of fever with generalized malaise and fatigue and was the concern about possible COVID.    in addition he has had associated dysuria and suprapubic abdominal pain.  He denies any cough or new shortness of breath from his baseline COPD symptoms.  Patient states that he had a history of pneumonia 2 years ago during which he had a prolonged hospital stay.   He last took Tylenol this morning for his symptoms.  He endorses decreased activity and oral intake.  Denies any sick contacts.  Denies any recent travel    Overview/Hospital Course:  No notes on file    Interval History: Patient resting in bed. Currently has no complaints other than shortness of breath with exertion.    Review of Systems   Constitutional: Negative for chills and fever.   Respiratory: Positive for shortness of breath.    Cardiovascular: Negative for chest pain.   Gastrointestinal: Negative for abdominal pain, nausea and vomiting.   Genitourinary: Negative for dysuria.   Neurological: Negative for headaches.     Objective:     Vital Signs (Most Recent):  Temp: 99.3 °F (37.4 °C) (03/30/20 0846)  Pulse: 73 (03/30/20 0846)  Resp: 18 (03/30/20 0846)  BP: 129/73 (03/30/20 0846)  SpO2: (!) 93 % (03/30/20 0846) Vital Signs (24h Range):  Temp:  [98.2 °F (36.8 °C)-99.5 °F (37.5 °C)]  99.3 °F (37.4 °C)  Pulse:  [65-82] 73  Resp:  [18-19] 18  SpO2:  [90 %-97 %] 93 %  BP: ()/(54-73) 129/73     Weight: 108.3 kg (238 lb 12.1 oz)  Body mass index is 31.5 kg/m².    Intake/Output Summary (Last 24 hours) at 3/30/2020 1344  Last data filed at 3/30/2020 0600  Gross per 24 hour   Intake 290 ml   Output 310 ml   Net -20 ml      Physical Exam   Constitutional: He appears well-developed and well-nourished.   HENT:   Head: Normocephalic and atraumatic.   Eyes: Conjunctivae are normal.   Pulmonary/Chest: Effort normal.   Neurological: He is alert.   Psychiatric: He has a normal mood and affect. His behavior is normal.   Vitals reviewed.        Significant Imaging: I have reviewed all pertinent imaging results/findings within the past 24 hours.      Assessment/Plan:      Pneumonia  Will continue treating as community-acquired pneumonia  Will follow-up with the culture result  COVID results pending      Suspected Covid-19 Virus Infection    Covid-19 Virus Infection  - Infection Control notified  -Results pending    - Isolation:   - Airborne and Droplet Precautions  - N95 masks must be fit tested, wear eye protection  - 20 second hand hygiene   - Limit visitors per hospital policy   - Consolidating lab draws, nursing care, and interventions    - Diagnostics: (rising CRP, persistent lymphopenia, hyponatremia, hyperferritinemia, elevated troponin, elevated d-dimer, age, and comorbidities are significant predictors of poor clinical outcome)   - CBC:   trend Q48hrs  - CMP:        trend Q48hrs  - Procalcitonin:  - D-dimer:  trend Q48hrs  - Ferritin:  repeat prior to discharge  - CRP:        trend Q48hrs  - LDH:  - BNP:  - Troponin:    - ECG:   - rapid Flu:   - RIP only if BMT/solid transplant:   - Legionella antigen:   - Blood culture x2:   - Sputum culture:   - CXR:   - UA and culture:      - Management:   - Bundle care as able to minimize in/out of room   - Supplemental O2 to maintain SpO2 >92%,   if requiring  6L NC or higher, place on nonrebreather and discuss case with MICU   - Telemetry & continuous Pulse Ox   - albuterol INHALER PRN 4puff Q6hr approximates a nebulizer (avoid nebulization of secretions)   - apap PRN fever   - Avoiding NIPPV to prevent aerosolization   (including home CPAP/BiPAP unless on a case-by-case basis and only in negative pressure room)   - Cautious use of NSAIDS for fever per WHO recommendations (3/16/2020)   - No new ACEi/ARB start or discontinuation of chronic med unless hypotensive (Esler et al. Journal of Hypertension 2020, 38:000-000)   - Careful use of steroids in the absence of other indications   - unless septic shock due to increased viral replication   - Fluid sparing resuscitation   - Empiric antibiotics per likely source & patient allergies    - CAP: x 5 day course  Ceftriaxone 1g IV Q24hrs            Azithromycin 500mg IV day #1, then 250mg PO daily x4 days                 If MRSA risk factors, add Vancomycin IV (PharmD consult)   - If patient meets criteria per Hospital Protocol    - start statin (if CPK WNL)    - start HCQ 400mg PO BID x1 day, then 400mg PO daily x 4 days (check G6PD, ECG, and start Qshift POCT glucose)    Goals of care, counseling/discussion  - Reviewed the typical clinical course of COVID19  (patient name or relationship to patient), including the potential for acute decompensation requiring intubation and mechanical ventilation  - Discussed again as part of routine daily evaluation, patient/POA maintains code status of full code    VTE High Risk Prophylaxis: enoxaparin 40mg sq QHS @ 2100 (bundled care) if GFR >30    Patient's chronic/stable medical conditions noted in the problem list above will be managed with the patient's home medications as tolerated.           COPD (chronic obstructive pulmonary disease)  Will continue albuterol inhaler as needed      Lumbar radiculitis        Bilateral carotid artery stenosis  History noted      Nonsustained ventricular  tachycardia        BPH with obstruction/lower urinary tract symptoms  Will continue to monitor urine output  F/u urine culture      Aortic valve disease        Umbilical hernia without obstruction and without gangrene  History noted      Myasthenia gravis, AChR antibody positive  Will continue home dose of prednisone 7 mg daily      DDD (degenerative disc disease), lumbar  Continue Tylenol for pain      HTN (hypertension), benign  Will continue home antihypertensive medications  Will continue to monitor for now      Hyperlipidemia  Will continue home statin dose      Hypothyroid  Will continue home medication home dose of levothyroxine        VTE Risk Mitigation (From admission, onward)         Ordered     enoxaparin injection 40 mg  Daily      03/29/20 1528     IP VTE HIGH RISK PATIENT  Once      03/29/20 1528                      Nuha Lane MD  Department of Hospital Medicine   [facility]

## 2020-03-31 PROBLEM — I48.91 A-FIB: Status: ACTIVE | Noted: 2020-03-31

## 2020-03-31 LAB
ALBUMIN SERPL BCP-MCNC: 2.8 G/DL (ref 3.5–5.2)
ALP SERPL-CCNC: 60 U/L (ref 55–135)
ALT SERPL W/O P-5'-P-CCNC: 16 U/L (ref 10–44)
ANION GAP SERPL CALC-SCNC: 10 MMOL/L (ref 8–16)
AST SERPL-CCNC: 21 U/L (ref 10–40)
BACTERIA #/AREA URNS HPF: ABNORMAL /HPF
BACTERIA UR CULT: NO GROWTH
BASOPHILS # BLD AUTO: 0.02 K/UL (ref 0–0.2)
BASOPHILS # BLD AUTO: 0.03 K/UL (ref 0–0.2)
BASOPHILS NFR BLD: 0.2 % (ref 0–1.9)
BASOPHILS NFR BLD: 0.3 % (ref 0–1.9)
BILIRUB SERPL-MCNC: 0.8 MG/DL (ref 0.1–1)
BILIRUB UR QL STRIP: NEGATIVE
BUN SERPL-MCNC: 23 MG/DL (ref 8–23)
CALCIUM SERPL-MCNC: 8.4 MG/DL (ref 8.7–10.5)
CHLORIDE SERPL-SCNC: 100 MMOL/L (ref 95–110)
CLARITY UR: ABNORMAL
CO2 SERPL-SCNC: 28 MMOL/L (ref 23–29)
COLOR UR: YELLOW
CREAT SERPL-MCNC: 0.9 MG/DL (ref 0.5–1.4)
CRP SERPL-MCNC: 132.7 MG/L (ref 0–8.2)
DIFFERENTIAL METHOD: ABNORMAL
DIFFERENTIAL METHOD: ABNORMAL
EOSINOPHIL # BLD AUTO: 0.2 K/UL (ref 0–0.5)
EOSINOPHIL # BLD AUTO: 0.2 K/UL (ref 0–0.5)
EOSINOPHIL NFR BLD: 2 % (ref 0–8)
EOSINOPHIL NFR BLD: 2.2 % (ref 0–8)
ERYTHROCYTE [DISTWIDTH] IN BLOOD BY AUTOMATED COUNT: 13.2 % (ref 11.5–14.5)
ERYTHROCYTE [DISTWIDTH] IN BLOOD BY AUTOMATED COUNT: 13.4 % (ref 11.5–14.5)
EST. GFR  (AFRICAN AMERICAN): >60 ML/MIN/1.73 M^2
EST. GFR  (NON AFRICAN AMERICAN): >60 ML/MIN/1.73 M^2
GLUCOSE SERPL-MCNC: 100 MG/DL (ref 70–110)
GLUCOSE UR QL STRIP: NEGATIVE
HCT VFR BLD AUTO: 38.6 % (ref 40–54)
HCT VFR BLD AUTO: 44 % (ref 40–54)
HGB BLD-MCNC: 12.6 G/DL (ref 14–18)
HGB BLD-MCNC: 14.1 G/DL (ref 14–18)
HGB UR QL STRIP: ABNORMAL
IMM GRANULOCYTES # BLD AUTO: 0.02 K/UL (ref 0–0.04)
IMM GRANULOCYTES # BLD AUTO: 0.03 K/UL (ref 0–0.04)
IMM GRANULOCYTES NFR BLD AUTO: 0.2 % (ref 0–0.5)
IMM GRANULOCYTES NFR BLD AUTO: 0.4 % (ref 0–0.5)
KETONES UR QL STRIP: NEGATIVE
LEUKOCYTE ESTERASE UR QL STRIP: ABNORMAL
LYMPHOCYTES # BLD AUTO: 1 K/UL (ref 1–4.8)
LYMPHOCYTES # BLD AUTO: 1 K/UL (ref 1–4.8)
LYMPHOCYTES NFR BLD: 11 % (ref 18–48)
LYMPHOCYTES NFR BLD: 12.5 % (ref 18–48)
MAGNESIUM SERPL-MCNC: 2.3 MG/DL (ref 1.6–2.6)
MCH RBC QN AUTO: 29.9 PG (ref 27–31)
MCH RBC QN AUTO: 30 PG (ref 27–31)
MCHC RBC AUTO-ENTMCNC: 32 G/DL (ref 32–36)
MCHC RBC AUTO-ENTMCNC: 32.6 G/DL (ref 32–36)
MCV RBC AUTO: 92 FL (ref 82–98)
MCV RBC AUTO: 94 FL (ref 82–98)
MICROSCOPIC COMMENT: ABNORMAL
MONOCYTES # BLD AUTO: 1 K/UL (ref 0.3–1)
MONOCYTES # BLD AUTO: 1 K/UL (ref 0.3–1)
MONOCYTES NFR BLD: 11.3 % (ref 4–15)
MONOCYTES NFR BLD: 12.2 % (ref 4–15)
NEUTROPHILS # BLD AUTO: 5.9 K/UL (ref 1.8–7.7)
NEUTROPHILS # BLD AUTO: 6.5 K/UL (ref 1.8–7.7)
NEUTROPHILS NFR BLD: 72.7 % (ref 38–73)
NEUTROPHILS NFR BLD: 75 % (ref 38–73)
NITRITE UR QL STRIP: NEGATIVE
NRBC BLD-RTO: 0 /100 WBC
NRBC BLD-RTO: 0 /100 WBC
PH UR STRIP: 6 [PH] (ref 5–8)
PHOSPHATE SERPL-MCNC: 2.9 MG/DL (ref 2.7–4.5)
PLATELET # BLD AUTO: 118 K/UL (ref 150–350)
PLATELET # BLD AUTO: 143 K/UL (ref 150–350)
PMV BLD AUTO: 10.4 FL (ref 9.2–12.9)
PMV BLD AUTO: 10.8 FL (ref 9.2–12.9)
POTASSIUM SERPL-SCNC: 3.3 MMOL/L (ref 3.5–5.1)
PROT SERPL-MCNC: 5.7 G/DL (ref 6–8.4)
PROT UR QL STRIP: NEGATIVE
RBC # BLD AUTO: 4.22 M/UL (ref 4.6–6.2)
RBC # BLD AUTO: 4.7 M/UL (ref 4.6–6.2)
RBC #/AREA URNS HPF: 5 /HPF (ref 0–4)
SODIUM SERPL-SCNC: 138 MMOL/L (ref 136–145)
SP GR UR STRIP: 1.02 (ref 1–1.03)
SQUAMOUS #/AREA URNS HPF: 1 /HPF
URN SPEC COLLECT METH UR: ABNORMAL
UROBILINOGEN UR STRIP-ACNC: ABNORMAL EU/DL
WBC # BLD AUTO: 8.05 K/UL (ref 3.9–12.7)
WBC # BLD AUTO: 8.7 K/UL (ref 3.9–12.7)
WBC #/AREA URNS HPF: >100 /HPF (ref 0–5)

## 2020-03-31 PROCEDURE — 86140 C-REACTIVE PROTEIN: CPT | Mod: HCNC

## 2020-03-31 PROCEDURE — 85025 COMPLETE CBC W/AUTO DIFF WBC: CPT | Mod: 91,HCNC

## 2020-03-31 PROCEDURE — 87086 URINE CULTURE/COLONY COUNT: CPT | Mod: HCNC

## 2020-03-31 PROCEDURE — 25000003 PHARM REV CODE 250: Mod: HCNC | Performed by: EMERGENCY MEDICINE

## 2020-03-31 PROCEDURE — 80053 COMPREHEN METABOLIC PANEL: CPT | Mod: HCNC

## 2020-03-31 PROCEDURE — 36415 COLL VENOUS BLD VENIPUNCTURE: CPT | Mod: HCNC

## 2020-03-31 PROCEDURE — 27000221 HC OXYGEN, UP TO 24 HOURS: Mod: HCNC

## 2020-03-31 PROCEDURE — 63600175 PHARM REV CODE 636 W HCPCS: Mod: HCNC | Performed by: INTERNAL MEDICINE

## 2020-03-31 PROCEDURE — 63700000 PHARM REV CODE 250 ALT 637 W/O HCPCS: Mod: HCNC | Performed by: FAMILY MEDICINE

## 2020-03-31 PROCEDURE — 83735 ASSAY OF MAGNESIUM: CPT | Mod: HCNC

## 2020-03-31 PROCEDURE — 94761 N-INVAS EAR/PLS OXIMETRY MLT: CPT | Mod: HCNC

## 2020-03-31 PROCEDURE — 81000 URINALYSIS NONAUTO W/SCOPE: CPT | Mod: HCNC

## 2020-03-31 PROCEDURE — 12000002 HC ACUTE/MED SURGE SEMI-PRIVATE ROOM: Mod: HCNC

## 2020-03-31 PROCEDURE — 84100 ASSAY OF PHOSPHORUS: CPT | Mod: HCNC

## 2020-03-31 PROCEDURE — 63700000 PHARM REV CODE 250 ALT 637 W/O HCPCS: Mod: HCNC | Performed by: INTERNAL MEDICINE

## 2020-03-31 PROCEDURE — 25000003 PHARM REV CODE 250: Mod: HCNC | Performed by: INTERNAL MEDICINE

## 2020-03-31 RX ORDER — POTASSIUM CHLORIDE 20 MEQ/15ML
40 SOLUTION ORAL
Status: DISCONTINUED | OUTPATIENT
Start: 2020-03-31 | End: 2020-04-02 | Stop reason: HOSPADM

## 2020-03-31 RX ADMIN — GABAPENTIN 300 MG: 300 CAPSULE ORAL at 02:03

## 2020-03-31 RX ADMIN — AZITHROMYCIN 250 MG: 250 TABLET, FILM COATED ORAL at 09:03

## 2020-03-31 RX ADMIN — ACETAMINOPHEN 650 MG: 325 TABLET ORAL at 01:03

## 2020-03-31 RX ADMIN — PREDNISONE 7 MG: 1 TABLET ORAL at 09:03

## 2020-03-31 RX ADMIN — EZETIMIBE 10 MG: 10 TABLET ORAL at 09:03

## 2020-03-31 RX ADMIN — ATORVASTATIN CALCIUM 80 MG: 40 TABLET, FILM COATED ORAL at 09:03

## 2020-03-31 RX ADMIN — ENOXAPARIN SODIUM 40 MG: 100 INJECTION SUBCUTANEOUS at 08:03

## 2020-03-31 RX ADMIN — GABAPENTIN 300 MG: 300 CAPSULE ORAL at 08:03

## 2020-03-31 RX ADMIN — GABAPENTIN 300 MG: 300 CAPSULE ORAL at 09:03

## 2020-03-31 RX ADMIN — METHOCARBAMOL 750 MG: 750 TABLET, FILM COATED ORAL at 08:03

## 2020-03-31 RX ADMIN — CEFTRIAXONE 1 G: 1 INJECTION, SOLUTION INTRAVENOUS at 08:03

## 2020-03-31 RX ADMIN — METHOCARBAMOL 750 MG: 750 TABLET, FILM COATED ORAL at 02:03

## 2020-03-31 RX ADMIN — CHLORTHALIDONE 25 MG: 25 TABLET ORAL at 09:03

## 2020-03-31 RX ADMIN — METHOCARBAMOL 750 MG: 750 TABLET, FILM COATED ORAL at 05:03

## 2020-03-31 RX ADMIN — OLMESARTAN MEDOXOMIL 20 MG: 20 TABLET, FILM COATED ORAL at 09:03

## 2020-03-31 RX ADMIN — CARVEDILOL 25 MG: 25 TABLET, FILM COATED ORAL at 08:03

## 2020-03-31 RX ADMIN — CARVEDILOL 25 MG: 25 TABLET, FILM COATED ORAL at 04:03

## 2020-03-31 RX ADMIN — LEVOTHYROXINE SODIUM 50 MCG: 50 TABLET ORAL at 05:03

## 2020-03-31 RX ADMIN — POTASSIUM CHLORIDE 40 MEQ: 20 SOLUTION ORAL at 11:03

## 2020-03-31 NOTE — PLAN OF CARE
Plan of care reviewed, patient verbalized understanding. AAOx4. Up ab shania independently. PIV CDI. IV abx infusing per order. VS addressed. Contious pulse ox and tele monitored. O2 @ 3L NC. SOB exhibited on execration . Redness noted to face - flush looked. Low grade temps note through shift. Moderate pain control with PRN meds. Bed in lowest position, SRx2, brakes locked, call light within reach. Patient remains free from fall and injury. Will continue to monitor.

## 2020-03-31 NOTE — SUBJECTIVE & OBJECTIVE
Interval History: Patient seems short of breath. States that he had just finished going to the bathroom. Currently has no other issues.     Review of Systems   Constitutional: Negative for chills and fever.   Respiratory: Positive for shortness of breath.    Cardiovascular: Negative for chest pain.   Gastrointestinal: Negative for diarrhea and vomiting.   Genitourinary: Negative for dysuria.   Skin: Negative for rash.   Neurological: Negative for headaches.     Objective:     Vital Signs (Most Recent):  Temp: 98.2 °F (36.8 °C) (03/31/20 0922)  Pulse: 67 (03/31/20 0922)  Resp: 18 (03/31/20 0922)  BP: (!) 140/71 (03/31/20 0922)  SpO2: 96 % (03/31/20 1004) Vital Signs (24h Range):  Temp:  [97.6 °F (36.4 °C)-99.6 °F (37.6 °C)] 98.2 °F (36.8 °C)  Pulse:  [64-68] 67  Resp:  [18-20] 18  SpO2:  [93 %-96 %] 96 %  BP: (114-140)/(58-71) 140/71     Weight: 108.3 kg (238 lb 12.1 oz)  Body mass index is 31.5 kg/m².    Intake/Output Summary (Last 24 hours) at 3/31/2020 1536  Last data filed at 3/31/2020 0716  Gross per 24 hour   Intake 440 ml   Output 975 ml   Net -535 ml      Physical Exam   Constitutional: He appears well-developed and well-nourished.   HENT:   Head: Normocephalic and atraumatic.   Eyes: Conjunctivae are normal.   Pulmonary/Chest:   Patient looks short of breath. Currently on NC   Neurological: He is alert.   Psychiatric: He has a normal mood and affect. His behavior is normal.   Vitals reviewed.      Significant Labs:   Recent Lab Results       03/31/20  1503   03/31/20  1216   03/31/20  0029        Albumin     2.8     Alkaline Phosphatase     60     ALT     16     Anion Gap     10     Appearance, UA   Cloudy       AST     21     Bacteria, UA   Moderate       Baso # 0.03   0.02     Basophil% 0.3   0.2     Bilirubin (UA)   Negative       BILIRUBIN TOTAL     0.8  Comment:  For infants and newborns, interpretation of results should be based  on gestational age, weight and in agreement with  clinical  observations.  Premature Infant recommended reference ranges:  Up to 24 hours.............<8.0 mg/dL  Up to 48 hours............<12.0 mg/dL  3-5 days..................<15.0 mg/dL  6-29 days.................<15.0 mg/dL       BUN, Bld     23     Calcium     8.4     Chloride     100     CO2     28     Color, UA   Yellow       Creatinine     0.9     CRP     132.7     Differential Method Automated   Automated     eGFR if      >60     eGFR if non      >60  Comment:  Calculation used to obtain the estimated glomerular filtration  rate (eGFR) is the CKD-EPI equation.        Eos # 0.2   0.2     Eosinophil% 2.2   2.0     Glucose     100     Glucose, UA   Negative       Gran # (ANC) 6.5   5.9     Gran% 75.0   72.7     Hematocrit 44.0   38.6     Hemoglobin 14.1   12.6     Immature Grans (Abs) 0.02  Comment:  Mild elevation in immature granulocytes is non specific and   can be seen in a variety of conditions including stress response,   acute inflammation, trauma and pregnancy. Correlation with other   laboratory and clinical findings is essential.     0.03  Comment:  Mild elevation in immature granulocytes is non specific and   can be seen in a variety of conditions including stress response,   acute inflammation, trauma and pregnancy. Correlation with other   laboratory and clinical findings is essential.       Immature Granulocytes 0.2   0.4     Ketones, UA   Negative       Leukocytes, UA   1+       Lymph # 1.0   1.0     Lymph% 11.0   12.5     Magnesium 2.3         MCH 30.0   29.9     MCHC 32.0   32.6     MCV 94   92     Microscopic Comment   SEE COMMENT  Comment:  Other formed elements not mentioned in the report are not   present in the microscopic examination.          Mono # 1.0   1.0     Mono% 11.3   12.2     MPV 10.4   10.8     NITRITE UA   Negative       nRBC 0   0     Occult Blood UA   1+       pH, UA   6.0       Phosphorus 2.9         Platelets 143   118     Potassium     3.3      PROTEIN TOTAL     5.7     Protein, UA   Negative  Comment:  Recommend a 24 hour urine protein or a urine   protein/creatinine ratio if globulin induced proteinuria is  clinically suspected.         RBC 4.70   4.22     RBC, UA   5       RDW 13.2   13.4     Sodium     138     Specific Gravity, UA   1.020       Specimen UA   Urine, Clean Catch       Squam Epithel, UA   1       UROBILINOGEN UA   2.0-3.0       WBC, UA   >100       WBC 8.70   8.05

## 2020-03-31 NOTE — PLAN OF CARE
Patient AAO. VSS, low-grade temp noted throughout shift. O2 at 3L NC with sats >92%. Intermittent SOB reported. Tele # [id number] monitored. COVID-19 results pending. Airborne/contact/droplet isolation maintained. All meds given as prescribed. PO Tylenol administered for report of h/a. Pt does report some suprapubic pain. Repositions self. Urinal at bedside. Patient resting quietly throughout majority of shift. No complaints noted at this time. Hourly/q2 rounds done for safety. Bed locked and low, call light within reach. Continuing to monitor.

## 2020-03-31 NOTE — PLAN OF CARE
03/31/20 1004   PRE-TX-O2   O2 Device (Oxygen Therapy) nasal cannula   $ Is the patient on Low Flow Oxygen? Yes   Flow (L/min) 3   SpO2 96 %   Pulse Oximetry Type Continuous   $ Pulse Oximetry - Multiple Charge Pulse Oximetry - Multiple

## 2020-03-31 NOTE — PROGRESS NOTES
[facility] Medicine  Progress Note    Patient Name: Maximilian Tavera Jr.  MRN: [id number]  Patient Class: IP- Inpatient   Admission Date: 3/29/2020  Length of Stay: 2 days  Attending Physician: Nuha Lane MD  Primary Care Provider: Brian Marroquin MD        Subjective:     Principal Problem:Pneumonia        HPI:  Patient is 74-year-old man with a past medical history of COPD, myasthenia gravis, hypertension, hypothyroidism hyperlipidemia cataract left eye carpal tunnel syndrome arthritis obesity polyneuropathy presented to the emergency room due to fever with the recorded temperature over 103.  Patient states that he has been having 3 days of fever with generalized malaise and fatigue and was the concern about possible COVID.    in addition he has had associated dysuria and suprapubic abdominal pain.  He denies any cough or new shortness of breath from his baseline COPD symptoms.  Patient states that he had a history of pneumonia 2 years ago during which he had a prolonged hospital stay.   He last took Tylenol this morning for his symptoms.  He endorses decreased activity and oral intake.  Denies any sick contacts.  Denies any recent travel    Overview/Hospital Course:  No notes on file    Interval History: Patient seems short of breath. States that he had just finished going to the bathroom. Currently has no other issues.     Review of Systems   Constitutional: Negative for chills and fever.   Respiratory: Positive for shortness of breath.    Cardiovascular: Negative for chest pain.   Gastrointestinal: Negative for diarrhea and vomiting.   Genitourinary: Negative for dysuria.   Skin: Negative for rash.   Neurological: Negative for headaches.     Objective:     Vital Signs (Most Recent):  Temp: 98.2 °F (36.8 °C) (03/31/20 0922)  Pulse: 67 (03/31/20 0922)  Resp: 18 (03/31/20 0922)  BP: (!) 140/71 (03/31/20 0922)  SpO2: 96 % (03/31/20 1004) Vital Signs (24h Range):  Temp:  [97.6 °F  (36.4 °C)-99.6 °F (37.6 °C)] 98.2 °F (36.8 °C)  Pulse:  [64-68] 67  Resp:  [18-20] 18  SpO2:  [93 %-96 %] 96 %  BP: (114-140)/(58-71) 140/71     Weight: 108.3 kg (238 lb 12.1 oz)  Body mass index is 31.5 kg/m².    Intake/Output Summary (Last 24 hours) at 3/31/2020 1536  Last data filed at 3/31/2020 0716  Gross per 24 hour   Intake 440 ml   Output 975 ml   Net -535 ml      Physical Exam   Constitutional: He appears well-developed and well-nourished.   HENT:   Head: Normocephalic and atraumatic.   Eyes: Conjunctivae are normal.   Pulmonary/Chest:   Patient looks short of breath. Currently on NC   Neurological: He is alert.   Psychiatric: He has a normal mood and affect. His behavior is normal.   Vitals reviewed.      Significant Labs:   Recent Lab Results       03/31/20  1503   03/31/20  1216   03/31/20  0029        Albumin     2.8     Alkaline Phosphatase     60     ALT     16     Anion Gap     10     Appearance, UA   Cloudy       AST     21     Bacteria, UA   Moderate       Baso # 0.03   0.02     Basophil% 0.3   0.2     Bilirubin (UA)   Negative       BILIRUBIN TOTAL     0.8  Comment:  For infants and newborns, interpretation of results should be based  on gestational age, weight and in agreement with clinical  observations.  Premature Infant recommended reference ranges:  Up to 24 hours.............<8.0 mg/dL  Up to 48 hours............<12.0 mg/dL  3-5 days..................<15.0 mg/dL  6-29 days.................<15.0 mg/dL       BUN, Bld     23     Calcium     8.4     Chloride     100     CO2     28     Color, UA   Yellow       Creatinine     0.9     CRP     132.7     Differential Method Automated   Automated     eGFR if      >60     eGFR if non      >60  Comment:  Calculation used to obtain the estimated glomerular filtration  rate (eGFR) is the CKD-EPI equation.        Eos # 0.2   0.2     Eosinophil% 2.2   2.0     Glucose     100     Glucose, UA   Negative       Gran # (ANC) 6.5    5.9     Gran% 75.0   72.7     Hematocrit 44.0   38.6     Hemoglobin 14.1   12.6     Immature Grans (Abs) 0.02  Comment:  Mild elevation in immature granulocytes is non specific and   can be seen in a variety of conditions including stress response,   acute inflammation, trauma and pregnancy. Correlation with other   laboratory and clinical findings is essential.     0.03  Comment:  Mild elevation in immature granulocytes is non specific and   can be seen in a variety of conditions including stress response,   acute inflammation, trauma and pregnancy. Correlation with other   laboratory and clinical findings is essential.       Immature Granulocytes 0.2   0.4     Ketones, UA   Negative       Leukocytes, UA   1+       Lymph # 1.0   1.0     Lymph% 11.0   12.5     Magnesium 2.3         MCH 30.0   29.9     MCHC 32.0   32.6     MCV 94   92     Microscopic Comment   SEE COMMENT  Comment:  Other formed elements not mentioned in the report are not   present in the microscopic examination.          Mono # 1.0   1.0     Mono% 11.3   12.2     MPV 10.4   10.8     NITRITE UA   Negative       nRBC 0   0     Occult Blood UA   1+       pH, UA   6.0       Phosphorus 2.9         Platelets 143   118     Potassium     3.3     PROTEIN TOTAL     5.7     Protein, UA   Negative  Comment:  Recommend a 24 hour urine protein or a urine   protein/creatinine ratio if globulin induced proteinuria is  clinically suspected.         RBC 4.70   4.22     RBC, UA   5       RDW 13.2   13.4     Sodium     138     Specific Gravity, UA   1.020       Specimen UA   Urine, Clean Catch       Squam Epithel, UA   1       UROBILINOGEN UA   2.0-3.0       WBC, UA   >100       WBC 8.70   8.05                 Assessment/Plan:      * Pneumonia  Will continue treating as community-acquired pneumonia  COVID results pending      A-fib  Patient had one episode of Afib while in hospital. Cards consulted.       Suspected Covid-19 Virus Infection    Covid-19 Virus  Infection  - Infection Control notified  -Results pending    - Isolation:   - Airborne and Droplet Precautions  - N95 masks must be fit tested, wear eye protection  - 20 second hand hygiene   - Limit visitors per hospital policy   - Consolidating lab draws, nursing care, and interventions    - Diagnostics: (rising CRP, persistent lymphopenia, hyponatremia, hyperferritinemia, elevated troponin, elevated d-dimer, age, and comorbidities are significant predictors of poor clinical outcome)   - CBC:   trend Q48hrs  - CMP:        trend Q48hrs  - Procalcitonin:  - D-dimer:  trend Q48hrs  - Ferritin:  repeat prior to discharge  - CRP:        trend Q48hrs  - LDH:  - BNP:  - Troponin:    - ECG:   - rapid Flu:   - RIP only if BMT/solid transplant:   - Legionella antigen:   - Blood culture x2:   - Sputum culture:   - CXR:   - UA and culture:      - Management:   - Bundle care as able to minimize in/out of room   - Supplemental O2 to maintain SpO2 >92%,   if requiring 6L NC or higher, place on nonrebreather and discuss case with MICU   - Telemetry & continuous Pulse Ox   - albuterol INHALER PRN 4puff Q6hr approximates a nebulizer (avoid nebulization of secretions)   - apap PRN fever   - Avoiding NIPPV to prevent aerosolization   (including home CPAP/BiPAP unless on a case-by-case basis and only in negative pressure room)   - Cautious use of NSAIDS for fever per WHO recommendations (3/16/2020)   - No new ACEi/ARB start or discontinuation of chronic med unless hypotensive (Esler et al. Journal of Hypertension 2020, 38:000-000)   - Careful use of steroids in the absence of other indications   - unless septic shock due to increased viral replication   - Fluid sparing resuscitation   - Empiric antibiotics per likely source & patient allergies    - CAP: x 5 day course  Ceftriaxone 1g IV Q24hrs            Azithromycin 500mg IV day #1, then 250mg PO daily x4 days                 If MRSA risk factors, add Vancomycin IV (PharmD  consult)   - If patient meets criteria per Hospital Protocol    - start statin (if CPK WNL)    - start HCQ 400mg PO BID x1 day, then 400mg PO daily x 4 days (check G6PD, ECG, and start Qshift POCT glucose)    Goals of care, counseling/discussion  - Reviewed the typical clinical course of COVID19  (patient name or relationship to patient), including the potential for acute decompensation requiring intubation and mechanical ventilation  - Discussed again as part of routine daily evaluation, patient/POA maintains code status of full code    VTE High Risk Prophylaxis: enoxaparin 40mg sq QHS @ 2100 (bundled care) if GFR >30    Patient's chronic/stable medical conditions noted in the problem list above will be managed with the patient's home medications as tolerated.           COPD (chronic obstructive pulmonary disease)  Will continue albuterol inhaler as needed      Lumbar radiculitis        Bilateral carotid artery stenosis  History noted      Nonsustained ventricular tachycardia        BPH with obstruction/lower urinary tract symptoms  Will continue to monitor urine output  F/u urine culture  Continue abx      Aortic valve disease        Umbilical hernia without obstruction and without gangrene  History noted      Myasthenia gravis, AChR antibody positive  Will continue home dose of prednisone 7 mg daily      DDD (degenerative disc disease), lumbar  Continue Tylenol for pain      HTN (hypertension), benign  Will continue home antihypertensive medications  Will continue to monitor for now      Hyperlipidemia  Will continue home statin dose      Hypothyroid  Will continue home medication home dose of levothyroxine        VTE Risk Mitigation (From admission, onward)         Ordered     enoxaparin injection 40 mg  Daily      03/29/20 1528     IP VTE HIGH RISK PATIENT  Once      03/29/20 1528                      Nuha Lane MD  Department of Hospital Medicine   [facility]

## 2020-04-01 LAB
BACTERIA UR CULT: NO GROWTH
BASOPHILS # BLD AUTO: 0.02 K/UL (ref 0–0.2)
BASOPHILS NFR BLD: 0.3 % (ref 0–1.9)
DIFFERENTIAL METHOD: ABNORMAL
EOSINOPHIL # BLD AUTO: 0.2 K/UL (ref 0–0.5)
EOSINOPHIL NFR BLD: 2.7 % (ref 0–8)
ERYTHROCYTE [DISTWIDTH] IN BLOOD BY AUTOMATED COUNT: 13.2 % (ref 11.5–14.5)
HCT VFR BLD AUTO: 41.3 % (ref 40–54)
HGB BLD-MCNC: 13.4 G/DL (ref 14–18)
IMM GRANULOCYTES # BLD AUTO: 0.02 K/UL (ref 0–0.04)
IMM GRANULOCYTES NFR BLD AUTO: 0.3 % (ref 0–0.5)
L PNEUMO AG UR QL IA: NOT DETECTED
LYMPHOCYTES # BLD AUTO: 1.1 K/UL (ref 1–4.8)
LYMPHOCYTES NFR BLD: 14.3 % (ref 18–48)
MCH RBC QN AUTO: 29.5 PG (ref 27–31)
MCHC RBC AUTO-ENTMCNC: 32.4 G/DL (ref 32–36)
MCV RBC AUTO: 91 FL (ref 82–98)
MONOCYTES # BLD AUTO: 0.9 K/UL (ref 0.3–1)
MONOCYTES NFR BLD: 12.3 % (ref 4–15)
NEUTROPHILS # BLD AUTO: 5.2 K/UL (ref 1.8–7.7)
NEUTROPHILS NFR BLD: 70.1 % (ref 38–73)
NRBC BLD-RTO: 0 /100 WBC
PLATELET # BLD AUTO: 132 K/UL (ref 150–350)
PMV BLD AUTO: 10.5 FL (ref 9.2–12.9)
RBC # BLD AUTO: 4.55 M/UL (ref 4.6–6.2)
SARS-COV-2 RNA RESP QL NAA+PROBE: NORMAL
WBC # BLD AUTO: 7.4 K/UL (ref 3.9–12.7)

## 2020-04-01 PROCEDURE — 63700000 PHARM REV CODE 250 ALT 637 W/O HCPCS: Mod: HCNC | Performed by: INTERNAL MEDICINE

## 2020-04-01 PROCEDURE — 27000221 HC OXYGEN, UP TO 24 HOURS: Mod: HCNC

## 2020-04-01 PROCEDURE — 85025 COMPLETE CBC W/AUTO DIFF WBC: CPT | Mod: HCNC

## 2020-04-01 PROCEDURE — 94761 N-INVAS EAR/PLS OXIMETRY MLT: CPT | Mod: HCNC

## 2020-04-01 PROCEDURE — 25000003 PHARM REV CODE 250: Mod: HCNC | Performed by: INTERNAL MEDICINE

## 2020-04-01 PROCEDURE — 36415 COLL VENOUS BLD VENIPUNCTURE: CPT | Mod: HCNC

## 2020-04-01 PROCEDURE — 12000002 HC ACUTE/MED SURGE SEMI-PRIVATE ROOM: Mod: HCNC

## 2020-04-01 PROCEDURE — 99232 PR SUBSEQUENT HOSPITAL CARE,LEVL II: ICD-10-PCS | Mod: ,,, | Performed by: FAMILY MEDICINE

## 2020-04-01 PROCEDURE — 63600175 PHARM REV CODE 636 W HCPCS: Mod: HCNC | Performed by: INTERNAL MEDICINE

## 2020-04-01 PROCEDURE — 99232 SBSQ HOSP IP/OBS MODERATE 35: CPT | Mod: ,,, | Performed by: FAMILY MEDICINE

## 2020-04-01 RX ADMIN — GABAPENTIN 300 MG: 300 CAPSULE ORAL at 04:04

## 2020-04-01 RX ADMIN — AZITHROMYCIN 250 MG: 250 TABLET, FILM COATED ORAL at 08:04

## 2020-04-01 RX ADMIN — OLMESARTAN MEDOXOMIL 20 MG: 20 TABLET, FILM COATED ORAL at 08:04

## 2020-04-01 RX ADMIN — LEVOTHYROXINE SODIUM 50 MCG: 50 TABLET ORAL at 05:04

## 2020-04-01 RX ADMIN — PREDNISONE 7 MG: 1 TABLET ORAL at 08:04

## 2020-04-01 RX ADMIN — EZETIMIBE 10 MG: 10 TABLET ORAL at 08:04

## 2020-04-01 RX ADMIN — CHLORTHALIDONE 25 MG: 25 TABLET ORAL at 09:04

## 2020-04-01 RX ADMIN — CARVEDILOL 25 MG: 25 TABLET, FILM COATED ORAL at 08:04

## 2020-04-01 RX ADMIN — METHOCARBAMOL 750 MG: 750 TABLET, FILM COATED ORAL at 07:04

## 2020-04-01 RX ADMIN — ENOXAPARIN SODIUM 40 MG: 100 INJECTION SUBCUTANEOUS at 08:04

## 2020-04-01 RX ADMIN — ATORVASTATIN CALCIUM 80 MG: 40 TABLET, FILM COATED ORAL at 08:04

## 2020-04-01 RX ADMIN — CARVEDILOL 25 MG: 25 TABLET, FILM COATED ORAL at 04:04

## 2020-04-01 RX ADMIN — METHOCARBAMOL 750 MG: 750 TABLET, FILM COATED ORAL at 01:04

## 2020-04-01 RX ADMIN — CEFTRIAXONE 1 G: 1 INJECTION, SOLUTION INTRAVENOUS at 09:04

## 2020-04-01 RX ADMIN — METHOCARBAMOL 750 MG: 750 TABLET, FILM COATED ORAL at 08:04

## 2020-04-01 RX ADMIN — GABAPENTIN 300 MG: 300 CAPSULE ORAL at 08:04

## 2020-04-01 NOTE — PLAN OF CARE
Patient AAO, PIV CDI/saline locked. VSS.  Covid R/O, Telemetry monitoring in place. Continuous Pulse ox in place. Pt remains on Airborne/Droplet/Contact Precautions. IV antbx ordered. Cardiology consulted for AFIB.  Pt verbalized understanding of POC. Purposeful hourly/q2hr rounding done during shift to promote patient safety. Patient free from falls and injury during shift.  Bed in lowest position, brakes locked, and call light within reach.  Will continue to monitor.

## 2020-04-01 NOTE — SUBJECTIVE & OBJECTIVE
Interval History: Patient resting in bed. Currently on 4L of oxygen. States shortness of breath is improving but better. Patient seen through telemedicine    Review of Systems   Constitutional: Negative for chills and fever.   Respiratory: Positive for shortness of breath.    Cardiovascular: Negative for chest pain.   Gastrointestinal: Negative for abdominal pain, nausea and vomiting.   Genitourinary: Negative for dysuria.   Skin: Negative for rash.   Neurological: Negative for headaches.     Objective:     Vital Signs (Most Recent):  Temp: 98.8 °F (37.1 °C) (04/01/20 0815)  Pulse: 70 (04/01/20 0815)  Resp: 19 (04/01/20 0815)  BP: 134/69 (04/01/20 0815)  SpO2: (!) 93 % (04/01/20 0815) Vital Signs (24h Range):  Temp:  [97.5 °F (36.4 °C)-98.9 °F (37.2 °C)] 98.8 °F (37.1 °C)  Pulse:  [64-70] 70  Resp:  [16-19] 19  SpO2:  [93 %-98 %] 93 %  BP: (134-146)/(67-76) 134/69     Weight: 108.3 kg (238 lb 12.1 oz)  Body mass index is 31.5 kg/m².    Intake/Output Summary (Last 24 hours) at 4/1/2020 1643  Last data filed at 3/31/2020 2100  Gross per 24 hour   Intake 290 ml   Output 625 ml   Net -335 ml      Physical Exam   Constitutional: He appears well-developed and well-nourished.   HENT:   Head: Normocephalic and atraumatic.   Eyes: Conjunctivae are normal.   Neck: Normal range of motion.   Pulmonary/Chest: Effort normal.   Neurological: He is alert.   Psychiatric: He has a normal mood and affect. His behavior is normal.   Vitals reviewed.      Significant Labs:   Recent Lab Results       04/01/20  0548        Baso # 0.02     Basophil% 0.3     Differential Method Automated     Eos # 0.2     Eosinophil% 2.7     Gran # (ANC) 5.2     Gran% 70.1     Hematocrit 41.3     Hemoglobin 13.4     Immature Grans (Abs) 0.02  Comment:  Mild elevation in immature granulocytes is non specific and   can be seen in a variety of conditions including stress response,   acute inflammation, trauma and pregnancy. Correlation with other   laboratory  and clinical findings is essential.       Immature Granulocytes 0.3     Lymph # 1.1     Lymph% 14.3     MCH 29.5     MCHC 32.4     MCV 91     Mono # 0.9     Mono% 12.3     MPV 10.5     nRBC 0     Platelets 132     RBC 4.55     RDW 13.2     WBC 7.40

## 2020-04-01 NOTE — PROGRESS NOTES
[facility] Hospital Medicine  Progress Note    Patient Name: Maximilian Tavera Jr.  MRN: [id number]  Patient Class: IP- Inpatient   Admission Date: 3/29/2020  Length of Stay: 3 days  Attending Physician: Nuha Lane MD  Primary Care Provider: Brian Marroquin MD        Subjective:     Principal Problem:Pneumonia        HPI:  Patient is 74-year-old man with a past medical history of COPD, myasthenia gravis, hypertension, hypothyroidism hyperlipidemia cataract left eye carpal tunnel syndrome arthritis obesity polyneuropathy presented to the emergency room due to fever with the recorded temperature over 103.  Patient states that he has been having 3 days of fever with generalized malaise and fatigue and was the concern about possible COVID.    in addition he has had associated dysuria and suprapubic abdominal pain.  He denies any cough or new shortness of breath from his baseline COPD symptoms.  Patient states that he had a history of pneumonia 2 years ago during which he had a prolonged hospital stay.   He last took Tylenol this morning for his symptoms.  He endorses decreased activity and oral intake.  Denies any sick contacts.  Denies any recent travel    Overview/Hospital Course:  No notes on file    Interval History: Patient resting in bed. Currently on 4L of oxygen. States shortness of breath is improving but better. Patient seen through telemedicine    Review of Systems   Constitutional: Negative for chills and fever.   Respiratory: Positive for shortness of breath.    Cardiovascular: Negative for chest pain.   Gastrointestinal: Negative for abdominal pain, nausea and vomiting.   Genitourinary: Negative for dysuria.   Skin: Negative for rash.   Neurological: Negative for headaches.     Objective:     Vital Signs (Most Recent):  Temp: 98.8 °F (37.1 °C) (04/01/20 0815)  Pulse: 70 (04/01/20 0815)  Resp: 19 (04/01/20 0815)  BP: 134/69 (04/01/20 0815)  SpO2: (!) 93 % (04/01/20 0815) Vital  Signs (24h Range):  Temp:  [97.5 °F (36.4 °C)-98.9 °F (37.2 °C)] 98.8 °F (37.1 °C)  Pulse:  [64-70] 70  Resp:  [16-19] 19  SpO2:  [93 %-98 %] 93 %  BP: (134-146)/(67-76) 134/69     Weight: 108.3 kg (238 lb 12.1 oz)  Body mass index is 31.5 kg/m².    Intake/Output Summary (Last 24 hours) at 4/1/2020 1643  Last data filed at 3/31/2020 2100  Gross per 24 hour   Intake 290 ml   Output 625 ml   Net -335 ml      Physical Exam   Constitutional: He appears well-developed and well-nourished.   HENT:   Head: Normocephalic and atraumatic.   Eyes: Conjunctivae are normal.   Neck: Normal range of motion.   Pulmonary/Chest: Effort normal.   Neurological: He is alert.   Psychiatric: He has a normal mood and affect. His behavior is normal.   Vitals reviewed.      Significant Labs:   Recent Lab Results       04/01/20  0548        Baso # 0.02     Basophil% 0.3     Differential Method Automated     Eos # 0.2     Eosinophil% 2.7     Gran # (ANC) 5.2     Gran% 70.1     Hematocrit 41.3     Hemoglobin 13.4     Immature Grans (Abs) 0.02  Comment:  Mild elevation in immature granulocytes is non specific and   can be seen in a variety of conditions including stress response,   acute inflammation, trauma and pregnancy. Correlation with other   laboratory and clinical findings is essential.       Immature Granulocytes 0.3     Lymph # 1.1     Lymph% 14.3     MCH 29.5     MCHC 32.4     MCV 91     Mono # 0.9     Mono% 12.3     MPV 10.5     nRBC 0     Platelets 132     RBC 4.55     RDW 13.2     WBC 7.40                 Assessment/Plan:      * Pneumonia  Will continue treating as community-acquired pneumonia  COVID test negative  Possibility of false negative. Symptomatically patient looks like COVID will continue contact precautions  Wean oxygen as tolerated      A-fib  Patient had one episode of Afib while in hospital. Cards consulted.       Suspected Covid-19 Virus Infection    Covid-19 Virus Infection  - Infection Control notified  -Results  Negative although patient looks like he has COVID symptomatically    - Isolation:   - Airborne and Droplet Precautions  - N95 masks must be fit tested, wear eye protection  - 20 second hand hygiene   - Limit visitors per hospital policy   - Consolidating lab draws, nursing care, and interventions    - Diagnostics: (rising CRP, persistent lymphopenia, hyponatremia, hyperferritinemia, elevated troponin, elevated d-dimer, age, and comorbidities are significant predictors of poor clinical outcome)   - CBC:   trend Q48hrs  - CMP:        trend Q48hrs  - Procalcitonin:  - D-dimer:  trend Q48hrs  - Ferritin:  repeat prior to discharge  - CRP:        trend Q48hrs  - LDH:  - BNP:  - Troponin:    - ECG:   - rapid Flu:   - RIP only if BMT/solid transplant:   - Legionella antigen:   - Blood culture x2:   - Sputum culture:   - CXR:   - UA and culture:      - Management:   - Bundle care as able to minimize in/out of room   - Supplemental O2 to maintain SpO2 >92%,   if requiring 6L NC or higher, place on nonrebreather and discuss case with MICU   - Telemetry & continuous Pulse Ox   - albuterol INHALER PRN 4puff Q6hr approximates a nebulizer (avoid nebulization of secretions)   - apap PRN fever   - Avoiding NIPPV to prevent aerosolization   (including home CPAP/BiPAP unless on a case-by-case basis and only in negative pressure room)   - Cautious use of NSAIDS for fever per WHO recommendations (3/16/2020)   - No new ACEi/ARB start or discontinuation of chronic med unless hypotensive (Esler et al. Journal of Hypertension 2020, 38:000-000)   - Careful use of steroids in the absence of other indications   - unless septic shock due to increased viral replication   - Fluid sparing resuscitation   - Empiric antibiotics per likely source & patient allergies    - CAP: x 5 day course  Ceftriaxone 1g IV Q24hrs            Azithromycin 500mg IV day #1, then 250mg PO daily x4 days                 If MRSA risk factors, add Vancomycin IV (PharmD  consult)   - If patient meets criteria per Hospital Protocol    - start statin (if CPK WNL)    - start HCQ 400mg PO BID x1 day, then 400mg PO daily x 4 days (check G6PD, ECG, and start Qshift POCT glucose)    Goals of care, counseling/discussion  - Reviewed the typical clinical course of COVID19  (patient name or relationship to patient), including the potential for acute decompensation requiring intubation and mechanical ventilation  - Discussed again as part of routine daily evaluation, patient/POA maintains code status of full code    VTE High Risk Prophylaxis: enoxaparin 40mg sq QHS @ 2100 (bundled care) if GFR >30    Patient's chronic/stable medical conditions noted in the problem list above will be managed with the patient's home medications as tolerated.           COPD (chronic obstructive pulmonary disease)  Will continue albuterol inhaler as needed      Lumbar radiculitis        Bilateral carotid artery stenosis  History noted      Nonsustained ventricular tachycardia        BPH with obstruction/lower urinary tract symptoms  Will continue to monitor urine output  F/u urine culture  Continue abx      Aortic valve disease        Umbilical hernia without obstruction and without gangrene  History noted      Myasthenia gravis, AChR antibody positive  Will continue home dose of prednisone 7 mg daily      DDD (degenerative disc disease), lumbar  Continue Tylenol for pain      HTN (hypertension), benign  Will continue home antihypertensive medications  Will continue to monitor for now      Hyperlipidemia  Will continue home statin dose      Hypothyroid  Will continue home medication home dose of levothyroxine        VTE Risk Mitigation (From admission, onward)         Ordered     enoxaparin injection 40 mg  Daily      03/29/20 1528     IP VTE HIGH RISK PATIENT  Once      03/29/20 1528                      Nuha Lane MD  Department of Hospital Medicine   [facility]

## 2020-04-01 NOTE — ASSESSMENT & PLAN NOTE
Will continue treating as community-acquired pneumonia  COVID test negative  Possibility of false negative. Symptomatically patient looks like COVID will continue contact precautions  Wean oxygen as tolerated

## 2020-04-01 NOTE — RESPIRATORY THERAPY
03/31/20 1940   Patient Assessment/Suction   Level of Consciousness (AVPU) alert   PRE-TX-O2   O2 Device (Oxygen Therapy) nasal cannula   Flow (L/min) 3   Oxygen Concentration (%) 32   SpO2 96 %   Pulse Oximetry Type Continuous   Ready to Wean/Extubation Screen   FIO2<=50 (chart decimal) 0.32

## 2020-04-01 NOTE — CARE UPDATE
04/01/20 0710   PRE-TX-O2   O2 Device (Oxygen Therapy) nasal cannula   $ Is the patient on Low Flow Oxygen? Yes   Flow (L/min) 3   SpO2 (!) 94 %   Pulse Oximetry Type Continuous   Pulse 64   Resp 16

## 2020-04-01 NOTE — ASSESSMENT & PLAN NOTE
Covid-19 Virus Infection  - Infection Control notified  -Results Negative although patient looks like he has COVID symptomatically    - Isolation:   - Airborne and Droplet Precautions  - N95 masks must be fit tested, wear eye protection  - 20 second hand hygiene   - Limit visitors per hospital policy   - Consolidating lab draws, nursing care, and interventions    - Diagnostics: (rising CRP, persistent lymphopenia, hyponatremia, hyperferritinemia, elevated troponin, elevated d-dimer, age, and comorbidities are significant predictors of poor clinical outcome)   - CBC:   trend Q48hrs  - CMP:        trend Q48hrs  - Procalcitonin:  - D-dimer:  trend Q48hrs  - Ferritin:  repeat prior to discharge  - CRP:        trend Q48hrs  - LDH:  - BNP:  - Troponin:    - ECG:   - rapid Flu:   - RIP only if BMT/solid transplant:   - Legionella antigen:   - Blood culture x2:   - Sputum culture:   - CXR:   - UA and culture:      - Management:   - Bundle care as able to minimize in/out of room   - Supplemental O2 to maintain SpO2 >92%,   if requiring 6L NC or higher, place on nonrebreather and discuss case with MICU   - Telemetry & continuous Pulse Ox   - albuterol INHALER PRN 4puff Q6hr approximates a nebulizer (avoid nebulization of secretions)   - apap PRN fever   - Avoiding NIPPV to prevent aerosolization   (including home CPAP/BiPAP unless on a case-by-case basis and only in negative pressure room)   - Cautious use of NSAIDS for fever per WHO recommendations (3/16/2020)   - No new ACEi/ARB start or discontinuation of chronic med unless hypotensive (Esler et al. Journal of Hypertension 2020, 38:000-000)   - Careful use of steroids in the absence of other indications   - unless septic shock due to increased viral replication   - Fluid sparing resuscitation   - Empiric antibiotics per likely source & patient allergies    - CAP: x 5 day course  Ceftriaxone 1g IV Q24hrs            Azithromycin 500mg IV day #1, then 250mg PO daily x4  days                 If MRSA risk factors, add Vancomycin IV (PharmD consult)   - If patient meets criteria per Hospital Protocol    - start statin (if CPK WNL)    - start HCQ 400mg PO BID x1 day, then 400mg PO daily x 4 days (check G6PD, ECG, and start Qshift POCT glucose)    Goals of care, counseling/discussion  - Reviewed the typical clinical course of COVID19  (patient name or relationship to patient), including the potential for acute decompensation requiring intubation and mechanical ventilation  - Discussed again as part of routine daily evaluation, patient/POA maintains code status of full code    VTE High Risk Prophylaxis: enoxaparin 40mg sq QHS @ 2100 (bundled care) if GFR >30    Patient's chronic/stable medical conditions noted in the problem list above will be managed with the patient's home medications as tolerated.

## 2020-04-01 NOTE — PLAN OF CARE
Afebrile throughout shift. Per provider is to be left on isolation even though covid negative, still has all the symptoms. Pt. Anxious to go home. Attempting to wean off 02. Currently at 3L/min. Pt. Has cont. Pulseox, sats 92-96% on current setting. Purposeful rounding utilized to ensure pt. Needs met and safety maintained.

## 2020-04-02 ENCOUNTER — TELEPHONE (OUTPATIENT)
Dept: FAMILY MEDICINE | Facility: CLINIC | Age: 75
End: 2020-04-02

## 2020-04-02 VITALS
HEIGHT: 73 IN | RESPIRATION RATE: 16 BRPM | OXYGEN SATURATION: 92 % | BODY MASS INDEX: 31.64 KG/M2 | HEART RATE: 72 BPM | SYSTOLIC BLOOD PRESSURE: 130 MMHG | TEMPERATURE: 98 F | DIASTOLIC BLOOD PRESSURE: 62 MMHG | WEIGHT: 238.75 LBS

## 2020-04-02 PROBLEM — Z20.822 SUSPECTED COVID-19 VIRUS INFECTION: Status: RESOLVED | Noted: 2020-03-29 | Resolved: 2020-04-02

## 2020-04-02 PROBLEM — J18.9 PNEUMONIA: Status: RESOLVED | Noted: 2020-03-29 | Resolved: 2020-04-02

## 2020-04-02 LAB
ALBUMIN SERPL BCP-MCNC: 2.9 G/DL (ref 3.5–5.2)
ALP SERPL-CCNC: 60 U/L (ref 55–135)
ALT SERPL W/O P-5'-P-CCNC: 20 U/L (ref 10–44)
ANION GAP SERPL CALC-SCNC: 8 MMOL/L (ref 8–16)
AST SERPL-CCNC: 22 U/L (ref 10–40)
BASOPHILS # BLD AUTO: 0.02 K/UL (ref 0–0.2)
BASOPHILS NFR BLD: 0.3 % (ref 0–1.9)
BILIRUB SERPL-MCNC: 0.8 MG/DL (ref 0.1–1)
BUN SERPL-MCNC: 19 MG/DL (ref 8–23)
CALCIUM SERPL-MCNC: 8.7 MG/DL (ref 8.7–10.5)
CHLORIDE SERPL-SCNC: 100 MMOL/L (ref 95–110)
CO2 SERPL-SCNC: 30 MMOL/L (ref 23–29)
CREAT SERPL-MCNC: 0.9 MG/DL (ref 0.5–1.4)
CRP SERPL-MCNC: 67.9 MG/L (ref 0–8.2)
DIFFERENTIAL METHOD: ABNORMAL
EOSINOPHIL # BLD AUTO: 0.2 K/UL (ref 0–0.5)
EOSINOPHIL NFR BLD: 2.4 % (ref 0–8)
ERYTHROCYTE [DISTWIDTH] IN BLOOD BY AUTOMATED COUNT: 13.2 % (ref 11.5–14.5)
EST. GFR  (AFRICAN AMERICAN): >60 ML/MIN/1.73 M^2
EST. GFR  (NON AFRICAN AMERICAN): >60 ML/MIN/1.73 M^2
GLUCOSE SERPL-MCNC: 102 MG/DL (ref 70–110)
HCT VFR BLD AUTO: 39.7 % (ref 40–54)
HGB BLD-MCNC: 12.6 G/DL (ref 14–18)
IMM GRANULOCYTES # BLD AUTO: 0.02 K/UL (ref 0–0.04)
IMM GRANULOCYTES NFR BLD AUTO: 0.3 % (ref 0–0.5)
LYMPHOCYTES # BLD AUTO: 1.1 K/UL (ref 1–4.8)
LYMPHOCYTES NFR BLD: 16.3 % (ref 18–48)
MCH RBC QN AUTO: 28.6 PG (ref 27–31)
MCHC RBC AUTO-ENTMCNC: 31.7 G/DL (ref 32–36)
MCV RBC AUTO: 90 FL (ref 82–98)
MONOCYTES # BLD AUTO: 1 K/UL (ref 0.3–1)
MONOCYTES NFR BLD: 15.1 % (ref 4–15)
NEUTROPHILS # BLD AUTO: 4.3 K/UL (ref 1.8–7.7)
NEUTROPHILS NFR BLD: 65.6 % (ref 38–73)
NRBC BLD-RTO: 0 /100 WBC
PLATELET # BLD AUTO: 134 K/UL (ref 150–350)
PMV BLD AUTO: 10.3 FL (ref 9.2–12.9)
POTASSIUM SERPL-SCNC: 3.1 MMOL/L (ref 3.5–5.1)
PROT SERPL-MCNC: 5.9 G/DL (ref 6–8.4)
RBC # BLD AUTO: 4.41 M/UL (ref 4.6–6.2)
SODIUM SERPL-SCNC: 138 MMOL/L (ref 136–145)
WBC # BLD AUTO: 6.61 K/UL (ref 3.9–12.7)

## 2020-04-02 PROCEDURE — 94761 N-INVAS EAR/PLS OXIMETRY MLT: CPT | Mod: HCNC

## 2020-04-02 PROCEDURE — 99238 HOSP IP/OBS DSCHRG MGMT 30/<: CPT | Mod: ,,, | Performed by: FAMILY MEDICINE

## 2020-04-02 PROCEDURE — 36415 COLL VENOUS BLD VENIPUNCTURE: CPT | Mod: HCNC

## 2020-04-02 PROCEDURE — 27000221 HC OXYGEN, UP TO 24 HOURS: Mod: HCNC

## 2020-04-02 PROCEDURE — 25000003 PHARM REV CODE 250: Mod: HCNC | Performed by: FAMILY MEDICINE

## 2020-04-02 PROCEDURE — 25000003 PHARM REV CODE 250: Mod: HCNC | Performed by: INTERNAL MEDICINE

## 2020-04-02 PROCEDURE — 80053 COMPREHEN METABOLIC PANEL: CPT | Mod: HCNC

## 2020-04-02 PROCEDURE — 63700000 PHARM REV CODE 250 ALT 637 W/O HCPCS: Mod: HCNC | Performed by: INTERNAL MEDICINE

## 2020-04-02 PROCEDURE — 99238 PR HOSPITAL DISCHARGE DAY,<30 MIN: ICD-10-PCS | Mod: ,,, | Performed by: FAMILY MEDICINE

## 2020-04-02 PROCEDURE — 63600175 PHARM REV CODE 636 W HCPCS: Mod: HCNC | Performed by: INTERNAL MEDICINE

## 2020-04-02 PROCEDURE — 63700000 PHARM REV CODE 250 ALT 637 W/O HCPCS: Mod: HCNC | Performed by: FAMILY MEDICINE

## 2020-04-02 PROCEDURE — 85025 COMPLETE CBC W/AUTO DIFF WBC: CPT | Mod: HCNC

## 2020-04-02 PROCEDURE — 86140 C-REACTIVE PROTEIN: CPT | Mod: HCNC

## 2020-04-02 RX ORDER — TRIAMTERENE AND HYDROCHLOROTHIAZIDE 37.5; 25 MG/1; MG/1
1 CAPSULE ORAL DAILY
Status: DISCONTINUED | OUTPATIENT
Start: 2020-04-02 | End: 2020-04-02 | Stop reason: SDUPTHER

## 2020-04-02 RX ORDER — TRIAMTERENE AND HYDROCHLOROTHIAZIDE 37.5; 25 MG/1; MG/1
1 CAPSULE ORAL DAILY
Qty: 30 CAPSULE | Refills: 1 | Status: SHIPPED | OUTPATIENT
Start: 2020-04-02 | End: 2020-05-12 | Stop reason: CLARIF

## 2020-04-02 RX ORDER — TRIAMTERENE/HYDROCHLOROTHIAZID 37.5-25 MG
1 TABLET ORAL DAILY
Status: DISCONTINUED | OUTPATIENT
Start: 2020-04-02 | End: 2020-04-02 | Stop reason: HOSPADM

## 2020-04-02 RX ADMIN — GABAPENTIN 300 MG: 300 CAPSULE ORAL at 12:04

## 2020-04-02 RX ADMIN — METHOCARBAMOL 750 MG: 750 TABLET, FILM COATED ORAL at 04:04

## 2020-04-02 RX ADMIN — EZETIMIBE 10 MG: 10 TABLET ORAL at 09:04

## 2020-04-02 RX ADMIN — ATORVASTATIN CALCIUM 80 MG: 40 TABLET, FILM COATED ORAL at 09:04

## 2020-04-02 RX ADMIN — PREDNISONE 7 MG: 1 TABLET ORAL at 09:04

## 2020-04-02 RX ADMIN — AZITHROMYCIN 250 MG: 250 TABLET, FILM COATED ORAL at 09:04

## 2020-04-02 RX ADMIN — GABAPENTIN 300 MG: 300 CAPSULE ORAL at 09:04

## 2020-04-02 RX ADMIN — TRIAMTERENE AND HYDROCHLOROTHIAZIDE 1 TABLET: 37.5; 25 TABLET ORAL at 02:04

## 2020-04-02 RX ADMIN — CEFTRIAXONE 1 G: 1 INJECTION, SOLUTION INTRAVENOUS at 09:04

## 2020-04-02 RX ADMIN — METHOCARBAMOL 750 MG: 750 TABLET, FILM COATED ORAL at 12:04

## 2020-04-02 RX ADMIN — CARVEDILOL 25 MG: 25 TABLET, FILM COATED ORAL at 09:04

## 2020-04-02 RX ADMIN — CHLORTHALIDONE 25 MG: 25 TABLET ORAL at 09:04

## 2020-04-02 RX ADMIN — OLMESARTAN MEDOXOMIL 20 MG: 20 TABLET, FILM COATED ORAL at 09:04

## 2020-04-02 RX ADMIN — LEVOTHYROXINE SODIUM 50 MCG: 50 TABLET ORAL at 04:04

## 2020-04-02 RX ADMIN — POTASSIUM CHLORIDE 40 MEQ: 20 SOLUTION ORAL at 02:04

## 2020-04-02 NOTE — PLAN OF CARE
Pt discharging to home.  Cm sent the information to Dr. Pacheco's office as requested by Lucero.       04/02/20 5277   Final Note   Assessment Type Final Discharge Note   Anticipated Discharge Disposition Home   Hospital Follow Up  Appt(s) scheduled? No  (The office will contact pt for appointments.)

## 2020-04-02 NOTE — NURSING
Discharge instructions given to patient (COVID instructions given to patient; instructed to self isolate); verbalized understanding. PIV removed, pressure applied, dressing applied. Telemetry monitor and pulse ox removed and returned to monitor tech. Patient dressed self; awaiting daughter to arrive to discharge home.     1507: Patient assisted via w/c to vehicle for discharge. Mask applied to patient for discharge.

## 2020-04-02 NOTE — PROGRESS NOTES
Interval History.  Patient was interviewed are by using telemedicine  Patient is feeling better denies having chest discomfort and significant shortness of breath.  Was able to ambulate in the room.      Review of Systems     Denies Chest pain, sob, or palpitations  No recent fever, cough chills or congestion  No blood in the urine or stool  No myalgias  No recent arm, neck, or jaw pain  No lightheadedness or dizziness  No Double vision, blurry, vision or headache     Objective:     Vital Signs (Most Recent):  Temp: 98.1 °F (36.7 °C) (04/02/20 0744)  Pulse: 76 (04/02/20 0800)  Resp: 16 (04/02/20 0744)  BP: 130/62 (04/02/20 0744)  SpO2: 96 % (04/02/20 0744) Vital Signs (24h Range):  Temp:  [98 °F (36.7 °C)-98.9 °F (37.2 °C)] 98.1 °F (36.7 °C)  Pulse:  [64-76] 76  Resp:  [14-17] 16  SpO2:  [93 %-96 %] 96 %  BP: (121-130)/(58-70) 130/62     Weight: 108.3 kg (238 lb 12.1 oz)  Body mass index is 31.5 kg/m².     SpO2: 96 %  O2 Device (Oxygen Therapy): nasal cannula      Intake/Output Summary (Last 24 hours) at 4/2/2020 1037  Last data filed at 4/1/2020 2200  Gross per 24 hour   Intake 530 ml   Output 1300 ml   Net -770 ml       Lines/Drains/Airways     Peripheral Intravenous Line                 Peripheral IV - Single Lumen 04/01/20 0845 20 G Anterior;Right Forearm 1 day                   atorvastatin  80 mg Oral Daily    azithromycin  250 mg Oral Daily    carvediloL  25 mg Oral BID WM    cefTRIAXone 1 g in dextrose 5 % 50 mL  1 g Intravenous Q24H    chlorthalidone  25 mg Oral Daily    enoxaparin  40 mg Subcutaneous Daily    ezetimibe  10 mg Oral Daily    gabapentin  300 mg Oral TID    levothyroxine  50 mcg Oral Before breakfast    methocarbamoL  750 mg Oral TID    olmesartan  20 mg Oral Daily    predniSONE  7 mg Oral Daily         PHYSICAL EXAM:  This is a COVID positive patient as per protocol and limitation of for exposure to physicians and personnel telemedicine interview was completed.  Constitutional:  Well built, well nourished in no apparent distress  Physical evaluation per hospitalist doctor  Domingo PAREDES HAVE REVIEWED :    The vital signs, nurses notes, and all the pertinent radiology and labs.       Significant Labs: Results for ROXANNA DELGADILLO  (MRN [id number]) as of 4/2/2020 10:36   Ref. Range 4/2/2020 06:40   WBC Latest Ref Range: 3.90 - 12.70 K/uL 6.61   RBC Latest Ref Range: 4.60 - 6.20 M/uL 4.41 (L)   Hemoglobin Latest Ref Range: 14.0 - 18.0 g/dL 12.6 (L)   Hematocrit Latest Ref Range: 40.0 - 54.0 % 39.7 (L)   MCV Latest Ref Range: 82 - 98 fL 90   MCH Latest Ref Range: 27.0 - 31.0 pg 28.6   MCHC Latest Ref Range: 32.0 - 36.0 g/dL 31.7 (L)   RDW Latest Ref Range: 11.5 - 14.5 % 13.2   Platelets Latest Ref Range: 150 - 350 K/uL 134 (L)   MPV Latest Ref Range: 9.2 - 12.9 fL 10.3   Gran% Latest Ref Range: 38.0 - 73.0 % 65.6   Gran # (ANC) Latest Ref Range: 1.8 - 7.7 K/uL 4.3   Lymph% Latest Ref Range: 18.0 - 48.0 % 16.3 (L)   Lymph # Latest Ref Range: 1.0 - 4.8 K/uL 1.1   Mono% Latest Ref Range: 4.0 - 15.0 % 15.1 (H)   Mono # Latest Ref Range: 0.3 - 1.0 K/uL 1.0   Eosinophil% Latest Ref Range: 0.0 - 8.0 % 2.4   Eos # Latest Ref Range: 0.0 - 0.5 K/uL 0.2   Basophil% Latest Ref Range: 0.0 - 1.9 % 0.3   Baso # Latest Ref Range: 0.00 - 0.20 K/uL 0.02   nRBC Latest Ref Range: 0 /100 WBC 0   Differential Method Unknown Automated   Immature Grans (Abs) Latest Ref Range: 0.00 - 0.04 K/uL 0.02   Immature Granulocytes Latest Ref Range: 0.0 - 0.5 % 0.3     Results for ROXANNA DELGADILLO JR. (MRN [id number]) as of 4/2/2020 12:51   Ref. Range 4/2/2020 06:41   Sodium Latest Ref Range: 136 - 145 mmol/L 138   Potassium Latest Ref Range: 3.5 - 5.1 mmol/L 3.1 (L)   Chloride Latest Ref Range: 95 - 110 mmol/L 100   CO2 Latest Ref Range: 23 - 29 mmol/L 30 (H)   Anion Gap Latest Ref Range: 8 - 16 mmol/L 8   BUN, Bld Latest Ref Range: 8 - 23 mg/dL 19   Creatinine Latest Ref Range: 0.5 - 1.4 mg/dL 0.9   eGFR if non   Latest Ref Range: >60 mL/min/1.73 m^2 >60   eGFR if  Latest Ref Range: >60 mL/min/1.73 m^2 >60   Glucose Latest Ref Range: 70 - 110 mg/dL 102   Calcium Latest Ref Range: 8.7 - 10.5 mg/dL 8.7   Alkaline Phosphatase Latest Ref Range: 55 - 135 U/L 60   PROTEIN TOTAL Latest Ref Range: 6.0 - 8.4 g/dL 5.9 (L)   Albumin Latest Ref Range: 3.5 - 5.2 g/dL 2.9 (L)   BILIRUBIN TOTAL Latest Ref Range: 0.1 - 1.0 mg/dL 0.8   AST Latest Ref Range: 10 - 40 U/L 22   ALT Latest Ref Range: 10 - 44 U/L 20   CRP Latest Ref Range: 0.0 - 8.2 mg/L 67.9 (H)     Significant Imaging:   X-Ray Chest AP Portable   Status: Final result   MyChart Results Release     [employer] Status: Active  Results Release   PACS Images for [facility] [place] Viewer      Show images for X-Ray Chest AP Portable   X-Ray Chest AP Portable   Order: [id number]   Status:  Final result   Visible to patient:  Yes (Patient Portal) Next appt:  04/29/2020 at 03:00 PM in Family Medicine (Brian Marroquin MD)   Details     Reading Physician Reading Date Result Priority   Judy Aden MD 3/29/2020 STAT      Narrative     EXAMINATION:  XR CHEST AP PORTABLE    CLINICAL HISTORY:  Shortness of breath    TECHNIQUE:  Single frontal view of the chest was performed.    COMPARISON:  05/14/2018    FINDINGS:  The cardiomediastinal silhouette is stable.  There is very slight atelectatic stranding left lung base.  There are mildly prominent markings in the right lung base suggesting mild right basilar infiltrate and peribronchial thickening.  No pleural effusion.      Impression       Mildly prominent markings right lung base compared to the prior exam suggesting mild infiltrate.  Mild atelectatic like stranding left lung base      Electronically signed by: Judy Aden MD  Date: 03/29/2020  Time: 11:20             Last Resulted: 03/29/20 11:20 Order Details View Encounter Lab and Collection Details Routing Result History            External Result Report      External Result Report         I HAVE REVIEWED :    The vital signs, nurses notes, and all the pertinent radiology and labs.       ASSESSMENT & PLAN:     1.  Essential hypertension  2.  Thyroid dysfunction  3.  Hyperlipidemia  4.  History of myasthenia gravis  5.  COVID 19 virus infection  6.  Pneumonia-CAP  7.  Hypokalemia  RECOMMENDATIONS:  Recommend to supplement potassium 40 mg p.o. now  2.  Discontinue chlorthalidone  3.  May usemaxzide 37.5/25 mg daily  Maintain on prednisone  Add magnesium oxide 400 mg daily to his regimen.  Patient can follow up with his primary cardiologist if he wishes to see me in the office I will see him in about 3-4 weeks time after COVID infection has subsided.    This interview was completed by telemedicine using [employer] and discussed with the nursing staff.

## 2020-04-02 NOTE — PLAN OF CARE
Patient AAO X 4. Patient free from injury during shift. Pain managed pharmacologically. Provided full relief. Patient free from N/V during shift. IV access has saline lock. Patient placed on cardiac monitoring. NSR. Remained afebrile during shift. Pt on room air tolerating well. Restroom offered. Repositioned as needed. Safety maintained with bed alarm. Bed in lowest position, call light and personal items within reach. Patient verbalized understanding of care. Will continue to monitor with every 2 hour rounding.

## 2020-04-02 NOTE — TELEPHONE ENCOUNTER
----- Message from Tiffanie Desai sent at 4/2/2020  1:56 PM CDT -----  Contact: pt  Type: Needs Medical Advice    Who Called:  Pt  Best Call Back Number: [phone]  Additional Information: Requesting a call back regarding scheduling a hospital follow up for pneumonia  Nurse stated pt was positive  for COVID-19  Please Advise ---Thank you

## 2020-04-02 NOTE — CARE UPDATE
04/02/20 1153   Home Oxygen Qualification   Room Air SpO2 At Rest 94 %   Room Air SpO2 on Exertion 92 %   Home O2 Eval Comments Patient does not qualify

## 2020-04-02 NOTE — CARE UPDATE
04/02/20 0730   PRE-TX-O2   O2 Device (Oxygen Therapy) nasal cannula   $ Is the patient on Low Flow Oxygen? Yes   Flow (L/min) 3   SpO2 (!) 93 %   Pulse Oximetry Type Continuous   $ Pulse Oximetry - Multiple Charge Pulse Oximetry - Multiple   Pulse 68   Resp 16

## 2020-04-02 NOTE — PLAN OF CARE
04/01/20 2000   Patient Assessment/Suction   Level of Consciousness (AVPU) alert   PRE-TX-O2   O2 Device (Oxygen Therapy) nasal cannula   $ Is the patient on Low Flow Oxygen? Yes   Flow (L/min) 3   SpO2 (!) 93 %   Pulse Oximetry Type Continuous   $ Pulse Oximetry - Multiple Charge Pulse Oximetry - Multiple   Pulse 65   Resp 17

## 2020-04-03 ENCOUNTER — PATIENT MESSAGE (OUTPATIENT)
Dept: CARDIOLOGY | Facility: CLINIC | Age: 75
End: 2020-04-03

## 2020-04-03 ENCOUNTER — PATIENT OUTREACH (OUTPATIENT)
Dept: ADMINISTRATIVE | Facility: HOSPITAL | Age: 75
End: 2020-04-03

## 2020-04-03 ENCOUNTER — TELEPHONE (OUTPATIENT)
Dept: FAMILY MEDICINE | Facility: CLINIC | Age: 75
End: 2020-04-03

## 2020-04-03 DIAGNOSIS — I10 HTN (HYPERTENSION), BENIGN: Primary | ICD-10-CM

## 2020-04-03 NOTE — PROGRESS NOTES
Chart review completed 04/03/2020.  Care Everywhere updates requested and reviewed.  Immunizations reconciled. Media reviewed.

## 2020-04-03 NOTE — TELEPHONE ENCOUNTER
----- Message from Jerry Worley sent at 4/3/2020  2:21 PM CDT -----  Contact: Ptnt  [phone]  Type: Needs Medical Advice    Who Called:  Ptnt  [phone]    Additional Information: Advised returning a call to Madisyn. Please call.

## 2020-04-06 ENCOUNTER — OFFICE VISIT (OUTPATIENT)
Dept: FAMILY MEDICINE | Facility: CLINIC | Age: 75
End: 2020-04-06
Payer: MEDICARE

## 2020-04-06 ENCOUNTER — PATIENT OUTREACH (OUTPATIENT)
Dept: ADMINISTRATIVE | Facility: CLINIC | Age: 75
End: 2020-04-06

## 2020-04-06 VITALS — WEIGHT: 227 LBS | TEMPERATURE: 98 F | BODY MASS INDEX: 29.95 KG/M2

## 2020-04-06 DIAGNOSIS — J18.9 PNEUMONIA OF RIGHT LOWER LOBE DUE TO INFECTIOUS ORGANISM: ICD-10-CM

## 2020-04-06 DIAGNOSIS — Z12.5 ENCOUNTER FOR SCREENING FOR MALIGNANT NEOPLASM OF PROSTATE: ICD-10-CM

## 2020-04-06 DIAGNOSIS — K40.90 INGUINAL HERNIA WITHOUT OBSTRUCTION OR GANGRENE, RECURRENCE NOT SPECIFIED, UNSPECIFIED LATERALITY: ICD-10-CM

## 2020-04-06 DIAGNOSIS — R35.1 NOCTURIA: ICD-10-CM

## 2020-04-06 DIAGNOSIS — I10 HTN (HYPERTENSION), BENIGN: Primary | ICD-10-CM

## 2020-04-06 DIAGNOSIS — R60.0 EDEMA OF EXTREMITIES: ICD-10-CM

## 2020-04-06 PROCEDURE — 99214 PR OFFICE/OUTPT VISIT, EST, LEVL IV, 30-39 MIN: ICD-10-PCS | Mod: HCNC,95,, | Performed by: FAMILY MEDICINE

## 2020-04-06 PROCEDURE — 1159F PR MEDICATION LIST DOCUMENTED IN MEDICAL RECORD: ICD-10-PCS | Mod: HCNC,95,, | Performed by: FAMILY MEDICINE

## 2020-04-06 PROCEDURE — 1101F PR PT FALLS ASSESS DOC 0-1 FALLS W/OUT INJ PAST YR: ICD-10-PCS | Mod: HCNC,CPTII,95, | Performed by: FAMILY MEDICINE

## 2020-04-06 PROCEDURE — 99499 UNLISTED E&M SERVICE: CPT | Mod: HCNC,95,, | Performed by: FAMILY MEDICINE

## 2020-04-06 PROCEDURE — 1101F PT FALLS ASSESS-DOCD LE1/YR: CPT | Mod: HCNC,CPTII,95, | Performed by: FAMILY MEDICINE

## 2020-04-06 PROCEDURE — 99499 RISK ADDL DX/OHS AUDIT: ICD-10-PCS | Mod: HCNC,95,, | Performed by: FAMILY MEDICINE

## 2020-04-06 PROCEDURE — 99214 OFFICE O/P EST MOD 30 MIN: CPT | Mod: HCNC,95,, | Performed by: FAMILY MEDICINE

## 2020-04-06 PROCEDURE — 1159F MED LIST DOCD IN RCRD: CPT | Mod: HCNC,95,, | Performed by: FAMILY MEDICINE

## 2020-04-06 RX ORDER — TRIAMTERENE AND HYDROCHLOROTHIAZIDE 37.5; 25 MG/1; MG/1
CAPSULE ORAL
Qty: 90 CAPSULE | OUTPATIENT
Start: 2020-04-06

## 2020-04-06 NOTE — DISCHARGE SUMMARY
[facility] Hospital Medicine  Discharge Summary      Patient Name: Maximilian Tavera Jr.  MRN: [id number]  Admission Date: 3/29/2020  Hospital Length of Stay: 4 days  Discharge Date and Time: 4/2/2020  3:09 PM  Attending Physician: No att. providers found   Discharging Provider: Nuha Lane MD  Primary Care Provider: Brian Marroquin MD      HPI:   Patient is 74-year-old man with a past medical history of COPD, myasthenia gravis, hypertension, hypothyroidism hyperlipidemia cataract left eye carpal tunnel syndrome arthritis obesity polyneuropathy presented to the emergency room due to fever with the recorded temperature over 103.  Patient states that he has been having 3 days of fever with generalized malaise and fatigue and was the concern about possible COVID.    in addition he has had associated dysuria and suprapubic abdominal pain.  He denies any cough or new shortness of breath from his baseline COPD symptoms.  Patient states that he had a history of pneumonia 2 years ago during which he had a prolonged hospital stay.   He last took Tylenol this morning for his symptoms.  He endorses decreased activity and oral intake.  Denies any sick contacts.  Denies any recent travel    * No surgery found *      Hospital Course:   Patient admitted to hospital for shortness of breath. Tested negative for COVID although patient was still treated symptomatically for the disease. Given supplemental oxygen along with azithromycin and rocephin. Had a episode of afib so cards was consulted. Episode of low potassium was corrected. Cards started patient on maxzide during hospital stay. Patient's symptoms improved and he was d/c'd in a stable condition with f/u for cards and pcp.      Patient evaluated through telemed  Review of Systems   Constitutional: Negative for chills and fever.   Respiratory: Negative for cough.    Cardiovascular: Negative for chest pain.   Gastrointestinal: Negative for abdominal pain,  constipation and diarrhea.   Genitourinary: Negative for dysuria.   Skin: Negative for rash.   Neurological: Negative for headaches.     Vitals:    04/02/20 0730 04/02/20 0744 04/02/20 0800 04/02/20 1200   BP:  130/62     BP Location:  Right arm     Patient Position:  Lying     Pulse: 68 72 76 72   Resp: 16 16     Temp:  98.1 °F (36.7 °C)     TempSrc:       SpO2: (!) 93% 96%  (!) 92%   Weight:       Height:         Physical Exam   Constitutional: He appears well-developed and well-nourished.   HENT:   Head: Normocephalic and atraumatic.   Eyes: Conjunctivae are normal.   Neck: Normal range of motion.   Pulmonary/Chest: Effort normal.   Neurological: He is alert.   Psychiatric: He has a normal mood and affect. His behavior is normal.   Vitals reviewed.        Consults:   Consults (From admission, onward)        Status Ordering Provider     Inpatient consult to Cardiology  Once     Provider:  Zachariah Pacheco MD    Completed CLAUDINE CASH         Final Active Diagnoses:    Diagnosis Date Noted POA    A-fib [I48.91] 03/31/2020 No    COPD (chronic obstructive pulmonary disease) [J44.9] 03/29/2020 Yes    Lumbar radiculitis [M54.16] 12/20/2019 Yes    Bilateral carotid artery stenosis [I65.23] 10/23/2019 Yes    Nonsustained ventricular tachycardia [I47.2] 04/11/2019 Yes    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, N13.8] 02/26/2019 Yes    Aortic valve disease [I35.9] 02/15/2017 Yes    Umbilical hernia without obstruction and without gangrene [K42.9] 08/31/2015 Yes    Myasthenia gravis, AChR antibody positive [G70.00]  Yes    DDD (degenerative disc disease), lumbar [M51.36] 10/09/2014 Yes    Hypothyroid [E03.9] 05/09/2012 Yes    Hyperlipidemia [E78.5] 05/09/2012 Yes    HTN (hypertension), benign [I10] 05/09/2012 Yes      Problems Resolved During this Admission:    Diagnosis Date Noted Date Resolved POA    PRINCIPAL PROBLEM:  Pneumonia [J18.9] 03/29/2020 04/02/2020 Yes    Suspected Covid-19  Virus Infection [R68.89] 03/29/2020 04/02/2020 Yes       Discharged Condition: good    Disposition: Home or Self Care    Follow Up:  Follow-up Information     Brian Marroquin MD.    Specialty:  Family Medicine  Why:  hospital f/u.  Cm spoke donnie Moreno in scheduling.  She sent a message to the nurse fro appointment  Contact information:  [address]  [facility] [address]  [phone]             Zachariah Pacheco MD.    Specialty:  Cardiology  Why:  Episode of afib in hospital. Cm spoke with Lucero, the nurse will call pt.  Contact information:  [address]  [address]  [facility]  [place] [address]  [phone]                 Patient Instructions:      POTASSIUM   Standing Status: Future Standing Exp. Date: 06/01/21     Notify your health care provider if you experience any of the following:  temperature >100.4     Notify your health care provider if you experience any of the following:  difficulty breathing or increased cough     Activity as tolerated       Significant Diagnostic Studies: Labs: All labs within the past 24 hours have been reviewed    Pending Diagnostic Studies:     None         Medications:  Reconciled Home Medications:      Medication List      START taking these medications    triamterene-hydrochlorothiazide 37.5-25 mg 37.5-25 mg per capsule  Commonly known as:  DYAZIDE  Take 1 capsule by mouth once daily.        CONTINUE taking these medications    atorvastatin 80 MG tablet  Commonly known as:  LIPITOR  TAKE 1 TABLET EVERY DAY     carvediloL 25 MG tablet  Commonly known as:  COREG  TAKE 1 TABLET TWICE DAILY WITH MEALS     cetirizine 10 MG tablet  Commonly known as:  ZYRTEC  Take 1 tablet by mouth daily as needed.     CO Q-10 100 mg capsule  Generic drug:  coenzyme Q10  Take 400 mg by mouth once daily.     ezetimibe 10 mg tablet  Commonly known as:  ZETIA  Take 1 tablet (10 mg total) by mouth once daily.     fluticasone propionate 50 mcg/actuation nasal spray  Commonly known as:   FLONASE  USE 1 SPRAY IN EACH NOSTRIL TWICE DAILY AS NEEDED  FOR  RHINITIS     gabapentin 300 MG capsule  Commonly known as:  NEURONTIN  TAKE 1 CAPSULE THREE TIMES DAILY     levothyroxine 50 MCG tablet  Commonly known as:  SYNTHROID  Take 1 tablet (50 mcg total) by mouth once daily.     methocarbamoL 750 MG Tab  Commonly known as:  ROBAXIN  TAKE 1 TABLET (750 MG TOTAL) BY MOUTH 3 (THREE) TIMES DAILY.     milk thistle 200 mg Cap  Take 200 mg by mouth 2 (two) times daily. Twice a day     MSM 1,000 mg Tab  Generic drug:  methylsulfonylmethane  Take 1,000 mg by mouth once daily. Every day     olmesartan 20 MG tablet  Commonly known as:  BENICAR  TAKE 1 TABLET EVERY DAY     omega-3 fatty acids 1,000 mg Cap  Twice a day     pantoprazole 40 MG tablet  Commonly known as:  PROTONIX  Take 1 tablet (40 mg total) by mouth 2 (two) times daily.     potassium chloride SA 20 MEQ tablet  Commonly known as:  K-DUR,KLOR-CON  Take 1 tablet (20 mEq total) by mouth 3 (three) times daily. Take 4 tablets daily for 5 days, then 3 daily for 2 weeks, then 2 daily thereafter.     * predniSONE 5 MG tablet  Commonly known as:  DELTASONE  TAKE 1 TABLET EVERY DAY     * predniSONE 1 MG tablet  Commonly known as:  DELTASONE  TAKE 2 TABLETS EVERY DAY     sildenafil 20 mg Tab  Commonly known as:  REVATIO  Take 1 to 3 tabs daily as needed.     sucralfate 1 gram tablet  Commonly known as:  CARAFATE  Take 1 tablet (1 g total) by mouth 4 (four) times daily before meals and nightly.     VENTOLIN HFA 90 mcg/actuation inhaler  Generic drug:  albuterol  Inhale 2 puffs into the lungs every 6 (six) hours as needed for Wheezing. Rescue     VITAMIN B-12 5,000 mcg Subl  Generic drug:  cyanocobalamin (vitamin B-12)  Take 5,000 mcg by mouth once daily. Every day     VITAMIN D 50 mcg (2,000 unit) Cap  Generic drug:  cholecalciferol (vitamin D3)  1 capsule once daily. Every day         * This list has 2 medication(s) that are the same as other medications prescribed  for you. Read the directions carefully, and ask your doctor or other care provider to review them with you.            STOP taking these medications    chlorthalidone 25 MG Tab  Commonly known as:  HYGROTEN            Indwelling Lines/Drains at time of discharge:   Lines/Drains/Airways     None                 Time spent on the discharge of patient: 25 minutes  Patient was seen and examined on the date of discharge and determined to be suitable for discharge.         Nuha Lane MD  Department of Hospital Medicine  [facility]

## 2020-04-06 NOTE — HOSPITAL COURSE
Patient admitted to hospital for shortness of breath. Tested positive for COVID. Given supplemental oxygen along with azithromycin and rocephin. Had a episode of afib so cards was consulted. Episode of low potassium was corrected. Cards started patient on maxzide during hospital stay. Patient's symptoms improved and he was d/c'd in a stable condition with f/u for cards and pcp.

## 2020-04-06 NOTE — PATIENT INSTRUCTIONS

## 2020-04-06 NOTE — PATIENT INSTRUCTIONS
Pneumonia (Adult)  Pneumonia is an infection deep within the lungs. It is in the small air sacs (alveoli). Pneumonia may be caused by a virus or bacteria. Pneumonia caused by bacteria is usually treated with an antibiotic. Severe cases may need to be treated in the hospital. Milder cases can be treated at home. Symptoms usually start to get better during the first 2 days of treatment.    Home care  Follow these guidelines when caring for yourself at home:  · Rest at home for the first 2 to 3 days, or until you feel stronger. Dont let yourself get overly tired when you go back to your activities.  · Stay away from cigarette smoke - yours or other peoples.  · You may use acetaminophen or ibuprofen to control fever or pain, unless another medicine was prescribed. If you have chronic liver or kidney disease, talk with your healthcare provider before using these medicines. Also talk with your provider if youve had a stomach ulcer or gastrointestinal bleeding. Dont give aspirin to anyone younger than 18 years of age who is ill with a fever. It may cause severe liver damage.  · Your appetite may be poor, so a light diet is fine.  · Drink 6 to 8 glasses of fluids every day to make sure you are getting enough fluids. Beverages can include water, sport drinks, sodas without caffeine, juices, tea, or soup. Fluids will help loosen secretions in the lung. This will make it easier for you to cough up the phlegm (sputum). If you also have heart or kidney disease, check with your healthcare provider before you drink extra fluids.  · Take antibiotic medicine prescribed until it is all gone, even if you are feeling better after a few days.  Follow-up care  Follow up with your healthcare provider in the next 2 to 3 days, or as advised. This is to be sure the medicine is helping you get better.  If you are 65 or older, you should get a pneumococcal vaccine and a yearly flu (influenza) shot. You should also get these vaccines if  you have chronic lung disease like asthma, emphysema, or COPD. Recently, a second type of pneumonia vaccine has become available for everyone over 65 years old. This is in addition to the previous vaccine. Ask your provider about this.  When to seek medical advice  Call your healthcare provider right away if any of these occur:  · You dont get better within the first 48 hours of treatment  · Shortness of breath gets worse  · Rapid breathing (more than 25 breaths per minute)  · Coughing up blood  · Chest pain gets worse with breathing  · Fever of 100.4°F (38°C) or higher that doesnt get better with fever medicine  · Weakness, dizziness, or fainting that gets worse  · Thirst or dry mouth that gets worse  · Sinus pain, headache, or a stiff neck  · Chest pain not caused by coughing  Date Last Reviewed: 1/1/2017  © 8220-3372 [employer]. [address] [place] [address]. All rights reserved. This information is not intended as a substitute for professional medical care. Always follow your healthcare professional's instructions.

## 2020-04-07 NOTE — PROGRESS NOTES
Subjective:       Patient ID: Maximilian Tavera Jr. is a 74 y.o. male.    Chief Complaint: No chief complaint on file.    HPI:   Patient is 74-year-old man with a past medical history of COPD, myasthenia gravis, hypertension, hypothyroidism hyperlipidemia cataract left eye carpal tunnel syndrome arthritis obesity polyneuropathy presented to the emergency room due to fever with the recorded temperature over 103.  Patient states that he has been having 3 days of fever with generalized malaise and fatigue and was the concern about possible COVID.    in addition he has had associated dysuria and suprapubic abdominal pain.  He denies any cough or new shortness of breath from his baseline COPD symptoms.  Patient states that he had a history of pneumonia 2 years ago during which he had a prolonged hospital stay.   He last took Tylenol this morning for his symptoms.  He endorses decreased activity and oral intake.  Denies any sick contacts.  Denies any recent travel     * No surgery found *       Hospital Course:   Patient admitted to hospital for shortness of breath. Tested negative for COVID although patient was still treated symptomatically for the disease. Given supplemental oxygen along with azithromycin and rocephin. Had a episode of afib so cards was consulted. Episode of low potassium was corrected. Cards started patient on maxzide during hospital stay. Patient's symptoms improved and he was d/c'd in a stable condition with f/u for cards .    Patient has been afebrile, no cough and to tolerate physical activity.  He has better appetite, no sweats, no edema, no heart palpitations.  Keeping away from the Ace inhibitors.  Follow-up potassium.  His potassium to twice a day.  Follow-up chemistry, be aware of hyperkalemia.       Hypertension stable  Hypothyroid stable  Hyperlipidemia controlled    Review of Systems    Patient Active Problem List   Diagnosis    Hypothyroid    Hyperlipidemia    HTN (hypertension),  benign    Neuropathy    ED (erectile dysfunction)    DDD (degenerative disc disease), lumbar    Diplopia    Pseudophakia, right eye    Myasthenia gravis, AChR antibody positive    Umbilical hernia without obstruction and without gangrene    Agatston CAC score, <100    Aortic valve disease    Primary osteoarthritis of both knees    Abdominal aortic atherosclerosis    RBBB    On prednisone therapy    Carpal tunnel syndrome on both sides    Tortuous aorta    Epigastric pain    Cervical stenosis of spinal canal    S/P cervical spinal fusion    BPH with obstruction/lower urinary tract symptoms    PVC's (premature ventricular contractions)    Nonsustained ventricular tachycardia    Current chronic use of systemic steroids    Bilateral carotid artery stenosis    Lumbar radiculitis    Gastric ulcer    COPD (chronic obstructive pulmonary disease)    A-fib       Objective:      Physical Exam    Lab Results   Component Value Date    WBC 6.61 04/02/2020    HGB 12.6 (L) 04/02/2020    HCT 39.7 (L) 04/02/2020     (L) 04/02/2020    CHOL 114 (L) 11/21/2019    TRIG 92 11/21/2019    HDL 41 11/21/2019    ALT 20 04/02/2020    AST 22 04/02/2020     04/02/2020    K 3.1 (L) 04/02/2020     04/02/2020    CREATININE 0.9 04/02/2020    BUN 19 04/02/2020    CO2 30 (H) 04/02/2020    TSH 1.395 03/20/2019    PSA 3.5 09/19/2019    INR 1.0 10/30/2018    HGBA1C 5.5 11/21/2018     The ASCVD Risk score (Ad RENEE Jr., et al., 2013) failed to calculate for the following reasons:    The valid total cholesterol range is 130 to 320 mg/dL    Assessment:       1. HTN (hypertension), benign    2. Pneumonia of right lower lobe due to infectious organism    3. Edema of extremities    4. Nocturia    5. Inguinal hernia without obstruction or gangrene, recurrence not specified, unspecified laterality    6. Encounter for screening for malignant neoplasm of prostate         Plan:       HTN (hypertension), benign  -     IN  OFFICE EKG 12-LEAD (to Muse)    Pneumonia of right lower lobe due to infectious organism  -     Basic metabolic panel; Future; Expected date: 04/06/2020  -     Magnesium; Future; Expected date: 04/06/2020  -     CBC auto differential; Future; Expected date: 04/06/2020  -     X-Ray Chest PA And Lateral; Future; Expected date: 04/06/2020  -     IN OFFICE EKG 12-LEAD (to Muse)    Edema of extremities  -     IN OFFICE EKG 12-LEAD (to Muse)    Nocturia  -     PSA, Screening; Future; Expected date: 04/06/2020    Inguinal hernia without obstruction or gangrene, recurrence not specified, unspecified laterality  -     Ambulatory referral/consult to General Surgery; Future; Expected date: 04/13/2020  -     US Abdomen Limited_Hernia; Future; Expected date: 04/06/2020    Encounter for screening for malignant neoplasm of prostate   -     PSA, Screening; Future; Expected date: 04/06/2020      Patient readiness: acceptance and barriers:none    During the course of the visit the patient was educated and counseled about the following:     Hypertension:   Dietary sodium restriction.  Regular aerobic exercise.  Check blood pressures daily and record.  Obesity:   General weight loss/lifestyle modification strategies discussed (elicit support from others; identify saboteurs; non-food rewards, etc).  Informal exercise measures discussed, e.g. taking stairs instead of elevator.    Goals: Hypertension: Reduce Blood Pressure and Obesity: Reduce calorie intake and BMI    Did patient meet goals/outcomes: Yes    The following self management tools provided: blood pressure log  excercise log    Patient Instructions (the written plan) was given to the patient/family.     Time spent with patient: 30 minutes    Barriers to medications present (yes )    Adverse reactions to current medications (yes)    Over the counter medications reviewed (Yes)    Your test was NEGATIVE for COVID-19 (coronavirus).  If you still have symptoms, treat with rest,  fluids, and over-the-counter medications.  Continue to stay home and practice proper handwashing.     If your symptoms worsen or if you have any other concerns, please contact [facility] at [phone].               30 minutes spent counseling patient on diet, exercise, and weight loss.  This has been fully explained to the patient, who indicates understanding.    Discussed health maintenance guidelines appropriate for age.

## 2020-04-08 NOTE — PROGRESS NOTES
Patient, Maximilian Tavera Jr. (MRN #[id number]), presented with a recent Platelet count less than 150 K/uL consistent with the definition of thrombocytopenia (ICD10 - D69.6).    Platelets   Date Value Ref Range Status   04/02/2020 134 (L) 150 - 350 K/uL Final     The patient's thrombocytopenia was monitored, evaluated, addressed and/or treated. This addendum to the medical record is made on 04/08/2020.

## 2020-04-09 ENCOUNTER — PATIENT MESSAGE (OUTPATIENT)
Dept: CARDIOLOGY | Facility: CLINIC | Age: 75
End: 2020-04-09

## 2020-04-16 ENCOUNTER — PATIENT MESSAGE (OUTPATIENT)
Dept: CARDIOLOGY | Facility: CLINIC | Age: 75
End: 2020-04-16

## 2020-04-16 ENCOUNTER — TELEPHONE (OUTPATIENT)
Dept: ORTHOPEDICS | Facility: CLINIC | Age: 75
End: 2020-04-16

## 2020-04-16 ENCOUNTER — LAB VISIT (OUTPATIENT)
Dept: LAB | Facility: HOSPITAL | Age: 75
End: 2020-04-16
Attending: INTERNAL MEDICINE
Payer: MEDICARE

## 2020-04-16 ENCOUNTER — PATIENT MESSAGE (OUTPATIENT)
Dept: ORTHOPEDICS | Facility: CLINIC | Age: 75
End: 2020-04-16

## 2020-04-16 DIAGNOSIS — I10 HTN (HYPERTENSION), BENIGN: ICD-10-CM

## 2020-04-16 LAB
ANION GAP SERPL CALC-SCNC: 6 MMOL/L (ref 8–16)
BUN SERPL-MCNC: 17 MG/DL (ref 8–23)
CALCIUM SERPL-MCNC: 8.7 MG/DL (ref 8.7–10.5)
CHLORIDE SERPL-SCNC: 106 MMOL/L (ref 95–110)
CO2 SERPL-SCNC: 31 MMOL/L (ref 23–29)
CREAT SERPL-MCNC: 1 MG/DL (ref 0.5–1.4)
EST. GFR  (AFRICAN AMERICAN): >60 ML/MIN/1.73 M^2
EST. GFR  (NON AFRICAN AMERICAN): >60 ML/MIN/1.73 M^2
GLUCOSE SERPL-MCNC: 97 MG/DL (ref 70–110)
POTASSIUM SERPL-SCNC: 4.1 MMOL/L (ref 3.5–5.1)
SODIUM SERPL-SCNC: 143 MMOL/L (ref 136–145)

## 2020-04-16 PROCEDURE — 36415 COLL VENOUS BLD VENIPUNCTURE: CPT | Mod: HCNC,PO

## 2020-04-16 PROCEDURE — 80048 BASIC METABOLIC PNL TOTAL CA: CPT | Mod: HCNC

## 2020-04-21 NOTE — TELEPHONE ENCOUNTER
Spoke w pt states he is recovery from pna would like to schedule appt in may VV appt waws scheduled.

## 2020-05-01 ENCOUNTER — PATIENT OUTREACH (OUTPATIENT)
Dept: ADMINISTRATIVE | Facility: HOSPITAL | Age: 75
End: 2020-05-01

## 2020-05-01 NOTE — PROGRESS NOTES
Chart review completed 05/01/2020.  Care Everywhere updates requested and reviewed.  Immunizations reconciled. Media reviewed.

## 2020-05-05 ENCOUNTER — PATIENT MESSAGE (OUTPATIENT)
Dept: ADMINISTRATIVE | Facility: HOSPITAL | Age: 75
End: 2020-05-05

## 2020-05-06 ENCOUNTER — PATIENT OUTREACH (OUTPATIENT)
Dept: ADMINISTRATIVE | Facility: OTHER | Age: 75
End: 2020-05-06

## 2020-05-07 ENCOUNTER — TELEPHONE (OUTPATIENT)
Dept: UROLOGY | Facility: CLINIC | Age: 75
End: 2020-05-07

## 2020-05-11 ENCOUNTER — OFFICE VISIT (OUTPATIENT)
Dept: UROLOGY | Facility: CLINIC | Age: 75
End: 2020-05-11
Payer: MEDICARE

## 2020-05-11 DIAGNOSIS — N40.1 BPH WITH URINARY OBSTRUCTION: Primary | ICD-10-CM

## 2020-05-11 DIAGNOSIS — N13.8 BPH WITH URINARY OBSTRUCTION: Primary | ICD-10-CM

## 2020-05-11 PROCEDURE — 99213 OFFICE O/P EST LOW 20 MIN: CPT | Mod: HCNC,95,, | Performed by: UROLOGY

## 2020-05-11 PROCEDURE — 1159F MED LIST DOCD IN RCRD: CPT | Mod: HCNC,95,, | Performed by: UROLOGY

## 2020-05-11 PROCEDURE — 1101F PT FALLS ASSESS-DOCD LE1/YR: CPT | Mod: HCNC,CPTII,95, | Performed by: UROLOGY

## 2020-05-11 PROCEDURE — 1159F PR MEDICATION LIST DOCUMENTED IN MEDICAL RECORD: ICD-10-PCS | Mod: HCNC,95,, | Performed by: UROLOGY

## 2020-05-11 PROCEDURE — 1101F PR PT FALLS ASSESS DOC 0-1 FALLS W/OUT INJ PAST YR: ICD-10-PCS | Mod: HCNC,CPTII,95, | Performed by: UROLOGY

## 2020-05-11 PROCEDURE — 99213 PR OFFICE/OUTPT VISIT, EST, LEVL III, 20-29 MIN: ICD-10-PCS | Mod: HCNC,95,, | Performed by: UROLOGY

## 2020-05-11 NOTE — PROGRESS NOTES
[facility] Urology Progress Note (virtual/video visit)    Maximilian Tavera Jr. is a 74 y.o. male who presents for BPH f/u    Hx myasthenia gravis, HTN, and enlarged prostate with AUA SS 23/4. Also history of C-spine surgery in November 2018 with aguilera in for a few days after and subsequent difficulty emptying and UTI which required 2 treatment courses of antibiotics to clear and he wason prophylactic daily abx after. Unable to tolerate flomax 2/2 side effects and has since been on alfuzosin.    1/18/19 AUA SS 23/4 mostly dissat (5: weak stream; 3: emptying, freq, int, urg, strain, nocturia), though also urine was infected. PVR 21cc.  No prior history of UTI/prostatitis after suspected difficulty to clear UTI after aguilera after neck surgery. No abx postop.     Cysto/trus 3/4/19  TRUS: volume 50.7cm3 (W 53mm, H 32mm, L 57mm), no median lobe, mild pvr  Cysto: Bilateral lateral lobe ingrowth causing mdoerate obstruction especially when viewed from verumontanum, with kissing lobes especially with flow of water off. No median lobe. Mild erythema over impression of prostate at bladder base centrally. Mild trabeculations  - elected refer to proceed with Urolift given his symptomatic obstruction with recent UTI, though unclear if catheter related, and inability to tolerate Flomax was symptomatic persistence on alfuzosin.  Preferred not to add finasteride or be on other medicines.  Wanted to establish with new neuromuscular specialist for his myasthenia gravis 1st  - he later deferred for GI issues    He returns today noting  Hospitalized early April with concern for covid  MG and back/sciatica problems, had 2 epidurals with Dr Watt and neither helped  Has only seen MG specialist once.  NTFx2, rarely 3x  Stream not as weak as last year, flow steady most times  Intolerant of alpha blockers  Dr Altamirano - cardiology (recent visit Jan 2020), Arrythmia specialist - Dr Zamudio  PCP Dr Marroquin - appt tomorrow - first f/u after hospital  discharge  No hematuria/dysuria. No feelings of incomplete emptying or UTI    ROS: A comprehensive 10 system review was performed and is negative except as noted above in HPI    PHYSICAL EXAM:     There were no vitals filed for this visit. 2/2 virtual visit, but denies fever     General: Alert, cooperative, no distress, appears stated age   Head: Normocephalic, without obvious abnormality, atraumatic   Eyes: PERRL, conjunctiva/corneas clear   Lungs: Respirations visibly unlabored, no accessory muscle use   CV: appears Warm and well perfused   Extremities: Extremities normal, atraumatic, no cyanosis or edema   Skin: Skin color, texture, turgor normal, no rashes or lesions   Psych: Appropriate   Neurologic: Non-focal    ASSESSMENT   1. BPH with urinary obstruction         Plan    Essentially stable LUTS.  Still interested in minimally invasive intervention is does not want add further medications, would like to minimize the amount medical therapy he is on given his significant comorbidities.  He 1st minimally invasive intervention strategies to minimize risk given his comorbidities.  He has had a lot going on with his health including back and spine, neurologic, cardiac, GI, and most recently with hospital admission for COVID concern.  He has follow-up with his primary care provider tomorrow.  He has seen cardiology this calendar year, and has not seen neuromuscular specialist for his myasthenia gravis in a year and would like to follow-up before proceeding with any intervention.    Will continue current management at this time and keep on the pending surgery list for Urolift for minimally invasive BPH intervention for resolution of his LUTS, and he did ask that a reach out to his other providers and subspecialists to help arrange annual follow-up and clearance to proceed.  In the interim, he will schedule office based noninvasive assessment of his obstructive voiding and reassessment of his emptying with a uroflow  and bladder scan PVR as a nurse visit in 3 months and review the data.  If he is able to proceed with intervention before then, can cancel was nurse visit.      The patient location is: home 2/2 covid19 outbreak  The chief complaint leading to consultation is: bph follow up  Visit type: Virtual visit with synchronous audio and video  Time spent with patient: 15 mins, over half in counseling  Each patient to whom he or she provides medical services by telemedicine is:  (1) informed of the relationship between the physician and patient and the respective role of any other health care provider with respect to management of the patient; and (2) notified that he or she may decline to receive medical services by telemedicine and may withdraw from such care at any time

## 2020-05-12 ENCOUNTER — OFFICE VISIT (OUTPATIENT)
Dept: FAMILY MEDICINE | Facility: CLINIC | Age: 75
End: 2020-05-12
Payer: MEDICARE

## 2020-05-12 DIAGNOSIS — Z79.52 CURRENT CHRONIC USE OF SYSTEMIC STEROIDS: ICD-10-CM

## 2020-05-12 DIAGNOSIS — K40.90 INGUINAL HERNIA WITHOUT OBSTRUCTION OR GANGRENE, RECURRENCE NOT SPECIFIED, UNSPECIFIED LATERALITY: ICD-10-CM

## 2020-05-12 DIAGNOSIS — E03.1 CONGENITAL HYPOTHYROIDISM WITHOUT GOITER: Primary | ICD-10-CM

## 2020-05-12 DIAGNOSIS — I10 HTN (HYPERTENSION), BENIGN: ICD-10-CM

## 2020-05-12 PROCEDURE — 99214 PR OFFICE/OUTPT VISIT, EST, LEVL IV, 30-39 MIN: ICD-10-PCS | Mod: HCNC,95,, | Performed by: FAMILY MEDICINE

## 2020-05-12 PROCEDURE — 1159F PR MEDICATION LIST DOCUMENTED IN MEDICAL RECORD: ICD-10-PCS | Mod: HCNC,95,, | Performed by: FAMILY MEDICINE

## 2020-05-12 PROCEDURE — 1159F MED LIST DOCD IN RCRD: CPT | Mod: HCNC,95,, | Performed by: FAMILY MEDICINE

## 2020-05-12 PROCEDURE — 1101F PT FALLS ASSESS-DOCD LE1/YR: CPT | Mod: HCNC,CPTII,95, | Performed by: FAMILY MEDICINE

## 2020-05-12 PROCEDURE — 1101F PR PT FALLS ASSESS DOC 0-1 FALLS W/OUT INJ PAST YR: ICD-10-PCS | Mod: HCNC,CPTII,95, | Performed by: FAMILY MEDICINE

## 2020-05-12 PROCEDURE — 99214 OFFICE O/P EST MOD 30 MIN: CPT | Mod: HCNC,95,, | Performed by: FAMILY MEDICINE

## 2020-05-12 RX ORDER — CHLORTHALIDONE 25 MG/1
TABLET ORAL
Qty: 30 TABLET | Refills: 11
Start: 2020-05-12 | End: 2020-11-06 | Stop reason: ALTCHOICE

## 2020-05-12 NOTE — PATIENT INSTRUCTIONS

## 2020-05-31 ENCOUNTER — TELEPHONE (OUTPATIENT)
Dept: UROLOGY | Facility: CLINIC | Age: 75
End: 2020-05-31

## 2020-05-31 NOTE — TELEPHONE ENCOUNTER
Dr Hoang et al    Referred this pt with MG to yall last year when planning BPH intervention to est care and provide any preop recs/optimization    He did not proceed at that time but wants to in near future, and also noted due for follow up with yall and asked I reach out to help get him some follow up and also eval from a preop perspective to make any recs relevant to brief surgery/anesthesia    Please contact pt for appt  Thanks

## 2020-06-01 NOTE — PROGRESS NOTES
Subjective:       Patient ID: Maximilian Tavera Jr. is a 74 y.o. male.    Chief Complaint: No chief complaint on file.    HPI:   Patient is 74-year-old man with a past medical history of COPD, myasthenia gravis, hypertension, hypothyroidism hyperlipidemia cataract left eye carpal tunnel syndrome arthritis obesity polyneuropathy presented to the emergency room due to fever with the recorded temperature over 103.  Patient states that he has been having 3 days of fever with generalized malaise and fatigue and was the concern about possible COVID.    in addition he has had associated dysuria and suprapubic abdominal pain.  He denies any cough or new shortness of breath from his baseline COPD symptoms.  Patient states that he had a history of pneumonia 2 years ago during which he had a prolonged hospital stay.   He last took Tylenol this morning for his symptoms.  He endorses decreased activity and oral intake.  Denies any sick contacts.  Denies any recent travel     * No surgery found *       Hospital Course:   Patient admitted to hospital for shortness of breath. Tested negative for COVID although patient was still treated symptomatically for the disease. Given supplemental oxygen along with azithromycin and rocephin. Had a episode of afib so cards was consulted. Episode of low potassium was corrected. Cards started patient on maxzide during hospital stay. Patient's symptoms improved and he was d/c'd in a stable condition with f/u for cards .    Patient has been afebrile, no cough and to tolerate physical activity.  He has better appetite, no sweats, no edema, no heart palpitations.  Keeping away from the Ace inhibitors.  Follow-up potassium.  His potassium to twice a day.  Follow-up chemistry, be aware of hyperkalemia.       Hypertension stable  Hypothyroid stable  Hyperlipidemia controlled    Review of Systems   Constitutional: Negative for fatigue and unexpected weight change.   Respiratory: Negative for chest  tightness and shortness of breath.    Cardiovascular: Negative for chest pain, palpitations and leg swelling.   Gastrointestinal: Negative for abdominal pain.   Musculoskeletal: Negative for arthralgias.   Neurological: Negative for dizziness, syncope, light-headedness and headaches.       Patient Active Problem List   Diagnosis    Hypothyroid    Hyperlipidemia    HTN (hypertension), benign    Neuropathy    ED (erectile dysfunction)    DDD (degenerative disc disease), lumbar    Diplopia    Pseudophakia, right eye    Myasthenia gravis, AChR antibody positive    Umbilical hernia without obstruction and without gangrene    Agatston CAC score, <100    Aortic valve disease    Primary osteoarthritis of both knees    Abdominal aortic atherosclerosis    RBBB    On prednisone therapy    Carpal tunnel syndrome on both sides    Tortuous aorta    Epigastric pain    Cervical stenosis of spinal canal    S/P cervical spinal fusion    BPH with obstruction/lower urinary tract symptoms    PVC's (premature ventricular contractions)    Nonsustained ventricular tachycardia    Current chronic use of systemic steroids    Bilateral carotid artery stenosis    Lumbar radiculitis    Gastric ulcer    COPD (chronic obstructive pulmonary disease)    A-fib       Objective:      Physical Exam    Lab Results   Component Value Date    WBC 6.61 04/02/2020    HGB 12.6 (L) 04/02/2020    HCT 39.7 (L) 04/02/2020     (L) 04/02/2020    CHOL 114 (L) 11/21/2019    TRIG 92 11/21/2019    HDL 41 11/21/2019    ALT 20 04/02/2020    AST 22 04/02/2020     04/16/2020    K 4.1 04/16/2020     04/16/2020    CREATININE 1.0 04/16/2020    BUN 17 04/16/2020    CO2 31 (H) 04/16/2020    TSH 1.395 03/20/2019    PSA 3.5 09/19/2019    INR 1.0 10/30/2018    HGBA1C 5.5 11/21/2018     The ASCVD Risk score (Ad RENEE Jr., et al., 2013) failed to calculate for the following reasons:    The valid total cholesterol range is 130 to 320  mg/dL    Assessment:       1. Congenital hypothyroidism without goiter    2. Inguinal hernia without obstruction or gangrene, recurrence not specified, unspecified laterality    3. HTN (hypertension), benign    4. Current chronic use of systemic steroids        Plan:       Congenital hypothyroidism without goiter  -     TSH; Future; Expected date: 05/12/2020  -     Lipid Panel; Future; Expected date: 05/12/2020  -     Basic metabolic panel; Future; Expected date: 05/12/2020  -     CBC auto differential; Future; Expected date: 05/12/2020  -     T4; Future; Expected date: 05/12/2020    Inguinal hernia without obstruction or gangrene, recurrence not specified, unspecified laterality  -     CBC auto differential; Future; Expected date: 05/12/2020    HTN (hypertension), benign  -     Hypertension Digital Medicine (HDMP) Enrollment Order    Current chronic use of systemic steroids  -     C-reactive protein; Future; Expected date: 05/12/2020    Other orders  -     chlorthalidone (HYGROTEN) 25 MG Tab; As directed  Dispense: 30 tablet; Refill: 11      Patient readiness: acceptance and barriers:none    During the course of the visit the patient was educated and counseled about the following:     Hypertension:   Dietary sodium restriction.  Regular aerobic exercise.  Check blood pressures daily and record.  Obesity:   General weight loss/lifestyle modification strategies discussed (elicit support from others; identify saboteurs; non-food rewards, etc).  Informal exercise measures discussed, e.g. taking stairs instead of elevator.    Goals: Hypertension: Reduce Blood Pressure and Obesity: Reduce calorie intake and BMI    Did patient meet goals/outcomes: Yes    The following self management tools provided: blood pressure log  excercise log    Patient Instructions (the written plan) was given to the patient/family.     Time spent with patient: 30 minutes    Barriers to medications present (yes )    Adverse reactions to current  medications (yes)    Over the counter medications reviewed (Yes)            30 minutes spent counseling patient on diet, exercise, and weight loss.  This has been fully explained to the patient, who indicates understanding.    Discussed health maintenance guidelines appropriate for age.  Discussed health maintenance guidelines appropriate for age.

## 2020-06-02 ENCOUNTER — PATIENT OUTREACH (OUTPATIENT)
Dept: ADMINISTRATIVE | Facility: OTHER | Age: 75
End: 2020-06-02

## 2020-06-03 DIAGNOSIS — M25.562 LEFT KNEE PAIN, UNSPECIFIED CHRONICITY: Primary | ICD-10-CM

## 2020-06-04 ENCOUNTER — OFFICE VISIT (OUTPATIENT)
Dept: ORTHOPEDICS | Facility: CLINIC | Age: 75
End: 2020-06-04
Payer: MEDICARE

## 2020-06-04 ENCOUNTER — HOSPITAL ENCOUNTER (OUTPATIENT)
Dept: RADIOLOGY | Facility: HOSPITAL | Age: 75
Discharge: HOME | End: 2020-06-04
Attending: ORTHOPAEDIC SURGERY
Payer: MEDICARE

## 2020-06-04 VITALS — BODY MASS INDEX: 30.09 KG/M2 | WEIGHT: 227 LBS | RESPIRATION RATE: 16 BRPM | HEIGHT: 73 IN

## 2020-06-04 DIAGNOSIS — M17.12 ARTHRITIS OF KNEE, LEFT: ICD-10-CM

## 2020-06-04 DIAGNOSIS — M17.12 PRIMARY OSTEOARTHRITIS OF LEFT KNEE: Primary | ICD-10-CM

## 2020-06-04 DIAGNOSIS — M77.11 LATERAL EPICONDYLITIS, RIGHT ELBOW: Primary | ICD-10-CM

## 2020-06-04 DIAGNOSIS — M25.562 LEFT KNEE PAIN, UNSPECIFIED CHRONICITY: ICD-10-CM

## 2020-06-04 PROCEDURE — 99213 PR OFFICE/OUTPT VISIT, EST, LEVL III, 20-29 MIN: ICD-10-PCS | Mod: 25,HCNC,S$GLB, | Performed by: ORTHOPAEDIC SURGERY

## 2020-06-04 PROCEDURE — 73564 X-RAY EXAM KNEE 4 OR MORE: CPT | Mod: 26,HCNC,LT, | Performed by: RADIOLOGY

## 2020-06-04 PROCEDURE — 73562 X-RAY EXAM OF KNEE 3: CPT | Mod: TC,HCNC,PN,RT

## 2020-06-04 PROCEDURE — 1101F PT FALLS ASSESS-DOCD LE1/YR: CPT | Mod: HCNC,CPTII,S$GLB, | Performed by: ORTHOPAEDIC SURGERY

## 2020-06-04 PROCEDURE — 1125F PR PAIN SEVERITY QUANTIFIED, PAIN PRESENT: ICD-10-PCS | Mod: HCNC,S$GLB,, | Performed by: ORTHOPAEDIC SURGERY

## 2020-06-04 PROCEDURE — 73564 XR KNEE ORTHO LEFT WITH FLEXION: ICD-10-PCS | Mod: 26,HCNC,LT, | Performed by: RADIOLOGY

## 2020-06-04 PROCEDURE — 73562 XR KNEE ORTHO LEFT WITH FLEXION: ICD-10-PCS | Mod: 26,HCNC,RT, | Performed by: RADIOLOGY

## 2020-06-04 PROCEDURE — 99999 PR PBB SHADOW E&M-EST. PATIENT-LVL III: ICD-10-PCS | Mod: PBBFAC,HCNC,, | Performed by: ORTHOPAEDIC SURGERY

## 2020-06-04 PROCEDURE — 1159F MED LIST DOCD IN RCRD: CPT | Mod: HCNC,S$GLB,, | Performed by: ORTHOPAEDIC SURGERY

## 2020-06-04 PROCEDURE — 1101F PR PT FALLS ASSESS DOC 0-1 FALLS W/OUT INJ PAST YR: ICD-10-PCS | Mod: HCNC,CPTII,S$GLB, | Performed by: ORTHOPAEDIC SURGERY

## 2020-06-04 PROCEDURE — 1125F AMNT PAIN NOTED PAIN PRSNT: CPT | Mod: HCNC,S$GLB,, | Performed by: ORTHOPAEDIC SURGERY

## 2020-06-04 PROCEDURE — 1159F PR MEDICATION LIST DOCUMENTED IN MEDICAL RECORD: ICD-10-PCS | Mod: HCNC,S$GLB,, | Performed by: ORTHOPAEDIC SURGERY

## 2020-06-04 PROCEDURE — 20551 TENDON ORIGIN: R ELBOW: ICD-10-PCS | Mod: HCNC,59,51,RT | Performed by: ORTHOPAEDIC SURGERY

## 2020-06-04 PROCEDURE — 97760 PR ORTHOTIC MGMT&TRAINJ INITIAL ENC EA 15 MINS: ICD-10-PCS | Mod: HCNC,GP,S$GLB, | Performed by: ORTHOPAEDIC SURGERY

## 2020-06-04 PROCEDURE — 73562 X-RAY EXAM OF KNEE 3: CPT | Mod: 26,HCNC,RT, | Performed by: RADIOLOGY

## 2020-06-04 PROCEDURE — 20610 DRAIN/INJ JOINT/BURSA W/O US: CPT | Mod: HCNC,LT,S$GLB, | Performed by: ORTHOPAEDIC SURGERY

## 2020-06-04 PROCEDURE — 97760 ORTHOTIC MGMT&TRAING 1ST ENC: CPT | Mod: HCNC,GP,S$GLB, | Performed by: ORTHOPAEDIC SURGERY

## 2020-06-04 PROCEDURE — 20551 NJX 1 TENDON ORIGIN/INSJ: CPT | Mod: HCNC,59,51,RT | Performed by: ORTHOPAEDIC SURGERY

## 2020-06-04 PROCEDURE — 99999 PR PBB SHADOW E&M-EST. PATIENT-LVL III: CPT | Mod: PBBFAC,HCNC,, | Performed by: ORTHOPAEDIC SURGERY

## 2020-06-04 PROCEDURE — 99213 OFFICE O/P EST LOW 20 MIN: CPT | Mod: 25,HCNC,S$GLB, | Performed by: ORTHOPAEDIC SURGERY

## 2020-06-04 PROCEDURE — 20610 LARGE JOINT ASPIRATION/INJECTION: L KNEE: ICD-10-PCS | Mod: HCNC,LT,S$GLB, | Performed by: ORTHOPAEDIC SURGERY

## 2020-06-04 RX ORDER — TRIAMCINOLONE ACETONIDE 40 MG/ML
40 INJECTION, SUSPENSION INTRA-ARTICULAR; INTRAMUSCULAR
Status: DISCONTINUED | OUTPATIENT
Start: 2020-06-04 | End: 2020-06-04 | Stop reason: HOSPADM

## 2020-06-04 RX ADMIN — TRIAMCINOLONE ACETONIDE 40 MG: 40 INJECTION, SUSPENSION INTRA-ARTICULAR; INTRAMUSCULAR at 03:06

## 2020-06-04 NOTE — PROGRESS NOTES
Past Medical History:   Diagnosis Date    A-fib 3/31/2020    Arthritis     Back pain     Carpal tunnel syndrome 2/25/2013    Cataract     Cataract, left eye 11/10/2014    Chest pain, musculoskeletal     COPD (chronic obstructive pulmonary disease) 11/052018    Emphysema lung 11/05/2018    Gastritis     Hx of colonic polyp     Hyperlipidemia     Hypertension     Hypothyroidism     Knee fracture     Myasthenia gravis     Neuropathy 1/3/2013    Obesity 1/29/2015    Pneumonia 3/29/2020    Polyneuropathy     PVC (premature ventricular contraction)     Squamous cell carcinoma 2014    left forearm    Thyroid disease        Past Surgical History:   Procedure Laterality Date    APPENDECTOMY      CATARACT EXTRACTION W/  INTRAOCULAR LENS IMPLANT Bilateral     COLONOSCOPY  03/01/2012    Dr Esteban; hyperplastic polyp; repeat in 5 years    CYSTOSCOPY N/A 2/26/2019    Procedure: CYSTOSCOPY;  Surgeon: Simba Cheung MD;  Location: [facility];  Service: Urology;  Laterality: N/A;    ENDOSCOPIC ULTRASOUND OF UPPER GASTROINTESTINAL TRACT N/A 1/7/2020    Procedure: ULTRASOUND, UPPER GI TRACT, ENDOSCOPIC;  Surgeon: Kati Ryan MD;  Location: [facility] ([address] [place] [address]);  Service: Endoscopy;  Laterality: N/A;    ESOPHAGOGASTRODUODENOSCOPY N/A 9/12/2018    Procedure: EGD (ESOPHAGOGASTRODUODENOSCOPY);  Surgeon: Gabriel Hernandez MD;  Location: [facility];  Service: Endoscopy;  Laterality: N/A;    ESOPHAGOGASTRODUODENOSCOPY N/A 8/19/2019    Procedure: EGD (ESOPHAGOGASTRODUODENOSCOPY);  Surgeon: Gabriel Hernandez MD;  Location: [facility];  Service: Endoscopy;  Laterality: N/A;    ESOPHAGOGASTRODUODENOSCOPY N/A 10/7/2019    Procedure: EGD (ESOPHAGOGASTRODUODENOSCOPY);  Surgeon: Gabriel Hernandez MD;  Location: [facility];  Service: Endoscopy;  Laterality: N/A;    ESOPHAGOGASTRODUODENOSCOPY N/A 1/7/2020    Procedure: EGD (ESOPHAGOGASTRODUODENOSCOPY);  Surgeon: Kati Ryan MD;  Location: [facility] ([facility]  Service: Endoscopy;  Laterality: N/A;    HEMORRHOID SURGERY      KNEE ARTHROPLASTY Bilateral     NECK SURGERY      POSTERIOR FUSION OF CERVICAL SPINE WITH LAMINECTOMY N/A 11/7/2018    Procedure: C2-C6 Posterior Cervical Laminectomy & Instrumental Fusion;  Surgeon: Kishore Turner MD;  [address];  Service: Neurosurgery;  Laterality: N/A;    TONSILLECTOMY      TRANSFORAMINAL EPIDURAL INJECTION OF STEROID Right 12/20/2019    Procedure: Injection,steroid,epidural,transforaminal approach;  Surgeon: Devan Watt MD;  Location: [facility];  Service: Pain Management;  Laterality: Right;  L3-4, L4-5    TRANSFORAMINAL EPIDURAL INJECTION OF STEROID Bilateral 2/6/2020    Procedure: Injection,steroid,epidural,transforaminal approach;  Surgeon: Devan Watt MD;  Location: [facility];  Service: Pain Management;  Laterality: Bilateral;  L3-L4,L4-L5    TRANSRECTAL ULTRASOUND EXAMINATION N/A 2/26/2019    Procedure: ULTRASOUND, RECTAL APPROACH;  Surgeon: Simba Cheung MD;  Location: [facility];  Service: Urology;  Laterality: N/A;    UPPER GASTROINTESTINAL ENDOSCOPY  09/12/2018    Dr Hernandez; gastritis; extensive intestinal metaplasia; repeat in 1-2- years       Current Outpatient Medications   Medication Sig    atorvastatin (LIPITOR) 80 MG tablet TAKE 1 TABLET EVERY DAY    carvediloL (COREG) 25 MG tablet TAKE 1 TABLET TWICE DAILY WITH MEALS    cetirizine (ZYRTEC) 10 MG tablet Take 1 tablet by mouth daily as needed.    chlorthalidone (HYGROTEN) 25 MG Tab As directed    cholecalciferol, vitamin D3, (VITAMIN D) 2,000 unit Cap 1 capsule once daily. Every day    coenzyme Q10 (CO Q-10) 100 mg capsule Take 400 mg by mouth once daily.     cyanocobalamin, vitamin B-12, (VITAMIN B-12) 5,000 mcg Subl Take 5,000 mcg by mouth once daily. Every day    ezetimibe (ZETIA) 10 mg tablet Take 1 tablet (10 mg total) by mouth once daily.    fluticasone (FLONASE) 50 mcg/actuation nasal spray USE 1 SPRAY IN EACH NOSTRIL  TWICE DAILY AS NEEDED  FOR  RHINITIS    gabapentin (NEURONTIN) 300 MG capsule TAKE 1 CAPSULE THREE TIMES DAILY    levothyroxine (SYNTHROID) 50 MCG tablet Take 1 tablet (50 mcg total) by mouth once daily.    methylsulfonylmethane (MSM) 1,000 mg Tab Take 1,000 mg by mouth once daily. Every day    milk thistle 200 mg Cap Take 200 mg by mouth 2 (two) times daily. Twice a day    olmesartan (BENICAR) 20 MG tablet TAKE 1 TABLET EVERY DAY    omega-3 fatty acids 1,000 mg Cap Twice a day    pantoprazole (PROTONIX) 40 MG tablet Take 1 tablet (40 mg total) by mouth 2 (two) times daily.    potassium chloride SA (K-DUR,KLOR-CON) 20 MEQ tablet Take 1 tablet (20 mEq total) by mouth 3 (three) times daily. Take 4 tablets daily for 5 days, then 3 daily for 2 weeks, then 2 daily thereafter.    predniSONE (DELTASONE) 1 MG tablet TAKE 2 TABLETS EVERY DAY    predniSONE (DELTASONE) 5 MG tablet TAKE 1 TABLET EVERY DAY    sildenafil (REVATIO) 20 mg Tab Take 1 to 3 tabs daily as needed.    sucralfate (CARAFATE) 1 gram tablet Take 1 tablet (1 g total) by mouth 4 (four) times daily before meals and nightly.    VENTOLIN HFA 90 mcg/actuation inhaler Inhale 2 puffs into the lungs every 6 (six) hours as needed for Wheezing. Rescue     No current facility-administered medications for this visit.      Facility-Administered Medications Ordered in Other Visits   Medication    0.9%  NaCl infusion       Review of patient's allergies indicates:   Allergen Reactions    No known drug allergies        Family History   Problem Relation Age of Onset    Cataracts Mother     Heart disease Mother         CHF    Hypertension Mother     Hyperlipidemia Mother     Cataracts Father     Glaucoma Father     Heart disease Father     Hyperlipidemia Sister     Hypertension Sister     No Known Problems Daughter     No Known Problems Daughter     Collagen disease Neg Hx     Amblyopia Neg Hx     Blindness Neg Hx     Macular degeneration Neg Hx      Retinal detachment Neg Hx     Strabismus Neg Hx     Cancer Neg Hx     Colon cancer Neg Hx     Esophageal cancer Neg Hx     Stomach cancer Neg Hx     Crohn's disease Neg Hx     Ulcerative colitis Neg Hx        Social History     Socioeconomic History    Marital status:      Spouse name: Not on file    Number of children: Not on file    Years of education: Not on file    Highest education level: Not on file   Occupational History    Not on file   Social Needs    Financial resource strain: Not hard at all    Food insecurity:     Worry: Never true     Inability: Never true    Transportation needs:     Medical: No     Non-medical: No   Tobacco Use    Smoking status: Never Smoker    Smokeless tobacco: Never Used    Tobacco comment: when a child   Substance and Sexual Activity    Alcohol use: Yes     Frequency: Never     Binge frequency: Never     Comment: rarely    Drug use: No    Sexual activity: Yes     Partners: Female   Lifestyle    Physical activity:     Days per week: 0 days     Minutes per session: 0 min    Stress: Not at all   Relationships    Social connections:     Talks on phone: More than three times a week     Gets together: More than three times a week     Attends Scientologist service: Not on file     Active member of club or organization: Yes     Attends meetings of clubs or organizations: More than 4 times per year     Relationship status:    Other Topics Concern    Not on file   Social History Narrative    Not on file       Chief Complaint:   Chief Complaint   Patient presents with    Knee Pain     left knee pain       History of present illness:  This is a 74-year-old male seen for right lateral elbow and forearm pain. This started back in the winter.  Cannot lift anything and hurts a lot at night.  Pain is about a 6/10 in the elbow.  Also having more pain in the left knee again.  We finished an Orthovisc series back in December.  He has pretty sharp medial pain.   Pain in the back as well.  Knee does not feel stable.  Pain is 6/10.    Answers for HPI/ROS submitted by the patient on 12/23/2019   Leg pain  unexpected weight change: No  appetite change : No  sleep disturbance: No  IMMUNOCOMPROMISED: No  nervous/ anxious: No  dysphoric mood: No  rash: No  visual disturbance: No  eye redness: No  eye pain: No  ear pain: No  tinnitus: Yes  hearing loss: No  sinus pressure : Yes  nosebleeds: Yes  enviro allergies: No  food allergies: No  cough: No  shortness of breath: Yes  sweating: No  frequency: Yes  difficulty urinating: No  hematuria: No  chest pain: No  palpitations: No  nausea: No  vomiting: No  diarrhea: No  blood in stool: No  constipation: No  headaches: Yes  dizziness: No  numbness: No  seizures: No  joint swelling: No  myalgia: No  weakness: No  back pain: Yes  Pain Chronicity: recurrent  History of trauma: No  Onset: more than 1 month ago  Frequency: daily  Progression since onset: waxing and waning  Injury mechanism: twisting  injury location: at home  pain- numeric: 3/10  pain location: left knee  pain quality: aching  Radiating Pain: No  Aggravating factors: bending, extension, twisting  fever: No  inability to bear weight: No  itching: No  joint locking: No  limited range of motion: Yes  stiffness: Yes  tingling: No  Treatments tried: cold, heat, exercise, injection treatment, NSAIDs, OTC ointments  physical therapy: not tried  Improvement on treatment: mild      Physical Examination:    Vital Signs:    Vitals:    06/04/20 1512   Resp: 16       Body mass index is 29.95 kg/m².    This a well-developed, well nourished patient in no acute distress.  They are alert and oriented and cooperative to examination.  Pt. walks without an antalgic gait.      Examination of the right elbow shows no signs of rashes or erythema. The patient has no masses, ecchymosis, or effusion. The patient has full range of motion from 0-160°. Patient has full pronation and supination. Patient  is nontender along the medial epicondyle and moderately tender over the lateral epicondyle. Nontender over the olecranon process.  Nontender along the course of the UCL. Patient has a negative valgus stress test and milking maneuver. Negative Tinel's sign over the cubital tunnel. 2+ radial pulse. Intact light touch sensation.     X-rays:  X-rays of the left knee is ordered and  reviewed which show severe lateral joint space narrowing on the right and more moderate to severe medial joint space wear on the left.  No significant change noted.  X-rays of the right elbow are reviewed which show a small olecranon spur.      Assessment:  Right lateral epicondylitis  Left knee arthritis with medial tibial stress reaction    Plan: I reviewed the x-ray with him today. We talked about lateral epicondylitis.  I agreed to inject his right elbow as well as his left knee today.  Also believe he would benefit from a medial  brace on the left.    We performed a custom orthotic/brace fitting, adjusting and training with the patient. The patient demonstrated understanding and proper care. This was performed for 15 minutes.        This note was created using [employer] voice recognition software that occasionally misinterpreted phrases or words.    Consult note is delivered via Epic messaging service.

## 2020-06-04 NOTE — PROCEDURES
Large Joint Aspiration/Injection: L knee  Date/Time: 6/4/2020 3:15 PM  Performed by: Haroldo Campbell MD  Authorized by: Haroldo Campbell MD     Consent Done?:  Yes (Verbal)  Indications:  Pain  Site marked: the procedure site was marked    Timeout: prior to procedure the correct patient, procedure, and site was verified    Local anesthetic:  Lidocaine 1% without epinephrine and bupivacaine 0.25% without epinephrine  Anesthetic total (ml):  6      Details:  Needle Size:  20 G  Approach:  Anterolateral  Location:  Knee  Site:  L knee  Medications:  40 mg triamcinolone acetonide 40 mg/mL  Patient tolerance:  Patient tolerated the procedure well with no immediate complications

## 2020-06-04 NOTE — PROCEDURES
Tendon Origin: R elbow  Date/Time: 6/4/2020 3:15 PM  Performed by: Haroldo Campbell MD  Authorized by: Haroldo Campbell MD     Consent Done?:  Yes (Verbal)  Timeout: prior to procedure the correct patient, procedure, and site was verified    Indications:  Pain  Site marked: the procedure site was marked    Timeout: prior to procedure the correct patient, procedure, and site was verified    Location:  Elbow  Site:  R elbow  Prep: patient was prepped and draped in usual sterile fashion    Ultrasonic Guidance for Needle Placement?: No    Needle size:  22 G  Approach:  Anterolateral  Medications:  40 mg triamcinolone acetonide 40 mg/mL  Patient tolerance:  Patient tolerated the procedure well with no immediate complications

## 2020-06-10 ENCOUNTER — TELEPHONE (OUTPATIENT)
Dept: CARDIOLOGY | Facility: CLINIC | Age: 75
End: 2020-06-10

## 2020-06-10 NOTE — TELEPHONE ENCOUNTER
----- Message from Miguel Altamirano MD sent at 6/1/2020  6:47 PM CDT -----  Regarding: RE: preop clearance  Needs visit before,CJL  ----- Message -----  From: Amanda Dick MA  Sent: 6/1/2020   5:08 PM CDT  To: Miguel Altamirano MD  Subject: FW: preop clearance                                  ----- Message -----  From: Simba Cheung MD  Sent: 5/31/2020  12:14 PM CDT  To: Karma Sánchez LPN, Miguel Altamirano MD, #  Subject: preop clearance                                  Pt would like to proceed with bph intervention in OR with urolift in next few month under brief general LMA vs MAC anesthesia  Need preop clearance/optimization/further workup/any preop recs re meds etc  Last visit with barbara in Jan 2020 so at your discretion if needs visit or can clear and make recs +/- order nec testing, from most recent eval  Thanks

## 2020-06-10 NOTE — TELEPHONE ENCOUNTER
Called patient to schedule an appt for pre op clearance  but he declined at this time. He said he hasn't decided if he is going to do the procedure or not. He will call to schedule the visit for clearance if he decides to do the surgery.     MD Amanda García MA             Needs visit before,CJL    Previous Messages      ----- Message -----   From: Amanda Dick MA   Sent: 6/1/2020   5:08 PM CDT   To: Miguel Altamirano MD   Subject: FW: preop clearance                                   ----- Message -----   From: Simba Cheung MD   Sent: 5/31/2020  12:14 PM CDT   To: Karma Sánchez LPN, Miguel Altamirano MD, *   Subject: preop clearance                                   Pt would like to proceed with bph intervention in OR with urolift in next few month under brief general LMA vs MAC anesthesia   Need preop clearance/optimization/further workup/any preop recs re meds etc   Last visit with barbara in Jan 2020 so at your discretion if needs visit or can clear and make recs +/- order nec testing, from most recent eval   Thanks

## 2020-06-12 ENCOUNTER — TELEPHONE (OUTPATIENT)
Dept: ORTHOPEDICS | Facility: CLINIC | Age: 75
End: 2020-06-12

## 2020-06-12 NOTE — TELEPHONE ENCOUNTER
----- Message from Devan Barr sent at 6/12/2020  9:45 AM CDT -----  Contact: Violeta Ma   Needs peer to peer for approval , call   before 6/16

## 2020-06-29 ENCOUNTER — OFFICE VISIT (OUTPATIENT)
Dept: NEUROLOGY | Facility: CLINIC | Age: 75
End: 2020-06-29
Payer: MEDICARE

## 2020-06-29 VITALS
DIASTOLIC BLOOD PRESSURE: 79 MMHG | SYSTOLIC BLOOD PRESSURE: 121 MMHG | HEART RATE: 58 BPM | HEIGHT: 73 IN | BODY MASS INDEX: 31.98 KG/M2 | WEIGHT: 241.31 LBS

## 2020-06-29 DIAGNOSIS — Z79.52 CURRENT CHRONIC USE OF SYSTEMIC STEROIDS: ICD-10-CM

## 2020-06-29 DIAGNOSIS — E78.2 MIXED HYPERLIPIDEMIA: ICD-10-CM

## 2020-06-29 DIAGNOSIS — G70.00 MYASTHENIA GRAVIS, ACHR ANTIBODY POSITIVE: Primary | ICD-10-CM

## 2020-06-29 DIAGNOSIS — I10 HTN (HYPERTENSION), BENIGN: ICD-10-CM

## 2020-06-29 PROCEDURE — 3078F PR MOST RECENT DIASTOLIC BLOOD PRESSURE < 80 MM HG: ICD-10-PCS | Mod: HCNC,CPTII,S$GLB, | Performed by: PSYCHIATRY & NEUROLOGY

## 2020-06-29 PROCEDURE — 3074F PR MOST RECENT SYSTOLIC BLOOD PRESSURE < 130 MM HG: ICD-10-PCS | Mod: HCNC,CPTII,S$GLB, | Performed by: PSYCHIATRY & NEUROLOGY

## 2020-06-29 PROCEDURE — 1101F PR PT FALLS ASSESS DOC 0-1 FALLS W/OUT INJ PAST YR: ICD-10-PCS | Mod: HCNC,CPTII,S$GLB, | Performed by: PSYCHIATRY & NEUROLOGY

## 2020-06-29 PROCEDURE — 1125F PR PAIN SEVERITY QUANTIFIED, PAIN PRESENT: ICD-10-PCS | Mod: HCNC,S$GLB,, | Performed by: PSYCHIATRY & NEUROLOGY

## 2020-06-29 PROCEDURE — 99999 PR PBB SHADOW E&M-EST. PATIENT-LVL V: CPT | Mod: PBBFAC,HCNC,, | Performed by: PSYCHIATRY & NEUROLOGY

## 2020-06-29 PROCEDURE — 1125F AMNT PAIN NOTED PAIN PRSNT: CPT | Mod: HCNC,S$GLB,, | Performed by: PSYCHIATRY & NEUROLOGY

## 2020-06-29 PROCEDURE — 99214 OFFICE O/P EST MOD 30 MIN: CPT | Mod: HCNC,S$GLB,, | Performed by: PSYCHIATRY & NEUROLOGY

## 2020-06-29 PROCEDURE — 1101F PT FALLS ASSESS-DOCD LE1/YR: CPT | Mod: HCNC,CPTII,S$GLB, | Performed by: PSYCHIATRY & NEUROLOGY

## 2020-06-29 PROCEDURE — 1159F MED LIST DOCD IN RCRD: CPT | Mod: HCNC,S$GLB,, | Performed by: PSYCHIATRY & NEUROLOGY

## 2020-06-29 PROCEDURE — 99214 PR OFFICE/OUTPT VISIT, EST, LEVL IV, 30-39 MIN: ICD-10-PCS | Mod: HCNC,S$GLB,, | Performed by: PSYCHIATRY & NEUROLOGY

## 2020-06-29 PROCEDURE — 3078F DIAST BP <80 MM HG: CPT | Mod: HCNC,CPTII,S$GLB, | Performed by: PSYCHIATRY & NEUROLOGY

## 2020-06-29 PROCEDURE — 1159F PR MEDICATION LIST DOCUMENTED IN MEDICAL RECORD: ICD-10-PCS | Mod: HCNC,S$GLB,, | Performed by: PSYCHIATRY & NEUROLOGY

## 2020-06-29 PROCEDURE — 99499 UNLISTED E&M SERVICE: CPT | Mod: HCNC,S$GLB,, | Performed by: PSYCHIATRY & NEUROLOGY

## 2020-06-29 PROCEDURE — 99499 RISK ADDL DX/OHS AUDIT: ICD-10-PCS | Mod: HCNC,S$GLB,, | Performed by: PSYCHIATRY & NEUROLOGY

## 2020-06-29 PROCEDURE — 3074F SYST BP LT 130 MM HG: CPT | Mod: HCNC,CPTII,S$GLB, | Performed by: PSYCHIATRY & NEUROLOGY

## 2020-06-29 PROCEDURE — 99999 PR PBB SHADOW E&M-EST. PATIENT-LVL V: ICD-10-PCS | Mod: PBBFAC,HCNC,, | Performed by: PSYCHIATRY & NEUROLOGY

## 2020-07-06 ENCOUNTER — OFFICE VISIT (OUTPATIENT)
Dept: ORTHOPEDICS | Facility: CLINIC | Age: 75
End: 2020-07-06
Payer: MEDICARE

## 2020-07-06 VITALS — WEIGHT: 241 LBS | HEIGHT: 73 IN | TEMPERATURE: 98 F | BODY MASS INDEX: 31.94 KG/M2 | RESPIRATION RATE: 20 BRPM

## 2020-07-06 DIAGNOSIS — M77.11 LATERAL EPICONDYLITIS, RIGHT ELBOW: Primary | ICD-10-CM

## 2020-07-06 PROCEDURE — 99213 PR OFFICE/OUTPT VISIT, EST, LEVL III, 20-29 MIN: ICD-10-PCS | Mod: HCNC,S$GLB,, | Performed by: ORTHOPAEDIC SURGERY

## 2020-07-06 PROCEDURE — 1125F PR PAIN SEVERITY QUANTIFIED, PAIN PRESENT: ICD-10-PCS | Mod: HCNC,S$GLB,, | Performed by: ORTHOPAEDIC SURGERY

## 2020-07-06 PROCEDURE — 99999 PR PBB SHADOW E&M-EST. PATIENT-LVL III: ICD-10-PCS | Mod: PBBFAC,HCNC,, | Performed by: ORTHOPAEDIC SURGERY

## 2020-07-06 PROCEDURE — 99999 PR PBB SHADOW E&M-EST. PATIENT-LVL III: CPT | Mod: PBBFAC,HCNC,, | Performed by: ORTHOPAEDIC SURGERY

## 2020-07-06 PROCEDURE — 1159F MED LIST DOCD IN RCRD: CPT | Mod: HCNC,S$GLB,, | Performed by: ORTHOPAEDIC SURGERY

## 2020-07-06 PROCEDURE — 1101F PT FALLS ASSESS-DOCD LE1/YR: CPT | Mod: HCNC,CPTII,S$GLB, | Performed by: ORTHOPAEDIC SURGERY

## 2020-07-06 PROCEDURE — 1159F PR MEDICATION LIST DOCUMENTED IN MEDICAL RECORD: ICD-10-PCS | Mod: HCNC,S$GLB,, | Performed by: ORTHOPAEDIC SURGERY

## 2020-07-06 PROCEDURE — 1125F AMNT PAIN NOTED PAIN PRSNT: CPT | Mod: HCNC,S$GLB,, | Performed by: ORTHOPAEDIC SURGERY

## 2020-07-06 PROCEDURE — 99213 OFFICE O/P EST LOW 20 MIN: CPT | Mod: HCNC,S$GLB,, | Performed by: ORTHOPAEDIC SURGERY

## 2020-07-06 PROCEDURE — 1101F PR PT FALLS ASSESS DOC 0-1 FALLS W/OUT INJ PAST YR: ICD-10-PCS | Mod: HCNC,CPTII,S$GLB, | Performed by: ORTHOPAEDIC SURGERY

## 2020-07-06 RX ORDER — ACETAMINOPHEN AND CODEINE PHOSPHATE 300; 30 MG/1; MG/1
1 TABLET ORAL EVERY 4 HOURS PRN
Qty: 28 TABLET | Refills: 0 | Status: SHIPPED | OUTPATIENT
Start: 2020-07-06 | End: 2020-07-16

## 2020-07-06 NOTE — PROGRESS NOTES
Past Medical History:   Diagnosis Date    A-fib 3/31/2020    Arthritis     Back pain     Carpal tunnel syndrome 2/25/2013    Cataract     Cataract, left eye 11/10/2014    Chest pain, musculoskeletal     COPD (chronic obstructive pulmonary disease) 11/052018    Emphysema lung 11/05/2018    Gastritis     Hx of colonic polyp     Hyperlipidemia     Hypertension     Hypothyroidism     Knee fracture     Myasthenia gravis     Neuropathy 1/3/2013    Obesity 1/29/2015    Pneumonia 3/29/2020    Polyneuropathy     PVC (premature ventricular contraction)     Squamous cell carcinoma 2014    left forearm    Thyroid disease        Past Surgical History:   Procedure Laterality Date    APPENDECTOMY      CATARACT EXTRACTION W/  INTRAOCULAR LENS IMPLANT Bilateral     COLONOSCOPY  03/01/2012    Dr Esteban; hyperplastic polyp; repeat in 5 years    CYSTOSCOPY N/A 2/26/2019    Procedure: CYSTOSCOPY;  Surgeon: Simba Cheung MD;  Location: [facility];  Service: Urology;  Laterality: N/A;    ENDOSCOPIC ULTRASOUND OF UPPER GASTROINTESTINAL TRACT N/A 1/7/2020    Procedure: ULTRASOUND, UPPER GI TRACT, ENDOSCOPIC;  Surgeon: Kati Ryan MD;  Location: [facility] ([address] [place] [address]);  Service: Endoscopy;  Laterality: N/A;    ESOPHAGOGASTRODUODENOSCOPY N/A 9/12/2018    Procedure: EGD (ESOPHAGOGASTRODUODENOSCOPY);  Surgeon: Gabriel Hernandez MD;  Location: [facility];  Service: Endoscopy;  Laterality: N/A;    ESOPHAGOGASTRODUODENOSCOPY N/A 8/19/2019    Procedure: EGD (ESOPHAGOGASTRODUODENOSCOPY);  Surgeon: Gabriel Hernandez MD;  Location: [facility];  Service: Endoscopy;  Laterality: N/A;    ESOPHAGOGASTRODUODENOSCOPY N/A 10/7/2019    Procedure: EGD (ESOPHAGOGASTRODUODENOSCOPY);  Surgeon: Gabriel Hernandez MD;  Location: [facility];  Service: Endoscopy;  Laterality: N/A;    ESOPHAGOGASTRODUODENOSCOPY N/A 1/7/2020    Procedure: EGD (ESOPHAGOGASTRODUODENOSCOPY);  Surgeon: Kati Ryan MD;  Location: [facility] ([facility]);  Service: Endoscopy;  Laterality: N/A;    HEMORRHOID SURGERY      KNEE ARTHROPLASTY Bilateral     NECK SURGERY      POSTERIOR FUSION OF CERVICAL SPINE WITH LAMINECTOMY N/A 11/7/2018    Procedure: C2-C6 Posterior Cervical Laminectomy & Instrumental Fusion;  Surgeon: Kishore Turner MD;  [address];  Service: Neurosurgery;  Laterality: N/A;    TONSILLECTOMY      TRANSFORAMINAL EPIDURAL INJECTION OF STEROID Right 12/20/2019    Procedure: Injection,steroid,epidural,transforaminal approach;  Surgeon: Devan Watt MD;  [facility];  Service: Pain Management;  Laterality: Right;  L3-4, L4-5    TRANSFORAMINAL EPIDURAL INJECTION OF STEROID Bilateral 2/6/2020    Procedure: Injection,steroid,epidural,transforaminal approach;  Surgeon: Devan Watt MD;  [facility];  Service: Pain Management;  Laterality: Bilateral;  L3-L4,L4-L5    TRANSRECTAL ULTRASOUND EXAMINATION N/A 2/26/2019    Procedure: ULTRASOUND, RECTAL APPROACH;  Surgeon: iSmba Cheung MD;  [facility];  Service: Urology;  Laterality: N/A;    UPPER GASTROINTESTINAL ENDOSCOPY  09/12/2018    Dr Hernandez; gastritis; extensive intestinal metaplasia; repeat in 1-2- years       Current Outpatient Medications   Medication Sig    atorvastatin (LIPITOR) 80 MG tablet TAKE 1 TABLET EVERY DAY    carvediloL (COREG) 25 MG tablet TAKE 1 TABLET TWICE DAILY WITH MEALS    cetirizine (ZYRTEC) 10 MG tablet Take 1 tablet by mouth daily as needed.    chlorthalidone (HYGROTEN) 25 MG Tab As directed    cholecalciferol, vitamin D3, (VITAMIN D) 2,000 unit Cap 1 capsule once daily. Every day    coenzyme Q10 (CO Q-10) 100 mg capsule Take 400 mg by mouth once daily.     cyanocobalamin, vitamin B-12, (VITAMIN B-12) 5,000 mcg Subl Take 5,000 mcg by mouth once daily. Every day    ezetimibe (ZETIA) 10 mg tablet Take 1 tablet (10 mg total) by mouth once daily.    fluticasone (FLONASE) 50 mcg/actuation nasal spray USE 1 SPRAY IN EACH NOSTRIL  TWICE DAILY AS NEEDED  FOR  RHINITIS    gabapentin (NEURONTIN) 300 MG capsule TAKE 1 CAPSULE THREE TIMES DAILY    levothyroxine (SYNTHROID) 50 MCG tablet Take 1 tablet (50 mcg total) by mouth once daily.    methylsulfonylmethane (MSM) 1,000 mg Tab Take 1,000 mg by mouth once daily. Every day    milk thistle 200 mg Cap Take 200 mg by mouth 2 (two) times daily. Twice a day    olmesartan (BENICAR) 20 MG tablet TAKE 1 TABLET EVERY DAY    omega-3 fatty acids 1,000 mg Cap Twice a day    pantoprazole (PROTONIX) 40 MG tablet Take 1 tablet (40 mg total) by mouth 2 (two) times daily.    potassium chloride SA (K-DUR,KLOR-CON) 20 MEQ tablet Take 1 tablet (20 mEq total) by mouth 3 (three) times daily. Take 4 tablets daily for 5 days, then 3 daily for 2 weeks, then 2 daily thereafter.    predniSONE (DELTASONE) 1 MG tablet TAKE 2 TABLETS EVERY DAY    predniSONE (DELTASONE) 5 MG tablet TAKE 1 TABLET EVERY DAY    sildenafil (REVATIO) 20 mg Tab Take 1 to 3 tabs daily as needed.    sucralfate (CARAFATE) 1 gram tablet Take 1 tablet (1 g total) by mouth 4 (four) times daily before meals and nightly.    VENTOLIN HFA 90 mcg/actuation inhaler Inhale 2 puffs into the lungs every 6 (six) hours as needed for Wheezing. Rescue    acetaminophen-codeine 300-30mg (TYLENOL-CODEINE #3) 300-30 mg Tab Take 1 tablet by mouth every 4 (four) hours as needed.     No current facility-administered medications for this visit.      Facility-Administered Medications Ordered in Other Visits   Medication    0.9%  NaCl infusion       Review of patient's allergies indicates:   Allergen Reactions    No known drug allergies        Family History   Problem Relation Age of Onset    Cataracts Mother     Heart disease Mother         CHF    Hypertension Mother     Hyperlipidemia Mother     Cataracts Father     Glaucoma Father     Heart disease Father     Hyperlipidemia Sister     Hypertension Sister     No Known Problems Daughter     No Known  Problems Daughter     Collagen disease Neg Hx     Amblyopia Neg Hx     Blindness Neg Hx     Macular degeneration Neg Hx     Retinal detachment Neg Hx     Strabismus Neg Hx     Cancer Neg Hx     Colon cancer Neg Hx     Esophageal cancer Neg Hx     Stomach cancer Neg Hx     Crohn's disease Neg Hx     Ulcerative colitis Neg Hx        Social History     Socioeconomic History    Marital status:      Spouse name: Not on file    Number of children: Not on file    Years of education: Not on file    Highest education level: Not on file   Occupational History    Not on file   Social Needs    Financial resource strain: Not hard at all    Food insecurity     Worry: Never true     Inability: Never true    Transportation needs     Medical: No     Non-medical: No   Tobacco Use    Smoking status: Never Smoker    Smokeless tobacco: Never Used    Tobacco comment: when a child   Substance and Sexual Activity    Alcohol use: Yes     Frequency: Never     Binge frequency: Never     Comment: rarely    Drug use: No    Sexual activity: Yes     Partners: Female   Lifestyle    Physical activity     Days per week: 0 days     Minutes per session: 0 min    Stress: Not at all   Relationships    Social connections     Talks on phone: More than three times a week     Gets together: More than three times a week     Attends [employer] service: Not on file     Active member of club or organization: Yes     Attends meetings of clubs or organizations: More than 4 times per year     Relationship status:    Other Topics Concern    Not on file   Social History Narrative    Not on file       Chief Complaint:   Chief Complaint   Patient presents with    Elbow Pain     RIGHT ELBOW PAIN       History of present illness:  This is a 74-year-old male seen for right lateral elbow and forearm pain. This started back in the winter.  Cannot lift anything and hurts a lot at night.  Pain is about a 6/10 in the elbow.  We  injected him earlier in the month and it helped very minimally.  Severe pain at night.  Also having more pain in the left knee again.  We finished an Orthovisc series back in December.  He has pretty sharp medial pain.  Pain in the back as well.  Knee does not feel stable.  Pain is 9/10    Answers for HPI/ROS submitted by the patient on 12/23/2019   Leg pain  unexpected weight change: No  appetite change : No  sleep disturbance: No  IMMUNOCOMPROMISED: No  nervous/ anxious: No  dysphoric mood: No  rash: No  visual disturbance: No  eye redness: No  eye pain: No  ear pain: No  tinnitus: Yes  hearing loss: No  sinus pressure : Yes  nosebleeds: Yes  enviro allergies: No  food allergies: No  cough: No  shortness of breath: Yes  sweating: No  frequency: Yes  difficulty urinating: No  hematuria: No  chest pain: No  palpitations: No  nausea: No  vomiting: No  diarrhea: No  blood in stool: No  constipation: No  headaches: Yes  dizziness: No  numbness: No  seizures: No  joint swelling: No  myalgia: No  weakness: No  back pain: Yes  Pain Chronicity: recurrent  History of trauma: No  Onset: more than 1 month ago  Frequency: daily  Progression since onset: waxing and waning  Injury mechanism: twisting  injury location: at home  pain- numeric: 3/10  pain location: left knee  pain quality: aching  Radiating Pain: No  Aggravating factors: bending, extension, twisting  fever: No  inability to bear weight: No  itching: No  joint locking: No  limited range of motion: Yes  stiffness: Yes  tingling: No  Treatments tried: cold, heat, exercise, injection treatment, NSAIDs, OTC ointments  physical therapy: not tried  Improvement on treatment: mild      Physical Examination:    Vital Signs:    Vitals:    07/06/20 1458   Resp: 20   Temp: 97.8 °F (36.6 °C)       Body mass index is 31.8 kg/m².    This a well-developed, well nourished patient in no acute distress.  They are alert and oriented and cooperative to examination.  Pt. walks without an  antalgic gait.      Examination of the right elbow shows no signs of rashes or erythema. The patient has no masses, ecchymosis, or effusion. The patient has full range of motion from 0-160°. Patient has full pronation and supination. Patient is nontender along the medial epicondyle and moderately tender over the lateral epicondyle. Nontender over the olecranon process.  Nontender along the course of the UCL. Patient has a negative valgus stress test and milking maneuver. Negative Tinel's sign over the cubital tunnel. 2+ radial pulse. Intact light touch sensation.     X-rays:  X-rays of the left knee is  reviewed which show severe lateral joint space narrowing on the right and more moderate to severe medial joint space wear on the left.  No significant change noted.  X-rays of the right elbow are reviewed which show a small olecranon spur.      Assessment:  Right lateral epicondylitis  Left knee arthritis with medial tibial stress reaction    Plan: I reviewed the findings with him.  We talked about getting an MRI to evaluate his elbow pain.  The injection did not seem to help too much.  Still has trouble sleeping.  Pain can be up to a 9/10 at times.      This note was created using Mine voice recognition software that occasionally misinterpreted phrases or words.    Consult note is delivered via Epic messaging service.

## 2020-07-07 NOTE — ASSESSMENT & PLAN NOTE
On appropriate medications and managed by PCP. We discussed the importance of managing all secondary stroke risk factors, including hyperlipiemia.

## 2020-07-07 NOTE — PROGRESS NOTES
Subjective:     Chief Complaint:  Follow-up    Interval History 6/27/2020 - I have reviewed all relevant medical records in Epic. Doing well in terms of MG control. He has rare dysphagia to water but denies any diplopia, orthopnea, dyspnea (MG-related, he does have baseline COPD), ptosis, dysphonia, dysarthria, or extremity weaknesses. He had a recent URI but was CV19 tested and negative. Full recovery. Neck pain is still present from prior stenosis and surgery but he says that he is overall coping well. Secondary stroke risk factors of HTN and HLD. Monitored for DM2.       History of Present Illness:  I have reviewed all relevant medical records in Epic. Maximilian Tavera Jr. is a 74 y.o. male who presents today for initial visit with me, previously followed by Dr. Gill. 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 Several months ago he had a fall [place] [employer] and had a serious neck injury (previously had two ACDF procedures) resulting in spinal stenosis and need for surgery. He is now post-op and is recovering well. He is is c-collar. He has not had MG-related weaknesses post-op and has continued his steroids, currently on 7 mg daily. He has only occasional vertical diplopia that occurs mostly when he is watching TV while lying down. No other bulbar or extremity weakness complaints. Cannot tolerate Mestinon (diarrhea).    From Dr. Gill's Notes:    DX: Ab+ MG dx'ed 3/2015  Thymic status: CT chest 4/15 negative for thymoma   Maintenance: prednisone 7 mg qOD; mestinon SE - diarrhea  Last dose change: 12/20/16: 8mg qOD -> 7mg qOD; 8/16: 10mg qOD to 8mg qOD; 6/15 prednisone 5 mg qd --> 10 mg q OD   Rescue: none   Prior immunemodulatory agents: none      Interval history 8/9/18:  Underwent MRI Cspine and Lspine for new onset neck pain, chronic hand numbness and LBP. 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   Having trouble with head drop,  "and some mild chewing issues. No diplopia, ptosis or UE/LE weakness. Still on prednisone 7mg qOD. Has numbness in arms that wakes him up at night.     Interval history 2/27/18:  Doing well currently with respect to MG. Mild diplopia at end of day. Waking up with numbness in his hands, bilateral. Relieved with movement. No numbness or weakness during the day. Occasional tongue biting.      Interval history 8/31/17:   Mr Tavera is a 70yo male with Ab+ myasthenia gravis diagnosed in 2015, maintained on 7mg prednisone qOD. Has been doing well since his last visit. Minimal diplopia at the end of the day. Has some generalized fatigue when it's hot, but tries to do all his chores early in the day. Has started exercising.     Interval 3/21/17: Doing well from an MG standpoint on low-dose prednisone. He still has a occasional diplopia at night when looking down. Has rare choking - mostly liquids - when he's not paying attention. Had a number of URIs last year. Also noticed that his sense of taste is off, although sense of smell is preserved.      Interval history 12/20/16:  Doing well. Last visit, dropped prednisone from 20 to 8mg qOD. No new symptoms. Still having diplopia on downgaze and only rarely at other times. No chewing or swallowing issues.     Interval history 8/23/16  Doing well. Still with mild baseline diplopia and rare difficulty with swallowing (this does not last more than a few seconds). No issues with speaking, breathing, or generalized weakness. Heat "wears me out".      HPI 5/17/16  Maximilian David Tavera Jr. is a 71 yo male with Ab+ myasthenia gravis. Doing well, stable. Occasional diplopia, mostly with laying back too far in his recliner while watching tv. Mentions 6-8 mos ago began noticing rhinorrhea with eating. Denies ptosis, difficulty chewing/swallowing/breathing. Denies weight changes, sweats, alternating diarrhea/constipation.  Prednisone 10 mg q OD     Interval hx  2/12/16:  Maximilian Hernandez " "Ayse Baca is a 69 yo  male with myasthenia gravis. Still has occasional diplopia, but is improved- occuring less frequently. Occurs most when watching TV. Occasional "funny feeling" when drinking soft drinks. Denies weakness, SOB, dysarthria. Taking prednisone 5mg/day. Stopped Mestion- GI side effects      Interval history 6/17/15:  Taking mestinon at 1/2, 1/2, 1 and not finding much improvement in diplopia, and having a decent amount of loose stools and gas.      HPI: Maximilian Tavera Jr. is a 69 y.o. male with 7-8mos of diplopia. Was seen by Dr Jimenez, who did some blood work and found that he was Ab+ for AchR. He was referred here for further care. No prior symptoms. No difficulty chewing/swallowing, breathing. Arm/leg strength is fine. No orthopnea. Diplopia is most noticeable when reading or watching tv. Driving is occasionally problematic. No clearly diurnal. No real problem with ptosis. He has not tried medications for this. He has prisms, which help. Looks like he started carvedilol in 3/14.     Review of Systems  Review of Systems   Constitutional: Negative for activity change, fatigue and fever.   HENT: Negative for hearing loss, trouble swallowing and voice change.    Eyes: Negative for pain, redness and visual disturbance.   Respiratory: Negative for choking, chest tightness and shortness of breath.    Cardiovascular: Negative for chest pain.   Gastrointestinal: Negative for abdominal pain, nausea and vomiting.   Endocrine: Negative for cold intolerance.   Genitourinary: Negative for frequency.   Musculoskeletal: Positive for neck pain. Negative for arthralgias, back pain, gait problem, joint swelling and myalgias.   Skin: Negative for color change.   Allergic/Immunologic: Negative for immunocompromised state.   Neurological: Negative for dizziness, seizures, speech difficulty, weakness, numbness and headaches.   Hematological: Negative for adenopathy.   Psychiatric/Behavioral: Negative for " "agitation, behavioral problems, dysphoric mood and suicidal ideas.        Objective:     Vitals:    06/29/20 1328   BP: 121/79   Pulse: (!) 58   Weight: 109.5 kg (241 lb 4.7 oz)   Height: 6' 1" (1.854 m)   PainSc:   5   PainLoc: Arm       Neurologic Exam     Mental Status   Oriented to person, place, and time.   Attention: normal.   Speech: speech is normal   Level of consciousness: alert  Knowledge: good.     Cranial Nerves     CN II   Visual fields full to confrontation.     CN III, IV, VI   Pupils are equal, round, and reactive to light.  Extraocular motions are normal.   Diplopia: none    CN V   Facial sensation intact.     CN VII   Facial expression full, symmetric.     CN VIII   Hearing: intact    CN IX, X   CN IX normal.     CN XI   CN XI normal.     CN XII   CN XII normal.     No ptosis noted     Motor Exam   Muscle bulk: normal  Overall muscle tone: normal    Strength   Strength 5/5 throughout. Cervical flexion and extension and rotation deferred     Sensory Exam   Light touch normal.   Vibration normal.     Gait, Coordination, and Reflexes     Gait  Gait: normal    Tremor   Resting tremor: absent  Intention tremor: absent  Action tremor: absent    Reflexes   Right brachioradialis: 2+  Left brachioradialis: 2+  Right biceps: 2+  Left biceps: 2+  Right triceps: 2+  Left triceps: 2+  Right patellar: 2+  Left patellar: 2+  Right achilles: 2+  Left achilles: 2+      Physical Exam  Vitals signs reviewed.   Constitutional:       Appearance: He is well-developed.   HENT:      Head: Normocephalic and atraumatic.   Eyes:      Extraocular Movements: EOM normal.      Pupils: Pupils are equal, round, and reactive to light.   Neck:      Musculoskeletal: Normal range of motion and neck supple.      Thyroid: No thyromegaly.   Cardiovascular:      Rate and Rhythm: Normal rate.   Pulmonary:      Effort: Pulmonary effort is normal.   Abdominal:      Palpations: Abdomen is soft.   Lymphadenopathy:      Cervical: No cervical " adenopathy.   Skin:     General: Skin is warm and dry.   Neurological:      Mental Status: He is oriented to person, place, and time.      Gait: Gait is intact.      Deep Tendon Reflexes: Strength normal.      Reflex Scores:       Tricep reflexes are 2+ on the right side and 2+ on the left side.       Bicep reflexes are 2+ on the right side and 2+ on the left side.       Brachioradialis reflexes are 2+ on the right side and 2+ on the left side.       Patellar reflexes are 2+ on the right side and 2+ on the left side.       Achilles reflexes are 2+ on the right side and 2+ on the left side.  Psychiatric:         Speech: Speech normal.         Behavior: Behavior normal.         Thought Content: Thought content normal.           Lab Results   Component Value Date    WBC 6.61 04/02/2020    HGB 12.6 (L) 04/02/2020    HCT 39.7 (L) 04/02/2020     (L) 04/02/2020    CHOL 114 (L) 11/21/2019    TRIG 92 11/21/2019    HDL 41 11/21/2019    ALT 20 04/02/2020    AST 22 04/02/2020     04/16/2020    K 4.1 04/16/2020     04/16/2020    CREATININE 1.0 04/16/2020    BUN 17 04/16/2020    CO2 31 (H) 04/16/2020    TSH 1.395 03/20/2019    HGBA1C 5.5 11/21/2018         Assessment and Plan:     Problem List Items Addressed This Visit     Current chronic use of systemic steroids (Chronic)    Relevant Orders    Vitamin D    Myasthenia gravis, AChR antibody positive - Primary    Overview     Ab+, thymoma negative, generalized MG, dx'ed 2015  Prednisone qOD; mestinon does not help symptoms  Rescue: none  Prior Immune: none         Current Assessment & Plan     Rare dysphagia to water but generally very well-controlled by Prednisone 7 mg daily. He cannot tolerate Mestinon for breakthrough weaknesses due to GI side effects. Doing well without any other immunosuppression requirements. This low of a dose of Prednisone hasn't seemed to affect him adversely otherwise. Managed by Dr. Marroquin for DM2 screening, etc.   - Continue Prednisone  7 mg daily   - Myasthenia Gravis IMPORTANT INFORMATION:    Elective intubation should be considered if serial measurements of the VC show values less than 20 mL/kg or if the NIF is worse than -30 cmH20.    Medication warning list:          1. Absolute contraindication   curare,   d-penicillamine,   botulinum toxin,   interferon alpha    2. Contraindicated    a. Antibiotics-- aminoglycosides (gentamycin, kanamycin, neomycin, streptomycin, tobramycine); macrolides (erythromycin, azithromycin (Z-pack), telithromycin, Biaxin)  Fluoroquinolones ( ciprofloxacin, norfloxacin, levofloxacin);  b. quinine, quinidine, procainamide,  c. magnesium salts, iv magnesium replacement.    3. Caution- may exacerbate weakness in some myasthenics  a. Calcium channel blockers  b. Beta blockers  c. Lithium  d. Statins  e. Iodinated contrast agents  F. benzodiazepines      · Medications that are likely to worsen MG (and which should be avoided, if possible): gentamicin, amikacin, tobramycin, clindamycin, procainamide, lignocaine, quinine, quinidine, and penicillamine.  · Medications which may worsen MG (but which are usually tolerated): narcotics, ciprofloxacin, moxifloxacin, norfloxacin, erythromycin, azithromycin, doxycycline, minocycline, ?-blockers, calcium channel blockers, statins, chloroquine, hydroxychloroquine, phenytoin, carbamazepine, lithium, anti-cholinergics, magnesium, lithium, and neuroleptics           Hyperlipidemia    Current Assessment & Plan     On appropriate medications and managed by PCP. We discussed the importance of managing all secondary stroke risk factors, including hyperlipiemia.           HTN (hypertension), benign    Current Assessment & Plan     On appropriate medications and managed by PCP. We discussed the importance of managing all secondary stroke risk factors, including hypertension.               RTC 6 months or as needed.    Ned Hoang MD  [facility]

## 2020-07-07 NOTE — ASSESSMENT & PLAN NOTE
Rare dysphagia to water but generally very well-controlled by Prednisone 7 mg daily. He cannot tolerate Mestinon for breakthrough weaknesses due to GI side effects. Doing well without any other immunosuppression requirements. This low of a dose of Prednisone hasn't seemed to affect him adversely otherwise. Managed by Dr. Marroquin for DM2 screening, etc.   - Continue Prednisone 7 mg daily   - Myasthenia Gravis IMPORTANT INFORMATION:    Elective intubation should be considered if serial measurements of the VC show values less than 20 mL/kg or if the NIF is worse than -30 cmH20.    Medication warning list:          1. Absolute contraindication   curare,   d-penicillamine,   botulinum toxin,   interferon alpha    2. Contraindicated    a. Antibiotics-- aminoglycosides (gentamycin, kanamycin, neomycin, streptomycin, tobramycine); macrolides (erythromycin, azithromycin (Z-pack), telithromycin, Biaxin)  Fluoroquinolones ( ciprofloxacin, norfloxacin, levofloxacin);  b. quinine, quinidine, procainamide,  c. magnesium salts, iv magnesium replacement.    3. Caution- may exacerbate weakness in some myasthenics  a. Calcium channel blockers  b. Beta blockers  c. Lithium  d. Statins  e. Iodinated contrast agents  F. benzodiazepines      · Medications that are likely to worsen MG (and which should be avoided, if possible): gentamicin, amikacin, tobramycin, clindamycin, procainamide, lignocaine, quinine, quinidine, and penicillamine.  · Medications which may worsen MG (but which are usually tolerated): narcotics, ciprofloxacin, moxifloxacin, norfloxacin, erythromycin, azithromycin, doxycycline, minocycline, ?-blockers, calcium channel blockers, statins, chloroquine, hydroxychloroquine, phenytoin, carbamazepine, lithium, anti-cholinergics, magnesium, lithium, and neuroleptics

## 2020-07-07 NOTE — ANESTHESIA POSTPROCEDURE EVALUATION
Anesthesia Post Evaluation    Patient: Maximilian Tavera     Procedure(s) Performed: Procedure(s) (LRB):  EGD (ESOPHAGOGASTRODUODENOSCOPY) (N/A)    Final Anesthesia Type: general  Patient location during evaluation: PACU  Patient participation: Yes- Able to Participate  Level of consciousness: awake and alert  Post-procedure vital signs: reviewed and stable  Pain management: adequate  Airway patency: patent  PONV status at discharge: No PONV  Anesthetic complications: no      Cardiovascular status: blood pressure returned to baseline  Respiratory status: unassisted  Hydration status: euvolemic  Follow-up not needed.          Vitals Value Taken Time   /79 10/7/2019 10:38 AM   Temp 36.7 °C (98.1 °F) 10/7/2019  9:45 AM   Pulse 61 10/7/2019 10:38 AM   Resp 14 10/7/2019 10:38 AM   SpO2 96 % 10/7/2019 10:38 AM         Event Time     Out of Recovery 10:54:09          Pain/Regina Score: Regina Score: 10 (10/7/2019 10:32 AM)         intact

## 2020-07-13 ENCOUNTER — HOSPITAL ENCOUNTER (OUTPATIENT)
Dept: RADIOLOGY | Facility: HOSPITAL | Age: 75
Discharge: HOME | End: 2020-07-13
Attending: ORTHOPAEDIC SURGERY
Payer: MEDICARE

## 2020-07-13 DIAGNOSIS — M77.11 LATERAL EPICONDYLITIS, RIGHT ELBOW: ICD-10-CM

## 2020-07-23 DIAGNOSIS — I10 HTN (HYPERTENSION), BENIGN: ICD-10-CM

## 2020-07-23 DIAGNOSIS — I48.91 ATRIAL FIBRILLATION, UNSPECIFIED TYPE: Primary | ICD-10-CM

## 2020-07-24 ENCOUNTER — HOSPITAL ENCOUNTER (OUTPATIENT)
Dept: CARDIOLOGY | Facility: HOSPITAL | Age: 75
Discharge: HOME | End: 2020-07-24
Attending: INTERNAL MEDICINE
Payer: MEDICARE

## 2020-07-24 DIAGNOSIS — I48.91 ATRIAL FIBRILLATION, UNSPECIFIED TYPE: ICD-10-CM

## 2020-07-24 DIAGNOSIS — I10 HTN (HYPERTENSION), BENIGN: ICD-10-CM

## 2020-07-24 PROCEDURE — 93227 HOLTER MONITOR - 48 HOUR (CUPID ONLY): ICD-10-PCS | Mod: HCWC,,, | Performed by: INTERNAL MEDICINE

## 2020-07-24 PROCEDURE — 93225 XTRNL ECG REC<48 HRS REC: CPT | Mod: HCWC

## 2020-07-24 PROCEDURE — 93227 XTRNL ECG REC<48 HR R&I: CPT | Mod: HCWC,,, | Performed by: INTERNAL MEDICINE

## 2020-07-24 PROCEDURE — 93005 ELECTROCARDIOGRAM TRACING: CPT | Mod: HCWC

## 2020-07-27 ENCOUNTER — TELEPHONE (OUTPATIENT)
Dept: CARDIOLOGY | Facility: CLINIC | Age: 75
End: 2020-07-27

## 2020-07-28 ENCOUNTER — LAB VISIT (OUTPATIENT)
Dept: LAB | Facility: HOSPITAL | Age: 75
End: 2020-07-28
Attending: INTERNAL MEDICINE
Payer: MEDICARE

## 2020-07-28 DIAGNOSIS — I10 HTN (HYPERTENSION), BENIGN: ICD-10-CM

## 2020-07-28 DIAGNOSIS — I48.91 ATRIAL FIBRILLATION, UNSPECIFIED TYPE: ICD-10-CM

## 2020-07-28 LAB
ANION GAP SERPL CALC-SCNC: 7 MMOL/L (ref 8–16)
BUN SERPL-MCNC: 14 MG/DL (ref 8–23)
CALCIUM SERPL-MCNC: 9.7 MG/DL (ref 8.7–10.5)
CHLORIDE SERPL-SCNC: 103 MMOL/L (ref 95–110)
CO2 SERPL-SCNC: 33 MMOL/L (ref 23–29)
CREAT SERPL-MCNC: 1 MG/DL (ref 0.5–1.4)
EST. GFR  (AFRICAN AMERICAN): >60 ML/MIN/1.73 M^2
EST. GFR  (NON AFRICAN AMERICAN): >60 ML/MIN/1.73 M^2
GLUCOSE SERPL-MCNC: 101 MG/DL (ref 70–110)
POTASSIUM SERPL-SCNC: 4 MMOL/L (ref 3.5–5.1)
SODIUM SERPL-SCNC: 143 MMOL/L (ref 136–145)

## 2020-07-28 PROCEDURE — 80048 BASIC METABOLIC PNL TOTAL CA: CPT | Mod: HCNC

## 2020-07-28 PROCEDURE — 36415 COLL VENOUS BLD VENIPUNCTURE: CPT | Mod: HCNC

## 2020-08-06 ENCOUNTER — OFFICE VISIT (OUTPATIENT)
Dept: ORTHOPEDICS | Facility: CLINIC | Age: 75
End: 2020-08-06
Payer: MEDICARE

## 2020-08-06 VITALS — BODY MASS INDEX: 31.94 KG/M2 | RESPIRATION RATE: 18 BRPM | HEIGHT: 73 IN | WEIGHT: 241 LBS

## 2020-08-06 DIAGNOSIS — M77.11 LATERAL EPICONDYLITIS, RIGHT ELBOW: Primary | ICD-10-CM

## 2020-08-06 PROCEDURE — 1159F PR MEDICATION LIST DOCUMENTED IN MEDICAL RECORD: ICD-10-PCS | Mod: HCNC,S$GLB,, | Performed by: ORTHOPAEDIC SURGERY

## 2020-08-06 PROCEDURE — 1125F AMNT PAIN NOTED PAIN PRSNT: CPT | Mod: HCNC,S$GLB,, | Performed by: ORTHOPAEDIC SURGERY

## 2020-08-06 PROCEDURE — 99999 PR PBB SHADOW E&M-EST. PATIENT-LVL IV: CPT | Mod: PBBFAC,HCNC,, | Performed by: ORTHOPAEDIC SURGERY

## 2020-08-06 PROCEDURE — 99213 PR OFFICE/OUTPT VISIT, EST, LEVL III, 20-29 MIN: ICD-10-PCS | Mod: HCNC,S$GLB,, | Performed by: ORTHOPAEDIC SURGERY

## 2020-08-06 PROCEDURE — 1159F MED LIST DOCD IN RCRD: CPT | Mod: HCNC,S$GLB,, | Performed by: ORTHOPAEDIC SURGERY

## 2020-08-06 PROCEDURE — 1125F PR PAIN SEVERITY QUANTIFIED, PAIN PRESENT: ICD-10-PCS | Mod: HCNC,S$GLB,, | Performed by: ORTHOPAEDIC SURGERY

## 2020-08-06 PROCEDURE — 99213 OFFICE O/P EST LOW 20 MIN: CPT | Mod: HCNC,S$GLB,, | Performed by: ORTHOPAEDIC SURGERY

## 2020-08-06 PROCEDURE — 99999 PR PBB SHADOW E&M-EST. PATIENT-LVL IV: ICD-10-PCS | Mod: PBBFAC,HCNC,, | Performed by: ORTHOPAEDIC SURGERY

## 2020-08-06 PROCEDURE — 1101F PT FALLS ASSESS-DOCD LE1/YR: CPT | Mod: HCNC,CPTII,S$GLB, | Performed by: ORTHOPAEDIC SURGERY

## 2020-08-06 PROCEDURE — 1101F PR PT FALLS ASSESS DOC 0-1 FALLS W/OUT INJ PAST YR: ICD-10-PCS | Mod: HCNC,CPTII,S$GLB, | Performed by: ORTHOPAEDIC SURGERY

## 2020-08-06 NOTE — PROGRESS NOTES
Past Medical History:   Diagnosis Date    A-fib 3/31/2020    Arthritis     Back pain     Carpal tunnel syndrome 2/25/2013    Cataract     Cataract, left eye 11/10/2014    Chest pain, musculoskeletal     COPD (chronic obstructive pulmonary disease) 11/052018    Emphysema lung 11/05/2018    Gastritis     Hx of colonic polyp     Hyperlipidemia     Hypertension     Hypothyroidism     Knee fracture     Myasthenia gravis     Neuropathy 1/3/2013    Obesity 1/29/2015    Pneumonia 3/29/2020    Polyneuropathy     PVC (premature ventricular contraction)     Squamous cell carcinoma 2014    left forearm    Thyroid disease        Past Surgical History:   Procedure Laterality Date    APPENDECTOMY      CATARACT EXTRACTION W/  INTRAOCULAR LENS IMPLANT Bilateral     COLONOSCOPY  03/01/2012    Dr Esteban; hyperplastic polyp; repeat in 5 years    CYSTOSCOPY N/A 2/26/2019    Procedure: CYSTOSCOPY;  Surgeon: Simba Cheung MD;  [facility];  Service: Urology;  Laterality: N/A;    ENDOSCOPIC ULTRASOUND OF UPPER GASTROINTESTINAL TRACT N/A 1/7/2020    Procedure: ULTRASOUND, UPPER GI TRACT, ENDOSCOPIC;  Surgeon: Kati Ryan MD;  Location: [facility] ([address] [place] [address]);  Service: Endoscopy;  Laterality: N/A;    ESOPHAGOGASTRODUODENOSCOPY N/A 9/12/2018    Procedure: EGD (ESOPHAGOGASTRODUODENOSCOPY);  Surgeon: Gabriel Hernandez MD;  Location: [facility];  Service: Endoscopy;  Laterality: N/A;    ESOPHAGOGASTRODUODENOSCOPY N/A 8/19/2019    Procedure: EGD (ESOPHAGOGASTRODUODENOSCOPY);  Surgeon: Gabriel Hernandez MD;  Location: [facility];  Service: Endoscopy;  Laterality: N/A;    ESOPHAGOGASTRODUODENOSCOPY N/A 10/7/2019    Procedure: EGD (ESOPHAGOGASTRODUODENOSCOPY);  Surgeon: Gabriel Hernandez MD;  Location: [facility];  Service: Endoscopy;  Laterality: N/A;    ESOPHAGOGASTRODUODENOSCOPY N/A 1/7/2020    Procedure: EGD (ESOPHAGOGASTRODUODENOSCOPY);  Surgeon: Kati Ryan MD;  Location: [facility] ([facility]  Service: Endoscopy;  Laterality: N/A;    HEMORRHOID SURGERY      KNEE ARTHROPLASTY Bilateral     NECK SURGERY      POSTERIOR FUSION OF CERVICAL SPINE WITH LAMINECTOMY N/A 11/7/2018    Procedure: C2-C6 Posterior Cervical Laminectomy & Instrumental Fusion;  Surgeon: Kishore Turner MD;  [address]  Service: Neurosurgery;  Laterality: N/A;    TONSILLECTOMY      TRANSFORAMINAL EPIDURAL INJECTION OF STEROID Right 12/20/2019    Procedure: Injection,steroid,epidural,transforaminal approach;  Surgeon: Devan Watt MD;  Location: [facility];  Service: Pain Management;  Laterality: Right;  L3-4, L4-5    TRANSFORAMINAL EPIDURAL INJECTION OF STEROID Bilateral 2/6/2020    Procedure: Injection,steroid,epidural,transforaminal approach;  Surgeon: Devan Watt MD;  Location: [facility];  Service: Pain Management;  Laterality: Bilateral;  L3-L4,L4-L5    TRANSRECTAL ULTRASOUND EXAMINATION N/A 2/26/2019    Procedure: ULTRASOUND, RECTAL APPROACH;  Surgeon: Simba Cheung MD;  Location: [facility];  Service: Urology;  Laterality: N/A;    UPPER GASTROINTESTINAL ENDOSCOPY  09/12/2018    Dr Hernandez; gastritis; extensive intestinal metaplasia; repeat in 1-2- years       Current Outpatient Medications   Medication Sig    atorvastatin (LIPITOR) 80 MG tablet TAKE 1 TABLET EVERY DAY    carvediloL (COREG) 25 MG tablet TAKE 1 TABLET TWICE DAILY WITH MEALS    cetirizine (ZYRTEC) 10 MG tablet Take 1 tablet by mouth daily as needed.    chlorthalidone (HYGROTEN) 25 MG Tab As directed    cholecalciferol, vitamin D3, (VITAMIN D) 2,000 unit Cap 1 capsule once daily. Every day    coenzyme Q10 (CO Q-10) 100 mg capsule Take 400 mg by mouth once daily.     cyanocobalamin, vitamin B-12, (VITAMIN B-12) 5,000 mcg Subl Take 5,000 mcg by mouth once daily. Every day    ezetimibe (ZETIA) 10 mg tablet Take 1 tablet (10 mg total) by mouth once daily.    fluticasone (FLONASE) 50 mcg/actuation nasal spray USE 1 SPRAY IN EACH NOSTRIL  TWICE DAILY AS NEEDED  FOR  RHINITIS    gabapentin (NEURONTIN) 300 MG capsule TAKE 1 CAPSULE THREE TIMES DAILY    levothyroxine (SYNTHROID) 50 MCG tablet Take 1 tablet (50 mcg total) by mouth once daily.    methylsulfonylmethane (MSM) 1,000 mg Tab Take 1,000 mg by mouth once daily. Every day    milk thistle 200 mg Cap Take 200 mg by mouth 2 (two) times daily. Twice a day    olmesartan (BENICAR) 20 MG tablet TAKE 1 TABLET EVERY DAY    omega-3 fatty acids 1,000 mg Cap Twice a day    pantoprazole (PROTONIX) 40 MG tablet Take 1 tablet (40 mg total) by mouth 2 (two) times daily.    potassium chloride SA (K-DUR,KLOR-CON) 20 MEQ tablet Take 1 tablet (20 mEq total) by mouth 3 (three) times daily. Take 4 tablets daily for 5 days, then 3 daily for 2 weeks, then 2 daily thereafter.    predniSONE (DELTASONE) 1 MG tablet TAKE 2 TABLETS EVERY DAY    predniSONE (DELTASONE) 5 MG tablet TAKE 1 TABLET EVERY DAY    sildenafil (REVATIO) 20 mg Tab Take 1 to 3 tabs daily as needed.    sucralfate (CARAFATE) 1 gram tablet Take 1 tablet (1 g total) by mouth 4 (four) times daily before meals and nightly.    VENTOLIN HFA 90 mcg/actuation inhaler Inhale 2 puffs into the lungs every 6 (six) hours as needed for Wheezing. Rescue     No current facility-administered medications for this visit.      Facility-Administered Medications Ordered in Other Visits   Medication    0.9%  NaCl infusion       Review of patient's allergies indicates:   Allergen Reactions    No known drug allergies        Family History   Problem Relation Age of Onset    Cataracts Mother     Heart disease Mother         CHF    Hypertension Mother     Hyperlipidemia Mother     Cataracts Father     Glaucoma Father     Heart disease Father     Hyperlipidemia Sister     Hypertension Sister     No Known Problems Daughter     No Known Problems Daughter     Collagen disease Neg Hx     Amblyopia Neg Hx     Blindness Neg Hx     Macular degeneration Neg Hx      Retinal detachment Neg Hx     Strabismus Neg Hx     Cancer Neg Hx     Colon cancer Neg Hx     Esophageal cancer Neg Hx     Stomach cancer Neg Hx     Crohn's disease Neg Hx     Ulcerative colitis Neg Hx        Social History     Socioeconomic History    Marital status:      Spouse name: Not on file    Number of children: Not on file    Years of education: Not on file    Highest education level: Not on file   Occupational History    Not on file   Social Needs    Financial resource strain: Not hard at all    Food insecurity     Worry: Never true     Inability: Never true    Transportation needs     Medical: No     Non-medical: No   Tobacco Use    Smoking status: Never Smoker    Smokeless tobacco: Never Used    Tobacco comment: when a child   Substance and Sexual Activity    Alcohol use: Yes     Frequency: Never     Binge frequency: Never     Comment: rarely    Drug use: No    Sexual activity: Yes     Partners: Female   Lifestyle    Physical activity     Days per week: 0 days     Minutes per session: 0 min    Stress: Not at all   Relationships    Social connections     Talks on phone: More than three times a week     Gets together: More than three times a week     Attends Confucianism service: More than 4 times per year     Active member of club or organization: Yes     Attends meetings of clubs or organizations: More than 4 times per year     Relationship status:    Other Topics Concern    Not on file   Social History Narrative    Not on file       Chief Complaint:   Chief Complaint   Patient presents with    Right Elbow - Results, Pain     MRI Review Right elbow       History of present illness:  This is a 74-year-old male seen for right lateral elbow and forearm pain. This started back in the winter.  Cannot lift anything and hurts a lot at night.  Pain is about a 4/10 in the elbow.  We injected him earlier in the month and it helped very minimally.  Severe pain at night.    MRI showed no tear.  Did show the lateral epicondylitis.    Answers for HPI/ROS submitted by the patient on 12/23/2019   Leg pain  unexpected weight change: No  appetite change : No  sleep disturbance: No  IMMUNOCOMPROMISED: No  nervous/ anxious: No  dysphoric mood: No  rash: No  visual disturbance: No  eye redness: No  eye pain: No  ear pain: No  tinnitus: Yes  hearing loss: No  sinus pressure : Yes  nosebleeds: Yes  enviro allergies: No  food allergies: No  cough: No  shortness of breath: Yes  sweating: No  frequency: Yes  difficulty urinating: No  hematuria: No  chest pain: No  palpitations: No  nausea: No  vomiting: No  diarrhea: No  blood in stool: No  constipation: No  headaches: Yes  dizziness: No  numbness: No  seizures: No  joint swelling: No  myalgia: No  weakness: No  back pain: Yes  Pain Chronicity: recurrent  History of trauma: No  Onset: more than 1 month ago  Frequency: daily  Progression since onset: waxing and waning  Injury mechanism: twisting  injury location: at home  pain- numeric: 3/10  pain location: left knee  pain quality: aching  Radiating Pain: No  Aggravating factors: bending, extension, twisting  fever: No  inability to bear weight: No  itching: No  joint locking: No  limited range of motion: Yes  stiffness: Yes  tingling: No  Treatments tried: cold, heat, exercise, injection treatment, NSAIDs, OTC ointments  physical therapy: not tried  Improvement on treatment: mild      Physical Examination:    Vital Signs:    Vitals:    08/06/20 1418   Resp: 18       Body mass index is 31.8 kg/m².    This a well-developed, well nourished patient in no acute distress.  They are alert and oriented and cooperative to examination.  Pt. walks without an antalgic gait.      Examination of the right elbow shows no signs of rashes or erythema. The patient has no masses, ecchymosis, or effusion. The patient has full range of motion from 0-160°. Patient has full pronation and supination. Patient is nontender  along the medial epicondyle and moderately tender over the lateral epicondyle. Nontender over the olecranon process.  Nontender along the course of the UCL. Patient has a negative valgus stress test and milking maneuver. Negative Tinel's sign over the cubital tunnel. 2+ radial pulse. Intact light touch sensation.     X-rays:  X-rays of the left knee is  reviewed which show severe lateral joint space narrowing on the right and more moderate to severe medial joint space wear on the left.  No significant change noted.  X-rays of the right elbow are reviewed which show a small olecranon spur.    MRI of the right elbow from stand-up MRI:  Lateral epicondylitis.  Subcutaneous edema about the elbow.      Assessment:  Right lateral epicondylitis      Plan: I reviewed the findings with him.  I recommended getting him in with Dr. Quach to discuss Tenex.      This note was created using [employer] voice recognition software that occasionally misinterpreted phrases or words.    Consult note is delivered via Epic messaging service.

## 2020-08-17 ENCOUNTER — OFFICE VISIT (OUTPATIENT)
Dept: PHYSICAL MEDICINE AND REHAB | Facility: CLINIC | Age: 75
End: 2020-08-17
Payer: MEDICARE

## 2020-08-17 VITALS
BODY MASS INDEX: 31.94 KG/M2 | WEIGHT: 241 LBS | HEART RATE: 60 BPM | HEIGHT: 73 IN | DIASTOLIC BLOOD PRESSURE: 71 MMHG | SYSTOLIC BLOOD PRESSURE: 133 MMHG

## 2020-08-17 DIAGNOSIS — M47.816 LUMBAR SPONDYLOSIS: ICD-10-CM

## 2020-08-17 DIAGNOSIS — M54.2 CHRONIC NECK PAIN: ICD-10-CM

## 2020-08-17 DIAGNOSIS — M47.812 CERVICAL SPONDYLOSIS: ICD-10-CM

## 2020-08-17 DIAGNOSIS — G89.29 CHRONIC NECK PAIN: ICD-10-CM

## 2020-08-17 DIAGNOSIS — M25.521 CHRONIC PAIN OF RIGHT ELBOW: ICD-10-CM

## 2020-08-17 DIAGNOSIS — M48.062 SPINAL STENOSIS OF LUMBAR REGION WITH NEUROGENIC CLAUDICATION: ICD-10-CM

## 2020-08-17 DIAGNOSIS — M77.11 LATERAL EPICONDYLITIS, RIGHT ELBOW: Primary | ICD-10-CM

## 2020-08-17 DIAGNOSIS — M54.42 CHRONIC BILATERAL LOW BACK PAIN WITH BILATERAL SCIATICA: ICD-10-CM

## 2020-08-17 DIAGNOSIS — G89.29 CHRONIC BILATERAL LOW BACK PAIN WITH BILATERAL SCIATICA: ICD-10-CM

## 2020-08-17 DIAGNOSIS — M51.36 DDD (DEGENERATIVE DISC DISEASE), LUMBAR: ICD-10-CM

## 2020-08-17 DIAGNOSIS — M54.41 CHRONIC BILATERAL LOW BACK PAIN WITH BILATERAL SCIATICA: ICD-10-CM

## 2020-08-17 DIAGNOSIS — G89.29 CHRONIC PAIN OF RIGHT ELBOW: ICD-10-CM

## 2020-08-17 PROCEDURE — 1125F AMNT PAIN NOTED PAIN PRSNT: CPT | Mod: HCNC,S$GLB,, | Performed by: PHYSICAL MEDICINE & REHABILITATION

## 2020-08-17 PROCEDURE — 99999 PR PBB SHADOW E&M-EST. PATIENT-LVL III: CPT | Mod: PBBFAC,HCNC,, | Performed by: PHYSICAL MEDICINE & REHABILITATION

## 2020-08-17 PROCEDURE — 1159F PR MEDICATION LIST DOCUMENTED IN MEDICAL RECORD: ICD-10-PCS | Mod: HCNC,S$GLB,, | Performed by: PHYSICAL MEDICINE & REHABILITATION

## 2020-08-17 PROCEDURE — 1101F PR PT FALLS ASSESS DOC 0-1 FALLS W/OUT INJ PAST YR: ICD-10-PCS | Mod: HCNC,CPTII,S$GLB, | Performed by: PHYSICAL MEDICINE & REHABILITATION

## 2020-08-17 PROCEDURE — 76882 PR  US,EXTREMITY,NONVASCULAR,REAL-TIME IMAGE,LIMITED: ICD-10-PCS | Mod: HCNC,S$GLB,, | Performed by: PHYSICAL MEDICINE & REHABILITATION

## 2020-08-17 PROCEDURE — 76882 US LMTD JT/FCL EVL NVASC XTR: CPT | Mod: HCNC,S$GLB,, | Performed by: PHYSICAL MEDICINE & REHABILITATION

## 2020-08-17 PROCEDURE — 1159F MED LIST DOCD IN RCRD: CPT | Mod: HCNC,S$GLB,, | Performed by: PHYSICAL MEDICINE & REHABILITATION

## 2020-08-17 PROCEDURE — 3078F PR MOST RECENT DIASTOLIC BLOOD PRESSURE < 80 MM HG: ICD-10-PCS | Mod: HCNC,CPTII,S$GLB, | Performed by: PHYSICAL MEDICINE & REHABILITATION

## 2020-08-17 PROCEDURE — 99204 OFFICE O/P NEW MOD 45 MIN: CPT | Mod: HCNC,S$GLB,, | Performed by: PHYSICAL MEDICINE & REHABILITATION

## 2020-08-17 PROCEDURE — 1101F PT FALLS ASSESS-DOCD LE1/YR: CPT | Mod: HCNC,CPTII,S$GLB, | Performed by: PHYSICAL MEDICINE & REHABILITATION

## 2020-08-17 PROCEDURE — 3075F SYST BP GE 130 - 139MM HG: CPT | Mod: HCNC,CPTII,S$GLB, | Performed by: PHYSICAL MEDICINE & REHABILITATION

## 2020-08-17 PROCEDURE — 1125F PR PAIN SEVERITY QUANTIFIED, PAIN PRESENT: ICD-10-PCS | Mod: HCNC,S$GLB,, | Performed by: PHYSICAL MEDICINE & REHABILITATION

## 2020-08-17 PROCEDURE — 99204 PR OFFICE/OUTPT VISIT, NEW, LEVL IV, 45-59 MIN: ICD-10-PCS | Mod: HCNC,S$GLB,, | Performed by: PHYSICAL MEDICINE & REHABILITATION

## 2020-08-17 PROCEDURE — 3075F PR MOST RECENT SYSTOLIC BLOOD PRESS GE 130-139MM HG: ICD-10-PCS | Mod: HCNC,CPTII,S$GLB, | Performed by: PHYSICAL MEDICINE & REHABILITATION

## 2020-08-17 PROCEDURE — 99999 PR PBB SHADOW E&M-EST. PATIENT-LVL III: ICD-10-PCS | Mod: PBBFAC,HCNC,, | Performed by: PHYSICAL MEDICINE & REHABILITATION

## 2020-08-17 PROCEDURE — 3078F DIAST BP <80 MM HG: CPT | Mod: HCNC,CPTII,S$GLB, | Performed by: PHYSICAL MEDICINE & REHABILITATION

## 2020-08-17 NOTE — LETTER
August 17, 2020      Haroldo Campbell MD  [address] [place] [address] [address]  [place] [address]           [facility]  [address] [facility] [address]  [place] [address]  Phone: [phone]  Fax: [phone]          Patient: Maximilian Tavera Jr.   MR Number: [id number]   YOB: 1945   Date of Visit: 8/17/2020       Dear Dr. Haroldo Campbell:    Thank you for referring Maximilian Tavera to me for evaluation. Attached you will find relevant portions of my assessment and plan of care.    If you have questions, please do not hesitate to call me. I look forward to following Maximilian Tavera along with you.    Sincerely,    Terry Quach MD    Enclosure  CC:  No Recipients    If you would like to receive this communication electronically, please contact [email or web address] or [phone] to request more information on [facility] access.    For providers and/or their staff who would like to refer a patient to [facility], please contact us through our one-stop-shop provider referral line, [facility], at [phone].    If you feel you have received this communication in error or would no longer like to receive these types of communications, please e-mail [email or web address]

## 2020-08-17 NOTE — PROCEDURES
Procedures     Diagnostic ultrasound examination of the right lateral elbow    Indications:  Right lateral epicondylitis, preprocedure planning    Findings:  Using a high frequency linear transducer on the [employer] HS60, the right lateral elbow was visualized.  There appeared to be a enthesophyte at the superficial aspect of the lateral epicondyle.  There was a interstitial tear at the origin on the lateral epicondyle with a hyperechoic focus consistent with a calcific deposit.      Impression:  1.  Right lateral epicondylitis/interstitial tear of the common extensor tendon with possible calcific tendinopathy.

## 2020-08-17 NOTE — PROGRESS NOTES
Today's Date: 08/17/2020     Referring Provider: Dr. Haroldo Henderson*     Chief Complaint:   Chief Complaint   Patient presents with    Elbow Pain     right elbow pain       HPI: Maximilian Tavera Jr. is a 74 y.o. male  who presents today for evaluation and treatment of right lateral elbow pain is a referral from Dr. Campbell.  He states that his pain started approximately 8 months ago without inciting event.  He had a right lateral epicondyle injection in started physical therapy.  Unfortunately this did not provided with any meaningful relief.  The pain is over the right lateral epicondyle and radiate anteriorly over the antecubital fossa.  He describes the pain as a dull achy pain.  He describes it as a searing shooting pain.  He denies any pain referred down the forearm or hand.  He denies any numbness or tingling in the hand or fingers.  Pain on average is a 7 to 8/10.  Pain is worse with activity and better with rest.    On today's visit, he also complains of low back pain.  Pain is around the right greater than left lumbosacral junction.  He describes the pain as a dull achy pain on average, but can be sharp stabbing and shooting with certain activities.  Pain can radiate down the posterior thigh stopping at about the knee.  Pain is worse with standing and walking and better with sitting and laying.  He denies any numbness or tingling in the legs or feet.  He denies any new or worsening bowel or bladder dysfunction, saddle anesthesia, or lower extremity weakness.  He has had bilateral L3-4 and L4-5 transforaminal epidural steroid injections with most recent being in February which she states did not provide him with any meaningful relief.    Review of Systems   Constitutional: Negative for chills and fever.   HENT: Negative for congestion and tinnitus.    Eyes: Negative for blurred vision and photophobia.   Respiratory: Negative for shortness of breath and wheezing.    Cardiovascular: Negative for  chest pain and palpitations.   Gastrointestinal: Negative for nausea and vomiting.   Genitourinary: Negative for dysuria and frequency.   Musculoskeletal: Positive for back pain, joint pain and neck pain. Negative for myalgias.   Skin: Negative for itching and rash.   Neurological: Negative for tingling, sensory change, speech change and focal weakness.   Endo/Heme/Allergies: Negative for environmental allergies. Does not bruise/bleed easily.   Psychiatric/Behavioral: Negative for depression. The patient is not nervous/anxious.         Social History     Socioeconomic History    Marital status:      Spouse name: Not on file    Number of children: Not on file    Years of education: Not on file    Highest education level: Not on file   Occupational History    Not on file   Social Needs    Financial resource strain: Not hard at all    Food insecurity     Worry: Never true     Inability: Never true    Transportation needs     Medical: No     Non-medical: No   Tobacco Use    Smoking status: Never Smoker    Smokeless tobacco: Never Used    Tobacco comment: when a child   Substance and Sexual Activity    Alcohol use: Yes     Frequency: Never     Binge frequency: Never     Comment: rarely    Drug use: No    Sexual activity: Yes     Partners: Female   Lifestyle    Physical activity     Days per week: 0 days     Minutes per session: 0 min    Stress: Not at all   Relationships    Social connections     Talks on phone: More than three times a week     Gets together: More than three times a week     Attends Hindu service: More than 4 times per year     Active member of club or organization: Yes     Attends meetings of clubs or organizations: More than 4 times per year     Relationship status:    Other Topics Concern    Not on file   Social History Narrative    Not on file       Family History   Problem Relation Age of Onset    Cataracts Mother     Heart disease Mother         CHF     Hypertension Mother     Hyperlipidemia Mother     Cataracts Father     Glaucoma Father     Heart disease Father     Hyperlipidemia Sister     Hypertension Sister     No Known Problems Daughter     No Known Problems Daughter     Collagen disease Neg Hx     Amblyopia Neg Hx     Blindness Neg Hx     Macular degeneration Neg Hx     Retinal detachment Neg Hx     Strabismus Neg Hx     Cancer Neg Hx     Colon cancer Neg Hx     Esophageal cancer Neg Hx     Stomach cancer Neg Hx     Crohn's disease Neg Hx     Ulcerative colitis Neg Hx        Current Outpatient Medications on File Prior to Visit   Medication Sig Dispense Refill    atorvastatin (LIPITOR) 80 MG tablet TAKE 1 TABLET EVERY DAY 90 tablet 3    carvediloL (COREG) 25 MG tablet TAKE 1 TABLET TWICE DAILY WITH MEALS 180 tablet 3    cetirizine (ZYRTEC) 10 MG tablet Take 1 tablet by mouth daily as needed.      chlorthalidone (HYGROTEN) 25 MG Tab As directed 30 tablet 11    cholecalciferol, vitamin D3, (VITAMIN D) 2,000 unit Cap 1 capsule once daily. Every day      coenzyme Q10 (CO Q-10) 100 mg capsule Take 400 mg by mouth once daily.       cyanocobalamin, vitamin B-12, (VITAMIN B-12) 5,000 mcg Subl Take 5,000 mcg by mouth once daily. Every day      ezetimibe (ZETIA) 10 mg tablet Take 1 tablet (10 mg total) by mouth once daily. 90 tablet 3    fluticasone (FLONASE) 50 mcg/actuation nasal spray USE 1 SPRAY IN EACH NOSTRIL TWICE DAILY AS NEEDED  FOR  RHINITIS 48 g 3    gabapentin (NEURONTIN) 300 MG capsule TAKE 1 CAPSULE THREE TIMES DAILY 270 capsule 5    levothyroxine (SYNTHROID) 50 MCG tablet Take 1 tablet (50 mcg total) by mouth once daily. 90 tablet 3    methylsulfonylmethane (MSM) 1,000 mg Tab Take 1,000 mg by mouth once daily. Every day      milk thistle 200 mg Cap Take 200 mg by mouth 2 (two) times daily. Twice a day      olmesartan (BENICAR) 20 MG tablet TAKE 1 TABLET EVERY DAY 90 tablet 0    omega-3 fatty acids 1,000 mg Cap Twice a  day      pantoprazole (PROTONIX) 40 MG tablet Take 1 tablet (40 mg total) by mouth 2 (two) times daily. 180 tablet 3    potassium chloride SA (K-DUR,KLOR-CON) 20 MEQ tablet Take 1 tablet (20 mEq total) by mouth 3 (three) times daily. Take 4 tablets daily for 5 days, then 3 daily for 2 weeks, then 2 daily thereafter. 270 tablet 3    predniSONE (DELTASONE) 1 MG tablet TAKE 2 TABLETS EVERY  tablet 3    predniSONE (DELTASONE) 5 MG tablet TAKE 1 TABLET EVERY DAY 90 tablet 3    sildenafil (REVATIO) 20 mg Tab Take 1 to 3 tabs daily as needed. 30 tablet 3    sucralfate (CARAFATE) 1 gram tablet Take 1 tablet (1 g total) by mouth 4 (four) times daily before meals and nightly. 360 tablet 1    VENTOLIN HFA 90 mcg/actuation inhaler Inhale 2 puffs into the lungs every 6 (six) hours as needed for Wheezing. Rescue 1 Inhaler 3    [DISCONTINUED] esomeprazole (NEXIUM) 40 MG capsule Take 1 capsule (40 mg total) by mouth before breakfast. 30 capsule 0    [DISCONTINUED] predniSONE (DELTASONE) 1 MG tablet TAKE 2 TABLETS EVERY  tablet 3    [DISCONTINUED] predniSONE (DELTASONE) 5 MG tablet TAKE 1 TABLET EVERY DAY 90 tablet 3     Current Facility-Administered Medications on File Prior to Visit   Medication Dose Route Frequency Provider Last Rate Last Dose    0.9%  NaCl infusion   Intravenous Continuous Devan Watt MD            Review of patient's allergies indicates:   Allergen Reactions    No known drug allergies        Past Surgical History:   Procedure Laterality Date    APPENDECTOMY      CATARACT EXTRACTION W/  INTRAOCULAR LENS IMPLANT Bilateral     COLONOSCOPY  03/01/2012    Dr Esteban; hyperplastic polyp; repeat in 5 years    CYSTOSCOPY N/A 2/26/2019    Procedure: CYSTOSCOPY;  Surgeon: Simba Cheung MD;  Location: [facility];  Service: Urology;  Laterality: N/A;    ENDOSCOPIC ULTRASOUND OF UPPER GASTROINTESTINAL TRACT N/A 1/7/2020    Procedure: ULTRASOUND, UPPER GI TRACT, ENDOSCOPIC;  Surgeon: Kati MOODY  Eric Ryan MD;  Location: [facility] (2ND FLR);  Service: Endoscopy;  Laterality: N/A;    ESOPHAGOGASTRODUODENOSCOPY N/A 9/12/2018    Procedure: EGD (ESOPHAGOGASTRODUODENOSCOPY);  Surgeon: Gabriel Hernandez MD;  Location: [facility];  Service: Endoscopy;  Laterality: N/A;    ESOPHAGOGASTRODUODENOSCOPY N/A 8/19/2019    Procedure: EGD (ESOPHAGOGASTRODUODENOSCOPY);  Surgeon: Gabriel Hernandez MD;  Location: [facility];  Service: Endoscopy;  Laterality: N/A;    ESOPHAGOGASTRODUODENOSCOPY N/A 10/7/2019    Procedure: EGD (ESOPHAGOGASTRODUODENOSCOPY);  Surgeon: Gabriel Hernandez MD;  Location: [facility];  Service: Endoscopy;  Laterality: N/A;    ESOPHAGOGASTRODUODENOSCOPY N/A 1/7/2020    Procedure: EGD (ESOPHAGOGASTRODUODENOSCOPY);  Surgeon: Kati Ryan MD;  Location: [facility] (2ND FLR);  Service: Endoscopy;  Laterality: N/A;    HEMORRHOID SURGERY      KNEE ARTHROPLASTY Bilateral     NECK SURGERY      POSTERIOR FUSION OF CERVICAL SPINE WITH LAMINECTOMY N/A 11/7/2018    Procedure: C2-C6 Posterior Cervical Laminectomy & Instrumental Fusion;  Surgeon: Kishore Turner MD;  Location: [facility] 2ND FLR;  Service: Neurosurgery;  Laterality: N/A;    TONSILLECTOMY      TRANSFORAMINAL EPIDURAL INJECTION OF STEROID Right 12/20/2019    Procedure: Injection,steroid,epidural,transforaminal approach;  Surgeon: Devan Watt MD;  Location: [facility] OR;  Service: Pain Management;  Laterality: Right;  L3-4, L4-5    TRANSFORAMINAL EPIDURAL INJECTION OF STEROID Bilateral 2/6/2020    Procedure: Injection,steroid,epidural,transforaminal approach;  Surgeon: Devan Watt MD;  Location: [facility] OR;  Service: Pain Management;  Laterality: Bilateral;  L3-L4,L4-L5    TRANSRECTAL ULTRASOUND EXAMINATION N/A 2/26/2019    Procedure: ULTRASOUND, RECTAL APPROACH;  Surgeon: Simba Cheung MD;  Location: [facility] OR;  Service: Urology;  Laterality: N/A;    UPPER GASTROINTESTINAL ENDOSCOPY  09/12/2018    Dr Hernandez; gastritis; extensive intestinal  metaplasia; repeat in 1-2- years       Past Medical History:   Diagnosis Date    A-fib 3/31/2020    Arthritis     Back pain     Carpal tunnel syndrome 2/25/2013    Cataract     Cataract, left eye 11/10/2014    Chest pain, musculoskeletal     COPD (chronic obstructive pulmonary disease) 11/052018    Emphysema lung 11/05/2018    Gastritis     Hx of colonic polyp     Hyperlipidemia     Hypertension     Hypothyroidism     Knee fracture     Myasthenia gravis     Neuropathy 1/3/2013    Obesity 1/29/2015    Pneumonia 3/29/2020    Polyneuropathy     PVC (premature ventricular contraction)     Squamous cell carcinoma 2014    left forearm    Thyroid disease        Vitals:    08/17/20 1446   BP: 133/71   Pulse: 60       Physical Exam  Constitutional:       Appearance: Normal appearance.   HENT:      Head: Normocephalic and atraumatic.      Nose: Nose normal. No congestion.      Mouth/Throat:      Mouth: Mucous membranes are moist.      Pharynx: Oropharynx is clear.   Eyes:      Extraocular Movements: Extraocular movements intact.      Conjunctiva/sclera: Conjunctivae normal.      Pupils: Pupils are equal, round, and reactive to light.   Neck:      Trachea: Trachea and phonation normal.   Pulmonary:      Effort: Pulmonary effort is normal. No respiratory distress.   Abdominal:      General: Abdomen is flat. There is no distension.   Musculoskeletal:      Lumbar back: He exhibits decreased range of motion (Pain with extension and rotation to the right versus the left.  Positive facet loading on the right greater than the left.), tenderness and pain.        Back:    Skin:     General: Skin is warm and dry.      Nails: There is no clubbing.     Neurological:      General: No focal deficit present.      Mental Status: He is alert and oriented to person, place, and time.      Cranial Nerves: Cranial nerves are intact.      Sensory: Sensation is intact.      Motor: Motor function is intact.      Gait: Gait is  intact.   Psychiatric:         Mood and Affect: Mood and affect normal.        Right Elbow Exam     Tenderness   The patient is experiencing tenderness in the lateral epicondyle.     Comments:  Positive Cozen's with resisted wrist extension             Lateral epicondylitis, right elbow  -     Ambulatory referral/consult to Physical Medicine Rehab    Chronic pain of right elbow    Chronic bilateral low back pain with bilateral sciatica    Lumbar spondylosis    Spinal stenosis of lumbar region with neurogenic claudication    DDD (degenerative disc disease), lumbar    Chronic neck pain    Cervical spondylosis           PLAN:    Maximilian Tavera Jr. is a 74 y.o. male with right lateral elbow pain.  History, physical examination, MRI, diagnostic ultrasound findings of the right elbow is consistent with an interstitial tear of the common extensor tendon origin on the lateral epicondyle with small calcific deposit.  He has tried injections and physical therapy without any meaningful lasting relief.  His pain continues to be over the lateral elbow and at its worst a 7 to 8/10.  His pain limits his activities of daily living.  At this time    On today's visit, he also complains of low back pain.  His pain is around the lumbosacral junction and to radiate down the posterior thigh stopping at about the knee.  Pain is worse with standing and better with sitting and lying down.  He has had bilateral L3-4 and L4-5 transforaminal epidural steroid injections without any meaningful relief.  His pain may be consistent with lumbar spondylosis versus lumbar spinal spinal stenosis with neurogenic claudication.  MRI shows multilevel facet arthropathy as well as moderate central stenosis at L4-5 and multilevel moderate to severe neural foraminal stenosis.  I will have him follow up with Dr. Watt to discuss alternative treatment modalities such as medial branch blocks versus interlaminar epidural.    Terry Quach D.O.  Physical  Medicine and Rehabilitation          CC: Patients PCP: Brian Marroquin MD  CC: Referring Provider: Dr. Haroldo Henderson*

## 2020-08-17 NOTE — H&P (VIEW-ONLY)
Today's Date: 08/17/2020     Referring Provider: Dr. Haroldo Henderson*     Chief Complaint:   Chief Complaint   Patient presents with    Elbow Pain     right elbow pain       HPI: Maximilian Tavera Jr. is a 74 y.o. male  who presents today for evaluation and treatment of right lateral elbow pain is a referral from Dr. Campbell.  He states that his pain started approximately 8 months ago without inciting event.  He had a right lateral epicondyle injection in started physical therapy.  Unfortunately this did not provided with any meaningful relief.  The pain is over the right lateral epicondyle and radiate anteriorly over the antecubital fossa.  He describes the pain as a dull achy pain.  He describes it as a searing shooting pain.  He denies any pain referred down the forearm or hand.  He denies any numbness or tingling in the hand or fingers.  Pain on average is a 7 to 8/10.  Pain is worse with activity and better with rest.    On today's visit, he also complains of low back pain.  Pain is around the right greater than left lumbosacral junction.  He describes the pain as a dull achy pain on average, but can be sharp stabbing and shooting with certain activities.  Pain can radiate down the posterior thigh stopping at about the knee.  Pain is worse with standing and walking and better with sitting and laying.  He denies any numbness or tingling in the legs or feet.  He denies any new or worsening bowel or bladder dysfunction, saddle anesthesia, or lower extremity weakness.  He has had bilateral L3-4 and L4-5 transforaminal epidural steroid injections with most recent being in February which she states did not provide him with any meaningful relief.    Review of Systems   Constitutional: Negative for chills and fever.   HENT: Negative for congestion and tinnitus.    Eyes: Negative for blurred vision and photophobia.   Respiratory: Negative for shortness of breath and wheezing.    Cardiovascular: Negative for  chest pain and palpitations.   Gastrointestinal: Negative for nausea and vomiting.   Genitourinary: Negative for dysuria and frequency.   Musculoskeletal: Positive for back pain, joint pain and neck pain. Negative for myalgias.   Skin: Negative for itching and rash.   Neurological: Negative for tingling, sensory change, speech change and focal weakness.   Endo/Heme/Allergies: Negative for environmental allergies. Does not bruise/bleed easily.   Psychiatric/Behavioral: Negative for depression. The patient is not nervous/anxious.         Social History     Socioeconomic History    Marital status:      Spouse name: Not on file    Number of children: Not on file    Years of education: Not on file    Highest education level: Not on file   Occupational History    Not on file   Social Needs    Financial resource strain: Not hard at all    Food insecurity     Worry: Never true     Inability: Never true    Transportation needs     Medical: No     Non-medical: No   Tobacco Use    Smoking status: Never Smoker    Smokeless tobacco: Never Used    Tobacco comment: when a child   Substance and Sexual Activity    Alcohol use: Yes     Frequency: Never     Binge frequency: Never     Comment: rarely    Drug use: No    Sexual activity: Yes     Partners: Female   Lifestyle    Physical activity     Days per week: 0 days     Minutes per session: 0 min    Stress: Not at all   Relationships    Social connections     Talks on phone: More than three times a week     Gets together: More than three times a week     Attends [employer] witness service: More than 4 times per year     Active member of club or organization: Yes     Attends meetings of clubs or organizations: More than 4 times per year     Relationship status:    Other Topics Concern    Not on file   Social History Narrative    Not on file       Family History   Problem Relation Age of Onset    Cataracts Mother     Heart disease Mother         CHF     Hypertension Mother     Hyperlipidemia Mother     Cataracts Father     Glaucoma Father     Heart disease Father     Hyperlipidemia Sister     Hypertension Sister     No Known Problems Daughter     No Known Problems Daughter     Collagen disease Neg Hx     Amblyopia Neg Hx     Blindness Neg Hx     Macular degeneration Neg Hx     Retinal detachment Neg Hx     Strabismus Neg Hx     Cancer Neg Hx     Colon cancer Neg Hx     Esophageal cancer Neg Hx     Stomach cancer Neg Hx     Crohn's disease Neg Hx     Ulcerative colitis Neg Hx        Current Outpatient Medications on File Prior to Visit   Medication Sig Dispense Refill    atorvastatin (LIPITOR) 80 MG tablet TAKE 1 TABLET EVERY DAY 90 tablet 3    carvediloL (COREG) 25 MG tablet TAKE 1 TABLET TWICE DAILY WITH MEALS 180 tablet 3    cetirizine (ZYRTEC) 10 MG tablet Take 1 tablet by mouth daily as needed.      chlorthalidone (HYGROTEN) 25 MG Tab As directed 30 tablet 11    cholecalciferol, vitamin D3, (VITAMIN D) 2,000 unit Cap 1 capsule once daily. Every day      coenzyme Q10 (CO Q-10) 100 mg capsule Take 400 mg by mouth once daily.       cyanocobalamin, vitamin B-12, (VITAMIN B-12) 5,000 mcg Subl Take 5,000 mcg by mouth once daily. Every day      ezetimibe (ZETIA) 10 mg tablet Take 1 tablet (10 mg total) by mouth once daily. 90 tablet 3    fluticasone (FLONASE) 50 mcg/actuation nasal spray USE 1 SPRAY IN EACH NOSTRIL TWICE DAILY AS NEEDED  FOR  RHINITIS 48 g 3    gabapentin (NEURONTIN) 300 MG capsule TAKE 1 CAPSULE THREE TIMES DAILY 270 capsule 5    levothyroxine (SYNTHROID) 50 MCG tablet Take 1 tablet (50 mcg total) by mouth once daily. 90 tablet 3    methylsulfonylmethane (MSM) 1,000 mg Tab Take 1,000 mg by mouth once daily. Every day      milk thistle 200 mg Cap Take 200 mg by mouth 2 (two) times daily. Twice a day      olmesartan (BENICAR) 20 MG tablet TAKE 1 TABLET EVERY DAY 90 tablet 0    omega-3 fatty acids 1,000 mg Cap Twice a  day      pantoprazole (PROTONIX) 40 MG tablet Take 1 tablet (40 mg total) by mouth 2 (two) times daily. 180 tablet 3    potassium chloride SA (K-DUR,KLOR-CON) 20 MEQ tablet Take 1 tablet (20 mEq total) by mouth 3 (three) times daily. Take 4 tablets daily for 5 days, then 3 daily for 2 weeks, then 2 daily thereafter. 270 tablet 3    predniSONE (DELTASONE) 1 MG tablet TAKE 2 TABLETS EVERY  tablet 3    predniSONE (DELTASONE) 5 MG tablet TAKE 1 TABLET EVERY DAY 90 tablet 3    sildenafil (REVATIO) 20 mg Tab Take 1 to 3 tabs daily as needed. 30 tablet 3    sucralfate (CARAFATE) 1 gram tablet Take 1 tablet (1 g total) by mouth 4 (four) times daily before meals and nightly. 360 tablet 1    VENTOLIN HFA 90 mcg/actuation inhaler Inhale 2 puffs into the lungs every 6 (six) hours as needed for Wheezing. Rescue 1 Inhaler 3    [DISCONTINUED] esomeprazole (NEXIUM) 40 MG capsule Take 1 capsule (40 mg total) by mouth before breakfast. 30 capsule 0    [DISCONTINUED] predniSONE (DELTASONE) 1 MG tablet TAKE 2 TABLETS EVERY  tablet 3    [DISCONTINUED] predniSONE (DELTASONE) 5 MG tablet TAKE 1 TABLET EVERY DAY 90 tablet 3     Current Facility-Administered Medications on File Prior to Visit   Medication Dose Route Frequency Provider Last Rate Last Dose    0.9%  NaCl infusion   Intravenous Continuous Devan Watt MD            Review of patient's allergies indicates:   Allergen Reactions    No known drug allergies        Past Surgical History:   Procedure Laterality Date    APPENDECTOMY      CATARACT EXTRACTION W/  INTRAOCULAR LENS IMPLANT Bilateral     COLONOSCOPY  03/01/2012    Dr Esteban; hyperplastic polyp; repeat in 5 years    CYSTOSCOPY N/A 2/26/2019    Procedure: CYSTOSCOPY;  Surgeon: Simba Cheung MD;  Location: Replaced by [facility];  Service: Urology;  Laterality: N/A;    ENDOSCOPIC ULTRASOUND OF UPPER GASTROINTESTINAL TRACT N/A 1/7/2020    Procedure: ULTRASOUND, UPPER GI TRACT, ENDOSCOPIC;  Surgeon: Kati MOODY  Eric Ryan MD;  Location: [facility] (2ND FLR);  Service: Endoscopy;  Laterality: N/A;    ESOPHAGOGASTRODUODENOSCOPY N/A 9/12/2018    Procedure: EGD (ESOPHAGOGASTRODUODENOSCOPY);  Surgeon: Gabriel Hernandez MD;  Location: [facility];  Service: Endoscopy;  Laterality: N/A;    ESOPHAGOGASTRODUODENOSCOPY N/A 8/19/2019    Procedure: EGD (ESOPHAGOGASTRODUODENOSCOPY);  Surgeon: Gabriel Hernandez MD;  Location: [facility];  Service: Endoscopy;  Laterality: N/A;    ESOPHAGOGASTRODUODENOSCOPY N/A 10/7/2019    Procedure: EGD (ESOPHAGOGASTRODUODENOSCOPY);  Surgeon: Gabriel Hernandez MD;  Location: [facility];  Service: Endoscopy;  Laterality: N/A;    ESOPHAGOGASTRODUODENOSCOPY N/A 1/7/2020    Procedure: EGD (ESOPHAGOGASTRODUODENOSCOPY);  Surgeon: Kati Ryan MD;  Location: [facility] (2ND FLR);  Service: Endoscopy;  Laterality: N/A;    HEMORRHOID SURGERY      KNEE ARTHROPLASTY Bilateral     NECK SURGERY      POSTERIOR FUSION OF CERVICAL SPINE WITH LAMINECTOMY N/A 11/7/2018    Procedure: C2-C6 Posterior Cervical Laminectomy & Instrumental Fusion;  Surgeon: Kishore Turner MD;  Location: [facility] 2ND FLR;  Service: Neurosurgery;  Laterality: N/A;    TONSILLECTOMY      TRANSFORAMINAL EPIDURAL INJECTION OF STEROID Right 12/20/2019    Procedure: Injection,steroid,epidural,transforaminal approach;  Surgeon: Devan Watt MD;  Location: [facility] OR;  Service: Pain Management;  Laterality: Right;  L3-4, L4-5    TRANSFORAMINAL EPIDURAL INJECTION OF STEROID Bilateral 2/6/2020    Procedure: Injection,steroid,epidural,transforaminal approach;  Surgeon: Devan Watt MD;  Location: [facility] OR;  Service: Pain Management;  Laterality: Bilateral;  L3-L4,L4-L5    TRANSRECTAL ULTRASOUND EXAMINATION N/A 2/26/2019    Procedure: ULTRASOUND, RECTAL APPROACH;  Surgeon: Simba Cheung MD;  Location: [facility] OR;  Service: Urology;  Laterality: N/A;    UPPER GASTROINTESTINAL ENDOSCOPY  09/12/2018    Dr Hernandez; gastritis; extensive intestinal  metaplasia; repeat in 1-2- years       Past Medical History:   Diagnosis Date    A-fib 3/31/2020    Arthritis     Back pain     Carpal tunnel syndrome 2/25/2013    Cataract     Cataract, left eye 11/10/2014    Chest pain, musculoskeletal     COPD (chronic obstructive pulmonary disease) 11/052018    Emphysema lung 11/05/2018    Gastritis     Hx of colonic polyp     Hyperlipidemia     Hypertension     Hypothyroidism     Knee fracture     Myasthenia gravis     Neuropathy 1/3/2013    Obesity 1/29/2015    Pneumonia 3/29/2020    Polyneuropathy     PVC (premature ventricular contraction)     Squamous cell carcinoma 2014    left forearm    Thyroid disease        Vitals:    08/17/20 1446   BP: 133/71   Pulse: 60       Physical Exam  Constitutional:       Appearance: Normal appearance.   HENT:      Head: Normocephalic and atraumatic.      Nose: Nose normal. No congestion.      Mouth/Throat:      Mouth: Mucous membranes are moist.      Pharynx: Oropharynx is clear.   Eyes:      Extraocular Movements: Extraocular movements intact.      Conjunctiva/sclera: Conjunctivae normal.      Pupils: Pupils are equal, round, and reactive to light.   Neck:      Trachea: Trachea and phonation normal.   Pulmonary:      Effort: Pulmonary effort is normal. No respiratory distress.   Abdominal:      General: Abdomen is flat. There is no distension.   Musculoskeletal:      Lumbar back: He exhibits decreased range of motion (Pain with extension and rotation to the right versus the left.  Positive facet loading on the right greater than the left.), tenderness and pain.        Back:    Skin:     General: Skin is warm and dry.      Nails: There is no clubbing.     Neurological:      General: No focal deficit present.      Mental Status: He is alert and oriented to person, place, and time.      Cranial Nerves: Cranial nerves are intact.      Sensory: Sensation is intact.      Motor: Motor function is intact.      Gait: Gait is  intact.   Psychiatric:         Mood and Affect: Mood and affect normal.        Right Elbow Exam     Tenderness   The patient is experiencing tenderness in the lateral epicondyle.     Comments:  Positive Cozen's with resisted wrist extension             Lateral epicondylitis, right elbow  -     Ambulatory referral/consult to Physical Medicine Rehab    Chronic pain of right elbow    Chronic bilateral low back pain with bilateral sciatica    Lumbar spondylosis    Spinal stenosis of lumbar region with neurogenic claudication    DDD (degenerative disc disease), lumbar    Chronic neck pain    Cervical spondylosis           PLAN:    Maximilian Tavera Jr. is a 74 y.o. male with right lateral elbow pain.  History, physical examination, MRI, diagnostic ultrasound findings of the right elbow is consistent with an interstitial tear of the common extensor tendon origin on the lateral epicondyle with small calcific deposit.  He has tried injections and physical therapy without any meaningful lasting relief.  His pain continues to be over the lateral elbow and at its worst a 7 to 8/10.  His pain limits his activities of daily living.  At this time    On today's visit, he also complains of low back pain.  His pain is around the lumbosacral junction and to radiate down the posterior thigh stopping at about the knee.  Pain is worse with standing and better with sitting and lying down.  He has had bilateral L3-4 and L4-5 transforaminal epidural steroid injections without any meaningful relief.  His pain may be consistent with lumbar spondylosis versus lumbar spinal spinal stenosis with neurogenic claudication.  MRI shows multilevel facet arthropathy as well as moderate central stenosis at L4-5 and multilevel moderate to severe neural foraminal stenosis.  I will have him follow up with Dr. Watt to discuss alternative treatment modalities such as medial branch blocks versus interlaminar epidural.    Terry Quach D.O.  Physical  Medicine and Rehabilitation          CC: Patients PCP: Brian Marroquin MD  CC: Referring Provider: Dr. Haroldo Henderson*

## 2020-08-18 ENCOUNTER — PATIENT OUTREACH (OUTPATIENT)
Dept: OTHER | Facility: OTHER | Age: 75
End: 2020-08-18

## 2020-08-18 NOTE — PROGRESS NOTES
Digital Medicine: Health  Introduction    Introduced Maximilian Tavera to Digital Medicine. Discussed health  role and recommended lifestyle modifications.    The history is provided by the patient.         Health  received low BP alert.        Additional Enrollment Details: Patient explained that he gets low blood pressures periodically, and that he feels dizzy when he does so, as he did on 8/14. He skips his evening dose of blood pressure medicine when it occurs. It has been happening for about a year. He initially thought it had something to do with working outside, but realized later that it happens regardless of whether he is active outside or inside. We discussed proper hydration, and recommendations to treat it with water and a salty snack should it happen again.    HYPERTENSION  Explained hypertension digital medicine goals including BP goal less than or equal to 130/80mmHg, improved convenience of BP management and reduced risk of heart attack, kidney failure, stroke, eye disease, dementia, and death.      Explained non-pharmacologic therapies like low salt diet and physical activity can reduce blood pressure. .      Explained that we expect patient to submit several blood pressure readings per week at random times of the day, but at least 30 minutes after taking blood pressure medications. Instructed patient not to allow anyone else to use their blood pressure monitor and phone as data submitted is directly entered into medical record. Reviewed and confirmed appropriate blood pressure monitoring technique.         Patient's BP goal is less than or equal to 130/80.Patient's BP average is 98/56 mmHg, which is at goal, per 2017 ACC/AHA Hypertension Guidelines.    Explained to the patient that the Digital Medicine team is not available for emergencies. Advised patient call [facility] ([phone] or [phone]) or 911 if needed.               Diet-Assessed      Additional diet details:  "Patient briefly mentioned that he and his wife do not add salt to their food, nor eat a lot of salt, as she also has hypertension. He drinks "sufficient" water - we discussed the american heart association recommendation to drink at least 64 ounces of water a day.    Physical Activity-Not assessed    Medication Adherence-Medication adherence was assessed.  Patient continue taking medication as prescribed.          Patient discussed that he will not make any blood pressure medication adjustments unless they are discussed with his cardiologist first. He explained that he had a "bad situation" when he made a change a couple years ago without the cardiologist's input.  Substance, Sleep, Stress-Not assessed      Provided patient education.  Reviewed Device Techniques.     Patient verbalizes understanding.      Explained the importance of self-monitoring and medication adherence. Encouraged the patient to communicate with their health  for lifestyle modifications to help improve or maintain a healthy lifestyle.        Sent link to [facility] webpages and my contact information via [employer] for future questions.        Explained to the patient that the Digital Medicine team is not available for emergencies. Advised patient call [facility] ([phone] or [phone]) or 911 if needed.       There are no preventive care reminders to display for this patient.    Last 5 Patient Entered Readings                                      Current 30 Day Average: 98/56     Recent Readings 8/15/2020 8/14/2020 8/14/2020 8/11/2020    SBP (mmHg) 120 69 69 104    DBP (mmHg) 71 44 45 62    Pulse 68 68 69 64        "

## 2020-08-18 NOTE — LETTER
August 18, 2020     Maximilian Kumariana luisa Tavera Jr.  [address] [place] [address]       Dear Mr. Tavera,    Welcome to [facility]! Our goal is to make care effective, proactive and convenient by using data you send us from home to better treat your chronic conditions.              My name is Margi Vaca, and I am your dedicated Digital Medicine clinician. As an expert in medication management, I will help ensure that the medications you are taking continue to provide the intended benefits and help you reach your goals. You can reach me directly at [phone] or by sending me a message directly through your [facility] account.      I am Simba Sibley and I will be your health . My job is to help you identify lifestyle changes to improve your disease control. We will talk about nutrition, exercise, and other ways you may be able to adjust your current habits to better your health. Additionally, we will help ensure you are completing the tests and screenings that are necessary to help manage your conditions. You can reach me directly at [phone] or by sending me a message directly through your [facility] account.    Most importantly, YOU are at the center of this team. Together, we will work to improve your overall health and encourage you to meet your goals for a healthier lifestyle.     What we expect from YOU:  · Please take frequent home blood pressure measurements. We ask that you take at least 1 blood pressure reading per week, but more information will better help us get you know you. Be sure you rest for a few minutes before taking the reading in a quiet, comfortable place.     Be available to receive phone calls or [facility] messages, when appropriate, from your care team. Please let us know if there are any specific days or times that work best for us to reach you via phone.     Complete routine tests and screenings. Dont worry, we will help keep you on track!            What you should expect from your Digital Medicine Care Team:   We will work with you to create a personalized plan of care and provide you with encouragement and education, including regarding lifestyle changes, that could help you manage your disease states.     We will adjust your current medications, if needed, and continue to monitor your long-term progress.     We will provide you and your physician with monthly progress reports after you have been in the program for more than 30 days.     We will send you reminders through [employer] and text messages to help ensure you do not miss any testing deadlines to help manage your disease states.    You will be able to reach us by phone or through your [facility] account by clicking our names under Care Team on the right side of the home screen.    I look forward to working with you to achieve your blood pressure goals!    We look forward to working with you to help manage your health,    Sincerely,    Your Digital Medicine Team    Please visit our websites to learn more:   · Hypertension: [email or web address]      Remember, we are not available for emergencies. If you have an emergency, please contact your doctors office directly or call [facility] ([phone] or [phone]) or [phone].

## 2020-08-19 ENCOUNTER — PATIENT OUTREACH (OUTPATIENT)
Dept: OTHER | Facility: OTHER | Age: 75
End: 2020-08-19

## 2020-08-19 ENCOUNTER — OFFICE VISIT (OUTPATIENT)
Dept: PAIN MEDICINE | Facility: CLINIC | Age: 75
End: 2020-08-19
Payer: MEDICARE

## 2020-08-19 VITALS
WEIGHT: 241 LBS | HEIGHT: 73 IN | HEART RATE: 63 BPM | BODY MASS INDEX: 31.94 KG/M2 | SYSTOLIC BLOOD PRESSURE: 132 MMHG | DIASTOLIC BLOOD PRESSURE: 71 MMHG

## 2020-08-19 DIAGNOSIS — M51.36 DDD (DEGENERATIVE DISC DISEASE), LUMBAR: ICD-10-CM

## 2020-08-19 DIAGNOSIS — Z01.818 PREOP TESTING: ICD-10-CM

## 2020-08-19 DIAGNOSIS — M54.16 LUMBAR RADICULOPATHY: Primary | ICD-10-CM

## 2020-08-19 DIAGNOSIS — Z01.818 PRE-OP TESTING: ICD-10-CM

## 2020-08-19 DIAGNOSIS — I10 HTN (HYPERTENSION), BENIGN: ICD-10-CM

## 2020-08-19 DIAGNOSIS — I49.3 PVC'S (PREMATURE VENTRICULAR CONTRACTIONS): ICD-10-CM

## 2020-08-19 DIAGNOSIS — M54.16 LUMBAR RADICULITIS: Primary | ICD-10-CM

## 2020-08-19 DIAGNOSIS — Z01.818 PRE-OP TESTING: Primary | ICD-10-CM

## 2020-08-19 DIAGNOSIS — M79.18 MYOFASCIAL PAIN: ICD-10-CM

## 2020-08-19 PROCEDURE — 99213 PR OFFICE/OUTPT VISIT, EST, LEVL III, 20-29 MIN: ICD-10-PCS | Mod: HCNC,S$GLB,, | Performed by: PHYSICIAN ASSISTANT

## 2020-08-19 PROCEDURE — 3075F PR MOST RECENT SYSTOLIC BLOOD PRESS GE 130-139MM HG: ICD-10-PCS | Mod: HCNC,CPTII,S$GLB, | Performed by: PHYSICIAN ASSISTANT

## 2020-08-19 PROCEDURE — 1125F PR PAIN SEVERITY QUANTIFIED, PAIN PRESENT: ICD-10-PCS | Mod: HCNC,S$GLB,, | Performed by: PHYSICIAN ASSISTANT

## 2020-08-19 PROCEDURE — 99213 OFFICE O/P EST LOW 20 MIN: CPT | Mod: HCNC,S$GLB,, | Performed by: PHYSICIAN ASSISTANT

## 2020-08-19 PROCEDURE — 1101F PT FALLS ASSESS-DOCD LE1/YR: CPT | Mod: HCNC,CPTII,S$GLB, | Performed by: PHYSICIAN ASSISTANT

## 2020-08-19 PROCEDURE — 1125F AMNT PAIN NOTED PAIN PRSNT: CPT | Mod: HCNC,S$GLB,, | Performed by: PHYSICIAN ASSISTANT

## 2020-08-19 PROCEDURE — 1101F PR PT FALLS ASSESS DOC 0-1 FALLS W/OUT INJ PAST YR: ICD-10-PCS | Mod: HCNC,CPTII,S$GLB, | Performed by: PHYSICIAN ASSISTANT

## 2020-08-19 PROCEDURE — 3078F DIAST BP <80 MM HG: CPT | Mod: HCNC,CPTII,S$GLB, | Performed by: PHYSICIAN ASSISTANT

## 2020-08-19 PROCEDURE — 1159F PR MEDICATION LIST DOCUMENTED IN MEDICAL RECORD: ICD-10-PCS | Mod: HCNC,S$GLB,, | Performed by: PHYSICIAN ASSISTANT

## 2020-08-19 PROCEDURE — 3075F SYST BP GE 130 - 139MM HG: CPT | Mod: HCNC,CPTII,S$GLB, | Performed by: PHYSICIAN ASSISTANT

## 2020-08-19 PROCEDURE — 99999 PR PBB SHADOW E&M-EST. PATIENT-LVL IV: CPT | Mod: PBBFAC,HCNC,, | Performed by: PHYSICIAN ASSISTANT

## 2020-08-19 PROCEDURE — 99999 PR PBB SHADOW E&M-EST. PATIENT-LVL IV: ICD-10-PCS | Mod: PBBFAC,HCNC,, | Performed by: PHYSICIAN ASSISTANT

## 2020-08-19 PROCEDURE — 1159F MED LIST DOCD IN RCRD: CPT | Mod: HCNC,S$GLB,, | Performed by: PHYSICIAN ASSISTANT

## 2020-08-19 PROCEDURE — 3078F PR MOST RECENT DIASTOLIC BLOOD PRESSURE < 80 MM HG: ICD-10-PCS | Mod: HCNC,CPTII,S$GLB, | Performed by: PHYSICIAN ASSISTANT

## 2020-08-19 NOTE — PROGRESS NOTES
PCP: Brian Marroquin MD      CC:  Low back and bilateral leg pain    Interval history: Mr. Tavera is a 74 y.o. male with chronic neck and low back pain who presents today for f/u. He continues to c/o pain bilateral down to his calves. Pain is unchanged in quality or location. He requests a repeat TF SONIA. Previous did no provide much improvement but prior to this he had good relief with ESIs. . Pain worsens standing, walking getting up.  He has tried physical therapy with minimal benefit.  He currently takes gabapentin 1200 mg daily with mild benefits.   He is s/p cervical spine surgery and reports pain has worsened in left Paraspinous muscles into left shoulder. He completed PT but does not have a home exercises program. He denies any worsening weakness.  No bowel bladder changes. Pain today is rated 4/10.    Prior HPI:   Mr. Tavera is a 68 year old male with PMH of HTN referred by Dr. Campbell for right leg pain.  He states having constant right leg pain for the past two years.  Pain is a throbbing pain in her posterior thigh and travels down to his ankle.  He denies any lower back pain.  No leg weakness or numbness.  Pain worsens with standing and walking.  Pain is relieved with rest.  He takes ibuprofen with mild benefits.  Physical therapy has not been helpful.      Pain intervention history: s/p right L4-5 and L5-S1 TFESI on 10/9/2014 and reports 75% relief of his low back and right leg pain.   s/p right L4-5 and L5-S1 TFESI on 2/21/17 and 9/2017 reports 70% relief of his right leg pain.  S/p right IESI at L4-5 and L5-S1 on 5/17/2018   S/p right TF SONIA at L4-5 and L5-S1 on 12/20/19  ROS:  CONSTITUTIONAL: No fevers, chills, night sweats, wt. loss, appetite changes  SKIN: no rashes or itching  ENT: No headaches, head trauma, vision changes, or eye pain  LYMPH NODES: None noticed   CV: No chest pain, palpitations.   RESP: No shortness of breath, dyspnea on exertion, cough, wheezing, or hemoptysis  GI: No nausea,  emesis, diarrhea, constipation, melena, hematochezia, pain.    : No dysuria, hematuria, urgency, or frequency   HEME: No easy bruising, bleeding problems  PSYCHIATRIC: No depression, anxiety, psychosis, hallucinations.  NEURO: No seizures, memory loss, dizziness or difficulty sleeping  MSK: + right leg pain      Past Medical History:   Diagnosis Date    A-fib 3/31/2020    Arthritis     Back pain     Carpal tunnel syndrome 2/25/2013    Cataract     Cataract, left eye 11/10/2014    Chest pain, musculoskeletal     COPD (chronic obstructive pulmonary disease) 11/052018    Emphysema lung 11/05/2018    Gastritis     Hx of colonic polyp     Hyperlipidemia     Hypertension     Hypothyroidism     Knee fracture     Myasthenia gravis     Neuropathy 1/3/2013    Obesity 1/29/2015    Pneumonia 3/29/2020    Polyneuropathy     PVC (premature ventricular contraction)     Squamous cell carcinoma 2014    left forearm    Thyroid disease      Past Surgical History:   Procedure Laterality Date    APPENDECTOMY      CATARACT EXTRACTION W/  INTRAOCULAR LENS IMPLANT Bilateral     COLONOSCOPY  03/01/2012    Dr Esteban; hyperplastic polyp; repeat in 5 years    CYSTOSCOPY N/A 2/26/2019    Procedure: CYSTOSCOPY;  Surgeon: Simba Cheung MD;  [facility];  Service: Urology;  Laterality: N/A;    ENDOSCOPIC ULTRASOUND OF UPPER GASTROINTESTINAL TRACT N/A 1/7/2020    Procedure: ULTRASOUND, UPPER GI TRACT, ENDOSCOPIC;  Surgeon: Kati Ryan MD;  Location: [facility] ([address] [place] [address]);  Service: Endoscopy;  Laterality: N/A;    ESOPHAGOGASTRODUODENOSCOPY N/A 9/12/2018    Procedure: EGD (ESOPHAGOGASTRODUODENOSCOPY);  Surgeon: Gabriel Hernandez MD;  Location: [facility];  Service: Endoscopy;  Laterality: N/A;    ESOPHAGOGASTRODUODENOSCOPY N/A 8/19/2019    Procedure: EGD (ESOPHAGOGASTRODUODENOSCOPY);  Surgeon: Gabriel Hernandez MD;  Location: [facility];  Service: Endoscopy;  Laterality: N/A;     ESOPHAGOGASTRODUODENOSCOPY N/A 10/7/2019    Procedure: EGD (ESOPHAGOGASTRODUODENOSCOPY);  Surgeon: Gabriel Hernandez MD;  Location: [facility];  Service: Endoscopy;  Laterality: N/A;    ESOPHAGOGASTRODUODENOSCOPY N/A 1/7/2020    Procedure: EGD (ESOPHAGOGASTRODUODENOSCOPY);  Surgeon: Kati Ryan MD;  Location: [facility] (2ND FLR);  Service: Endoscopy;  Laterality: N/A;    HEMORRHOID SURGERY      KNEE ARTHROPLASTY Bilateral     NECK SURGERY      POSTERIOR FUSION OF CERVICAL SPINE WITH LAMINECTOMY N/A 11/7/2018    Procedure: C2-C6 Posterior Cervical Laminectomy & Instrumental Fusion;  Surgeon: Kishore Turner MD;  [address];  Service: Neurosurgery;  Laterality: N/A;    TONSILLECTOMY      TRANSFORAMINAL EPIDURAL INJECTION OF STEROID Right 12/20/2019    Procedure: Injection,steroid,epidural,transforaminal approach;  Surgeon: Devan Watt MD;  Location: [facility];  Service: Pain Management;  Laterality: Right;  L3-4, L4-5    TRANSFORAMINAL EPIDURAL INJECTION OF STEROID Bilateral 2/6/2020    Procedure: Injection,steroid,epidural,transforaminal approach;  Surgeon: Devan Watt MD;  Location: [facility];  Service: Pain Management;  Laterality: Bilateral;  L3-L4,L4-L5    TRANSRECTAL ULTRASOUND EXAMINATION N/A 2/26/2019    Procedure: ULTRASOUND, RECTAL APPROACH;  Surgeon: Simba Cheung MD;  Location: [facility];  Service: Urology;  Laterality: N/A;    UPPER GASTROINTESTINAL ENDOSCOPY  09/12/2018    Dr Hernandez; gastritis; extensive intestinal metaplasia; repeat in 1-2- years     Family History   Problem Relation Age of Onset    Cataracts Mother     Heart disease Mother         CHF    Hypertension Mother     Hyperlipidemia Mother     Cataracts Father     Glaucoma Father     Heart disease Father     Hyperlipidemia Sister     Hypertension Sister     No Known Problems Daughter     No Known Problems Daughter     Collagen disease Neg Hx     Amblyopia Neg Hx     Blindness Neg Hx     Macular  "degeneration Neg Hx     Retinal detachment Neg Hx     Strabismus Neg Hx     Cancer Neg Hx     Colon cancer Neg Hx     Esophageal cancer Neg Hx     Stomach cancer Neg Hx     Crohn's disease Neg Hx     Ulcerative colitis Neg Hx      Social History     Socioeconomic History    Marital status:      Spouse name: Not on file    Number of children: Not on file    Years of education: Not on file    Highest education level: Not on file   Occupational History    Not on file   Social Needs    Financial resource strain: Not hard at all    Food insecurity     Worry: Never true     Inability: Never true    Transportation needs     Medical: No     Non-medical: No   Tobacco Use    Smoking status: Never Smoker    Smokeless tobacco: Never Used    Tobacco comment: when a child   Substance and Sexual Activity    Alcohol use: Yes     Frequency: Never     Binge frequency: Never     Comment: rarely    Drug use: No    Sexual activity: Yes     Partners: Female   Lifestyle    Physical activity     Days per week: 0 days     Minutes per session: 0 min    Stress: Not at all   Relationships    Social connections     Talks on phone: More than three times a week     Gets together: More than three times a week     Attends [employer] service: More than 4 times per year     Active member of club or organization: Yes     Attends meetings of clubs or organizations: More than 4 times per year     Relationship status:    Other Topics Concern    Not on file   Social History Narrative    Not on file         Medications/Allergies: See med card    Vitals:    08/19/20 1112   BP: 132/71   Pulse: 63   Weight: 109.3 kg (241 lb)   Height: 6' 1" (1.854 m)   PainSc:   4   PainLoc: Neck         Physical exam:    GENERAL: A and O x3, the patient appears well groomed and is in no acute distress.  Skin: No rashes or obvious lesions  HEENT: normocephalic, atraumatic  CARDIOVASCULAR:  Bradycardia   LUNGS: non labored " breathing  ABDOMEN: soft, nontender   UPPER EXTREMITIES: Normal alignment, normal range of motion, no atrophy, no skin changes,  hair growth and nail growth normal and equal bilaterally. No swelling, no tenderness.    LOWER EXTREMITIES:  Normal alignment, normal range of motion, no atrophy, no skin changes,  hair growth and nail growth normal and equal bilaterally. No swelling, no tenderness.    LUMBAR SPINE  Lumbar spine: ROM is full with flexion extension and oblique extension with no increased pain.    Porter's test causes no increased pain on either side.    Supine straight leg raise is negative bilaterally.    Internal and external rotation of the hip causes no increased pain on either side.  Myofascial exam: No tenderness to palpation across lumbar paraspinous muscles.      MENTAL STATUS: normal orientation, speech, language, and fund of knowledge for social situation.  Emotional state appropriate.    CRANIAL NERVES:  II:  PERRL bilaterally,   III,IV,VI: EOMI.    V:  Facial sensation equal bilaterally  VII:  Facial motor function normal.  VIII:  Hearing equal to finger rub bilaterally  IX/X: Gag normal, palate symmetric  XI:  Shoulder shrug equal, head turn equal  XII:  Tongue midline without fasciculations      MOTOR: Tone and bulk: normal bilateral upper and lower Strength: normal    IP ADD ABD Quad TA Gas HAM  R 5 5 5 5 5 5 5  L 5 5 5 5 5 5 5    SENSATION: Light touch and pinprick intact bilaterally  REFLEXES: normal, symmetric, nonbrisk.  Toes down, no clonus. No hoffmans.  GAIT: normal rise, base, steps, and arm swing.      Imaging:    MRI lumbar spine without contrast 10/12/2019 (open MRI)     L1-2, stable foraminal disc protrusion.  Mild central canal stenosis.  Moderate left neuroforaminal narrowing.  L2-3, disc bulging with prominent posterior element hypertrophy.  Moderate central canal stenosis.  Bilateral recess narrowing as well as moderate left and mild right neural foraminal narrowing.  L3-4,  cervical ventral disc bulge with facet disease.  Mild-to-moderate central canal narrowing.  Moderate right and mild left neural foraminal narrowing.  L4-5, service control disc bulge with bulky facet disease.  Mild-to-moderate central canal stenosis.  Moderate bilateral neural foraminal narrowing, right greater than left.  L5-S1 disc bulge and facet disease  MRI lumbar spine 09/2018 (outside open MRI)  Multilevel spondylosis.  Moderate neural foraminal narrowing at L3-4 and L4-5.      Assessment:  Mr. Tavera is a 74 y.o. male with   1. Lumbar radiculitis    2. DDD (degenerative disc disease), lumbar    3. Myofascial pain      Plan:  1.  I have stressed the importance of physical activity and exercise to improve overall health. Home lumbar exercises provided.   2.  Schedule repeat TF SONIA at L3-4 and L4-5 bilaterally. I have explained the risks, benefits, and alternatives of the procedure in detail. The patient voices understanding and all questions have been answered. The patient agrees to proceed as planned. Written Consent obtained.   3.  Continue Gabapentin as tolerated to 1800 mg daily  4. Discussed neurosurgery consult. He does not wish to consider lumbar surgery  5. If no further improvement will schedule for MBB Workup  6. F/u s/p TF SONIA

## 2020-08-19 NOTE — PROGRESS NOTES
HTN Enrollment  - Per patient request, call back .  - HTN meds: Carvedilol 25 mg tab BID, chlorthalidone 25 mg tab as directed, olmesartan 20 mg tab daily  - BP av/56 mmHg  - Last office visit BP: 133/71 mmHg  - Abnormal labs: low potassium, protein, and albumin- pt taking potassium supplement

## 2020-08-19 NOTE — H&P (VIEW-ONLY)
PCP: Brian Marroquin MD      CC:  Low back and bilateral leg pain    Interval history: Mr. Tavera is a 74 y.o. male with chronic neck and low back pain who presents today for f/u. He continues to c/o pain bilateral down to his calves. Pain is unchanged in quality or location. He requests a repeat TF SONIA. Previous did no provide much improvement but prior to this he had good relief with ESIs. . Pain worsens standing, walking getting up.  He has tried physical therapy with minimal benefit.  He currently takes gabapentin 1200 mg daily with mild benefits.   He is s/p cervical spine surgery and reports pain has worsened in left Paraspinous muscles into left shoulder. He completed PT but does not have a home exercises program. He denies any worsening weakness.  No bowel bladder changes. Pain today is rated 4/10.    Prior HPI:   Mr. Tavera is a 68 year old male with PMH of HTN referred by Dr. Campbell for right leg pain.  He states having constant right leg pain for the past two years.  Pain is a throbbing pain in her posterior thigh and travels down to his ankle.  He denies any lower back pain.  No leg weakness or numbness.  Pain worsens with standing and walking.  Pain is relieved with rest.  He takes ibuprofen with mild benefits.  Physical therapy has not been helpful.      Pain intervention history: s/p right L4-5 and L5-S1 TFESI on 10/9/2014 and reports 75% relief of his low back and right leg pain.   s/p right L4-5 and L5-S1 TFESI on 2/21/17 and 9/2017 reports 70% relief of his right leg pain.  S/p right IESI at L4-5 and L5-S1 on 5/17/2018   S/p right TF SONIA at L4-5 and L5-S1 on 12/20/19  ROS:  CONSTITUTIONAL: No fevers, chills, night sweats, wt. loss, appetite changes  SKIN: no rashes or itching  ENT: No headaches, head trauma, vision changes, or eye pain  LYMPH NODES: None noticed   CV: No chest pain, palpitations.   RESP: No shortness of breath, dyspnea on exertion, cough, wheezing, or hemoptysis  GI: No nausea,  emesis, diarrhea, constipation, melena, hematochezia, pain.    : No dysuria, hematuria, urgency, or frequency   HEME: No easy bruising, bleeding problems  PSYCHIATRIC: No depression, anxiety, psychosis, hallucinations.  NEURO: No seizures, memory loss, dizziness or difficulty sleeping  MSK: + right leg pain      Past Medical History:   Diagnosis Date    A-fib 3/31/2020    Arthritis     Back pain     Carpal tunnel syndrome 2/25/2013    Cataract     Cataract, left eye 11/10/2014    Chest pain, musculoskeletal     COPD (chronic obstructive pulmonary disease) 11/052018    Emphysema lung 11/05/2018    Gastritis     Hx of colonic polyp     Hyperlipidemia     Hypertension     Hypothyroidism     Knee fracture     Myasthenia gravis     Neuropathy 1/3/2013    Obesity 1/29/2015    Pneumonia 3/29/2020    Polyneuropathy     PVC (premature ventricular contraction)     Squamous cell carcinoma 2014    left forearm    Thyroid disease      Past Surgical History:   Procedure Laterality Date    APPENDECTOMY      CATARACT EXTRACTION W/  INTRAOCULAR LENS IMPLANT Bilateral     COLONOSCOPY  03/01/2012    Dr Esteban; hyperplastic polyp; repeat in 5 years    CYSTOSCOPY N/A 2/26/2019    Procedure: CYSTOSCOPY;  Surgeon: Simba Cheung MD;  Location: [facility];  Service: Urology;  Laterality: N/A;    ENDOSCOPIC ULTRASOUND OF UPPER GASTROINTESTINAL TRACT N/A 1/7/2020    Procedure: ULTRASOUND, UPPER GI TRACT, ENDOSCOPIC;  Surgeon: Kati Ryan MD;  Location: [facility] ([address] [place] [address]);  Service: Endoscopy;  Laterality: N/A;    ESOPHAGOGASTRODUODENOSCOPY N/A 9/12/2018    Procedure: EGD (ESOPHAGOGASTRODUODENOSCOPY);  Surgeon: Gabriel Hernandez MD;  Location: [facility];  Service: Endoscopy;  Laterality: N/A;    ESOPHAGOGASTRODUODENOSCOPY N/A 8/19/2019    Procedure: EGD (ESOPHAGOGASTRODUODENOSCOPY);  Surgeon: Gabriel Hernandez MD;  Location: [facility];  Service: Endoscopy;  Laterality: N/A;     ESOPHAGOGASTRODUODENOSCOPY N/A 10/7/2019    Procedure: EGD (ESOPHAGOGASTRODUODENOSCOPY);  Surgeon: Gabriel Hernandez MD;  Location: [facility];  Service: Endoscopy;  Laterality: N/A;    ESOPHAGOGASTRODUODENOSCOPY N/A 1/7/2020    Procedure: EGD (ESOPHAGOGASTRODUODENOSCOPY);  Surgeon: Kati Ryan MD;  Location: [facility] (2ND FLR);  Service: Endoscopy;  Laterality: N/A;    HEMORRHOID SURGERY      KNEE ARTHROPLASTY Bilateral     NECK SURGERY      POSTERIOR FUSION OF CERVICAL SPINE WITH LAMINECTOMY N/A 11/7/2018    Procedure: C2-C6 Posterior Cervical Laminectomy & Instrumental Fusion;  Surgeon: Kishore Turner MD;  [address]  Service: Neurosurgery;  Laterality: N/A;    TONSILLECTOMY      TRANSFORAMINAL EPIDURAL INJECTION OF STEROID Right 12/20/2019    Procedure: Injection,steroid,epidural,transforaminal approach;  Surgeon: Devan Watt MD;  Location: [facility];  Service: Pain Management;  Laterality: Right;  L3-4, L4-5    TRANSFORAMINAL EPIDURAL INJECTION OF STEROID Bilateral 2/6/2020    Procedure: Injection,steroid,epidural,transforaminal approach;  Surgeon: Devan Watt MD;  Location: [facility];  Service: Pain Management;  Laterality: Bilateral;  L3-L4,L4-L5    TRANSRECTAL ULTRASOUND EXAMINATION N/A 2/26/2019    Procedure: ULTRASOUND, RECTAL APPROACH;  Surgeon: Simba Cheung MD;  Location: [facility];  Service: Urology;  Laterality: N/A;    UPPER GASTROINTESTINAL ENDOSCOPY  09/12/2018    Dr Hernandez; gastritis; extensive intestinal metaplasia; repeat in 1-2- years     Family History   Problem Relation Age of Onset    Cataracts Mother     Heart disease Mother         CHF    Hypertension Mother     Hyperlipidemia Mother     Cataracts Father     Glaucoma Father     Heart disease Father     Hyperlipidemia Sister     Hypertension Sister     No Known Problems Daughter     No Known Problems Daughter     Collagen disease Neg Hx     Amblyopia Neg Hx     Blindness Neg Hx     Macular  "degeneration Neg Hx     Retinal detachment Neg Hx     Strabismus Neg Hx     Cancer Neg Hx     Colon cancer Neg Hx     Esophageal cancer Neg Hx     Stomach cancer Neg Hx     Crohn's disease Neg Hx     Ulcerative colitis Neg Hx      Social History     Socioeconomic History    Marital status:      Spouse name: Not on file    Number of children: Not on file    Years of education: Not on file    Highest education level: Not on file   Occupational History    Not on file   Social Needs    Financial resource strain: Not hard at all    Food insecurity     Worry: Never true     Inability: Never true    Transportation needs     Medical: No     Non-medical: No   Tobacco Use    Smoking status: Never Smoker    Smokeless tobacco: Never Used    Tobacco comment: when a child   Substance and Sexual Activity    Alcohol use: Yes     Frequency: Never     Binge frequency: Never     Comment: rarely    Drug use: No    Sexual activity: Yes     Partners: Female   Lifestyle    Physical activity     Days per week: 0 days     Minutes per session: 0 min    Stress: Not at all   Relationships    Social connections     Talks on phone: More than three times a week     Gets together: More than three times a week     Attends [employer] service: More than 4 times per year     Active member of club or organization: Yes     Attends meetings of clubs or organizations: More than 4 times per year     Relationship status:    Other Topics Concern    Not on file   Social History Narrative    Not on file         Medications/Allergies: See med card    Vitals:    08/19/20 1112   BP: 132/71   Pulse: 63   Weight: 109.3 kg (241 lb)   Height: 6' 1" (1.854 m)   PainSc:   4   PainLoc: Neck         Physical exam:    GENERAL: A and O x3, the patient appears well groomed and is in no acute distress.  Skin: No rashes or obvious lesions  HEENT: normocephalic, atraumatic  CARDIOVASCULAR:  Bradycardia   LUNGS: non labored " breathing  ABDOMEN: soft, nontender   UPPER EXTREMITIES: Normal alignment, normal range of motion, no atrophy, no skin changes,  hair growth and nail growth normal and equal bilaterally. No swelling, no tenderness.    LOWER EXTREMITIES:  Normal alignment, normal range of motion, no atrophy, no skin changes,  hair growth and nail growth normal and equal bilaterally. No swelling, no tenderness.    LUMBAR SPINE  Lumbar spine: ROM is full with flexion extension and oblique extension with no increased pain.    Porter's test causes no increased pain on either side.    Supine straight leg raise is negative bilaterally.    Internal and external rotation of the hip causes no increased pain on either side.  Myofascial exam: No tenderness to palpation across lumbar paraspinous muscles.      MENTAL STATUS: normal orientation, speech, language, and fund of knowledge for social situation.  Emotional state appropriate.    CRANIAL NERVES:  II:  PERRL bilaterally,   III,IV,VI: EOMI.    V:  Facial sensation equal bilaterally  VII:  Facial motor function normal.  VIII:  Hearing equal to finger rub bilaterally  IX/X: Gag normal, palate symmetric  XI:  Shoulder shrug equal, head turn equal  XII:  Tongue midline without fasciculations      MOTOR: Tone and bulk: normal bilateral upper and lower Strength: normal    IP ADD ABD Quad TA Gas HAM  R 5 5 5 5 5 5 5  L 5 5 5 5 5 5 5    SENSATION: Light touch and pinprick intact bilaterally  REFLEXES: normal, symmetric, nonbrisk.  Toes down, no clonus. No hoffmans.  GAIT: normal rise, base, steps, and arm swing.      Imaging:    MRI lumbar spine without contrast 10/12/2019 (open MRI)     L1-2, stable foraminal disc protrusion.  Mild central canal stenosis.  Moderate left neuroforaminal narrowing.  L2-3, disc bulging with prominent posterior element hypertrophy.  Moderate central canal stenosis.  Bilateral recess narrowing as well as moderate left and mild right neural foraminal narrowing.  L3-4,  cervical ventral disc bulge with facet disease.  Mild-to-moderate central canal narrowing.  Moderate right and mild left neural foraminal narrowing.  L4-5, service control disc bulge with bulky facet disease.  Mild-to-moderate central canal stenosis.  Moderate bilateral neural foraminal narrowing, right greater than left.  L5-S1 disc bulge and facet disease  MRI lumbar spine 09/2018 (outside open MRI)  Multilevel spondylosis.  Moderate neural foraminal narrowing at L3-4 and L4-5.      Assessment:  Mr. Tavera is a 74 y.o. male with   1. Lumbar radiculitis    2. DDD (degenerative disc disease), lumbar    3. Myofascial pain      Plan:  1.  I have stressed the importance of physical activity and exercise to improve overall health. Home lumbar exercises provided.   2.  Schedule repeat TF SONIA at L3-4 and L4-5 bilaterally. I have explained the risks, benefits, and alternatives of the procedure in detail. The patient voices understanding and all questions have been answered. The patient agrees to proceed as planned. Written Consent obtained.   3.  Continue Gabapentin as tolerated to 1800 mg daily  4. Discussed neurosurgery consult. He does not wish to consider lumbar surgery  5. If no further improvement will schedule for MBB Workup  6. F/u s/p TF SONIA

## 2020-08-20 DIAGNOSIS — M77.11 LATERAL EPICONDYLITIS, RIGHT ELBOW: Primary | ICD-10-CM

## 2020-08-20 NOTE — PROGRESS NOTES
Digital Medicine: Clinician Introduction    Maximilian Tavera Jr. is a 74 y.o. male who is newly enrolled in the Digital Medicine Clinic.    Patient experiences s/sx of hypotension </=1 per month. Patient indicates when he had BP reading of 69/44 mmHg, he was feeling light headed. When this occurs he indicates he will skip night time dose of BP medication. Patient indicates one year ago he stopped a BP medication and he had heart palpitations. Patient refuses to decrease or discontinue any of his BP medications and if he wishes to in the future he wishes all decisions to be made by Cardiologist, Dr. Altamirano.    The history is provided by the patient.      Review of patient's allergies indicates:   -- No known drug allergies   Completed Medication Reconciliation  Verified pharmacy information.    HYPERTENSION  Explained hypertension digital medicine goals including BP goal less than or equal to 130/80mmHg, improved convenience of BP management and reduced risk of heart attack, kidney failure, stroke, eye disease, dementia, and death.     Explained non-pharmacologic therapies like low salt diet and physical activity can reduce blood pressure.       Explained that we expect patient to submit several blood pressure readings per week at random times of the day, but at least 30 minutes after taking blood pressure medications. Instructed patient not to allow anyone else to use their blood pressure monitor and phone as data submitted is directly entered into medical record. Reviewed and confirmed appropriate blood pressure monitoring technique.         Reviewed signs/symptoms of hypertension (headache, changes in vision, chest pain, shortness of breath)   Reviewed signs/symptoms of hypotension (lightheaded, dizziness, weakness)           Last 5 Patient Entered Readings                                      Current 30 Day Average: 107/61     Recent Readings 8/19/2020 8/15/2020 8/14/2020 8/14/2020 8/11/2020    SBP (mmHg) 134  120 69 69 104    DBP (mmHg) 83 71 44 45 62    Pulse 64 68 68 69 64              Depression Screening  Maximilian Tavera Jr. screened negative on the depression screening.     Sleep Apnea Screening  Patient not previously diagnosed with ENIO       Medication Affordability Screening  Patient screened negative on the medication affordability questionnaires. Patient is currently not having problems affording medications    Medication Adherence-Medication adherence was assessed.    Patient reported missing medication: once a month.          ASSESSMENT(S)  Patients BP average is 107/61 mmHg, which is at goal. Patient's BP goal is less than or equal to 130/80 per 2017 ACC/AHA Hypertension Guidelines.    Patient currently taking carvedilol 25 mg tab BID, chlorthalidone 25 mg tab as directed, and olmesartan 20 mg tab daily, which is an appropriate BP treatment regimen. As BP within normal limits, recommend continuation of current therapy and follow-up as clinically indicated.         Hypertension Plan  Continue current therapy.  Provided patient education. Counseled patient to please contact me if he experiences hypotension frequently and/or is interested in decreasing or discontinuing a BP medication.        Addressed any questions or concerns and patient has my contact information if needed prior to next outreach. Patient verbalizes understanding.      Explained the importance of self-monitoring and medication adherence. Encouraged the patient to communicate with their health  for lifestyle modifications to help improve or maintain a healthy lifestyle.        Sent link to [facility] webpages and my contact information via [facility] for future questions.        Explained to the patient that the Digital Medicine team is not available for emergencies. Advised patient call [facility] ([phone] or [phone]) or [phone] if needed.         There are no preventive care reminders to display for this  patient.    Current Medication Regimen:  Hypertension Medications             carvediloL (COREG) 25 MG tablet TAKE 1 TABLET TWICE DAILY WITH MEALS    chlorthalidone (HYGROTEN) 25 MG Tab As directed    olmesartan (BENICAR) 20 MG tablet TAKE 1 TABLET EVERY DAY

## 2020-08-23 ENCOUNTER — LAB VISIT (OUTPATIENT)
Dept: PRIMARY CARE CLINIC | Facility: CLINIC | Age: 75
End: 2020-08-23
Payer: MEDICARE

## 2020-08-23 DIAGNOSIS — Z01.818 PRE-OP TESTING: ICD-10-CM

## 2020-08-23 PROCEDURE — U0003 INFECTIOUS AGENT DETECTION BY NUCLEIC ACID (DNA OR RNA); SEVERE ACUTE RESPIRATORY SYNDROME CORONAVIRUS 2 (SARS-COV-2) (CORONAVIRUS DISEASE [COVID-19]), AMPLIFIED PROBE TECHNIQUE, MAKING USE OF HIGH THROUGHPUT TECHNOLOGIES AS DESCRIBED BY CMS-2020-01-R: HCPCS | Mod: HCNC

## 2020-08-24 LAB — SARS-COV-2 RNA RESP QL NAA+PROBE: NOT DETECTED

## 2020-08-26 ENCOUNTER — HOSPITAL ENCOUNTER (OUTPATIENT)
Facility: AMBULARY SURGERY CENTER | Age: 75
Discharge: HOME | End: 2020-08-26
Attending: ANESTHESIOLOGY | Admitting: ANESTHESIOLOGY
Payer: MEDICARE

## 2020-08-26 DIAGNOSIS — M54.16 LUMBAR RADICULITIS: Primary | ICD-10-CM

## 2020-08-26 PROCEDURE — 64483 PR EPIDURAL INJ, ANES/STEROID, TRANSFORAMINAL, LUMB/SACR, SNGL LEVL: ICD-10-PCS | Mod: 50,HCNC,, | Performed by: ANESTHESIOLOGY

## 2020-08-26 PROCEDURE — 64484 NJX AA&/STRD TFRM EPI L/S EA: CPT | Mod: LT | Performed by: ANESTHESIOLOGY

## 2020-08-26 PROCEDURE — 64484 NJX AA&/STRD TFRM EPI L/S EA: CPT | Mod: HCNC,LT,, | Performed by: ANESTHESIOLOGY

## 2020-08-26 PROCEDURE — 64483 NJX AA&/STRD TFRM EPI L/S 1: CPT | Mod: LT | Performed by: ANESTHESIOLOGY

## 2020-08-26 PROCEDURE — 64483 NJX AA&/STRD TFRM EPI L/S 1: CPT | Mod: 50,HCNC,, | Performed by: ANESTHESIOLOGY

## 2020-08-26 PROCEDURE — 64484 PRA INJECT ANES/STEROID FORAMEN LUMBAR/SACRAL W IMG GUIDE ,EA ADD LEVEL: ICD-10-PCS | Mod: HCNC,LT,, | Performed by: ANESTHESIOLOGY

## 2020-08-26 RX ORDER — DEXAMETHASONE SODIUM PHOSPHATE 10 MG/ML
INJECTION INTRAMUSCULAR; INTRAVENOUS
Status: DISCONTINUED | OUTPATIENT
Start: 2020-08-26 | End: 2020-08-26 | Stop reason: HOSPADM

## 2020-08-26 RX ORDER — DEXTROSE MONOHYDRATE 50 MG/ML
INJECTION, SOLUTION INTRAVENOUS CONTINUOUS
Status: DISCONTINUED | OUTPATIENT
Start: 2020-08-26 | End: 2020-08-26 | Stop reason: HOSPADM

## 2020-08-26 RX ORDER — SODIUM CHLORIDE, SODIUM LACTATE, POTASSIUM CHLORIDE, CALCIUM CHLORIDE 600; 310; 30; 20 MG/100ML; MG/100ML; MG/100ML; MG/100ML
INJECTION, SOLUTION INTRAVENOUS ONCE AS NEEDED
Status: DISCONTINUED | OUTPATIENT
Start: 2020-08-26 | End: 2020-08-26 | Stop reason: HOSPADM

## 2020-08-26 RX ORDER — BUPIVACAINE HYDROCHLORIDE 2.5 MG/ML
INJECTION, SOLUTION EPIDURAL; INFILTRATION; INTRACAUDAL
Status: DISCONTINUED | OUTPATIENT
Start: 2020-08-26 | End: 2020-08-26 | Stop reason: HOSPADM

## 2020-08-26 RX ORDER — LIDOCAINE HYDROCHLORIDE 10 MG/ML
INJECTION, SOLUTION EPIDURAL; INFILTRATION; INTRACAUDAL; PERINEURAL
Status: DISCONTINUED | OUTPATIENT
Start: 2020-08-26 | End: 2020-08-26 | Stop reason: HOSPADM

## 2020-08-26 RX ORDER — ALPRAZOLAM 1 MG/1
1 TABLET, ORALLY DISINTEGRATING ORAL
Status: COMPLETED | OUTPATIENT
Start: 2020-08-26 | End: 2020-08-26

## 2020-08-26 RX ADMIN — ALPRAZOLAM 1 MG: 1 TABLET, ORALLY DISINTEGRATING ORAL at 11:08

## 2020-08-26 NOTE — PLAN OF CARE
Pt states ready to go home , stable, tolerating fluids. C/O pain 5/10.. Injection site SIS no drainage noted. WC to car accompanied by nurse. Home w/ family.

## 2020-08-26 NOTE — OP NOTE
PROCEDURE DATE: 8/26/2020    PROCEDURE: Bilateral L3-4, L4-5 transforaminal epidural steroid injection under fluoroscopy    DIAGNOSIS: Lumbar  disc displacement without myelopathy  Post op diagnosis: Same    PHYSICIAN: Devan Watt MD    MEDICATIONS INJECTED:  Dexamethasone 2.5mg  and 1.0ml 0.25% bupivicaine at each nerve root.     LOCAL ANESTHETIC INJECTED:  Lidocaine 1%. 2 ml per site.    SEDATION MEDICATIONS: RN IV sedation    ESTIMATED BLOOD LOSS:  None    COMPLICATIONS:  None    TECHNIQUE:   A time-out was taken to identify patient and procedure side prior to starting the procedure. The patient was placed in a prone position, prepped and draped in the usual sterile fashion using ChloraPrep and sterile towels.  The area to be injected was determined under fluoroscopic guidance in AP and oblique view.  Local anesthetic was given by raising a wheal and going down to the hub of a 25-gauge 1.5 inch needle.  In oblique view, a 3.5 inch 22-gauge bent-tip spinal needle was introduced towards 6 oclock position of the pedicle of each above named nerve root level.  The needle was walked medially then hinged into the neural foramen and position was confirmed in AP and lateral views.  1ml contrast dye was injected to confirm appropriate placement and that there was no vascular uptake.  After negative aspiration for blood or CSF, the medication was then injected. This was performed at the bilateral L3-4, L4-5 level(s). The patient tolerated the procedure well.    The patient was monitored after the procedure.  Patient was given post procedure and discharge instructions to follow at home. The patient was discharged in a stable condition.

## 2020-08-26 NOTE — INTERVAL H&P NOTE
The patient has been examined and the H&P has been reviewed:    I concur with the findings and no changes have occurred since H&P was written.  This patient has been cleared for surgery in an ambulatory surgical facility    ASA 3,  Mallampatti Score 3  No history of anesthetic complications  Plan for RN IV sedation    Anesthesia risks, benefits and alternative options discussed and understood by patient/family.          Active Hospital Problems    Diagnosis  POA    Lumbar radiculitis [M54.16]  Yes      Resolved Hospital Problems   No resolved problems to display.

## 2020-08-26 NOTE — DISCHARGE SUMMARY
[facility]  Discharge Note  Short Stay    Admit Date: 8/26/2020    Discharge Date and Time: 8/26/2020    Attending Physician: Devan Watt MD     Discharge Provider: Devan Watt    Diagnoses:  Active Hospital Problems    Diagnosis  POA    *Lumbar radiculitis [M54.16]  Yes      Resolved Hospital Problems   No resolved problems to display.       Hospital Course: Lumbar SONIA  Discharged Condition: Good    Final Diagnoses:   Active Hospital Problems    Diagnosis  POA    *Lumbar radiculitis [M54.16]  Yes      Resolved Hospital Problems   No resolved problems to display.       Disposition: Home or Self Care    Follow up/Patient Instructions:    Medications:  Reconciled Home Medications:      Medication List      CONTINUE taking these medications    atorvastatin 80 MG tablet  Commonly known as: LIPITOR  TAKE 1 TABLET EVERY DAY     carvediloL 25 MG tablet  Commonly known as: COREG  TAKE 1 TABLET TWICE DAILY WITH MEALS     cetirizine 10 MG tablet  Commonly known as: ZYRTEC  Take 1 tablet by mouth daily as needed.     chlorthalidone 25 MG Tab  Commonly known as: HYGROTEN  As directed     CO Q-10 100 mg capsule  Generic drug: coenzyme Q10  Take 400 mg by mouth once daily.     ezetimibe 10 mg tablet  Commonly known as: ZETIA  Take 1 tablet (10 mg total) by mouth once daily.     fluticasone propionate 50 mcg/actuation nasal spray  Commonly known as: FLONASE  USE 1 SPRAY IN EACH NOSTRIL TWICE DAILY AS NEEDED  FOR  RHINITIS     gabapentin 300 MG capsule  Commonly known as: NEURONTIN  TAKE 1 CAPSULE THREE TIMES DAILY     levothyroxine 50 MCG tablet  Commonly known as: SYNTHROID  Take 1 tablet (50 mcg total) by mouth once daily.     milk thistle 200 mg Cap  Take 200 mg by mouth 2 (two) times daily. Twice a day     MSM 1,000 mg Tab  Generic drug: methylsulfonylmethane  Take 1,000 mg by mouth once daily. Every day     olmesartan 20 MG tablet  Commonly known as: BENICAR  TAKE 1 TABLET EVERY DAY     omega-3 fatty acids 1,000 mg  Cap  Twice a day     pantoprazole 40 MG tablet  Commonly known as: PROTONIX  Take 1 tablet (40 mg total) by mouth 2 (two) times daily.     potassium chloride SA 20 MEQ tablet  Commonly known as: K-DUR,KLOR-CON  Take 1 tablet (20 mEq total) by mouth 3 (three) times daily. Take 4 tablets daily for 5 days, then 3 daily for 2 weeks, then 2 daily thereafter.     * predniSONE 1 MG tablet  Commonly known as: DELTASONE  TAKE 2 TABLETS EVERY DAY     * predniSONE 5 MG tablet  Commonly known as: DELTASONE  TAKE 1 TABLET EVERY DAY     sildenafil 20 mg Tab  Commonly known as: REVATIO  Take 1 to 3 tabs daily as needed.     sucralfate 1 gram tablet  Commonly known as: CARAFATE  Take 1 tablet (1 g total) by mouth 4 (four) times daily before meals and nightly.     VENTOLIN HFA 90 mcg/actuation inhaler  Generic drug: albuterol  Inhale 2 puffs into the lungs every 6 (six) hours as needed for Wheezing. Rescue     VITAMIN B-12 5,000 mcg Subl  Generic drug: cyanocobalamin (vitamin B-12)  Take 5,000 mcg by mouth once daily. Every day     VITAMIN D 50 mcg (2,000 unit) Cap  Generic drug: cholecalciferol (vitamin D3)  1 capsule once daily. Every day         * This list has 2 medication(s) that are the same as other medications prescribed for you. Read the directions carefully, and ask your doctor or other care provider to review them with you.              Discharge Procedure Orders   Call MD for:  temperature >100.4     Call MD for:  persistent nausea and vomiting or diarrhea     Call MD for:  severe uncontrolled pain     Call MD for:  redness, tenderness, or signs of infection (pain, swelling, redness, odor or green/yellow discharge around incision site)     Call MD for:  difficulty breathing or increased cough     Call MD for:  severe persistent headache        Follow up with MD in 2-3 weeks    Discharge Procedure Orders (must include Diet, Follow-up, Activity):   Discharge Procedure Orders (must include Diet, Follow-up, Activity)   Call  MD for:  temperature >100.4     Call MD for:  persistent nausea and vomiting or diarrhea     Call MD for:  severe uncontrolled pain     Call MD for:  redness, tenderness, or signs of infection (pain, swelling, redness, odor or green/yellow discharge around incision site)     Call MD for:  difficulty breathing or increased cough     Call MD for:  severe persistent headache

## 2020-08-27 VITALS
SYSTOLIC BLOOD PRESSURE: 120 MMHG | OXYGEN SATURATION: 93 % | BODY MASS INDEX: 31.94 KG/M2 | HEART RATE: 62 BPM | RESPIRATION RATE: 18 BRPM | DIASTOLIC BLOOD PRESSURE: 69 MMHG | TEMPERATURE: 97 F | HEIGHT: 73 IN | WEIGHT: 241 LBS

## 2020-09-02 NOTE — DISCHARGE INSTRUCTIONS
Upper GI Endoscopy     During endoscopy, a long, flexible tube is used to view the inside of your upper GI tract.      Upper GI endoscopy allows your healthcare provider to look directly into the beginning of your gastrointestinal (GI) tract. The esophagus, stomach, and duodenum (the first part of the small intestine) make up the upper GI tract.   Before the exam  Follow these and any other instructions you are given before your endoscopy. If you dont follow the healthcare providers instructions carefully, the test may need to be canceled or done over:  · Don't eat or drink anything after midnight the night before your exam. If your exam is in the afternoon, drink only clear liquids in the morning. Don't eat or drink anything for 8 hours before the exam. In some cases, you may be able to take medicines with sips of water until 2 hours before the procedure. Speak with your healthcare provider about this.   · Bring your X-rays and any other test results you have.  · Because you will be sedated, arrange for an adult to drive you home after the exam.  · Tell your healthcare provider before the exam if you are taking any medicines or have any medical problems.  The procedure  Here is what to expect:  · You will lie on the endoscopy table. Usually patients lie on the left side.  · You will be monitored and given oxygen.  · Your throat may be numbed with a spray or gargle. You are given medicine through an intravenous (IV) line that will help you relax and remain comfortable. You may be awake or asleep during the procedure.  · The healthcare provider will put the endoscope in your mouth and down your esophagus. It is thinner than most pieces of food that you swallow. It will not affect your breathing. The medicine helps keep you from gagging.  · Air is put into your GI tract to expand it. It can make you burp.  · During the procedure, the healthcare provider can take biopsies (tissue samples), remove abnormalities,  such as polyps, or treat abnormalities through a variety of devices placed through the endoscope. You will not feel this.   · The endoscope carries images of your upper GI tract to a video screen. If you are awake, you may be able to look at the images.  · After the procedure is done, you will rest for a time. An adult must drive you home.  When to call your healthcare provider  Contact your healthcare provider if you have:  · Black or tarry stools, or blood in your stool  · Fever  · Pain in your belly that does not go away  · Nausea and vomiting, or vomiting blood   Date Last Reviewed: 7/1/2016  © 6451-5180 [employer]. [address] [place] [address], [place] [address]. All rights reserved. This information is not intended as a substitute for professional medical care. Always follow your healthcare professional's instructions.         What Type Of Note Output Would You Prefer (Optional)?: Bullet Format Hpi Title: Evaluation of a Skin Lesion How Severe Are Your Spot(S)?: mild Have Your Spot(S) Been Treated In The Past?: has not been treated Additional History: Patient states area has grown over time. Pt does pick at lesion sometimes.

## 2020-09-08 ENCOUNTER — LAB VISIT (OUTPATIENT)
Dept: PRIMARY CARE CLINIC | Facility: CLINIC | Age: 75
End: 2020-09-08
Payer: MEDICARE

## 2020-09-08 DIAGNOSIS — Z01.818 PRE-OP TESTING: ICD-10-CM

## 2020-09-08 PROCEDURE — U0003 INFECTIOUS AGENT DETECTION BY NUCLEIC ACID (DNA OR RNA); SEVERE ACUTE RESPIRATORY SYNDROME CORONAVIRUS 2 (SARS-COV-2) (CORONAVIRUS DISEASE [COVID-19]), AMPLIFIED PROBE TECHNIQUE, MAKING USE OF HIGH THROUGHPUT TECHNOLOGIES AS DESCRIBED BY CMS-2020-01-R: HCPCS | Mod: HCNC

## 2020-09-09 LAB — SARS-COV-2 RNA RESP QL NAA+PROBE: NOT DETECTED

## 2020-09-11 ENCOUNTER — HOSPITAL ENCOUNTER (OUTPATIENT)
Facility: AMBULARY SURGERY CENTER | Age: 75
Discharge: HOME | End: 2020-09-11
Attending: PHYSICAL MEDICINE & REHABILITATION | Admitting: PHYSICAL MEDICINE & REHABILITATION
Payer: MEDICARE

## 2020-09-11 VITALS
HEIGHT: 73 IN | RESPIRATION RATE: 18 BRPM | WEIGHT: 240.94 LBS | BODY MASS INDEX: 31.93 KG/M2 | SYSTOLIC BLOOD PRESSURE: 158 MMHG | OXYGEN SATURATION: 92 % | DIASTOLIC BLOOD PRESSURE: 75 MMHG | TEMPERATURE: 98 F | HEART RATE: 63 BPM

## 2020-09-11 DIAGNOSIS — M77.11 LATERAL EPICONDYLITIS OF RIGHT ELBOW: Primary | ICD-10-CM

## 2020-09-11 DIAGNOSIS — M77.10 LATERAL EPICONDYLITIS: ICD-10-CM

## 2020-09-11 PROCEDURE — 24357 REPAIR ELBOW PERC: CPT | Performed by: PHYSICAL MEDICINE & REHABILITATION

## 2020-09-11 PROCEDURE — 24357 REPAIR ELBOW PERC: CPT | Mod: HCNC,RT,, | Performed by: PHYSICAL MEDICINE & REHABILITATION

## 2020-09-11 PROCEDURE — 24357 PR TENOTOMY ELBOW LATERAL/MEDIAL PERCUTANEOUS: ICD-10-PCS | Mod: HCNC,RT,, | Performed by: PHYSICAL MEDICINE & REHABILITATION

## 2020-09-11 RX ORDER — LIDOCAINE HYDROCHLORIDE 10 MG/ML
INJECTION, SOLUTION EPIDURAL; INFILTRATION; INTRACAUDAL; PERINEURAL
Status: DISCONTINUED | OUTPATIENT
Start: 2020-09-11 | End: 2020-09-11 | Stop reason: HOSPADM

## 2020-09-11 RX ORDER — LIDOCAINE HYDROCHLORIDE 10 MG/ML
1 INJECTION, SOLUTION EPIDURAL; INFILTRATION; INTRACAUDAL; PERINEURAL ONCE
Status: DISCONTINUED | OUTPATIENT
Start: 2020-09-11 | End: 2022-06-16 | Stop reason: HOSPADM

## 2020-09-11 RX ORDER — ALPRAZOLAM 0.5 MG/1
0.5 TABLET, ORALLY DISINTEGRATING ORAL ONCE AS NEEDED
Status: COMPLETED | OUTPATIENT
Start: 2020-09-11 | End: 2020-09-11

## 2020-09-11 RX ADMIN — ALPRAZOLAM 0.5 MG: 0.5 TABLET, ORALLY DISINTEGRATING ORAL at 09:09

## 2020-09-11 NOTE — INTERVAL H&P NOTE
The patient has been examined and the H&P has been reviewed:    I concur with the findings and no changes have occurred since H&P was written.    Surgery risks, benefits and alternative options discussed and understood by patient/family.    ASA 3, Mallampati 2      Active Hospital Problems    Diagnosis  POA    Lateral epicondylitis [M77.10]  Yes      Resolved Hospital Problems   No resolved problems to display.

## 2020-09-11 NOTE — OP NOTE
"PATIENT INDICATIONS: Maximilian Tavera Jr. is a 74 y.o. male with right lateral elbow pain.  History, physical examination, MRI, diagnostic ultrasound findings of the right elbow is consistent with an interstitial tear of the common extensor tendon origin on the lateral epicondyle with small calcific deposit.  He has tried injections and physical therapy without any meaningful lasting relief.  His pain continues to be over the lateral elbow and at its worst a 7 to 8/10.  His pain limits his activities of daily living.  At this time we will proceed with ultrasound guided percutaneous tenotomy to the right common extensor tendon at the elbow.    PROCEDURE PERFORMED:  Percutaneous tenotomy of right common extensor tendon of the elbow under ultrasound guidance to cut tendon.      Procedure in detail:  A sterile sleeve was placed over the ultrasound transducer. The anatomy was identified, and the diseased tissue was visualized or confirmed on the fascia. The area was prepped with chloraprep, draped and the area was injected a local anesthetic. Using a 27 gauge 1.5 inch needle a skin wheal was placed and the involved tissue was anesthetized with 7 cc of 1% lidocaine. Using an #11 blade, small skin incision was made and carried through the skin and subcutaneous tissue.  The overlying fascia was incised in line with the pathology.     The diseased right common extensor tendon, was examined along its length under ultrasound imaging. The Tenex 1 surgical instrument was introduced through the incision advancing to the diseased tendon. Once the tip of instrument reached the pathologic tissue, the tendon was incised along its length and the pathologic tissue removed. Total cutting time 1 minute and 30 seconds.    Upon completion of the procedure the skin incision was closed with steristrip and a sterile, water impervious dressing was applied. It was then wrapped with an 4" ace bandage.  The patient tolerated the procedure " well without any adverse events      Detailed post- procedure instructions and a return appointment were given. The patient was advised to avoid lifting >5 lbs for 2 weeks then increase activity as tolerated and to return to clinic at 1 months time.

## 2020-09-11 NOTE — PLAN OF CARE
Dr Quach at bedside looking at site and rewrapping ace.Dr. Quach reviewing post op instructions with patient and pt verbalized understanding.

## 2020-09-11 NOTE — DISCHARGE SUMMARY
[facility]  Discharge Note  Short Stay    Procedure(s) (LRB):  percutaeous tenotomy of right lateral epicondyle (tenex) (Right)    OUTCOME: Patient tolerated treatment/procedure well without complication and is now ready for discharge.    DISPOSITION: Home or Self Care    FINAL DIAGNOSIS:  Right lateral epicondylitis    FOLLOWUP: In clinic    DISCHARGE INSTRUCTIONS:  No discharge procedures on file.

## 2020-09-17 ENCOUNTER — TELEPHONE (OUTPATIENT)
Dept: FAMILY MEDICINE | Facility: CLINIC | Age: 75
End: 2020-09-17

## 2020-09-17 NOTE — TELEPHONE ENCOUNTER
----- Message from Patrica Daugherty MA sent at 9/17/2020  3:42 PM CDT -----  Type: Needs Medical Advice  Who Called:  Maximilian Bess Call Back Number: [phone]  Additional Information: patient would like to know if he will be able receive his flu vaccination on Sept 30 during his annual visit.  He would also like to ask about his wife receiving hers at this time.  Please call to discuss

## 2020-09-17 NOTE — TELEPHONE ENCOUNTER
Informed patient that yes he will be able to get his flu shot when he comes in for his appointment.

## 2020-09-21 ENCOUNTER — PATIENT OUTREACH (OUTPATIENT)
Dept: ADMINISTRATIVE | Facility: OTHER | Age: 75
End: 2020-09-21

## 2020-09-21 NOTE — PROGRESS NOTES
Chart was reviewed for overdue [facility]active [facility] Encounters ([facility])  topics  Updates were requested from care everywhere  Health Maintenance was unable to be updated  [facility] immunization registry triggered

## 2020-09-23 ENCOUNTER — OFFICE VISIT (OUTPATIENT)
Dept: PAIN MEDICINE | Facility: CLINIC | Age: 75
End: 2020-09-23
Payer: MEDICARE

## 2020-09-23 VITALS
WEIGHT: 240 LBS | HEART RATE: 62 BPM | BODY MASS INDEX: 31.81 KG/M2 | SYSTOLIC BLOOD PRESSURE: 143 MMHG | DIASTOLIC BLOOD PRESSURE: 78 MMHG | HEIGHT: 73 IN

## 2020-09-23 DIAGNOSIS — M47.816 LUMBAR SPONDYLOSIS: Primary | ICD-10-CM

## 2020-09-23 DIAGNOSIS — Z01.818 PRE-OP TESTING: ICD-10-CM

## 2020-09-23 DIAGNOSIS — M51.36 DDD (DEGENERATIVE DISC DISEASE), LUMBAR: ICD-10-CM

## 2020-09-23 DIAGNOSIS — M79.18 MYOFASCIAL PAIN: ICD-10-CM

## 2020-09-23 DIAGNOSIS — M47.896 OTHER SPONDYLOSIS, LUMBAR REGION: ICD-10-CM

## 2020-09-23 DIAGNOSIS — M54.16 LUMBAR RADICULITIS: Primary | ICD-10-CM

## 2020-09-23 PROCEDURE — 3077F SYST BP >= 140 MM HG: CPT | Mod: HCNC,CPTII,S$GLB, | Performed by: PHYSICIAN ASSISTANT

## 2020-09-23 PROCEDURE — 3078F PR MOST RECENT DIASTOLIC BLOOD PRESSURE < 80 MM HG: ICD-10-PCS | Mod: HCNC,CPTII,S$GLB, | Performed by: PHYSICIAN ASSISTANT

## 2020-09-23 PROCEDURE — 1125F PR PAIN SEVERITY QUANTIFIED, PAIN PRESENT: ICD-10-PCS | Mod: HCNC,S$GLB,, | Performed by: PHYSICIAN ASSISTANT

## 2020-09-23 PROCEDURE — 3077F PR MOST RECENT SYSTOLIC BLOOD PRESSURE >= 140 MM HG: ICD-10-PCS | Mod: HCNC,CPTII,S$GLB, | Performed by: PHYSICIAN ASSISTANT

## 2020-09-23 PROCEDURE — 99999 PR PBB SHADOW E&M-EST. PATIENT-LVL IV: CPT | Mod: PBBFAC,HCNC,, | Performed by: PHYSICIAN ASSISTANT

## 2020-09-23 PROCEDURE — 1125F AMNT PAIN NOTED PAIN PRSNT: CPT | Mod: HCNC,S$GLB,, | Performed by: PHYSICIAN ASSISTANT

## 2020-09-23 PROCEDURE — 99213 PR OFFICE/OUTPT VISIT, EST, LEVL III, 20-29 MIN: ICD-10-PCS | Mod: HCNC,S$GLB,, | Performed by: PHYSICIAN ASSISTANT

## 2020-09-23 PROCEDURE — 1101F PR PT FALLS ASSESS DOC 0-1 FALLS W/OUT INJ PAST YR: ICD-10-PCS | Mod: HCNC,CPTII,S$GLB, | Performed by: PHYSICIAN ASSISTANT

## 2020-09-23 PROCEDURE — 1159F PR MEDICATION LIST DOCUMENTED IN MEDICAL RECORD: ICD-10-PCS | Mod: HCNC,S$GLB,, | Performed by: PHYSICIAN ASSISTANT

## 2020-09-23 PROCEDURE — 1101F PT FALLS ASSESS-DOCD LE1/YR: CPT | Mod: HCNC,CPTII,S$GLB, | Performed by: PHYSICIAN ASSISTANT

## 2020-09-23 PROCEDURE — 99213 OFFICE O/P EST LOW 20 MIN: CPT | Mod: HCNC,S$GLB,, | Performed by: PHYSICIAN ASSISTANT

## 2020-09-23 PROCEDURE — 99999 PR PBB SHADOW E&M-EST. PATIENT-LVL IV: ICD-10-PCS | Mod: PBBFAC,HCNC,, | Performed by: PHYSICIAN ASSISTANT

## 2020-09-23 PROCEDURE — 3078F DIAST BP <80 MM HG: CPT | Mod: HCNC,CPTII,S$GLB, | Performed by: PHYSICIAN ASSISTANT

## 2020-09-23 PROCEDURE — 1159F MED LIST DOCD IN RCRD: CPT | Mod: HCNC,S$GLB,, | Performed by: PHYSICIAN ASSISTANT

## 2020-09-23 NOTE — PROGRESS NOTES
PCP: Brian Marroquin MD      CC:  Low back and bilateral leg pain    Interval history: Mr. Tavera is a 74 y.o. male with chronic neck and low back pain who presents today for f/u s/p bilateral TF SONIA at L3-4 and L4-5 that provided no benefit. He continues to c/o pain bilateral down to his calves. Standing up after prolonged sitting and stiffness that prevnets him from doing activities. Previous did not provide much improvement either but prior to this he had good relief with ESIs. . He c/o significant low back pain as well. Difficulty  Pain worsens standing, walking getting up.  He has tried physical therapy with minimal benefit.  He currently takes gabapentin 1200 mg daily with mild benefits.   He is s/p cervical spine surgery and reports pain has worsened in left Paraspinous muscles into left shoulder. He completed PT but does not have a home exercises program. He denies any worsening weakness.  No bowel bladder changes. Pain today is rated 4/10.    Prior HPI:   Mr. Tavera is a 68 year old male with PMH of HTN referred by Dr. Campbell for right leg pain.  He states having constant right leg pain for the past two years.  Pain is a throbbing pain in her posterior thigh and travels down to his ankle.  He denies any lower back pain.  No leg weakness or numbness.  Pain worsens with standing and walking.  Pain is relieved with rest.  He takes ibuprofen with mild benefits.  Physical therapy has not been helpful.      Pain intervention history: s/p right L4-5 and L5-S1 TFESI on 10/9/2014 and reports 75% relief of his low back and right leg pain.   s/p right L4-5 and L5-S1 TFESI on 2/21/17 and 9/2017 reports 70% relief of his right leg pain.  S/p right IESI at L4-5 and L5-S1 on 5/17/2018   S/p right TF SONIA at L4-5 and L5-S1 on 12/20/19  ROS:  CONSTITUTIONAL: No fevers, chills, night sweats, wt. loss, appetite changes  SKIN: no rashes or itching  ENT: No headaches, head trauma, vision changes, or eye pain  LYMPH NODES: None  noticed   CV: No chest pain, palpitations.   RESP: No shortness of breath, dyspnea on exertion, cough, wheezing, or hemoptysis  GI: No nausea, emesis, diarrhea, constipation, melena, hematochezia, pain.    : No dysuria, hematuria, urgency, or frequency   HEME: No easy bruising, bleeding problems  PSYCHIATRIC: No depression, anxiety, psychosis, hallucinations.  NEURO: No seizures, memory loss, dizziness or difficulty sleeping  MSK: + right leg pain      Past Medical History:   Diagnosis Date    A-fib 3/31/2020    Arthritis     Back pain     Carpal tunnel syndrome 2/25/2013    Cataract     Cataract, left eye 11/10/2014    Chest pain, musculoskeletal     COPD (chronic obstructive pulmonary disease) 11/052018    Emphysema lung 11/05/2018    Gastritis     Hx of colonic polyp     Hyperlipidemia     Hypertension     Hypothyroidism     Knee fracture     Myasthenia gravis     Neuropathy 1/3/2013    Obesity 1/29/2015    Pneumonia 3/29/2020    Polyneuropathy     PVC (premature ventricular contraction)     Squamous cell carcinoma 2014    left forearm    Thyroid disease      Past Surgical History:   Procedure Laterality Date    APPENDECTOMY      CATARACT EXTRACTION W/  INTRAOCULAR LENS IMPLANT Bilateral     COLONOSCOPY  03/01/2012    Dr Esteban; hyperplastic polyp; repeat in 5 years    CYSTOSCOPY N/A 2/26/2019    Procedure: CYSTOSCOPY;  Surgeon: Simba Cheung MD;  Location: [facility];  Service: Urology;  Laterality: N/A;    ENDOSCOPIC ULTRASOUND OF UPPER GASTROINTESTINAL TRACT N/A 1/7/2020    Procedure: ULTRASOUND, UPPER GI TRACT, ENDOSCOPIC;  Surgeon: Kati Ryan MD;  Location: [address]);  Service: Endoscopy;  Laterality: N/A;    ESOPHAGOGASTRODUODENOSCOPY N/A 9/12/2018    Procedure: EGD (ESOPHAGOGASTRODUODENOSCOPY);  Surgeon: Gabriel Hernandez MD;  Location: [facility];  Service: Endoscopy;  Laterality: N/A;    ESOPHAGOGASTRODUODENOSCOPY N/A 8/19/2019    Procedure: EGD  (ESOPHAGOGASTRODUODENOSCOPY);  Surgeon: Gabriel Hernandez MD;  Location: [facility] ENDO;  Service: Endoscopy;  Laterality: N/A;    ESOPHAGOGASTRODUODENOSCOPY N/A 10/7/2019    Procedure: EGD (ESOPHAGOGASTRODUODENOSCOPY);  Surgeon: Gabriel Hernandez MD;  Location: [facility] ENDO;  Service: Endoscopy;  Laterality: N/A;    ESOPHAGOGASTRODUODENOSCOPY N/A 1/7/2020    Procedure: EGD (ESOPHAGOGASTRODUODENOSCOPY);  Surgeon: Kati Ryan MD;  Location: [facility] (2ND FLR);  Service: Endoscopy;  Laterality: N/A;    HEMORRHOID SURGERY      KNEE ARTHROPLASTY Bilateral     LENGTHENING OF ACHILLES TENDON Right 9/11/2020    Procedure: percutaeous tenotomy of right lateral epicondyle (tenex);  Surgeon: Terry Quach MD;  Location: [facility] OR;  Service: Neurosurgery;  Laterality: Right;  tenex to right lateral epicondyle  Tenex machine SN#[id number], total time 1min. 30seconds    NECK SURGERY      POSTERIOR FUSION OF CERVICAL SPINE WITH LAMINECTOMY N/A 11/7/2018    Procedure: C2-C6 Posterior Cervical Laminectomy & Instrumental Fusion;  Surgeon: Kishore Turner MD;  Location: [facility] OR 2ND FLR;  Service: Neurosurgery;  Laterality: N/A;    TONSILLECTOMY      TRANSFORAMINAL EPIDURAL INJECTION OF STEROID Right 12/20/2019    Procedure: Injection,steroid,epidural,transforaminal approach;  Surgeon: Devan Watt MD;  Location: [facility] OR;  Service: Pain Management;  Laterality: Right;  L3-4, L4-5    TRANSFORAMINAL EPIDURAL INJECTION OF STEROID Bilateral 2/6/2020    Procedure: Injection,steroid,epidural,transforaminal approach;  Surgeon: Devan Watt MD;  Location: [facility] OR;  Service: Pain Management;  Laterality: Bilateral;  L3-L4,L4-L5    TRANSFORAMINAL EPIDURAL INJECTION OF STEROID Bilateral 8/26/2020    Procedure: Injection,steroid,epidural,transforaminal approach;  Surgeon: Devan Watt MD;  Location: [facility] OR;  Service: Pain Management;  Laterality: Bilateral;  L3-4, L4-5    TRANSRECTAL ULTRASOUND EXAMINATION N/A 2/26/2019    Procedure:  ULTRASOUND, RECTAL APPROACH;  Surgeon: Simba Cheung MD;  Location: [facility];  Service: Urology;  Laterality: N/A;    UPPER GASTROINTESTINAL ENDOSCOPY  09/12/2018    Dr Hernandez; gastritis; extensive intestinal metaplasia; repeat in 1-2- years     Family History   Problem Relation Age of Onset    Cataracts Mother     Heart disease Mother         CHF    Hypertension Mother     Hyperlipidemia Mother     Cataracts Father     Glaucoma Father     Heart disease Father     Hyperlipidemia Sister     Hypertension Sister     No Known Problems Daughter     No Known Problems Daughter     Collagen disease Neg Hx     Amblyopia Neg Hx     Blindness Neg Hx     Macular degeneration Neg Hx     Retinal detachment Neg Hx     Strabismus Neg Hx     Cancer Neg Hx     Colon cancer Neg Hx     Esophageal cancer Neg Hx     Stomach cancer Neg Hx     Crohn's disease Neg Hx     Ulcerative colitis Neg Hx      Social History     Socioeconomic History    Marital status:      Spouse name: Not on file    Number of children: Not on file    Years of education: Not on file    Highest education level: Not on file   Occupational History    Not on file   Social Needs    Financial resource strain: Not hard at all    Food insecurity     Worry: Never true     Inability: Never true    Transportation needs     Medical: No     Non-medical: No   Tobacco Use    Smoking status: Never Smoker    Smokeless tobacco: Never Used    Tobacco comment: when a child   Substance and Sexual Activity    Alcohol use: Yes     Frequency: Never     Binge frequency: Never     Comment: rarely    Drug use: No    Sexual activity: Yes     Partners: Female   Lifestyle    Physical activity     Days per week: 0 days     Minutes per session: 0 min    Stress: Not at all   Relationships    Social connections     Talks on phone: More than three times a week     Gets together: More than three times a week     Attends Hoahaoism service: More than  "4 times per year     Active member of club or organization: Yes     Attends meetings of clubs or organizations: More than 4 times per year     Relationship status:    Other Topics Concern    Not on file   Social History Narrative    Not on file         Medications/Allergies: See med card    Vitals:    09/23/20 1411   BP: (!) 143/78   Pulse: 62   Weight: 108.9 kg (240 lb)   Height: 6' 1" (1.854 m)   PainSc:   5   PainLoc: Back         Physical exam:    GENERAL: A and O x3, the patient appears well groomed and is in no acute distress.  Skin: No rashes or obvious lesions  HEENT: normocephalic, atraumatic  CARDIOVASCULAR:  Bradycardia   LUNGS: non labored breathing  ABDOMEN: soft, nontender   UPPER EXTREMITIES: Normal alignment, normal range of motion, no atrophy, no skin changes,  hair growth and nail growth normal and equal bilaterally. No swelling, no tenderness.    LOWER EXTREMITIES:  Normal alignment, normal range of motion, no atrophy, no skin changes,  hair growth and nail growth normal and equal bilaterally. No swelling, no tenderness.    LUMBAR SPINE  Lumbar spine: ROM is full with flexion extension and oblique extension with no increased pain.    Porter's test causes no increased pain on either side.    Supine straight leg raise is negative bilaterally.    Internal and external rotation of the hip causes no increased pain on either side.  Myofascial exam: No tenderness to palpation across lumbar paraspinous muscles.      MENTAL STATUS: normal orientation, speech, language, and fund of knowledge for social situation.  Emotional state appropriate.    CRANIAL NERVES:  II:  PERRL bilaterally,   III,IV,VI: EOMI.    V:  Facial sensation equal bilaterally  VII:  Facial motor function normal.  VIII:  Hearing equal to finger rub bilaterally  IX/X: Gag normal, palate symmetric  XI:  Shoulder shrug equal, head turn equal  XII:  Tongue midline without fasciculations      MOTOR: Tone and bulk: normal bilateral " upper and lower Strength: normal    IP ADD ABD Quad TA Gas HAM  R 5 5 5 5 5 5 5  L 5 5 5 5 5 5 5    SENSATION: Light touch and pinprick intact bilaterally  REFLEXES: normal, symmetric, nonbrisk.  Toes down, no clonus. No hoffmans.  GAIT: normal rise, base, steps, and arm swing.      Imaging:    MRI lumbar spine without contrast 10/12/2019 (open MRI)     L1-2, stable foraminal disc protrusion.  Mild central canal stenosis.  Moderate left neuroforaminal narrowing.  L2-3, disc bulging with prominent posterior element hypertrophy.  Moderate central canal stenosis.  Bilateral recess narrowing as well as moderate left and mild right neural foraminal narrowing.  L3-4, cervical ventral disc bulge with facet disease.  Mild-to-moderate central canal narrowing.  Moderate right and mild left neural foraminal narrowing.  L4-5, service control disc bulge with bulky facet disease.  Mild-to-moderate central canal stenosis.  Moderate bilateral neural foraminal narrowing, right greater than left.  L5-S1 disc bulge and facet disease  MRI lumbar spine 09/2018 (outside open MRI)  Multilevel spondylosis.  Moderate neural foraminal narrowing at L3-4 and L4-5.      Assessment:  Mr. Tavera is a 74 y.o. male with   1. Lumbar radiculitis    2. Myofascial pain    3. DDD (degenerative disc disease), lumbar    4. Other spondylosis, lumbar region      Plan:  1.  I have stressed the importance of physical activity and exercise to improve overall health. Home lumbar exercises provided.   2.  Schedule bilateral lumbar MBB at L3,4 5. He is aware this will not help LE pain. I have explained the risks, benefits, and alternatives of the procedure in detail. The patient voices understanding and all questions have been answered. The patient agrees to proceed as planned. Written Consent obtained. \  3.  Continue Gabapentin as tolerated to 1800 mg daily  4. Discussed neurosurgery consult. He does not wish to consider lumbar surgery  5. Will call s/p MBB and  move forward with RFA

## 2020-09-23 NOTE — H&P (VIEW-ONLY)
PCP: Brian Marroquin MD      CC:  Low back and bilateral leg pain    Interval history: Mr. Tavera is a 74 y.o. male with chronic neck and low back pain who presents today for f/u s/p bilateral TF SONIA at L3-4 and L4-5 that provided no benefit. He continues to c/o pain bilateral down to his calves. Standing up after prolonged sitting and stiffness that prevnets him from doing activities. Previous did not provide much improvement either but prior to this he had good relief with ESIs. . He c/o significant low back pain as well. Difficulty  Pain worsens standing, walking getting up.  He has tried physical therapy with minimal benefit.  He currently takes gabapentin 1200 mg daily with mild benefits.   He is s/p cervical spine surgery and reports pain has worsened in left Paraspinous muscles into left shoulder. He completed PT but does not have a home exercises program. He denies any worsening weakness.  No bowel bladder changes. Pain today is rated 4/10.    Prior HPI:   Mr. Tavera is a 68 year old male with PMH of HTN referred by Dr. Campbell for right leg pain.  He states having constant right leg pain for the past two years.  Pain is a throbbing pain in her posterior thigh and travels down to his ankle.  He denies any lower back pain.  No leg weakness or numbness.  Pain worsens with standing and walking.  Pain is relieved with rest.  He takes ibuprofen with mild benefits.  Physical therapy has not been helpful.      Pain intervention history: s/p right L4-5 and L5-S1 TFESI on 10/9/2014 and reports 75% relief of his low back and right leg pain.   s/p right L4-5 and L5-S1 TFESI on 2/21/17 and 9/2017 reports 70% relief of his right leg pain.  S/p right IESI at L4-5 and L5-S1 on 5/17/2018   S/p right TF SONIA at L4-5 and L5-S1 on 12/20/19  ROS:  CONSTITUTIONAL: No fevers, chills, night sweats, wt. loss, appetite changes  SKIN: no rashes or itching  ENT: No headaches, head trauma, vision changes, or eye pain  LYMPH NODES: None  noticed   CV: No chest pain, palpitations.   RESP: No shortness of breath, dyspnea on exertion, cough, wheezing, or hemoptysis  GI: No nausea, emesis, diarrhea, constipation, melena, hematochezia, pain.    : No dysuria, hematuria, urgency, or frequency   HEME: No easy bruising, bleeding problems  PSYCHIATRIC: No depression, anxiety, psychosis, hallucinations.  NEURO: No seizures, memory loss, dizziness or difficulty sleeping  MSK: + right leg pain      Past Medical History:   Diagnosis Date    A-fib 3/31/2020    Arthritis     Back pain     Carpal tunnel syndrome 2/25/2013    Cataract     Cataract, left eye 11/10/2014    Chest pain, musculoskeletal     COPD (chronic obstructive pulmonary disease) 11/052018    Emphysema lung 11/05/2018    Gastritis     Hx of colonic polyp     Hyperlipidemia     Hypertension     Hypothyroidism     Knee fracture     Myasthenia gravis     Neuropathy 1/3/2013    Obesity 1/29/2015    Pneumonia 3/29/2020    Polyneuropathy     PVC (premature ventricular contraction)     Squamous cell carcinoma 2014    left forearm    Thyroid disease      Past Surgical History:   Procedure Laterality Date    APPENDECTOMY      CATARACT EXTRACTION W/  INTRAOCULAR LENS IMPLANT Bilateral     COLONOSCOPY  03/01/2012    Dr Esteban; hyperplastic polyp; repeat in 5 years    CYSTOSCOPY N/A 2/26/2019    Procedure: CYSTOSCOPY;  Surgeon: Simba Cheung MD;  Location: [facility];  Service: Urology;  Laterality: N/A;    ENDOSCOPIC ULTRASOUND OF UPPER GASTROINTESTINAL TRACT N/A 1/7/2020    Procedure: ULTRASOUND, UPPER GI TRACT, ENDOSCOPIC;  Surgeon: Kati Ryan MD;  Location: [address]);  Service: Endoscopy;  Laterality: N/A;    ESOPHAGOGASTRODUODENOSCOPY N/A 9/12/2018    Procedure: EGD (ESOPHAGOGASTRODUODENOSCOPY);  Surgeon: Gabriel Hernandez MD;  Location: [facility];  Service: Endoscopy;  Laterality: N/A;    ESOPHAGOGASTRODUODENOSCOPY N/A 8/19/2019    Procedure: EGD  (ESOPHAGOGASTRODUODENOSCOPY);  Surgeon: Gabriel Hernandez MD;  Location: [facility] ENDO;  Service: Endoscopy;  Laterality: N/A;    ESOPHAGOGASTRODUODENOSCOPY N/A 10/7/2019    Procedure: EGD (ESOPHAGOGASTRODUODENOSCOPY);  Surgeon: Gabriel Hernandez MD;  Location: [facility] ENDO;  Service: Endoscopy;  Laterality: N/A;    ESOPHAGOGASTRODUODENOSCOPY N/A 1/7/2020    Procedure: EGD (ESOPHAGOGASTRODUODENOSCOPY);  Surgeon: Kati Ryan MD;  Location: [facility] (2ND FLR);  Service: Endoscopy;  Laterality: N/A;    HEMORRHOID SURGERY      KNEE ARTHROPLASTY Bilateral     LENGTHENING OF ACHILLES TENDON Right 9/11/2020    Procedure: percutaeous tenotomy of right lateral epicondyle (tenex);  Surgeon: Terry Quach MD;  Location: [facility] OR;  Service: Neurosurgery;  Laterality: Right;  tenex to right lateral epicondyle  Tenex machine SN#[id number], total time 1min. 30seconds    NECK SURGERY      POSTERIOR FUSION OF CERVICAL SPINE WITH LAMINECTOMY N/A 11/7/2018    Procedure: C2-C6 Posterior Cervical Laminectomy & Instrumental Fusion;  Surgeon: Kishore Turner MD;  Location: [facility] OR 2ND FLR;  Service: Neurosurgery;  Laterality: N/A;    TONSILLECTOMY      TRANSFORAMINAL EPIDURAL INJECTION OF STEROID Right 12/20/2019    Procedure: Injection,steroid,epidural,transforaminal approach;  Surgeon: Devan Watt MD;  Location: [facility] OR;  Service: Pain Management;  Laterality: Right;  L3-4, L4-5    TRANSFORAMINAL EPIDURAL INJECTION OF STEROID Bilateral 2/6/2020    Procedure: Injection,steroid,epidural,transforaminal approach;  Surgeon: Devan Watt MD;  Location: [facility] OR;  Service: Pain Management;  Laterality: Bilateral;  L3-L4,L4-L5    TRANSFORAMINAL EPIDURAL INJECTION OF STEROID Bilateral 8/26/2020    Procedure: Injection,steroid,epidural,transforaminal approach;  Surgeon: Devan Watt MD;  Location: [facility] OR;  Service: Pain Management;  Laterality: Bilateral;  L3-4, L4-5    TRANSRECTAL ULTRASOUND EXAMINATION N/A 2/26/2019    Procedure:  ULTRASOUND, RECTAL APPROACH;  Surgeon: Simba Cheung MD;  [facility];  Service: Urology;  Laterality: N/A;    UPPER GASTROINTESTINAL ENDOSCOPY  09/12/2018    Dr Hernandez; gastritis; extensive intestinal metaplasia; repeat in 1-2- years     Family History   Problem Relation Age of Onset    Cataracts Mother     Heart disease Mother         CHF    Hypertension Mother     Hyperlipidemia Mother     Cataracts Father     Glaucoma Father     Heart disease Father     Hyperlipidemia Sister     Hypertension Sister     No Known Problems Daughter     No Known Problems Daughter     Collagen disease Neg Hx     Amblyopia Neg Hx     Blindness Neg Hx     Macular degeneration Neg Hx     Retinal detachment Neg Hx     Strabismus Neg Hx     Cancer Neg Hx     Colon cancer Neg Hx     Esophageal cancer Neg Hx     Stomach cancer Neg Hx     Crohn's disease Neg Hx     Ulcerative colitis Neg Hx      Social History     Socioeconomic History    Marital status:      Spouse name: Not on file    Number of children: Not on file    Years of education: Not on file    Highest education level: Not on file   Occupational History    Not on file   Social Needs    Financial resource strain: Not hard at all    Food insecurity     Worry: Never true     Inability: Never true    Transportation needs     Medical: No     Non-medical: No   Tobacco Use    Smoking status: Never Smoker    Smokeless tobacco: Never Used    Tobacco comment: when a child   Substance and Sexual Activity    Alcohol use: Yes     Frequency: Never     Binge frequency: Never     Comment: rarely    Drug use: No    Sexual activity: Yes     Partners: Female   Lifestyle    Physical activity     Days per week: 0 days     Minutes per session: 0 min    Stress: Not at all   Relationships    Social connections     Talks on phone: More than three times a week     Gets together: More than three times a week     Attends Taoism service: More than  "4 times per year     Active member of club or organization: Yes     Attends meetings of clubs or organizations: More than 4 times per year     Relationship status:    Other Topics Concern    Not on file   Social History Narrative    Not on file         Medications/Allergies: See med card    Vitals:    09/23/20 1411   BP: (!) 143/78   Pulse: 62   Weight: 108.9 kg (240 lb)   Height: 6' 1" (1.854 m)   PainSc:   5   PainLoc: Back         Physical exam:    GENERAL: A and O x3, the patient appears well groomed and is in no acute distress.  Skin: No rashes or obvious lesions  HEENT: normocephalic, atraumatic  CARDIOVASCULAR:  Bradycardia   LUNGS: non labored breathing  ABDOMEN: soft, nontender   UPPER EXTREMITIES: Normal alignment, normal range of motion, no atrophy, no skin changes,  hair growth and nail growth normal and equal bilaterally. No swelling, no tenderness.    LOWER EXTREMITIES:  Normal alignment, normal range of motion, no atrophy, no skin changes,  hair growth and nail growth normal and equal bilaterally. No swelling, no tenderness.    LUMBAR SPINE  Lumbar spine: ROM is full with flexion extension and oblique extension with no increased pain.    Porter's test causes no increased pain on either side.    Supine straight leg raise is negative bilaterally.    Internal and external rotation of the hip causes no increased pain on either side.  Myofascial exam: No tenderness to palpation across lumbar paraspinous muscles.      MENTAL STATUS: normal orientation, speech, language, and fund of knowledge for social situation.  Emotional state appropriate.    CRANIAL NERVES:  II:  PERRL bilaterally,   III,IV,VI: EOMI.    V:  Facial sensation equal bilaterally  VII:  Facial motor function normal.  VIII:  Hearing equal to finger rub bilaterally  IX/X: Gag normal, palate symmetric  XI:  Shoulder shrug equal, head turn equal  XII:  Tongue midline without fasciculations      MOTOR: Tone and bulk: normal bilateral " upper and lower Strength: normal    IP ADD ABD Quad TA Gas HAM  R 5 5 5 5 5 5 5  L 5 5 5 5 5 5 5    SENSATION: Light touch and pinprick intact bilaterally  REFLEXES: normal, symmetric, nonbrisk.  Toes down, no clonus. No hoffmans.  GAIT: normal rise, base, steps, and arm swing.      Imaging:    MRI lumbar spine without contrast 10/12/2019 (open MRI)     L1-2, stable foraminal disc protrusion.  Mild central canal stenosis.  Moderate left neuroforaminal narrowing.  L2-3, disc bulging with prominent posterior element hypertrophy.  Moderate central canal stenosis.  Bilateral recess narrowing as well as moderate left and mild right neural foraminal narrowing.  L3-4, cervical ventral disc bulge with facet disease.  Mild-to-moderate central canal narrowing.  Moderate right and mild left neural foraminal narrowing.  L4-5, service control disc bulge with bulky facet disease.  Mild-to-moderate central canal stenosis.  Moderate bilateral neural foraminal narrowing, right greater than left.  L5-S1 disc bulge and facet disease  MRI lumbar spine 09/2018 (outside open MRI)  Multilevel spondylosis.  Moderate neural foraminal narrowing at L3-4 and L4-5.      Assessment:  Mr. Tavera is a 74 y.o. male with   1. Lumbar radiculitis    2. Myofascial pain    3. DDD (degenerative disc disease), lumbar    4. Other spondylosis, lumbar region      Plan:  1.  I have stressed the importance of physical activity and exercise to improve overall health. Home lumbar exercises provided.   2.  Schedule bilateral lumbar MBB at L3,4 5. He is aware this will not help LE pain. I have explained the risks, benefits, and alternatives of the procedure in detail. The patient voices understanding and all questions have been answered. The patient agrees to proceed as planned. Written Consent obtained. \  3.  Continue Gabapentin as tolerated to 1800 mg daily  4. Discussed neurosurgery consult. He does not wish to consider lumbar surgery  5. Will call s/p MBB and  move forward with RFA

## 2020-09-28 ENCOUNTER — LAB VISIT (OUTPATIENT)
Dept: PRIMARY CARE CLINIC | Facility: CLINIC | Age: 75
End: 2020-09-28
Payer: MEDICARE

## 2020-09-28 DIAGNOSIS — Z01.818 PRE-OP TESTING: ICD-10-CM

## 2020-09-28 PROCEDURE — U0003 INFECTIOUS AGENT DETECTION BY NUCLEIC ACID (DNA OR RNA); SEVERE ACUTE RESPIRATORY SYNDROME CORONAVIRUS 2 (SARS-COV-2) (CORONAVIRUS DISEASE [COVID-19]), AMPLIFIED PROBE TECHNIQUE, MAKING USE OF HIGH THROUGHPUT TECHNOLOGIES AS DESCRIBED BY CMS-2020-01-R: HCPCS | Mod: HCNC

## 2020-09-29 LAB — SARS-COV-2 RNA RESP QL NAA+PROBE: NOT DETECTED

## 2020-09-30 ENCOUNTER — OFFICE VISIT (OUTPATIENT)
Dept: FAMILY MEDICINE | Facility: CLINIC | Age: 75
End: 2020-09-30
Payer: MEDICARE

## 2020-09-30 VITALS
HEART RATE: 60 BPM | DIASTOLIC BLOOD PRESSURE: 70 MMHG | RESPIRATION RATE: 16 BRPM | OXYGEN SATURATION: 98 % | TEMPERATURE: 98 F | SYSTOLIC BLOOD PRESSURE: 120 MMHG | HEIGHT: 73 IN | WEIGHT: 248.69 LBS | BODY MASS INDEX: 32.96 KG/M2

## 2020-09-30 DIAGNOSIS — I77.1 TORTUOUS AORTA: ICD-10-CM

## 2020-09-30 DIAGNOSIS — Z00.00 ENCOUNTER FOR PREVENTIVE HEALTH EXAMINATION: Primary | ICD-10-CM

## 2020-09-30 DIAGNOSIS — E78.2 MIXED HYPERLIPIDEMIA: ICD-10-CM

## 2020-09-30 DIAGNOSIS — J44.9 CHRONIC OBSTRUCTIVE PULMONARY DISEASE, UNSPECIFIED COPD TYPE: ICD-10-CM

## 2020-09-30 DIAGNOSIS — I70.0 ABDOMINAL AORTIC ATHEROSCLEROSIS: ICD-10-CM

## 2020-09-30 DIAGNOSIS — I10 HTN (HYPERTENSION), BENIGN: ICD-10-CM

## 2020-09-30 DIAGNOSIS — G95.9 CERVICAL MYELOPATHY: ICD-10-CM

## 2020-09-30 DIAGNOSIS — I47.29 NONSUSTAINED VENTRICULAR TACHYCARDIA: ICD-10-CM

## 2020-09-30 DIAGNOSIS — I48.91 ATRIAL FIBRILLATION, UNSPECIFIED TYPE: ICD-10-CM

## 2020-09-30 PROCEDURE — 99999 PR PBB SHADOW E&M-EST. PATIENT-LVL IV: ICD-10-PCS | Mod: PBBFAC,HCNC,, | Performed by: NURSE PRACTITIONER

## 2020-09-30 PROCEDURE — 90662 IIV NO PRSV INCREASED AG IM: CPT | Mod: HCNC,S$GLB,, | Performed by: NURSE PRACTITIONER

## 2020-09-30 PROCEDURE — 99499 UNLISTED E&M SERVICE: CPT | Mod: HCNC,S$GLB,, | Performed by: NURSE PRACTITIONER

## 2020-09-30 PROCEDURE — 3078F DIAST BP <80 MM HG: CPT | Mod: HCNC,CPTII,S$GLB, | Performed by: NURSE PRACTITIONER

## 2020-09-30 PROCEDURE — G0439 PR MEDICARE ANNUAL WELLNESS SUBSEQUENT VISIT: ICD-10-PCS | Mod: HCNC,S$GLB,, | Performed by: NURSE PRACTITIONER

## 2020-09-30 PROCEDURE — 3074F SYST BP LT 130 MM HG: CPT | Mod: HCNC,CPTII,S$GLB, | Performed by: NURSE PRACTITIONER

## 2020-09-30 PROCEDURE — G0439 PPPS, SUBSEQ VISIT: HCPCS | Mod: HCNC,S$GLB,, | Performed by: NURSE PRACTITIONER

## 2020-09-30 PROCEDURE — 3078F PR MOST RECENT DIASTOLIC BLOOD PRESSURE < 80 MM HG: ICD-10-PCS | Mod: HCNC,CPTII,S$GLB, | Performed by: NURSE PRACTITIONER

## 2020-09-30 PROCEDURE — 99999 PR PBB SHADOW E&M-EST. PATIENT-LVL IV: CPT | Mod: PBBFAC,HCNC,, | Performed by: NURSE PRACTITIONER

## 2020-09-30 PROCEDURE — G0008 FLU VACCINE - HIGH DOSE (65+) PRESERVATIVE FREE IM: ICD-10-PCS | Mod: HCNC,S$GLB,, | Performed by: NURSE PRACTITIONER

## 2020-09-30 PROCEDURE — 99499 RISK ADDL DX/OHS AUDIT: ICD-10-PCS | Mod: HCNC,S$GLB,, | Performed by: NURSE PRACTITIONER

## 2020-09-30 PROCEDURE — 90662 FLU VACCINE - HIGH DOSE (65+) PRESERVATIVE FREE IM: ICD-10-PCS | Mod: HCNC,S$GLB,, | Performed by: NURSE PRACTITIONER

## 2020-09-30 PROCEDURE — 3074F PR MOST RECENT SYSTOLIC BLOOD PRESSURE < 130 MM HG: ICD-10-PCS | Mod: HCNC,CPTII,S$GLB, | Performed by: NURSE PRACTITIONER

## 2020-09-30 PROCEDURE — G0008 ADMIN INFLUENZA VIRUS VAC: HCPCS | Mod: HCNC,S$GLB,, | Performed by: NURSE PRACTITIONER

## 2020-09-30 NOTE — PROGRESS NOTES
"  Maximilian Tavera presented for a  Medicare AWV and comprehensive Health Risk Assessment today. The following components were reviewed and updated:    · Medical history  · Family History  · Social history  · Allergies and Current Medications  · Health Risk Assessment  · Health Maintenance  · Care Team     ** See Completed Assessments for Annual Wellness Visit within the encounter summary.**         The following assessments were completed:  · Living Situation  · CAGE  · Depression Screening  · Timed Get Up and Go  · Whisper Test  · Cognitive Function Screening  · Nutrition Screening  · ADL Screening  · PAQ Screening            Vitals:    09/30/20 1442   BP: 120/70   BP Location: Right arm   Patient Position: Sitting   BP Method: Large (Manual)   Pulse: 60   Resp: 16   Temp: 98 °F (36.7 °C)   TempSrc: Other (see comments)   SpO2: 98%   Weight: 112.8 kg (248 lb 10.9 oz)   Height: 6' 1" (1.854 m)     Body mass index is 32.81 kg/m².  Physical Exam  Constitutional:       Appearance: He is well-developed.   HENT:      Head: Normocephalic and atraumatic.      Right Ear: Hearing normal.      Left Ear: Hearing normal.      Nose: Nose normal.   Eyes:      General: Lids are normal.      Conjunctiva/sclera: Conjunctivae normal.      Pupils: Pupils are equal, round, and reactive to light.   Neck:      Musculoskeletal: Normal range of motion and neck supple.   Cardiovascular:      Rate and Rhythm: Normal rate.   Pulmonary:      Effort: Pulmonary effort is normal.   Abdominal:      Palpations: Abdomen is soft.   Musculoskeletal: Normal range of motion.   Skin:     General: Skin is warm and dry.   Neurological:      Mental Status: He is alert and oriented to person, place, and time.               Diagnoses and health risks identified today and associated recommendations/orders:    1. Encounter for preventive health examination  Discussed health maintenance guidelines appropriate for age.    Flu vaccine Today  Shingles vaccine in one " month  Colonoscopy every 10 years per Dr. Hernandez per patient report    2. Chronic obstructive pulmonary disease, unspecified COPD type  Stable, continue current medication regimen  Continue regular follow up with PCP    3. Mixed hyperlipidemia  Stable,continue current medication regimen  Continue regular follow up with pcp  On statin  4. HTN (hypertension), benign  Controlled, continue current medication regimen  Low salt diet     5. Abdominal aortic atherosclerosis  Stable, continue to monitor  On statin  Continue regular follow up with cardiology   6. Tortuous aorta  Stable, continue to monitor  Continue regular follow up with cardiology   7. Nonsustained ventricular tachycardia  Stable, continue current management  Continue regular follow up with cardiology     8. Atrial fibrillation, unspecified type  Stable, continue current medication regimen   Continue regular follow up with cardiology   9. Cervical myelopathy  Stable, continue to monitor             Provided Maximilian with a 5-10 year written screening schedule and personal prevention plan. Recommendations were developed using the USPSTF age appropriate recommendations. Education, counseling, and referrals were provided as needed. After Visit Summary printed and given to patient which includes a list of additional screenings\tests needed.    No follow-ups on file.    Tiffanie Vargas NP    I offered to discuss end of life issues, including information on how to make advance directives that the patient could use to name someone who would make medical decisions on their behalf if they became too ill to make themselves.    _X_Patient declined  ___Patient is interested, I provided paper work and offered to discuss.

## 2020-09-30 NOTE — PATIENT INSTRUCTIONS
Counseling and Referral of Other Preventative  (Italic type indicates deductible and co-insurance are waived)    Patient Name: Maximilian Tavera  Today's Date: 9/30/2020    Health Maintenance       Date Due Completion Date    Hepatitis C Screening 1945 ---    Shingles Vaccine (2 of 3) 09/01/2015 7/7/2015    Colonoscopy 03/01/2017 3/1/2012    Influenza Vaccine (1) 08/01/2020 10/8/2019    Lipid Panel 11/21/2020 11/21/2019    TETANUS VACCINE 08/31/2025 8/31/2015        No orders of the defined types were placed in this encounter.    The following information is provided to all patients.  This information is to help you find resources for any of the problems found today that may be affecting your health:                Living healthy guide: www.[facility].gov      Understanding Diabetes: www.diabetes.org      Eating healthy: www.cdc.gov/healthyweight      CDC home safety checklist: www.cdc.gov/steadi/patient.html      Agency on Aging: www.goea.[place].[facility]      Alcoholics anonymous (AA): www.aa.org      Physical Activity: www.aristides.nih.gov/sl5ulbv      Tobacco use: www.quitwithusla.org

## 2020-10-01 ENCOUNTER — HOSPITAL ENCOUNTER (OUTPATIENT)
Facility: AMBULARY SURGERY CENTER | Age: 75
Discharge: HOME | End: 2020-10-01
Attending: ANESTHESIOLOGY | Admitting: ANESTHESIOLOGY
Payer: MEDICARE

## 2020-10-01 DIAGNOSIS — M54.16 LUMBAR RADICULITIS: Primary | ICD-10-CM

## 2020-10-01 PROCEDURE — 64494 PR INJ DX/THER AGNT PARAVERT FACET JOINT,IMG GUIDE,LUMBAR/SAC, 2ND LEVEL: ICD-10-PCS | Mod: 50,HCNC,, | Performed by: ANESTHESIOLOGY

## 2020-10-01 PROCEDURE — 64493 PR INJ DX/THER AGNT PARAVERT FACET JOINT,IMG GUIDE,LUMBAR/SAC,1ST LVL: ICD-10-PCS | Mod: 50,HCNC,, | Performed by: ANESTHESIOLOGY

## 2020-10-01 PROCEDURE — 64493 INJ PARAVERT F JNT L/S 1 LEV: CPT | Mod: 50,HCNC,, | Performed by: ANESTHESIOLOGY

## 2020-10-01 PROCEDURE — 64494 INJ PARAVERT F JNT L/S 2 LEV: CPT | Mod: LT | Performed by: ANESTHESIOLOGY

## 2020-10-01 PROCEDURE — 64493 INJ PARAVERT F JNT L/S 1 LEV: CPT | Mod: LT | Performed by: ANESTHESIOLOGY

## 2020-10-01 PROCEDURE — 64494 INJ PARAVERT F JNT L/S 2 LEV: CPT | Mod: 50,HCNC,, | Performed by: ANESTHESIOLOGY

## 2020-10-01 RX ORDER — BUPIVACAINE HYDROCHLORIDE 5 MG/ML
INJECTION, SOLUTION EPIDURAL; INTRACAUDAL
Status: DISCONTINUED | OUTPATIENT
Start: 2020-10-01 | End: 2020-10-01 | Stop reason: HOSPADM

## 2020-10-01 RX ORDER — SODIUM CHLORIDE, SODIUM LACTATE, POTASSIUM CHLORIDE, CALCIUM CHLORIDE 600; 310; 30; 20 MG/100ML; MG/100ML; MG/100ML; MG/100ML
INJECTION, SOLUTION INTRAVENOUS ONCE AS NEEDED
Status: DISCONTINUED | OUTPATIENT
Start: 2020-10-01 | End: 2020-10-01 | Stop reason: HOSPADM

## 2020-10-01 NOTE — OP NOTE
PROCEDURE DATE: 10/1/2020    PROCEDURE:  Bilateral L3,4,5 medial branch nerve blocks    DIAGNOSIS:  Other lumbar spondylosis    Post Op diagnosis: Same    PHYSICIAN: Devan Watt MD    MEDICATIONS INJECTED: 0.5% bupivicaine, 0.5 ml at each level    SEDATION MEDICATIONS:None    LOCAL ANESTHETIC USED: None    ESTIMATED BLOOD LOSS:  None    COMPLICATIONS:  None    TECHNIQUE: A time out was taken to identify the patient, procedure and side of the procedure. The patient was placed in a prone position, then prepped and draped in the usual sterile fashion using ChloraPrep and sterile towels.  The levels were determined under fluoroscopic guidance and then marked.  A 25-gauge 3.5 inch needle was introduced to the anatomic location of the L3,4,5 medial branch nerves on the bilateral side. The above medication was then injected. The patient tolerated the procedure well.     The patient was monitored after the procedure. Patient was given pain diary to record pain levels at home.     If found to have greater than a 50% recovery and so will be scheduled for a radiofrequency ablation of the corresponding nerves.  Patient was given post procedure and discharge instructions to follow at home.  The patient was discharged in a stable condition.

## 2020-10-01 NOTE — DISCHARGE SUMMARY
[facility]  Discharge Note  Short Stay    Admit Date: 10/1/2020    Discharge Date and Time: 10/1/2020    Attending Physician: Devan Watt MD     Discharge Provider: Devan Watt    Diagnoses:  Active Hospital Problems    Diagnosis  POA    *Lumbar radiculitis [M54.16]  Yes      Resolved Hospital Problems   No resolved problems to display.       Hospital Course: Lumbar MBB  Discharged Condition: Good    Final Diagnoses:   Active Hospital Problems    Diagnosis  POA    *Lumbar radiculitis [M54.16]  Yes      Resolved Hospital Problems   No resolved problems to display.       Disposition: Home or Self Care    Follow up/Patient Instructions:    Medications:  Reconciled Home Medications:      Medication List      CONTINUE taking these medications    atorvastatin 80 MG tablet  Commonly known as: LIPITOR  TAKE 1 TABLET EVERY DAY     carvediloL 25 MG tablet  Commonly known as: COREG  TAKE 1 TABLET TWICE DAILY WITH MEALS     cetirizine 10 MG tablet  Commonly known as: ZYRTEC  Take 1 tablet by mouth daily as needed.     chlorthalidone 25 MG Tab  Commonly known as: HYGROTEN  As directed     CO Q-10 100 mg capsule  Generic drug: coenzyme Q10  Take 400 mg by mouth once daily.     ezetimibe 10 mg tablet  Commonly known as: ZETIA  Take 1 tablet (10 mg total) by mouth once daily.     fluticasone propionate 50 mcg/actuation nasal spray  Commonly known as: FLONASE  USE 1 SPRAY IN EACH NOSTRIL TWICE DAILY AS NEEDED  FOR  RHINITIS     gabapentin 300 MG capsule  Commonly known as: NEURONTIN  TAKE 1 CAPSULE THREE TIMES DAILY     levothyroxine 50 MCG tablet  Commonly known as: SYNTHROID  Take 1 tablet (50 mcg total) by mouth once daily.     milk thistle 200 mg Cap  Take 200 mg by mouth 2 (two) times daily. Twice a day     MSM 1,000 mg Tab  Generic drug: methylsulfonylmethane  Take 1,000 mg by mouth once daily. Every day     olmesartan 20 MG tablet  Commonly known as: BENICAR  TAKE 1 TABLET EVERY DAY     omega-3 fatty acids 1,000 mg  Cap  Twice a day     pantoprazole 40 MG tablet  Commonly known as: PROTONIX  Take 1 tablet (40 mg total) by mouth 2 (two) times daily.     potassium chloride SA 20 MEQ tablet  Commonly known as: K-DUR,KLOR-CON  Take 1 tablet (20 mEq total) by mouth 3 (three) times daily. Take 4 tablets daily for 5 days, then 3 daily for 2 weeks, then 2 daily thereafter.     * predniSONE 1 MG tablet  Commonly known as: DELTASONE  TAKE 2 TABLETS EVERY DAY     * predniSONE 5 MG tablet  Commonly known as: DELTASONE  TAKE 1 TABLET EVERY DAY     sildenafil 20 mg Tab  Commonly known as: REVATIO  Take 1 to 3 tabs daily as needed.     sucralfate 1 gram tablet  Commonly known as: CARAFATE  Take 1 tablet (1 g total) by mouth 4 (four) times daily before meals and nightly.     VENTOLIN HFA 90 mcg/actuation inhaler  Generic drug: albuterol  Inhale 2 puffs into the lungs every 6 (six) hours as needed for Wheezing. Rescue     VITAMIN B-12 5,000 mcg Subl  Generic drug: cyanocobalamin (vitamin B-12)  Take 5,000 mcg by mouth once daily. Every day     VITAMIN D 50 mcg (2,000 unit) Cap  Generic drug: cholecalciferol (vitamin D3)  1 capsule once daily. Every day         * This list has 2 medication(s) that are the same as other medications prescribed for you. Read the directions carefully, and ask your doctor or other care provider to review them with you.              Discharge Procedure Orders   Call MD for:  temperature >100.4     Call MD for:  persistent nausea and vomiting or diarrhea     Call MD for:  severe uncontrolled pain     Call MD for:  redness, tenderness, or signs of infection (pain, swelling, redness, odor or green/yellow discharge around incision site)     Call MD for:  difficulty breathing or increased cough     Call MD for:  severe persistent headache        Follow up with MD in 2-3 weeks    Discharge Procedure Orders (must include Diet, Follow-up, Activity):   Discharge Procedure Orders (must include Diet, Follow-up, Activity)   Call  MD for:  temperature >100.4     Call MD for:  persistent nausea and vomiting or diarrhea     Call MD for:  severe uncontrolled pain     Call MD for:  redness, tenderness, or signs of infection (pain, swelling, redness, odor or green/yellow discharge around incision site)     Call MD for:  difficulty breathing or increased cough     Call MD for:  severe persistent headache

## 2020-10-01 NOTE — INTERVAL H&P NOTE
The patient has been examined and the H&P has been reviewed:    I concur with the findings and no changes have occurred since H&P was written.    Surgery risks, benefits and alternative options discussed and understood by patient/family.  This patient has been cleared for surgery in an ambulatory surgical facility    ASA 3,  Mallampatti Score 3  No history of anesthetic complications  Plan for RN IV sedation          There are no hospital problems to display for this patient.

## 2020-10-02 VITALS
DIASTOLIC BLOOD PRESSURE: 85 MMHG | BODY MASS INDEX: 31.81 KG/M2 | WEIGHT: 240 LBS | SYSTOLIC BLOOD PRESSURE: 151 MMHG | HEART RATE: 66 BPM | RESPIRATION RATE: 18 BRPM | HEIGHT: 73 IN | TEMPERATURE: 98 F | OXYGEN SATURATION: 94 %

## 2020-10-06 ENCOUNTER — TELEPHONE (OUTPATIENT)
Dept: PAIN MEDICINE | Facility: CLINIC | Age: 75
End: 2020-10-06

## 2020-10-06 NOTE — TELEPHONE ENCOUNTER
Pt stated the MBB not help at all. Offered an apt to discuss options, pt declined. Stated he is not sure if anything else can be done. Advised to call us back if he changing his mind , pt agreed

## 2020-10-09 ENCOUNTER — OFFICE VISIT (OUTPATIENT)
Dept: PHYSICAL MEDICINE AND REHAB | Facility: CLINIC | Age: 75
End: 2020-10-09
Payer: MEDICARE

## 2020-10-09 VITALS
HEART RATE: 65 BPM | HEIGHT: 73 IN | SYSTOLIC BLOOD PRESSURE: 145 MMHG | BODY MASS INDEX: 32.87 KG/M2 | DIASTOLIC BLOOD PRESSURE: 77 MMHG | WEIGHT: 248 LBS

## 2020-10-09 DIAGNOSIS — G89.29 CHRONIC RIGHT SHOULDER PAIN: ICD-10-CM

## 2020-10-09 DIAGNOSIS — M25.511 CHRONIC RIGHT SHOULDER PAIN: ICD-10-CM

## 2020-10-09 DIAGNOSIS — M77.11 RIGHT LATERAL EPICONDYLITIS: Primary | ICD-10-CM

## 2020-10-09 PROCEDURE — 3078F DIAST BP <80 MM HG: CPT | Mod: HCNC,CPTII,S$GLB, | Performed by: PHYSICAL MEDICINE & REHABILITATION

## 2020-10-09 PROCEDURE — 1101F PR PT FALLS ASSESS DOC 0-1 FALLS W/OUT INJ PAST YR: ICD-10-PCS | Mod: HCNC,CPTII,S$GLB, | Performed by: PHYSICAL MEDICINE & REHABILITATION

## 2020-10-09 PROCEDURE — 1125F AMNT PAIN NOTED PAIN PRSNT: CPT | Mod: HCNC,S$GLB,, | Performed by: PHYSICAL MEDICINE & REHABILITATION

## 2020-10-09 PROCEDURE — 3077F PR MOST RECENT SYSTOLIC BLOOD PRESSURE >= 140 MM HG: ICD-10-PCS | Mod: HCNC,CPTII,S$GLB, | Performed by: PHYSICAL MEDICINE & REHABILITATION

## 2020-10-09 PROCEDURE — 1101F PT FALLS ASSESS-DOCD LE1/YR: CPT | Mod: HCNC,CPTII,S$GLB, | Performed by: PHYSICAL MEDICINE & REHABILITATION

## 2020-10-09 PROCEDURE — 3078F PR MOST RECENT DIASTOLIC BLOOD PRESSURE < 80 MM HG: ICD-10-PCS | Mod: HCNC,CPTII,S$GLB, | Performed by: PHYSICAL MEDICINE & REHABILITATION

## 2020-10-09 PROCEDURE — 3077F SYST BP >= 140 MM HG: CPT | Mod: HCNC,CPTII,S$GLB, | Performed by: PHYSICAL MEDICINE & REHABILITATION

## 2020-10-09 PROCEDURE — 99999 PR PBB SHADOW E&M-EST. PATIENT-LVL III: CPT | Mod: PBBFAC,HCNC,, | Performed by: PHYSICAL MEDICINE & REHABILITATION

## 2020-10-09 PROCEDURE — 99999 PR PBB SHADOW E&M-EST. PATIENT-LVL III: ICD-10-PCS | Mod: PBBFAC,HCNC,, | Performed by: PHYSICAL MEDICINE & REHABILITATION

## 2020-10-09 PROCEDURE — 99213 PR OFFICE/OUTPT VISIT, EST, LEVL III, 20-29 MIN: ICD-10-PCS | Mod: HCNC,S$GLB,, | Performed by: PHYSICAL MEDICINE & REHABILITATION

## 2020-10-09 PROCEDURE — 1159F PR MEDICATION LIST DOCUMENTED IN MEDICAL RECORD: ICD-10-PCS | Mod: HCNC,S$GLB,, | Performed by: PHYSICAL MEDICINE & REHABILITATION

## 2020-10-09 PROCEDURE — 1125F PR PAIN SEVERITY QUANTIFIED, PAIN PRESENT: ICD-10-PCS | Mod: HCNC,S$GLB,, | Performed by: PHYSICAL MEDICINE & REHABILITATION

## 2020-10-09 PROCEDURE — 1159F MED LIST DOCD IN RCRD: CPT | Mod: HCNC,S$GLB,, | Performed by: PHYSICAL MEDICINE & REHABILITATION

## 2020-10-09 PROCEDURE — 99213 OFFICE O/P EST LOW 20 MIN: CPT | Mod: HCNC,S$GLB,, | Performed by: PHYSICAL MEDICINE & REHABILITATION

## 2020-10-09 NOTE — PROCEDURES
Procedures     Procedure: Right common extensor tendon of the elbow trigger point injection    Procedure indications. Right lateral epicondylitis. Muscle spasm of elbow    Procedure in detail: Risks and benefits were discussed and written informed consent was obtained.  The patient was placed in the seated position with the right forearm bent to 90° and pronated.  Using a linear transducer, the common extensor tendon on the lateral epicondyle was evaluated.  Distally, skin was cleaned with ChloraPrep.  A 25 gauge 1.5 in needle was advanced into the common extensor tendon where 3 cc of a solution of 3 cc of 0.50% ropivacaine was cc of 50% dextrose (12.5% dextrose) was injected into the tendon at multiple areas from superficial to deep and lateral to medial.  Needle was removed and Band-Aid was applied.  The patient tolerated the procedure well with no adverse events.  He did notice improvement in symptoms following the procedure

## 2020-10-09 NOTE — PROGRESS NOTES
Chief Complaint   Patient presents with    Follow-up     tenex procedure right elbow 9/11/20         HPI: Maximilian Tavera Jr. is a  74 y.o. male who presents today for followup. he was last seen approximately 1 months ago at which time I performed ultrasound guided percutaneous tenotomy to the right common extensor tendon origin of the lateral epicondyle.  At this time, he states no improvement in symptoms.  He continues to complain of pain over the lateral epicondyle and use with using his arm and hand.  Pain is otherwise unchanged in location, duration, intensity, or characteristics        Review of Systems   Musculoskeletal: Positive for back pain and joint pain.   All other systems reviewed and are negative.           Past Medical History:   Diagnosis Date    A-fib 3/31/2020    Arthritis     Back pain     Carpal tunnel syndrome 2/25/2013    Cataract     Cataract, left eye 11/10/2014    Chest pain, musculoskeletal     COPD (chronic obstructive pulmonary disease) 11/052018    Emphysema lung 11/05/2018    Gastritis     Hx of colonic polyp     Hyperlipidemia     Hypertension     Hypothyroidism     Knee fracture     Myasthenia gravis     Neuropathy 1/3/2013    Obesity 1/29/2015    Pneumonia 3/29/2020    Polyneuropathy     PVC (premature ventricular contraction)     Squamous cell carcinoma 2014    left forearm    Thyroid disease           Current Outpatient Medications on File Prior to Visit   Medication Sig Dispense Refill    atorvastatin (LIPITOR) 80 MG tablet TAKE 1 TABLET EVERY DAY 90 tablet 3    carvediloL (COREG) 25 MG tablet TAKE 1 TABLET TWICE DAILY WITH MEALS 180 tablet 3    cetirizine (ZYRTEC) 10 MG tablet Take 1 tablet by mouth daily as needed.      chlorthalidone (HYGROTEN) 25 MG Tab As directed 30 tablet 11    cholecalciferol, vitamin D3, (VITAMIN D) 2,000 unit Cap 1 capsule once daily. Every day      coenzyme Q10 (CO Q-10) 100 mg capsule Take 400 mg by mouth once  daily.       cyanocobalamin, vitamin B-12, (VITAMIN B-12) 5,000 mcg Subl Take 5,000 mcg by mouth once daily. Every day      ezetimibe (ZETIA) 10 mg tablet TAKE 1 TABLET (10 MG TOTAL) BY MOUTH ONCE DAILY. 90 tablet 3    fluticasone (FLONASE) 50 mcg/actuation nasal spray USE 1 SPRAY IN EACH NOSTRIL TWICE DAILY AS NEEDED  FOR  RHINITIS 48 g 3    gabapentin (NEURONTIN) 300 MG capsule TAKE 1 CAPSULE THREE TIMES DAILY 270 capsule 5    levothyroxine (SYNTHROID) 50 MCG tablet TAKE 1 TABLET EVERY DAY 90 tablet 3    methylsulfonylmethane (MSM) 1,000 mg Tab Take 1,000 mg by mouth once daily. Every day      milk thistle 200 mg Cap Take 200 mg by mouth 2 (two) times daily. Twice a day      olmesartan (BENICAR) 20 MG tablet TAKE 1 TABLET EVERY DAY 90 tablet 0    omega-3 fatty acids 1,000 mg Cap Twice a day      pantoprazole (PROTONIX) 40 MG tablet Take 1 tablet (40 mg total) by mouth 2 (two) times daily. 180 tablet 3    potassium chloride SA (K-DUR,KLOR-CON) 20 MEQ tablet Take 1 tablet (20 mEq total) by mouth 3 (three) times daily. Take 4 tablets daily for 5 days, then 3 daily for 2 weeks, then 2 daily thereafter. 270 tablet 3    predniSONE (DELTASONE) 1 MG tablet TAKE 2 TABLETS EVERY  tablet 3    predniSONE (DELTASONE) 5 MG tablet TAKE 1 TABLET EVERY DAY 90 tablet 3    sildenafil (REVATIO) 20 mg Tab Take 1 to 3 tabs daily as needed. 30 tablet 3    sucralfate (CARAFATE) 1 gram tablet Take 1 tablet (1 g total) by mouth 4 (four) times daily before meals and nightly. 360 tablet 1    VENTOLIN HFA 90 mcg/actuation inhaler Inhale 2 puffs into the lungs every 6 (six) hours as needed for Wheezing. Rescue 1 Inhaler 3    [DISCONTINUED] esomeprazole (NEXIUM) 40 MG capsule Take 1 capsule (40 mg total) by mouth before breakfast. 30 capsule 0    [DISCONTINUED] levothyroxine (SYNTHROID) 50 MCG tablet Take 1 tablet (50 mcg total) by mouth once daily. 90 tablet 3    [DISCONTINUED] predniSONE (DELTASONE) 1 MG tablet TAKE  2 TABLETS EVERY  tablet 3    [DISCONTINUED] predniSONE (DELTASONE) 5 MG tablet TAKE 1 TABLET EVERY DAY 90 tablet 3     Current Facility-Administered Medications on File Prior to Visit   Medication Dose Route Frequency Provider Last Rate Last Dose    0.9%  NaCl infusion   Intravenous Continuous Devan Watt MD        lidocaine (PF) 10 mg/ml (1%) injection 10 mg  1 mL Intradermal Once Terry Quach MD                  Physical Exam:  Vitals:    10/09/20 0954   BP: (!) 145/77   Pulse: 65     Physical Exam  Constitutional:       Appearance: Normal appearance.   HENT:      Head: Normocephalic and atraumatic.      Nose: Nose normal. No congestion.      Mouth/Throat:      Mouth: Mucous membranes are moist.      Pharynx: Oropharynx is clear.   Eyes:      Extraocular Movements: Extraocular movements intact.      Pupils: Pupils are equal, round, and reactive to light.   Pulmonary:      Effort: Pulmonary effort is normal. No respiratory distress.   Abdominal:      General: Abdomen is flat. There is no distension.   Skin:     General: Skin is warm and dry.   Neurological:      Mental Status: He is alert and oriented to person, place, and time. Mental status is at baseline.   Psychiatric:         Mood and Affect: Mood normal.         Behavior: Behavior normal.       Right Elbow Exam     Tenderness   The patient is experiencing tenderness in the lateral epicondyle.     Comments:  Positive Cozen's                  Assessment:  Right lateral epicondylitis    Chronic right shoulder pain               PLAN:  Maximilian Tavera JrMaye is a 74 y.o. male with right lateral elbow pain secondary to lateral epicondylitis.  He he is approximately 1 month status post Tenex to the right common extensor tendon on the lateral epicondyle.  At this time, he reports no improvement in symptoms.  I advised him that he can take 4-8 weeks to begin to notice any improvement and healing can occur from 6 months to year thereafter.  At this visit,  I will perform a trigger point injection to the right lateral epicondyle with ropivacaine and dextrose to stimulate tendon healing and remodeling                      Terry Quach D.O.   Physical Medicine and Rehabilitation

## 2020-10-28 ENCOUNTER — PATIENT OUTREACH (OUTPATIENT)
Dept: OTHER | Facility: OTHER | Age: 75
End: 2020-10-28

## 2020-10-28 NOTE — PROGRESS NOTES
"Digital Medicine: Health  Follow-Up    The history is provided by the patient.             Reason for review: Blood pressure not at goal      Additional Follow-up details: Mr. Tavera checked in with me very briefly as he was "on vacation and getting ready to go into a show". He explained that he did not bring his cuff with him, which is why we haven't received any recent readings from him. He plans to return home this weekend.             Diet-Not assessed          Physical Activity-Not assessed    Medication Adherence-Medication Adherence not addressed.      Substance, Sleep, Stress-Not assessed      Additional monitoring needed.       Addressed patient questions and patient has my contact information if needed prior to next outreach.        There are no preventive care reminders to display for this patient.      Last 5 Patient Entered Readings                                      Current 30 Day Average: 138/73     Recent Readings 10/17/2020 10/11/2020 9/30/2020 9/22/2020 9/17/2020    SBP (mmHg) 140 137 137 133 136    DBP (mmHg) 70 79 71 76 74    Pulse 67 59 61 63 63               "

## 2020-11-05 DIAGNOSIS — R06.00 DYSPNEA AND RESPIRATORY ABNORMALITIES: Primary | ICD-10-CM

## 2020-11-05 DIAGNOSIS — R06.89 DYSPNEA AND RESPIRATORY ABNORMALITIES: Primary | ICD-10-CM

## 2020-11-06 ENCOUNTER — OFFICE VISIT (OUTPATIENT)
Dept: FAMILY MEDICINE | Facility: CLINIC | Age: 75
End: 2020-11-06
Payer: MEDICARE

## 2020-11-06 ENCOUNTER — HOSPITAL ENCOUNTER (OUTPATIENT)
Dept: RADIOLOGY | Facility: CLINIC | Age: 75
Discharge: HOME | End: 2020-11-06
Attending: FAMILY MEDICINE
Payer: MEDICARE

## 2020-11-06 ENCOUNTER — LAB VISIT (OUTPATIENT)
Dept: LAB | Facility: HOSPITAL | Age: 75
End: 2020-11-06
Attending: FAMILY MEDICINE
Payer: MEDICARE

## 2020-11-06 VITALS
SYSTOLIC BLOOD PRESSURE: 140 MMHG | HEART RATE: 77 BPM | TEMPERATURE: 97 F | RESPIRATION RATE: 20 BRPM | DIASTOLIC BLOOD PRESSURE: 78 MMHG | OXYGEN SATURATION: 95 % | BODY MASS INDEX: 33.16 KG/M2 | WEIGHT: 251.31 LBS

## 2020-11-06 DIAGNOSIS — R06.00 DYSPNEA AND RESPIRATORY ABNORMALITIES: ICD-10-CM

## 2020-11-06 DIAGNOSIS — N52.1 ERECTILE DISORDER DUE TO MEDICAL CONDITION IN MALE PATIENT: ICD-10-CM

## 2020-11-06 DIAGNOSIS — R06.89 DYSPNEA AND RESPIRATORY ABNORMALITIES: ICD-10-CM

## 2020-11-06 DIAGNOSIS — R60.0 EDEMA OF EXTREMITIES: Primary | ICD-10-CM

## 2020-11-06 DIAGNOSIS — E87.6 HYPOKALEMIA: ICD-10-CM

## 2020-11-06 DIAGNOSIS — I10 HTN (HYPERTENSION), BENIGN: ICD-10-CM

## 2020-11-06 DIAGNOSIS — R06.02 SHORTNESS OF BREATH: ICD-10-CM

## 2020-11-06 LAB
ANION GAP SERPL CALC-SCNC: 9 MMOL/L (ref 8–16)
BNP SERPL-MCNC: 67 PG/ML (ref 0–99)
BUN SERPL-MCNC: 14 MG/DL (ref 8–23)
CALCIUM SERPL-MCNC: 8.8 MG/DL (ref 8.7–10.5)
CHLORIDE SERPL-SCNC: 103 MMOL/L (ref 95–110)
CO2 SERPL-SCNC: 30 MMOL/L (ref 23–29)
CREAT SERPL-MCNC: 1 MG/DL (ref 0.5–1.4)
D DIMER PPP IA.FEU-MCNC: 0.59 MG/L FEU
EST. GFR  (AFRICAN AMERICAN): >60 ML/MIN/1.73 M^2
EST. GFR  (NON AFRICAN AMERICAN): >60 ML/MIN/1.73 M^2
GLUCOSE SERPL-MCNC: 107 MG/DL (ref 70–110)
POTASSIUM SERPL-SCNC: 3.5 MMOL/L (ref 3.5–5.1)
SODIUM SERPL-SCNC: 142 MMOL/L (ref 136–145)

## 2020-11-06 PROCEDURE — 99499 UNLISTED E&M SERVICE: CPT | Mod: S$GLB,,, | Performed by: FAMILY MEDICINE

## 2020-11-06 PROCEDURE — 93010 ELECTROCARDIOGRAM REPORT: CPT | Mod: HCWC,S$GLB,, | Performed by: INTERNAL MEDICINE

## 2020-11-06 PROCEDURE — 99215 OFFICE O/P EST HI 40 MIN: CPT | Mod: HCNC,S$GLB,, | Performed by: FAMILY MEDICINE

## 2020-11-06 PROCEDURE — 3078F PR MOST RECENT DIASTOLIC BLOOD PRESSURE < 80 MM HG: ICD-10-PCS | Mod: HCNC,CPTII,S$GLB, | Performed by: FAMILY MEDICINE

## 2020-11-06 PROCEDURE — 99215 PR OFFICE/OUTPT VISIT, EST, LEVL V, 40-54 MIN: ICD-10-PCS | Mod: HCNC,S$GLB,, | Performed by: FAMILY MEDICINE

## 2020-11-06 PROCEDURE — 71046 X-RAY EXAM CHEST 2 VIEWS: CPT | Mod: TC,HCNC,FY,PO

## 2020-11-06 PROCEDURE — 1126F AMNT PAIN NOTED NONE PRSNT: CPT | Mod: HCNC,S$GLB,, | Performed by: FAMILY MEDICINE

## 2020-11-06 PROCEDURE — 1126F PR PAIN SEVERITY QUANTIFIED, NO PAIN PRESENT: ICD-10-PCS | Mod: HCNC,S$GLB,, | Performed by: FAMILY MEDICINE

## 2020-11-06 PROCEDURE — 3077F SYST BP >= 140 MM HG: CPT | Mod: HCNC,CPTII,S$GLB, | Performed by: FAMILY MEDICINE

## 2020-11-06 PROCEDURE — 99999 PR PBB SHADOW E&M-EST. PATIENT-LVL IV: CPT | Mod: PBBFAC,HCNC,, | Performed by: FAMILY MEDICINE

## 2020-11-06 PROCEDURE — 99999 PR PBB SHADOW E&M-EST. PATIENT-LVL IV: ICD-10-PCS | Mod: PBBFAC,HCNC,, | Performed by: FAMILY MEDICINE

## 2020-11-06 PROCEDURE — 85379 FIBRIN DEGRADATION QUANT: CPT | Mod: HCNC

## 2020-11-06 PROCEDURE — 3078F DIAST BP <80 MM HG: CPT | Mod: HCNC,CPTII,S$GLB, | Performed by: FAMILY MEDICINE

## 2020-11-06 PROCEDURE — 1159F MED LIST DOCD IN RCRD: CPT | Mod: HCNC,S$GLB,, | Performed by: FAMILY MEDICINE

## 2020-11-06 PROCEDURE — 93005 ELECTROCARDIOGRAM TRACING: CPT | Mod: HCNC,S$GLB,, | Performed by: FAMILY MEDICINE

## 2020-11-06 PROCEDURE — 1159F PR MEDICATION LIST DOCUMENTED IN MEDICAL RECORD: ICD-10-PCS | Mod: HCNC,S$GLB,, | Performed by: FAMILY MEDICINE

## 2020-11-06 PROCEDURE — 1101F PR PT FALLS ASSESS DOC 0-1 FALLS W/OUT INJ PAST YR: ICD-10-PCS | Mod: HCNC,CPTII,S$GLB, | Performed by: FAMILY MEDICINE

## 2020-11-06 PROCEDURE — 93005 EKG 12-LEAD: ICD-10-PCS | Mod: HCNC,S$GLB,, | Performed by: FAMILY MEDICINE

## 2020-11-06 PROCEDURE — 93010 EKG 12-LEAD: ICD-10-PCS | Mod: HCWC,S$GLB,, | Performed by: INTERNAL MEDICINE

## 2020-11-06 PROCEDURE — 99499 RISK ADDL DX/OHS AUDIT: ICD-10-PCS | Mod: S$GLB,,, | Performed by: FAMILY MEDICINE

## 2020-11-06 PROCEDURE — 1101F PT FALLS ASSESS-DOCD LE1/YR: CPT | Mod: HCNC,CPTII,S$GLB, | Performed by: FAMILY MEDICINE

## 2020-11-06 PROCEDURE — 36415 COLL VENOUS BLD VENIPUNCTURE: CPT | Mod: HCNC

## 2020-11-06 PROCEDURE — 3077F PR MOST RECENT SYSTOLIC BLOOD PRESSURE >= 140 MM HG: ICD-10-PCS | Mod: HCNC,CPTII,S$GLB, | Performed by: FAMILY MEDICINE

## 2020-11-06 PROCEDURE — 83880 ASSAY OF NATRIURETIC PEPTIDE: CPT | Mod: HCNC

## 2020-11-06 PROCEDURE — 80048 BASIC METABOLIC PNL TOTAL CA: CPT | Mod: HCNC

## 2020-11-06 PROCEDURE — 71046 XR CHEST PA AND LATERAL: ICD-10-PCS | Mod: 26,HCNC,, | Performed by: RADIOLOGY

## 2020-11-06 PROCEDURE — 71046 X-RAY EXAM CHEST 2 VIEWS: CPT | Mod: 26,HCNC,, | Performed by: RADIOLOGY

## 2020-11-06 RX ORDER — POTASSIUM CHLORIDE 20 MEQ/1
TABLET, EXTENDED RELEASE ORAL
Qty: 270 TABLET | Refills: 3 | Status: SHIPPED | OUTPATIENT
Start: 2020-11-06 | End: 2020-12-18

## 2020-11-06 RX ORDER — POTASSIUM CHLORIDE 20 MEQ/1
20 TABLET, EXTENDED RELEASE ORAL 3 TIMES DAILY
Qty: 270 TABLET | Refills: 3 | Status: SHIPPED | OUTPATIENT
Start: 2020-11-06 | End: 2020-11-06

## 2020-11-06 RX ORDER — BUMETANIDE 1 MG/1
TABLET ORAL
Qty: 60 TABLET | Refills: 11 | Status: SHIPPED | OUTPATIENT
Start: 2020-11-06 | End: 2020-12-18

## 2020-11-06 RX ORDER — BUMETANIDE 1 MG/1
TABLET ORAL
Qty: 30 TABLET | Refills: 11 | Status: SHIPPED | OUTPATIENT
Start: 2020-11-06 | End: 2020-11-06

## 2020-11-06 RX ORDER — SILDENAFIL CITRATE 20 MG/1
TABLET ORAL
Qty: 30 TABLET | Refills: 3 | Status: SHIPPED | OUTPATIENT
Start: 2020-11-06

## 2020-11-06 NOTE — PROGRESS NOTES
Subjective:       Patient ID: Maximilian Tavera Jr. is a 74 y.o. male.    Chief Complaint: No chief complaint on file.    Edema  This is a new problem. The current episode started 1 to 4 weeks ago. The problem occurs constantly. The problem has been rapidly worsening. Associated symptoms include arthralgias and fatigue. Pertinent negatives include no abdominal pain, anorexia, change in bowel habit, chest pain, chills, congestion, coughing, fever, headaches, joint swelling, myalgias, nausea or neck pain. The symptoms are aggravated by standing. He has tried nothing for the symptoms. The treatment provided no relief.     Review of Systems   Constitutional: Positive for fatigue. Negative for chills and fever.   HENT: Negative for congestion.    Respiratory: Negative for cough.    Cardiovascular: Negative for chest pain.   Gastrointestinal: Negative for abdominal pain, anorexia, change in bowel habit and nausea.   Musculoskeletal: Positive for arthralgias and back pain. Negative for joint swelling, myalgias and neck pain.   Neurological: Negative for headaches.       Patient Active Problem List   Diagnosis    Hypothyroid    Hyperlipidemia    HTN (hypertension), benign    Neuropathy    ED (erectile dysfunction)    DDD (degenerative disc disease), lumbar    Diplopia    Pseudophakia, right eye    Myasthenia gravis, AChR antibody positive    Umbilical hernia without obstruction and without gangrene    Agatston CAC score, <100    Aortic valve disease    Primary osteoarthritis of both knees    Abdominal aortic atherosclerosis    RBBB    On prednisone therapy    Carpal tunnel syndrome on both sides    Tortuous aorta    Epigastric pain    Cervical stenosis of spinal canal    S/P cervical spinal fusion    BPH with obstruction/lower urinary tract symptoms    PVC's (premature ventricular contractions)    Nonsustained ventricular tachycardia    Current chronic use of systemic steroids    Bilateral  carotid artery stenosis    Lumbar radiculitis    Gastric ulcer    COPD (chronic obstructive pulmonary disease)    A-fib    Lateral epicondylitis    Other spondylosis, lumbar region       Objective:      Physical Exam  Vitals signs reviewed.   Constitutional:       General: He is not in acute distress.     Appearance: He is well-developed. He is obese. He is not diaphoretic.   HENT:      Head: Normocephalic and atraumatic.      Right Ear: External ear normal.      Left Ear: External ear normal.      Nose: Nose normal.      Mouth/Throat:      Pharynx: No oropharyngeal exudate.   Eyes:      General: No scleral icterus.        Right eye: No discharge.         Left eye: No discharge.      Conjunctiva/sclera: Conjunctivae normal.      Pupils: Pupils are equal, round, and reactive to light.   Neck:      Musculoskeletal: Normal range of motion and neck supple.      Thyroid: No thyromegaly.      Vascular: No JVD.      Trachea: No tracheal deviation.   Cardiovascular:      Rate and Rhythm: Normal rate.      Heart sounds: Normal heart sounds. No murmur.   Pulmonary:      Effort: Pulmonary effort is normal. No respiratory distress.      Breath sounds: Normal breath sounds. No wheezing or rales.   Abdominal:      General: Bowel sounds are normal. There is no distension.      Palpations: Abdomen is soft. There is no mass.      Tenderness: There is no abdominal tenderness. There is no guarding or rebound.   Musculoskeletal:         General: No tenderness.      Comments:        Lymphadenopathy:      Cervical: No cervical adenopathy.   Skin:     General: Skin is warm and dry.      Coloration: Skin is not pale.      Findings: No erythema or rash.   Neurological:      Mental Status: He is alert and oriented to person, place, and time.      Cranial Nerves: No cranial nerve deficit.      Coordination: Coordination normal.   Psychiatric:         Behavior: Behavior normal.         Thought Content: Thought content normal.          Judgment: Judgment normal.         Lab Results   Component Value Date    WBC 6.61 04/02/2020    HGB 12.6 (L) 04/02/2020    HCT 39.7 (L) 04/02/2020     (L) 04/02/2020    CHOL 114 (L) 11/21/2019    TRIG 92 11/21/2019    HDL 41 11/21/2019    ALT 20 04/02/2020    AST 22 04/02/2020     11/06/2020    K 3.5 11/06/2020     11/06/2020    CREATININE 1.0 11/06/2020    BUN 14 11/06/2020    CO2 30 (H) 11/06/2020    TSH 1.395 03/20/2019    PSA 3.5 09/19/2019    INR 1.0 10/30/2018    HGBA1C 5.5 11/21/2018     The ASCVD Risk score (Ad DURAN Jr., et al., 2013) failed to calculate for the following reasons:    The valid total cholesterol range is 130 to 320 mg/dL    Assessment:       1. Edema of extremities    2. HTN (hypertension), benign    3. Hypokalemia    4. Erectile disorder due to medical condition in male patient    5. Shortness of breath        Plan:       Edema of extremities  -     Discontinue: bumetanide (BUMEX) 1 MG tablet; 1 tab bid x 3 days then 1 tab daily.  Dispense: 30 tablet; Refill: 11  -     bumetanide (BUMEX) 1 MG tablet; 1 tab bid x 3 days then 1 tab daily thereafter.  Dispense: 60 tablet; Refill: 11    HTN (hypertension), benign  -     Discontinue: bumetanide (BUMEX) 1 MG tablet; 1 tab bid x 3 days then 1 tab daily.  Dispense: 30 tablet; Refill: 11  -     Discontinue: potassium chloride SA (K-DUR,KLOR-CON) 20 MEQ tablet; Take 1 tablet (20 mEq total) by mouth 3 (three) times daily. 2 tabs x 3 a day x 3 days then 2 tabs x 2 a day  Dispense: 270 tablet; Refill: 3  -     potassium chloride SA (K-DUR,KLOR-CON) 20 MEQ tablet; 2 tabs x 3 a day x 3 days then 2 tabs bid  Dispense: 270 tablet; Refill: 3  -     bumetanide (BUMEX) 1 MG tablet; 1 tab bid x 3 days then 1 tab daily thereafter.  Dispense: 60 tablet; Refill: 11    Hypokalemia  -     Discontinue: bumetanide (BUMEX) 1 MG tablet; 1 tab bid x 3 days then 1 tab daily.  Dispense: 30 tablet; Refill: 11  -     Discontinue: potassium chloride SA  (K-DUR,KLOR-CON) 20 MEQ tablet; Take 1 tablet (20 mEq total) by mouth 3 (three) times daily. 2 tabs x 3 a day x 3 days then 2 tabs x 2 a day  Dispense: 270 tablet; Refill: 3  -     potassium chloride SA (K-DUR,KLOR-CON) 20 MEQ tablet; 2 tabs x 3 a day x 3 days then 2 tabs bid  Dispense: 270 tablet; Refill: 3  -     bumetanide (BUMEX) 1 MG tablet; 1 tab bid x 3 days then 1 tab daily thereafter.  Dispense: 60 tablet; Refill: 11    Erectile disorder due to medical condition in male patient  -     sildenafil (REVATIO) 20 mg Tab; Take 1 to 3 tabs daily as needed.  Dispense: 30 tablet; Refill: 3    Shortness of breath  -     CT Chest Without Contrast; Future; Expected date: 11/06/2020      patient declined to have a CT scan done today.  Patient was encouraged to have the CT least tomorrow morning.  Aspirin 325 mg daily with meals.  Patient is risks for DVT and or PE.   This has been fully explained to the patient, who indicates understanding.    Patient readiness: acceptance and barriers:readiness and social stressors    During the course of the visit the patient was educated and counseled about the following:     Hypertension:   Dietary sodium restriction.  Regular aerobic exercise.  Check blood pressures 3 times weekly and record.  Obesity:   General weight loss/lifestyle modification strategies discussed (elicit support from others; identify saboteurs; non-food rewards, etc).  Diet interventions: low calorie (1000 kCal/d) deficit diet and qualitative changes (increase low-fat,  high-fiber foods).  Regular aerobic exercise program discussed.    Goals: Hypertension: Reduce Blood Pressure and Obesity: Reduce calorie intake and BMI    Did patient meet goals/outcomes: Yes    The following self management tools provided: blood pressure log    Patient Instructions (the written plan) was given to the patient/family.     Time spent with patient: 30 minutes    Barriers to medications present (no )    Adverse reactions to current  medications (no)    Over the counter medications reviewed (Yes)        30-35-minute visit. 10 minutes spent counseling patient on diet, exercise, and weight loss.  This has been fully explained to the patient, who indicates understanding.    Discussed health maintenance guidelines appropriate for age.

## 2020-11-06 NOTE — PATIENT INSTRUCTIONS
The patient is asked to make an attempt to improve diet and exercise patterns to aid in medical management of this problem.    Low-Salt Diet  This diet removes foods that are high in salt. It also limits the amount of salt you use when cooking. It is most often used for people with high blood pressure, edema (fluid retention), and kidney, liver, or heart disease.  Table salt contains the mineral sodium. Your body needs sodium to work normally. But too much sodium can make your health problems worse. Your healthcare provider is recommending a low-salt (also called low-sodium) diet for you. Your total daily allowance of salt is 1,500 to 2,300 milligrams (mg). It is less than 1 teaspoon of table salt. This means you can have only about 500 to 700 mg of sodium at each meal. People with certain health problems should limit salt intake to the lower end of the recommended range.    When you cook, dont add much salt. If you can cook without using salt, even better. Dont add salt to your food at the table.  When shopping, read food labels. Salt is often called sodium on the label. Choose foods that are salt-free, low salt, or very low salt. Note that foods with reduced salt may not lower your salt intake enough.    Beans, potatoes, and pasta  Ok: Dry beans, split peas, lentils, potatoes, rice, macaroni, pasta, spaghetti without added salt  Avoid: Potato chips, tortilla chips, and similar products  Breads and cereals  Ok: Low-sodium breads, rolls, cereals, and cakes; low-salt crackers, matzo crackers  Avoid: Salted crackers, pretzels, popcorn, Icelandic toast, pancakes, muffins  Dairy  Ok: Milk, chocolate milk, hot chocolate mix, low-salt cheeses, and yogurt  Avoid: Processed cheese and cheese spreads; Roquefort, Camembert, and cottage cheese; buttermilk, instant breakfast drink  Desserts  Ok: Ice cream, frozen yogurt, juice bars, gelatin, cookies and pies, sugar, honey, jelly, hard candy  Avoid: Most pies, cakes and cookies  prepared or processed with salt; instant pudding  Drinks  Ok: Tea, coffee, fizzy (carbonated) drinks, juices  Avoid: Flavored coffees, electrolyte replacement drinks, sports drinks  Meats  Ok: All fresh meat, fish, poultry, low-salt tuna, eggs, egg substitute  Avoid: Smoked, pickled, brine-cured, or salted meats and fish. This includes vail, chipped beef, corned beef, hot dogs, deli meats, ham, kosher meats, salt pork, sausage, canned tuna, salted codfish, smoked salmon, herring, sardines, or anchovies.  Seasonings and spices  Ok: Most seasonings are okay. Good substitutes for salt include: fresh herb blends, hot sauce, lemon, garlic, trevino, vinegar, dry mustard, parsley, cilantro, horseradish, tomato paste, regular margarine, mayonnaise, unsalted butter, cream cheese, vegetable oil, cream, low-salt salad dressing and gravy.  Avoid: Regular ketchup, relishes, pickles, soy sauce, teriyaki sauce, Worcestershire sauce, BBQ sauce, tartar sauce, meat tenderizer, chili sauce, regular gravy, regular salad dressing, salted butter  Soups  Ok: Low-salt soups and broths made with allowed foods  Avoid: Bouillon cubes, soups with smoked or salted meats, regular soup and broth  Vegetables  Ok: Most vegetables are okay; also low-salt tomato and vegetable juices  Avoid: Sauerkraut and other brine-soaked vegetables; pickles and other pickled vegetables; tomato juice, olives  Date Last Reviewed: 8/1/2016  © 5038-8700 [employer]. [address] [place] [address], [place] [address]. All rights reserved. This information is not intended as a substitute for professional medical care. Always follow your healthcare professional's instructions.

## 2020-11-06 NOTE — PROGRESS NOTES
Pervasive edema and moderate shortness of breath for the last 2 weeks. He he needs just x-ray, electrocardiogram, chemistry, D dimer. Please Please schedule early appointment in a.fady Escamilla

## 2020-11-07 ENCOUNTER — HOSPITAL ENCOUNTER (OUTPATIENT)
Dept: RADIOLOGY | Facility: HOSPITAL | Age: 75
Discharge: HOME | End: 2020-11-07
Attending: FAMILY MEDICINE
Payer: MEDICARE

## 2020-11-07 DIAGNOSIS — R06.02 SHORTNESS OF BREATH: ICD-10-CM

## 2020-11-07 PROCEDURE — 71250 CT THORAX DX C-: CPT | Mod: TC

## 2020-11-09 ENCOUNTER — OFFICE VISIT (OUTPATIENT)
Dept: PHYSICAL MEDICINE AND REHAB | Facility: CLINIC | Age: 75
End: 2020-11-09
Payer: MEDICARE

## 2020-11-09 VITALS
SYSTOLIC BLOOD PRESSURE: 137 MMHG | DIASTOLIC BLOOD PRESSURE: 73 MMHG | WEIGHT: 251 LBS | HEART RATE: 63 BPM | BODY MASS INDEX: 33.27 KG/M2 | HEIGHT: 73 IN

## 2020-11-09 DIAGNOSIS — M62.838 MUSCLE SPASM: ICD-10-CM

## 2020-11-09 DIAGNOSIS — M25.521 RIGHT ELBOW PAIN: ICD-10-CM

## 2020-11-09 DIAGNOSIS — Z74.09 IMPAIRED FUNCTIONAL MOBILITY AND ACTIVITY TOLERANCE: ICD-10-CM

## 2020-11-09 DIAGNOSIS — M77.11 RIGHT LATERAL EPICONDYLITIS: Primary | ICD-10-CM

## 2020-11-09 PROCEDURE — 1159F PR MEDICATION LIST DOCUMENTED IN MEDICAL RECORD: ICD-10-PCS | Mod: HCNC,S$GLB,, | Performed by: PHYSICAL MEDICINE & REHABILITATION

## 2020-11-09 PROCEDURE — 1101F PT FALLS ASSESS-DOCD LE1/YR: CPT | Mod: HCNC,CPTII,S$GLB, | Performed by: PHYSICAL MEDICINE & REHABILITATION

## 2020-11-09 PROCEDURE — 1125F PR PAIN SEVERITY QUANTIFIED, PAIN PRESENT: ICD-10-PCS | Mod: HCNC,S$GLB,, | Performed by: PHYSICAL MEDICINE & REHABILITATION

## 2020-11-09 PROCEDURE — 20552 NJX 1/MLT TRIGGER POINT 1/2: CPT | Mod: HCNC,S$GLB,, | Performed by: PHYSICAL MEDICINE & REHABILITATION

## 2020-11-09 PROCEDURE — 1125F AMNT PAIN NOTED PAIN PRSNT: CPT | Mod: HCNC,S$GLB,, | Performed by: PHYSICAL MEDICINE & REHABILITATION

## 2020-11-09 PROCEDURE — 1101F PR PT FALLS ASSESS DOC 0-1 FALLS W/OUT INJ PAST YR: ICD-10-PCS | Mod: HCNC,CPTII,S$GLB, | Performed by: PHYSICAL MEDICINE & REHABILITATION

## 2020-11-09 PROCEDURE — 99213 PR OFFICE/OUTPT VISIT, EST, LEVL III, 20-29 MIN: ICD-10-PCS | Mod: HCNC,25,S$GLB, | Performed by: PHYSICAL MEDICINE & REHABILITATION

## 2020-11-09 PROCEDURE — 99999 PR PBB SHADOW E&M-EST. PATIENT-LVL III: CPT | Mod: PBBFAC,HCNC,, | Performed by: PHYSICAL MEDICINE & REHABILITATION

## 2020-11-09 PROCEDURE — 3075F PR MOST RECENT SYSTOLIC BLOOD PRESS GE 130-139MM HG: ICD-10-PCS | Mod: HCNC,CPTII,S$GLB, | Performed by: PHYSICAL MEDICINE & REHABILITATION

## 2020-11-09 PROCEDURE — 99999 PR PBB SHADOW E&M-EST. PATIENT-LVL III: ICD-10-PCS | Mod: PBBFAC,HCNC,, | Performed by: PHYSICAL MEDICINE & REHABILITATION

## 2020-11-09 PROCEDURE — 3075F SYST BP GE 130 - 139MM HG: CPT | Mod: HCNC,CPTII,S$GLB, | Performed by: PHYSICAL MEDICINE & REHABILITATION

## 2020-11-09 PROCEDURE — 3078F PR MOST RECENT DIASTOLIC BLOOD PRESSURE < 80 MM HG: ICD-10-PCS | Mod: HCNC,CPTII,S$GLB, | Performed by: PHYSICAL MEDICINE & REHABILITATION

## 2020-11-09 PROCEDURE — 3078F DIAST BP <80 MM HG: CPT | Mod: HCNC,CPTII,S$GLB, | Performed by: PHYSICAL MEDICINE & REHABILITATION

## 2020-11-09 PROCEDURE — 99213 OFFICE O/P EST LOW 20 MIN: CPT | Mod: HCNC,25,S$GLB, | Performed by: PHYSICAL MEDICINE & REHABILITATION

## 2020-11-09 PROCEDURE — 1159F MED LIST DOCD IN RCRD: CPT | Mod: HCNC,S$GLB,, | Performed by: PHYSICAL MEDICINE & REHABILITATION

## 2020-11-09 PROCEDURE — 20552 PR INJECT TRIGGER POINT, 1 OR 2: ICD-10-PCS | Mod: HCNC,S$GLB,, | Performed by: PHYSICAL MEDICINE & REHABILITATION

## 2020-11-09 NOTE — PROCEDURES
Procedures     Procedures      Procedure: Right common extensor tendon of the elbow trigger point injection     Procedure indications. Right lateral epicondylitis. Muscle spasm of elbow     Procedure in detail: Risks and benefits were discussed and written informed consent was obtained.  The patient was placed in the seated position with the right forearm bent to 90° and pronated.  Using a linear transducer, the common extensor tendon on the lateral epicondyle was evaluated.  Distally, skin was cleaned with ChloraPrep.  A 25 gauge 1.5 in needle was advanced into the common extensor tendon where 3 cc of a solution of 3 cc of 0.50% ropivacaine was cc of 50% dextrose (12.5% dextrose) was injected into the tendon at multiple areas from superficial to deep and lateral to medial.  Needle was removed and Band-Aid was applied.  The patient tolerated the procedure well with no adverse events.  He did notice improvement in symptoms following the procedure

## 2020-11-09 NOTE — PROGRESS NOTES
Chief Complaint   Patient presents with    Follow-up     1 michael follow up         HPI: Maximilian Tavera Jr. is a  74 y.o. male who presents today for followup. he was last seen in clinic approximately 1 month ago at which time he was 1 month status post Tenex to the right common extensor tendon of the elbow.  At that visit, performed a trigger point injection to the common extensor tendon with 12.5% dextrose in ropivacaine 0.50%.  At this visit, he appreciates approximately 50% reduction in pain and improvement in function.  He continues to complain of pain worse 1st thing in the morning and with heavy lifting.  Pain on average is a 4/10.        Review of Systems   Musculoskeletal: Positive for joint pain and myalgias.   All other systems reviewed and are negative.           Past Medical History:   Diagnosis Date    A-fib 3/31/2020    Arthritis     Back pain     Carpal tunnel syndrome 2/25/2013    Cataract     Cataract, left eye 11/10/2014    Chest pain, musculoskeletal     COPD (chronic obstructive pulmonary disease) 11/052018    Emphysema lung 11/05/2018    Gastritis     Hx of colonic polyp     Hyperlipidemia     Hypertension     Hypothyroidism     Knee fracture     Myasthenia gravis     Neuropathy 1/3/2013    Obesity 1/29/2015    Pneumonia 3/29/2020    Polyneuropathy     PVC (premature ventricular contraction)     Squamous cell carcinoma 2014    left forearm    Thyroid disease           Current Outpatient Medications on File Prior to Visit   Medication Sig Dispense Refill    atorvastatin (LIPITOR) 80 MG tablet TAKE 1 TABLET EVERY DAY 90 tablet 3    bumetanide (BUMEX) 1 MG tablet 1 tab bid x 3 days then 1 tab daily thereafter. 60 tablet 11    carvediloL (COREG) 25 MG tablet TAKE 1 TABLET TWICE DAILY WITH MEALS 180 tablet 3    cetirizine (ZYRTEC) 10 MG tablet Take 1 tablet by mouth daily as needed.      cholecalciferol, vitamin D3, (VITAMIN D) 2,000 unit Cap 1 capsule once daily.  Every day      coenzyme Q10 (CO Q-10) 100 mg capsule Take 400 mg by mouth once daily.       cyanocobalamin, vitamin B-12, (VITAMIN B-12) 5,000 mcg Subl Take 5,000 mcg by mouth once daily. Every day      ezetimibe (ZETIA) 10 mg tablet TAKE 1 TABLET (10 MG TOTAL) BY MOUTH ONCE DAILY. 90 tablet 3    fluticasone (FLONASE) 50 mcg/actuation nasal spray USE 1 SPRAY IN EACH NOSTRIL TWICE DAILY AS NEEDED  FOR  RHINITIS 48 g 3    gabapentin (NEURONTIN) 300 MG capsule TAKE 1 CAPSULE THREE TIMES DAILY 270 capsule 5    levothyroxine (SYNTHROID) 50 MCG tablet TAKE 1 TABLET EVERY DAY 90 tablet 3    methylsulfonylmethane (MSM) 1,000 mg Tab Take 1,000 mg by mouth once daily. Every day      milk thistle 200 mg Cap Take 200 mg by mouth 2 (two) times daily. Twice a day      olmesartan (BENICAR) 20 MG tablet TAKE 1 TABLET EVERY DAY 90 tablet 0    omega-3 fatty acids 1,000 mg Cap Twice a day      pantoprazole (PROTONIX) 40 MG tablet Take 1 tablet (40 mg total) by mouth 2 (two) times daily. 180 tablet 3    potassium chloride SA (K-DUR,KLOR-CON) 20 MEQ tablet 2 tabs x 3 a day x 3 days then 2 tabs bid 270 tablet 3    predniSONE (DELTASONE) 1 MG tablet TAKE 2 TABLETS EVERY  tablet 3    predniSONE (DELTASONE) 5 MG tablet TAKE 1 TABLET EVERY DAY 90 tablet 3    sildenafil (REVATIO) 20 mg Tab Take 1 to 3 tabs daily as needed. 30 tablet 3    VENTOLIN HFA 90 mcg/actuation inhaler Inhale 2 puffs into the lungs every 6 (six) hours as needed for Wheezing. Rescue 1 Inhaler 3    [DISCONTINUED] esomeprazole (NEXIUM) 40 MG capsule Take 1 capsule (40 mg total) by mouth before breakfast. 30 capsule 0    [DISCONTINUED] levothyroxine (SYNTHROID) 50 MCG tablet Take 1 tablet (50 mcg total) by mouth once daily. 90 tablet 3    [DISCONTINUED] predniSONE (DELTASONE) 1 MG tablet TAKE 2 TABLETS EVERY  tablet 3    [DISCONTINUED] predniSONE (DELTASONE) 5 MG tablet TAKE 1 TABLET EVERY DAY 90 tablet 3     Current Facility-Administered  Medications on File Prior to Visit   Medication Dose Route Frequency Provider Last Rate Last Dose    0.9%  NaCl infusion   Intravenous Continuous Devan Watt MD        lidocaine (PF) 10 mg/ml (1%) injection 10 mg  1 mL Intradermal Once Terry Quach MD                  Physical Exam:  Vitals:    11/09/20 1103   BP: 137/73   Pulse: 63     Physical Exam  Constitutional:       Appearance: Normal appearance. He is normal weight.   HENT:      Head: Normocephalic and atraumatic.      Nose: Nose normal. No congestion.      Mouth/Throat:      Mouth: Mucous membranes are moist.      Pharynx: Oropharynx is clear.   Eyes:      Extraocular Movements: Extraocular movements intact.      Conjunctiva/sclera: Conjunctivae normal.      Pupils: Pupils are equal, round, and reactive to light.   Pulmonary:      Effort: Pulmonary effort is normal. No respiratory distress.   Abdominal:      General: Abdomen is flat. There is no distension.   Skin:     General: Skin is warm and dry.   Neurological:      General: No focal deficit present.      Mental Status: He is alert and oriented to person, place, and time.   Psychiatric:         Mood and Affect: Mood normal.         Behavior: Behavior normal.       Right Elbow Exam     Tenderness   The patient is experiencing tenderness in the lateral epicondyle.                   Assessment:  Right lateral epicondylitis    Right elbow pain    Muscle spasm    Impaired functional mobility and activity tolerance               PLAN:  Maximilian Tavera Jr. is a 74 y.o. 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  He is approximately 2 months status post ultrasound-guided percutaneous tenotomy to the common extensor tendon of the elbow.  He is approximately 1 month status post trigger point injection using a 12.5% dextrose in a ropivacaine solution.  At this visit, he appreciates 50% reduction in pain and  improvement in function.  At this time I will repeat trigger point injection to the right common extensor tendon to stimulate scar tissue formation.  He can continue with activity as tolerated.                  Terry Quach D.O.

## 2020-11-10 ENCOUNTER — PATIENT MESSAGE (OUTPATIENT)
Dept: CARDIOLOGY | Facility: CLINIC | Age: 75
End: 2020-11-10

## 2020-11-17 ENCOUNTER — TELEPHONE (OUTPATIENT)
Dept: CARDIOLOGY | Facility: CLINIC | Age: 75
End: 2020-11-17

## 2020-12-09 ENCOUNTER — PATIENT OUTREACH (OUTPATIENT)
Dept: OTHER | Facility: OTHER | Age: 75
End: 2020-12-09

## 2020-12-18 ENCOUNTER — HOSPITAL ENCOUNTER (OUTPATIENT)
Dept: RADIOLOGY | Facility: CLINIC | Age: 75
Discharge: HOME | End: 2020-12-18
Attending: PHYSICIAN ASSISTANT
Payer: MEDICARE

## 2020-12-18 ENCOUNTER — TELEPHONE (OUTPATIENT)
Dept: NEUROSURGERY | Facility: CLINIC | Age: 75
End: 2020-12-18

## 2020-12-18 ENCOUNTER — OFFICE VISIT (OUTPATIENT)
Dept: FAMILY MEDICINE | Facility: CLINIC | Age: 75
End: 2020-12-18
Payer: MEDICARE

## 2020-12-18 VITALS
TEMPERATURE: 97 F | HEART RATE: 60 BPM | RESPIRATION RATE: 16 BRPM | DIASTOLIC BLOOD PRESSURE: 64 MMHG | WEIGHT: 252.19 LBS | HEIGHT: 73 IN | OXYGEN SATURATION: 95 % | SYSTOLIC BLOOD PRESSURE: 122 MMHG | BODY MASS INDEX: 33.42 KG/M2

## 2020-12-18 DIAGNOSIS — G70.00 MYASTHENIA GRAVIS, ACHR ANTIBODY POSITIVE: ICD-10-CM

## 2020-12-18 DIAGNOSIS — D64.9 ANEMIA, UNSPECIFIED TYPE: ICD-10-CM

## 2020-12-18 DIAGNOSIS — E87.6 HYPOKALEMIA: ICD-10-CM

## 2020-12-18 DIAGNOSIS — G89.29 CHRONIC RIGHT-SIDED LOW BACK PAIN WITH RIGHT-SIDED SCIATICA: Primary | ICD-10-CM

## 2020-12-18 DIAGNOSIS — M54.41 CHRONIC RIGHT-SIDED LOW BACK PAIN WITH RIGHT-SIDED SCIATICA: Primary | ICD-10-CM

## 2020-12-18 DIAGNOSIS — M54.9 DORSALGIA, UNSPECIFIED: ICD-10-CM

## 2020-12-18 DIAGNOSIS — R79.89 POSITIVE D DIMER: ICD-10-CM

## 2020-12-18 DIAGNOSIS — I10 HTN (HYPERTENSION), BENIGN: ICD-10-CM

## 2020-12-18 DIAGNOSIS — M79.18 RIGHT BUTTOCK PAIN: ICD-10-CM

## 2020-12-18 PROCEDURE — 1101F PR PT FALLS ASSESS DOC 0-1 FALLS W/OUT INJ PAST YR: ICD-10-PCS | Mod: HCNC,CPTII,S$GLB, | Performed by: PHYSICIAN ASSISTANT

## 2020-12-18 PROCEDURE — 72110 XR LUMBAR SPINE COMPLETE 5 VIEW: ICD-10-PCS | Mod: 26,HCNC,, | Performed by: RADIOLOGY

## 2020-12-18 PROCEDURE — 72220 X-RAY EXAM SACRUM TAILBONE: CPT | Mod: 26,HCNC,, | Performed by: RADIOLOGY

## 2020-12-18 PROCEDURE — 3078F DIAST BP <80 MM HG: CPT | Mod: HCNC,CPTII,S$GLB, | Performed by: PHYSICIAN ASSISTANT

## 2020-12-18 PROCEDURE — 99999 PR PBB SHADOW E&M-EST. PATIENT-LVL V: CPT | Mod: PBBFAC,HCNC,, | Performed by: PHYSICIAN ASSISTANT

## 2020-12-18 PROCEDURE — 72220 XR SACRUM AND COCCYX: ICD-10-PCS | Mod: 26,HCNC,, | Performed by: RADIOLOGY

## 2020-12-18 PROCEDURE — 72220 X-RAY EXAM SACRUM TAILBONE: CPT | Mod: TC,HCNC,FY,PO

## 2020-12-18 PROCEDURE — 1159F MED LIST DOCD IN RCRD: CPT | Mod: HCNC,S$GLB,, | Performed by: PHYSICIAN ASSISTANT

## 2020-12-18 PROCEDURE — 99999 PR PBB SHADOW E&M-EST. PATIENT-LVL V: ICD-10-PCS | Mod: PBBFAC,HCNC,, | Performed by: PHYSICIAN ASSISTANT

## 2020-12-18 PROCEDURE — 1125F PR PAIN SEVERITY QUANTIFIED, PAIN PRESENT: ICD-10-PCS | Mod: HCNC,S$GLB,, | Performed by: PHYSICIAN ASSISTANT

## 2020-12-18 PROCEDURE — 1157F ADVNC CARE PLAN IN RCRD: CPT | Mod: HCNC,S$GLB,, | Performed by: PHYSICIAN ASSISTANT

## 2020-12-18 PROCEDURE — 3074F PR MOST RECENT SYSTOLIC BLOOD PRESSURE < 130 MM HG: ICD-10-PCS | Mod: HCNC,CPTII,S$GLB, | Performed by: PHYSICIAN ASSISTANT

## 2020-12-18 PROCEDURE — 3078F PR MOST RECENT DIASTOLIC BLOOD PRESSURE < 80 MM HG: ICD-10-PCS | Mod: HCNC,CPTII,S$GLB, | Performed by: PHYSICIAN ASSISTANT

## 2020-12-18 PROCEDURE — 3074F SYST BP LT 130 MM HG: CPT | Mod: HCNC,CPTII,S$GLB, | Performed by: PHYSICIAN ASSISTANT

## 2020-12-18 PROCEDURE — 3288F PR FALLS RISK ASSESSMENT DOCUMENTED: ICD-10-PCS | Mod: HCNC,CPTII,S$GLB, | Performed by: PHYSICIAN ASSISTANT

## 2020-12-18 PROCEDURE — 99214 PR OFFICE/OUTPT VISIT, EST, LEVL IV, 30-39 MIN: ICD-10-PCS | Mod: HCNC,S$GLB,, | Performed by: PHYSICIAN ASSISTANT

## 2020-12-18 PROCEDURE — 72110 X-RAY EXAM L-2 SPINE 4/>VWS: CPT | Mod: TC,HCNC,FY,PO

## 2020-12-18 PROCEDURE — 72110 X-RAY EXAM L-2 SPINE 4/>VWS: CPT | Mod: 26,HCNC,, | Performed by: RADIOLOGY

## 2020-12-18 PROCEDURE — 1159F PR MEDICATION LIST DOCUMENTED IN MEDICAL RECORD: ICD-10-PCS | Mod: HCNC,S$GLB,, | Performed by: PHYSICIAN ASSISTANT

## 2020-12-18 PROCEDURE — 1125F AMNT PAIN NOTED PAIN PRSNT: CPT | Mod: HCNC,S$GLB,, | Performed by: PHYSICIAN ASSISTANT

## 2020-12-18 PROCEDURE — 99214 OFFICE O/P EST MOD 30 MIN: CPT | Mod: HCNC,S$GLB,, | Performed by: PHYSICIAN ASSISTANT

## 2020-12-18 PROCEDURE — 1101F PT FALLS ASSESS-DOCD LE1/YR: CPT | Mod: HCNC,CPTII,S$GLB, | Performed by: PHYSICIAN ASSISTANT

## 2020-12-18 PROCEDURE — 1157F PR ADVANCE CARE PLAN OR EQUIV PRESENT IN MEDICAL RECORD: ICD-10-PCS | Mod: HCNC,S$GLB,, | Performed by: PHYSICIAN ASSISTANT

## 2020-12-18 PROCEDURE — 3288F FALL RISK ASSESSMENT DOCD: CPT | Mod: HCNC,CPTII,S$GLB, | Performed by: PHYSICIAN ASSISTANT

## 2020-12-18 RX ORDER — CHLORTHALIDONE 25 MG/1
25 TABLET ORAL DAILY
COMMUNITY
End: 2020-12-23 | Stop reason: SDUPTHER

## 2020-12-18 RX ORDER — POTASSIUM CHLORIDE 20 MEQ/1
40 TABLET, EXTENDED RELEASE ORAL 2 TIMES DAILY
Qty: 270 TABLET | Refills: 3
Start: 2020-12-18 | End: 2021-05-27

## 2020-12-18 NOTE — PROGRESS NOTES
Subjective:       Patient ID: Maximilian Tavera Jr. is a 75 y.o. male.    Chief Complaint: Follow-up (6 week f/u )    Mr. Tavera is a 75 year old male with MG, HTN, and hyperlipidemia. The patient was seen by Dr. Marroquin 6 weeks ago. His diuretic was changed to bumex. The patient reports he did not tolerate this medication well and returned to taking his chlorthalidone. The patient reports his swelling in his legs has improved. He reports his shortness of breath has also improved. He is currently taking chlorthalidone and potassium supplement. The patient complains of right sided back pain and right buttock pain. This pain also radiates down his leg. The patient has been seeing pain management, Dr. Watt. The patient is interested in seeing Dr. Cedeño and a neurosurgeon for back pain. The patient did have a positive d dimer at his last office visit. The patient had a CT of his lungs but a CTA was not completed. The patient continues to follow up Dr. Altamirano, cardiologist.     Review of patient's allergies indicates:   Allergen Reactions    No known drug allergies          Current Outpatient Medications:     atorvastatin (LIPITOR) 80 MG tablet, TAKE 1 TABLET EVERY DAY, Disp: 90 tablet, Rfl: 3    carvediloL (COREG) 25 MG tablet, TAKE 1 TABLET TWICE DAILY WITH MEALS, Disp: 180 tablet, Rfl: 3    cetirizine (ZYRTEC) 10 MG tablet, Take 1 tablet by mouth daily as needed., Disp: , Rfl:     cholecalciferol, vitamin D3, (VITAMIN D) 2,000 unit Cap, 1 capsule once daily. Every day, Disp: , Rfl:     coenzyme Q10 (CO Q-10) 100 mg capsule, Take 400 mg by mouth once daily. , Disp: , Rfl:     cyanocobalamin, vitamin B-12, (VITAMIN B-12) 5,000 mcg Subl, Take 5,000 mcg by mouth once daily. Every day, Disp: , Rfl:     ezetimibe (ZETIA) 10 mg tablet, TAKE 1 TABLET (10 MG TOTAL) BY MOUTH ONCE DAILY., Disp: 90 tablet, Rfl: 3    fluticasone (FLONASE) 50 mcg/actuation nasal spray, USE 1 SPRAY IN EACH NOSTRIL TWICE DAILY AS NEEDED   FOR  RHINITIS, Disp: 48 g, Rfl: 3    gabapentin (NEURONTIN) 300 MG capsule, TAKE 1 CAPSULE THREE TIMES DAILY, Disp: 270 capsule, Rfl: 5    levothyroxine (SYNTHROID) 50 MCG tablet, TAKE 1 TABLET EVERY DAY, Disp: 90 tablet, Rfl: 3    methylsulfonylmethane (MSM) 1,000 mg Tab, Take 1,000 mg by mouth once daily. Every day, Disp: , Rfl:     milk thistle 200 mg Cap, Take 200 mg by mouth 2 (two) times daily. Twice a day, Disp: , Rfl:     olmesartan (BENICAR) 20 MG tablet, TAKE 1 TABLET EVERY DAY, Disp: 90 tablet, Rfl: 0    omega-3 fatty acids 1,000 mg Cap, Twice a day, Disp: , Rfl:     potassium chloride SA (K-DUR,KLOR-CON) 20 MEQ tablet, 2 tabs x 3 a day x 3 days then 2 tabs bid, Disp: 270 tablet, Rfl: 3    predniSONE (DELTASONE) 1 MG tablet, TAKE 2 TABLETS EVERY DAY, Disp: 180 tablet, Rfl: 3    predniSONE (DELTASONE) 5 MG tablet, TAKE 1 TABLET EVERY DAY, Disp: 90 tablet, Rfl: 3    sildenafil (REVATIO) 20 mg Tab, Take 1 to 3 tabs daily as needed., Disp: 30 tablet, Rfl: 3    VENTOLIN HFA 90 mcg/actuation inhaler, Inhale 2 puffs into the lungs every 6 (six) hours as needed for Wheezing. Rescue, Disp: 1 Inhaler, Rfl: 3    bumetanide (BUMEX) 1 MG tablet, 1 tab bid x 3 days then 1 tab daily thereafter., Disp: 60 tablet, Rfl: 11    pantoprazole (PROTONIX) 40 MG tablet, Take 1 tablet (40 mg total) by mouth 2 (two) times daily., Disp: 180 tablet, Rfl: 3  No current facility-administered medications for this visit.     Facility-Administered Medications Ordered in Other Visits:     0.9%  NaCl infusion, , Intravenous, Continuous, Devan Watt MD    lidocaine (PF) 10 mg/ml (1%) injection 10 mg, 1 mL, Intradermal, Once, Terry Quach MD    Lab Results   Component Value Date    WBC 6.61 04/02/2020    HGB 12.6 (L) 04/02/2020    HCT 39.7 (L) 04/02/2020     (L) 04/02/2020    CHOL 114 (L) 11/21/2019    TRIG 92 11/21/2019    HDL 41 11/21/2019    ALT 20 04/02/2020    AST 22 04/02/2020     11/06/2020    K 3.5  11/06/2020     11/06/2020    CREATININE 1.0 11/06/2020    BUN 14 11/06/2020    CO2 30 (H) 11/06/2020    TSH 1.395 03/20/2019    PSA 3.5 09/19/2019    INR 1.0 10/30/2018    HGBA1C 5.5 11/21/2018       Review of Systems   Constitutional: Negative for activity change, appetite change and fever.   HENT: Negative for postnasal drip, rhinorrhea and sinus pressure.    Eyes: Negative for visual disturbance.   Respiratory: Negative for cough and shortness of breath.    Cardiovascular: Negative for chest pain.   Gastrointestinal: Negative for abdominal distention and abdominal pain.   Genitourinary: Negative for difficulty urinating and dysuria.   Musculoskeletal: Negative for arthralgias and myalgias.   Neurological: Negative for headaches.   Hematological: Negative for adenopathy.   Psychiatric/Behavioral: The patient is not nervous/anxious.        Objective:      Physical Exam  Constitutional:       Appearance: Normal appearance.   HENT:      Head: Normocephalic and atraumatic.   Cardiovascular:      Rate and Rhythm: Normal rate and regular rhythm.      Heart sounds: Normal heart sounds.   Pulmonary:      Effort: Pulmonary effort is normal.      Breath sounds: Normal breath sounds. No wheezing.   Abdominal:      General: Bowel sounds are normal.      Palpations: Abdomen is soft.      Tenderness: There is no abdominal tenderness.   Lymphadenopathy:      Cervical: No cervical adenopathy.   Skin:     Findings: No erythema.   Neurological:      Mental Status: He is alert and oriented to person, place, and time.   Psychiatric:         Behavior: Behavior normal.         Assessment:       1. Chronic right-sided low back pain with right-sided sciatica    2. HTN (hypertension), benign    3. Hypokalemia    4. Right buttock pain    5. Dorsalgia, unspecified    6. Myasthenia gravis, AChR antibody positive    7. Anemia, unspecified type    8. Positive D dimer        Plan:       Maximilian was seen today for follow-up.    Diagnoses  and all orders for this visit:    Chronic right-sided low back pain with right-sided sciatica  -     Ambulatory referral/consult to Orthopedics; Future  -     Ambulatory referral/consult to Neurosurgery; Future    HTN (hypertension), benign  -     potassium chloride SA (K-DUR,KLOR-CON) 20 MEQ tablet; Take 2 tablets (40 mEq total) by mouth 2 (two) times daily. 2 tabs x 3 a day x 3 days then 2 tabs bid  -     Comprehensive Metabolic Panel; Future  -     CBC Auto Differential; Future  -     Lipid Panel; Future  Controlled  Low salt diet  Continue current medication  Hypokalemia  -     potassium chloride SA (K-DUR,KLOR-CON) 20 MEQ tablet; Take 2 tablets (40 mEq total) by mouth 2 (two) times daily. 2 tabs x 3 a day x 3 days then 2 tabs bid    Right buttock pain  Imaging  Dorsalgia, unspecified  -     X-Ray Lumbar Spine Complete 5 View; Future  -     X-Ray Sacrum And Coccyx; Future  -     Ambulatory referral/consult to Orthopedics; Future  -     Ambulatory referral/consult to Neurosurgery; Future    Myasthenia gravis, AChR antibody positive  Stable on current therapy  Anemia, unspecified type  -     CBC Auto Differential; Future    Positive D dimer  -     D dimer, quantitative; Future  Recheck level today.

## 2020-12-18 NOTE — TELEPHONE ENCOUNTER
----- Message from Analia Anmol sent at 12/18/2020  2:53 PM CST -----  Pt was seen today by KEYA lee and needs an appt for Chronic right-sided low back pain with right-sided sciatica [M54.41, G89.29]  Dorsalgia, unspecified   Please call him @ [phone]  Thanks !  Please do NOT respond directly back to me, any questions, reply to staff box since this inbox is not routinely monitored

## 2020-12-20 RX ORDER — DIPHENOXYLATE HYDROCHLORIDE AND ATROPINE SULFATE 2.5; .025 MG/1; MG/1
1 TABLET ORAL 4 TIMES DAILY PRN
Qty: 90 TABLET | Refills: 1 | Status: SHIPPED | OUTPATIENT
Start: 2020-12-20 | End: 2020-12-30

## 2020-12-23 ENCOUNTER — PATIENT MESSAGE (OUTPATIENT)
Dept: CARDIOLOGY | Facility: CLINIC | Age: 75
End: 2020-12-23

## 2020-12-23 RX ORDER — CHLORTHALIDONE 25 MG/1
25 TABLET ORAL DAILY
Qty: 90 TABLET | Refills: 3 | Status: SHIPPED | OUTPATIENT
Start: 2020-12-23 | End: 2021-07-19 | Stop reason: SDUPTHER

## 2020-12-28 DIAGNOSIS — I10 HTN (HYPERTENSION), BENIGN: ICD-10-CM

## 2020-12-28 RX ORDER — OLMESARTAN MEDOXOMIL 20 MG/1
TABLET ORAL
Qty: 90 TABLET | Refills: 3 | Status: SHIPPED | OUTPATIENT
Start: 2020-12-28 | End: 2021-09-30

## 2021-01-06 ENCOUNTER — OFFICE VISIT (OUTPATIENT)
Dept: NEUROSURGERY | Facility: CLINIC | Age: 76
End: 2021-01-06
Payer: MEDICARE

## 2021-01-06 VITALS
BODY MASS INDEX: 33.27 KG/M2 | DIASTOLIC BLOOD PRESSURE: 80 MMHG | SYSTOLIC BLOOD PRESSURE: 140 MMHG | HEART RATE: 69 BPM | RESPIRATION RATE: 18 BRPM | WEIGHT: 252.19 LBS

## 2021-01-06 DIAGNOSIS — M54.41 CHRONIC RIGHT-SIDED LOW BACK PAIN WITH RIGHT-SIDED SCIATICA: ICD-10-CM

## 2021-01-06 DIAGNOSIS — M54.50 CHRONIC LOW BACK PAIN, UNSPECIFIED BACK PAIN LATERALITY, UNSPECIFIED WHETHER SCIATICA PRESENT: Primary | ICD-10-CM

## 2021-01-06 DIAGNOSIS — G89.29 CHRONIC LOW BACK PAIN, UNSPECIFIED BACK PAIN LATERALITY, UNSPECIFIED WHETHER SCIATICA PRESENT: Primary | ICD-10-CM

## 2021-01-06 DIAGNOSIS — R29.818 NEUROGENIC CLAUDICATION: ICD-10-CM

## 2021-01-06 DIAGNOSIS — G89.29 CHRONIC RIGHT-SIDED LOW BACK PAIN WITH RIGHT-SIDED SCIATICA: ICD-10-CM

## 2021-01-06 PROCEDURE — 1101F PT FALLS ASSESS-DOCD LE1/YR: CPT | Mod: HCNC,CPTII,S$GLB, | Performed by: PHYSICIAN ASSISTANT

## 2021-01-06 PROCEDURE — 1159F MED LIST DOCD IN RCRD: CPT | Mod: HCNC,S$GLB,, | Performed by: PHYSICIAN ASSISTANT

## 2021-01-06 PROCEDURE — 99999 PR PBB SHADOW E&M-EST. PATIENT-LVL V: CPT | Mod: PBBFAC,HCNC,, | Performed by: PHYSICIAN ASSISTANT

## 2021-01-06 PROCEDURE — 99214 OFFICE O/P EST MOD 30 MIN: CPT | Mod: HCNC,S$GLB,, | Performed by: PHYSICIAN ASSISTANT

## 2021-01-06 PROCEDURE — 3077F PR MOST RECENT SYSTOLIC BLOOD PRESSURE >= 140 MM HG: ICD-10-PCS | Mod: HCNC,CPTII,S$GLB, | Performed by: PHYSICIAN ASSISTANT

## 2021-01-06 PROCEDURE — 3288F FALL RISK ASSESSMENT DOCD: CPT | Mod: HCNC,CPTII,S$GLB, | Performed by: PHYSICIAN ASSISTANT

## 2021-01-06 PROCEDURE — 1125F AMNT PAIN NOTED PAIN PRSNT: CPT | Mod: HCNC,S$GLB,, | Performed by: PHYSICIAN ASSISTANT

## 2021-01-06 PROCEDURE — 3288F PR FALLS RISK ASSESSMENT DOCUMENTED: ICD-10-PCS | Mod: HCNC,CPTII,S$GLB, | Performed by: PHYSICIAN ASSISTANT

## 2021-01-06 PROCEDURE — 3079F DIAST BP 80-89 MM HG: CPT | Mod: HCNC,CPTII,S$GLB, | Performed by: PHYSICIAN ASSISTANT

## 2021-01-06 PROCEDURE — 1125F PR PAIN SEVERITY QUANTIFIED, PAIN PRESENT: ICD-10-PCS | Mod: HCNC,S$GLB,, | Performed by: PHYSICIAN ASSISTANT

## 2021-01-06 PROCEDURE — 99999 PR PBB SHADOW E&M-EST. PATIENT-LVL V: ICD-10-PCS | Mod: PBBFAC,HCNC,, | Performed by: PHYSICIAN ASSISTANT

## 2021-01-06 PROCEDURE — 1101F PR PT FALLS ASSESS DOC 0-1 FALLS W/OUT INJ PAST YR: ICD-10-PCS | Mod: HCNC,CPTII,S$GLB, | Performed by: PHYSICIAN ASSISTANT

## 2021-01-06 PROCEDURE — 99214 PR OFFICE/OUTPT VISIT, EST, LEVL IV, 30-39 MIN: ICD-10-PCS | Mod: HCNC,S$GLB,, | Performed by: PHYSICIAN ASSISTANT

## 2021-01-06 PROCEDURE — 3077F SYST BP >= 140 MM HG: CPT | Mod: HCNC,CPTII,S$GLB, | Performed by: PHYSICIAN ASSISTANT

## 2021-01-06 PROCEDURE — 1159F PR MEDICATION LIST DOCUMENTED IN MEDICAL RECORD: ICD-10-PCS | Mod: HCNC,S$GLB,, | Performed by: PHYSICIAN ASSISTANT

## 2021-01-06 PROCEDURE — 1157F PR ADVANCE CARE PLAN OR EQUIV PRESENT IN MEDICAL RECORD: ICD-10-PCS | Mod: HCNC,S$GLB,, | Performed by: PHYSICIAN ASSISTANT

## 2021-01-06 PROCEDURE — 1157F ADVNC CARE PLAN IN RCRD: CPT | Mod: HCNC,S$GLB,, | Performed by: PHYSICIAN ASSISTANT

## 2021-01-06 PROCEDURE — 3079F PR MOST RECENT DIASTOLIC BLOOD PRESSURE 80-89 MM HG: ICD-10-PCS | Mod: HCNC,CPTII,S$GLB, | Performed by: PHYSICIAN ASSISTANT

## 2021-01-07 ENCOUNTER — PATIENT MESSAGE (OUTPATIENT)
Dept: NEUROSURGERY | Facility: CLINIC | Age: 76
End: 2021-01-07

## 2021-01-10 ENCOUNTER — IMMUNIZATION (OUTPATIENT)
Dept: PRIMARY CARE CLINIC | Facility: CLINIC | Age: 76
End: 2021-01-10
Payer: MEDICARE

## 2021-01-10 DIAGNOSIS — Z23 NEED FOR VACCINATION: ICD-10-CM

## 2021-01-10 PROCEDURE — 0001A COVID-19, MRNA, LNP-S, PF, 30 MCG/0.3 ML DOSE VACCINE: ICD-10-PCS | Mod: S$GLB,,, | Performed by: FAMILY MEDICINE

## 2021-01-10 PROCEDURE — 91300 COVID-19, MRNA, LNP-S, PF, 30 MCG/0.3 ML DOSE VACCINE: ICD-10-PCS | Mod: S$GLB,,, | Performed by: FAMILY MEDICINE

## 2021-01-10 PROCEDURE — 0001A COVID-19, MRNA, LNP-S, PF, 30 MCG/0.3 ML DOSE VACCINE: CPT | Mod: S$GLB,,, | Performed by: FAMILY MEDICINE

## 2021-01-10 PROCEDURE — 91300 COVID-19, MRNA, LNP-S, PF, 30 MCG/0.3 ML DOSE VACCINE: CPT | Mod: S$GLB,,, | Performed by: FAMILY MEDICINE

## 2021-01-13 ENCOUNTER — TELEPHONE (OUTPATIENT)
Dept: NEUROSURGERY | Facility: CLINIC | Age: 76
End: 2021-01-13

## 2021-01-15 ENCOUNTER — HOSPITAL ENCOUNTER (OUTPATIENT)
Dept: RADIOLOGY | Facility: HOSPITAL | Age: 76
Discharge: HOME | End: 2021-01-15
Attending: PHYSICIAN ASSISTANT
Payer: MEDICARE

## 2021-01-15 PROCEDURE — 72120 X-RAY BEND ONLY L-S SPINE: CPT | Mod: 26,,, | Performed by: RADIOLOGY

## 2021-01-15 PROCEDURE — 72120 XR LUMBAR SPINE AP AND LAT WITH FLEX/EXT: ICD-10-PCS | Mod: 26,,, | Performed by: RADIOLOGY

## 2021-01-15 PROCEDURE — 72100 X-RAY EXAM L-S SPINE 2/3 VWS: CPT | Mod: TC,FY

## 2021-01-15 PROCEDURE — 72100 XR LUMBAR SPINE AP AND LAT WITH FLEX/EXT: ICD-10-PCS | Mod: 26,,, | Performed by: RADIOLOGY

## 2021-01-15 PROCEDURE — 72100 X-RAY EXAM L-S SPINE 2/3 VWS: CPT | Mod: 26,,, | Performed by: RADIOLOGY

## 2021-01-24 ENCOUNTER — TELEPHONE (OUTPATIENT)
Dept: UROLOGY | Facility: CLINIC | Age: 76
End: 2021-01-24

## 2021-01-24 NOTE — TELEPHONE ENCOUNTER
Last seen via VV may 2020  Had NV planned 8/2020 which he canceled  Per below, please assist in rebooking NV for uroflow/pvr/auass     Will continue current management at this time and keep on the pending surgery list for [facility] for minimally invasive BPH intervention for resolution of his LUTS, and he did ask that a reach out to his other providers and subspecialists to help arrange annual follow-up and clearance to proceed.  In the interim, he will schedule office based noninvasive assessment of his obstructive voiding and reassessment of his emptying with a uroflow and bladder scan PVR as a nurse visit in 3 months and review the data.  If he is able to proceed with intervention before then, can cancel was nurse visit.

## 2021-01-31 ENCOUNTER — IMMUNIZATION (OUTPATIENT)
Dept: PRIMARY CARE CLINIC | Facility: CLINIC | Age: 76
End: 2021-01-31
Payer: MEDICARE

## 2021-01-31 DIAGNOSIS — Z23 NEED FOR VACCINATION: Primary | ICD-10-CM

## 2021-01-31 PROCEDURE — 91300 COVID-19, MRNA, LNP-S, PF, 30 MCG/0.3 ML DOSE VACCINE: ICD-10-PCS | Mod: S$GLB,,, | Performed by: FAMILY MEDICINE

## 2021-01-31 PROCEDURE — 0002A COVID-19, MRNA, LNP-S, PF, 30 MCG/0.3 ML DOSE VACCINE: CPT | Mod: S$GLB,,, | Performed by: FAMILY MEDICINE

## 2021-01-31 PROCEDURE — 91300 COVID-19, MRNA, LNP-S, PF, 30 MCG/0.3 ML DOSE VACCINE: CPT | Mod: S$GLB,,, | Performed by: FAMILY MEDICINE

## 2021-01-31 PROCEDURE — 0002A COVID-19, MRNA, LNP-S, PF, 30 MCG/0.3 ML DOSE VACCINE: ICD-10-PCS | Mod: S$GLB,,, | Performed by: FAMILY MEDICINE

## 2021-01-31 NOTE — OR NURSING
Sedation, oxygen delivery, and monitoring per anesthesia for endoscopic procedure.   Initial (On Arrival)

## 2021-02-04 ENCOUNTER — OFFICE VISIT (OUTPATIENT)
Dept: NEUROSURGERY | Facility: CLINIC | Age: 76
End: 2021-02-04
Payer: MEDICARE

## 2021-02-04 VITALS
BODY MASS INDEX: 33.42 KG/M2 | TEMPERATURE: 97 F | DIASTOLIC BLOOD PRESSURE: 81 MMHG | WEIGHT: 252.19 LBS | RESPIRATION RATE: 18 BRPM | SYSTOLIC BLOOD PRESSURE: 149 MMHG | HEART RATE: 61 BPM | HEIGHT: 73 IN

## 2021-02-04 DIAGNOSIS — M51.34 DDD (DEGENERATIVE DISC DISEASE), THORACIC: ICD-10-CM

## 2021-02-04 DIAGNOSIS — M51.36 DDD (DEGENERATIVE DISC DISEASE), LUMBAR: Primary | ICD-10-CM

## 2021-02-04 DIAGNOSIS — M41.50 SCOLIOSIS DUE TO DEGENERATIVE DISEASE OF SPINE IN ADULT PATIENT: ICD-10-CM

## 2021-02-04 DIAGNOSIS — Z98.1 HISTORY OF SPINAL FUSION: ICD-10-CM

## 2021-02-04 PROCEDURE — 99999 PR PBB SHADOW E&M-EST. PATIENT-LVL III: CPT | Mod: PBBFAC,,, | Performed by: STUDENT IN AN ORGANIZED HEALTH CARE EDUCATION/TRAINING PROGRAM

## 2021-02-04 PROCEDURE — 99499 UNLISTED E&M SERVICE: CPT | Mod: S$GLB,,, | Performed by: STUDENT IN AN ORGANIZED HEALTH CARE EDUCATION/TRAINING PROGRAM

## 2021-02-04 PROCEDURE — 99214 PR OFFICE/OUTPT VISIT, EST, LEVL IV, 30-39 MIN: ICD-10-PCS | Mod: S$GLB,,, | Performed by: STUDENT IN AN ORGANIZED HEALTH CARE EDUCATION/TRAINING PROGRAM

## 2021-02-04 PROCEDURE — 1125F AMNT PAIN NOTED PAIN PRSNT: CPT | Mod: S$GLB,,, | Performed by: STUDENT IN AN ORGANIZED HEALTH CARE EDUCATION/TRAINING PROGRAM

## 2021-02-04 PROCEDURE — 1101F PR PT FALLS ASSESS DOC 0-1 FALLS W/OUT INJ PAST YR: ICD-10-PCS | Mod: CPTII,S$GLB,, | Performed by: STUDENT IN AN ORGANIZED HEALTH CARE EDUCATION/TRAINING PROGRAM

## 2021-02-04 PROCEDURE — 3079F DIAST BP 80-89 MM HG: CPT | Mod: CPTII,S$GLB,, | Performed by: STUDENT IN AN ORGANIZED HEALTH CARE EDUCATION/TRAINING PROGRAM

## 2021-02-04 PROCEDURE — 3288F FALL RISK ASSESSMENT DOCD: CPT | Mod: CPTII,S$GLB,, | Performed by: STUDENT IN AN ORGANIZED HEALTH CARE EDUCATION/TRAINING PROGRAM

## 2021-02-04 PROCEDURE — 1101F PT FALLS ASSESS-DOCD LE1/YR: CPT | Mod: CPTII,S$GLB,, | Performed by: STUDENT IN AN ORGANIZED HEALTH CARE EDUCATION/TRAINING PROGRAM

## 2021-02-04 PROCEDURE — 1125F PR PAIN SEVERITY QUANTIFIED, PAIN PRESENT: ICD-10-PCS | Mod: S$GLB,,, | Performed by: STUDENT IN AN ORGANIZED HEALTH CARE EDUCATION/TRAINING PROGRAM

## 2021-02-04 PROCEDURE — 3077F SYST BP >= 140 MM HG: CPT | Mod: CPTII,S$GLB,, | Performed by: STUDENT IN AN ORGANIZED HEALTH CARE EDUCATION/TRAINING PROGRAM

## 2021-02-04 PROCEDURE — 99499 RISK ADDL DX/OHS AUDIT: ICD-10-PCS | Mod: S$GLB,,, | Performed by: STUDENT IN AN ORGANIZED HEALTH CARE EDUCATION/TRAINING PROGRAM

## 2021-02-04 PROCEDURE — 3288F PR FALLS RISK ASSESSMENT DOCUMENTED: ICD-10-PCS | Mod: CPTII,S$GLB,, | Performed by: STUDENT IN AN ORGANIZED HEALTH CARE EDUCATION/TRAINING PROGRAM

## 2021-02-04 PROCEDURE — 1157F PR ADVANCE CARE PLAN OR EQUIV PRESENT IN MEDICAL RECORD: ICD-10-PCS | Mod: S$GLB,,, | Performed by: STUDENT IN AN ORGANIZED HEALTH CARE EDUCATION/TRAINING PROGRAM

## 2021-02-04 PROCEDURE — 1159F MED LIST DOCD IN RCRD: CPT | Mod: S$GLB,,, | Performed by: STUDENT IN AN ORGANIZED HEALTH CARE EDUCATION/TRAINING PROGRAM

## 2021-02-04 PROCEDURE — 1157F ADVNC CARE PLAN IN RCRD: CPT | Mod: S$GLB,,, | Performed by: STUDENT IN AN ORGANIZED HEALTH CARE EDUCATION/TRAINING PROGRAM

## 2021-02-04 PROCEDURE — 99214 OFFICE O/P EST MOD 30 MIN: CPT | Mod: S$GLB,,, | Performed by: STUDENT IN AN ORGANIZED HEALTH CARE EDUCATION/TRAINING PROGRAM

## 2021-02-04 PROCEDURE — 3079F PR MOST RECENT DIASTOLIC BLOOD PRESSURE 80-89 MM HG: ICD-10-PCS | Mod: CPTII,S$GLB,, | Performed by: STUDENT IN AN ORGANIZED HEALTH CARE EDUCATION/TRAINING PROGRAM

## 2021-02-04 PROCEDURE — 99999 PR PBB SHADOW E&M-EST. PATIENT-LVL III: ICD-10-PCS | Mod: PBBFAC,,, | Performed by: STUDENT IN AN ORGANIZED HEALTH CARE EDUCATION/TRAINING PROGRAM

## 2021-02-04 PROCEDURE — 3077F PR MOST RECENT SYSTOLIC BLOOD PRESSURE >= 140 MM HG: ICD-10-PCS | Mod: CPTII,S$GLB,, | Performed by: STUDENT IN AN ORGANIZED HEALTH CARE EDUCATION/TRAINING PROGRAM

## 2021-02-04 PROCEDURE — 1159F PR MEDICATION LIST DOCUMENTED IN MEDICAL RECORD: ICD-10-PCS | Mod: S$GLB,,, | Performed by: STUDENT IN AN ORGANIZED HEALTH CARE EDUCATION/TRAINING PROGRAM

## 2021-02-10 ENCOUNTER — OFFICE VISIT (OUTPATIENT)
Dept: PAIN MEDICINE | Facility: CLINIC | Age: 76
End: 2021-02-10
Payer: MEDICARE

## 2021-02-10 VITALS
WEIGHT: 252.19 LBS | HEIGHT: 73 IN | BODY MASS INDEX: 33.42 KG/M2 | DIASTOLIC BLOOD PRESSURE: 62 MMHG | HEART RATE: 67 BPM | SYSTOLIC BLOOD PRESSURE: 124 MMHG

## 2021-02-10 DIAGNOSIS — M50.30 DDD (DEGENERATIVE DISC DISEASE), CERVICAL: ICD-10-CM

## 2021-02-10 DIAGNOSIS — M51.36 DDD (DEGENERATIVE DISC DISEASE), LUMBAR: ICD-10-CM

## 2021-02-10 DIAGNOSIS — G89.4 CHRONIC PAIN DISORDER: ICD-10-CM

## 2021-02-10 DIAGNOSIS — M96.1 POSTLAMINECTOMY SYNDROME OF LUMBAR REGION: ICD-10-CM

## 2021-02-10 DIAGNOSIS — G24.3 CERVICAL DYSTONIA: Primary | ICD-10-CM

## 2021-02-10 PROCEDURE — 3078F DIAST BP <80 MM HG: CPT | Mod: CPTII,S$GLB,, | Performed by: ANESTHESIOLOGY

## 2021-02-10 PROCEDURE — 1157F PR ADVANCE CARE PLAN OR EQUIV PRESENT IN MEDICAL RECORD: ICD-10-PCS | Mod: S$GLB,,, | Performed by: ANESTHESIOLOGY

## 2021-02-10 PROCEDURE — 1159F MED LIST DOCD IN RCRD: CPT | Mod: S$GLB,,, | Performed by: ANESTHESIOLOGY

## 2021-02-10 PROCEDURE — 1157F ADVNC CARE PLAN IN RCRD: CPT | Mod: S$GLB,,, | Performed by: ANESTHESIOLOGY

## 2021-02-10 PROCEDURE — 99999 PR PBB SHADOW E&M-EST. PATIENT-LVL IV: ICD-10-PCS | Mod: PBBFAC,,, | Performed by: ANESTHESIOLOGY

## 2021-02-10 PROCEDURE — 99214 OFFICE O/P EST MOD 30 MIN: CPT | Mod: S$GLB,,, | Performed by: ANESTHESIOLOGY

## 2021-02-10 PROCEDURE — 1101F PR PT FALLS ASSESS DOC 0-1 FALLS W/OUT INJ PAST YR: ICD-10-PCS | Mod: CPTII,S$GLB,, | Performed by: ANESTHESIOLOGY

## 2021-02-10 PROCEDURE — 3078F PR MOST RECENT DIASTOLIC BLOOD PRESSURE < 80 MM HG: ICD-10-PCS | Mod: CPTII,S$GLB,, | Performed by: ANESTHESIOLOGY

## 2021-02-10 PROCEDURE — 1125F AMNT PAIN NOTED PAIN PRSNT: CPT | Mod: S$GLB,,, | Performed by: ANESTHESIOLOGY

## 2021-02-10 PROCEDURE — 99214 PR OFFICE/OUTPT VISIT, EST, LEVL IV, 30-39 MIN: ICD-10-PCS | Mod: S$GLB,,, | Performed by: ANESTHESIOLOGY

## 2021-02-10 PROCEDURE — 1125F PR PAIN SEVERITY QUANTIFIED, PAIN PRESENT: ICD-10-PCS | Mod: S$GLB,,, | Performed by: ANESTHESIOLOGY

## 2021-02-10 PROCEDURE — 1101F PT FALLS ASSESS-DOCD LE1/YR: CPT | Mod: CPTII,S$GLB,, | Performed by: ANESTHESIOLOGY

## 2021-02-10 PROCEDURE — 3288F PR FALLS RISK ASSESSMENT DOCUMENTED: ICD-10-PCS | Mod: CPTII,S$GLB,, | Performed by: ANESTHESIOLOGY

## 2021-02-10 PROCEDURE — 1159F PR MEDICATION LIST DOCUMENTED IN MEDICAL RECORD: ICD-10-PCS | Mod: S$GLB,,, | Performed by: ANESTHESIOLOGY

## 2021-02-10 PROCEDURE — 3074F PR MOST RECENT SYSTOLIC BLOOD PRESSURE < 130 MM HG: ICD-10-PCS | Mod: CPTII,S$GLB,, | Performed by: ANESTHESIOLOGY

## 2021-02-10 PROCEDURE — 3288F FALL RISK ASSESSMENT DOCD: CPT | Mod: CPTII,S$GLB,, | Performed by: ANESTHESIOLOGY

## 2021-02-10 PROCEDURE — 99999 PR PBB SHADOW E&M-EST. PATIENT-LVL IV: CPT | Mod: PBBFAC,,, | Performed by: ANESTHESIOLOGY

## 2021-02-10 PROCEDURE — 3074F SYST BP LT 130 MM HG: CPT | Mod: CPTII,S$GLB,, | Performed by: ANESTHESIOLOGY

## 2021-02-26 ENCOUNTER — TELEPHONE (OUTPATIENT)
Dept: NEUROSURGERY | Facility: CLINIC | Age: 76
End: 2021-02-26

## 2021-03-01 ENCOUNTER — TELEPHONE (OUTPATIENT)
Dept: PAIN MEDICINE | Facility: CLINIC | Age: 76
End: 2021-03-01

## 2021-03-01 ENCOUNTER — OFFICE VISIT (OUTPATIENT)
Dept: NEUROLOGY | Facility: CLINIC | Age: 76
End: 2021-03-01
Payer: MEDICARE

## 2021-03-01 VITALS
DIASTOLIC BLOOD PRESSURE: 74 MMHG | WEIGHT: 252 LBS | HEART RATE: 54 BPM | BODY MASS INDEX: 33.4 KG/M2 | HEIGHT: 73 IN | SYSTOLIC BLOOD PRESSURE: 133 MMHG

## 2021-03-01 DIAGNOSIS — G89.29 CHRONIC BILATERAL LOW BACK PAIN WITHOUT SCIATICA: Chronic | ICD-10-CM

## 2021-03-01 DIAGNOSIS — I10 HTN (HYPERTENSION), BENIGN: ICD-10-CM

## 2021-03-01 DIAGNOSIS — M54.50 CHRONIC BILATERAL LOW BACK PAIN WITHOUT SCIATICA: Chronic | ICD-10-CM

## 2021-03-01 DIAGNOSIS — E78.2 MIXED HYPERLIPIDEMIA: ICD-10-CM

## 2021-03-01 DIAGNOSIS — M54.12 CERVICAL RADICULOPATHY: ICD-10-CM

## 2021-03-01 DIAGNOSIS — G70.00 MYASTHENIA GRAVIS, ACHR ANTIBODY POSITIVE: Primary | ICD-10-CM

## 2021-03-01 DIAGNOSIS — Z79.52 CURRENT CHRONIC USE OF SYSTEMIC STEROIDS: Chronic | ICD-10-CM

## 2021-03-01 PROCEDURE — 1125F AMNT PAIN NOTED PAIN PRSNT: CPT | Mod: S$GLB,,, | Performed by: PSYCHIATRY & NEUROLOGY

## 2021-03-01 PROCEDURE — 3075F PR MOST RECENT SYSTOLIC BLOOD PRESS GE 130-139MM HG: ICD-10-PCS | Mod: CPTII,S$GLB,, | Performed by: PSYCHIATRY & NEUROLOGY

## 2021-03-01 PROCEDURE — 99999 PR PBB SHADOW E&M-EST. PATIENT-LVL V: CPT | Mod: PBBFAC,,, | Performed by: PSYCHIATRY & NEUROLOGY

## 2021-03-01 PROCEDURE — 3078F DIAST BP <80 MM HG: CPT | Mod: CPTII,S$GLB,, | Performed by: PSYCHIATRY & NEUROLOGY

## 2021-03-01 PROCEDURE — 1125F PR PAIN SEVERITY QUANTIFIED, PAIN PRESENT: ICD-10-PCS | Mod: S$GLB,,, | Performed by: PSYCHIATRY & NEUROLOGY

## 2021-03-01 PROCEDURE — 1157F PR ADVANCE CARE PLAN OR EQUIV PRESENT IN MEDICAL RECORD: ICD-10-PCS | Mod: S$GLB,,, | Performed by: PSYCHIATRY & NEUROLOGY

## 2021-03-01 PROCEDURE — 99214 PR OFFICE/OUTPT VISIT, EST, LEVL IV, 30-39 MIN: ICD-10-PCS | Mod: S$GLB,,, | Performed by: PSYCHIATRY & NEUROLOGY

## 2021-03-01 PROCEDURE — 1101F PT FALLS ASSESS-DOCD LE1/YR: CPT | Mod: CPTII,S$GLB,, | Performed by: PSYCHIATRY & NEUROLOGY

## 2021-03-01 PROCEDURE — 99499 UNLISTED E&M SERVICE: CPT | Mod: S$GLB,,, | Performed by: PSYCHIATRY & NEUROLOGY

## 2021-03-01 PROCEDURE — 3288F FALL RISK ASSESSMENT DOCD: CPT | Mod: CPTII,S$GLB,, | Performed by: PSYCHIATRY & NEUROLOGY

## 2021-03-01 PROCEDURE — 99499 RISK ADDL DX/OHS AUDIT: ICD-10-PCS | Mod: S$GLB,,, | Performed by: PSYCHIATRY & NEUROLOGY

## 2021-03-01 PROCEDURE — 3075F SYST BP GE 130 - 139MM HG: CPT | Mod: CPTII,S$GLB,, | Performed by: PSYCHIATRY & NEUROLOGY

## 2021-03-01 PROCEDURE — 1159F PR MEDICATION LIST DOCUMENTED IN MEDICAL RECORD: ICD-10-PCS | Mod: S$GLB,,, | Performed by: PSYCHIATRY & NEUROLOGY

## 2021-03-01 PROCEDURE — 3288F PR FALLS RISK ASSESSMENT DOCUMENTED: ICD-10-PCS | Mod: CPTII,S$GLB,, | Performed by: PSYCHIATRY & NEUROLOGY

## 2021-03-01 PROCEDURE — 1159F MED LIST DOCD IN RCRD: CPT | Mod: S$GLB,,, | Performed by: PSYCHIATRY & NEUROLOGY

## 2021-03-01 PROCEDURE — 99999 PR PBB SHADOW E&M-EST. PATIENT-LVL V: ICD-10-PCS | Mod: PBBFAC,,, | Performed by: PSYCHIATRY & NEUROLOGY

## 2021-03-01 PROCEDURE — 1157F ADVNC CARE PLAN IN RCRD: CPT | Mod: S$GLB,,, | Performed by: PSYCHIATRY & NEUROLOGY

## 2021-03-01 PROCEDURE — 3078F PR MOST RECENT DIASTOLIC BLOOD PRESSURE < 80 MM HG: ICD-10-PCS | Mod: CPTII,S$GLB,, | Performed by: PSYCHIATRY & NEUROLOGY

## 2021-03-01 PROCEDURE — 99214 OFFICE O/P EST MOD 30 MIN: CPT | Mod: S$GLB,,, | Performed by: PSYCHIATRY & NEUROLOGY

## 2021-03-01 PROCEDURE — 1101F PR PT FALLS ASSESS DOC 0-1 FALLS W/OUT INJ PAST YR: ICD-10-PCS | Mod: CPTII,S$GLB,, | Performed by: PSYCHIATRY & NEUROLOGY

## 2021-03-02 ENCOUNTER — TELEPHONE (OUTPATIENT)
Dept: PAIN MEDICINE | Facility: CLINIC | Age: 76
End: 2021-03-02

## 2021-03-02 DIAGNOSIS — M96.1 POSTLAMINECTOMY SYNDROME OF LUMBAR REGION: ICD-10-CM

## 2021-03-02 DIAGNOSIS — G24.3 CERVICAL DYSTONIA: Primary | ICD-10-CM

## 2021-03-02 DIAGNOSIS — G89.4 CHRONIC PAIN DISORDER: ICD-10-CM

## 2021-03-03 ENCOUNTER — RESEARCH ENCOUNTER (OUTPATIENT)
Dept: RESEARCH | Facility: HOSPITAL | Age: 76
End: 2021-03-03

## 2021-03-03 ENCOUNTER — CLINICAL SUPPORT (OUTPATIENT)
Dept: REHABILITATION | Facility: HOSPITAL | Age: 76
End: 2021-03-03
Attending: PSYCHIATRY & NEUROLOGY
Payer: MEDICARE

## 2021-03-03 DIAGNOSIS — M54.50 CHRONIC RIGHT-SIDED LOW BACK PAIN WITHOUT SCIATICA: ICD-10-CM

## 2021-03-03 DIAGNOSIS — M54.12 CERVICAL RADICULOPATHY: ICD-10-CM

## 2021-03-03 DIAGNOSIS — I49.8 OTHER SPECIFIED CARDIAC ARRHYTHMIAS: Primary | ICD-10-CM

## 2021-03-03 DIAGNOSIS — G89.29 CHRONIC RIGHT-SIDED LOW BACK PAIN WITHOUT SCIATICA: ICD-10-CM

## 2021-03-03 DIAGNOSIS — M54.2 CERVICAL PAIN: ICD-10-CM

## 2021-03-03 DIAGNOSIS — R29.898 DECREASED ROM OF NECK: ICD-10-CM

## 2021-03-03 PROCEDURE — 97110 THERAPEUTIC EXERCISES: CPT | Mod: PO | Performed by: PHYSICAL THERAPIST

## 2021-03-03 PROCEDURE — 97162 PT EVAL MOD COMPLEX 30 MIN: CPT | Mod: PO | Performed by: PHYSICAL THERAPIST

## 2021-03-04 ENCOUNTER — TELEPHONE (OUTPATIENT)
Dept: ELECTROPHYSIOLOGY | Facility: CLINIC | Age: 76
End: 2021-03-04

## 2021-03-04 DIAGNOSIS — I48.91 ATRIAL FIBRILLATION, UNSPECIFIED TYPE: ICD-10-CM

## 2021-03-04 DIAGNOSIS — I49.8 OTHER SPECIFIED CARDIAC ARRHYTHMIAS: Primary | ICD-10-CM

## 2021-03-04 PROBLEM — M54.2 CERVICAL PAIN: Status: ACTIVE | Noted: 2021-03-04

## 2021-03-04 PROBLEM — M54.50 CHRONIC RIGHT-SIDED LOW BACK PAIN WITHOUT SCIATICA: Status: ACTIVE | Noted: 2021-03-01

## 2021-03-04 PROBLEM — G89.29 CHRONIC RIGHT-SIDED LOW BACK PAIN WITHOUT SCIATICA: Status: ACTIVE | Noted: 2021-03-01

## 2021-03-04 PROBLEM — R29.898 DECREASED ROM OF NECK: Status: ACTIVE | Noted: 2021-03-04

## 2021-03-08 ENCOUNTER — TELEPHONE (OUTPATIENT)
Dept: ELECTROPHYSIOLOGY | Facility: CLINIC | Age: 76
End: 2021-03-08

## 2021-03-09 ENCOUNTER — PATIENT MESSAGE (OUTPATIENT)
Dept: ELECTROPHYSIOLOGY | Facility: CLINIC | Age: 76
End: 2021-03-09

## 2021-03-09 ENCOUNTER — CLINICAL SUPPORT (OUTPATIENT)
Dept: REHABILITATION | Facility: HOSPITAL | Age: 76
End: 2021-03-09
Attending: PSYCHIATRY & NEUROLOGY
Payer: MEDICARE

## 2021-03-09 DIAGNOSIS — G89.29 CHRONIC RIGHT-SIDED LOW BACK PAIN WITHOUT SCIATICA: ICD-10-CM

## 2021-03-09 DIAGNOSIS — R29.898 DECREASED ROM OF NECK: ICD-10-CM

## 2021-03-09 DIAGNOSIS — M54.2 CERVICAL PAIN: ICD-10-CM

## 2021-03-09 DIAGNOSIS — M54.50 CHRONIC RIGHT-SIDED LOW BACK PAIN WITHOUT SCIATICA: ICD-10-CM

## 2021-03-09 PROCEDURE — 97110 THERAPEUTIC EXERCISES: CPT | Mod: PO | Performed by: PHYSICAL THERAPIST

## 2021-03-11 ENCOUNTER — TELEPHONE (OUTPATIENT)
Dept: REHABILITATION | Facility: HOSPITAL | Age: 76
End: 2021-03-11

## 2021-03-12 ENCOUNTER — CLINICAL SUPPORT (OUTPATIENT)
Dept: REHABILITATION | Facility: HOSPITAL | Age: 76
End: 2021-03-12
Attending: PSYCHIATRY & NEUROLOGY
Payer: MEDICARE

## 2021-03-12 DIAGNOSIS — R29.898 DECREASED ROM OF NECK: ICD-10-CM

## 2021-03-12 DIAGNOSIS — M54.2 CERVICAL PAIN: ICD-10-CM

## 2021-03-12 DIAGNOSIS — G89.29 CHRONIC RIGHT-SIDED LOW BACK PAIN WITHOUT SCIATICA: ICD-10-CM

## 2021-03-12 DIAGNOSIS — M54.50 CHRONIC RIGHT-SIDED LOW BACK PAIN WITHOUT SCIATICA: ICD-10-CM

## 2021-03-12 PROCEDURE — 97110 THERAPEUTIC EXERCISES: CPT | Mod: PO | Performed by: PHYSICAL THERAPIST

## 2021-03-16 ENCOUNTER — DOCUMENTATION ONLY (OUTPATIENT)
Dept: REHABILITATION | Facility: HOSPITAL | Age: 76
End: 2021-03-16
Attending: PSYCHIATRY & NEUROLOGY
Payer: MEDICARE

## 2021-03-17 ENCOUNTER — CLINICAL SUPPORT (OUTPATIENT)
Dept: REHABILITATION | Facility: HOSPITAL | Age: 76
End: 2021-03-17
Attending: PSYCHIATRY & NEUROLOGY
Payer: MEDICARE

## 2021-03-17 DIAGNOSIS — G89.29 CHRONIC RIGHT-SIDED LOW BACK PAIN WITHOUT SCIATICA: ICD-10-CM

## 2021-03-17 DIAGNOSIS — M54.2 CERVICAL PAIN: ICD-10-CM

## 2021-03-17 DIAGNOSIS — R29.898 DECREASED ROM OF NECK: ICD-10-CM

## 2021-03-17 DIAGNOSIS — M54.50 CHRONIC RIGHT-SIDED LOW BACK PAIN WITHOUT SCIATICA: ICD-10-CM

## 2021-03-17 PROCEDURE — 97110 THERAPEUTIC EXERCISES: CPT | Mod: PO | Performed by: PHYSICAL THERAPIST

## 2021-03-19 ENCOUNTER — CLINICAL SUPPORT (OUTPATIENT)
Dept: REHABILITATION | Facility: HOSPITAL | Age: 76
End: 2021-03-19
Attending: PSYCHIATRY & NEUROLOGY
Payer: MEDICARE

## 2021-03-19 DIAGNOSIS — G89.29 CHRONIC RIGHT-SIDED LOW BACK PAIN WITHOUT SCIATICA: ICD-10-CM

## 2021-03-19 DIAGNOSIS — M54.2 CERVICAL PAIN: ICD-10-CM

## 2021-03-19 DIAGNOSIS — R29.898 DECREASED ROM OF NECK: ICD-10-CM

## 2021-03-19 DIAGNOSIS — M54.50 CHRONIC RIGHT-SIDED LOW BACK PAIN WITHOUT SCIATICA: ICD-10-CM

## 2021-03-19 PROCEDURE — 97110 THERAPEUTIC EXERCISES: CPT | Mod: PO,CQ

## 2021-03-23 ENCOUNTER — DOCUMENTATION ONLY (OUTPATIENT)
Dept: REHABILITATION | Facility: HOSPITAL | Age: 76
End: 2021-03-23
Attending: PSYCHIATRY & NEUROLOGY
Payer: MEDICARE

## 2021-03-23 DIAGNOSIS — M54.50 CHRONIC RIGHT-SIDED LOW BACK PAIN WITHOUT SCIATICA: ICD-10-CM

## 2021-03-23 DIAGNOSIS — G89.29 CHRONIC RIGHT-SIDED LOW BACK PAIN WITHOUT SCIATICA: ICD-10-CM

## 2021-03-23 DIAGNOSIS — R29.898 DECREASED ROM OF NECK: ICD-10-CM

## 2021-03-23 DIAGNOSIS — M54.2 CERVICAL PAIN: ICD-10-CM

## 2021-03-23 PROCEDURE — 97140 MANUAL THERAPY 1/> REGIONS: CPT | Mod: PO | Performed by: PHYSICAL THERAPIST

## 2021-03-25 ENCOUNTER — CLINICAL SUPPORT (OUTPATIENT)
Dept: REHABILITATION | Facility: HOSPITAL | Age: 76
End: 2021-03-25
Attending: PSYCHIATRY & NEUROLOGY
Payer: MEDICARE

## 2021-03-25 DIAGNOSIS — M54.2 CERVICAL PAIN: ICD-10-CM

## 2021-03-25 DIAGNOSIS — G89.29 CHRONIC RIGHT-SIDED LOW BACK PAIN WITHOUT SCIATICA: ICD-10-CM

## 2021-03-25 DIAGNOSIS — M54.50 CHRONIC RIGHT-SIDED LOW BACK PAIN WITHOUT SCIATICA: ICD-10-CM

## 2021-03-25 DIAGNOSIS — R29.898 DECREASED ROM OF NECK: ICD-10-CM

## 2021-03-25 PROCEDURE — 97110 THERAPEUTIC EXERCISES: CPT | Mod: PO | Performed by: PHYSICAL THERAPIST

## 2021-03-29 ENCOUNTER — CLINICAL SUPPORT (OUTPATIENT)
Dept: REHABILITATION | Facility: HOSPITAL | Age: 76
End: 2021-03-29
Attending: PSYCHIATRY & NEUROLOGY
Payer: MEDICARE

## 2021-03-29 DIAGNOSIS — M54.2 CERVICAL PAIN: ICD-10-CM

## 2021-03-29 DIAGNOSIS — M54.50 CHRONIC RIGHT-SIDED LOW BACK PAIN WITHOUT SCIATICA: ICD-10-CM

## 2021-03-29 DIAGNOSIS — R29.898 DECREASED ROM OF NECK: ICD-10-CM

## 2021-03-29 DIAGNOSIS — G89.29 CHRONIC RIGHT-SIDED LOW BACK PAIN WITHOUT SCIATICA: ICD-10-CM

## 2021-03-29 PROCEDURE — 97110 THERAPEUTIC EXERCISES: CPT | Mod: PO | Performed by: PHYSICAL THERAPIST

## 2021-03-30 ENCOUNTER — DOCUMENTATION ONLY (OUTPATIENT)
Dept: REHABILITATION | Facility: HOSPITAL | Age: 76
End: 2021-03-30
Attending: PSYCHIATRY & NEUROLOGY
Payer: MEDICARE

## 2021-03-31 ENCOUNTER — CLINICAL SUPPORT (OUTPATIENT)
Dept: REHABILITATION | Facility: HOSPITAL | Age: 76
End: 2021-03-31
Attending: PSYCHIATRY & NEUROLOGY
Payer: MEDICARE

## 2021-03-31 DIAGNOSIS — R29.898 DECREASED ROM OF NECK: ICD-10-CM

## 2021-03-31 DIAGNOSIS — M54.50 CHRONIC RIGHT-SIDED LOW BACK PAIN WITHOUT SCIATICA: ICD-10-CM

## 2021-03-31 DIAGNOSIS — M54.2 CERVICAL PAIN: ICD-10-CM

## 2021-03-31 DIAGNOSIS — G89.29 CHRONIC RIGHT-SIDED LOW BACK PAIN WITHOUT SCIATICA: ICD-10-CM

## 2021-03-31 PROCEDURE — 97110 THERAPEUTIC EXERCISES: CPT | Mod: PO | Performed by: PHYSICAL THERAPIST

## 2021-04-06 ENCOUNTER — CLINICAL SUPPORT (OUTPATIENT)
Dept: REHABILITATION | Facility: HOSPITAL | Age: 76
End: 2021-04-06
Attending: PSYCHIATRY & NEUROLOGY
Payer: MEDICARE

## 2021-04-06 ENCOUNTER — DOCUMENTATION ONLY (OUTPATIENT)
Dept: REHABILITATION | Facility: HOSPITAL | Age: 76
End: 2021-04-06
Attending: FAMILY MEDICINE
Payer: MEDICARE

## 2021-04-06 DIAGNOSIS — M54.50 CHRONIC RIGHT-SIDED LOW BACK PAIN WITHOUT SCIATICA: ICD-10-CM

## 2021-04-06 DIAGNOSIS — R29.898 DECREASED ROM OF NECK: ICD-10-CM

## 2021-04-06 DIAGNOSIS — M54.2 CERVICAL PAIN: ICD-10-CM

## 2021-04-06 DIAGNOSIS — G89.29 CHRONIC RIGHT-SIDED LOW BACK PAIN WITHOUT SCIATICA: ICD-10-CM

## 2021-04-06 PROCEDURE — 97110 THERAPEUTIC EXERCISES: CPT | Mod: PO | Performed by: PHYSICAL THERAPIST

## 2021-04-06 PROCEDURE — 97750 PHYSICAL PERFORMANCE TEST: CPT | Mod: PO | Performed by: PHYSICAL THERAPIST

## 2021-04-09 ENCOUNTER — CLINICAL SUPPORT (OUTPATIENT)
Dept: REHABILITATION | Facility: HOSPITAL | Age: 76
End: 2021-04-09
Attending: PSYCHIATRY & NEUROLOGY
Payer: MEDICARE

## 2021-04-09 DIAGNOSIS — G89.29 CHRONIC RIGHT-SIDED LOW BACK PAIN WITHOUT SCIATICA: ICD-10-CM

## 2021-04-09 DIAGNOSIS — R29.898 DECREASED ROM OF NECK: ICD-10-CM

## 2021-04-09 DIAGNOSIS — M54.2 CERVICAL PAIN: ICD-10-CM

## 2021-04-09 DIAGNOSIS — M54.50 CHRONIC RIGHT-SIDED LOW BACK PAIN WITHOUT SCIATICA: ICD-10-CM

## 2021-04-09 PROCEDURE — 97110 THERAPEUTIC EXERCISES: CPT | Mod: PO | Performed by: PHYSICAL THERAPIST

## 2021-04-12 ENCOUNTER — CLINICAL SUPPORT (OUTPATIENT)
Dept: REHABILITATION | Facility: HOSPITAL | Age: 76
End: 2021-04-12
Attending: PSYCHIATRY & NEUROLOGY
Payer: MEDICARE

## 2021-04-12 DIAGNOSIS — R29.898 DECREASED ROM OF NECK: ICD-10-CM

## 2021-04-12 DIAGNOSIS — M54.2 CERVICAL PAIN: ICD-10-CM

## 2021-04-12 DIAGNOSIS — M54.50 CHRONIC RIGHT-SIDED LOW BACK PAIN WITHOUT SCIATICA: ICD-10-CM

## 2021-04-12 DIAGNOSIS — G89.29 CHRONIC RIGHT-SIDED LOW BACK PAIN WITHOUT SCIATICA: ICD-10-CM

## 2021-04-12 PROCEDURE — 97110 THERAPEUTIC EXERCISES: CPT | Mod: PO | Performed by: PHYSICAL THERAPIST

## 2021-04-14 ENCOUNTER — CLINICAL SUPPORT (OUTPATIENT)
Dept: REHABILITATION | Facility: HOSPITAL | Age: 76
End: 2021-04-14
Attending: PSYCHIATRY & NEUROLOGY
Payer: MEDICARE

## 2021-04-14 DIAGNOSIS — M54.50 CHRONIC RIGHT-SIDED LOW BACK PAIN WITHOUT SCIATICA: ICD-10-CM

## 2021-04-14 DIAGNOSIS — M54.2 CERVICAL PAIN: ICD-10-CM

## 2021-04-14 DIAGNOSIS — R29.898 DECREASED ROM OF NECK: ICD-10-CM

## 2021-04-14 DIAGNOSIS — G89.29 CHRONIC RIGHT-SIDED LOW BACK PAIN WITHOUT SCIATICA: ICD-10-CM

## 2021-04-14 PROCEDURE — 97110 THERAPEUTIC EXERCISES: CPT | Mod: PO | Performed by: PHYSICAL THERAPIST

## 2021-04-17 ENCOUNTER — PATIENT MESSAGE (OUTPATIENT)
Dept: ORTHOPEDICS | Facility: CLINIC | Age: 76
End: 2021-04-17

## 2021-04-17 ENCOUNTER — PATIENT MESSAGE (OUTPATIENT)
Dept: ELECTROPHYSIOLOGY | Facility: CLINIC | Age: 76
End: 2021-04-17

## 2021-04-20 DIAGNOSIS — M77.11 LATERAL EPICONDYLITIS, RIGHT ELBOW: Primary | ICD-10-CM

## 2021-04-20 RX ORDER — METHOCARBAMOL 750 MG/1
750 TABLET, FILM COATED ORAL 3 TIMES DAILY
Qty: 270 TABLET | Refills: 0 | Status: SHIPPED | OUTPATIENT
Start: 2021-04-20 | End: 2021-07-20

## 2021-04-23 ENCOUNTER — CLINICAL SUPPORT (OUTPATIENT)
Dept: REHABILITATION | Facility: HOSPITAL | Age: 76
End: 2021-04-23
Attending: PSYCHIATRY & NEUROLOGY
Payer: MEDICARE

## 2021-04-23 DIAGNOSIS — G89.29 CHRONIC RIGHT-SIDED LOW BACK PAIN WITHOUT SCIATICA: ICD-10-CM

## 2021-04-23 DIAGNOSIS — M54.50 CHRONIC RIGHT-SIDED LOW BACK PAIN WITHOUT SCIATICA: ICD-10-CM

## 2021-04-23 DIAGNOSIS — M54.2 CERVICAL PAIN: ICD-10-CM

## 2021-04-23 DIAGNOSIS — R29.898 DECREASED ROM OF NECK: ICD-10-CM

## 2021-04-23 PROCEDURE — 97110 THERAPEUTIC EXERCISES: CPT | Mod: PO | Performed by: PHYSICAL THERAPIST

## 2021-04-26 ENCOUNTER — CLINICAL SUPPORT (OUTPATIENT)
Dept: REHABILITATION | Facility: HOSPITAL | Age: 76
End: 2021-04-26
Attending: PSYCHIATRY & NEUROLOGY
Payer: MEDICARE

## 2021-04-26 DIAGNOSIS — G89.29 CHRONIC RIGHT-SIDED LOW BACK PAIN WITHOUT SCIATICA: ICD-10-CM

## 2021-04-26 DIAGNOSIS — R29.898 DECREASED ROM OF NECK: ICD-10-CM

## 2021-04-26 DIAGNOSIS — M54.50 CHRONIC RIGHT-SIDED LOW BACK PAIN WITHOUT SCIATICA: ICD-10-CM

## 2021-04-26 DIAGNOSIS — M54.2 CERVICAL PAIN: ICD-10-CM

## 2021-04-26 PROCEDURE — 97110 THERAPEUTIC EXERCISES: CPT | Mod: PO | Performed by: PHYSICAL THERAPIST

## 2021-04-28 ENCOUNTER — CLINICAL SUPPORT (OUTPATIENT)
Dept: REHABILITATION | Facility: HOSPITAL | Age: 76
End: 2021-04-28
Attending: PSYCHIATRY & NEUROLOGY
Payer: MEDICARE

## 2021-04-28 DIAGNOSIS — M54.2 CERVICAL PAIN: ICD-10-CM

## 2021-04-28 DIAGNOSIS — G89.29 CHRONIC RIGHT-SIDED LOW BACK PAIN WITHOUT SCIATICA: ICD-10-CM

## 2021-04-28 DIAGNOSIS — M54.50 CHRONIC RIGHT-SIDED LOW BACK PAIN WITHOUT SCIATICA: ICD-10-CM

## 2021-04-28 DIAGNOSIS — R29.898 DECREASED ROM OF NECK: ICD-10-CM

## 2021-04-28 PROCEDURE — 97110 THERAPEUTIC EXERCISES: CPT | Mod: PO | Performed by: PHYSICAL THERAPIST

## 2021-05-03 ENCOUNTER — CLINICAL SUPPORT (OUTPATIENT)
Dept: REHABILITATION | Facility: HOSPITAL | Age: 76
End: 2021-05-03
Attending: PSYCHIATRY & NEUROLOGY
Payer: MEDICARE

## 2021-05-03 DIAGNOSIS — M54.50 CHRONIC RIGHT-SIDED LOW BACK PAIN WITHOUT SCIATICA: ICD-10-CM

## 2021-05-03 DIAGNOSIS — M54.2 CERVICAL PAIN: ICD-10-CM

## 2021-05-03 DIAGNOSIS — R29.898 DECREASED ROM OF NECK: ICD-10-CM

## 2021-05-03 DIAGNOSIS — G89.29 CHRONIC RIGHT-SIDED LOW BACK PAIN WITHOUT SCIATICA: ICD-10-CM

## 2021-05-03 PROCEDURE — 97110 THERAPEUTIC EXERCISES: CPT | Mod: PO | Performed by: PHYSICAL THERAPIST

## 2021-05-04 ENCOUNTER — CLINICAL SUPPORT (OUTPATIENT)
Dept: CARDIOLOGY | Facility: CLINIC | Age: 76
End: 2021-05-04
Attending: INTERNAL MEDICINE
Payer: MEDICARE

## 2021-05-04 DIAGNOSIS — I49.8 OTHER SPECIFIED CARDIAC ARRHYTHMIAS: ICD-10-CM

## 2021-05-04 DIAGNOSIS — I48.91 ATRIAL FIBRILLATION, UNSPECIFIED TYPE: ICD-10-CM

## 2021-05-04 PROCEDURE — 93224 HOLTER MONITOR - 24 HOUR (CUPID ONLY): ICD-10-PCS | Mod: S$GLB,,, | Performed by: INTERNAL MEDICINE

## 2021-05-04 PROCEDURE — 93224 XTRNL ECG REC UP TO 48 HRS: CPT | Mod: S$GLB,,, | Performed by: INTERNAL MEDICINE

## 2021-05-05 ENCOUNTER — CLINICAL SUPPORT (OUTPATIENT)
Dept: REHABILITATION | Facility: HOSPITAL | Age: 76
End: 2021-05-05
Attending: PSYCHIATRY & NEUROLOGY
Payer: MEDICARE

## 2021-05-05 ENCOUNTER — TELEPHONE (OUTPATIENT)
Dept: REHABILITATION | Facility: HOSPITAL | Age: 76
End: 2021-05-05

## 2021-05-05 DIAGNOSIS — M54.50 CHRONIC RIGHT-SIDED LOW BACK PAIN WITHOUT SCIATICA: ICD-10-CM

## 2021-05-05 DIAGNOSIS — M54.2 CERVICAL PAIN: ICD-10-CM

## 2021-05-05 DIAGNOSIS — G89.29 CHRONIC RIGHT-SIDED LOW BACK PAIN WITHOUT SCIATICA: ICD-10-CM

## 2021-05-05 DIAGNOSIS — R29.898 DECREASED ROM OF NECK: ICD-10-CM

## 2021-05-05 PROCEDURE — 97110 THERAPEUTIC EXERCISES: CPT | Mod: PO | Performed by: PHYSICAL THERAPIST

## 2021-05-06 LAB
OHS CV EVENT MONITOR DAY: 0
OHS CV HOLTER LENGTH DECIMAL HOURS: 24
OHS CV HOLTER LENGTH HOURS: 24
OHS CV HOLTER LENGTH MINUTES: 0

## 2021-05-07 ENCOUNTER — PATIENT MESSAGE (OUTPATIENT)
Dept: CARDIOLOGY | Facility: CLINIC | Age: 76
End: 2021-05-07

## 2021-05-10 ENCOUNTER — TELEPHONE (OUTPATIENT)
Dept: REHABILITATION | Facility: HOSPITAL | Age: 76
End: 2021-05-10

## 2021-05-10 ENCOUNTER — CLINICAL SUPPORT (OUTPATIENT)
Dept: REHABILITATION | Facility: HOSPITAL | Age: 76
End: 2021-05-10
Attending: PSYCHIATRY & NEUROLOGY
Payer: MEDICARE

## 2021-05-10 DIAGNOSIS — M54.50 CHRONIC RIGHT-SIDED LOW BACK PAIN WITHOUT SCIATICA: ICD-10-CM

## 2021-05-10 DIAGNOSIS — R29.898 DECREASED ROM OF NECK: ICD-10-CM

## 2021-05-10 DIAGNOSIS — M54.2 CERVICAL PAIN: ICD-10-CM

## 2021-05-10 DIAGNOSIS — G89.29 CHRONIC RIGHT-SIDED LOW BACK PAIN WITHOUT SCIATICA: ICD-10-CM

## 2021-05-10 PROCEDURE — 97110 THERAPEUTIC EXERCISES: CPT | Mod: PO | Performed by: PHYSICAL THERAPIST

## 2021-05-12 ENCOUNTER — CLINICAL SUPPORT (OUTPATIENT)
Dept: REHABILITATION | Facility: HOSPITAL | Age: 76
End: 2021-05-12
Attending: PSYCHIATRY & NEUROLOGY
Payer: MEDICARE

## 2021-05-12 DIAGNOSIS — M54.50 CHRONIC RIGHT-SIDED LOW BACK PAIN WITHOUT SCIATICA: ICD-10-CM

## 2021-05-12 DIAGNOSIS — G89.29 CHRONIC RIGHT-SIDED LOW BACK PAIN WITHOUT SCIATICA: ICD-10-CM

## 2021-05-12 DIAGNOSIS — M54.2 CERVICAL PAIN: ICD-10-CM

## 2021-05-12 DIAGNOSIS — R29.898 DECREASED ROM OF NECK: ICD-10-CM

## 2021-05-12 PROCEDURE — 97110 THERAPEUTIC EXERCISES: CPT | Mod: PO | Performed by: PHYSICAL THERAPIST

## 2021-05-12 PROCEDURE — 97750 PHYSICAL PERFORMANCE TEST: CPT | Mod: PO | Performed by: PHYSICAL THERAPIST

## 2021-05-17 PROBLEM — R29.898 DECREASED ROM OF NECK: Status: RESOLVED | Noted: 2021-03-04 | Resolved: 2021-05-12

## 2021-05-17 PROBLEM — M54.50 CHRONIC RIGHT-SIDED LOW BACK PAIN WITHOUT SCIATICA: Status: RESOLVED | Noted: 2021-03-01 | Resolved: 2021-05-12

## 2021-05-17 PROBLEM — M54.2 CERVICAL PAIN: Status: RESOLVED | Noted: 2021-03-04 | Resolved: 2021-05-12

## 2021-05-17 PROBLEM — G89.29 CHRONIC RIGHT-SIDED LOW BACK PAIN WITHOUT SCIATICA: Status: RESOLVED | Noted: 2021-03-01 | Resolved: 2021-05-12

## 2021-05-22 ENCOUNTER — PATIENT MESSAGE (OUTPATIENT)
Dept: NEUROLOGY | Facility: CLINIC | Age: 76
End: 2021-05-22

## 2021-05-24 DIAGNOSIS — J44.9 CHRONIC OBSTRUCTIVE PULMONARY DISEASE, UNSPECIFIED COPD TYPE: Primary | ICD-10-CM

## 2021-05-25 ENCOUNTER — OFFICE VISIT (OUTPATIENT)
Dept: ELECTROPHYSIOLOGY | Facility: CLINIC | Age: 76
End: 2021-05-25
Payer: MEDICARE

## 2021-05-25 ENCOUNTER — HOSPITAL ENCOUNTER (OUTPATIENT)
Dept: CARDIOLOGY | Facility: CLINIC | Age: 76
Discharge: HOME | End: 2021-05-25
Payer: MEDICARE

## 2021-05-25 ENCOUNTER — TELEPHONE (OUTPATIENT)
Dept: NEUROLOGY | Facility: CLINIC | Age: 76
End: 2021-05-25

## 2021-05-25 VITALS
DIASTOLIC BLOOD PRESSURE: 58 MMHG | BODY MASS INDEX: 33.22 KG/M2 | SYSTOLIC BLOOD PRESSURE: 122 MMHG | HEIGHT: 73 IN | WEIGHT: 250.69 LBS | HEART RATE: 41 BPM

## 2021-05-25 DIAGNOSIS — I10 HTN (HYPERTENSION), BENIGN: ICD-10-CM

## 2021-05-25 DIAGNOSIS — I45.10 RBBB: Primary | ICD-10-CM

## 2021-05-25 DIAGNOSIS — I49.3 PVC (PREMATURE VENTRICULAR CONTRACTION): ICD-10-CM

## 2021-05-25 DIAGNOSIS — I49.8 OTHER SPECIFIED CARDIAC ARRHYTHMIAS: ICD-10-CM

## 2021-05-25 DIAGNOSIS — G70.00 MYASTHENIA GRAVIS, ACHR ANTIBODY POSITIVE: ICD-10-CM

## 2021-05-25 PROCEDURE — 1159F MED LIST DOCD IN RCRD: CPT | Mod: S$GLB,,, | Performed by: INTERNAL MEDICINE

## 2021-05-25 PROCEDURE — 99499 UNLISTED E&M SERVICE: CPT | Mod: S$GLB,,, | Performed by: INTERNAL MEDICINE

## 2021-05-25 PROCEDURE — 99999 PR PBB SHADOW E&M-EST. PATIENT-LVL IV: ICD-10-PCS | Mod: PBBFAC,,, | Performed by: INTERNAL MEDICINE

## 2021-05-25 PROCEDURE — 93010 RHYTHM STRIP: ICD-10-PCS | Mod: S$GLB,,, | Performed by: INTERNAL MEDICINE

## 2021-05-25 PROCEDURE — 1157F ADVNC CARE PLAN IN RCRD: CPT | Mod: S$GLB,,, | Performed by: INTERNAL MEDICINE

## 2021-05-25 PROCEDURE — 1101F PR PT FALLS ASSESS DOC 0-1 FALLS W/OUT INJ PAST YR: ICD-10-PCS | Mod: CPTII,S$GLB,, | Performed by: INTERNAL MEDICINE

## 2021-05-25 PROCEDURE — 99214 OFFICE O/P EST MOD 30 MIN: CPT | Mod: S$GLB,,, | Performed by: INTERNAL MEDICINE

## 2021-05-25 PROCEDURE — 1125F AMNT PAIN NOTED PAIN PRSNT: CPT | Mod: S$GLB,,, | Performed by: INTERNAL MEDICINE

## 2021-05-25 PROCEDURE — 3288F FALL RISK ASSESSMENT DOCD: CPT | Mod: CPTII,S$GLB,, | Performed by: INTERNAL MEDICINE

## 2021-05-25 PROCEDURE — 93010 ELECTROCARDIOGRAM REPORT: CPT | Mod: S$GLB,,, | Performed by: INTERNAL MEDICINE

## 2021-05-25 PROCEDURE — 99214 PR OFFICE/OUTPT VISIT, EST, LEVL IV, 30-39 MIN: ICD-10-PCS | Mod: S$GLB,,, | Performed by: INTERNAL MEDICINE

## 2021-05-25 PROCEDURE — 93005 ELECTROCARDIOGRAM TRACING: CPT | Mod: S$GLB,,, | Performed by: INTERNAL MEDICINE

## 2021-05-25 PROCEDURE — 93005 RHYTHM STRIP: ICD-10-PCS | Mod: S$GLB,,, | Performed by: INTERNAL MEDICINE

## 2021-05-25 PROCEDURE — 1157F PR ADVANCE CARE PLAN OR EQUIV PRESENT IN MEDICAL RECORD: ICD-10-PCS | Mod: S$GLB,,, | Performed by: INTERNAL MEDICINE

## 2021-05-25 PROCEDURE — 3288F PR FALLS RISK ASSESSMENT DOCUMENTED: ICD-10-PCS | Mod: CPTII,S$GLB,, | Performed by: INTERNAL MEDICINE

## 2021-05-25 PROCEDURE — 1159F PR MEDICATION LIST DOCUMENTED IN MEDICAL RECORD: ICD-10-PCS | Mod: S$GLB,,, | Performed by: INTERNAL MEDICINE

## 2021-05-25 PROCEDURE — 99999 PR PBB SHADOW E&M-EST. PATIENT-LVL IV: CPT | Mod: PBBFAC,,, | Performed by: INTERNAL MEDICINE

## 2021-05-25 PROCEDURE — 1125F PR PAIN SEVERITY QUANTIFIED, PAIN PRESENT: ICD-10-PCS | Mod: S$GLB,,, | Performed by: INTERNAL MEDICINE

## 2021-05-25 PROCEDURE — 1101F PT FALLS ASSESS-DOCD LE1/YR: CPT | Mod: CPTII,S$GLB,, | Performed by: INTERNAL MEDICINE

## 2021-05-25 PROCEDURE — 99499 RISK ADDL DX/OHS AUDIT: ICD-10-PCS | Mod: S$GLB,,, | Performed by: INTERNAL MEDICINE

## 2021-05-26 ENCOUNTER — TELEPHONE (OUTPATIENT)
Dept: CARDIOLOGY | Facility: HOSPITAL | Age: 76
End: 2021-05-26

## 2021-05-26 DIAGNOSIS — I10 HTN (HYPERTENSION), BENIGN: ICD-10-CM

## 2021-05-26 DIAGNOSIS — I45.10 RBBB: Primary | ICD-10-CM

## 2021-05-26 DIAGNOSIS — I49.3 PVC'S (PREMATURE VENTRICULAR CONTRACTIONS): ICD-10-CM

## 2021-05-26 DIAGNOSIS — E03.9 HYPOTHYROIDISM: ICD-10-CM

## 2021-05-27 RX ORDER — CARVEDILOL 25 MG/1
TABLET ORAL
Qty: 180 TABLET | Refills: 3 | Status: SHIPPED | OUTPATIENT
Start: 2021-05-27 | End: 2022-04-13

## 2021-05-27 RX ORDER — LEVOTHYROXINE SODIUM 50 UG/1
TABLET ORAL
Qty: 90 TABLET | Refills: 3 | Status: SHIPPED | OUTPATIENT
Start: 2021-05-27 | End: 2022-04-13

## 2021-06-01 ENCOUNTER — HOSPITAL ENCOUNTER (OUTPATIENT)
Dept: PULMONOLOGY | Facility: HOSPITAL | Age: 76
Discharge: HOME | End: 2021-06-01
Attending: PSYCHIATRY & NEUROLOGY
Payer: MEDICARE

## 2021-06-01 DIAGNOSIS — J44.9 CHRONIC OBSTRUCTIVE PULMONARY DISEASE, UNSPECIFIED COPD TYPE: ICD-10-CM

## 2021-06-01 LAB
BRPFT: NORMAL
DLCO ADJ PRE: 17.21 ML/(MIN*MMHG)
DLCO SINGLE BREATH LLN: 20.6
DLCO SINGLE BREATH PRE REF: 62.5 %
DLCO SINGLE BREATH REF: 27.53
DLCOC SBVA LLN: 2.57
DLCOC SBVA PRE REF: 121.6 %
DLCOC SBVA REF: 3.65
DLCOC SINGLE BREATH LLN: 20.6
DLCOC SINGLE BREATH PRE REF: 62.5 %
DLCOC SINGLE BREATH REF: 27.53
DLCOVA LLN: 2.57
DLCOVA PRE REF: 121.6 %
DLCOVA PRE: 4.44 ML/(MIN*MMHG*L)
DLCOVA REF: 3.65
DLVAADJ PRE: 4.44 ML/(MIN*MMHG*L)
ERVN2 LLN: -16448.98
ERVN2 PRE REF: 11.7 %
ERVN2 PRE: 0.12 L
ERVN2 REF: 1.02
FEF 25 75 CHG: -17.6 %
FEF 25 75 LLN: 0.88
FEF 25 75 POST REF: 74.5 %
FEF 25 75 PRE REF: 90.4 %
FEF 25 75 REF: 2.23
FET100 CHG: -4 %
FEV1 CHG: -3.8 %
FEV1 FVC CHG: -2.4 %
FEV1 FVC LLN: 60
FEV1 FVC POST REF: 101.6 %
FEV1 FVC PRE REF: 104.1 %
FEV1 FVC REF: 75
FEV1 LLN: 2.21
FEV1 POST REF: 66.7 %
FEV1 PRE REF: 69.3 %
FEV1 REF: 3.15
FRCN2 LLN: 2.9
FRCN2 PRE REF: 47.8 %
FRCN2 REF: 3.89
FVC CHG: -1.4 %
FVC LLN: 3.1
FVC POST REF: 65.1 %
FVC PRE REF: 66 %
FVC REF: 4.26
IVC PRE: 2.18 L
IVC SINGLE BREATH LLN: 3.1
IVC SINGLE BREATH PRE REF: 51.1 %
IVC SINGLE BREATH REF: 4.26
MVV LLN: 107
MVV PRE REF: 46.9 %
MVV REF: 126
PEF CHG: 11.5 %
PEF LLN: 5.61
PEF POST REF: 98.5 %
PEF PRE REF: 88.3 %
PEF REF: 8.07
POST FEF 25 75: 1.66 L/S
POST FET 100: 10.22 SEC
POST FEV1 FVC: 75.78 %
POST FEV1: 2.1 L
POST FVC: 2.77 L
POST PEF: 7.95 L/S
PRE DLCO: 17.21 ML/(MIN*MMHG)
PRE FEF 25 75: 2.01 L/S
PRE FET 100: 10.65 SEC
PRE FEV1 FVC: 77.68 %
PRE FEV1: 2.19 L
PRE FRC N2: 1.86 L
PRE FVC: 2.81 L
PRE MVV: 59 L/MIN
PRE PEF: 7.13 L/S
RVN2 LLN: 2.19
RVN2 PRE REF: 56.4 %
RVN2 PRE: 1.61 L
RVN2 REF: 2.86
RVN2TLCN2 LLN: 35.01
RVN2TLCN2 PRE REF: 82.8 %
RVN2TLCN2 PRE: 36.44 %
RVN2TLCN2 REF: 43.99
TLCN2 LLN: 6.39
TLCN2 PRE REF: 58.7 %
TLCN2 PRE: 4.43 L
TLCN2 REF: 7.54
VA PRE: 3.88 L
VA SINGLE BREATH LLN: 7.39
VA SINGLE BREATH PRE REF: 52.5 %
VA SINGLE BREATH REF: 7.39
VCMAXN2 LLN: 3.1
VCMAXN2 PRE REF: 66 %
VCMAXN2 PRE: 2.81 L
VCMAXN2 REF: 4.26

## 2021-06-01 PROCEDURE — 94060 PR EVAL OF BRONCHOSPASM: ICD-10-PCS | Mod: 26,,, | Performed by: INTERNAL MEDICINE

## 2021-06-01 PROCEDURE — 94727 GAS DIL/WSHOT DETER LNG VOL: CPT

## 2021-06-01 PROCEDURE — 94727 GAS DIL/WSHOT DETER LNG VOL: CPT | Mod: 26,,, | Performed by: INTERNAL MEDICINE

## 2021-06-01 PROCEDURE — 94729 PR C02/MEMBANE DIFFUSE CAPACITY: ICD-10-PCS | Mod: 26,,, | Performed by: INTERNAL MEDICINE

## 2021-06-01 PROCEDURE — 94729 DIFFUSING CAPACITY: CPT

## 2021-06-01 PROCEDURE — 94060 EVALUATION OF WHEEZING: CPT

## 2021-06-01 PROCEDURE — 94729 DIFFUSING CAPACITY: CPT | Mod: 26,,, | Performed by: INTERNAL MEDICINE

## 2021-06-01 PROCEDURE — 94060 EVALUATION OF WHEEZING: CPT | Mod: 26,,, | Performed by: INTERNAL MEDICINE

## 2021-06-01 PROCEDURE — 94727 PR PULM FUNCTION TEST BY GAS: ICD-10-PCS | Mod: 26,,, | Performed by: INTERNAL MEDICINE

## 2021-06-02 ENCOUNTER — HOSPITAL ENCOUNTER (OUTPATIENT)
Dept: CARDIOLOGY | Facility: HOSPITAL | Age: 76
Discharge: HOME | End: 2021-06-02
Attending: INTERNAL MEDICINE
Payer: MEDICARE

## 2021-06-02 ENCOUNTER — TELEPHONE (OUTPATIENT)
Dept: CARDIOLOGY | Facility: HOSPITAL | Age: 76
End: 2021-06-02

## 2021-06-02 ENCOUNTER — TELEPHONE (OUTPATIENT)
Dept: ELECTROPHYSIOLOGY | Facility: CLINIC | Age: 76
End: 2021-06-02

## 2021-06-02 VITALS
HEART RATE: 75 BPM | BODY MASS INDEX: 33.27 KG/M2 | SYSTOLIC BLOOD PRESSURE: 130 MMHG | HEIGHT: 73 IN | WEIGHT: 251 LBS | DIASTOLIC BLOOD PRESSURE: 70 MMHG

## 2021-06-02 DIAGNOSIS — I49.3 FREQUENT UNIFOCAL PVCS: Primary | ICD-10-CM

## 2021-06-02 DIAGNOSIS — I45.10 RBBB: ICD-10-CM

## 2021-06-02 LAB
ASCENDING AORTA: 3.7 CM
AV INDEX (PROSTH): 0.72
AV MEAN GRADIENT: 4 MMHG
AV PEAK GRADIENT: 7 MMHG
AV VALVE AREA: 2.64 CM2
AV VELOCITY RATIO: 0.63
BSA FOR ECHO PROCEDURE: 2.42 M2
CV ECHO LV RWT: 0.45 CM
DOP CALC AO PEAK VEL: 1.34 M/S
DOP CALC AO VTI: 29.44 CM
DOP CALC LVOT AREA: 3.7 CM2
DOP CALC LVOT DIAMETER: 2.16 CM
DOP CALC LVOT PEAK VEL: 0.84 M/S
DOP CALC LVOT STROKE VOLUME: 77.83 CM3
DOP CALCLVOT PEAK VEL VTI: 21.25 CM
E/E' RATIO: 10.4 M/S
ECHO LV POSTERIOR WALL: 1.26 CM (ref 0.6–1.1)
EJECTION FRACTION: 65 %
FRACTIONAL SHORTENING: 32 % (ref 28–44)
INTERVENTRICULAR SEPTUM: 1.14 CM (ref 0.6–1.1)
IVRT: 114.18 MSEC
LA MAJOR: 6.17 CM
LA MINOR: 6.28 CM
LA WIDTH: 4.8 CM
LEFT ATRIUM SIZE: 4.64 CM
LEFT ATRIUM VOLUME INDEX MOD: 44.9 ML/M2
LEFT ATRIUM VOLUME INDEX: 49.7 ML/M2
LEFT ATRIUM VOLUME MOD: 106.34 CM3
LEFT ATRIUM VOLUME: 117.84 CM3
LEFT INTERNAL DIMENSION IN SYSTOLE: 3.79 CM (ref 2.1–4)
LEFT VENTRICLE DIASTOLIC VOLUME INDEX: 64.42 ML/M2
LEFT VENTRICLE DIASTOLIC VOLUME: 152.68 ML
LEFT VENTRICLE MASS INDEX: 118 G/M2
LEFT VENTRICLE SYSTOLIC VOLUME INDEX: 25.9 ML/M2
LEFT VENTRICLE SYSTOLIC VOLUME: 61.4 ML
LEFT VENTRICULAR INTERNAL DIMENSION IN DIASTOLE: 5.58 CM (ref 3.5–6)
LEFT VENTRICULAR MASS: 278.84 G
LV LATERAL E/E' RATIO: 9.75 M/S
LV SEPTAL E/E' RATIO: 11.14 M/S
MV A" WAVE DURATION": 24.36 MSEC
MV PEAK E VEL: 0.78 M/S
PISA TR MAX VEL: 2.49 M/S
PULM VEIN S/D RATIO: 0.65
PV PEAK D VEL: 0.48 M/S
PV PEAK S VEL: 0.31 M/S
RA MAJOR: 5.36 CM
RA PRESSURE: 3 MMHG
RA WIDTH: 4.23 CM
RIGHT VENTRICULAR END-DIASTOLIC DIMENSION: 3.36 CM
RV TISSUE DOPPLER FREE WALL SYSTOLIC VELOCITY 1 (APICAL 4 CHAMBER VIEW): 12.51 CM/S
SINUS: 3.71 CM
STJ: 3.24 CM
TDI LATERAL: 0.08 M/S
TDI SEPTAL: 0.07 M/S
TDI: 0.08 M/S
TR MAX PG: 25 MMHG
TRICUSPID ANNULAR PLANE SYSTOLIC EXCURSION: 3.06 CM
TV REST PULMONARY ARTERY PRESSURE: 28 MMHG

## 2021-06-02 PROCEDURE — 93306 ECHO (CUPID ONLY): ICD-10-PCS | Mod: 26,,, | Performed by: INTERNAL MEDICINE

## 2021-06-02 PROCEDURE — 93306 TTE W/DOPPLER COMPLETE: CPT

## 2021-06-02 PROCEDURE — 93306 TTE W/DOPPLER COMPLETE: CPT | Mod: 26,,, | Performed by: INTERNAL MEDICINE

## 2021-06-23 ENCOUNTER — LAB VISIT (OUTPATIENT)
Dept: LAB | Facility: HOSPITAL | Age: 76
End: 2021-06-23
Attending: PHYSICIAN ASSISTANT
Payer: MEDICARE

## 2021-06-23 DIAGNOSIS — I10 HTN (HYPERTENSION), BENIGN: ICD-10-CM

## 2021-06-23 DIAGNOSIS — D64.9 ANEMIA, UNSPECIFIED TYPE: ICD-10-CM

## 2021-06-23 LAB
BASOPHILS # BLD AUTO: 0.01 K/UL (ref 0–0.2)
BASOPHILS NFR BLD: 0.2 % (ref 0–1.9)
DIFFERENTIAL METHOD: ABNORMAL
EOSINOPHIL # BLD AUTO: 0.2 K/UL (ref 0–0.5)
EOSINOPHIL NFR BLD: 2.7 % (ref 0–8)
ERYTHROCYTE [DISTWIDTH] IN BLOOD BY AUTOMATED COUNT: 13.4 % (ref 11.5–14.5)
HCT VFR BLD AUTO: 43 % (ref 40–54)
HGB BLD-MCNC: 13.6 G/DL (ref 14–18)
IMM GRANULOCYTES # BLD AUTO: 0.02 K/UL (ref 0–0.04)
IMM GRANULOCYTES NFR BLD AUTO: 0.3 % (ref 0–0.5)
LYMPHOCYTES # BLD AUTO: 1.7 K/UL (ref 1–4.8)
LYMPHOCYTES NFR BLD: 25.9 % (ref 18–48)
MCH RBC QN AUTO: 29.5 PG (ref 27–31)
MCHC RBC AUTO-ENTMCNC: 31.6 G/DL (ref 32–36)
MCV RBC AUTO: 93 FL (ref 82–98)
MONOCYTES # BLD AUTO: 0.8 K/UL (ref 0.3–1)
MONOCYTES NFR BLD: 12.1 % (ref 4–15)
NEUTROPHILS # BLD AUTO: 3.9 K/UL (ref 1.8–7.7)
NEUTROPHILS NFR BLD: 58.8 % (ref 38–73)
NRBC BLD-RTO: 0 /100 WBC
PLATELET # BLD AUTO: 115 K/UL (ref 150–450)
PMV BLD AUTO: 11.5 FL (ref 9.2–12.9)
RBC # BLD AUTO: 4.61 M/UL (ref 4.6–6.2)
WBC # BLD AUTO: 6.6 K/UL (ref 3.9–12.7)

## 2021-06-23 PROCEDURE — 80053 COMPREHEN METABOLIC PANEL: CPT | Performed by: PHYSICIAN ASSISTANT

## 2021-06-23 PROCEDURE — 36415 COLL VENOUS BLD VENIPUNCTURE: CPT | Mod: PO | Performed by: PHYSICIAN ASSISTANT

## 2021-06-23 PROCEDURE — 85025 COMPLETE CBC W/AUTO DIFF WBC: CPT | Performed by: PHYSICIAN ASSISTANT

## 2021-06-23 PROCEDURE — 80061 LIPID PANEL: CPT | Performed by: PHYSICIAN ASSISTANT

## 2021-06-24 ENCOUNTER — PATIENT MESSAGE (OUTPATIENT)
Dept: CARDIOLOGY | Facility: CLINIC | Age: 76
End: 2021-06-24

## 2021-06-24 ENCOUNTER — PATIENT MESSAGE (OUTPATIENT)
Dept: NEUROLOGY | Facility: CLINIC | Age: 76
End: 2021-06-24

## 2021-06-24 ENCOUNTER — PES CALL (OUTPATIENT)
Dept: ADMINISTRATIVE | Facility: CLINIC | Age: 76
End: 2021-06-24

## 2021-06-24 LAB
ALBUMIN SERPL BCP-MCNC: 3.7 G/DL (ref 3.5–5.2)
ALP SERPL-CCNC: 62 U/L (ref 55–135)
ALT SERPL W/O P-5'-P-CCNC: 16 U/L (ref 10–44)
ANION GAP SERPL CALC-SCNC: 9 MMOL/L (ref 8–16)
AST SERPL-CCNC: 15 U/L (ref 10–40)
BILIRUB SERPL-MCNC: 1.3 MG/DL (ref 0.1–1)
BUN SERPL-MCNC: 20 MG/DL (ref 8–23)
CALCIUM SERPL-MCNC: 9.5 MG/DL (ref 8.7–10.5)
CHLORIDE SERPL-SCNC: 106 MMOL/L (ref 95–110)
CHOLEST SERPL-MCNC: 117 MG/DL (ref 120–199)
CHOLEST/HDLC SERPL: 3.3 {RATIO} (ref 2–5)
CO2 SERPL-SCNC: 28 MMOL/L (ref 23–29)
CREAT SERPL-MCNC: 1.3 MG/DL (ref 0.5–1.4)
EST. GFR  (AFRICAN AMERICAN): >60 ML/MIN/1.73 M^2
EST. GFR  (NON AFRICAN AMERICAN): 52.6 ML/MIN/1.73 M^2
GLUCOSE SERPL-MCNC: 108 MG/DL (ref 70–110)
HDLC SERPL-MCNC: 36 MG/DL (ref 40–75)
HDLC SERPL: 30.8 % (ref 20–50)
LDLC SERPL CALC-MCNC: 61.4 MG/DL (ref 63–159)
NONHDLC SERPL-MCNC: 81 MG/DL
POTASSIUM SERPL-SCNC: 3.6 MMOL/L (ref 3.5–5.1)
PROT SERPL-MCNC: 6.2 G/DL (ref 6–8.4)
SODIUM SERPL-SCNC: 143 MMOL/L (ref 136–145)
TRIGL SERPL-MCNC: 98 MG/DL (ref 30–150)

## 2021-06-25 ENCOUNTER — OFFICE VISIT (OUTPATIENT)
Dept: FAMILY MEDICINE | Facility: CLINIC | Age: 76
End: 2021-06-25
Payer: MEDICARE

## 2021-06-25 VITALS
DIASTOLIC BLOOD PRESSURE: 68 MMHG | HEIGHT: 73 IN | BODY MASS INDEX: 33.13 KG/M2 | WEIGHT: 250 LBS | SYSTOLIC BLOOD PRESSURE: 128 MMHG | HEART RATE: 66 BPM | OXYGEN SATURATION: 94 % | TEMPERATURE: 99 F

## 2021-06-25 DIAGNOSIS — I70.0 ABDOMINAL AORTIC ATHEROSCLEROSIS: ICD-10-CM

## 2021-06-25 DIAGNOSIS — J44.89 COPD WITH ASTHMA: ICD-10-CM

## 2021-06-25 DIAGNOSIS — D69.6 THROMBOCYTOPENIA: ICD-10-CM

## 2021-06-25 DIAGNOSIS — I10 HTN (HYPERTENSION), BENIGN: Primary | ICD-10-CM

## 2021-06-25 DIAGNOSIS — R06.02 SHORTNESS OF BREATH: ICD-10-CM

## 2021-06-25 DIAGNOSIS — Z23 NEED FOR ZOSTER VACCINE: ICD-10-CM

## 2021-06-25 DIAGNOSIS — R35.1 NOCTURIA: ICD-10-CM

## 2021-06-25 DIAGNOSIS — R60.0 EDEMA OF EXTREMITIES: ICD-10-CM

## 2021-06-25 PROCEDURE — 3078F DIAST BP <80 MM HG: CPT | Mod: CPTII,S$GLB,, | Performed by: FAMILY MEDICINE

## 2021-06-25 PROCEDURE — 1159F MED LIST DOCD IN RCRD: CPT | Mod: S$GLB,,, | Performed by: FAMILY MEDICINE

## 2021-06-25 PROCEDURE — 3288F PR FALLS RISK ASSESSMENT DOCUMENTED: ICD-10-PCS | Mod: CPTII,S$GLB,, | Performed by: FAMILY MEDICINE

## 2021-06-25 PROCEDURE — 99214 PR OFFICE/OUTPT VISIT, EST, LEVL IV, 30-39 MIN: ICD-10-PCS | Mod: S$GLB,,, | Performed by: FAMILY MEDICINE

## 2021-06-25 PROCEDURE — 99999 PR PBB SHADOW E&M-EST. PATIENT-LVL IV: CPT | Mod: PBBFAC,,, | Performed by: FAMILY MEDICINE

## 2021-06-25 PROCEDURE — 99214 OFFICE O/P EST MOD 30 MIN: CPT | Mod: S$GLB,,, | Performed by: FAMILY MEDICINE

## 2021-06-25 PROCEDURE — 1126F PR PAIN SEVERITY QUANTIFIED, NO PAIN PRESENT: ICD-10-PCS | Mod: S$GLB,,, | Performed by: FAMILY MEDICINE

## 2021-06-25 PROCEDURE — 99999 PR PBB SHADOW E&M-EST. PATIENT-LVL IV: ICD-10-PCS | Mod: PBBFAC,,, | Performed by: FAMILY MEDICINE

## 2021-06-25 PROCEDURE — 3074F SYST BP LT 130 MM HG: CPT | Mod: CPTII,S$GLB,, | Performed by: FAMILY MEDICINE

## 2021-06-25 PROCEDURE — 99499 UNLISTED E&M SERVICE: CPT | Mod: S$GLB,,, | Performed by: FAMILY MEDICINE

## 2021-06-25 PROCEDURE — 1157F ADVNC CARE PLAN IN RCRD: CPT | Mod: S$GLB,,, | Performed by: FAMILY MEDICINE

## 2021-06-25 PROCEDURE — 1101F PR PT FALLS ASSESS DOC 0-1 FALLS W/OUT INJ PAST YR: ICD-10-PCS | Mod: CPTII,S$GLB,, | Performed by: FAMILY MEDICINE

## 2021-06-25 PROCEDURE — 3074F PR MOST RECENT SYSTOLIC BLOOD PRESSURE < 130 MM HG: ICD-10-PCS | Mod: CPTII,S$GLB,, | Performed by: FAMILY MEDICINE

## 2021-06-25 PROCEDURE — 3288F FALL RISK ASSESSMENT DOCD: CPT | Mod: CPTII,S$GLB,, | Performed by: FAMILY MEDICINE

## 2021-06-25 PROCEDURE — 1126F AMNT PAIN NOTED NONE PRSNT: CPT | Mod: S$GLB,,, | Performed by: FAMILY MEDICINE

## 2021-06-25 PROCEDURE — 1157F PR ADVANCE CARE PLAN OR EQUIV PRESENT IN MEDICAL RECORD: ICD-10-PCS | Mod: S$GLB,,, | Performed by: FAMILY MEDICINE

## 2021-06-25 PROCEDURE — 1159F PR MEDICATION LIST DOCUMENTED IN MEDICAL RECORD: ICD-10-PCS | Mod: S$GLB,,, | Performed by: FAMILY MEDICINE

## 2021-06-25 PROCEDURE — 99499 RISK ADDL DX/OHS AUDIT: ICD-10-PCS | Mod: S$GLB,,, | Performed by: FAMILY MEDICINE

## 2021-06-25 PROCEDURE — 3078F PR MOST RECENT DIASTOLIC BLOOD PRESSURE < 80 MM HG: ICD-10-PCS | Mod: CPTII,S$GLB,, | Performed by: FAMILY MEDICINE

## 2021-06-25 PROCEDURE — 1101F PT FALLS ASSESS-DOCD LE1/YR: CPT | Mod: CPTII,S$GLB,, | Performed by: FAMILY MEDICINE

## 2021-06-25 RX ORDER — SPIRONOLACTONE 25 MG/1
25 TABLET ORAL DAILY
Qty: 90 TABLET | Refills: 3 | Status: SHIPPED | OUTPATIENT
Start: 2021-06-25 | End: 2021-06-28 | Stop reason: SDUPTHER

## 2021-06-25 RX ORDER — SPIRONOLACTONE 25 MG/1
25 TABLET ORAL DAILY
COMMUNITY
End: 2021-06-25 | Stop reason: SDUPTHER

## 2021-06-25 RX ORDER — ZOSTER VACCINE RECOMBINANT, ADJUVANTED 50 MCG/0.5
0.5 KIT INTRAMUSCULAR ONCE
Qty: 1 EACH | Refills: 1 | Status: SHIPPED | OUTPATIENT
Start: 2021-06-25 | End: 2021-06-25

## 2021-06-25 RX ORDER — TIOTROPIUM BROMIDE 18 UG/1
18 CAPSULE ORAL; RESPIRATORY (INHALATION) DAILY
Qty: 30 CAPSULE | Refills: 0 | Status: SHIPPED | OUTPATIENT
Start: 2021-06-25 | End: 2021-08-11

## 2021-06-28 DIAGNOSIS — I10 HYPERTENSION, UNSPECIFIED TYPE: Primary | ICD-10-CM

## 2021-06-28 RX ORDER — SPIRONOLACTONE 25 MG/1
25 TABLET ORAL DAILY
Qty: 90 TABLET | Refills: 3 | Status: SHIPPED | OUTPATIENT
Start: 2021-06-28 | End: 2021-08-17

## 2021-06-30 ENCOUNTER — HOSPITAL ENCOUNTER (OUTPATIENT)
Dept: RADIOLOGY | Facility: HOSPITAL | Age: 76
Discharge: HOME | End: 2021-06-30
Attending: FAMILY MEDICINE
Payer: MEDICARE

## 2021-06-30 DIAGNOSIS — D69.6 THROMBOCYTOPENIA: ICD-10-CM

## 2021-06-30 PROCEDURE — 76700 US ABDOMEN COMPLETE: ICD-10-PCS | Mod: 26,,, | Performed by: RADIOLOGY

## 2021-06-30 PROCEDURE — 76700 US EXAM ABDOM COMPLETE: CPT | Mod: TC

## 2021-06-30 PROCEDURE — 76700 US EXAM ABDOM COMPLETE: CPT | Mod: 26,,, | Performed by: RADIOLOGY

## 2021-07-02 ENCOUNTER — PATIENT MESSAGE (OUTPATIENT)
Dept: CARDIOLOGY | Facility: CLINIC | Age: 76
End: 2021-07-02

## 2021-07-02 ENCOUNTER — PATIENT MESSAGE (OUTPATIENT)
Dept: FAMILY MEDICINE | Facility: CLINIC | Age: 76
End: 2021-07-02

## 2021-07-02 DIAGNOSIS — N13.30 HYDRONEPHROSIS DETERMINED BY ULTRASOUND: Primary | ICD-10-CM

## 2021-07-02 DIAGNOSIS — N13.30 HYDRONEPHROSIS, UNSPECIFIED HYDRONEPHROSIS TYPE: ICD-10-CM

## 2021-07-02 DIAGNOSIS — R97.20 ABNORMAL PSA: ICD-10-CM

## 2021-07-06 ENCOUNTER — TELEPHONE (OUTPATIENT)
Dept: CARDIOLOGY | Facility: CLINIC | Age: 76
End: 2021-07-06

## 2021-07-06 DIAGNOSIS — E87.5 HYPERKALEMIA: Primary | ICD-10-CM

## 2021-07-14 ENCOUNTER — HOSPITAL ENCOUNTER (OUTPATIENT)
Dept: RADIOLOGY | Facility: HOSPITAL | Age: 76
Discharge: HOME | End: 2021-07-14
Attending: FAMILY MEDICINE
Payer: MEDICARE

## 2021-07-14 DIAGNOSIS — N13.30 HYDRONEPHROSIS, UNSPECIFIED HYDRONEPHROSIS TYPE: ICD-10-CM

## 2021-07-14 PROCEDURE — 74176 CT ABD & PELVIS W/O CONTRAST: CPT | Mod: TC

## 2021-07-14 PROCEDURE — 74176 CT ABD & PELVIS W/O CONTRAST: CPT | Mod: 26,,, | Performed by: RADIOLOGY

## 2021-07-14 PROCEDURE — 74176 CT ABDOMEN PELVIS WITHOUT CONTRAST: ICD-10-PCS | Mod: 26,,, | Performed by: RADIOLOGY

## 2021-07-21 ENCOUNTER — PATIENT MESSAGE (OUTPATIENT)
Dept: FAMILY MEDICINE | Facility: CLINIC | Age: 76
End: 2021-07-21

## 2021-07-21 ENCOUNTER — LAB VISIT (OUTPATIENT)
Dept: LAB | Facility: HOSPITAL | Age: 76
End: 2021-07-21
Attending: INTERNAL MEDICINE
Payer: MEDICARE

## 2021-07-21 DIAGNOSIS — I10 HYPERTENSION, UNSPECIFIED TYPE: ICD-10-CM

## 2021-07-21 PROCEDURE — 36415 COLL VENOUS BLD VENIPUNCTURE: CPT | Mod: PO | Performed by: INTERNAL MEDICINE

## 2021-07-21 PROCEDURE — 80048 BASIC METABOLIC PNL TOTAL CA: CPT | Performed by: INTERNAL MEDICINE

## 2021-07-22 LAB
ANION GAP SERPL CALC-SCNC: 12 MMOL/L (ref 8–16)
BUN SERPL-MCNC: 20 MG/DL (ref 8–23)
CALCIUM SERPL-MCNC: 9.5 MG/DL (ref 8.7–10.5)
CHLORIDE SERPL-SCNC: 104 MMOL/L (ref 95–110)
CO2 SERPL-SCNC: 25 MMOL/L (ref 23–29)
CREAT SERPL-MCNC: 1.3 MG/DL (ref 0.5–1.4)
EST. GFR  (AFRICAN AMERICAN): >60 ML/MIN/1.73 M^2
EST. GFR  (NON AFRICAN AMERICAN): 52.6 ML/MIN/1.73 M^2
GLUCOSE SERPL-MCNC: 97 MG/DL (ref 70–110)
POTASSIUM SERPL-SCNC: 4.1 MMOL/L (ref 3.5–5.1)
SODIUM SERPL-SCNC: 141 MMOL/L (ref 136–145)

## 2021-08-04 ENCOUNTER — PATIENT OUTREACH (OUTPATIENT)
Dept: ADMINISTRATIVE | Facility: OTHER | Age: 76
End: 2021-08-04

## 2021-08-08 ENCOUNTER — PATIENT MESSAGE (OUTPATIENT)
Dept: NEUROLOGY | Facility: CLINIC | Age: 76
End: 2021-08-08

## 2021-08-08 ENCOUNTER — PATIENT MESSAGE (OUTPATIENT)
Dept: FAMILY MEDICINE | Facility: CLINIC | Age: 76
End: 2021-08-08

## 2021-08-09 ENCOUNTER — OFFICE VISIT (OUTPATIENT)
Dept: UROLOGY | Facility: CLINIC | Age: 76
End: 2021-08-09
Payer: MEDICARE

## 2021-08-09 VITALS
SYSTOLIC BLOOD PRESSURE: 116 MMHG | BODY MASS INDEX: 32.14 KG/M2 | WEIGHT: 242.5 LBS | HEIGHT: 73 IN | DIASTOLIC BLOOD PRESSURE: 66 MMHG | HEART RATE: 65 BPM

## 2021-08-09 DIAGNOSIS — R97.20 ELEVATED PSA: ICD-10-CM

## 2021-08-09 DIAGNOSIS — N13.8 BPH WITH OBSTRUCTION/LOWER URINARY TRACT SYMPTOMS: ICD-10-CM

## 2021-08-09 DIAGNOSIS — N40.1 BPH WITH OBSTRUCTION/LOWER URINARY TRACT SYMPTOMS: ICD-10-CM

## 2021-08-09 LAB
BILIRUB SERPL-MCNC: ABNORMAL MG/DL
BLOOD URINE, POC: ABNORMAL
CLARITY, POC UA: CLEAR
COLOR, POC UA: YELLOW
GLUCOSE UR QL STRIP: ABNORMAL
KETONES UR QL STRIP: ABNORMAL
LEUKOCYTE ESTERASE URINE, POC: ABNORMAL
NITRITE, POC UA: ABNORMAL
PH, POC UA: 5
POC RESIDUAL URINE VOLUME: 0 ML (ref 0–100)
PROTEIN, POC: ABNORMAL
SPECIFIC GRAVITY, POC UA: 1.03
UROBILINOGEN, POC UA: 1

## 2021-08-09 PROCEDURE — 51798 POCT BLADDER SCAN: ICD-10-PCS | Mod: S$GLB,,, | Performed by: UROLOGY

## 2021-08-09 PROCEDURE — 1160F RVW MEDS BY RX/DR IN RCRD: CPT | Mod: CPTII,S$GLB,, | Performed by: UROLOGY

## 2021-08-09 PROCEDURE — 81002 POCT URINE DIPSTICK WITHOUT MICROSCOPE: ICD-10-PCS | Mod: S$GLB,,, | Performed by: UROLOGY

## 2021-08-09 PROCEDURE — 1160F PR REVIEW ALL MEDS BY PRESCRIBER/CLIN PHARMACIST DOCUMENTED: ICD-10-PCS | Mod: CPTII,S$GLB,, | Performed by: UROLOGY

## 2021-08-09 PROCEDURE — 1159F PR MEDICATION LIST DOCUMENTED IN MEDICAL RECORD: ICD-10-PCS | Mod: CPTII,S$GLB,, | Performed by: UROLOGY

## 2021-08-09 PROCEDURE — 1126F AMNT PAIN NOTED NONE PRSNT: CPT | Mod: CPTII,S$GLB,, | Performed by: UROLOGY

## 2021-08-09 PROCEDURE — 3078F DIAST BP <80 MM HG: CPT | Mod: CPTII,S$GLB,, | Performed by: UROLOGY

## 2021-08-09 PROCEDURE — 1101F PR PT FALLS ASSESS DOC 0-1 FALLS W/OUT INJ PAST YR: ICD-10-PCS | Mod: CPTII,S$GLB,, | Performed by: UROLOGY

## 2021-08-09 PROCEDURE — 1101F PT FALLS ASSESS-DOCD LE1/YR: CPT | Mod: CPTII,S$GLB,, | Performed by: UROLOGY

## 2021-08-09 PROCEDURE — 51798 US URINE CAPACITY MEASURE: CPT | Mod: S$GLB,,, | Performed by: UROLOGY

## 2021-08-09 PROCEDURE — 99215 PR OFFICE/OUTPT VISIT, EST, LEVL V, 40-54 MIN: ICD-10-PCS | Mod: S$GLB,,, | Performed by: UROLOGY

## 2021-08-09 PROCEDURE — 3078F PR MOST RECENT DIASTOLIC BLOOD PRESSURE < 80 MM HG: ICD-10-PCS | Mod: CPTII,S$GLB,, | Performed by: UROLOGY

## 2021-08-09 PROCEDURE — 1159F MED LIST DOCD IN RCRD: CPT | Mod: CPTII,S$GLB,, | Performed by: UROLOGY

## 2021-08-09 PROCEDURE — 3074F SYST BP LT 130 MM HG: CPT | Mod: CPTII,S$GLB,, | Performed by: UROLOGY

## 2021-08-09 PROCEDURE — 1157F PR ADVANCE CARE PLAN OR EQUIV PRESENT IN MEDICAL RECORD: ICD-10-PCS | Mod: CPTII,S$GLB,, | Performed by: UROLOGY

## 2021-08-09 PROCEDURE — 1157F ADVNC CARE PLAN IN RCRD: CPT | Mod: CPTII,S$GLB,, | Performed by: UROLOGY

## 2021-08-09 PROCEDURE — 99215 OFFICE O/P EST HI 40 MIN: CPT | Mod: S$GLB,,, | Performed by: UROLOGY

## 2021-08-09 PROCEDURE — 3288F FALL RISK ASSESSMENT DOCD: CPT | Mod: CPTII,S$GLB,, | Performed by: UROLOGY

## 2021-08-09 PROCEDURE — 81002 URINALYSIS NONAUTO W/O SCOPE: CPT | Mod: S$GLB,,, | Performed by: UROLOGY

## 2021-08-09 PROCEDURE — 3288F PR FALLS RISK ASSESSMENT DOCUMENTED: ICD-10-PCS | Mod: CPTII,S$GLB,, | Performed by: UROLOGY

## 2021-08-09 PROCEDURE — 1126F PR PAIN SEVERITY QUANTIFIED, NO PAIN PRESENT: ICD-10-PCS | Mod: CPTII,S$GLB,, | Performed by: UROLOGY

## 2021-08-09 PROCEDURE — 3074F PR MOST RECENT SYSTOLIC BLOOD PRESSURE < 130 MM HG: ICD-10-PCS | Mod: CPTII,S$GLB,, | Performed by: UROLOGY

## 2021-08-09 PROCEDURE — 99999 PR PBB SHADOW E&M-EST. PATIENT-LVL III: CPT | Mod: PBBFAC,,, | Performed by: UROLOGY

## 2021-08-09 PROCEDURE — 99999 PR PBB SHADOW E&M-EST. PATIENT-LVL III: ICD-10-PCS | Mod: PBBFAC,,, | Performed by: UROLOGY

## 2021-08-09 RX ORDER — ALFUZOSIN HYDROCHLORIDE 10 MG/1
10 TABLET, EXTENDED RELEASE ORAL
Qty: 30 TABLET | Refills: 11 | Status: SHIPPED | OUTPATIENT
Start: 2021-08-09 | End: 2022-01-10

## 2021-08-09 RX ORDER — DOXYCYCLINE 100 MG/1
100 CAPSULE ORAL 2 TIMES DAILY
Qty: 28 CAPSULE | Refills: 0 | Status: SHIPPED | OUTPATIENT
Start: 2021-08-09 | End: 2021-08-23

## 2021-08-10 DIAGNOSIS — M25.511 RIGHT SHOULDER PAIN, UNSPECIFIED CHRONICITY: Primary | ICD-10-CM

## 2021-08-11 DIAGNOSIS — J44.89 COPD WITH ASTHMA: ICD-10-CM

## 2021-08-11 RX ORDER — TIOTROPIUM BROMIDE 18 UG/1
CAPSULE ORAL; RESPIRATORY (INHALATION)
Qty: 30 CAPSULE | Refills: 5 | Status: SHIPPED | OUTPATIENT
Start: 2021-08-11 | End: 2022-02-27 | Stop reason: SDUPTHER

## 2021-08-16 ENCOUNTER — HOSPITAL ENCOUNTER (OUTPATIENT)
Dept: RADIOLOGY | Facility: HOSPITAL | Age: 76
Discharge: HOME | End: 2021-08-16
Attending: ORTHOPAEDIC SURGERY
Payer: MEDICARE

## 2021-08-16 ENCOUNTER — OFFICE VISIT (OUTPATIENT)
Dept: ORTHOPEDICS | Facility: CLINIC | Age: 76
End: 2021-08-16
Payer: MEDICARE

## 2021-08-16 VITALS — RESPIRATION RATE: 16 BRPM | BODY MASS INDEX: 32.14 KG/M2 | HEIGHT: 73 IN | WEIGHT: 242.5 LBS

## 2021-08-16 DIAGNOSIS — M75.100 ROTATOR CUFF SYNDROME, UNSPECIFIED LATERALITY: ICD-10-CM

## 2021-08-16 DIAGNOSIS — M25.511 RIGHT SHOULDER PAIN, UNSPECIFIED CHRONICITY: ICD-10-CM

## 2021-08-16 DIAGNOSIS — E87.5 HYPERKALEMIA: Primary | ICD-10-CM

## 2021-08-16 PROCEDURE — 1157F ADVNC CARE PLAN IN RCRD: CPT | Mod: CPTII,S$GLB,, | Performed by: ORTHOPAEDIC SURGERY

## 2021-08-16 PROCEDURE — 3288F PR FALLS RISK ASSESSMENT DOCUMENTED: ICD-10-PCS | Mod: CPTII,S$GLB,, | Performed by: ORTHOPAEDIC SURGERY

## 2021-08-16 PROCEDURE — 20610 DRAIN/INJ JOINT/BURSA W/O US: CPT | Mod: RT,S$GLB,, | Performed by: ORTHOPAEDIC SURGERY

## 2021-08-16 PROCEDURE — 99213 OFFICE O/P EST LOW 20 MIN: CPT | Mod: 25,S$GLB,, | Performed by: ORTHOPAEDIC SURGERY

## 2021-08-16 PROCEDURE — 73030 X-RAY EXAM OF SHOULDER: CPT | Mod: 26,RT,, | Performed by: RADIOLOGY

## 2021-08-16 PROCEDURE — 1125F AMNT PAIN NOTED PAIN PRSNT: CPT | Mod: CPTII,S$GLB,, | Performed by: ORTHOPAEDIC SURGERY

## 2021-08-16 PROCEDURE — 20610 LARGE JOINT ASPIRATION/INJECTION: R SUBACROMIAL BURSA: ICD-10-PCS | Mod: RT,S$GLB,, | Performed by: ORTHOPAEDIC SURGERY

## 2021-08-16 PROCEDURE — 1101F PT FALLS ASSESS-DOCD LE1/YR: CPT | Mod: CPTII,S$GLB,, | Performed by: ORTHOPAEDIC SURGERY

## 2021-08-16 PROCEDURE — 73030 X-RAY EXAM OF SHOULDER: CPT | Mod: TC,PN,RT

## 2021-08-16 PROCEDURE — 1159F MED LIST DOCD IN RCRD: CPT | Mod: CPTII,S$GLB,, | Performed by: ORTHOPAEDIC SURGERY

## 2021-08-16 PROCEDURE — 1159F PR MEDICATION LIST DOCUMENTED IN MEDICAL RECORD: ICD-10-PCS | Mod: CPTII,S$GLB,, | Performed by: ORTHOPAEDIC SURGERY

## 2021-08-16 PROCEDURE — 73030 XR SHOULDER TRAUMA 3 VIEW RIGHT: ICD-10-PCS | Mod: 26,RT,, | Performed by: RADIOLOGY

## 2021-08-16 PROCEDURE — 1125F PR PAIN SEVERITY QUANTIFIED, PAIN PRESENT: ICD-10-PCS | Mod: CPTII,S$GLB,, | Performed by: ORTHOPAEDIC SURGERY

## 2021-08-16 PROCEDURE — 1157F PR ADVANCE CARE PLAN OR EQUIV PRESENT IN MEDICAL RECORD: ICD-10-PCS | Mod: CPTII,S$GLB,, | Performed by: ORTHOPAEDIC SURGERY

## 2021-08-16 PROCEDURE — 99999 PR PBB SHADOW E&M-EST. PATIENT-LVL V: CPT | Mod: PBBFAC,,, | Performed by: ORTHOPAEDIC SURGERY

## 2021-08-16 PROCEDURE — 1101F PR PT FALLS ASSESS DOC 0-1 FALLS W/OUT INJ PAST YR: ICD-10-PCS | Mod: CPTII,S$GLB,, | Performed by: ORTHOPAEDIC SURGERY

## 2021-08-16 PROCEDURE — 3288F FALL RISK ASSESSMENT DOCD: CPT | Mod: CPTII,S$GLB,, | Performed by: ORTHOPAEDIC SURGERY

## 2021-08-16 PROCEDURE — 99999 PR PBB SHADOW E&M-EST. PATIENT-LVL V: ICD-10-PCS | Mod: PBBFAC,,, | Performed by: ORTHOPAEDIC SURGERY

## 2021-08-16 PROCEDURE — 99213 PR OFFICE/OUTPT VISIT, EST, LEVL III, 20-29 MIN: ICD-10-PCS | Mod: 25,S$GLB,, | Performed by: ORTHOPAEDIC SURGERY

## 2021-08-16 RX ORDER — TRIAMCINOLONE ACETONIDE 40 MG/ML
40 INJECTION, SUSPENSION INTRA-ARTICULAR; INTRAMUSCULAR
Status: DISCONTINUED | OUTPATIENT
Start: 2021-08-16 | End: 2021-08-16 | Stop reason: HOSPADM

## 2021-08-16 RX ADMIN — TRIAMCINOLONE ACETONIDE 40 MG: 40 INJECTION, SUSPENSION INTRA-ARTICULAR; INTRAMUSCULAR at 02:08

## 2021-08-17 ENCOUNTER — HOSPITAL ENCOUNTER (OUTPATIENT)
Dept: PULMONOLOGY | Facility: HOSPITAL | Age: 76
Discharge: HOME | End: 2021-08-17
Attending: INTERNAL MEDICINE
Payer: MEDICARE

## 2021-08-17 ENCOUNTER — OFFICE VISIT (OUTPATIENT)
Dept: PULMONOLOGY | Facility: CLINIC | Age: 76
End: 2021-08-17
Payer: MEDICARE

## 2021-08-17 VITALS
SYSTOLIC BLOOD PRESSURE: 120 MMHG | BODY MASS INDEX: 32.42 KG/M2 | HEART RATE: 65 BPM | DIASTOLIC BLOOD PRESSURE: 65 MMHG | OXYGEN SATURATION: 95 % | WEIGHT: 244.63 LBS | HEIGHT: 73 IN

## 2021-08-17 DIAGNOSIS — J45.30 MILD PERSISTENT ASTHMA WITHOUT COMPLICATION: ICD-10-CM

## 2021-08-17 DIAGNOSIS — J98.4 RESTRICTIVE LUNG MECHANICS DUE TO NEUROMUSCULAR DISEASE: ICD-10-CM

## 2021-08-17 DIAGNOSIS — R60.9 EDEMA, UNSPECIFIED TYPE: ICD-10-CM

## 2021-08-17 DIAGNOSIS — R06.09 DOE (DYSPNEA ON EXERTION): ICD-10-CM

## 2021-08-17 DIAGNOSIS — J47.9 BRONCHIECTASIS WITHOUT COMPLICATION: ICD-10-CM

## 2021-08-17 DIAGNOSIS — G70.9 RESTRICTIVE LUNG MECHANICS DUE TO NEUROMUSCULAR DISEASE: ICD-10-CM

## 2021-08-17 DIAGNOSIS — J44.89 COPD WITH ASTHMA: ICD-10-CM

## 2021-08-17 DIAGNOSIS — R06.09 DOE (DYSPNEA ON EXERTION): Primary | ICD-10-CM

## 2021-08-17 DIAGNOSIS — G70.00 MYASTHENIA GRAVIS: ICD-10-CM

## 2021-08-17 DIAGNOSIS — I50.32 CHRONIC DIASTOLIC CONGESTIVE HEART FAILURE: ICD-10-CM

## 2021-08-17 LAB
BR6MWT: NORMAL
BRPFT: NORMAL
MEP LLN: 83
MEP PRE REF: 80 %
MEP PRE: 80 CMH2O
MEP REF: 100
MIP LLN: 58
MIP PRE REF: 94.7 %
MIP PRE: 71 CMH2O
MIP REF: 75

## 2021-08-17 PROCEDURE — 1159F PR MEDICATION LIST DOCUMENTED IN MEDICAL RECORD: ICD-10-PCS | Mod: CPTII,S$GLB,, | Performed by: INTERNAL MEDICINE

## 2021-08-17 PROCEDURE — 1157F ADVNC CARE PLAN IN RCRD: CPT | Mod: CPTII,S$GLB,, | Performed by: INTERNAL MEDICINE

## 2021-08-17 PROCEDURE — 1157F PR ADVANCE CARE PLAN OR EQUIV PRESENT IN MEDICAL RECORD: ICD-10-PCS | Mod: CPTII,S$GLB,, | Performed by: INTERNAL MEDICINE

## 2021-08-17 PROCEDURE — 99205 PR OFFICE/OUTPT VISIT, NEW, LEVL V, 60-74 MIN: ICD-10-PCS | Mod: 25,S$GLB,, | Performed by: INTERNAL MEDICINE

## 2021-08-17 PROCEDURE — 3074F SYST BP LT 130 MM HG: CPT | Mod: CPTII,S$GLB,, | Performed by: INTERNAL MEDICINE

## 2021-08-17 PROCEDURE — 99999 PR PBB SHADOW E&M-EST. PATIENT-LVL III: CPT | Mod: PBBFAC,,, | Performed by: INTERNAL MEDICINE

## 2021-08-17 PROCEDURE — 99499 RISK ADDL DX/OHS AUDIT: ICD-10-PCS | Mod: HCNC,S$GLB,, | Performed by: INTERNAL MEDICINE

## 2021-08-17 PROCEDURE — 99999 PR PBB SHADOW E&M-EST. PATIENT-LVL III: ICD-10-PCS | Mod: PBBFAC,,, | Performed by: INTERNAL MEDICINE

## 2021-08-17 PROCEDURE — 1159F MED LIST DOCD IN RCRD: CPT | Mod: CPTII,S$GLB,, | Performed by: INTERNAL MEDICINE

## 2021-08-17 PROCEDURE — 3288F FALL RISK ASSESSMENT DOCD: CPT | Mod: CPTII,S$GLB,, | Performed by: INTERNAL MEDICINE

## 2021-08-17 PROCEDURE — 3074F PR MOST RECENT SYSTOLIC BLOOD PRESSURE < 130 MM HG: ICD-10-PCS | Mod: CPTII,S$GLB,, | Performed by: INTERNAL MEDICINE

## 2021-08-17 PROCEDURE — 3288F PR FALLS RISK ASSESSMENT DOCUMENTED: ICD-10-PCS | Mod: CPTII,S$GLB,, | Performed by: INTERNAL MEDICINE

## 2021-08-17 PROCEDURE — 1125F PR PAIN SEVERITY QUANTIFIED, PAIN PRESENT: ICD-10-PCS | Mod: CPTII,S$GLB,, | Performed by: INTERNAL MEDICINE

## 2021-08-17 PROCEDURE — 94200 LUNG FUNCTION TEST (MBC/MVV): CPT

## 2021-08-17 PROCEDURE — 94618 PULMONARY STRESS TESTING: ICD-10-PCS | Mod: 26,,, | Performed by: INTERNAL MEDICINE

## 2021-08-17 PROCEDURE — 94799 PR NIF/PIF PULMONARY FUNCTION TEST: ICD-10-PCS | Mod: 26,,, | Performed by: INTERNAL MEDICINE

## 2021-08-17 PROCEDURE — 94618 PULMONARY STRESS TESTING: CPT | Mod: 26,,, | Performed by: INTERNAL MEDICINE

## 2021-08-17 PROCEDURE — 3078F PR MOST RECENT DIASTOLIC BLOOD PRESSURE < 80 MM HG: ICD-10-PCS | Mod: CPTII,S$GLB,, | Performed by: INTERNAL MEDICINE

## 2021-08-17 PROCEDURE — 3078F DIAST BP <80 MM HG: CPT | Mod: CPTII,S$GLB,, | Performed by: INTERNAL MEDICINE

## 2021-08-17 PROCEDURE — 1101F PR PT FALLS ASSESS DOC 0-1 FALLS W/OUT INJ PAST YR: ICD-10-PCS | Mod: CPTII,S$GLB,, | Performed by: INTERNAL MEDICINE

## 2021-08-17 PROCEDURE — 99205 OFFICE O/P NEW HI 60 MIN: CPT | Mod: 25,S$GLB,, | Performed by: INTERNAL MEDICINE

## 2021-08-17 PROCEDURE — 1101F PT FALLS ASSESS-DOCD LE1/YR: CPT | Mod: CPTII,S$GLB,, | Performed by: INTERNAL MEDICINE

## 2021-08-17 PROCEDURE — 99499 UNLISTED E&M SERVICE: CPT | Mod: HCNC,S$GLB,, | Performed by: INTERNAL MEDICINE

## 2021-08-17 PROCEDURE — 94799 UNLISTED PULMONARY SVC/PX: CPT | Mod: 26,,, | Performed by: INTERNAL MEDICINE

## 2021-08-17 PROCEDURE — 1125F AMNT PAIN NOTED PAIN PRSNT: CPT | Mod: CPTII,S$GLB,, | Performed by: INTERNAL MEDICINE

## 2021-08-17 RX ORDER — FLUTICASONE FUROATE, UMECLIDINIUM BROMIDE AND VILANTEROL TRIFENATATE 200; 62.5; 25 UG/1; UG/1; UG/1
1 POWDER RESPIRATORY (INHALATION) DAILY
Qty: 60 EACH | Refills: 11 | Status: SHIPPED | OUTPATIENT
Start: 2021-08-17 | End: 2022-01-10

## 2021-08-17 RX ORDER — FUROSEMIDE 20 MG/1
20 TABLET ORAL DAILY
Qty: 30 TABLET | Refills: 0 | Status: SHIPPED | OUTPATIENT
Start: 2021-08-17 | End: 2021-11-22 | Stop reason: SDUPTHER

## 2021-08-17 RX ORDER — TRIAMTERENE AND HYDROCHLOROTHIAZIDE 75; 50 MG/1; MG/1
1 TABLET ORAL DAILY
Qty: 90 TABLET | Refills: 0 | Status: SHIPPED | OUTPATIENT
Start: 2021-08-17 | End: 2021-09-09 | Stop reason: ALTCHOICE

## 2021-08-18 ENCOUNTER — TELEPHONE (OUTPATIENT)
Dept: PULMONOLOGY | Facility: CLINIC | Age: 76
End: 2021-08-18

## 2021-08-18 ENCOUNTER — PATIENT MESSAGE (OUTPATIENT)
Dept: CARDIOLOGY | Facility: CLINIC | Age: 76
End: 2021-08-18

## 2021-08-20 ENCOUNTER — TELEPHONE (OUTPATIENT)
Dept: CARDIOLOGY | Facility: HOSPITAL | Age: 76
End: 2021-08-20

## 2021-08-23 ENCOUNTER — CLINICAL SUPPORT (OUTPATIENT)
Dept: CARDIOLOGY | Facility: HOSPITAL | Age: 76
End: 2021-08-23
Attending: INTERNAL MEDICINE
Payer: MEDICARE

## 2021-08-23 DIAGNOSIS — I49.3 FREQUENT UNIFOCAL PVCS: ICD-10-CM

## 2021-08-23 PROCEDURE — 93226 XTRNL ECG REC<48 HR SCAN A/R: CPT

## 2021-08-26 ENCOUNTER — CLINICAL SUPPORT (OUTPATIENT)
Dept: CARDIOLOGY | Facility: HOSPITAL | Age: 76
End: 2021-08-26
Attending: INTERNAL MEDICINE
Payer: MEDICARE

## 2021-08-26 PROCEDURE — 93225 XTRNL ECG REC<48 HRS REC: CPT

## 2021-09-02 ENCOUNTER — LAB VISIT (OUTPATIENT)
Dept: LAB | Facility: HOSPITAL | Age: 76
End: 2021-09-02
Attending: INTERNAL MEDICINE
Payer: MEDICARE

## 2021-09-02 ENCOUNTER — PATIENT MESSAGE (OUTPATIENT)
Dept: ELECTROPHYSIOLOGY | Facility: CLINIC | Age: 76
End: 2021-09-02

## 2021-09-02 DIAGNOSIS — E87.5 HYPERKALEMIA: ICD-10-CM

## 2021-09-02 LAB
ANION GAP SERPL CALC-SCNC: 10 MMOL/L (ref 8–16)
BUN SERPL-MCNC: 25 MG/DL (ref 8–23)
CALCIUM SERPL-MCNC: 9.2 MG/DL (ref 8.7–10.5)
CHLORIDE SERPL-SCNC: 105 MMOL/L (ref 95–110)
CO2 SERPL-SCNC: 25 MMOL/L (ref 23–29)
CREAT SERPL-MCNC: 1.3 MG/DL (ref 0.5–1.4)
EST. GFR  (AFRICAN AMERICAN): >60 ML/MIN/1.73 M^2
EST. GFR  (NON AFRICAN AMERICAN): 52.6 ML/MIN/1.73 M^2
GLUCOSE SERPL-MCNC: 131 MG/DL (ref 70–110)
OHS CV EVENT MONITOR DAY: 0
OHS CV HOLTER LENGTH DECIMAL HOURS: 24
OHS CV HOLTER LENGTH HOURS: 24
OHS CV HOLTER LENGTH MINUTES: 0
OHS CV HOLTER SINUS AVERAGE HR: 62
OHS CV HOLTER SINUS MAX HR: 93
OHS CV HOLTER SINUS MIN HR: 50
POTASSIUM SERPL-SCNC: 4.3 MMOL/L (ref 3.5–5.1)
SODIUM SERPL-SCNC: 140 MMOL/L (ref 136–145)

## 2021-09-02 PROCEDURE — 80048 BASIC METABOLIC PNL TOTAL CA: CPT | Performed by: INTERNAL MEDICINE

## 2021-09-02 PROCEDURE — 36415 COLL VENOUS BLD VENIPUNCTURE: CPT | Mod: PO | Performed by: INTERNAL MEDICINE

## 2021-09-05 ENCOUNTER — PATIENT MESSAGE (OUTPATIENT)
Dept: ELECTROPHYSIOLOGY | Facility: CLINIC | Age: 76
End: 2021-09-05

## 2021-09-09 ENCOUNTER — TELEPHONE (OUTPATIENT)
Dept: FAMILY MEDICINE | Facility: CLINIC | Age: 76
End: 2021-09-09

## 2021-09-09 ENCOUNTER — OFFICE VISIT (OUTPATIENT)
Dept: FAMILY MEDICINE | Facility: CLINIC | Age: 76
End: 2021-09-09
Payer: MEDICARE

## 2021-09-09 VITALS
HEART RATE: 71 BPM | SYSTOLIC BLOOD PRESSURE: 124 MMHG | DIASTOLIC BLOOD PRESSURE: 61 MMHG | RESPIRATION RATE: 16 BRPM | TEMPERATURE: 99 F | BODY MASS INDEX: 31.85 KG/M2 | WEIGHT: 240.31 LBS | OXYGEN SATURATION: 96 % | HEIGHT: 73 IN

## 2021-09-09 DIAGNOSIS — Z23 NEED FOR IMMUNIZATION AGAINST INFLUENZA: ICD-10-CM

## 2021-09-09 DIAGNOSIS — I49.3 PVC (PREMATURE VENTRICULAR CONTRACTION): ICD-10-CM

## 2021-09-09 DIAGNOSIS — Z12.11 ENCOUNTER FOR SCREENING COLONOSCOPY: ICD-10-CM

## 2021-09-09 DIAGNOSIS — J44.89 COPD WITH ASTHMA: ICD-10-CM

## 2021-09-09 DIAGNOSIS — G70.00 MYASTHENIA GRAVIS, ACHR ANTIBODY POSITIVE: ICD-10-CM

## 2021-09-09 DIAGNOSIS — D69.6 THROMBOCYTOPENIA: ICD-10-CM

## 2021-09-09 DIAGNOSIS — Z23 NEED FOR ZOSTER VACCINE: ICD-10-CM

## 2021-09-09 DIAGNOSIS — I10 HTN (HYPERTENSION), BENIGN: Primary | ICD-10-CM

## 2021-09-09 PROCEDURE — 1157F PR ADVANCE CARE PLAN OR EQUIV PRESENT IN MEDICAL RECORD: ICD-10-PCS | Mod: CPTII,S$GLB,, | Performed by: FAMILY MEDICINE

## 2021-09-09 PROCEDURE — 1125F PR PAIN SEVERITY QUANTIFIED, PAIN PRESENT: ICD-10-PCS | Mod: CPTII,S$GLB,, | Performed by: FAMILY MEDICINE

## 2021-09-09 PROCEDURE — 1125F AMNT PAIN NOTED PAIN PRSNT: CPT | Mod: CPTII,S$GLB,, | Performed by: FAMILY MEDICINE

## 2021-09-09 PROCEDURE — 99214 PR OFFICE/OUTPT VISIT, EST, LEVL IV, 30-39 MIN: ICD-10-PCS | Mod: S$GLB,,, | Performed by: FAMILY MEDICINE

## 2021-09-09 PROCEDURE — 1157F ADVNC CARE PLAN IN RCRD: CPT | Mod: CPTII,S$GLB,, | Performed by: FAMILY MEDICINE

## 2021-09-09 PROCEDURE — 3078F DIAST BP <80 MM HG: CPT | Mod: CPTII,S$GLB,, | Performed by: FAMILY MEDICINE

## 2021-09-09 PROCEDURE — 1101F PT FALLS ASSESS-DOCD LE1/YR: CPT | Mod: CPTII,S$GLB,, | Performed by: FAMILY MEDICINE

## 2021-09-09 PROCEDURE — 3288F PR FALLS RISK ASSESSMENT DOCUMENTED: ICD-10-PCS | Mod: CPTII,S$GLB,, | Performed by: FAMILY MEDICINE

## 2021-09-09 PROCEDURE — 1160F RVW MEDS BY RX/DR IN RCRD: CPT | Mod: CPTII,S$GLB,, | Performed by: FAMILY MEDICINE

## 2021-09-09 PROCEDURE — 1101F PR PT FALLS ASSESS DOC 0-1 FALLS W/OUT INJ PAST YR: ICD-10-PCS | Mod: CPTII,S$GLB,, | Performed by: FAMILY MEDICINE

## 2021-09-09 PROCEDURE — 99214 OFFICE O/P EST MOD 30 MIN: CPT | Mod: S$GLB,,, | Performed by: FAMILY MEDICINE

## 2021-09-09 PROCEDURE — 1159F PR MEDICATION LIST DOCUMENTED IN MEDICAL RECORD: ICD-10-PCS | Mod: CPTII,S$GLB,, | Performed by: FAMILY MEDICINE

## 2021-09-09 PROCEDURE — 3288F FALL RISK ASSESSMENT DOCD: CPT | Mod: CPTII,S$GLB,, | Performed by: FAMILY MEDICINE

## 2021-09-09 PROCEDURE — 1159F MED LIST DOCD IN RCRD: CPT | Mod: CPTII,S$GLB,, | Performed by: FAMILY MEDICINE

## 2021-09-09 PROCEDURE — 1160F PR REVIEW ALL MEDS BY PRESCRIBER/CLIN PHARMACIST DOCUMENTED: ICD-10-PCS | Mod: CPTII,S$GLB,, | Performed by: FAMILY MEDICINE

## 2021-09-09 PROCEDURE — 99999 PR PBB SHADOW E&M-EST. PATIENT-LVL IV: ICD-10-PCS | Mod: PBBFAC,,, | Performed by: FAMILY MEDICINE

## 2021-09-09 PROCEDURE — 99999 PR PBB SHADOW E&M-EST. PATIENT-LVL IV: CPT | Mod: PBBFAC,,, | Performed by: FAMILY MEDICINE

## 2021-09-09 PROCEDURE — 3078F PR MOST RECENT DIASTOLIC BLOOD PRESSURE < 80 MM HG: ICD-10-PCS | Mod: CPTII,S$GLB,, | Performed by: FAMILY MEDICINE

## 2021-09-09 PROCEDURE — 3074F PR MOST RECENT SYSTOLIC BLOOD PRESSURE < 130 MM HG: ICD-10-PCS | Mod: CPTII,S$GLB,, | Performed by: FAMILY MEDICINE

## 2021-09-09 PROCEDURE — 3074F SYST BP LT 130 MM HG: CPT | Mod: CPTII,S$GLB,, | Performed by: FAMILY MEDICINE

## 2021-09-10 ENCOUNTER — PATIENT MESSAGE (OUTPATIENT)
Dept: FAMILY MEDICINE | Facility: CLINIC | Age: 76
End: 2021-09-10

## 2021-09-13 ENCOUNTER — TELEPHONE (OUTPATIENT)
Dept: PULMONOLOGY | Facility: CLINIC | Age: 76
End: 2021-09-13

## 2021-09-13 ENCOUNTER — TELEPHONE (OUTPATIENT)
Dept: CARDIOLOGY | Facility: HOSPITAL | Age: 76
End: 2021-09-13

## 2021-09-13 DIAGNOSIS — G47.30 OBSERVED SLEEP APNEA: Primary | ICD-10-CM

## 2021-09-13 DIAGNOSIS — G47.33 OBSTRUCTIVE SLEEP APNEA: ICD-10-CM

## 2021-09-15 ENCOUNTER — CLINICAL SUPPORT (OUTPATIENT)
Dept: UROLOGY | Facility: CLINIC | Age: 76
End: 2021-09-15
Payer: MEDICARE

## 2021-09-15 ENCOUNTER — LAB VISIT (OUTPATIENT)
Dept: LAB | Facility: HOSPITAL | Age: 76
End: 2021-09-15
Attending: UROLOGY
Payer: MEDICARE

## 2021-09-15 DIAGNOSIS — R97.20 ELEVATED PSA: ICD-10-CM

## 2021-09-15 DIAGNOSIS — N40.0 BENIGN PROSTATIC HYPERPLASIA, UNSPECIFIED WHETHER LOWER URINARY TRACT SYMPTOMS PRESENT: Primary | ICD-10-CM

## 2021-09-15 LAB
POC RESIDUAL URINE VOLUME: 22 ML (ref 0–100)
PROSTATE SPECIFIC ANTIGEN, TOTAL: 6.5 NG/ML (ref 0–4)
PSA FREE MFR SERPL: 14.77 %
PSA FREE SERPL-MCNC: 0.96 NG/ML (ref 0–1.5)

## 2021-09-15 PROCEDURE — 51741 PR UROFLOWMETRY, COMPLEX: ICD-10-PCS | Mod: HCNC,S$GLB,ICN, | Performed by: UROLOGY

## 2021-09-15 PROCEDURE — 36415 COLL VENOUS BLD VENIPUNCTURE: CPT | Performed by: UROLOGY

## 2021-09-15 PROCEDURE — 99499 UNLISTED E&M SERVICE: CPT | Mod: HCNC,S$GLB,, | Performed by: UROLOGY

## 2021-09-15 PROCEDURE — 51741 ELECTRO-UROFLOWMETRY FIRST: CPT | Mod: HCNC,S$GLB,ICN, | Performed by: UROLOGY

## 2021-09-15 PROCEDURE — 51798 US URINE CAPACITY MEASURE: CPT | Mod: S$GLB,,, | Performed by: UROLOGY

## 2021-09-15 PROCEDURE — 51798 POCT BLADDER SCAN: ICD-10-PCS | Mod: S$GLB,,, | Performed by: UROLOGY

## 2021-09-15 PROCEDURE — 99499 NO LOS: ICD-10-PCS | Mod: HCNC,S$GLB,, | Performed by: UROLOGY

## 2021-09-15 PROCEDURE — 84153 ASSAY OF PSA TOTAL: CPT | Performed by: UROLOGY

## 2021-09-23 ENCOUNTER — OFFICE VISIT (OUTPATIENT)
Dept: ELECTROPHYSIOLOGY | Facility: CLINIC | Age: 76
End: 2021-09-23
Payer: MEDICARE

## 2021-09-23 VITALS
WEIGHT: 244.25 LBS | HEIGHT: 73 IN | DIASTOLIC BLOOD PRESSURE: 62 MMHG | SYSTOLIC BLOOD PRESSURE: 112 MMHG | BODY MASS INDEX: 32.37 KG/M2 | HEART RATE: 68 BPM

## 2021-09-23 DIAGNOSIS — I45.10 RBBB: ICD-10-CM

## 2021-09-23 DIAGNOSIS — G70.00 MYASTHENIA GRAVIS: ICD-10-CM

## 2021-09-23 DIAGNOSIS — I49.8 OTHER SPECIFIED CARDIAC ARRHYTHMIAS: ICD-10-CM

## 2021-09-23 DIAGNOSIS — I47.29 NONSUSTAINED VENTRICULAR TACHYCARDIA: Primary | ICD-10-CM

## 2021-09-23 DIAGNOSIS — I10 HTN (HYPERTENSION), BENIGN: ICD-10-CM

## 2021-09-23 DIAGNOSIS — G70.9 RESTRICTIVE LUNG MECHANICS DUE TO NEUROMUSCULAR DISEASE: ICD-10-CM

## 2021-09-23 DIAGNOSIS — J98.4 RESTRICTIVE LUNG MECHANICS DUE TO NEUROMUSCULAR DISEASE: ICD-10-CM

## 2021-09-23 DIAGNOSIS — I49.3 PVC'S (PREMATURE VENTRICULAR CONTRACTIONS): ICD-10-CM

## 2021-09-23 PROBLEM — I48.91 A-FIB: Status: RESOLVED | Noted: 2020-03-31 | Resolved: 2021-09-23

## 2021-09-23 PROCEDURE — 1160F RVW MEDS BY RX/DR IN RCRD: CPT | Mod: HCNC,CPTII,S$GLB, | Performed by: INTERNAL MEDICINE

## 2021-09-23 PROCEDURE — 99499 UNLISTED E&M SERVICE: CPT | Mod: HCNC,S$GLB,, | Performed by: INTERNAL MEDICINE

## 2021-09-23 PROCEDURE — 3078F DIAST BP <80 MM HG: CPT | Mod: HCNC,CPTII,S$GLB, | Performed by: INTERNAL MEDICINE

## 2021-09-23 PROCEDURE — 93010 ELECTROCARDIOGRAM REPORT: CPT | Mod: HCNC,S$GLB,, | Performed by: INTERNAL MEDICINE

## 2021-09-23 PROCEDURE — 1101F PR PT FALLS ASSESS DOC 0-1 FALLS W/OUT INJ PAST YR: ICD-10-PCS | Mod: HCNC,CPTII,S$GLB, | Performed by: INTERNAL MEDICINE

## 2021-09-23 PROCEDURE — 3288F PR FALLS RISK ASSESSMENT DOCUMENTED: ICD-10-PCS | Mod: HCNC,CPTII,S$GLB, | Performed by: INTERNAL MEDICINE

## 2021-09-23 PROCEDURE — 99214 OFFICE O/P EST MOD 30 MIN: CPT | Mod: HCNC,S$GLB,, | Performed by: INTERNAL MEDICINE

## 2021-09-23 PROCEDURE — 1101F PT FALLS ASSESS-DOCD LE1/YR: CPT | Mod: HCNC,CPTII,S$GLB, | Performed by: INTERNAL MEDICINE

## 2021-09-23 PROCEDURE — 3074F PR MOST RECENT SYSTOLIC BLOOD PRESSURE < 130 MM HG: ICD-10-PCS | Mod: HCNC,CPTII,S$GLB, | Performed by: INTERNAL MEDICINE

## 2021-09-23 PROCEDURE — 99999 PR PBB SHADOW E&M-EST. PATIENT-LVL III: CPT | Mod: PBBFAC,HCNC,, | Performed by: INTERNAL MEDICINE

## 2021-09-23 PROCEDURE — 1160F PR REVIEW ALL MEDS BY PRESCRIBER/CLIN PHARMACIST DOCUMENTED: ICD-10-PCS | Mod: HCNC,CPTII,S$GLB, | Performed by: INTERNAL MEDICINE

## 2021-09-23 PROCEDURE — 93005 ELECTROCARDIOGRAM TRACING: CPT | Mod: HCNC,S$GLB,, | Performed by: INTERNAL MEDICINE

## 2021-09-23 PROCEDURE — 3288F FALL RISK ASSESSMENT DOCD: CPT | Mod: HCNC,CPTII,S$GLB, | Performed by: INTERNAL MEDICINE

## 2021-09-23 PROCEDURE — 3078F PR MOST RECENT DIASTOLIC BLOOD PRESSURE < 80 MM HG: ICD-10-PCS | Mod: HCNC,CPTII,S$GLB, | Performed by: INTERNAL MEDICINE

## 2021-09-23 PROCEDURE — 3074F SYST BP LT 130 MM HG: CPT | Mod: HCNC,CPTII,S$GLB, | Performed by: INTERNAL MEDICINE

## 2021-09-23 PROCEDURE — 93010 RHYTHM STRIP: ICD-10-PCS | Mod: HCNC,S$GLB,, | Performed by: INTERNAL MEDICINE

## 2021-09-23 PROCEDURE — 1157F PR ADVANCE CARE PLAN OR EQUIV PRESENT IN MEDICAL RECORD: ICD-10-PCS | Mod: HCNC,CPTII,S$GLB, | Performed by: INTERNAL MEDICINE

## 2021-09-23 PROCEDURE — 99499 RISK ADDL DX/OHS AUDIT: ICD-10-PCS | Mod: HCNC,S$GLB,, | Performed by: INTERNAL MEDICINE

## 2021-09-23 PROCEDURE — 1159F PR MEDICATION LIST DOCUMENTED IN MEDICAL RECORD: ICD-10-PCS | Mod: HCNC,CPTII,S$GLB, | Performed by: INTERNAL MEDICINE

## 2021-09-23 PROCEDURE — 99214 PR OFFICE/OUTPT VISIT, EST, LEVL IV, 30-39 MIN: ICD-10-PCS | Mod: HCNC,S$GLB,, | Performed by: INTERNAL MEDICINE

## 2021-09-23 PROCEDURE — 99999 PR PBB SHADOW E&M-EST. PATIENT-LVL III: ICD-10-PCS | Mod: PBBFAC,HCNC,, | Performed by: INTERNAL MEDICINE

## 2021-09-23 PROCEDURE — 93005 RHYTHM STRIP: ICD-10-PCS | Mod: HCNC,S$GLB,, | Performed by: INTERNAL MEDICINE

## 2021-09-23 PROCEDURE — 1159F MED LIST DOCD IN RCRD: CPT | Mod: HCNC,CPTII,S$GLB, | Performed by: INTERNAL MEDICINE

## 2021-09-23 PROCEDURE — 1157F ADVNC CARE PLAN IN RCRD: CPT | Mod: HCNC,CPTII,S$GLB, | Performed by: INTERNAL MEDICINE

## 2021-09-23 RX ORDER — SPIRONOLACTONE 25 MG/1
TABLET ORAL
COMMUNITY
Start: 2021-09-02 | End: 2021-11-15

## 2021-09-28 ENCOUNTER — TELEPHONE (OUTPATIENT)
Dept: FAMILY MEDICINE | Facility: CLINIC | Age: 76
End: 2021-09-28

## 2021-09-30 ENCOUNTER — IMMUNIZATION (OUTPATIENT)
Dept: PRIMARY CARE CLINIC | Facility: CLINIC | Age: 76
End: 2021-09-30
Payer: MEDICARE

## 2021-09-30 DIAGNOSIS — Z23 NEED FOR VACCINATION: Primary | ICD-10-CM

## 2021-09-30 PROCEDURE — 91300 COVID-19, MRNA, LNP-S, PF, 30 MCG/0.3 ML DOSE VACCINE: ICD-10-PCS | Mod: S$GLB,,, | Performed by: FAMILY MEDICINE

## 2021-09-30 PROCEDURE — 0003A COVID-19, MRNA, LNP-S, PF, 30 MCG/0.3 ML DOSE VACCINE: CPT | Mod: S$GLB,,, | Performed by: FAMILY MEDICINE

## 2021-09-30 PROCEDURE — 0003A COVID-19, MRNA, LNP-S, PF, 30 MCG/0.3 ML DOSE VACCINE: ICD-10-PCS | Mod: S$GLB,,, | Performed by: FAMILY MEDICINE

## 2021-09-30 PROCEDURE — 91300 COVID-19, MRNA, LNP-S, PF, 30 MCG/0.3 ML DOSE VACCINE: CPT | Mod: S$GLB,,, | Performed by: FAMILY MEDICINE

## 2021-09-30 RX ORDER — DIPHENOXYLATE HYDROCHLORIDE AND ATROPINE SULFATE 2.5; .025 MG/1; MG/1
1 TABLET ORAL 4 TIMES DAILY PRN
COMMUNITY
End: 2021-12-23 | Stop reason: SDUPTHER

## 2021-10-12 ENCOUNTER — CLINICAL SUPPORT (OUTPATIENT)
Dept: INTERNAL MEDICINE | Facility: CLINIC | Age: 76
End: 2021-10-12
Payer: MEDICARE

## 2021-10-12 VITALS — DIASTOLIC BLOOD PRESSURE: 62 MMHG | SYSTOLIC BLOOD PRESSURE: 112 MMHG

## 2021-10-12 DIAGNOSIS — Z23 IMMUNIZATION DUE: ICD-10-CM

## 2021-10-12 PROCEDURE — 99999 PR PBB SHADOW E&M-EST. PATIENT-LVL III: ICD-10-PCS | Mod: PBBFAC,HCNC,,

## 2021-10-12 PROCEDURE — 99999 PR PBB SHADOW E&M-EST. PATIENT-LVL III: CPT | Mod: PBBFAC,HCNC,,

## 2021-10-12 PROCEDURE — 90694 VACC AIIV4 NO PRSRV 0.5ML IM: CPT | Mod: HCNC,S$GLB,, | Performed by: FAMILY MEDICINE

## 2021-10-12 PROCEDURE — 90694 FLU VACCINE - QUADRIVALENT - ADJUVANTED: ICD-10-PCS | Mod: HCNC,S$GLB,, | Performed by: FAMILY MEDICINE

## 2021-10-12 PROCEDURE — G0008 FLU VACCINE - QUADRIVALENT - ADJUVANTED: ICD-10-PCS | Mod: HCNC,S$GLB,, | Performed by: FAMILY MEDICINE

## 2021-10-12 PROCEDURE — G0008 ADMIN INFLUENZA VIRUS VAC: HCPCS | Mod: HCNC,S$GLB,, | Performed by: FAMILY MEDICINE

## 2021-11-04 DIAGNOSIS — M17.12 ARTHRITIS OF KNEE, LEFT: ICD-10-CM

## 2021-11-04 DIAGNOSIS — M77.11 LATERAL EPICONDYLITIS, RIGHT ELBOW: Primary | ICD-10-CM

## 2021-11-09 ENCOUNTER — OFFICE VISIT (OUTPATIENT)
Dept: GASTROENTEROLOGY | Facility: CLINIC | Age: 76
End: 2021-11-09
Payer: MEDICARE

## 2021-11-09 VITALS
WEIGHT: 250.25 LBS | SYSTOLIC BLOOD PRESSURE: 133 MMHG | DIASTOLIC BLOOD PRESSURE: 68 MMHG | BODY MASS INDEX: 39.28 KG/M2 | HEART RATE: 57 BPM | HEIGHT: 67 IN

## 2021-11-09 DIAGNOSIS — K21.9 GASTROESOPHAGEAL REFLUX DISEASE, UNSPECIFIED WHETHER ESOPHAGITIS PRESENT: ICD-10-CM

## 2021-11-09 DIAGNOSIS — D64.9 ANEMIA, UNSPECIFIED TYPE: ICD-10-CM

## 2021-11-09 DIAGNOSIS — Z86.010 HISTORY OF COLON POLYPS: Primary | ICD-10-CM

## 2021-11-09 DIAGNOSIS — K27.9 PEPTIC ULCER DISEASE: ICD-10-CM

## 2021-11-09 PROCEDURE — 99499 RISK ADDL DX/OHS AUDIT: ICD-10-PCS | Mod: S$GLB,,, | Performed by: INTERNAL MEDICINE

## 2021-11-09 PROCEDURE — 3288F PR FALLS RISK ASSESSMENT DOCUMENTED: ICD-10-PCS | Mod: CPTII,S$GLB,, | Performed by: INTERNAL MEDICINE

## 2021-11-09 PROCEDURE — 1159F MED LIST DOCD IN RCRD: CPT | Mod: CPTII,S$GLB,, | Performed by: INTERNAL MEDICINE

## 2021-11-09 PROCEDURE — 1101F PR PT FALLS ASSESS DOC 0-1 FALLS W/OUT INJ PAST YR: ICD-10-PCS | Mod: CPTII,S$GLB,, | Performed by: INTERNAL MEDICINE

## 2021-11-09 PROCEDURE — 3078F PR MOST RECENT DIASTOLIC BLOOD PRESSURE < 80 MM HG: ICD-10-PCS | Mod: CPTII,S$GLB,, | Performed by: INTERNAL MEDICINE

## 2021-11-09 PROCEDURE — 1126F AMNT PAIN NOTED NONE PRSNT: CPT | Mod: CPTII,S$GLB,, | Performed by: INTERNAL MEDICINE

## 2021-11-09 PROCEDURE — 1159F PR MEDICATION LIST DOCUMENTED IN MEDICAL RECORD: ICD-10-PCS | Mod: CPTII,S$GLB,, | Performed by: INTERNAL MEDICINE

## 2021-11-09 PROCEDURE — 1126F PR PAIN SEVERITY QUANTIFIED, NO PAIN PRESENT: ICD-10-PCS | Mod: CPTII,S$GLB,, | Performed by: INTERNAL MEDICINE

## 2021-11-09 PROCEDURE — 3075F PR MOST RECENT SYSTOLIC BLOOD PRESS GE 130-139MM HG: ICD-10-PCS | Mod: CPTII,S$GLB,, | Performed by: INTERNAL MEDICINE

## 2021-11-09 PROCEDURE — 3078F DIAST BP <80 MM HG: CPT | Mod: CPTII,S$GLB,, | Performed by: INTERNAL MEDICINE

## 2021-11-09 PROCEDURE — 99499 UNLISTED E&M SERVICE: CPT | Mod: S$GLB,,, | Performed by: INTERNAL MEDICINE

## 2021-11-09 PROCEDURE — 1160F PR REVIEW ALL MEDS BY PRESCRIBER/CLIN PHARMACIST DOCUMENTED: ICD-10-PCS | Mod: CPTII,S$GLB,, | Performed by: INTERNAL MEDICINE

## 2021-11-09 PROCEDURE — 1101F PT FALLS ASSESS-DOCD LE1/YR: CPT | Mod: CPTII,S$GLB,, | Performed by: INTERNAL MEDICINE

## 2021-11-09 PROCEDURE — 1157F ADVNC CARE PLAN IN RCRD: CPT | Mod: CPTII,S$GLB,, | Performed by: INTERNAL MEDICINE

## 2021-11-09 PROCEDURE — 1160F RVW MEDS BY RX/DR IN RCRD: CPT | Mod: CPTII,S$GLB,, | Performed by: INTERNAL MEDICINE

## 2021-11-09 PROCEDURE — 99999 PR PBB SHADOW E&M-EST. PATIENT-LVL V: ICD-10-PCS | Mod: PBBFAC,HCNC,, | Performed by: INTERNAL MEDICINE

## 2021-11-09 PROCEDURE — 1157F PR ADVANCE CARE PLAN OR EQUIV PRESENT IN MEDICAL RECORD: ICD-10-PCS | Mod: CPTII,S$GLB,, | Performed by: INTERNAL MEDICINE

## 2021-11-09 PROCEDURE — 99999 PR PBB SHADOW E&M-EST. PATIENT-LVL V: CPT | Mod: PBBFAC,HCNC,, | Performed by: INTERNAL MEDICINE

## 2021-11-09 PROCEDURE — 99214 PR OFFICE/OUTPT VISIT, EST, LEVL IV, 30-39 MIN: ICD-10-PCS | Mod: S$GLB,,, | Performed by: INTERNAL MEDICINE

## 2021-11-09 PROCEDURE — 3075F SYST BP GE 130 - 139MM HG: CPT | Mod: CPTII,S$GLB,, | Performed by: INTERNAL MEDICINE

## 2021-11-09 PROCEDURE — 99214 OFFICE O/P EST MOD 30 MIN: CPT | Mod: S$GLB,,, | Performed by: INTERNAL MEDICINE

## 2021-11-09 PROCEDURE — 3288F FALL RISK ASSESSMENT DOCD: CPT | Mod: CPTII,S$GLB,, | Performed by: INTERNAL MEDICINE

## 2021-11-11 ENCOUNTER — HOSPITAL ENCOUNTER (OUTPATIENT)
Dept: RADIOLOGY | Facility: HOSPITAL | Age: 76
Discharge: HOME | End: 2021-11-11
Attending: ORTHOPAEDIC SURGERY
Payer: MEDICARE

## 2021-11-11 ENCOUNTER — OFFICE VISIT (OUTPATIENT)
Dept: ORTHOPEDICS | Facility: CLINIC | Age: 76
End: 2021-11-11
Payer: MEDICARE

## 2021-11-11 ENCOUNTER — PATIENT OUTREACH (OUTPATIENT)
Dept: ADMINISTRATIVE | Facility: OTHER | Age: 76
End: 2021-11-11
Payer: MEDICARE

## 2021-11-11 VITALS — WEIGHT: 250 LBS | HEIGHT: 67 IN | BODY MASS INDEX: 39.24 KG/M2

## 2021-11-11 DIAGNOSIS — M17.12 ARTHRITIS OF KNEE, LEFT: ICD-10-CM

## 2021-11-11 DIAGNOSIS — M77.11 LATERAL EPICONDYLITIS, RIGHT ELBOW: Primary | ICD-10-CM

## 2021-11-11 DIAGNOSIS — M77.11 LATERAL EPICONDYLITIS, RIGHT ELBOW: ICD-10-CM

## 2021-11-11 PROCEDURE — 1101F PT FALLS ASSESS-DOCD LE1/YR: CPT | Mod: HCNC,CPTII,S$GLB, | Performed by: ORTHOPAEDIC SURGERY

## 2021-11-11 PROCEDURE — 3288F FALL RISK ASSESSMENT DOCD: CPT | Mod: HCNC,CPTII,S$GLB, | Performed by: ORTHOPAEDIC SURGERY

## 2021-11-11 PROCEDURE — 73080 XR ELBOW COMPLETE 3 VIEW RIGHT: ICD-10-PCS | Mod: 26,HCNC,RT, | Performed by: RADIOLOGY

## 2021-11-11 PROCEDURE — 1160F RVW MEDS BY RX/DR IN RCRD: CPT | Mod: HCNC,CPTII,S$GLB, | Performed by: ORTHOPAEDIC SURGERY

## 2021-11-11 PROCEDURE — 1159F MED LIST DOCD IN RCRD: CPT | Mod: HCNC,CPTII,S$GLB, | Performed by: ORTHOPAEDIC SURGERY

## 2021-11-11 PROCEDURE — 73564 XR KNEE ORTHO LEFT WITH FLEXION: ICD-10-PCS | Mod: 26,HCNC,LT, | Performed by: RADIOLOGY

## 2021-11-11 PROCEDURE — 20610 DRAIN/INJ JOINT/BURSA W/O US: CPT | Mod: HCNC,LT,S$GLB, | Performed by: ORTHOPAEDIC SURGERY

## 2021-11-11 PROCEDURE — 73564 X-RAY EXAM KNEE 4 OR MORE: CPT | Mod: 26,HCNC,LT, | Performed by: RADIOLOGY

## 2021-11-11 PROCEDURE — 73080 X-RAY EXAM OF ELBOW: CPT | Mod: 26,HCNC,RT, | Performed by: RADIOLOGY

## 2021-11-11 PROCEDURE — 20551 TENDON ORIGIN: R ELBOW: ICD-10-PCS | Mod: HCNC,59,51,RT | Performed by: ORTHOPAEDIC SURGERY

## 2021-11-11 PROCEDURE — 99213 OFFICE O/P EST LOW 20 MIN: CPT | Mod: 25,HCNC,S$GLB, | Performed by: ORTHOPAEDIC SURGERY

## 2021-11-11 PROCEDURE — 1157F ADVNC CARE PLAN IN RCRD: CPT | Mod: HCNC,CPTII,S$GLB, | Performed by: ORTHOPAEDIC SURGERY

## 2021-11-11 PROCEDURE — 99213 PR OFFICE/OUTPT VISIT, EST, LEVL III, 20-29 MIN: ICD-10-PCS | Mod: 25,HCNC,S$GLB, | Performed by: ORTHOPAEDIC SURGERY

## 2021-11-11 PROCEDURE — 73562 XR KNEE ORTHO LEFT WITH FLEXION: ICD-10-PCS | Mod: 26,HCNC,RT, | Performed by: RADIOLOGY

## 2021-11-11 PROCEDURE — 1101F PR PT FALLS ASSESS DOC 0-1 FALLS W/OUT INJ PAST YR: ICD-10-PCS | Mod: HCNC,CPTII,S$GLB, | Performed by: ORTHOPAEDIC SURGERY

## 2021-11-11 PROCEDURE — 1125F AMNT PAIN NOTED PAIN PRSNT: CPT | Mod: HCNC,CPTII,S$GLB, | Performed by: ORTHOPAEDIC SURGERY

## 2021-11-11 PROCEDURE — 20610 LARGE JOINT ASPIRATION/INJECTION: L KNEE: ICD-10-PCS | Mod: HCNC,LT,S$GLB, | Performed by: ORTHOPAEDIC SURGERY

## 2021-11-11 PROCEDURE — 1125F PR PAIN SEVERITY QUANTIFIED, PAIN PRESENT: ICD-10-PCS | Mod: HCNC,CPTII,S$GLB, | Performed by: ORTHOPAEDIC SURGERY

## 2021-11-11 PROCEDURE — 99999 PR PBB SHADOW E&M-EST. PATIENT-LVL IV: ICD-10-PCS | Mod: PBBFAC,HCNC,, | Performed by: ORTHOPAEDIC SURGERY

## 2021-11-11 PROCEDURE — 1159F PR MEDICATION LIST DOCUMENTED IN MEDICAL RECORD: ICD-10-PCS | Mod: HCNC,CPTII,S$GLB, | Performed by: ORTHOPAEDIC SURGERY

## 2021-11-11 PROCEDURE — 1157F PR ADVANCE CARE PLAN OR EQUIV PRESENT IN MEDICAL RECORD: ICD-10-PCS | Mod: HCNC,CPTII,S$GLB, | Performed by: ORTHOPAEDIC SURGERY

## 2021-11-11 PROCEDURE — 99999 PR PBB SHADOW E&M-EST. PATIENT-LVL IV: CPT | Mod: PBBFAC,HCNC,, | Performed by: ORTHOPAEDIC SURGERY

## 2021-11-11 PROCEDURE — 73080 X-RAY EXAM OF ELBOW: CPT | Mod: TC,HCNC,PN,RT

## 2021-11-11 PROCEDURE — 1160F PR REVIEW ALL MEDS BY PRESCRIBER/CLIN PHARMACIST DOCUMENTED: ICD-10-PCS | Mod: HCNC,CPTII,S$GLB, | Performed by: ORTHOPAEDIC SURGERY

## 2021-11-11 PROCEDURE — 73562 X-RAY EXAM OF KNEE 3: CPT | Mod: 26,HCNC,RT, | Performed by: RADIOLOGY

## 2021-11-11 PROCEDURE — 73564 X-RAY EXAM KNEE 4 OR MORE: CPT | Mod: TC,HCNC,PN,LT

## 2021-11-11 PROCEDURE — 20551 NJX 1 TENDON ORIGIN/INSJ: CPT | Mod: HCNC,59,51,RT | Performed by: ORTHOPAEDIC SURGERY

## 2021-11-11 PROCEDURE — 3288F PR FALLS RISK ASSESSMENT DOCUMENTED: ICD-10-PCS | Mod: HCNC,CPTII,S$GLB, | Performed by: ORTHOPAEDIC SURGERY

## 2021-11-11 RX ORDER — TRIAMCINOLONE ACETONIDE 40 MG/ML
40 INJECTION, SUSPENSION INTRA-ARTICULAR; INTRAMUSCULAR
Status: DISCONTINUED | OUTPATIENT
Start: 2021-11-11 | End: 2021-11-11 | Stop reason: HOSPADM

## 2021-11-11 RX ADMIN — TRIAMCINOLONE ACETONIDE 40 MG: 40 INJECTION, SUSPENSION INTRA-ARTICULAR; INTRAMUSCULAR at 11:11

## 2021-11-22 DIAGNOSIS — R60.9 EDEMA, UNSPECIFIED TYPE: ICD-10-CM

## 2021-11-22 DIAGNOSIS — I50.32 CHRONIC DIASTOLIC CONGESTIVE HEART FAILURE: ICD-10-CM

## 2021-11-22 RX ORDER — FUROSEMIDE 20 MG/1
20 TABLET ORAL DAILY
Qty: 90 TABLET | Refills: 0 | Status: SHIPPED | OUTPATIENT
Start: 2021-11-22 | End: 2022-01-10

## 2021-11-26 ENCOUNTER — TELEPHONE (OUTPATIENT)
Dept: UROLOGY | Facility: CLINIC | Age: 76
End: 2021-11-26

## 2021-11-26 DIAGNOSIS — R97.20 ELEVATED PSA: Primary | ICD-10-CM

## 2021-11-26 NOTE — TELEPHONE ENCOUNTER
Spoke w pt and apologized for delay of results   Pt was given all recoemmendations regarding follow up and pt declined   Patient requested that recommendations from MD be sent to portal so that he can review and will follow up when can as pt voiced has been having a lot of back problems and would like to focus on that first.  As pt requested recommendations sent to portal

## 2021-11-26 NOTE — TELEPHONE ENCOUNTER
Please apologize for delay in result review  Last seen aug 2021, and had severe luts off all meds and psa elevation    Restarted meds and returned 9/15 for uroflow and repeat psa after abx    Please advise  1. Uroflow still demonstrated obstructed pattern despite meds  2. psa remained elevated - though is age-adjusted normal and may be normal for prostate size if had further growth    --> needs:  1. Cysto for reevaluation of prostate obstruction to help guide further recs as discussed at last o/v  - but first:  2. Given psa elevation, at his age, would first get MRI prostate which will both reevaluate size of gland and advise if any concerning lesions for prostate ca    3a. If mri has no pi-rad 4 or 5 lesions, will call to plan cystoscopy  3b. If MRI has concern for clinically significant prostate ca, can discuss prostate biopsy at time of cystoscopy    - schedule mri with cr on arrival in the next 2-4, and can repeat psa with that lab draw as well on arrival for mri  (orderd placed)

## 2021-12-07 ENCOUNTER — ANESTHESIA EVENT (OUTPATIENT)
Dept: ENDOSCOPY | Facility: HOSPITAL | Age: 76
End: 2021-12-07
Payer: MEDICARE

## 2021-12-07 ENCOUNTER — ANESTHESIA (OUTPATIENT)
Dept: ENDOSCOPY | Facility: HOSPITAL | Age: 76
End: 2021-12-07
Payer: MEDICARE

## 2021-12-07 ENCOUNTER — HOSPITAL ENCOUNTER (OUTPATIENT)
Facility: HOSPITAL | Age: 76
Discharge: HOME | End: 2021-12-07
Attending: INTERNAL MEDICINE | Admitting: INTERNAL MEDICINE
Payer: MEDICARE

## 2021-12-07 DIAGNOSIS — Z83.719 FAMILY HISTORY OF COLONIC POLYPS: ICD-10-CM

## 2021-12-07 DIAGNOSIS — K57.90 DIVERTICULOSIS: ICD-10-CM

## 2021-12-07 DIAGNOSIS — K64.8 INTERNAL HEMORRHOIDS: ICD-10-CM

## 2021-12-07 DIAGNOSIS — K63.5 POLYP OF COLON, UNSPECIFIED PART OF COLON, UNSPECIFIED TYPE: Primary | ICD-10-CM

## 2021-12-07 PROCEDURE — 45381 COLONOSCOPY SUBMUCOUS NJX: CPT | Mod: 59,HCNC | Performed by: INTERNAL MEDICINE

## 2021-12-07 PROCEDURE — 25000003 PHARM REV CODE 250: Mod: HCNC | Performed by: NURSE ANESTHETIST, CERTIFIED REGISTERED

## 2021-12-07 PROCEDURE — 88305 TISSUE EXAM BY PATHOLOGIST: ICD-10-PCS | Mod: 26,HCNC,, | Performed by: PATHOLOGY

## 2021-12-07 PROCEDURE — 27201089 HC SNARE, DISP (ANY): Mod: HCNC | Performed by: INTERNAL MEDICINE

## 2021-12-07 PROCEDURE — D9220A PRA ANESTHESIA: Mod: PT,HCNC,ANES, | Performed by: ANESTHESIOLOGY

## 2021-12-07 PROCEDURE — 45380 COLONOSCOPY AND BIOPSY: CPT | Mod: 59,HCNC,, | Performed by: INTERNAL MEDICINE

## 2021-12-07 PROCEDURE — 45385 COLONOSCOPY W/LESION REMOVAL: CPT | Mod: PT,HCNC | Performed by: INTERNAL MEDICINE

## 2021-12-07 PROCEDURE — 37000009 HC ANESTHESIA EA ADD 15 MINS: Mod: HCNC | Performed by: INTERNAL MEDICINE

## 2021-12-07 PROCEDURE — 45381 PR COLONOSCPY,FLEX,W/DIR SUBMUC INJECT: ICD-10-PCS | Mod: 51,HCNC,, | Performed by: INTERNAL MEDICINE

## 2021-12-07 PROCEDURE — 27201028 HC NEEDLE, SCLERO: Mod: HCNC | Performed by: INTERNAL MEDICINE

## 2021-12-07 PROCEDURE — 45385 PR COLONOSCOPY,REMV LESN,SNARE: ICD-10-PCS | Mod: PT,HCNC,, | Performed by: INTERNAL MEDICINE

## 2021-12-07 PROCEDURE — 45385 COLONOSCOPY W/LESION REMOVAL: CPT | Mod: PT,HCNC,, | Performed by: INTERNAL MEDICINE

## 2021-12-07 PROCEDURE — 45381 COLONOSCOPY SUBMUCOUS NJX: CPT | Mod: 51,HCNC,, | Performed by: INTERNAL MEDICINE

## 2021-12-07 PROCEDURE — 45380 PR COLONOSCOPY,BIOPSY: ICD-10-PCS | Mod: 59,HCNC,, | Performed by: INTERNAL MEDICINE

## 2021-12-07 PROCEDURE — 45380 COLONOSCOPY AND BIOPSY: CPT | Mod: 59,HCNC | Performed by: INTERNAL MEDICINE

## 2021-12-07 PROCEDURE — 25000003 PHARM REV CODE 250: Mod: HCNC | Performed by: INTERNAL MEDICINE

## 2021-12-07 PROCEDURE — D9220A PRA ANESTHESIA: Mod: PT,HCNC,CRNA, | Performed by: NURSE ANESTHETIST, CERTIFIED REGISTERED

## 2021-12-07 PROCEDURE — 27201012 HC FORCEPS, HOT/COLD, DISP: Mod: HCNC | Performed by: INTERNAL MEDICINE

## 2021-12-07 PROCEDURE — 88305 TISSUE EXAM BY PATHOLOGIST: CPT | Mod: 26,HCNC,, | Performed by: PATHOLOGY

## 2021-12-07 PROCEDURE — 88305 TISSUE EXAM BY PATHOLOGIST: CPT | Mod: HCNC | Performed by: PATHOLOGY

## 2021-12-07 PROCEDURE — 63600175 PHARM REV CODE 636 W HCPCS: Mod: HCNC | Performed by: NURSE ANESTHETIST, CERTIFIED REGISTERED

## 2021-12-07 PROCEDURE — D9220A PRA ANESTHESIA: ICD-10-PCS | Mod: PT,HCNC,ANES, | Performed by: ANESTHESIOLOGY

## 2021-12-07 PROCEDURE — 37000008 HC ANESTHESIA 1ST 15 MINUTES: Mod: HCNC | Performed by: INTERNAL MEDICINE

## 2021-12-07 PROCEDURE — D9220A PRA ANESTHESIA: ICD-10-PCS | Mod: PT,HCNC,CRNA, | Performed by: NURSE ANESTHETIST, CERTIFIED REGISTERED

## 2021-12-07 RX ORDER — PROPOFOL 10 MG/ML
VIAL (ML) INTRAVENOUS
Status: DISCONTINUED | OUTPATIENT
Start: 2021-12-07 | End: 2021-12-07

## 2021-12-07 RX ORDER — SODIUM CHLORIDE 9 MG/ML
INJECTION, SOLUTION INTRAVENOUS CONTINUOUS
Status: DISCONTINUED | OUTPATIENT
Start: 2021-12-07 | End: 2021-12-07 | Stop reason: HOSPADM

## 2021-12-07 RX ORDER — LIDOCAINE HYDROCHLORIDE 20 MG/ML
INJECTION INTRAVENOUS
Status: DISCONTINUED | OUTPATIENT
Start: 2021-12-07 | End: 2021-12-07

## 2021-12-07 RX ADMIN — LIDOCAINE HYDROCHLORIDE 50 MG: 20 INJECTION, SOLUTION INTRAVENOUS at 09:12

## 2021-12-07 RX ADMIN — SODIUM CHLORIDE: 0.9 INJECTION, SOLUTION INTRAVENOUS at 09:12

## 2021-12-07 RX ADMIN — PROPOFOL 100 MG: 10 INJECTION, EMULSION INTRAVENOUS at 10:12

## 2021-12-07 RX ADMIN — PROPOFOL 50 MG: 10 INJECTION, EMULSION INTRAVENOUS at 10:12

## 2021-12-07 RX ADMIN — PROPOFOL 100 MG: 10 INJECTION, EMULSION INTRAVENOUS at 09:12

## 2021-12-07 RX ADMIN — PROPOFOL 50 MG: 10 INJECTION, EMULSION INTRAVENOUS at 09:12

## 2021-12-08 VITALS
RESPIRATION RATE: 16 BRPM | TEMPERATURE: 98 F | HEIGHT: 73 IN | HEART RATE: 68 BPM | WEIGHT: 238 LBS | OXYGEN SATURATION: 95 % | BODY MASS INDEX: 31.54 KG/M2 | DIASTOLIC BLOOD PRESSURE: 78 MMHG | SYSTOLIC BLOOD PRESSURE: 130 MMHG

## 2021-12-13 LAB
FINAL PATHOLOGIC DIAGNOSIS: NORMAL
GROSS: NORMAL
Lab: NORMAL

## 2021-12-13 NOTE — TELEPHONE ENCOUNTER
Recalled pt to follow up with review of previous conversation pt and MD recommendations for further testing  Pt declined scheduling at this time and hasn't had a true time to review [employer] message sent   Pt voiced regarding MRI declining doing it with anxiety prob and voiced he will need to be put to sleep not interested in doing MRI at all.

## 2021-12-14 ENCOUNTER — TELEPHONE (OUTPATIENT)
Dept: GASTROENTEROLOGY | Facility: CLINIC | Age: 76
End: 2021-12-14
Payer: MEDICARE

## 2021-12-23 ENCOUNTER — PATIENT MESSAGE (OUTPATIENT)
Dept: FAMILY MEDICINE | Facility: CLINIC | Age: 76
End: 2021-12-23
Payer: MEDICARE

## 2021-12-23 RX ORDER — INFLUENZA VACCINE, ADJUVANTED 15; 15; 15; 15 UG/.5ML; UG/.5ML; UG/.5ML; UG/.5ML
INJECTION, SUSPENSION INTRAMUSCULAR
COMMUNITY
Start: 2021-10-12 | End: 2024-01-05 | Stop reason: ALTCHOICE

## 2021-12-23 RX ORDER — TRIAMCINOLONE ACETONIDE 40 MG/ML
INJECTION, SUSPENSION INTRA-ARTICULAR; INTRAMUSCULAR
COMMUNITY
Start: 2021-08-16 | End: 2023-01-26 | Stop reason: ALTCHOICE

## 2021-12-23 RX ORDER — DIPHENOXYLATE HYDROCHLORIDE AND ATROPINE SULFATE 2.5; .025 MG/1; MG/1
1 TABLET ORAL 4 TIMES DAILY PRN
Qty: 90 TABLET | Refills: 0 | Status: SHIPPED | OUTPATIENT
Start: 2021-12-23

## 2021-12-23 NOTE — TELEPHONE ENCOUNTER
No new care gaps identified.  Powered by [employer]. Reference number: [id number].   12/23/2021 4:42:57 PM CST

## 2021-12-26 RX ORDER — CHOLESTYRAMINE 4 G/9G
4 POWDER, FOR SUSPENSION ORAL
Qty: 270 PACKET | Refills: 3 | Status: SHIPPED | OUTPATIENT
Start: 2021-12-26 | End: 2023-01-26

## 2022-01-03 ENCOUNTER — LAB VISIT (OUTPATIENT)
Dept: LAB | Facility: HOSPITAL | Age: 77
End: 2022-01-03
Attending: FAMILY MEDICINE
Payer: MEDICARE

## 2022-01-03 DIAGNOSIS — I10 HTN (HYPERTENSION), BENIGN: ICD-10-CM

## 2022-01-03 DIAGNOSIS — I49.3 PVC (PREMATURE VENTRICULAR CONTRACTION): ICD-10-CM

## 2022-01-03 DIAGNOSIS — D69.6 THROMBOCYTOPENIA: ICD-10-CM

## 2022-01-03 LAB
ALBUMIN SERPL BCP-MCNC: 3.3 G/DL (ref 3.5–5.2)
ALP SERPL-CCNC: 68 U/L (ref 55–135)
ALT SERPL W/O P-5'-P-CCNC: 21 U/L (ref 10–44)
ANION GAP SERPL CALC-SCNC: 9 MMOL/L (ref 8–16)
AST SERPL-CCNC: 20 U/L (ref 10–40)
BILIRUB SERPL-MCNC: 1.1 MG/DL (ref 0.1–1)
BUN SERPL-MCNC: 16 MG/DL (ref 8–23)
CALCIUM SERPL-MCNC: 9 MG/DL (ref 8.7–10.5)
CHLORIDE SERPL-SCNC: 107 MMOL/L (ref 95–110)
CHOLEST SERPL-MCNC: 122 MG/DL (ref 120–199)
CHOLEST/HDLC SERPL: 3 {RATIO} (ref 2–5)
CO2 SERPL-SCNC: 29 MMOL/L (ref 23–29)
CREAT SERPL-MCNC: 1 MG/DL (ref 0.5–1.4)
EST. GFR  (AFRICAN AMERICAN): >60 ML/MIN/1.73 M^2
EST. GFR  (NON AFRICAN AMERICAN): >60 ML/MIN/1.73 M^2
GLUCOSE SERPL-MCNC: 101 MG/DL (ref 70–110)
HDLC SERPL-MCNC: 41 MG/DL (ref 40–75)
HDLC SERPL: 33.6 % (ref 20–50)
LDLC SERPL CALC-MCNC: 67.4 MG/DL (ref 63–159)
MAGNESIUM SERPL-MCNC: 2 MG/DL (ref 1.6–2.6)
NONHDLC SERPL-MCNC: 81 MG/DL
POTASSIUM SERPL-SCNC: 4 MMOL/L (ref 3.5–5.1)
PROT SERPL-MCNC: 5.9 G/DL (ref 6–8.4)
SODIUM SERPL-SCNC: 145 MMOL/L (ref 136–145)
T4 SERPL-MCNC: 6.7 UG/DL (ref 4.5–11.5)
TRIGL SERPL-MCNC: 68 MG/DL (ref 30–150)
TSH SERPL DL<=0.005 MIU/L-ACNC: 2.31 UIU/ML (ref 0.4–4)

## 2022-01-03 PROCEDURE — 80053 COMPREHEN METABOLIC PANEL: CPT | Mod: HCNC | Performed by: FAMILY MEDICINE

## 2022-01-03 PROCEDURE — 84443 ASSAY THYROID STIM HORMONE: CPT | Mod: HCNC | Performed by: FAMILY MEDICINE

## 2022-01-03 PROCEDURE — 83735 ASSAY OF MAGNESIUM: CPT | Mod: HCNC | Performed by: FAMILY MEDICINE

## 2022-01-03 PROCEDURE — 36415 COLL VENOUS BLD VENIPUNCTURE: CPT | Mod: HCNC,PO | Performed by: FAMILY MEDICINE

## 2022-01-03 PROCEDURE — 80061 LIPID PANEL: CPT | Mod: HCNC | Performed by: FAMILY MEDICINE

## 2022-01-03 PROCEDURE — 84436 ASSAY OF TOTAL THYROXINE: CPT | Mod: HCNC | Performed by: FAMILY MEDICINE

## 2022-01-03 PROCEDURE — 85025 COMPLETE CBC W/AUTO DIFF WBC: CPT | Mod: HCNC | Performed by: FAMILY MEDICINE

## 2022-01-04 ENCOUNTER — PATIENT MESSAGE (OUTPATIENT)
Dept: CARDIOLOGY | Facility: CLINIC | Age: 77
End: 2022-01-04
Payer: MEDICARE

## 2022-01-04 LAB
BASOPHILS # BLD AUTO: 0.02 K/UL (ref 0–0.2)
BASOPHILS NFR BLD: 0.3 % (ref 0–1.9)
DIFFERENTIAL METHOD: ABNORMAL
EOSINOPHIL # BLD AUTO: 0.1 K/UL (ref 0–0.5)
EOSINOPHIL NFR BLD: 1.6 % (ref 0–8)
ERYTHROCYTE [DISTWIDTH] IN BLOOD BY AUTOMATED COUNT: 13.1 % (ref 11.5–14.5)
HCT VFR BLD AUTO: 42.2 % (ref 40–54)
HGB BLD-MCNC: 13.1 G/DL (ref 14–18)
IMM GRANULOCYTES # BLD AUTO: 0.01 K/UL (ref 0–0.04)
IMM GRANULOCYTES NFR BLD AUTO: 0.2 % (ref 0–0.5)
LYMPHOCYTES # BLD AUTO: 1.3 K/UL (ref 1–4.8)
LYMPHOCYTES NFR BLD: 23.1 % (ref 18–48)
MCH RBC QN AUTO: 29.6 PG (ref 27–31)
MCHC RBC AUTO-ENTMCNC: 31 G/DL (ref 32–36)
MCV RBC AUTO: 95 FL (ref 82–98)
MONOCYTES # BLD AUTO: 0.7 K/UL (ref 0.3–1)
MONOCYTES NFR BLD: 11.3 % (ref 4–15)
NEUTROPHILS # BLD AUTO: 3.7 K/UL (ref 1.8–7.7)
NEUTROPHILS NFR BLD: 63.5 % (ref 38–73)
NRBC BLD-RTO: 0 /100 WBC
PLATELET # BLD AUTO: 114 K/UL (ref 150–450)
PMV BLD AUTO: 11.4 FL (ref 9.2–12.9)
RBC # BLD AUTO: 4.43 M/UL (ref 4.6–6.2)
WBC # BLD AUTO: 5.77 K/UL (ref 3.9–12.7)

## 2022-01-10 ENCOUNTER — OFFICE VISIT (OUTPATIENT)
Dept: FAMILY MEDICINE | Facility: CLINIC | Age: 77
End: 2022-01-10
Payer: MEDICARE

## 2022-01-10 ENCOUNTER — HOSPITAL ENCOUNTER (OUTPATIENT)
Dept: RADIOLOGY | Facility: HOSPITAL | Age: 77
Discharge: HOME | End: 2022-01-10
Attending: PHYSICIAN ASSISTANT
Payer: MEDICARE

## 2022-01-10 VITALS
RESPIRATION RATE: 16 BRPM | SYSTOLIC BLOOD PRESSURE: 138 MMHG | HEART RATE: 66 BPM | OXYGEN SATURATION: 96 % | HEIGHT: 73 IN | WEIGHT: 255.5 LBS | TEMPERATURE: 99 F | DIASTOLIC BLOOD PRESSURE: 76 MMHG | BODY MASS INDEX: 33.86 KG/M2

## 2022-01-10 DIAGNOSIS — R60.0 BILATERAL LOWER EXTREMITY EDEMA: ICD-10-CM

## 2022-01-10 DIAGNOSIS — Z79.899 ENCOUNTER FOR MONITORING DIURETIC THERAPY: ICD-10-CM

## 2022-01-10 DIAGNOSIS — G70.00 MYASTHENIA GRAVIS: ICD-10-CM

## 2022-01-10 DIAGNOSIS — J44.89 COPD WITH ASTHMA: ICD-10-CM

## 2022-01-10 DIAGNOSIS — Z51.81 ENCOUNTER FOR MONITORING DIURETIC THERAPY: ICD-10-CM

## 2022-01-10 DIAGNOSIS — I10 HTN (HYPERTENSION), BENIGN: ICD-10-CM

## 2022-01-10 DIAGNOSIS — Z11.59 ENCOUNTER FOR HEPATITIS C SCREENING TEST FOR LOW RISK PATIENT: ICD-10-CM

## 2022-01-10 DIAGNOSIS — E78.2 MIXED HYPERLIPIDEMIA: ICD-10-CM

## 2022-01-10 DIAGNOSIS — J45.30 MILD PERSISTENT ASTHMA WITHOUT COMPLICATION: ICD-10-CM

## 2022-01-10 DIAGNOSIS — E87.6 HYPOKALEMIA: ICD-10-CM

## 2022-01-10 DIAGNOSIS — R06.09 DOE (DYSPNEA ON EXERTION): ICD-10-CM

## 2022-01-10 DIAGNOSIS — R60.0 BILATERAL LOWER EXTREMITY EDEMA: Primary | ICD-10-CM

## 2022-01-10 DIAGNOSIS — I70.0 ABDOMINAL AORTIC ATHEROSCLEROSIS: ICD-10-CM

## 2022-01-10 DIAGNOSIS — R60.9 EDEMA, UNSPECIFIED TYPE: ICD-10-CM

## 2022-01-10 PROCEDURE — 1125F AMNT PAIN NOTED PAIN PRSNT: CPT | Mod: HCNC,CPTII,S$GLB, | Performed by: PHYSICIAN ASSISTANT

## 2022-01-10 PROCEDURE — 1160F PR REVIEW ALL MEDS BY PRESCRIBER/CLIN PHARMACIST DOCUMENTED: ICD-10-PCS | Mod: HCNC,CPTII,S$GLB, | Performed by: PHYSICIAN ASSISTANT

## 2022-01-10 PROCEDURE — 3078F DIAST BP <80 MM HG: CPT | Mod: HCNC,CPTII,S$GLB, | Performed by: PHYSICIAN ASSISTANT

## 2022-01-10 PROCEDURE — 1157F PR ADVANCE CARE PLAN OR EQUIV PRESENT IN MEDICAL RECORD: ICD-10-PCS | Mod: HCNC,CPTII,S$GLB, | Performed by: PHYSICIAN ASSISTANT

## 2022-01-10 PROCEDURE — 99214 PR OFFICE/OUTPT VISIT, EST, LEVL IV, 30-39 MIN: ICD-10-PCS | Mod: HCNC,S$GLB,, | Performed by: PHYSICIAN ASSISTANT

## 2022-01-10 PROCEDURE — 3075F PR MOST RECENT SYSTOLIC BLOOD PRESS GE 130-139MM HG: ICD-10-PCS | Mod: HCNC,CPTII,S$GLB, | Performed by: PHYSICIAN ASSISTANT

## 2022-01-10 PROCEDURE — 3288F FALL RISK ASSESSMENT DOCD: CPT | Mod: HCNC,CPTII,S$GLB, | Performed by: PHYSICIAN ASSISTANT

## 2022-01-10 PROCEDURE — 99499 UNLISTED E&M SERVICE: CPT | Mod: S$GLB,,, | Performed by: PHYSICIAN ASSISTANT

## 2022-01-10 PROCEDURE — 1101F PT FALLS ASSESS-DOCD LE1/YR: CPT | Mod: HCNC,CPTII,S$GLB, | Performed by: PHYSICIAN ASSISTANT

## 2022-01-10 PROCEDURE — 1159F MED LIST DOCD IN RCRD: CPT | Mod: HCNC,CPTII,S$GLB, | Performed by: PHYSICIAN ASSISTANT

## 2022-01-10 PROCEDURE — 99214 OFFICE O/P EST MOD 30 MIN: CPT | Mod: HCNC,S$GLB,, | Performed by: PHYSICIAN ASSISTANT

## 2022-01-10 PROCEDURE — 3078F PR MOST RECENT DIASTOLIC BLOOD PRESSURE < 80 MM HG: ICD-10-PCS | Mod: HCNC,CPTII,S$GLB, | Performed by: PHYSICIAN ASSISTANT

## 2022-01-10 PROCEDURE — 3288F PR FALLS RISK ASSESSMENT DOCUMENTED: ICD-10-PCS | Mod: HCNC,CPTII,S$GLB, | Performed by: PHYSICIAN ASSISTANT

## 2022-01-10 PROCEDURE — 1157F ADVNC CARE PLAN IN RCRD: CPT | Mod: HCNC,CPTII,S$GLB, | Performed by: PHYSICIAN ASSISTANT

## 2022-01-10 PROCEDURE — 3075F SYST BP GE 130 - 139MM HG: CPT | Mod: HCNC,CPTII,S$GLB, | Performed by: PHYSICIAN ASSISTANT

## 2022-01-10 PROCEDURE — 93970 EXTREMITY STUDY: CPT | Mod: TC

## 2022-01-10 PROCEDURE — 99499 RISK ADDL DX/OHS AUDIT: ICD-10-PCS | Mod: S$GLB,,, | Performed by: PHYSICIAN ASSISTANT

## 2022-01-10 PROCEDURE — 1125F PR PAIN SEVERITY QUANTIFIED, PAIN PRESENT: ICD-10-PCS | Mod: HCNC,CPTII,S$GLB, | Performed by: PHYSICIAN ASSISTANT

## 2022-01-10 PROCEDURE — 99999 PR PBB SHADOW E&M-EST. PATIENT-LVL V: CPT | Mod: PBBFAC,HCNC,, | Performed by: PHYSICIAN ASSISTANT

## 2022-01-10 PROCEDURE — 1159F PR MEDICATION LIST DOCUMENTED IN MEDICAL RECORD: ICD-10-PCS | Mod: HCNC,CPTII,S$GLB, | Performed by: PHYSICIAN ASSISTANT

## 2022-01-10 PROCEDURE — 99999 PR PBB SHADOW E&M-EST. PATIENT-LVL V: ICD-10-PCS | Mod: PBBFAC,HCNC,, | Performed by: PHYSICIAN ASSISTANT

## 2022-01-10 PROCEDURE — 1160F RVW MEDS BY RX/DR IN RCRD: CPT | Mod: HCNC,CPTII,S$GLB, | Performed by: PHYSICIAN ASSISTANT

## 2022-01-10 PROCEDURE — 1101F PR PT FALLS ASSESS DOC 0-1 FALLS W/OUT INJ PAST YR: ICD-10-PCS | Mod: HCNC,CPTII,S$GLB, | Performed by: PHYSICIAN ASSISTANT

## 2022-01-10 RX ORDER — FUROSEMIDE 20 MG/1
40 TABLET ORAL 2 TIMES DAILY
Qty: 120 TABLET | Refills: 0
Start: 2022-01-10 | End: 2022-01-10

## 2022-01-10 RX ORDER — BUMETANIDE 1 MG/1
1 TABLET ORAL 2 TIMES DAILY
Qty: 60 TABLET | Refills: 0 | Status: SHIPPED | OUTPATIENT
Start: 2022-01-10 | End: 2022-01-24

## 2022-01-10 RX ORDER — POTASSIUM CHLORIDE 20 MEQ/1
TABLET, EXTENDED RELEASE ORAL
Qty: 360 TABLET | Refills: 3 | Status: SHIPPED | OUTPATIENT
Start: 2022-01-10 | End: 2022-03-22

## 2022-01-10 RX ORDER — ALBUTEROL SULFATE 90 UG/1
2 AEROSOL, METERED RESPIRATORY (INHALATION) EVERY 6 HOURS PRN
Qty: 18 G | Refills: 0 | Status: SHIPPED | OUTPATIENT
Start: 2022-01-10 | End: 2022-06-15

## 2022-01-10 NOTE — PATIENT INSTRUCTIONS
Start boost nutritional/ protein supplement to help improve protein levels.   Stop Lasix.  Start Bumex 1mg BID  Increase potassium to 6 tablets daily.  ULTRASOUND today  Labs today  Recheck labs on Friday.  Appt with Farideh on Friday to reassess swelling, blood pressure

## 2022-01-10 NOTE — PROGRESS NOTES
Subjective:       Patient ID: Maximilian Tavear Jr. is a 76 y.o. male.    Chief Complaint: Follow-up (4 month f/u ) and Leg Pain (Both legs swelling and feet swelling )    Mr. Tavera is a 76 year old male with HTN, HLD, and MG. He is here today for routine follow up, but complains of lower extremity swelling over the last several months. He is taking lasix daily with no improvement. Patient had labs recently that returned stable; it revealed low protein levels.  He denies worsening shortness of breath. The patient has GOLD at baseline due to COPD and deconditioning. The patient denies orthopnea or significant weight gain. The patient has gained 15 pounds since September but he attributes this to holidays and prednisone. The patient admits the swelling has gotten so severe that he has a hard time fitting his foot into a shoe. The patient is followed closely by his cardiologist, Dr. Altamirano; he will see him on Wednesday.     Review of patient's allergies indicates:   Allergen Reactions    Spironolactone Diarrhea         Current Outpatient Medications:     atorvastatin (LIPITOR) 80 MG tablet, TAKE 1 TABLET EVERY DAY, Disp: 90 tablet, Rfl: 3    carvediloL (COREG) 25 MG tablet, TAKE 1 TABLET TWICE DAILY WITH MEALS, Disp: 180 tablet, Rfl: 3    cetirizine (ZYRTEC) 10 MG tablet, Take 1 tablet by mouth daily as needed., Disp: , Rfl:     cholecalciferol, vitamin D3, (VITAMIN D3) 50 mcg (2,000 unit) Cap, 1 capsule once daily. Every day, Disp: , Rfl:     cholestyramine (QUESTRAN) 4 gram packet, Take 1 packet (4 g total) by mouth 3 (three) times daily with meals., Disp: 270 packet, Rfl: 3    coenzyme Q10 (CO Q-10) 100 mg capsule, Take 400 mg by mouth once daily., Disp: , Rfl:     cyanocobalamin, vitamin B-12, 5,000 mcg Subl, Take 5,000 mcg by mouth once daily. Every day, Disp: , Rfl:     diphenoxylate-atropine 2.5-0.025 mg (LOMOTIL) 2.5-0.025 mg per tablet, Take 1 tablet by mouth 4 (four) times daily as needed for  Diarrhea., Disp: 90 tablet, Rfl: 0    ezetimibe (ZETIA) 10 mg tablet, TAKE 1 TABLET EVERY DAY, Disp: 90 tablet, Rfl: 3    FLUAD QUAD 2021-22,65Y UP,,PF, 60 mcg (15 mcg x 4)/0.5 mL Syrg, , Disp: , Rfl:     fluticasone (FLONASE) 50 mcg/actuation nasal spray, USE 1 SPRAY IN EACH NOSTRIL TWICE DAILY AS NEEDED  FOR  RHINITIS, Disp: 48 g, Rfl: 3    gabapentin (NEURONTIN) 300 MG capsule, TAKE 1 CAPSULE THREE TIMES DAILY, Disp: 270 capsule, Rfl: 5    KENALOG 40 mg/mL injection, , Disp: , Rfl:     levothyroxine (SYNTHROID) 50 MCG tablet, TAKE 1 TABLET EVERY DAY, Disp: 90 tablet, Rfl: 3    methylsulfonylmethane 1,000 mg Tab, Take 1,000 mg by mouth once daily. Every day, Disp: , Rfl:     milk thistle 200 mg Cap, Take 200 mg by mouth 2 (two) times daily. Twice a day, Disp: , Rfl:     olmesartan (BENICAR) 20 MG tablet, TAKE 1 TABLET EVERY DAY, Disp: 90 tablet, Rfl: 3    omega-3 fatty acids 1,000 mg Cap, Twice a day, Disp: , Rfl:     predniSONE (DELTASONE) 1 MG tablet, TAKE 2 TABLETS EVERY DAY, Disp: 180 tablet, Rfl: 3    predniSONE (DELTASONE) 5 MG tablet, TAKE 1 TABLET EVERY DAY, Disp: 90 tablet, Rfl: 3    sildenafil (REVATIO) 20 mg Tab, Take 1 to 3 tabs daily as needed., Disp: 30 tablet, Rfl: 3    SPIRIVA WITH HANDIHALER 18 mcg inhalation capsule, INHALE THE CONTENTS OF 1 CAPSULE (18 MCG TOTAL) INTO THE LUNGS ONCE DAILY. CONTROLLER, Disp: 30 capsule, Rfl: 5    bumetanide (BUMEX) 1 MG tablet, Take 1 tablet (1 mg total) by mouth 2 (two) times daily., Disp: 60 tablet, Rfl: 0    pantoprazole (PROTONIX) 40 MG tablet, Take 1 tablet (40 mg total) by mouth 2 (two) times daily., Disp: 180 tablet, Rfl: 3    potassium chloride SA (K-DUR,KLOR-CON) 20 MEQ tablet, Take 6 tab daily while on bumex, Disp: 360 tablet, Rfl: 3    VENTOLIN HFA 90 mcg/actuation inhaler, Inhale 2 puffs into the lungs every 6 (six) hours as needed for Wheezing. Rescue, Disp: 18 g, Rfl: 0  No current facility-administered medications for this  visit.    Facility-Administered Medications Ordered in Other Visits:     0.9%  NaCl infusion, , Intravenous, Continuous, Devan Watt MD    lidocaine (PF) 10 mg/ml (1%) injection 10 mg, 1 mL, Intradermal, Once, Terry Quach MD    Lab Results   Component Value Date    WBC 5.77 01/03/2022    HGB 13.1 (L) 01/03/2022    HCT 42.2 01/03/2022     (L) 01/03/2022    CHOL 122 01/03/2022    TRIG 68 01/03/2022    HDL 41 01/03/2022    ALT 21 01/03/2022    AST 20 01/03/2022     01/03/2022    K 4.0 01/03/2022     01/03/2022    CREATININE 1.0 01/03/2022    BUN 16 01/03/2022    CO2 29 01/03/2022    TSH 2.311 01/03/2022    PSA 3.5 09/19/2019    INR 1.0 10/30/2018    HGBA1C 5.5 11/21/2018       Review of Systems   Constitutional: Negative for activity change, appetite change and fever.   HENT: Negative for postnasal drip, rhinorrhea and sinus pressure.    Eyes: Negative for visual disturbance.   Respiratory: Negative for cough and shortness of breath.    Cardiovascular: Positive for leg swelling. Negative for chest pain.   Gastrointestinal: Negative for abdominal distention and abdominal pain.   Genitourinary: Negative for difficulty urinating and dysuria.   Musculoskeletal: Positive for arthralgias and back pain. Negative for myalgias.   Neurological: Negative for headaches.   Hematological: Negative for adenopathy.   Psychiatric/Behavioral: The patient is not nervous/anxious.        Objective:      Physical Exam  Constitutional:       Appearance: Normal appearance.   HENT:      Head: Normocephalic and atraumatic.   Eyes:      Conjunctiva/sclera: Conjunctivae normal.   Cardiovascular:      Rate and Rhythm: Normal rate and regular rhythm.   Pulmonary:      Effort: Pulmonary effort is normal. No respiratory distress.      Breath sounds: Normal breath sounds. No wheezing.   Abdominal:      General: There is no distension.      Palpations: There is no mass.      Tenderness: There is no abdominal tenderness.    Musculoskeletal:      Right lower leg: Edema present.      Left lower leg: Edema present.      Comments: Significant pitting edema of bilateral lower extremities.    Neurological:      Mental Status: He is alert and oriented to person, place, and time.   Psychiatric:         Behavior: Behavior normal.         Assessment:       1. Bilateral lower extremity edema    2. COPD with asthma    3. Mild persistent asthma without complication    4. Mixed hyperlipidemia    5. HTN (hypertension), benign    6. Myasthenia gravis    7. Abdominal aortic atherosclerosis    8. Edema, unspecified type    9. GOLD (dyspnea on exertion)    10. Hypokalemia    11. Encounter for monitoring diuretic therapy    12. Encounter for hepatitis C screening test for low risk patient        Plan:       Maximilian was seen today for follow-up and leg pain.    Diagnoses and all orders for this visit:    Bilateral lower extremity edema  -     BNP; Future  -     US Lower Extremity Veins Bilateral; Future  -     bumetanide (BUMEX) 1 MG tablet; Take 1 tablet (1 mg total) by mouth 2 (two) times daily.  -     Comprehensive Metabolic Panel; Future  -     Magnesium; Future  No responding well to lasix- possibly due to low albumin level?  Change to bumex  ULTRASOUND and BNP today  CMP and Mag Friday. Follow up on Friday with me  COPD with asthma  -     VENTOLIN HFA 90 mcg/actuation inhaler; Inhale 2 puffs into the lungs every 6 (six) hours as needed for Wheezing. Rescue    Mild persistent asthma without complication  -     VENTOLIN HFA 90 mcg/actuation inhaler; Inhale 2 puffs into the lungs every 6 (six) hours as needed for Wheezing. Rescue    Mixed hyperlipidemia  High fiber diet  Continue current medication  HTN (hypertension), benign  -     potassium chloride SA (K-DUR,KLOR-CON) 20 MEQ tablet; Take 6 tab daily while on bumex  Monitor BP closely while on increased diuretic due to increased risk of dizziness/ low blood pressure.   Myasthenia gravis  Follow up  with specialists as scheduled  Abdominal aortic atherosclerosis  Continue current medication  Edema, unspecified type  -     Discontinue: furosemide (LASIX) 20 MG tablet; Take 2 tablets (40 mg total) by mouth 2 (two) times a day.  -     bumetanide (BUMEX) 1 MG tablet; Take 1 tablet (1 mg total) by mouth 2 (two) times daily.    GOLD (dyspnea on exertion)  Chronic due to COPD/ asthma. Stable at this time  Hypokalemia  -     potassium chloride SA (K-DUR,KLOR-CON) 20 MEQ tablet; Take 6 tab daily while on bumex  -     Comprehensive Metabolic Panel; Future  -     Magnesium; Future    Encounter for monitoring diuretic therapy  -     Comprehensive Metabolic Panel; Future  -     Magnesium; Future    Encounter for hepatitis C screening test for low risk patient  -     Hepatitis C Antibody; Future      Message sent to Dr. Altamirano, cardiologist. Patient to follow up with Dr. Altamirano this week  Follow up with me Friday or sooner if needed.

## 2022-01-11 PROBLEM — M79.89 LEG SWELLING: Status: ACTIVE | Noted: 2022-01-11

## 2022-01-12 ENCOUNTER — OFFICE VISIT (OUTPATIENT)
Dept: CARDIOLOGY | Facility: CLINIC | Age: 77
End: 2022-01-12
Payer: MEDICARE

## 2022-01-12 VITALS
HEART RATE: 68 BPM | BODY MASS INDEX: 33.54 KG/M2 | SYSTOLIC BLOOD PRESSURE: 142 MMHG | HEIGHT: 73 IN | DIASTOLIC BLOOD PRESSURE: 85 MMHG | WEIGHT: 253.06 LBS

## 2022-01-12 DIAGNOSIS — G70.00 MYASTHENIA GRAVIS, ACHR ANTIBODY POSITIVE: ICD-10-CM

## 2022-01-12 DIAGNOSIS — I10 HTN (HYPERTENSION), BENIGN: Primary | ICD-10-CM

## 2022-01-12 DIAGNOSIS — E78.2 MIXED HYPERLIPIDEMIA: ICD-10-CM

## 2022-01-12 DIAGNOSIS — I70.0 ABDOMINAL AORTIC ATHEROSCLEROSIS: ICD-10-CM

## 2022-01-12 DIAGNOSIS — J43.8 OTHER EMPHYSEMA: ICD-10-CM

## 2022-01-12 DIAGNOSIS — Z98.1 S/P CERVICAL SPINAL FUSION: ICD-10-CM

## 2022-01-12 DIAGNOSIS — I35.9 AORTIC VALVE DISEASE: ICD-10-CM

## 2022-01-12 DIAGNOSIS — N52.01 ERECTILE DYSFUNCTION DUE TO ARTERIAL INSUFFICIENCY: ICD-10-CM

## 2022-01-12 DIAGNOSIS — G70.9 RESTRICTIVE LUNG MECHANICS DUE TO NEUROMUSCULAR DISEASE: ICD-10-CM

## 2022-01-12 DIAGNOSIS — I49.3 PVC'S (PREMATURE VENTRICULAR CONTRACTIONS): ICD-10-CM

## 2022-01-12 DIAGNOSIS — M48.02 CERVICAL STENOSIS OF SPINAL CANAL: ICD-10-CM

## 2022-01-12 DIAGNOSIS — M79.89 LEG SWELLING: ICD-10-CM

## 2022-01-12 DIAGNOSIS — Z79.52 ON PREDNISONE THERAPY: ICD-10-CM

## 2022-01-12 DIAGNOSIS — I45.10 RBBB: ICD-10-CM

## 2022-01-12 DIAGNOSIS — M17.0 PRIMARY OSTEOARTHRITIS OF BOTH KNEES: ICD-10-CM

## 2022-01-12 DIAGNOSIS — R93.1 AGATSTON CAC SCORE, <100: ICD-10-CM

## 2022-01-12 DIAGNOSIS — I49.3 PVC (PREMATURE VENTRICULAR CONTRACTION): ICD-10-CM

## 2022-01-12 DIAGNOSIS — I47.29 NONSUSTAINED VENTRICULAR TACHYCARDIA: ICD-10-CM

## 2022-01-12 DIAGNOSIS — J98.4 RESTRICTIVE LUNG MECHANICS DUE TO NEUROMUSCULAR DISEASE: ICD-10-CM

## 2022-01-12 PROCEDURE — 1125F PR PAIN SEVERITY QUANTIFIED, PAIN PRESENT: ICD-10-PCS | Mod: HCNC,CPTII,S$GLB, | Performed by: INTERNAL MEDICINE

## 2022-01-12 PROCEDURE — 99499 UNLISTED E&M SERVICE: CPT | Mod: S$GLB,,, | Performed by: INTERNAL MEDICINE

## 2022-01-12 PROCEDURE — 99999 PR PBB SHADOW E&M-EST. PATIENT-LVL V: CPT | Mod: PBBFAC,HCNC,, | Performed by: INTERNAL MEDICINE

## 2022-01-12 PROCEDURE — 1125F AMNT PAIN NOTED PAIN PRSNT: CPT | Mod: HCNC,CPTII,S$GLB, | Performed by: INTERNAL MEDICINE

## 2022-01-12 PROCEDURE — 1157F ADVNC CARE PLAN IN RCRD: CPT | Mod: HCNC,CPTII,S$GLB, | Performed by: INTERNAL MEDICINE

## 2022-01-12 PROCEDURE — 1157F PR ADVANCE CARE PLAN OR EQUIV PRESENT IN MEDICAL RECORD: ICD-10-PCS | Mod: HCNC,CPTII,S$GLB, | Performed by: INTERNAL MEDICINE

## 2022-01-12 PROCEDURE — 3077F PR MOST RECENT SYSTOLIC BLOOD PRESSURE >= 140 MM HG: ICD-10-PCS | Mod: HCNC,CPTII,S$GLB, | Performed by: INTERNAL MEDICINE

## 2022-01-12 PROCEDURE — 99215 OFFICE O/P EST HI 40 MIN: CPT | Mod: HCNC,S$GLB,, | Performed by: INTERNAL MEDICINE

## 2022-01-12 PROCEDURE — 99999 PR PBB SHADOW E&M-EST. PATIENT-LVL V: ICD-10-PCS | Mod: PBBFAC,HCNC,, | Performed by: INTERNAL MEDICINE

## 2022-01-12 PROCEDURE — 99499 RISK ADDL DX/OHS AUDIT: ICD-10-PCS | Mod: S$GLB,,, | Performed by: INTERNAL MEDICINE

## 2022-01-12 PROCEDURE — 3077F SYST BP >= 140 MM HG: CPT | Mod: HCNC,CPTII,S$GLB, | Performed by: INTERNAL MEDICINE

## 2022-01-12 PROCEDURE — 3079F PR MOST RECENT DIASTOLIC BLOOD PRESSURE 80-89 MM HG: ICD-10-PCS | Mod: HCNC,CPTII,S$GLB, | Performed by: INTERNAL MEDICINE

## 2022-01-12 PROCEDURE — 99215 PR OFFICE/OUTPT VISIT, EST, LEVL V, 40-54 MIN: ICD-10-PCS | Mod: HCNC,S$GLB,, | Performed by: INTERNAL MEDICINE

## 2022-01-12 PROCEDURE — 3079F DIAST BP 80-89 MM HG: CPT | Mod: HCNC,CPTII,S$GLB, | Performed by: INTERNAL MEDICINE

## 2022-01-12 PROCEDURE — 1159F PR MEDICATION LIST DOCUMENTED IN MEDICAL RECORD: ICD-10-PCS | Mod: HCNC,CPTII,S$GLB, | Performed by: INTERNAL MEDICINE

## 2022-01-12 PROCEDURE — 1159F MED LIST DOCD IN RCRD: CPT | Mod: HCNC,CPTII,S$GLB, | Performed by: INTERNAL MEDICINE

## 2022-01-12 NOTE — PROGRESS NOTES
Subjective:   Patient ID:  Maximilian Tavera Jr. is a 76 y.o. male who presents for follow-up of leg edema    HPI: He's has leg edema and wonders if heart is contributing? The patient has no chest pain, SOB, TIA, palpitations, syncope or pre-syncope.BP normally 120s.      Review of Systems   Constitutional: Negative for chills, decreased appetite, diaphoresis, fever, malaise/fatigue, night sweats, weight gain and weight loss.   HENT: Negative for congestion, hoarse voice, nosebleeds, sore throat and tinnitus.    Eyes: Negative for blurred vision, double vision, vision loss in left eye, vision loss in right eye, visual disturbance and visual halos.   Cardiovascular: Positive for leg swelling. Negative for chest pain, claudication, cyanosis, dyspnea on exertion, irregular heartbeat, near-syncope, orthopnea, palpitations, paroxysmal nocturnal dyspnea and syncope.   Respiratory: Negative for cough, hemoptysis, shortness of breath, sleep disturbances due to breathing, snoring, sputum production and wheezing.    Endocrine: Negative for cold intolerance, heat intolerance, polydipsia, polyphagia and polyuria.   Hematologic/Lymphatic: Negative for adenopathy and bleeding problem. Does not bruise/bleed easily.   Skin: Negative for color change, dry skin, flushing, itching, nail changes, poor wound healing, rash, skin cancer, suspicious lesions and unusual hair distribution.   Musculoskeletal: Negative for arthritis, back pain, falls, gout, joint pain, joint swelling, muscle cramps, muscle weakness, myalgias and stiffness.   Gastrointestinal: Negative for abdominal pain, anorexia, change in bowel habit, constipation, diarrhea, dysphagia, heartburn, hematemesis, hematochezia, melena and vomiting.   Genitourinary: Negative for decreased libido, dysuria, hematuria, hesitancy and urgency.   Neurological: Negative for excessive daytime sleepiness, dizziness, focal weakness, headaches, light-headedness, loss of balance,  "numbness, paresthesias, seizures, sensory change, tremors, vertigo and weakness.   Psychiatric/Behavioral: Negative for altered mental status, depression, hallucinations, memory loss, substance abuse and suicidal ideas. The patient does not have insomnia and is not nervous/anxious.    Allergic/Immunologic: Negative for environmental allergies and hives.       Objective: BP (!) 142/85 (BP Location: Left arm, Patient Position: Sitting, BP Method: X-Large (Automatic))   Pulse 68   Ht 6' 1" (1.854 m)   Wt 114.8 kg (253 lb 1.4 oz)   BMI 33.39 kg/m²      Physical Exam  Constitutional:       General: He is not in acute distress.     Appearance: He is well-developed and well-nourished. He is not diaphoretic.   HENT:      Head: Normocephalic.   Eyes:      Extraocular Movements: EOM normal.      Pupils: Pupils are equal, round, and reactive to light.   Neck:      Thyroid: No thyromegaly.   Cardiovascular:      Rate and Rhythm: Normal rate and regular rhythm.      Pulses: Intact distal pulses.           Carotid pulses are 3+ on the right side and 3+ on the left side.       Radial pulses are 3+ on the right side and 3+ on the left side.        Femoral pulses are 3+ on the right side and 3+ on the left side.       Popliteal pulses are 3+ on the right side and 3+ on the left side.        Dorsalis pedis pulses are 3+ on the right side and 3+ on the left side.        Posterior tibial pulses are 3+ on the right side and 3+ on the left side.      Heart sounds: Normal heart sounds. No murmur heard.  No friction rub. No gallop.    Pulmonary:      Effort: Pulmonary effort is normal. No respiratory distress.      Breath sounds: Normal breath sounds. No wheezing or rales.   Chest:      Chest wall: No tenderness.   Abdominal:      General: There is no distension.      Palpations: Abdomen is soft. There is no mass.      Tenderness: There is no abdominal tenderness.   Musculoskeletal:         General: Normal range of motion.      " Cervical back: Normal range of motion.      Right lower leg: Edema present.      Left lower leg: Edema present.   Lymphadenopathy:      Cervical: No cervical adenopathy.   Skin:     General: Skin is warm.      Nails: There is no clubbing or cyanosis.   Neurological:      Mental Status: He is alert and oriented to person, place, and time.   Psychiatric:         Mood and Affect: Mood and affect normal.         Speech: Speech normal.         Behavior: Behavior normal.         Thought Content: Thought content normal.         Cognition and Memory: Cognition and memory normal.         Judgment: Judgment normal.     Face and arms puffy too    Assessment:     1. HTN (hypertension), benign    2. Leg swelling    3. Mixed hyperlipidemia    4. Erectile dysfunction due to arterial insufficiency    5. Myasthenia gravis, AChR antibody positive    6. Agatston CAC score, <100    7. Aortic valve disease    8. Primary osteoarthritis of both knees    9. On prednisone therapy    10. PVC's (premature ventricular contractions)    11. Nonsustained ventricular tachycardia    12. Other emphysema    13. RBBB    14. Abdominal aortic atherosclerosis    15. Cervical stenosis of spinal canal    16. S/P cervical spinal fusion    17. PVC (premature ventricular contraction)    18. 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   We reviewed meds in detail.  Reassured-Discussed goals, options, plan.  Omega-3 > 800 mg/d combined EPA/DHA.  Goal BP< 130/80.  Discussed elevating legs  Could have him see Dr Amezquita for venous insufficiency  If needed could also add Metolazone but main issue is total body fluid due to weight , Prednisone etc  Maximilian was seen today for edema.    Diagnoses and all orders for this visit:    HTN (hypertension), benign  -     Ambulatory referral/consult to [facility]; Future; Expected date: 01/13/2022  -     Basic  Metabolic Panel; Standing    Leg swelling  -     Ambulatory referral/consult to [facility] Medicine; Future; Expected date: 01/13/2022  -     Basic Metabolic Panel; Standing    Mixed hyperlipidemia    Erectile dysfunction due to arterial insufficiency    Myasthenia gravis, AChR antibody positive    Agatston CAC score, <100    Aortic valve disease    Primary osteoarthritis of both knees    On prednisone therapy    PVC's (premature ventricular contractions)    Nonsustained ventricular tachycardia    Other emphysema    RBBB    Abdominal aortic atherosclerosis    Cervical stenosis of spinal canal    S/P cervical spinal fusion    PVC (premature ventricular contraction)    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

## 2022-01-12 NOTE — PATIENT INSTRUCTIONS
Discussed diet , achieving and maintaining ideal body weight, and exercise.   We reviewed meds in detail.  Reassured-Discussed goals, options, plan.  Omega-3 > 800 mg/d combined EPA/DHA.  Goal BP< 130/80.  Discussed elevating legs  Could have him see Dr Amezquita for venous insufficiency  If needed could also add Metolazone but main issue is total body fluid due to weight , Prednisone etc

## 2022-01-13 ENCOUNTER — OFFICE VISIT (OUTPATIENT)
Dept: NEUROLOGY | Facility: CLINIC | Age: 77
End: 2022-01-13
Payer: MEDICARE

## 2022-01-13 VITALS
SYSTOLIC BLOOD PRESSURE: 134 MMHG | HEART RATE: 60 BPM | HEIGHT: 73 IN | WEIGHT: 253 LBS | DIASTOLIC BLOOD PRESSURE: 73 MMHG | BODY MASS INDEX: 33.53 KG/M2

## 2022-01-13 DIAGNOSIS — M51.36 DDD (DEGENERATIVE DISC DISEASE), LUMBAR: ICD-10-CM

## 2022-01-13 DIAGNOSIS — Z79.52 CURRENT CHRONIC USE OF SYSTEMIC STEROIDS: Chronic | ICD-10-CM

## 2022-01-13 DIAGNOSIS — I10 HTN (HYPERTENSION), BENIGN: ICD-10-CM

## 2022-01-13 DIAGNOSIS — G70.00 MYASTHENIA GRAVIS, ACHR ANTIBODY POSITIVE: Primary | ICD-10-CM

## 2022-01-13 DIAGNOSIS — E78.2 MIXED HYPERLIPIDEMIA: ICD-10-CM

## 2022-01-13 PROCEDURE — 1159F MED LIST DOCD IN RCRD: CPT | Mod: HCNC,CPTII,S$GLB, | Performed by: PSYCHIATRY & NEUROLOGY

## 2022-01-13 PROCEDURE — 1160F PR REVIEW ALL MEDS BY PRESCRIBER/CLIN PHARMACIST DOCUMENTED: ICD-10-PCS | Mod: HCNC,CPTII,S$GLB, | Performed by: PSYCHIATRY & NEUROLOGY

## 2022-01-13 PROCEDURE — 99499 UNLISTED E&M SERVICE: CPT | Mod: S$GLB,,, | Performed by: PSYCHIATRY & NEUROLOGY

## 2022-01-13 PROCEDURE — 99214 OFFICE O/P EST MOD 30 MIN: CPT | Mod: HCNC,S$GLB,, | Performed by: PSYCHIATRY & NEUROLOGY

## 2022-01-13 PROCEDURE — 3075F PR MOST RECENT SYSTOLIC BLOOD PRESS GE 130-139MM HG: ICD-10-PCS | Mod: HCNC,CPTII,S$GLB, | Performed by: PSYCHIATRY & NEUROLOGY

## 2022-01-13 PROCEDURE — 1126F PR PAIN SEVERITY QUANTIFIED, NO PAIN PRESENT: ICD-10-PCS | Mod: HCNC,CPTII,S$GLB, | Performed by: PSYCHIATRY & NEUROLOGY

## 2022-01-13 PROCEDURE — 3078F PR MOST RECENT DIASTOLIC BLOOD PRESSURE < 80 MM HG: ICD-10-PCS | Mod: HCNC,CPTII,S$GLB, | Performed by: PSYCHIATRY & NEUROLOGY

## 2022-01-13 PROCEDURE — 1157F PR ADVANCE CARE PLAN OR EQUIV PRESENT IN MEDICAL RECORD: ICD-10-PCS | Mod: HCNC,CPTII,S$GLB, | Performed by: PSYCHIATRY & NEUROLOGY

## 2022-01-13 PROCEDURE — 99999 PR PBB SHADOW E&M-EST. PATIENT-LVL IV: ICD-10-PCS | Mod: PBBFAC,HCNC,, | Performed by: PSYCHIATRY & NEUROLOGY

## 2022-01-13 PROCEDURE — 1101F PR PT FALLS ASSESS DOC 0-1 FALLS W/OUT INJ PAST YR: ICD-10-PCS | Mod: HCNC,CPTII,S$GLB, | Performed by: PSYCHIATRY & NEUROLOGY

## 2022-01-13 PROCEDURE — 3288F FALL RISK ASSESSMENT DOCD: CPT | Mod: HCNC,CPTII,S$GLB, | Performed by: PSYCHIATRY & NEUROLOGY

## 2022-01-13 PROCEDURE — 1160F RVW MEDS BY RX/DR IN RCRD: CPT | Mod: HCNC,CPTII,S$GLB, | Performed by: PSYCHIATRY & NEUROLOGY

## 2022-01-13 PROCEDURE — 99214 PR OFFICE/OUTPT VISIT, EST, LEVL IV, 30-39 MIN: ICD-10-PCS | Mod: HCNC,S$GLB,, | Performed by: PSYCHIATRY & NEUROLOGY

## 2022-01-13 PROCEDURE — 3075F SYST BP GE 130 - 139MM HG: CPT | Mod: HCNC,CPTII,S$GLB, | Performed by: PSYCHIATRY & NEUROLOGY

## 2022-01-13 PROCEDURE — 1157F ADVNC CARE PLAN IN RCRD: CPT | Mod: HCNC,CPTII,S$GLB, | Performed by: PSYCHIATRY & NEUROLOGY

## 2022-01-13 PROCEDURE — 99499 RISK ADDL DX/OHS AUDIT: ICD-10-PCS | Mod: S$GLB,,, | Performed by: PSYCHIATRY & NEUROLOGY

## 2022-01-13 PROCEDURE — 1101F PT FALLS ASSESS-DOCD LE1/YR: CPT | Mod: HCNC,CPTII,S$GLB, | Performed by: PSYCHIATRY & NEUROLOGY

## 2022-01-13 PROCEDURE — 3288F PR FALLS RISK ASSESSMENT DOCUMENTED: ICD-10-PCS | Mod: HCNC,CPTII,S$GLB, | Performed by: PSYCHIATRY & NEUROLOGY

## 2022-01-13 PROCEDURE — 99999 PR PBB SHADOW E&M-EST. PATIENT-LVL IV: CPT | Mod: PBBFAC,HCNC,, | Performed by: PSYCHIATRY & NEUROLOGY

## 2022-01-13 PROCEDURE — 3078F DIAST BP <80 MM HG: CPT | Mod: HCNC,CPTII,S$GLB, | Performed by: PSYCHIATRY & NEUROLOGY

## 2022-01-13 PROCEDURE — 1126F AMNT PAIN NOTED NONE PRSNT: CPT | Mod: HCNC,CPTII,S$GLB, | Performed by: PSYCHIATRY & NEUROLOGY

## 2022-01-13 PROCEDURE — 1159F PR MEDICATION LIST DOCUMENTED IN MEDICAL RECORD: ICD-10-PCS | Mod: HCNC,CPTII,S$GLB, | Performed by: PSYCHIATRY & NEUROLOGY

## 2022-01-14 ENCOUNTER — PATIENT MESSAGE (OUTPATIENT)
Dept: CARDIOLOGY | Facility: CLINIC | Age: 77
End: 2022-01-14
Payer: MEDICARE

## 2022-01-14 ENCOUNTER — LAB VISIT (OUTPATIENT)
Dept: LAB | Facility: HOSPITAL | Age: 77
End: 2022-01-14
Attending: PHYSICIAN ASSISTANT
Payer: MEDICARE

## 2022-01-14 ENCOUNTER — OFFICE VISIT (OUTPATIENT)
Dept: FAMILY MEDICINE | Facility: CLINIC | Age: 77
End: 2022-01-14
Payer: MEDICARE

## 2022-01-14 VITALS
TEMPERATURE: 99 F | RESPIRATION RATE: 17 BRPM | HEIGHT: 73 IN | WEIGHT: 253.06 LBS | DIASTOLIC BLOOD PRESSURE: 68 MMHG | OXYGEN SATURATION: 95 % | HEART RATE: 71 BPM | SYSTOLIC BLOOD PRESSURE: 138 MMHG | BODY MASS INDEX: 33.54 KG/M2

## 2022-01-14 DIAGNOSIS — R60.0 BILATERAL LOWER EXTREMITY EDEMA: ICD-10-CM

## 2022-01-14 DIAGNOSIS — E66.9 OBESITY (BMI 30.0-34.9): ICD-10-CM

## 2022-01-14 DIAGNOSIS — J45.30 MILD PERSISTENT ASTHMA WITHOUT COMPLICATION: ICD-10-CM

## 2022-01-14 DIAGNOSIS — E87.6 HYPOKALEMIA: ICD-10-CM

## 2022-01-14 DIAGNOSIS — R60.0 LOWER EXTREMITY EDEMA: Primary | ICD-10-CM

## 2022-01-14 DIAGNOSIS — G70.00 MYASTHENIA GRAVIS: ICD-10-CM

## 2022-01-14 DIAGNOSIS — E78.2 MIXED HYPERLIPIDEMIA: ICD-10-CM

## 2022-01-14 DIAGNOSIS — Z51.81 ENCOUNTER FOR MONITORING DIURETIC THERAPY: ICD-10-CM

## 2022-01-14 DIAGNOSIS — I10 HTN (HYPERTENSION), BENIGN: ICD-10-CM

## 2022-01-14 DIAGNOSIS — Z79.899 ENCOUNTER FOR MONITORING DIURETIC THERAPY: ICD-10-CM

## 2022-01-14 LAB
ALBUMIN SERPL BCP-MCNC: 3.4 G/DL (ref 3.5–5.2)
ALP SERPL-CCNC: 75 U/L (ref 55–135)
ALT SERPL W/O P-5'-P-CCNC: 25 U/L (ref 10–44)
ANION GAP SERPL CALC-SCNC: 9 MMOL/L (ref 8–16)
AST SERPL-CCNC: 20 U/L (ref 10–40)
BILIRUB SERPL-MCNC: 1.1 MG/DL (ref 0.1–1)
BUN SERPL-MCNC: 23 MG/DL (ref 8–23)
CALCIUM SERPL-MCNC: 8.9 MG/DL (ref 8.7–10.5)
CHLORIDE SERPL-SCNC: 109 MMOL/L (ref 95–110)
CO2 SERPL-SCNC: 27 MMOL/L (ref 23–29)
CREAT SERPL-MCNC: 1 MG/DL (ref 0.5–1.4)
EST. GFR  (AFRICAN AMERICAN): >60 ML/MIN/1.73 M^2
EST. GFR  (NON AFRICAN AMERICAN): >60 ML/MIN/1.73 M^2
GLUCOSE SERPL-MCNC: 121 MG/DL (ref 70–110)
MAGNESIUM SERPL-MCNC: 2.1 MG/DL (ref 1.6–2.6)
POTASSIUM SERPL-SCNC: 4.2 MMOL/L (ref 3.5–5.1)
PROT SERPL-MCNC: 6.2 G/DL (ref 6–8.4)
SODIUM SERPL-SCNC: 145 MMOL/L (ref 136–145)

## 2022-01-14 PROCEDURE — 36415 COLL VENOUS BLD VENIPUNCTURE: CPT | Mod: HCNC | Performed by: PHYSICIAN ASSISTANT

## 2022-01-14 PROCEDURE — 3075F SYST BP GE 130 - 139MM HG: CPT | Mod: HCNC,CPTII,S$GLB, | Performed by: PHYSICIAN ASSISTANT

## 2022-01-14 PROCEDURE — 1159F MED LIST DOCD IN RCRD: CPT | Mod: HCNC,CPTII,S$GLB, | Performed by: PHYSICIAN ASSISTANT

## 2022-01-14 PROCEDURE — 1157F PR ADVANCE CARE PLAN OR EQUIV PRESENT IN MEDICAL RECORD: ICD-10-PCS | Mod: HCNC,CPTII,S$GLB, | Performed by: PHYSICIAN ASSISTANT

## 2022-01-14 PROCEDURE — 83735 ASSAY OF MAGNESIUM: CPT | Mod: HCNC | Performed by: PHYSICIAN ASSISTANT

## 2022-01-14 PROCEDURE — 3078F DIAST BP <80 MM HG: CPT | Mod: HCNC,CPTII,S$GLB, | Performed by: PHYSICIAN ASSISTANT

## 2022-01-14 PROCEDURE — 80053 COMPREHEN METABOLIC PANEL: CPT | Mod: HCNC | Performed by: PHYSICIAN ASSISTANT

## 2022-01-14 PROCEDURE — 1125F PR PAIN SEVERITY QUANTIFIED, PAIN PRESENT: ICD-10-PCS | Mod: HCNC,CPTII,S$GLB, | Performed by: PHYSICIAN ASSISTANT

## 2022-01-14 PROCEDURE — 99999 PR PBB SHADOW E&M-EST. PATIENT-LVL IV: ICD-10-PCS | Mod: PBBFAC,HCNC,, | Performed by: PHYSICIAN ASSISTANT

## 2022-01-14 PROCEDURE — 1160F PR REVIEW ALL MEDS BY PRESCRIBER/CLIN PHARMACIST DOCUMENTED: ICD-10-PCS | Mod: HCNC,CPTII,S$GLB, | Performed by: PHYSICIAN ASSISTANT

## 2022-01-14 PROCEDURE — 29580 STRAPPING UNNA BOOT: CPT | Mod: 50,HCNC,S$GLB, | Performed by: PHYSICIAN ASSISTANT

## 2022-01-14 PROCEDURE — 99214 PR OFFICE/OUTPT VISIT, EST, LEVL IV, 30-39 MIN: ICD-10-PCS | Mod: 25,HCNC,S$GLB, | Performed by: PHYSICIAN ASSISTANT

## 2022-01-14 PROCEDURE — 1157F ADVNC CARE PLAN IN RCRD: CPT | Mod: HCNC,CPTII,S$GLB, | Performed by: PHYSICIAN ASSISTANT

## 2022-01-14 PROCEDURE — 3075F PR MOST RECENT SYSTOLIC BLOOD PRESS GE 130-139MM HG: ICD-10-PCS | Mod: HCNC,CPTII,S$GLB, | Performed by: PHYSICIAN ASSISTANT

## 2022-01-14 PROCEDURE — 99214 OFFICE O/P EST MOD 30 MIN: CPT | Mod: 25,HCNC,S$GLB, | Performed by: PHYSICIAN ASSISTANT

## 2022-01-14 PROCEDURE — 3078F PR MOST RECENT DIASTOLIC BLOOD PRESSURE < 80 MM HG: ICD-10-PCS | Mod: HCNC,CPTII,S$GLB, | Performed by: PHYSICIAN ASSISTANT

## 2022-01-14 PROCEDURE — 1101F PT FALLS ASSESS-DOCD LE1/YR: CPT | Mod: HCNC,CPTII,S$GLB, | Performed by: PHYSICIAN ASSISTANT

## 2022-01-14 PROCEDURE — 3288F FALL RISK ASSESSMENT DOCD: CPT | Mod: HCNC,CPTII,S$GLB, | Performed by: PHYSICIAN ASSISTANT

## 2022-01-14 PROCEDURE — 1159F PR MEDICATION LIST DOCUMENTED IN MEDICAL RECORD: ICD-10-PCS | Mod: HCNC,CPTII,S$GLB, | Performed by: PHYSICIAN ASSISTANT

## 2022-01-14 PROCEDURE — 99999 PR PBB SHADOW E&M-EST. PATIENT-LVL IV: CPT | Mod: PBBFAC,HCNC,, | Performed by: PHYSICIAN ASSISTANT

## 2022-01-14 PROCEDURE — 1101F PR PT FALLS ASSESS DOC 0-1 FALLS W/OUT INJ PAST YR: ICD-10-PCS | Mod: HCNC,CPTII,S$GLB, | Performed by: PHYSICIAN ASSISTANT

## 2022-01-14 PROCEDURE — 29580 PR STRAPPING UNNA BOOT: ICD-10-PCS | Mod: 50,HCNC,S$GLB, | Performed by: PHYSICIAN ASSISTANT

## 2022-01-14 PROCEDURE — 1160F RVW MEDS BY RX/DR IN RCRD: CPT | Mod: HCNC,CPTII,S$GLB, | Performed by: PHYSICIAN ASSISTANT

## 2022-01-14 PROCEDURE — 1125F AMNT PAIN NOTED PAIN PRSNT: CPT | Mod: HCNC,CPTII,S$GLB, | Performed by: PHYSICIAN ASSISTANT

## 2022-01-14 PROCEDURE — 3288F PR FALLS RISK ASSESSMENT DOCUMENTED: ICD-10-PCS | Mod: HCNC,CPTII,S$GLB, | Performed by: PHYSICIAN ASSISTANT

## 2022-01-14 NOTE — PATIENT INSTRUCTIONS
If you are due for any health screening(s) below please notify me so we can arrange them to be ordered and scheduled to maintain your health.     Health Maintenance   Topic Date Due    Aspirin/Antiplatelet Therapy  Never done    Lipid Panel  01/03/2023    Colonoscopy  12/07/2024    TETANUS VACCINE  08/31/2025    Hepatitis C Screening  Completed

## 2022-01-14 NOTE — PROGRESS NOTES
Subjective:       Patient ID: Maximilian Tavera Jr. is a 76 y.o. male.    Chief Complaint: Leg Pain (Swelling )    Mr. Tavera is a 76 year old male with HTN, HLD, asthma, and MG. The patient is here today for follow up of leg swelling. His diuretic was changed to bumex. His blood pressure is stable and his labs also are stable. The patient reports minimal improvement in swelling. The patient does elevate his legs when at home. He saw his cardiologist regarding the swelling. He believes the swelling is related to his weight and prednisone use. The patient and his wife are going to make efforts to change his diet. He is limited on how much he can exercise due to his back pain.     Review of patient's allergies indicates:   Allergen Reactions    Spironolactone Diarrhea         Current Outpatient Medications:     atorvastatin (LIPITOR) 80 MG tablet, TAKE 1 TABLET EVERY DAY, Disp: 90 tablet, Rfl: 3    bumetanide (BUMEX) 1 MG tablet, Take 1 tablet (1 mg total) by mouth 2 (two) times daily., Disp: 60 tablet, Rfl: 0    carvediloL (COREG) 25 MG tablet, TAKE 1 TABLET TWICE DAILY WITH MEALS, Disp: 180 tablet, Rfl: 3    cetirizine (ZYRTEC) 10 MG tablet, Take 1 tablet by mouth daily as needed., Disp: , Rfl:     cholecalciferol, vitamin D3, (VITAMIN D3) 50 mcg (2,000 unit) Cap, 1 capsule once daily. Every day, Disp: , Rfl:     cholestyramine (QUESTRAN) 4 gram packet, Take 1 packet (4 g total) by mouth 3 (three) times daily with meals., Disp: 270 packet, Rfl: 3    coenzyme Q10 (CO Q-10) 100 mg capsule, Take 400 mg by mouth once daily., Disp: , Rfl:     cyanocobalamin, vitamin B-12, 5,000 mcg Subl, Take 5,000 mcg by mouth once daily. Every day, Disp: , Rfl:     diphenoxylate-atropine 2.5-0.025 mg (LOMOTIL) 2.5-0.025 mg per tablet, Take 1 tablet by mouth 4 (four) times daily as needed for Diarrhea., Disp: 90 tablet, Rfl: 0    ezetimibe (ZETIA) 10 mg tablet, TAKE 1 TABLET EVERY DAY, Disp: 90 tablet, Rfl: 3    FLUAD  QUAD 2021-22,65Y UP,,PF, 60 mcg (15 mcg x 4)/0.5 mL Syrg, , Disp: , Rfl:     fluticasone (FLONASE) 50 mcg/actuation nasal spray, USE 1 SPRAY IN EACH NOSTRIL TWICE DAILY AS NEEDED  FOR  RHINITIS, Disp: 48 g, Rfl: 3    gabapentin (NEURONTIN) 300 MG capsule, TAKE 1 CAPSULE THREE TIMES DAILY, Disp: 270 capsule, Rfl: 5    KENALOG 40 mg/mL injection, , Disp: , Rfl:     levothyroxine (SYNTHROID) 50 MCG tablet, TAKE 1 TABLET EVERY DAY, Disp: 90 tablet, Rfl: 3    methylsulfonylmethane 1,000 mg Tab, Take 1,000 mg by mouth once daily. Every day, Disp: , Rfl:     milk thistle 200 mg Cap, Take 200 mg by mouth 2 (two) times daily. Twice a day, Disp: , Rfl:     olmesartan (BENICAR) 20 MG tablet, TAKE 1 TABLET EVERY DAY, Disp: 90 tablet, Rfl: 3    omega-3 fatty acids 1,000 mg Cap, Twice a day, Disp: , Rfl:     potassium chloride SA (K-DUR,KLOR-CON) 20 MEQ tablet, Take 6 tab daily while on bumex, Disp: 360 tablet, Rfl: 3    predniSONE (DELTASONE) 1 MG tablet, TAKE 2 TABLETS EVERY DAY, Disp: 180 tablet, Rfl: 3    predniSONE (DELTASONE) 5 MG tablet, TAKE 1 TABLET EVERY DAY, Disp: 90 tablet, Rfl: 3    sildenafil (REVATIO) 20 mg Tab, Take 1 to 3 tabs daily as needed., Disp: 30 tablet, Rfl: 3    SPIRIVA WITH HANDIHALER 18 mcg inhalation capsule, INHALE THE CONTENTS OF 1 CAPSULE (18 MCG TOTAL) INTO THE LUNGS ONCE DAILY. CONTROLLER, Disp: 30 capsule, Rfl: 5    VENTOLIN HFA 90 mcg/actuation inhaler, Inhale 2 puffs into the lungs every 6 (six) hours as needed for Wheezing. Rescue, Disp: 18 g, Rfl: 0    pantoprazole (PROTONIX) 40 MG tablet, Take 1 tablet (40 mg total) by mouth 2 (two) times daily., Disp: 180 tablet, Rfl: 3  No current facility-administered medications for this visit.    Facility-Administered Medications Ordered in Other Visits:     0.9%  NaCl infusion, , Intravenous, Continuous, Devan Watt MD    lidocaine (PF) 10 mg/ml (1%) injection 10 mg, 1 mL, Intradermal, Once, Terry Quach MD    Lab Results   Component  Value Date    WBC 5.77 01/03/2022    HGB 13.1 (L) 01/03/2022    HCT 42.2 01/03/2022     (L) 01/03/2022    CHOL 122 01/03/2022    TRIG 68 01/03/2022    HDL 41 01/03/2022    ALT 25 01/14/2022    AST 20 01/14/2022     01/14/2022    K 4.2 01/14/2022     01/14/2022    CREATININE 1.0 01/14/2022    BUN 23 01/14/2022    CO2 27 01/14/2022    TSH 2.311 01/03/2022    PSA 3.5 09/19/2019    INR 1.0 10/30/2018    HGBA1C 5.5 11/21/2018       Review of Systems   Constitutional: Negative for activity change, appetite change and fever.   HENT: Negative for postnasal drip, rhinorrhea and sinus pressure.    Eyes: Negative for visual disturbance.   Respiratory: Negative for cough and shortness of breath.    Cardiovascular: Positive for leg swelling. Negative for chest pain.   Gastrointestinal: Negative for abdominal distention and abdominal pain.   Genitourinary: Negative for difficulty urinating and dysuria.   Musculoskeletal: Positive for arthralgias and back pain. Negative for myalgias.   Neurological: Negative for headaches.   Hematological: Negative for adenopathy.   Psychiatric/Behavioral: The patient is not nervous/anxious.        Objective:      Physical Exam  Constitutional:       Appearance: Normal appearance.   HENT:      Head: Normocephalic and atraumatic.   Eyes:      Conjunctiva/sclera: Conjunctivae normal.   Cardiovascular:      Rate and Rhythm: Normal rate and regular rhythm.   Pulmonary:      Effort: Pulmonary effort is normal. No respiratory distress.      Breath sounds: Normal breath sounds. No wheezing.   Abdominal:      General: There is no distension.      Palpations: There is no mass.      Tenderness: There is no abdominal tenderness.   Musculoskeletal:      Right lower leg: Edema present.      Left lower leg: Edema present.      Comments: Significant pitting edema of bilateral lower extremities.mild erythema of distal anterior bilateral lower extremities as seen in venous stasis dermatitis    Neurological:      Mental Status: He is alert and oriented to person, place, and time.   Psychiatric:         Behavior: Behavior normal.         Assessment:       1. Lower extremity edema    2. HTN (hypertension), benign    3. Mixed hyperlipidemia    4. Myasthenia gravis    5. Mild persistent asthma without complication    6. Obesity (BMI 30.0-34.9)        Plan:   Maximilian was seen today for leg pain.    Diagnoses and all orders for this visit:    Lower extremity edema  Continue current medication  Thais boots applied today  Follow up next week  HTN (hypertension), benign  -     Ambulatory referral/consult to [facility]; Future  Controlled  Low salt diet  Continue current medication  Mixed hyperlipidemia  -     Ambulatory referral/consult to [facility]; Future  High fiber diet  Continue current medication  Myasthenia gravis  Follow up with Dr. Hoang  Mild persistent asthma without complication  Stable on current medication   Obesity (BMI 30.0-34.9)  -     Ambulatory referral/consult to [facility]; Future

## 2022-01-17 NOTE — PROGRESS NOTES
Subjective:     Chief Complaint:  Follow-up and Neurologic Problem (AChRAb+ MG)    Interval History 1/13/2022 - I have reviewed relevant medical records in Epic. Here for routine follow up for Ab+ MG, taking Prednisone 7 mg daily. This dose has kept him well-managed for many years. He has had 25# weight gain over the past several months but he suspects this is secondary to decreased activity rather than due to steroids use. Sine the pandemic his routine has slowed down significantly. He has also got chronic low back pain that impedes activities. He saw [facility] but they declined intervention since his case is complicated by age and complexity of the pathology. No reported ocular changes. He has occasional shortness of breath that he reports is more likely secondary to COPD. No extremity weaknesses, no changes in gait, no falls.     Interval History 3.1.2021 - I have reviewed all relevant medical records in Epic. Here for routine follow up for Ab+ MG. Still doing very well on Prednisone 7 mg daily. Not taking any Mestinon due to GI upset with use. No diplopia, ptosis, dysarthria, or extremity weaknesses. He has been having some shortness of breath, which may be related to baseline COPD. It doesn't fluctuate much. He has not had PFTs done in 2 years. He reported some dysphagia to thin liquids at last visit and has same complaint today. Occurs a few times per week only with liquids. No choking episodes. No complaints of GERD, etc.He is complaining of neck pain and L shoulder pain and low back pain. He saw a neurosurgeon who told him that his L/S spine is not surgical. He went to [facility] and was offered Botox in L cervical paraspinal / shoulder / scapular regions and is asking me for my opinion.    Interval History 6/27/2020 - I have reviewed all relevant medical records in Epic. Doing well in terms of MG control. He has rare dysphagia to water but denies any diplopia, orthopnea, dyspnea (MG-related, he does have  baseline COPD), ptosis, dysphonia, dysarthria, or extremity weaknesses. He had a recent URI but was CV19 tested and negative. Full recovery. Neck pain is still present from prior stenosis and surgery but he says that he is overall coping well. Secondary stroke risk factors of HTN and HLD. Monitored for DM2.       History of Present Illness:  I have reviewed all relevant medical records in Epic. Maximilian Tavera Jr. is a 76 y.o. male who presents today for initial visit with me, previously followed by Dr. Gill. 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 Several months ago he had a fall at the [facility] and had a serious neck injury (previously had two ACDF procedures) resulting in spinal stenosis and need for surgery. He is now post-op and is recovering well. He is is c-collar. He has not had MG-related weaknesses post-op and has continued his steroids, currently on 7 mg daily. He has only occasional vertical diplopia that occurs mostly when he is watching TV while lying down. No other bulbar or extremity weakness complaints. Cannot tolerate Mestinon (diarrhea).    From Dr. Gill's Notes:    DX: Ab+ MG dx'ed 3/2015  Thymic status: CT chest 4/15 negative for thymoma   Maintenance: prednisone 7 mg qOD; mestinon SE - diarrhea  Last dose change: 12/20/16: 8mg qOD -> 7mg qOD; 8/16: 10mg qOD to 8mg qOD; 6/15 prednisone 5 mg qd --> 10 mg q OD   Rescue: none   Prior immunemodulatory agents: none      Interval history 8/9/18:  Underwent MRI Cspine and Lspine for new onset neck pain, chronic hand numbness and LBP. 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   Having trouble with head drop, and some mild chewing issues. No diplopia, ptosis or UE/LE weakness. Still on prednisone 7mg qOD. Has numbness in arms that wakes him up at night.     Interval history 2/27/18:  Doing well currently with respect to MG. Mild diplopia at end of day. Waking up with  "numbness in his hands, bilateral. Relieved with movement. No numbness or weakness during the day. Occasional tongue biting.      Interval history 8/31/17:   Mr Tavera is a 70yo male with Ab+ myasthenia gravis diagnosed in 2015, maintained on 7mg prednisone qOD. Has been doing well since his last visit. Minimal diplopia at the end of the day. Has some generalized fatigue when it's hot, but tries to do all his chores early in the day. Has started exercising.     Interval 3/21/17: Doing well from an MG standpoint on low-dose prednisone. He still has a occasional diplopia at night when looking down. Has rare choking - mostly liquids - when he's not paying attention. Had a number of URIs last year. Also noticed that his sense of taste is off, although sense of smell is preserved.      Interval history 12/20/16:  Doing well. Last visit, dropped prednisone from 20 to 8mg qOD. No new symptoms. Still having diplopia on downgaze and only rarely at other times. No chewing or swallowing issues.     Interval history 8/23/16  Doing well. Still with mild baseline diplopia and rare difficulty with swallowing (this does not last more than a few seconds). No issues with speaking, breathing, or generalized weakness. Heat "wears me out".      HPI 5/17/16  Maximilian Tavera Jr. is a 69 yo male with Ab+ myasthenia gravis. Doing well, stable. Occasional diplopia, mostly with laying back too far in his recliner while watching tv. Mentions 6-8 mos ago began noticing rhinorrhea with eating. Denies ptosis, difficulty chewing/swallowing/breathing. Denies weight changes, sweats, alternating diarrhea/constipation.  Prednisone 10 mg q OD     Interval hx  2/12/16:  Maximilian Tavera Jr. is a 69 yo  male with myasthenia gravis. Still has occasional diplopia, but is improved- occuring less frequently. Occurs most when watching TV. Occasional "funny feeling" when drinking soft drinks. Denies weakness, SOB, dysarthria. Taking prednisone " "5mg/day. Stopped Mestion- GI side effects      Interval history 6/17/15:  Taking mestinon at 1/2, 1/2, 1 and not finding much improvement in diplopia, and having a decent amount of loose stools and gas.      HPI: Maximilian Tavera Jr. is a 69 y.o. male with 7-8mos of diplopia. Was seen by Dr Jimenez, who did some blood work and found that he was Ab+ for AchR. He was referred here for further care. No prior symptoms. No difficulty chewing/swallowing, breathing. Arm/leg strength is fine. No orthopnea. Diplopia is most noticeable when reading or watching tv. Driving is occasionally problematic. No clearly diurnal. No real problem with ptosis. He has not tried medications for this. He has prisms, which help. Looks like he started carvedilol in 3/14.     Review of Systems  Review of Systems   Constitutional: Negative for activity change, fatigue and fever.   HENT: Positive for trouble swallowing (occasional with thin liquids). Negative for hearing loss and voice change.    Eyes: Negative for pain, redness and visual disturbance.   Respiratory: Negative for choking, chest tightness and shortness of breath.    Cardiovascular: Negative for chest pain.   Gastrointestinal: Negative for abdominal pain, nausea and vomiting.   Endocrine: Negative for cold intolerance.   Genitourinary: Negative for frequency.   Musculoskeletal: Positive for back pain and neck pain. Negative for arthralgias, gait problem, joint swelling and myalgias.   Skin: Negative for color change.   Allergic/Immunologic: Negative for immunocompromised state.   Neurological: Negative for dizziness, seizures, speech difficulty, weakness, numbness and headaches.   Hematological: Negative for adenopathy.   Psychiatric/Behavioral: Negative for agitation, behavioral problems, dysphoric mood and suicidal ideas.        Objective:     Vitals:    01/13/22 1359   BP: 134/73   Pulse: 60   Weight: 114.8 kg (253 lb)   Height: 6' 1" (1.854 m)   PainSc: 0-No pain "       Neurologic Exam     Mental Status   Oriented to person, place, and time.   Attention: normal.   Speech: speech is normal   Level of consciousness: alert  Knowledge: good.     Cranial Nerves     CN II   Visual fields full to confrontation.     CN III, IV, VI   Pupils are equal, round, and reactive to light.  Extraocular motions are normal.   Diplopia: none    CN V   Facial sensation intact.     CN VII   Facial expression full, symmetric.     CN VIII   Hearing: intact    CN IX, X   CN IX normal.     CN XI   CN XI normal.     CN XII   CN XII normal.     No ptosis noted     Motor Exam   Muscle bulk: normal  Overall muscle tone: normal    Strength   Strength 5/5 throughout. Pain in the L shoulder and L c-spine regions     Sensory Exam   Light touch normal.   Vibration normal.     Gait, Coordination, and Reflexes     Gait  Gait: normal    Tremor   Resting tremor: absent  Intention tremor: absent  Action tremor: absent    Reflexes   Right brachioradialis: 2+  Left brachioradialis: 2+  Right biceps: 2+  Left biceps: 2+  Right triceps: 2+  Left triceps: 2+  Right patellar: 2+  Left patellar: 2+  Right achilles: 2+  Left achilles: 2+      Physical Exam  Vitals reviewed.   Constitutional:       Appearance: He is well-developed.   HENT:      Head: Normocephalic and atraumatic.   Eyes:      Extraocular Movements: EOM normal.      Pupils: Pupils are equal, round, and reactive to light.   Neck:      Thyroid: No thyromegaly.   Cardiovascular:      Rate and Rhythm: Normal rate.   Pulmonary:      Effort: Pulmonary effort is normal.   Abdominal:      Palpations: Abdomen is soft.   Musculoskeletal:      Cervical back: Normal range of motion and neck supple.   Lymphadenopathy:      Cervical: No cervical adenopathy.   Skin:     General: Skin is warm and dry.   Neurological:      Mental Status: He is oriented to person, place, and time.      Gait: Gait is intact.      Deep Tendon Reflexes: Strength normal.      Reflex Scores:        Tricep reflexes are 2+ on the right side and 2+ on the left side.       Bicep reflexes are 2+ on the right side and 2+ on the left side.       Brachioradialis reflexes are 2+ on the right side and 2+ on the left side.       Patellar reflexes are 2+ on the right side and 2+ on the left side.       Achilles reflexes are 2+ on the right side and 2+ on the left side.  Psychiatric:         Speech: Speech normal.         Behavior: Behavior normal.         Thought Content: Thought content normal.           Lab Results   Component Value Date    WBC 5.77 01/03/2022    HGB 13.1 (L) 01/03/2022    HCT 42.2 01/03/2022     (L) 01/03/2022    CHOL 122 01/03/2022    TRIG 68 01/03/2022    HDL 41 01/03/2022    ALT 25 01/14/2022    AST 20 01/14/2022     01/14/2022    K 4.2 01/14/2022     01/14/2022    CREATININE 1.0 01/14/2022    BUN 23 01/14/2022    CO2 27 01/14/2022    TSH 2.311 01/03/2022    HGBA1C 5.5 11/21/2018         Assessment and Plan:     Problem List Items Addressed This Visit     Current chronic use of systemic steroids (Chronic)    Current Assessment & Plan     Taking 7 mg Prednisone daily. Recent weight gain of 25# that is secondary to inactivity and low back pain. Vitamin D WNL         Myasthenia gravis, AChR antibody positive - Primary    Overview     Ab+, thymoma negative, generalized MG, dx'ed 2015  Prednisone qOD; mestinon does not help symptoms  Rescue: none  Prior Immune: none         Current Assessment & Plan     Doing well with the same Prednisone 7 mg daily that he has used for many years. He has had some occasional shortness of breath that he reports is more consistent with his COPD symptoms and not with MG respiratory weaknesses. He is intolerant of Mestinon.   - Continue Prednisone 7 mg daily.     Myasthenia Gravis IMPORTANT INFORMATION:    Elective intubation should be considered if serial measurements of the VC show values less than 20 mL/kg or if the NIF is worse than -30 cm H20 or is  "progressively worsening after serial evaluation.      Absolute Contraindicated Medications (Category 1) Contraindicated Medications (Category 2) Likely to Worsen MG Cautionary Medications  (Category 3) That May Worsen MG but are Usually Tolerated    Curare   Botulinum toxin   D-penicillamine   Interferon alpha     Aminoglycoside (Gentamycin, Kanamycin, Streptomycin, Neomycin, Tobramycin)   Macrolide (Erythromycin, Azithromycin, Telithromycin, Biaxin, Clarithromycin)   Fluoroquinolone - (Ciprofloxacin, Moxifloxacin, Levofloxacin, Delafloxacin, Norfloxacin, Prulifloxacin)   Quinine (Use only for Malaria)   Quinidine   Procainamide   Magnesium salts, IV magnesium replacement   Chloroquine   Hydroxychloroquine   Immune checkpoint inhibitors: Pembrolizumab (Keytruda), Nivolumab (Opdivo), Atezolizumab (Tecentriq), Avelumab (Bavencio), Durvalumab (Imfinzi), Ipilimumab (Yervoy)    Atropine    Corticosteroids   Desferrioxamine   Beta blockers   Statins   Iodinated radiologic contrast agents   Lithium   Benzodiazepines    Calcium Channel Blockers (verapamil)    Doxacurium   Neuromuscular blockades (Succinylcholine, Cisatracurium, Rocuronium, Vecuronium, Atracurium)   Diclomine       THIS PATIENT HAS MYASTHENIA GRAVIS     USE THESE DRUGS WITH CAUTION   1. Antibiotics of the Following Classes               A. Aminoglycosides (end in "mycin", "micin" e.g. Neomycin, tobramycin, gentamicin)               B. Flagyl (metronidazole)               C. Macrolides (e.g. Erythromycin, azithromycin (Zithromax), clarithromycin)   2. Beta Blockers (oral, parenteral and ophthalmic preparations)               A. (end in "olol" e.g. Atenolol, labetalol, metoprolol, propranolol, sotalol, timolol, etc)   3. Calcium Channel Blockers (end in "ipine" e.g. Amlodipine (Norvasc), nicardipine, nifedipine (Procardia), felodipine (Plendil))   4. Corticosteroids & ACTH   5. Interferons   6. Magnesium   7. Narcotic analgesics (if " "there is respiratory compromise e.g. Demerol, morphine)   8. Phenothiazines (end in "zine" e.g. Compazine, chlorpromazine (Thorazine), fluphenazine (Prolixin), perphenazine (Trilafon), Sparine)   9. Respiratory Depressants   10. Sedatives and Hypnotics       THESE DRUGS SHOULD *NOT* BE USED IN PATIENTS WITH MYASTHENIA GRAVIS WITHOUT PRIOR DISCUSSION WITH A NEUROMUSCULAR SPECIALIST   1. Ketolides (e.g. Telithromycin) - FDA BLACK BOX WARNING - Do NOT Use   2. Fluoroquinolones (end in "acin" e.g. Levofloxacin (Levaquin), ciprofloxacin (CIPRO), moxifloxacin (Avelox) - FDA BLACK BOX WARNING   3. Botulinum toxin   4. D-Penicillamine       NURSES -- TRIPLE CHECK BEFORE GIVING THE FOLLOWING DUE TO THE HIGH PROBABILITY OF PROLONGED WEAKNESS OR MG CRISIS   1. Neuromuscular blocking agent (both depolarizing and non-depolarizing)               A. Succinylcholine-like               B. Curare-like   2. Quinidine   3. Quinine           Hyperlipidemia    Current Assessment & Plan     On appropriate medications and managed by PCP. We discussed the importance of managing all secondary stroke risk factors, including hyperlipidemia.           HTN (hypertension), benign    Current Assessment & Plan     On appropriate medications and managed by PCP. We discussed the importance of managing all secondary stroke risk factors, including hypertension.           DDD (degenerative disc disease), lumbar        RTC 12 months or as needed.    Ned Hoang MD  [facility]  "

## 2022-01-17 NOTE — ASSESSMENT & PLAN NOTE
"Doing well with the same Prednisone 7 mg daily that he has used for many years. He has had some occasional shortness of breath that he reports is more consistent with his COPD symptoms and not with MG respiratory weaknesses. He is intolerant of Mestinon.   - Continue Prednisone 7 mg daily.     Myasthenia Gravis IMPORTANT INFORMATION:    Elective intubation should be considered if serial measurements of the VC show values less than 20 mL/kg or if the NIF is worse than -30 cm H20 or is progressively worsening after serial evaluation.      Absolute Contraindicated Medications (Category 1) Contraindicated Medications (Category 2) Likely to Worsen MG Cautionary Medications  (Category 3) That May Worsen MG but are Usually Tolerated    Curare   Botulinum toxin   D-penicillamine   Interferon alpha     Aminoglycoside (Gentamycin, Kanamycin, Streptomycin, Neomycin, Tobramycin)   Macrolide (Erythromycin, Azithromycin, Telithromycin, Biaxin, Clarithromycin)   Fluoroquinolone - (Ciprofloxacin, Moxifloxacin, Levofloxacin, Delafloxacin, Norfloxacin, Prulifloxacin)   Quinine (Use only for Malaria)   Quinidine   Procainamide   Magnesium salts, IV magnesium replacement   Chloroquine   Hydroxychloroquine   Immune checkpoint inhibitors: Pembrolizumab (Keytruda), Nivolumab (Opdivo), Atezolizumab (Tecentriq), Avelumab (Bavencio), Durvalumab (Imfinzi), Ipilimumab (Yervoy)    Atropine    Corticosteroids   Desferrioxamine   Beta blockers   Statins   Iodinated radiologic contrast agents   Lithium   Benzodiazepines    Calcium Channel Blockers (verapamil)    Doxacurium   Neuromuscular blockades (Succinylcholine, Cisatracurium, Rocuronium, Vecuronium, Atracurium)   Diclomine       THIS PATIENT HAS MYASTHENIA GRAVIS     USE THESE DRUGS WITH CAUTION   1. Antibiotics of the Following Classes               A. Aminoglycosides (end in "mycin", "micin" e.g. Neomycin, tobramycin, gentamicin)               B. Flagyl " "(metronidazole)               C. Macrolides (e.g. Erythromycin, azithromycin (Zithromax), clarithromycin)   2. Beta Blockers (oral, parenteral and ophthalmic preparations)               A. (end in "olol" e.g. Atenolol, labetalol, metoprolol, propranolol, sotalol, timolol, etc)   3. Calcium Channel Blockers (end in "ipine" e.g. Amlodipine (Norvasc), nicardipine, nifedipine (Procardia), felodipine (Plendil))   4. Corticosteroids & ACTH   5. Interferons   6. Magnesium   7. Narcotic analgesics (if there is respiratory compromise e.g. Demerol, morphine)   8. Phenothiazines (end in "zine" e.g. Compazine, chlorpromazine (Thorazine), fluphenazine (Prolixin), perphenazine (Trilafon), Sparine)   9. Respiratory Depressants   10. Sedatives and Hypnotics       THESE DRUGS SHOULD *NOT* BE USED IN PATIENTS WITH MYASTHENIA GRAVIS WITHOUT PRIOR DISCUSSION WITH A NEUROMUSCULAR SPECIALIST   1. Ketolides (e.g. Telithromycin) - FDA BLACK BOX WARNING - Do NOT Use   2. Fluoroquinolones (end in "acin" e.g. Levofloxacin (Levaquin), ciprofloxacin (CIPRO), moxifloxacin (Avelox) - FDA BLACK BOX WARNING   3. Botulinum toxin   4. D-Penicillamine       NURSES -- TRIPLE CHECK BEFORE GIVING THE FOLLOWING DUE TO THE HIGH PROBABILITY OF PROLONGED WEAKNESS OR MG CRISIS   1. Neuromuscular blocking agent (both depolarizing and non-depolarizing)               A. Succinylcholine-like               B. Curare-like   2. Quinidine   3. Quinine    "

## 2022-01-17 NOTE — ASSESSMENT & PLAN NOTE
Taking 7 mg Prednisone daily. Recent weight gain of 25# that is secondary to inactivity and low back pain. Vitamin D WNL

## 2022-01-18 ENCOUNTER — OFFICE VISIT (OUTPATIENT)
Dept: FAMILY MEDICINE | Facility: CLINIC | Age: 77
End: 2022-01-18
Payer: MEDICARE

## 2022-01-18 VITALS
BODY MASS INDEX: 32.54 KG/M2 | RESPIRATION RATE: 16 BRPM | HEART RATE: 67 BPM | HEIGHT: 73 IN | WEIGHT: 245.56 LBS | DIASTOLIC BLOOD PRESSURE: 60 MMHG | SYSTOLIC BLOOD PRESSURE: 124 MMHG | OXYGEN SATURATION: 96 % | TEMPERATURE: 98 F

## 2022-01-18 DIAGNOSIS — R35.0 URINARY FREQUENCY: ICD-10-CM

## 2022-01-18 DIAGNOSIS — R60.0 LOWER EXTREMITY EDEMA: Primary | ICD-10-CM

## 2022-01-18 DIAGNOSIS — Z79.52 CURRENT CHRONIC USE OF SYSTEMIC STEROIDS: Chronic | ICD-10-CM

## 2022-01-18 DIAGNOSIS — E66.9 OBESITY (BMI 30.0-34.9): ICD-10-CM

## 2022-01-18 DIAGNOSIS — I10 HTN (HYPERTENSION), BENIGN: ICD-10-CM

## 2022-01-18 PROCEDURE — 1159F PR MEDICATION LIST DOCUMENTED IN MEDICAL RECORD: ICD-10-PCS | Mod: HCNC,CPTII,S$GLB, | Performed by: PHYSICIAN ASSISTANT

## 2022-01-18 PROCEDURE — 99214 PR OFFICE/OUTPT VISIT, EST, LEVL IV, 30-39 MIN: ICD-10-PCS | Mod: HCNC,S$GLB,, | Performed by: PHYSICIAN ASSISTANT

## 2022-01-18 PROCEDURE — 1125F AMNT PAIN NOTED PAIN PRSNT: CPT | Mod: HCNC,CPTII,S$GLB, | Performed by: PHYSICIAN ASSISTANT

## 2022-01-18 PROCEDURE — 1125F PR PAIN SEVERITY QUANTIFIED, PAIN PRESENT: ICD-10-PCS | Mod: HCNC,CPTII,S$GLB, | Performed by: PHYSICIAN ASSISTANT

## 2022-01-18 PROCEDURE — 3078F DIAST BP <80 MM HG: CPT | Mod: HCNC,CPTII,S$GLB, | Performed by: PHYSICIAN ASSISTANT

## 2022-01-18 PROCEDURE — 3288F PR FALLS RISK ASSESSMENT DOCUMENTED: ICD-10-PCS | Mod: HCNC,CPTII,S$GLB, | Performed by: PHYSICIAN ASSISTANT

## 2022-01-18 PROCEDURE — 3074F SYST BP LT 130 MM HG: CPT | Mod: HCNC,CPTII,S$GLB, | Performed by: PHYSICIAN ASSISTANT

## 2022-01-18 PROCEDURE — 99999 PR PBB SHADOW E&M-EST. PATIENT-LVL III: ICD-10-PCS | Mod: PBBFAC,HCNC,, | Performed by: PHYSICIAN ASSISTANT

## 2022-01-18 PROCEDURE — 3078F PR MOST RECENT DIASTOLIC BLOOD PRESSURE < 80 MM HG: ICD-10-PCS | Mod: HCNC,CPTII,S$GLB, | Performed by: PHYSICIAN ASSISTANT

## 2022-01-18 PROCEDURE — 3288F FALL RISK ASSESSMENT DOCD: CPT | Mod: HCNC,CPTII,S$GLB, | Performed by: PHYSICIAN ASSISTANT

## 2022-01-18 PROCEDURE — 99999 PR PBB SHADOW E&M-EST. PATIENT-LVL III: CPT | Mod: PBBFAC,HCNC,, | Performed by: PHYSICIAN ASSISTANT

## 2022-01-18 PROCEDURE — 1101F PR PT FALLS ASSESS DOC 0-1 FALLS W/OUT INJ PAST YR: ICD-10-PCS | Mod: HCNC,CPTII,S$GLB, | Performed by: PHYSICIAN ASSISTANT

## 2022-01-18 PROCEDURE — 1159F MED LIST DOCD IN RCRD: CPT | Mod: HCNC,CPTII,S$GLB, | Performed by: PHYSICIAN ASSISTANT

## 2022-01-18 PROCEDURE — 3074F PR MOST RECENT SYSTOLIC BLOOD PRESSURE < 130 MM HG: ICD-10-PCS | Mod: HCNC,CPTII,S$GLB, | Performed by: PHYSICIAN ASSISTANT

## 2022-01-18 PROCEDURE — 1157F ADVNC CARE PLAN IN RCRD: CPT | Mod: HCNC,CPTII,S$GLB, | Performed by: PHYSICIAN ASSISTANT

## 2022-01-18 PROCEDURE — 1157F PR ADVANCE CARE PLAN OR EQUIV PRESENT IN MEDICAL RECORD: ICD-10-PCS | Mod: HCNC,CPTII,S$GLB, | Performed by: PHYSICIAN ASSISTANT

## 2022-01-18 PROCEDURE — 1101F PT FALLS ASSESS-DOCD LE1/YR: CPT | Mod: HCNC,CPTII,S$GLB, | Performed by: PHYSICIAN ASSISTANT

## 2022-01-18 PROCEDURE — 1160F RVW MEDS BY RX/DR IN RCRD: CPT | Mod: HCNC,CPTII,S$GLB, | Performed by: PHYSICIAN ASSISTANT

## 2022-01-18 PROCEDURE — 1160F PR REVIEW ALL MEDS BY PRESCRIBER/CLIN PHARMACIST DOCUMENTED: ICD-10-PCS | Mod: HCNC,CPTII,S$GLB, | Performed by: PHYSICIAN ASSISTANT

## 2022-01-18 PROCEDURE — 99214 OFFICE O/P EST MOD 30 MIN: CPT | Mod: HCNC,S$GLB,, | Performed by: PHYSICIAN ASSISTANT

## 2022-01-18 NOTE — PROGRESS NOTES
Subjective:       Patient ID: Maximilian Tavera Jr. is a 76 y.o. male.    Chief Complaint: Follow-up (5 days f/u leg and feet swelling )    Mr. Tavera is a 76 year old male with HTN, HLD, asthma, and MG. The patient is here today for follow up of leg swelling. His diuretic was changed to bumex and last week his legs were wrapped. The patient has lost 8 pounds since last office visit and swelling has significantly improved. The patient reports he is feeling much better. He admits over the weekend that he was urinating every hour, interfering with his quality of life. But in the last day this has improved.     Review of patient's allergies indicates:   Allergen Reactions    Spironolactone Diarrhea         Current Outpatient Medications:     atorvastatin (LIPITOR) 80 MG tablet, TAKE 1 TABLET EVERY DAY, Disp: 90 tablet, Rfl: 3    bumetanide (BUMEX) 1 MG tablet, Take 1 tablet (1 mg total) by mouth 2 (two) times daily., Disp: 60 tablet, Rfl: 0    carvediloL (COREG) 25 MG tablet, TAKE 1 TABLET TWICE DAILY WITH MEALS, Disp: 180 tablet, Rfl: 3    cetirizine (ZYRTEC) 10 MG tablet, Take 1 tablet by mouth daily as needed., Disp: , Rfl:     cholecalciferol, vitamin D3, (VITAMIN D3) 50 mcg (2,000 unit) Cap, 1 capsule once daily. Every day, Disp: , Rfl:     cholestyramine (QUESTRAN) 4 gram packet, Take 1 packet (4 g total) by mouth 3 (three) times daily with meals., Disp: 270 packet, Rfl: 3    coenzyme Q10 (CO Q-10) 100 mg capsule, Take 400 mg by mouth once daily., Disp: , Rfl:     cyanocobalamin, vitamin B-12, 5,000 mcg Subl, Take 5,000 mcg by mouth once daily. Every day, Disp: , Rfl:     diphenoxylate-atropine 2.5-0.025 mg (LOMOTIL) 2.5-0.025 mg per tablet, Take 1 tablet by mouth 4 (four) times daily as needed for Diarrhea., Disp: 90 tablet, Rfl: 0    ezetimibe (ZETIA) 10 mg tablet, TAKE 1 TABLET EVERY DAY, Disp: 90 tablet, Rfl: 3    FLUAD QUAD 2021-22,65Y UP,,PF, 60 mcg (15 mcg x 4)/0.5 mL Syrg, , Disp: , Rfl:      fluticasone (FLONASE) 50 mcg/actuation nasal spray, USE 1 SPRAY IN EACH NOSTRIL TWICE DAILY AS NEEDED  FOR  RHINITIS, Disp: 48 g, Rfl: 3    gabapentin (NEURONTIN) 300 MG capsule, TAKE 1 CAPSULE THREE TIMES DAILY, Disp: 270 capsule, Rfl: 5    KENALOG 40 mg/mL injection, , Disp: , Rfl:     levothyroxine (SYNTHROID) 50 MCG tablet, TAKE 1 TABLET EVERY DAY, Disp: 90 tablet, Rfl: 3    methylsulfonylmethane 1,000 mg Tab, Take 1,000 mg by mouth once daily. Every day, Disp: , Rfl:     milk thistle 200 mg Cap, Take 200 mg by mouth 2 (two) times daily. Twice a day, Disp: , Rfl:     olmesartan (BENICAR) 20 MG tablet, TAKE 1 TABLET EVERY DAY, Disp: 90 tablet, Rfl: 3    omega-3 fatty acids 1,000 mg Cap, Twice a day, Disp: , Rfl:     potassium chloride SA (K-DUR,KLOR-CON) 20 MEQ tablet, Take 6 tab daily while on bumex, Disp: 360 tablet, Rfl: 3    predniSONE (DELTASONE) 1 MG tablet, TAKE 2 TABLETS EVERY DAY, Disp: 180 tablet, Rfl: 3    predniSONE (DELTASONE) 5 MG tablet, TAKE 1 TABLET EVERY DAY, Disp: 90 tablet, Rfl: 3    sildenafil (REVATIO) 20 mg Tab, Take 1 to 3 tabs daily as needed., Disp: 30 tablet, Rfl: 3    SPIRIVA WITH HANDIHALER 18 mcg inhalation capsule, INHALE THE CONTENTS OF 1 CAPSULE (18 MCG TOTAL) INTO THE LUNGS ONCE DAILY. CONTROLLER, Disp: 30 capsule, Rfl: 5    VENTOLIN HFA 90 mcg/actuation inhaler, Inhale 2 puffs into the lungs every 6 (six) hours as needed for Wheezing. Rescue, Disp: 18 g, Rfl: 0    pantoprazole (PROTONIX) 40 MG tablet, Take 1 tablet (40 mg total) by mouth 2 (two) times daily., Disp: 180 tablet, Rfl: 3  No current facility-administered medications for this visit.    Facility-Administered Medications Ordered in Other Visits:     0.9%  NaCl infusion, , Intravenous, Continuous, Devan Watt MD    lidocaine (PF) 10 mg/ml (1%) injection 10 mg, 1 mL, Intradermal, Once, Terry Quach MD    Lab Results   Component Value Date    WBC 5.77 01/03/2022    HGB 13.1 (L) 01/03/2022    HCT 42.2  01/03/2022     (L) 01/03/2022    CHOL 122 01/03/2022    TRIG 68 01/03/2022    HDL 41 01/03/2022    ALT 25 01/14/2022    AST 20 01/14/2022     01/14/2022    K 4.2 01/14/2022     01/14/2022    CREATININE 1.0 01/14/2022    BUN 23 01/14/2022    CO2 27 01/14/2022    TSH 2.311 01/03/2022    PSA 3.5 09/19/2019    INR 1.0 10/30/2018    HGBA1C 5.5 11/21/2018       Review of Systems   Constitutional: Positive for unexpected weight change. Negative for activity change, appetite change and fever.   HENT: Negative for postnasal drip, rhinorrhea and sinus pressure.    Eyes: Negative for visual disturbance.   Respiratory: Negative for cough and shortness of breath.    Cardiovascular: Positive for leg swelling. Negative for chest pain.   Gastrointestinal: Negative for abdominal distention and abdominal pain.   Genitourinary: Negative for difficulty urinating and dysuria.   Musculoskeletal: Positive for arthralgias and back pain. Negative for myalgias.   Neurological: Negative for headaches.   Hematological: Negative for adenopathy.   Psychiatric/Behavioral: The patient is not nervous/anxious.        Objective:      Physical Exam  Constitutional:       Appearance: Normal appearance.      Comments: 8 lb weight loss   HENT:      Head: Normocephalic and atraumatic.   Eyes:      Conjunctiva/sclera: Conjunctivae normal.   Cardiovascular:      Rate and Rhythm: Normal rate and regular rhythm.   Pulmonary:      Effort: Pulmonary effort is normal. No respiratory distress.      Breath sounds: Normal breath sounds. No wheezing.   Abdominal:      General: There is no distension.      Palpations: There is no mass.      Tenderness: There is no abdominal tenderness.   Musculoskeletal:      Right lower leg: Edema present.      Left lower leg: Edema present.      Comments: Edema significantly improved.    Neurological:      Mental Status: He is alert and oriented to person, place, and time.   Psychiatric:         Behavior:  Behavior normal.         Assessment:       1. Lower extremity edema    2. HTN (hypertension), benign    3. Current chronic use of systemic steroids    4. Urinary frequency    5. Obesity (BMI 30.0-34.9)        Plan:       Maximilian was seen today for follow-up.    Diagnoses and all orders for this visit:    Lower extremity edema  Improving  Continue compression socks  HTN (hypertension), benign  Controlled  Low salt diet  Continue current medication  Current chronic use of systemic steroids  Due to MG.   Urinary frequency  Consider reducing diuretic if too bothersome  Obesity (BMI 30.0-34.9)  Weight loss efforts through diet and exercise  Consider ideal protein.     Patient will call us later this week with update on weight, swelling, and urinary frequency.

## 2022-01-21 ENCOUNTER — PATIENT MESSAGE (OUTPATIENT)
Dept: FAMILY MEDICINE | Facility: CLINIC | Age: 77
End: 2022-01-21
Payer: MEDICARE

## 2022-01-21 ENCOUNTER — PATIENT MESSAGE (OUTPATIENT)
Dept: GASTROENTEROLOGY | Facility: CLINIC | Age: 77
End: 2022-01-21
Payer: MEDICARE

## 2022-01-21 DIAGNOSIS — R60.9 EDEMA, UNSPECIFIED TYPE: Primary | ICD-10-CM

## 2022-01-24 ENCOUNTER — PATIENT MESSAGE (OUTPATIENT)
Dept: ADMINISTRATIVE | Facility: OTHER | Age: 77
End: 2022-01-24
Payer: MEDICARE

## 2022-01-24 ENCOUNTER — PES CALL (OUTPATIENT)
Dept: ADMINISTRATIVE | Facility: CLINIC | Age: 77
End: 2022-01-24
Payer: MEDICARE

## 2022-01-24 RX ORDER — BUMETANIDE 0.5 MG/1
0.5 TABLET ORAL 2 TIMES DAILY
Qty: 60 TABLET | Refills: 1 | Status: SHIPPED | OUTPATIENT
Start: 2022-01-24 | End: 2022-10-18 | Stop reason: ALTCHOICE

## 2022-01-24 NOTE — TELEPHONE ENCOUNTER
Am uable to send in Rx for cough syrup. This will need to come from PCP as it is a controlled substance. I have sent in reduced dose of bumex. Let's see how this works. Monitor weight and swelling. Follow up in 2-4 weeks

## 2022-01-26 ENCOUNTER — PES CALL (OUTPATIENT)
Dept: ADMINISTRATIVE | Facility: CLINIC | Age: 77
End: 2022-01-26
Payer: MEDICARE

## 2022-01-27 RX ORDER — CODEINE PHOSPHATE AND GUAIFENESIN 10; 100 MG/5ML; MG/5ML
10 SOLUTION ORAL 3 TIMES DAILY PRN
Qty: 118 ML | Refills: 0 | Status: SHIPPED | OUTPATIENT
Start: 2022-01-27 | End: 2022-02-06

## 2022-02-02 ENCOUNTER — LAB VISIT (OUTPATIENT)
Dept: LAB | Facility: HOSPITAL | Age: 77
End: 2022-02-02
Attending: INTERNAL MEDICINE
Payer: MEDICARE

## 2022-02-02 ENCOUNTER — OFFICE VISIT (OUTPATIENT)
Dept: FAMILY MEDICINE | Facility: CLINIC | Age: 77
End: 2022-02-02
Payer: MEDICARE

## 2022-02-02 VITALS
SYSTOLIC BLOOD PRESSURE: 128 MMHG | TEMPERATURE: 98 F | BODY MASS INDEX: 32.7 KG/M2 | OXYGEN SATURATION: 94 % | DIASTOLIC BLOOD PRESSURE: 74 MMHG | WEIGHT: 246.69 LBS | HEART RATE: 63 BPM | HEIGHT: 73 IN

## 2022-02-02 DIAGNOSIS — I77.1 TORTUOUS AORTA: ICD-10-CM

## 2022-02-02 DIAGNOSIS — R26.9 ABNORMALITY OF GAIT AND MOBILITY: ICD-10-CM

## 2022-02-02 DIAGNOSIS — G70.00 MYASTHENIA GRAVIS: ICD-10-CM

## 2022-02-02 DIAGNOSIS — M79.89 LEG SWELLING: ICD-10-CM

## 2022-02-02 DIAGNOSIS — Z00.00 ENCOUNTER FOR PREVENTIVE HEALTH EXAMINATION: Primary | ICD-10-CM

## 2022-02-02 DIAGNOSIS — D69.6 THROMBOCYTOPENIA: ICD-10-CM

## 2022-02-02 DIAGNOSIS — J43.8 OTHER EMPHYSEMA: ICD-10-CM

## 2022-02-02 DIAGNOSIS — I10 HTN (HYPERTENSION), BENIGN: ICD-10-CM

## 2022-02-02 DIAGNOSIS — J47.9 BRONCHIECTASIS WITHOUT COMPLICATION: ICD-10-CM

## 2022-02-02 DIAGNOSIS — I47.29 NONSUSTAINED VENTRICULAR TACHYCARDIA: ICD-10-CM

## 2022-02-02 DIAGNOSIS — I70.0 ABDOMINAL AORTIC ATHEROSCLEROSIS: ICD-10-CM

## 2022-02-02 DIAGNOSIS — R29.818 NEUROGENIC CLAUDICATION: ICD-10-CM

## 2022-02-02 LAB
ANION GAP SERPL CALC-SCNC: 7 MMOL/L (ref 8–16)
BUN SERPL-MCNC: 15 MG/DL (ref 8–23)
CALCIUM SERPL-MCNC: 9 MG/DL (ref 8.7–10.5)
CHLORIDE SERPL-SCNC: 106 MMOL/L (ref 95–110)
CO2 SERPL-SCNC: 28 MMOL/L (ref 23–29)
CREAT SERPL-MCNC: 1 MG/DL (ref 0.5–1.4)
EST. GFR  (AFRICAN AMERICAN): >60 ML/MIN/1.73 M^2
EST. GFR  (NON AFRICAN AMERICAN): >60 ML/MIN/1.73 M^2
GLUCOSE SERPL-MCNC: 120 MG/DL (ref 70–110)
POTASSIUM SERPL-SCNC: 4 MMOL/L (ref 3.5–5.1)
SODIUM SERPL-SCNC: 141 MMOL/L (ref 136–145)

## 2022-02-02 PROCEDURE — 36415 COLL VENOUS BLD VENIPUNCTURE: CPT | Mod: HCNC,PO | Performed by: INTERNAL MEDICINE

## 2022-02-02 PROCEDURE — 1101F PR PT FALLS ASSESS DOC 0-1 FALLS W/OUT INJ PAST YR: ICD-10-PCS | Mod: HCNC,CPTII,S$GLB, | Performed by: NURSE PRACTITIONER

## 2022-02-02 PROCEDURE — G0439 PPPS, SUBSEQ VISIT: HCPCS | Mod: HCNC,S$GLB,, | Performed by: NURSE PRACTITIONER

## 2022-02-02 PROCEDURE — 3288F PR FALLS RISK ASSESSMENT DOCUMENTED: ICD-10-PCS | Mod: HCNC,CPTII,S$GLB, | Performed by: NURSE PRACTITIONER

## 2022-02-02 PROCEDURE — 1159F PR MEDICATION LIST DOCUMENTED IN MEDICAL RECORD: ICD-10-PCS | Mod: HCNC,CPTII,S$GLB, | Performed by: NURSE PRACTITIONER

## 2022-02-02 PROCEDURE — 3078F DIAST BP <80 MM HG: CPT | Mod: HCNC,CPTII,S$GLB, | Performed by: NURSE PRACTITIONER

## 2022-02-02 PROCEDURE — 99999 PR PBB SHADOW E&M-EST. PATIENT-LVL IV: CPT | Mod: PBBFAC,HCNC,, | Performed by: NURSE PRACTITIONER

## 2022-02-02 PROCEDURE — 99499 UNLISTED E&M SERVICE: CPT | Mod: HCNC,S$GLB,, | Performed by: NURSE PRACTITIONER

## 2022-02-02 PROCEDURE — G0439 PR MEDICARE ANNUAL WELLNESS SUBSEQUENT VISIT: ICD-10-PCS | Mod: HCNC,S$GLB,, | Performed by: NURSE PRACTITIONER

## 2022-02-02 PROCEDURE — 1159F MED LIST DOCD IN RCRD: CPT | Mod: HCNC,CPTII,S$GLB, | Performed by: NURSE PRACTITIONER

## 2022-02-02 PROCEDURE — 3288F FALL RISK ASSESSMENT DOCD: CPT | Mod: HCNC,CPTII,S$GLB, | Performed by: NURSE PRACTITIONER

## 2022-02-02 PROCEDURE — 1125F AMNT PAIN NOTED PAIN PRSNT: CPT | Mod: HCNC,CPTII,S$GLB, | Performed by: NURSE PRACTITIONER

## 2022-02-02 PROCEDURE — 99499 RISK ADDL DX/OHS AUDIT: ICD-10-PCS | Mod: HCNC,S$GLB,, | Performed by: NURSE PRACTITIONER

## 2022-02-02 PROCEDURE — 1170F FXNL STATUS ASSESSED: CPT | Mod: HCNC,CPTII,S$GLB, | Performed by: NURSE PRACTITIONER

## 2022-02-02 PROCEDURE — 1157F PR ADVANCE CARE PLAN OR EQUIV PRESENT IN MEDICAL RECORD: ICD-10-PCS | Mod: HCNC,CPTII,S$GLB, | Performed by: NURSE PRACTITIONER

## 2022-02-02 PROCEDURE — 3074F PR MOST RECENT SYSTOLIC BLOOD PRESSURE < 130 MM HG: ICD-10-PCS | Mod: HCNC,CPTII,S$GLB, | Performed by: NURSE PRACTITIONER

## 2022-02-02 PROCEDURE — 1125F PR PAIN SEVERITY QUANTIFIED, PAIN PRESENT: ICD-10-PCS | Mod: HCNC,CPTII,S$GLB, | Performed by: NURSE PRACTITIONER

## 2022-02-02 PROCEDURE — 3078F PR MOST RECENT DIASTOLIC BLOOD PRESSURE < 80 MM HG: ICD-10-PCS | Mod: HCNC,CPTII,S$GLB, | Performed by: NURSE PRACTITIONER

## 2022-02-02 PROCEDURE — 1160F RVW MEDS BY RX/DR IN RCRD: CPT | Mod: HCNC,CPTII,S$GLB, | Performed by: NURSE PRACTITIONER

## 2022-02-02 PROCEDURE — 1160F PR REVIEW ALL MEDS BY PRESCRIBER/CLIN PHARMACIST DOCUMENTED: ICD-10-PCS | Mod: HCNC,CPTII,S$GLB, | Performed by: NURSE PRACTITIONER

## 2022-02-02 PROCEDURE — 1157F ADVNC CARE PLAN IN RCRD: CPT | Mod: HCNC,CPTII,S$GLB, | Performed by: NURSE PRACTITIONER

## 2022-02-02 PROCEDURE — 1101F PT FALLS ASSESS-DOCD LE1/YR: CPT | Mod: HCNC,CPTII,S$GLB, | Performed by: NURSE PRACTITIONER

## 2022-02-02 PROCEDURE — 1170F PR FUNCTIONAL STATUS ASSESSED: ICD-10-PCS | Mod: HCNC,CPTII,S$GLB, | Performed by: NURSE PRACTITIONER

## 2022-02-02 PROCEDURE — 3074F SYST BP LT 130 MM HG: CPT | Mod: HCNC,CPTII,S$GLB, | Performed by: NURSE PRACTITIONER

## 2022-02-02 PROCEDURE — 99999 PR PBB SHADOW E&M-EST. PATIENT-LVL IV: ICD-10-PCS | Mod: PBBFAC,HCNC,, | Performed by: NURSE PRACTITIONER

## 2022-02-02 PROCEDURE — 80048 BASIC METABOLIC PNL TOTAL CA: CPT | Mod: HCNC | Performed by: INTERNAL MEDICINE

## 2022-02-02 NOTE — PATIENT INSTRUCTIONS
Counseling and Referral of Other Preventative  (Italic type indicates deductible and co-insurance are waived)    Patient Name: Maximilian Tavera  Today's Date: 2/2/2022    Health Maintenance       Date Due Completion Date    Aspirin/Antiplatelet Therapy Never done ---    Shingles Vaccine (2 of 3) 09/01/2015 7/7/2015    Lipid Panel 01/03/2023 1/3/2022    Colonoscopy 12/07/2024 12/7/2021    TETANUS VACCINE 08/31/2025 8/31/2015        No orders of the defined types were placed in this encounter.    The following information is provided to all patients.  This information is to help you find resources for any of the problems found today that may be affecting your health:                Living healthy guide: www.[facility].[place].gov      Understanding Diabetes: www.diabetes.org      Eating healthy: www.cdc.gov/healthyweight      [facility] home safety checklist: www.cdc.gov/steadi/patient.html      Agency on Aging: www.goea.[place].[facility]      Alcoholics anonymous (AA): www.aa.org      Physical Activity: www.aristides.nih.gov/vn2zogc      Tobacco use: www.quitwithusla.org

## 2022-02-02 NOTE — PROGRESS NOTES
"  Maximilian Tavera presented for a  Medicare AWV and comprehensive Health Risk Assessment today. The following components were reviewed and updated:    · Medical history  · Family History  · Social history  · Allergies and Current Medications  · Health Risk Assessment  · Health Maintenance  · Care Team         ** See Completed Assessments for Annual Wellness Visit within the encounter summary.**             The following assessments were completed:  · Living Situation  · CAGE  · Depression Screening  · Timed Get Up and Go  · Whisper Test  · Cognitive Function Screening  · Nutrition Screening  · ADL Screening  · PAQ Screening        Vitals:    02/02/22 1039   BP: 128/74   Pulse: 63   Temp: 98.4 °F (36.9 °C)   SpO2: (!) 94%   Weight: 111.9 kg (246 lb 11.1 oz)   Height: 6' 1" (1.854 m)     Body mass index is 32.55 kg/m².  Physical Exam  Constitutional:       Appearance: He is well-developed and well-nourished.   HENT:      Head: Normocephalic and atraumatic.      Right Ear: Hearing normal.      Left Ear: Hearing normal.      Nose: Nose normal.   Eyes:      General: Lids are normal.      Conjunctiva/sclera: Conjunctivae normal.      Pupils: Pupils are equal, round, and reactive to light.   Cardiovascular:      Rate and Rhythm: Normal rate.   Pulmonary:      Effort: Pulmonary effort is normal.   Abdominal:      Palpations: Abdomen is soft.   Musculoskeletal:         General: Normal range of motion.      Cervical back: Normal range of motion and neck supple.   Skin:     General: Skin is warm and dry.   Neurological:      Mental Status: He is alert and oriented to person, place, and time.               Diagnoses and health risks identified today and associated recommendations/orders:    1. Encounter for preventive health examination  Discussed health maintenance guidelines appropriate for age.    Shingrix education provided       2. Myasthenia gravis  Stable, continue current medication regimen   Followed by neurology     3. " Bronchiectasis without complication  Stable, continue to monitor  Followed by pulmonology     4. Other emphysema  Stable, continue current medications  Followed by pulmonology     5. Abdominal aortic atherosclerosis  Stable, continue to monitor  On statin  Followed by pcp    6. Nonsustained ventricular tachycardia  Stable, continue to monitor  Followed by cardiology     7. Tortuous aorta  Stable, continue to monitor    8. Abnormality of gait and mobility  Stable, continue to monitor    9. Thrombocytopenia  Stable, continue to monitor  Followed by pcp    10. Neurogenic claudication  Stable, continue to monitor  followed by neurosurgery       Provided Maximilian with a 5-10 year written screening schedule and personal prevention plan. Recommendations were developed using the USPSTF age appropriate recommendations. Education, counseling, and referrals were provided as needed. After Visit Summary printed and given to patient which includes a list of additional screenings\tests needed.    Follow up for One year for Annual Wellness Visit.    Tiffanie Vargas NP    I offered to discuss advanced care planning, including how to pick a person who would make decisions for you if you were unable to make them for yourself, called a health care power of , and what kind of decisions you might make such as use of life sustaining treatments such as ventilators and tube feeding when faced with a life limiting illness recorded on a living will that they will need to know. (How you want to be cared for as you near the end of your natural life)     X  Patient has advanced directives on file, which we reviewed, and they do not wish to make changes.

## 2022-02-03 ENCOUNTER — OFFICE VISIT (OUTPATIENT)
Dept: CARDIOLOGY | Facility: CLINIC | Age: 77
End: 2022-02-03
Payer: MEDICARE

## 2022-02-03 ENCOUNTER — PATIENT MESSAGE (OUTPATIENT)
Dept: PHARMACY | Facility: CLINIC | Age: 77
End: 2022-02-03
Payer: MEDICARE

## 2022-02-03 VITALS
HEIGHT: 73 IN | HEART RATE: 62 BPM | SYSTOLIC BLOOD PRESSURE: 138 MMHG | DIASTOLIC BLOOD PRESSURE: 74 MMHG | BODY MASS INDEX: 32.9 KG/M2 | WEIGHT: 248.25 LBS

## 2022-02-03 DIAGNOSIS — M79.89 LEG SWELLING: Primary | ICD-10-CM

## 2022-02-03 DIAGNOSIS — I87.2 VENOUS STASIS DERMATITIS OF BOTH LOWER EXTREMITIES: ICD-10-CM

## 2022-02-03 DIAGNOSIS — R60.0 LOCALIZED EDEMA: ICD-10-CM

## 2022-02-03 DIAGNOSIS — I10 HTN (HYPERTENSION), BENIGN: ICD-10-CM

## 2022-02-03 DIAGNOSIS — E78.2 MIXED HYPERLIPIDEMIA: ICD-10-CM

## 2022-02-03 DIAGNOSIS — I89.0 LYMPHEDEMA OF BOTH LOWER EXTREMITIES: ICD-10-CM

## 2022-02-03 PROCEDURE — 1157F PR ADVANCE CARE PLAN OR EQUIV PRESENT IN MEDICAL RECORD: ICD-10-PCS | Mod: HCNC,CPTII,S$GLB, | Performed by: INTERNAL MEDICINE

## 2022-02-03 PROCEDURE — 1159F MED LIST DOCD IN RCRD: CPT | Mod: HCNC,CPTII,S$GLB, | Performed by: INTERNAL MEDICINE

## 2022-02-03 PROCEDURE — 3075F PR MOST RECENT SYSTOLIC BLOOD PRESS GE 130-139MM HG: ICD-10-PCS | Mod: HCNC,CPTII,S$GLB, | Performed by: INTERNAL MEDICINE

## 2022-02-03 PROCEDURE — 99205 OFFICE O/P NEW HI 60 MIN: CPT | Mod: HCNC,S$GLB,, | Performed by: INTERNAL MEDICINE

## 2022-02-03 PROCEDURE — 1157F ADVNC CARE PLAN IN RCRD: CPT | Mod: HCNC,CPTII,S$GLB, | Performed by: INTERNAL MEDICINE

## 2022-02-03 PROCEDURE — 1101F PR PT FALLS ASSESS DOC 0-1 FALLS W/OUT INJ PAST YR: ICD-10-PCS | Mod: HCNC,CPTII,S$GLB, | Performed by: INTERNAL MEDICINE

## 2022-02-03 PROCEDURE — 1101F PT FALLS ASSESS-DOCD LE1/YR: CPT | Mod: HCNC,CPTII,S$GLB, | Performed by: INTERNAL MEDICINE

## 2022-02-03 PROCEDURE — 3288F FALL RISK ASSESSMENT DOCD: CPT | Mod: HCNC,CPTII,S$GLB, | Performed by: INTERNAL MEDICINE

## 2022-02-03 PROCEDURE — 1126F AMNT PAIN NOTED NONE PRSNT: CPT | Mod: HCNC,CPTII,S$GLB, | Performed by: INTERNAL MEDICINE

## 2022-02-03 PROCEDURE — 3288F PR FALLS RISK ASSESSMENT DOCUMENTED: ICD-10-PCS | Mod: HCNC,CPTII,S$GLB, | Performed by: INTERNAL MEDICINE

## 2022-02-03 PROCEDURE — 99999 PR PBB SHADOW E&M-EST. PATIENT-LVL IV: ICD-10-PCS | Mod: PBBFAC,HCNC,, | Performed by: INTERNAL MEDICINE

## 2022-02-03 PROCEDURE — 1159F PR MEDICATION LIST DOCUMENTED IN MEDICAL RECORD: ICD-10-PCS | Mod: HCNC,CPTII,S$GLB, | Performed by: INTERNAL MEDICINE

## 2022-02-03 PROCEDURE — 99999 PR PBB SHADOW E&M-EST. PATIENT-LVL IV: CPT | Mod: PBBFAC,HCNC,, | Performed by: INTERNAL MEDICINE

## 2022-02-03 PROCEDURE — 1126F PR PAIN SEVERITY QUANTIFIED, NO PAIN PRESENT: ICD-10-PCS | Mod: HCNC,CPTII,S$GLB, | Performed by: INTERNAL MEDICINE

## 2022-02-03 PROCEDURE — 3078F DIAST BP <80 MM HG: CPT | Mod: HCNC,CPTII,S$GLB, | Performed by: INTERNAL MEDICINE

## 2022-02-03 PROCEDURE — 99205 PR OFFICE/OUTPT VISIT, NEW, LEVL V, 60-74 MIN: ICD-10-PCS | Mod: HCNC,S$GLB,, | Performed by: INTERNAL MEDICINE

## 2022-02-03 PROCEDURE — 3075F SYST BP GE 130 - 139MM HG: CPT | Mod: HCNC,CPTII,S$GLB, | Performed by: INTERNAL MEDICINE

## 2022-02-03 PROCEDURE — 3078F PR MOST RECENT DIASTOLIC BLOOD PRESSURE < 80 MM HG: ICD-10-PCS | Mod: HCNC,CPTII,S$GLB, | Performed by: INTERNAL MEDICINE

## 2022-02-03 NOTE — PATIENT INSTRUCTIONS
Assessment/Plan:  Maximilian Tavera Jr. is a 76 y.o. male with hypertension, myasthenia gravis on chronic prednisone therapy, degenerative joint disease with myelopathy s/p decompression and fusion of C2-6, chronic PVCs, who presents for an initial appointment.    1. Leg swelling- Due to lymphedema and possible venous insufficiency.  Check BLE venous reflux study and KATTY study.  Refer to lymphedema clinic.  Increase bumex to 1 mg bid every other day (0.5 mg bid on alternate days).  Pt to limit sodium intake to 2,000 mg daily.  Limit volume intake to 1.5 liters daily.  Check cmp in 1 week.      2. HTN- Continue current medications.     3. Chronic PVC's- Stable.  Continue current medications and follow up with EP as scheduled.      4. HLD- LDL is at goal of <70.  Continue current medications.     Follow up in 2 months

## 2022-02-03 NOTE — PROGRESS NOTES
[facility]    CC:   Chief Complaint   Patient presents with    [facility]    Leg Swelling    Follow-up       Patient ID: Maximilain Tavera Jr. is a 76 y.o. male with HTN, HLD, myasthenia gravis on chronic prednisone therapy, degenerative joint disease with myelopathy s/p decompression and fusion of C2-6, chronic PVCs, who presents for an initial appointment.  Pertinent history/events are as follows:     -Pt kindly referred by Dr. Altamirano for evaluation of leg swelling.    HPI:  Mr. Tavera reports leg swelling starting 3-4 months ago.  States his PCP wrapped his legs 2 weeks ago and he lost 8 pounds.  He has no claudication, rest pain, or tissue loss.      Past Medical History:   Diagnosis Date    A-fib 3/31/2020    Arthritis     Back pain     Carpal tunnel syndrome 2/25/2013    Cataract     Cataract, left eye 11/10/2014    Chest pain, musculoskeletal     COPD (chronic obstructive pulmonary disease) 11/052018    COPD with asthma 8/17/2021    Emphysema lung 11/05/2018    Gastritis     Hx of colonic polyp     Hyperlipidemia     Hypertension     Hypothyroidism     Knee fracture     Myasthenia gravis     Neuropathy 1/3/2013    Obesity 1/29/2015    Pneumonia 3/29/2020    Polyneuropathy     PVC (premature ventricular contraction)     Squamous cell carcinoma 2014    left forearm    Thyroid disease      Past Surgical History:   Procedure Laterality Date    APPENDECTOMY      CATARACT EXTRACTION W/  INTRAOCULAR LENS IMPLANT Bilateral     COLONOSCOPY  03/01/2012    Dr Esteban; hyperplastic polyp; repeat in 5 years    COLONOSCOPY N/A 12/7/2021    Procedure: COLONOSCOPY;  Surgeon: Gabriel Hernandez MD;  Location: [facility];  Service: Endoscopy;  Laterality: N/A;    CYSTOSCOPY N/A 2/26/2019    Procedure: CYSTOSCOPY;  Surgeon: Simba Cheung MD;  Location: [facility];  Service: Urology;  Laterality: N/A;    ENDOSCOPIC ULTRASOUND OF UPPER GASTROINTESTINAL TRACT N/A 1/7/2020     Procedure: ULTRASOUND, UPPER GI TRACT, ENDOSCOPIC;  Surgeon: Kati Ryan MD;  Location: [facility] ENDO (2ND FLR);  Service: Endoscopy;  Laterality: N/A;    ESOPHAGOGASTRODUODENOSCOPY N/A 9/12/2018    Procedure: EGD (ESOPHAGOGASTRODUODENOSCOPY);  Surgeon: Gabriel Hernandez MD;  Location: [facility] ENDO;  Service: Endoscopy;  Laterality: N/A;    ESOPHAGOGASTRODUODENOSCOPY N/A 8/19/2019    Procedure: EGD (ESOPHAGOGASTRODUODENOSCOPY);  Surgeon: Gabriel Hernandez MD;  Location: [facility] ENDO;  Service: Endoscopy;  Laterality: N/A;    ESOPHAGOGASTRODUODENOSCOPY N/A 10/7/2019    Procedure: EGD (ESOPHAGOGASTRODUODENOSCOPY);  Surgeon: Gabriel Hernandez MD;  Location: [facility] ENDO;  Service: Endoscopy;  Laterality: N/A;    ESOPHAGOGASTRODUODENOSCOPY N/A 1/7/2020    Procedure: EGD (ESOPHAGOGASTRODUODENOSCOPY);  Surgeon: Kati Ryan MD;  Location: [facility] (2ND FLR);  Service: Endoscopy;  Laterality: N/A;    HEMORRHOID SURGERY      INJECTION OF ANESTHETIC AGENT AROUND MEDIAL BRANCH NERVES INNERVATING LUMBAR FACET JOINT Bilateral 10/1/2020    Procedure: Block-nerve-medial branch-lumbar Bilateral L 3,4,5;  Surgeon: Devan Watt MD;  Location: [facility];  Service: Pain Management;  Laterality: Bilateral;    KNEE ARTHROPLASTY Bilateral     LENGTHENING OF ACHILLES TENDON Right 9/11/2020    Procedure: percutaeous tenotomy of right lateral epicondyle (tenex);  Surgeon: Terry Quach MD;  Location: [facility] OR;  Service: Neurosurgery;  Laterality: Right;  tenex to right lateral epicondyle  Tenex machine SN#[id number], total time 1min. 30seconds    NECK SURGERY      POSTERIOR FUSION OF CERVICAL SPINE WITH LAMINECTOMY N/A 11/7/2018    Procedure: C2-C6 Posterior Cervical Laminectomy & Instrumental Fusion;  Surgeon: Kishore Turner MD;  Location: [facility] 2ND FLR;  Service: Neurosurgery;  Laterality: N/A;    TONSILLECTOMY      TRANSFORAMINAL EPIDURAL INJECTION OF STEROID Right 12/20/2019    Procedure:  Injection,steroid,epidural,transforaminal approach;  Surgeon: Devan Watt MD;  Location: [facility] OR;  Service: Pain Management;  Laterality: Right;  L3-4, L4-5    TRANSFORAMINAL EPIDURAL INJECTION OF STEROID Bilateral 2/6/2020    Procedure: Injection,steroid,epidural,transforaminal approach;  Surgeon: Devan Watt MD;  Location: [facility] OR;  Service: Pain Management;  Laterality: Bilateral;  L3-L4,L4-L5    TRANSFORAMINAL EPIDURAL INJECTION OF STEROID Bilateral 8/26/2020    Procedure: Injection,steroid,epidural,transforaminal approach;  Surgeon: Devan Watt MD;  Location: [facility] OR;  Service: Pain Management;  Laterality: Bilateral;  L3-4, L4-5    TRANSRECTAL ULTRASOUND EXAMINATION N/A 2/26/2019    Procedure: ULTRASOUND, RECTAL APPROACH;  Surgeon: Simba Cheung MD;  Location: [facility];  Service: Urology;  Laterality: N/A;    UPPER GASTROINTESTINAL ENDOSCOPY  09/12/2018    Dr Hernandez; gastritis; extensive intestinal metaplasia; repeat in 1-2- years     Social History     Socioeconomic History    Marital status:    Tobacco Use    Smoking status: Never Smoker    Smokeless tobacco: Never Used    Tobacco comment: when a child   Substance and Sexual Activity    Alcohol use: Yes     Comment: rarely    Drug use: No    Sexual activity: Yes     Partners: Female     Social Determinants of Health     Financial Resource Strain: Low Risk     Difficulty of Paying Living Expenses: Not hard at all   Food Insecurity: No Food Insecurity    Worried About Running Out of Food in the Last Year: Never true    Ran Out of Food in the Last Year: Never true   Transportation Needs: No Transportation Needs    Lack of Transportation (Medical): No    Lack of Transportation (Non-Medical): No   Physical Activity: Inactive    Days of Exercise per Week: 0 days    Minutes of Exercise per Session: 0 min   Stress: No Stress Concern Present    Feeling of Stress : Not at all   Social Connections: Unknown    Frequency of Communication  with Friends and Family: More than three times a week    Frequency of Social Gatherings with Friends and Family: More than three times a week    Active Member of Clubs or Organizations: Yes    Attends Club or Organization Meetings: More than 4 times per year    Marital Status:    Housing Stability: Low Risk     Unable to Pay for Housing in the Last Year: No    Number of Places Lived in the Last Year: 1    Unstable Housing in the Last Year: No     Family History   Problem Relation Age of Onset    Cataracts Mother     Heart disease Mother         CHF    Hypertension Mother     Hyperlipidemia Mother     Cataracts Father     Glaucoma Father     Heart disease Father     Hyperlipidemia Sister     Hypertension Sister     No Known Problems Daughter     No Known Problems Daughter     Collagen disease Neg Hx     Amblyopia Neg Hx     Blindness Neg Hx     Macular degeneration Neg Hx     Retinal detachment Neg Hx     Strabismus Neg Hx     Cancer Neg Hx     Colon cancer Neg Hx     Esophageal cancer Neg Hx     Stomach cancer Neg Hx     Crohn's disease Neg Hx     Ulcerative colitis Neg Hx        Review of patient's allergies indicates:   Allergen Reactions    Spironolactone Diarrhea       Medication List with Changes/Refills   Current Medications    ATORVASTATIN (LIPITOR) 80 MG TABLET    TAKE 1 TABLET EVERY DAY    BUMETANIDE (BUMEX) 0.5 MG TAB    Take 1 tablet (0.5 mg total) by mouth 2 (two) times daily.    CARVEDILOL (COREG) 25 MG TABLET    TAKE 1 TABLET TWICE DAILY WITH MEALS    CETIRIZINE (ZYRTEC) 10 MG TABLET    Take 1 tablet by mouth daily as needed.    CHOLECALCIFEROL, VITAMIN D3, (VITAMIN D3) 50 MCG (2,000 UNIT) CAP    1 capsule once daily. Every day    CHOLESTYRAMINE (QUESTRAN) 4 GRAM PACKET    Take 1 packet (4 g total) by mouth 3 (three) times daily with meals.    COENZYME Q10 (CO Q-10) 100 MG CAPSULE    Take 400 mg by mouth once daily.    CYANOCOBALAMIN, VITAMIN B-12, 5,000 MCG SUBL     Take 5,000 mcg by mouth once daily. Every day    DIPHENOXYLATE-ATROPINE 2.5-0.025 MG (LOMOTIL) 2.5-0.025 MG PER TABLET    Take 1 tablet by mouth 4 (four) times daily as needed for Diarrhea.    EZETIMIBE (ZETIA) 10 MG TABLET    TAKE 1 TABLET EVERY DAY    FLUAD QUAD 2021-22,65Y UP,,PF, 60 MCG (15 MCG X 4)/0.5 ML SYRG        FLUTICASONE (FLONASE) 50 MCG/ACTUATION NASAL SPRAY    USE 1 SPRAY IN EACH NOSTRIL TWICE DAILY AS NEEDED  FOR  RHINITIS    GABAPENTIN (NEURONTIN) 300 MG CAPSULE    TAKE 1 CAPSULE THREE TIMES DAILY    GUAIFENESIN-CODEINE 100-10 MG/5 ML (CHERATUSSIN AC)  MG/5 ML SYRUP    Take 10 mLs by mouth 3 (three) times daily as needed for Cough.    KENALOG 40 MG/ML INJECTION        LEVOTHYROXINE (SYNTHROID) 50 MCG TABLET    TAKE 1 TABLET EVERY DAY    METHYLSULFONYLMETHANE 1,000 MG TAB    Take 1,000 mg by mouth once daily. Every day    MILK THISTLE 200 MG CAP    Take 200 mg by mouth 2 (two) times daily. Twice a day    OLMESARTAN (BENICAR) 20 MG TABLET    TAKE 1 TABLET EVERY DAY    OMEGA-3 FATTY ACIDS 1,000 MG CAP    Twice a day    PANTOPRAZOLE (PROTONIX) 40 MG TABLET    Take 1 tablet (40 mg total) by mouth 2 (two) times daily.    POTASSIUM CHLORIDE SA (K-DUR,KLOR-CON) 20 MEQ TABLET    Take 6 tab daily while on bumex    PREDNISONE (DELTASONE) 1 MG TABLET    TAKE 2 TABLETS EVERY DAY    PREDNISONE (DELTASONE) 5 MG TABLET    TAKE 1 TABLET EVERY DAY    SILDENAFIL (REVATIO) 20 MG TAB    Take 1 to 3 tabs daily as needed.    SPIRIVA WITH HANDIHALER 18 MCG INHALATION CAPSULE    INHALE THE CONTENTS OF 1 CAPSULE (18 MCG TOTAL) INTO THE LUNGS ONCE DAILY. CONTROLLER    VENTOLIN HFA 90 MCG/ACTUATION INHALER    Inhale 2 puffs into the lungs every 6 (six) hours as needed for Wheezing. Rescue       Review of Systems  Constitution: Denies chills, fever, and sweats.  HENT: Denies headaches or blurry vision.  Cardiovascular: Denies chest pain or irregular heart beat.  Respiratory: Denies cough or shortness of  "breath.  Gastrointestinal: Denies abdominal pain, nausea, or vomiting.  Musculoskeletal: Positive for leg swelling.  Neurological: Denies dizziness or focal weakness.  Psychiatric/Behavioral: Normal mental status.  Hematologic/Lymphatic: Denies bleeding problem or easy bruising/bleeding.  Skin: Denies rash or suspicious lesions    Physical Examination  /74 (BP Location: Left arm, Patient Position: Sitting, BP Method: Large (Automatic))   Pulse 62   Ht 6' 1" (1.854 m)   Wt 112.6 kg (248 lb 3.8 oz)   BMI 32.75 kg/m²     Constitutional: No acute distress, conversant  HEENT: Sclera anicteric, Pupils equal, round and reactive to light, extraocular motions intact, Oropharynx clear  Neck: No JVD, no carotid bruits  Cardiovascular: regular rate and rhythm, no murmur, rubs or gallops, normal S1/S2  Pulmonary: Clear to auscultation bilaterally  Abdominal: Abdomen soft, nontender, nondistended, positive bowel sounds  Extremities: BLE's with 1+ pitting edema, venous stasis dermatitis and changes consistent with lymphedema   Pulses:  Carotid pulses are 2+ on the right side, and 2+ on the left side.  Radial pulses are 2+ on the right side, and 2+ on the left side.   Femoral pulses are 2+ on the right side, and 2+ on the left side.  Popliteal pulses are 2+ on the right side, and 2+ on the left side.   Dorsalis pedis pulses are 2+ on the right side, and 2+ on the left side.   Posterior tibial pulses are 2+ on the right side, and 2+ on the left side.    Skin: No ecchymosis, erythema, or ulcers  Psych: Alert and oriented x 3, appropriate affect  Neuro: CNII-XII intact, no focal deficits    Labs:  Most Recent Data  CBC:   Lab Results   Component Value Date    WBC 5.77 01/03/2022    HGB 13.1 (L) 01/03/2022    HCT 42.2 01/03/2022     (L) 01/03/2022    MCV 95 01/03/2022    RDW 13.1 01/03/2022     BMP:   Lab Results   Component Value Date     02/02/2022    K 4.0 02/02/2022     02/02/2022    CO2 28 02/02/2022 "    BUN 15 02/02/2022    CREATININE 1.0 02/02/2022     (H) 02/02/2022    CALCIUM 9.0 02/02/2022    MG 2.1 01/14/2022    PHOS 2.9 03/31/2020     LFTS;   Lab Results   Component Value Date    PROT 6.2 01/14/2022    ALBUMIN 3.4 (L) 01/14/2022    BILITOT 1.1 (H) 01/14/2022    AST 20 01/14/2022    ALKPHOS 75 01/14/2022    ALT 25 01/14/2022     COAGS:   Lab Results   Component Value Date    INR 1.0 10/30/2018     FLP:   Lab Results   Component Value Date    CHOL 122 01/03/2022    HDL 41 01/03/2022    LDLCALC 67.4 01/03/2022    TRIG 68 01/03/2022    CHOLHDL 33.6 01/03/2022     CARDIAC:   Lab Results   Component Value Date    TROPONINI 0.044 (H) 03/29/2020    BNP 81 01/10/2022       EKG:    BLE Venous Ultrasound 1/10/2022:  Negative for DVT throughout bilateral lower extremities.    Echo 6/2/2021:  · Severe left atrial enlargement.  · The left ventricle is normal in size with concentric hypertrophy and normal systolic function.  · The estimated ejection fraction is 65%.  · Indeterminate left ventricular diastolic function.  · Normal right ventricular size with normal right ventricular systolic function.  · The estimated PA systolic pressure is 28 mmHg.  · Normal central venous pressure (3 mmHg).      Assessment/Plan:  Maximilian Tavera Jr. is a 76 y.o. male with hypertension, myasthenia gravis on chronic prednisone therapy, degenerative joint disease with myelopathy s/p decompression and fusion of C2-6, chronic PVCs, who presents for an initial appointment.    1. Leg swelling- Due to lymphedema and possible venous insufficiency.  Check BLE venous reflux study and KATTY study.  Refer to lymphedema clinic.  Increase bumex to 1 mg bid every other day (0.5 mg bid on alternate days).  Pt to limit sodium intake to 2,000 mg daily.  Limit volume intake to 1.5 liters daily.  Check cmp in 1 week.      2. HTN- Continue current medications.     3. Chronic PVC's- Stable.  Continue current medications and follow up with EP as  scheduled.      4. HLD- LDL is at goal of <70.  Continue current medications.     Follow up in 2 months    Total duration of face to face visit time 45 minutes.  Total time spent counseling greater than fifty percent of total visit time.  Counseling included discussion regarding imaging findings, diagnosis, possibilities, treatment options, risks and benefits.  The patient had many questions regarding the options and long-term effects.    Devang Amezquita MD, PhD  Interventional Cardiology

## 2022-02-04 ENCOUNTER — TELEPHONE (OUTPATIENT)
Dept: CARDIOLOGY | Facility: CLINIC | Age: 77
End: 2022-02-04
Payer: MEDICARE

## 2022-02-04 NOTE — TELEPHONE ENCOUNTER
Pt is scheduled for appt.       ----- Message from Michelle Smith sent at 2/4/2022  9:32 AM CST -----  Regarding: ret call  Pt is returning your call and can be reached at [phone].    Thank you

## 2022-02-05 ENCOUNTER — OFFICE VISIT (OUTPATIENT)
Dept: URGENT CARE | Facility: CLINIC | Age: 77
End: 2022-02-05
Payer: MEDICARE

## 2022-02-05 VITALS
DIASTOLIC BLOOD PRESSURE: 71 MMHG | WEIGHT: 248 LBS | TEMPERATURE: 101 F | HEART RATE: 73 BPM | RESPIRATION RATE: 20 BRPM | SYSTOLIC BLOOD PRESSURE: 131 MMHG | OXYGEN SATURATION: 97 % | BODY MASS INDEX: 32.87 KG/M2 | HEIGHT: 73 IN

## 2022-02-05 DIAGNOSIS — I49.9 IRREGULAR HEART RATE: ICD-10-CM

## 2022-02-05 DIAGNOSIS — U07.1 COVID-19 VIRUS DETECTED: ICD-10-CM

## 2022-02-05 DIAGNOSIS — R05.9 COUGH: ICD-10-CM

## 2022-02-05 DIAGNOSIS — R50.9 FEVER, UNSPECIFIED FEVER CAUSE: Primary | ICD-10-CM

## 2022-02-05 DIAGNOSIS — U07.1 COVID-19: ICD-10-CM

## 2022-02-05 LAB
CTP QC/QA: YES
SARS-COV-2 AG RESP QL IA.RAPID: POSITIVE

## 2022-02-05 PROCEDURE — 87811 SARS-COV-2 COVID19 W/OPTIC: CPT | Mod: S$GLB,CS,, | Performed by: STUDENT IN AN ORGANIZED HEALTH CARE EDUCATION/TRAINING PROGRAM

## 2022-02-05 PROCEDURE — 1157F ADVNC CARE PLAN IN RCRD: CPT | Mod: CPTII,S$GLB,, | Performed by: STUDENT IN AN ORGANIZED HEALTH CARE EDUCATION/TRAINING PROGRAM

## 2022-02-05 PROCEDURE — 1159F MED LIST DOCD IN RCRD: CPT | Mod: CPTII,S$GLB,, | Performed by: STUDENT IN AN ORGANIZED HEALTH CARE EDUCATION/TRAINING PROGRAM

## 2022-02-05 PROCEDURE — 93000 ELECTROCARDIOGRAM COMPLETE: CPT | Mod: S$GLB,,, | Performed by: STUDENT IN AN ORGANIZED HEALTH CARE EDUCATION/TRAINING PROGRAM

## 2022-02-05 PROCEDURE — 99204 PR OFFICE/OUTPT VISIT, NEW, LEVL IV, 45-59 MIN: ICD-10-PCS | Mod: S$GLB,CS,, | Performed by: STUDENT IN AN ORGANIZED HEALTH CARE EDUCATION/TRAINING PROGRAM

## 2022-02-05 PROCEDURE — 3075F PR MOST RECENT SYSTOLIC BLOOD PRESS GE 130-139MM HG: ICD-10-PCS | Mod: CPTII,S$GLB,, | Performed by: STUDENT IN AN ORGANIZED HEALTH CARE EDUCATION/TRAINING PROGRAM

## 2022-02-05 PROCEDURE — 3075F SYST BP GE 130 - 139MM HG: CPT | Mod: CPTII,S$GLB,, | Performed by: STUDENT IN AN ORGANIZED HEALTH CARE EDUCATION/TRAINING PROGRAM

## 2022-02-05 PROCEDURE — 3078F DIAST BP <80 MM HG: CPT | Mod: CPTII,S$GLB,, | Performed by: STUDENT IN AN ORGANIZED HEALTH CARE EDUCATION/TRAINING PROGRAM

## 2022-02-05 PROCEDURE — 93000 PR ELECTROCARDIOGRAM, COMPLETE: ICD-10-PCS | Mod: S$GLB,,, | Performed by: STUDENT IN AN ORGANIZED HEALTH CARE EDUCATION/TRAINING PROGRAM

## 2022-02-05 PROCEDURE — 99204 OFFICE O/P NEW MOD 45 MIN: CPT | Mod: S$GLB,CS,, | Performed by: STUDENT IN AN ORGANIZED HEALTH CARE EDUCATION/TRAINING PROGRAM

## 2022-02-05 PROCEDURE — 1159F PR MEDICATION LIST DOCUMENTED IN MEDICAL RECORD: ICD-10-PCS | Mod: CPTII,S$GLB,, | Performed by: STUDENT IN AN ORGANIZED HEALTH CARE EDUCATION/TRAINING PROGRAM

## 2022-02-05 PROCEDURE — 87811 SARS CORONAVIRUS 2 ANTIGEN POCT, MANUAL READ: ICD-10-PCS | Mod: S$GLB,CS,, | Performed by: STUDENT IN AN ORGANIZED HEALTH CARE EDUCATION/TRAINING PROGRAM

## 2022-02-05 PROCEDURE — 1157F PR ADVANCE CARE PLAN OR EQUIV PRESENT IN MEDICAL RECORD: ICD-10-PCS | Mod: CPTII,S$GLB,, | Performed by: STUDENT IN AN ORGANIZED HEALTH CARE EDUCATION/TRAINING PROGRAM

## 2022-02-05 PROCEDURE — 1160F PR REVIEW ALL MEDS BY PRESCRIBER/CLIN PHARMACIST DOCUMENTED: ICD-10-PCS | Mod: CPTII,S$GLB,, | Performed by: STUDENT IN AN ORGANIZED HEALTH CARE EDUCATION/TRAINING PROGRAM

## 2022-02-05 PROCEDURE — 1160F RVW MEDS BY RX/DR IN RCRD: CPT | Mod: CPTII,S$GLB,, | Performed by: STUDENT IN AN ORGANIZED HEALTH CARE EDUCATION/TRAINING PROGRAM

## 2022-02-05 PROCEDURE — 3078F PR MOST RECENT DIASTOLIC BLOOD PRESSURE < 80 MM HG: ICD-10-PCS | Mod: CPTII,S$GLB,, | Performed by: STUDENT IN AN ORGANIZED HEALTH CARE EDUCATION/TRAINING PROGRAM

## 2022-02-05 RX ORDER — ALBUTEROL SULFATE 90 UG/1
1-2 AEROSOL, METERED RESPIRATORY (INHALATION) EVERY 6 HOURS PRN
Qty: 18 G | Refills: 0 | Status: SHIPPED | OUTPATIENT
Start: 2022-02-05 | End: 2022-02-06

## 2022-02-05 RX ORDER — ACETAMINOPHEN 500 MG
1000 TABLET ORAL
Status: COMPLETED | OUTPATIENT
Start: 2022-02-05 | End: 2022-02-05

## 2022-02-05 RX ORDER — DOXYCYCLINE HYCLATE 100 MG
100 TABLET ORAL 2 TIMES DAILY
Qty: 20 TABLET | Refills: 0 | Status: SHIPPED | OUTPATIENT
Start: 2022-02-05 | End: 2022-02-15

## 2022-02-05 RX ORDER — BENZONATATE 200 MG/1
200 CAPSULE ORAL 3 TIMES DAILY PRN
Qty: 30 CAPSULE | Refills: 0 | Status: SHIPPED | OUTPATIENT
Start: 2022-02-05 | End: 2022-02-15

## 2022-02-05 RX ADMIN — Medication 1000 MG: at 03:02

## 2022-02-05 NOTE — PROGRESS NOTES
"Subjective:       Patient ID: Maximilian Tavera Jr. is a 76 y.o. male.    Vitals:  height is 6' 1" (1.854 m) and weight is 112.5 kg (248 lb). His oral temperature is 102.5 °F (39.2 °C) (abnormal). His blood pressure is 131/71 and his pulse is 73. His respiration is 20 and oxygen saturation is 97%.     Chief Complaint: Cough    Patient is a 76-year-old male with past medical history of COPD, atrial fibrillation, hypertension, hyperlipidemia, hypothyroidism, BPH, asthma, degenerative disc disease, and myasthenia gravis who presents to clinic for evaluation of COVID like symptoms.  Patient reports he is vaccinated for COVID including the booster.  Patient denies any recent or known sick exposure however states he was in the hospital x2 days ago.  Patient states symptoms began x2 days ago.  Patient states has taken over-the-counter medications such as Tylenol and a cough syrup.  Patient reports these helped symptoms slightly.  Patient complaining of fatigue, chills, fever with a temperature max of 100.5 F, nasal sinus congestion with postnasal drainage, sinus pressure, nonproductive cough, generalized body aches, and generalized headaches.  Patient denies any acute dizziness, ear pain or sore throat, chest pain, shortness of breath, abdominal pain, nausea or vomiting, or any diarrhea.  Patient states he believes symptoms were related to receiving his shingles vaccination 2 days ago.      Constitution: Positive for chills, fatigue and fever (Reports temperature max 100.5 F).   HENT: Positive for congestion, postnasal drip and sinus pressure. Negative for ear pain and sore throat.    Neck: neck negative.   Cardiovascular: Positive for leg swelling. Negative for chest pain.        Reports history of PVCs   Eyes: Negative.    Respiratory: Positive for cough. Negative for chest tightness, sputum production and shortness of breath.    Gastrointestinal: Negative.  Negative for abdominal pain, nausea, vomiting and diarrhea. "   Endocrine: negative.   Genitourinary: Negative.    Musculoskeletal: Positive for muscle ache.   Skin: Negative.  Negative for color change, pale and erythema.   Allergic/Immunologic: Negative.    Neurological: Positive for headaches. Negative for dizziness, light-headedness, passing out, disorientation and altered mental status.   Hematologic/Lymphatic: Negative.    Psychiatric/Behavioral: Negative.  Negative for altered mental status, disorientation and confusion.       Objective:      Physical Exam   Constitutional: He is oriented to person, place, and time. He appears well-developed and well-nourished. He is cooperative.  Non-toxic appearance. He appears ill (Uncomfortable). No distress.   HENT:   Head: Normocephalic and atraumatic.   Ears:   Right Ear: Hearing, tympanic membrane, external ear and ear canal normal.   Left Ear: Hearing, tympanic membrane, external ear and ear canal normal.   Nose: Congestion present. No mucosal edema, rhinorrhea or nasal deformity. No epistaxis. Right sinus exhibits no maxillary sinus tenderness and no frontal sinus tenderness. Left sinus exhibits no maxillary sinus tenderness and no frontal sinus tenderness.   Mouth/Throat: Uvula is midline and mucous membranes are normal. Mucous membranes are moist. No trismus in the jaw. Normal dentition. No uvula swelling. Posterior oropharyngeal erythema present. No oropharyngeal exudate. Oropharynx is clear.   Eyes: Conjunctivae and lids are normal. Pupils are equal, round, and reactive to light. Right eye exhibits no discharge. Left eye exhibits no discharge. No scleral icterus.   Neck: Trachea normal and phonation normal. Neck supple.   Cardiovascular: Normal rate, intact distal pulses and normal pulses. An irregular rhythm present.   Pulmonary/Chest: Effort normal. Tachypnea noted. No respiratory distress (Occasional cough noted). He has decreased breath sounds (Slightly diminished). He has no wheezes. He has no rhonchi. He has no  rales.   Abdominal: Normal appearance and bowel sounds are normal. He exhibits no distension. Soft. There is no abdominal tenderness.   Musculoskeletal: Normal range of motion.         General: No deformity or edema. Normal range of motion.   Neurological: He is alert and oriented to person, place, and time. He exhibits normal muscle tone. Coordination normal.   Skin: Skin is warm, dry, intact, not diaphoretic, not pale and no rash. Capillary refill takes 2 to 3 seconds. No erythema   Psychiatric: He has a normal mood and affect. His speech is normal and behavior is normal. Judgment and thought content normal.   Nursing note and vitals reviewed.        Assessment:       1. Fever, unspecified fever cause    2. COVID-19    3. Cough    4. Irregular heart rate          Plan:         Fever, unspecified fever cause  -     SARS Coronavirus 2 Antigen, POCT Manual Read    COVID-19  -     Ambulatory referral/consult to [facility]    Cough  -     XR CHEST PA AND LATERAL; Future; Expected date: 02/05/2022    Irregular heart rate  -     EKG 12-lead; Future    Other orders  -     acetaminophen tablet 1,000 mg  -     doxycycline (VIBRA-TABS) 100 MG tablet; Take 1 tablet (100 mg total) by mouth 2 (two) times daily. for 10 days  Dispense: 20 tablet; Refill: 0  -     benzonatate (TESSALON) 200 MG capsule; Take 1 capsule (200 mg total) by mouth 3 (three) times daily as needed for Cough.  Dispense: 30 capsule; Refill: 0  -     albuterol (VENTOLIN HFA) 90 mcg/actuation inhaler; Inhale 1-2 puffs into the lungs every 6 (six) hours as needed for Wheezing or Shortness of Breath. Rescue  Dispense: 18 g; Refill: 0                 Labs:  COVID positive.  EKG:  Sinus rhythm with PVCs and incomplete right bundle-branch block and left axis deviation.  No ST elevation noted.   MS,  MS. EKG comparable to May 2021 and September 2021.  Evaluated by Cardiology 2 days ago on February 3rd 2022 and as well follows EP for PVCs.  Chest  x-ray: Cardiomediastinal silhouette is within normal limits.   Chronic mild elevation of the left hemidiaphragm with minimal left basilar subsegmental atelectasis versus scarring.  No definite new focal consolidation, pleural effusion, or pneumothorax.  Pulmonary vasculature and hilar regions are within normal limits.  No acute osseous abnormality.  Postoperative changes in the lower cervical spine. No definite acute radiographic abnormality.  Tylenol 1 g by mouth in clinic for fever.  Temperature trending down.  Patient feels comfortable to treat home.  Monoclonal antibody infusion order placed.  Take medications as prescribed.  Follow-up PCP in 1-2 days.  Return to clinic as needed.  To ED for any new or acutely worsening symptoms including but not limited to uncontrolled fever greater than 102° F, shortness of breath or chest pain.  Patient in agreement with plan of care.

## 2022-02-05 NOTE — PATIENT INSTRUCTIONS
Patient Education       COVID-19 Discharge Instructions   About this topic   Coronavirus disease 2019 is also known as COVID-19. It is a viral illness that infects the lungs. It is caused by a virus called SARS-associated coronavirus (SARS-CoV-2).  The signs of COVID-19 most often start a few days after you have been infected. In some people, it takes longer to show signs. Others never show signs of the infection. You may have a cough, fever, shaking chills and it may be hard to breathe. You may be very tired, have muscle aches, a headache or sore throat. Some people have an upset stomach or loose stools. Others lose their sense of smell or taste. You may not have these signs all the time and they may come and go while you are sick.  The virus spreads easily through droplets when you talk, sneeze, or cough. You can pass the virus to others when you are talking close together, singing, hugging, sharing food, or shaking hands. Doctors believe the germs also survive on surfaces like tables, door handles, and telephones. However, this is not a common way that COVID-19 spreads. Doctors believe you can also spread the infection even if you dont have any symptoms, but they do not know how that happens. This is why getting vaccinated is one of the best ways to keep you healthy and slow the spread of the virus.  Some people have a mild case of COVID-19 and are able to stay at home and away from others until they feel better. Others may need to be in the hospital if they are very sick. Some people with COVID-19 can have some symptoms for weeks or months. People with COVID-19 must isolate themselves. You can start to be around others when your doctor says it is safe to do so.       What care is needed at home?   · Ask your doctor what you need to do when you go home. Make sure you ask questions if you do not understand what the doctor says.  · Drink lots of water, juice, or broth to replace fluids lost from a fever.  · You  may use cool mist humidifiers to help ease congestion and coughing.  · Use 2 to 3 pillows to prop yourself up when you lie down to make it easier to breathe and sleep.  · Do not smoke and do not drink beer, wine, and mixed drinks (alcohol).  · To lower the chance of passing the infection to others, get a COVID-19 vaccine after your infection has resolved.  · If you have not been fully vaccinated:  ? Wear a mask over your mouth and nose if you are around others who are not sick. Cloth masks work best if they have more than one layer of fabric.  ? Wash your hands often.  ? Stay home in a separate room, if possible, away from others. Only go out to get medical care.  ? Use a separate bathroom if possible.  ? Do not make food for others.  What follow-up care is needed?   · Your doctor may ask you to make visits to the office to check on your progress. Be sure to keep these visits. Make sure you wear a mask at these visits.  · If you can, tell the staff you have COVID-19 ahead of time so they can take extra care to stop the disease from spreading.  · It may take a few weeks before your health returns to normal.  What drugs may be needed?   The doctor may order drugs to:  · Help with breathing  · Help with fever  · Help with swelling in your airways and lungs  · Control coughing  · Ease a sore throat  · Help a runny or stuffy nose  Will physical activity be limited?   You may have to limit your physical activity. Talk to your doctor about the right amount of activity for you. If you have been very sick with COVID-19, it can take some time to get your strength back.  Will there be any other care needed?   Doctors do not know how long you can pass the virus on to others after you are sick. This is why it is important to stay in a separate room, if possible, when you are sick. For now, doctors are giving general guidelines for you to follow after you have been sick. Before you go around other people, you should:  · Be fever  free for 24 hours without taking any drugs to lower the fever  · Have no symptoms of cough or shortness of breath  · Wait at least 10 days after first having symptoms or your first positive test, and you need to be symptom free as above. Some experts suggest waiting 20 days if you have had a more severe infection.  Talk with your doctor about getting a COVID-19 vaccine.  What problems could happen?   · Fluid loss. This is dehydration.  · Short-term or long-term lung damage  · Heart problems  · Death  When do I need to call the doctor?   · You are having so much trouble breathing that you can only say one or two words at a time.  · You need to sit upright at all times to be able to breathe and/or cannot lie down.  · You are very confused or cannot stay awake.  · Your lips or skin start to turn blue or grey.  · You think you might be having a medical emergency. Some examples of medical emergencies are:  ? Severe chest pain.  ? Not able to speak or move normally.  · You have trouble breathing when talking or sitting still.  · You have new shortness of breath.  · You become weak or dizzy.  · You have very dark urine or do not pass urine for more than 8 hours.  · You have new or worsening COVID-19 symptoms like:  ? Fever  ? Cough  ? Feeling very tired  ? Shaking chills  ? Headache  ? Trouble swallowing  ? Throwing up  ? Loose stools  ? Reddish purple spots on your fingers or toes  Teach Back: Helping You Understand   The Teach Back Method helps you understand the information we are giving you. After you talk with the staff, tell them in your own words what you learned. This helps to make sure the staff has described each thing clearly. It also helps to explain things that may have been confusing. Before going home, make sure you can do these:  · I can tell you about my condition.  · I can tell you what may help ease my breathing.  · I can tell you what I can do to help avoid passing the infection to others.  · I can tell  you what I will do if I have trouble breathing; feel sleepy or confused; or my fingertips, fingernails, skin, or lips are blue.  Where can I learn more?   Centers for Disease Control and Prevention  https://www.cdc.gov/coronavirus/2019-ncov/about/index.html   Centers for Disease Control and Prevention  https://www.cdc.gov/coronavirus/2019-ncov/hcp/disposition-in-home-patients.html   World Health Organization  https://www.who.int/news-room/q-a-detail/c-g-vcoitjvqfowvp   Last Reviewed Date   2021-10-05  Consumer Information Use and Disclaimer   This information is not specific medical advice and does not replace information you receive from your health care provider. This is only a brief summary of general information. It does NOT include all information about conditions, illnesses, injuries, tests, procedures, treatments, therapies, discharge instructions or life-style choices that may apply to you. You must talk with your health care provider for complete information about your health and treatment options. This information should not be used to decide whether or not to accept your health care providers advice, instructions or recommendations. Only your health care provider has the knowledge and training to provide advice that is right for you.  Copyright   Copyright © 2021 [employer] and its affiliates and/or licensors. All rights reserved.

## 2022-02-07 ENCOUNTER — TELEPHONE (OUTPATIENT)
Dept: CARDIOLOGY | Facility: CLINIC | Age: 77
End: 2022-02-07
Payer: MEDICARE

## 2022-02-10 ENCOUNTER — HOSPITAL ENCOUNTER (OUTPATIENT)
Dept: CARDIOLOGY | Facility: HOSPITAL | Age: 77
Discharge: HOME | End: 2022-02-10
Attending: INTERNAL MEDICINE
Payer: MEDICARE

## 2022-02-10 ENCOUNTER — PATIENT MESSAGE (OUTPATIENT)
Dept: CARDIOLOGY | Facility: CLINIC | Age: 77
End: 2022-02-10
Payer: MEDICARE

## 2022-02-10 DIAGNOSIS — M79.89 LEG SWELLING: ICD-10-CM

## 2022-02-10 DIAGNOSIS — R60.0 LOCALIZED EDEMA: ICD-10-CM

## 2022-02-10 LAB
IMMEDIATE ARM BP: 152 MMHG
IMMEDIATE LEFT ABI: 1.68
IMMEDIATE LEFT TIBIAL: 255 MMHG
IMMEDIATE RIGHT ABI: 1.68
IMMEDIATE RIGHT TIBIAL: 255 MMHG
LEFT ABI: 1.95
LEFT ARM BP: 131 MMHG
LEFT DORSALIS PEDIS: 255 MMHG
LEFT GREAT SAPHENOUS DISTAL THIGH DIA: 0.36 CM
LEFT GREAT SAPHENOUS JUNCTION DIA: 0.55 CM
LEFT GREAT SAPHENOUS KNEE DIA: 0.42 CM
LEFT GREAT SAPHENOUS MIDDLE THIGH DIA: 0.5 CM
LEFT GREAT SAPHENOUS PROXIMAL CALF DIA: 0.35 CM
LEFT POSTERIOR TIBIAL: 255 MMHG
LEFT SMALL SAPHENOUS KNEE DIA: 0.3 CM
LEFT SMALL SAPHENOUS SPJ DIA: 0.27 CM
LEFT TBI: 0.82
LEFT TOE PRESSURE: 108 MMHG
RIGHT ABI: 1.95
RIGHT ARM BP: 127 MMHG
RIGHT DORSALIS PEDIS: 255 MMHG
RIGHT GREAT SAPHENOUS DISTAL THIGH DIA: 0.34 CM
RIGHT GREAT SAPHENOUS DISTAL THIGH REFLUX: 3179 MS
RIGHT GREAT SAPHENOUS JUNCTION DIA: 0.5 CM
RIGHT GREAT SAPHENOUS KNEE DIA: 0.34 CM
RIGHT GREAT SAPHENOUS KNEE REFLUX: 1929 MS
RIGHT GREAT SAPHENOUS MIDDLE THIGH DIA: 0.44 CM
RIGHT GREAT SAPHENOUS PROXIMAL CALF DIA: 0.26 CM
RIGHT POSTERIOR TIBIAL: 255 MMHG
RIGHT SMALL SAPHENOUS KNEE DIA: 0.27 CM
RIGHT SMALL SAPHENOUS SPJ DIA: 0.28 CM
RIGHT TBI: 0.88
RIGHT TOE PRESSURE: 115 MMHG
TOE RAISES: 50

## 2022-02-10 PROCEDURE — 93970 CV US LOWER VENOUS INSUFFICIENCY BILATERAL (CUPID ONLY): ICD-10-PCS | Mod: 26,HCNC,, | Performed by: INTERNAL MEDICINE

## 2022-02-10 PROCEDURE — 93970 EXTREMITY STUDY: CPT | Mod: TC,HCNC

## 2022-02-10 PROCEDURE — 93924 LWR XTR VASC STDY BILAT: CPT | Mod: HCNC

## 2022-02-10 PROCEDURE — 93924 ANKLE BRACHIAL INDICES (ABI): ICD-10-PCS | Mod: 26,HCNC,, | Performed by: INTERNAL MEDICINE

## 2022-02-10 PROCEDURE — 93970 EXTREMITY STUDY: CPT | Mod: 26,HCNC,, | Performed by: INTERNAL MEDICINE

## 2022-02-10 PROCEDURE — 93924 LWR XTR VASC STDY BILAT: CPT | Mod: 26,HCNC,, | Performed by: INTERNAL MEDICINE

## 2022-02-16 ENCOUNTER — OFFICE VISIT (OUTPATIENT)
Dept: PODIATRY | Facility: CLINIC | Age: 77
End: 2022-02-16
Payer: MEDICARE

## 2022-02-16 VITALS — WEIGHT: 248 LBS | BODY MASS INDEX: 32.87 KG/M2 | HEIGHT: 73 IN

## 2022-02-16 DIAGNOSIS — L90.9 PLANTAR FAT PAD ATROPHY: ICD-10-CM

## 2022-02-16 DIAGNOSIS — Q82.8 POROKERATOSIS: ICD-10-CM

## 2022-02-16 DIAGNOSIS — M79.671 FOOT PAIN, RIGHT: Primary | ICD-10-CM

## 2022-02-16 PROCEDURE — 1159F MED LIST DOCD IN RCRD: CPT | Mod: HCNC,CPTII,S$GLB, | Performed by: PODIATRIST

## 2022-02-16 PROCEDURE — 1157F PR ADVANCE CARE PLAN OR EQUIV PRESENT IN MEDICAL RECORD: ICD-10-PCS | Mod: HCNC,CPTII,S$GLB, | Performed by: PODIATRIST

## 2022-02-16 PROCEDURE — 1159F PR MEDICATION LIST DOCUMENTED IN MEDICAL RECORD: ICD-10-PCS | Mod: HCNC,CPTII,S$GLB, | Performed by: PODIATRIST

## 2022-02-16 PROCEDURE — 99213 PR OFFICE/OUTPT VISIT, EST, LEVL III, 20-29 MIN: ICD-10-PCS | Mod: HCNC,S$GLB,, | Performed by: PODIATRIST

## 2022-02-16 PROCEDURE — 1101F PR PT FALLS ASSESS DOC 0-1 FALLS W/OUT INJ PAST YR: ICD-10-PCS | Mod: HCNC,CPTII,S$GLB, | Performed by: PODIATRIST

## 2022-02-16 PROCEDURE — 1101F PT FALLS ASSESS-DOCD LE1/YR: CPT | Mod: HCNC,CPTII,S$GLB, | Performed by: PODIATRIST

## 2022-02-16 PROCEDURE — 3288F FALL RISK ASSESSMENT DOCD: CPT | Mod: HCNC,CPTII,S$GLB, | Performed by: PODIATRIST

## 2022-02-16 PROCEDURE — 99213 OFFICE O/P EST LOW 20 MIN: CPT | Mod: HCNC,S$GLB,, | Performed by: PODIATRIST

## 2022-02-16 PROCEDURE — 99999 PR PBB SHADOW E&M-EST. PATIENT-LVL II: ICD-10-PCS | Mod: PBBFAC,HCNC,, | Performed by: PODIATRIST

## 2022-02-16 PROCEDURE — 3288F PR FALLS RISK ASSESSMENT DOCUMENTED: ICD-10-PCS | Mod: HCNC,CPTII,S$GLB, | Performed by: PODIATRIST

## 2022-02-16 PROCEDURE — 1125F PR PAIN SEVERITY QUANTIFIED, PAIN PRESENT: ICD-10-PCS | Mod: HCNC,CPTII,S$GLB, | Performed by: PODIATRIST

## 2022-02-16 PROCEDURE — 1157F ADVNC CARE PLAN IN RCRD: CPT | Mod: HCNC,CPTII,S$GLB, | Performed by: PODIATRIST

## 2022-02-16 PROCEDURE — 99999 PR PBB SHADOW E&M-EST. PATIENT-LVL II: CPT | Mod: PBBFAC,HCNC,, | Performed by: PODIATRIST

## 2022-02-16 PROCEDURE — 1125F AMNT PAIN NOTED PAIN PRSNT: CPT | Mod: HCNC,CPTII,S$GLB, | Performed by: PODIATRIST

## 2022-02-16 NOTE — PROGRESS NOTES
Subjective:      Patient ID: Maximilian Tavera Jr. is a 76 y.o. male.    Chief Complaint: Foot Problem (Growth on rt foot)  Patient presents to clinic for a follow up regarding the prior growth of the Rt. Plantar forefoot.  Relates treating the site previously with Compound W and an occlusive dressing, however, states the lesion continued to build up over time.  Describes sharp pain from the site that he rates as a 6/10.  Symptoms are exacerbated with all weight bearing and alleviated with rest.  Has not attempted to self treat recently.  Denies noticing drainage from the affected area nor signs of infection.   Denies any additional pedal complaints.      Past Medical History:   Diagnosis Date    A-fib 3/31/2020    Arthritis     Back pain     Carpal tunnel syndrome 2/25/2013    Cataract     Cataract, left eye 11/10/2014    Chest pain, musculoskeletal     COPD (chronic obstructive pulmonary disease) 11/052018    COPD with asthma 8/17/2021    Emphysema lung 11/05/2018    Gastritis     Hx of colonic polyp     Hyperlipidemia     Hypertension     Hypothyroidism     Knee fracture     Myasthenia gravis     Neuropathy 1/3/2013    Obesity 1/29/2015    Pneumonia 3/29/2020    Polyneuropathy     PVC (premature ventricular contraction)     Squamous cell carcinoma 2014    left forearm    Thyroid disease        Past Surgical History:   Procedure Laterality Date    APPENDECTOMY      CATARACT EXTRACTION W/  INTRAOCULAR LENS IMPLANT Bilateral     COLONOSCOPY  03/01/2012    Dr Esteban; hyperplastic polyp; repeat in 5 years    COLONOSCOPY N/A 12/7/2021    Procedure: COLONOSCOPY;  Surgeon: Gabriel Hernandez MD;  Location: [facility];  Service: Endoscopy;  Laterality: N/A;    CYSTOSCOPY N/A 2/26/2019    Procedure: CYSTOSCOPY;  Surgeon: Simba Cheung MD;  Location: [facility] OR;  Service: Urology;  Laterality: N/A;    ENDOSCOPIC ULTRASOUND OF UPPER GASTROINTESTINAL TRACT N/A 1/7/2020    Procedure:  ULTRASOUND, UPPER GI TRACT, ENDOSCOPIC;  Surgeon: Kati Ryan MD;  Location: [facility] ENDO (2ND FLR);  Service: Endoscopy;  Laterality: N/A;    ESOPHAGOGASTRODUODENOSCOPY N/A 9/12/2018    Procedure: EGD (ESOPHAGOGASTRODUODENOSCOPY);  Surgeon: Gabriel Hernandez MD;  Location: [facility] ENDO;  Service: Endoscopy;  Laterality: N/A;    ESOPHAGOGASTRODUODENOSCOPY N/A 8/19/2019    Procedure: EGD (ESOPHAGOGASTRODUODENOSCOPY);  Surgeon: Gabriel Hernandez MD;  Location: [facility] ENDO;  Service: Endoscopy;  Laterality: N/A;    ESOPHAGOGASTRODUODENOSCOPY N/A 10/7/2019    Procedure: EGD (ESOPHAGOGASTRODUODENOSCOPY);  Surgeon: Gabriel Hernandez MD;  Location: [facility] ENDO;  Service: Endoscopy;  Laterality: N/A;    ESOPHAGOGASTRODUODENOSCOPY N/A 1/7/2020    Procedure: EGD (ESOPHAGOGASTRODUODENOSCOPY);  Surgeon: Kati Ryan MD;  Location: [facility] (2ND FLR);  Service: Endoscopy;  Laterality: N/A;    HEMORRHOID SURGERY      INJECTION OF ANESTHETIC AGENT AROUND MEDIAL BRANCH NERVES INNERVATING LUMBAR FACET JOINT Bilateral 10/1/2020    Procedure: Block-nerve-medial branch-lumbar Bilateral L 3,4,5;  Surgeon: Devan Watt MD;  Location: [facility];  Service: Pain Management;  Laterality: Bilateral;    KNEE ARTHROPLASTY Bilateral     LENGTHENING OF ACHILLES TENDON Right 9/11/2020    Procedure: percutaeous tenotomy of right lateral epicondyle (tenex);  Surgeon: Terry Quach MD;  [facility]: [facility] OR;  Service: Neurosurgery;  Laterality: Right;  tenex to right lateral epicondyle  Tenex machine SN#[id number], total time 1min. 30seconds    NECK SURGERY      POSTERIOR FUSION OF CERVICAL SPINE WITH LAMINECTOMY N/A 11/7/2018    Procedure: C2-C6 Posterior Cervical Laminectomy & Instrumental Fusion;  Surgeon: Kishore Turner MD;  Location: [facility] 2ND FLR;  Service: Neurosurgery;  Laterality: N/A;    TONSILLECTOMY      TRANSFORAMINAL EPIDURAL INJECTION OF STEROID Right 12/20/2019    Procedure: Injection,steroid,epidural,transforaminal  approach;  Surgeon: Devan Watt MD;  Location: [facility] OR;  Service: Pain Management;  Laterality: Right;  L3-4, L4-5    TRANSFORAMINAL EPIDURAL INJECTION OF STEROID Bilateral 2/6/2020    Procedure: Injection,steroid,epidural,transforaminal approach;  Surgeon: Devan Watt MD;  Location: [facility] OR;  Service: Pain Management;  Laterality: Bilateral;  L3-L4,L4-L5    TRANSFORAMINAL EPIDURAL INJECTION OF STEROID Bilateral 8/26/2020    Procedure: Injection,steroid,epidural,transforaminal approach;  Surgeon: Devan Watt MD;  Location: [facility] OR;  Service: Pain Management;  Laterality: Bilateral;  L3-4, L4-5    TRANSRECTAL ULTRASOUND EXAMINATION N/A 2/26/2019    Procedure: ULTRASOUND, RECTAL APPROACH;  Surgeon: Simba Cheung MD;  Location: [facility] OR;  Service: Urology;  Laterality: N/A;    UPPER GASTROINTESTINAL ENDOSCOPY  09/12/2018    Dr Hernandez; gastritis; extensive intestinal metaplasia; repeat in 1-2- years       Family History   Problem Relation Age of Onset    Cataracts Mother     Heart disease Mother         CHF    Hypertension Mother     Hyperlipidemia Mother     Cataracts Father     Glaucoma Father     Heart disease Father     Hyperlipidemia Sister     Hypertension Sister     No Known Problems Daughter     No Known Problems Daughter     Collagen disease Neg Hx     Amblyopia Neg Hx     Blindness Neg Hx     Macular degeneration Neg Hx     Retinal detachment Neg Hx     Strabismus Neg Hx     Cancer Neg Hx     Colon cancer Neg Hx     Esophageal cancer Neg Hx     Stomach cancer Neg Hx     Crohn's disease Neg Hx     Ulcerative colitis Neg Hx        Social History     Socioeconomic History    Marital status:    Tobacco Use    Smoking status: Never Smoker    Smokeless tobacco: Never Used    Tobacco comment: when a child   Substance and Sexual Activity    Alcohol use: Yes     Comment: rarely    Drug use: No    Sexual activity: Yes     Partners: Female     Social Determinants of Health      Financial Resource Strain: Low Risk     Difficulty of Paying Living Expenses: Not hard at all   Food Insecurity: No Food Insecurity    Worried About Running Out of Food in the Last Year: Never true    Ran Out of Food in the Last Year: Never true   Transportation Needs: No Transportation Needs    Lack of Transportation (Medical): No    Lack of Transportation (Non-Medical): No   Physical Activity: Inactive    Days of Exercise per Week: 0 days    Minutes of Exercise per Session: 0 min   Stress: No Stress Concern Present    Feeling of Stress : Not at all   Social Connections: Unknown    Frequency of Communication with Friends and Family: More than three times a week    Frequency of Social Gatherings with Friends and Family: More than three times a week    Active Member of Clubs or Organizations: Yes    Attends Club or Organization Meetings: More than 4 times per year    Marital Status:    Housing Stability: Low Risk     Unable to Pay for Housing in the Last Year: No    Number of Places Lived in the Last Year: 1    Unstable Housing in the Last Year: No       Current Outpatient Medications   Medication Sig Dispense Refill    albuterol (PROVENTIL/VENTOLIN HFA) 90 mcg/actuation inhaler INHALE 1 TO 2 PUFFS INTO THE LUNGS EVERY 6 HOURS AS NEEDED FOR WHEEZING OR SHORTNESS OF BREATH. FOR RESCUE. 25.5 g 0    atorvastatin (LIPITOR) 80 MG tablet TAKE 1 TABLET EVERY DAY 90 tablet 3    bumetanide (BUMEX) 0.5 MG Tab Take 1 tablet (0.5 mg total) by mouth 2 (two) times daily. 60 tablet 1    carvediloL (COREG) 25 MG tablet TAKE 1 TABLET TWICE DAILY WITH MEALS 180 tablet 3    cetirizine (ZYRTEC) 10 MG tablet Take 1 tablet by mouth daily as needed.      cholecalciferol, vitamin D3, (VITAMIN D3) 50 mcg (2,000 unit) Cap 1 capsule once daily. Every day      cholestyramine (QUESTRAN) 4 gram packet Take 1 packet (4 g total) by mouth 3 (three) times daily with meals. 270 packet 3    coenzyme Q10 (CO Q-10) 100  mg capsule Take 400 mg by mouth once daily.      cyanocobalamin, vitamin B-12, 5,000 mcg Subl Take 5,000 mcg by mouth once daily. Every day      diphenoxylate-atropine 2.5-0.025 mg (LOMOTIL) 2.5-0.025 mg per tablet Take 1 tablet by mouth 4 (four) times daily as needed for Diarrhea. 90 tablet 0    ezetimibe (ZETIA) 10 mg tablet TAKE 1 TABLET EVERY DAY 90 tablet 3    FLUAD QUAD 2021-22,65Y UP,,PF, 60 mcg (15 mcg x 4)/0.5 mL Syrg       fluticasone (FLONASE) 50 mcg/actuation nasal spray USE 1 SPRAY IN EACH NOSTRIL TWICE DAILY AS NEEDED  FOR  RHINITIS 48 g 3    gabapentin (NEURONTIN) 300 MG capsule TAKE 1 CAPSULE THREE TIMES DAILY 270 capsule 5    KENALOG 40 mg/mL injection       levothyroxine (SYNTHROID) 50 MCG tablet TAKE 1 TABLET EVERY DAY 90 tablet 3    methylsulfonylmethane 1,000 mg Tab Take 1,000 mg by mouth once daily. Every day      milk thistle 200 mg Cap Take 200 mg by mouth 2 (two) times daily. Twice a day      olmesartan (BENICAR) 20 MG tablet TAKE 1 TABLET EVERY DAY 90 tablet 3    omega-3 fatty acids 1,000 mg Cap Twice a day      potassium chloride SA (K-DUR,KLOR-CON) 20 MEQ tablet Take 6 tab daily while on bumex 360 tablet 3    predniSONE (DELTASONE) 1 MG tablet TAKE 2 TABLETS EVERY  tablet 3    predniSONE (DELTASONE) 5 MG tablet TAKE 1 TABLET EVERY DAY 90 tablet 3    sildenafil (REVATIO) 20 mg Tab Take 1 to 3 tabs daily as needed. 30 tablet 3    SPIRIVA WITH HANDIHALER 18 mcg inhalation capsule INHALE THE CONTENTS OF 1 CAPSULE (18 MCG TOTAL) INTO THE LUNGS ONCE DAILY. CONTROLLER 30 capsule 5    VENTOLIN HFA 90 mcg/actuation inhaler Inhale 2 puffs into the lungs every 6 (six) hours as needed for Wheezing. Rescue 18 g 0    pantoprazole (PROTONIX) 40 MG tablet Take 1 tablet (40 mg total) by mouth 2 (two) times daily. 180 tablet 3     Current Facility-Administered Medications   Medication Dose Route Frequency Provider Last Rate Last Admin    acetaminophen tablet 650 mg  650 mg Oral  Once PRN Sibma Loya NP         Facility-Administered Medications Ordered in Other Visits   Medication Dose Route Frequency Provider Last Rate Last Admin    0.9%  NaCl infusion   Intravenous Continuous Devan Watt MD        lidocaine (PF) 10 mg/ml (1%) injection 10 mg  1 mL Intradermal Once Terry Quach MD           Review of patient's allergies indicates:   Allergen Reactions    Spironolactone Diarrhea       Review of Systems   Constitutional: Negative for chills and fever.   Cardiovascular: Negative for claudication.   Skin: Positive for suspicious lesions. Negative for color change and nail changes.   Musculoskeletal: Positive for arthritis and back pain. Negative for joint swelling, muscle cramps and muscle weakness.   Neurological: Negative for numbness and paresthesias.   Psychiatric/Behavioral: Negative for altered mental status.           Objective:      Physical Exam  Constitutional:       General: He is not in acute distress.     Appearance: He is well-developed. He is not diaphoretic.   Cardiovascular:      Pulses:           Dorsalis pedis pulses are 2+ on the right side and 2+ on the left side.        Posterior tibial pulses are 2+ on the right side and 2+ on the left side.      Comments: CFT is < 3 seconds bilateral.  Pedal hair growth is present bilateral.  Varicosities noted bilateral.  Moderate nonpitting lower extremity edema noted bilateral.  Toes are warm to touch bilateral.    Musculoskeletal:         General: Tenderness present.      Comments: Muscle strength 5/5 in all muscle groups bilateral.  No tenderness nor crepitation with ROM of foot/ankle joints bilateral.  Pain with palpation to the lesion of the Rt. Sub 5th met head.  Bilateral plantar forefoot fat pad atrophy.     Skin:     General: Skin is warm and dry.      Capillary Refill: Capillary refill takes 2 to 3 seconds.      Coloration: Skin is not pale.      Findings: Lesion present. No abrasion, bruising, burn, ecchymosis,  erythema, laceration or rash.      Comments: Pedal skin has normal turgor, temperature, and texture bilateral.  Toenails x 10 appear normotrophic.  Porokeratosis noted to the Rt. Sub 5th met head.     Neurological:      Mental Status: He is alert and oriented to person, place, and time.      Sensory: No sensory deficit.      Comments: Light touch is intact bilateral.                 Assessment:       Encounter Diagnoses   Name Primary?    Foot pain, right Yes    Porokeratosis     Plantar fat pad atrophy          Plan:       Maximilian was seen today for foot problem.    Diagnoses and all orders for this visit:    Foot pain, right    Porokeratosis    Plantar fat pad atrophy      I counseled the patient on his conditions, their implications and medical management.    Lesion of the Rt. Plantar forefoot is consistent with a porokeratosis.  Former plantar wart has since resolved.    Advised to begin applying amlactin to the affected area twice daily.    Recommend filing the site with an fidel board or pumice stone every 2-3 days.    Discussed purchasing 3/4 length spenco insoles to better offload the forefoot.    If callus build up persists, we will consider urea cream.      RTC prn.    Khris Valentin DPM

## 2022-02-18 ENCOUNTER — CLINICAL SUPPORT (OUTPATIENT)
Dept: REHABILITATION | Facility: HOSPITAL | Age: 77
End: 2022-02-18
Attending: INTERNAL MEDICINE
Payer: MEDICARE

## 2022-02-18 DIAGNOSIS — I89.0 LYMPHEDEMA OF BOTH LOWER EXTREMITIES: ICD-10-CM

## 2022-02-18 PROCEDURE — 97166 OT EVAL MOD COMPLEX 45 MIN: CPT | Mod: HCNC,PN

## 2022-02-18 NOTE — PLAN OF CARE
[facility]  Occupational Therapy Initial Evaluation    Date: 2/18/2022  Name: Maximilian Tavera Jr.  Clinic Number: [id number]    Therapy Diagnosis:   Encounter Diagnosis   Name Primary?    Lymphedema of both lower extremities      Physician: Devang Amezquita MD*    Physician Orders: evaluate and treat lower extremity lymphedema  Medical Diagnosis: lymphedema  Surgical Procedure and Date: na  Evaluation Date: 2/18/2022  Insurance Authorization Period Expiration: 02/03/2023  Plan of Care Certification Period: 5/18/2022    Date of Return to MD: 3 months  Visit # / Visits authorized: 1 / 1  FOTO:     Precautions:  Immunosuppression    Time In:1003  Time Out: 1048  Total Appointment Time (timed & untimed codes): 45 minutes    SUBJECTIVE     Date of Onset: 3 months ago per patient    History of Current Condition/Mechanism of Injury: Maximilian Tavera Jr. is a 76 y.o. male who presents to [facility] for evaluation secondary to lymphedema. Patient was referred to therapy by Devang Amezquita MD* , which is the patient's vascular doctor. Patient reports that his legs started swelling equally about 3 months ago without cause.   Falls: no    Involved Side: bilateral lower extremities to knees  Prior Therapy: no  Occupation/Working presently: retired      Functional Limitations/Social History:    Previous functional status includes: Independent with all ADLs.     Current Functional Status   Home/Living environment: lives with their spouse      Limitation of Functional Status as follows:     ADLs/IADLs: Maximilian reports difficulty with lower body dressing, finding shoes that fit, pant legs too tight. Maximilian continues to be independent with his mobility, self care, household and social activities.          Pain:  Functional Pain Scale Rating 0-10: Current 0/10, worst 0/10, best 0/10   Location: na  Description: na  Aggravating Factors:  na  Easing Factors: na    Patient's Goals for Therapy: to be able to wear his normal shoes again (loafers)    Medical History:   Past Medical History:   Diagnosis Date    A-fib 3/31/2020    Arthritis     Back pain     Carpal tunnel syndrome 2/25/2013    Cataract     Cataract, left eye 11/10/2014    Chest pain, musculoskeletal     COPD (chronic obstructive pulmonary disease) 11/052018    COPD with asthma 8/17/2021    Emphysema lung 11/05/2018    Gastritis     Hx of colonic polyp     Hyperlipidemia     Hypertension     Hypothyroidism     Knee fracture     Myasthenia gravis     Neuropathy 1/3/2013    Obesity 1/29/2015    Pneumonia 3/29/2020    Polyneuropathy     PVC (premature ventricular contraction)     Squamous cell carcinoma 2014    left forearm    Thyroid disease        Surgical History:    has a past surgical history that includes Appendectomy; Tonsillectomy; Neck surgery; Hemorrhoid surgery; Knee Arthroplasty (Bilateral); Cataract extraction w/  intraocular lens implant (Bilateral); Esophagogastroduodenoscopy (N/A, 9/12/2018); Posterior fusion of cervical spine with laminectomy (N/A, 11/7/2018); Cystoscopy (N/A, 2/26/2019); Transrectal ultrasound examination (N/A, 2/26/2019); Upper gastrointestinal endoscopy (09/12/2018); Colonoscopy (03/01/2012); Esophagogastroduodenoscopy (N/A, 8/19/2019); Esophagogastroduodenoscopy (N/A, 10/7/2019); Transforaminal epidural injection of steroid (Right, 12/20/2019); Esophagogastroduodenoscopy (N/A, 1/7/2020); Endoscopic ultrasound of upper gastrointestinal tract (N/A, 1/7/2020); Transforaminal epidural injection of steroid (Bilateral, 2/6/2020); Transforaminal epidural injection of steroid (Bilateral, 8/26/2020); Lengthening of Achilles tendon (Right, 9/11/2020); Injection of anesthetic agent around medial branch nerves innervating lumbar facet joint (Bilateral, 10/1/2020); and Colonoscopy (N/A, 12/7/2021).    Medications:   has a current medication list  "which includes the following prescription(s): albuterol, atorvastatin, bumetanide, carvedilol, cetirizine, cholecalciferol (vitamin d3), cholestyramine, co q-10, cyanocobalamin (vitamin b-12), diphenoxylate-atropine 2.5-0.025 mg, ezetimibe, fluad quad 2021-22(65y up)(pf), fluticasone propionate, gabapentin, kenalog, levothyroxine, methylsulfonylmethane, milk thistle, olmesartan, omega-3 fatty acids, pantoprazole, potassium chloride sa, prednisone, prednisone, sildenafil, spiriva with handihaler, ventolin hfa, and [DISCONTINUED] esomeprazole, and the following Facility-Administered Medications: sodium chloride 0.9%, acetaminophen, and lidocaine (pf) 10 mg/ml (1%).    Allergies:   Review of patient's allergies indicates:   Allergen Reactions    Spironolactone Diarrhea          OBJECTIVE     Maximilian ambulated 150 feet to treatment room without difficulty. Interview completed and lymphedema is relatively recent onset. Skin inspection reveals pale, dry skin with some flakiness. Non pitting but density is moderate. No redness, bruising, weeping, abrasions or wounds. No fungal occurrence and no history of infection.  Range of motion is impaired bilateral ankles due to swelling but patient reports no pain during ambulation.  Girth Measurements (in centimeters)  LANDMARK R LE L LE    forefoot 24.5 cm 25 cm    ankle  29 cm 31 cm    4" 32 cm 31 cm    calf 42 cm 42 cm    Below knee 40 cm 39 cm      Treatment   Total Treatment time (time-based codes) separate from Evaluation: 0 minutes      Patient Education and Home Exercises    Education provided:   1. Educated on definition of lymphedema.  2. Explained the Complete Decongestive Therapy protocol in depth  3. Educated on Phase 1 and 2 of protocol.  4. Reviewed treatment frequency and likely duration of weeks  5.Contraindications for treatment.  6. Plan of care and goals.  7. Educated on home management protocols.     Written Home Exercises Provided: Maximilian leads an active " lifestyle sufficient to maximize the muscle pump effect of compression bandages. No further exercises.  Exercises were reviewed and Maximilian was able to demonstrate them prior to the end of the session.  Maximilian demonstrated good  understanding of the education provided. See EMR under Patient Instructions for exercises provided during therapy sessions.     Pt was advised to perform these exercises free of pain, and to stop performing them if pain occurs.    Patient/Family Education: role of OT, goals for OT, scheduling/cancellations - pt verbalized understanding. Discussed insurance limitations with patient.      ASSESSMENT     Maximilian Tavera Jr. is a 76 y.o. male referred to outpatient occupational therapy and presents with a medical diagnosis of lymphedema.  Lymphedema, left untreated increases risk of infection, gait deviation causing ortho problems and poor body image. Patient presents with the following therapy deficits: lymphedema of bilateral lower limbs and demonstrates limitations as described in the chart below. Following medical record review it is determined that pt will benefit from complete decongestive therapy services for the treatment and management of this chronic condition. The following goals were discussed with the patient and patient is in agreement with them as to be addressed in the treatment plan. The patient's rehab potential is Good.     Anticipated barriers to occupational therapy: none  Pt has no cultural, educational or language barriers to learning provided.    Profile and History Assessment of Occupational Performance Level of Clinical Decision Making Complexity Score   Occupational Profile:   Maximilian Tavera Jr. is a 76 y.o. male who lives with their spouse and is retired Maximilian Tavera Jr. has difficulty with  ADLs and IADLs as listed previously, which  Affecting hisdaily functional abilities.      Comorbidities:    has a past medical history of A-fib, Arthritis,  Back pain, Carpal tunnel syndrome, Cataract, Cataract, left eye, Chest pain, musculoskeletal, COPD (chronic obstructive pulmonary disease), COPD with asthma, Emphysema lung, Gastritis, colonic polyp, Hyperlipidemia, Hypertension, Hypothyroidism, Knee fracture, Myasthenia gravis, Neuropathy, Obesity, Pneumonia, Polyneuropathy, PVC (premature ventricular contraction), Squamous cell carcinoma, and Thyroid disease.    Medical and Therapy History Review:   Brief               Performance Deficits    Physical:  Joint Mobility  Edema    Cognitive:  No Deficits    Psychosocial:    No Deficits     Clinical Decision Making:  moderate    Assessment Process:  Problem-Focused Assessments    Modification/Need for Assistance:  Not Necessary    Intervention Selection:  Limited Treatment Options       moderate  Based on PMHX, co morbidities , data from assessments and functional level of assistance required with task and clinical presentation directly impacting function.       The following goals were discussed with the patient and patient is in agreement with them as to be addressed in the treatment plan.     Goals:   Short Term Goals for 3 weeks: (phase 1 of protocol)  Complete decongestion B LE- to be initiated at 1st appointment post eval  Patient will be educated on lymphedema precautions and signs of infection. - Ongoing 2/18/2022   Patient will perform deep abdominal breathing TID- Ongoing 2/18/2022   Patient will tolerate daily activities with multilayered bandaging.- post eval appointment  Appropriate compression garments to be ordered/delivered-       Long Term Goals for 12 weeks: (Phase 2 of goals)  Patient will be independent with home management of this chronic condition.  Patient to jennifer/doff compression garment.   Patient will demonstrate compliance with all home management recommendations.  Patient will maintain reduction at monthly follow up appointments for 3 months     PLAN   Plan of Care Certification: 2/18/2022  to 5/18/2022.     Outpatient Occupational Therapy 3 times weekly for up to 2 weeks and then 2x week for remainder of visits, to include the following interventions: Manual therapy/joint mobilizations, Therapeutic exercises/activities. and Complete Decongestive Therapy  .      MILE Hardy, SAMI/JENNA      I CERTIFY THE NEED FOR THESE SERVICES FURNISHED UNDER THIS PLAN OF TREATMENT AND WHILE UNDER MY CARE  Physician's comments:      Physician's Signature: ___________________________________________________

## 2022-02-18 NOTE — PROGRESS NOTES
Patient was referred for the evaluation and treatment of bilateral lower extremity lymphedema. Plan of care was created and reviewed with patient.

## 2022-02-21 ENCOUNTER — CLINICAL SUPPORT (OUTPATIENT)
Dept: REHABILITATION | Facility: HOSPITAL | Age: 77
End: 2022-02-21
Attending: INTERNAL MEDICINE
Payer: MEDICARE

## 2022-02-21 DIAGNOSIS — I89.0 LYMPHEDEMA OF BOTH LOWER EXTREMITIES: Primary | ICD-10-CM

## 2022-02-21 PROCEDURE — 97140 MANUAL THERAPY 1/> REGIONS: CPT | Mod: HCNC,PN

## 2022-02-22 ENCOUNTER — CLINICAL SUPPORT (OUTPATIENT)
Dept: REHABILITATION | Facility: HOSPITAL | Age: 77
End: 2022-02-22
Attending: INTERNAL MEDICINE
Payer: MEDICARE

## 2022-02-22 DIAGNOSIS — I89.0 LYMPHEDEMA OF BOTH LOWER EXTREMITIES: Primary | ICD-10-CM

## 2022-02-22 PROCEDURE — 97140 MANUAL THERAPY 1/> REGIONS: CPT | Mod: HCNC,PN

## 2022-02-22 NOTE — PROGRESS NOTES
[facility]  Occupational Therapy Treatment Note    Date: 2/21/2022  Name: Maximilian Tavera Jr.  Clinic Number: [id number]    Therapy Diagnosis:   Encounter Diagnosis   Name Primary?    Lymphedema of both lower extremities Yes     Physician: Devang Amezquita MD*        Physician Orders: evaluate and treat lower extremity lymphedema  Medical Diagnosis: lymphedema  Surgical Procedure and Date: na  Evaluation Date: 2/18/2022  Insurance Authorization Period Expiration: 04/18/2022  Plan of Care Certification Period: 5/18/2022      Precautions:  Immunosuppression    Time In: 1500  Time Out: 1600  Total Billable Time: 60 minutes    SUBJECTIVE     Pt reports: ready to start treatment    Response to previous treatment: this is patient's first treatment since evaluation.  Functional change: na    Pain: 0/10  No pain reported    OBJECTIVE     Objective Measures updated at progress report unless specified.    Treatment     Maximilian received the treatments listed below:     Manual therapy techniques: Manual Lymphatic Drainage were applied for 60 minutes, including:  Patient presented for first treatment post evaluation.. Skin care completed.  Initiated MLD with patient in seated in chair. Instructed patient in deep abdominal breathing techniques and explained rationale for this. Completed 5 reps with instruxions to do throughout each day, at least TID.  At end of session, compression bandages applied left limb.  patient was able to use shoehorn to put regular shoes on.      Patient Education and Home Exercises      Education provided: reviewed education provided at evaluation  1. Educated on definition of lymphedema.  2. Explained the Complete Decongestive Therapy protocol in depth  3. Educated on Phase 1 (current phase)  4. Reviewed treatment frequency and likely duration of weeks  5.Contraindications for treatment.  6. Plan of care and goals.  7. Educated on home management protocols.      Written Home Exercises Provided: no. Maximilian is very active and does not need additional exercises to improve outcomes of Complete Decongestive Therapy.   Assessment     Pt would continue to benefit from skilled OT. Yes, Maximilian has lymphedema and the only proven treatment for this chronic condition is Complete Decongestive Therapy.    Maximilian is progressing well towards his goals and there are no updates to goals at this time. Pt prognosis is Good.     Pt will continue to benefit from skilled outpatient occupational therapy to address the deficits listed in the problem list on initial evaluation provide pt/family education and to maximize pt's level of independence in the home and community environment.     Pt's spiritual, cultural and educational needs considered and pt agreeable to plan of care and goals.    Anticipated barriers to occupational therapy: none    Goals:  Short Term Goals for 3 weeks: (phase 1 of protocol)  Complete decongestion B LE- Ongoing 2/21/2022   Patient will be educated on lymphedema precautions and signs of infection. - Ongoing 2/21/2022   Patient will perform deep abdominal breathing TID- Ongoing 2/21/2022   Patient will tolerate daily activities with multilayered bandaging.- Ongoing 2/21/2022   Appropriate compression garments to be ordered/delivered-      Long Term Goals for 12 weeks: (Phase 2 of goals)  Patient will be independent with home management of this chronic condition.  Patient to jennifer/doff compression garment.   Patient will demonstrate compliance with all home management recommendations.  Patient will maintain reduction at monthly follow up appointments for 3 months     PLAN     Continue plan of care 2 times a week to address the goals listed above..  Updates/Grading for next session: continue hadley phase 1 Complete Decongestive Therapy      MILE Hardy

## 2022-02-23 DIAGNOSIS — D84.9 IMMUNOSUPPRESSED STATUS: ICD-10-CM

## 2022-02-24 ENCOUNTER — CLINICAL SUPPORT (OUTPATIENT)
Dept: REHABILITATION | Facility: HOSPITAL | Age: 77
End: 2022-02-24
Attending: INTERNAL MEDICINE
Payer: MEDICARE

## 2022-02-24 DIAGNOSIS — I89.0 LYMPHEDEMA OF BOTH LOWER EXTREMITIES: Primary | ICD-10-CM

## 2022-02-24 PROCEDURE — 97140 MANUAL THERAPY 1/> REGIONS: CPT | Mod: HCNC,PN

## 2022-02-25 ENCOUNTER — CLINICAL SUPPORT (OUTPATIENT)
Dept: REHABILITATION | Facility: HOSPITAL | Age: 77
End: 2022-02-25
Attending: INTERNAL MEDICINE
Payer: MEDICARE

## 2022-02-25 DIAGNOSIS — I89.0 LYMPHEDEMA OF BOTH LOWER EXTREMITIES: Primary | ICD-10-CM

## 2022-02-25 PROCEDURE — 97140 MANUAL THERAPY 1/> REGIONS: CPT | Mod: HCNC,PN

## 2022-02-26 ENCOUNTER — PATIENT MESSAGE (OUTPATIENT)
Dept: FAMILY MEDICINE | Facility: CLINIC | Age: 77
End: 2022-02-26
Payer: MEDICARE

## 2022-02-26 DIAGNOSIS — J44.89 COPD WITH ASTHMA: ICD-10-CM

## 2022-02-27 RX ORDER — TIOTROPIUM BROMIDE 18 UG/1
CAPSULE ORAL; RESPIRATORY (INHALATION)
Qty: 90 CAPSULE | Refills: 3 | Status: SHIPPED | OUTPATIENT
Start: 2022-02-27 | End: 2022-07-06

## 2022-02-27 NOTE — TELEPHONE ENCOUNTER
No new care gaps identified.  Powered by [employer]. Reference number: [id number].   2/27/2022 7:17:05 AM CST

## 2022-02-28 ENCOUNTER — CLINICAL SUPPORT (OUTPATIENT)
Dept: REHABILITATION | Facility: HOSPITAL | Age: 77
End: 2022-02-28
Attending: INTERNAL MEDICINE
Payer: MEDICARE

## 2022-02-28 DIAGNOSIS — I89.0 LYMPHEDEMA OF BOTH LOWER EXTREMITIES: Primary | ICD-10-CM

## 2022-02-28 PROCEDURE — 97140 MANUAL THERAPY 1/> REGIONS: CPT | Mod: HCNC,PN

## 2022-02-28 NOTE — PROGRESS NOTES
"  [facility]  Occupational Therapy Treatment Note    Date: 2/24/2022  Name: Maximilian Tavera Jr.  Clinic Number: [id number]    Therapy Diagnosis:   Encounter Diagnosis   Name Primary?    Lymphedema of both lower extremities Yes     Physician: Devang Amezquita MD*        Physician Orders: evaluate and treat lower extremity lymphedema  Medical Diagnosis: lymphedema  Surgical Procedure and Date: na  Evaluation Date: 2/18/2022  Insurance Authorization Period Expiration: 04/18/2022  Plan of Care Certification Period: 5/18/2022  Visits 4/20    Precautions:  Immunosuppression    Time In: 1000  Time Out: 1100  Total Billable Time: 60 minutes    SUBJECTIVE     Pt reports: no issues with compression     Response to previous treatment: good, see measurements in chart below  Functional change: na    Pain: 0/10  No pain reported    OBJECTIVE     Objective Measures updated at progress report unless specified.    Treatment     Maximilian received the treatments listed below:     Manual therapy techniques: Manual Lymphatic Drainage were applied for 60 minutes, including:  Compression bandages were removed from bilateral  lower extremites. Skin care was completed and measurements were taken and documented.  Initiated MLD with patient in seated in chair. Complete Decongestive Therapy protocol for bilatweral lower extremity lymphedema initiated and completed. Instructed patient in deep abdominal breathing techniques and explained rationale for this. Completed 5 reps with instrucions to do throughout each day, at least TID.  At end of session, compression bandages applied to bilateral lower extremities.     Girth Measurements (in centimeters)  LANDMARK R lower eval and current L lower extremity eval and current     forefoot 24.5/24 cm 25/23 cm     ankle  29/25.5 cm 31/25 cm     4" 32/28 cm 31/28.5cm     calf 42/42 cm 42/38 cm     Below knee 40/40 cm 39/39 cm            Patient Education and Home " Exercises      Education provided: reviewed education provided at evaluation  1. Educated on definition of lymphedema.  2. Explained the Complete Decongestive Therapy protocol in depth  3. Educated on Phase 1 (current phase)  4. Reviewed treatment frequency and likely duration of weeks  5.Contraindications for treatment.  6. Plan of care and goals.  7. Educated on home management protocols.     Written Home Exercises Provided: no. Maximilian is very active and does not need additional exercises to improve outcomes of Complete Decongestive Therapy.   Assessment     Pt would continue to benefit from skilled OT. Yes, Maximilian has lymphedema and the only proven treatment for this chronic condition is Complete Decongestive Therapy.    Maximilian is progressing well towards his goals and there are no updates to goals at this time. Pt prognosis is Good.     Pt will continue to benefit from skilled outpatient occupational therapy to address the deficits listed in the problem list on initial evaluation provide pt/family education and to maximize pt's level of independence in the home and community environment.     Pt's spiritual, cultural and educational needs considered and pt agreeable to plan of care and goals.    Anticipated barriers to occupational therapy: none    Goals:  Short Term Goals for 3 weeks: (phase 1 of protocol)  Complete decongestion B LE- Ongoing 2/24/2022   Patient will be educated on lymphedema precautions and signs of infection. - Ongoing 2/24/2022   Patient will perform deep abdominal breathing TID- Ongoing 2/24/2022   Patient will tolerate daily activities with multilayered bandaging.- Ongoing 2/24/2022   Appropriate compression garments to be ordered/delivered- 1 garment delivered.     Long Term Goals for 12 weeks: (Phase 2 of goals)  Patient will be independent with home management of this chronic condition.  Patient to jennifer/doff compression garment.   Patient will demonstrate compliance with all home  management recommendations.  Patient will maintain reduction at monthly follow up appointments for 3 months     PLAN     Continue plan of care 2 times a week to address the goals listed above..  Updates/Grading for next session: continue hadley phase 1 Complete Decongestive Therapy      MILE Hardy

## 2022-02-28 NOTE — PROGRESS NOTES
"  [facility]  Occupational Therapy Treatment Note    Date: 2/22/2022  Name: Maximilian Tavera Jr.  Clinic Number: [id number]    Therapy Diagnosis:   Encounter Diagnosis   Name Primary?    Lymphedema of both lower extremities Yes     Physician: Devang Amezquita MD*        Physician Orders: evaluate and treat lower extremity lymphedema  Medical Diagnosis: lymphedema  Surgical Procedure and Date: na  Evaluation Date: 2/18/2022  Insurance Authorization Period Expiration: 04/18/2022  Plan of Care Certification Period: 5/18/2022  Visits 3/20    Precautions:  Immunosuppression    Time In: 1500  Time Out: 1600  Total Billable Time: 60 minutes    SUBJECTIVE     Pt reports: no issues with compression     Response to previous treatment: good, see measurements in chart below  Functional change: na    Pain: 0/10  No pain reported    OBJECTIVE     Objective Measures updated at progress report unless specified.    Treatment     Maximilian received the treatments listed below:     Manual therapy techniques: Manual Lymphatic Drainage were applied for 60 minutes, including:  Compression bandages were removed from left lower extremity. Skin care was completed and measurements were taken and documented.  Initiated MLD with patient in seated in chair. Instructed patient in deep abdominal breathing techniques and explained rationale for this. Completed 5 reps with instrucions to do throughout each day, at least TID.  At end of session, compression bandages applied to bilateral lower extremities as requested by patient.     Girth Measurements (in centimeters)  LANDMARK R LE L lower extremity eval and current     forefoot 24.5 cm 25/24. cm     ankle  29 cm 31/28.25 cm     4" 32 cm 31/28.5 cm     calf 42 cm 42/38 cm     Below knee 40 cm 39/39 cm            Patient Education and Home Exercises      Education provided: reviewed education provided at evaluation  1. Educated on definition of lymphedema.  2. " Explained the Complete Decongestive Therapy protocol in depth  3. Educated on Phase 1 (current phase)  4. Reviewed treatment frequency and likely duration of weeks  5.Contraindications for treatment.  6. Plan of care and goals.  7. Educated on home management protocols.     Written Home Exercises Provided: no. Maximilian is very active and does not need additional exercises to improve outcomes of Complete Decongestive Therapy.   Assessment     Pt would continue to benefit from skilled OT. Yes, Maximilian has lymphedema and the only proven treatment for this chronic condition is Complete Decongestive Therapy.    Maximilian is progressing well towards his goals and there are no updates to goals at this time. Pt prognosis is Good.     Pt will continue to benefit from skilled outpatient occupational therapy to address the deficits listed in the problem list on initial evaluation provide pt/family education and to maximize pt's level of independence in the home and community environment.     Pt's spiritual, cultural and educational needs considered and pt agreeable to plan of care and goals.    Anticipated barriers to occupational therapy: none    Goals:  Short Term Goals for 3 weeks: (phase 1 of protocol)  Complete decongestion B LE- Ongoing 2/22/2022   Patient will be educated on lymphedema precautions and signs of infection. - Ongoing 2/22/2022   Patient will perform deep abdominal breathing TID- Ongoing 2/22/2022   Patient will tolerate daily activities with multilayered bandaging.- Ongoing 2/22/2022   Appropriate compression garments to be ordered/delivered- 1 garment delivered.     Long Term Goals for 12 weeks: (Phase 2 of goals)  Patient will be independent with home management of this chronic condition.  Patient to jennifer/doff compression garment.   Patient will demonstrate compliance with all home management recommendations.  Patient will maintain reduction at monthly follow up appointments for 3 months     PLAN     Continue  plan of care 2 times a week to address the goals listed above..  Updates/Grading for next session: continue hadley phase 1 Complete Decongestive Therapy      MILE Hardy

## 2022-03-02 NOTE — PROGRESS NOTES
"  [facility]  Occupational Therapy Treatment Note    Date: 2/25/2022  Name: Maximilian Tavera Jr.  Clinic Number: [id number]    Therapy Diagnosis:   Encounter Diagnosis   Name Primary?    Lymphedema of both lower extremities Yes     Physician: Devang Amezquita MD*        Physician Orders: evaluate and treat lower extremity lymphedema  Medical Diagnosis: lymphedema  Surgical Procedure and Date: na  Evaluation Date: 2/18/2022  Insurance Authorization Period Expiration: 04/18/2022  Plan of Care Certification Period: 5/18/2022  Visits 5/20    Precautions:  Immunosuppression    Time In: 1500  Time Out: 1555  Total Billable Time: 55 minutes    SUBJECTIVE     Pt reports: no issues with compression     Response to previous treatment: good, see measurements in chart below  Functional change: na    Pain: 0/10  No pain reported    OBJECTIVE     Objective Measures updated at progress report unless specified.    Treatment     Maximilian received the treatments listed below:     Manual therapy techniques: Manual Lymphatic Drainage were applied for 55 minutes, including:  Compression bandages were removed from bilateral  lower extremites. Skin care was completed and measurements were taken and documented.  Initiated MLD with patient in seated in chair. Complete Decongestive Therapy protocol for bilateral lower extremity lymphedema initiated and completed. Instructed patient in deep abdominal breathing techniques and explained rationale for this. Completed 5 reps with instrucions to do throughout each day, at least TID.  At end of session, compression bandages were applied to bilateral lower extremity.     LANDMARK R lower eval and current L lower extremity eval and current     forefoot 24.5/24 cm 25/23 cm     ankle  29/25.5 cm 31/25 cm     4" 32/28 cm 31/27cm     calf 42/42 cm 42/38 cm     Below knee 40/40 cm 39/39 cm            Patient Education and Home Exercises      Education provided: reviewed " education provided at evaluation  1. Educated on definition of lymphedema.  2. Explained the Complete Decongestive Therapy protocol in depth  3. Educated on Phase 1 (current phase)  4. Reviewed treatment frequency and likely duration of weeks  5.Contraindications for treatment.  6. Plan of care and goals.  7. Educated on home management protocols.     Written Home Exercises Provided: no. Maximilian is very active and does not need additional exercises to improve outcomes of Complete Decongestive Therapy.   Assessment     Pt would continue to benefit from skilled OT. Yes, Maximilian has lymphedema and the only proven treatment for this chronic condition is Complete Decongestive Therapy.    Maximilian is progressing well towards his goals and there are no updates to goals at this time. Pt prognosis is Good.     Pt will continue to benefit from skilled outpatient occupational therapy to address the deficits listed in the problem list on initial evaluation provide pt/family education and to maximize pt's level of independence in the home and community environment.     Pt's spiritual, cultural and educational needs considered and pt agreeable to plan of care and goals.    Anticipated barriers to occupational therapy: none    Goals:  Short Term Goals for 3 weeks: (phase 1 of protocol)  Complete decongestion B LE- ongoing  Patient will be educated on lymphedema precautions and signs of infection. - Ongoing 2/25/2022   Patient will perform deep abdominal breathing TID- met  Patient will tolerate daily activities with multilayered bandaging.- met  Appropriate compression garments to be ordered/delivered- 1 garment delivered, 2nd on order      Long Term Goals for 12 weeks: (Phase 2 of goals)  Patient will be independent with home management of this chronic condition.  Patient to jennifer/doff compression garment.   Patient will demonstrate compliance with all home management recommendations.  Patient will maintain reduction at monthly follow  up appointments for 3 months     PLAN     Continue plan of care 2 times a week to address the goals listed above..  Updates/Grading for next session: continue hadley phase 1 Complete Decongestive Therapy right lower extremity      MILE Hardy

## 2022-03-02 NOTE — PROGRESS NOTES
[facility]  Occupational Therapy Treatment Note    Date: 2/28/2022  Name: Maximilian Tavera Jr.  Clinic Number: [id number]    Therapy Diagnosis:   Encounter Diagnosis   Name Primary?    Lymphedema of both lower extremities Yes     Physician: Devang Amezquita MD*        Physician Orders: evaluate and treat lower extremity lymphedema  Medical Diagnosis: lymphedema  Surgical Procedure and Date: na  Evaluation Date: 2/18/2022  Insurance Authorization Period Expiration: 04/18/2022  Plan of Care Certification Period: 5/18/2022  Visits 6/20    Precautions:  Immunosuppression    Time In: 1405  Time Out: 1500  Total Billable Time: 55 minutes    SUBJECTIVE     Pt reports: no issues with compression     Response to previous treatment: good, see measurements in chart below  Functional change: na    Pain: 0/10  No pain reported    OBJECTIVE     Objective Measures updated at progress report unless specified.    Treatment     Maximilian received the treatments listed below:     Manual therapy techniques: Manual Lymphatic Drainage were applied for 55 minutes, including:  Compression bandages were removed from bilateral  lower extremites. Skin care was completed and measurements were taken and documented.  Initiated MLD with patient in seated in chair. Complete Decongestive Therapy protocol for bilateral lower extremity lymphedema initiated and completed. Instructed patient in deep abdominal breathing techniques and explained rationale for this. Completed 5 reps with instrucions to do throughout each day, at least TID.  At end of session, compression bandages were applied to right lower extremity. Sigvaris compreflex lite garment placed on left leg as decongestion completed. Patient able to return demonstration on application of garment.   Girth Measurements (in centimeters)  LANDMARK R lower eval and current L lower extremity eval and current     forefoot 24.5/24 cm 25/23 cm     ankle  29/25.5 cm  "31/25 cm     4" 32/28 cm 31/27cm     calf 42/42 cm 42/38 cm     Below knee 40/40 cm 39/39 cm            Patient Education and Home Exercises      Education provided: reviewed education provided at evaluation  1. Educated on definition of lymphedema.  2. Explained the Complete Decongestive Therapy protocol in depth  3. Educated on Phase 1 (current phase)  4. Reviewed treatment frequency and likely duration of weeks  5.Contraindications for treatment.  6. Plan of care and goals.  7. Educated on home management protocols.     Written Home Exercises Provided: no. Maximilian is very active and does not need additional exercises to improve outcomes of Complete Decongestive Therapy.   Assessment     Pt would continue to benefit from skilled OT. Yes, Maximilian has lymphedema and the only proven treatment for this chronic condition is Complete Decongestive Therapy.    Maximilian is progressing well towards his goals and there are no updates to goals at this time. Pt prognosis is Good.     Pt will continue to benefit from skilled outpatient occupational therapy to address the deficits listed in the problem list on initial evaluation provide pt/family education and to maximize pt's level of independence in the home and community environment.     Pt's spiritual, cultural and educational needs considered and pt agreeable to plan of care and goals.    Anticipated barriers to occupational therapy: none    Goals:  Short Term Goals for 3 weeks: (phase 1 of protocol)  Complete decongestion B LE- left leg completed this date  Patient will be educated on lymphedema precautions and signs of infection. - Ongoing 2/28/2022   Patient will perform deep abdominal breathing TID- met  Patient will tolerate daily activities with multilayered bandaging.- met  Appropriate compression garments to be ordered/delivered- 1 garment delivered, 2nd on order      Long Term Goals for 12 weeks: (Phase 2 of goals)  Patient will be independent with home management of " this chronic condition.  Patient to jennifer/doff compression garment.   Patient will demonstrate compliance with all home management recommendations.  Patient will maintain reduction at monthly follow up appointments for 3 months     PLAN     Continue plan of care 2 times a week to address the goals listed above..  Updates/Grading for next session: continue hadley phase 1 Complete Decongestive Therapy right lower extremity      MILE Hardy

## 2022-03-03 ENCOUNTER — CLINICAL SUPPORT (OUTPATIENT)
Dept: REHABILITATION | Facility: HOSPITAL | Age: 77
End: 2022-03-03
Attending: INTERNAL MEDICINE
Payer: MEDICARE

## 2022-03-03 DIAGNOSIS — I89.0 LYMPHEDEMA OF BOTH LOWER EXTREMITIES: Primary | ICD-10-CM

## 2022-03-03 PROCEDURE — 97140 MANUAL THERAPY 1/> REGIONS: CPT | Mod: PN

## 2022-03-04 ENCOUNTER — CLINICAL SUPPORT (OUTPATIENT)
Dept: REHABILITATION | Facility: HOSPITAL | Age: 77
End: 2022-03-04
Attending: INTERNAL MEDICINE
Payer: MEDICARE

## 2022-03-04 DIAGNOSIS — I89.0 LYMPHEDEMA OF BOTH LOWER EXTREMITIES: Primary | ICD-10-CM

## 2022-03-04 PROCEDURE — 97140 MANUAL THERAPY 1/> REGIONS: CPT | Mod: PN

## 2022-03-07 ENCOUNTER — CLINICAL SUPPORT (OUTPATIENT)
Dept: REHABILITATION | Facility: HOSPITAL | Age: 77
End: 2022-03-07
Attending: INTERNAL MEDICINE
Payer: MEDICARE

## 2022-03-07 DIAGNOSIS — I89.0 LYMPHEDEMA OF BOTH LOWER EXTREMITIES: Primary | ICD-10-CM

## 2022-03-07 PROCEDURE — 97140 MANUAL THERAPY 1/> REGIONS: CPT | Mod: PN

## 2022-03-08 NOTE — PROGRESS NOTES
[facility]  Occupational Therapy Treatment Note    Date: 3/3/2022  Name: Maximilian Tavera Jr.  Clinic Number: [id number]    Therapy Diagnosis:   Encounter Diagnosis   Name Primary?    Lymphedema of both lower extremities Yes     Physician: Devang Amezquita MD*        Physician Orders: evaluate and treat lower extremity lymphedema  Medical Diagnosis: lymphedema  Surgical Procedure and Date: na  Evaluation Date: 2/18/2022  Insurance Authorization Period Expiration: 04/18/2022  Plan of Care Certification Period: 5/18/2022  Visits 7/20    Precautions:  Immunosuppression    Time In: 1405  Time Out: 1500  Total Billable Time: 55 minutes    SUBJECTIVE     Pt reports: no issues with compression     Response to previous treatment: good, see measurements in chart below  Functional change: na    Pain: 0/10  No pain reported    OBJECTIVE     Objective Measures updated at progress report unless specified.    Treatment     Maximilian received the treatments listed below:     Manual therapy techniques: Manual Lymphatic Drainage were applied for 55 minutes, including:  Compression bandages were removed from bilateral  lower extremites. Skin care was completed and measurements were taken and documented.  Initiated MLD with patient in seated in chair. Complete Decongestive Therapy protocol for bilateral lower extremity lymphedema initiated and completed. Instructed patient in deep abdominal breathing techniques and explained rationale for this. Completed 5 reps with instrucions to do throughout each day, at least TID.  At end of session, compression bandages were applied to right lower extremity. Sigvaris compreflex lite garment was reapplied  placed on left leg as decongestion completed. Patient able to return demonstration on application of garment.   Girth Measurements (in centimeters)  LANDMARK R lower eval and current L lower extremity eval and current     forefoot 24.5/24 cm 25/23 cm     ankle   "29/25. cm 31/25 cm     4" 32/28 cm 31/27cm     calf 42/42 cm 42/38 cm     Below knee 40/40 cm 39/39 cm            Patient Education and Home Exercises      Education provided: reviewed education provided at evaluation  1. Educated on definition of lymphedema.  2. Explained the Complete Decongestive Therapy protocol in depth  3. Educated on Phase 1 (current phase)  4. Reviewed treatment frequency and likely duration of weeks  5.Contraindications for treatment.  6. Plan of care and goals.  7. Educated on home management protocols.     Written Home Exercises Provided: no. Maximilian is very active and does not need additional exercises to improve outcomes of Complete Decongestive Therapy.   Assessment     Pt would continue to benefit from skilled OT. Yes, Maximilian has lymphedema and the only proven treatment for this chronic condition is Complete Decongestive Therapy.    Maximilian is progressing well towards his goals and there are no updates to goals at this time. Pt prognosis is Good.     Pt will continue to benefit from skilled outpatient occupational therapy to address the deficits listed in the problem list on initial evaluation provide pt/family education and to maximize pt's level of independence in the home and community environment.     Pt's spiritual, cultural and educational needs considered and pt agreeable to plan of care and goals.    Anticipated barriers to occupational therapy: none    Goals:  Short Term Goals for 3 weeks: (phase 1 of protocol)  Complete decongestion B LE- left leg completed this date  Patient will be educated on lymphedema precautions and signs of infection. - Ongoing 3/3/2022   Patient will perform deep abdominal breathing TID- met  Patient will tolerate daily activities with multilayered bandaging.- met  Appropriate compression garments to be ordered/delivered- 1 garment delivered, 2nd on order      Long Term Goals for 12 weeks: (Phase 2 of goals)  Patient will be independent with home " management of this chronic condition.  Patient to jennifer/doff compression garment.   Patient will demonstrate compliance with all home management recommendations.  Patient will maintain reduction at monthly follow up appointments for 3 months     PLAN     Continue plan of care 2 times a week to address the goals listed above..  Updates/Grading for next session: continue hadley phase 2 Complete Decongestive Therapy left lower extremity and phase 1 for right      MILE Hardy/JENNA

## 2022-03-09 ENCOUNTER — LAB VISIT (OUTPATIENT)
Dept: LAB | Facility: HOSPITAL | Age: 77
End: 2022-03-09
Attending: INTERNAL MEDICINE
Payer: MEDICARE

## 2022-03-09 DIAGNOSIS — I10 HTN (HYPERTENSION), BENIGN: ICD-10-CM

## 2022-03-09 DIAGNOSIS — M79.89 LEG SWELLING: ICD-10-CM

## 2022-03-09 LAB
ANION GAP SERPL CALC-SCNC: 7 MMOL/L (ref 8–16)
BUN SERPL-MCNC: 14 MG/DL (ref 8–23)
CALCIUM SERPL-MCNC: 8.8 MG/DL (ref 8.7–10.5)
CHLORIDE SERPL-SCNC: 106 MMOL/L (ref 95–110)
CO2 SERPL-SCNC: 30 MMOL/L (ref 23–29)
CREAT SERPL-MCNC: 0.9 MG/DL (ref 0.5–1.4)
EST. GFR  (AFRICAN AMERICAN): >60 ML/MIN/1.73 M^2
EST. GFR  (NON AFRICAN AMERICAN): >60 ML/MIN/1.73 M^2
GLUCOSE SERPL-MCNC: 90 MG/DL (ref 70–110)
POTASSIUM SERPL-SCNC: 3.8 MMOL/L (ref 3.5–5.1)
SODIUM SERPL-SCNC: 143 MMOL/L (ref 136–145)

## 2022-03-09 PROCEDURE — 80048 BASIC METABOLIC PNL TOTAL CA: CPT | Performed by: INTERNAL MEDICINE

## 2022-03-09 PROCEDURE — 36415 COLL VENOUS BLD VENIPUNCTURE: CPT | Mod: PO | Performed by: INTERNAL MEDICINE

## 2022-03-11 ENCOUNTER — PATIENT MESSAGE (OUTPATIENT)
Dept: CARDIOLOGY | Facility: CLINIC | Age: 77
End: 2022-03-11
Payer: MEDICARE

## 2022-03-11 ENCOUNTER — CLINICAL SUPPORT (OUTPATIENT)
Dept: REHABILITATION | Facility: HOSPITAL | Age: 77
End: 2022-03-11
Attending: INTERNAL MEDICINE
Payer: MEDICARE

## 2022-03-11 DIAGNOSIS — I89.0 LYMPHEDEMA OF BOTH LOWER EXTREMITIES: Primary | ICD-10-CM

## 2022-03-11 PROCEDURE — 97140 MANUAL THERAPY 1/> REGIONS: CPT | Mod: PN

## 2022-03-11 NOTE — PROGRESS NOTES
"  [facility]  Occupational Therapy Treatment Note    Date: 3/4/2022  Name: Maximilian Tavera Jr.  Clinic Number: [id number]    Therapy Diagnosis:   Encounter Diagnosis   Name Primary?    Lymphedema of both lower extremities Yes     Physician: Devang Amezquita MD*        Physician Orders: evaluate and treat lower extremity lymphedema  Medical Diagnosis: lymphedema  Surgical Procedure and Date: na  Evaluation Date: 2/18/2022  Insurance Authorization Period Expiration: 04/18/2022  Plan of Care Certification Period: 5/18/2022  Visits 8/20    Precautions:  Immunosuppression    Time In: 1505  Time Out: 1600  Total Billable Time: 55 minutes    SUBJECTIVE     Pt reports: no issues with compression     Response to previous treatment: good, see measurements in chart below  Functional change: na    Pain: 0/10  No pain reported    OBJECTIVE     Objective Measures updated at progress report unless specified.    Treatment     Maximilian received the treatments listed below:     Manual therapy techniques: Manual Lymphatic Drainage were applied for 55 minutes, including:  Compression bandages were removed from right lower extremity. Sigvaris wrap was removed from left lower extremity. Skin care was completed and measurements were taken and documented.  Initiated MLD with patient in seated in chair. Complete Decongestive Therapy protocol for bilateral lower extremity lymphedema initiated and completed.  patient completed 5 repetitions of deep abdominal breathing.  At end of session, compression bandages were applied to right lower extremity. Sigvaris compreflex lite garment was reapplied  placed on left leg as decongestion completed. Patient able to return demonstration on application of garment.   Girth Measurements (in centimeters)  LANDMARK R lower eval and current L lower extremity eval and current     forefoot 24.5/24 cm 25/23 cm     ankle  29/25. cm 31/25 cm     4" 32/28 cm 31/27cm     calf 42/42 " cm 42/38 cm     Below knee 40/39 cm 39/39 cm            Patient Education and Home Exercises      Education provided: reviewed education provided at evaluation  1. Educated on definition of lymphedema.  2. Explained the Complete Decongestive Therapy protocol in depth  3. Educated on Phase 1 (current phase)  4. Reviewed treatment frequency and likely duration of weeks  5.Contraindications for treatment.  6. Plan of care and goals.  7. Educated on home management protocols.     Written Home Exercises Provided: no. Maximilian is very active and does not need additional exercises to improve outcomes of Complete Decongestive Therapy.   Assessment     Pt would continue to benefit from skilled OT. Yes, Maximilian has lymphedema and the only proven treatment for this chronic condition is Complete Decongestive Therapy.    Maximilian is progressing well towards his goals and there are no updates to goals at this time. Pt prognosis is Good.     Pt will continue to benefit from skilled outpatient occupational therapy to address the deficits listed in the problem list on initial evaluation provide pt/family education and to maximize pt's level of independence in the home and community environment.     Pt's spiritual, cultural and educational needs considered and pt agreeable to plan of care and goals.    Anticipated barriers to occupational therapy: none    Goals:  Short Term Goals for 3 weeks: (phase 1 of protocol)  Complete decongestion B LE- left leg completed   Patient will be educated on lymphedema precautions and signs of infection. - Ongoing 3/4/2022   Patient will perform deep abdominal breathing TID- met  Patient will tolerate daily activities with multilayered bandaging.- met  Appropriate compression garments to be ordered/delivered- 1 garment delivered, 2nd on order      Long Term Goals for 12 weeks: (Phase 2 of goals)  Patient will be independent with home management of this chronic condition.  Patient to jennifer/doff compression  pedro.   Patient will demonstrate compliance with all home management recommendations.  Patient will maintain reduction at monthly follow up appointments for 3 months     PLAN     Continue plan of care 2 times a week to address the goals listed above..  Updates/Grading for next session: continue hadley phase 2 Complete Decongestive Therapy left lower extremity and phase 1 for right      MILE Hardy/JENNA

## 2022-03-14 ENCOUNTER — CLINICAL SUPPORT (OUTPATIENT)
Dept: REHABILITATION | Facility: HOSPITAL | Age: 77
End: 2022-03-14
Attending: INTERNAL MEDICINE
Payer: MEDICARE

## 2022-03-14 DIAGNOSIS — I89.0 LYMPHEDEMA OF BOTH LOWER EXTREMITIES: Primary | ICD-10-CM

## 2022-03-14 PROCEDURE — 97140 MANUAL THERAPY 1/> REGIONS: CPT | Mod: PN

## 2022-03-14 NOTE — PROGRESS NOTES
"  [facility]  Occupational Therapy Treatment Note    Date: 3/7/2022  Name: Maximilian Tavera Jr.  Clinic Number: [id number]    Therapy Diagnosis:   Encounter Diagnosis   Name Primary?    Lymphedema of both lower extremities Yes     Physician: Devang Amezquita MD*        Physician Orders: evaluate and treat lower extremity lymphedema  Medical Diagnosis: lymphedema  Surgical Procedure and Date: na  Evaluation Date: 2/18/2022  Insurance Authorization Period Expiration: 04/18/2022  Plan of Care Certification Period: 5/18/2022  Visits 9/20    Precautions:  Immunosuppression    Time In: 1300  Time Out: 1400  Total Billable Time: 60 minutes    SUBJECTIVE     Pt reports: no issues with compression     Response to previous treatment: good, see measurements in chart below  Functional change: na    Pain: 0/10  No pain reported    OBJECTIVE     Objective Measures updated at progress report unless specified.    Treatment     Maximilian received the treatments listed below:     Manual therapy techniques: Manual Lymphatic Drainage were applied for 60 minutes, including:  Compression bandages were removed from right lower extremity. Sigvaris wrap was removed from left lower extremity. Skin care was completed and measurements were taken and documented.  Initiated MLD with patient in seated in chair. Complete Decongestive Therapy protocol for bilateral lower extremity lymphedema initiated and completed.  patient completed 5 repetitions of deep abdominal breathing.  At end of session, compression garments were applied to bilateral lower extremities as reduction indicated decongestion. 2nd garment had been delivered but was a large tall, instead of medium tall but appeared to fit correctly.   Girth Measurements (in centimeters)  LANDMARK R lower eval and current L lower extremity eval and current     forefoot 24.5/24 cm 25/23 cm     ankle  29/24. cm 31/24.25 cm     4" 32/28 cm 31/27cm     calf 42/37.75 cm " 42/37 cm     Below knee 40/39 cm 39/38 cm            Patient Education and Home Exercises      Education provided: reviewed education provided at evaluation  1. Educated on definition of lymphedema.  2. Explained the Complete Decongestive Therapy protocol in depth  3. Educated on Phase 1 (current phase)  4. Reviewed treatment frequency and likely duration of weeks  5.Contraindications for treatment.  6. Plan of care and goals.  7. Educated on home management protocols.     Written Home Exercises Provided: no. Maximilian is very active and does not need additional exercises to improve outcomes of Complete Decongestive Therapy.   Assessment     Pt would continue to benefit from skilled OT. Yes, Maximilian has lymphedema and the only proven treatment for this chronic condition is Complete Decongestive Therapy.    Maximilian is progressing well towards his goals and there are no updates to goals at this time. Pt prognosis is Good.     Pt will continue to benefit from skilled outpatient occupational therapy to address the deficits listed in the problem list on initial evaluation provide pt/family education and to maximize pt's level of independence in the home and community environment.     Pt's spiritual, cultural and educational needs considered and pt agreeable to plan of care and goals.    Anticipated barriers to occupational therapy: none    Goals:  Short Term Goals for 3 weeks: (phase 1 of protocol)  Complete decongestion B LE- will determine for bilateral next appointment  Patient will be educated on lymphedema precautions and signs of infection. - Ongoing 3/7/2022   Patient will perform deep abdominal breathing TID- met  Patient will tolerate daily activities with multilayered bandaging.- met  Appropriate compression garments to be ordered/delivered- met  Long Term Goals for 12 weeks: (Phase 2 of goals)  Patient will be independent with home management of this chronic condition.  Patient to jennifer/doff compression garment.  met  Patient will demonstrate compliance with all home management recommendations.  Patient will maintain reduction at monthly follow up appointments for 3 months     PLAN     Continue plan of care 2 times a week to address the goals listed above..  Updates/Grading for next session: continue hadley phase 2 Complete Decongestive Therapy     MILE Hardy/JENNA

## 2022-03-15 NOTE — PROGRESS NOTES
"  [facility]  Occupational Therapy Treatment Note    Date: 3/11/2022  Name: Maximilian Tavera Jr.  Clinic Number: [id number]    Therapy Diagnosis:   Encounter Diagnosis   Name Primary?    Lymphedema of both lower extremities Yes     Physician: Devang Amezquita MD*        Physician Orders: evaluate and treat lower extremity lymphedema  Medical Diagnosis: lymphedema  Surgical Procedure and Date: na  Evaluation Date: 2/18/2022  Insurance Authorization Period Expiration: 04/18/2022  Plan of Care Certification Period: 5/18/2022  Visits 10/20    Precautions:  Immunosuppression    Time In: 1000  Time Out: 1100  Total Billable Time: 60 minutes    SUBJECTIVE     Pt reports: Maximilian reports that the right garment (large tall) was painful because it was too large and rubbed his leg.  Response to previous treatment: good  Functional change: na    Pain: 0/10  No pain reported    OBJECTIVE     Objective Measures updated at progress report unless specified.    Treatment     Maximilian received the treatments listed below:     Manual therapy techniques: Manual Lymphatic Drainage were applied for 60 minutes, including:  Compression garments  were removed from bilateral lower extremities.  Skin care was completed and measurements were taken and documented.  Initiated MLD with patient in seated in chair. Complete Decongestive Therapy protocol for bilateral lower extremity lymphedema initiated and completed.  patient completed 5 repetitions of deep abdominal breathing. At end of session, medium tall garment was placed on right leg but left leg was compression bandaged and patient to order a second garment of same size for left leg.Girth Measurements (in centimeters)  LANDMARK R lower eval and current L lower extremity eval and current     forefoot 24.5/24 cm 25/23 cm     ankle  29/24. cm 31/24.25 cm     4" 32/28 cm 31/27cm     calf 42/37.75 cm 42/37 cm     Below knee 40/39 cm 39/38 cm            Patient " Education and Home Exercises      Education provided: reviewed education provided at evaluation  1. Educated on definition of lymphedema.  2. Explained the Complete Decongestive Therapy protocol in depth  3. Educated on Phase 1 (current phase)  4. Reviewed treatment frequency and likely duration of weeks  5.Contraindications for treatment.  6. Plan of care and goals.  7. Educated on home management protocols.     Written Home Exercises Provided: no. Maximilian is very active and does not need additional exercises to improve outcomes of Complete Decongestive Therapy.   Assessment     Pt would continue to benefit from skilled OT. Yes, Maximilian has lymphedema and the only proven treatment for this chronic condition is Complete Decongestive Therapy.    Maximilian is progressing well towards his goals and there are no updates to goals at this time. Pt prognosis is Good.     Pt will continue to benefit from skilled outpatient occupational therapy to address the deficits listed in the problem list on initial evaluation provide pt/family education and to maximize pt's level of independence in the home and community environment.     Pt's spiritual, cultural and educational needs considered and pt agreeable to plan of care and goals.    Anticipated barriers to occupational therapy: none    Goals:  Short Term Goals for 3 weeks: (phase 1 of protocol)  Complete decongestion B LE- will determine for bilateral next appointment  Patient will be educated on lymphedema precautions and signs of infection. - Ongoing 3/11/2022   Patient will perform deep abdominal breathing TID- met  Patient will tolerate daily activities with multilayered bandaging.- met  Appropriate compression garments to be ordered/delivered- met for right leg, waiting for left leg garment  Long Term Goals for 12 weeks: (Phase 2 of goals)  Patient will be independent with home management of this chronic condition.  Patient to jennifer/doff compression garment. met  Patient will  demonstrate compliance with all home management recommendations.  Patient will maintain reduction at monthly follow up appointments for 3 months     PLAN     Continue plan of care 2 times a week to address the goals listed above..  Updates/Grading for next session: continue hadley phase 2 Complete Decongestive Therapy     MILE Hardy/JENNA

## 2022-03-18 NOTE — PROGRESS NOTES
[facility]  Occupational Therapy Treatment Note    Date: 3/14/2022  Name: Maximilian Tavera Jr.  Clinic Number: [id number]    Therapy Diagnosis:   Encounter Diagnosis   Name Primary?    Lymphedema of both lower extremities Yes     Physician: Devang Amezquita MD*        Physician Orders: evaluate and treat lower extremity lymphedema  Medical Diagnosis: lymphedema  Surgical Procedure and Date: na  Evaluation Date: 2/18/2022  Insurance Authorization Period Expiration: 04/18/2022  Plan of Care Certification Period: 5/18/2022  Visits 11/20    Precautions:  Immunosuppression    Time In: 1300  Time Out: 1400  Total Billable Time: 60 minutes    SUBJECTIVE     Pt reports: no pain  Response to previous treatment: good  Functional change: na    Pain: 0/10  No pain reported    OBJECTIVE     Objective Measures updated at progress report unless specified.    Treatment     Maximilian received the treatments listed below:     Manual therapy techniques: Manual Lymphatic Drainage  for 60 minutes, including:  Compression garments   removed from right lower extremity. Compression bandages removed from left leg.  Skin care was completed and measurements were taken and documented.  Initiated MLD with patient in seated in chair. Complete Decongestive Therapy protocol for bilateral lower extremity lymphedema initiated and completed.  patient completed 5 repetitions of deep abdominal breathing. At end of session, medium tall garment was placed on right leg but left leg was compression bandaged while waiting for delivery of 2nd garment.        Patient Education and Home Exercises      Education provided: reviewed education provided at evaluation  1. Educated on definition of lymphedema.  2. Explained the Complete Decongestive Therapy protocol in depth  3. Educated on Phase 1 (current phase)  4. Reviewed treatment frequency and likely duration of weeks  5.Contraindications for treatment.  6. Plan of care and  goals.  7. Educated on home management protocols.     Written Home Exercises Provided: no. Maximilian is very active and does not need additional exercises to improve outcomes of Complete Decongestive Therapy.   Assessment     Pt would continue to benefit from skilled OT. Yes, Maximilian has lymphedema and the only proven treatment for this chronic condition is Complete Decongestive Therapy.    Maximilian is progressing well towards his goals and there are no updates to goals at this time. Pt prognosis is Good.     Pt will continue to benefit from skilled outpatient occupational therapy to address the deficits listed in the problem list on initial evaluation provide pt/family education and to maximize pt's level of independence in the home and community environment.     Pt's spiritual, cultural and educational needs considered and pt agreeable to plan of care and goals.    Anticipated barriers to occupational therapy: none    Goals:  Short Term Goals for 3 weeks: (phase 1 of protocol)  Complete decongestion B LE- will determine for bilateral next appointment  Patient will be educated on lymphedema precautions and signs of infection. - Ongoing 3/14/2022   Patient will perform deep abdominal breathing TID- met  Patient will tolerate daily activities with multilayered bandaging.- met  Appropriate compression garments to be ordered/delivered- met for right leg, waiting for left leg garment  Long Term Goals for 12 weeks: (Phase 2 of goals)  Patient will be independent with home management of this chronic condition.  Patient to jennifer/doff compression garment. met  Patient will demonstrate compliance with all home management recommendations.  Patient will maintain reduction at monthly follow up appointments for 3 months     PLAN     Continue plan of care 2 times a week to address the goals listed above..  Updates/Grading for next session: continue hadley phase 2 Complete Decongestive Therapy     MILE Hardy/JENNA

## 2022-03-20 ENCOUNTER — PATIENT MESSAGE (OUTPATIENT)
Dept: CARDIOLOGY | Facility: CLINIC | Age: 77
End: 2022-03-20
Payer: MEDICARE

## 2022-03-21 ENCOUNTER — CLINICAL SUPPORT (OUTPATIENT)
Dept: REHABILITATION | Facility: HOSPITAL | Age: 77
End: 2022-03-21
Attending: INTERNAL MEDICINE
Payer: MEDICARE

## 2022-03-21 DIAGNOSIS — I89.0 LYMPHEDEMA OF BOTH LOWER EXTREMITIES: Primary | ICD-10-CM

## 2022-03-21 PROCEDURE — 97140 MANUAL THERAPY 1/> REGIONS: CPT | Mod: PN

## 2022-03-22 NOTE — PROGRESS NOTES
[facility]  Occupational Therapy Treatment Note    Date: 3/21/2022  Name: Maximilian Tavera Jr.  Clinic Number: [id number]    Therapy Diagnosis:   Encounter Diagnosis   Name Primary?    Lymphedema of both lower extremities Yes     Physician: Devang Amezquita MD*        Physician Orders: evaluate and treat lower extremity lymphedema  Medical Diagnosis: lymphedema  Surgical Procedure and Date: na  Evaluation Date: 2/18/2022  Insurance Authorization Period Expiration: 04/18/2022  Plan of Care Certification Period: 5/18/2022  Visits 11/20    Precautions:  Immunosuppression    Time In: 1400  Time Out: 1500  Total Billable Time: 60 minutes    SUBJECTIVE     Pt reports: no pain  Response to previous treatment: good  Functional change: na    Pain: 0/10  No pain reported    OBJECTIVE     Objective Measures updated at progress report unless specified.    Treatment     Maximilian received the treatments listed below:     Manual therapy techniques: Manual Lymphatic Drainage  for 60 minutes, including:  Patient arrived with 20-30 compression socks on bilateral legs. Reports that he has kept compression on as instructed. Legs visibly swollen and therapist communicated that stockings would be better at class 2 compression level. Also, Maximilian has a sigvaris garment for 1 leg only at this time. Stockings removed and measurements taken. Bilateral legs were compression bandaged this date to reduce and allow for wearing garments. Patient is going to order a second garment this date.    Patient Education and Home Exercises      Education provided:   1. Reviewed home management program  2. Discussed better options for reduction maintenance  3. Update on progress    Written Home Exercises Provided: no. Maximilian is very active and does not need additional exercises to improve outcomes of Complete Decongestive Therapy.   Assessment     Pt would continue to benefit from skilled OT. Yes, Maximilian has lymphedema  and the only proven treatment for this chronic condition is Complete Decongestive Therapy.    Maximilian is progressing well towards his goals and there are no updates to goals at this time. Pt prognosis is Good.     Pt will continue to benefit from skilled outpatient occupational therapy to address the deficits listed in the problem list on initial evaluation provide pt/family education and to maximize pt's level of independence in the home and community environment.     Pt's spiritual, cultural and educational needs considered and pt agreeable to plan of care and goals.    Anticipated barriers to occupational therapy: none    Goals:  Short Term Goals for 3 weeks: (phase 1 of protocol)  Complete decongestion B LE- met  Patient will be educated on lymphedema precautions and signs of infection. - Ongoing 3/21/2022   Patient will perform deep abdominal breathing TID- met  Patient will tolerate daily activities with multilayered bandaging.- met  Appropriate compression garments to be ordered/delivered- met for right leg, waiting for left leg garment  Long Term Goals for 12 weeks: (Phase 2 of goals)  Patient will be independent with home management of this chronic condition.  Patient to jennifer/doff compression garment. met  Patient will demonstrate compliance with all home management recommendations.  Patient will maintain reduction at monthly follow up appointments for 3 months     PLAN     Continue plan of care 2 times a week to address the goals listed above..  Updates/Grading for next session: continue hadley phase 2 Complete Decongestive Therapy     MILE Hardy/JENNA

## 2022-03-25 ENCOUNTER — CLINICAL SUPPORT (OUTPATIENT)
Dept: REHABILITATION | Facility: HOSPITAL | Age: 77
End: 2022-03-25
Attending: INTERNAL MEDICINE
Payer: MEDICARE

## 2022-03-25 DIAGNOSIS — I89.0 LYMPHEDEMA OF BOTH LOWER EXTREMITIES: Primary | ICD-10-CM

## 2022-03-25 PROCEDURE — 97140 MANUAL THERAPY 1/> REGIONS: CPT | Mod: PN

## 2022-03-29 NOTE — PROGRESS NOTES
[facility]  Occupational Therapy Treatment Note    Date: 3/25/2022  Name: Maximilian Tavera Jr.  Clinic Number: [id number]    Therapy Diagnosis:   Encounter Diagnosis   Name Primary?    Lymphedema of both lower extremities Yes     Physician: Devang Amezquita MD*        Physician Orders: evaluate and treat lower extremity lymphedema  Medical Diagnosis: lymphedema  Surgical Procedure and Date: na  Evaluation Date: 2/18/2022  Insurance Authorization Period Expiration: 04/18/2022  Plan of Care Certification Period: 5/18/2022  Visits 12/20    Precautions:  Immunosuppression    Time In: 0900  Time Out: 1000  Total Billable Time: 60 minutes    SUBJECTIVE     Pt reports: no pain  Response to previous treatment: good  Functional change: na    Pain: 0/10  No pain reported    OBJECTIVE     Objective Measures updated at progress report unless specified.    Treatment     Maximilian received the treatments listed below:     Manual therapy techniques: Manual Lymphatic Drainage  for 60 minutes, including:  Patient arrived with 20-30 compression socks on bilateral legs. Reports that he removed the bandages from his last visit and then immediately put the compression socks on. The timeliness of this has solved the problem of swelling with stockings. Stockings were removed and measurements were at previous best (decongested). Discussed having option of compression socks or the Sigvaris wraps to maintain reduction.   Patient is going to order a second pair of stockings soon. patient was able to jennifer stockings (20-30) without difficulty.    Patient Education and Home Exercises      Education provided:   1. Reviewed home management program  2. Discussed  options for reduction maintenance  3. Update on progress    Written Home Exercises Provided: no. Maximilian is very active and does not need additional exercises to improve outcomes of Complete Decongestive Therapy.   Assessment         Maximilian has met goals  in plan of care except for the 3 month monitoring which we will complete. He has achieved decongestion and demonstrates compliance with home management program. goals and there are no updates to goals at this time. Pt prognosis is Good.      Pt's spiritual, cultural and educational needs considered and pt agreeable to plan of care and goals.    Anticipated barriers to occupational therapy: none    Goals:  Short Term Goals for 3 weeks: (phase 1 of protocol)  Complete decongestion B LE- met  Patient will be educated on lymphedema precautions and signs of infection. - met  Patient will perform deep abdominal breathing TID- met  Patient will tolerate daily activities with multilayered bandaging.- met  Appropriate compression garments to be ordered/delivered- met   Long Term Goals for 12 weeks: (Phase 2 of goals)  Patient will be independent with home management of this chronic condition.  Patient to jennifer/doff compression garment. met  Patient will demonstrate compliance with all home management recommendations.met  Patient will maintain reduction at monthly follow up appointments for 3 months ongoing    PLAN     Continue plan of care 1 times a month for 3 months to address the goals listed above..  Updates/Grading for next session: to monitor  MILE Hardy/JENNA

## 2022-04-05 ENCOUNTER — TELEPHONE (OUTPATIENT)
Dept: CARDIOLOGY | Facility: CLINIC | Age: 77
End: 2022-04-05
Payer: MEDICARE

## 2022-04-05 ENCOUNTER — PATIENT MESSAGE (OUTPATIENT)
Dept: CARDIOLOGY | Facility: CLINIC | Age: 77
End: 2022-04-05
Payer: MEDICARE

## 2022-04-05 DIAGNOSIS — E87.6 HYPOKALEMIA: Primary | ICD-10-CM

## 2022-04-12 ENCOUNTER — LAB VISIT (OUTPATIENT)
Dept: LAB | Facility: HOSPITAL | Age: 77
End: 2022-04-12
Attending: FAMILY MEDICINE
Payer: MEDICARE

## 2022-04-12 DIAGNOSIS — E87.6 HYPOKALEMIA: ICD-10-CM

## 2022-04-12 LAB
ANION GAP SERPL CALC-SCNC: 11 MMOL/L (ref 8–16)
BUN SERPL-MCNC: 23 MG/DL (ref 8–23)
CALCIUM SERPL-MCNC: 9.4 MG/DL (ref 8.7–10.5)
CHLORIDE SERPL-SCNC: 103 MMOL/L (ref 95–110)
CO2 SERPL-SCNC: 29 MMOL/L (ref 23–29)
CREAT SERPL-MCNC: 1.3 MG/DL (ref 0.5–1.4)
EST. GFR  (AFRICAN AMERICAN): >60 ML/MIN/1.73 M^2
EST. GFR  (NON AFRICAN AMERICAN): 52.3 ML/MIN/1.73 M^2
GLUCOSE SERPL-MCNC: 99 MG/DL (ref 70–110)
POTASSIUM SERPL-SCNC: 4.1 MMOL/L (ref 3.5–5.1)
SODIUM SERPL-SCNC: 143 MMOL/L (ref 136–145)

## 2022-04-12 PROCEDURE — 36415 COLL VENOUS BLD VENIPUNCTURE: CPT | Mod: PO | Performed by: INTERNAL MEDICINE

## 2022-04-12 PROCEDURE — 80048 BASIC METABOLIC PNL TOTAL CA: CPT | Performed by: INTERNAL MEDICINE

## 2022-04-13 DIAGNOSIS — E03.9 HYPOTHYROIDISM: ICD-10-CM

## 2022-04-13 DIAGNOSIS — I49.3 PVC'S (PREMATURE VENTRICULAR CONTRACTIONS): ICD-10-CM

## 2022-04-13 DIAGNOSIS — I10 HTN (HYPERTENSION), BENIGN: ICD-10-CM

## 2022-04-13 RX ORDER — LEVOTHYROXINE SODIUM 50 UG/1
TABLET ORAL
Qty: 90 TABLET | Refills: 3 | Status: SHIPPED | OUTPATIENT
Start: 2022-04-13 | End: 2023-03-08

## 2022-04-13 RX ORDER — CARVEDILOL 25 MG/1
TABLET ORAL
Qty: 180 TABLET | Refills: 3 | Status: SHIPPED | OUTPATIENT
Start: 2022-04-13 | End: 2023-03-08

## 2022-04-13 NOTE — TELEPHONE ENCOUNTER
Refill Routing Note   Medication(s) are not appropriate for processing by [facility] for the following reason(s):      - Patient has been seen in the ED/Hospital since the last PCP visit    [facility] action(s):  Route          Medication reconciliation completed: No     Appointments  past 12m or future 3m with PCP    Date Provider   Last Visit   9/9/2021 Brian Marroquin MD   Next Visit   6/1/2022 Brian Marroquin MD   ED visits in past 90 days: 0        Note composed:12:00 PM 04/13/2022

## 2022-04-13 NOTE — TELEPHONE ENCOUNTER
No new care gaps identified.  Powered by [employer]. Reference number: [id number].   4/13/2022 11:51:41 AM CDT

## 2022-04-14 ENCOUNTER — PATIENT MESSAGE (OUTPATIENT)
Dept: CARDIOLOGY | Facility: CLINIC | Age: 77
End: 2022-04-14
Payer: MEDICARE

## 2022-04-20 ENCOUNTER — PATIENT OUTREACH (OUTPATIENT)
Dept: ADMINISTRATIVE | Facility: OTHER | Age: 77
End: 2022-04-20
Payer: MEDICARE

## 2022-04-20 NOTE — PROGRESS NOTES
Health Maintenance Due   Topic Date Due    Shingles Vaccine (2 of 3) 09/01/2015     Updates were requested from care everywhere.  Chart was reviewed for overdue Proactive [facility] Encounters ([facility]) topics (CRS, Breast Cancer Screening, Eye exam)  Health Maintenance has been updated.  [facility] immunization registry triggered.  Immunizations were reconciled.

## 2022-04-21 ENCOUNTER — OFFICE VISIT (OUTPATIENT)
Dept: PODIATRY | Facility: CLINIC | Age: 77
End: 2022-04-21
Payer: MEDICARE

## 2022-04-21 DIAGNOSIS — L60.0 INGROWN TOENAIL OF LEFT FOOT: Primary | ICD-10-CM

## 2022-04-21 DIAGNOSIS — L03.032 PARONYCHIA OF TOE OF LEFT FOOT: ICD-10-CM

## 2022-04-21 PROCEDURE — 1157F PR ADVANCE CARE PLAN OR EQUIV PRESENT IN MEDICAL RECORD: ICD-10-PCS | Mod: CPTII,S$GLB,, | Performed by: PODIATRIST

## 2022-04-21 PROCEDURE — 1159F MED LIST DOCD IN RCRD: CPT | Mod: CPTII,S$GLB,, | Performed by: PODIATRIST

## 2022-04-21 PROCEDURE — 87075 CULTR BACTERIA EXCEPT BLOOD: CPT | Performed by: PODIATRIST

## 2022-04-21 PROCEDURE — 99999 PR PBB SHADOW E&M-EST. PATIENT-LVL IV: ICD-10-PCS | Mod: PBBFAC,,, | Performed by: PODIATRIST

## 2022-04-21 PROCEDURE — 1101F PR PT FALLS ASSESS DOC 0-1 FALLS W/OUT INJ PAST YR: ICD-10-PCS | Mod: CPTII,S$GLB,, | Performed by: PODIATRIST

## 2022-04-21 PROCEDURE — 3288F PR FALLS RISK ASSESSMENT DOCUMENTED: ICD-10-PCS | Mod: CPTII,S$GLB,, | Performed by: PODIATRIST

## 2022-04-21 PROCEDURE — 1126F AMNT PAIN NOTED NONE PRSNT: CPT | Mod: CPTII,S$GLB,, | Performed by: PODIATRIST

## 2022-04-21 PROCEDURE — 99213 PR OFFICE/OUTPT VISIT, EST, LEVL III, 20-29 MIN: ICD-10-PCS | Mod: S$GLB,,, | Performed by: PODIATRIST

## 2022-04-21 PROCEDURE — 99213 OFFICE O/P EST LOW 20 MIN: CPT | Mod: S$GLB,,, | Performed by: PODIATRIST

## 2022-04-21 PROCEDURE — 1159F PR MEDICATION LIST DOCUMENTED IN MEDICAL RECORD: ICD-10-PCS | Mod: CPTII,S$GLB,, | Performed by: PODIATRIST

## 2022-04-21 PROCEDURE — 3288F FALL RISK ASSESSMENT DOCD: CPT | Mod: CPTII,S$GLB,, | Performed by: PODIATRIST

## 2022-04-21 PROCEDURE — 1126F PR PAIN SEVERITY QUANTIFIED, NO PAIN PRESENT: ICD-10-PCS | Mod: CPTII,S$GLB,, | Performed by: PODIATRIST

## 2022-04-21 PROCEDURE — 99999 PR PBB SHADOW E&M-EST. PATIENT-LVL IV: CPT | Mod: PBBFAC,,, | Performed by: PODIATRIST

## 2022-04-21 PROCEDURE — 1157F ADVNC CARE PLAN IN RCRD: CPT | Mod: CPTII,S$GLB,, | Performed by: PODIATRIST

## 2022-04-21 PROCEDURE — 87070 CULTURE OTHR SPECIMN AEROBIC: CPT | Performed by: PODIATRIST

## 2022-04-21 PROCEDURE — 1101F PT FALLS ASSESS-DOCD LE1/YR: CPT | Mod: CPTII,S$GLB,, | Performed by: PODIATRIST

## 2022-04-21 RX ORDER — MUPIROCIN 20 MG/G
OINTMENT TOPICAL 3 TIMES DAILY
Qty: 30 G | Refills: 3 | Status: SHIPPED | OUTPATIENT
Start: 2022-04-21 | End: 2022-10-11

## 2022-04-21 NOTE — PATIENT INSTRUCTIONS
Soak the affected toe in a basin of warm water in conjunction with 1/4 cup of epsom salt for up to 20 minutes daily.    Apply bactroban ointment and a band aid to the toe three times daily.

## 2022-04-21 NOTE — PROGRESS NOTES
Subjective:      Patient ID: Maximilian Tavera Jr. is a 76 y.o. male.    Chief Complaint: Ingrown Toenail (Left ingrown nail )  Patient presents to clinic with the chief complaint of pain and inflammation of the Lt. Great toe.  Describes this has been present for the past couple weeks.  Suspects he might have an ingrown toenail involving the lateral nail edge.  Describes pain from the site as sharp and rates currently as a 0/10 while at rest.  Symptoms are exacerbated with pressure from shoe gear.  Has not attempted to self treat.  Denies trauma to the affected area leading up to current symptoms.  Denies experiencing N/V/F/C/D.  Denies any additional pedal complaints.      Past Medical History:   Diagnosis Date    A-fib 3/31/2020    Arthritis     Back pain     Carpal tunnel syndrome 2/25/2013    Cataract     Cataract, left eye 11/10/2014    Chest pain, musculoskeletal     COPD (chronic obstructive pulmonary disease) 11/052018    COPD with asthma 8/17/2021    Emphysema lung 11/05/2018    Gastritis     Hx of colonic polyp     Hyperlipidemia     Hypertension     Hypothyroidism     Knee fracture     Myasthenia gravis     Neuropathy 1/3/2013    Obesity 1/29/2015    Pneumonia 3/29/2020    Polyneuropathy     PVC (premature ventricular contraction)     Squamous cell carcinoma 2014    left forearm    Thyroid disease        Past Surgical History:   Procedure Laterality Date    APPENDECTOMY      CATARACT EXTRACTION W/  INTRAOCULAR LENS IMPLANT Bilateral     COLONOSCOPY  03/01/2012    Dr Esteban; hyperplastic polyp; repeat in 5 years    COLONOSCOPY N/A 12/7/2021    Procedure: COLONOSCOPY;  Surgeon: Gabriel Hernandez MD;  Location: [facility];  Service: Endoscopy;  Laterality: N/A;    CYSTOSCOPY N/A 2/26/2019    Procedure: CYSTOSCOPY;  Surgeon: Simba Cheung MD;  Location: [facility];  Service: Urology;  Laterality: N/A;    ENDOSCOPIC ULTRASOUND OF UPPER GASTROINTESTINAL TRACT N/A 1/7/2020     Procedure: ULTRASOUND, UPPER GI TRACT, ENDOSCOPIC;  Surgeon: Kati Ryan MD;  Location: [facility] ENDO (2ND FLR);  Service: Endoscopy;  Laterality: N/A;    ESOPHAGOGASTRODUODENOSCOPY N/A 9/12/2018    Procedure: EGD (ESOPHAGOGASTRODUODENOSCOPY);  Surgeon: Gabriel Hernandez MD;  Location: [facility] ENDO;  Service: Endoscopy;  Laterality: N/A;    ESOPHAGOGASTRODUODENOSCOPY N/A 8/19/2019    Procedure: EGD (ESOPHAGOGASTRODUODENOSCOPY);  Surgeon: Gabriel Hernandez MD;  Location: [facility] ENDO;  Service: Endoscopy;  Laterality: N/A;    ESOPHAGOGASTRODUODENOSCOPY N/A 10/7/2019    Procedure: EGD (ESOPHAGOGASTRODUODENOSCOPY);  Surgeon: Gabriel Hernandez MD;  Location: [facility] ENDO;  Service: Endoscopy;  Laterality: N/A;    ESOPHAGOGASTRODUODENOSCOPY N/A 1/7/2020    Procedure: EGD (ESOPHAGOGASTRODUODENOSCOPY);  Surgeon: Kati Ryan MD;  Location: [facility] (2ND FLR);  Service: Endoscopy;  Laterality: N/A;    HEMORRHOID SURGERY      INJECTION OF ANESTHETIC AGENT AROUND MEDIAL BRANCH NERVES INNERVATING LUMBAR FACET JOINT Bilateral 10/1/2020    Procedure: Block-nerve-medial branch-lumbar Bilateral L 3,4,5;  Surgeon: Devan Watt MD;  Location: [facility];  Service: Pain Management;  Laterality: Bilateral;    KNEE ARTHROPLASTY Bilateral     LENGTHENING OF ACHILLES TENDON Right 9/11/2020    Procedure: percutaeous tenotomy of right lateral epicondyle (tenex);  Surgeon: Terry Quach MD;  Location: [facility] OR;  Service: Neurosurgery;  Laterality: Right;  tenex to right lateral epicondyle  Tenex machine SN#[id number], total time 1min. 30seconds    NECK SURGERY      POSTERIOR FUSION OF CERVICAL SPINE WITH LAMINECTOMY N/A 11/7/2018    Procedure: C2-C6 Posterior Cervical Laminectomy & Instrumental Fusion;  Surgeon: Kishore Turner MD;  Location: [facility] 2ND FLR;  Service: Neurosurgery;  Laterality: N/A;    TONSILLECTOMY      TRANSFORAMINAL EPIDURAL INJECTION OF STEROID Right 12/20/2019    Procedure:  Injection,steroid,epidural,transforaminal approach;  Surgeon: Devan Watt MD;  Location: [facility] OR;  Service: Pain Management;  Laterality: Right;  L3-4, L4-5    TRANSFORAMINAL EPIDURAL INJECTION OF STEROID Bilateral 2/6/2020    Procedure: Injection,steroid,epidural,transforaminal approach;  Surgeon: Devan Watt MD;  Location: [facility] OR;  Service: Pain Management;  Laterality: Bilateral;  L3-L4,L4-L5    TRANSFORAMINAL EPIDURAL INJECTION OF STEROID Bilateral 8/26/2020    Procedure: Injection,steroid,epidural,transforaminal approach;  Surgeon: Devan Watt MD;  Location: [facility] OR;  Service: Pain Management;  Laterality: Bilateral;  L3-4, L4-5    TRANSRECTAL ULTRASOUND EXAMINATION N/A 2/26/2019    Procedure: ULTRASOUND, RECTAL APPROACH;  Surgeon: Simba Cheung MD;  Location: [facility] OR;  Service: Urology;  Laterality: N/A;    UPPER GASTROINTESTINAL ENDOSCOPY  09/12/2018    Dr Hernandez; gastritis; extensive intestinal metaplasia; repeat in 1-2- years       Family History   Problem Relation Age of Onset    Cataracts Mother     Heart disease Mother         CHF    Hypertension Mother     Hyperlipidemia Mother     Cataracts Father     Glaucoma Father     Heart disease Father     Hyperlipidemia Sister     Hypertension Sister     No Known Problems Daughter     No Known Problems Daughter     Collagen disease Neg Hx     Amblyopia Neg Hx     Blindness Neg Hx     Macular degeneration Neg Hx     Retinal detachment Neg Hx     Strabismus Neg Hx     Cancer Neg Hx     Colon cancer Neg Hx     Esophageal cancer Neg Hx     Stomach cancer Neg Hx     Crohn's disease Neg Hx     Ulcerative colitis Neg Hx        Social History     Socioeconomic History    Marital status:    Tobacco Use    Smoking status: Never Smoker    Smokeless tobacco: Never Used    Tobacco comment: when a child   Substance and Sexual Activity    Alcohol use: Yes     Comment: rarely    Drug use: No    Sexual activity: Yes     Partners:  Female     Social Determinants of Health     Financial Resource Strain: Low Risk     Difficulty of Paying Living Expenses: Not hard at all   Food Insecurity: No Food Insecurity    Worried About Running Out of Food in the Last Year: Never true    Ran Out of Food in the Last Year: Never true   Transportation Needs: No Transportation Needs    Lack of Transportation (Medical): No    Lack of Transportation (Non-Medical): No   Physical Activity: Inactive    Days of Exercise per Week: 0 days    Minutes of Exercise per Session: 0 min   Stress: No Stress Concern Present    Feeling of Stress : Not at all   Social Connections: Unknown    Frequency of Communication with Friends and Family: More than three times a week    Frequency of Social Gatherings with Friends and Family: More than three times a week    Active Member of Clubs or Organizations: Yes    Attends Club or Organization Meetings: More than 4 times per year    Marital Status:    Housing Stability: Low Risk     Unable to Pay for Housing in the Last Year: No    Number of Places Lived in the Last Year: 1    Unstable Housing in the Last Year: No       Current Outpatient Medications   Medication Sig Dispense Refill    albuterol (PROVENTIL/VENTOLIN HFA) 90 mcg/actuation inhaler INHALE 1 TO 2 PUFFS INTO THE LUNGS EVERY 6 HOURS AS NEEDED FOR WHEEZING OR SHORTNESS OF BREATH. FOR RESCUE. 25.5 g 0    atorvastatin (LIPITOR) 80 MG tablet TAKE 1 TABLET EVERY DAY 90 tablet 3    bumetanide (BUMEX) 0.5 MG Tab Take 1 tablet (0.5 mg total) by mouth 2 (two) times daily. 60 tablet 1    carvediloL (COREG) 25 MG tablet TAKE 1 TABLET TWICE DAILY WITH MEALS 180 tablet 3    cetirizine (ZYRTEC) 10 MG tablet Take 1 tablet by mouth daily as needed.      chlorthalidone (HYGROTEN) 25 MG Tab Take 25 mg by mouth once daily.      cholecalciferol, vitamin D3, (VITAMIN D3) 50 mcg (2,000 unit) Cap 1 capsule once daily. Every day      cholestyramine (QUESTRAN) 4 gram  packet Take 1 packet (4 g total) by mouth 3 (three) times daily with meals. 270 packet 3    coenzyme Q10 (CO Q-10) 100 mg capsule Take 400 mg by mouth once daily.      cyanocobalamin, vitamin B-12, 5,000 mcg Subl Take 5,000 mcg by mouth once daily. Every day      diphenoxylate-atropine 2.5-0.025 mg (LOMOTIL) 2.5-0.025 mg per tablet Take 1 tablet by mouth 4 (four) times daily as needed for Diarrhea. 90 tablet 0    ezetimibe (ZETIA) 10 mg tablet TAKE 1 TABLET EVERY DAY 90 tablet 3    FLUAD QUAD 2021-22,65Y UP,,PF, 60 mcg (15 mcg x 4)/0.5 mL Syrg       fluticasone (FLONASE) 50 mcg/actuation nasal spray USE 1 SPRAY IN EACH NOSTRIL TWICE DAILY AS NEEDED  FOR  RHINITIS 48 g 3    gabapentin (NEURONTIN) 300 MG capsule TAKE 1 CAPSULE THREE TIMES DAILY 270 capsule 5    KENALOG 40 mg/mL injection       levothyroxine (SYNTHROID) 50 MCG tablet TAKE 1 TABLET EVERY DAY 90 tablet 3    methylsulfonylmethane 1,000 mg Tab Take 1,000 mg by mouth once daily. Every day      milk thistle 200 mg Cap Take 200 mg by mouth 2 (two) times daily. Twice a day      olmesartan (BENICAR) 20 MG tablet TAKE 1 TABLET EVERY DAY 90 tablet 3    omega-3 fatty acids 1,000 mg Cap Twice a day      potassium chloride SA (K-DUR,KLOR-CON) 20 MEQ tablet TAKE 4 TABLETS EVERY DAY  OR AS DIRECTED 360 tablet 3    predniSONE (DELTASONE) 1 MG tablet TAKE 2 TABLETS EVERY  tablet 3    predniSONE (DELTASONE) 5 MG tablet TAKE 1 TABLET EVERY DAY 90 tablet 3    sildenafil (REVATIO) 20 mg Tab Take 1 to 3 tabs daily as needed. 30 tablet 3    spironolactone (ALDACTONE) 25 MG tablet Take 25 mg by mouth once daily.      tiotropium (SPIRIVA WITH HANDIHALER) 18 mcg inhalation capsule INHALE THE CONTENTS OF 1 CAPSULE (18 MCG TOTAL) INTO THE LUNGS ONCE DAILY. CONTROLLER 90 capsule 3    VENTOLIN HFA 90 mcg/actuation inhaler Inhale 2 puffs into the lungs every 6 (six) hours as needed for Wheezing. Rescue 18 g 0    pantoprazole (PROTONIX) 40 MG tablet Take 1  tablet (40 mg total) by mouth 2 (two) times daily. 180 tablet 3     Current Facility-Administered Medications   Medication Dose Route Frequency Provider Last Rate Last Admin    acetaminophen tablet 650 mg  650 mg Oral Once PRN Simba Loya NP         Facility-Administered Medications Ordered in Other Visits   Medication Dose Route Frequency Provider Last Rate Last Admin    0.9%  NaCl infusion   Intravenous Continuous Devan Watt MD        lidocaine (PF) 10 mg/ml (1%) injection 10 mg  1 mL Intradermal Once Terry Quach MD           Review of patient's allergies indicates:   Allergen Reactions    Spironolactone Diarrhea       Review of Systems   Constitutional: Negative for chills and fever.   Cardiovascular: Negative for claudication.   Skin: Positive for color change and nail changes. Negative for suspicious lesions.   Musculoskeletal: Positive for arthritis and back pain. Negative for joint swelling, muscle cramps and muscle weakness.   Neurological: Negative for numbness and paresthesias.   Psychiatric/Behavioral: Negative for altered mental status.           Objective:      Physical Exam  Constitutional:       General: He is not in acute distress.     Appearance: He is well-developed. He is not diaphoretic.   Cardiovascular:      Pulses:           Dorsalis pedis pulses are 2+ on the right side and 2+ on the left side.        Posterior tibial pulses are 2+ on the right side and 2+ on the left side.      Comments: CFT is < 3 seconds bilateral.  Pedal hair growth is present bilateral.  Varicosities noted bilateral.  Moderate nonpitting lower extremity edema noted bilateral.  Toes are warm to touch bilateral.    Musculoskeletal:         General: Tenderness present.      Comments: Muscle strength 5/5 in all muscle groups bilateral.  No tenderness nor crepitation with ROM of foot/ankle joints bilateral.  Pain with palpation to the lateral nail fold of the Lt. Hallux.     Skin:     General: Skin is warm and  dry.      Capillary Refill: Capillary refill takes 2 to 3 seconds.      Coloration: Skin is not pale.      Findings: Erythema present. No abrasion, bruising, burn, ecchymosis, laceration, lesion or rash.      Comments: Pedal skin has normal turgor, temperature, and texture bilateral.  Incurvation noted to the lateral border of the Lt. Hallux toenail.  Moderate edema and erythema noted to the lateral nail fold.  No fluctuance, purulence, or malodor noted.  Remaining toenails x 9 appear normotrophic.     Neurological:      Mental Status: He is alert and oriented to person, place, and time.      Sensory: No sensory deficit.      Comments: Light touch is intact bilateral.                 Assessment:       Encounter Diagnoses   Name Primary?    Ingrown toenail of left foot Yes    Paronychia of toe of left foot          Plan:       Maximilian was seen today for ingrown toenail.    Diagnoses and all orders for this visit:    Ingrown toenail of left foot  -     Aerobic culture  -     Culture, Anaerobic    Paronychia of toe of left foot  -     Aerobic culture  -     Culture, Anaerobic      I counseled the patient on his conditions, their implications and medical management.    Discussed treatment options for ingrow toenails, specifically a partial matrixectomy of the Lt. Hallux lateral nail border.  Patient is amenable to said procedure.    Explained the procedure is only effective upon clearing bacterial infection at the site.    Cultures swabs were obtained from the area.    Rx written for bactroban.  To be applied along with a band aid TID x 1 week.    Patient to begin warm water and epsom salt soaks up to 20 minutes daily x 1 week.    The site was covered with iodosorb and a band aid.  To keep intact until tomorrow morning.  To then begin applying the bactroban.    Patient scheduled for the aforementioned procedure in one week.    Khris Valentin DPM

## 2022-04-25 LAB — BACTERIA SPEC AEROBE CULT: NORMAL

## 2022-04-26 LAB — BACTERIA SPEC ANAEROBE CULT: NORMAL

## 2022-04-28 ENCOUNTER — OFFICE VISIT (OUTPATIENT)
Dept: PODIATRY | Facility: CLINIC | Age: 77
End: 2022-04-28
Payer: MEDICARE

## 2022-04-28 VITALS — WEIGHT: 248 LBS | BODY MASS INDEX: 32.87 KG/M2 | HEIGHT: 73 IN

## 2022-04-28 DIAGNOSIS — M79.675 TOE PAIN, LEFT: ICD-10-CM

## 2022-04-28 DIAGNOSIS — L60.0 INGROWN TOENAIL OF LEFT FOOT: Primary | ICD-10-CM

## 2022-04-28 PROCEDURE — 1101F PT FALLS ASSESS-DOCD LE1/YR: CPT | Mod: CPTII,S$GLB,, | Performed by: PODIATRIST

## 2022-04-28 PROCEDURE — 1126F PR PAIN SEVERITY QUANTIFIED, NO PAIN PRESENT: ICD-10-PCS | Mod: CPTII,S$GLB,, | Performed by: PODIATRIST

## 2022-04-28 PROCEDURE — 99499 UNLISTED E&M SERVICE: CPT | Mod: S$GLB,,, | Performed by: PODIATRIST

## 2022-04-28 PROCEDURE — 1101F PR PT FALLS ASSESS DOC 0-1 FALLS W/OUT INJ PAST YR: ICD-10-PCS | Mod: CPTII,S$GLB,, | Performed by: PODIATRIST

## 2022-04-28 PROCEDURE — 99999 PR PBB SHADOW E&M-EST. PATIENT-LVL II: ICD-10-PCS | Mod: PBBFAC,,, | Performed by: PODIATRIST

## 2022-04-28 PROCEDURE — 3288F PR FALLS RISK ASSESSMENT DOCUMENTED: ICD-10-PCS | Mod: CPTII,S$GLB,, | Performed by: PODIATRIST

## 2022-04-28 PROCEDURE — 1157F ADVNC CARE PLAN IN RCRD: CPT | Mod: CPTII,S$GLB,, | Performed by: PODIATRIST

## 2022-04-28 PROCEDURE — 1126F AMNT PAIN NOTED NONE PRSNT: CPT | Mod: CPTII,S$GLB,, | Performed by: PODIATRIST

## 2022-04-28 PROCEDURE — 1159F MED LIST DOCD IN RCRD: CPT | Mod: CPTII,S$GLB,, | Performed by: PODIATRIST

## 2022-04-28 PROCEDURE — 1159F PR MEDICATION LIST DOCUMENTED IN MEDICAL RECORD: ICD-10-PCS | Mod: CPTII,S$GLB,, | Performed by: PODIATRIST

## 2022-04-28 PROCEDURE — 1157F PR ADVANCE CARE PLAN OR EQUIV PRESENT IN MEDICAL RECORD: ICD-10-PCS | Mod: CPTII,S$GLB,, | Performed by: PODIATRIST

## 2022-04-28 PROCEDURE — 11730 NAIL REMOVAL: ICD-10-PCS | Mod: TA,S$GLB,, | Performed by: PODIATRIST

## 2022-04-28 PROCEDURE — 99499 NO LOS: ICD-10-PCS | Mod: S$GLB,,, | Performed by: PODIATRIST

## 2022-04-28 PROCEDURE — 3288F FALL RISK ASSESSMENT DOCD: CPT | Mod: CPTII,S$GLB,, | Performed by: PODIATRIST

## 2022-04-28 PROCEDURE — 99999 PR PBB SHADOW E&M-EST. PATIENT-LVL II: CPT | Mod: PBBFAC,,, | Performed by: PODIATRIST

## 2022-04-28 PROCEDURE — 11730 AVULSION NAIL PLATE SIMPLE 1: CPT | Mod: TA,S$GLB,, | Performed by: PODIATRIST

## 2022-04-28 NOTE — PATIENT INSTRUCTIONS
"POST NAIL PROCEDURE INSTRUCTIONS    1. Leave bandage intact for 12-24 hours. If the bandage sticks as you try to remove it, soak it in warm water until it lifts off.    2. Wash the toe with antibacterial soap and water separate from the body.    3. Dry foot completely after washing, and apply a small amount of bactroban ointment and a fabric or cloth bandaid ("plastic" bandaids tend to lift off with ointment use).  Wear open-toed shoes as needed for comfort.     4. Take Tylenol as needed for pain.     5. Your toe may drain for the next few days. Normal drainage is yellow-to-pink, and clear, much like the fluid in a blister. Watch for redness spreading up your toe into your foot, white thick drainage (pus), pain unrelieved by medication, or nausea/vomiting/fever/chills. These are signs of infection. Please call the clinic or visit your doctor.  "

## 2022-04-28 NOTE — PROGRESS NOTES
Subjective:      Patient ID: Maximilian Tavera Jr. is a 76 y.o. male.    Chief Complaint: No chief complaint on file.  Patient presents to clinic for surgical removal of the lateral border of the Lt. Hallux toenail.  Notes said edge to be tender to touch and rates pain currently as a 3/10.  Symptoms are aggravated with pressure from shoe gear and alleviated with removal of his shoes.  He currently denies noticing pus nor local signs of infection to the digit.  Denies experiencing N/V/F/C/D.  Denies any additional pedal complaints.      Past Medical History:   Diagnosis Date    A-fib 3/31/2020    Arthritis     Back pain     Carpal tunnel syndrome 2/25/2013    Cataract     Cataract, left eye 11/10/2014    Chest pain, musculoskeletal     COPD (chronic obstructive pulmonary disease) 11/052018    COPD with asthma 8/17/2021    Emphysema lung 11/05/2018    Gastritis     Hx of colonic polyp     Hyperlipidemia     Hypertension     Hypothyroidism     Knee fracture     Myasthenia gravis     Neuropathy 1/3/2013    Obesity 1/29/2015    Pneumonia 3/29/2020    Polyneuropathy     PVC (premature ventricular contraction)     Squamous cell carcinoma 2014    left forearm    Thyroid disease        Past Surgical History:   Procedure Laterality Date    APPENDECTOMY      CATARACT EXTRACTION W/  INTRAOCULAR LENS IMPLANT Bilateral     COLONOSCOPY  03/01/2012    Dr Esteban; hyperplastic polyp; repeat in 5 years    COLONOSCOPY N/A 12/7/2021    Procedure: COLONOSCOPY;  Surgeon: Gabriel Hernandez MD;  Location: [facility];  Service: Endoscopy;  Laterality: N/A;    CYSTOSCOPY N/A 2/26/2019    Procedure: CYSTOSCOPY;  Surgeon: Simba Cheung MD;  Location: [facility];  Service: Urology;  Laterality: N/A;    ENDOSCOPIC ULTRASOUND OF UPPER GASTROINTESTINAL TRACT N/A 1/7/2020    Procedure: ULTRASOUND, UPPER GI TRACT, ENDOSCOPIC;  Surgeon: Kati Ryan MD;  Location: [facility] ([address] [place] [address]);  Service: Endoscopy;   Laterality: N/A;    ESOPHAGOGASTRODUODENOSCOPY N/A 9/12/2018    Procedure: EGD (ESOPHAGOGASTRODUODENOSCOPY);  Surgeon: Gabriel Hernandez MD;  Location: [facility];  Service: Endoscopy;  Laterality: N/A;    ESOPHAGOGASTRODUODENOSCOPY N/A 8/19/2019    Procedure: EGD (ESOPHAGOGASTRODUODENOSCOPY);  Surgeon: Gabriel Hernandez MD;  Location: [facility] ENDO;  Service: Endoscopy;  Laterality: N/A;    ESOPHAGOGASTRODUODENOSCOPY N/A 10/7/2019    Procedure: EGD (ESOPHAGOGASTRODUODENOSCOPY);  Surgeon: Gabriel Hernandez MD;  Location: [facility] ENDO;  Service: Endoscopy;  Laterality: N/A;    ESOPHAGOGASTRODUODENOSCOPY N/A 1/7/2020    Procedure: EGD (ESOPHAGOGASTRODUODENOSCOPY);  Surgeon: Kati yRan MD;  Location: [facility] (2ND FLR);  Service: Endoscopy;  Laterality: N/A;    HEMORRHOID SURGERY      INJECTION OF ANESTHETIC AGENT AROUND MEDIAL BRANCH NERVES INNERVATING LUMBAR FACET JOINT Bilateral 10/1/2020    Procedure: Block-nerve-medial branch-lumbar Bilateral L 3,4,5;  Surgeon: Devan Watt MD;  Location: [facility];  Service: Pain Management;  Laterality: Bilateral;    KNEE ARTHROPLASTY Bilateral     LENGTHENING OF ACHILLES TENDON Right 9/11/2020    Procedure: percutaeous tenotomy of right lateral epicondyle (tenex);  Surgeon: Terry Quach MD;  Location: [facility];  Service: Neurosurgery;  Laterality: Right;  tenex to right lateral epicondyle  Tenex machine SN#[id number], total time 1min. 30seconds    NECK SURGERY      POSTERIOR FUSION OF CERVICAL SPINE WITH LAMINECTOMY N/A 11/7/2018    Procedure: C2-C6 Posterior Cervical Laminectomy & Instrumental Fusion;  Surgeon: Kishore Turner MD;  Location: [facility] 2ND FLR;  Service: Neurosurgery;  Laterality: N/A;    TONSILLECTOMY      TRANSFORAMINAL EPIDURAL INJECTION OF STEROID Right 12/20/2019    Procedure: Injection,steroid,epidural,transforaminal approach;  Surgeon: Devan Watt MD;  Location: [facility];  Service: Pain Management;  Laterality: Right;  L3-4, L4-5    TRANSFORAMINAL  EPIDURAL INJECTION OF STEROID Bilateral 2/6/2020    Procedure: Injection,steroid,epidural,transforaminal approach;  Surgeon: Devan Watt MD;  [facility];  Service: Pain Management;  Laterality: Bilateral;  L3-L4,L4-L5    TRANSFORAMINAL EPIDURAL INJECTION OF STEROID Bilateral 8/26/2020    Procedure: Injection,steroid,epidural,transforaminal approach;  Surgeon: Devan Watt MD;  [facility];  Service: Pain Management;  Laterality: Bilateral;  L3-4, L4-5    TRANSRECTAL ULTRASOUND EXAMINATION N/A 2/26/2019    Procedure: ULTRASOUND, RECTAL APPROACH;  Surgeon: Simba Cheung MD;  [facility];  Service: Urology;  Laterality: N/A;    UPPER GASTROINTESTINAL ENDOSCOPY  09/12/2018    Dr Hernandez; gastritis; extensive intestinal metaplasia; repeat in 1-2- years       Family History   Problem Relation Age of Onset    Cataracts Mother     Heart disease Mother         CHF    Hypertension Mother     Hyperlipidemia Mother     Cataracts Father     Glaucoma Father     Heart disease Father     Hyperlipidemia Sister     Hypertension Sister     No Known Problems Daughter     No Known Problems Daughter     Collagen disease Neg Hx     Amblyopia Neg Hx     Blindness Neg Hx     Macular degeneration Neg Hx     Retinal detachment Neg Hx     Strabismus Neg Hx     Cancer Neg Hx     Colon cancer Neg Hx     Esophageal cancer Neg Hx     Stomach cancer Neg Hx     Crohn's disease Neg Hx     Ulcerative colitis Neg Hx        Social History     Socioeconomic History    Marital status:    Tobacco Use    Smoking status: Never Smoker    Smokeless tobacco: Never Used    Tobacco comment: when a child   Substance and Sexual Activity    Alcohol use: Yes     Comment: rarely    Drug use: No    Sexual activity: Yes     Partners: Female     Social Determinants of Health     Financial Resource Strain: Low Risk     Difficulty of Paying Living Expenses: Not hard at all   Food Insecurity: No Food Insecurity     Worried About Running Out of Food in the Last Year: Never true    Ran Out of Food in the Last Year: Never true   Transportation Needs: No Transportation Needs    Lack of Transportation (Medical): No    Lack of Transportation (Non-Medical): No   Physical Activity: Inactive    Days of Exercise per Week: 0 days    Minutes of Exercise per Session: 0 min   Stress: No Stress Concern Present    Feeling of Stress : Not at all   Social Connections: Unknown    Frequency of Communication with Friends and Family: More than three times a week    Frequency of Social Gatherings with Friends and Family: More than three times a week    Active Member of Clubs or Organizations: Yes    Attends Club or Organization Meetings: More than 4 times per year    Marital Status:    Housing Stability: Low Risk     Unable to Pay for Housing in the Last Year: No    Number of Places Lived in the Last Year: 1    Unstable Housing in the Last Year: No       Current Outpatient Medications   Medication Sig Dispense Refill    albuterol (PROVENTIL/VENTOLIN HFA) 90 mcg/actuation inhaler INHALE 1 TO 2 PUFFS INTO THE LUNGS EVERY 6 HOURS AS NEEDED FOR WHEEZING OR SHORTNESS OF BREATH. FOR RESCUE. 25.5 g 0    atorvastatin (LIPITOR) 80 MG tablet TAKE 1 TABLET EVERY DAY 90 tablet 3    bumetanide (BUMEX) 0.5 MG Tab Take 1 tablet (0.5 mg total) by mouth 2 (two) times daily. 60 tablet 1    carvediloL (COREG) 25 MG tablet TAKE 1 TABLET TWICE DAILY WITH MEALS 180 tablet 3    cetirizine (ZYRTEC) 10 MG tablet Take 1 tablet by mouth daily as needed.      chlorthalidone (HYGROTEN) 25 MG Tab Take 25 mg by mouth once daily.      cholecalciferol, vitamin D3, (VITAMIN D3) 50 mcg (2,000 unit) Cap 1 capsule once daily. Every day      cholestyramine (QUESTRAN) 4 gram packet Take 1 packet (4 g total) by mouth 3 (three) times daily with meals. 270 packet 3    coenzyme Q10 (CO Q-10) 100 mg capsule Take 400 mg by mouth once daily.       cyanocobalamin, vitamin B-12, 5,000 mcg Subl Take 5,000 mcg by mouth once daily. Every day      diphenoxylate-atropine 2.5-0.025 mg (LOMOTIL) 2.5-0.025 mg per tablet Take 1 tablet by mouth 4 (four) times daily as needed for Diarrhea. 90 tablet 0    ezetimibe (ZETIA) 10 mg tablet TAKE 1 TABLET EVERY DAY 90 tablet 3    FLUAD QUAD 2021-22,65Y UP,,PF, 60 mcg (15 mcg x 4)/0.5 mL Syrg       fluticasone (FLONASE) 50 mcg/actuation nasal spray USE 1 SPRAY IN EACH NOSTRIL TWICE DAILY AS NEEDED  FOR  RHINITIS 48 g 3    gabapentin (NEURONTIN) 300 MG capsule TAKE 1 CAPSULE THREE TIMES DAILY 270 capsule 5    KENALOG 40 mg/mL injection       levothyroxine (SYNTHROID) 50 MCG tablet TAKE 1 TABLET EVERY DAY 90 tablet 3    methylsulfonylmethane 1,000 mg Tab Take 1,000 mg by mouth once daily. Every day      milk thistle 200 mg Cap Take 200 mg by mouth 2 (two) times daily. Twice a day      mupirocin (BACTROBAN) 2 % ointment Apply topically 3 (three) times daily. 30 g 3    olmesartan (BENICAR) 20 MG tablet TAKE 1 TABLET EVERY DAY 90 tablet 3    omega-3 fatty acids 1,000 mg Cap Twice a day      pantoprazole (PROTONIX) 40 MG tablet Take 1 tablet (40 mg total) by mouth 2 (two) times daily. 180 tablet 3    potassium chloride SA (K-DUR,KLOR-CON) 20 MEQ tablet TAKE 4 TABLETS EVERY DAY  OR AS DIRECTED 360 tablet 3    predniSONE (DELTASONE) 1 MG tablet TAKE 2 TABLETS EVERY  tablet 3    predniSONE (DELTASONE) 5 MG tablet TAKE 1 TABLET EVERY DAY 90 tablet 3    sildenafil (REVATIO) 20 mg Tab Take 1 to 3 tabs daily as needed. 30 tablet 3    spironolactone (ALDACTONE) 25 MG tablet Take 25 mg by mouth once daily.      tiotropium (SPIRIVA WITH HANDIHALER) 18 mcg inhalation capsule INHALE THE CONTENTS OF 1 CAPSULE (18 MCG TOTAL) INTO THE LUNGS ONCE DAILY. CONTROLLER 90 capsule 3    VENTOLIN HFA 90 mcg/actuation inhaler Inhale 2 puffs into the lungs every 6 (six) hours as needed for Wheezing. Rescue 18 g 0     Current  Facility-Administered Medications   Medication Dose Route Frequency Provider Last Rate Last Admin    acetaminophen tablet 650 mg  650 mg Oral Once PRN Simba Loya NP         Facility-Administered Medications Ordered in Other Visits   Medication Dose Route Frequency Provider Last Rate Last Admin    0.9%  NaCl infusion   Intravenous Continuous Devan Watt MD        lidocaine (PF) 10 mg/ml (1%) injection 10 mg  1 mL Intradermal Once Terry Quach MD           Review of patient's allergies indicates:   Allergen Reactions    Spironolactone Diarrhea       Review of Systems   Constitutional: Negative for chills and fever.   Cardiovascular: Negative for claudication.   Skin: Positive for color change and nail changes. Negative for suspicious lesions.   Musculoskeletal: Positive for arthritis and back pain. Negative for joint swelling, muscle cramps and muscle weakness.   Neurological: Negative for numbness and paresthesias.   Psychiatric/Behavioral: Negative for altered mental status.           Objective:      Physical Exam  Constitutional:       General: He is not in acute distress.     Appearance: He is well-developed. He is not diaphoretic.   Cardiovascular:      Pulses:           Dorsalis pedis pulses are 2+ on the right side and 2+ on the left side.        Posterior tibial pulses are 2+ on the right side and 2+ on the left side.      Comments: CFT is < 3 seconds bilateral.  Pedal hair growth is present bilateral.  Varicosities noted bilateral.  Moderate nonpitting lower extremity edema noted bilateral.  Toes are warm to touch bilateral.    Musculoskeletal:         General: Tenderness present.      Comments: Muscle strength 5/5 in all muscle groups bilateral.  No tenderness nor crepitation with ROM of foot/ankle joints bilateral.  Pain with palpation to the lateral nail fold of the Lt. Hallux.     Skin:     General: Skin is warm and dry.      Capillary Refill: Capillary refill takes 2 to 3 seconds.       Coloration: Skin is not pale.      Findings: No abrasion, bruising, burn, ecchymosis, erythema, laceration, lesion or rash.      Comments: Pedal skin has normal turgor, temperature, and texture bilateral.  Incurvation noted to the lateral border of the Lt. Hallux toenail.  Mild edema noted to the lateral nail fold.  No erythema, fluctuance, purulence, or malodor noted.  Remaining toenails x 9 appear normotrophic.     Neurological:      Mental Status: He is alert and oriented to person, place, and time.      Sensory: No sensory deficit.      Comments: Light touch is intact bilateral.                 Assessment:       Encounter Diagnoses   Name Primary?    Ingrown toenail of left foot Yes    Toe pain, left          Plan:       Diagnoses and all orders for this visit:    Ingrown toenail of left foot    Toe pain, left      I counseled the patient on his conditions, their implications and medical management.    Discussed, in depth, the risks, benefits, procedure, and follow up care associated with a partial matrixectomy of the lateral border of the Lt. great toenail. All questions were thoroughly answered. Consent was read, signed, and witnessed. See attached procedure note.      Given verbal and written wound care/dressing change instructions.      Rest, ice, and elevate the surgical extremity.       Instructed to take tylenol for postoperative pain.    RTC in 1 week for a follow up.     Khris Valentin DPM

## 2022-04-28 NOTE — PROCEDURES
Nail Removal    Date/Time: 4/28/2022 1:00 PM  Performed by: Khris Valentin DPM  Authorized by: Khris Valentin DPM     Consent Done?:  Yes (Written)  Time out: Immediately prior to the procedure a time out was called    Prep: patient was prepped and draped in usual sterile fashion    Location:     Location:  Left foot    Location detail:  Left big toe  Anesthesia:     Anesthesia:  Digital block    Local anesthetic:  Lidocaine 1% without epinephrine    Anesthetic total (ml):  4  Procedure Details:     Preparation:  Skin prepped with alcohol and skin prepped with Betadine    Amount removed:  Partial    Side:  Lateral    Wedge excision of skin of nail fold: No      Nail bed sutured?: No      Nail matrix removed:  None    Removed nail replaced and anchored: No      Dressing applied:  4x4, antibiotic ointment and dressing applied    Patient tolerance:  Patient tolerated the procedure well with no immediate complications     Applied three 30 second application of phenol.

## 2022-05-03 ENCOUNTER — OFFICE VISIT (OUTPATIENT)
Dept: CARDIOLOGY | Facility: CLINIC | Age: 77
End: 2022-05-03
Payer: MEDICARE

## 2022-05-03 VITALS
BODY MASS INDEX: 31.44 KG/M2 | HEART RATE: 66 BPM | SYSTOLIC BLOOD PRESSURE: 114 MMHG | HEIGHT: 73 IN | DIASTOLIC BLOOD PRESSURE: 60 MMHG | WEIGHT: 237.19 LBS

## 2022-05-03 DIAGNOSIS — I48.0 PAROXYSMAL ATRIAL FIBRILLATION: ICD-10-CM

## 2022-05-03 DIAGNOSIS — I87.2 VENOUS STASIS DERMATITIS OF BOTH LOWER EXTREMITIES: ICD-10-CM

## 2022-05-03 DIAGNOSIS — I70.0 ABDOMINAL AORTIC ATHEROSCLEROSIS: Primary | ICD-10-CM

## 2022-05-03 DIAGNOSIS — M79.89 LEG SWELLING: ICD-10-CM

## 2022-05-03 DIAGNOSIS — G70.00 MYASTHENIA GRAVIS: ICD-10-CM

## 2022-05-03 DIAGNOSIS — I89.0 LYMPHEDEMA OF BOTH LOWER EXTREMITIES: ICD-10-CM

## 2022-05-03 DIAGNOSIS — I77.1 TORTUOUS AORTA: ICD-10-CM

## 2022-05-03 DIAGNOSIS — E78.2 MIXED HYPERLIPIDEMIA: ICD-10-CM

## 2022-05-03 DIAGNOSIS — E66.9 OBESITY (BMI 30-39.9): ICD-10-CM

## 2022-05-03 DIAGNOSIS — I10 HTN (HYPERTENSION), BENIGN: ICD-10-CM

## 2022-05-03 DIAGNOSIS — I65.23 BILATERAL CAROTID ARTERY STENOSIS: ICD-10-CM

## 2022-05-03 DIAGNOSIS — M51.36 DDD (DEGENERATIVE DISC DISEASE), LUMBAR: ICD-10-CM

## 2022-05-03 PROCEDURE — 99999 PR PBB SHADOW E&M-EST. PATIENT-LVL V: ICD-10-PCS | Mod: PBBFAC,,, | Performed by: INTERNAL MEDICINE

## 2022-05-03 PROCEDURE — 3288F PR FALLS RISK ASSESSMENT DOCUMENTED: ICD-10-PCS | Mod: CPTII,S$GLB,, | Performed by: INTERNAL MEDICINE

## 2022-05-03 PROCEDURE — 99999 PR PBB SHADOW E&M-EST. PATIENT-LVL V: CPT | Mod: PBBFAC,,, | Performed by: INTERNAL MEDICINE

## 2022-05-03 PROCEDURE — 3074F SYST BP LT 130 MM HG: CPT | Mod: CPTII,S$GLB,, | Performed by: INTERNAL MEDICINE

## 2022-05-03 PROCEDURE — 1126F PR PAIN SEVERITY QUANTIFIED, NO PAIN PRESENT: ICD-10-PCS | Mod: CPTII,S$GLB,, | Performed by: INTERNAL MEDICINE

## 2022-05-03 PROCEDURE — 1157F ADVNC CARE PLAN IN RCRD: CPT | Mod: CPTII,S$GLB,, | Performed by: INTERNAL MEDICINE

## 2022-05-03 PROCEDURE — 1157F PR ADVANCE CARE PLAN OR EQUIV PRESENT IN MEDICAL RECORD: ICD-10-PCS | Mod: CPTII,S$GLB,, | Performed by: INTERNAL MEDICINE

## 2022-05-03 PROCEDURE — 1101F PT FALLS ASSESS-DOCD LE1/YR: CPT | Mod: CPTII,S$GLB,, | Performed by: INTERNAL MEDICINE

## 2022-05-03 PROCEDURE — 99215 OFFICE O/P EST HI 40 MIN: CPT | Mod: S$GLB,,, | Performed by: INTERNAL MEDICINE

## 2022-05-03 PROCEDURE — 1159F MED LIST DOCD IN RCRD: CPT | Mod: CPTII,S$GLB,, | Performed by: INTERNAL MEDICINE

## 2022-05-03 PROCEDURE — 1126F AMNT PAIN NOTED NONE PRSNT: CPT | Mod: CPTII,S$GLB,, | Performed by: INTERNAL MEDICINE

## 2022-05-03 PROCEDURE — 1159F PR MEDICATION LIST DOCUMENTED IN MEDICAL RECORD: ICD-10-PCS | Mod: CPTII,S$GLB,, | Performed by: INTERNAL MEDICINE

## 2022-05-03 PROCEDURE — 1101F PR PT FALLS ASSESS DOC 0-1 FALLS W/OUT INJ PAST YR: ICD-10-PCS | Mod: CPTII,S$GLB,, | Performed by: INTERNAL MEDICINE

## 2022-05-03 PROCEDURE — 3078F DIAST BP <80 MM HG: CPT | Mod: CPTII,S$GLB,, | Performed by: INTERNAL MEDICINE

## 2022-05-03 PROCEDURE — 3078F PR MOST RECENT DIASTOLIC BLOOD PRESSURE < 80 MM HG: ICD-10-PCS | Mod: CPTII,S$GLB,, | Performed by: INTERNAL MEDICINE

## 2022-05-03 PROCEDURE — 3288F FALL RISK ASSESSMENT DOCD: CPT | Mod: CPTII,S$GLB,, | Performed by: INTERNAL MEDICINE

## 2022-05-03 PROCEDURE — 99215 PR OFFICE/OUTPT VISIT, EST, LEVL V, 40-54 MIN: ICD-10-PCS | Mod: S$GLB,,, | Performed by: INTERNAL MEDICINE

## 2022-05-03 PROCEDURE — 3074F PR MOST RECENT SYSTOLIC BLOOD PRESSURE < 130 MM HG: ICD-10-PCS | Mod: CPTII,S$GLB,, | Performed by: INTERNAL MEDICINE

## 2022-05-03 NOTE — PROGRESS NOTES
[facility]      Chief Complaint   Patient presents with    Leg Swelling    Hyperlipidemia       Patient ID: Maximilian Tavera Jr. is a 76 y.o. male with BLE lymphedema, venous insuffciency, HTN, HLD, myasthenia gravis on chronic prednisone therapy, degenerative joint disease with myelopathy s/p decompression and fusion of C2-6, chronic PVCs, who presents for a follow up appointment.  Pertinent history/events are as follows:     -Pt kindly referred by Dr. Altamirano for evaluation of leg swelling.    -At our initial clinic visit on 2/3/2022, Mr. Tavera reports leg swelling starting 3-4 months ago.  States his PCP wrapped his legs 2 weeks ago and he lost 8 pounds.  He has no claudication, rest pain, or tissue loss.  Plan:   Leg swelling- Due to lymphedema and possible venous insufficiency.  Check BLE venous reflux study and KATTY study.  Refer to lymphedema clinic.  Increase bumex to 1 mg bid every other day (0.5 mg bid on alternate days).  Pt to limit sodium intake to 2,000 mg daily.  Limit volume intake to 1.5 liters daily.  Check cmp in 1 week.    HTN- Continue current medications.   Chronic PVC's- Stable.  Continue current medications and follow up with EP as scheduled.    HLD- LDL is at goal of <70.  Continue current medications.    HPI:  Mr. Tavera reports significant improvement in BLE edema since completing lymphedema clinic therapy.  He continues to do lymphedema clinic techniques at home and wear graduated compression hose.  BLE Venous Reflux Study on 2/10/2022 revealed significant RLE venous reflux and no evidence of lower extremity DVT.  KATTY Study on 2/10/2022 revealed noncompressible ABIs bilaterally consistent with medial arterial calcification.  PVR waveforms are mildly abnormal bilaterally.  Pt states he stopped taking bumex due to frequent urination.     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    Emphysema lung 11/05/2018    Gastritis     Hx of colonic polyp     Hyperlipidemia     Hypertension     Hypothyroidism     Knee fracture     Myasthenia gravis     Neuropathy 1/3/2013    Obesity 1/29/2015    Pneumonia 3/29/2020    Polyneuropathy     PVC (premature ventricular contraction)     Squamous cell carcinoma 2014    left forearm    Thyroid disease      Past Surgical History:   Procedure Laterality Date    APPENDECTOMY      CATARACT EXTRACTION W/  INTRAOCULAR LENS IMPLANT Bilateral     COLONOSCOPY  03/01/2012    Dr Esteban; hyperplastic polyp; repeat in 5 years    COLONOSCOPY N/A 12/7/2021    Procedure: COLONOSCOPY;  Surgeon: Gabriel Hernandez MD;  Location: [facility];  Service: Endoscopy;  Laterality: N/A;    CYSTOSCOPY N/A 2/26/2019    Procedure: CYSTOSCOPY;  Surgeon: Simba Cheung MD;  Location: [facility] OR;  Service: Urology;  Laterality: N/A;    ENDOSCOPIC ULTRASOUND OF UPPER GASTROINTESTINAL TRACT N/A 1/7/2020    Procedure: ULTRASOUND, UPPER GI TRACT, ENDOSCOPIC;  Surgeon: Kati Ryan MD;  [facility]  Service: Endoscopy;  Laterality: N/A;    ESOPHAGOGASTRODUODENOSCOPY N/A 9/12/2018    Procedure: EGD (ESOPHAGOGASTRODUODENOSCOPY);  Surgeon: Gabriel Hernandez MD;  Location: [facility];  Service: Endoscopy;  Laterality: N/A;    ESOPHAGOGASTRODUODENOSCOPY N/A 8/19/2019    Procedure: EGD (ESOPHAGOGASTRODUODENOSCOPY);  Surgeon: Gabriel Hernandez MD;  Location: [facility];  Service: Endoscopy;  Laterality: N/A;    ESOPHAGOGASTRODUODENOSCOPY N/A 10/7/2019    Procedure: EGD (ESOPHAGOGASTRODUODENOSCOPY);  Surgeon: Gabriel Hernandez MD;  Location: [facility];  Service: Endoscopy;  Laterality: N/A;    ESOPHAGOGASTRODUODENOSCOPY N/A 1/7/2020    Procedure: EGD (ESOPHAGOGASTRODUODENOSCOPY);  Surgeon:  Kati Ryan MD;  Location: [facility] ENDO (2ND FLR);  Service: Endoscopy;  Laterality: N/A;    HEMORRHOID SURGERY      INJECTION OF ANESTHETIC AGENT AROUND MEDIAL BRANCH NERVES INNERVATING LUMBAR FACET JOINT Bilateral 10/1/2020    Procedure: Block-nerve-medial branch-lumbar Bilateral L 3,4,5;  Surgeon: Devan Watt MD;  Location: [facility];  Service: Pain Management;  Laterality: Bilateral;    KNEE ARTHROPLASTY Bilateral     LENGTHENING OF ACHILLES TENDON Right 9/11/2020    Procedure: percutaeous tenotomy of right lateral epicondyle (tenex);  Surgeon: Terry Quach MD;  Location: [facility] OR;  Service: Neurosurgery;  Laterality: Right;  tenex to right lateral epicondyle  Tenex machine SN#[id number], total time 1min. 30seconds    NECK SURGERY      POSTERIOR FUSION OF CERVICAL SPINE WITH LAMINECTOMY N/A 11/7/2018    Procedure: C2-C6 Posterior Cervical Laminectomy & Instrumental Fusion;  Surgeon: Kishore Turner MD;  Location: [facility] OR 2ND FLR;  Service: Neurosurgery;  Laterality: N/A;    TONSILLECTOMY      TRANSFORAMINAL EPIDURAL INJECTION OF STEROID Right 12/20/2019    Procedure: Injection,steroid,epidural,transforaminal approach;  Surgeon: Devan Watt MD;  Location: [facility] OR;  Service: Pain Management;  Laterality: Right;  L3-4, L4-5    TRANSFORAMINAL EPIDURAL INJECTION OF STEROID Bilateral 2/6/2020    Procedure: Injection,steroid,epidural,transforaminal approach;  Surgeon: Devan Watt MD;  Location: [facility] OR;  Service: Pain Management;  Laterality: Bilateral;  L3-L4,L4-L5    TRANSFORAMINAL EPIDURAL INJECTION OF STEROID Bilateral 8/26/2020    Procedure: Injection,steroid,epidural,transforaminal approach;  Surgeon: Devan Watt MD;  Location: [facility] OR;  Service: Pain Management;  Laterality: Bilateral;  L3-4, L4-5    TRANSRECTAL ULTRASOUND EXAMINATION N/A 2/26/2019    Procedure: ULTRASOUND, RECTAL APPROACH;  Surgeon: Simba Cheung MD;  Location: [facility] OR;  Service: Urology;  Laterality: N/A;    UPPER  GASTROINTESTINAL ENDOSCOPY  09/12/2018    Dr Hernandez; gastritis; extensive intestinal metaplasia; repeat in 1-2- years     Social History     Socioeconomic History    Marital status:    Tobacco Use    Smoking status: Never Smoker    Smokeless tobacco: Never Used    Tobacco comment: when a child   Substance and Sexual Activity    Alcohol use: Yes     Comment: rarely    Drug use: No    Sexual activity: Yes     Partners: Female     Social Determinants of Health     Financial Resource Strain: Low Risk     Difficulty of Paying Living Expenses: Not hard at all   Food Insecurity: No Food Insecurity    Worried About Running Out of Food in the Last Year: Never true    Ran Out of Food in the Last Year: Never true   Transportation Needs: No Transportation Needs    Lack of Transportation (Medical): No    Lack of Transportation (Non-Medical): No   Physical Activity: Inactive    Days of Exercise per Week: 0 days    Minutes of Exercise per Session: 0 min   Stress: No Stress Concern Present    Feeling of Stress : Not at all   Social Connections: Unknown    Frequency of Communication with Friends and Family: More than three times a week    Frequency of Social Gatherings with Friends and Family: More than three times a week    Active Member of Clubs or Organizations: Yes    Attends Club or Organization Meetings: More than 4 times per year    Marital Status:    Housing Stability: Low Risk     Unable to Pay for Housing in the Last Year: No    Number of Places Lived in the Last Year: 1    Unstable Housing in the Last Year: No     Family History   Problem Relation Age of Onset    Cataracts Mother     Heart disease Mother         CHF    Hypertension Mother     Hyperlipidemia Mother     Cataracts Father     Glaucoma Father     Heart disease Father     Hyperlipidemia Sister     Hypertension Sister     No Known Problems Daughter     No Known Problems Daughter     Collagen disease Neg Hx      Amblyopia Neg Hx     Blindness Neg Hx     Macular degeneration Neg Hx     Retinal detachment Neg Hx     Strabismus Neg Hx     Cancer Neg Hx     Colon cancer Neg Hx     Esophageal cancer Neg Hx     Stomach cancer Neg Hx     Crohn's disease Neg Hx     Ulcerative colitis Neg Hx        Review of patient's allergies indicates:   Allergen Reactions    Spironolactone Diarrhea       Medication List with Changes/Refills   Current Medications    ALBUTEROL (PROVENTIL/VENTOLIN HFA) 90 MCG/ACTUATION INHALER    INHALE 1 TO 2 PUFFS INTO THE LUNGS EVERY 6 HOURS AS NEEDED FOR WHEEZING OR SHORTNESS OF BREATH. FOR RESCUE.    ATORVASTATIN (LIPITOR) 80 MG TABLET    TAKE 1 TABLET EVERY DAY    BUMETANIDE (BUMEX) 0.5 MG TAB    Take 1 tablet (0.5 mg total) by mouth 2 (two) times daily.    CARVEDILOL (COREG) 25 MG TABLET    TAKE 1 TABLET TWICE DAILY WITH MEALS    CETIRIZINE (ZYRTEC) 10 MG TABLET    Take 1 tablet by mouth daily as needed.    CHLORTHALIDONE (HYGROTEN) 25 MG TAB    Take 25 mg by mouth once daily.    CHOLECALCIFEROL, VITAMIN D3, (VITAMIN D3) 50 MCG (2,000 UNIT) CAP    1 capsule once daily. Every day    CHOLESTYRAMINE (QUESTRAN) 4 GRAM PACKET    Take 1 packet (4 g total) by mouth 3 (three) times daily with meals.    COENZYME Q10 (CO Q-10) 100 MG CAPSULE    Take 400 mg by mouth once daily.    CYANOCOBALAMIN, VITAMIN B-12, 5,000 MCG SUBL    Take 5,000 mcg by mouth once daily. Every day    DIPHENOXYLATE-ATROPINE 2.5-0.025 MG (LOMOTIL) 2.5-0.025 MG PER TABLET    Take 1 tablet by mouth 4 (four) times daily as needed for Diarrhea.    EZETIMIBE (ZETIA) 10 MG TABLET    TAKE 1 TABLET EVERY DAY    FLUAD QUAD 2021-22,65Y UP,,PF, 60 MCG (15 MCG X 4)/0.5 ML SYRG        FLUTICASONE (FLONASE) 50 MCG/ACTUATION NASAL SPRAY    USE 1 SPRAY IN EACH NOSTRIL TWICE DAILY AS NEEDED  FOR  RHINITIS    GABAPENTIN (NEURONTIN) 300 MG CAPSULE    TAKE 1 CAPSULE THREE TIMES DAILY    KENALOG 40 MG/ML INJECTION        LEVOTHYROXINE (SYNTHROID) 50 MCG  "TABLET    TAKE 1 TABLET EVERY DAY    METHYLSULFONYLMETHANE 1,000 MG TAB    Take 1,000 mg by mouth once daily. Every day    MILK THISTLE 200 MG CAP    Take 200 mg by mouth 2 (two) times daily. Twice a day    MUPIROCIN (BACTROBAN) 2 % OINTMENT    Apply topically 3 (three) times daily.    OLMESARTAN (BENICAR) 20 MG TABLET    TAKE 1 TABLET EVERY DAY    OMEGA-3 FATTY ACIDS 1,000 MG CAP    Twice a day    PANTOPRAZOLE (PROTONIX) 40 MG TABLET    Take 1 tablet (40 mg total) by mouth 2 (two) times daily.    POTASSIUM CHLORIDE SA (K-DUR,KLOR-CON) 20 MEQ TABLET    TAKE 4 TABLETS EVERY DAY  OR AS DIRECTED    PREDNISONE (DELTASONE) 1 MG TABLET    TAKE 2 TABLETS EVERY DAY    PREDNISONE (DELTASONE) 5 MG TABLET    TAKE 1 TABLET EVERY DAY    SILDENAFIL (REVATIO) 20 MG TAB    Take 1 to 3 tabs daily as needed.    SPIRONOLACTONE (ALDACTONE) 25 MG TABLET    Take 25 mg by mouth once daily.    TIOTROPIUM (SPIRIVA WITH HANDIHALER) 18 MCG INHALATION CAPSULE    INHALE THE CONTENTS OF 1 CAPSULE (18 MCG TOTAL) INTO THE LUNGS ONCE DAILY. CONTROLLER    VARICELLA-ZOSTER [id number], PF, (SHINGRIX, PF,) 50 MCG/0.5 ML INJECTION    Inject into the muscle.    VENTOLIN HFA 90 MCG/ACTUATION INHALER    Inhale 2 puffs into the lungs every 6 (six) hours as needed for Wheezing. Rescue       Review of Systems  Constitution: Denies chills, fever, and sweats.  HENT: Denies headaches or blurry vision.  Cardiovascular: Denies chest pain or irregular heart beat.  Respiratory: Denies cough or shortness of breath.  Gastrointestinal: Denies abdominal pain, nausea, or vomiting.  Musculoskeletal: Positive for leg swelling.  Neurological: Denies dizziness or focal weakness.  Psychiatric/Behavioral: Normal mental status.  Hematologic/Lymphatic: Denies bleeding problem or easy bruising/bleeding.  Skin: Denies rash or suspicious lesions    Physical Examination  /60   Pulse 66   Ht 6' 1" (1.854 m)   Wt 107.6 kg (237 lb 3.4 oz)   BMI 31.30 kg/m²     Constitutional: " No acute distress, conversant  HEENT: Sclera anicteric, Pupils equal, round and reactive to light, extraocular motions intact, Oropharynx clear  Neck: No JVD, no carotid bruits  Cardiovascular: regular rate and rhythm, no murmur, rubs or gallops, normal S1/S2  Pulmonary: Clear to auscultation bilaterally  Abdominal: Abdomen soft, nontender, nondistended, positive bowel sounds  Extremities: BLE's with 1+ pitting edema, venous stasis dermatitis and changes consistent with lymphedema   Pulses:  Carotid pulses are 2+ on the right side, and 2+ on the left side.  Radial pulses are 2+ on the right side, and 2+ on the left side.   Femoral pulses are 2+ on the right side, and 2+ on the left side.  Popliteal pulses are 2+ on the right side, and 2+ on the left side.   Dorsalis pedis pulses are 2+ on the right side, and 2+ on the left side.   Posterior tibial pulses are 2+ on the right side, and 2+ on the left side.    Skin: No ecchymosis, erythema, or ulcers  Psych: Alert and oriented x 3, appropriate affect  Neuro: CNII-XII intact, no focal deficits    Labs:  Most Recent Data  CBC:   Lab Results   Component Value Date    WBC 5.77 01/03/2022    HGB 13.1 (L) 01/03/2022    HCT 42.2 01/03/2022     (L) 01/03/2022    MCV 95 01/03/2022    RDW 13.1 01/03/2022     BMP:   Lab Results   Component Value Date     04/12/2022    K 4.1 04/12/2022     04/12/2022    CO2 29 04/12/2022    BUN 23 04/12/2022    CREATININE 1.3 04/12/2022    GLU 99 04/12/2022    CALCIUM 9.4 04/12/2022    MG 2.1 01/14/2022    PHOS 2.9 03/31/2020     LFTS;   Lab Results   Component Value Date    PROT 6.0 02/10/2022    ALBUMIN 3.2 (L) 02/10/2022    BILITOT 1.2 (H) 02/10/2022    AST 19 02/10/2022    ALKPHOS 68 02/10/2022    ALT 16 02/10/2022     COAGS:   Lab Results   Component Value Date    INR 1.0 10/30/2018     FLP:   Lab Results   Component Value Date    CHOL 122 01/03/2022    HDL 41 01/03/2022    LDLCALC 67.4 01/03/2022    TRIG 68 01/03/2022     CHOLHDL 33.6 01/03/2022     CARDIAC:   Lab Results   Component Value Date    TROPONINI 0.044 (H) 03/29/2020    BNP 81 01/10/2022       EKG 9/23/2021:  Normal sinus rhythm  Left axis deviation  Incomplete right bundle branch block    BLE Venous Reflux Study 2/10/2022:  No evidence of deep or superficial venous thrombosis bilaterally.  The right GSV demonstrates focal reflux below the knee.  Bilateral lower extremity varicosities are noted.    KATTY Study 2/10/2022:  Noncompressible ABIs bilaterally consistent with medial arterial calcification.  PVR waveforms are mildly abnormal bilaterally.    BLE Venous Ultrasound 1/10/2022:  Negative for DVT throughout bilateral lower extremities.    Echo 6/2/2021:  · Severe left atrial enlargement.  · The left ventricle is normal in size with concentric hypertrophy and normal systolic function.  · The estimated ejection fraction is 65%.  · Indeterminate left ventricular diastolic function.  · Normal right ventricular size with normal right ventricular systolic function.  · The estimated PA systolic pressure is 28 mmHg.  · Normal central venous pressure (3 mmHg).    Assessment/Plan:  Maximilian Tavera Jr. is a 76 y.o. male with BLE lymphedema, venous insuffciency, HTN, HLD, myasthenia gravis on chronic prednisone therapy, degenerative joint disease with myelopathy s/p decompression and fusion of C2-6, chronic PVCs, who presents for a follow up appointment.       1. Leg swelling- Mr. Tavera reports significant improvement in BLE edema since completing lymphedema clinic therapy.  BLE Venous Reflux Study on 2/10/2022 revealed significant RLE venous reflux and no evidence of lower extremity DVT.  KATTY Study on 2/10/2022 revealed noncompressible ABIs bilaterally consistent with medial arterial calcification.  PVR waveforms are mildly abnormal bilaterally.  Continue lymphedema clinic techniques at home and wear graduated compression hose.  Pt to limit sodium intake to 2,000 mg daily.   Limit volume intake to 1.5 liters daily.  Check cmp in 1 week.      2. HTN- Continue current medications.     3. Chronic PVC's- Stable.  Continue current medications and follow up with EP as scheduled.      4. HLD- LDL is at goal of <70.  Continue atorvastatin 80 mg daily.       Follow up in 6 months    Total duration of face to face visit time 30 minutes.  Total time spent counseling greater than fifty percent of total visit time.  Counseling included discussion regarding imaging findings, diagnosis, possibilities, treatment options, risks and benefits.  The patient had many questions regarding the options and long-term effects.    Devang Amezquita MD, PhD  Interventional Cardiology

## 2022-05-03 NOTE — PATIENT INSTRUCTIONS
Assessment/Plan:  Maximilian Tavera Jr. is a 76 y.o. male with BLE lymphedema, venous insuffciency, HTN, HLD, myasthenia gravis on chronic prednisone therapy, degenerative joint disease with myelopathy s/p decompression and fusion of C2-6, chronic PVCs, who presents for a follow up appointment.       1. Leg swelling- Mr. Tavera reports significant improvement in BLE edema since completing lymphedema clinic therapy.  BLE Venous Reflux Study on 2/10/2022 revealed significant RLE venous reflux and no evidence of lower extremity DVT.  KATTY Study on 2/10/2022 revealed noncompressible ABIs bilaterally consistent with medial arterial calcification.  PVR waveforms are mildly abnormal bilaterally.  Continue lymphedema clinic techniques at home and wear graduated compression hose.  Pt to limit sodium intake to 2,000 mg daily.  Limit volume intake to 1.5 liters daily.  Check cmp in 1 week.      2. HTN- Continue current medications.     3. Chronic PVC's- Stable.  Continue current medications and follow up with EP as scheduled.      4. HLD- LDL is at goal of <70.  Continue atorvastatin 80 mg daily.       Follow up in 6 months

## 2022-05-04 ENCOUNTER — OFFICE VISIT (OUTPATIENT)
Dept: PODIATRY | Facility: CLINIC | Age: 77
End: 2022-05-04
Payer: MEDICARE

## 2022-05-04 VITALS — BODY MASS INDEX: 31.44 KG/M2 | WEIGHT: 237.19 LBS | HEIGHT: 73 IN

## 2022-05-04 DIAGNOSIS — Z98.890 STATUS POST NAIL SURGERY: Primary | ICD-10-CM

## 2022-05-04 DIAGNOSIS — L03.032 PARONYCHIA OF TOE OF LEFT FOOT: ICD-10-CM

## 2022-05-04 PROCEDURE — 1157F ADVNC CARE PLAN IN RCRD: CPT | Mod: CPTII,S$GLB,, | Performed by: PODIATRIST

## 2022-05-04 PROCEDURE — 1159F MED LIST DOCD IN RCRD: CPT | Mod: CPTII,S$GLB,, | Performed by: PODIATRIST

## 2022-05-04 PROCEDURE — 1101F PR PT FALLS ASSESS DOC 0-1 FALLS W/OUT INJ PAST YR: ICD-10-PCS | Mod: CPTII,S$GLB,, | Performed by: PODIATRIST

## 2022-05-04 PROCEDURE — 99024 PR POST-OP FOLLOW-UP VISIT: ICD-10-PCS | Mod: S$GLB,,, | Performed by: PODIATRIST

## 2022-05-04 PROCEDURE — 99024 POSTOP FOLLOW-UP VISIT: CPT | Mod: S$GLB,,, | Performed by: PODIATRIST

## 2022-05-04 PROCEDURE — 1159F PR MEDICATION LIST DOCUMENTED IN MEDICAL RECORD: ICD-10-PCS | Mod: CPTII,S$GLB,, | Performed by: PODIATRIST

## 2022-05-04 PROCEDURE — 1125F AMNT PAIN NOTED PAIN PRSNT: CPT | Mod: CPTII,S$GLB,, | Performed by: PODIATRIST

## 2022-05-04 PROCEDURE — 1125F PR PAIN SEVERITY QUANTIFIED, PAIN PRESENT: ICD-10-PCS | Mod: CPTII,S$GLB,, | Performed by: PODIATRIST

## 2022-05-04 PROCEDURE — 1101F PT FALLS ASSESS-DOCD LE1/YR: CPT | Mod: CPTII,S$GLB,, | Performed by: PODIATRIST

## 2022-05-04 PROCEDURE — 99999 PR PBB SHADOW E&M-EST. PATIENT-LVL II: ICD-10-PCS | Mod: PBBFAC,,, | Performed by: PODIATRIST

## 2022-05-04 PROCEDURE — 99999 PR PBB SHADOW E&M-EST. PATIENT-LVL II: CPT | Mod: PBBFAC,,, | Performed by: PODIATRIST

## 2022-05-04 PROCEDURE — 3288F PR FALLS RISK ASSESSMENT DOCUMENTED: ICD-10-PCS | Mod: CPTII,S$GLB,, | Performed by: PODIATRIST

## 2022-05-04 PROCEDURE — 1157F PR ADVANCE CARE PLAN OR EQUIV PRESENT IN MEDICAL RECORD: ICD-10-PCS | Mod: CPTII,S$GLB,, | Performed by: PODIATRIST

## 2022-05-04 PROCEDURE — 3288F FALL RISK ASSESSMENT DOCD: CPT | Mod: CPTII,S$GLB,, | Performed by: PODIATRIST

## 2022-05-04 RX ORDER — CLINDAMYCIN HYDROCHLORIDE 300 MG/1
300 CAPSULE ORAL EVERY 8 HOURS
Qty: 21 CAPSULE | Refills: 0 | Status: SHIPPED | OUTPATIENT
Start: 2022-05-04 | End: 2022-06-16 | Stop reason: ALTCHOICE

## 2022-05-05 NOTE — PROGRESS NOTES
Subjective:      Patient ID: Maximilian Tavera Jr. is a 76 y.o. male.    Chief Complaint: Post-op Evaluation  Patient presents to clinic 1 week S/P partial matrixectomy of the lateral border of the Lt. Hallux toenail.  Relates doing well postoperatively.  States there is a slight amount of pain at the proximal lateral portion of the nail fold in conjunction with some erythema.  Denies noticing purulent drainage or pus from the site.  Symptoms are aggravated only with pressure from shoe gear.  Otherwise, he is asymptomatic with today's exam. Denies experiencing N/V/F/C/D.  Denies any additional pedal complaints.      Past Medical History:   Diagnosis Date    A-fib 3/31/2020    Arthritis     Back pain     Carpal tunnel syndrome 2/25/2013    Cataract     Cataract, left eye 11/10/2014    Chest pain, musculoskeletal     COPD (chronic obstructive pulmonary disease) 11/052018    COPD with asthma 8/17/2021    Emphysema lung 11/05/2018    Gastritis     Hx of colonic polyp     Hyperlipidemia     Hypertension     Hypothyroidism     Knee fracture     Myasthenia gravis     Neuropathy 1/3/2013    Obesity 1/29/2015    Pneumonia 3/29/2020    Polyneuropathy     PVC (premature ventricular contraction)     Squamous cell carcinoma 2014    left forearm    Thyroid disease        Past Surgical History:   Procedure Laterality Date    APPENDECTOMY      CATARACT EXTRACTION W/  INTRAOCULAR LENS IMPLANT Bilateral     COLONOSCOPY  03/01/2012    Dr Esteban; hyperplastic polyp; repeat in 5 years    COLONOSCOPY N/A 12/7/2021    Procedure: COLONOSCOPY;  Surgeon: Gabriel Hernandez MD;  Location: [facility];  Service: Endoscopy;  Laterality: N/A;    CYSTOSCOPY N/A 2/26/2019    Procedure: CYSTOSCOPY;  Surgeon: Simba Cheung MD;  Location: [facility] OR;  Service: Urology;  Laterality: N/A;    ENDOSCOPIC ULTRASOUND OF UPPER GASTROINTESTINAL TRACT N/A 1/7/2020    Procedure: ULTRASOUND, UPPER GI TRACT, ENDOSCOPIC;   Surgeon: Kati Ryan MD;  Location: [facility] (2ND FLR);  Service: Endoscopy;  Laterality: N/A;    ESOPHAGOGASTRODUODENOSCOPY N/A 9/12/2018    Procedure: EGD (ESOPHAGOGASTRODUODENOSCOPY);  Surgeon: Gabriel Hernandez MD;  Location: [facility] ENDO;  Service: Endoscopy;  Laterality: N/A;    ESOPHAGOGASTRODUODENOSCOPY N/A 8/19/2019    Procedure: EGD (ESOPHAGOGASTRODUODENOSCOPY);  Surgeon: Gabriel Hernandez MD;  Location: [facility];  Service: Endoscopy;  Laterality: N/A;    ESOPHAGOGASTRODUODENOSCOPY N/A 10/7/2019    Procedure: EGD (ESOPHAGOGASTRODUODENOSCOPY);  Surgeon: Gabriel Hernandez MD;  Location: [facility];  Service: Endoscopy;  Laterality: N/A;    ESOPHAGOGASTRODUODENOSCOPY N/A 1/7/2020    Procedure: EGD (ESOPHAGOGASTRODUODENOSCOPY);  Surgeon: Kati Ryan MD;  Location: [facility] (2ND FLR);  Service: Endoscopy;  Laterality: N/A;    HEMORRHOID SURGERY      INJECTION OF ANESTHETIC AGENT AROUND MEDIAL BRANCH NERVES INNERVATING LUMBAR FACET JOINT Bilateral 10/1/2020    Procedure: Block-nerve-medial branch-lumbar Bilateral L 3,4,5;  Surgeon: Devan Watt MD;  [facility];  Service: Pain Management;  Laterality: Bilateral;    KNEE ARTHROPLASTY Bilateral     LENGTHENING OF ACHILLES TENDON Right 9/11/2020    Procedure: percutaeous tenotomy of right lateral epicondyle (tenex);  Surgeon: Terry Quach MD;  Location: [facility] OR;  Service: Neurosurgery;  Laterality: Right;  tenex to right lateral epicondyle  Tenex machine SN#[id number], total time 1min. 30seconds    NECK SURGERY      POSTERIOR FUSION OF CERVICAL SPINE WITH LAMINECTOMY N/A 11/7/2018    Procedure: C2-C6 Posterior Cervical Laminectomy & Instrumental Fusion;  Surgeon: Kishore Turner MD;  Location: [facility] 2ND FLR;  Service: Neurosurgery;  Laterality: N/A;    TONSILLECTOMY      TRANSFORAMINAL EPIDURAL INJECTION OF STEROID Right 12/20/2019    Procedure: Injection,steroid,epidural,transforaminal approach;  Surgeon: Devan Watt MD;  Location:  [facility] OR;  Service: Pain Management;  Laterality: Right;  L3-4, L4-5    TRANSFORAMINAL EPIDURAL INJECTION OF STEROID Bilateral 2/6/2020    Procedure: Injection,steroid,epidural,transforaminal approach;  Surgeon: Devan Watt MD;  Location: [facility] OR;  Service: Pain Management;  Laterality: Bilateral;  L3-L4,L4-L5    TRANSFORAMINAL EPIDURAL INJECTION OF STEROID Bilateral 8/26/2020    Procedure: Injection,steroid,epidural,transforaminal approach;  Surgeon: Devan Watt MD;  Location: [facility] OR;  Service: Pain Management;  Laterality: Bilateral;  L3-4, L4-5    TRANSRECTAL ULTRASOUND EXAMINATION N/A 2/26/2019    Procedure: ULTRASOUND, RECTAL APPROACH;  Surgeon: Simba Cheung MD;  Location: [facility] OR;  Service: Urology;  Laterality: N/A;    UPPER GASTROINTESTINAL ENDOSCOPY  09/12/2018    Dr Hernandez; gastritis; extensive intestinal metaplasia; repeat in 1-2- years       Family History   Problem Relation Age of Onset    Cataracts Mother     Heart disease Mother         CHF    Hypertension Mother     Hyperlipidemia Mother     Cataracts Father     Glaucoma Father     Heart disease Father     Hyperlipidemia Sister     Hypertension Sister     No Known Problems Daughter     No Known Problems Daughter     Collagen disease Neg Hx     Amblyopia Neg Hx     Blindness Neg Hx     Macular degeneration Neg Hx     Retinal detachment Neg Hx     Strabismus Neg Hx     Cancer Neg Hx     Colon cancer Neg Hx     Esophageal cancer Neg Hx     Stomach cancer Neg Hx     Crohn's disease Neg Hx     Ulcerative colitis Neg Hx        Social History     Socioeconomic History    Marital status:    Tobacco Use    Smoking status: Never Smoker    Smokeless tobacco: Never Used    Tobacco comment: when a child   Substance and Sexual Activity    Alcohol use: Yes     Comment: rarely    Drug use: No    Sexual activity: Yes     Partners: Female     Social Determinants of Health     Financial Resource Strain: Low Risk      Scheduled C section Difficulty of Paying Living Expenses: Not hard at all   Food Insecurity: No Food Insecurity    Worried About Running Out of Food in the Last Year: Never true    Ran Out of Food in the Last Year: Never true   Transportation Needs: No Transportation Needs    Lack of Transportation (Medical): No    Lack of Transportation (Non-Medical): No   Physical Activity: Inactive    Days of Exercise per Week: 0 days    Minutes of Exercise per Session: 0 min   Stress: No Stress Concern Present    Feeling of Stress : Not at all   Social Connections: Unknown    Frequency of Communication with Friends and Family: More than three times a week    Frequency of Social Gatherings with Friends and Family: More than three times a week    Active Member of Clubs or Organizations: Yes    Attends Club or Organization Meetings: More than 4 times per year    Marital Status:    Housing Stability: Low Risk     Unable to Pay for Housing in the Last Year: No    Number of Places Lived in the Last Year: 1    Unstable Housing in the Last Year: No       Current Outpatient Medications   Medication Sig Dispense Refill    albuterol (PROVENTIL/VENTOLIN HFA) 90 mcg/actuation inhaler INHALE 1 TO 2 PUFFS INTO THE LUNGS EVERY 6 HOURS AS NEEDED FOR WHEEZING OR SHORTNESS OF BREATH. FOR RESCUE. 25.5 g 0    atorvastatin (LIPITOR) 80 MG tablet TAKE 1 TABLET EVERY DAY 90 tablet 3    bumetanide (BUMEX) 0.5 MG Tab Take 1 tablet (0.5 mg total) by mouth 2 (two) times daily. 60 tablet 1    carvediloL (COREG) 25 MG tablet TAKE 1 TABLET TWICE DAILY WITH MEALS 180 tablet 3    cetirizine (ZYRTEC) 10 MG tablet Take 1 tablet by mouth daily as needed.      chlorthalidone (HYGROTEN) 25 MG Tab Take 25 mg by mouth once daily.      cholecalciferol, vitamin D3, (VITAMIN D3) 50 mcg (2,000 unit) Cap 1 capsule once daily. Every day      cholestyramine (QUESTRAN) 4 gram packet Take 1 packet (4 g total) by mouth 3 (three) times daily with meals. 270 packet 3     coenzyme Q10 (CO Q-10) 100 mg capsule Take 400 mg by mouth once daily.      cyanocobalamin, vitamin B-12, 5,000 mcg Subl Take 5,000 mcg by mouth once daily. Every day      diphenoxylate-atropine 2.5-0.025 mg (LOMOTIL) 2.5-0.025 mg per tablet Take 1 tablet by mouth 4 (four) times daily as needed for Diarrhea. 90 tablet 0    ezetimibe (ZETIA) 10 mg tablet TAKE 1 TABLET EVERY DAY 90 tablet 3    FLUAD QUAD 2021-22,65Y UP,,PF, 60 mcg (15 mcg x 4)/0.5 mL Syrg       fluticasone (FLONASE) 50 mcg/actuation nasal spray USE 1 SPRAY IN EACH NOSTRIL TWICE DAILY AS NEEDED  FOR  RHINITIS 48 g 3    gabapentin (NEURONTIN) 300 MG capsule TAKE 1 CAPSULE THREE TIMES DAILY 270 capsule 5    KENALOG 40 mg/mL injection       levothyroxine (SYNTHROID) 50 MCG tablet TAKE 1 TABLET EVERY DAY 90 tablet 3    methylsulfonylmethane 1,000 mg Tab Take 1,000 mg by mouth once daily. Every day      milk thistle 200 mg Cap Take 200 mg by mouth 2 (two) times daily. Twice a day      mupirocin (BACTROBAN) 2 % ointment Apply topically 3 (three) times daily. 30 g 3    olmesartan (BENICAR) 20 MG tablet TAKE 1 TABLET EVERY DAY 90 tablet 3    omega-3 fatty acids 1,000 mg Cap Twice a day      potassium chloride SA (K-DUR,KLOR-CON) 20 MEQ tablet TAKE 4 TABLETS EVERY DAY  OR AS DIRECTED 360 tablet 3    predniSONE (DELTASONE) 1 MG tablet TAKE 2 TABLETS EVERY  tablet 3    predniSONE (DELTASONE) 5 MG tablet TAKE 1 TABLET EVERY DAY 90 tablet 3    sildenafil (REVATIO) 20 mg Tab Take 1 to 3 tabs daily as needed. 30 tablet 3    spironolactone (ALDACTONE) 25 MG tablet Take 25 mg by mouth once daily.      tiotropium (SPIRIVA WITH HANDIHALER) 18 mcg inhalation capsule INHALE THE CONTENTS OF 1 CAPSULE (18 MCG TOTAL) INTO THE LUNGS ONCE DAILY. CONTROLLER 90 capsule 3    varicella-zoster gE-AS01B, PF, (SHINGRIX, PF,) 50 mcg/0.5 mL injection Inject into the muscle. 1 each 0    VENTOLIN HFA 90 mcg/actuation inhaler Inhale 2 puffs into the lungs  every 6 (six) hours as needed for Wheezing. Rescue 18 g 0    clindamycin (CLEOCIN) 300 MG capsule Take 1 capsule (300 mg total) by mouth every 8 (eight) hours. 21 capsule 0    pantoprazole (PROTONIX) 40 MG tablet Take 1 tablet (40 mg total) by mouth 2 (two) times daily. 180 tablet 3     Current Facility-Administered Medications   Medication Dose Route Frequency Provider Last Rate Last Admin    acetaminophen tablet 650 mg  650 mg Oral Once PRN Simba Loya NP         Facility-Administered Medications Ordered in Other Visits   Medication Dose Route Frequency Provider Last Rate Last Admin    0.9%  NaCl infusion   Intravenous Continuous Devan Watt MD        lidocaine (PF) 10 mg/ml (1%) injection 10 mg  1 mL Intradermal Once Terry Quach MD           Review of patient's allergies indicates:   Allergen Reactions    Spironolactone Diarrhea       Review of Systems   Constitutional: Negative for chills and fever.   Cardiovascular: Negative for claudication.   Skin: Positive for color change and nail changes. Negative for suspicious lesions.   Musculoskeletal: Positive for arthritis and back pain. Negative for joint swelling, muscle cramps and muscle weakness.   Neurological: Negative for numbness and paresthesias.   Psychiatric/Behavioral: Negative for altered mental status.           Objective:      Physical Exam  Constitutional:       General: He is not in acute distress.     Appearance: He is well-developed. He is not diaphoretic.   Cardiovascular:      Pulses:           Dorsalis pedis pulses are 2+ on the right side and 2+ on the left side.        Posterior tibial pulses are 2+ on the right side and 2+ on the left side.      Comments: CFT is < 3 seconds bilateral.  Pedal hair growth is present bilateral.  Varicosities noted bilateral.  Moderate nonpitting lower extremity edema noted bilateral.  Toes are warm to touch bilateral.    Musculoskeletal:         General: Tenderness present.      Comments: Muscle  strength 5/5 in all muscle groups bilateral.  No tenderness nor crepitation with ROM of foot/ankle joints bilateral.  Mild pain with palpation to the proximal lateral nail fold of the Lt. Hallux.     Skin:     General: Skin is warm and dry.      Capillary Refill: Capillary refill takes 2 to 3 seconds.      Coloration: Skin is not pale.      Findings: No abrasion, bruising, burn, ecchymosis, erythema, laceration, lesion or rash.      Comments: Pedal skin has normal turgor, temperature, and texture bilateral.  Straight edge noted to the lateral border of the Lt. Hallux toenail.  Mild erythema noted to the proximal lateral nail fold.  No fluctuance, purulence, or malodor noted.  Remaining toenails x 9 appear normotrophic.     Neurological:      Mental Status: He is alert and oriented to person, place, and time.      Sensory: No sensory deficit.      Comments: Light touch is intact bilateral.                 Assessment:       Encounter Diagnoses   Name Primary?    Status post nail surgery Yes    Paronychia of toe of left foot          Plan:       Maximilian was seen today for post-op evaluation.    Diagnoses and all orders for this visit:    Status post nail surgery    Paronychia of toe of left foot  -     clindamycin (CLEOCIN) 300 MG capsule; Take 1 capsule (300 mg total) by mouth every 8 (eight) hours.      I counseled the patient on his conditions, their implications and medical management.    Overall, satisfactory postoperative results.    I suspect the mild amount of erythema at the proximal lateral nail fold is secondary to a reaction from the phenol, although I'm fully unable to exclude infection.    Rx written for Clindamycin, to be taken for one week.    To continue the recommended dressing change daily x 1 final week.    Advised to continue soaking the area in warm water and epsom salt x 4 final days.    To call in one week to relay the condition of the surgical site, as I anticipate it will be fully healed at  that time.    RTC prn.     Khris Valentin DPM

## 2022-05-12 ENCOUNTER — PATIENT MESSAGE (OUTPATIENT)
Dept: CARDIOLOGY | Facility: CLINIC | Age: 77
End: 2022-05-12
Payer: MEDICARE

## 2022-05-13 ENCOUNTER — TELEPHONE (OUTPATIENT)
Dept: ELECTROPHYSIOLOGY | Facility: CLINIC | Age: 77
End: 2022-05-13
Payer: MEDICARE

## 2022-05-13 ENCOUNTER — OFFICE VISIT (OUTPATIENT)
Dept: CARDIOLOGY | Facility: CLINIC | Age: 77
End: 2022-05-13
Payer: MEDICARE

## 2022-05-13 ENCOUNTER — LAB VISIT (OUTPATIENT)
Dept: LAB | Facility: HOSPITAL | Age: 77
End: 2022-05-13
Attending: INTERNAL MEDICINE
Payer: MEDICARE

## 2022-05-13 VITALS
HEIGHT: 73 IN | BODY MASS INDEX: 27.23 KG/M2 | DIASTOLIC BLOOD PRESSURE: 71 MMHG | HEART RATE: 77 BPM | WEIGHT: 205.5 LBS | SYSTOLIC BLOOD PRESSURE: 115 MMHG

## 2022-05-13 DIAGNOSIS — G70.9 RESTRICTIVE LUNG MECHANICS DUE TO NEUROMUSCULAR DISEASE: ICD-10-CM

## 2022-05-13 DIAGNOSIS — J98.4 RESTRICTIVE LUNG MECHANICS DUE TO NEUROMUSCULAR DISEASE: ICD-10-CM

## 2022-05-13 DIAGNOSIS — I49.3 PVC'S (PREMATURE VENTRICULAR CONTRACTIONS): Primary | ICD-10-CM

## 2022-05-13 DIAGNOSIS — I10 HTN (HYPERTENSION), BENIGN: ICD-10-CM

## 2022-05-13 DIAGNOSIS — I45.10 RBBB: ICD-10-CM

## 2022-05-13 DIAGNOSIS — G70.00 MYASTHENIA GRAVIS, ACHR ANTIBODY POSITIVE: ICD-10-CM

## 2022-05-13 DIAGNOSIS — I47.29 NONSUSTAINED VENTRICULAR TACHYCARDIA: ICD-10-CM

## 2022-05-13 DIAGNOSIS — I49.8 OTHER SPECIFIED CARDIAC ARRHYTHMIAS: ICD-10-CM

## 2022-05-13 LAB
ANION GAP SERPL CALC-SCNC: 11 MMOL/L (ref 8–16)
BASOPHILS # BLD AUTO: 0.04 K/UL (ref 0–0.2)
BASOPHILS NFR BLD: 0.4 % (ref 0–1.9)
BUN SERPL-MCNC: 30 MG/DL (ref 8–23)
CALCIUM SERPL-MCNC: 9.1 MG/DL (ref 8.7–10.5)
CHLORIDE SERPL-SCNC: 105 MMOL/L (ref 95–110)
CO2 SERPL-SCNC: 27 MMOL/L (ref 23–29)
CREAT SERPL-MCNC: 1.3 MG/DL (ref 0.5–1.4)
DIFFERENTIAL METHOD: ABNORMAL
EOSINOPHIL # BLD AUTO: 0.2 K/UL (ref 0–0.5)
EOSINOPHIL NFR BLD: 2.3 % (ref 0–8)
ERYTHROCYTE [DISTWIDTH] IN BLOOD BY AUTOMATED COUNT: 14.1 % (ref 11.5–14.5)
EST. GFR  (AFRICAN AMERICAN): >60 ML/MIN/1.73 M^2
EST. GFR  (NON AFRICAN AMERICAN): 52 ML/MIN/1.73 M^2
GLUCOSE SERPL-MCNC: 116 MG/DL (ref 70–110)
HCT VFR BLD AUTO: 41.1 % (ref 40–54)
HGB BLD-MCNC: 12.9 G/DL (ref 14–18)
IMM GRANULOCYTES # BLD AUTO: 0.05 K/UL (ref 0–0.04)
IMM GRANULOCYTES NFR BLD AUTO: 0.5 % (ref 0–0.5)
LYMPHOCYTES # BLD AUTO: 1.8 K/UL (ref 1–4.8)
LYMPHOCYTES NFR BLD: 19.7 % (ref 18–48)
MCH RBC QN AUTO: 28.7 PG (ref 27–31)
MCHC RBC AUTO-ENTMCNC: 31.4 G/DL (ref 32–36)
MCV RBC AUTO: 92 FL (ref 82–98)
MONOCYTES # BLD AUTO: 0.9 K/UL (ref 0.3–1)
MONOCYTES NFR BLD: 9.7 % (ref 4–15)
NEUTROPHILS # BLD AUTO: 6.2 K/UL (ref 1.8–7.7)
NEUTROPHILS NFR BLD: 67.4 % (ref 38–73)
NRBC BLD-RTO: 0 /100 WBC
PLATELET # BLD AUTO: 145 K/UL (ref 150–450)
PMV BLD AUTO: 10.8 FL (ref 9.2–12.9)
POTASSIUM SERPL-SCNC: 4.7 MMOL/L (ref 3.5–5.1)
RBC # BLD AUTO: 4.49 M/UL (ref 4.6–6.2)
SODIUM SERPL-SCNC: 143 MMOL/L (ref 136–145)
WBC # BLD AUTO: 9.25 K/UL (ref 3.9–12.7)

## 2022-05-13 PROCEDURE — 99214 PR OFFICE/OUTPT VISIT, EST, LEVL IV, 30-39 MIN: ICD-10-PCS | Mod: S$GLB,,, | Performed by: INTERNAL MEDICINE

## 2022-05-13 PROCEDURE — 99999 PR PBB SHADOW E&M-EST. PATIENT-LVL V: ICD-10-PCS | Mod: PBBFAC,,, | Performed by: INTERNAL MEDICINE

## 2022-05-13 PROCEDURE — 99999 PR PBB SHADOW E&M-EST. PATIENT-LVL V: CPT | Mod: PBBFAC,,, | Performed by: INTERNAL MEDICINE

## 2022-05-13 PROCEDURE — 3078F DIAST BP <80 MM HG: CPT | Mod: CPTII,S$GLB,, | Performed by: INTERNAL MEDICINE

## 2022-05-13 PROCEDURE — 3074F SYST BP LT 130 MM HG: CPT | Mod: CPTII,S$GLB,, | Performed by: INTERNAL MEDICINE

## 2022-05-13 PROCEDURE — 36415 COLL VENOUS BLD VENIPUNCTURE: CPT | Performed by: INTERNAL MEDICINE

## 2022-05-13 PROCEDURE — 93005 ELECTROCARDIOGRAM TRACING: CPT | Mod: S$GLB,,, | Performed by: INTERNAL MEDICINE

## 2022-05-13 PROCEDURE — 93010 RHYTHM STRIP: ICD-10-PCS | Mod: S$GLB,,, | Performed by: INTERNAL MEDICINE

## 2022-05-13 PROCEDURE — 1160F PR REVIEW ALL MEDS BY PRESCRIBER/CLIN PHARMACIST DOCUMENTED: ICD-10-PCS | Mod: CPTII,S$GLB,, | Performed by: INTERNAL MEDICINE

## 2022-05-13 PROCEDURE — 1126F PR PAIN SEVERITY QUANTIFIED, NO PAIN PRESENT: ICD-10-PCS | Mod: CPTII,S$GLB,, | Performed by: INTERNAL MEDICINE

## 2022-05-13 PROCEDURE — 1157F PR ADVANCE CARE PLAN OR EQUIV PRESENT IN MEDICAL RECORD: ICD-10-PCS | Mod: CPTII,S$GLB,, | Performed by: INTERNAL MEDICINE

## 2022-05-13 PROCEDURE — 99214 OFFICE O/P EST MOD 30 MIN: CPT | Mod: S$GLB,,, | Performed by: INTERNAL MEDICINE

## 2022-05-13 PROCEDURE — 93005 RHYTHM STRIP: ICD-10-PCS | Mod: S$GLB,,, | Performed by: INTERNAL MEDICINE

## 2022-05-13 PROCEDURE — 1157F ADVNC CARE PLAN IN RCRD: CPT | Mod: CPTII,S$GLB,, | Performed by: INTERNAL MEDICINE

## 2022-05-13 PROCEDURE — 1159F PR MEDICATION LIST DOCUMENTED IN MEDICAL RECORD: ICD-10-PCS | Mod: CPTII,S$GLB,, | Performed by: INTERNAL MEDICINE

## 2022-05-13 PROCEDURE — 3074F PR MOST RECENT SYSTOLIC BLOOD PRESSURE < 130 MM HG: ICD-10-PCS | Mod: CPTII,S$GLB,, | Performed by: INTERNAL MEDICINE

## 2022-05-13 PROCEDURE — 85025 COMPLETE CBC W/AUTO DIFF WBC: CPT | Performed by: INTERNAL MEDICINE

## 2022-05-13 PROCEDURE — 93010 ELECTROCARDIOGRAM REPORT: CPT | Mod: S$GLB,,, | Performed by: INTERNAL MEDICINE

## 2022-05-13 PROCEDURE — 99499 RISK ADDL DX/OHS AUDIT: ICD-10-PCS | Mod: HCNC,S$GLB,, | Performed by: INTERNAL MEDICINE

## 2022-05-13 PROCEDURE — 3078F PR MOST RECENT DIASTOLIC BLOOD PRESSURE < 80 MM HG: ICD-10-PCS | Mod: CPTII,S$GLB,, | Performed by: INTERNAL MEDICINE

## 2022-05-13 PROCEDURE — 99499 UNLISTED E&M SERVICE: CPT | Mod: HCNC,S$GLB,, | Performed by: INTERNAL MEDICINE

## 2022-05-13 PROCEDURE — 1160F RVW MEDS BY RX/DR IN RCRD: CPT | Mod: CPTII,S$GLB,, | Performed by: INTERNAL MEDICINE

## 2022-05-13 PROCEDURE — 1159F MED LIST DOCD IN RCRD: CPT | Mod: CPTII,S$GLB,, | Performed by: INTERNAL MEDICINE

## 2022-05-13 PROCEDURE — 1126F AMNT PAIN NOTED NONE PRSNT: CPT | Mod: CPTII,S$GLB,, | Performed by: INTERNAL MEDICINE

## 2022-05-13 PROCEDURE — 80048 BASIC METABOLIC PNL TOTAL CA: CPT | Performed by: INTERNAL MEDICINE

## 2022-05-13 NOTE — PROGRESS NOTES
Subjective:    Patient ID:  Maximilian Tavera Jr. is a 76 y.o. male who presents for evaluation of Hypotension and PVCs    Referring Cardiologist: Alphonso Altamirano MD  Primary Care Physician: Brian Marroquin MD  Neurologist: César Hoang MD    HPI  Prior Hx:  I had the pleasure of seeing Mr. Tavera today in our electrophysiology clinic in consultation for his palpitations. As you are aware he is a pleasant 75 year-old man with hypertension, myasthenia gravis on chronic prednisone therapy, and degenerative joint disease with myelopathy s/p decompression and fusion of C2-6. He reports being diagnosed with PVCs 5 years ago however over the past 1-2 months have become more prevalent. No syncope but has been having intermittent episodes of symptomatic hypotension with heart rate in the 110s which seemed to increase when started on BPH meds. He saw Dr. Altamirano in cardiology clinic who ordered an exercise stress echo and a 30 day event monitor. Stress test noted a structurally normal heart without ischemia. The ECG portion of his stress test noted sinus rhythm with occasional unifocal PVCs (LBBB but with V2 pattern break, +Rs II/aVF, rS in III, R in 1) at times with PVC couplets. His 30 day event monitor noted frequent PVCs with PVC couplets, triplets and occasional 4 beat NSVT. He denies any near syncope or syncope.    He was on carvedilol for years however this was stopped 2 months ago when he was started on Flomax for BPH (prescribing physician suggested stopping carvedilol to avoid hypotension). He stopped this and changed to alfuzosin however he stopped this due to symptomatic hypotension. For HTN he is on olmesartan and chlorthalidone.    ECG from 10/2018 notes a dimorphic couplet with 1 similar to PVC noted on stress test and the other inferiorly directed PVC in inferior leads. Other ECGs show sinus rhythm with RBBB.    Holter monitor 5/2019 noted 2.4% PVC burden but with 14 runs of nonsustained VT up to 33  beats. We discussed on the phone and resumed carvedilol 6.25mg q12h. He presented for follow-up shortly after and had no complaints. No MG symptoms since starting carvedilol. He has no symptoms from the ventricular arrhythmias. We increased his carvedilol to 12.5mg q12h.    Mr. Tavera returned for follow-up 10/2019. He denied any issues related to his MG on 25mg carvedilol q12h. Recent holter monitor on this dosage indicated only 604 PVCs and no NSVT. He felt much better on carvedilol.    Mr. Tavera returned for yearly follow-up 5/2021. Recent holter noted a 10.8% PVC burden. He noted palpitations and some dizziness when PVCs are very frequent. He has some worsening in his chronic shortness of breath. Of note AF is listed now as a medical problem. This appears to be from when he was admitted to [facility] in March of 2020 with a febrile illness. No ECGs or telemetry strips scanned into media from that hospitalization indicate AF. Dr. Pacheco saw him on consultation and did not mention AF in his notes.    He initially had great response to carvedilol. Recently his PVC symptoms have increased which correlate with 10% burden on that holter monitor, despite 25mg of carvedilol bid. Discussed concern that other medications that could be used may exacerbate his MG. Also concerned that he was having increased dyspnea on exertion with differential diagnosis including COPD, heart failure, or neuromuscular due to MG. Recommended ECHO. If EF normal then would continue watchful monitoring and would repeat a holter in 3 months. If EF declining then would recommend considering PVC mapping and ablation. Discussed concerns regarding location of his focus (possibly infero-basal septum which would increase his risk of AV block if mapped close to native conduction system).    ECHO 6/2021 noted normal LV function.     Mr. Tavera returned for follow-up 9/2021. Holter 8/2021 noted very rare PVCs. He felt well other than stable chronic dyspnea on  exertion. Plan was to hold the course with beta-blocker therapy.    Interim Hx:  Mr. Tavera returns for follow-up. Reports yesterday waking up feeling weak and had hypotension and bradycardia (BP was 70s/30s and measured pulse rate in the 30s). He had a BP of 80s/50s with a normal pulse on April 5, 2022. He did not feel poorly enough to go to the ER. He reports he started spironolactone 3 weeks ago. He spoke with the triage nurse yesterday and has held chlorthalidone, olmesartan and spironolactone. He reports feeling skipped beats a few days prior to this starting. He feels better today. He also has been limiting his fluid intake to 1.5L a day for his lymphedema in his legs. He wears compression stockings.    My interpretation of today's in clinic ECG is sinus rhythm with incomplete RBBB with PACs.    Review of Systems   Constitutional: Negative for fever and malaise/fatigue.   HENT: Negative for congestion and nosebleeds.    Eyes: Negative for blurred vision and visual disturbance.   Cardiovascular: Positive for dyspnea on exertion and near-syncope. Negative for chest pain, irregular heartbeat, leg swelling, orthopnea, palpitations, paroxysmal nocturnal dyspnea and syncope.   Respiratory: Negative for cough and shortness of breath.    Hematologic/Lymphatic: Negative for bleeding problem. Does not bruise/bleed easily.   Skin: Negative.    Musculoskeletal: Negative.    Gastrointestinal: Negative for bloating and abdominal pain.   Neurological: Positive for light-headedness. Negative for dizziness and focal weakness.        Objective:    Physical Exam  Vitals reviewed.   Constitutional:       General: He is not in acute distress.     Appearance: He is well-developed. He is not diaphoretic.   HENT:      Head: Normocephalic and atraumatic.   Eyes:      General:         Right eye: No discharge.         Left eye: No discharge.      Conjunctiva/sclera: Conjunctivae normal.   Cardiovascular:      Rate and Rhythm: Normal  rate and regular rhythm.      Heart sounds: No murmur heard.    No friction rub. No gallop.   Pulmonary:      Effort: Pulmonary effort is normal. No respiratory distress.      Breath sounds: Normal breath sounds. No wheezing or rales.   Abdominal:      General: Bowel sounds are normal. There is no distension.      Palpations: Abdomen is soft.      Tenderness: There is no abdominal tenderness.   Musculoskeletal:      Cervical back: Neck supple.   Skin:     General: Skin is warm and dry.   Neurological:      Mental Status: He is alert and oriented to person, place, and time.   Psychiatric:         Behavior: Behavior normal.         Thought Content: Thought content normal.         Judgment: Judgment normal.           Assessment:       1. PVC's (premature ventricular contractions)    2. Nonsustained ventricular tachycardia    3. RBBB    4. HTN (hypertension), benign    5. Restrictive lung mechanics due to neuromuscular disease    6. Myasthenia gravis, AChR antibody positive         Plan:       In summary, Mr. Tavera is a pleasant 76 year-old man with hypertension, myasthenia gravis on chronic prednisone therapy, and degenerative joint disease with myelopathy s/p decompression and fusion of C2-6, chronic PVCs however since the beginning of this 2019 had become more prevalent, that was related to stopping his carvedilol. Holter monitor noted ~2% PVC burden but with 14 episodes of NSVT up to 33 beats that were asymptomatic.  No indication that carvedilol worsened his MG symptoms. After discussion with Dr. Hoang in Neurology we resumed carvedilol. He had great response to carvedilol.    His current episode is likely due to over medication and dehydration. Advised him to remain off his diuretic therapy and olmesartan for now and let the digital HTN clinic reintroduce agents as his BP is required. He did have significant benefit from diuretic therapy however with respect to his lymphedema.     Discussed investing in the  [employer] Mobile device to obtain ECGs on demand when he notes a low pulse rate (suspect artificially low due to PVCs). Will get and extended holter monitor.    I will call him with results.     [place] [facility] [place]      Thank you for allowing me to participate in the care of this patient. Please do not hesitate to call me with any questions or concerns.    Sathya Zamudio MD, PhD  Cardiac Electrophysiology

## 2022-05-16 ENCOUNTER — CLINICAL SUPPORT (OUTPATIENT)
Dept: CARDIOLOGY | Facility: HOSPITAL | Age: 77
End: 2022-05-16
Attending: INTERNAL MEDICINE
Payer: MEDICARE

## 2022-05-16 DIAGNOSIS — I49.3 PVC'S (PREMATURE VENTRICULAR CONTRACTIONS): ICD-10-CM

## 2022-05-16 PROCEDURE — 93244 EXT ECG>48HR<7D REV&INTERPJ: CPT | Mod: ,,, | Performed by: INTERNAL MEDICINE

## 2022-05-16 PROCEDURE — 93244 CV CARDIAC MONITOR - 3-15 DAY ADULT (CUPID ONLY): ICD-10-PCS | Mod: ,,, | Performed by: INTERNAL MEDICINE

## 2022-05-21 ENCOUNTER — PATIENT MESSAGE (OUTPATIENT)
Dept: ELECTROPHYSIOLOGY | Facility: CLINIC | Age: 77
End: 2022-05-21
Payer: MEDICARE

## 2022-05-21 ENCOUNTER — PATIENT MESSAGE (OUTPATIENT)
Dept: PODIATRY | Facility: CLINIC | Age: 77
End: 2022-05-21
Payer: MEDICARE

## 2022-05-30 ENCOUNTER — PATIENT MESSAGE (OUTPATIENT)
Dept: ELECTROPHYSIOLOGY | Facility: CLINIC | Age: 77
End: 2022-05-30
Payer: MEDICARE

## 2022-06-09 ENCOUNTER — PATIENT MESSAGE (OUTPATIENT)
Dept: CARDIOLOGY | Facility: CLINIC | Age: 77
End: 2022-06-09
Payer: MEDICARE

## 2022-06-13 ENCOUNTER — TELEPHONE (OUTPATIENT)
Dept: ELECTROPHYSIOLOGY | Facility: CLINIC | Age: 77
End: 2022-06-13
Payer: MEDICARE

## 2022-06-13 NOTE — PROGRESS NOTES
Please notify the patient that his monitor showed a normal heart rate and some extra beats from the upper heart chamber. His PVCs were very rare.

## 2022-06-13 NOTE — TELEPHONE ENCOUNTER
----- Message from Sathya Zamudio MD sent at 6/13/2022 11:10 AM CDT -----  Please notify the patient that his monitor showed a normal heart rate and some extra beats from the upper heart chamber. His PVCs were very rare.

## 2022-06-13 NOTE — TELEPHONE ENCOUNTER
Spoke with patient and advised that Dr Zamudio reviewed monitor showed a normal heart rate and some extra beats from the upper heart chamber, and  very rare PVC's. Patient verbalized understanding and states that he has been feeling much better since his medication was adjusted . Patient advised to call the office for any future needs.

## 2022-06-15 ENCOUNTER — TELEPHONE (OUTPATIENT)
Dept: FAMILY MEDICINE | Facility: CLINIC | Age: 77
End: 2022-06-15
Payer: MEDICARE

## 2022-06-15 ENCOUNTER — OFFICE VISIT (OUTPATIENT)
Dept: FAMILY MEDICINE | Facility: CLINIC | Age: 77
End: 2022-06-15
Payer: MEDICARE

## 2022-06-15 ENCOUNTER — OFFICE VISIT (OUTPATIENT)
Dept: ORTHOPEDICS | Facility: CLINIC | Age: 77
End: 2022-06-15
Payer: MEDICARE

## 2022-06-15 VITALS — RESPIRATION RATE: 18 BRPM | WEIGHT: 205 LBS | BODY MASS INDEX: 27.17 KG/M2 | HEIGHT: 73 IN

## 2022-06-15 VITALS
BODY MASS INDEX: 31.88 KG/M2 | WEIGHT: 241.63 LBS | HEART RATE: 70 BPM | DIASTOLIC BLOOD PRESSURE: 74 MMHG | TEMPERATURE: 98 F | SYSTOLIC BLOOD PRESSURE: 130 MMHG | RESPIRATION RATE: 19 BRPM | OXYGEN SATURATION: 96 %

## 2022-06-15 DIAGNOSIS — E78.2 MIXED HYPERLIPIDEMIA: ICD-10-CM

## 2022-06-15 DIAGNOSIS — M54.16 LUMBAR RADICULITIS: ICD-10-CM

## 2022-06-15 DIAGNOSIS — M77.11 LATERAL EPICONDYLITIS, RIGHT ELBOW: Primary | ICD-10-CM

## 2022-06-15 DIAGNOSIS — I89.0 LYMPHEDEMA OF BOTH LOWER EXTREMITIES: ICD-10-CM

## 2022-06-15 DIAGNOSIS — J47.9 BRONCHIECTASIS WITHOUT COMPLICATION: ICD-10-CM

## 2022-06-15 DIAGNOSIS — E66.9 OBESITY (BMI 30-39.9): ICD-10-CM

## 2022-06-15 DIAGNOSIS — D69.6 THROMBOCYTOPENIA: ICD-10-CM

## 2022-06-15 DIAGNOSIS — N18.31 CHRONIC KIDNEY DISEASE, STAGE 3A: Primary | ICD-10-CM

## 2022-06-15 DIAGNOSIS — L91.8 SKIN TAG: ICD-10-CM

## 2022-06-15 DIAGNOSIS — Z79.52 ON PREDNISONE THERAPY: ICD-10-CM

## 2022-06-15 DIAGNOSIS — G70.00 MYASTHENIA GRAVIS: ICD-10-CM

## 2022-06-15 DIAGNOSIS — R60.9 EDEMA, UNSPECIFIED TYPE: ICD-10-CM

## 2022-06-15 PROCEDURE — 1160F RVW MEDS BY RX/DR IN RCRD: CPT | Mod: CPTII,S$GLB,, | Performed by: ORTHOPAEDIC SURGERY

## 2022-06-15 PROCEDURE — 1159F MED LIST DOCD IN RCRD: CPT | Mod: CPTII,S$GLB,, | Performed by: ORTHOPAEDIC SURGERY

## 2022-06-15 PROCEDURE — 99499 UNLISTED E&M SERVICE: CPT | Mod: S$GLB,,, | Performed by: FAMILY MEDICINE

## 2022-06-15 PROCEDURE — 20551 TENDON ORIGIN: R ELBOW: ICD-10-PCS | Mod: S$GLB,,, | Performed by: ORTHOPAEDIC SURGERY

## 2022-06-15 PROCEDURE — 1100F PR PT FALLS ASSESS DOC 2+ FALLS/FALL W/INJURY/YR: ICD-10-PCS | Mod: CPTII,S$GLB,, | Performed by: FAMILY MEDICINE

## 2022-06-15 PROCEDURE — 1157F ADVNC CARE PLAN IN RCRD: CPT | Mod: CPTII,S$GLB,, | Performed by: ORTHOPAEDIC SURGERY

## 2022-06-15 PROCEDURE — 3288F FALL RISK ASSESSMENT DOCD: CPT | Mod: CPTII,S$GLB,, | Performed by: ORTHOPAEDIC SURGERY

## 2022-06-15 PROCEDURE — 99214 OFFICE O/P EST MOD 30 MIN: CPT | Mod: S$GLB,,, | Performed by: FAMILY MEDICINE

## 2022-06-15 PROCEDURE — 99999 PR PBB SHADOW E&M-EST. PATIENT-LVL III: CPT | Mod: PBBFAC,,, | Performed by: FAMILY MEDICINE

## 2022-06-15 PROCEDURE — 3075F PR MOST RECENT SYSTOLIC BLOOD PRESS GE 130-139MM HG: ICD-10-PCS | Mod: CPTII,S$GLB,, | Performed by: FAMILY MEDICINE

## 2022-06-15 PROCEDURE — 99214 PR OFFICE/OUTPT VISIT, EST, LEVL IV, 30-39 MIN: ICD-10-PCS | Mod: S$GLB,,, | Performed by: FAMILY MEDICINE

## 2022-06-15 PROCEDURE — 1159F PR MEDICATION LIST DOCUMENTED IN MEDICAL RECORD: ICD-10-PCS | Mod: CPTII,S$GLB,, | Performed by: FAMILY MEDICINE

## 2022-06-15 PROCEDURE — 3288F FALL RISK ASSESSMENT DOCD: CPT | Mod: CPTII,S$GLB,, | Performed by: FAMILY MEDICINE

## 2022-06-15 PROCEDURE — 3075F SYST BP GE 130 - 139MM HG: CPT | Mod: CPTII,S$GLB,, | Performed by: FAMILY MEDICINE

## 2022-06-15 PROCEDURE — 20551 NJX 1 TENDON ORIGIN/INSJ: CPT | Mod: S$GLB,,, | Performed by: ORTHOPAEDIC SURGERY

## 2022-06-15 PROCEDURE — 3288F PR FALLS RISK ASSESSMENT DOCUMENTED: ICD-10-PCS | Mod: CPTII,S$GLB,, | Performed by: ORTHOPAEDIC SURGERY

## 2022-06-15 PROCEDURE — 1125F PR PAIN SEVERITY QUANTIFIED, PAIN PRESENT: ICD-10-PCS | Mod: CPTII,S$GLB,, | Performed by: FAMILY MEDICINE

## 2022-06-15 PROCEDURE — 99999 PR PBB SHADOW E&M-EST. PATIENT-LVL IV: ICD-10-PCS | Mod: PBBFAC,,, | Performed by: ORTHOPAEDIC SURGERY

## 2022-06-15 PROCEDURE — 1100F PTFALLS ASSESS-DOCD GE2>/YR: CPT | Mod: CPTII,S$GLB,, | Performed by: FAMILY MEDICINE

## 2022-06-15 PROCEDURE — 1157F ADVNC CARE PLAN IN RCRD: CPT | Mod: CPTII,S$GLB,, | Performed by: FAMILY MEDICINE

## 2022-06-15 PROCEDURE — 1160F RVW MEDS BY RX/DR IN RCRD: CPT | Mod: CPTII,S$GLB,, | Performed by: FAMILY MEDICINE

## 2022-06-15 PROCEDURE — 1159F MED LIST DOCD IN RCRD: CPT | Mod: CPTII,S$GLB,, | Performed by: FAMILY MEDICINE

## 2022-06-15 PROCEDURE — 1101F PT FALLS ASSESS-DOCD LE1/YR: CPT | Mod: CPTII,S$GLB,, | Performed by: ORTHOPAEDIC SURGERY

## 2022-06-15 PROCEDURE — 3288F PR FALLS RISK ASSESSMENT DOCUMENTED: ICD-10-PCS | Mod: CPTII,S$GLB,, | Performed by: FAMILY MEDICINE

## 2022-06-15 PROCEDURE — 1160F PR REVIEW ALL MEDS BY PRESCRIBER/CLIN PHARMACIST DOCUMENTED: ICD-10-PCS | Mod: CPTII,S$GLB,, | Performed by: FAMILY MEDICINE

## 2022-06-15 PROCEDURE — 3078F DIAST BP <80 MM HG: CPT | Mod: CPTII,S$GLB,, | Performed by: FAMILY MEDICINE

## 2022-06-15 PROCEDURE — 1157F PR ADVANCE CARE PLAN OR EQUIV PRESENT IN MEDICAL RECORD: ICD-10-PCS | Mod: CPTII,S$GLB,, | Performed by: ORTHOPAEDIC SURGERY

## 2022-06-15 PROCEDURE — 1160F PR REVIEW ALL MEDS BY PRESCRIBER/CLIN PHARMACIST DOCUMENTED: ICD-10-PCS | Mod: CPTII,S$GLB,, | Performed by: ORTHOPAEDIC SURGERY

## 2022-06-15 PROCEDURE — 1125F AMNT PAIN NOTED PAIN PRSNT: CPT | Mod: CPTII,S$GLB,, | Performed by: FAMILY MEDICINE

## 2022-06-15 PROCEDURE — 1101F PR PT FALLS ASSESS DOC 0-1 FALLS W/OUT INJ PAST YR: ICD-10-PCS | Mod: CPTII,S$GLB,, | Performed by: ORTHOPAEDIC SURGERY

## 2022-06-15 PROCEDURE — 99213 OFFICE O/P EST LOW 20 MIN: CPT | Mod: 25,S$GLB,, | Performed by: ORTHOPAEDIC SURGERY

## 2022-06-15 PROCEDURE — 99499 RISK ADDL DX/OHS AUDIT: ICD-10-PCS | Mod: S$GLB,,, | Performed by: FAMILY MEDICINE

## 2022-06-15 PROCEDURE — 1159F PR MEDICATION LIST DOCUMENTED IN MEDICAL RECORD: ICD-10-PCS | Mod: CPTII,S$GLB,, | Performed by: ORTHOPAEDIC SURGERY

## 2022-06-15 PROCEDURE — 99999 PR PBB SHADOW E&M-EST. PATIENT-LVL III: ICD-10-PCS | Mod: PBBFAC,,, | Performed by: FAMILY MEDICINE

## 2022-06-15 PROCEDURE — 3078F PR MOST RECENT DIASTOLIC BLOOD PRESSURE < 80 MM HG: ICD-10-PCS | Mod: CPTII,S$GLB,, | Performed by: FAMILY MEDICINE

## 2022-06-15 PROCEDURE — 1157F PR ADVANCE CARE PLAN OR EQUIV PRESENT IN MEDICAL RECORD: ICD-10-PCS | Mod: CPTII,S$GLB,, | Performed by: FAMILY MEDICINE

## 2022-06-15 PROCEDURE — 99213 PR OFFICE/OUTPT VISIT, EST, LEVL III, 20-29 MIN: ICD-10-PCS | Mod: 25,S$GLB,, | Performed by: ORTHOPAEDIC SURGERY

## 2022-06-15 PROCEDURE — 99999 PR PBB SHADOW E&M-EST. PATIENT-LVL IV: CPT | Mod: PBBFAC,,, | Performed by: ORTHOPAEDIC SURGERY

## 2022-06-15 RX ORDER — TRIAMCINOLONE ACETONIDE 40 MG/ML
40 INJECTION, SUSPENSION INTRA-ARTICULAR; INTRAMUSCULAR
Status: DISCONTINUED | OUTPATIENT
Start: 2022-06-15 | End: 2022-06-15 | Stop reason: HOSPADM

## 2022-06-15 RX ADMIN — TRIAMCINOLONE ACETONIDE 40 MG: 40 INJECTION, SUSPENSION INTRA-ARTICULAR; INTRAMUSCULAR at 11:06

## 2022-06-15 NOTE — TELEPHONE ENCOUNTER
----- Message from Jennyfer Michaels sent at 6/15/2022  8:43 AM CDT -----  Contact: ROXANNA DELGADILLO JR. [[id number]]  Type: Patient Call Back    Who called:ROXANNA DELGADILLO JR. [[id number]]    What is the request in detail: The patient states that he was calledin in regards to his appointment. He was told by a nurse that he would be contacted today because the office wanted him to reschedule to October and he states that his appointment has already been cancelled once.     Can the clinic reply by [facility]?    Would the patient rather a call back or a response via [facility]?     Best call back number: [phone] (mobile)    Additional Information:

## 2022-06-15 NOTE — PROGRESS NOTES
Past Medical History:   Diagnosis Date    A-fib 3/31/2020    Arthritis     Back pain     Carpal tunnel syndrome 2/25/2013    Cataract     Cataract, left eye 11/10/2014    Chest pain, musculoskeletal     COPD (chronic obstructive pulmonary disease) 11/052018    COPD with asthma 8/17/2021    Emphysema lung 11/05/2018    Gastritis     Hx of colonic polyp     Hyperlipidemia     Hypertension     Hypothyroidism     Knee fracture     Myasthenia gravis     Neuropathy 1/3/2013    Obesity 1/29/2015    Pneumonia 3/29/2020    Polyneuropathy     PVC (premature ventricular contraction)     Squamous cell carcinoma 2014    left forearm    Thyroid disease        Past Surgical History:   Procedure Laterality Date    APPENDECTOMY      CATARACT EXTRACTION W/  INTRAOCULAR LENS IMPLANT Bilateral     COLONOSCOPY  03/01/2012    Dr Esteban; hyperplastic polyp; repeat in 5 years    COLONOSCOPY N/A 12/7/2021    Procedure: COLONOSCOPY;  Surgeon: Gabriel Hernandez MD;  Location: [facility];  Service: Endoscopy;  Laterality: N/A;    CYSTOSCOPY N/A 2/26/2019    Procedure: CYSTOSCOPY;  Surgeon: Simba Cheung MD;  Location: [facility];  Service: Urology;  Laterality: N/A;    ENDOSCOPIC ULTRASOUND OF UPPER GASTROINTESTINAL TRACT N/A 1/7/2020    Procedure: ULTRASOUND, UPPER GI TRACT, ENDOSCOPIC;  Surgeon: Kati Ryan MD;  [facility]  Service: Endoscopy;  Laterality: N/A;    ESOPHAGOGASTRODUODENOSCOPY N/A 9/12/2018    Procedure: EGD (ESOPHAGOGASTRODUODENOSCOPY);  Surgeon: Gabriel Hernandez MD;  Location: [facility];  Service: Endoscopy;  Laterality: N/A;    ESOPHAGOGASTRODUODENOSCOPY N/A 8/19/2019    Procedure: EGD (ESOPHAGOGASTRODUODENOSCOPY);  Surgeon: Gabriel Hernandez MD;  Location: [facility];  Service: Endoscopy;  Laterality: N/A;    ESOPHAGOGASTRODUODENOSCOPY N/A 10/7/2019    Procedure: EGD (ESOPHAGOGASTRODUODENOSCOPY);  Surgeon: Gabriel Hernandez MD;  Location: [facility];  Service:  Endoscopy;  Laterality: N/A;    ESOPHAGOGASTRODUODENOSCOPY N/A 1/7/2020    Procedure: EGD (ESOPHAGOGASTRODUODENOSCOPY);  Surgeon: Kati Ryan MD;  [facility]);  Service: Endoscopy;  Laterality: N/A;    HEMORRHOID SURGERY      INJECTION OF ANESTHETIC AGENT AROUND MEDIAL BRANCH NERVES INNERVATING LUMBAR FACET JOINT Bilateral 10/1/2020    Procedure: Block-nerve-medial branch-lumbar Bilateral L 3,4,5;  Surgeon: Devan Watt MD;  Location: [facility];  Service: Pain Management;  Laterality: Bilateral;    KNEE ARTHROPLASTY Bilateral     LENGTHENING OF ACHILLES TENDON Right 9/11/2020    Procedure: percutaeous tenotomy of right lateral epicondyle (tenex);  Surgeon: Terry Quach MD;  Location: [facility];  Service: Neurosurgery;  Laterality: Right;  tenex to right lateral epicondyle  Tenex machine SN#[id number], total time 1min. 30seconds    NECK SURGERY      POSTERIOR FUSION OF CERVICAL SPINE WITH LAMINECTOMY N/A 11/7/2018    Procedure: C2-C6 Posterior Cervical Laminectomy & Instrumental Fusion;  Surgeon: Kishore Turner MD;  Location: [facility];  Service: Neurosurgery;  Laterality: N/A;    TONSILLECTOMY      TRANSFORAMINAL EPIDURAL INJECTION OF STEROID Right 12/20/2019    Procedure: Injection,steroid,epidural,transforaminal approach;  Surgeon: Devan Watt MD;  Location: [facility];  Service: Pain Management;  Laterality: Right;  L3-4, L4-5    TRANSFORAMINAL EPIDURAL INJECTION OF STEROID Bilateral 2/6/2020    Procedure: Injection,steroid,epidural,transforaminal approach;  Surgeon: Devan Watt MD;  Location: [facility];  Service: Pain Management;  Laterality: Bilateral;  L3-L4,L4-L5    TRANSFORAMINAL EPIDURAL INJECTION OF STEROID Bilateral 8/26/2020    Procedure: Injection,steroid,epidural,transforaminal approach;  Surgeon: Devan Watt MD;  Location: [facility];  Service: Pain Management;  Laterality: Bilateral;  L3-4, L4-5    TRANSRECTAL ULTRASOUND EXAMINATION N/A 2/26/2019    Procedure: ULTRASOUND,  RECTAL APPROACH;  Surgeon: Simba Cheung MD;  Location: [facility];  Service: Urology;  Laterality: N/A;    UPPER GASTROINTESTINAL ENDOSCOPY  09/12/2018    Dr Hernandez; gastritis; extensive intestinal metaplasia; repeat in 1-2- years       Current Outpatient Medications   Medication Sig    albuterol (PROVENTIL/VENTOLIN HFA) 90 mcg/actuation inhaler INHALE 1 TO 2 PUFFS INTO THE LUNGS EVERY 6 HOURS AS NEEDED FOR WHEEZING OR SHORTNESS OF BREATH. FOR RESCUE.    atorvastatin (LIPITOR) 80 MG tablet TAKE 1 TABLET EVERY DAY    bumetanide (BUMEX) 0.5 MG Tab Take 1 tablet (0.5 mg total) by mouth 2 (two) times daily.    carvediloL (COREG) 25 MG tablet TAKE 1 TABLET TWICE DAILY WITH MEALS    cetirizine (ZYRTEC) 10 MG tablet Take 1 tablet by mouth daily as needed.    chlorthalidone (HYGROTEN) 25 MG Tab Take 25 mg by mouth once daily.    cholecalciferol, vitamin D3, (VITAMIN D3) 50 mcg (2,000 unit) Cap 1 capsule once daily. Every day    cholestyramine (QUESTRAN) 4 gram packet Take 1 packet (4 g total) by mouth 3 (three) times daily with meals.    clindamycin (CLEOCIN) 300 MG capsule Take 1 capsule (300 mg total) by mouth every 8 (eight) hours.    coenzyme Q10 (CO Q-10) 100 mg capsule Take 400 mg by mouth once daily.    cyanocobalamin, vitamin B-12, 5,000 mcg Subl Take 5,000 mcg by mouth once daily. Every day    diphenoxylate-atropine 2.5-0.025 mg (LOMOTIL) 2.5-0.025 mg per tablet Take 1 tablet by mouth 4 (four) times daily as needed for Diarrhea.    ezetimibe (ZETIA) 10 mg tablet TAKE 1 TABLET EVERY DAY    FLUAD QUAD 2021-22,65Y UP,,PF, 60 mcg (15 mcg x 4)/0.5 mL Syrg     fluticasone (FLONASE) 50 mcg/actuation nasal spray USE 1 SPRAY IN EACH NOSTRIL TWICE DAILY AS NEEDED  FOR  RHINITIS    gabapentin (NEURONTIN) 300 MG capsule TAKE 1 CAPSULE THREE TIMES DAILY    KENALOG 40 mg/mL injection     levothyroxine (SYNTHROID) 50 MCG tablet TAKE 1 TABLET EVERY DAY    methylsulfonylmethane 1,000 mg Tab Take 1,000 mg by  mouth once daily. Every day    milk thistle 200 mg Cap Take 200 mg by mouth 2 (two) times daily. Twice a day    mupirocin (BACTROBAN) 2 % ointment Apply topically 3 (three) times daily.    olmesartan (BENICAR) 20 MG tablet TAKE 1 TABLET EVERY DAY    omega-3 fatty acids 1,000 mg Cap Twice a day    potassium chloride SA (K-DUR,KLOR-CON) 20 MEQ tablet TAKE 4 TABLETS EVERY DAY  OR AS DIRECTED    predniSONE (DELTASONE) 1 MG tablet TAKE 2 TABLETS EVERY DAY    predniSONE (DELTASONE) 5 MG tablet TAKE 1 TABLET EVERY DAY    sildenafil (REVATIO) 20 mg Tab Take 1 to 3 tabs daily as needed.    spironolactone (ALDACTONE) 25 MG tablet Take 25 mg by mouth once daily.    tiotropium (SPIRIVA WITH HANDIHALER) 18 mcg inhalation capsule INHALE THE CONTENTS OF 1 CAPSULE (18 MCG TOTAL) INTO THE LUNGS ONCE DAILY. CONTROLLER    varicella-zoster gE-AS01B, PF, (SHINGRIX, PF,) 50 mcg/0.5 mL injection Inject into the muscle.    pantoprazole (PROTONIX) 40 MG tablet Take 1 tablet (40 mg total) by mouth 2 (two) times daily.    VENTOLIN HFA 90 mcg/actuation inhaler Inhale 2 puffs into the lungs every 6 (six) hours as needed for Wheezing. Rescue     Current Facility-Administered Medications   Medication    acetaminophen tablet 650 mg     Facility-Administered Medications Ordered in Other Visits   Medication    0.9%  NaCl infusion    lidocaine (PF) 10 mg/ml (1%) injection 10 mg       Review of patient's allergies indicates:   Allergen Reactions    No known drug allergies        Family History   Problem Relation Age of Onset    Cataracts Mother     Heart disease Mother         CHF    Hypertension Mother     Hyperlipidemia Mother     Cataracts Father     Glaucoma Father     Heart disease Father     Hyperlipidemia Sister     Hypertension Sister     No Known Problems Daughter     No Known Problems Daughter     Collagen disease Neg Hx     Amblyopia Neg Hx     Blindness Neg Hx     Macular degeneration Neg Hx     Retinal  detachment Neg Hx     Strabismus Neg Hx     Cancer Neg Hx     Colon cancer Neg Hx     Esophageal cancer Neg Hx     Stomach cancer Neg Hx     Crohn's disease Neg Hx     Ulcerative colitis Neg Hx        Social History     Socioeconomic History    Marital status:    Tobacco Use    Smoking status: Never Smoker    Smokeless tobacco: Never Used    Tobacco comment: when a child   Substance and Sexual Activity    Alcohol use: Yes     Comment: rarely    Drug use: No    Sexual activity: Yes     Partners: Female     Social Determinants of Health     Financial Resource Strain: Low Risk     Difficulty of Paying Living Expenses: Not hard at all   Food Insecurity: No Food Insecurity    Worried About Running Out of Food in the Last Year: Never true    Ran Out of Food in the Last Year: Never true   Transportation Needs: No Transportation Needs    Lack of Transportation (Medical): No    Lack of Transportation (Non-Medical): No   Physical Activity: Inactive    Days of Exercise per Week: 0 days    Minutes of Exercise per Session: 0 min   Stress: No Stress Concern Present    Feeling of Stress : Not at all   Social Connections: Unknown    Frequency of Communication with Friends and Family: More than three times a week    Frequency of Social Gatherings with Friends and Family: More than three times a week    Active Member of Clubs or Organizations: Yes    Attends Club or Organization Meetings: More than 4 times per year    Marital Status:    Housing Stability: Low Risk     Unable to Pay for Housing in the Last Year: No    Number of Places Lived in the Last Year: 1    Unstable Housing in the Last Year: No       Chief Complaint:   Chief Complaint   Patient presents with    Right Elbow - Pain       History of present illness:  This is a 76-year-old male seen for right lateral elbow and forearm pain. This started back a couple of years ago.  Cannot lift anything and hurts a lot at night.  Pain is  about a 4/10 in the elbow.  We injected him and it helped a lot for about 6 months.  Pain started again 2 weeks ago.  Pain is a 4/10.  Severe pain at night.   MRI showed no tear.  Did show the lateral epicondylitis.  He tried Tenex already also.      Answers for HPI/ROS submitted by the patient on 12/23/2019   Leg pain  unexpected weight change: No  appetite change : No  sleep disturbance: No  IMMUNOCOMPROMISED: No  nervous/ anxious: No  dysphoric mood: No  rash: No  visual disturbance: No  eye redness: No  eye pain: No  ear pain: No  tinnitus: Yes  hearing loss: No  sinus pressure : Yes  nosebleeds: Yes  enviro allergies: No  food allergies: No  cough: No  shortness of breath: Yes  sweating: No  frequency: Yes  difficulty urinating: No  hematuria: No  chest pain: No  palpitations: No  nausea: No  vomiting: No  diarrhea: No  blood in stool: No  constipation: No  headaches: Yes  dizziness: No  numbness: No  seizures: No  joint swelling: No  myalgia: No  weakness: No  back pain: Yes  Pain Chronicity: recurrent  History of trauma: No  Onset: more than 1 month ago  Frequency: daily  Progression since onset: waxing and waning  Injury mechanism: twisting  injury location: at home  pain- numeric: 3/10  pain location: left knee  pain quality: aching  Radiating Pain: No  Aggravating factors: bending, extension, twisting  fever: No  inability to bear weight: No  itching: No  joint locking: No  limited range of motion: Yes  stiffness: Yes  tingling: No  Treatments tried: cold, heat, exercise, injection treatment, NSAIDs, OTC ointments  physical therapy: not tried  Improvement on treatment: mild      Physical Examination:    Vital Signs:    Vitals:    06/15/22 1101   Resp: 18       Body mass index is 27.05 kg/m².    This a well-developed, well nourished patient in no acute distress.  They are alert and oriented and cooperative to examination.  Pt. walks without an antalgic gait.      Examination of the right elbow shows no  signs of rashes or erythema. The patient has no masses, ecchymosis, or effusion. The patient has full range of motion from 0-160°. Patient has full pronation and supination. Patient is nontender along the medial epicondyle and moderately tender over the lateral epicondyle. Nontender over the olecranon process.  Nontender along the course of the UCL. Patient has a negative valgus stress test and milking maneuver. Negative Tinel's sign over the cubital tunnel. 2+ radial pulse. Intact light touch sensation.         X-rays:  X-rays of the left knee is reviewed which show severe lateral joint space narrowing on the right and more moderate to severe medial joint space wear on the left.  No significant change noted.  X-rays of the right elbow are  reviewed which show a small olecranon spur.    MRI of the right elbow from stand-up MRI:  Lateral epicondylitis.  Subcutaneous edema about the elbow.      Assessment:  Right lateral epicondylitis        Plan: I reviewed the findings with him.  I recommended trying another steroid injection to see if we can calm it down again.  We injected both his right elbow.  Next step might be surgery for his elbow given that he is tried Tenex already.  It was successful for about a year but has come back as bad as ever.      This note was created using [employer] voice recognition software that occasionally misinterpreted phrases or words.    Consult note is delivered via Epic messaging service.

## 2022-06-15 NOTE — PROCEDURES
Tendon Origin: R elbow    Date/Time: 6/15/2022 11:15 AM  Performed by: Haroldo Campbell MD  Authorized by: Haroldo Campbell MD     Consent Done?:  Yes (Verbal)  Timeout: prior to procedure the correct patient, procedure, and site was verified    Indications:  Pain  Site marked: the procedure site was marked    Timeout: prior to procedure the correct patient, procedure, and site was verified    Location:  Elbow  Site:  R elbow  Prep: patient was prepped and draped in usual sterile fashion    Ultrasonic Guidance for Needle Placement?: No    Needle size:  22 G  Approach:  Anterolateral  Medications:  40 mg triamcinolone acetonide 40 mg/mL  Patient tolerance:  Patient tolerated the procedure well with no immediate complications       Additional handoff

## 2022-06-16 NOTE — PROGRESS NOTES
[facility]     Subjective:    Chief Complaint: No chief complaint on file.      History of Present Illness:  76 y.o. male presents for multiple issues.     Notes:  76-year-old with history of atrial fibrillation controlled.  He denies dyspnea orthopnea.  He has chronic lymphedema, treated with support stockings and diuretics.  Chronic COPD, stable no cough no hemoptysis.  Chronic dyspnea is stable.  Degenerative disc disease of lumbar spine, poor results with physical therapy and or epidurals.  He denies claudication but he is unable to walk for long distance.  Sedentary life.  He denies red flags.  Hypothyroid.No symptoms of hypo or hyperthyroidism: no decreased or increased weight, no feeling cold/chilly or excessively warm, no diarrhea or constipation, no undue sweatiness, anxiety or palpitations.  Chronic post cholecystectomy diarrhea, biliary insufficiency.  Partial response with Lomotil.   Chronic polyps (adenomas), repeat colonoscopy in 3 years.    I reviewed the patients chart dating back for the past few appointments. See above.    The following portions of the patient's history were reviewed and updated as appropriate: allergies, current medications, past family history, past medical history, past social history, past surgical history and problem list.    He denies chest pain upon exertion, dyspnea, nausea, vomiting, diaphoresis, and syncope. No pleuritic chest pain, unilateral leg swelling, calf tenderness, or calf pain.      Past Medical History:   Diagnosis Date    A-fib 3/31/2020    Arthritis     Back pain     Carpal tunnel syndrome 2/25/2013    Cataract     Cataract, left eye 11/10/2014    Chest pain, musculoskeletal     COPD (chronic obstructive pulmonary disease) 11/052018    COPD with asthma 8/17/2021    Emphysema lung 11/05/2018    Gastritis     Hx of colonic polyp     Hyperlipidemia     Hypertension     Hypothyroidism     Knee fracture     Myasthenia gravis      Neuropathy 1/3/2013    Obesity 1/29/2015    Pneumonia 3/29/2020    Polyneuropathy     PVC (premature ventricular contraction)     Squamous cell carcinoma 2014    left forearm    Thyroid disease        Past Surgical History:   Procedure Laterality Date    APPENDECTOMY      CATARACT EXTRACTION W/  INTRAOCULAR LENS IMPLANT Bilateral     COLONOSCOPY  03/01/2012    Dr Esteban; hyperplastic polyp; repeat in 5 years    COLONOSCOPY N/A 12/7/2021    Procedure: COLONOSCOPY;  Surgeon: Gabriel Hernandez MD;  Location: [facility];  Service: Endoscopy;  Laterality: N/A;    CYSTOSCOPY N/A 2/26/2019    Procedure: CYSTOSCOPY;  Surgeon: Simba Cheung MD;  Location: [facility];  Service: Urology;  Laterality: N/A;    ENDOSCOPIC ULTRASOUND OF UPPER GASTROINTESTINAL TRACT N/A 1/7/2020    Procedure: ULTRASOUND, UPPER GI TRACT, ENDOSCOPIC;  Surgeon: Kati Ryan MD;  Location: [facility] (2ND FLR);  Service: Endoscopy;  Laterality: N/A;    ESOPHAGOGASTRODUODENOSCOPY N/A 9/12/2018    Procedure: EGD (ESOPHAGOGASTRODUODENOSCOPY);  Surgeon: Gabriel Hernandez MD;  Location: [facility];  Service: Endoscopy;  Laterality: N/A;    ESOPHAGOGASTRODUODENOSCOPY N/A 8/19/2019    Procedure: EGD (ESOPHAGOGASTRODUODENOSCOPY);  Surgeon: Gabriel Hernandez MD;  Location: [facility];  Service: Endoscopy;  Laterality: N/A;    ESOPHAGOGASTRODUODENOSCOPY N/A 10/7/2019    Procedure: EGD (ESOPHAGOGASTRODUODENOSCOPY);  Surgeon: Gabriel Hernandez MD;  Location: [facility];  Service: Endoscopy;  Laterality: N/A;    ESOPHAGOGASTRODUODENOSCOPY N/A 1/7/2020    Procedure: EGD (ESOPHAGOGASTRODUODENOSCOPY);  Surgeon: Kati Ryan MD;  Location: [facility] (2ND FLR);  Service: Endoscopy;  Laterality: N/A;    HEMORRHOID SURGERY      INJECTION OF ANESTHETIC AGENT AROUND MEDIAL BRANCH NERVES INNERVATING LUMBAR FACET JOINT Bilateral 10/1/2020    Procedure: Block-nerve-medial branch-lumbar Bilateral L 3,4,5;  Surgeon: Devan Watt MD;  Location: [facility]  OR;  Service: Pain Management;  Laterality: Bilateral;    KNEE ARTHROPLASTY Bilateral     LENGTHENING OF ACHILLES TENDON Right 9/11/2020    Procedure: percutaeous tenotomy of right lateral epicondyle (tenex);  Surgeon: Terry Quach MD;  Location: [facility];  Service: Neurosurgery;  Laterality: Right;  tenex to right lateral epicondyle  Tenex machine SN#[id number], total time 1min. 30seconds    NECK SURGERY      POSTERIOR FUSION OF CERVICAL SPINE WITH LAMINECTOMY N/A 11/7/2018    Procedure: C2-C6 Posterior Cervical Laminectomy & Instrumental Fusion;  Surgeon: Kishore Turner MD;  Location: [address];  Service: Neurosurgery;  Laterality: N/A;    TONSILLECTOMY      TRANSFORAMINAL EPIDURAL INJECTION OF STEROID Right 12/20/2019    Procedure: Injection,steroid,epidural,transforaminal approach;  Surgeon: Devan Watt MD;  Location: [facility];  Service: Pain Management;  Laterality: Right;  L3-4, L4-5    TRANSFORAMINAL EPIDURAL INJECTION OF STEROID Bilateral 2/6/2020    Procedure: Injection,steroid,epidural,transforaminal approach;  Surgeon: Devan Watt MD;  Location: [facility];  Service: Pain Management;  Laterality: Bilateral;  L3-L4,L4-L5    TRANSFORAMINAL EPIDURAL INJECTION OF STEROID Bilateral 8/26/2020    Procedure: Injection,steroid,epidural,transforaminal approach;  Surgeon: Devan Watt MD;  Location: [facility];  Service: Pain Management;  Laterality: Bilateral;  L3-4, L4-5    TRANSRECTAL ULTRASOUND EXAMINATION N/A 2/26/2019    Procedure: ULTRASOUND, RECTAL APPROACH;  Surgeon: Simba Cheung MD;  Location: [facility];  Service: Urology;  Laterality: N/A;    UPPER GASTROINTESTINAL ENDOSCOPY  09/12/2018    Dr Hernandez; gastritis; extensive intestinal metaplasia; repeat in 1-2- years       Social History  Social History     Tobacco Use    Smoking status: Never Smoker    Smokeless tobacco: Never Used    Tobacco comment: when a child   Substance Use Topics    Alcohol use: Yes     Comment: rarely    Drug use: No        Family History   Problem Relation Age of Onset    Cataracts Mother     Heart disease Mother         CHF    Hypertension Mother     Hyperlipidemia Mother     Cataracts Father     Glaucoma Father     Heart disease Father     Hyperlipidemia Sister     Hypertension Sister     No Known Problems Daughter     No Known Problems Daughter     Collagen disease Neg Hx     Amblyopia Neg Hx     Blindness Neg Hx     Macular degeneration Neg Hx     Retinal detachment Neg Hx     Strabismus Neg Hx     Cancer Neg Hx     Colon cancer Neg Hx     Esophageal cancer Neg Hx     Stomach cancer Neg Hx     Crohn's disease Neg Hx     Ulcerative colitis Neg Hx      Review of patient's allergies indicates:   Allergen Reactions    Spironolactone Diarrhea       Review of Systems [Negative unless checked off]    General ROS: []fever, []chills, []weight loss, [x]malaise/fatigue.  ENT ROS: []congestion, []rhinorrhea,  []sore throat, [x]neck pain, []hearing loss.  Ophthalmic ROS:[]blurry vision, [] double vision, []photophobia, []eye pain.  Respiratory ROS: []cough, []pleuritic chest pain, []shortness of breath, []wheezing.  CVS ROS:[]chest pain, []dyspnea on exertion, []palpitations, []orthopnea, []leg swelling, []PND.   GI ROS: []nausea, []vomiting, [] epigastric pain, []abd pain, [x]diarrhea, []constipation, []blood/melena in stool.   Urology ROS:[]dysuria, []frequency, []flank pain,[] trouble voiding, [] hematuria.   MSK ROS: []myalgias, []joint pain, [x]muscular weakness,  []back pain, [] falls.   Derm ROS: []pruritis, []rash, []jaundice.  Neurological:[x]dizziness,[]numbness,[]loss of consciousness, []weakness  []headaches.   Psych ROS: []hallucinations, []depression, []anxiety, []suicidal ideation.    Physical Examination  /74 (BP Location: Right arm, Patient Position: Sitting, BP Method: Medium (Manual))   Pulse 70   Temp 98.3 °F (36.8 °C) (Oral)   Resp 19   Wt 109.6 kg (241 lb 10 oz)   SpO2 96%   BMI  "31.88 kg/m²   Wt Readings from Last 3 Encounters:   06/15/22 109.6 kg (241 lb 10 oz)   06/15/22 93 kg (205 lb)   05/13/22 93.2 kg (205 lb 7.5 oz)     BP Readings from Last 3 Encounters:   06/15/22 130/74   05/13/22 115/71   05/03/22 114/60     Estimated body mass index is 31.88 kg/m² as calculated from the following:    Height as of an earlier encounter on 6/15/22: 6' 1" (1.854 m).    Weight as of this encounter: 109.6 kg (241 lb 10 oz).     General appearance: alert, cooperative, no distress.  Obese  Eyes: pupils equal and reactive, extraocular eye movements intact   Ears: bilateral TM's and external ear canals normal   Nose: normal and patent, no erythema, discharge or polyps   Sinuses: Normal paranasal sinuses without tenderness   Throat: mucous membranes moist, pharynx normal without lesions   Neck: no thyromegaly, trachea midline  Lungs: clear to auscultation, no wheezes, rales or rhonchi, symmetric air entry, no dullness to percussion bilaterally.  Heart: normal rate, regular rhythm, normal S1, S2, no murmurs, rubs, clicks or gallops, no displacement of the PMI.  Abdomen: soft, nontender, nondistended, no masses or organomegaly No rigidity, rebound, or guarding.   Scrotal skin tag, vs wart. No evidence of any other lesion suggestive of condyloma.  Back: full range of motion, no tenderness, palpable spasm or pain on motion   Extremities: pedal edema  +, venous stasis dermatitis noted, no ulcers, gangrene or atrophic changes   Feet: warm, good capillary refill.  Neurological:alert, oriented, normal speech, no focal findings or movement disorder noted, screening mental status exam normal, neck supple without rigidity, cranial nerves II through XII intact, DTR's normal and symmetric, motor and sensory grossly normal bilaterally, normal muscle tone, no tremors, strength 5/5   Psychiatric: alert, oriented to person, place, and time, normal mood, behavior, speech, dress, motor activity, and thought " "processes  Integument: normal coloration and turgor, no rashes, no suspicious skin lesions noted  Wart vs condyloma lesion of scotum..    Data reviewed    Previous medical records reviewed and summarized above in HPI. 274}    Laboratory    I have reviewed old labs below:   { Click here to jump to lab section :91097::"   "}274}  Lab Results   Component Value Date    WBC 9.25 05/13/2022    HGB 12.9 (L) 05/13/2022    HCT 41.1 05/13/2022    MCV 92 05/13/2022     (L) 05/13/2022     05/13/2022    K 4.7 05/13/2022     05/13/2022    CALCIUM 9.1 05/13/2022    PHOS 2.9 03/31/2020    CO2 27 05/13/2022     (H) 05/13/2022    BUN 30 (H) 05/13/2022    CREATININE 1.3 05/13/2022    ANIONGAP 11 05/13/2022    ESTGFRAFRICA >60 05/13/2022    EGFRNONAA 52 (A) 05/13/2022    PROT 6.0 02/10/2022    ALBUMIN 3.2 (L) 02/10/2022    BILITOT 1.2 (H) 02/10/2022    ALKPHOS 68 02/10/2022    ALT 16 02/10/2022    AST 19 02/10/2022    INR 1.0 10/30/2018    CHOL 122 01/03/2022    TRIG 68 01/03/2022    HDL 41 01/03/2022    LDLCALC 67.4 01/03/2022    TSH 2.311 01/03/2022    PSA 3.5 09/19/2019    HGBA1C 5.5 11/21/2018       Imaging/Tracing: I have reviewed the pertinent results/findings and my personal findings are below:  274}    Assessment/Plan     274}    Maximilian Hernandez Ayse Pan. is a 76 y.o. male who presents to clinic with:    1. Chronic kidney disease, stage 3a    2. Thrombocytopenia    3. Edema, unspecified type    4. Mixed hyperlipidemia    5. Bronchiectasis without complication    6. Skin tag         Diagnostic impression remarks       1. Chronic kidney disease, stage 3a  - Comprehensive Metabolic Panel; Future  - CBC Auto Differential; Future    2. Thrombocytopenia    3. Edema, unspecified type    4. Mixed hyperlipidemia  - Lipid Panel; Future    5. Bronchiectasis without complication    6. Skin tag  - Ambulatory referral/consult to Dermatology; Future      Medication Monitoring: In today's visit, monitoring for drug " toxicity was accomplished. Proper use of medications was discussed.     Counseling: Counseling included discussion regarding imaging findings, diagnosis, possibilities, treatment options, medications, risks, and benefits. He had many questions regarding the options and long-term effects. All questions were answered. He expressed understanding after counseling regarding the diagnosis and recommendations. He was capable and demonstrated competence with understanding of these options. Shared decision making was performed resulting in him choosing the current treatment plan.     He was counseled about the importance of healthy dietary habits as well as routine physical activity and exercise for better health outcomes. I also discussed the importance of cancer screening.     I also discussed the importance of close follow up to discuss labs, change or modify his medications if needed, monitor side effects, and further evaluation of medical problems.     Additional workup planned: see labs ordered below.    See below for labs and meds ordered with associated diagnosis      Medication List with Changes/Refills   Current Medications    ALBUTEROL (PROVENTIL/VENTOLIN HFA) 90 MCG/ACTUATION INHALER    INHALE 1 TO 2 PUFFS INTO THE LUNGS EVERY 6 HOURS AS NEEDED FOR WHEEZING OR SHORTNESS OF BREATH. FOR RESCUE.    ATORVASTATIN (LIPITOR) 80 MG TABLET    TAKE 1 TABLET EVERY DAY    BUMETANIDE (BUMEX) 0.5 MG TAB    Take 1 tablet (0.5 mg total) by mouth 2 (two) times daily.    CARVEDILOL (COREG) 25 MG TABLET    TAKE 1 TABLET TWICE DAILY WITH MEALS    CETIRIZINE (ZYRTEC) 10 MG TABLET    Take 1 tablet by mouth daily as needed.    CHLORTHALIDONE (HYGROTEN) 25 MG TAB    Take 25 mg by mouth once daily.    CHOLECALCIFEROL, VITAMIN D3, (VITAMIN D3) 50 MCG (2,000 UNIT) CAP    1 capsule once daily. Every day    CHOLESTYRAMINE (QUESTRAN) 4 GRAM PACKET    Take 1 packet (4 g total) by mouth 3 (three) times daily with meals.    CLINDAMYCIN (CLEOCIN)  300 MG CAPSULE    Take 1 capsule (300 mg total) by mouth every 8 (eight) hours.    COENZYME Q10 (CO Q-10) 100 MG CAPSULE    Take 400 mg by mouth once daily.    CYANOCOBALAMIN, VITAMIN B-12, 5,000 MCG SUBL    Take 5,000 mcg by mouth once daily. Every day    DIPHENOXYLATE-ATROPINE 2.5-0.025 MG (LOMOTIL) 2.5-0.025 MG PER TABLET    Take 1 tablet by mouth 4 (four) times daily as needed for Diarrhea.    EZETIMIBE (ZETIA) 10 MG TABLET    TAKE 1 TABLET EVERY DAY    FLUAD QUAD 2021-22,65Y UP,,PF, 60 MCG (15 MCG X 4)/0.5 ML SYRG        FLUTICASONE (FLONASE) 50 MCG/ACTUATION NASAL SPRAY    USE 1 SPRAY IN EACH NOSTRIL TWICE DAILY AS NEEDED  FOR  RHINITIS    GABAPENTIN (NEURONTIN) 300 MG CAPSULE    TAKE 1 CAPSULE THREE TIMES DAILY    KENALOG 40 MG/ML INJECTION        LEVOTHYROXINE (SYNTHROID) 50 MCG TABLET    TAKE 1 TABLET EVERY DAY    METHYLSULFONYLMETHANE 1,000 MG TAB    Take 1,000 mg by mouth once daily. Every day    MILK THISTLE 200 MG CAP    Take 200 mg by mouth 2 (two) times daily. Twice a day    MUPIROCIN (BACTROBAN) 2 % OINTMENT    Apply topically 3 (three) times daily.    OLMESARTAN (BENICAR) 20 MG TABLET    TAKE 1 TABLET EVERY DAY    OMEGA-3 FATTY ACIDS 1,000 MG CAP    Twice a day    PANTOPRAZOLE (PROTONIX) 40 MG TABLET    Take 1 tablet (40 mg total) by mouth 2 (two) times daily.    POTASSIUM CHLORIDE SA (K-DUR,KLOR-CON) 20 MEQ TABLET    TAKE 4 TABLETS EVERY DAY  OR AS DIRECTED    PREDNISONE (DELTASONE) 1 MG TABLET    TAKE 2 TABLETS EVERY DAY    PREDNISONE (DELTASONE) 5 MG TABLET    TAKE 1 TABLET EVERY DAY    SILDENAFIL (REVATIO) 20 MG TAB    Take 1 to 3 tabs daily as needed.    SPIRONOLACTONE (ALDACTONE) 25 MG TABLET    Take 25 mg by mouth once daily.    TIOTROPIUM (SPIRIVA WITH HANDIHALER) 18 MCG INHALATION CAPSULE    INHALE THE CONTENTS OF 1 CAPSULE (18 MCG TOTAL) INTO THE LUNGS ONCE DAILY. CONTROLLER    VARICELLA-ZOSTER [id number], PF, (SHINGRIX, PF,) 50 MCG/0.5 ML INJECTION    Inject into the muscle.     Modified  "Medications    No medications on file       Follow up in about 6 months (around 12/15/2022) for labs before next visit. for further workup and reassessment if labs and tests obtained are stable or sooner as needed. He was instructed to call the clinic or go to the emergency department if his symptoms do not improve, worsens, or if new symptoms develop. Patient knows to call any time if an emergency arises. Shared decision making occurred and he verbalized understanding in agreement with this plan.     Documentation entered by me for this encounter may have been done in part using speech-recognition technology. Although I have made an effort to ensure accuracy, "sound like" errors may exist and should be interpreted in context.       Brian Marroquin MD     Discussed health maintenance guidelines appropriate for age.    "

## 2022-06-17 ENCOUNTER — TELEPHONE (OUTPATIENT)
Dept: FAMILY MEDICINE | Facility: CLINIC | Age: 77
End: 2022-06-17
Payer: MEDICARE

## 2022-06-21 ENCOUNTER — OFFICE VISIT (OUTPATIENT)
Dept: ELECTROPHYSIOLOGY | Facility: CLINIC | Age: 77
End: 2022-06-21
Payer: MEDICARE

## 2022-06-21 ENCOUNTER — HOSPITAL ENCOUNTER (OUTPATIENT)
Dept: CARDIOLOGY | Facility: CLINIC | Age: 77
Discharge: HOME | End: 2022-06-21
Payer: MEDICARE

## 2022-06-21 VITALS
WEIGHT: 236.13 LBS | HEIGHT: 73 IN | BODY MASS INDEX: 31.3 KG/M2 | SYSTOLIC BLOOD PRESSURE: 130 MMHG | DIASTOLIC BLOOD PRESSURE: 71 MMHG | HEART RATE: 60 BPM

## 2022-06-21 DIAGNOSIS — I49.8 OTHER SPECIFIED CARDIAC ARRHYTHMIAS: ICD-10-CM

## 2022-06-21 DIAGNOSIS — I10 HTN (HYPERTENSION), BENIGN: ICD-10-CM

## 2022-06-21 DIAGNOSIS — I70.0 ABDOMINAL AORTIC ATHEROSCLEROSIS: ICD-10-CM

## 2022-06-21 DIAGNOSIS — I45.10 RBBB: ICD-10-CM

## 2022-06-21 DIAGNOSIS — E66.9 OBESITY (BMI 30-39.9): ICD-10-CM

## 2022-06-21 DIAGNOSIS — I49.3 PVC (PREMATURE VENTRICULAR CONTRACTION): Primary | ICD-10-CM

## 2022-06-21 DIAGNOSIS — I47.29 NONSUSTAINED VENTRICULAR TACHYCARDIA: ICD-10-CM

## 2022-06-21 PROCEDURE — 3075F PR MOST RECENT SYSTOLIC BLOOD PRESS GE 130-139MM HG: ICD-10-PCS | Mod: CPTII,S$GLB,, | Performed by: INTERNAL MEDICINE

## 2022-06-21 PROCEDURE — 3078F DIAST BP <80 MM HG: CPT | Mod: CPTII,S$GLB,, | Performed by: INTERNAL MEDICINE

## 2022-06-21 PROCEDURE — 1100F PR PT FALLS ASSESS DOC 2+ FALLS/FALL W/INJURY/YR: ICD-10-PCS | Mod: CPTII,S$GLB,, | Performed by: INTERNAL MEDICINE

## 2022-06-21 PROCEDURE — 1159F MED LIST DOCD IN RCRD: CPT | Mod: CPTII,S$GLB,, | Performed by: INTERNAL MEDICINE

## 2022-06-21 PROCEDURE — 93005 ELECTROCARDIOGRAM TRACING: CPT | Mod: S$GLB,,, | Performed by: INTERNAL MEDICINE

## 2022-06-21 PROCEDURE — 99999 PR PBB SHADOW E&M-EST. PATIENT-LVL IV: CPT | Mod: PBBFAC,,, | Performed by: INTERNAL MEDICINE

## 2022-06-21 PROCEDURE — 1157F PR ADVANCE CARE PLAN OR EQUIV PRESENT IN MEDICAL RECORD: ICD-10-PCS | Mod: CPTII,S$GLB,, | Performed by: INTERNAL MEDICINE

## 2022-06-21 PROCEDURE — 93005 RHYTHM STRIP: ICD-10-PCS | Mod: S$GLB,,, | Performed by: INTERNAL MEDICINE

## 2022-06-21 PROCEDURE — 93010 ELECTROCARDIOGRAM REPORT: CPT | Mod: S$GLB,,, | Performed by: INTERNAL MEDICINE

## 2022-06-21 PROCEDURE — 3288F FALL RISK ASSESSMENT DOCD: CPT | Mod: CPTII,S$GLB,, | Performed by: INTERNAL MEDICINE

## 2022-06-21 PROCEDURE — 1100F PTFALLS ASSESS-DOCD GE2>/YR: CPT | Mod: CPTII,S$GLB,, | Performed by: INTERNAL MEDICINE

## 2022-06-21 PROCEDURE — 99214 PR OFFICE/OUTPT VISIT, EST, LEVL IV, 30-39 MIN: ICD-10-PCS | Mod: S$GLB,,, | Performed by: INTERNAL MEDICINE

## 2022-06-21 PROCEDURE — 99999 PR PBB SHADOW E&M-EST. PATIENT-LVL IV: ICD-10-PCS | Mod: PBBFAC,,, | Performed by: INTERNAL MEDICINE

## 2022-06-21 PROCEDURE — 1126F PR PAIN SEVERITY QUANTIFIED, NO PAIN PRESENT: ICD-10-PCS | Mod: CPTII,S$GLB,, | Performed by: INTERNAL MEDICINE

## 2022-06-21 PROCEDURE — 1126F AMNT PAIN NOTED NONE PRSNT: CPT | Mod: CPTII,S$GLB,, | Performed by: INTERNAL MEDICINE

## 2022-06-21 PROCEDURE — 1159F PR MEDICATION LIST DOCUMENTED IN MEDICAL RECORD: ICD-10-PCS | Mod: CPTII,S$GLB,, | Performed by: INTERNAL MEDICINE

## 2022-06-21 PROCEDURE — 3078F PR MOST RECENT DIASTOLIC BLOOD PRESSURE < 80 MM HG: ICD-10-PCS | Mod: CPTII,S$GLB,, | Performed by: INTERNAL MEDICINE

## 2022-06-21 PROCEDURE — 1160F RVW MEDS BY RX/DR IN RCRD: CPT | Mod: CPTII,S$GLB,, | Performed by: INTERNAL MEDICINE

## 2022-06-21 PROCEDURE — 93010 RHYTHM STRIP: ICD-10-PCS | Mod: S$GLB,,, | Performed by: INTERNAL MEDICINE

## 2022-06-21 PROCEDURE — 3288F PR FALLS RISK ASSESSMENT DOCUMENTED: ICD-10-PCS | Mod: CPTII,S$GLB,, | Performed by: INTERNAL MEDICINE

## 2022-06-21 PROCEDURE — 1160F PR REVIEW ALL MEDS BY PRESCRIBER/CLIN PHARMACIST DOCUMENTED: ICD-10-PCS | Mod: CPTII,S$GLB,, | Performed by: INTERNAL MEDICINE

## 2022-06-21 PROCEDURE — 3075F SYST BP GE 130 - 139MM HG: CPT | Mod: CPTII,S$GLB,, | Performed by: INTERNAL MEDICINE

## 2022-06-21 PROCEDURE — 1157F ADVNC CARE PLAN IN RCRD: CPT | Mod: CPTII,S$GLB,, | Performed by: INTERNAL MEDICINE

## 2022-06-21 PROCEDURE — 99214 OFFICE O/P EST MOD 30 MIN: CPT | Mod: S$GLB,,, | Performed by: INTERNAL MEDICINE

## 2022-06-21 NOTE — PROGRESS NOTES
Subjective:    Patient ID:  Maximilian Tavera Jr. is a 76 y.o. male who presents for evaluation of PVCs    Referring Cardiologist: Alphonso Altamirano MD  Primary Care Physician: Brian Marroquin MD  Neurologist: César Hoang MD    HPI  Prior Hx:  I had the pleasure of seeing Mr. Tavera today in our electrophysiology clinic in consultation for his palpitations. As you are aware he is a pleasant 76 year-old man with hypertension, myasthenia gravis on chronic prednisone therapy, and degenerative joint disease with myelopathy s/p decompression and fusion of C2-6. He reports being diagnosed with PVCs 5 years ago however over the past 1-2 months have become more prevalent. No syncope but has been having intermittent episodes of symptomatic hypotension with heart rate in the 110s which seemed to increase when started on BPH meds. He saw Dr. Altamirano in cardiology clinic who ordered an exercise stress echo and a 30 day event monitor. Stress test noted a structurally normal heart without ischemia. The ECG portion of his stress test noted sinus rhythm with occasional unifocal PVCs (LBBB but with V2 pattern break, +Rs II/aVF, rS in III, R in 1) at times with PVC couplets. His 30 day event monitor noted frequent PVCs with PVC couplets, triplets and occasional 4 beat NSVT. He denies any near syncope or syncope.    He was on carvedilol for years however this was stopped 2 months ago when he was started on Flomax for BPH (prescribing physician suggested stopping carvedilol to avoid hypotension). He stopped this and changed to alfuzosin however he stopped this due to symptomatic hypotension. For HTN he is on olmesartan and chlorthalidone.    ECG from 10/2018 notes a dimorphic couplet with 1 similar to PVC noted on stress test and the other inferiorly directed PVC in inferior leads. Other ECGs show sinus rhythm with RBBB.    Holter monitor 5/2019 noted 2.4% PVC burden but with 14 runs of nonsustained VT up to 33 beats. We discussed  on the phone and resumed carvedilol 6.25mg q12h. He presented for follow-up shortly after and had no complaints. No MG symptoms since starting carvedilol. He has no symptoms from the ventricular arrhythmias. We increased his carvedilol to 12.5mg q12h.    Mr. Tavera returned for follow-up 10/2019. He denied any issues related to his MG on 25mg carvedilol q12h. Recent holter monitor on this dosage indicated only 604 PVCs and no NSVT. He felt much better on carvedilol.    Mr. Tavera returned for yearly follow-up 5/2021. Recent holter noted a 10.8% PVC burden. He noted palpitations and some dizziness when PVCs are very frequent. He has some worsening in his chronic shortness of breath. Of note AF is listed now as a medical problem. This appears to be from when he was admitted to [facility] in March of 2020 with a febrile illness. No ECGs or telemetry strips scanned into media from that hospitalization indicate AF. Dr. Pacheco saw him on consultation and did not mention AF in his notes.    He initially had great response to carvedilol. Recently his PVC symptoms have increased which correlate with 10% burden on that holter monitor, despite 25mg of carvedilol bid. Discussed concern that other medications that could be used may exacerbate his MG. Also concerned that he was having increased dyspnea on exertion with differential diagnosis including COPD, heart failure, or neuromuscular due to MG. Recommended ECHO. If EF normal then would continue watchful monitoring and would repeat a holter in 3 months. If EF declining then would recommend considering PVC mapping and ablation. Discussed concerns regarding location of his focus (possibly infero-basal septum which would increase his risk of AV block if mapped close to native conduction system).    ECHO 6/2021 noted normal LV function.     Mr. Tavera returned for follow-up 9/2021. Holter 8/2021 noted very rare PVCs. He felt well other than stable chronic dyspnea on exertion. Plan was  to hold the course with beta-blocker therapy.    Mr. Tavera returned for follow-up 5/13/2022. Reported the day prior waking up feeling weak and had hypotension and bradycardia (BP was 70s/30s and measured pulse rate in the 30s). He had a BP of 80s/50s with a normal pulse on April 5, 2022. He did not feel poorly enough to go to the ER. He reports he started spironolactone 3 weeks ago. He spoke with the triage nurse yesterday and has held chlorthalidone, olmesartan and spironolactone. He reports feeling skipped beats a few days prior to this starting. He feels better today. He also has been limiting his fluid intake to 1.5L a day for his lymphedema in his legs. He wears compression stockings. In my opinion it was due to overmedication and dehydration. I told him to hold his olmesartan and diuretic.    Interim Hx:  Mr. Ayse Pan returns for follow-up. 14 day holter noted no significant PVC burden. He did have a 1.5% PAC burden and nonsustained AT. He feels well. Reports BP now is normal.    My interpretation of today's in clinic ECG is sinus rhythm with RBBB and no ectopy    Review of Systems   Constitutional: Negative for fever and malaise/fatigue.   HENT: Negative for congestion and nosebleeds.    Eyes: Negative for blurred vision and visual disturbance.   Cardiovascular: Negative for chest pain, dyspnea on exertion, irregular heartbeat, leg swelling, near-syncope, orthopnea, palpitations, paroxysmal nocturnal dyspnea and syncope.   Respiratory: Negative for cough and shortness of breath.    Hematologic/Lymphatic: Negative for bleeding problem. Does not bruise/bleed easily.   Skin: Negative.    Musculoskeletal: Negative.    Gastrointestinal: Negative for bloating and abdominal pain.   Neurological: Negative for dizziness, focal weakness and light-headedness.        Objective:    Physical Exam  Vitals reviewed.   Constitutional:       General: He is not in acute distress.     Appearance: He is well-developed. He is  not diaphoretic.   HENT:      Head: Normocephalic and atraumatic.   Eyes:      General:         Right eye: No discharge.         Left eye: No discharge.      Conjunctiva/sclera: Conjunctivae normal.   Cardiovascular:      Rate and Rhythm: Normal rate and regular rhythm.      Heart sounds: No murmur heard.    No friction rub. No gallop.   Pulmonary:      Effort: Pulmonary effort is normal. No respiratory distress.      Breath sounds: Normal breath sounds. No wheezing or rales.   Abdominal:      General: Bowel sounds are normal. There is no distension.      Palpations: Abdomen is soft.      Tenderness: There is no abdominal tenderness.   Musculoskeletal:      Cervical back: Neck supple.   Skin:     General: Skin is warm and dry.   Neurological:      Mental Status: He is alert and oriented to person, place, and time.   Psychiatric:         Behavior: Behavior normal.         Thought Content: Thought content normal.         Judgment: Judgment normal.           Assessment:       1. PVC (premature ventricular contraction)    2. Nonsustained ventricular tachycardia    3. RBBB    4. HTN (hypertension), benign    5. Abdominal aortic atherosclerosis    6. Obesity (BMI 30-39.9)         Plan:       In summary, Mr. Tavera is a pleasant 76 year-old man with hypertension, myasthenia gravis on chronic prednisone therapy, and degenerative joint disease with myelopathy s/p decompression and fusion of C2-6, chronic PVCs however since the beginning of this 2019 had become more prevalent, that was related to stopping his carvedilol. Holter monitor noted ~2% PVC burden but with 14 episodes of NSVT up to 33 beats that were asymptomatic.  No indication that carvedilol worsened his MG symptoms. He had great response to carvedilol. He feels well.    RTC in 1 year with 14 day holter 1 month prior.      Thank you for allowing me to participate in the care of this patient. Please do not hesitate to call me with any questions or concerns.    Sathya  MD Robb, PhD  Cardiac Electrophysiology

## 2022-07-05 DIAGNOSIS — J44.89 COPD WITH ASTHMA: ICD-10-CM

## 2022-07-06 RX ORDER — TIOTROPIUM BROMIDE 18 UG/1
CAPSULE ORAL; RESPIRATORY (INHALATION)
Qty: 90 CAPSULE | Refills: 3 | Status: SHIPPED | OUTPATIENT
Start: 2022-07-06 | End: 2023-07-11

## 2022-07-06 NOTE — TELEPHONE ENCOUNTER
Refill Decision Note   Maximilian Ayse  is requesting a refill authorization.  Brief Assessment and Rationale for Refill:        Medication Therapy Plan:           Comments:     No Care Gaps recommended.     Note composed:8:41 AM 07/06/2022

## 2022-07-06 NOTE — TELEPHONE ENCOUNTER
No new care gaps identified.  [facility] Embedded Care Gaps. Reference number: [id number]. 7/05/2022   8:31:59 PM CDT

## 2022-07-27 ENCOUNTER — OFFICE VISIT (OUTPATIENT)
Dept: DERMATOLOGY | Facility: CLINIC | Age: 77
End: 2022-07-27
Payer: MEDICARE

## 2022-07-27 VITALS — HEIGHT: 73 IN | BODY MASS INDEX: 31.28 KG/M2 | WEIGHT: 236 LBS

## 2022-07-27 DIAGNOSIS — D49.2 NEOPLASM OF UNSPECIFIED BEHAVIOR OF BONE, SOFT TISSUE, AND SKIN: ICD-10-CM

## 2022-07-27 PROCEDURE — 1157F ADVNC CARE PLAN IN RCRD: CPT | Mod: CPTII,S$GLB,, | Performed by: DERMATOLOGY

## 2022-07-27 PROCEDURE — 3288F PR FALLS RISK ASSESSMENT DOCUMENTED: ICD-10-PCS | Mod: CPTII,S$GLB,, | Performed by: DERMATOLOGY

## 2022-07-27 PROCEDURE — 1101F PR PT FALLS ASSESS DOC 0-1 FALLS W/OUT INJ PAST YR: ICD-10-PCS | Mod: CPTII,S$GLB,, | Performed by: DERMATOLOGY

## 2022-07-27 PROCEDURE — 1126F PR PAIN SEVERITY QUANTIFIED, NO PAIN PRESENT: ICD-10-PCS | Mod: CPTII,S$GLB,, | Performed by: DERMATOLOGY

## 2022-07-27 PROCEDURE — 99999 PR PBB SHADOW E&M-EST. PATIENT-LVL V: CPT | Mod: PBBFAC,,, | Performed by: DERMATOLOGY

## 2022-07-27 PROCEDURE — 1160F PR REVIEW ALL MEDS BY PRESCRIBER/CLIN PHARMACIST DOCUMENTED: ICD-10-PCS | Mod: CPTII,S$GLB,, | Performed by: DERMATOLOGY

## 2022-07-27 PROCEDURE — 3288F FALL RISK ASSESSMENT DOCD: CPT | Mod: CPTII,S$GLB,, | Performed by: DERMATOLOGY

## 2022-07-27 PROCEDURE — 1159F MED LIST DOCD IN RCRD: CPT | Mod: CPTII,S$GLB,, | Performed by: DERMATOLOGY

## 2022-07-27 PROCEDURE — 99499 NO LOS: ICD-10-PCS | Mod: S$GLB,,, | Performed by: DERMATOLOGY

## 2022-07-27 PROCEDURE — 1101F PT FALLS ASSESS-DOCD LE1/YR: CPT | Mod: CPTII,S$GLB,, | Performed by: DERMATOLOGY

## 2022-07-27 PROCEDURE — 99999 PR PBB SHADOW E&M-EST. PATIENT-LVL V: ICD-10-PCS | Mod: PBBFAC,,, | Performed by: DERMATOLOGY

## 2022-07-27 PROCEDURE — 1126F AMNT PAIN NOTED NONE PRSNT: CPT | Mod: CPTII,S$GLB,, | Performed by: DERMATOLOGY

## 2022-07-27 PROCEDURE — 12041 PR LAYR CLOS WND REST BODY <2.5 CM: ICD-10-PCS | Mod: 51,S$GLB,, | Performed by: DERMATOLOGY

## 2022-07-27 PROCEDURE — 11423 EXC H-F-NK-SP B9+MARG 2.1-3: CPT | Mod: S$GLB,,, | Performed by: DERMATOLOGY

## 2022-07-27 PROCEDURE — 88304 PR  SURG PATH,LEVEL III: ICD-10-PCS | Mod: 26,,, | Performed by: PATHOLOGY

## 2022-07-27 PROCEDURE — 1159F PR MEDICATION LIST DOCUMENTED IN MEDICAL RECORD: ICD-10-PCS | Mod: CPTII,S$GLB,, | Performed by: DERMATOLOGY

## 2022-07-27 PROCEDURE — 12041 INTMD RPR N-HF/GENIT 2.5CM/<: CPT | Mod: 51,S$GLB,, | Performed by: DERMATOLOGY

## 2022-07-27 PROCEDURE — 1157F PR ADVANCE CARE PLAN OR EQUIV PRESENT IN MEDICAL RECORD: ICD-10-PCS | Mod: CPTII,S$GLB,, | Performed by: DERMATOLOGY

## 2022-07-27 PROCEDURE — 99499 UNLISTED E&M SERVICE: CPT | Mod: S$GLB,,, | Performed by: DERMATOLOGY

## 2022-07-27 PROCEDURE — 88304 TISSUE EXAM BY PATHOLOGIST: CPT | Performed by: PATHOLOGY

## 2022-07-27 PROCEDURE — 1160F RVW MEDS BY RX/DR IN RCRD: CPT | Mod: CPTII,S$GLB,, | Performed by: DERMATOLOGY

## 2022-07-27 PROCEDURE — 11423 PR EXC SKIN BENIG 2.1-3 CM REMAINDR BODY: ICD-10-PCS | Mod: S$GLB,,, | Performed by: DERMATOLOGY

## 2022-07-27 PROCEDURE — 88304 TISSUE EXAM BY PATHOLOGIST: CPT | Mod: 26,,, | Performed by: PATHOLOGY

## 2022-07-27 NOTE — PROGRESS NOTES
Subjective:       Patient ID:  Maximilian Tavera Jr. is a 76 y.o. male who presents for   Chief Complaint   Patient presents with    Lump     Groin area     LOV- 5/49/22- Dr. 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 FOR RESCUE., Disp: 25.5 g, Rfl: 0    atorvastatin (LIPITOR) 80 MG tablet, TAKE 1 TABLET EVERY DAY, Disp: 90 tablet, Rfl: 3    bumetanide (BUMEX) 0.5 MG Tab, Take 1 tablet (0.5 mg total) by mouth 2 (two) times daily., Disp: 60 tablet, Rfl: 1    carvediloL (COREG) 25 MG tablet, TAKE 1 TABLET TWICE DAILY WITH MEALS, Disp: 180 tablet, Rfl: 3    cetirizine (ZYRTEC) 10 MG tablet, Take 1 tablet by mouth daily as needed., Disp: , Rfl:     chlorthalidone (HYGROTEN) 25 MG Tab, Take 25 mg by mouth once daily., Disp: , Rfl:     cholecalciferol, vitamin D3, (VITAMIN D3) 50 mcg (2,000 unit) Cap, 1 capsule once daily. Every day, Disp: , Rfl:     cholestyramine (QUESTRAN) 4 gram packet, Take 1 packet (4 g total) by mouth 3 (three) times daily with meals., Disp: 270 packet, Rfl: 3    coenzyme Q10 (CO Q-10) 100 mg capsule, Take 400 mg by mouth once daily., Disp: , Rfl:     cyanocobalamin, vitamin B-12, 5,000 mcg Subl, Take 5,000 mcg by mouth once daily. Every day, Disp: , Rfl:     diphenoxylate-atropine 2.5-0.025 mg (LOMOTIL) 2.5-0.025 mg per tablet, Take 1 tablet by mouth 4 (four) times daily as needed for Diarrhea., Disp: 90 tablet, Rfl: 0    ezetimibe (ZETIA) 10 mg tablet, TAKE 1 TABLET EVERY DAY, Disp: 90 tablet, Rfl: 3    FLUAD QUAD 2021-22,65Y UP,,PF, 60 mcg (15 mcg x 4)/0.5 mL Syrg, , Disp:  , Rfl:     fluticasone (FLONASE) 50 mcg/actuation nasal spray, USE 1 SPRAY IN EACH NOSTRIL TWICE DAILY AS NEEDED  FOR  RHINITIS, Disp: 48 g, Rfl: 3    gabapentin (NEURONTIN) 300 MG capsule, TAKE 1 CAPSULE THREE TIMES DAILY, Disp: 270 capsule, Rfl: 5    KENALOG 40 mg/mL injection, , Disp: , Rfl:     levothyroxine (SYNTHROID) 50 MCG tablet, TAKE 1 TABLET EVERY DAY, Disp: 90 tablet, Rfl: 3    methylsulfonylmethane 1,000 mg Tab, Take 1,000 mg by mouth once daily. Every day, Disp: , Rfl:     milk thistle 200 mg Cap, Take 200 mg by mouth 2 (two) times daily. Twice a day, Disp: , Rfl:     mupirocin (BACTROBAN) 2 % ointment, Apply topically 3 (three) times daily., Disp: 30 g, Rfl: 3    olmesartan (BENICAR) 20 MG tablet, TAKE 1 TABLET EVERY DAY, Disp: 90 tablet, Rfl: 3    omega-3 fatty acids 1,000 mg Cap, Twice a day, Disp: , Rfl:     potassium chloride SA (K-DUR,KLOR-CON) 20 MEQ tablet, TAKE 4 TABLETS EVERY DAY  OR AS DIRECTED, Disp: 360 tablet, Rfl: 3    predniSONE (DELTASONE) 1 MG tablet, TAKE 2 TABLETS EVERY DAY, Disp: 180 tablet, Rfl: 3    predniSONE (DELTASONE) 5 MG tablet, TAKE 1 TABLET EVERY DAY, Disp: 90 tablet, Rfl: 3    sildenafil (REVATIO) 20 mg Tab, Take 1 to 3 tabs daily as needed., Disp: 30 tablet, Rfl: 3    tiotropium (SPIRIVA WITH HANDIHALER) 18 mcg inhalation capsule, INHALE THE CONTENTS OF 1 CAPSULE EVERY DAY  VIA HANDIHALER (CONTROLLER), Disp: 90 capsule, Rfl: 3    varicella-zoster [id number], PF, (SHINGRIX, PF,) 50 mcg/0.5 mL injection, Inject into the muscle., Disp: 1 each, Rfl: 0    pantoprazole (PROTONIX) 40 MG tablet, Take 1 tablet (40 mg total) by mouth 2 (two) times daily., Disp: 180 tablet, Rfl: 3        Review of Systems   Constitutional: Negative for fever and chills.   Respiratory: Negative for cough and shortness of breath.    Skin: Positive for wears hat. Negative for itching, rash, dry skin, daily sunscreen use and activity-related sunscreen use.        Objective:    Physical  Exam   Constitutional: He appears well-developed and well-nourished. No distress.   Neurological: He is alert and oriented to person, place, and time. He is not disoriented.   Psychiatric: He has a normal mood and affect.   Skin:   Areas Examined (abnormalities noted in diagram):   Genitals / Buttocks / Groin Inspection Performed              Diagram Legend     Erythematous scaling macule/papule c/w actinic keratosis       Vascular papule c/w angioma      Pigmented verrucoid papule/plaque c/w seborrheic keratosis      Yellow umbilicated papule c/w sebaceous hyperplasia      Irregularly shaped tan macule c/w lentigo     1-2 mm smooth white papules consistent with Milia      Movable subcutaneous cyst with punctum c/w epidermal inclusion cyst      Subcutaneous movable cyst c/w pilar cyst      Firm pink to brown papule c/w dermatofibroma      Pedunculated fleshy papule(s) c/w skin tag(s)      Evenly pigmented macule c/w junctional nevus     Mildly variegated pigmented, slightly irregular-bordered macule c/w mildly atypical nevus      Flesh colored to evenly pigmented papule c/w intradermal nevus       Pink pearly papule/plaque c/w basal cell carcinoma      Erythematous hyperkeratotic cursted plaque c/w SCC      Surgical scar with no sign of skin cancer recurrence      Open and closed comedones      Inflammatory papules and pustules      Verrucoid papule consistent consistent with wart     Erythematous eczematous patches and plaques     Dystrophic onycholytic nail with subungual debris c/w onychomycosis     Umbilicated papule    Erythematous-base heme-crusted tan verrucoid plaque consistent with inflamed seborrheic keratosis     Erythematous Silvery Scaling Plaque c/w Psoriasis     See annotation      Assessment / Plan:      Pathology Orders:     Normal Orders This Visit    Specimen to Pathology, Dermatology     Questions:    Procedure Type: Dermatology and skin neoplasms    Number of Specimens: 1     ------------------------: -------------------------    Spec 1 Procedure: Excision >2cm    Spec 1 Clinical Impression: eic vs other    Spec 1 Source: R pubis    Release to patient:         Neoplasm of unspecified behavior of bone, soft tissue, and skin  -     Ambulatory referral/consult to Dermatology  -     Specimen to Pathology, Dermatology    PREPARATION: The diagnosis, procedure, alternatives, benefits and risks, including but not limited to: infection, bleeding/bruising, drug reactions, pain, scar or cosmetic defect, local sensation disturbances, wound dehiscence (separation of wound edges after sutures removed) and/or recurrence of present condition were explained to the patient. The patient elected to proceed.  Patient's identity was verified using 2 patient identifiers and the side and site was verified.  Time out period with surgeon, assistant and patient in surgical suite was taken.    PROCEDURE: The location noted above was prepped and draped in the usual sterile fashion. The area was anesthetized with intradermal buffered xylocaine. An elliptiform excision with a #15 scalpel was performed to access the  cyst wall, and dissection was carried out around the cyst wall.  Electrocoagulation was used to obtain hemostasis. Blood loss was minimal. The wound was then approximated in a layered fashion with 3 intradermal sutures of 4.0 Monocryl, and the wound was then superficially closed with simple interrupted sutures of 4.0 Prolene.   Lesion size: 2.5 cm  Final incision length: 2.5 cm         Follow up today (on 7/27/2022) for suture removal.

## 2022-07-27 NOTE — PATIENT INSTRUCTIONS
Surgery Wound Care    Your doctor has performed an excision today.  A bandage and vaseline ointment has been placed over the site.  This should remain in place for 24 hours.  It is recommended that you keep the area dry for the first 24 hours.  After 24 hours, you may remove the band aid and wash the area with warm soap and water and apply Vaseline jelly or aquaphore.  Many patients prefer to use Neosporin or Bacitracin ointment.  This is acceptable; however know that you can develop an allergy to this medication even if you have used it safely for years.  It is important to keep the area moist.  Letting it dry out and get air slows healing time, will worsen the scar, and make it more difficult to remove the stitches if they were placed.        If you notice increasing redness, tenderness, pain, or yellow drainage at the biopsy or surgical site, please notify your doctor.  These are signs of an infection.    If your biopsy/surgical site is bleeding, apply firm pressure for 15 minutes straight.  Repeat for another 15 minutes, if it is still bleeding.   If the surgical site continues to bleed, then please contact your doctor.    [facility] - DERMATOLOGY  [address] [place] [address] [address]  [facility] [id number]  Dept: [phone]

## 2022-08-02 LAB
FINAL PATHOLOGIC DIAGNOSIS: NORMAL
GROSS: NORMAL
Lab: NORMAL
MICROSCOPIC EXAM: NORMAL

## 2022-08-05 ENCOUNTER — CLINICAL SUPPORT (OUTPATIENT)
Dept: DERMATOLOGY | Facility: CLINIC | Age: 77
End: 2022-08-05
Payer: MEDICARE

## 2022-08-05 DIAGNOSIS — D49.2 NEOPLASM OF UNSPECIFIED BEHAVIOR OF BONE, SOFT TISSUE, AND SKIN: Primary | ICD-10-CM

## 2022-08-05 PROCEDURE — 99024 POSTOP FOLLOW-UP VISIT: CPT | Mod: S$GLB,,, | Performed by: DERMATOLOGY

## 2022-08-05 PROCEDURE — 99024 PR POST-OP FOLLOW-UP VISIT: ICD-10-PCS | Mod: S$GLB,,, | Performed by: DERMATOLOGY

## 2022-08-19 ENCOUNTER — PATIENT MESSAGE (OUTPATIENT)
Dept: CARDIOLOGY | Facility: CLINIC | Age: 77
End: 2022-08-19
Payer: MEDICARE

## 2022-08-19 RX ORDER — CHLORTHALIDONE 25 MG/1
25 TABLET ORAL DAILY
Qty: 90 TABLET | Refills: 3 | Status: SHIPPED | OUTPATIENT
Start: 2022-08-19 | End: 2023-06-19

## 2022-08-26 ENCOUNTER — HOSPITAL ENCOUNTER (OUTPATIENT)
Dept: RADIOLOGY | Facility: CLINIC | Age: 77
Discharge: HOME | End: 2022-08-26
Attending: PHYSICIAN ASSISTANT
Payer: MEDICARE

## 2022-08-26 ENCOUNTER — OFFICE VISIT (OUTPATIENT)
Dept: FAMILY MEDICINE | Facility: CLINIC | Age: 77
End: 2022-08-26
Payer: MEDICARE

## 2022-08-26 VITALS
HEART RATE: 66 BPM | DIASTOLIC BLOOD PRESSURE: 70 MMHG | BODY MASS INDEX: 31.68 KG/M2 | WEIGHT: 239 LBS | OXYGEN SATURATION: 95 % | RESPIRATION RATE: 17 BRPM | TEMPERATURE: 99 F | HEIGHT: 73 IN | SYSTOLIC BLOOD PRESSURE: 114 MMHG

## 2022-08-26 DIAGNOSIS — J44.89 COPD WITH ASTHMA: ICD-10-CM

## 2022-08-26 DIAGNOSIS — I10 HTN (HYPERTENSION), BENIGN: ICD-10-CM

## 2022-08-26 DIAGNOSIS — U09.9 POST-COVID CHRONIC COUGH: Primary | ICD-10-CM

## 2022-08-26 DIAGNOSIS — E78.2 MIXED HYPERLIPIDEMIA: ICD-10-CM

## 2022-08-26 DIAGNOSIS — U09.9 POST-COVID CHRONIC COUGH: ICD-10-CM

## 2022-08-26 DIAGNOSIS — R05.3 POST-COVID CHRONIC COUGH: Primary | ICD-10-CM

## 2022-08-26 DIAGNOSIS — I10 HYPERTENSION, UNSPECIFIED TYPE: ICD-10-CM

## 2022-08-26 DIAGNOSIS — R05.3 POST-COVID CHRONIC COUGH: ICD-10-CM

## 2022-08-26 DIAGNOSIS — E03.9 HYPOTHYROIDISM, UNSPECIFIED TYPE: ICD-10-CM

## 2022-08-26 DIAGNOSIS — G70.00 MYASTHENIA GRAVIS: ICD-10-CM

## 2022-08-26 DIAGNOSIS — R05.3 CHRONIC COUGH: ICD-10-CM

## 2022-08-26 PROCEDURE — 1160F PR REVIEW ALL MEDS BY PRESCRIBER/CLIN PHARMACIST DOCUMENTED: ICD-10-PCS | Mod: CPTII,S$GLB,, | Performed by: PHYSICIAN ASSISTANT

## 2022-08-26 PROCEDURE — 1160F RVW MEDS BY RX/DR IN RCRD: CPT | Mod: CPTII,S$GLB,, | Performed by: PHYSICIAN ASSISTANT

## 2022-08-26 PROCEDURE — 3074F SYST BP LT 130 MM HG: CPT | Mod: CPTII,S$GLB,, | Performed by: PHYSICIAN ASSISTANT

## 2022-08-26 PROCEDURE — 71046 X-RAY EXAM CHEST 2 VIEWS: CPT | Mod: 26,,, | Performed by: RADIOLOGY

## 2022-08-26 PROCEDURE — 1157F ADVNC CARE PLAN IN RCRD: CPT | Mod: CPTII,S$GLB,, | Performed by: PHYSICIAN ASSISTANT

## 2022-08-26 PROCEDURE — 1159F PR MEDICATION LIST DOCUMENTED IN MEDICAL RECORD: ICD-10-PCS | Mod: CPTII,S$GLB,, | Performed by: PHYSICIAN ASSISTANT

## 2022-08-26 PROCEDURE — 71046 XR CHEST PA AND LATERAL: ICD-10-PCS | Mod: 26,,, | Performed by: RADIOLOGY

## 2022-08-26 PROCEDURE — 99214 OFFICE O/P EST MOD 30 MIN: CPT | Mod: S$GLB,,, | Performed by: PHYSICIAN ASSISTANT

## 2022-08-26 PROCEDURE — 3078F PR MOST RECENT DIASTOLIC BLOOD PRESSURE < 80 MM HG: ICD-10-PCS | Mod: CPTII,S$GLB,, | Performed by: PHYSICIAN ASSISTANT

## 2022-08-26 PROCEDURE — 1159F MED LIST DOCD IN RCRD: CPT | Mod: CPTII,S$GLB,, | Performed by: PHYSICIAN ASSISTANT

## 2022-08-26 PROCEDURE — 99999 PR PBB SHADOW E&M-EST. PATIENT-LVL V: CPT | Mod: PBBFAC,,, | Performed by: PHYSICIAN ASSISTANT

## 2022-08-26 PROCEDURE — 3078F DIAST BP <80 MM HG: CPT | Mod: CPTII,S$GLB,, | Performed by: PHYSICIAN ASSISTANT

## 2022-08-26 PROCEDURE — 1125F AMNT PAIN NOTED PAIN PRSNT: CPT | Mod: CPTII,S$GLB,, | Performed by: PHYSICIAN ASSISTANT

## 2022-08-26 PROCEDURE — 1125F PR PAIN SEVERITY QUANTIFIED, PAIN PRESENT: ICD-10-PCS | Mod: CPTII,S$GLB,, | Performed by: PHYSICIAN ASSISTANT

## 2022-08-26 PROCEDURE — 3074F PR MOST RECENT SYSTOLIC BLOOD PRESSURE < 130 MM HG: ICD-10-PCS | Mod: CPTII,S$GLB,, | Performed by: PHYSICIAN ASSISTANT

## 2022-08-26 PROCEDURE — 99214 PR OFFICE/OUTPT VISIT, EST, LEVL IV, 30-39 MIN: ICD-10-PCS | Mod: S$GLB,,, | Performed by: PHYSICIAN ASSISTANT

## 2022-08-26 PROCEDURE — 1100F PTFALLS ASSESS-DOCD GE2>/YR: CPT | Mod: CPTII,S$GLB,, | Performed by: PHYSICIAN ASSISTANT

## 2022-08-26 PROCEDURE — 1100F PR PT FALLS ASSESS DOC 2+ FALLS/FALL W/INJURY/YR: ICD-10-PCS | Mod: CPTII,S$GLB,, | Performed by: PHYSICIAN ASSISTANT

## 2022-08-26 PROCEDURE — 3288F PR FALLS RISK ASSESSMENT DOCUMENTED: ICD-10-PCS | Mod: CPTII,S$GLB,, | Performed by: PHYSICIAN ASSISTANT

## 2022-08-26 PROCEDURE — 71046 X-RAY EXAM CHEST 2 VIEWS: CPT | Mod: TC,FY,PO

## 2022-08-26 PROCEDURE — 1157F PR ADVANCE CARE PLAN OR EQUIV PRESENT IN MEDICAL RECORD: ICD-10-PCS | Mod: CPTII,S$GLB,, | Performed by: PHYSICIAN ASSISTANT

## 2022-08-26 PROCEDURE — 99999 PR PBB SHADOW E&M-EST. PATIENT-LVL V: ICD-10-PCS | Mod: PBBFAC,,, | Performed by: PHYSICIAN ASSISTANT

## 2022-08-26 PROCEDURE — 3288F FALL RISK ASSESSMENT DOCD: CPT | Mod: CPTII,S$GLB,, | Performed by: PHYSICIAN ASSISTANT

## 2022-08-26 RX ORDER — PROMETHAZINE HYDROCHLORIDE AND DEXTROMETHORPHAN HYDROBROMIDE 6.25; 15 MG/5ML; MG/5ML
5 SYRUP ORAL NIGHTLY PRN
Qty: 180 ML | Refills: 0 | Status: SHIPPED | OUTPATIENT
Start: 2022-08-26 | End: 2022-09-05

## 2022-08-26 RX ORDER — BENZONATATE 100 MG/1
100 CAPSULE ORAL 2 TIMES DAILY PRN
Qty: 60 CAPSULE | Refills: 0 | Status: SHIPPED | OUTPATIENT
Start: 2022-08-26 | End: 2022-09-09 | Stop reason: SDUPTHER

## 2022-08-26 NOTE — PATIENT INSTRUCTIONS
Isiah Yao,     If you are due for any health screening(s) below please notify me so we can arrange them to be ordered and scheduled to maintain your health. Most healthy patients complete it. Don't lose out on improving your health.     All of your core healthy metrics are met

## 2022-08-26 NOTE — PROGRESS NOTES
Subjective:       Patient ID: Maximilian Tavera Jr. is a 76 y.o. male.    Chief Complaint: Cough (If lays on left side can't stop coughing and when he coughs for long period of time he gets a headache)    Cough  This is a chronic problem. The current episode started more than 1 month ago. The problem has been waxing and waning. The problem occurs every few hours. The cough is productive of sputum. Associated symptoms include headaches (after coughing for a long time) and shortness of breath (chronic GOLD). Pertinent negatives include no chest pain, chills, ear pain, fever, myalgias, postnasal drip, rash, rhinorrhea or wheezing. The symptoms are aggravated by lying down (worse with lying on left side). He has tried a beta-agonist inhaler, body position changes and ipratropium inhaler for the symptoms. The treatment provided mild relief. His past medical history is significant for asthma, COPD, emphysema and pneumonia.     Review of patient's allergies indicates:   Allergen Reactions    Spironolactone Diarrhea         Current Outpatient Medications:     albuterol (PROVENTIL/VENTOLIN HFA) 90 mcg/actuation inhaler, INHALE 1 TO 2 PUFFS INTO THE LUNGS EVERY 6 HOURS AS NEEDED FOR WHEEZING OR SHORTNESS OF BREATH. FOR RESCUE., Disp: 25.5 g, Rfl: 0    atorvastatin (LIPITOR) 80 MG tablet, TAKE 1 TABLET EVERY DAY, Disp: 90 tablet, Rfl: 3    bumetanide (BUMEX) 0.5 MG Tab, Take 1 tablet (0.5 mg total) by mouth 2 (two) times daily., Disp: 60 tablet, Rfl: 1    carvediloL (COREG) 25 MG tablet, TAKE 1 TABLET TWICE DAILY WITH MEALS, Disp: 180 tablet, Rfl: 3    cetirizine (ZYRTEC) 10 MG tablet, Take 1 tablet by mouth daily as needed., Disp: , Rfl:     chlorthalidone (HYGROTEN) 25 MG Tab, Take 1 tablet (25 mg total) by mouth once daily., Disp: 90 tablet, Rfl: 3    cholecalciferol, vitamin D3, (VITAMIN D3) 50 mcg (2,000 unit) Cap, 1 capsule once daily. Every day, Disp: , Rfl:     cholestyramine (QUESTRAN) 4 gram packet, Take  1 packet (4 g total) by mouth 3 (three) times daily with meals., Disp: 270 packet, Rfl: 3    coenzyme Q10 (CO Q-10) 100 mg capsule, Take 400 mg by mouth once daily., Disp: , Rfl:     cyanocobalamin, vitamin B-12, 5,000 mcg Subl, Take 5,000 mcg by mouth once daily. Every day, Disp: , Rfl:     diphenoxylate-atropine 2.5-0.025 mg (LOMOTIL) 2.5-0.025 mg per tablet, Take 1 tablet by mouth 4 (four) times daily as needed for Diarrhea., Disp: 90 tablet, Rfl: 0    ezetimibe (ZETIA) 10 mg tablet, TAKE 1 TABLET EVERY DAY, Disp: 90 tablet, Rfl: 3    FLUAD QUAD 2021-22,65Y UP,,PF, 60 mcg (15 mcg x 4)/0.5 mL Syrg, , Disp: , Rfl:     fluticasone (FLONASE) 50 mcg/actuation nasal spray, USE 1 SPRAY IN EACH NOSTRIL TWICE DAILY AS NEEDED  FOR  RHINITIS, Disp: 48 g, Rfl: 3    gabapentin (NEURONTIN) 300 MG capsule, TAKE 1 CAPSULE THREE TIMES DAILY, Disp: 270 capsule, Rfl: 5    KENALOG 40 mg/mL injection, , Disp: , Rfl:     levothyroxine (SYNTHROID) 50 MCG tablet, TAKE 1 TABLET EVERY DAY, Disp: 90 tablet, Rfl: 3    methylsulfonylmethane 1,000 mg Tab, Take 1,000 mg by mouth once daily. Every day, Disp: , Rfl:     milk thistle 200 mg Cap, Take 200 mg by mouth 2 (two) times daily. Twice a day, Disp: , Rfl:     mupirocin (BACTROBAN) 2 % ointment, Apply topically 3 (three) times daily., Disp: 30 g, Rfl: 3    olmesartan (BENICAR) 20 MG tablet, TAKE 1 TABLET EVERY DAY, Disp: 90 tablet, Rfl: 3    omega-3 fatty acids 1,000 mg Cap, Twice a day, Disp: , Rfl:     potassium chloride SA (K-DUR,KLOR-CON) 20 MEQ tablet, TAKE 4 TABLETS EVERY DAY  OR AS DIRECTED, Disp: 360 tablet, Rfl: 3    predniSONE (DELTASONE) 1 MG tablet, TAKE 2 TABLETS EVERY DAY, Disp: 180 tablet, Rfl: 3    predniSONE (DELTASONE) 5 MG tablet, TAKE 1 TABLET EVERY DAY, Disp: 90 tablet, Rfl: 3    sildenafil (REVATIO) 20 mg Tab, Take 1 to 3 tabs daily as needed., Disp: 30 tablet, Rfl: 3    tiotropium (SPIRIVA WITH HANDIHALER) 18 mcg inhalation capsule, INHALE THE  CONTENTS OF 1 CAPSULE EVERY DAY  VIA HANDIHALER (CONTROLLER), Disp: 90 capsule, Rfl: 3    varicella-zoster [id number], PF, (SHINGRIX, PF,) 50 mcg/0.5 mL injection, Inject into the muscle., Disp: 1 each, Rfl: 0    pantoprazole (PROTONIX) 40 MG tablet, Take 1 tablet (40 mg total) by mouth 2 (two) times daily., Disp: 180 tablet, Rfl: 3    Lab Results   Component Value Date    WBC 9.25 05/13/2022    HGB 12.9 (L) 05/13/2022    HCT 41.1 05/13/2022     (L) 05/13/2022    CHOL 122 01/03/2022    TRIG 68 01/03/2022    HDL 41 01/03/2022    ALT 16 02/10/2022    AST 19 02/10/2022     05/13/2022    K 4.7 05/13/2022     05/13/2022    CREATININE 1.3 05/13/2022    BUN 30 (H) 05/13/2022    CO2 27 05/13/2022    TSH 2.311 01/03/2022    PSA 3.5 09/19/2019    INR 1.0 10/30/2018    HGBA1C 5.5 11/21/2018       Review of Systems   Constitutional: Negative for activity change, appetite change, chills, fatigue and fever.   HENT: Negative for congestion, ear pain, postnasal drip and rhinorrhea.    Eyes: Negative for pain, itching and visual disturbance.   Respiratory: Positive for cough and shortness of breath (chronic GOLD). Negative for wheezing.    Cardiovascular: Negative for chest pain.   Gastrointestinal: Negative for abdominal distention, abdominal pain, constipation, diarrhea and nausea.   Genitourinary: Negative for difficulty urinating, dysuria, frequency, hematuria and urgency.   Musculoskeletal: Positive for arthralgias and back pain. Negative for myalgias and neck pain.        Chronic low back pain   Skin: Negative for color change, pallor and rash.   Neurological: Positive for headaches (after coughing for a long time). Negative for dizziness and syncope.   Hematological: Negative for adenopathy.   Psychiatric/Behavioral: Negative for behavioral problems. The patient is not nervous/anxious.        Objective:      Physical Exam  Constitutional:       Appearance: Normal appearance.   HENT:      Head: Normocephalic  and atraumatic.   Eyes:      Conjunctiva/sclera: Conjunctivae normal.   Cardiovascular:      Rate and Rhythm: Normal rate and regular rhythm.   Pulmonary:      Effort: Pulmonary effort is normal. No respiratory distress.      Breath sounds: Normal breath sounds. No wheezing.   Abdominal:      General: There is no distension.      Palpations: There is no mass.      Tenderness: There is no abdominal tenderness.   Musculoskeletal:      Right lower leg: No edema.      Left lower leg: No edema.   Skin:     Findings: No erythema.   Neurological:      Mental Status: He is alert and oriented to person, place, and time.   Psychiatric:         Behavior: Behavior normal.         Assessment:       1. Post-COVID chronic cough    2. Myasthenia gravis    3. COPD with asthma    4. Mixed hyperlipidemia    5. Hypertension, unspecified type    6. Hypothyroidism, unspecified type    7. HTN (hypertension), benign    8. Chronic cough        Plan:       Maximilian was seen today for cough.    Diagnoses and all orders for this visit:    Post-COVID chronic cough  -     X-Ray Chest PA And Lateral; Future    Myasthenia gravis  Continue current meds  COPD with asthma  -     X-Ray Chest PA And Lateral; Future    Mixed hyperlipidemia  High fiber diet  Continue current medication  Hypertension, unspecified type  Controlled  Low salt diet  Continue current medication  Hypothyroidism, unspecified type  stable  HTN (hypertension), benign  Controlled  Low salt diet  Chronic cough  -     B-TYPE NATRIURETIC PEPTIDE; Future  -     Comprehensive Metabolic Panel; Future  -     CBC Auto Differential; Future

## 2022-09-09 ENCOUNTER — OFFICE VISIT (OUTPATIENT)
Dept: FAMILY MEDICINE | Facility: CLINIC | Age: 77
End: 2022-09-09
Payer: MEDICARE

## 2022-09-09 VITALS
DIASTOLIC BLOOD PRESSURE: 70 MMHG | RESPIRATION RATE: 16 BRPM | HEART RATE: 68 BPM | WEIGHT: 240.75 LBS | OXYGEN SATURATION: 95 % | TEMPERATURE: 99 F | SYSTOLIC BLOOD PRESSURE: 130 MMHG | HEIGHT: 73 IN | BODY MASS INDEX: 31.91 KG/M2

## 2022-09-09 DIAGNOSIS — R05.3 CHRONIC COUGH: ICD-10-CM

## 2022-09-09 DIAGNOSIS — I10 HTN (HYPERTENSION), BENIGN: Primary | ICD-10-CM

## 2022-09-09 DIAGNOSIS — I35.9 AORTIC VALVE DISEASE: ICD-10-CM

## 2022-09-09 DIAGNOSIS — E78.2 MIXED HYPERLIPIDEMIA: ICD-10-CM

## 2022-09-09 DIAGNOSIS — G70.00 MYASTHENIA GRAVIS, ACHR ANTIBODY POSITIVE: ICD-10-CM

## 2022-09-09 DIAGNOSIS — J44.89 COPD WITH ASTHMA: ICD-10-CM

## 2022-09-09 PROCEDURE — 3288F PR FALLS RISK ASSESSMENT DOCUMENTED: ICD-10-PCS | Mod: CPTII,S$GLB,, | Performed by: PHYSICIAN ASSISTANT

## 2022-09-09 PROCEDURE — 1157F ADVNC CARE PLAN IN RCRD: CPT | Mod: CPTII,S$GLB,, | Performed by: PHYSICIAN ASSISTANT

## 2022-09-09 PROCEDURE — 3288F FALL RISK ASSESSMENT DOCD: CPT | Mod: CPTII,S$GLB,, | Performed by: PHYSICIAN ASSISTANT

## 2022-09-09 PROCEDURE — 1159F MED LIST DOCD IN RCRD: CPT | Mod: CPTII,S$GLB,, | Performed by: PHYSICIAN ASSISTANT

## 2022-09-09 PROCEDURE — 1101F PT FALLS ASSESS-DOCD LE1/YR: CPT | Mod: CPTII,S$GLB,, | Performed by: PHYSICIAN ASSISTANT

## 2022-09-09 PROCEDURE — 99999 PR PBB SHADOW E&M-EST. PATIENT-LVL V: CPT | Mod: PBBFAC,,, | Performed by: PHYSICIAN ASSISTANT

## 2022-09-09 PROCEDURE — 1159F PR MEDICATION LIST DOCUMENTED IN MEDICAL RECORD: ICD-10-PCS | Mod: CPTII,S$GLB,, | Performed by: PHYSICIAN ASSISTANT

## 2022-09-09 PROCEDURE — 1157F PR ADVANCE CARE PLAN OR EQUIV PRESENT IN MEDICAL RECORD: ICD-10-PCS | Mod: CPTII,S$GLB,, | Performed by: PHYSICIAN ASSISTANT

## 2022-09-09 PROCEDURE — 1125F AMNT PAIN NOTED PAIN PRSNT: CPT | Mod: CPTII,S$GLB,, | Performed by: PHYSICIAN ASSISTANT

## 2022-09-09 PROCEDURE — 99214 PR OFFICE/OUTPT VISIT, EST, LEVL IV, 30-39 MIN: ICD-10-PCS | Mod: S$GLB,,, | Performed by: PHYSICIAN ASSISTANT

## 2022-09-09 PROCEDURE — 1160F PR REVIEW ALL MEDS BY PRESCRIBER/CLIN PHARMACIST DOCUMENTED: ICD-10-PCS | Mod: CPTII,S$GLB,, | Performed by: PHYSICIAN ASSISTANT

## 2022-09-09 PROCEDURE — 3078F DIAST BP <80 MM HG: CPT | Mod: CPTII,S$GLB,, | Performed by: PHYSICIAN ASSISTANT

## 2022-09-09 PROCEDURE — 3075F PR MOST RECENT SYSTOLIC BLOOD PRESS GE 130-139MM HG: ICD-10-PCS | Mod: CPTII,S$GLB,, | Performed by: PHYSICIAN ASSISTANT

## 2022-09-09 PROCEDURE — 1160F RVW MEDS BY RX/DR IN RCRD: CPT | Mod: CPTII,S$GLB,, | Performed by: PHYSICIAN ASSISTANT

## 2022-09-09 PROCEDURE — 99999 PR PBB SHADOW E&M-EST. PATIENT-LVL V: ICD-10-PCS | Mod: PBBFAC,,, | Performed by: PHYSICIAN ASSISTANT

## 2022-09-09 PROCEDURE — 1101F PR PT FALLS ASSESS DOC 0-1 FALLS W/OUT INJ PAST YR: ICD-10-PCS | Mod: CPTII,S$GLB,, | Performed by: PHYSICIAN ASSISTANT

## 2022-09-09 PROCEDURE — 99214 OFFICE O/P EST MOD 30 MIN: CPT | Mod: S$GLB,,, | Performed by: PHYSICIAN ASSISTANT

## 2022-09-09 PROCEDURE — 1125F PR PAIN SEVERITY QUANTIFIED, PAIN PRESENT: ICD-10-PCS | Mod: CPTII,S$GLB,, | Performed by: PHYSICIAN ASSISTANT

## 2022-09-09 PROCEDURE — 3075F SYST BP GE 130 - 139MM HG: CPT | Mod: CPTII,S$GLB,, | Performed by: PHYSICIAN ASSISTANT

## 2022-09-09 PROCEDURE — 3078F PR MOST RECENT DIASTOLIC BLOOD PRESSURE < 80 MM HG: ICD-10-PCS | Mod: CPTII,S$GLB,, | Performed by: PHYSICIAN ASSISTANT

## 2022-09-09 RX ORDER — BENZONATATE 100 MG/1
100 CAPSULE ORAL 2 TIMES DAILY PRN
Qty: 60 CAPSULE | Refills: 1 | Status: SHIPPED | OUTPATIENT
Start: 2022-09-09 | End: 2022-12-15 | Stop reason: ALTCHOICE

## 2022-09-09 RX ORDER — PROMETHAZINE HYDROCHLORIDE AND DEXTROMETHORPHAN HYDROBROMIDE 6.25; 15 MG/5ML; MG/5ML
5 SYRUP ORAL NIGHTLY PRN
Qty: 240 ML | Refills: 0 | Status: SHIPPED | OUTPATIENT
Start: 2022-09-09 | End: 2022-09-19

## 2022-09-09 NOTE — PROGRESS NOTES
Subjective:       Patient ID: Maximilian Tavera Jr. is a 76 y.o. male.    Chief Complaint: Follow-up (2 week f/u )    Mr. Tavera is a 76 year old male with HTN, myasthenia gravis, and hypothyroidism who presents to clinic for follow up of chronic cough. The patient patient had no acute findings on CHEST X- RAY and BNP was normal. The patient was previously evaluated by pulmonology. The patient is not currently being followed by pulmonology. The patient is followed closely by cardiology. The patient has no additional complaints at this time.     Review of patient's allergies indicates:   Allergen Reactions    Spironolactone Diarrhea         Current Outpatient Medications:     albuterol (PROVENTIL/VENTOLIN HFA) 90 mcg/actuation inhaler, INHALE 1 TO 2 PUFFS INTO THE LUNGS EVERY 6 HOURS AS NEEDED FOR WHEEZING OR SHORTNESS OF BREATH. FOR RESCUE., Disp: 25.5 g, Rfl: 0    atorvastatin (LIPITOR) 80 MG tablet, TAKE 1 TABLET EVERY DAY, Disp: 90 tablet, Rfl: 3    bumetanide (BUMEX) 0.5 MG Tab, Take 1 tablet (0.5 mg total) by mouth 2 (two) times daily., Disp: 60 tablet, Rfl: 1    carvediloL (COREG) 25 MG tablet, TAKE 1 TABLET TWICE DAILY WITH MEALS, Disp: 180 tablet, Rfl: 3    cetirizine (ZYRTEC) 10 MG tablet, Take 1 tablet by mouth daily as needed., Disp: , Rfl:     chlorthalidone (HYGROTEN) 25 MG Tab, Take 1 tablet (25 mg total) by mouth once daily., Disp: 90 tablet, Rfl: 3    cholecalciferol, vitamin D3, (VITAMIN D3) 50 mcg (2,000 unit) Cap, 1 capsule once daily. Every day, Disp: , Rfl:     cholestyramine (QUESTRAN) 4 gram packet, Take 1 packet (4 g total) by mouth 3 (three) times daily with meals., Disp: 270 packet, Rfl: 3    coenzyme Q10 (CO Q-10) 100 mg capsule, Take 400 mg by mouth once daily., Disp: , Rfl:     cyanocobalamin, vitamin B-12, 5,000 mcg Subl, Take 5,000 mcg by mouth once daily. Every day, Disp: , Rfl:     diphenoxylate-atropine 2.5-0.025 mg (LOMOTIL) 2.5-0.025 mg per tablet, Take 1 tablet by mouth 4  (four) times daily as needed for Diarrhea., Disp: 90 tablet, Rfl: 0    ezetimibe (ZETIA) 10 mg tablet, TAKE 1 TABLET EVERY DAY, Disp: 90 tablet, Rfl: 3    FLUAD QUAD 2021-22,65Y UP,,PF, 60 mcg (15 mcg x 4)/0.5 mL Syrg, , Disp: , Rfl:     fluticasone (FLONASE) 50 mcg/actuation nasal spray, USE 1 SPRAY IN EACH NOSTRIL TWICE DAILY AS NEEDED  FOR  RHINITIS, Disp: 48 g, Rfl: 3    gabapentin (NEURONTIN) 300 MG capsule, TAKE 1 CAPSULE THREE TIMES DAILY, Disp: 270 capsule, Rfl: 5    KENALOG 40 mg/mL injection, , Disp: , Rfl:     levothyroxine (SYNTHROID) 50 MCG tablet, TAKE 1 TABLET EVERY DAY, Disp: 90 tablet, Rfl: 3    methylsulfonylmethane 1,000 mg Tab, Take 1,000 mg by mouth once daily. Every day, Disp: , Rfl:     milk thistle 200 mg Cap, Take 200 mg by mouth 2 (two) times daily. Twice a day, Disp: , Rfl:     mupirocin (BACTROBAN) 2 % ointment, Apply topically 3 (three) times daily., Disp: 30 g, Rfl: 3    olmesartan (BENICAR) 20 MG tablet, TAKE 1 TABLET EVERY DAY, Disp: 90 tablet, Rfl: 3    omega-3 fatty acids 1,000 mg Cap, Twice a day, Disp: , Rfl:     potassium chloride SA (K-DUR,KLOR-CON) 20 MEQ tablet, TAKE 4 TABLETS EVERY DAY  OR AS DIRECTED, Disp: 360 tablet, Rfl: 3    predniSONE (DELTASONE) 1 MG tablet, TAKE 2 TABLETS EVERY DAY, Disp: 180 tablet, Rfl: 3    predniSONE (DELTASONE) 5 MG tablet, TAKE 1 TABLET EVERY DAY, Disp: 90 tablet, Rfl: 3    sildenafil (REVATIO) 20 mg Tab, Take 1 to 3 tabs daily as needed., Disp: 30 tablet, Rfl: 3    tiotropium (SPIRIVA WITH HANDIHALER) 18 mcg inhalation capsule, INHALE THE CONTENTS OF 1 CAPSULE EVERY DAY  VIA HANDIHALER (CONTROLLER), Disp: 90 capsule, Rfl: 3    varicella-zoster [id number], PF, (SHINGRIX, PF,) 50 mcg/0.5 mL injection, Inject into the muscle., Disp: 1 each, Rfl: 0    benzonatate (TESSALON) 100 MG capsule, Take 1 capsule (100 mg total) by mouth 2 (two) times daily as needed for Cough., Disp: 60 capsule, Rfl: 1    pantoprazole (PROTONIX) 40 MG tablet, Take 1 tablet  (40 mg total) by mouth 2 (two) times daily., Disp: 180 tablet, Rfl: 3    promethazine-dextromethorphan (PROMETHAZINE-DM) 6.25-15 mg/5 mL Syrp, Take 5 mLs by mouth nightly as needed., Disp: 240 mL, Rfl: 0    Lab Results   Component Value Date    WBC 6.83 08/26/2022    HGB 14.4 08/26/2022    HCT 42.9 08/26/2022     (L) 08/26/2022    CHOL 122 01/03/2022    TRIG 68 01/03/2022    HDL 41 01/03/2022    ALT 20 08/26/2022    AST 20 08/26/2022     08/26/2022    K 4.0 08/26/2022     08/26/2022    CREATININE 1.1 08/26/2022    BUN 16 08/26/2022    CO2 32 (H) 08/26/2022    TSH 2.311 01/03/2022    PSA 3.5 09/19/2019    INR 1.0 10/30/2018    HGBA1C 5.5 11/21/2018       Review of Systems   Constitutional:  Negative for activity change, appetite change and fever.   HENT:  Negative for postnasal drip, rhinorrhea and sinus pressure.    Eyes:  Negative for visual disturbance.   Respiratory:  Positive for cough (chronic cough). Negative for shortness of breath.    Cardiovascular:  Negative for chest pain.   Gastrointestinal:  Negative for abdominal distention and abdominal pain.   Genitourinary:  Negative for difficulty urinating and dysuria.   Musculoskeletal:  Positive for arthralgias and back pain. Negative for myalgias.   Neurological:  Negative for headaches.   Hematological:  Negative for adenopathy.   Psychiatric/Behavioral:  The patient is not nervous/anxious.      Objective:      Physical Exam  Constitutional:       Appearance: Normal appearance.   HENT:      Head: Normocephalic and atraumatic.   Eyes:      Conjunctiva/sclera: Conjunctivae normal.   Cardiovascular:      Rate and Rhythm: Normal rate and regular rhythm.   Pulmonary:      Effort: Pulmonary effort is normal. No respiratory distress.      Breath sounds: Normal breath sounds. No wheezing.   Abdominal:      General: There is no distension.      Palpations: There is no mass.      Tenderness: There is no abdominal tenderness.   Musculoskeletal:       Right lower leg: No edema.      Left lower leg: No edema.   Skin:     Findings: No erythema.   Neurological:      Mental Status: He is alert and oriented to person, place, and time.   Psychiatric:         Behavior: Behavior normal.       Assessment:       1. HTN (hypertension), benign    2. Chronic cough    3. Myasthenia gravis, AChR antibody positive    4. COPD with asthma    5. Aortic valve disease    6. Mixed hyperlipidemia          Plan:   Maximilian was seen today for follow-up.    Diagnoses and all orders for this visit:    HTN (hypertension), benign  Controlled  Low salt diet  Continue current medication  Chronic cough  -     benzonatate (TESSALON) 100 MG capsule; Take 1 capsule (100 mg total) by mouth 2 (two) times daily as needed for Cough.  -     promethazine-dextromethorphan (PROMETHAZINE-DM) 6.25-15 mg/5 mL Syrp; Take 5 mLs by mouth nightly as needed.  Improving  If continued improvement/ resolution does not occur, please contact clinic for additional evaluation.  Myasthenia gravis, AChR antibody positive  stable  COPD with asthma  stable  Aortic valve disease  Follow up with cardiology  Mixed hyperlipidemia  High fiber diet  Continue current medication.

## 2022-09-09 NOTE — PATIENT INSTRUCTIONS
Isiah Yao,     If you are due for any health screening(s) below please notify me so we can arrange them to be ordered and scheduled to maintain your health. Most healthy patients complete it. Don't lose out on improving your health.     All of your core healthy metrics are met.

## 2022-09-10 DIAGNOSIS — I10 HTN (HYPERTENSION), BENIGN: ICD-10-CM

## 2022-09-10 RX ORDER — OLMESARTAN MEDOXOMIL 20 MG/1
TABLET ORAL
Qty: 90 TABLET | Refills: 2 | Status: SHIPPED | OUTPATIENT
Start: 2022-09-10 | End: 2023-08-14

## 2022-09-14 ENCOUNTER — OFFICE VISIT (OUTPATIENT)
Dept: PAIN MEDICINE | Facility: CLINIC | Age: 77
End: 2022-09-14
Payer: MEDICARE

## 2022-09-14 VITALS
SYSTOLIC BLOOD PRESSURE: 146 MMHG | HEIGHT: 73 IN | HEART RATE: 63 BPM | DIASTOLIC BLOOD PRESSURE: 79 MMHG | WEIGHT: 240 LBS | BODY MASS INDEX: 31.81 KG/M2

## 2022-09-14 DIAGNOSIS — G89.4 CHRONIC PAIN DISORDER: ICD-10-CM

## 2022-09-14 DIAGNOSIS — M47.896 OTHER SPONDYLOSIS, LUMBAR REGION: ICD-10-CM

## 2022-09-14 DIAGNOSIS — M47.816 LUMBAR SPONDYLOSIS: Primary | ICD-10-CM

## 2022-09-14 DIAGNOSIS — M96.1 POSTLAMINECTOMY SYNDROME OF LUMBAR REGION: Primary | ICD-10-CM

## 2022-09-14 DIAGNOSIS — M54.16 LUMBAR RADICULITIS: ICD-10-CM

## 2022-09-14 PROCEDURE — 3078F PR MOST RECENT DIASTOLIC BLOOD PRESSURE < 80 MM HG: ICD-10-PCS | Mod: CPTII,S$GLB,, | Performed by: ANESTHESIOLOGY

## 2022-09-14 PROCEDURE — 1159F PR MEDICATION LIST DOCUMENTED IN MEDICAL RECORD: ICD-10-PCS | Mod: CPTII,S$GLB,, | Performed by: ANESTHESIOLOGY

## 2022-09-14 PROCEDURE — 3288F FALL RISK ASSESSMENT DOCD: CPT | Mod: CPTII,S$GLB,, | Performed by: ANESTHESIOLOGY

## 2022-09-14 PROCEDURE — 3288F PR FALLS RISK ASSESSMENT DOCUMENTED: ICD-10-PCS | Mod: CPTII,S$GLB,, | Performed by: ANESTHESIOLOGY

## 2022-09-14 PROCEDURE — 1101F PT FALLS ASSESS-DOCD LE1/YR: CPT | Mod: CPTII,S$GLB,, | Performed by: ANESTHESIOLOGY

## 2022-09-14 PROCEDURE — 3077F SYST BP >= 140 MM HG: CPT | Mod: CPTII,S$GLB,, | Performed by: ANESTHESIOLOGY

## 2022-09-14 PROCEDURE — 1159F MED LIST DOCD IN RCRD: CPT | Mod: CPTII,S$GLB,, | Performed by: ANESTHESIOLOGY

## 2022-09-14 PROCEDURE — 1101F PR PT FALLS ASSESS DOC 0-1 FALLS W/OUT INJ PAST YR: ICD-10-PCS | Mod: CPTII,S$GLB,, | Performed by: ANESTHESIOLOGY

## 2022-09-14 PROCEDURE — 1157F ADVNC CARE PLAN IN RCRD: CPT | Mod: CPTII,S$GLB,, | Performed by: ANESTHESIOLOGY

## 2022-09-14 PROCEDURE — 99214 PR OFFICE/OUTPT VISIT, EST, LEVL IV, 30-39 MIN: ICD-10-PCS | Mod: S$GLB,,, | Performed by: ANESTHESIOLOGY

## 2022-09-14 PROCEDURE — 3077F PR MOST RECENT SYSTOLIC BLOOD PRESSURE >= 140 MM HG: ICD-10-PCS | Mod: CPTII,S$GLB,, | Performed by: ANESTHESIOLOGY

## 2022-09-14 PROCEDURE — 3078F DIAST BP <80 MM HG: CPT | Mod: CPTII,S$GLB,, | Performed by: ANESTHESIOLOGY

## 2022-09-14 PROCEDURE — 1125F PR PAIN SEVERITY QUANTIFIED, PAIN PRESENT: ICD-10-PCS | Mod: CPTII,S$GLB,, | Performed by: ANESTHESIOLOGY

## 2022-09-14 PROCEDURE — 99999 PR PBB SHADOW E&M-EST. PATIENT-LVL IV: ICD-10-PCS | Mod: PBBFAC,,, | Performed by: ANESTHESIOLOGY

## 2022-09-14 PROCEDURE — 1125F AMNT PAIN NOTED PAIN PRSNT: CPT | Mod: CPTII,S$GLB,, | Performed by: ANESTHESIOLOGY

## 2022-09-14 PROCEDURE — 99999 PR PBB SHADOW E&M-EST. PATIENT-LVL IV: CPT | Mod: PBBFAC,,, | Performed by: ANESTHESIOLOGY

## 2022-09-14 PROCEDURE — 99214 OFFICE O/P EST MOD 30 MIN: CPT | Mod: S$GLB,,, | Performed by: ANESTHESIOLOGY

## 2022-09-14 PROCEDURE — 1157F PR ADVANCE CARE PLAN OR EQUIV PRESENT IN MEDICAL RECORD: ICD-10-PCS | Mod: CPTII,S$GLB,, | Performed by: ANESTHESIOLOGY

## 2022-09-14 NOTE — PROGRESS NOTES
PCP: Brian Marroquin MD      CC:  Low back pain    Interval history: Mr. Tavera is a 76 y.o. male with chronic neck and low back pain who returns to our clinic. He is known patient to our clinic and has had lumbar SONIA and Lumbar MBB recently with very short lived relief.  MBB was helpful but he had flare up of his myasthenia gravis and did not continue the subsequent MBB.  The pain is present in the low back and radiates down bilateral legs in the S1 distribution. He reports a sensation of weakness in his legs, as though they will give out on him. He reports no pain with sitting. He experiences significant leg pain with standing and cannot walk long distances. He can walk longer if he is leaning on the shopping cart. He states his symptoms resolve when he sits.  Lower back pain is more bothersome.  He denies bowel/bladder dysfunction. He has been on chronic steroids due to Myasthenia Gravis. He does have a history of posterior cervical fusion with Dr. Turner in 2018.  He denies any worsening weakness.  No bowel bladder changes. Pain today is rated 4/10.    Prior HPI:   Mr. Tavera is a 68 year old male with PMH of HTN referred by Dr. Campbell for right leg pain.  He states having constant right leg pain for the past two years.  Pain is a throbbing pain in her posterior thigh and travels down to his ankle.  He denies any lower back pain.  No leg weakness or numbness.  Pain worsens with standing and walking.  Pain is relieved with rest.  He takes ibuprofen with mild benefits.  Physical therapy has not been helpful.      Pain intervention history: s/p right L4-5 and L5-S1 TFESI on 10/9/2014 and reports 75% relief of his low back and right leg pain.   s/p right L4-5 and L5-S1 TFESI on 2/21/17 and 9/2017 reports 70% relief of his right leg pain.  S/p right IESI at L4-5 and L5-S1 on 5/17/2018   S/p right TF SONIA at L4-5 and L5-S1 on 12/20/19    ROS:  CONSTITUTIONAL: No fevers, chills, night sweats, wt. loss, appetite  changes  SKIN: no rashes or itching  ENT: No headaches, head trauma, vision changes, or eye pain  LYMPH NODES: None noticed   CV: No chest pain, palpitations.   RESP: No shortness of breath, dyspnea on exertion, cough, wheezing, or hemoptysis  GI: No nausea, emesis, diarrhea, constipation, melena, hematochezia, pain.    : No dysuria, hematuria, urgency, or frequency   HEME: No easy bruising, bleeding problems  PSYCHIATRIC: No depression, anxiety, psychosis, hallucinations.  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    Emphysema lung 11/05/2018    Gastritis     Hx of colonic polyp     Hyperlipidemia     Hypertension     Hypothyroidism     Knee fracture     Myasthenia gravis     Neuropathy 01/03/2013    Obesity 01/29/2015    Pneumonia 03/29/2020    Polyneuropathy     PVC (premature ventricular contraction)     Squamous cell carcinoma 2014    left forearm    Thyroid disease      Past Surgical History:   Procedure Laterality Date    APPENDECTOMY      CATARACT EXTRACTION W/  INTRAOCULAR LENS IMPLANT Bilateral     COLONOSCOPY  03/01/2012    Dr Esteban; hyperplastic polyp; repeat in 5 years    COLONOSCOPY N/A 12/7/2021    Procedure: COLONOSCOPY;  Surgeon: Gabriel Hernandez MD;  Location: [facility];  Service: Endoscopy;  Laterality: N/A;    CYSTOSCOPY N/A 2/26/2019    Procedure: CYSTOSCOPY;  Surgeon: Simba Cheung MD;  Location: [facility] OR;  Service: Urology;  Laterality: N/A;    ENDOSCOPIC ULTRASOUND OF UPPER GASTROINTESTINAL TRACT N/A 1/7/2020    Procedure: ULTRASOUND, UPPER GI TRACT, ENDOSCOPIC;  Surgeon: Kati Ryan MD;  Location: [facility] ([address] [place] [address]);  Service: Endoscopy;   Laterality: N/A;    ESOPHAGOGASTRODUODENOSCOPY N/A 9/12/2018    Procedure: EGD (ESOPHAGOGASTRODUODENOSCOPY);  Surgeon: Gabriel Hernandez MD;  Location: [facility];  Service: Endoscopy;  Laterality: N/A;    ESOPHAGOGASTRODUODENOSCOPY N/A 8/19/2019    Procedure: EGD (ESOPHAGOGASTRODUODENOSCOPY);  Surgeon: Gabriel Hernandez MD;  Location: [facility];  Service: Endoscopy;  Laterality: N/A;    ESOPHAGOGASTRODUODENOSCOPY N/A 10/7/2019    Procedure: EGD (ESOPHAGOGASTRODUODENOSCOPY);  Surgeon: Gabriel Hernandez MD;  Location: [facility];  Service: Endoscopy;  Laterality: N/A;    ESOPHAGOGASTRODUODENOSCOPY N/A 1/7/2020    Procedure: EGD (ESOPHAGOGASTRODUODENOSCOPY);  Surgeon: Kati Ryan MD;  Location: [facility] (2ND FLR);  Service: Endoscopy;  Laterality: N/A;    HEMORRHOID SURGERY      INJECTION OF ANESTHETIC AGENT AROUND MEDIAL BRANCH NERVES INNERVATING LUMBAR FACET JOINT Bilateral 10/1/2020    Procedure: Block-nerve-medial branch-lumbar Bilateral L 3,4,5;  Surgeon: Devan Watt MD;  Location: [facility];  Service: Pain Management;  Laterality: Bilateral;    KNEE ARTHROPLASTY Bilateral     LENGTHENING OF ACHILLES TENDON Right 9/11/2020    Procedure: percutaeous tenotomy of right lateral epicondyle (tenex);  Surgeon: Terry Quach MD;  Location: [facility] OR;  Service: Neurosurgery;  Laterality: Right;  tenex to right lateral epicondyle  Tenex machine SN#[id number], total time 1min. 30seconds    NECK SURGERY      POSTERIOR FUSION OF CERVICAL SPINE WITH LAMINECTOMY N/A 11/7/2018    Procedure: C2-C6 Posterior Cervical Laminectomy & Instrumental Fusion;  Surgeon: Kishore Turner MD;  Location: [facility] 2ND FLR;  Service: Neurosurgery;  Laterality: N/A;    TONSILLECTOMY      TRANSFORAMINAL EPIDURAL INJECTION OF STEROID Right 12/20/2019    Procedure: Injection,steroid,epidural,transforaminal approach;  Surgeon: Devan Watt MD;  Location: [facility];  Service: Pain Management;  Laterality: Right;  L3-4, L4-5    TRANSFORAMINAL EPIDURAL  INJECTION OF STEROID Bilateral 2/6/2020    Procedure: Injection,steroid,epidural,transforaminal approach;  Surgeon: Devan Watt MD;  Location: [facility] OR;  Service: Pain Management;  Laterality: Bilateral;  L3-L4,L4-L5    TRANSFORAMINAL EPIDURAL INJECTION OF STEROID Bilateral 8/26/2020    Procedure: Injection,steroid,epidural,transforaminal approach;  Surgeon: Devan Watt MD;  Location: [facility] OR;  Service: Pain Management;  Laterality: Bilateral;  L3-4, L4-5    TRANSRECTAL ULTRASOUND EXAMINATION N/A 2/26/2019    Procedure: ULTRASOUND, RECTAL APPROACH;  Surgeon: Simba Cheung MD;  Location: [facility] OR;  Service: Urology;  Laterality: N/A;    UPPER GASTROINTESTINAL ENDOSCOPY  09/12/2018    Dr Hernandez; gastritis; extensive intestinal metaplasia; repeat in 1-2- years     Family History   Problem Relation Age of Onset    Cataracts Mother     Heart disease Mother         CHF    Hypertension Mother     Hyperlipidemia Mother     Cataracts Father     Glaucoma Father     Heart disease Father     Hyperlipidemia Sister     Hypertension Sister     No Known Problems Daughter     No Known Problems Daughter     Collagen disease Neg Hx     Amblyopia Neg Hx     Blindness Neg Hx     Macular degeneration Neg Hx     Retinal detachment Neg Hx     Strabismus Neg Hx     Cancer Neg Hx     Colon cancer Neg Hx     Esophageal cancer Neg Hx     Stomach cancer Neg Hx     Crohn's disease Neg Hx     Ulcerative colitis Neg Hx      Social History     Socioeconomic History    Marital status:    Tobacco Use    Smoking status: Never    Smokeless tobacco: Never    Tobacco comments:     when a child   Substance and Sexual Activity    Alcohol use: Yes     Comment: rarely    Drug use: No    Sexual activity: Yes     Partners: Female     Social Determinants of Health     Financial Resource Strain: Low Risk     Difficulty of Paying Living Expenses: Not hard at all   Food Insecurity: No Food Insecurity    Worried About Running Out of Food in the Last  "Year: Never true    Ran Out of Food in the Last Year: Never true   Transportation Needs: No Transportation Needs    Lack of Transportation (Medical): No    Lack of Transportation (Non-Medical): No   Physical Activity: Inactive    Days of Exercise per Week: 0 days    Minutes of Exercise per Session: 0 min   Stress: No Stress Concern Present    Feeling of Stress : Not at all   Social Connections: Unknown    Frequency of Communication with Friends and Family: More than three times a week    Frequency of Social Gatherings with Friends and Family: More than three times a week    Active Member of Clubs or Organizations: Yes    Attends Club or Organization Meetings: More than 4 times per year    Marital Status:    Housing Stability: Low Risk     Unable to Pay for Housing in the Last Year: No    Number of Places Lived in the Last Year: 1    Unstable Housing in the Last Year: No         Medications/Allergies: See med card    Vitals:    09/14/22 1056   BP: (!) 146/79   Pulse: 63   Weight: 108.9 kg (240 lb)   Height: 6' 1" (1.854 m)   PainSc:   8   PainLoc: Back         Physical exam:    GENERAL: A and O x3, the patient appears well groomed and is in no acute distress.  Skin: No rashes or obvious lesions  HEENT: normocephalic, atraumatic  CARDIOVASCULAR:  Bradycardia   LUNGS: non labored breathing  ABDOMEN: soft, nontender   UPPER EXTREMITIES: Normal alignment, normal range of motion, no atrophy, no skin changes,  hair growth and nail growth normal and equal bilaterally. No swelling, no tenderness.    LOWER EXTREMITIES:  Normal alignment, normal range of motion, no atrophy, no skin changes,  hair growth and nail growth normal and equal bilaterally. No swelling, no tenderness.    Cervical Spine:  Cervical spine: ROM is very limited in flexion, extension and lateral rotation with moderate increased pain. Neck is has contractures on the left side  Spurling's maneuver causes neck pain to left side.  Myofascial exam: No " Tenderness to palpation across cervical paraspinous region bilaterally.      LUMBAR SPINE  Lumbar spine: ROM is full with flexion extension and oblique extension with no increased pain.    Porter's test causes no increased pain on either side.    Supine straight leg raise is negative bilaterally.    Internal and external rotation of the hip causes no increased pain on either side.  Myofascial exam: No tenderness to palpation across lumbar paraspinous muscles.      MENTAL STATUS: normal orientation, speech, language, and fund of knowledge for social situation.  Emotional state appropriate.    CRANIAL NERVES:  II:  PERRL bilaterally,   III,IV,VI: EOMI.    V:  Facial sensation equal bilaterally  VII:  Facial motor function normal.  VIII:  Hearing equal to finger rub bilaterally  IX/X: Gag normal, palate symmetric  XI:  Shoulder shrug equal, head turn equal  XII:  Tongue midline without fasciculations      MOTOR: Tone and bulk: normal bilateral upper and lower Strength: normal    IP ADD ABD Quad TA Gas HAM  R 5 5 5 5 5 5 5  L 5 5 5 5 5 5 5    SENSATION: Light touch and pinprick intact bilaterally  REFLEXES: normal, symmetric, nonbrisk.  Toes down, no clonus. No hoffmans.  GAIT: normal rise, base, steps, and arm swing.      Imaging:    MRI lumbar spine without contrast 10/12/2019 (open MRI)     L1-2, stable foraminal disc protrusion.  Mild central canal stenosis.  Moderate left neuroforaminal narrowing.  L2-3, disc bulging with prominent posterior element hypertrophy.  Moderate central canal stenosis.  Bilateral recess narrowing as well as moderate left and mild right neural foraminal narrowing.  L3-4, cervical ventral disc bulge with facet disease.  Mild-to-moderate central canal narrowing.  Moderate right and mild left neural foraminal narrowing.  L4-5, service control disc bulge with bulky facet disease.  Mild-to-moderate central canal stenosis.  Moderate bilateral neural foraminal narrowing, right greater than  left.  L5-S1 disc bulge and facet disease  MRI lumbar spine 09/2018 (outside open MRI)  Multilevel spondylosis.  Moderate neural foraminal narrowing at L3-4 and L4-5.      Assessment:  Mr. Tavera is a 76 y.o. male with   1. Postlaminectomy syndrome of lumbar region    2. Lumbar radiculitis    3. Other spondylosis, lumbar region    4. Chronic pain disorder      Plan:  1.  I have stressed the importance of physical activity and exercise to improve overall health. Home lumbar exercises provided.   2.  Reviewed pertinent imaging and records with patient  3.  I believe his low back pain maybe due to facet arthropathy and have recommended lumbar medial branch blocks as a diagnostic procedure.  If successful, would proceed with radiofrequency ablation.  4. May consider lumbar SONIA to help with radicular symptoms in the future.    5. Follow up after procedure

## 2022-09-14 NOTE — H&P (VIEW-ONLY)
PCP: Brian Marroquin MD      CC:  Low back pain    Interval history: Mr. Tavera is a 76 y.o. male with chronic neck and low back pain who returns to our clinic. He is known patient to our clinic and has had lumbar SONIA and Lumbar MBB recently with very short lived relief.  MBB was helpful but he had flare up of his myasthenia gravis and did not continue the subsequent MBB.  The pain is present in the low back and radiates down bilateral legs in the S1 distribution. He reports a sensation of weakness in his legs, as though they will give out on him. He reports no pain with sitting. He experiences significant leg pain with standing and cannot walk long distances. He can walk longer if he is leaning on the shopping cart. He states his symptoms resolve when he sits.  Lower back pain is more bothersome.  He denies bowel/bladder dysfunction. He has been on chronic steroids due to Myasthenia Gravis. He does have a history of posterior cervical fusion with Dr. Turner in 2018.  He denies any worsening weakness.  No bowel bladder changes. Pain today is rated 4/10.    Prior HPI:   Mr. Tavera is a 68 year old male with PMH of HTN referred by Dr. Campbell for right leg pain.  He states having constant right leg pain for the past two years.  Pain is a throbbing pain in her posterior thigh and travels down to his ankle.  He denies any lower back pain.  No leg weakness or numbness.  Pain worsens with standing and walking.  Pain is relieved with rest.  He takes ibuprofen with mild benefits.  Physical therapy has not been helpful.      Pain intervention history: s/p right L4-5 and L5-S1 TFESI on 10/9/2014 and reports 75% relief of his low back and right leg pain.   s/p right L4-5 and L5-S1 TFESI on 2/21/17 and 9/2017 reports 70% relief of his right leg pain.  S/p right IESI at L4-5 and L5-S1 on 5/17/2018   S/p right TF SONIA at L4-5 and L5-S1 on 12/20/19    ROS:  CONSTITUTIONAL: No fevers, chills, night sweats, wt. loss, appetite  changes  SKIN: no rashes or itching  ENT: No headaches, head trauma, vision changes, or eye pain  LYMPH NODES: None noticed   CV: No chest pain, palpitations.   RESP: No shortness of breath, dyspnea on exertion, cough, wheezing, or hemoptysis  GI: No nausea, emesis, diarrhea, constipation, melena, hematochezia, pain.    : No dysuria, hematuria, urgency, or frequency   HEME: No easy bruising, bleeding problems  PSYCHIATRIC: No depression, anxiety, psychosis, hallucinations.  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    Emphysema lung 11/05/2018    Gastritis     Hx of colonic polyp     Hyperlipidemia     Hypertension     Hypothyroidism     Knee fracture     Myasthenia gravis     Neuropathy 01/03/2013    Obesity 01/29/2015    Pneumonia 03/29/2020    Polyneuropathy     PVC (premature ventricular contraction)     Squamous cell carcinoma 2014    left forearm    Thyroid disease      Past Surgical History:   Procedure Laterality Date    APPENDECTOMY      CATARACT EXTRACTION W/  INTRAOCULAR LENS IMPLANT Bilateral     COLONOSCOPY  03/01/2012    Dr Esteban; hyperplastic polyp; repeat in 5 years    COLONOSCOPY N/A 12/7/2021    Procedure: COLONOSCOPY;  Surgeon: Gabriel Hernandez MD;  Location: [facility];  Service: Endoscopy;  Laterality: N/A;    CYSTOSCOPY N/A 2/26/2019    Procedure: CYSTOSCOPY;  Surgeon: Simba Cheung MD;  Location: [facility] OR;  Service: Urology;  Laterality: N/A;    ENDOSCOPIC ULTRASOUND OF UPPER GASTROINTESTINAL TRACT N/A 1/7/2020    Procedure: ULTRASOUND, UPPER GI TRACT, ENDOSCOPIC;  Surgeon: Kati Ryan MD;  Location: [facility] ([address] [place] [address]);  Service: Endoscopy;   Laterality: N/A;    ESOPHAGOGASTRODUODENOSCOPY N/A 9/12/2018    Procedure: EGD (ESOPHAGOGASTRODUODENOSCOPY);  Surgeon: Gabriel Hernandez MD;  Location: [facility];  Service: Endoscopy;  Laterality: N/A;    ESOPHAGOGASTRODUODENOSCOPY N/A 8/19/2019    Procedure: EGD (ESOPHAGOGASTRODUODENOSCOPY);  Surgeon: Gabriel Hernandez MD;  Location: [facility] ENDO;  Service: Endoscopy;  Laterality: N/A;    ESOPHAGOGASTRODUODENOSCOPY N/A 10/7/2019    Procedure: EGD (ESOPHAGOGASTRODUODENOSCOPY);  Surgeon: Gabriel Hernandez MD;  Location: [facility] ENDO;  Service: Endoscopy;  Laterality: N/A;    ESOPHAGOGASTRODUODENOSCOPY N/A 1/7/2020    Procedure: EGD (ESOPHAGOGASTRODUODENOSCOPY);  Surgeon: Kati Ryan MD;  Location: [facility] (2ND FLR);  Service: Endoscopy;  Laterality: N/A;    HEMORRHOID SURGERY      INJECTION OF ANESTHETIC AGENT AROUND MEDIAL BRANCH NERVES INNERVATING LUMBAR FACET JOINT Bilateral 10/1/2020    Procedure: Block-nerve-medial branch-lumbar Bilateral L 3,4,5;  Surgeon: Devan Watt MD;  Location: [facility];  Service: Pain Management;  Laterality: Bilateral;    KNEE ARTHROPLASTY Bilateral     LENGTHENING OF ACHILLES TENDON Right 9/11/2020    Procedure: percutaeous tenotomy of right lateral epicondyle (tenex);  Surgeon: Terry Quach MD;  Location: [facility] OR;  Service: Neurosurgery;  Laterality: Right;  tenex to right lateral epicondyle  Tenex machine SN#[id number], total time 1min. 30seconds    NECK SURGERY      POSTERIOR FUSION OF CERVICAL SPINE WITH LAMINECTOMY N/A 11/7/2018    Procedure: C2-C6 Posterior Cervical Laminectomy & Instrumental Fusion;  Surgeon: Kishore Turner MD;  Location: [facility] 2ND FLR;  Service: Neurosurgery;  Laterality: N/A;    TONSILLECTOMY      TRANSFORAMINAL EPIDURAL INJECTION OF STEROID Right 12/20/2019    Procedure: Injection,steroid,epidural,transforaminal approach;  Surgeon: Devan Watt MD;  Location: [facility];  Service: Pain Management;  Laterality: Right;  L3-4, L4-5    TRANSFORAMINAL EPIDURAL  INJECTION OF STEROID Bilateral 2/6/2020    Procedure: Injection,steroid,epidural,transforaminal approach;  Surgeon: Devan Watt MD;  Location: [facility] OR;  Service: Pain Management;  Laterality: Bilateral;  L3-L4,L4-L5    TRANSFORAMINAL EPIDURAL INJECTION OF STEROID Bilateral 8/26/2020    Procedure: Injection,steroid,epidural,transforaminal approach;  Surgeon: Devan Watt MD;  Location: [facility] OR;  Service: Pain Management;  Laterality: Bilateral;  L3-4, L4-5    TRANSRECTAL ULTRASOUND EXAMINATION N/A 2/26/2019    Procedure: ULTRASOUND, RECTAL APPROACH;  Surgeon: Simba Cheung MD;  Location: [facility] OR;  Service: Urology;  Laterality: N/A;    UPPER GASTROINTESTINAL ENDOSCOPY  09/12/2018    Dr Hernandez; gastritis; extensive intestinal metaplasia; repeat in 1-2- years     Family History   Problem Relation Age of Onset    Cataracts Mother     Heart disease Mother         CHF    Hypertension Mother     Hyperlipidemia Mother     Cataracts Father     Glaucoma Father     Heart disease Father     Hyperlipidemia Sister     Hypertension Sister     No Known Problems Daughter     No Known Problems Daughter     Collagen disease Neg Hx     Amblyopia Neg Hx     Blindness Neg Hx     Macular degeneration Neg Hx     Retinal detachment Neg Hx     Strabismus Neg Hx     Cancer Neg Hx     Colon cancer Neg Hx     Esophageal cancer Neg Hx     Stomach cancer Neg Hx     Crohn's disease Neg Hx     Ulcerative colitis Neg Hx      Social History     Socioeconomic History    Marital status:    Tobacco Use    Smoking status: Never    Smokeless tobacco: Never    Tobacco comments:     when a child   Substance and Sexual Activity    Alcohol use: Yes     Comment: rarely    Drug use: No    Sexual activity: Yes     Partners: Female     Social Determinants of Health     Financial Resource Strain: Low Risk     Difficulty of Paying Living Expenses: Not hard at all   Food Insecurity: No Food Insecurity    Worried About Running Out of Food in the Last  "Year: Never true    Ran Out of Food in the Last Year: Never true   Transportation Needs: No Transportation Needs    Lack of Transportation (Medical): No    Lack of Transportation (Non-Medical): No   Physical Activity: Inactive    Days of Exercise per Week: 0 days    Minutes of Exercise per Session: 0 min   Stress: No Stress Concern Present    Feeling of Stress : Not at all   Social Connections: Unknown    Frequency of Communication with Friends and Family: More than three times a week    Frequency of Social Gatherings with Friends and Family: More than three times a week    Active Member of Clubs or Organizations: Yes    Attends Club or Organization Meetings: More than 4 times per year    Marital Status:    Housing Stability: Low Risk     Unable to Pay for Housing in the Last Year: No    Number of Places Lived in the Last Year: 1    Unstable Housing in the Last Year: No         Medications/Allergies: See med card    Vitals:    09/14/22 1056   BP: (!) 146/79   Pulse: 63   Weight: 108.9 kg (240 lb)   Height: 6' 1" (1.854 m)   PainSc:   8   PainLoc: Back         Physical exam:    GENERAL: A and O x3, the patient appears well groomed and is in no acute distress.  Skin: No rashes or obvious lesions  HEENT: normocephalic, atraumatic  CARDIOVASCULAR:  Bradycardia   LUNGS: non labored breathing  ABDOMEN: soft, nontender   UPPER EXTREMITIES: Normal alignment, normal range of motion, no atrophy, no skin changes,  hair growth and nail growth normal and equal bilaterally. No swelling, no tenderness.    LOWER EXTREMITIES:  Normal alignment, normal range of motion, no atrophy, no skin changes,  hair growth and nail growth normal and equal bilaterally. No swelling, no tenderness.    Cervical Spine:  Cervical spine: ROM is very limited in flexion, extension and lateral rotation with moderate increased pain. Neck is has contractures on the left side  Spurling's maneuver causes neck pain to left side.  Myofascial exam: No " Tenderness to palpation across cervical paraspinous region bilaterally.      LUMBAR SPINE  Lumbar spine: ROM is full with flexion extension and oblique extension with no increased pain.    Porter's test causes no increased pain on either side.    Supine straight leg raise is negative bilaterally.    Internal and external rotation of the hip causes no increased pain on either side.  Myofascial exam: No tenderness to palpation across lumbar paraspinous muscles.      MENTAL STATUS: normal orientation, speech, language, and fund of knowledge for social situation.  Emotional state appropriate.    CRANIAL NERVES:  II:  PERRL bilaterally,   III,IV,VI: EOMI.    V:  Facial sensation equal bilaterally  VII:  Facial motor function normal.  VIII:  Hearing equal to finger rub bilaterally  IX/X: Gag normal, palate symmetric  XI:  Shoulder shrug equal, head turn equal  XII:  Tongue midline without fasciculations      MOTOR: Tone and bulk: normal bilateral upper and lower Strength: normal    IP ADD ABD Quad TA Gas HAM  R 5 5 5 5 5 5 5  L 5 5 5 5 5 5 5    SENSATION: Light touch and pinprick intact bilaterally  REFLEXES: normal, symmetric, nonbrisk.  Toes down, no clonus. No hoffmans.  GAIT: normal rise, base, steps, and arm swing.      Imaging:    MRI lumbar spine without contrast 10/12/2019 (open MRI)     L1-2, stable foraminal disc protrusion.  Mild central canal stenosis.  Moderate left neuroforaminal narrowing.  L2-3, disc bulging with prominent posterior element hypertrophy.  Moderate central canal stenosis.  Bilateral recess narrowing as well as moderate left and mild right neural foraminal narrowing.  L3-4, cervical ventral disc bulge with facet disease.  Mild-to-moderate central canal narrowing.  Moderate right and mild left neural foraminal narrowing.  L4-5, service control disc bulge with bulky facet disease.  Mild-to-moderate central canal stenosis.  Moderate bilateral neural foraminal narrowing, right greater than  left.  L5-S1 disc bulge and facet disease  MRI lumbar spine 09/2018 (outside open MRI)  Multilevel spondylosis.  Moderate neural foraminal narrowing at L3-4 and L4-5.      Assessment:  Mr. Tavera is a 76 y.o. male with   1. Postlaminectomy syndrome of lumbar region    2. Lumbar radiculitis    3. Other spondylosis, lumbar region    4. Chronic pain disorder      Plan:  1.  I have stressed the importance of physical activity and exercise to improve overall health. Home lumbar exercises provided.   2.  Reviewed pertinent imaging and records with patient  3.  I believe his low back pain maybe due to facet arthropathy and have recommended lumbar medial branch blocks as a diagnostic procedure.  If successful, would proceed with radiofrequency ablation.  4. May consider lumbar SONIA to help with radicular symptoms in the future.    5. Follow up after procedure

## 2022-10-05 NOTE — DISCHARGE INSTRUCTIONS
PLEASE MAKE SURE YOU HAVE ALL OF YOUR BELONGINGS BEFORE LEAVING     Nerve Block  This was a test, not a treatment. Your pain may return.  Keep your pain journal Dr office will call to check your pain levels within 3 days   Perform activities that normally cause you pain during this testing period    Home care instructions  You may apply ice pack to the injection site for 2 days , NO HEAT for 2 days  You may take a shower but no soaking above level of injection in tub or pool for 2 days  Resume Aspirin, Plavix, or Coumadin the day after the procedure unless otherwise instructed.  Do not drive for 24 hr      SEEK  IMMEDIATE MEDICAL HELP FOR:  Severe increase in your usual pain or appearance of new pain  Prolonged  ( more than 8 hours)or increasing weakness or numbness in the legs or arms  Drainage, redness, active bleeding, or increased swelling at the injection site  Temperature over 100.0 degrees F.  Headache that increases when your head is upright and decreases when you lie flat  Shortness of breath, chest pain, or problems breathing      After Surgery:  Always be aware that any surgery can cause these symptoms:    Pain- After this  test, we expect your pain to decrease but  then it may return as the local anesthetic wears off. Try to NOT take your pain medicine during this testing period. Ice and rest can help with pain relief.    Bleeding- a little bleeding after a surgery is usually within normal.  If there is a lot of blood you need to notify your MD.  Emergency treatments of bleeding are cold application, elevation of the bleeding site and compression.    Infection- Infection after surgery is NOT a normal occurrence.  Signs of infection are fever, swelling, hot to touch the incision.  If this occurs notify your MD immediately.    Nausea- this can be common after a surgery especially if you have had anesthesia medicine or are taking pain medicine.  Staying on clear liquids, bland foods, gingerale, or over the  counter anti nausea medicines can help.  If you vomit more than once, notify your MD.  Anti Nausea medicines can be prescribed.

## 2022-10-07 ENCOUNTER — HOSPITAL ENCOUNTER (OUTPATIENT)
Facility: AMBULARY SURGERY CENTER | Age: 77
Discharge: HOME | End: 2022-10-07
Attending: ANESTHESIOLOGY | Admitting: ANESTHESIOLOGY
Payer: MEDICARE

## 2022-10-07 DIAGNOSIS — M47.896 OTHER SPONDYLOSIS, LUMBAR REGION: ICD-10-CM

## 2022-10-07 PROCEDURE — 64493 INJ PARAVERT F JNT L/S 1 LEV: CPT | Performed by: ANESTHESIOLOGY

## 2022-10-07 PROCEDURE — 64494 PR INJ DX/THER AGNT PARAVERT FACET JOINT,IMG GUIDE,LUMBAR/SAC, 2ND LEVEL: ICD-10-PCS | Mod: RT,,, | Performed by: ANESTHESIOLOGY

## 2022-10-07 PROCEDURE — 64493 PR INJ DX/THER AGNT PARAVERT FACET JOINT,IMG GUIDE,LUMBAR/SAC,1ST LVL: ICD-10-PCS | Mod: RT,,, | Performed by: ANESTHESIOLOGY

## 2022-10-07 PROCEDURE — 64494 INJ PARAVERT F JNT L/S 2 LEV: CPT | Performed by: ANESTHESIOLOGY

## 2022-10-07 PROCEDURE — 64494 INJ PARAVERT F JNT L/S 2 LEV: CPT | Mod: RT,,, | Performed by: ANESTHESIOLOGY

## 2022-10-07 PROCEDURE — 64493 INJ PARAVERT F JNT L/S 1 LEV: CPT | Mod: RT,,, | Performed by: ANESTHESIOLOGY

## 2022-10-07 RX ORDER — SODIUM CHLORIDE, SODIUM LACTATE, POTASSIUM CHLORIDE, CALCIUM CHLORIDE 600; 310; 30; 20 MG/100ML; MG/100ML; MG/100ML; MG/100ML
INJECTION, SOLUTION INTRAVENOUS ONCE AS NEEDED
Status: DISCONTINUED | OUTPATIENT
Start: 2022-10-07 | End: 2022-10-07 | Stop reason: HOSPADM

## 2022-10-07 RX ORDER — BUPIVACAINE HYDROCHLORIDE 5 MG/ML
INJECTION, SOLUTION EPIDURAL; INTRACAUDAL
Status: DISCONTINUED | OUTPATIENT
Start: 2022-10-07 | End: 2022-10-07 | Stop reason: HOSPADM

## 2022-10-07 NOTE — OP NOTE
PROCEDURE DATE: 10/7/2022    PROCEDURE:  Right L3,4,5 medial branch nerve blocks under fluoroscopy     DIAGNOSIS:  Other lumbar spondylosis    Post Op diagnosis: Same    PHYSICIAN: Devan Watt MD    MEDICATIONS INJECTED: 0.5% bupivicaine, 0.5 ml at each level    SEDATION MEDICATIONS:None    LOCAL ANESTHETIC USED: None    ESTIMATED BLOOD LOSS:  None    COMPLICATIONS:  None    TECHNIQUE: A time out was taken to identify the patient, procedure and side of the procedure. The patient was placed in a prone position, then prepped and draped in the usual sterile fashion using ChloraPrep and sterile towels.  The levels were determined under fluoroscopic guidance and then marked.  A 25-gauge 3.5 inch needle was introduced to the anatomic location of the L3,4,5 medial branch nerves on the right side. The above medication was then injected. The patient tolerated the procedure well.     The patient was monitored after the procedure. Patient was given pain diary to record pain levels at home.     If found to have greater than a 50% recovery and so will be scheduled for a radiofrequency ablation of the corresponding nerves.  Patient was given post procedure and discharge instructions to follow at home.  The patient was discharged in a stable condition.

## 2022-10-07 NOTE — DISCHARGE SUMMARY
[facility]  Discharge Note  Short Stay    Procedure(s) (LRB):  Block-nerve-medial branch-lumbar L3,4,5 (Right)      OUTCOME: Patient tolerated treatment/procedure well without complication and is now ready for discharge.    DISPOSITION: Home or Self Care    FINAL DIAGNOSIS:  <principal problem not specified>    FOLLOWUP: In clinic    DISCHARGE INSTRUCTIONS:    Discharge Procedure Orders   Notify your health care provider if you experience any of the following:  temperature >100.4     Notify your health care provider if you experience any of the following:  severe uncontrolled pain     Notify your health care provider if you experience any of the following:  redness, tenderness, or signs of infection (pain, swelling, redness, odor or green/yellow discharge around incision site)     Activity as tolerated        TIME SPENT ON DISCHARGE: 30 minutes

## 2022-10-10 VITALS
WEIGHT: 240 LBS | TEMPERATURE: 98 F | HEART RATE: 58 BPM | SYSTOLIC BLOOD PRESSURE: 154 MMHG | OXYGEN SATURATION: 93 % | BODY MASS INDEX: 31.66 KG/M2 | DIASTOLIC BLOOD PRESSURE: 81 MMHG | RESPIRATION RATE: 18 BRPM

## 2022-10-11 ENCOUNTER — IMMUNIZATION (OUTPATIENT)
Dept: PRIMARY CARE CLINIC | Facility: CLINIC | Age: 77
End: 2022-10-11
Payer: MEDICARE

## 2022-10-11 ENCOUNTER — OFFICE VISIT (OUTPATIENT)
Dept: URGENT CARE | Facility: CLINIC | Age: 77
End: 2022-10-11
Payer: MEDICARE

## 2022-10-11 VITALS
OXYGEN SATURATION: 95 % | DIASTOLIC BLOOD PRESSURE: 72 MMHG | WEIGHT: 240 LBS | RESPIRATION RATE: 18 BRPM | HEART RATE: 64 BPM | HEIGHT: 73 IN | BODY MASS INDEX: 31.81 KG/M2 | SYSTOLIC BLOOD PRESSURE: 127 MMHG | TEMPERATURE: 99 F

## 2022-10-11 DIAGNOSIS — Z23 NEED FOR VACCINATION: Primary | ICD-10-CM

## 2022-10-11 DIAGNOSIS — S51.832A PUNCTURE WOUND OF LEFT FOREARM, INITIAL ENCOUNTER: Primary | ICD-10-CM

## 2022-10-11 PROCEDURE — 3074F PR MOST RECENT SYSTOLIC BLOOD PRESSURE < 130 MM HG: ICD-10-PCS | Mod: CPTII,S$GLB,, | Performed by: STUDENT IN AN ORGANIZED HEALTH CARE EDUCATION/TRAINING PROGRAM

## 2022-10-11 PROCEDURE — 0124A COVID-19, MRNA, LNP-S, BIVALENT BOOSTER, PF, 30 MCG/0.3 ML DOSE: ICD-10-PCS | Mod: S$GLB,,, | Performed by: FAMILY MEDICINE

## 2022-10-11 PROCEDURE — 1125F PR PAIN SEVERITY QUANTIFIED, PAIN PRESENT: ICD-10-PCS | Mod: CPTII,S$GLB,, | Performed by: STUDENT IN AN ORGANIZED HEALTH CARE EDUCATION/TRAINING PROGRAM

## 2022-10-11 PROCEDURE — 90471 IMMUNIZATION ADMIN: CPT | Mod: S$GLB,,, | Performed by: EMERGENCY MEDICINE

## 2022-10-11 PROCEDURE — 1159F MED LIST DOCD IN RCRD: CPT | Mod: CPTII,S$GLB,, | Performed by: STUDENT IN AN ORGANIZED HEALTH CARE EDUCATION/TRAINING PROGRAM

## 2022-10-11 PROCEDURE — 90471 TD VACCINE GREATER THAN OR EQUAL TO 7YO WITH PRESERVATIVE IM: ICD-10-PCS | Mod: S$GLB,,, | Performed by: EMERGENCY MEDICINE

## 2022-10-11 PROCEDURE — 90714 TD VACC NO PRESV 7 YRS+ IM: CPT | Mod: S$GLB,,, | Performed by: EMERGENCY MEDICINE

## 2022-10-11 PROCEDURE — 1160F RVW MEDS BY RX/DR IN RCRD: CPT | Mod: CPTII,S$GLB,, | Performed by: STUDENT IN AN ORGANIZED HEALTH CARE EDUCATION/TRAINING PROGRAM

## 2022-10-11 PROCEDURE — 1157F ADVNC CARE PLAN IN RCRD: CPT | Mod: CPTII,S$GLB,, | Performed by: STUDENT IN AN ORGANIZED HEALTH CARE EDUCATION/TRAINING PROGRAM

## 2022-10-11 PROCEDURE — 99214 OFFICE O/P EST MOD 30 MIN: CPT | Mod: 25,S$GLB,, | Performed by: STUDENT IN AN ORGANIZED HEALTH CARE EDUCATION/TRAINING PROGRAM

## 2022-10-11 PROCEDURE — 3288F FALL RISK ASSESSMENT DOCD: CPT | Mod: CPTII,S$GLB,, | Performed by: STUDENT IN AN ORGANIZED HEALTH CARE EDUCATION/TRAINING PROGRAM

## 2022-10-11 PROCEDURE — 91312 COVID-19, MRNA, LNP-S, BIVALENT BOOSTER, PF, 30 MCG/0.3 ML DOSE: ICD-10-PCS | Mod: S$GLB,,, | Performed by: FAMILY MEDICINE

## 2022-10-11 PROCEDURE — 3078F PR MOST RECENT DIASTOLIC BLOOD PRESSURE < 80 MM HG: ICD-10-PCS | Mod: CPTII,S$GLB,, | Performed by: STUDENT IN AN ORGANIZED HEALTH CARE EDUCATION/TRAINING PROGRAM

## 2022-10-11 PROCEDURE — 1157F PR ADVANCE CARE PLAN OR EQUIV PRESENT IN MEDICAL RECORD: ICD-10-PCS | Mod: CPTII,S$GLB,, | Performed by: STUDENT IN AN ORGANIZED HEALTH CARE EDUCATION/TRAINING PROGRAM

## 2022-10-11 PROCEDURE — 3074F SYST BP LT 130 MM HG: CPT | Mod: CPTII,S$GLB,, | Performed by: STUDENT IN AN ORGANIZED HEALTH CARE EDUCATION/TRAINING PROGRAM

## 2022-10-11 PROCEDURE — 1101F PR PT FALLS ASSESS DOC 0-1 FALLS W/OUT INJ PAST YR: ICD-10-PCS | Mod: CPTII,S$GLB,, | Performed by: STUDENT IN AN ORGANIZED HEALTH CARE EDUCATION/TRAINING PROGRAM

## 2022-10-11 PROCEDURE — 1160F PR REVIEW ALL MEDS BY PRESCRIBER/CLIN PHARMACIST DOCUMENTED: ICD-10-PCS | Mod: CPTII,S$GLB,, | Performed by: STUDENT IN AN ORGANIZED HEALTH CARE EDUCATION/TRAINING PROGRAM

## 2022-10-11 PROCEDURE — 99214 PR OFFICE/OUTPT VISIT, EST, LEVL IV, 30-39 MIN: ICD-10-PCS | Mod: 25,S$GLB,, | Performed by: STUDENT IN AN ORGANIZED HEALTH CARE EDUCATION/TRAINING PROGRAM

## 2022-10-11 PROCEDURE — 3288F PR FALLS RISK ASSESSMENT DOCUMENTED: ICD-10-PCS | Mod: CPTII,S$GLB,, | Performed by: STUDENT IN AN ORGANIZED HEALTH CARE EDUCATION/TRAINING PROGRAM

## 2022-10-11 PROCEDURE — 1159F PR MEDICATION LIST DOCUMENTED IN MEDICAL RECORD: ICD-10-PCS | Mod: CPTII,S$GLB,, | Performed by: STUDENT IN AN ORGANIZED HEALTH CARE EDUCATION/TRAINING PROGRAM

## 2022-10-11 PROCEDURE — 0124A COVID-19, MRNA, LNP-S, BIVALENT BOOSTER, PF, 30 MCG/0.3 ML DOSE: CPT | Mod: S$GLB,,, | Performed by: FAMILY MEDICINE

## 2022-10-11 PROCEDURE — 1101F PT FALLS ASSESS-DOCD LE1/YR: CPT | Mod: CPTII,S$GLB,, | Performed by: STUDENT IN AN ORGANIZED HEALTH CARE EDUCATION/TRAINING PROGRAM

## 2022-10-11 PROCEDURE — 3078F DIAST BP <80 MM HG: CPT | Mod: CPTII,S$GLB,, | Performed by: STUDENT IN AN ORGANIZED HEALTH CARE EDUCATION/TRAINING PROGRAM

## 2022-10-11 PROCEDURE — 90714 TD VACCINE GREATER THAN OR EQUAL TO 7YO WITH PRESERVATIVE IM: ICD-10-PCS | Mod: S$GLB,,, | Performed by: EMERGENCY MEDICINE

## 2022-10-11 PROCEDURE — 91312 COVID-19, MRNA, LNP-S, BIVALENT BOOSTER, PF, 30 MCG/0.3 ML DOSE: CPT | Mod: S$GLB,,, | Performed by: FAMILY MEDICINE

## 2022-10-11 PROCEDURE — 1125F AMNT PAIN NOTED PAIN PRSNT: CPT | Mod: CPTII,S$GLB,, | Performed by: STUDENT IN AN ORGANIZED HEALTH CARE EDUCATION/TRAINING PROGRAM

## 2022-10-11 RX ORDER — MUPIROCIN 20 MG/G
OINTMENT TOPICAL 3 TIMES DAILY
Qty: 30 G | Refills: 0 | Status: SHIPPED | OUTPATIENT
Start: 2022-10-11

## 2022-10-11 RX ORDER — CEPHALEXIN 500 MG/1
500 CAPSULE ORAL EVERY 8 HOURS
Qty: 21 CAPSULE | Refills: 0 | Status: SHIPPED | OUTPATIENT
Start: 2022-10-11 | End: 2022-10-18

## 2022-10-11 NOTE — PROGRESS NOTES
"Subjective:       Patient ID: Maximilian Tavera Jr. is a 76 y.o. male.    Vitals:  height is 6' 1" (1.854 m) and weight is 108.9 kg (240 lb). His oral temperature is 98.8 °F (37.1 °C). His blood pressure is 127/72 and his pulse is 64. His respiration is 18 and oxygen saturation is 95%.     Chief Complaint: Arm Injury    Patient is a 76-year-old male with past medical history of COPD, atrial fibrillation, hypertension, hyperlipidemia, hypothyroidism, BPH, asthma, degenerative disc disease, and myasthenia gravis who presents to clinic for evaluation of arm injury.  Patient states yesterday he was repairing his which in well in the front yd.  Patient states he was drilling and the drill went in to his left forearm.  Patient states that he has experience pain to the left forearm at the puncture wound.  Patient states that he does not believe his tetanus shot is up today.  Patient states his wife wanted him to come in and get and updated.  Patient states that he has not had any significant bleeding or drainage from this puncture wound.  Patient states that he has not had any significant swelling of the forearm.  Patient states that he has not had any numbness or tingling of the forearm, hand or fingers.  Patient states pain is constant.  Patient states pain feels like a soreness.  Patient rates pain at current a 3 on a 10 scale unless it is pressed on than it is a 7 on 10 scale.  Patient denies radiation of pain.  Patient states no over-the-counter medications for symptoms at this point.  Patient states that he has not had any fever or chills, chest pain or shortness of breath, abdominal pain, nausea or vomiting, headaches or dizziness.    Arm Injury   The incident occurred 12 to 24 hours ago. The incident occurred at home. The pain is present in the left wrist. The quality of the pain is described as shooting. Pertinent negatives include no chest pain or numbness.     Constitution: Negative. Negative for chills, " sweating, fatigue and fever.   HENT: Negative.     Neck: neck negative.   Cardiovascular: Negative.  Negative for chest pain and palpitations.   Eyes: Negative.    Respiratory: Negative.  Negative for chest tightness, cough and shortness of breath.    Gastrointestinal: Negative.  Negative for abdominal pain, nausea, vomiting and diarrhea.   Endocrine: negative.   Genitourinary: Negative.    Musculoskeletal:  Positive for trauma (Drill bit into left forearm yesterday) and joint pain (Left forearm). Negative for joint swelling.   Skin:  Positive for puncture wound (Left medial forearm) and erythema.   Allergic/Immunologic: Negative.    Neurological: Negative.  Negative for dizziness, light-headedness, passing out, headaches, disorientation, altered mental status, numbness and tingling.   Hematologic/Lymphatic: Negative.    Psychiatric/Behavioral: Negative.  Negative for altered mental status, disorientation and confusion.      Objective:      Physical Exam   Constitutional: He is oriented to person, place, and time. He appears well-developed. He is cooperative.  Non-toxic appearance. He does not appear ill. No distress.   HENT:   Head: Normocephalic and atraumatic.   Ears:   Right Ear: Hearing, tympanic membrane, external ear and ear canal normal.   Left Ear: Hearing, tympanic membrane, external ear and ear canal normal.   Nose: Nose normal. No mucosal edema, rhinorrhea or nasal deformity. No epistaxis. Right sinus exhibits no maxillary sinus tenderness and no frontal sinus tenderness. Left sinus exhibits no maxillary sinus tenderness and no frontal sinus tenderness.   Mouth/Throat: Uvula is midline, oropharynx is clear and moist and mucous membranes are normal. No trismus in the jaw. Normal dentition. No uvula swelling. No posterior oropharyngeal erythema.   Eyes: Conjunctivae and lids are normal. Pupils are equal, round, and reactive to light. Right eye exhibits no discharge. Left eye exhibits no discharge. No  scleral icterus.   Neck: Trachea normal and phonation normal. Neck supple. No neck rigidity present.   Cardiovascular: Normal rate, regular rhythm and normal pulses.   Pulmonary/Chest: Effort normal and breath sounds normal. No respiratory distress. He has no wheezes. He has no rhonchi. He has no rales.   Abdominal: Normal appearance and bowel sounds are normal. He exhibits no distension. Soft. There is no abdominal tenderness.   Musculoskeletal: Normal range of motion.         General: Signs of injury present. Normal range of motion.      Cervical back: He exhibits no tenderness.      Left forearm: He exhibits tenderness and deformity (Puncture wound noted to the medial aspect of the left forearm with surrounding erythema and tenderness.  No bleeding or drainage noted.). He exhibits no swelling.   Neurological: He is alert and oriented to person, place, and time. He exhibits normal muscle tone. Coordination normal.   Skin: Skin is warm, dry, intact, not diaphoretic and not pale. Capillary refill takes 2 to 3 seconds. erythema   Psychiatric: His speech is normal and behavior is normal. Judgment and thought content normal.   Nursing note and vitals reviewed.      Assessment:       1. Puncture wound of left forearm, initial encounter          Plan:         Puncture wound of left forearm, initial encounter  -     XR FOREARM LEFT; Future; Expected date: 10/11/2022    Other orders  -     (In Office Administered) Td Vaccine  -     cephALEXin (KEFLEX) 500 MG capsule; Take 1 capsule (500 mg total) by mouth every 8 (eight) hours. for 7 days  Dispense: 21 capsule; Refill: 0  -     mupirocin (BACTROBAN) 2 % ointment; Apply topically 3 (three) times daily.  Dispense: 30 g; Refill: 0               Discussed with patient that tetanus is reportedly up-to-date in chart.  Chart review shows patient had tetanus in 2015.  Patient wishes to still have his tetanus updated today.  X-ray left forearm: There is no acute fracture or  malalignment.  Mild carpal degenerative changes.  Mild degenerative change ulnar humeral joint.  On the lateral image only there is a linear density projecting over the volar forearm soft tissues not well seen in the other view.  This could reflect a retained foreign body or skin debris and needs to be correlated with the puncture site.  Puncture wound is a great distance from where possible foreign body was described on imaging.  Wound irrigated in clinic.  Bactroban ointment applied.  Covered with Band-Aid.  Tetanus updated in clinic.  Patient tolerated well.  No complications noted.    Take medications as prescribed.    Tylenol/Motrin per package instructions for any pain or fever.    Wound care as directed.    Follow-up with PCP in 1-2 days.    Return to clinic as needed.    To ED for any new acutely worsening symptoms.    Patient in agreement with plan of care.    DISCLAIMER: Please note that my documentation in this Electronic Healthcare Record was produced using speech recognition software and therefore may contain errors related to that software system.These could include grammar, punctuation and spelling errors or the inclusion/exclusion of phrases that were not intended. Garbled syntax, mangled pronouns, and other bizarre constructions may be attributed to that software system.      Former smoker

## 2022-10-13 ENCOUNTER — OFFICE VISIT (OUTPATIENT)
Dept: PODIATRY | Facility: CLINIC | Age: 77
End: 2022-10-13
Payer: MEDICARE

## 2022-10-13 VITALS — BODY MASS INDEX: 31.82 KG/M2 | HEIGHT: 73 IN | WEIGHT: 240.06 LBS

## 2022-10-13 DIAGNOSIS — M79.675 TOE PAIN, LEFT: ICD-10-CM

## 2022-10-13 DIAGNOSIS — L60.0 INGROWN TOENAIL OF LEFT FOOT: Primary | ICD-10-CM

## 2022-10-13 PROCEDURE — 1125F AMNT PAIN NOTED PAIN PRSNT: CPT | Mod: CPTII,S$GLB,, | Performed by: PODIATRIST

## 2022-10-13 PROCEDURE — 99999 PR PBB SHADOW E&M-EST. PATIENT-LVL IV: ICD-10-PCS | Mod: PBBFAC,,, | Performed by: PODIATRIST

## 2022-10-13 PROCEDURE — 99999 PR PBB SHADOW E&M-EST. PATIENT-LVL IV: CPT | Mod: PBBFAC,,, | Performed by: PODIATRIST

## 2022-10-13 PROCEDURE — 1159F PR MEDICATION LIST DOCUMENTED IN MEDICAL RECORD: ICD-10-PCS | Mod: CPTII,S$GLB,, | Performed by: PODIATRIST

## 2022-10-13 PROCEDURE — 1125F PR PAIN SEVERITY QUANTIFIED, PAIN PRESENT: ICD-10-PCS | Mod: CPTII,S$GLB,, | Performed by: PODIATRIST

## 2022-10-13 PROCEDURE — 1159F MED LIST DOCD IN RCRD: CPT | Mod: CPTII,S$GLB,, | Performed by: PODIATRIST

## 2022-10-13 PROCEDURE — 1157F ADVNC CARE PLAN IN RCRD: CPT | Mod: CPTII,S$GLB,, | Performed by: PODIATRIST

## 2022-10-13 PROCEDURE — 99213 OFFICE O/P EST LOW 20 MIN: CPT | Mod: S$GLB,,, | Performed by: PODIATRIST

## 2022-10-13 PROCEDURE — 99213 PR OFFICE/OUTPT VISIT, EST, LEVL III, 20-29 MIN: ICD-10-PCS | Mod: S$GLB,,, | Performed by: PODIATRIST

## 2022-10-13 PROCEDURE — 1157F PR ADVANCE CARE PLAN OR EQUIV PRESENT IN MEDICAL RECORD: ICD-10-PCS | Mod: CPTII,S$GLB,, | Performed by: PODIATRIST

## 2022-10-13 NOTE — PROGRESS NOTES
Subjective:      Patient ID: Maximilian Tavera Jr. is a 76 y.o. male.    Chief Complaint: Toe Pain (1st and 2nd toe)  Patient presents to clinic with the chief complaint of pain from an ingrowing nail of the Lt. Hallux and 2nd toes.  Describes pain from said issue as sharp and rates as a 3/10.  Symptoms are exacerbated with applied pressure to the nail plates with wearing closed toe shoe gear.  Symptoms are alleviated with removal of shoes.  States this has been an issue for the past several weeks.  Denies noticing signs of infection to the affected digits.  Has not attempted to self treat.  Denies any additional pedal complaints.      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    Emphysema lung 11/05/2018    Gastritis     Hx of colonic polyp     Hyperlipidemia     Hypertension     Hypothyroidism     Knee fracture     Myasthenia gravis     Neuropathy 01/03/2013    Obesity 01/29/2015    Pneumonia 03/29/2020    Polyneuropathy     PVC (premature ventricular contraction)     Squamous cell carcinoma 2014    left forearm    Thyroid disease        Past Surgical History:   Procedure Laterality Date    APPENDECTOMY      CATARACT EXTRACTION W/  INTRAOCULAR LENS IMPLANT Bilateral     COLONOSCOPY  03/01/2012    Dr Esteban; hyperplastic polyp; repeat in 5 years    COLONOSCOPY N/A 12/7/2021    Procedure: COLONOSCOPY;  Surgeon: Gabriel Hernandez MD;  Location: [facility];  Service: Endoscopy;  Laterality: N/A;    CYSTOSCOPY N/A 2/26/2019    Procedure: CYSTOSCOPY;  Surgeon: Simba Cheung MD;  Location: [facility] OR;  Service: Urology;  Laterality: N/A;    ENDOSCOPIC ULTRASOUND OF UPPER GASTROINTESTINAL TRACT N/A 1/7/2020    Procedure: ULTRASOUND, UPPER GI TRACT, ENDOSCOPIC;  Surgeon:  Kati Ryan MD;  Location: [facility] (2ND FLR);  Service: Endoscopy;  Laterality: N/A;    ESOPHAGOGASTRODUODENOSCOPY N/A 9/12/2018    Procedure: EGD (ESOPHAGOGASTRODUODENOSCOPY);  Surgeon: Gabriel Hernandez MD;  Location: [facility] ENDO;  Service: Endoscopy;  Laterality: N/A;    ESOPHAGOGASTRODUODENOSCOPY N/A 8/19/2019    Procedure: EGD (ESOPHAGOGASTRODUODENOSCOPY);  Surgeon: Gabriel Hernandez MD;  Location: [facility] ENDO;  Service: Endoscopy;  Laterality: N/A;    ESOPHAGOGASTRODUODENOSCOPY N/A 10/7/2019    Procedure: EGD (ESOPHAGOGASTRODUODENOSCOPY);  Surgeon: Gabriel Hernandez MD;  Location: [facility] ENDO;  Service: Endoscopy;  Laterality: N/A;    ESOPHAGOGASTRODUODENOSCOPY N/A 1/7/2020    Procedure: EGD (ESOPHAGOGASTRODUODENOSCOPY);  Surgeon: Kati Ryan MD;  Location: [facility] (2ND FLR);  Service: Endoscopy;  Laterality: N/A;    HEMORRHOID SURGERY      INJECTION OF ANESTHETIC AGENT AROUND MEDIAL BRANCH NERVES INNERVATING LUMBAR FACET JOINT Bilateral 10/1/2020    Procedure: Block-nerve-medial branch-lumbar Bilateral L 3,4,5;  Surgeon: Devan Watt MD;  [facility];  Service: Pain Management;  Laterality: Bilateral;    INJECTION OF ANESTHETIC AGENT AROUND MEDIAL BRANCH NERVES INNERVATING LUMBAR FACET JOINT Right 10/7/2022    Procedure: Block-nerve-medial branch-lumbar L3,4,5;  Surgeon: Devan Watt MD;  [facility];  Service: Pain Management;  Laterality: Right;    KNEE ARTHROPLASTY Bilateral     LENGTHENING OF ACHILLES TENDON Right 9/11/2020    Procedure: percutaeous tenotomy of right lateral epicondyle (tenex);  Surgeon: Terry Quach MD;  [facility];  Service: Neurosurgery;  Laterality: Right;  tenex to right lateral epicondyle  Tenex machine SN#[id number], total time 1min. 30seconds    NECK SURGERY      POSTERIOR FUSION OF CERVICAL SPINE WITH LAMINECTOMY N/A 11/7/2018    Procedure: C2-C6 Posterior Cervical Laminectomy & Instrumental Fusion;  Surgeon: Kishore Turner MD;  Location: [facility] OR [facility]  FLR;  Service: Neurosurgery;  Laterality: N/A;    TONSILLECTOMY      TRANSFORAMINAL EPIDURAL INJECTION OF STEROID Right 12/20/2019    Procedure: Injection,steroid,epidural,transforaminal approach;  Surgeon: Devan Watt MD;  Location: [facility] OR;  Service: Pain Management;  Laterality: Right;  L3-4, L4-5    TRANSFORAMINAL EPIDURAL INJECTION OF STEROID Bilateral 2/6/2020    Procedure: Injection,steroid,epidural,transforaminal approach;  Surgeon: Devan Watt MD;  Location: [facility] OR;  Service: Pain Management;  Laterality: Bilateral;  L3-L4,L4-L5    TRANSFORAMINAL EPIDURAL INJECTION OF STEROID Bilateral 8/26/2020    Procedure: Injection,steroid,epidural,transforaminal approach;  Surgeon: Devan Watt MD;  Location: [facility] OR;  Service: Pain Management;  Laterality: Bilateral;  L3-4, L4-5    TRANSRECTAL ULTRASOUND EXAMINATION N/A 2/26/2019    Procedure: ULTRASOUND, RECTAL APPROACH;  Surgeon: Simba Cheung MD;  Location: [facility] OR;  Service: Urology;  Laterality: N/A;    UPPER GASTROINTESTINAL ENDOSCOPY  09/12/2018    Dr Hernandez; gastritis; extensive intestinal metaplasia; repeat in 1-2- years       Family History   Problem Relation Age of Onset    Cataracts Mother     Heart disease Mother         CHF    Hypertension Mother     Hyperlipidemia Mother     Cataracts Father     Glaucoma Father     Heart disease Father     Hyperlipidemia Sister     Hypertension Sister     No Known Problems Daughter     No Known Problems Daughter     Collagen disease Neg Hx     Amblyopia Neg Hx     Blindness Neg Hx     Macular degeneration Neg Hx     Retinal detachment Neg Hx     Strabismus Neg Hx     Cancer Neg Hx     Colon cancer Neg Hx     Esophageal cancer Neg Hx     Stomach cancer Neg Hx     Crohn's disease Neg Hx     Ulcerative colitis Neg Hx        Social History     Socioeconomic History    Marital status:    Tobacco Use    Smoking status: Never    Smokeless tobacco: Never    Tobacco comments:     when a child   Substance and Sexual  Activity    Alcohol use: Yes     Comment: rarely    Drug use: No    Sexual activity: Yes     Partners: Female     Social Determinants of Health     Financial Resource Strain: Low Risk     Difficulty of Paying Living Expenses: Not hard at all   Food Insecurity: No Food Insecurity    Worried About Running Out of Food in the Last Year: Never true    Ran Out of Food in the Last Year: Never true   Transportation Needs: No Transportation Needs    Lack of Transportation (Medical): No    Lack of Transportation (Non-Medical): No   Physical Activity: Inactive    Days of Exercise per Week: 0 days    Minutes of Exercise per Session: 0 min   Stress: No Stress Concern Present    Feeling of Stress : Not at all   Social Connections: Unknown    Frequency of Communication with Friends and Family: More than three times a week    Frequency of Social Gatherings with Friends and Family: More than three times a week    Active Member of Clubs or Organizations: Yes    Attends Club or Organization Meetings: More than 4 times per year    Marital Status:    Housing Stability: Low Risk     Unable to Pay for Housing in the Last Year: No    Number of Places Lived in the Last Year: 1    Unstable Housing in the Last Year: No       Current Outpatient Medications   Medication Sig Dispense Refill    albuterol (PROVENTIL/VENTOLIN HFA) 90 mcg/actuation inhaler INHALE 1 TO 2 PUFFS INTO THE LUNGS EVERY 6 HOURS AS NEEDED FOR WHEEZING OR SHORTNESS OF BREATH. FOR RESCUE. 25.5 g 0    atorvastatin (LIPITOR) 80 MG tablet TAKE 1 TABLET EVERY DAY 90 tablet 1    benzonatate (TESSALON) 100 MG capsule Take 1 capsule (100 mg total) by mouth 2 (two) times daily as needed for Cough. 60 capsule 1    carvediloL (COREG) 25 MG tablet TAKE 1 TABLET TWICE DAILY WITH MEALS 180 tablet 3    cephALEXin (KEFLEX) 500 MG capsule Take 1 capsule (500 mg total) by mouth every 8 (eight) hours. for 7 days 21 capsule 0    cetirizine (ZYRTEC) 10 MG tablet Take 1 tablet by mouth  daily as needed.      chlorthalidone (HYGROTEN) 25 MG Tab Take 1 tablet (25 mg total) by mouth once daily. 90 tablet 3    cholecalciferol, vitamin D3, (VITAMIN D3) 50 mcg (2,000 unit) Cap 1 capsule once daily. Every day      cholestyramine (QUESTRAN) 4 gram packet Take 1 packet (4 g total) by mouth 3 (three) times daily with meals. 270 packet 3    coenzyme Q10 (CO Q-10) 100 mg capsule Take 400 mg by mouth once daily.      cyanocobalamin, vitamin B-12, 5,000 mcg Subl Take 5,000 mcg by mouth once daily. Every day      diphenoxylate-atropine 2.5-0.025 mg (LOMOTIL) 2.5-0.025 mg per tablet Take 1 tablet by mouth 4 (four) times daily as needed for Diarrhea. 90 tablet 0    ezetimibe (ZETIA) 10 mg tablet TAKE 1 TABLET EVERY DAY 90 tablet 3    FLUAD QUAD 2021-22,65Y UP,,PF, 60 mcg (15 mcg x 4)/0.5 mL Syrg       fluticasone (FLONASE) 50 mcg/actuation nasal spray USE 1 SPRAY IN EACH NOSTRIL TWICE DAILY AS NEEDED  FOR  RHINITIS 48 g 3    gabapentin (NEURONTIN) 300 MG capsule TAKE 1 CAPSULE THREE TIMES DAILY 270 capsule 5    KENALOG 40 mg/mL injection       levothyroxine (SYNTHROID) 50 MCG tablet TAKE 1 TABLET EVERY DAY 90 tablet 3    methylsulfonylmethane 1,000 mg Tab Take 1,000 mg by mouth once daily. Every day      milk thistle 200 mg Cap Take 200 mg by mouth 2 (two) times daily. Twice a day      mupirocin (BACTROBAN) 2 % ointment Apply topically 3 (three) times daily. 30 g 0    olmesartan (BENICAR) 20 MG tablet TAKE 1 TABLET EVERY DAY 90 tablet 2    omega-3 fatty acids 1,000 mg Cap Twice a day      potassium chloride SA (K-DUR,KLOR-CON) 20 MEQ tablet TAKE 4 TABLETS EVERY DAY  OR AS DIRECTED 360 tablet 3    predniSONE (DELTASONE) 1 MG tablet TAKE 2 TABLETS EVERY  tablet 3    predniSONE (DELTASONE) 5 MG tablet TAKE 1 TABLET EVERY DAY 90 tablet 3    sildenafil (REVATIO) 20 mg Tab Take 1 to 3 tabs daily as needed. 30 tablet 3    tiotropium (SPIRIVA WITH HANDIHALER) 18 mcg inhalation capsule INHALE THE CONTENTS OF 1  CAPSULE EVERY DAY  VIA HANDIHALER (CONTROLLER) 90 capsule 3    bumetanide (BUMEX) 0.5 MG Tab Take 1 tablet (0.5 mg total) by mouth 2 (two) times daily. 60 tablet 1    pantoprazole (PROTONIX) 40 MG tablet Take 1 tablet (40 mg total) by mouth 2 (two) times daily. 180 tablet 3    varicella-zoster [id number], PF, (SHINGRIX, PF,) 50 mcg/0.5 mL injection Inject into the muscle. 1 each 0     No current facility-administered medications for this visit.       Review of patient's allergies indicates:   Allergen Reactions    Spironolactone Diarrhea       Review of Systems   Constitutional: Negative for chills and fever.   Cardiovascular:  Negative for claudication.   Skin:  Positive for nail changes. Negative for color change and suspicious lesions.   Musculoskeletal:  Positive for arthritis and back pain. Negative for joint swelling, muscle cramps and muscle weakness.   Neurological:  Negative for numbness and paresthesias.   Psychiatric/Behavioral:  Negative for altered mental status.          Objective:      Physical Exam  Constitutional:       General: He is not in acute distress.     Appearance: He is well-developed. He is not diaphoretic.   Cardiovascular:      Pulses:           Dorsalis pedis pulses are 2+ on the right side and 2+ on the left side.        Posterior tibial pulses are 2+ on the right side and 2+ on the left side.      Comments: CFT is < 3 seconds bilateral.  Pedal hair growth is present bilateral.  Varicosities noted bilateral.  Moderate nonpitting lower extremity edema noted bilateral.  Toes are warm to touch bilateral.    Musculoskeletal:         General: Tenderness present.      Comments: Muscle strength 5/5 in all muscle groups bilateral.  No tenderness nor crepitation with ROM of foot/ankle joints bilateral.  Pain with palpation to the lateral nail fold of the Lt. Hallux and medial nail fold of the Lt. 2nd toe.     Skin:     General: Skin is warm and dry.      Capillary Refill: Capillary refill takes 2  to 3 seconds.      Coloration: Skin is not pale.      Findings: No abrasion, bruising, burn, ecchymosis, erythema, laceration, lesion or rash.      Comments: Pedal skin has normal turgor, temperature, and texture bilateral.  Incurvation noted to the lateral border of the Lt. Hallux toenail and medial border of the Lt. 2nd toenail.  No erythema, edema, fluctuance, purulence, or malodor noted.  Remaining toenails x 8 appear normotrophic.     Neurological:      Mental Status: He is alert and oriented to person, place, and time.      Sensory: No sensory deficit.      Comments: Light touch is intact bilateral.               Assessment:       Encounter Diagnoses   Name Primary?    Ingrown toenail of left foot Yes    Toe pain, left          Plan:       Maximilian was seen today for toe pain.    Diagnoses and all orders for this visit:    Ingrown toenail of left foot    Toe pain, left    I counseled the patient on his conditions, their implications and medical management.    Utilizing sterile toenail clippers I aggressively trimmed the offending lateral Lt. Hallux and medial Lt. 2nd toenail borders approximately 3 mm from its edge and carried the nail plate incision down at an angle in order to wedge out the offending cryptotic portion of the nail plates. The offending borders were then removed in toto. This was performed without incident.  Patient tolerated the procedure well and related significant relief.    Advised to begin applying vaseline to the affected nail grooves daily to soften the nails and to prevent callus build up within the nail grooves.    Recommend applying a cotton ball to the 1st webspace to prevent the pressure leading up to said issue.      RTC prn.     Khris Valentin DPM

## 2022-10-17 ENCOUNTER — TELEPHONE (OUTPATIENT)
Dept: ORTHOPEDICS | Facility: CLINIC | Age: 77
End: 2022-10-17
Payer: MEDICARE

## 2022-10-17 NOTE — TELEPHONE ENCOUNTER
----- Message from Jolynn Gonzalez sent at 10/17/2022 10:33 AM CDT -----  Regarding: injection appointment  Contact: patient  Type:  Sooner Appointment Request    Caller is requesting a sooner appointment.  Caller declined first available appointment listed below.  Caller will not accept being placed on the waitlist and is requesting a message be sent to doctor.    Name of Caller:  patient  When is the first available appointment?    Symptoms:  injection left hip  Best Call Back Number:  [phone]  Additional Information:  patient is leaving out of town on Wednesday for 2 weeks and needs relief from the pain before he goes. Please call patient to advise.Thanks!

## 2022-10-18 ENCOUNTER — HOSPITAL ENCOUNTER (OUTPATIENT)
Dept: RADIOLOGY | Facility: HOSPITAL | Age: 77
Discharge: HOME | End: 2022-10-18
Attending: FAMILY MEDICINE
Payer: MEDICARE

## 2022-10-18 ENCOUNTER — OFFICE VISIT (OUTPATIENT)
Dept: ORTHOPEDICS | Facility: CLINIC | Age: 77
End: 2022-10-18
Payer: MEDICARE

## 2022-10-18 VITALS — WEIGHT: 240.31 LBS | RESPIRATION RATE: 14 BRPM | BODY MASS INDEX: 31.85 KG/M2 | HEIGHT: 73 IN

## 2022-10-18 DIAGNOSIS — M25.552 LEFT HIP PAIN: Primary | ICD-10-CM

## 2022-10-18 DIAGNOSIS — M25.552 LEFT HIP PAIN: ICD-10-CM

## 2022-10-18 DIAGNOSIS — M70.62 GREATER TROCHANTERIC BURSITIS OF LEFT HIP: Primary | ICD-10-CM

## 2022-10-18 PROCEDURE — 73502 X-RAY EXAM HIP UNI 2-3 VIEWS: CPT | Mod: TC,PN,LT

## 2022-10-18 PROCEDURE — 1157F PR ADVANCE CARE PLAN OR EQUIV PRESENT IN MEDICAL RECORD: ICD-10-PCS | Mod: CPTII,S$GLB,, | Performed by: FAMILY MEDICINE

## 2022-10-18 PROCEDURE — 99214 PR OFFICE/OUTPT VISIT, EST, LEVL IV, 30-39 MIN: ICD-10-PCS | Mod: 25,S$GLB,, | Performed by: FAMILY MEDICINE

## 2022-10-18 PROCEDURE — 99214 OFFICE O/P EST MOD 30 MIN: CPT | Mod: 25,S$GLB,, | Performed by: FAMILY MEDICINE

## 2022-10-18 PROCEDURE — 99999 PR PBB SHADOW E&M-EST. PATIENT-LVL IV: ICD-10-PCS | Mod: PBBFAC,,, | Performed by: FAMILY MEDICINE

## 2022-10-18 PROCEDURE — 20610 DRAIN/INJ JOINT/BURSA W/O US: CPT | Mod: LT,S$GLB,, | Performed by: FAMILY MEDICINE

## 2022-10-18 PROCEDURE — 1125F PR PAIN SEVERITY QUANTIFIED, PAIN PRESENT: ICD-10-PCS | Mod: CPTII,S$GLB,, | Performed by: FAMILY MEDICINE

## 2022-10-18 PROCEDURE — 1125F AMNT PAIN NOTED PAIN PRSNT: CPT | Mod: CPTII,S$GLB,, | Performed by: FAMILY MEDICINE

## 2022-10-18 PROCEDURE — 73502 X-RAY EXAM HIP UNI 2-3 VIEWS: CPT | Mod: 26,LT,, | Performed by: RADIOLOGY

## 2022-10-18 PROCEDURE — 1157F ADVNC CARE PLAN IN RCRD: CPT | Mod: CPTII,S$GLB,, | Performed by: FAMILY MEDICINE

## 2022-10-18 PROCEDURE — 1159F MED LIST DOCD IN RCRD: CPT | Mod: CPTII,S$GLB,, | Performed by: FAMILY MEDICINE

## 2022-10-18 PROCEDURE — 1159F PR MEDICATION LIST DOCUMENTED IN MEDICAL RECORD: ICD-10-PCS | Mod: CPTII,S$GLB,, | Performed by: FAMILY MEDICINE

## 2022-10-18 PROCEDURE — 99999 PR PBB SHADOW E&M-EST. PATIENT-LVL IV: CPT | Mod: PBBFAC,,, | Performed by: FAMILY MEDICINE

## 2022-10-18 PROCEDURE — 73502 XR HIP WITH PELVIS WHEN PERFORMED, 2 OR 3 VIEWS LEFT: ICD-10-PCS | Mod: 26,LT,, | Performed by: RADIOLOGY

## 2022-10-18 PROCEDURE — 20610 LARGE JOINT ASPIRATION/INJECTION: L GREATER TROCHANTERIC BURSA: ICD-10-PCS | Mod: LT,S$GLB,, | Performed by: FAMILY MEDICINE

## 2022-10-18 RX ORDER — METHYLPREDNISOLONE ACETATE 80 MG/ML
80 INJECTION, SUSPENSION INTRA-ARTICULAR; INTRALESIONAL; INTRAMUSCULAR; SOFT TISSUE
Status: DISCONTINUED | OUTPATIENT
Start: 2022-10-18 | End: 2022-10-18 | Stop reason: HOSPADM

## 2022-10-18 RX ADMIN — METHYLPREDNISOLONE ACETATE 80 MG: 80 INJECTION, SUSPENSION INTRA-ARTICULAR; INTRALESIONAL; INTRAMUSCULAR; SOFT TISSUE at 10:10

## 2022-10-18 NOTE — PROGRESS NOTES
Subjective:      Patient ID: Maximilian Tavera Jr. is a 76 y.o. male.    Chief Complaint: Pain of the Left Hip    Patient here today with complaints of left hip pain.  This been a problem form off and on for the last few years.  States that it happens every 7-8 months.  He will get a sharp pain in the lateral aspect of his left hip.  He also has a history of low back issues.  This particular episode of pain it started about a week ago and got worse over the weekend.  Is made worse by prolonged sitting.  Is made worse by hanging his legs off of anything.  Walking seems to make it worse.  Tomorrow he is leaving on a trip for the [employer] and would like some pain relief.  States in the past he has been seen for this issue and been treated with steroid injections into his hip.      Review of Systems   Constitutional: Negative for chills and decreased appetite.   HENT:  Negative for congestion and sore throat.    Eyes:  Negative for blurred vision.   Cardiovascular:  Negative for chest pain, dyspnea on exertion and palpitations.   Respiratory:  Negative for cough and shortness of breath.    Skin:  Negative for rash.   Neurological:  Negative for difficulty with concentration, disturbances in coordination and headaches.   Psychiatric/Behavioral:  Negative for altered mental status, depression, hallucinations, memory loss and suicidal ideas.        Objective:            General    Nursing note and vitals reviewed.  Constitutional: He is oriented to person, place, and time. He appears well-developed and well-nourished.   HENT:   Nose: Nose normal.   Eyes: EOM are normal. Pupils are equal, round, and reactive to light.   Neck: Neck supple.   Cardiovascular:  Normal rate.            Pulmonary/Chest: Effort normal.   Abdominal: Soft.   Neurological: He is alert and oriented to person, place, and time. He has normal reflexes.   Psychiatric: He has a normal mood and affect. His behavior is normal. Judgment and thought  content normal.             Right Hip Exam   Right hip exam is normal.     Inspection   Scars: absent  Swelling: absent  No deformity of hip.    Range of Motion   The patient has normal right hip ROM.    Muscle Strength   The patient has normal right hip strength.    Other   Sensation: normal  Left Hip Exam   Left hip exam is normal.    Inspection   Swelling: absent  No deformity of hip.  Quadriceps Atrophy:  negative    Tenderness   The patient tender to palpation of the trochanteric bursa.    Crepitus   The patient has crepitus of the trochanteric bursa.    Range of Motion   Extension:  20   Flexion:  140   External rotation:  80   Internal rotation: 30     Muscle Strength   The patient has normal left hip strength.     Tests   Pain w/ forced internal rotation (TAI): absent  Pain w/ forced external rotation (FADIR): present  Oscar: negative  Circumduction test: negative  Log Roll: negative  Snapping Hip (internal): negative    Other   Sensation: normal          Vascular Exam     Right Pulses  Dorsalis Pedis:      2+          Left Pulses  Dorsalis Pedis:      2+          X-ray images ordered obtained interpreted by me.  They show degenerative changes of both hips with some joint space narrowing.  Atherosclerosis noted on x-ray as well.  No acute fractures are present.          Assessment:       Encounter Diagnoses   Name Primary?    Left hip pain     Greater trochanteric bursitis of left hip Yes          Plan:       Maximilian was seen today for pain.    Diagnoses and all orders for this visit:    Greater trochanteric bursitis of left hip    Left hip pain    Will proceed with an injection into the greater trochanteric bursa today in the left hip.  Lumbar radiculopathy could also be a source of his pain.  He should follow up here if pain fails to improve.    Large Joint Aspiration/Injection: L greater trochanteric bursa    Date/Time: 10/18/2022 10:00 AM  Performed by: Beto Núñez MD  Authorized by: Beto Núñez MD      Consent Done?:  Yes (Verbal)  Indications:  Pain and arthritis  Site marked: the procedure site was marked    Timeout: prior to procedure the correct patient, procedure, and site was verified    Prep: patient was prepped and draped in usual sterile fashion      Local anesthesia used?: Yes    Local anesthetic:  Lidocaine 1% without epinephrine and topical anesthetic  Anesthetic total (ml):  2      Details:  Needle Size:  22 G  Ultrasonic Guidance for needle placement?: No    Approach:  Lateral  Location:  Hip  Site:  L greater trochanteric bursa  Medications:  80 mg methylPREDNISolone acetate 80 mg/mL  Patient tolerance:  Patient tolerated the procedure well with no immediate complications

## 2022-10-28 DIAGNOSIS — M17.12 ARTHRITIS OF KNEE, LEFT: Primary | ICD-10-CM

## 2022-11-02 ENCOUNTER — OFFICE VISIT (OUTPATIENT)
Dept: ORTHOPEDICS | Facility: CLINIC | Age: 77
End: 2022-11-02
Payer: MEDICARE

## 2022-11-02 ENCOUNTER — HOSPITAL ENCOUNTER (OUTPATIENT)
Dept: RADIOLOGY | Facility: HOSPITAL | Age: 77
Discharge: HOME | End: 2022-11-02
Attending: ORTHOPAEDIC SURGERY
Payer: MEDICARE

## 2022-11-02 VITALS — BODY MASS INDEX: 31.81 KG/M2 | RESPIRATION RATE: 18 BRPM | HEIGHT: 73 IN | WEIGHT: 240 LBS

## 2022-11-02 DIAGNOSIS — M17.12 ARTHRITIS OF KNEE, LEFT: ICD-10-CM

## 2022-11-02 DIAGNOSIS — M17.12 ARTHRITIS OF KNEE, LEFT: Primary | ICD-10-CM

## 2022-11-02 PROCEDURE — 1157F PR ADVANCE CARE PLAN OR EQUIV PRESENT IN MEDICAL RECORD: ICD-10-PCS | Mod: CPTII,S$GLB,, | Performed by: ORTHOPAEDIC SURGERY

## 2022-11-02 PROCEDURE — 73562 XR KNEE ORTHO LEFT WITH FLEXION: ICD-10-PCS | Mod: 26,RT,, | Performed by: RADIOLOGY

## 2022-11-02 PROCEDURE — 99999 PR PBB SHADOW E&M-EST. PATIENT-LVL II: ICD-10-PCS | Mod: PBBFAC,,, | Performed by: ORTHOPAEDIC SURGERY

## 2022-11-02 PROCEDURE — 73564 XR KNEE ORTHO LEFT WITH FLEXION: ICD-10-PCS | Mod: 26,LT,, | Performed by: RADIOLOGY

## 2022-11-02 PROCEDURE — 1157F ADVNC CARE PLAN IN RCRD: CPT | Mod: CPTII,S$GLB,, | Performed by: ORTHOPAEDIC SURGERY

## 2022-11-02 PROCEDURE — 73564 X-RAY EXAM KNEE 4 OR MORE: CPT | Mod: 26,LT,, | Performed by: RADIOLOGY

## 2022-11-02 PROCEDURE — 99213 PR OFFICE/OUTPT VISIT, EST, LEVL III, 20-29 MIN: ICD-10-PCS | Mod: 25,S$GLB,, | Performed by: ORTHOPAEDIC SURGERY

## 2022-11-02 PROCEDURE — 20610 DRAIN/INJ JOINT/BURSA W/O US: CPT | Mod: LT,S$GLB,, | Performed by: ORTHOPAEDIC SURGERY

## 2022-11-02 PROCEDURE — 1159F PR MEDICATION LIST DOCUMENTED IN MEDICAL RECORD: ICD-10-PCS | Mod: CPTII,S$GLB,, | Performed by: ORTHOPAEDIC SURGERY

## 2022-11-02 PROCEDURE — 99999 PR PBB SHADOW E&M-EST. PATIENT-LVL II: CPT | Mod: PBBFAC,,, | Performed by: ORTHOPAEDIC SURGERY

## 2022-11-02 PROCEDURE — 1160F RVW MEDS BY RX/DR IN RCRD: CPT | Mod: CPTII,S$GLB,, | Performed by: ORTHOPAEDIC SURGERY

## 2022-11-02 PROCEDURE — 1160F PR REVIEW ALL MEDS BY PRESCRIBER/CLIN PHARMACIST DOCUMENTED: ICD-10-PCS | Mod: CPTII,S$GLB,, | Performed by: ORTHOPAEDIC SURGERY

## 2022-11-02 PROCEDURE — 73562 X-RAY EXAM OF KNEE 3: CPT | Mod: 26,RT,, | Performed by: RADIOLOGY

## 2022-11-02 PROCEDURE — 20610 LARGE JOINT ASPIRATION/INJECTION: L KNEE: ICD-10-PCS | Mod: LT,S$GLB,, | Performed by: ORTHOPAEDIC SURGERY

## 2022-11-02 PROCEDURE — 99213 OFFICE O/P EST LOW 20 MIN: CPT | Mod: 25,S$GLB,, | Performed by: ORTHOPAEDIC SURGERY

## 2022-11-02 PROCEDURE — 1159F MED LIST DOCD IN RCRD: CPT | Mod: CPTII,S$GLB,, | Performed by: ORTHOPAEDIC SURGERY

## 2022-11-02 PROCEDURE — 73562 X-RAY EXAM OF KNEE 3: CPT | Mod: TC,59,PO,RT

## 2022-11-02 RX ORDER — TRIAMCINOLONE ACETONIDE 40 MG/ML
40 INJECTION, SUSPENSION INTRA-ARTICULAR; INTRAMUSCULAR
Status: DISCONTINUED | OUTPATIENT
Start: 2022-11-02 | End: 2022-11-02 | Stop reason: HOSPADM

## 2022-11-02 RX ADMIN — TRIAMCINOLONE ACETONIDE 40 MG: 40 INJECTION, SUSPENSION INTRA-ARTICULAR; INTRAMUSCULAR at 02:11

## 2022-11-02 NOTE — PROGRESS NOTES
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    Emphysema lung 11/05/2018    Gastritis     Hx of colonic polyp     Hyperlipidemia     Hypertension     Hypothyroidism     Knee fracture     Myasthenia gravis     Neuropathy 01/03/2013    Obesity 01/29/2015    Pneumonia 03/29/2020    Polyneuropathy     PVC (premature ventricular contraction)     Squamous cell carcinoma 2014    left forearm    Thyroid disease        Past Surgical History:   Procedure Laterality Date    APPENDECTOMY      CATARACT EXTRACTION W/  INTRAOCULAR LENS IMPLANT Bilateral     COLONOSCOPY  03/01/2012    Dr Esteban; hyperplastic polyp; repeat in 5 years    COLONOSCOPY N/A 12/7/2021    Procedure: COLONOSCOPY;  Surgeon: Gabriel Hernandez MD;  Location: [facility];  Service: Endoscopy;  Laterality: N/A;    CYSTOSCOPY N/A 2/26/2019    Procedure: CYSTOSCOPY;  Surgeon: Simba Cheung MD;  Location: [facility];  Service: Urology;  Laterality: N/A;    ENDOSCOPIC ULTRASOUND OF UPPER GASTROINTESTINAL TRACT N/A 1/7/2020    Procedure: ULTRASOUND, UPPER GI TRACT, ENDOSCOPIC;  Surgeon: Kati Ryan MD;  [facility]  Service: Endoscopy;  Laterality: N/A;    ESOPHAGOGASTRODUODENOSCOPY N/A 9/12/2018    Procedure: EGD (ESOPHAGOGASTRODUODENOSCOPY);  Surgeon: Gabriel Hernandez MD;  Location: [facility];  Service: Endoscopy;  Laterality: N/A;    ESOPHAGOGASTRODUODENOSCOPY N/A 8/19/2019    Procedure: EGD (ESOPHAGOGASTRODUODENOSCOPY);  Surgeon: Gabriel Hernandez MD;  Location: [facility];  Service: Endoscopy;  Laterality: N/A;    ESOPHAGOGASTRODUODENOSCOPY N/A 10/7/2019    Procedure: EGD (ESOPHAGOGASTRODUODENOSCOPY);  Surgeon: Gabriel Hernandez MD;  Location: [facility];  Service: Endoscopy;  Laterality: N/A;     ESOPHAGOGASTRODUODENOSCOPY N/A 1/7/2020    Procedure: EGD (ESOPHAGOGASTRODUODENOSCOPY);  Surgeon: Kati Ryan MD;  Location: [address]);  Service: Endoscopy;  Laterality: N/A;    HEMORRHOID SURGERY      INJECTION OF ANESTHETIC AGENT AROUND MEDIAL BRANCH NERVES INNERVATING LUMBAR FACET JOINT Bilateral 10/1/2020    Procedure: Block-nerve-medial branch-lumbar Bilateral L 3,4,5;  Surgeon: Devan Watt MD;  Location: [facility];  Service: Pain Management;  Laterality: Bilateral;    INJECTION OF ANESTHETIC AGENT AROUND MEDIAL BRANCH NERVES INNERVATING LUMBAR FACET JOINT Right 10/7/2022    Procedure: Block-nerve-medial branch-lumbar L3,4,5;  Surgeon: Devan Watt MD;  Location: [facility];  Service: Pain Management;  Laterality: Right;    KNEE ARTHROPLASTY Bilateral     LENGTHENING OF ACHILLES TENDON Right 9/11/2020    Procedure: percutaeous tenotomy of right lateral epicondyle (tenex);  Surgeon: Terry Quach MD;  Location: [facility];  Service: Neurosurgery;  Laterality: Right;  tenex to right lateral epicondyle  Tenex machine SN#[id number], total time 1min. 30seconds    NECK SURGERY      POSTERIOR FUSION OF CERVICAL SPINE WITH LAMINECTOMY N/A 11/7/2018    Procedure: C2-C6 Posterior Cervical Laminectomy & Instrumental Fusion;  Surgeon: Kishore Turner MD;  Location: [address];  Service: Neurosurgery;  Laterality: N/A;    TONSILLECTOMY      TRANSFORAMINAL EPIDURAL INJECTION OF STEROID Right 12/20/2019    Procedure: Injection,steroid,epidural,transforaminal approach;  Surgeon: Devan Watt MD;  Location: [facility];  Service: Pain Management;  Laterality: Right;  L3-4, L4-5    TRANSFORAMINAL EPIDURAL INJECTION OF STEROID Bilateral 2/6/2020    Procedure: Injection,steroid,epidural,transforaminal approach;  Surgeon: Devan Watt MD;  Location: [facility];  Service: Pain Management;  Laterality: Bilateral;  L3-L4,L4-L5    TRANSFORAMINAL EPIDURAL INJECTION OF STEROID Bilateral 8/26/2020    Procedure:  Injection,steroid,epidural,transforaminal approach;  Surgeon: Devan Watt MD;  Location: [facility] OR;  Service: Pain Management;  Laterality: Bilateral;  L3-4, L4-5    TRANSRECTAL ULTRASOUND EXAMINATION N/A 2/26/2019    Procedure: ULTRASOUND, RECTAL APPROACH;  Surgeon: Simba Cheung MD;  Location: [facility] OR;  Service: Urology;  Laterality: N/A;    UPPER GASTROINTESTINAL ENDOSCOPY  09/12/2018    Dr Hernandez; gastritis; extensive intestinal metaplasia; repeat in 1-2- years       Current Outpatient Medications   Medication Sig    albuterol (PROVENTIL/VENTOLIN HFA) 90 mcg/actuation inhaler INHALE 1 TO 2 PUFFS INTO THE LUNGS EVERY 6 HOURS AS NEEDED FOR WHEEZING OR SHORTNESS OF BREATH. FOR RESCUE.    atorvastatin (LIPITOR) 80 MG tablet TAKE 1 TABLET EVERY DAY    benzonatate (TESSALON) 100 MG capsule Take 1 capsule (100 mg total) by mouth 2 (two) times daily as needed for Cough.    carvediloL (COREG) 25 MG tablet TAKE 1 TABLET TWICE DAILY WITH MEALS    cetirizine (ZYRTEC) 10 MG tablet Take 1 tablet by mouth daily as needed.    chlorthalidone (HYGROTEN) 25 MG Tab Take 1 tablet (25 mg total) by mouth once daily.    cholecalciferol, vitamin D3, (VITAMIN D3) 50 mcg (2,000 unit) Cap 1 capsule once daily. Every day    cholestyramine (QUESTRAN) 4 gram packet Take 1 packet (4 g total) by mouth 3 (three) times daily with meals.    coenzyme Q10 (CO Q-10) 100 mg capsule Take 400 mg by mouth once daily.    cyanocobalamin, vitamin B-12, 5,000 mcg Subl Take 5,000 mcg by mouth once daily. Every day    diphenoxylate-atropine 2.5-0.025 mg (LOMOTIL) 2.5-0.025 mg per tablet Take 1 tablet by mouth 4 (four) times daily as needed for Diarrhea.    ezetimibe (ZETIA) 10 mg tablet TAKE 1 TABLET EVERY DAY    FLUAD QUAD 2021-22,65Y UP,,PF, 60 mcg (15 mcg x 4)/0.5 mL Syrg     fluticasone (FLONASE) 50 mcg/actuation nasal spray USE 1 SPRAY IN EACH NOSTRIL TWICE DAILY AS NEEDED  FOR  RHINITIS    gabapentin (NEURONTIN) 300 MG capsule TAKE 1 CAPSULE THREE  TIMES DAILY    KENALOG 40 mg/mL injection     levothyroxine (SYNTHROID) 50 MCG tablet TAKE 1 TABLET EVERY DAY    methylsulfonylmethane 1,000 mg Tab Take 1,000 mg by mouth once daily. Every day    milk thistle 200 mg Cap Take 200 mg by mouth 2 (two) times daily. Twice a day    mupirocin (BACTROBAN) 2 % ointment Apply topically 3 (three) times daily.    olmesartan (BENICAR) 20 MG tablet TAKE 1 TABLET EVERY DAY    omega-3 fatty acids 1,000 mg Cap Twice a day    pantoprazole (PROTONIX) 40 MG tablet Take 1 tablet (40 mg total) by mouth 2 (two) times daily.    potassium chloride SA (K-DUR,KLOR-CON) 20 MEQ tablet TAKE 4 TABLETS EVERY DAY  OR AS DIRECTED    predniSONE (DELTASONE) 1 MG tablet TAKE 2 TABLETS EVERY DAY    predniSONE (DELTASONE) 5 MG tablet TAKE 1 TABLET EVERY DAY    sildenafil (REVATIO) 20 mg Tab Take 1 to 3 tabs daily as needed.    tiotropium (SPIRIVA WITH HANDIHALER) 18 mcg inhalation capsule INHALE THE CONTENTS OF 1 CAPSULE EVERY DAY  VIA HANDIHALER (CONTROLLER)     No current facility-administered medications for this visit.       Review of patient's allergies indicates:   Allergen Reactions    No known drug allergies        Family History   Problem Relation Age of Onset    Cataracts Mother     Heart disease Mother         CHF    Hypertension Mother     Hyperlipidemia Mother     Cataracts Father     Glaucoma Father     Heart disease Father     Hyperlipidemia Sister     Hypertension Sister     No Known Problems Daughter     No Known Problems Daughter     Collagen disease Neg Hx     Amblyopia Neg Hx     Blindness Neg Hx     Macular degeneration Neg Hx     Retinal detachment Neg Hx     Strabismus Neg Hx     Cancer Neg Hx     Colon cancer Neg Hx     Esophageal cancer Neg Hx     Stomach cancer Neg Hx     Crohn's disease Neg Hx     Ulcerative colitis Neg Hx        Social History     Socioeconomic History    Marital status:    Tobacco Use    Smoking status: Never    Smokeless tobacco: Never    Tobacco  comments:     when a child   Substance and Sexual Activity    Alcohol use: Yes     Comment: rarely    Drug use: No    Sexual activity: Yes     Partners: Female     Social Determinants of Health     Financial Resource Strain: Low Risk     Difficulty of Paying Living Expenses: Not hard at all   Food Insecurity: No Food Insecurity    Worried About Running Out of Food in the Last Year: Never true    Ran Out of Food in the Last Year: Never true   Transportation Needs: No Transportation Needs    Lack of Transportation (Medical): No    Lack of Transportation (Non-Medical): No   Physical Activity: Inactive    Days of Exercise per Week: 0 days    Minutes of Exercise per Session: 0 min   Stress: No Stress Concern Present    Feeling of Stress : Not at all   Social Connections: Unknown    Frequency of Communication with Friends and Family: More than three times a week    Frequency of Social Gatherings with Friends and Family: More than three times a week    Active Member of Clubs or Organizations: Yes    Attends Club or Organization Meetings: More than 4 times per year    Marital Status:    Housing Stability: Low Risk     Unable to Pay for Housing in the Last Year: No    Number of Places Lived in the Last Year: 1    Unstable Housing in the Last Year: No       Chief Complaint:   No chief complaint on file.      History of present illness:  This is a 76-year-old male seen for left knee pain.   Cannot take NSAIDs due to an ulcer.  Has had cortisone injections and hyaluronic injection in the past as well.  Last treatment was several years ago.  Pain started again about 3-4 weeks ago.  Pain along the posterior medial aspect of his knee.  No new injury.  Patient was doing lot of [place] the [place].  Was having hip pain is so might have affected the way he was ambulating.  Pain can get up to a 9/10.  No swelling.  Pain is a 5/10 usually      Answers for HPI/ROS submitted by the patient on 12/23/2019   Leg  pain  unexpected weight change: No  appetite change : No  sleep disturbance: No  IMMUNOCOMPROMISED: No  nervous/ anxious: No  dysphoric mood: No  rash: No  visual disturbance: No  eye redness: No  eye pain: No  ear pain: No  tinnitus: Yes  hearing loss: No  sinus pressure : Yes  nosebleeds: Yes  enviro allergies: No  food allergies: No  cough: No  shortness of breath: Yes  sweating: No  frequency: Yes  difficulty urinating: No  hematuria: No  chest pain: No  palpitations: No  nausea: No  vomiting: No  diarrhea: No  blood in stool: No  constipation: No  headaches: Yes  dizziness: No  numbness: No  seizures: No  joint swelling: No  myalgia: No  weakness: No  back pain: Yes  Pain Chronicity: recurrent  History of trauma: No  Onset: more than 1 month ago  Frequency: daily  Progression since onset: waxing and waning  Injury mechanism: twisting  injury location: at home  pain- numeric: 3/10  pain location: left knee  pain quality: aching  Radiating Pain: No  Aggravating factors: bending, extension, twisting  fever: No  inability to bear weight: No  itching: No  joint locking: No  limited range of motion: Yes  stiffness: Yes  tingling: No  Treatments tried: cold, heat, exercise, injection treatment, NSAIDs, OTC ointments  physical therapy: not tried  Improvement on treatment: mild      Physical Examination:    Vital Signs:    There were no vitals filed for this visit.    There is no height or weight on file to calculate BMI.    This a well-developed, well nourished patient in no acute distress.  They are alert and oriented and cooperative to examination.  Pt. walks without an antalgic gait.      Examination of the left knee shows no rashes or erythema. There are no masses ecchymosis or effusion. Patient has full range of motion from 0-130°. Patient is moderately tender to palpation over lateral joint line and moderately tender to palpation over the medial joint line. Patient has a - Lachman exam, - anterior drawer exam,  and - posterior drawer exam. - Fabián's exam. Knee is stable to varus and valgus stress. 5 out of 5 motor strength. Palpable distal pulses. Intact light touch sensation. Negative Patellofemoral crepitus      X-rays:  X-rays of the left knee is ordered and reviewed which show severe lateral joint space narrowing on the right and more moderate to severe medial joint space wear on the left.  No significant progression compared to previous x-rays.        Assessment::  Left knee arthritis      Plan: I reviewed the x-rays with him today.  We discussed treatment options.  Recommended a cortisone injection to see if it will help.  We talked about possibly needing the MRI or repeating the hyaluronic acid if it does not improve    This note was created using [employer] voice recognition software that occasionally misinterpreted phrases or words.    Consult note is delivered via Epic messaging service.

## 2022-11-02 NOTE — PROCEDURES
Large Joint Aspiration/Injection: L knee    Date/Time: 11/2/2022 2:45 PM  Performed by: Haroldo Campbell MD  Authorized by: Haroldo Campbell MD     Consent Done?:  Yes (Verbal)  Indications:  Pain  Site marked: the procedure site was marked    Timeout: prior to procedure the correct patient, procedure, and site was verified    Local anesthetic:  Lidocaine 1% without epinephrine and bupivacaine 0.25% without epinephrine  Anesthetic total (ml):  6      Details:  Needle Size:  20 G  Approach:  Anterolateral  Location:  Knee  Site:  L knee  Medications:  40 mg triamcinolone acetonide 40 mg/mL  Patient tolerance:  Patient tolerated the procedure well with no immediate complications

## 2022-11-21 DIAGNOSIS — S83.242D TEAR OF MEDIAL MENISCUS OF LEFT KNEE, UNSPECIFIED TEAR TYPE, UNSPECIFIED WHETHER OLD OR CURRENT TEAR, SUBSEQUENT ENCOUNTER: Primary | ICD-10-CM

## 2022-11-25 ENCOUNTER — HOSPITAL ENCOUNTER (OUTPATIENT)
Dept: RADIOLOGY | Facility: HOSPITAL | Age: 77
Discharge: HOME | End: 2022-11-25
Attending: ORTHOPAEDIC SURGERY
Payer: MEDICARE

## 2022-11-25 DIAGNOSIS — S83.242D TEAR OF MEDIAL MENISCUS OF LEFT KNEE, UNSPECIFIED TEAR TYPE, UNSPECIFIED WHETHER OLD OR CURRENT TEAR, SUBSEQUENT ENCOUNTER: ICD-10-CM

## 2022-11-25 PROCEDURE — 73721 MRI JNT OF LWR EXTRE W/O DYE: CPT | Mod: TC,PO,LT

## 2022-12-08 ENCOUNTER — OFFICE VISIT (OUTPATIENT)
Dept: ORTHOPEDICS | Facility: CLINIC | Age: 77
End: 2022-12-08
Payer: MEDICARE

## 2022-12-08 VITALS — RESPIRATION RATE: 18 BRPM | HEIGHT: 73 IN | WEIGHT: 240 LBS | BODY MASS INDEX: 31.81 KG/M2

## 2022-12-08 DIAGNOSIS — M17.12 PRIMARY OSTEOARTHRITIS OF LEFT KNEE: Primary | ICD-10-CM

## 2022-12-08 DIAGNOSIS — M17.12 ARTHRITIS OF KNEE, LEFT: Primary | ICD-10-CM

## 2022-12-08 PROCEDURE — 99999 PR PBB SHADOW E&M-EST. PATIENT-LVL III: ICD-10-PCS | Mod: PBBFAC,HCNC,, | Performed by: ORTHOPAEDIC SURGERY

## 2022-12-08 PROCEDURE — 1160F PR REVIEW ALL MEDS BY PRESCRIBER/CLIN PHARMACIST DOCUMENTED: ICD-10-PCS | Mod: HCNC,CPTII,S$GLB, | Performed by: ORTHOPAEDIC SURGERY

## 2022-12-08 PROCEDURE — 1159F MED LIST DOCD IN RCRD: CPT | Mod: HCNC,CPTII,S$GLB, | Performed by: ORTHOPAEDIC SURGERY

## 2022-12-08 PROCEDURE — 1157F PR ADVANCE CARE PLAN OR EQUIV PRESENT IN MEDICAL RECORD: ICD-10-PCS | Mod: HCNC,CPTII,S$GLB, | Performed by: ORTHOPAEDIC SURGERY

## 2022-12-08 PROCEDURE — 1160F RVW MEDS BY RX/DR IN RCRD: CPT | Mod: HCNC,CPTII,S$GLB, | Performed by: ORTHOPAEDIC SURGERY

## 2022-12-08 PROCEDURE — 1157F ADVNC CARE PLAN IN RCRD: CPT | Mod: HCNC,CPTII,S$GLB, | Performed by: ORTHOPAEDIC SURGERY

## 2022-12-08 PROCEDURE — 1125F PR PAIN SEVERITY QUANTIFIED, PAIN PRESENT: ICD-10-PCS | Mod: HCNC,CPTII,S$GLB, | Performed by: ORTHOPAEDIC SURGERY

## 2022-12-08 PROCEDURE — 1125F AMNT PAIN NOTED PAIN PRSNT: CPT | Mod: HCNC,CPTII,S$GLB, | Performed by: ORTHOPAEDIC SURGERY

## 2022-12-08 PROCEDURE — 99213 OFFICE O/P EST LOW 20 MIN: CPT | Mod: HCNC,S$GLB,, | Performed by: ORTHOPAEDIC SURGERY

## 2022-12-08 PROCEDURE — 1159F PR MEDICATION LIST DOCUMENTED IN MEDICAL RECORD: ICD-10-PCS | Mod: HCNC,CPTII,S$GLB, | Performed by: ORTHOPAEDIC SURGERY

## 2022-12-08 PROCEDURE — 99999 PR PBB SHADOW E&M-EST. PATIENT-LVL III: CPT | Mod: PBBFAC,HCNC,, | Performed by: ORTHOPAEDIC SURGERY

## 2022-12-08 PROCEDURE — 99213 PR OFFICE/OUTPT VISIT, EST, LEVL III, 20-29 MIN: ICD-10-PCS | Mod: HCNC,S$GLB,, | Performed by: ORTHOPAEDIC SURGERY

## 2022-12-08 NOTE — PROGRESS NOTES
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    Emphysema lung 11/05/2018    Gastritis     Hx of colonic polyp     Hyperlipidemia     Hypertension     Hypothyroidism     Knee fracture     Myasthenia gravis     Neuropathy 01/03/2013    Obesity 01/29/2015    Pneumonia 03/29/2020    Polyneuropathy     PVC (premature ventricular contraction)     Squamous cell carcinoma 2014    left forearm    Thyroid disease        Past Surgical History:   Procedure Laterality Date    APPENDECTOMY      CATARACT EXTRACTION W/  INTRAOCULAR LENS IMPLANT Bilateral     COLONOSCOPY  03/01/2012    Dr Esteban; hyperplastic polyp; repeat in 5 years    COLONOSCOPY N/A 12/7/2021    Procedure: COLONOSCOPY;  Surgeon: Gabriel Hernandez MD;  Location: [facility];  Service: Endoscopy;  Laterality: N/A;    CYSTOSCOPY N/A 2/26/2019    Procedure: CYSTOSCOPY;  Surgeon: Simba Cheung MD;  Location: [facility] OR;  Service: Urology;  Laterality: N/A;    ENDOSCOPIC ULTRASOUND OF UPPER GASTROINTESTINAL TRACT N/A 1/7/2020    Procedure: ULTRASOUND, UPPER GI TRACT, ENDOSCOPIC;  Surgeon: Kati Ryan MD;  [facility] ([facility]  Service: Endoscopy;  Laterality: N/A;    ESOPHAGOGASTRODUODENOSCOPY N/A 9/12/2018    Procedure: EGD (ESOPHAGOGASTRODUODENOSCOPY);  Surgeon: Gabriel Hernandez MD;  Location: [facility];  Service: Endoscopy;  Laterality: N/A;    ESOPHAGOGASTRODUODENOSCOPY N/A 8/19/2019    Procedure: EGD (ESOPHAGOGASTRODUODENOSCOPY);  Surgeon: Gabriel Hernandez MD;  Location: [facility];  Service: Endoscopy;  Laterality: N/A;    ESOPHAGOGASTRODUODENOSCOPY N/A 10/7/2019    Procedure: EGD (ESOPHAGOGASTRODUODENOSCOPY);  Surgeon: Gabriel Hernandez MD;  Location: [facility];  Service: Endoscopy;  Laterality: N/A;     ESOPHAGOGASTRODUODENOSCOPY N/A 1/7/2020    Procedure: EGD (ESOPHAGOGASTRODUODENOSCOPY);  Surgeon: Kati Ryan MD;  Location: [address]);  Service: Endoscopy;  Laterality: N/A;    HEMORRHOID SURGERY      INJECTION OF ANESTHETIC AGENT AROUND MEDIAL BRANCH NERVES INNERVATING LUMBAR FACET JOINT Bilateral 10/1/2020    Procedure: Block-nerve-medial branch-lumbar Bilateral L 3,4,5;  Surgeon: Devan Watt MD;  Location: [facility];  Service: Pain Management;  Laterality: Bilateral;    INJECTION OF ANESTHETIC AGENT AROUND MEDIAL BRANCH NERVES INNERVATING LUMBAR FACET JOINT Right 10/7/2022    Procedure: Block-nerve-medial branch-lumbar L3,4,5;  Surgeon: Devan Watt MD;  Location: [facility];  Service: Pain Management;  Laterality: Right;    KNEE ARTHROPLASTY Bilateral     LENGTHENING OF ACHILLES TENDON Right 9/11/2020    Procedure: percutaeous tenotomy of right lateral epicondyle (tenex);  Surgeon: Terry Quach MD;  Location: [facility];  Service: Neurosurgery;  Laterality: Right;  tenex to right lateral epicondyle  Tenex machine SN#[id number], total time 1min. 30seconds    NECK SURGERY      POSTERIOR FUSION OF CERVICAL SPINE WITH LAMINECTOMY N/A 11/7/2018    Procedure: C2-C6 Posterior Cervical Laminectomy & Instrumental Fusion;  Surgeon: Kishore Turner MD;  Location: [address];  Service: Neurosurgery;  Laterality: N/A;    TONSILLECTOMY      TRANSFORAMINAL EPIDURAL INJECTION OF STEROID Right 12/20/2019    Procedure: Injection,steroid,epidural,transforaminal approach;  Surgeon: Devan Watt MD;  Location: [facility];  Service: Pain Management;  Laterality: Right;  L3-4, L4-5    TRANSFORAMINAL EPIDURAL INJECTION OF STEROID Bilateral 2/6/2020    Procedure: Injection,steroid,epidural,transforaminal approach;  Surgeon: Devan Watt MD;  Location: [facility];  Service: Pain Management;  Laterality: Bilateral;  L3-L4,L4-L5    TRANSFORAMINAL EPIDURAL INJECTION OF STEROID Bilateral 8/26/2020    Procedure:  Injection,steroid,epidural,transforaminal approach;  Surgeon: Devan Watt MD;  Location: [facility] OR;  Service: Pain Management;  Laterality: Bilateral;  L3-4, L4-5    TRANSRECTAL ULTRASOUND EXAMINATION N/A 2/26/2019    Procedure: ULTRASOUND, RECTAL APPROACH;  Surgeon: Simba Cheung MD;  Location: [facility] OR;  Service: Urology;  Laterality: N/A;    UPPER GASTROINTESTINAL ENDOSCOPY  09/12/2018    Dr Hernandez; gastritis; extensive intestinal metaplasia; repeat in 1-2- years       Current Outpatient Medications   Medication Sig    albuterol (PROVENTIL/VENTOLIN HFA) 90 mcg/actuation inhaler INHALE 1 TO 2 PUFFS INTO THE LUNGS EVERY 6 HOURS AS NEEDED FOR WHEEZING OR SHORTNESS OF BREATH. FOR RESCUE.    atorvastatin (LIPITOR) 80 MG tablet TAKE 1 TABLET EVERY DAY    benzonatate (TESSALON) 100 MG capsule Take 1 capsule (100 mg total) by mouth 2 (two) times daily as needed for Cough.    carvediloL (COREG) 25 MG tablet TAKE 1 TABLET TWICE DAILY WITH MEALS    cetirizine (ZYRTEC) 10 MG tablet Take 1 tablet by mouth daily as needed.    chlorthalidone (HYGROTEN) 25 MG Tab Take 1 tablet (25 mg total) by mouth once daily.    cholecalciferol, vitamin D3, (VITAMIN D3) 50 mcg (2,000 unit) Cap 1 capsule once daily. Every day    cholestyramine (QUESTRAN) 4 gram packet Take 1 packet (4 g total) by mouth 3 (three) times daily with meals.    coenzyme Q10 (CO Q-10) 100 mg capsule Take 400 mg by mouth once daily.    cyanocobalamin, vitamin B-12, 5,000 mcg Subl Take 5,000 mcg by mouth once daily. Every day    diphenoxylate-atropine 2.5-0.025 mg (LOMOTIL) 2.5-0.025 mg per tablet Take 1 tablet by mouth 4 (four) times daily as needed for Diarrhea.    ezetimibe (ZETIA) 10 mg tablet TAKE 1 TABLET EVERY DAY    FLUAD QUAD 2021-22,65Y UP,,PF, 60 mcg (15 mcg x 4)/0.5 mL Syrg     fluticasone (FLONASE) 50 mcg/actuation nasal spray USE 1 SPRAY IN EACH NOSTRIL TWICE DAILY AS NEEDED  FOR  RHINITIS    gabapentin (NEURONTIN) 300 MG capsule TAKE 1 CAPSULE THREE  TIMES DAILY    KENALOG 40 mg/mL injection     levothyroxine (SYNTHROID) 50 MCG tablet TAKE 1 TABLET EVERY DAY    methylsulfonylmethane 1,000 mg Tab Take 1,000 mg by mouth once daily. Every day    milk thistle 200 mg Cap Take 200 mg by mouth 2 (two) times daily. Twice a day    mupirocin (BACTROBAN) 2 % ointment Apply topically 3 (three) times daily.    olmesartan (BENICAR) 20 MG tablet TAKE 1 TABLET EVERY DAY    omega-3 fatty acids 1,000 mg Cap Twice a day    pantoprazole (PROTONIX) 40 MG tablet Take 1 tablet (40 mg total) by mouth 2 (two) times daily.    potassium chloride SA (K-DUR,KLOR-CON) 20 MEQ tablet TAKE 4 TABLETS EVERY DAY  OR AS DIRECTED    predniSONE (DELTASONE) 1 MG tablet TAKE 2 TABLETS EVERY DAY    predniSONE (DELTASONE) 5 MG tablet TAKE 1 TABLET EVERY DAY    sildenafil (REVATIO) 20 mg Tab Take 1 to 3 tabs daily as needed.    tiotropium (SPIRIVA WITH HANDIHALER) 18 mcg inhalation capsule INHALE THE CONTENTS OF 1 CAPSULE EVERY DAY  VIA HANDIHALER (CONTROLLER)     No current facility-administered medications for this visit.       Review of patient's allergies indicates:   Allergen Reactions    No known drug allergies        Family History   Problem Relation Age of Onset    Cataracts Mother     Heart disease Mother         CHF    Hypertension Mother     Hyperlipidemia Mother     Cataracts Father     Glaucoma Father     Heart disease Father     Hyperlipidemia Sister     Hypertension Sister     No Known Problems Daughter     No Known Problems Daughter     Collagen disease Neg Hx     Amblyopia Neg Hx     Blindness Neg Hx     Macular degeneration Neg Hx     Retinal detachment Neg Hx     Strabismus Neg Hx     Cancer Neg Hx     Colon cancer Neg Hx     Esophageal cancer Neg Hx     Stomach cancer Neg Hx     Crohn's disease Neg Hx     Ulcerative colitis Neg Hx        Social History     Socioeconomic History    Marital status:    Tobacco Use    Smoking status: Never    Smokeless tobacco: Never    Tobacco  comments:     when a child   Substance and Sexual Activity    Alcohol use: Yes     Comment: rarely    Drug use: No    Sexual activity: Yes     Partners: Female     Social Determinants of Health     Financial Resource Strain: Low Risk     Difficulty of Paying Living Expenses: Not hard at all   Food Insecurity: No Food Insecurity    Worried About Running Out of Food in the Last Year: Never true    Ran Out of Food in the Last Year: Never true   Transportation Needs: No Transportation Needs    Lack of Transportation (Medical): No    Lack of Transportation (Non-Medical): No   Physical Activity: Inactive    Days of Exercise per Week: 0 days    Minutes of Exercise per Session: 0 min   Stress: No Stress Concern Present    Feeling of Stress : Not at all   Social Connections: Unknown    Frequency of Communication with Friends and Family: More than three times a week    Frequency of Social Gatherings with Friends and Family: More than three times a week    Active Member of Clubs or Organizations: Yes    Attends Club or Organization Meetings: More than 4 times per year    Marital Status:    Housing Stability: Low Risk     Unable to Pay for Housing in the Last Year: No    Number of Places Lived in the Last Year: 1    Unstable Housing in the Last Year: No       Chief Complaint:   Chief Complaint   Patient presents with    Left Knee - Pain         History of present illness:  This is a 76-year-old male seen for left knee pain.   Cannot take NSAIDs due to an ulcer.  Has had cortisone injections and hyaluronic injection in the past as well.  Last treatment was several years ago.  Pain started again about 3-4 weeks ago.  Pain along the posterior medial aspect of his knee.  No new injury.  Patient [place] lot [place]  Was having hip pain is so might have affected the way he was ambulating.  MRI showed some chondral thinning of the medial compartment without meniscal tearing.      Answers for  HPI/ROS submitted by the patient on 12/23/2019   Leg pain  unexpected weight change: No  appetite change : No  sleep disturbance: No  IMMUNOCOMPROMISED: No  nervous/ anxious: No  dysphoric mood: No  rash: No  visual disturbance: No  eye redness: No  eye pain: No  ear pain: No  tinnitus: Yes  hearing loss: No  sinus pressure : Yes  nosebleeds: Yes  enviro allergies: No  food allergies: No  cough: No  shortness of breath: Yes  sweating: No  frequency: Yes  difficulty urinating: No  hematuria: No  chest pain: No  palpitations: No  nausea: No  vomiting: No  diarrhea: No  blood in stool: No  constipation: No  headaches: Yes  dizziness: No  numbness: No  seizures: No  joint swelling: No  myalgia: No  weakness: No  back pain: Yes  Pain Chronicity: recurrent  History of trauma: No  Onset: more than 1 month ago  Frequency: daily  Progression since onset: waxing and waning  Injury mechanism: twisting  injury location: at home  pain- numeric: 3/10  pain location: left knee  pain quality: aching  Radiating Pain: No  Aggravating factors: bending, extension, twisting  fever: No  inability to bear weight: No  itching: No  joint locking: No  limited range of motion: Yes  stiffness: Yes  tingling: No  Treatments tried: cold, heat, exercise, injection treatment, NSAIDs, OTC ointments  physical therapy: not tried  Improvement on treatment: mild      Physical Examination:    Vital Signs:    Vitals:    12/08/22 1421   Resp: 18       Body mass index is 31.66 kg/m².    This a well-developed, well nourished patient in no acute distress.  They are alert and oriented and cooperative to examination.  Pt. walks without an antalgic gait.      Examination of the left knee shows no rashes or erythema. There are no masses ecchymosis or effusion. Patient has full range of motion from 0-130°. Patient is moderately tender to palpation over lateral joint line and moderately tender to palpation over the medial joint line. Patient has a - Lachman exam,  - anterior drawer exam, and - posterior drawer exam. - Fabián's exam. Knee is stable to varus and valgus stress. 5 out of 5 motor strength. Palpable distal pulses. Intact light touch sensation. Negative Patellofemoral crepitus      X-rays:  X-rays of the left knee is  reviewed which show severe lateral joint space narrowing on the right and more moderate to severe medial joint space wear on the left.  No significant progression compared to previous x-rays    MRI of the left knee is reviewed interpreted:No evidence of ligamental meniscal injury  Diffuse mild subcutaneous edema  Moderate thinning of the medial femoral cartilage and medial patellar cartilage with mild chondromalacia of the talar cartilage    Assessment::  Left knee arthritis    Plan: I reviewed the MRI with him today.  We discussed treatment options.  Recommended  repeating the hyaluronic acid .    The patient has tried a self guided exercise program that has included walking without significant improvement. Minimal relief with tylenol or OTC Nsaids. Reports less than 8 weeks relief with IA steroid injection. Kellgren Arnulfo scale of 3. They are not receiving another HA injectable at this time. I will precert for gel injection.       This note was created using [employer] voice recognition software that occasionally misinterpreted phrases or words.    Consult note is delivered via Epic messaging service.

## 2022-12-09 ENCOUNTER — LAB VISIT (OUTPATIENT)
Dept: LAB | Facility: HOSPITAL | Age: 77
End: 2022-12-09
Attending: FAMILY MEDICINE
Payer: MEDICARE

## 2022-12-09 DIAGNOSIS — E78.2 MIXED HYPERLIPIDEMIA: ICD-10-CM

## 2022-12-09 DIAGNOSIS — N18.31 CHRONIC KIDNEY DISEASE, STAGE 3A: ICD-10-CM

## 2022-12-09 LAB
ALBUMIN SERPL BCP-MCNC: 3.4 G/DL (ref 3.5–5.2)
ALP SERPL-CCNC: 62 U/L (ref 55–135)
ALT SERPL W/O P-5'-P-CCNC: 18 U/L (ref 10–44)
ANION GAP SERPL CALC-SCNC: 7 MMOL/L (ref 8–16)
AST SERPL-CCNC: 17 U/L (ref 10–40)
BASOPHILS # BLD AUTO: 0.02 K/UL (ref 0–0.2)
BASOPHILS NFR BLD: 0.3 % (ref 0–1.9)
BILIRUB SERPL-MCNC: 1.6 MG/DL (ref 0.1–1)
BUN SERPL-MCNC: 18 MG/DL (ref 8–23)
CALCIUM SERPL-MCNC: 8.9 MG/DL (ref 8.7–10.5)
CHLORIDE SERPL-SCNC: 102 MMOL/L (ref 95–110)
CHOLEST SERPL-MCNC: 116 MG/DL (ref 120–199)
CHOLEST/HDLC SERPL: 2.9 {RATIO} (ref 2–5)
CO2 SERPL-SCNC: 30 MMOL/L (ref 23–29)
CREAT SERPL-MCNC: 1 MG/DL (ref 0.5–1.4)
DIFFERENTIAL METHOD: ABNORMAL
EOSINOPHIL # BLD AUTO: 0.1 K/UL (ref 0–0.5)
EOSINOPHIL NFR BLD: 2.2 % (ref 0–8)
ERYTHROCYTE [DISTWIDTH] IN BLOOD BY AUTOMATED COUNT: 13.7 % (ref 11.5–14.5)
EST. GFR  (NO RACE VARIABLE): >60 ML/MIN/1.73 M^2
GLUCOSE SERPL-MCNC: 101 MG/DL (ref 70–110)
HCT VFR BLD AUTO: 39.6 % (ref 40–54)
HDLC SERPL-MCNC: 40 MG/DL (ref 40–75)
HDLC SERPL: 34.5 % (ref 20–50)
HGB BLD-MCNC: 13.3 G/DL (ref 14–18)
IMM GRANULOCYTES # BLD AUTO: 0.03 K/UL (ref 0–0.04)
IMM GRANULOCYTES NFR BLD AUTO: 0.5 % (ref 0–0.5)
LDLC SERPL CALC-MCNC: 61 MG/DL (ref 63–159)
LYMPHOCYTES # BLD AUTO: 1.5 K/UL (ref 1–4.8)
LYMPHOCYTES NFR BLD: 23.6 % (ref 18–48)
MCH RBC QN AUTO: 30.8 PG (ref 27–31)
MCHC RBC AUTO-ENTMCNC: 33.6 G/DL (ref 32–36)
MCV RBC AUTO: 92 FL (ref 82–98)
MONOCYTES # BLD AUTO: 0.7 K/UL (ref 0.3–1)
MONOCYTES NFR BLD: 11.1 % (ref 4–15)
NEUTROPHILS # BLD AUTO: 3.9 K/UL (ref 1.8–7.7)
NEUTROPHILS NFR BLD: 62.3 % (ref 38–73)
NONHDLC SERPL-MCNC: 76 MG/DL
NRBC BLD-RTO: 0 /100 WBC
PLATELET # BLD AUTO: 136 K/UL (ref 150–450)
PMV BLD AUTO: 10.6 FL (ref 9.2–12.9)
POTASSIUM SERPL-SCNC: 3.5 MMOL/L (ref 3.5–5.1)
PROT SERPL-MCNC: 5.8 G/DL (ref 6–8.4)
RBC # BLD AUTO: 4.32 M/UL (ref 4.6–6.2)
SODIUM SERPL-SCNC: 139 MMOL/L (ref 136–145)
TRIGL SERPL-MCNC: 75 MG/DL (ref 30–150)
WBC # BLD AUTO: 6.23 K/UL (ref 3.9–12.7)

## 2022-12-09 PROCEDURE — 80053 COMPREHEN METABOLIC PANEL: CPT | Mod: HCNC | Performed by: FAMILY MEDICINE

## 2022-12-09 PROCEDURE — 80061 LIPID PANEL: CPT | Mod: HCNC | Performed by: FAMILY MEDICINE

## 2022-12-09 PROCEDURE — 85025 COMPLETE CBC W/AUTO DIFF WBC: CPT | Mod: HCNC | Performed by: FAMILY MEDICINE

## 2022-12-09 PROCEDURE — 36415 COLL VENOUS BLD VENIPUNCTURE: CPT | Mod: HCNC,PO | Performed by: FAMILY MEDICINE

## 2022-12-14 ENCOUNTER — TELEPHONE (OUTPATIENT)
Dept: FAMILY MEDICINE | Facility: CLINIC | Age: 77
End: 2022-12-14
Payer: MEDICARE

## 2022-12-15 ENCOUNTER — PATIENT MESSAGE (OUTPATIENT)
Dept: FAMILY MEDICINE | Facility: CLINIC | Age: 77
End: 2022-12-15

## 2022-12-15 ENCOUNTER — LAB VISIT (OUTPATIENT)
Dept: LAB | Facility: HOSPITAL | Age: 77
End: 2022-12-15
Attending: FAMILY MEDICINE
Payer: MEDICARE

## 2022-12-15 ENCOUNTER — OFFICE VISIT (OUTPATIENT)
Dept: FAMILY MEDICINE | Facility: CLINIC | Age: 77
End: 2022-12-15
Payer: MEDICARE

## 2022-12-15 VITALS
HEIGHT: 73 IN | DIASTOLIC BLOOD PRESSURE: 68 MMHG | WEIGHT: 242.94 LBS | SYSTOLIC BLOOD PRESSURE: 122 MMHG | TEMPERATURE: 98 F | OXYGEN SATURATION: 97 % | BODY MASS INDEX: 32.2 KG/M2 | HEART RATE: 66 BPM

## 2022-12-15 DIAGNOSIS — N40.0 BPH WITH ELEVATED PSA: ICD-10-CM

## 2022-12-15 DIAGNOSIS — I10 HTN (HYPERTENSION), BENIGN: ICD-10-CM

## 2022-12-15 DIAGNOSIS — E80.6 ACQUIRED HYPERBILIRUBINEMIA: ICD-10-CM

## 2022-12-15 DIAGNOSIS — G70.00 MYASTHENIA GRAVIS: ICD-10-CM

## 2022-12-15 DIAGNOSIS — I89.0 LYMPHEDEMA OF BOTH LOWER EXTREMITIES: ICD-10-CM

## 2022-12-15 DIAGNOSIS — R97.20 BPH WITH ELEVATED PSA: ICD-10-CM

## 2022-12-15 DIAGNOSIS — M85.80 DRUG-INDUCED OSTEOPENIA: ICD-10-CM

## 2022-12-15 DIAGNOSIS — T50.905A DRUG-INDUCED OSTEOPENIA: ICD-10-CM

## 2022-12-15 DIAGNOSIS — D69.6 THROMBOCYTOPENIA: ICD-10-CM

## 2022-12-15 DIAGNOSIS — M51.36 DDD (DEGENERATIVE DISC DISEASE), LUMBAR: ICD-10-CM

## 2022-12-15 DIAGNOSIS — I35.9 AORTIC VALVE DISEASE: Primary | ICD-10-CM

## 2022-12-15 DIAGNOSIS — R05.3 CHRONIC COUGH: ICD-10-CM

## 2022-12-15 LAB
BASOPHILS # BLD AUTO: 0.03 K/UL (ref 0–0.2)
BASOPHILS NFR BLD: 0.4 % (ref 0–1.9)
DIFFERENTIAL METHOD: ABNORMAL
EOSINOPHIL # BLD AUTO: 0.2 K/UL (ref 0–0.5)
EOSINOPHIL NFR BLD: 3.2 % (ref 0–8)
ERYTHROCYTE [DISTWIDTH] IN BLOOD BY AUTOMATED COUNT: 13.9 % (ref 11.5–14.5)
HCT VFR BLD AUTO: 42.2 % (ref 40–54)
HGB BLD-MCNC: 13.3 G/DL (ref 14–18)
IMM GRANULOCYTES # BLD AUTO: 0.04 K/UL (ref 0–0.04)
IMM GRANULOCYTES NFR BLD AUTO: 0.6 % (ref 0–0.5)
LYMPHOCYTES # BLD AUTO: 1.6 K/UL (ref 1–4.8)
LYMPHOCYTES NFR BLD: 21.9 % (ref 18–48)
MCH RBC QN AUTO: 29.6 PG (ref 27–31)
MCHC RBC AUTO-ENTMCNC: 31.5 G/DL (ref 32–36)
MCV RBC AUTO: 94 FL (ref 82–98)
MONOCYTES # BLD AUTO: 0.9 K/UL (ref 0.3–1)
MONOCYTES NFR BLD: 12.1 % (ref 4–15)
NEUTROPHILS # BLD AUTO: 4.4 K/UL (ref 1.8–7.7)
NEUTROPHILS NFR BLD: 61.8 % (ref 38–73)
NRBC BLD-RTO: 0 /100 WBC
PATH REV BLD -IMP: NORMAL
PLATELET # BLD AUTO: 135 K/UL (ref 150–450)
PMV BLD AUTO: 11.1 FL (ref 9.2–12.9)
RBC # BLD AUTO: 4.5 M/UL (ref 4.6–6.2)
RETICS/RBC NFR AUTO: 1.9 % (ref 0.4–2)
WBC # BLD AUTO: 7.08 K/UL (ref 3.9–12.7)

## 2022-12-15 PROCEDURE — 1126F AMNT PAIN NOTED NONE PRSNT: CPT | Mod: HCNC,CPTII,S$GLB, | Performed by: FAMILY MEDICINE

## 2022-12-15 PROCEDURE — 85060 BLOOD SMEAR INTERPRETATION: CPT | Mod: HCNC,,, | Performed by: PATHOLOGY

## 2022-12-15 PROCEDURE — 1160F PR REVIEW ALL MEDS BY PRESCRIBER/CLIN PHARMACIST DOCUMENTED: ICD-10-PCS | Mod: HCNC,CPTII,S$GLB, | Performed by: FAMILY MEDICINE

## 2022-12-15 PROCEDURE — 3288F FALL RISK ASSESSMENT DOCD: CPT | Mod: HCNC,CPTII,S$GLB, | Performed by: FAMILY MEDICINE

## 2022-12-15 PROCEDURE — 85060 PATHOLOGIST REVIEW: ICD-10-PCS | Mod: HCNC,,, | Performed by: PATHOLOGY

## 2022-12-15 PROCEDURE — 1126F PR PAIN SEVERITY QUANTIFIED, NO PAIN PRESENT: ICD-10-PCS | Mod: HCNC,CPTII,S$GLB, | Performed by: FAMILY MEDICINE

## 2022-12-15 PROCEDURE — 93010 EKG 12-LEAD: ICD-10-PCS | Mod: HCWC,S$GLB,, | Performed by: INTERNAL MEDICINE

## 2022-12-15 PROCEDURE — 3078F PR MOST RECENT DIASTOLIC BLOOD PRESSURE < 80 MM HG: ICD-10-PCS | Mod: HCNC,CPTII,S$GLB, | Performed by: FAMILY MEDICINE

## 2022-12-15 PROCEDURE — 36415 COLL VENOUS BLD VENIPUNCTURE: CPT | Mod: HCNC,PO | Performed by: FAMILY MEDICINE

## 2022-12-15 PROCEDURE — 85025 COMPLETE CBC W/AUTO DIFF WBC: CPT | Mod: HCNC | Performed by: FAMILY MEDICINE

## 2022-12-15 PROCEDURE — 1101F PR PT FALLS ASSESS DOC 0-1 FALLS W/OUT INJ PAST YR: ICD-10-PCS | Mod: HCNC,CPTII,S$GLB, | Performed by: FAMILY MEDICINE

## 2022-12-15 PROCEDURE — 1159F PR MEDICATION LIST DOCUMENTED IN MEDICAL RECORD: ICD-10-PCS | Mod: HCNC,CPTII,S$GLB, | Performed by: FAMILY MEDICINE

## 2022-12-15 PROCEDURE — 93010 ELECTROCARDIOGRAM REPORT: CPT | Mod: HCWC,S$GLB,, | Performed by: INTERNAL MEDICINE

## 2022-12-15 PROCEDURE — 99999 PR PBB SHADOW E&M-EST. PATIENT-LVL V: CPT | Mod: PBBFAC,HCNC,, | Performed by: FAMILY MEDICINE

## 2022-12-15 PROCEDURE — 1159F MED LIST DOCD IN RCRD: CPT | Mod: HCNC,CPTII,S$GLB, | Performed by: FAMILY MEDICINE

## 2022-12-15 PROCEDURE — 1123F ACP DISCUSS/DSCN MKR DOCD: CPT | Mod: HCNC,CPTII,S$GLB, | Performed by: FAMILY MEDICINE

## 2022-12-15 PROCEDURE — 1123F PR ADV CARE PLAN DISCUSSED, PLAN OR SURROGATE DOCUMENTED: ICD-10-PCS | Mod: HCNC,CPTII,S$GLB, | Performed by: FAMILY MEDICINE

## 2022-12-15 PROCEDURE — 3074F PR MOST RECENT SYSTOLIC BLOOD PRESSURE < 130 MM HG: ICD-10-PCS | Mod: HCNC,CPTII,S$GLB, | Performed by: FAMILY MEDICINE

## 2022-12-15 PROCEDURE — 1160F RVW MEDS BY RX/DR IN RCRD: CPT | Mod: HCNC,CPTII,S$GLB, | Performed by: FAMILY MEDICINE

## 2022-12-15 PROCEDURE — 99215 OFFICE O/P EST HI 40 MIN: CPT | Mod: HCNC,S$GLB,, | Performed by: FAMILY MEDICINE

## 2022-12-15 PROCEDURE — 1101F PT FALLS ASSESS-DOCD LE1/YR: CPT | Mod: HCNC,CPTII,S$GLB, | Performed by: FAMILY MEDICINE

## 2022-12-15 PROCEDURE — 93005 ELECTROCARDIOGRAM TRACING: CPT | Mod: HCNC,S$GLB,, | Performed by: FAMILY MEDICINE

## 2022-12-15 PROCEDURE — 84153 ASSAY OF PSA TOTAL: CPT | Mod: HCNC | Performed by: FAMILY MEDICINE

## 2022-12-15 PROCEDURE — 99999 PR PBB SHADOW E&M-EST. PATIENT-LVL V: ICD-10-PCS | Mod: PBBFAC,HCNC,, | Performed by: FAMILY MEDICINE

## 2022-12-15 PROCEDURE — 3074F SYST BP LT 130 MM HG: CPT | Mod: HCNC,CPTII,S$GLB, | Performed by: FAMILY MEDICINE

## 2022-12-15 PROCEDURE — 83010 ASSAY OF HAPTOGLOBIN QUANT: CPT | Mod: HCNC | Performed by: FAMILY MEDICINE

## 2022-12-15 PROCEDURE — 99215 PR OFFICE/OUTPT VISIT, EST, LEVL V, 40-54 MIN: ICD-10-PCS | Mod: HCNC,S$GLB,, | Performed by: FAMILY MEDICINE

## 2022-12-15 PROCEDURE — 3288F PR FALLS RISK ASSESSMENT DOCUMENTED: ICD-10-PCS | Mod: HCNC,CPTII,S$GLB, | Performed by: FAMILY MEDICINE

## 2022-12-15 PROCEDURE — 93005 EKG 12-LEAD: ICD-10-PCS | Mod: HCNC,S$GLB,, | Performed by: FAMILY MEDICINE

## 2022-12-15 PROCEDURE — 85045 AUTOMATED RETICULOCYTE COUNT: CPT | Mod: HCNC | Performed by: FAMILY MEDICINE

## 2022-12-15 PROCEDURE — 3078F DIAST BP <80 MM HG: CPT | Mod: HCNC,CPTII,S$GLB, | Performed by: FAMILY MEDICINE

## 2022-12-15 PROCEDURE — 83615 LACTATE (LD) (LDH) ENZYME: CPT | Mod: HCNC | Performed by: FAMILY MEDICINE

## 2022-12-15 RX ORDER — PROMETHAZINE HYDROCHLORIDE AND DEXTROMETHORPHAN HYDROBROMIDE 6.25; 15 MG/5ML; MG/5ML
5 SYRUP ORAL EVERY 6 HOURS PRN
Qty: 240 ML | Refills: 1 | Status: SHIPPED | OUTPATIENT
Start: 2022-12-15 | End: 2023-12-14

## 2022-12-15 RX ORDER — PANTOPRAZOLE SODIUM 40 MG/1
40 TABLET, DELAYED RELEASE ORAL 2 TIMES DAILY
Qty: 180 TABLET | Refills: 3 | Status: SHIPPED | OUTPATIENT
Start: 2022-12-15 | End: 2023-09-27

## 2022-12-15 RX ORDER — GABAPENTIN 300 MG/1
600 CAPSULE ORAL NIGHTLY
Qty: 180 CAPSULE | Refills: 2 | Status: SHIPPED | OUTPATIENT
Start: 2022-12-15 | End: 2023-06-15

## 2022-12-15 NOTE — LETTER
December 15, 2022    Dr Alonzo Nichols [facility]  [address]  [place] [address]  Phone: [phone]  Fax: [phone]   Patient: Maximilian Tavera Jr.   MR Number: [id number]   YOB: 1945   Date of Visit: 12/15/2022       Dear Dr Carroll,    I am referring my patient, Maximilian Tavera, to you for evaluation/surgery of ptosis of the eyelids.    He  has a past medical history of A-fib (03/31/2020), Arthritis, Back pain, Carpal tunnel syndrome (02/25/2013), Cataract, Cataract, left eye (11/10/2014), Chest pain, musculoskeletal, COPD (chronic obstructive pulmonary disease) (11/052018), COPD with asthma (08/17/2021), Emphysema lung (11/05/2018), Gastritis, colonic polyp, Hyperlipidemia, Hypertension, Hypothyroidism, Knee fracture, Myasthenia gravis, Neuropathy (01/03/2013), Obesity (01/29/2015), Pneumonia (03/29/2020), Polyneuropathy, PVC (premature ventricular contraction), Squamous cell carcinoma (2014), and Thyroid disease. His  has a past surgical history that includes Appendectomy; Tonsillectomy; Neck surgery; Hemorrhoid surgery; Knee Arthroplasty (Bilateral); Cataract extraction w/  intraocular lens implant (Bilateral); Esophagogastroduodenoscopy (N/A, 9/12/2018); Posterior fusion of cervical spine with laminectomy (N/A, 11/7/2018); Cystoscopy (N/A, 2/26/2019); Transrectal ultrasound examination (N/A, 2/26/2019); Upper gastrointestinal endoscopy (09/12/2018); Colonoscopy (03/01/2012); Esophagogastroduodenoscopy (N/A, 8/19/2019); Esophagogastroduodenoscopy (N/A, 10/7/2019); Transforaminal epidural injection of steroid (Right, 12/20/2019); Esophagogastroduodenoscopy (N/A, 1/7/2020); Endoscopic ultrasound of upper gastrointestinal tract (N/A, 1/7/2020); Transforaminal epidural injection of steroid (Bilateral, 2/6/2020); Transforaminal epidural injection of steroid (Bilateral, 8/26/2020); Lengthening of Achilles tendon (Right, 9/11/2020); Injection of anesthetic agent  around medial branch nerves innervating lumbar facet joint (Bilateral, 10/1/2020); Colonoscopy (N/A, 12/7/2021); and Injection of anesthetic agent around medial branch nerves innervating lumbar facet joint (Right, 10/7/2022). He  reports that he has never smoked. He has never used smokeless tobacco. He reports current alcohol use. He reports that he does not use drugs.    He has a current medication list which includes the following prescription(s): albuterol, atorvastatin, carvedilol, cetirizine, chlorthalidone, cholecalciferol (vitamin d3), cholestyramine, co q-10, cyanocobalamin (vitamin b-12), diphenoxylate-atropine 2.5-0.025 mg, ezetimibe, fluad quad 2021-22(65y up)(pf), fluticasone propionate, kenalog, levothyroxine, methylsulfonylmethane, milk thistle, mupirocin, olmesartan, omega-3 fatty acids, potassium chloride sa, prednisone, prednisone, sildenafil, spiriva with handihaler, gabapentin, pantoprazole, promethazine-dextromethorphan, and [DISCONTINUED] esomeprazole. He is allergic to spironolactone.  Low risk for cardiac event. Clear for surgery and anesthesia.  I appreciate your assistance in his care and look forward to your findings and recommendations.    Sincerely,                           Brian Marroquin MD

## 2022-12-15 NOTE — PROGRESS NOTES
Subjective:       Patient ID: Maximilian Tavera Jr. is a 77 y.o. male.    Chief Complaint: Follow-up (Wants to review labs)    Mr. Tavera is a 77 year old male with HTN, myasthenia gravis,GERD, COPD and hypothyroidism who presents to clinic for follow up of chronic cough. The patient patient had no acute findings on CHEST X- RAY and BNP was normal. The patient was previously evaluated by pulmonology. There is improvement with Spiriva.The patient is not currently being followed by pulmonology. The patient is followed closely by cardiology. The patient has increase in bili and decrease protein. Fatty liver.  Contempating eye lid surgery by Dr Carroll.    Follow-up  Associated symptoms include arthralgias and coughing (chronic cough). Pertinent negatives include no abdominal pain, chest pain, fever, headaches or myalgias.   Review of patient's allergies indicates:   Allergen Reactions    Spironolactone Diarrhea         Current Outpatient Medications:     albuterol (PROVENTIL/VENTOLIN HFA) 90 mcg/actuation inhaler, INHALE 1 TO 2 PUFFS INTO THE LUNGS EVERY 6 HOURS AS NEEDED FOR WHEEZING OR SHORTNESS OF BREATH. FOR RESCUE., Disp: 25.5 g, Rfl: 0    atorvastatin (LIPITOR) 80 MG tablet, TAKE 1 TABLET EVERY DAY, Disp: 90 tablet, Rfl: 1    carvediloL (COREG) 25 MG tablet, TAKE 1 TABLET TWICE DAILY WITH MEALS, Disp: 180 tablet, Rfl: 3    cetirizine (ZYRTEC) 10 MG tablet, Take 1 tablet by mouth daily as needed., Disp: , Rfl:     chlorthalidone (HYGROTEN) 25 MG Tab, Take 1 tablet (25 mg total) by mouth once daily., Disp: 90 tablet, Rfl: 3    cholecalciferol, vitamin D3, (VITAMIN D3) 50 mcg (2,000 unit) Cap, 1 capsule once daily. Every day, Disp: , Rfl:     cholestyramine (QUESTRAN) 4 gram packet, Take 1 packet (4 g total) by mouth 3 (three) times daily with meals., Disp: 270 packet, Rfl: 3    coenzyme Q10 (CO Q-10) 100 mg capsule, Take 400 mg by mouth once daily., Disp: , Rfl:     cyanocobalamin, vitamin B-12, 5,000 mcg Subl,  Take 5,000 mcg by mouth once daily. Every day, Disp: , Rfl:     diphenoxylate-atropine 2.5-0.025 mg (LOMOTIL) 2.5-0.025 mg per tablet, Take 1 tablet by mouth 4 (four) times daily as needed for Diarrhea., Disp: 90 tablet, Rfl: 0    ezetimibe (ZETIA) 10 mg tablet, TAKE 1 TABLET EVERY DAY, Disp: 90 tablet, Rfl: 3    FLUAD QUAD 2021-22,65Y UP,,PF, 60 mcg (15 mcg x 4)/0.5 mL Syrg, , Disp: , Rfl:     fluticasone (FLONASE) 50 mcg/actuation nasal spray, USE 1 SPRAY IN EACH NOSTRIL TWICE DAILY AS NEEDED  FOR  RHINITIS, Disp: 48 g, Rfl: 3    KENALOG 40 mg/mL injection, , Disp: , Rfl:     levothyroxine (SYNTHROID) 50 MCG tablet, TAKE 1 TABLET EVERY DAY, Disp: 90 tablet, Rfl: 3    methylsulfonylmethane 1,000 mg Tab, Take 1,000 mg by mouth once daily. Every day, Disp: , Rfl:     milk thistle 200 mg Cap, Take 200 mg by mouth 2 (two) times daily. Twice a day, Disp: , Rfl:     mupirocin (BACTROBAN) 2 % ointment, Apply topically 3 (three) times daily., Disp: 30 g, Rfl: 0    olmesartan (BENICAR) 20 MG tablet, TAKE 1 TABLET EVERY DAY, Disp: 90 tablet, Rfl: 2    omega-3 fatty acids 1,000 mg Cap, Twice a day, Disp: , Rfl:     potassium chloride SA (K-DUR,KLOR-CON) 20 MEQ tablet, TAKE 4 TABLETS EVERY DAY  OR AS DIRECTED, Disp: 360 tablet, Rfl: 3    predniSONE (DELTASONE) 1 MG tablet, TAKE 2 TABLETS EVERY DAY, Disp: 180 tablet, Rfl: 3    predniSONE (DELTASONE) 5 MG tablet, TAKE 1 TABLET EVERY DAY, Disp: 90 tablet, Rfl: 3    sildenafil (REVATIO) 20 mg Tab, Take 1 to 3 tabs daily as needed., Disp: 30 tablet, Rfl: 3    tiotropium (SPIRIVA WITH HANDIHALER) 18 mcg inhalation capsule, INHALE THE CONTENTS OF 1 CAPSULE EVERY DAY  VIA HANDIHALER (CONTROLLER), Disp: 90 capsule, Rfl: 3    gabapentin (NEURONTIN) 300 MG capsule, Take 2 capsules (600 mg total) by mouth every evening., Disp: 180 capsule, Rfl: 2    pantoprazole (PROTONIX) 40 MG tablet, Take 1 tablet (40 mg total) by mouth 2 (two) times daily., Disp: 180 tablet, Rfl: 3     promethazine-dextromethorphan (PROMETHAZINE-DM) 6.25-15 mg/5 mL Syrp, Take 5 mLs by mouth every 6 (six) hours as needed., Disp: 240 mL, Rfl: 1    Lab Results   Component Value Date    WBC 6.23 12/09/2022    HGB 13.3 (L) 12/09/2022    HCT 39.6 (L) 12/09/2022     (L) 12/09/2022    CHOL 116 (L) 12/09/2022    TRIG 75 12/09/2022    HDL 40 12/09/2022    ALT 18 12/09/2022    AST 17 12/09/2022     12/09/2022    K 3.5 12/09/2022     12/09/2022    CREATININE 1.0 12/09/2022    BUN 18 12/09/2022    CO2 30 (H) 12/09/2022    TSH 2.311 01/03/2022    PSA 3.5 09/19/2019    INR 1.0 10/30/2018    HGBA1C 5.5 11/21/2018       Review of Systems   Constitutional:  Negative for activity change, appetite change and fever.   HENT:  Negative for postnasal drip, rhinorrhea and sinus pressure.    Eyes:  Negative for visual disturbance.   Respiratory:  Positive for cough (chronic cough). Negative for shortness of breath.    Cardiovascular:  Negative for chest pain.   Gastrointestinal:  Negative for abdominal distention and abdominal pain.   Genitourinary:  Negative for difficulty urinating and dysuria.   Musculoskeletal:  Positive for arthralgias and back pain. Negative for myalgias.   Neurological:  Negative for headaches.   Hematological:  Negative for adenopathy.   Psychiatric/Behavioral:  The patient is not nervous/anxious.      Objective:      Physical Exam  Constitutional:       Appearance: He is obese.   HENT:      Head: Normocephalic and atraumatic.   Eyes:      Conjunctiva/sclera: Conjunctivae normal.   Cardiovascular:      Rate and Rhythm: Normal rate and regular rhythm.   Pulmonary:      Effort: Pulmonary effort is normal. No respiratory distress.      Breath sounds: Normal breath sounds. No wheezing.   Abdominal:      General: There is no distension.      Palpations: There is no mass.      Tenderness: There is no abdominal tenderness.   Musculoskeletal:      Cervical back: Decreased range of motion.      Thoracic  back: Decreased range of motion.      Lumbar back: Bony tenderness present. Decreased range of motion.      Right lower leg: No edema.      Left lower leg: No edema.   Skin:     Findings: No erythema.   Neurological:      Mental Status: He is alert and oriented to person, place, and time.   Psychiatric:         Behavior: Behavior normal.       Assessment:       1. Aortic valve disease    2. DDD (degenerative disc disease), lumbar    3. HTN (hypertension), benign    4. Myasthenia gravis    5. Thrombocytopenia    6. Lymphedema of both lower extremities    7. Acquired hyperbilirubinemia    8. Chronic cough    9. Osteopenia after menopause    10. BPH with elevated PSA          Plan:   Maximilian was seen today for follow-up.  The natural history of prostate cancer and ongoing controversy regarding screening and potential treatment outcomes of prostate cancer has been discussed with the patient. The meaning of a false positive PSA and a false negative PSA has been discussed. He indicates understanding of the limitations of this screening test and wishes to proceed with screening PSA testing.  Aortic valve disease    DDD (degenerative disc disease), lumbar  -     gabapentin (NEURONTIN) 300 MG capsule; Take 2 capsules (600 mg total) by mouth every evening.  Dispense: 180 capsule; Refill: 2    HTN (hypertension), benign  -     gabapentin (NEURONTIN) 300 MG capsule; Take 2 capsules (600 mg total) by mouth every evening.  Dispense: 180 capsule; Refill: 2    Myasthenia gravis    Thrombocytopenia    Lymphedema of both lower extremities  -     US Abdomen Complete; Future; Expected date: 12/15/2022  -     IN OFFICE EKG 12-LEAD (to Muse)    Acquired hyperbilirubinemia  -     Lactate dehydrogenase; Future; Expected date: 12/15/2022  -     Haptoglobin; Future; Expected date: 12/15/2022  -     Reticulocytes; Future; Expected date: 12/15/2022  -     Pathologist Interpretation Differential; Future; Expected date: 12/15/2022    Chronic  cough  -     Ambulatory referral/consult to Gastroenterology; Future; Expected date: 12/22/2022  -     pantoprazole (PROTONIX) 40 MG tablet; Take 1 tablet (40 mg total) by mouth 2 (two) times daily.  Dispense: 180 tablet; Refill: 3  -     promethazine-dextromethorphan (PROMETHAZINE-DM) 6.25-15 mg/5 mL Syrp; Take 5 mLs by mouth every 6 (six) hours as needed.  Dispense: 240 mL; Refill: 1    Osteopenia , Chronic use glucocorticoids  -     DXA Bone Density Spine And Hip; Future; Expected date: 12/15/2022    BPH with elevated PSA, hx of abnormal PSA at 6. Mild   -     PSA, TOTAL AND FREE; Future; Expected date: 12/15/2022    Advance Care Planning     Date: 12/15/2022                 Diagnoses and all orders for this visit:    HTN (hypertension), benign  Controlled  Low salt diet  Continue current medication  Chronic cough  -     GERD.  Improving  If continued improvement/ resolution does not occur, please contact clinic for additional evaluation.  Myasthenia gravis, AChR antibody positive under prednisone  stable  COPD with asthma  Stable with Spiriva.   Aortic valve disease, no signs of CHF  Follow up with cardiology  Mixed hyperlipidemia  High fiber diet  Continue current medication.     Discussed health maintenance guidelines appropriate for age.  55 face to face encounter. Pending evaluation of the liver before [facility] or [facility].

## 2022-12-16 ENCOUNTER — TELEPHONE (OUTPATIENT)
Dept: GASTROENTEROLOGY | Facility: CLINIC | Age: 77
End: 2022-12-16
Payer: MEDICARE

## 2022-12-16 ENCOUNTER — PATIENT MESSAGE (OUTPATIENT)
Dept: FAMILY MEDICINE | Facility: CLINIC | Age: 77
End: 2022-12-16
Payer: MEDICARE

## 2022-12-16 ENCOUNTER — HOSPITAL ENCOUNTER (OUTPATIENT)
Dept: RADIOLOGY | Facility: CLINIC | Age: 77
Discharge: HOME | End: 2022-12-16
Attending: FAMILY MEDICINE
Payer: MEDICARE

## 2022-12-16 ENCOUNTER — TELEPHONE (OUTPATIENT)
Dept: FAMILY MEDICINE | Facility: CLINIC | Age: 77
End: 2022-12-16

## 2022-12-16 DIAGNOSIS — I44.4 LEFT ANTERIOR FASCICULAR HEMIBLOCK: Primary | ICD-10-CM

## 2022-12-16 DIAGNOSIS — T50.905A DRUG-INDUCED OSTEOPENIA: ICD-10-CM

## 2022-12-16 DIAGNOSIS — M85.80 DRUG-INDUCED OSTEOPENIA: ICD-10-CM

## 2022-12-16 LAB
HAPTOGLOB SERPL-MCNC: 131 MG/DL (ref 30–250)
LDH SERPL L TO P-CCNC: 198 U/L (ref 110–260)
PATH REV BLD -IMP: NORMAL
PROSTATE SPECIFIC ANTIGEN, TOTAL: 9.8 NG/ML (ref 0–4)
PSA FREE MFR SERPL: 12.96 %
PSA FREE SERPL-MCNC: 1.27 NG/ML (ref 0–1.5)

## 2022-12-16 PROCEDURE — 77080 DEXA BONE DENSITY SPINE HIP: ICD-10-PCS | Mod: 26,HCNC,, | Performed by: RADIOLOGY

## 2022-12-16 PROCEDURE — 77080 DXA BONE DENSITY AXIAL: CPT | Mod: 26,HCNC,, | Performed by: RADIOLOGY

## 2022-12-16 PROCEDURE — 77080 DXA BONE DENSITY AXIAL: CPT | Mod: TC,HCNC,PO

## 2022-12-16 NOTE — TELEPHONE ENCOUNTER
Called patient per referral he states he is driving and will call back contact info sent to portal

## 2022-12-16 NOTE — PROGRESS NOTES
Spoke with patient regarding his results, patient said he was not sure why he needed to see gastro, so I explained to him the reason for the referral and he said he did not want to see Gastro.

## 2022-12-19 ENCOUNTER — TELEPHONE (OUTPATIENT)
Dept: GASTROENTEROLOGY | Facility: CLINIC | Age: 77
End: 2022-12-19
Payer: MEDICARE

## 2022-12-22 ENCOUNTER — OFFICE VISIT (OUTPATIENT)
Dept: ORTHOPEDICS | Facility: CLINIC | Age: 77
End: 2022-12-22
Payer: MEDICARE

## 2022-12-22 DIAGNOSIS — M17.12 PRIMARY OSTEOARTHRITIS OF LEFT KNEE: Primary | ICD-10-CM

## 2022-12-22 PROCEDURE — 1160F PR REVIEW ALL MEDS BY PRESCRIBER/CLIN PHARMACIST DOCUMENTED: ICD-10-PCS | Mod: HCNC,CPTII,S$GLB, | Performed by: ORTHOPAEDIC SURGERY

## 2022-12-22 PROCEDURE — 20610 PR DRAIN/INJECT LARGE JOINT/BURSA: ICD-10-PCS | Mod: HCNC,LT,S$GLB, | Performed by: ORTHOPAEDIC SURGERY

## 2022-12-22 PROCEDURE — 99499 NO LOS: ICD-10-PCS | Mod: HCNC,S$GLB,, | Performed by: ORTHOPAEDIC SURGERY

## 2022-12-22 PROCEDURE — 1157F PR ADVANCE CARE PLAN OR EQUIV PRESENT IN MEDICAL RECORD: ICD-10-PCS | Mod: HCNC,CPTII,S$GLB, | Performed by: ORTHOPAEDIC SURGERY

## 2022-12-22 PROCEDURE — 99999 PR PBB SHADOW E&M-EST. PATIENT-LVL I: ICD-10-PCS | Mod: PBBFAC,HCNC,, | Performed by: ORTHOPAEDIC SURGERY

## 2022-12-22 PROCEDURE — 20610 DRAIN/INJ JOINT/BURSA W/O US: CPT | Mod: HCNC,LT,S$GLB, | Performed by: ORTHOPAEDIC SURGERY

## 2022-12-22 PROCEDURE — 1157F ADVNC CARE PLAN IN RCRD: CPT | Mod: HCNC,CPTII,S$GLB, | Performed by: ORTHOPAEDIC SURGERY

## 2022-12-22 PROCEDURE — 99499 UNLISTED E&M SERVICE: CPT | Mod: HCNC,S$GLB,, | Performed by: ORTHOPAEDIC SURGERY

## 2022-12-22 PROCEDURE — 1159F MED LIST DOCD IN RCRD: CPT | Mod: HCNC,CPTII,S$GLB, | Performed by: ORTHOPAEDIC SURGERY

## 2022-12-22 PROCEDURE — 1160F RVW MEDS BY RX/DR IN RCRD: CPT | Mod: HCNC,CPTII,S$GLB, | Performed by: ORTHOPAEDIC SURGERY

## 2022-12-22 PROCEDURE — 99999 PR PBB SHADOW E&M-EST. PATIENT-LVL I: CPT | Mod: PBBFAC,HCNC,, | Performed by: ORTHOPAEDIC SURGERY

## 2022-12-22 PROCEDURE — 1159F PR MEDICATION LIST DOCUMENTED IN MEDICAL RECORD: ICD-10-PCS | Mod: HCNC,CPTII,S$GLB, | Performed by: ORTHOPAEDIC SURGERY

## 2022-12-22 NOTE — PROCEDURES
Large Joint Aspiration/Injection: L knee joint    Date/Time: 12/22/2022 4:15 PM  Performed by: Haroldo Campbell MD  Authorized by: Haroldo Campbell MD     Consent Done?:  Yes (Verbal)  Indications:  Pain  Site marked: the procedure site was marked    Timeout: prior to procedure the correct patient, procedure, and site was verified      Details:  Needle Size:  20 G  Approach:  Anterolateral  Location:  Knee  Site:  L knee joint  Medications:  30 mg sodium hyaluronate (orthovisc) 30 mg/2 mL  Patient tolerance:  Patient tolerated the procedure well with no immediate complications

## 2022-12-22 NOTE — PROGRESS NOTES
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    Emphysema lung 11/05/2018    Gastritis     Hx of colonic polyp     Hyperlipidemia     Hypertension     Hypothyroidism     Knee fracture     Myasthenia gravis     Neuropathy 01/03/2013    Obesity 01/29/2015    Pneumonia 03/29/2020    Polyneuropathy     PVC (premature ventricular contraction)     Squamous cell carcinoma 2014    left forearm    Thyroid disease        Past Surgical History:   Procedure Laterality Date    APPENDECTOMY      CATARACT EXTRACTION W/  INTRAOCULAR LENS IMPLANT Bilateral     COLONOSCOPY  03/01/2012    Dr Esteban; hyperplastic polyp; repeat in 5 years    COLONOSCOPY N/A 12/7/2021    Procedure: COLONOSCOPY;  Surgeon: Gabriel Hernandez MD;  Location: [facility];  Service: Endoscopy;  Laterality: N/A;    CYSTOSCOPY N/A 2/26/2019    Procedure: CYSTOSCOPY;  Surgeon: Simba Cheung MD;  [facility]: [facility];  Service: Urology;  Laterality: N/A;    ENDOSCOPIC ULTRASOUND OF UPPER GASTROINTESTINAL TRACT N/A 1/7/2020    Procedure: ULTRASOUND, UPPER GI TRACT, ENDOSCOPIC;  Surgeon: Kati Ryan MD;  [facility]  Service: Endoscopy;  Laterality: N/A;    ESOPHAGOGASTRODUODENOSCOPY N/A 9/12/2018    Procedure: EGD (ESOPHAGOGASTRODUODENOSCOPY);  Surgeon: Gabriel Hernandez MD;  Location: [facility];  Service: Endoscopy;  Laterality: N/A;    ESOPHAGOGASTRODUODENOSCOPY N/A 8/19/2019    Procedure: EGD (ESOPHAGOGASTRODUODENOSCOPY);  Surgeon: Gabriel Hernandez MD;  Location: [facility];  Service: Endoscopy;  Laterality: N/A;    ESOPHAGOGASTRODUODENOSCOPY N/A 10/7/2019    Procedure: EGD (ESOPHAGOGASTRODUODENOSCOPY);  Surgeon: Gabriel Hernandez MD;  Location: [facility];  Service: Endoscopy;  Laterality: N/A;     ESOPHAGOGASTRODUODENOSCOPY N/A 1/7/2020    Procedure: EGD (ESOPHAGOGASTRODUODENOSCOPY);  Surgeon: Kati Ryan MD;  Location: [address]);  Service: Endoscopy;  Laterality: N/A;    HEMORRHOID SURGERY      INJECTION OF ANESTHETIC AGENT AROUND MEDIAL BRANCH NERVES INNERVATING LUMBAR FACET JOINT Bilateral 10/1/2020    Procedure: Block-nerve-medial branch-lumbar Bilateral L 3,4,5;  Surgeon: Devan Watt MD;  Location: [facility];  Service: Pain Management;  Laterality: Bilateral;    INJECTION OF ANESTHETIC AGENT AROUND MEDIAL BRANCH NERVES INNERVATING LUMBAR FACET JOINT Right 10/7/2022    Procedure: Block-nerve-medial branch-lumbar L3,4,5;  Surgeon: Devan Watt MD;  Location: [facility];  Service: Pain Management;  Laterality: Right;    KNEE ARTHROPLASTY Bilateral     LENGTHENING OF ACHILLES TENDON Right 9/11/2020    Procedure: percutaeous tenotomy of right lateral epicondyle (tenex);  Surgeon: Terry Quach MD;  Location: [facility];  Service: Neurosurgery;  Laterality: Right;  tenex to right lateral epicondyle  Tenex machine SN#[id number], total time 1min. 30seconds    NECK SURGERY      POSTERIOR FUSION OF CERVICAL SPINE WITH LAMINECTOMY N/A 11/7/2018    Procedure: C2-C6 Posterior Cervical Laminectomy & Instrumental Fusion;  Surgeon: Kishore Turner MD;  Location: [address];  Service: Neurosurgery;  Laterality: N/A;    TONSILLECTOMY      TRANSFORAMINAL EPIDURAL INJECTION OF STEROID Right 12/20/2019    Procedure: Injection,steroid,epidural,transforaminal approach;  Surgeon: Devan Watt MD;  Location: [facility];  Service: Pain Management;  Laterality: Right;  L3-4, L4-5    TRANSFORAMINAL EPIDURAL INJECTION OF STEROID Bilateral 2/6/2020    Procedure: Injection,steroid,epidural,transforaminal approach;  Surgeon: Devan Watt MD;  Location: [facility];  Service: Pain Management;  Laterality: Bilateral;  L3-L4,L4-L5    TRANSFORAMINAL EPIDURAL INJECTION OF STEROID Bilateral 8/26/2020    Procedure:  Injection,steroid,epidural,transforaminal approach;  Surgeon: Devan Watt MD;  Location: [facility] OR;  Service: Pain Management;  Laterality: Bilateral;  L3-4, L4-5    TRANSRECTAL ULTRASOUND EXAMINATION N/A 2/26/2019    Procedure: ULTRASOUND, RECTAL APPROACH;  Surgeon: Simba Cheung MD;  Location: [facility] OR;  Service: Urology;  Laterality: N/A;    UPPER GASTROINTESTINAL ENDOSCOPY  09/12/2018    Dr Hernandez; gastritis; extensive intestinal metaplasia; repeat in 1-2- years       Current Outpatient Medications   Medication Sig    albuterol (PROVENTIL/VENTOLIN HFA) 90 mcg/actuation inhaler INHALE 1 TO 2 PUFFS INTO THE LUNGS EVERY 6 HOURS AS NEEDED FOR WHEEZING OR SHORTNESS OF BREATH. FOR RESCUE.    atorvastatin (LIPITOR) 80 MG tablet TAKE 1 TABLET EVERY DAY    carvediloL (COREG) 25 MG tablet TAKE 1 TABLET TWICE DAILY WITH MEALS    cetirizine (ZYRTEC) 10 MG tablet Take 1 tablet by mouth daily as needed.    chlorthalidone (HYGROTEN) 25 MG Tab Take 1 tablet (25 mg total) by mouth once daily.    cholecalciferol, vitamin D3, (VITAMIN D3) 50 mcg (2,000 unit) Cap 1 capsule once daily. Every day    cholestyramine (QUESTRAN) 4 gram packet Take 1 packet (4 g total) by mouth 3 (three) times daily with meals.    coenzyme Q10 (CO Q-10) 100 mg capsule Take 400 mg by mouth once daily.    cyanocobalamin, vitamin B-12, 5,000 mcg Subl Take 5,000 mcg by mouth once daily. Every day    diphenoxylate-atropine 2.5-0.025 mg (LOMOTIL) 2.5-0.025 mg per tablet Take 1 tablet by mouth 4 (four) times daily as needed for Diarrhea.    ezetimibe (ZETIA) 10 mg tablet TAKE 1 TABLET EVERY DAY    FLUAD QUAD 2021-22,65Y UP,,PF, 60 mcg (15 mcg x 4)/0.5 mL Syrg     fluticasone (FLONASE) 50 mcg/actuation nasal spray USE 1 SPRAY IN EACH NOSTRIL TWICE DAILY AS NEEDED  FOR  RHINITIS    gabapentin (NEURONTIN) 300 MG capsule Take 2 capsules (600 mg total) by mouth every evening.    KENALOG 40 mg/mL injection     levothyroxine (SYNTHROID) 50 MCG tablet TAKE 1 TABLET  EVERY DAY    methylsulfonylmethane 1,000 mg Tab Take 1,000 mg by mouth once daily. Every day    milk thistle 200 mg Cap Take 200 mg by mouth 2 (two) times daily. Twice a day    mupirocin (BACTROBAN) 2 % ointment Apply topically 3 (three) times daily.    olmesartan (BENICAR) 20 MG tablet TAKE 1 TABLET EVERY DAY    omega-3 fatty acids 1,000 mg Cap Twice a day    pantoprazole (PROTONIX) 40 MG tablet Take 1 tablet (40 mg total) by mouth 2 (two) times daily.    potassium chloride SA (K-DUR,KLOR-CON) 20 MEQ tablet TAKE 4 TABLETS EVERY DAY  OR AS DIRECTED    predniSONE (DELTASONE) 1 MG tablet TAKE 2 TABLETS EVERY DAY    predniSONE (DELTASONE) 5 MG tablet TAKE 1 TABLET EVERY DAY    promethazine-dextromethorphan (PROMETHAZINE-DM) 6.25-15 mg/5 mL Syrp Take 5 mLs by mouth every 6 (six) hours as needed.    sildenafil (REVATIO) 20 mg Tab Take 1 to 3 tabs daily as needed.    tiotropium (SPIRIVA WITH HANDIHALER) 18 mcg inhalation capsule INHALE THE CONTENTS OF 1 CAPSULE EVERY DAY  VIA HANDIHALER (CONTROLLER)     No current facility-administered medications for this visit.       Review of patient's allergies indicates:   Allergen Reactions    No known drug allergies        Family History   Problem Relation Age of Onset    Cataracts Mother     Heart disease Mother         CHF    Hypertension Mother     Hyperlipidemia Mother     Cataracts Father     Glaucoma Father     Heart disease Father     Hyperlipidemia Sister     Hypertension Sister     No Known Problems Daughter     No Known Problems Daughter     Collagen disease Neg Hx     Amblyopia Neg Hx     Blindness Neg Hx     Macular degeneration Neg Hx     Retinal detachment Neg Hx     Strabismus Neg Hx     Cancer Neg Hx     Colon cancer Neg Hx     Esophageal cancer Neg Hx     Stomach cancer Neg Hx     Crohn's disease Neg Hx     Ulcerative colitis Neg Hx        Social History     Socioeconomic History    Marital status:    Tobacco Use    Smoking status: Never    Smokeless  tobacco: Never    Tobacco comments:     when a child   Substance and Sexual Activity    Alcohol use: Yes     Comment: rarely    Drug use: No    Sexual activity: Yes     Partners: Female     Social Determinants of Health     Financial Resource Strain: Low Risk     Difficulty of Paying Living Expenses: Not hard at all   Food Insecurity: No Food Insecurity    Worried About Running Out of Food in the Last Year: Never true    Ran Out of Food in the Last Year: Never true   Transportation Needs: No Transportation Needs    Lack of Transportation (Medical): No    Lack of Transportation (Non-Medical): No   Physical Activity: Inactive    Days of Exercise per Week: 0 days    Minutes of Exercise per Session: 0 min   Stress: No Stress Concern Present    Feeling of Stress : Not at all   Social Connections: Unknown    Frequency of Communication with Friends and Family: More than three times a week    Frequency of Social Gatherings with Friends and Family: More than three times a week    Active Member of Clubs or Organizations: Yes    Attends Club or Organization Meetings: More than 4 times per year    Marital Status:    Housing Stability: Low Risk     Unable to Pay for Housing in the Last Year: No    Number of Places Lived in the Last Year: 1    Unstable Housing in the Last Year: No       Chief Complaint:   No chief complaint on file.        History of present illness:  This is a 76-year-old male seen for left knee pain.   Cannot take NSAIDs due to an ulcer.  Has had cortisone injections and hyaluronic injection in the past as well.  Last treatment was several years ago.  Pain started again about 3-4 weeks ago.  Pain along the posterior medial aspect of his knee.  No new injury.  [place]  Was having hip pain is so might have affected the way he was ambulating.  MRI showed some chondral thinning of the medial compartment without meniscal tearing.      Answers for HPI/ROS  submitted by the patient on 12/23/2019   Leg pain  unexpected weight change: No  appetite change : No  sleep disturbance: No  IMMUNOCOMPROMISED: No  nervous/ anxious: No  dysphoric mood: No  rash: No  visual disturbance: No  eye redness: No  eye pain: No  ear pain: No  tinnitus: Yes  hearing loss: No  sinus pressure : Yes  nosebleeds: Yes  enviro allergies: No  food allergies: No  cough: No  shortness of breath: Yes  sweating: No  frequency: Yes  difficulty urinating: No  hematuria: No  chest pain: No  palpitations: No  nausea: No  vomiting: No  diarrhea: No  blood in stool: No  constipation: No  headaches: Yes  dizziness: No  numbness: No  seizures: No  joint swelling: No  myalgia: No  weakness: No  back pain: Yes  Pain Chronicity: recurrent  History of trauma: No  Onset: more than 1 month ago  Frequency: daily  Progression since onset: waxing and waning  Injury mechanism: twisting  injury location: at home  pain- numeric: 3/10  pain location: left knee  pain quality: aching  Radiating Pain: No  Aggravating factors: bending, extension, twisting  fever: No  inability to bear weight: No  itching: No  joint locking: No  limited range of motion: Yes  stiffness: Yes  tingling: No  Treatments tried: cold, heat, exercise, injection treatment, NSAIDs, OTC ointments  physical therapy: not tried  Improvement on treatment: mild      Physical Examination:    Vital Signs:    There were no vitals filed for this visit.      There is no height or weight on file to calculate BMI.    This a well-developed, well nourished patient in no acute distress.  They are alert and oriented and cooperative to examination.  Pt. walks without an antalgic gait.      Examination of the left knee shows no rashes or erythema. There are no masses ecchymosis or effusion. Patient has full range of motion from 0-130°. Patient is moderately tender to palpation over lateral joint line and moderately tender to palpation over the medial joint line. Patient  has a - Lachman exam, - anterior drawer exam, and - posterior drawer exam. - Fabián's exam. Knee is stable to varus and valgus stress. 5 out of 5 motor strength. Palpable distal pulses. Intact light touch sensation. Negative Patellofemoral crepitus      X-rays:  X-rays of the left knee is  reviewed which show severe lateral joint space narrowing on the right and more moderate to severe medial joint space wear on the left.  No significant progression compared to previous x-rays    MRI of the left knee is reviewed interpreted:No evidence of ligamental meniscal injury  Diffuse mild subcutaneous edema  Moderate thinning of the medial femoral cartilage and medial patellar cartilage with mild chondromalacia of the talar cartilage    Assessment::  Left knee arthritis    Plan: I injected his left knee with Orthovisc 1 of 3.  Follow up next week.    The patient has tried a self guided exercise program that has included walking without significant improvement. Minimal relief with tylenol or OTC Nsaids. Reports less than 8 weeks relief with IA steroid injection. Kellgren Arnulfo scale of 3. They are not receiving another HA injectable at this time. I will precert for gel injection.       This note was created using Smarts[employer] voice recognition software that occasionally misinterpreted phrases or words.    Consult note is delivered via Epic messaging service.

## 2022-12-29 ENCOUNTER — OFFICE VISIT (OUTPATIENT)
Dept: ORTHOPEDICS | Facility: CLINIC | Age: 77
End: 2022-12-29
Payer: MEDICARE

## 2022-12-29 DIAGNOSIS — M17.12 PRIMARY OSTEOARTHRITIS OF LEFT KNEE: Primary | ICD-10-CM

## 2022-12-29 PROCEDURE — 99499 UNLISTED E&M SERVICE: CPT | Mod: HCNC,S$GLB,, | Performed by: ORTHOPAEDIC SURGERY

## 2022-12-29 PROCEDURE — 1160F RVW MEDS BY RX/DR IN RCRD: CPT | Mod: HCNC,CPTII,S$GLB, | Performed by: ORTHOPAEDIC SURGERY

## 2022-12-29 PROCEDURE — 20610 PR DRAIN/INJECT LARGE JOINT/BURSA: ICD-10-PCS | Mod: HCNC,LT,S$GLB, | Performed by: ORTHOPAEDIC SURGERY

## 2022-12-29 PROCEDURE — 99499 NO LOS: ICD-10-PCS | Mod: HCNC,S$GLB,, | Performed by: ORTHOPAEDIC SURGERY

## 2022-12-29 PROCEDURE — 1157F PR ADVANCE CARE PLAN OR EQUIV PRESENT IN MEDICAL RECORD: ICD-10-PCS | Mod: HCNC,CPTII,S$GLB, | Performed by: ORTHOPAEDIC SURGERY

## 2022-12-29 PROCEDURE — 1159F MED LIST DOCD IN RCRD: CPT | Mod: HCNC,CPTII,S$GLB, | Performed by: ORTHOPAEDIC SURGERY

## 2022-12-29 PROCEDURE — 99999 PR PBB SHADOW E&M-EST. PATIENT-LVL I: ICD-10-PCS | Mod: PBBFAC,HCNC,, | Performed by: ORTHOPAEDIC SURGERY

## 2022-12-29 PROCEDURE — 99999 PR PBB SHADOW E&M-EST. PATIENT-LVL I: CPT | Mod: PBBFAC,HCNC,, | Performed by: ORTHOPAEDIC SURGERY

## 2022-12-29 PROCEDURE — 1159F PR MEDICATION LIST DOCUMENTED IN MEDICAL RECORD: ICD-10-PCS | Mod: HCNC,CPTII,S$GLB, | Performed by: ORTHOPAEDIC SURGERY

## 2022-12-29 PROCEDURE — 20610 DRAIN/INJ JOINT/BURSA W/O US: CPT | Mod: HCNC,LT,S$GLB, | Performed by: ORTHOPAEDIC SURGERY

## 2022-12-29 PROCEDURE — 1157F ADVNC CARE PLAN IN RCRD: CPT | Mod: HCNC,CPTII,S$GLB, | Performed by: ORTHOPAEDIC SURGERY

## 2022-12-29 PROCEDURE — 1160F PR REVIEW ALL MEDS BY PRESCRIBER/CLIN PHARMACIST DOCUMENTED: ICD-10-PCS | Mod: HCNC,CPTII,S$GLB, | Performed by: ORTHOPAEDIC SURGERY

## 2022-12-29 NOTE — PROGRESS NOTES
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    Emphysema lung 11/05/2018    Gastritis     Hx of colonic polyp     Hyperlipidemia     Hypertension     Hypothyroidism     Knee fracture     Myasthenia gravis     Neuropathy 01/03/2013    Obesity 01/29/2015    Pneumonia 03/29/2020    Polyneuropathy     PVC (premature ventricular contraction)     Squamous cell carcinoma 2014    left forearm    Thyroid disease        Past Surgical History:   Procedure Laterality Date    APPENDECTOMY      CATARACT EXTRACTION W/  INTRAOCULAR LENS IMPLANT Bilateral     COLONOSCOPY  03/01/2012    Dr Esteban; hyperplastic polyp; repeat in 5 years    COLONOSCOPY N/A 12/7/2021    Procedure: COLONOSCOPY;  Surgeon: Gabriel Hernandez MD;  Location: [facility];  Service: Endoscopy;  Laterality: N/A;    CYSTOSCOPY N/A 2/26/2019    Procedure: CYSTOSCOPY;  Surgeon: Simba Cheung MD;  Location: [facility];  Service: Urology;  Laterality: N/A;    ENDOSCOPIC ULTRASOUND OF UPPER GASTROINTESTINAL TRACT N/A 1/7/2020    Procedure: ULTRASOUND, UPPER GI TRACT, ENDOSCOPIC;  Surgeon: Kati Ryan MD;  [facility]  Service: Endoscopy;  Laterality: N/A;    ESOPHAGOGASTRODUODENOSCOPY N/A 9/12/2018    Procedure: EGD (ESOPHAGOGASTRODUODENOSCOPY);  Surgeon: Gabriel Hernandez MD;  Location: [facility];  Service: Endoscopy;  Laterality: N/A;    ESOPHAGOGASTRODUODENOSCOPY N/A 8/19/2019    Procedure: EGD (ESOPHAGOGASTRODUODENOSCOPY);  Surgeon: Gabriel Hernandez MD;  Location: [facility];  Service: Endoscopy;  Laterality: N/A;    ESOPHAGOGASTRODUODENOSCOPY N/A 10/7/2019    Procedure: EGD (ESOPHAGOGASTRODUODENOSCOPY);  Surgeon: Gabriel Hernandez MD;  Location: [facility];  Service: Endoscopy;  Laterality: N/A;     ESOPHAGOGASTRODUODENOSCOPY N/A 1/7/2020    Procedure: EGD (ESOPHAGOGASTRODUODENOSCOPY);  Surgeon: Kati Ryan MD;  Location: [address]);  Service: Endoscopy;  Laterality: N/A;    HEMORRHOID SURGERY      INJECTION OF ANESTHETIC AGENT AROUND MEDIAL BRANCH NERVES INNERVATING LUMBAR FACET JOINT Bilateral 10/1/2020    Procedure: Block-nerve-medial branch-lumbar Bilateral L 3,4,5;  Surgeon: Devan Watt MD;  Location: [facility];  Service: Pain Management;  Laterality: Bilateral;    INJECTION OF ANESTHETIC AGENT AROUND MEDIAL BRANCH NERVES INNERVATING LUMBAR FACET JOINT Right 10/7/2022    Procedure: Block-nerve-medial branch-lumbar L3,4,5;  Surgeon: Devan Watt MD;  Location: [facility];  Service: Pain Management;  Laterality: Right;    KNEE ARTHROPLASTY Bilateral     LENGTHENING OF ACHILLES TENDON Right 9/11/2020    Procedure: percutaeous tenotomy of right lateral epicondyle (tenex);  Surgeon: Terry Quach MD;  Location: [facility];  Service: Neurosurgery;  Laterality: Right;  tenex to right lateral epicondyle  Tenex machine SN#[id number], total time 1min. 30seconds    NECK SURGERY      POSTERIOR FUSION OF CERVICAL SPINE WITH LAMINECTOMY N/A 11/7/2018    Procedure: C2-C6 Posterior Cervical Laminectomy & Instrumental Fusion;  Surgeon: Kishore Turner MD;  Location: [address];  Service: Neurosurgery;  Laterality: N/A;    TONSILLECTOMY      TRANSFORAMINAL EPIDURAL INJECTION OF STEROID Right 12/20/2019    Procedure: Injection,steroid,epidural,transforaminal approach;  Surgeon: Devan Watt MD;  Location: [facility];  Service: Pain Management;  Laterality: Right;  L3-4, L4-5    TRANSFORAMINAL EPIDURAL INJECTION OF STEROID Bilateral 2/6/2020    Procedure: Injection,steroid,epidural,transforaminal approach;  Surgeon: Devan Watt MD;  Location: [facility];  Service: Pain Management;  Laterality: Bilateral;  L3-L4,L4-L5    TRANSFORAMINAL EPIDURAL INJECTION OF STEROID Bilateral 8/26/2020    Procedure:  Injection,steroid,epidural,transforaminal approach;  Surgeon: Devan Watt MD;  Location: [facility];  Service: Pain Management;  Laterality: Bilateral;  L3-4, L4-5    TRANSRECTAL ULTRASOUND EXAMINATION N/A 2/26/2019    Procedure: ULTRASOUND, RECTAL APPROACH;  Surgeon: Simba Cheung MD;  Location: [facility];  Service: Urology;  Laterality: N/A;    UPPER GASTROINTESTINAL ENDOSCOPY  09/12/2018    Dr Hernandez; gastritis; extensive intestinal metaplasia; repeat in 1-2- years       Current Outpatient Medications   Medication Sig    albuterol (PROVENTIL/VENTOLIN HFA) 90 mcg/actuation inhaler INHALE 1 TO 2 PUFFS INTO THE LUNGS EVERY 6 HOURS AS NEEDED FOR WHEEZING OR SHORTNESS OF BREATH. FOR RESCUE.    atorvastatin (LIPITOR) 80 MG tablet TAKE 1 TABLET EVERY DAY    carvediloL (COREG) 25 MG tablet TAKE 1 TABLET TWICE DAILY WITH MEALS    cetirizine (ZYRTEC) 10 MG tablet Take 1 tablet by mouth daily as needed.    chlorthalidone (HYGROTEN) 25 MG Tab Take 1 tablet (25 mg total) by mouth once daily.    cholecalciferol, vitamin D3, (VITAMIN D3) 50 mcg (2,000 unit) Cap 1 capsule once daily. Every day    cholestyramine (QUESTRAN) 4 gram packet Take 1 packet (4 g total) by mouth 3 (three) times daily with meals.    coenzyme Q10 (CO Q-10) 100 mg capsule Take 400 mg by mouth once daily.    cyanocobalamin, vitamin B-12, 5,000 mcg Subl Take 5,000 mcg by mouth once daily. Every day    diphenoxylate-atropine 2.5-0.025 mg (LOMOTIL) 2.5-0.025 mg per tablet Take 1 tablet by mouth 4 (four) times daily as needed for Diarrhea.    ezetimibe (ZETIA) 10 mg tablet TAKE 1 TABLET EVERY DAY    FLUAD QUAD 2021-22,65Y UP,,PF, 60 mcg (15 mcg x 4)/0.5 mL Syrg     fluticasone (FLONASE) 50 mcg/actuation nasal spray USE 1 SPRAY IN EACH NOSTRIL TWICE DAILY AS NEEDED  FOR  RHINITIS    gabapentin (NEURONTIN) 300 MG capsule Take 2 capsules (600 mg total) by mouth every evening.    KENALOG 40 mg/mL injection     levothyroxine (SYNTHROID) 50 MCG tablet TAKE 1 TABLET  EVERY DAY    methylsulfonylmethane 1,000 mg Tab Take 1,000 mg by mouth once daily. Every day    milk thistle 200 mg Cap Take 200 mg by mouth 2 (two) times daily. Twice a day    mupirocin (BACTROBAN) 2 % ointment Apply topically 3 (three) times daily.    olmesartan (BENICAR) 20 MG tablet TAKE 1 TABLET EVERY DAY    omega-3 fatty acids 1,000 mg Cap Twice a day    pantoprazole (PROTONIX) 40 MG tablet Take 1 tablet (40 mg total) by mouth 2 (two) times daily.    potassium chloride SA (K-DUR,KLOR-CON) 20 MEQ tablet TAKE 4 TABLETS EVERY DAY  OR AS DIRECTED    predniSONE (DELTASONE) 1 MG tablet TAKE 2 TABLETS EVERY DAY    predniSONE (DELTASONE) 5 MG tablet TAKE 1 TABLET EVERY DAY    sildenafil (REVATIO) 20 mg Tab Take 1 to 3 tabs daily as needed.    tiotropium (SPIRIVA WITH HANDIHALER) 18 mcg inhalation capsule INHALE THE CONTENTS OF 1 CAPSULE EVERY DAY  VIA HANDIHALER (CONTROLLER)     No current facility-administered medications for this visit.       Review of patient's allergies indicates:   Allergen Reactions    No known drug allergies        Family History   Problem Relation Age of Onset    Cataracts Mother     Heart disease Mother         CHF    Hypertension Mother     Hyperlipidemia Mother     Cataracts Father     Glaucoma Father     Heart disease Father     Hyperlipidemia Sister     Hypertension Sister     No Known Problems Daughter     No Known Problems Daughter     Collagen disease Neg Hx     Amblyopia Neg Hx     Blindness Neg Hx     Macular degeneration Neg Hx     Retinal detachment Neg Hx     Strabismus Neg Hx     Cancer Neg Hx     Colon cancer Neg Hx     Esophageal cancer Neg Hx     Stomach cancer Neg Hx     Crohn's disease Neg Hx     Ulcerative colitis Neg Hx        Social History     Socioeconomic History    Marital status:    Tobacco Use    Smoking status: Never    Smokeless tobacco: Never    Tobacco comments:     when a child   Substance and Sexual Activity    Alcohol use: Yes     Comment: rarely     Drug use: No    Sexual activity: Yes     Partners: Female     Social Determinants of Health     Financial Resource Strain: Low Risk     Difficulty of Paying Living Expenses: Not hard at all   Food Insecurity: No Food Insecurity    Worried About Running Out of Food in the Last Year: Never true    Ran Out of Food in the Last Year: Never true   Transportation Needs: No Transportation Needs    Lack of Transportation (Medical): No    Lack of Transportation (Non-Medical): No   Physical Activity: Inactive    Days of Exercise per Week: 0 days    Minutes of Exercise per Session: 0 min   Stress: No Stress Concern Present    Feeling of Stress : Not at all   Social Connections: Unknown    Frequency of Communication with Friends and Family: More than three times a week    Frequency of Social Gatherings with Friends and Family: More than three times a week    Active Member of Clubs or Organizations: Yes    Attends Club or Organization Meetings: More than 4 times per year    Marital Status:    Housing Stability: Low Risk     Unable to Pay for Housing in the Last Year: No    Number of Places Lived in the Last Year: 1    Unstable Housing in the Last Year: No       Chief Complaint:   No chief complaint on file.        History of present illness:  This is a 76-year-old male seen for left knee pain.   Cannot take NSAIDs due to an ulcer.  Has had cortisone injections and hyaluronic injection in the past as well.  Last treatment was several years ago.  Pain started again about 3-4 weeks ago.  Pain along the posterior medial aspect of his knee.  No new injury.  Patient [place]  Was having hip pain is so might have affected the way he was ambulating.  MRI showed some chondral thinning of the medial compartment without meniscal tearing.      Answers for HPI/ROS submitted by the patient on 12/23/2019   Leg pain  unexpected weight change: No  appetite change : No  sleep disturbance:  No  IMMUNOCOMPROMISED: No  nervous/ anxious: No  dysphoric mood: No  rash: No  visual disturbance: No  eye redness: No  eye pain: No  ear pain: No  tinnitus: Yes  hearing loss: No  sinus pressure : Yes  nosebleeds: Yes  enviro allergies: No  food allergies: No  cough: No  shortness of breath: Yes  sweating: No  frequency: Yes  difficulty urinating: No  hematuria: No  chest pain: No  palpitations: No  nausea: No  vomiting: No  diarrhea: No  blood in stool: No  constipation: No  headaches: Yes  dizziness: No  numbness: No  seizures: No  joint swelling: No  myalgia: No  weakness: No  back pain: Yes  Pain Chronicity: recurrent  History of trauma: No  Onset: more than 1 month ago  Frequency: daily  Progression since onset: waxing and waning  Injury mechanism: twisting  injury location: at home  pain- numeric: 3/10  pain location: left knee  pain quality: aching  Radiating Pain: No  Aggravating factors: bending, extension, twisting  fever: No  inability to bear weight: No  itching: No  joint locking: No  limited range of motion: Yes  stiffness: Yes  tingling: No  Treatments tried: cold, heat, exercise, injection treatment, NSAIDs, OTC ointments  physical therapy: not tried  Improvement on treatment: mild      Physical Examination:    Vital Signs:    There were no vitals filed for this visit.      There is no height or weight on file to calculate BMI.    This a well-developed, well nourished patient in no acute distress.  They are alert and oriented and cooperative to examination.  Pt. walks without an antalgic gait.      Examination of the left knee shows no rashes or erythema. There are no masses ecchymosis or effusion. Patient has full range of motion from 0-130°. Patient is moderately tender to palpation over lateral joint line and moderately tender to palpation over the medial joint line. Patient has a - Lachman exam, - anterior drawer exam, and - posterior drawer exam. - Fabián's exam. Knee is stable to varus and  valgus stress. 5 out of 5 motor strength. Palpable distal pulses. Intact light touch sensation. Negative Patellofemoral crepitus      X-rays:  X-rays of the left knee is  reviewed which show severe lateral joint space narrowing on the right and more moderate to severe medial joint space wear on the left.  No significant progression compared to previous x-rays    MRI of the left knee is reviewed interpreted:No evidence of ligamental meniscal injury  Diffuse mild subcutaneous edema  Moderate thinning of the medial femoral cartilage and medial patellar cartilage with mild chondromalacia of the talar cartilage    Assessment::  Left knee arthritis    Plan: I injected his left knee with Orthovisc 2 of 3.  Follow up next week.    The patient has tried a self guided exercise program that has included walking without significant improvement. Minimal relief with tylenol or OTC Nsaids. Reports less than 8 weeks relief with IA steroid injection. Kellgren Arnulfo scale of 3. They are not receiving another HA injectable at this time. I will precert for gel injection.       This note was created using [employer] voice recognition software that occasionally misinterpreted phrases or words.    Consult note is delivered via Epic messaging service.

## 2022-12-29 NOTE — PROCEDURES
Large Joint Aspiration/Injection: L knee joint    Date/Time: 12/29/2022 9:45 AM  Performed by: Haroldo Campbell MD  Authorized by: Haroldo Campbell MD     Consent Done?:  Yes (Verbal)  Indications:  Pain  Site marked: the procedure site was marked    Timeout: prior to procedure the correct patient, procedure, and site was verified      Details:  Needle Size:  20 G  Approach:  Anterolateral  Location:  Knee  Site:  L knee joint  Medications:  30 mg sodium hyaluronate (orthovisc) 30 mg/2 mL  Patient tolerance:  Patient tolerated the procedure well with no immediate complications

## 2023-01-04 ENCOUNTER — HOSPITAL ENCOUNTER (OUTPATIENT)
Dept: RADIOLOGY | Facility: HOSPITAL | Age: 78
Discharge: HOME | End: 2023-01-04
Attending: FAMILY MEDICINE
Payer: MEDICARE

## 2023-01-04 DIAGNOSIS — I89.0 LYMPHEDEMA OF BOTH LOWER EXTREMITIES: ICD-10-CM

## 2023-01-04 PROCEDURE — 76700 US ABDOMEN COMPLETE: ICD-10-PCS | Mod: 26,HCNC,, | Performed by: RADIOLOGY

## 2023-01-04 PROCEDURE — 76700 US EXAM ABDOM COMPLETE: CPT | Mod: 26,HCNC,, | Performed by: RADIOLOGY

## 2023-01-04 PROCEDURE — 76700 US EXAM ABDOM COMPLETE: CPT | Mod: TC,HCNC

## 2023-01-05 ENCOUNTER — OFFICE VISIT (OUTPATIENT)
Dept: ORTHOPEDICS | Facility: CLINIC | Age: 78
End: 2023-01-05
Payer: MEDICARE

## 2023-01-05 DIAGNOSIS — M17.12 PRIMARY OSTEOARTHRITIS OF LEFT KNEE: Primary | ICD-10-CM

## 2023-01-05 PROCEDURE — 1159F MED LIST DOCD IN RCRD: CPT | Mod: HCNC,CPTII,S$GLB, | Performed by: ORTHOPAEDIC SURGERY

## 2023-01-05 PROCEDURE — 20610 PR DRAIN/INJECT LARGE JOINT/BURSA: ICD-10-PCS | Mod: HCNC,LT,S$GLB, | Performed by: ORTHOPAEDIC SURGERY

## 2023-01-05 PROCEDURE — 99999 PR PBB SHADOW E&M-EST. PATIENT-LVL I: ICD-10-PCS | Mod: PBBFAC,HCNC,, | Performed by: ORTHOPAEDIC SURGERY

## 2023-01-05 PROCEDURE — 1160F PR REVIEW ALL MEDS BY PRESCRIBER/CLIN PHARMACIST DOCUMENTED: ICD-10-PCS | Mod: HCNC,CPTII,S$GLB, | Performed by: ORTHOPAEDIC SURGERY

## 2023-01-05 PROCEDURE — 1157F PR ADVANCE CARE PLAN OR EQUIV PRESENT IN MEDICAL RECORD: ICD-10-PCS | Mod: HCNC,CPTII,S$GLB, | Performed by: ORTHOPAEDIC SURGERY

## 2023-01-05 PROCEDURE — 99499 NO LOS: ICD-10-PCS | Mod: HCNC,S$GLB,, | Performed by: ORTHOPAEDIC SURGERY

## 2023-01-05 PROCEDURE — 1159F PR MEDICATION LIST DOCUMENTED IN MEDICAL RECORD: ICD-10-PCS | Mod: HCNC,CPTII,S$GLB, | Performed by: ORTHOPAEDIC SURGERY

## 2023-01-05 PROCEDURE — 1157F ADVNC CARE PLAN IN RCRD: CPT | Mod: HCNC,CPTII,S$GLB, | Performed by: ORTHOPAEDIC SURGERY

## 2023-01-05 PROCEDURE — 1160F RVW MEDS BY RX/DR IN RCRD: CPT | Mod: HCNC,CPTII,S$GLB, | Performed by: ORTHOPAEDIC SURGERY

## 2023-01-05 PROCEDURE — 99999 PR PBB SHADOW E&M-EST. PATIENT-LVL I: CPT | Mod: PBBFAC,HCNC,, | Performed by: ORTHOPAEDIC SURGERY

## 2023-01-05 PROCEDURE — 99499 UNLISTED E&M SERVICE: CPT | Mod: HCNC,S$GLB,, | Performed by: ORTHOPAEDIC SURGERY

## 2023-01-05 PROCEDURE — 20610 DRAIN/INJ JOINT/BURSA W/O US: CPT | Mod: HCNC,LT,S$GLB, | Performed by: ORTHOPAEDIC SURGERY

## 2023-01-05 NOTE — PROCEDURES
Large Joint Aspiration/Injection: L knee joint    Date/Time: 1/5/2023 1:00 PM  Performed by: Haroldo Campbell MD  Authorized by: Haroldo Campbell MD     Consent Done?:  Yes (Verbal)  Indications:  Pain  Site marked: the procedure site was marked    Timeout: prior to procedure the correct patient, procedure, and site was verified      Details:  Needle Size:  20 G  Approach:  Anterolateral  Location:  Knee  Site:  L knee joint  Medications:  30 mg sodium hyaluronate (orthovisc) 30 mg/2 mL  Patient tolerance:  Patient tolerated the procedure well with no immediate complications

## 2023-01-05 NOTE — PROGRESS NOTES
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    Emphysema lung 11/05/2018    Gastritis     Hx of colonic polyp     Hyperlipidemia     Hypertension     Hypothyroidism     Knee fracture     Myasthenia gravis     Neuropathy 01/03/2013    Obesity 01/29/2015    Pneumonia 03/29/2020    Polyneuropathy     PVC (premature ventricular contraction)     Squamous cell carcinoma 2014    left forearm    Thyroid disease        Past Surgical History:   Procedure Laterality Date    APPENDECTOMY      CATARACT EXTRACTION W/  INTRAOCULAR LENS IMPLANT Bilateral     COLONOSCOPY  03/01/2012    Dr Esteban; hyperplastic polyp; repeat in 5 years    COLONOSCOPY N/A 12/7/2021    Procedure: COLONOSCOPY;  Surgeon: Gabriel Hernandez MD;  Location: [facility];  Service: Endoscopy;  Laterality: N/A;    CYSTOSCOPY N/A 2/26/2019    Procedure: CYSTOSCOPY;  Surgeon: Simba Cheung MD;  Location: [facility] OR;  Service: Urology;  Laterality: N/A;    ENDOSCOPIC ULTRASOUND OF UPPER GASTROINTESTINAL TRACT N/A 1/7/2020    Procedure: ULTRASOUND, UPPER GI TRACT, ENDOSCOPIC;  Surgeon: Kati Ryan MD;  [facility]  Service: Endoscopy;  Laterality: N/A;    ESOPHAGOGASTRODUODENOSCOPY N/A 9/12/2018    Procedure: EGD (ESOPHAGOGASTRODUODENOSCOPY);  Surgeon: Gabriel Hernandez MD;  Location: [facility];  Service: Endoscopy;  Laterality: N/A;    ESOPHAGOGASTRODUODENOSCOPY N/A 8/19/2019    Procedure: EGD (ESOPHAGOGASTRODUODENOSCOPY);  Surgeon: Gabriel Hernandez MD;  Location: [facility];  Service: Endoscopy;  Laterality: N/A;    ESOPHAGOGASTRODUODENOSCOPY N/A 10/7/2019    Procedure: EGD (ESOPHAGOGASTRODUODENOSCOPY);  Surgeon: Gabriel Hernandez MD;  Location: [facility];  Service: Endoscopy;  Laterality: N/A;     ESOPHAGOGASTRODUODENOSCOPY N/A 1/7/2020    Procedure: EGD (ESOPHAGOGASTRODUODENOSCOPY);  Surgeon: Kati Ryan MD;  Location: [address]);  Service: Endoscopy;  Laterality: N/A;    HEMORRHOID SURGERY      INJECTION OF ANESTHETIC AGENT AROUND MEDIAL BRANCH NERVES INNERVATING LUMBAR FACET JOINT Bilateral 10/1/2020    Procedure: Block-nerve-medial branch-lumbar Bilateral L 3,4,5;  Surgeon: Devan Watt MD;  Location: [facility];  Service: Pain Management;  Laterality: Bilateral;    INJECTION OF ANESTHETIC AGENT AROUND MEDIAL BRANCH NERVES INNERVATING LUMBAR FACET JOINT Right 10/7/2022    Procedure: Block-nerve-medial branch-lumbar L3,4,5;  Surgeon: Devan Watt MD;  Location: [facility];  Service: Pain Management;  Laterality: Right;    KNEE ARTHROPLASTY Bilateral     LENGTHENING OF ACHILLES TENDON Right 9/11/2020    Procedure: percutaeous tenotomy of right lateral epicondyle (tenex);  Surgeon: Terry Quach MD;  Location: [facility];  Service: Neurosurgery;  Laterality: Right;  tenex to right lateral epicondyle  Tenex machine SN#[id number], total time 1min. 30seconds    NECK SURGERY      POSTERIOR FUSION OF CERVICAL SPINE WITH LAMINECTOMY N/A 11/7/2018    Procedure: C2-C6 Posterior Cervical Laminectomy & Instrumental Fusion;  Surgeon: Kishore Turner MD;  Location: [address];  Service: Neurosurgery;  Laterality: N/A;    TONSILLECTOMY      TRANSFORAMINAL EPIDURAL INJECTION OF STEROID Right 12/20/2019    Procedure: Injection,steroid,epidural,transforaminal approach;  Surgeon: Devan Watt MD;  Location: [facility];  Service: Pain Management;  Laterality: Right;  L3-4, L4-5    TRANSFORAMINAL EPIDURAL INJECTION OF STEROID Bilateral 2/6/2020    Procedure: Injection,steroid,epidural,transforaminal approach;  Surgeon: Devan Watt MD;  Location: [facility];  Service: Pain Management;  Laterality: Bilateral;  L3-L4,L4-L5    TRANSFORAMINAL EPIDURAL INJECTION OF STEROID Bilateral 8/26/2020    Procedure:  Injection,steroid,epidural,transforaminal approach;  Surgeon: Devan Watt MD;  Location: [facility] OR;  Service: Pain Management;  Laterality: Bilateral;  L3-4, L4-5    TRANSRECTAL ULTRASOUND EXAMINATION N/A 2/26/2019    Procedure: ULTRASOUND, RECTAL APPROACH;  Surgeon: Simba Cheung MD;  Location: [facility] OR;  Service: Urology;  Laterality: N/A;    UPPER GASTROINTESTINAL ENDOSCOPY  09/12/2018    Dr Hernandez; gastritis; extensive intestinal metaplasia; repeat in 1-2- years       Current Outpatient Medications   Medication Sig    albuterol (PROVENTIL/VENTOLIN HFA) 90 mcg/actuation inhaler INHALE 1 TO 2 PUFFS INTO THE LUNGS EVERY 6 HOURS AS NEEDED FOR WHEEZING OR SHORTNESS OF BREATH. FOR RESCUE.    atorvastatin (LIPITOR) 80 MG tablet TAKE 1 TABLET EVERY DAY    carvediloL (COREG) 25 MG tablet TAKE 1 TABLET TWICE DAILY WITH MEALS    cetirizine (ZYRTEC) 10 MG tablet Take 1 tablet by mouth daily as needed.    chlorthalidone (HYGROTEN) 25 MG Tab Take 1 tablet (25 mg total) by mouth once daily.    cholecalciferol, vitamin D3, (VITAMIN D3) 50 mcg (2,000 unit) Cap 1 capsule once daily. Every day    cholestyramine (QUESTRAN) 4 gram packet Take 1 packet (4 g total) by mouth 3 (three) times daily with meals.    coenzyme Q10 (CO Q-10) 100 mg capsule Take 400 mg by mouth once daily.    cyanocobalamin, vitamin B-12, 5,000 mcg Subl Take 5,000 mcg by mouth once daily. Every day    diphenoxylate-atropine 2.5-0.025 mg (LOMOTIL) 2.5-0.025 mg per tablet Take 1 tablet by mouth 4 (four) times daily as needed for Diarrhea.    ezetimibe (ZETIA) 10 mg tablet TAKE 1 TABLET EVERY DAY    FLUAD QUAD 2021-22,65Y UP,,PF, 60 mcg (15 mcg x 4)/0.5 mL Syrg     fluticasone (FLONASE) 50 mcg/actuation nasal spray USE 1 SPRAY IN EACH NOSTRIL TWICE DAILY AS NEEDED  FOR  RHINITIS    gabapentin (NEURONTIN) 300 MG capsule Take 2 capsules (600 mg total) by mouth every evening.    KENALOG 40 mg/mL injection     levothyroxine (SYNTHROID) 50 MCG tablet TAKE 1 TABLET  EVERY DAY    methylsulfonylmethane 1,000 mg Tab Take 1,000 mg by mouth once daily. Every day    milk thistle 200 mg Cap Take 200 mg by mouth 2 (two) times daily. Twice a day    mupirocin (BACTROBAN) 2 % ointment Apply topically 3 (three) times daily.    olmesartan (BENICAR) 20 MG tablet TAKE 1 TABLET EVERY DAY    omega-3 fatty acids 1,000 mg Cap Twice a day    pantoprazole (PROTONIX) 40 MG tablet Take 1 tablet (40 mg total) by mouth 2 (two) times daily.    potassium chloride SA (K-DUR,KLOR-CON) 20 MEQ tablet TAKE 4 TABLETS EVERY DAY  OR AS DIRECTED    predniSONE (DELTASONE) 1 MG tablet TAKE 2 TABLETS EVERY DAY    predniSONE (DELTASONE) 5 MG tablet TAKE 1 TABLET EVERY DAY    sildenafil (REVATIO) 20 mg Tab Take 1 to 3 tabs daily as needed.    tiotropium (SPIRIVA WITH HANDIHALER) 18 mcg inhalation capsule INHALE THE CONTENTS OF 1 CAPSULE EVERY DAY  VIA HANDIHALER (CONTROLLER)     No current facility-administered medications for this visit.       Review of patient's allergies indicates:   Allergen Reactions    No known drug allergies        Family History   Problem Relation Age of Onset    Cataracts Mother     Heart disease Mother         CHF    Hypertension Mother     Hyperlipidemia Mother     Cataracts Father     Glaucoma Father     Heart disease Father     Hyperlipidemia Sister     Hypertension Sister     No Known Problems Daughter     No Known Problems Daughter     Collagen disease Neg Hx     Amblyopia Neg Hx     Blindness Neg Hx     Macular degeneration Neg Hx     Retinal detachment Neg Hx     Strabismus Neg Hx     Cancer Neg Hx     Colon cancer Neg Hx     Esophageal cancer Neg Hx     Stomach cancer Neg Hx     Crohn's disease Neg Hx     Ulcerative colitis Neg Hx        Social History     Socioeconomic History    Marital status:    Tobacco Use    Smoking status: Never    Smokeless tobacco: Never    Tobacco comments:     when a child   Substance and Sexual Activity    Alcohol use: Yes     Comment: rarely     Drug use: No    Sexual activity: Yes     Partners: Female     Social Determinants of Health     Financial Resource Strain: Low Risk     Difficulty of Paying Living Expenses: Not hard at all   Food Insecurity: No Food Insecurity    Worried About Running Out of Food in the Last Year: Never true    Ran Out of Food in the Last Year: Never true   Transportation Needs: No Transportation Needs    Lack of Transportation (Medical): No    Lack of Transportation (Non-Medical): No   Physical Activity: Inactive    Days of Exercise per Week: 0 days    Minutes of Exercise per Session: 0 min   Stress: No Stress Concern Present    Feeling of Stress : Not at all   Social Connections: Unknown    Frequency of Communication with Friends and Family: More than three times a week    Frequency of Social Gatherings with Friends and Family: More than three times a week    Active Member of Clubs or Organizations: Yes    Attends Club or Organization Meetings: More than 4 times per year    Marital Status:    Housing Stability: Low Risk     Unable to Pay for Housing in the Last Year: No    Number of Places Lived in the Last Year: 1    Unstable Housing in the Last Year: No       Chief Complaint:   No chief complaint on file.        History of present illness:  This is a 76-year-old male seen for left knee pain.   Cannot take NSAIDs due to an ulcer.  Has had cortisone injections and hyaluronic injection in the past as well.  Last treatment was several years ago.  Pain started again about 3-4 weeks ago.  Pain along the posterior medial aspect of his knee.  No new injury.  Patient [place] doing lot [place]  Was having hip pain is so might have affected the way he was ambulating.  MRI showed some chondral thinning of the medial compartment without meniscal tearing.      Answers for HPI/ROS submitted by the patient on 12/23/2019   Leg pain  unexpected weight change: No  appetite change : No  sleep disturbance:  No  IMMUNOCOMPROMISED: No  nervous/ anxious: No  dysphoric mood: No  rash: No  visual disturbance: No  eye redness: No  eye pain: No  ear pain: No  tinnitus: Yes  hearing loss: No  sinus pressure : Yes  nosebleeds: Yes  enviro allergies: No  food allergies: No  cough: No  shortness of breath: Yes  sweating: No  frequency: Yes  difficulty urinating: No  hematuria: No  chest pain: No  palpitations: No  nausea: No  vomiting: No  diarrhea: No  blood in stool: No  constipation: No  headaches: Yes  dizziness: No  numbness: No  seizures: No  joint swelling: No  myalgia: No  weakness: No  back pain: Yes  Pain Chronicity: recurrent  History of trauma: No  Onset: more than 1 month ago  Frequency: daily  Progression since onset: waxing and waning  Injury mechanism: twisting  injury location: at home  pain- numeric: 3/10  pain location: left knee  pain quality: aching  Radiating Pain: No  Aggravating factors: bending, extension, twisting  fever: No  inability to bear weight: No  itching: No  joint locking: No  limited range of motion: Yes  stiffness: Yes  tingling: No  Treatments tried: cold, heat, exercise, injection treatment, NSAIDs, OTC ointments  physical therapy: not tried  Improvement on treatment: mild      Physical Examination:    Vital Signs:    There were no vitals filed for this visit.      There is no height or weight on file to calculate BMI.    This a well-developed, well nourished patient in no acute distress.  They are alert and oriented and cooperative to examination.  Pt. walks without an antalgic gait.      Examination of the left knee shows no rashes or erythema. There are no masses ecchymosis or effusion. Patient has full range of motion from 0-130°. Patient is moderately tender to palpation over lateral joint line and moderately tender to palpation over the medial joint line. Patient has a - Lachman exam, - anterior drawer exam, and - posterior drawer exam. - Fabián's exam. Knee is stable to varus and  valgus stress. 5 out of 5 motor strength. Palpable distal pulses. Intact light touch sensation. Negative Patellofemoral crepitus      X-rays:  X-rays of the left knee is  reviewed which show severe lateral joint space narrowing on the right and more moderate to severe medial joint space wear on the left.  No significant progression compared to previous x-rays    MRI of the left knee is reviewed interpreted:No evidence of ligamental meniscal injury  Diffuse mild subcutaneous edema  Moderate thinning of the medial femoral cartilage and medial patellar cartilage with mild chondromalacia of the talar cartilage    Assessment::  Left knee arthritis    Plan: I injected his left knee with Orthovisc 3 of 3.  Follow up prn    The patient has tried a self guided exercise program that has included walking without significant improvement. Minimal relief with tylenol or OTC Nsaids. Reports less than 8 weeks relief with IA steroid injection. Kellgren Arnulfo scale of 3. They are not receiving another HA injectable at this time. I will precert for gel injection.       This note was created using [employer] voice recognition software that occasionally misinterpreted phrases or words.    Consult note is delivered via Epic messaging service.

## 2023-01-10 PROBLEM — E87.6 HYPOKALEMIA: Status: ACTIVE | Noted: 2023-01-10

## 2023-01-11 ENCOUNTER — OFFICE VISIT (OUTPATIENT)
Dept: CARDIOLOGY | Facility: CLINIC | Age: 78
End: 2023-01-11
Payer: MEDICARE

## 2023-01-11 VITALS
SYSTOLIC BLOOD PRESSURE: 125 MMHG | HEART RATE: 64 BPM | OXYGEN SATURATION: 94 % | DIASTOLIC BLOOD PRESSURE: 65 MMHG | WEIGHT: 239.63 LBS | HEIGHT: 72 IN | BODY MASS INDEX: 32.46 KG/M2

## 2023-01-11 DIAGNOSIS — I10 HTN (HYPERTENSION), BENIGN: Primary | ICD-10-CM

## 2023-01-11 DIAGNOSIS — E87.6 HYPOKALEMIA: ICD-10-CM

## 2023-01-11 DIAGNOSIS — I47.29 NON-SUSTAINED VENTRICULAR TACHYCARDIA: ICD-10-CM

## 2023-01-11 DIAGNOSIS — M51.36 DDD (DEGENERATIVE DISC DISEASE), LUMBAR: ICD-10-CM

## 2023-01-11 DIAGNOSIS — N13.8 BPH WITH OBSTRUCTION/LOWER URINARY TRACT SYMPTOMS: ICD-10-CM

## 2023-01-11 DIAGNOSIS — R29.818 NEUROGENIC CLAUDICATION: ICD-10-CM

## 2023-01-11 DIAGNOSIS — E78.2 MIXED HYPERLIPIDEMIA: ICD-10-CM

## 2023-01-11 DIAGNOSIS — I44.4 LEFT ANTERIOR FASCICULAR HEMIBLOCK: ICD-10-CM

## 2023-01-11 DIAGNOSIS — N40.1 BPH WITH OBSTRUCTION/LOWER URINARY TRACT SYMPTOMS: ICD-10-CM

## 2023-01-11 DIAGNOSIS — N52.01 ERECTILE DYSFUNCTION DUE TO ARTERIAL INSUFFICIENCY: ICD-10-CM

## 2023-01-11 DIAGNOSIS — M48.02 CERVICAL STENOSIS OF SPINAL CANAL: ICD-10-CM

## 2023-01-11 DIAGNOSIS — I77.1 TORTUOUS AORTA: ICD-10-CM

## 2023-01-11 DIAGNOSIS — Z98.1 S/P CERVICAL SPINAL FUSION: ICD-10-CM

## 2023-01-11 DIAGNOSIS — E03.8 OTHER SPECIFIED HYPOTHYROIDISM: ICD-10-CM

## 2023-01-11 DIAGNOSIS — I45.10 RBBB: ICD-10-CM

## 2023-01-11 DIAGNOSIS — G70.00 MYASTHENIA GRAVIS, ACHR ANTIBODY POSITIVE: ICD-10-CM

## 2023-01-11 PROCEDURE — 3074F SYST BP LT 130 MM HG: CPT | Mod: HCNC,CPTII,S$GLB, | Performed by: INTERNAL MEDICINE

## 2023-01-11 PROCEDURE — 3074F PR MOST RECENT SYSTOLIC BLOOD PRESSURE < 130 MM HG: ICD-10-PCS | Mod: HCNC,CPTII,S$GLB, | Performed by: INTERNAL MEDICINE

## 2023-01-11 PROCEDURE — 3078F PR MOST RECENT DIASTOLIC BLOOD PRESSURE < 80 MM HG: ICD-10-PCS | Mod: HCNC,CPTII,S$GLB, | Performed by: INTERNAL MEDICINE

## 2023-01-11 PROCEDURE — 1157F PR ADVANCE CARE PLAN OR EQUIV PRESENT IN MEDICAL RECORD: ICD-10-PCS | Mod: HCNC,CPTII,S$GLB, | Performed by: INTERNAL MEDICINE

## 2023-01-11 PROCEDURE — 1157F ADVNC CARE PLAN IN RCRD: CPT | Mod: HCNC,CPTII,S$GLB, | Performed by: INTERNAL MEDICINE

## 2023-01-11 PROCEDURE — 99214 PR OFFICE/OUTPT VISIT, EST, LEVL IV, 30-39 MIN: ICD-10-PCS | Mod: HCNC,S$GLB,, | Performed by: INTERNAL MEDICINE

## 2023-01-11 PROCEDURE — 1125F PR PAIN SEVERITY QUANTIFIED, PAIN PRESENT: ICD-10-PCS | Mod: HCNC,CPTII,S$GLB, | Performed by: INTERNAL MEDICINE

## 2023-01-11 PROCEDURE — 99999 PR PBB SHADOW E&M-EST. PATIENT-LVL IV: ICD-10-PCS | Mod: PBBFAC,HCNC,, | Performed by: INTERNAL MEDICINE

## 2023-01-11 PROCEDURE — 99214 OFFICE O/P EST MOD 30 MIN: CPT | Mod: HCNC,S$GLB,, | Performed by: INTERNAL MEDICINE

## 2023-01-11 PROCEDURE — 99999 PR PBB SHADOW E&M-EST. PATIENT-LVL IV: CPT | Mod: PBBFAC,HCNC,, | Performed by: INTERNAL MEDICINE

## 2023-01-11 PROCEDURE — 3288F PR FALLS RISK ASSESSMENT DOCUMENTED: ICD-10-PCS | Mod: HCNC,CPTII,S$GLB, | Performed by: INTERNAL MEDICINE

## 2023-01-11 PROCEDURE — 1101F PT FALLS ASSESS-DOCD LE1/YR: CPT | Mod: HCNC,CPTII,S$GLB, | Performed by: INTERNAL MEDICINE

## 2023-01-11 PROCEDURE — 1159F PR MEDICATION LIST DOCUMENTED IN MEDICAL RECORD: ICD-10-PCS | Mod: HCNC,CPTII,S$GLB, | Performed by: INTERNAL MEDICINE

## 2023-01-11 PROCEDURE — 1159F MED LIST DOCD IN RCRD: CPT | Mod: HCNC,CPTII,S$GLB, | Performed by: INTERNAL MEDICINE

## 2023-01-11 PROCEDURE — 3078F DIAST BP <80 MM HG: CPT | Mod: HCNC,CPTII,S$GLB, | Performed by: INTERNAL MEDICINE

## 2023-01-11 PROCEDURE — 1125F AMNT PAIN NOTED PAIN PRSNT: CPT | Mod: HCNC,CPTII,S$GLB, | Performed by: INTERNAL MEDICINE

## 2023-01-11 PROCEDURE — 3288F FALL RISK ASSESSMENT DOCD: CPT | Mod: HCNC,CPTII,S$GLB, | Performed by: INTERNAL MEDICINE

## 2023-01-11 PROCEDURE — 1101F PR PT FALLS ASSESS DOC 0-1 FALLS W/OUT INJ PAST YR: ICD-10-PCS | Mod: HCNC,CPTII,S$GLB, | Performed by: INTERNAL MEDICINE

## 2023-01-11 NOTE — PATIENT INSTRUCTIONS
Discussed diet , achieving and maintaining ideal body weight, and exercise.   We reviewed meds in detail.  Reassured-Discussed goals, options, plan.  Omega-3 > 800 mg/d combined EPA/DHA.  Goal BP< 130/80.  Goal LDL < 100.  Cleared for eye surgery

## 2023-01-11 NOTE — PROGRESS NOTES
Subjective:   Patient ID:  Maximilian Tavera Jr. is a 77 y.o. male who presents for follow-up of CVD    HPI: The patient is here for CAD risk factors and low CAC.   The patient has no chest pain, SOB, TIA, palpitations, syncope or pre-syncope.BP has       Review of Systems   Constitutional: Negative for chills, decreased appetite, diaphoresis, fever, malaise/fatigue, night sweats, weight gain and weight loss.   HENT:  Negative for congestion, hoarse voice, nosebleeds, sore throat and tinnitus.    Eyes:  Negative for blurred vision, double vision, vision loss in left eye, vision loss in right eye, visual disturbance and visual halos.   Cardiovascular:  Negative for chest pain, claudication, cyanosis, dyspnea on exertion, irregular heartbeat, leg swelling, near-syncope, orthopnea, palpitations, paroxysmal nocturnal dyspnea and syncope.   Respiratory:  Negative for cough, hemoptysis, shortness of breath, sleep disturbances due to breathing, snoring, sputum production and wheezing.    Endocrine: Negative for cold intolerance, heat intolerance, polydipsia, polyphagia and polyuria.   Hematologic/Lymphatic: Negative for adenopathy and bleeding problem. Does not bruise/bleed easily.   Skin:  Negative for color change, dry skin, flushing, itching, nail changes, poor wound healing, rash, skin cancer, suspicious lesions and unusual hair distribution.   Musculoskeletal:  Positive for arthritis, back pain and joint pain. Negative for falls, gout, joint swelling, muscle cramps, muscle weakness, myalgias and stiffness.   Gastrointestinal:  Negative for abdominal pain, anorexia, change in bowel habit, constipation, diarrhea, dysphagia, heartburn, hematemesis, hematochezia, melena and vomiting.   Genitourinary:  Negative for decreased libido, dysuria, hematuria, hesitancy and urgency.   Neurological:  Negative for excessive daytime sleepiness, dizziness, focal weakness, headaches, light-headedness, loss of balance, numbness,  paresthesias, seizures, sensory change, tremors, vertigo and weakness.   Psychiatric/Behavioral:  Negative for altered mental status, depression, hallucinations, memory loss, substance abuse and suicidal ideas. The patient does not have insomnia and is not nervous/anxious.    Allergic/Immunologic: Negative for environmental allergies and hives.     Objective: /65   Pulse 64   Ht 6' (1.829 m)   Wt 108.7 kg (239 lb 10.2 oz)   SpO2 (!) 94%   BMI 32.50 kg/m²      Physical Exam  Constitutional:       General: He is not in acute distress.     Appearance: He is well-developed. He is not diaphoretic.   HENT:      Head: Normocephalic.   Eyes:      Pupils: Pupils are equal, round, and reactive to light.   Neck:      Thyroid: No thyromegaly.   Cardiovascular:      Rate and Rhythm: Normal rate and regular rhythm.      Pulses: Intact distal pulses.           Carotid pulses are 3+ on the right side and 3+ on the left side.       Radial pulses are 3+ on the right side and 3+ on the left side.        Femoral pulses are 3+ on the right side and 3+ on the left side.       Popliteal pulses are 3+ on the right side and 3+ on the left side.        Dorsalis pedis pulses are 3+ on the right side and 3+ on the left side.        Posterior tibial pulses are 3+ on the right side and 3+ on the left side.      Heart sounds: Normal heart sounds. No murmur heard.    No friction rub. No gallop.   Pulmonary:      Effort: Pulmonary effort is normal. No respiratory distress.      Breath sounds: Normal breath sounds. No wheezing or rales.   Chest:      Chest wall: No tenderness.   Abdominal:      General: There is no distension.      Palpations: Abdomen is soft. There is no mass.      Tenderness: There is no abdominal tenderness.   Musculoskeletal:         General: Normal range of motion.      Cervical back: Normal range of motion.   Lymphadenopathy:      Cervical: No cervical adenopathy.   Skin:     General: Skin is warm.      Nails: There  is no clubbing.   Neurological:      Mental Status: He is alert and oriented to person, place, and time.   Psychiatric:         Speech: Speech normal.         Behavior: Behavior normal.         Thought Content: Thought content normal.         Judgment: Judgment normal.       Assessment:     1. HTN (hypertension), benign    2. Mixed hyperlipidemia    3. Other specified hypothyroidism    4. Erectile dysfunction due to arterial insufficiency    5. DDD (degenerative disc disease), lumbar    6. Neurogenic claudication    7. Myasthenia gravis, AChR antibody positive    8. Tortuous aorta    9. Cervical stenosis of spinal canal    10. S/P cervical spinal fusion    11. BPH with obstruction/lower urinary tract symptoms    12. Hypokalemia    13. Left anterior fascicular hemiblock    14. Non-sustained ventricular tachycardia    15. RBBB        Plan:   Discussed diet , achieving and maintaining ideal body weight, and exercise.   We reviewed meds in detail.  Reassured-Discussed goals, options, plan.  Omega-3 > 800 mg/d combined EPA/DHA.  Goal BP< 130/80.  Goal LDL < 100.  Cleared for eye surgery    Maximilian was seen today for palpitations.    Diagnoses and all orders for this visit:    HTN (hypertension), benign  -     Basic Metabolic Panel; Standing  -     Lipid Panel; Future; Expected date: 03/11/2024  -     Comprehensive Metabolic Panel; Future; Expected date: 03/11/2024  -     TSH; Future; Expected date: 03/11/2024  -     CBC Auto Differential; Future; Expected date: 03/11/2024  -     EKG 12-lead; Future; Expected date: 03/11/2024  -     BNP; Future; Expected date: 03/11/2024  -     Echo; Future; Expected date: 03/11/2024    Mixed hyperlipidemia  -     Lipid Panel; Future; Expected date: 03/11/2024  -     Comprehensive Metabolic Panel; Future; Expected date: 03/11/2024  -     TSH; Future; Expected date: 03/11/2024  -     CBC Auto Differential; Future; Expected date: 03/11/2024  -     EKG 12-lead; Future; Expected date:  03/11/2024    Other specified hypothyroidism  -     TSH; Future; Expected date: 03/11/2024  -     T4, Free; Future; Expected date: 03/11/2024    Erectile dysfunction due to arterial insufficiency    DDD (degenerative disc disease), lumbar    Neurogenic claudication    Myasthenia gravis, AChR antibody positive  -     CBC Auto Differential; Future; Expected date: 03/11/2024    Tortuous aorta    Cervical stenosis of spinal canal    S/P cervical spinal fusion    BPH with obstruction/lower urinary tract symptoms    Hypokalemia  -     Basic Metabolic Panel; Standing  -     Comprehensive Metabolic Panel; Future; Expected date: 03/11/2024    Left anterior fascicular hemiblock  -     Ambulatory referral/consult to Cardiology  -     EKG 12-lead; Future; Expected date: 03/11/2024  -     BNP; Future; Expected date: 03/11/2024  -     Echo; Future; Expected date: 03/11/2024    Non-sustained ventricular tachycardia  -     EKG 12-lead; Future; Expected date: 03/11/2024  -     BNP; Future; Expected date: 03/11/2024  -     Echo; Future; Expected date: 03/11/2024    RBBB  -     EKG 12-lead; Future; Expected date: 03/11/2024  -     BNP; Future; Expected date: 03/11/2024  -     Echo; Future; Expected date: 03/11/2024            Follow up in about 15 months (around 4/11/2024) for with ECG, CFD Evelia Altamirano to read and labs; chem 7 at 6 weeks, 6 and 10 months.

## 2023-01-18 ENCOUNTER — PATIENT MESSAGE (OUTPATIENT)
Dept: FAMILY MEDICINE | Facility: CLINIC | Age: 78
End: 2023-01-18
Payer: MEDICARE

## 2023-01-24 ENCOUNTER — TELEPHONE (OUTPATIENT)
Dept: ADMINISTRATIVE | Facility: CLINIC | Age: 78
End: 2023-01-24
Payer: MEDICARE

## 2023-01-24 NOTE — TELEPHONE ENCOUNTER
Called pt, no answer; left message informing pt I was calling to remind pt of his in office EAWV on 1/26/23 and to return my call if he had any questions; left my name and number

## 2023-01-26 ENCOUNTER — OFFICE VISIT (OUTPATIENT)
Dept: FAMILY MEDICINE | Facility: CLINIC | Age: 78
End: 2023-01-26
Payer: MEDICARE

## 2023-01-26 VITALS
HEART RATE: 68 BPM | TEMPERATURE: 98 F | HEIGHT: 72 IN | BODY MASS INDEX: 31.5 KG/M2 | OXYGEN SATURATION: 96 % | DIASTOLIC BLOOD PRESSURE: 66 MMHG | WEIGHT: 232.56 LBS | SYSTOLIC BLOOD PRESSURE: 122 MMHG

## 2023-01-26 DIAGNOSIS — D69.6 THROMBOCYTOPENIA: ICD-10-CM

## 2023-01-26 DIAGNOSIS — Z00.00 ENCOUNTER FOR PREVENTIVE HEALTH EXAMINATION: Primary | ICD-10-CM

## 2023-01-26 DIAGNOSIS — J44.89 COPD WITH ASTHMA: ICD-10-CM

## 2023-01-26 DIAGNOSIS — I70.0 ABDOMINAL AORTIC ATHEROSCLEROSIS: ICD-10-CM

## 2023-01-26 DIAGNOSIS — G70.00 MYASTHENIA GRAVIS, ACHR ANTIBODY POSITIVE: ICD-10-CM

## 2023-01-26 PROBLEM — N18.31 CHRONIC KIDNEY DISEASE, STAGE 3A: Status: RESOLVED | Noted: 2022-06-15 | Resolved: 2023-01-26

## 2023-01-26 PROCEDURE — 99999 PR PBB SHADOW E&M-EST. PATIENT-LVL V: ICD-10-PCS | Mod: PBBFAC,HCNC,, | Performed by: NURSE PRACTITIONER

## 2023-01-26 PROCEDURE — 1159F PR MEDICATION LIST DOCUMENTED IN MEDICAL RECORD: ICD-10-PCS | Mod: HCNC,CPTII,S$GLB, | Performed by: NURSE PRACTITIONER

## 2023-01-26 PROCEDURE — 1157F PR ADVANCE CARE PLAN OR EQUIV PRESENT IN MEDICAL RECORD: ICD-10-PCS | Mod: HCNC,CPTII,S$GLB, | Performed by: NURSE PRACTITIONER

## 2023-01-26 PROCEDURE — 1101F PR PT FALLS ASSESS DOC 0-1 FALLS W/OUT INJ PAST YR: ICD-10-PCS | Mod: HCNC,CPTII,S$GLB, | Performed by: NURSE PRACTITIONER

## 2023-01-26 PROCEDURE — 1160F PR REVIEW ALL MEDS BY PRESCRIBER/CLIN PHARMACIST DOCUMENTED: ICD-10-PCS | Mod: HCNC,CPTII,S$GLB, | Performed by: NURSE PRACTITIONER

## 2023-01-26 PROCEDURE — 1170F PR FUNCTIONAL STATUS ASSESSED: ICD-10-PCS | Mod: HCNC,CPTII,S$GLB, | Performed by: NURSE PRACTITIONER

## 2023-01-26 PROCEDURE — 3078F PR MOST RECENT DIASTOLIC BLOOD PRESSURE < 80 MM HG: ICD-10-PCS | Mod: HCNC,CPTII,S$GLB, | Performed by: NURSE PRACTITIONER

## 2023-01-26 PROCEDURE — 3078F DIAST BP <80 MM HG: CPT | Mod: HCNC,CPTII,S$GLB, | Performed by: NURSE PRACTITIONER

## 2023-01-26 PROCEDURE — 3288F PR FALLS RISK ASSESSMENT DOCUMENTED: ICD-10-PCS | Mod: HCNC,CPTII,S$GLB, | Performed by: NURSE PRACTITIONER

## 2023-01-26 PROCEDURE — 1157F ADVNC CARE PLAN IN RCRD: CPT | Mod: HCNC,CPTII,S$GLB, | Performed by: NURSE PRACTITIONER

## 2023-01-26 PROCEDURE — G0439 PPPS, SUBSEQ VISIT: HCPCS | Mod: HCNC,S$GLB,, | Performed by: NURSE PRACTITIONER

## 2023-01-26 PROCEDURE — 3074F SYST BP LT 130 MM HG: CPT | Mod: HCNC,CPTII,S$GLB, | Performed by: NURSE PRACTITIONER

## 2023-01-26 PROCEDURE — G0439 PR MEDICARE ANNUAL WELLNESS SUBSEQUENT VISIT: ICD-10-PCS | Mod: HCNC,S$GLB,, | Performed by: NURSE PRACTITIONER

## 2023-01-26 PROCEDURE — 99999 PR PBB SHADOW E&M-EST. PATIENT-LVL V: CPT | Mod: PBBFAC,HCNC,, | Performed by: NURSE PRACTITIONER

## 2023-01-26 PROCEDURE — 3074F PR MOST RECENT SYSTOLIC BLOOD PRESSURE < 130 MM HG: ICD-10-PCS | Mod: HCNC,CPTII,S$GLB, | Performed by: NURSE PRACTITIONER

## 2023-01-26 PROCEDURE — 1125F PR PAIN SEVERITY QUANTIFIED, PAIN PRESENT: ICD-10-PCS | Mod: HCNC,CPTII,S$GLB, | Performed by: NURSE PRACTITIONER

## 2023-01-26 PROCEDURE — 1125F AMNT PAIN NOTED PAIN PRSNT: CPT | Mod: HCNC,CPTII,S$GLB, | Performed by: NURSE PRACTITIONER

## 2023-01-26 PROCEDURE — 3288F FALL RISK ASSESSMENT DOCD: CPT | Mod: HCNC,CPTII,S$GLB, | Performed by: NURSE PRACTITIONER

## 2023-01-26 PROCEDURE — 1160F RVW MEDS BY RX/DR IN RCRD: CPT | Mod: HCNC,CPTII,S$GLB, | Performed by: NURSE PRACTITIONER

## 2023-01-26 PROCEDURE — 1101F PT FALLS ASSESS-DOCD LE1/YR: CPT | Mod: HCNC,CPTII,S$GLB, | Performed by: NURSE PRACTITIONER

## 2023-01-26 PROCEDURE — 1170F FXNL STATUS ASSESSED: CPT | Mod: HCNC,CPTII,S$GLB, | Performed by: NURSE PRACTITIONER

## 2023-01-26 PROCEDURE — 1159F MED LIST DOCD IN RCRD: CPT | Mod: HCNC,CPTII,S$GLB, | Performed by: NURSE PRACTITIONER

## 2023-01-26 NOTE — PROGRESS NOTES
Maximilian Tavera presented for a  Medicare AWV and comprehensive Health Risk Assessment today. The following components were reviewed and updated:    Medical history  Family History  Social history  Allergies and Current Medications  Health Risk Assessment  Health Maintenance  Care Team         ** See Completed Assessments for Annual Wellness Visit within the encounter summary.**         The following assessments were completed:  Living Situation  CAGE  Depression Screening  Timed Get Up and Go  Whisper Test  Cognitive Function Screening  Nutrition Screening  ADL Screening  PAQ Screening        Vitals:    01/26/23 1017   BP: 122/66   Pulse: 68   Temp: 98.2 °F (36.8 °C)   TempSrc: Oral   SpO2: 96%   Weight: 105.5 kg (232 lb 9.4 oz)   Height: 6' (1.829 m)     Body mass index is 31.54 kg/m².  Physical Exam  Constitutional:       Appearance: He is well-developed.   HENT:      Head: Normocephalic and atraumatic.      Right Ear: Hearing normal.      Left Ear: Hearing normal.      Nose: Nose normal.   Eyes:      General: Lids are normal.      Conjunctiva/sclera: Conjunctivae normal.      Pupils: Pupils are equal, round, and reactive to light.   Cardiovascular:      Rate and Rhythm: Normal rate.   Pulmonary:      Effort: Pulmonary effort is normal.   Abdominal:      Palpations: Abdomen is soft.   Musculoskeletal:         General: Normal range of motion.      Cervical back: Normal range of motion and neck supple.   Skin:     General: Skin is warm and dry.   Neurological:      Mental Status: He is alert and oriented to person, place, and time.             Diagnoses and health risks identified today and associated recommendations/orders:    1. Encounter for preventive health examination  Discussed health maintenance guidelines appropriate for age.    Review for Opioid Screening: Patient does not have rx for Opioids.    Review for Substance Use Disorders: Patient does not use substance.      2. Thrombocytopenia  Stable, continue  to monitor  Followed by pcp    3. COPD with asthma  Stable, no acute symptoms currently  Continue current medications  Followed by pcp    4. Myasthenia gravis, AChR antibody positive  Stable, continue care and follow up per neurology     5. Abdominal aortic atherosclerosis  Stable, continue to monitor  Bp well controlled, on statin       Provided Maximilian with a 5-10 year written screening schedule and personal prevention plan. Recommendations were developed using the USPSTF age appropriate recommendations. Education, counseling, and referrals were provided as needed. After Visit Summary printed and given to patient which includes a list of additional screenings\tests needed.    Follow up for One year for Annual Wellness Visit.    Tiffanie Vargas NP       I offered to discuss advanced care planning, including how to pick a person who would make decisions for you if you were unable to make them for yourself, called a health care power of , and what kind of decisions you might make such as use of life sustaining treatments such as ventilators and tube feeding when faced with a life limiting illness recorded on a living will that they will need to know. (How you want to be cared for as you near the end of your natural life)     X  Patient has advanced directives on file, which we reviewed, and they do not wish to make changes.

## 2023-01-26 NOTE — PATIENT INSTRUCTIONS
Counseling and Referral of Other Preventative  (Italic type indicates deductible and co-insurance are waived)    Patient Name: Maximilian Tavera  Today's Date: 1/26/2023    Health Maintenance       Date Due Completion Date    Lipid Panel 12/09/2023 12/9/2022    Colonoscopy 12/07/2024 12/7/2021    TETANUS VACCINE 10/11/2032 10/11/2022        No orders of the defined types were placed in this encounter.      The following information is provided to all patients.  This information is to help you find resources for any of the problems found today that may be affecting your health:                Living healthy guide: www.Count includes the [facility].[place]      Understanding Diabetes: www.diabetes.org      Eating healthy: www.cdc.gov/healthyweight      CDC home safety checklist: www.cdc.gov/steadi/patient.html      Agency on Aging: www.goea.[place]      Alcoholics anonymous (AA): www.aa.org      Physical Activity: www.aristides.nih.gov/cc4zdkv      Tobacco use: www.quitwithusla.org

## 2023-02-07 DIAGNOSIS — Z00.00 ENCOUNTER FOR MEDICARE ANNUAL WELLNESS EXAM: ICD-10-CM

## 2023-02-09 DIAGNOSIS — Z00.00 ENCOUNTER FOR MEDICARE ANNUAL WELLNESS EXAM: ICD-10-CM

## 2023-02-15 ENCOUNTER — PATIENT MESSAGE (OUTPATIENT)
Dept: ORTHOPEDICS | Facility: CLINIC | Age: 78
End: 2023-02-15

## 2023-02-15 ENCOUNTER — OFFICE VISIT (OUTPATIENT)
Dept: ORTHOPEDICS | Facility: CLINIC | Age: 78
End: 2023-02-15
Payer: MEDICARE

## 2023-02-15 DIAGNOSIS — M70.62 TROCHANTERIC BURSITIS OF LEFT HIP: Primary | ICD-10-CM

## 2023-02-15 PROCEDURE — 1157F ADVNC CARE PLAN IN RCRD: CPT | Mod: HCNC,CPTII,S$GLB, | Performed by: ORTHOPAEDIC SURGERY

## 2023-02-15 PROCEDURE — 99204 OFFICE O/P NEW MOD 45 MIN: CPT | Mod: HCNC,S$GLB,, | Performed by: ORTHOPAEDIC SURGERY

## 2023-02-15 PROCEDURE — 1159F MED LIST DOCD IN RCRD: CPT | Mod: HCNC,CPTII,S$GLB, | Performed by: ORTHOPAEDIC SURGERY

## 2023-02-15 PROCEDURE — 99204 PR OFFICE/OUTPT VISIT, NEW, LEVL IV, 45-59 MIN: ICD-10-PCS | Mod: HCNC,S$GLB,, | Performed by: ORTHOPAEDIC SURGERY

## 2023-02-15 PROCEDURE — 99999 PR PBB SHADOW E&M-EST. PATIENT-LVL II: ICD-10-PCS | Mod: PBBFAC,HCNC,, | Performed by: ORTHOPAEDIC SURGERY

## 2023-02-15 PROCEDURE — 1160F PR REVIEW ALL MEDS BY PRESCRIBER/CLIN PHARMACIST DOCUMENTED: ICD-10-PCS | Mod: HCNC,CPTII,S$GLB, | Performed by: ORTHOPAEDIC SURGERY

## 2023-02-15 PROCEDURE — 1160F RVW MEDS BY RX/DR IN RCRD: CPT | Mod: HCNC,CPTII,S$GLB, | Performed by: ORTHOPAEDIC SURGERY

## 2023-02-15 PROCEDURE — 99999 PR PBB SHADOW E&M-EST. PATIENT-LVL II: CPT | Mod: PBBFAC,HCNC,, | Performed by: ORTHOPAEDIC SURGERY

## 2023-02-15 PROCEDURE — 1157F PR ADVANCE CARE PLAN OR EQUIV PRESENT IN MEDICAL RECORD: ICD-10-PCS | Mod: HCNC,CPTII,S$GLB, | Performed by: ORTHOPAEDIC SURGERY

## 2023-02-15 PROCEDURE — 1159F PR MEDICATION LIST DOCUMENTED IN MEDICAL RECORD: ICD-10-PCS | Mod: HCNC,CPTII,S$GLB, | Performed by: ORTHOPAEDIC SURGERY

## 2023-02-15 RX ORDER — TRAMADOL HYDROCHLORIDE 50 MG/1
50 TABLET ORAL EVERY 6 HOURS PRN
Qty: 10 TABLET | Refills: 0 | Status: SHIPPED | OUTPATIENT
Start: 2023-02-15 | End: 2023-06-15 | Stop reason: ALTCHOICE

## 2023-02-15 NOTE — PROGRESS NOTES
Subjective:      Patient ID: Maximilian Tavera Jr. is a 77 y.o. male.    Chief Complaint: No chief complaint on file.    HPI  Patient is in for follow up on some left-sided lower extremity pain.  He states that he had very acute pain a week or so ago.  This is only a few weeks removed from previous treatment provided by Dr. Campbell.  Denies any trauma.  Knee pain is marginally improved.  Denies any groin pain radiating pain numbness or tingling.  ROS      Objective:    Ortho Exam     Constitutional:   Patient is alert  and oriented in no acute distress  HEENT:  normocephalic atraumatic; PERRL EOMI  Neck:  Supple without adenopathy  Cardiovascular:  Normal rate and rhythm  Pulmonary:  Normal respiratory effort normal chest wall expansion  Abdominal:  Nonprotuberant nondistended  Musculoskeletal:   patient has no abnormality to inspection or palpation.  There is no scoliosis noted. Minimal tenderness over the low back including his right SI joint  Normal nonantalgic gait. Full and nonpainful bilateral hip range of motion.  He does have tenderness over the left greater trochanter Minimal limitation of forward flexion. No Suad signs noted.  Motor strength is 5/5 all muscle groups tested.  Deep tendon reflexes were 2+ and symmetric. Toes downgoing with Babinski testing with no clonus  Neurological:  No focal defect; cranial nerves 2-12 grossly intact  Psychiatric/behavioral:  Mood and behavior normal    US Abdomen Complete  Narrative: EXAMINATION:  US ABDOMEN COMPLETE    CLINICAL HISTORY:  Lymphedema, not elsewhere classified    TECHNIQUE:  Complete abdominal ultrasound (including pancreas, aorta, liver, gallbladder, common bile duct, IVC, kidneys, and spleen) was performed.    COMPARISON:  06/30/2021    FINDINGS:  Pancreas: The visualized portions of pancreas appear normal.    Aorta: No aneurysm.    Liver: 15.6 cm, normal in size. Homogeneous parenchymal echotexture. No focal lesions.    Gallbladder: No calculi,  wall thickening, or pericholecystic fluid.  Negative sonographic Barnhart's sign.    Biliary system: 3.3 mm common bile duct.  No intrahepatic ductal dilatation.    Inferior vena cava: Normal in appearance.    Right kidney: 11.6 cm. No hydronephrosis.    Left kidney: 11.7 cm. No hydronephrosis.    Spleen: 13.2 cm.  Mildly enlarged.  Previously 12.2 cm.    Miscellaneous: No ascites.  Impression: Mildly enlarged spleen.  Otherwise unremarkable exam.    Electronically signed by: Jerry Purdy MD  Date:    01/04/2023  Time:    09:55       My radiographic Findings:    Radiographs of his left hip show some early degenerative change no acute abnormality these were reviewed from back in October.  Radiographs of the knee show moderate to severe degenerative changes.  Assessment:       Encounter Diagnosis   Name Primary?    Trochanteric bursitis of left hip Yes         Plan:       I have discussed medical condition and treatment options with him at length.  I think his pain is likely multifactorial most likely stemming from low back/sciatica possibly some some stenosis.  He does have some mild tenderness over the hip bursa and after a verbal consent and sterile prep I injected him in that area.  We will try to set up some physical therapy for him.  He is intolerant of NSAIDs.  He can follow up with his PCP for any additional pain medications.  We happy to see him back on a as needed basis if pain persists would need to get some radiographs of his lumbar spine if symptoms persist.  Additionally I will give him a few Ultracet for his pain.        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    Emphysema lung 11/05/2018    Gastritis     Hx of colonic polyp     Hyperlipidemia      Hypertension     Hypothyroidism     Knee fracture     Myasthenia gravis     Neuropathy 01/03/2013    Obesity 01/29/2015    Pneumonia 03/29/2020    Polyneuropathy     PVC (premature ventricular contraction)     Squamous cell carcinoma 2014    left forearm    Thyroid disease      Past Surgical History:   Procedure Laterality Date    APPENDECTOMY      CATARACT EXTRACTION W/  INTRAOCULAR LENS IMPLANT Bilateral     COLONOSCOPY  03/01/2012    Dr Esteban; hyperplastic polyp; repeat in 5 years    COLONOSCOPY N/A 12/7/2021    Procedure: COLONOSCOPY;  Surgeon: Gabriel Hernandez MD;  Location: [facility];  Service: Endoscopy;  Laterality: N/A;    CYSTOSCOPY N/A 2/26/2019    Procedure: CYSTOSCOPY;  Surgeon: Simba Cheung MD;  Location: [facility];  Service: Urology;  Laterality: N/A;    ENDOSCOPIC ULTRASOUND OF UPPER GASTROINTESTINAL TRACT N/A 1/7/2020    Procedure: ULTRASOUND, UPPER GI TRACT, ENDOSCOPIC;  Surgeon: Kati Ryan MD;  Location: [facility] (2ND FLR);  Service: Endoscopy;  Laterality: N/A;    ESOPHAGOGASTRODUODENOSCOPY N/A 9/12/2018    Procedure: EGD (ESOPHAGOGASTRODUODENOSCOPY);  Surgeon: Gabriel Hernandez MD;  Location: [facility];  Service: Endoscopy;  Laterality: N/A;    ESOPHAGOGASTRODUODENOSCOPY N/A 8/19/2019    Procedure: EGD (ESOPHAGOGASTRODUODENOSCOPY);  Surgeon: Gabriel Hernandez MD;  Location: [facility];  Service: Endoscopy;  Laterality: N/A;    ESOPHAGOGASTRODUODENOSCOPY N/A 10/7/2019    Procedure: EGD (ESOPHAGOGASTRODUODENOSCOPY);  Surgeon: Gabriel Hernandez MD;  Location: [facility];  Service: Endoscopy;  Laterality: N/A;    ESOPHAGOGASTRODUODENOSCOPY N/A 1/7/2020    Procedure: EGD (ESOPHAGOGASTRODUODENOSCOPY);  Surgeon: Kati Ryan MD;  Location: [facility] (2ND FLR);  Service: Endoscopy;  Laterality: N/A;    HEMORRHOID SURGERY      INJECTION OF ANESTHETIC AGENT AROUND MEDIAL BRANCH NERVES INNERVATING LUMBAR FACET JOINT Bilateral 10/1/2020    Procedure: Block-nerve-medial branch-lumbar  Bilateral L 3,4,5;  Surgeon: Devan Watt MD;  Location: [facility];  Service: Pain Management;  Laterality: Bilateral;    INJECTION OF ANESTHETIC AGENT AROUND MEDIAL BRANCH NERVES INNERVATING LUMBAR FACET JOINT Right 10/7/2022    Procedure: Block-nerve-medial branch-lumbar L3,4,5;  Surgeon: Devan Watt MD;  Location: [facility];  Service: Pain Management;  Laterality: Right;    KNEE ARTHROPLASTY Bilateral     LENGTHENING OF ACHILLES TENDON Right 9/11/2020    Procedure: percutaeous tenotomy of right lateral epicondyle (tenex);  Surgeon: Terry Quach MD;  Location: [facility];  Service: Neurosurgery;  Laterality: Right;  tenex to right lateral epicondyle  Tenex machine SN#[id number], total time 1min. 30seconds    NECK SURGERY      POSTERIOR FUSION OF CERVICAL SPINE WITH LAMINECTOMY N/A 11/7/2018    Procedure: C2-C6 Posterior Cervical Laminectomy & Instrumental Fusion;  Surgeon: Kishore Turner MD;  Location: [address];  Service: Neurosurgery;  Laterality: N/A;    TONSILLECTOMY      TRANSFORAMINAL EPIDURAL INJECTION OF STEROID Right 12/20/2019    Procedure: Injection,steroid,epidural,transforaminal approach;  Surgeon: Devan Watt MD;  Location: [facility];  Service: Pain Management;  Laterality: Right;  L3-4, L4-5    TRANSFORAMINAL EPIDURAL INJECTION OF STEROID Bilateral 2/6/2020    Procedure: Injection,steroid,epidural,transforaminal approach;  Surgeon: Devan Watt MD;  Location: [facility];  Service: Pain Management;  Laterality: Bilateral;  L3-L4,L4-L5    TRANSFORAMINAL EPIDURAL INJECTION OF STEROID Bilateral 8/26/2020    Procedure: Injection,steroid,epidural,transforaminal approach;  Surgeon: Devan Watt MD;  Location: [facility];  Service: Pain Management;  Laterality: Bilateral;  L3-4, L4-5    TRANSRECTAL ULTRASOUND EXAMINATION N/A 2/26/2019    Procedure: ULTRASOUND, RECTAL APPROACH;  Surgeon: Simba Cheung MD;  Location: [facility];  Service: Urology;  Laterality: N/A;    UPPER GASTROINTESTINAL ENDOSCOPY  09/12/2018      Dibuono; gastritis; extensive intestinal metaplasia; repeat in 1-2- years         Current Outpatient Medications:     albuterol (PROVENTIL/VENTOLIN HFA) 90 mcg/actuation inhaler, INHALE 1 TO 2 PUFFS INTO THE LUNGS EVERY 6 HOURS AS NEEDED FOR WHEEZING OR SHORTNESS OF BREATH. FOR RESCUE., Disp: 25.5 g, Rfl: 0    atorvastatin (LIPITOR) 80 MG tablet, TAKE 1 TABLET EVERY DAY, Disp: 90 tablet, Rfl: 1    carvediloL (COREG) 25 MG tablet, TAKE 1 TABLET TWICE DAILY WITH MEALS, Disp: 180 tablet, Rfl: 3    cetirizine (ZYRTEC) 10 MG tablet, Take 1 tablet by mouth daily as needed., Disp: , Rfl:     chlorthalidone (HYGROTEN) 25 MG Tab, Take 1 tablet (25 mg total) by mouth once daily., Disp: 90 tablet, Rfl: 3    cholecalciferol, vitamin D3, (VITAMIN D3) 50 mcg (2,000 unit) Cap, 1 capsule once daily. Every day, Disp: , Rfl:     coenzyme Q10 (CO Q-10) 100 mg capsule, Take 400 mg by mouth once daily., Disp: , Rfl:     cyanocobalamin, vitamin B-12, 5,000 mcg Subl, Take 5,000 mcg by mouth once daily. Every day, Disp: , Rfl:     diphenoxylate-atropine 2.5-0.025 mg (LOMOTIL) 2.5-0.025 mg per tablet, Take 1 tablet by mouth 4 (four) times daily as needed for Diarrhea., Disp: 90 tablet, Rfl: 0    ezetimibe (ZETIA) 10 mg tablet, TAKE 1 TABLET EVERY DAY, Disp: 90 tablet, Rfl: 3    FLUAD QUAD 2021-22,65Y UP,,PF, 60 mcg (15 mcg x 4)/0.5 mL Syrg, , Disp: , Rfl:     fluticasone (FLONASE) 50 mcg/actuation nasal spray, USE 1 SPRAY IN EACH NOSTRIL TWICE DAILY AS NEEDED  FOR  RHINITIS, Disp: 48 g, Rfl: 3    gabapentin (NEURONTIN) 300 MG capsule, Take 2 capsules (600 mg total) by mouth every evening., Disp: 180 capsule, Rfl: 2    levothyroxine (SYNTHROID) 50 MCG tablet, TAKE 1 TABLET EVERY DAY, Disp: 90 tablet, Rfl: 3    milk thistle 200 mg Cap, Take 200 mg by mouth 2 (two) times daily. Twice a day, Disp: , Rfl:     mupirocin (BACTROBAN) 2 % ointment, Apply topically 3 (three) times daily., Disp: 30 g, Rfl: 0    olmesartan (BENICAR) 20 MG tablet, TAKE  1 TABLET EVERY DAY, Disp: 90 tablet, Rfl: 2    omega-3 fatty acids 1,000 mg Cap, Twice a day, Disp: , Rfl:     pantoprazole (PROTONIX) 40 MG tablet, Take 1 tablet (40 mg total) by mouth 2 (two) times daily., Disp: 180 tablet, Rfl: 3    potassium chloride SA (K-DUR,KLOR-CON) 20 MEQ tablet, TAKE 4 TABLETS EVERY DAY  OR AS DIRECTED, Disp: 360 tablet, Rfl: 3    predniSONE (DELTASONE) 1 MG tablet, TAKE 2 TABLETS EVERY DAY, Disp: 180 tablet, Rfl: 3    predniSONE (DELTASONE) 5 MG tablet, TAKE 1 TABLET EVERY DAY, Disp: 90 tablet, Rfl: 3    sildenafil (REVATIO) 20 mg Tab, Take 1 to 3 tabs daily as needed., Disp: 30 tablet, Rfl: 3    tiotropium (SPIRIVA WITH HANDIHALER) 18 mcg inhalation capsule, INHALE THE CONTENTS OF 1 CAPSULE EVERY DAY  VIA HANDIHALER (CONTROLLER), Disp: 90 capsule, Rfl: 3    Review of patient's allergies indicates:   Allergen Reactions    Spironolactone Diarrhea       Family History   Problem Relation Age of Onset    Cataracts Mother     Heart disease Mother         CHF    Hypertension Mother     Hyperlipidemia Mother     Cataracts Father     Glaucoma Father     Heart disease Father     Hyperlipidemia Sister     Hypertension Sister     No Known Problems Daughter     No Known Problems Daughter     Collagen disease Neg Hx     Amblyopia Neg Hx     Blindness Neg Hx     Macular degeneration Neg Hx     Retinal detachment Neg Hx     Strabismus Neg Hx     Cancer Neg Hx     Colon cancer Neg Hx     Esophageal cancer Neg Hx     Stomach cancer Neg Hx     Crohn's disease Neg Hx     Ulcerative colitis Neg Hx      Social History     Occupational History    Not on file   Tobacco Use    Smoking status: Never    Smokeless tobacco: Never    Tobacco comments:     when a child   Substance and Sexual Activity    Alcohol use: Yes     Comment: rarely    Drug use: No    Sexual activity: Yes     Partners: Female

## 2023-02-22 ENCOUNTER — LAB VISIT (OUTPATIENT)
Dept: LAB | Facility: HOSPITAL | Age: 78
End: 2023-02-22
Attending: INTERNAL MEDICINE
Payer: MEDICARE

## 2023-02-22 DIAGNOSIS — E78.2 MIXED HYPERLIPIDEMIA: ICD-10-CM

## 2023-02-22 DIAGNOSIS — I44.4 LEFT ANTERIOR FASCICULAR HEMIBLOCK: ICD-10-CM

## 2023-02-22 DIAGNOSIS — E87.6 HYPOKALEMIA: ICD-10-CM

## 2023-02-22 DIAGNOSIS — G70.00 MYASTHENIA GRAVIS, ACHR ANTIBODY POSITIVE: ICD-10-CM

## 2023-02-22 DIAGNOSIS — I47.29 NON-SUSTAINED VENTRICULAR TACHYCARDIA: ICD-10-CM

## 2023-02-22 DIAGNOSIS — I45.10 RBBB: ICD-10-CM

## 2023-02-22 DIAGNOSIS — E03.8 OTHER SPECIFIED HYPOTHYROIDISM: ICD-10-CM

## 2023-02-22 DIAGNOSIS — I10 HTN (HYPERTENSION), BENIGN: ICD-10-CM

## 2023-02-22 LAB
ALBUMIN SERPL BCP-MCNC: 3.5 G/DL (ref 3.5–5.2)
ALP SERPL-CCNC: 81 U/L (ref 55–135)
ALT SERPL W/O P-5'-P-CCNC: 32 U/L (ref 10–44)
ANION GAP SERPL CALC-SCNC: 8 MMOL/L (ref 8–16)
ANION GAP SERPL CALC-SCNC: 8 MMOL/L (ref 8–16)
AST SERPL-CCNC: 16 U/L (ref 10–40)
BASOPHILS # BLD AUTO: 0.02 K/UL (ref 0–0.2)
BASOPHILS NFR BLD: 0.2 % (ref 0–1.9)
BILIRUB SERPL-MCNC: 1.1 MG/DL (ref 0.1–1)
BNP SERPL-MCNC: 62 PG/ML (ref 0–99)
BUN SERPL-MCNC: 20 MG/DL (ref 8–23)
BUN SERPL-MCNC: 20 MG/DL (ref 8–23)
CALCIUM SERPL-MCNC: 9.1 MG/DL (ref 8.7–10.5)
CALCIUM SERPL-MCNC: 9.1 MG/DL (ref 8.7–10.5)
CHLORIDE SERPL-SCNC: 103 MMOL/L (ref 95–110)
CHLORIDE SERPL-SCNC: 103 MMOL/L (ref 95–110)
CHOLEST SERPL-MCNC: 131 MG/DL (ref 120–199)
CHOLEST/HDLC SERPL: 3.2 {RATIO} (ref 2–5)
CO2 SERPL-SCNC: 25 MMOL/L (ref 23–29)
CO2 SERPL-SCNC: 25 MMOL/L (ref 23–29)
CREAT SERPL-MCNC: 1 MG/DL (ref 0.5–1.4)
CREAT SERPL-MCNC: 1 MG/DL (ref 0.5–1.4)
DIFFERENTIAL METHOD: ABNORMAL
EOSINOPHIL # BLD AUTO: 0.1 K/UL (ref 0–0.5)
EOSINOPHIL NFR BLD: 0.7 % (ref 0–8)
ERYTHROCYTE [DISTWIDTH] IN BLOOD BY AUTOMATED COUNT: 13.7 % (ref 11.5–14.5)
EST. GFR  (NO RACE VARIABLE): >60 ML/MIN/1.73 M^2
EST. GFR  (NO RACE VARIABLE): >60 ML/MIN/1.73 M^2
GLUCOSE SERPL-MCNC: 220 MG/DL (ref 70–110)
GLUCOSE SERPL-MCNC: 220 MG/DL (ref 70–110)
HCT VFR BLD AUTO: 41.2 % (ref 40–54)
HDLC SERPL-MCNC: 41 MG/DL (ref 40–75)
HDLC SERPL: 31.3 % (ref 20–50)
HGB BLD-MCNC: 13.4 G/DL (ref 14–18)
IMM GRANULOCYTES # BLD AUTO: 0.06 K/UL (ref 0–0.04)
IMM GRANULOCYTES NFR BLD AUTO: 0.6 % (ref 0–0.5)
LDLC SERPL CALC-MCNC: 63.8 MG/DL (ref 63–159)
LYMPHOCYTES # BLD AUTO: 1.3 K/UL (ref 1–4.8)
LYMPHOCYTES NFR BLD: 12.2 % (ref 18–48)
MCH RBC QN AUTO: 29.8 PG (ref 27–31)
MCHC RBC AUTO-ENTMCNC: 32.5 G/DL (ref 32–36)
MCV RBC AUTO: 92 FL (ref 82–98)
MONOCYTES # BLD AUTO: 0.9 K/UL (ref 0.3–1)
MONOCYTES NFR BLD: 8 % (ref 4–15)
NEUTROPHILS # BLD AUTO: 8.5 K/UL (ref 1.8–7.7)
NEUTROPHILS NFR BLD: 78.3 % (ref 38–73)
NONHDLC SERPL-MCNC: 90 MG/DL
NRBC BLD-RTO: 0 /100 WBC
PLATELET # BLD AUTO: 132 K/UL (ref 150–450)
PMV BLD AUTO: 10.8 FL (ref 9.2–12.9)
POTASSIUM SERPL-SCNC: 4 MMOL/L (ref 3.5–5.1)
POTASSIUM SERPL-SCNC: 4 MMOL/L (ref 3.5–5.1)
PROT SERPL-MCNC: 6 G/DL (ref 6–8.4)
RBC # BLD AUTO: 4.49 M/UL (ref 4.6–6.2)
SODIUM SERPL-SCNC: 136 MMOL/L (ref 136–145)
SODIUM SERPL-SCNC: 136 MMOL/L (ref 136–145)
T4 FREE SERPL-MCNC: 1.01 NG/DL (ref 0.71–1.51)
TRIGL SERPL-MCNC: 131 MG/DL (ref 30–150)
TSH SERPL DL<=0.005 MIU/L-ACNC: 1.25 UIU/ML (ref 0.4–4)
WBC # BLD AUTO: 10.9 K/UL (ref 3.9–12.7)

## 2023-02-22 PROCEDURE — 80053 COMPREHEN METABOLIC PANEL: CPT | Mod: HCNC | Performed by: INTERNAL MEDICINE

## 2023-02-22 PROCEDURE — 83880 ASSAY OF NATRIURETIC PEPTIDE: CPT | Mod: HCNC | Performed by: INTERNAL MEDICINE

## 2023-02-22 PROCEDURE — 84443 ASSAY THYROID STIM HORMONE: CPT | Mod: HCNC | Performed by: INTERNAL MEDICINE

## 2023-02-22 PROCEDURE — 80061 LIPID PANEL: CPT | Mod: HCNC | Performed by: INTERNAL MEDICINE

## 2023-02-22 PROCEDURE — 36415 COLL VENOUS BLD VENIPUNCTURE: CPT | Mod: HCNC,PO | Performed by: INTERNAL MEDICINE

## 2023-02-22 PROCEDURE — 84439 ASSAY OF FREE THYROXINE: CPT | Mod: HCNC | Performed by: INTERNAL MEDICINE

## 2023-02-22 PROCEDURE — 85025 COMPLETE CBC W/AUTO DIFF WBC: CPT | Mod: HCNC | Performed by: INTERNAL MEDICINE

## 2023-02-28 ENCOUNTER — OFFICE VISIT (OUTPATIENT)
Dept: CARDIOLOGY | Facility: CLINIC | Age: 78
End: 2023-02-28
Payer: MEDICARE

## 2023-02-28 VITALS
HEIGHT: 72 IN | SYSTOLIC BLOOD PRESSURE: 131 MMHG | HEART RATE: 64 BPM | WEIGHT: 234.81 LBS | BODY MASS INDEX: 31.8 KG/M2 | DIASTOLIC BLOOD PRESSURE: 67 MMHG

## 2023-02-28 DIAGNOSIS — I49.3 PVC'S (PREMATURE VENTRICULAR CONTRACTIONS): ICD-10-CM

## 2023-02-28 DIAGNOSIS — E78.2 MIXED HYPERLIPIDEMIA: ICD-10-CM

## 2023-02-28 DIAGNOSIS — I65.23 BILATERAL CAROTID ARTERY STENOSIS: ICD-10-CM

## 2023-02-28 DIAGNOSIS — I77.1 TORTUOUS AORTA: ICD-10-CM

## 2023-02-28 DIAGNOSIS — I89.0 LYMPHEDEMA OF BOTH LOWER EXTREMITIES: Primary | ICD-10-CM

## 2023-02-28 DIAGNOSIS — M79.89 LEG SWELLING: ICD-10-CM

## 2023-02-28 DIAGNOSIS — I10 HTN (HYPERTENSION), BENIGN: ICD-10-CM

## 2023-02-28 DIAGNOSIS — E66.9 OBESITY (BMI 30-39.9): ICD-10-CM

## 2023-02-28 DIAGNOSIS — I87.2 VENOUS STASIS DERMATITIS OF BOTH LOWER EXTREMITIES: ICD-10-CM

## 2023-02-28 PROCEDURE — 1125F PR PAIN SEVERITY QUANTIFIED, PAIN PRESENT: ICD-10-PCS | Mod: HCNC,CPTII,S$GLB, | Performed by: INTERNAL MEDICINE

## 2023-02-28 PROCEDURE — 99215 PR OFFICE/OUTPT VISIT, EST, LEVL V, 40-54 MIN: ICD-10-PCS | Mod: HCNC,S$GLB,, | Performed by: INTERNAL MEDICINE

## 2023-02-28 PROCEDURE — 99999 PR PBB SHADOW E&M-EST. PATIENT-LVL V: CPT | Mod: PBBFAC,HCNC,, | Performed by: INTERNAL MEDICINE

## 2023-02-28 PROCEDURE — 3075F PR MOST RECENT SYSTOLIC BLOOD PRESS GE 130-139MM HG: ICD-10-PCS | Mod: HCNC,CPTII,S$GLB, | Performed by: INTERNAL MEDICINE

## 2023-02-28 PROCEDURE — 3288F FALL RISK ASSESSMENT DOCD: CPT | Mod: HCNC,CPTII,S$GLB, | Performed by: INTERNAL MEDICINE

## 2023-02-28 PROCEDURE — 3078F DIAST BP <80 MM HG: CPT | Mod: HCNC,CPTII,S$GLB, | Performed by: INTERNAL MEDICINE

## 2023-02-28 PROCEDURE — 1159F PR MEDICATION LIST DOCUMENTED IN MEDICAL RECORD: ICD-10-PCS | Mod: HCNC,CPTII,S$GLB, | Performed by: INTERNAL MEDICINE

## 2023-02-28 PROCEDURE — 1101F PR PT FALLS ASSESS DOC 0-1 FALLS W/OUT INJ PAST YR: ICD-10-PCS | Mod: HCNC,CPTII,S$GLB, | Performed by: INTERNAL MEDICINE

## 2023-02-28 PROCEDURE — 1159F MED LIST DOCD IN RCRD: CPT | Mod: HCNC,CPTII,S$GLB, | Performed by: INTERNAL MEDICINE

## 2023-02-28 PROCEDURE — 3075F SYST BP GE 130 - 139MM HG: CPT | Mod: HCNC,CPTII,S$GLB, | Performed by: INTERNAL MEDICINE

## 2023-02-28 PROCEDURE — 1157F PR ADVANCE CARE PLAN OR EQUIV PRESENT IN MEDICAL RECORD: ICD-10-PCS | Mod: HCNC,CPTII,S$GLB, | Performed by: INTERNAL MEDICINE

## 2023-02-28 PROCEDURE — 1101F PT FALLS ASSESS-DOCD LE1/YR: CPT | Mod: HCNC,CPTII,S$GLB, | Performed by: INTERNAL MEDICINE

## 2023-02-28 PROCEDURE — 3288F PR FALLS RISK ASSESSMENT DOCUMENTED: ICD-10-PCS | Mod: HCNC,CPTII,S$GLB, | Performed by: INTERNAL MEDICINE

## 2023-02-28 PROCEDURE — 1125F AMNT PAIN NOTED PAIN PRSNT: CPT | Mod: HCNC,CPTII,S$GLB, | Performed by: INTERNAL MEDICINE

## 2023-02-28 PROCEDURE — 1157F ADVNC CARE PLAN IN RCRD: CPT | Mod: HCNC,CPTII,S$GLB, | Performed by: INTERNAL MEDICINE

## 2023-02-28 PROCEDURE — 3078F PR MOST RECENT DIASTOLIC BLOOD PRESSURE < 80 MM HG: ICD-10-PCS | Mod: HCNC,CPTII,S$GLB, | Performed by: INTERNAL MEDICINE

## 2023-02-28 PROCEDURE — 99999 PR PBB SHADOW E&M-EST. PATIENT-LVL V: ICD-10-PCS | Mod: PBBFAC,HCNC,, | Performed by: INTERNAL MEDICINE

## 2023-02-28 PROCEDURE — 99215 OFFICE O/P EST HI 40 MIN: CPT | Mod: HCNC,S$GLB,, | Performed by: INTERNAL MEDICINE

## 2023-02-28 NOTE — PATIENT INSTRUCTIONS
Assessment/Plan:  Maximilian Tavera Jr. is a 77 y.o. male with BLE lymphedema, venous insuffciency, HTN, HLD, myasthenia gravis on chronic prednisone therapy, degenerative joint disease with myelopathy s/p decompression and fusion of C2-6, chronic PVCs, who presents for a follow up appointment.       1. Leg swelling- Mr. Tavera reports continued improvement in BLE edema.  Continue lymphedema clinic techniques at home and wear graduated compression hose.  Pt to limit sodium intake to 2,000 mg daily.  Limit volume intake to 1.5 liters daily.  Check cmp in 1 week.      2. HTN- Continue current medications.     3. Chronic PVC's- Stable.  Continue current medications and follow up with EP as scheduled.      4. HLD- LDL is at goal of <70 (69 on 12/9/2022).  Continue atorvastatin 80 mg daily.       Follow up in 6 months

## 2023-02-28 NOTE — PROGRESS NOTES
[facility]      Chief Complaint   Patient presents with    Hypertension    Hyperlipidemia    COPD       Patient ID: Maximilian Tavera Jr. is a 77 y.o. male with BLE lymphedema, venous insuffciency, HTN, HLD, myasthenia gravis on chronic prednisone therapy, degenerative joint disease with myelopathy s/p decompression and fusion of C2-6, chronic PVCs, who presents for a follow up appointment.  Pertinent history/events are as follows:     -Pt kindly referred by Dr. Altamirano for evaluation of leg swelling.    -At our initial clinic visit on 2/3/2022, Mr. Tavera reported leg swelling starting 3-4 months ago.  States his PCP wrapped his legs 2 weeks ago and he lost 8 pounds.  He has no claudication, rest pain, or tissue loss.  Plan:   Leg swelling- Due to lymphedema and possible venous insufficiency.  Check BLE venous reflux study and KATTY study.  Refer to lymphedema clinic.  Increase bumex to 1 mg bid every other day (0.5 mg bid on alternate days).  Pt to limit sodium intake to 2,000 mg daily.  Limit volume intake to 1.5 liters daily.  Check cmp in 1 week.    HTN- Continue current medications.   Chronic PVC's- Stable.  Continue current medications and follow up with EP as scheduled.    HLD- LDL is at goal of <70.  Continue current medications.    -At follow up clinic visit on 5/3/2022, Mr. Tavera reported significant improvement in BLE edema since completing lymphedema clinic therapy.  He continues to do lymphedema clinic techniques at home and wear graduated compression hose.  BLE Venous Reflux Study on 2/10/2022 revealed significant RLE venous reflux and no evidence of lower extremity DVT.  KATTY Study on 2/10/2022 revealed noncompressible ABIs bilaterally consistent with medial arterial calcification.  PVR waveforms are mildly abnormal bilaterally.  Pt states he stopped taking bumex due to frequent urination.   Plan:   Leg swelling- Mr. Tavera reports significant improvement in BLE edema since completing  lymphedema clinic therapy.  BLE Venous Reflux Study on 2/10/2022 revealed significant RLE venous reflux and no evidence of lower extremity DVT.  KATTY Study on 2/10/2022 revealed noncompressible ABIs bilaterally consistent with medial arterial calcification.  PVR waveforms are mildly abnormal bilaterally.  Continue lymphedema clinic techniques at home and wear graduated compression hose.  Pt to limit sodium intake to 2,000 mg daily.  Limit volume intake to 1.5 liters daily.  Check cmp in 1 week.    HTN- Continue current medications.   Chronic PVC's- Stable.  Continue current medications and follow up with EP as scheduled.    HLD- LDL is at goal of <70.  Continue atorvastatin 80 mg daily.       HPI:  Mr. Tavera reports continued improvement in BLE edema.  He has no claudication or tissue loss.      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    Emphysema lung 11/05/2018    Gastritis     Hx of colonic polyp     Hyperlipidemia     Hypertension     Hypothyroidism     Knee fracture     Myasthenia gravis     Neuropathy 01/03/2013    Obesity 01/29/2015    Pneumonia 03/29/2020    Polyneuropathy     PVC (premature ventricular contraction)     Squamous cell carcinoma 2014    left forearm    Thyroid disease      Past Surgical History:   Procedure Laterality Date    APPENDECTOMY      CATARACT EXTRACTION W/  INTRAOCULAR LENS IMPLANT Bilateral     COLONOSCOPY  03/01/2012    Dr Esteban; hyperplastic polyp; repeat in 5 years    COLONOSCOPY N/A 12/7/2021    Procedure: COLONOSCOPY;  Surgeon: Gabriel Hernandez MD;  Location: [facility];  Service: Endoscopy;  Laterality: N/A;    CYSTOSCOPY N/A 2/26/2019    Procedure: CYSTOSCOPY;  Surgeon: Simba Cheung MD;  Location: [facility] OR;  Service:  Urology;  Laterality: N/A;    ENDOSCOPIC ULTRASOUND OF UPPER GASTROINTESTINAL TRACT N/A 1/7/2020    Procedure: ULTRASOUND, UPPER GI TRACT, ENDOSCOPIC;  Surgeon: Kati Ryan MD;  Location: [facility] (2ND FLR);  Service: Endoscopy;  Laterality: N/A;    ESOPHAGOGASTRODUODENOSCOPY N/A 9/12/2018    Procedure: EGD (ESOPHAGOGASTRODUODENOSCOPY);  Surgeon: Gabriel Hernandez MD;  Location: [facility] ENDO;  Service: Endoscopy;  Laterality: N/A;    ESOPHAGOGASTRODUODENOSCOPY N/A 8/19/2019    Procedure: EGD (ESOPHAGOGASTRODUODENOSCOPY);  Surgeon: Gabriel Hernandez MD;  Location: [facility] ENDO;  Service: Endoscopy;  Laterality: N/A;    ESOPHAGOGASTRODUODENOSCOPY N/A 10/7/2019    Procedure: EGD (ESOPHAGOGASTRODUODENOSCOPY);  Surgeon: Gabriel Hernandez MD;  Location: [facility] ENDO;  Service: Endoscopy;  Laterality: N/A;    ESOPHAGOGASTRODUODENOSCOPY N/A 1/7/2020    Procedure: EGD (ESOPHAGOGASTRODUODENOSCOPY);  Surgeon: Kati Ryan MD;  Location: [facility] (2ND FLR);  Service: Endoscopy;  Laterality: N/A;    HEMORRHOID SURGERY      INJECTION OF ANESTHETIC AGENT AROUND MEDIAL BRANCH NERVES INNERVATING LUMBAR FACET JOINT Bilateral 10/1/2020    Procedure: Block-nerve-medial branch-lumbar Bilateral L 3,4,5;  Surgeon: Devan Watt MD;  Location: [facility] OR;  Service: Pain Management;  Laterality: Bilateral;    INJECTION OF ANESTHETIC AGENT AROUND MEDIAL BRANCH NERVES INNERVATING LUMBAR FACET JOINT Right 10/7/2022    Procedure: Block-nerve-medial branch-lumbar L3,4,5;  Surgeon: Devan Watt MD;  Location: [facility] OR;  Service: Pain Management;  Laterality: Right;    KNEE ARTHROPLASTY Bilateral     LENGTHENING OF ACHILLES TENDON Right 9/11/2020    Procedure: percutaeous tenotomy of right lateral epicondyle (tenex);  Surgeon: Terry Quach MD;  Location: [facility] OR;  Service: Neurosurgery;  Laterality: Right;  tenex to right lateral epicondyle  Tenex machine SN#[id number], total time 1min. 30seconds    NECK SURGERY      POSTERIOR FUSION OF CERVICAL  SPINE WITH LAMINECTOMY N/A 11/7/2018    Procedure: C2-C6 Posterior Cervical Laminectomy & Instrumental Fusion;  Surgeon: Kishore Turner MD;  Location: [address];  Service: Neurosurgery;  Laterality: N/A;    TONSILLECTOMY      TRANSFORAMINAL EPIDURAL INJECTION OF STEROID Right 12/20/2019    Procedure: Injection,steroid,epidural,transforaminal approach;  Surgeon: Devan Watt MD;  Location: [facility];  Service: Pain Management;  Laterality: Right;  L3-4, L4-5    TRANSFORAMINAL EPIDURAL INJECTION OF STEROID Bilateral 2/6/2020    Procedure: Injection,steroid,epidural,transforaminal approach;  Surgeon: Devan Watt MD;  Location: [facility];  Service: Pain Management;  Laterality: Bilateral;  L3-L4,L4-L5    TRANSFORAMINAL EPIDURAL INJECTION OF STEROID Bilateral 8/26/2020    Procedure: Injection,steroid,epidural,transforaminal approach;  Surgeon: Devan Watt MD;  Location: [facility];  Service: Pain Management;  Laterality: Bilateral;  L3-4, L4-5    TRANSRECTAL ULTRASOUND EXAMINATION N/A 2/26/2019    Procedure: ULTRASOUND, RECTAL APPROACH;  Surgeon: Simba Cheung MD;  Location: [facility];  Service: Urology;  Laterality: N/A;    UPPER GASTROINTESTINAL ENDOSCOPY  09/12/2018    Dr Hernandez; gastritis; extensive intestinal metaplasia; repeat in 1-2- years     Social History     Socioeconomic History    Marital status:    Tobacco Use    Smoking status: Never    Smokeless tobacco: Never    Tobacco comments:     when a child   Substance and Sexual Activity    Alcohol use: Yes     Comment: rarely    Drug use: No    Sexual activity: Yes     Partners: Female     Social Determinants of Health     Financial Resource Strain: Low Risk     Difficulty of Paying Living Expenses: Not hard at all   Food Insecurity: No Food Insecurity    Worried About Running Out of Food in the Last Year: Never true    Ran Out of Food in the Last Year: Never true   Transportation Needs: No Transportation Needs    Lack of Transportation (Medical): No     Lack of Transportation (Non-Medical): No   Physical Activity: Inactive    Days of Exercise per Week: 0 days    Minutes of Exercise per Session: 0 min   Stress: No Stress Concern Present    Feeling of Stress : Not at all   Social Connections: Socially Integrated    Frequency of Communication with Friends and Family: More than three times a week    Frequency of Social Gatherings with Friends and Family: More than three times a week    Attends [employer] Services: More than 4 times per year    Active Member of Clubs or Organizations: Yes    Attends Club or Organization Meetings: More than 4 times per year    Marital Status:    Housing Stability: Low Risk     Unable to Pay for Housing in the Last Year: No    Number of Places Lived in the Last Year: 1    Unstable Housing in the Last Year: No     Family History   Problem Relation Age of Onset    Cataracts Mother     Heart disease Mother         CHF    Hypertension Mother     Hyperlipidemia Mother     Cataracts Father     Glaucoma Father     Heart disease Father     Hyperlipidemia Sister     Hypertension Sister     No Known Problems Daughter     No Known Problems Daughter     Collagen disease Neg Hx     Amblyopia Neg Hx     Blindness Neg Hx     Macular degeneration Neg Hx     Retinal detachment Neg Hx     Strabismus Neg Hx     Cancer Neg Hx     Colon cancer Neg Hx     Esophageal cancer Neg Hx     Stomach cancer Neg Hx     Crohn's disease Neg Hx     Ulcerative colitis Neg Hx        Review of patient's allergies indicates:   Allergen Reactions    Spironolactone Diarrhea       Medication List with Changes/Refills   Current Medications    ALBUTEROL (PROVENTIL/VENTOLIN HFA) 90 MCG/ACTUATION INHALER    INHALE 1 TO 2 PUFFS INTO THE LUNGS EVERY 6 HOURS AS NEEDED FOR WHEEZING OR SHORTNESS OF BREATH. FOR RESCUE.    ATORVASTATIN (LIPITOR) 80 MG TABLET    TAKE 1 TABLET EVERY DAY    CARVEDILOL (COREG) 25 MG TABLET    TAKE 1 TABLET TWICE DAILY WITH MEALS    CETIRIZINE (ZYRTEC) 10  MG TABLET    Take 1 tablet by mouth daily as needed.    CHLORTHALIDONE (HYGROTEN) 25 MG TAB    Take 1 tablet (25 mg total) by mouth once daily.    CHOLECALCIFEROL, VITAMIN D3, (VITAMIN D3) 50 MCG (2,000 UNIT) CAP    1 capsule once daily. Every day    COENZYME Q10 (CO Q-10) 100 MG CAPSULE    Take 400 mg by mouth once daily.    CYANOCOBALAMIN, VITAMIN B-12, 5,000 MCG SUBL    Take 5,000 mcg by mouth once daily. Every day    DIPHENOXYLATE-ATROPINE 2.5-0.025 MG (LOMOTIL) 2.5-0.025 MG PER TABLET    Take 1 tablet by mouth 4 (four) times daily as needed for Diarrhea.    EZETIMIBE (ZETIA) 10 MG TABLET    TAKE 1 TABLET EVERY DAY    FLUAD QUAD 2021-22,65Y UP,,PF, 60 MCG (15 MCG X 4)/0.5 ML SYRG        FLUTICASONE (FLONASE) 50 MCG/ACTUATION NASAL SPRAY    USE 1 SPRAY IN EACH NOSTRIL TWICE DAILY AS NEEDED  FOR  RHINITIS    GABAPENTIN (NEURONTIN) 300 MG CAPSULE    Take 2 capsules (600 mg total) by mouth every evening.    LEVOTHYROXINE (SYNTHROID) 50 MCG TABLET    TAKE 1 TABLET EVERY DAY    MILK THISTLE 200 MG CAP    Take 200 mg by mouth 2 (two) times daily. Twice a day    MUPIROCIN (BACTROBAN) 2 % OINTMENT    Apply topically 3 (three) times daily.    OLMESARTAN (BENICAR) 20 MG TABLET    TAKE 1 TABLET EVERY DAY    OMEGA-3 FATTY ACIDS 1,000 MG CAP    Twice a day    PANTOPRAZOLE (PROTONIX) 40 MG TABLET    Take 1 tablet (40 mg total) by mouth 2 (two) times daily.    POTASSIUM CHLORIDE SA (K-DUR,KLOR-CON) 20 MEQ TABLET    TAKE 4 TABLETS EVERY DAY  OR AS DIRECTED    PREDNISONE (DELTASONE) 1 MG TABLET    TAKE 2 TABLETS EVERY DAY    PREDNISONE (DELTASONE) 5 MG TABLET    TAKE 1 TABLET EVERY DAY    SILDENAFIL (REVATIO) 20 MG TAB    Take 1 to 3 tabs daily as needed.    TIOTROPIUM (SPIRIVA WITH HANDIHALER) 18 MCG INHALATION CAPSULE    INHALE THE CONTENTS OF 1 CAPSULE EVERY DAY  VIA HANDIHALER (CONTROLLER)    TRAMADOL (ULTRAM) 50 MG TABLET    Take 1 tablet (50 mg total) by mouth every 6 (six) hours as needed for Pain.       Review of  Systems  Constitution: Denies chills, fever, and sweats.  HENT: Denies headaches or blurry vision.  Cardiovascular: Denies chest pain or irregular heart beat.  Respiratory: Denies cough or shortness of breath.  Gastrointestinal: Denies abdominal pain, nausea, or vomiting.  Musculoskeletal: Positive for leg swelling.  Neurological: Denies dizziness or focal weakness.  Psychiatric/Behavioral: Normal mental status.  Hematologic/Lymphatic: Denies bleeding problem or easy bruising/bleeding.  Skin: Denies rash or suspicious lesions    Physical Examination  /67   Pulse 64   Ht 6' (1.829 m)   Wt 106.5 kg (234 lb 12.6 oz)   BMI 31.84 kg/m²     Constitutional: No acute distress, conversant  HEENT: Sclera anicteric, Pupils equal, round and reactive to light, extraocular motions intact, Oropharynx clear  Neck: No JVD, no carotid bruits  Cardiovascular: regular rate and rhythm, no murmur, rubs or gallops, normal S1/S2  Pulmonary: Clear to auscultation bilaterally  Abdominal: Abdomen soft, nontender, nondistended, positive bowel sounds  Extremities: BLE's with 1+ pitting edema, venous stasis dermatitis and changes consistent with lymphedema   Pulses:  Carotid pulses are 2+ on the right side, and 2+ on the left side.  Radial pulses are 2+ on the right side, and 2+ on the left side.   Femoral pulses are 2+ on the right side, and 2+ on the left side.  Popliteal pulses are 2+ on the right side, and 2+ on the left side.   Dorsalis pedis pulses are 2+ on the right side, and 2+ on the left side.   Posterior tibial pulses are 2+ on the right side, and 2+ on the left side.    Skin: No ecchymosis, erythema, or ulcers  Psych: Alert and oriented x 3, appropriate affect  Neuro: CNII-XII intact, no focal deficits    Labs:  Most Recent Data  CBC:   Lab Results   Component Value Date    WBC 10.90 02/22/2023    HGB 13.4 (L) 02/22/2023    HCT 41.2 02/22/2023     (L) 02/22/2023    MCV 92 02/22/2023    RDW 13.7 02/22/2023     BMP:    Lab Results   Component Value Date     02/22/2023     02/22/2023    K 4.0 02/22/2023    K 4.0 02/22/2023     02/22/2023     02/22/2023    CO2 25 02/22/2023    CO2 25 02/22/2023    BUN 20 02/22/2023    BUN 20 02/22/2023    CREATININE 1.0 02/22/2023    CREATININE 1.0 02/22/2023     (H) 02/22/2023     (H) 02/22/2023    CALCIUM 9.1 02/22/2023    CALCIUM 9.1 02/22/2023    MG 2.1 01/14/2022    PHOS 2.9 03/31/2020     LFTS;   Lab Results   Component Value Date    PROT 6.0 02/22/2023    ALBUMIN 3.5 02/22/2023    BILITOT 1.1 (H) 02/22/2023    AST 16 02/22/2023    ALKPHOS 81 02/22/2023    ALT 32 02/22/2023     COAGS:   Lab Results   Component Value Date    INR 1.0 10/30/2018     FLP:   Lab Results   Component Value Date    CHOL 131 02/22/2023    HDL 41 02/22/2023    LDLCALC 63.8 02/22/2023    TRIG 131 02/22/2023    CHOLHDL 31.3 02/22/2023     CARDIAC:   Lab Results   Component Value Date    TROPONINI 0.044 (H) 03/29/2020    BNP 62 02/22/2023       EKG 9/23/2021:  Normal sinus rhythm  Left axis deviation  Incomplete right bundle branch block    BLE Venous Reflux Study 2/10/2022:  No evidence of deep or superficial venous thrombosis bilaterally.  The right GSV demonstrates focal reflux below the knee.  Bilateral lower extremity varicosities are noted.    KATTY Study 2/10/2022:  Noncompressible ABIs bilaterally consistent with medial arterial calcification.  PVR waveforms are mildly abnormal bilaterally.    BLE Venous Ultrasound 1/10/2022:  Negative for DVT throughout bilateral lower extremities.    Echo 6/2/2021:  Severe left atrial enlargement.  The left ventricle is normal in size with concentric hypertrophy and normal systolic function.  The estimated ejection fraction is 65%.  Indeterminate left ventricular diastolic function.  Normal right ventricular size with normal right ventricular systolic function.  The estimated PA systolic pressure is 28 mmHg.  Normal central venous pressure (3  mmHg).    Assessment/Plan:  Maximilian Tavera Jr. is a 77 y.o. male with BLE lymphedema, venous insuffciency, HTN, HLD, myasthenia gravis on chronic prednisone therapy, degenerative joint disease with myelopathy s/p decompression and fusion of C2-6, chronic PVCs, who presents for a follow up appointment.       1. Leg swelling- Mr. Tavera reports continued improvement in BLE edema.  Continue lymphedema clinic techniques at home and wear graduated compression hose.  Pt to limit sodium intake to 2,000 mg daily.  Limit volume intake to 1.5 liters daily.  Check cmp in 1 week.      2. HTN- Continue current medications.     3. Chronic PVC's- Stable.  Continue current medications and follow up with EP as scheduled.      4. HLD- LDL is at goal of <70 (69 on 12/9/2022).  Continue atorvastatin 80 mg daily.       Follow up in 6 months    Total duration of face to face visit time 30 minutes.  Total time spent counseling greater than fifty percent of total visit time.  Counseling included discussion regarding imaging findings, diagnosis, possibilities, treatment options, risks and benefits.  The patient had many questions regarding the options and long-term effects.    Devang Amezquita MD, PhD  Interventional Cardiology

## 2023-04-04 ENCOUNTER — TELEPHONE (OUTPATIENT)
Dept: FAMILY MEDICINE | Facility: CLINIC | Age: 78
End: 2023-04-04
Payer: MEDICARE

## 2023-04-04 NOTE — TELEPHONE ENCOUNTER
----- Message from Viviane Daniele sent at 4/3/2023 11:09 AM CDT -----  Contact: self  Type:  Needs Medical Advice    Who Called: self  Would the patient rather a call back or a response via [facility]? call  Best Call Back Number: [phone] (home) or [phone]     Additional Information: pt would like to speak with nurse Tai about an appt. Please advise and thank you.

## 2023-04-04 NOTE — TELEPHONE ENCOUNTER
Returned patients call in regards to needing to discuss an appointment. No answer , unable to leave a voicemail.

## 2023-05-31 NOTE — TELEPHONE ENCOUNTER
I just spoke wMaye gilmore and he said that somebody from dr Astorga's office told him to go to [place] for [facility] to see dr Zamudio. I told him to call us back if further issue and he verbalized understanding.   Summary   Left ventricular function is low normal, EF is estimated at 50-55%. Left ventricle size is normal.   Moderate left ventricular hypertrophy. Grade I diastolic dysfunction. Mild mitral, tricuspid and pulmonic regurgitation is present. RVSP is 25 mmHg. No evidence of pericardial effusion. Spoke to patient regarding results of echo. Patient voiced understanding.

## 2023-06-09 ENCOUNTER — PATIENT MESSAGE (OUTPATIENT)
Dept: ELECTROPHYSIOLOGY | Facility: CLINIC | Age: 78
End: 2023-06-09
Payer: MEDICARE

## 2023-06-09 ENCOUNTER — TELEPHONE (OUTPATIENT)
Dept: ELECTROPHYSIOLOGY | Facility: CLINIC | Age: 78
End: 2023-06-09
Payer: MEDICARE

## 2023-06-13 ENCOUNTER — TELEPHONE (OUTPATIENT)
Dept: ELECTROPHYSIOLOGY | Facility: CLINIC | Age: 78
End: 2023-06-13
Payer: MEDICARE

## 2023-06-14 DIAGNOSIS — I49.3 PVC (PREMATURE VENTRICULAR CONTRACTION): Primary | ICD-10-CM

## 2023-06-15 ENCOUNTER — OFFICE VISIT (OUTPATIENT)
Dept: FAMILY MEDICINE | Facility: CLINIC | Age: 78
End: 2023-06-15
Payer: MEDICARE

## 2023-06-15 ENCOUNTER — HOSPITAL ENCOUNTER (OUTPATIENT)
Dept: RADIOLOGY | Facility: CLINIC | Age: 78
Discharge: HOME | End: 2023-06-15
Attending: FAMILY MEDICINE
Payer: MEDICARE

## 2023-06-15 VITALS
TEMPERATURE: 99 F | BODY MASS INDEX: 32.63 KG/M2 | WEIGHT: 240.94 LBS | DIASTOLIC BLOOD PRESSURE: 64 MMHG | HEART RATE: 71 BPM | OXYGEN SATURATION: 98 % | HEIGHT: 72 IN | SYSTOLIC BLOOD PRESSURE: 112 MMHG

## 2023-06-15 DIAGNOSIS — G70.00 MYASTHENIA GRAVIS, ACHR ANTIBODY POSITIVE: ICD-10-CM

## 2023-06-15 DIAGNOSIS — I10 HTN (HYPERTENSION), BENIGN: ICD-10-CM

## 2023-06-15 DIAGNOSIS — R73.9 HYPERGLYCEMIA: ICD-10-CM

## 2023-06-15 DIAGNOSIS — I35.9 AORTIC VALVE DISEASE: ICD-10-CM

## 2023-06-15 DIAGNOSIS — R05.3 CHRONIC COUGH: ICD-10-CM

## 2023-06-15 DIAGNOSIS — M54.16 LUMBAR RADICULITIS: ICD-10-CM

## 2023-06-15 DIAGNOSIS — M51.36 DDD (DEGENERATIVE DISC DISEASE), LUMBAR: Primary | ICD-10-CM

## 2023-06-15 DIAGNOSIS — M48.02 CERVICAL STENOSIS OF SPINAL CANAL: ICD-10-CM

## 2023-06-15 DIAGNOSIS — E03.4 HYPOTHYROIDISM DUE TO ACQUIRED ATROPHY OF THYROID: ICD-10-CM

## 2023-06-15 PROCEDURE — 3078F PR MOST RECENT DIASTOLIC BLOOD PRESSURE < 80 MM HG: ICD-10-PCS | Mod: HCNC,CPTII,S$GLB, | Performed by: FAMILY MEDICINE

## 2023-06-15 PROCEDURE — 1125F AMNT PAIN NOTED PAIN PRSNT: CPT | Mod: HCNC,CPTII,S$GLB, | Performed by: FAMILY MEDICINE

## 2023-06-15 PROCEDURE — 1159F PR MEDICATION LIST DOCUMENTED IN MEDICAL RECORD: ICD-10-PCS | Mod: HCNC,CPTII,S$GLB, | Performed by: FAMILY MEDICINE

## 2023-06-15 PROCEDURE — 99999 PR PBB SHADOW E&M-EST. PATIENT-LVL V: CPT | Mod: PBBFAC,HCNC,, | Performed by: FAMILY MEDICINE

## 2023-06-15 PROCEDURE — 71046 X-RAY EXAM CHEST 2 VIEWS: CPT | Mod: 26,,, | Performed by: RADIOLOGY

## 2023-06-15 PROCEDURE — 99215 OFFICE O/P EST HI 40 MIN: CPT | Mod: HCNC,S$GLB,, | Performed by: FAMILY MEDICINE

## 2023-06-15 PROCEDURE — 3288F PR FALLS RISK ASSESSMENT DOCUMENTED: ICD-10-PCS | Mod: HCNC,CPTII,S$GLB, | Performed by: FAMILY MEDICINE

## 2023-06-15 PROCEDURE — 1125F PR PAIN SEVERITY QUANTIFIED, PAIN PRESENT: ICD-10-PCS | Mod: HCNC,CPTII,S$GLB, | Performed by: FAMILY MEDICINE

## 2023-06-15 PROCEDURE — 1160F PR REVIEW ALL MEDS BY PRESCRIBER/CLIN PHARMACIST DOCUMENTED: ICD-10-PCS | Mod: HCNC,CPTII,S$GLB, | Performed by: FAMILY MEDICINE

## 2023-06-15 PROCEDURE — 99215 PR OFFICE/OUTPT VISIT, EST, LEVL V, 40-54 MIN: ICD-10-PCS | Mod: HCNC,S$GLB,, | Performed by: FAMILY MEDICINE

## 2023-06-15 PROCEDURE — 3288F FALL RISK ASSESSMENT DOCD: CPT | Mod: HCNC,CPTII,S$GLB, | Performed by: FAMILY MEDICINE

## 2023-06-15 PROCEDURE — 1160F RVW MEDS BY RX/DR IN RCRD: CPT | Mod: HCNC,CPTII,S$GLB, | Performed by: FAMILY MEDICINE

## 2023-06-15 PROCEDURE — 1101F PT FALLS ASSESS-DOCD LE1/YR: CPT | Mod: HCNC,CPTII,S$GLB, | Performed by: FAMILY MEDICINE

## 2023-06-15 PROCEDURE — 99999 PR PBB SHADOW E&M-EST. PATIENT-LVL V: ICD-10-PCS | Mod: PBBFAC,HCNC,, | Performed by: FAMILY MEDICINE

## 2023-06-15 PROCEDURE — 3074F PR MOST RECENT SYSTOLIC BLOOD PRESSURE < 130 MM HG: ICD-10-PCS | Mod: HCNC,CPTII,S$GLB, | Performed by: FAMILY MEDICINE

## 2023-06-15 PROCEDURE — 3078F DIAST BP <80 MM HG: CPT | Mod: HCNC,CPTII,S$GLB, | Performed by: FAMILY MEDICINE

## 2023-06-15 PROCEDURE — 1157F PR ADVANCE CARE PLAN OR EQUIV PRESENT IN MEDICAL RECORD: ICD-10-PCS | Mod: HCNC,CPTII,S$GLB, | Performed by: FAMILY MEDICINE

## 2023-06-15 PROCEDURE — 1101F PR PT FALLS ASSESS DOC 0-1 FALLS W/OUT INJ PAST YR: ICD-10-PCS | Mod: HCNC,CPTII,S$GLB, | Performed by: FAMILY MEDICINE

## 2023-06-15 PROCEDURE — 71046 X-RAY EXAM CHEST 2 VIEWS: CPT | Mod: TC,FY,PO

## 2023-06-15 PROCEDURE — 71046 XR CHEST PA AND LATERAL: ICD-10-PCS | Mod: 26,,, | Performed by: RADIOLOGY

## 2023-06-15 PROCEDURE — 1157F ADVNC CARE PLAN IN RCRD: CPT | Mod: HCNC,CPTII,S$GLB, | Performed by: FAMILY MEDICINE

## 2023-06-15 PROCEDURE — 1159F MED LIST DOCD IN RCRD: CPT | Mod: HCNC,CPTII,S$GLB, | Performed by: FAMILY MEDICINE

## 2023-06-15 PROCEDURE — 3074F SYST BP LT 130 MM HG: CPT | Mod: HCNC,CPTII,S$GLB, | Performed by: FAMILY MEDICINE

## 2023-06-15 RX ORDER — BENZONATATE 100 MG/1
100 CAPSULE ORAL 3 TIMES DAILY PRN
Qty: 30 CAPSULE | Refills: 0 | Status: SHIPPED | OUTPATIENT
Start: 2023-06-15 | End: 2023-06-25

## 2023-06-15 RX ORDER — HYDROCODONE BITARTRATE AND ACETAMINOPHEN 7.5; 325 MG/1; MG/1
1 TABLET ORAL EVERY 6 HOURS PRN
Qty: 28 TABLET | Refills: 0 | Status: SHIPPED | OUTPATIENT
Start: 2023-06-15

## 2023-06-15 RX ORDER — GABAPENTIN 600 MG/1
600 TABLET ORAL 3 TIMES DAILY
Qty: 90 TABLET | Refills: 3 | Status: SHIPPED | OUTPATIENT
Start: 2023-06-15 | End: 2023-11-07

## 2023-06-15 RX ORDER — METHOCARBAMOL 500 MG/1
500 TABLET, FILM COATED ORAL 3 TIMES DAILY
Qty: 30 TABLET | Refills: 4 | Status: SHIPPED | OUTPATIENT
Start: 2023-06-15 | End: 2023-07-15

## 2023-06-15 NOTE — H&P
[facility] Medicine  History & Physical    Patient Name: Maximilian Tavera Jr.  MRN: [id number]  Admission Date: 3/29/2020  Attending Physician: Ashley Vallecillo MD   Primary Care Provider: Brian Marroquin MD         Patient information was obtained from patient and ER records.     Subjective:     Principal Problem:<principal problem not specified>    Chief Complaint:   Chief Complaint   Patient presents with    Shortness of Breath    Fever        HPI: Patient is 74-year-old man with a past medical history of COPD, myasthenia gravis, hypertension, hypothyroidism hyperlipidemia cataract left eye carpal tunnel syndrome arthritis obesity polyneuropathy presented to the emergency room due to fever with the recorded temperature over 103.  Patient states that he has been having 3 days of fever with generalized malaise and fatigue and was the concern about possible COVID.    in addition he has had associated dysuria and suprapubic abdominal pain.  He denies any cough or new shortness of breath from his baseline COPD symptoms.  Patient states that he had a history of pneumonia 2 years ago during which he had a prolonged hospital stay.   He last took Tylenol this morning for his symptoms.  He endorses decreased activity and oral intake.  Denies any sick contacts.  Denies any recent travel    Past Medical History:   Diagnosis Date    Arthritis     Back pain     Carpal tunnel syndrome 2/25/2013    Cataract     Cataract, left eye 11/10/2014    Chest pain, musculoskeletal     COPD (chronic obstructive pulmonary disease) 11/052018    Emphysema lung 11/05/2018    Gastritis     Hx of colonic polyp     Hyperlipidemia     Hypertension     Hypothyroidism     Knee fracture     Myasthenia gravis     Neuropathy 1/3/2013    Obesity 1/29/2015    Polyneuropathy     PVC (premature ventricular contraction)     Squamous cell carcinoma 2014    left forearm    Thyroid disease        Past  Surgical History:   Procedure Laterality Date    APPENDECTOMY      CATARACT EXTRACTION W/  INTRAOCULAR LENS IMPLANT Bilateral     COLONOSCOPY  03/01/2012    Dr Esteban; hyperplastic polyp; repeat in 5 years    CYSTOSCOPY N/A 2/26/2019    Procedure: CYSTOSCOPY;  Surgeon: Simba Cheung MD;  Location: [facility];  Service: Urology;  Laterality: N/A;    ENDOSCOPIC ULTRASOUND OF UPPER GASTROINTESTINAL TRACT N/A 1/7/2020    Procedure: ULTRASOUND, UPPER GI TRACT, ENDOSCOPIC;  Surgeon: Kati Ryan MD;  Location: [facility] (2ND FLR);  Service: Endoscopy;  Laterality: N/A;    ESOPHAGOGASTRODUODENOSCOPY N/A 9/12/2018    Procedure: EGD (ESOPHAGOGASTRODUODENOSCOPY);  Surgeon: Gabriel Hernandez MD;  Location: [facility];  Service: Endoscopy;  Laterality: N/A;    ESOPHAGOGASTRODUODENOSCOPY N/A 8/19/2019    Procedure: EGD (ESOPHAGOGASTRODUODENOSCOPY);  Surgeon: Gabriel Hernandez MD;  Location: [facility];  Service: Endoscopy;  Laterality: N/A;    ESOPHAGOGASTRODUODENOSCOPY N/A 10/7/2019    Procedure: EGD (ESOPHAGOGASTRODUODENOSCOPY);  Surgeon: Gabriel Hernandez MD;  Location: [facility];  Service: Endoscopy;  Laterality: N/A;    ESOPHAGOGASTRODUODENOSCOPY N/A 1/7/2020    Procedure: EGD (ESOPHAGOGASTRODUODENOSCOPY);  Surgeon: Kati Ryan MD;  Location: [facility] (2ND FLR);  Service: Endoscopy;  Laterality: N/A;    HEMORRHOID SURGERY      KNEE ARTHROPLASTY Bilateral     NECK SURGERY      POSTERIOR FUSION OF CERVICAL SPINE WITH LAMINECTOMY N/A 11/7/2018    Procedure: C2-C6 Posterior Cervical Laminectomy & Instrumental Fusion;  Surgeon: Kishore Turner MD;  Location: [facility] 2ND FLR;  Service: Neurosurgery;  Laterality: N/A;    TONSILLECTOMY      TRANSFORAMINAL EPIDURAL INJECTION OF STEROID Right 12/20/2019    Procedure: Injection,steroid,epidural,transforaminal approach;  Surgeon: Devan Watt MD;  Location: [facility];  Service: Pain Management;  Laterality: Right;  L3-4, L4-5    TRANSFORAMINAL EPIDURAL  INJECTION OF STEROID Bilateral 2/6/2020    Procedure: Injection,steroid,epidural,transforaminal approach;  Surgeon: Devan Watt MD;  Location: [facility] OR;  Service: Pain Management;  Laterality: Bilateral;  L3-L4,L4-L5    TRANSRECTAL ULTRASOUND EXAMINATION N/A 2/26/2019    Procedure: ULTRASOUND, RECTAL APPROACH;  Surgeon: Simba Cheung MD;  Location: [facility] OR;  Service: Urology;  Laterality: N/A;    UPPER GASTROINTESTINAL ENDOSCOPY  09/12/2018    Dr Hernandez; gastritis; extensive intestinal metaplasia; repeat in 1-2- years       Review of patient's allergies indicates:   Allergen Reactions    No known drug allergies        Current Facility-Administered Medications on File Prior to Encounter   Medication    0.9%  NaCl infusion     Current Outpatient Medications on File Prior to Encounter   Medication Sig    atorvastatin (LIPITOR) 80 MG tablet TAKE 1 TABLET EVERY DAY    carvediloL (COREG) 25 MG tablet TAKE 1 TABLET TWICE DAILY WITH MEALS    cetirizine (ZYRTEC) 10 MG tablet Take 1 tablet by mouth daily as needed.    chlorthalidone (HYGROTEN) 25 MG Tab TAKE 1 TABLET EVERY DAY    cholecalciferol, vitamin D3, (VITAMIN D) 2,000 unit Cap 1 capsule once daily. Every day    coenzyme Q10 (CO Q-10) 100 mg capsule Take 400 mg by mouth once daily.     cyanocobalamin, vitamin B-12, (VITAMIN B-12) 5,000 mcg Subl Take 5,000 mcg by mouth once daily. Every day    ezetimibe (ZETIA) 10 mg tablet Take 1 tablet (10 mg total) by mouth once daily.    fluticasone (FLONASE) 50 mcg/actuation nasal spray USE 1 SPRAY IN EACH NOSTRIL TWICE DAILY AS NEEDED  FOR  RHINITIS    gabapentin (NEURONTIN) 300 MG capsule TAKE 1 CAPSULE THREE TIMES DAILY    levothyroxine (SYNTHROID) 50 MCG tablet Take 1 tablet (50 mcg total) by mouth once daily.    methocarbamol (ROBAXIN) 750 MG Tab TAKE 1 TABLET (750 MG TOTAL) BY MOUTH 3 (THREE) TIMES DAILY.    methylsulfonylmethane (MSM) 1,000 mg Tab Take 1,000 mg by mouth once daily. Every day    milk  thistle 200 mg Cap Take 200 mg by mouth 2 (two) times daily. Twice a day    olmesartan (BENICAR) 20 MG tablet TAKE 1 TABLET EVERY DAY    omega-3 fatty acids 1,000 mg Cap Twice a day    pantoprazole (PROTONIX) 40 MG tablet Take 1 tablet (40 mg total) by mouth 2 (two) times daily.    potassium chloride SA (K-DUR,KLOR-CON) 20 MEQ tablet Take 1 tablet (20 mEq total) by mouth 3 (three) times daily. Take 4 tablets daily for 5 days, then 3 daily for 2 weeks, then 2 daily thereafter.    predniSONE (DELTASONE) 1 MG tablet TAKE 2 TABLETS EVERY DAY    predniSONE (DELTASONE) 5 MG tablet TAKE 1 TABLET EVERY DAY    sildenafil (REVATIO) 20 mg Tab Take 1 to 3 tabs daily as needed.    sucralfate (CARAFATE) 1 gram tablet Take 1 tablet (1 g total) by mouth 4 (four) times daily before meals and nightly.    VENTOLIN HFA 90 mcg/actuation inhaler Inhale 2 puffs into the lungs every 6 (six) hours as needed for Wheezing. Rescue    [DISCONTINUED] esomeprazole (NEXIUM) 40 MG capsule Take 1 capsule (40 mg total) by mouth before breakfast.     Family History     Problem Relation (Age of Onset)    Cataracts Mother, Father    Glaucoma Father    Heart disease Mother, Father    Hyperlipidemia Mother, Sister    Hypertension Mother, Sister    No Known Problems Daughter, Daughter        Tobacco Use    Smoking status: Never Smoker    Smokeless tobacco: Never Used    Tobacco comment: when a child   Substance and Sexual Activity    Alcohol use: Yes     Frequency: Never     Binge frequency: Never     Comment: rarely    Drug use: No    Sexual activity: Yes     Partners: Female     Review of Systems   Constitutional: Positive for fatigue and fever. Negative for appetite change and chills.   HENT: Negative for congestion, hearing loss, rhinorrhea, sore throat, trouble swallowing and voice change.    Eyes: Negative for visual disturbance.   Respiratory: Positive for cough and shortness of breath. Negative for chest tightness and wheezing.     Cardiovascular: Negative for chest pain, palpitations and leg swelling.   Gastrointestinal: Negative for abdominal pain, blood in stool, diarrhea, nausea and vomiting.   Genitourinary: Negative for difficulty urinating, frequency, hematuria and urgency.   Musculoskeletal: Positive for arthralgias, back pain and myalgias. Negative for joint swelling and neck stiffness.   Skin: Negative for pallor and rash.   Neurological: Negative for tremors, seizures, syncope, speech difficulty, weakness, numbness and headaches.   Hematological: Negative for adenopathy.   Psychiatric/Behavioral: Negative for agitation, behavioral problems, confusion and sleep disturbance.     Objective:     Vital Signs (Most Recent):  Temp: 98.2 °F (36.8 °C) (03/29/20 1504)  Pulse: 65 (03/29/20 1504)  Resp: 18 (03/29/20 1504)  BP: (!) 117/58 (03/29/20 1504)  SpO2: 97 % (03/29/20 1504) Vital Signs (24h Range):  Temp:  [97.9 °F (36.6 °C)-98.2 °F (36.8 °C)] 98.2 °F (36.8 °C)  Pulse:  [65-76] 65  Resp:  [18-20] 18  SpO2:  [92 %-97 %] 97 %  BP: ()/(55-58) 117/58     Weight: 108.4 kg (239 lb)  Body mass index is 31.53 kg/m².    Physical Exam   Nursing note and vitals reviewed.  PATIENT WAS SEEN AS A VIDEO VISIT WITH NURSE IN PATIENT ROOM WITH PATIENT TO ASSIST DURING THE VISIT. PHYSICAL EXAM FINDINGS ARE AS VIEWED BY MYSELF VIA VIDEO OR AS REPORTED BY NURSE IF SPECIFIED AS SUCH, EXAM NOT DONE PERSONALLY BY MYSELF AT BEDSIDE.          Significant Labs:   BMP:   Recent Labs   Lab 03/29/20  1152   *      K 3.7      CO2 29   BUN 32*   CREATININE 1.6*   CALCIUM 8.9   MG 1.9     CBC:   Recent Labs   Lab 03/29/20  1152 03/29/20  1546   WBC 11.37 9.47   HGB 13.5* 12.8*   HCT 43.0 40.3   * 109*       Significant Imaging: I have reviewed all pertinent imaging results/findings within the past 24 hours.    Assessment/Plan:     Pneumonia  Will continue treating as community-acquired pneumonia  Will follow-up with the culture  result  COVID results pending      Suspected Covid-19 Virus Infection    Covid-19 Virus Infection  - Infection Control notified    - Isolation:   - Airborne and Droplet Precautions  - N95 masks must be fit tested, wear eye protection  - 20 second hand hygiene   - Limit visitors per hospital policy   - Consolidating lab draws, nursing care, and interventions    - Diagnostics: (rising CRP, persistent lymphopenia, hyponatremia, hyperferritinemia, elevated troponin, elevated d-dimer, age, and comorbidities are significant predictors of poor clinical outcome)   - CBC:   trend Q48hrs  - CMP:        trend Q48hrs  - Procalcitonin:  - D-dimer:  trend Q48hrs  - Ferritin:  repeat prior to discharge  - CRP:        trend Q48hrs  - LDH:  - BNP:  - Troponin:    - ECG:   - rapid Flu:   - RIP only if BMT/solid transplant:   - Legionella antigen:   - Blood culture x2:   - Sputum culture:   - CXR:   - UA and culture:      - Management:   - Bundle care as able to minimize in/out of room   - Supplemental O2 to maintain SpO2 >92%,   if requiring 6L NC or higher, place on nonrebreather and discuss case with MICU   - Telemetry & continuous Pulse Ox   - albuterol INHALER PRN 4puff Q6hr approximates a nebulizer (avoid nebulization of secretions)   - apap PRN fever   - Avoiding NIPPV to prevent aerosolization   (including home CPAP/BiPAP unless on a case-by-case basis and only in negative pressure room)   - Cautious use of NSAIDS for fever per WHO recommendations (3/16/2020)   - No new ACEi/ARB start or discontinuation of chronic med unless hypotensive (Esler et al. Journal of Hypertension 2020, 38:000-000)   - Careful use of steroids in the absence of other indications   - unless septic shock due to increased viral replication   - Fluid sparing resuscitation   - Empiric antibiotics per likely source & patient allergies    - CAP: x 5 day course  Ceftriaxone 1g IV Q24hrs            Azithromycin 500mg IV day #1, then 250mg PO daily x4 days                  If MRSA risk factors, add Vancomycin IV (PharmD consult)   - If patient meets criteria per Hospital Protocol    - start statin (if CPK WNL)    - start HCQ 400mg PO BID x1 day, then 400mg PO daily x 4 days (check G6PD, ECG, and start Qshift POCT glucose)    Goals of care, counseling/discussion  - Reviewed the typical clinical course of COVID19  (patient name or relationship to patient), including the potential for acute decompensation requiring intubation and mechanical ventilation  - Discussed again as part of routine daily evaluation, patient/POA maintains code status of full code    VTE High Risk Prophylaxis: enoxaparin 40mg sq QHS @ 2100 (bundled care) if GFR >30    Patient's chronic/stable medical conditions noted in the problem list above will be managed with the patient's home medications as tolerated.           COPD (chronic obstructive pulmonary disease)  Will continue albuterol inhaler as needed      Bilateral carotid artery stenosis  History noted      BPH with obstruction/lower urinary tract symptoms  Will continue to monitor urine output      Umbilical hernia without obstruction and without gangrene  History noted      Myasthenia gravis, AChR antibody positive  Will continue home dose of prednisone 7 mg daily      DDD (degenerative disc disease), lumbar  Continue Tylenol for pain      HTN (hypertension), benign  Will continue home antihypertensive medications  Will continue to monitor for now      Hyperlipidemia  Will continue home statin dose      Hypothyroid   Will continue home medication home dose of levothyroxine        VTE Risk Mitigation (From admission, onward)         Ordered     enoxaparin injection 40 mg  Daily      03/29/20 1528     IP VTE HIGH RISK PATIENT  Once      03/29/20 1528                   Ashley Vallecillo MD  Department of Hospital Medicine   [facility]   Bilateral legs

## 2023-06-18 DIAGNOSIS — E78.2 MIXED HYPERLIPIDEMIA: ICD-10-CM

## 2023-06-18 NOTE — TELEPHONE ENCOUNTER
No care due was identified.  [facility] Embedded Care Due Messages. Reference number: [id number].   6/18/2023 3:24:10 PM CDT

## 2023-06-19 RX ORDER — ATORVASTATIN CALCIUM 80 MG/1
TABLET, FILM COATED ORAL
Qty: 90 TABLET | Refills: 2 | Status: SHIPPED | OUTPATIENT
Start: 2023-06-19

## 2023-06-19 RX ORDER — CHLORTHALIDONE 25 MG/1
TABLET ORAL
Qty: 90 TABLET | Refills: 3 | Status: SHIPPED | OUTPATIENT
Start: 2023-06-19

## 2023-06-19 NOTE — TELEPHONE ENCOUNTER
Refill Decision Note   Maximilian Tavera  is requesting a refill authorization.  Brief Assessment and Rationale for Refill:  Approve     Medication Therapy Plan:         Comments:     Note composed:1:55 PM 06/19/2023

## 2023-06-21 ENCOUNTER — PATIENT MESSAGE (OUTPATIENT)
Dept: FAMILY MEDICINE | Facility: CLINIC | Age: 78
End: 2023-06-21
Payer: MEDICARE

## 2023-06-26 ENCOUNTER — CLINICAL SUPPORT (OUTPATIENT)
Dept: CARDIOLOGY | Facility: HOSPITAL | Age: 78
End: 2023-06-26
Attending: INTERNAL MEDICINE
Payer: MEDICARE

## 2023-06-26 DIAGNOSIS — I47.29 NONSUSTAINED VENTRICULAR TACHYCARDIA: ICD-10-CM

## 2023-06-26 PROCEDURE — 93248 EXT ECG>7D<15D REV&INTERPJ: CPT | Mod: HCNC,,, | Performed by: INTERNAL MEDICINE

## 2023-06-26 PROCEDURE — 93248 CV CARDIAC MONITOR - 3-15 DAY ADULT (CUPID ONLY): ICD-10-PCS | Mod: HCNC,,, | Performed by: INTERNAL MEDICINE

## 2023-06-27 ENCOUNTER — PATIENT MESSAGE (OUTPATIENT)
Dept: CARDIOLOGY | Facility: CLINIC | Age: 78
End: 2023-06-27
Payer: MEDICARE

## 2023-07-01 ENCOUNTER — HOSPITAL ENCOUNTER (INPATIENT)
Facility: HOSPITAL | Age: 78
LOS: 2 days | Discharge: HOME | DRG: 301 | End: 2023-07-03
Attending: HOSPITALIST | Admitting: HOSPITALIST
Payer: MEDICARE

## 2023-07-01 DIAGNOSIS — D69.6 THROMBOCYTOPENIA: ICD-10-CM

## 2023-07-01 DIAGNOSIS — D69.6 THROMBOCYTOPENIA, UNSPECIFIED: ICD-10-CM

## 2023-07-01 DIAGNOSIS — I82.402 ACUTE DEEP VEIN THROMBOSIS (DVT) OF LEFT LOWER EXTREMITY: Primary | ICD-10-CM

## 2023-07-01 DIAGNOSIS — I82.409 DVT (DEEP VENOUS THROMBOSIS): ICD-10-CM

## 2023-07-01 DIAGNOSIS — R07.9 CHEST PAIN: ICD-10-CM

## 2023-07-01 DIAGNOSIS — M79.89 LIMB SWELLING: ICD-10-CM

## 2023-07-01 DIAGNOSIS — D64.9 ANEMIA, UNSPECIFIED TYPE: ICD-10-CM

## 2023-07-01 DIAGNOSIS — I82.412 ACUTE DEEP VEIN THROMBOSIS (DVT) OF FEMORAL VEIN OF LEFT LOWER EXTREMITY: ICD-10-CM

## 2023-07-01 PROBLEM — E87.6 HYPOKALEMIA: Status: RESOLVED | Noted: 2023-01-10 | Resolved: 2023-07-01

## 2023-07-01 LAB
ALBUMIN SERPL BCP-MCNC: 3.4 G/DL (ref 3.5–5.2)
ALP SERPL-CCNC: 74 U/L (ref 55–135)
ALT SERPL W/O P-5'-P-CCNC: 19 U/L (ref 10–44)
ANION GAP SERPL CALC-SCNC: 11 MMOL/L (ref 8–16)
APTT PPP: 133.2 SEC (ref 21–32)
APTT PPP: 29.9 SEC (ref 21–32)
AST SERPL-CCNC: 17 U/L (ref 10–40)
BASOPHILS # BLD AUTO: 0.03 K/UL (ref 0–0.2)
BASOPHILS NFR BLD: 0.4 % (ref 0–1.9)
BILIRUB SERPL-MCNC: 1.1 MG/DL (ref 0.1–1)
BNP SERPL-MCNC: 98 PG/ML (ref 0–99)
BUN SERPL-MCNC: 23 MG/DL (ref 8–23)
CALCIUM SERPL-MCNC: 8.8 MG/DL (ref 8.7–10.5)
CHLORIDE SERPL-SCNC: 105 MMOL/L (ref 95–110)
CO2 SERPL-SCNC: 25 MMOL/L (ref 23–29)
CREAT SERPL-MCNC: 1 MG/DL (ref 0.5–1.4)
D DIMER PPP IA.FEU-MCNC: 2.31 MG/L FEU
DIFFERENTIAL METHOD: ABNORMAL
EOSINOPHIL # BLD AUTO: 0.3 K/UL (ref 0–0.5)
EOSINOPHIL NFR BLD: 3.4 % (ref 0–8)
ERYTHROCYTE [DISTWIDTH] IN BLOOD BY AUTOMATED COUNT: 13.6 % (ref 11.5–14.5)
EST. GFR  (NO RACE VARIABLE): >60 ML/MIN/1.73 M^2
GLUCOSE SERPL-MCNC: 137 MG/DL (ref 70–110)
HCT VFR BLD AUTO: 37 % (ref 40–54)
HGB BLD-MCNC: 12.3 G/DL (ref 14–18)
IMM GRANULOCYTES # BLD AUTO: 0.03 K/UL (ref 0–0.04)
IMM GRANULOCYTES NFR BLD AUTO: 0.4 % (ref 0–0.5)
INR PPP: 1 (ref 0.8–1.2)
LYMPHOCYTES # BLD AUTO: 1.4 K/UL (ref 1–4.8)
LYMPHOCYTES NFR BLD: 16.7 % (ref 18–48)
MCH RBC QN AUTO: 30.1 PG (ref 27–31)
MCHC RBC AUTO-ENTMCNC: 33.2 G/DL (ref 32–36)
MCV RBC AUTO: 91 FL (ref 82–98)
MONOCYTES # BLD AUTO: 0.7 K/UL (ref 0.3–1)
MONOCYTES NFR BLD: 8.6 % (ref 4–15)
NEUTROPHILS # BLD AUTO: 5.8 K/UL (ref 1.8–7.7)
NEUTROPHILS NFR BLD: 70.5 % (ref 38–73)
NRBC BLD-RTO: 0 /100 WBC
PLATELET # BLD AUTO: 115 K/UL (ref 150–450)
PMV BLD AUTO: 10.5 FL (ref 9.2–12.9)
POTASSIUM SERPL-SCNC: 3.7 MMOL/L (ref 3.5–5.1)
PROT SERPL-MCNC: 6 G/DL (ref 6–8.4)
PROTHROMBIN TIME: 11.2 SEC (ref 9–12.5)
RBC # BLD AUTO: 4.08 M/UL (ref 4.6–6.2)
SODIUM SERPL-SCNC: 141 MMOL/L (ref 136–145)
TROPONIN I SERPL DL<=0.01 NG/ML-MCNC: <0.006 NG/ML (ref 0–0.03)
WBC # BLD AUTO: 8.18 K/UL (ref 3.9–12.7)

## 2023-07-01 PROCEDURE — 93005 ELECTROCARDIOGRAM TRACING: CPT

## 2023-07-01 PROCEDURE — 85025 COMPLETE CBC W/AUTO DIFF WBC: CPT | Performed by: STUDENT IN AN ORGANIZED HEALTH CARE EDUCATION/TRAINING PROGRAM

## 2023-07-01 PROCEDURE — 36415 COLL VENOUS BLD VENIPUNCTURE: CPT | Performed by: HOSPITALIST

## 2023-07-01 PROCEDURE — 85610 PROTHROMBIN TIME: CPT | Performed by: STUDENT IN AN ORGANIZED HEALTH CARE EDUCATION/TRAINING PROGRAM

## 2023-07-01 PROCEDURE — 93010 ELECTROCARDIOGRAM REPORT: CPT | Mod: ,,, | Performed by: INTERNAL MEDICINE

## 2023-07-01 PROCEDURE — 83880 ASSAY OF NATRIURETIC PEPTIDE: CPT | Performed by: STUDENT IN AN ORGANIZED HEALTH CARE EDUCATION/TRAINING PROGRAM

## 2023-07-01 PROCEDURE — 85379 FIBRIN DEGRADATION QUANT: CPT | Performed by: STUDENT IN AN ORGANIZED HEALTH CARE EDUCATION/TRAINING PROGRAM

## 2023-07-01 PROCEDURE — 80053 COMPREHEN METABOLIC PANEL: CPT | Performed by: STUDENT IN AN ORGANIZED HEALTH CARE EDUCATION/TRAINING PROGRAM

## 2023-07-01 PROCEDURE — 84484 ASSAY OF TROPONIN QUANT: CPT | Performed by: STUDENT IN AN ORGANIZED HEALTH CARE EDUCATION/TRAINING PROGRAM

## 2023-07-01 PROCEDURE — 36415 COLL VENOUS BLD VENIPUNCTURE: CPT | Performed by: STUDENT IN AN ORGANIZED HEALTH CARE EDUCATION/TRAINING PROGRAM

## 2023-07-01 PROCEDURE — 99900035 HC TECH TIME PER 15 MIN (STAT)

## 2023-07-01 PROCEDURE — 11000001 HC ACUTE MED/SURG PRIVATE ROOM

## 2023-07-01 PROCEDURE — 63600175 PHARM REV CODE 636 W HCPCS: Performed by: STUDENT IN AN ORGANIZED HEALTH CARE EDUCATION/TRAINING PROGRAM

## 2023-07-01 PROCEDURE — 94760 N-INVAS EAR/PLS OXIMETRY 1: CPT

## 2023-07-01 PROCEDURE — 25000003 PHARM REV CODE 250: Performed by: NURSE PRACTITIONER

## 2023-07-01 PROCEDURE — 99285 EMERGENCY DEPT VISIT HI MDM: CPT | Mod: 25

## 2023-07-01 PROCEDURE — 85730 THROMBOPLASTIN TIME PARTIAL: CPT | Mod: 91 | Performed by: HOSPITALIST

## 2023-07-01 PROCEDURE — 93010 EKG 12-LEAD: ICD-10-PCS | Mod: ,,, | Performed by: INTERNAL MEDICINE

## 2023-07-01 PROCEDURE — 85730 THROMBOPLASTIN TIME PARTIAL: CPT | Performed by: STUDENT IN AN ORGANIZED HEALTH CARE EDUCATION/TRAINING PROGRAM

## 2023-07-01 RX ORDER — PANTOPRAZOLE SODIUM 40 MG/1
40 TABLET, DELAYED RELEASE ORAL 2 TIMES DAILY
Status: DISCONTINUED | OUTPATIENT
Start: 2023-07-01 | End: 2023-07-03 | Stop reason: HOSPADM

## 2023-07-01 RX ORDER — MAG HYDROX/ALUMINUM HYD/SIMETH 200-200-20
30 SUSPENSION, ORAL (FINAL DOSE FORM) ORAL 4 TIMES DAILY PRN
Status: DISCONTINUED | OUTPATIENT
Start: 2023-07-01 | End: 2023-07-03 | Stop reason: HOSPADM

## 2023-07-01 RX ORDER — LOSARTAN POTASSIUM 25 MG/1
50 TABLET ORAL DAILY
Status: DISCONTINUED | OUTPATIENT
Start: 2023-07-02 | End: 2023-07-03 | Stop reason: HOSPADM

## 2023-07-01 RX ORDER — IBUPROFEN 200 MG
16 TABLET ORAL
Status: DISCONTINUED | OUTPATIENT
Start: 2023-07-01 | End: 2023-07-03 | Stop reason: HOSPADM

## 2023-07-01 RX ORDER — LEVOTHYROXINE SODIUM 50 UG/1
50 TABLET ORAL
Status: DISCONTINUED | OUTPATIENT
Start: 2023-07-02 | End: 2023-07-03 | Stop reason: HOSPADM

## 2023-07-01 RX ORDER — METHOCARBAMOL 500 MG/1
500 TABLET, FILM COATED ORAL 3 TIMES DAILY
Status: DISCONTINUED | OUTPATIENT
Start: 2023-07-01 | End: 2023-07-03 | Stop reason: HOSPADM

## 2023-07-01 RX ORDER — CETIRIZINE HYDROCHLORIDE 10 MG/1
10 TABLET ORAL DAILY
Status: DISCONTINUED | OUTPATIENT
Start: 2023-07-02 | End: 2023-07-03 | Stop reason: HOSPADM

## 2023-07-01 RX ORDER — IPRATROPIUM BROMIDE AND ALBUTEROL SULFATE 2.5; .5 MG/3ML; MG/3ML
3 SOLUTION RESPIRATORY (INHALATION) EVERY 6 HOURS PRN
Status: DISCONTINUED | OUTPATIENT
Start: 2023-07-01 | End: 2023-07-03 | Stop reason: HOSPADM

## 2023-07-01 RX ORDER — HYDROCODONE BITARTRATE AND ACETAMINOPHEN 7.5; 325 MG/1; MG/1
1 TABLET ORAL EVERY 6 HOURS PRN
Status: DISCONTINUED | OUTPATIENT
Start: 2023-07-01 | End: 2023-07-03 | Stop reason: HOSPADM

## 2023-07-01 RX ORDER — FLUTICASONE PROPIONATE 50 MCG
1 SPRAY, SUSPENSION (ML) NASAL DAILY
Status: DISCONTINUED | OUTPATIENT
Start: 2023-07-02 | End: 2023-07-03 | Stop reason: HOSPADM

## 2023-07-01 RX ORDER — TALC
6 POWDER (GRAM) TOPICAL NIGHTLY PRN
Status: DISCONTINUED | OUTPATIENT
Start: 2023-07-01 | End: 2023-07-03 | Stop reason: HOSPADM

## 2023-07-01 RX ORDER — GLUCAGON 1 MG
1 KIT INJECTION
Status: DISCONTINUED | OUTPATIENT
Start: 2023-07-01 | End: 2023-07-03 | Stop reason: HOSPADM

## 2023-07-01 RX ORDER — CARVEDILOL 6.25 MG/1
25 TABLET ORAL 2 TIMES DAILY WITH MEALS
Status: DISCONTINUED | OUTPATIENT
Start: 2023-07-01 | End: 2023-07-03 | Stop reason: HOSPADM

## 2023-07-01 RX ORDER — MORPHINE SULFATE 4 MG/ML
4 INJECTION, SOLUTION INTRAMUSCULAR; INTRAVENOUS EVERY 4 HOURS PRN
Status: DISCONTINUED | OUTPATIENT
Start: 2023-07-01 | End: 2023-07-03 | Stop reason: HOSPADM

## 2023-07-01 RX ORDER — HEPARIN SODIUM,PORCINE/D5W 25000/250
0-40 INTRAVENOUS SOLUTION INTRAVENOUS CONTINUOUS
Status: DISCONTINUED | OUTPATIENT
Start: 2023-07-01 | End: 2023-07-02

## 2023-07-01 RX ORDER — GABAPENTIN 300 MG/1
600 CAPSULE ORAL 3 TIMES DAILY
Status: DISCONTINUED | OUTPATIENT
Start: 2023-07-01 | End: 2023-07-03 | Stop reason: HOSPADM

## 2023-07-01 RX ORDER — ACETAMINOPHEN 325 MG/1
650 TABLET ORAL EVERY 8 HOURS PRN
Status: DISCONTINUED | OUTPATIENT
Start: 2023-07-01 | End: 2023-07-03 | Stop reason: HOSPADM

## 2023-07-01 RX ORDER — SODIUM CHLORIDE 0.9 % (FLUSH) 0.9 %
10 SYRINGE (ML) INJECTION EVERY 12 HOURS PRN
Status: DISCONTINUED | OUTPATIENT
Start: 2023-07-01 | End: 2023-07-03 | Stop reason: HOSPADM

## 2023-07-01 RX ORDER — AMOXICILLIN 250 MG
1 CAPSULE ORAL 2 TIMES DAILY
Status: DISCONTINUED | OUTPATIENT
Start: 2023-07-01 | End: 2023-07-03 | Stop reason: HOSPADM

## 2023-07-01 RX ORDER — EZETIMIBE 10 MG/1
10 TABLET ORAL DAILY
Status: DISCONTINUED | OUTPATIENT
Start: 2023-07-02 | End: 2023-07-03 | Stop reason: HOSPADM

## 2023-07-01 RX ORDER — ATORVASTATIN CALCIUM 40 MG/1
80 TABLET, FILM COATED ORAL DAILY
Status: DISCONTINUED | OUTPATIENT
Start: 2023-07-02 | End: 2023-07-03 | Stop reason: HOSPADM

## 2023-07-01 RX ORDER — IBUPROFEN 200 MG
24 TABLET ORAL
Status: DISCONTINUED | OUTPATIENT
Start: 2023-07-01 | End: 2023-07-03 | Stop reason: HOSPADM

## 2023-07-01 RX ORDER — NALOXONE HCL 0.4 MG/ML
0.02 VIAL (ML) INJECTION
Status: DISCONTINUED | OUTPATIENT
Start: 2023-07-01 | End: 2023-07-03 | Stop reason: HOSPADM

## 2023-07-01 RX ORDER — ONDANSETRON 2 MG/ML
4 INJECTION INTRAMUSCULAR; INTRAVENOUS EVERY 6 HOURS PRN
Status: DISCONTINUED | OUTPATIENT
Start: 2023-07-01 | End: 2023-07-03 | Stop reason: HOSPADM

## 2023-07-01 RX ORDER — POLYETHYLENE GLYCOL 3350 17 G/17G
17 POWDER, FOR SOLUTION ORAL 2 TIMES DAILY PRN
Status: DISCONTINUED | OUTPATIENT
Start: 2023-07-01 | End: 2023-07-03 | Stop reason: HOSPADM

## 2023-07-01 RX ADMIN — GABAPENTIN 600 MG: 300 CAPSULE ORAL at 09:07

## 2023-07-01 RX ADMIN — HEPARIN SODIUM 18 UNITS/KG/HR: 10000 INJECTION, SOLUTION INTRAVENOUS at 04:07

## 2023-07-01 RX ADMIN — CARVEDILOL 25 MG: 25 TABLET, FILM COATED ORAL at 05:07

## 2023-07-01 RX ADMIN — PANTOPRAZOLE SODIUM 40 MG: 40 TABLET, DELAYED RELEASE ORAL at 09:07

## 2023-07-01 RX ADMIN — ALUMINUM HYDROXIDE, MAGNESIUM HYDROXIDE, AND SIMETHICONE 30 ML: 200; 200; 20 SUSPENSION ORAL at 07:07

## 2023-07-01 NOTE — H&P
"[facility]  History & Physical    Patient Name: Maximilian Tavera Jr.  MRN: [id number]  Patient Class: IP- Inpatient  Admission Date: 7/1/2023  Attending Physician: Dr. Sales  Primary Care Provider: Brian Marroquin MD         Patient information was obtained from patient, spouse/SO, past medical records and ER records.     Subjective:     Principal Problem:Acute deep vein thrombosis (DVT) of femoral vein of left lower extremity    Chief Complaint:   Chief Complaint   Patient presents with    Leg Swelling     Left  / hx. Lymphedema         HPI: Mr. Tavera is a 77 year old male with history of COPD/asthma, myasthenia gravis - steroid dependent, hypothyroidism, afib (no anticoagulation), obesity BMI 32, peripheral LE lymphedema, and HTN who presents today with complaints of LLE swelling worsening over the last week. It is severe. It is associated with pain and pallor. He denies fever, chills, N/V/D, chest pain, SOB, dizziness, recent long trips, recent surgical procedures, recent covid infection. In the ED, Left LE US revealed: "Extensive intraluminal thrombus involving the large veins of the left lower extremity extending into the calf". D-dimer is 2.3, BNP and troponin are WNL. Hospital medicine is consulted for admission for further work up and treatment.      Past Medical History:   Diagnosis Date    A-fib 03/31/2020    Arthritis     Back pain     Carpal tunnel syndrome 02/25/2013    Cataract     Cataract, left eye 11/10/2014    Chest pain, musculoskeletal     COPD (chronic obstructive pulmonary disease) 11/052018    COPD with asthma 08/17/2021    Emphysema lung 11/05/2018    Gastritis     Hx of colonic polyp     Hyperlipidemia     Hypertension     Hypothyroidism     Knee fracture     Myasthenia gravis     Neuropathy 01/03/2013    Obesity 01/29/2015    Pneumonia 03/29/2020    Polyneuropathy     PVC (premature ventricular contraction)     Squamous cell carcinoma " 2014    left forearm    Thyroid disease        Past Surgical History:   Procedure Laterality Date    APPENDECTOMY      CATARACT EXTRACTION W/  INTRAOCULAR LENS IMPLANT Bilateral     COLONOSCOPY  03/01/2012    Dr Esteban; hyperplastic polyp; repeat in 5 years    COLONOSCOPY N/A 12/7/2021    Procedure: COLONOSCOPY;  Surgeon: Gabriel Hernandez MD;  Location: [facility];  Service: Endoscopy;  Laterality: N/A;    CYSTOSCOPY N/A 2/26/2019    Procedure: CYSTOSCOPY;  Surgeon: Simba Cheung MD;  Location: [facility] OR;  Service: Urology;  Laterality: N/A;    ENDOSCOPIC ULTRASOUND OF UPPER GASTROINTESTINAL TRACT N/A 1/7/2020    Procedure: ULTRASOUND, UPPER GI TRACT, ENDOSCOPIC;  Surgeon: Kati Ryan MD;  Location: [facility] (2ND FLR);  Service: Endoscopy;  Laterality: N/A;    ESOPHAGOGASTRODUODENOSCOPY N/A 9/12/2018    Procedure: EGD (ESOPHAGOGASTRODUODENOSCOPY);  Surgeon: Gabriel Hernandez MD;  Location: [facility];  Service: Endoscopy;  Laterality: N/A;    ESOPHAGOGASTRODUODENOSCOPY N/A 8/19/2019    Procedure: EGD (ESOPHAGOGASTRODUODENOSCOPY);  Surgeon: Gabriel Hernandez MD;  Location: [facility];  Service: Endoscopy;  Laterality: N/A;    ESOPHAGOGASTRODUODENOSCOPY N/A 10/7/2019    Procedure: EGD (ESOPHAGOGASTRODUODENOSCOPY);  Surgeon: Gabriel Hernandez MD;  Location: [facility];  Service: Endoscopy;  Laterality: N/A;    ESOPHAGOGASTRODUODENOSCOPY N/A 1/7/2020    Procedure: EGD (ESOPHAGOGASTRODUODENOSCOPY);  Surgeon: Kati Ryan MD;  Location: [facility] (2ND FLR);  Service: Endoscopy;  Laterality: N/A;    HEMORRHOID SURGERY      INJECTION OF ANESTHETIC AGENT AROUND MEDIAL BRANCH NERVES INNERVATING LUMBAR FACET JOINT Bilateral 10/1/2020    Procedure: Block-nerve-medial branch-lumbar Bilateral L 3,4,5;  Surgeon: Devan Watt MD;  Location: [facility];  Service: Pain Management;  Laterality: Bilateral;    INJECTION OF ANESTHETIC AGENT AROUND MEDIAL BRANCH NERVES INNERVATING LUMBAR FACET JOINT Right  10/7/2022    Procedure: Block-nerve-medial branch-lumbar L3,4,5;  Surgeon: Devan Watt MD;  Location: [facility];  Service: Pain Management;  Laterality: Right;    KNEE ARTHROPLASTY Bilateral     LENGTHENING OF ACHILLES TENDON Right 9/11/2020    Procedure: percutaeous tenotomy of right lateral epicondyle (tenex);  Surgeon: Terry Quach MD;  Location: [facility] OR;  Service: Neurosurgery;  Laterality: Right;  tenex to right lateral epicondyle  Tenex machine SN#[id number], total time 1min. 30seconds    NECK SURGERY      POSTERIOR FUSION OF CERVICAL SPINE WITH LAMINECTOMY N/A 11/7/2018    Procedure: C2-C6 Posterior Cervical Laminectomy & Instrumental Fusion;  Surgeon: Kishore Turner MD;  Location: [address];  Service: Neurosurgery;  Laterality: N/A;    TONSILLECTOMY      TRANSFORAMINAL EPIDURAL INJECTION OF STEROID Right 12/20/2019    Procedure: Injection,steroid,epidural,transforaminal approach;  Surgeon: Devan Watt MD;  Location: [facility];  Service: Pain Management;  Laterality: Right;  L3-4, L4-5    TRANSFORAMINAL EPIDURAL INJECTION OF STEROID Bilateral 2/6/2020    Procedure: Injection,steroid,epidural,transforaminal approach;  Surgeon: Devan Watt MD;  Location: [facility];  Service: Pain Management;  Laterality: Bilateral;  L3-L4,L4-L5    TRANSFORAMINAL EPIDURAL INJECTION OF STEROID Bilateral 8/26/2020    Procedure: Injection,steroid,epidural,transforaminal approach;  Surgeon: Devan Watt MD;  Location: [facility];  Service: Pain Management;  Laterality: Bilateral;  L3-4, L4-5    TRANSRECTAL ULTRASOUND EXAMINATION N/A 2/26/2019    Procedure: ULTRASOUND, RECTAL APPROACH;  Surgeon: Simba Cheung MD;  Location: [facility];  Service: Urology;  Laterality: N/A;    UPPER GASTROINTESTINAL ENDOSCOPY  09/12/2018    Dr Hernandez; gastritis; extensive intestinal metaplasia; repeat in 1-2- years       Review of patient's allergies indicates:   Allergen Reactions    Spironolactone Diarrhea       No current  facility-administered medications on file prior to encounter.     Current Outpatient Medications on File Prior to Encounter   Medication Sig    atorvastatin (LIPITOR) 80 MG tablet TAKE 1 TABLET EVERY DAY    chlorthalidone (HYGROTEN) 25 MG Tab TAKE 1 TABLET ONE TIME DAILY    albuterol (PROVENTIL/VENTOLIN HFA) 90 mcg/actuation inhaler INHALE 1 TO 2 PUFFS INTO THE LUNGS EVERY 6 HOURS AS NEEDED FOR WHEEZING OR SHORTNESS OF BREATH. FOR RESCUE.    carvediloL (COREG) 25 MG tablet TAKE 1 TABLET TWICE DAILY WITH MEALS    cetirizine (ZYRTEC) 10 MG tablet Take 1 tablet by mouth daily as needed.    cholecalciferol, vitamin D3, (VITAMIN D3) 50 mcg (2,000 unit) Cap 1 capsule once daily. Every day    coenzyme Q10 (CO Q-10) 100 mg capsule Take 400 mg by mouth once daily.    cyanocobalamin, vitamin B-12, 5,000 mcg Subl Take 5,000 mcg by mouth once daily. Every day    diphenoxylate-atropine 2.5-0.025 mg (LOMOTIL) 2.5-0.025 mg per tablet Take 1 tablet by mouth 4 (four) times daily as needed for Diarrhea.    ezetimibe (ZETIA) 10 mg tablet TAKE 1 TABLET EVERY DAY    FLUAD QUAD 2021-22,65Y UP,,PF, 60 mcg (15 mcg x 4)/0.5 mL Syrg     fluticasone (FLONASE) 50 mcg/actuation nasal spray USE 1 SPRAY IN EACH NOSTRIL TWICE DAILY AS NEEDED  FOR  RHINITIS    gabapentin (NEURONTIN) 600 MG tablet Take 1 tablet (600 mg total) by mouth 3 (three) times daily.    HYDROcodone-acetaminophen (NORCO) 7.5-325 mg per tablet Take 1 tablet by mouth every 6 (six) hours as needed for Pain.    levothyroxine (SYNTHROID) 50 MCG tablet TAKE 1 TABLET EVERY DAY    methocarbamoL (ROBAXIN) 500 MG Tab Take 1 tablet (500 mg total) by mouth 3 (three) times daily.    milk thistle 200 mg Cap Take 200 mg by mouth 2 (two) times daily. Twice a day    mupirocin (BACTROBAN) 2 % ointment Apply topically 3 (three) times daily.    olmesartan (BENICAR) 20 MG tablet TAKE 1 TABLET EVERY DAY    omega-3 fatty acids 1,000 mg Cap Twice a day    pantoprazole (PROTONIX)  40 MG tablet Take 1 tablet (40 mg total) by mouth 2 (two) times daily.    potassium chloride SA (K-DUR,KLOR-CON) 20 MEQ tablet TAKE 4 TABLETS EVERY DAY OR AS DIRECTED    predniSONE (DELTASONE) 1 MG tablet TAKE 2 TABLETS EVERY DAY    predniSONE (DELTASONE) 5 MG tablet TAKE 1 TABLET EVERY DAY (Patient taking differently: 7.5 mg.)    sildenafil (REVATIO) 20 mg Tab Take 1 to 3 tabs daily as needed.    tiotropium (SPIRIVA WITH HANDIHALER) 18 mcg inhalation capsule INHALE THE CONTENTS OF 1 CAPSULE EVERY DAY  VIA HANDIHALER (CONTROLLER)    [DISCONTINUED] esomeprazole (NEXIUM) 40 MG capsule Take 1 capsule (40 mg total) by mouth before breakfast.     Family History       Problem Relation (Age of Onset)    Cataracts Mother, Father    Glaucoma Father    Heart disease Mother, Father    Hyperlipidemia Mother, Sister    Hypertension Mother, Sister    No Known Problems Daughter, Daughter          Tobacco Use    Smoking status: Never    Smokeless tobacco: Never    Tobacco comments:     when a child   Substance and Sexual Activity    Alcohol use: Yes     Comment: rarely    Drug use: No    Sexual activity: Yes     Partners: Female     Review of Systems   Constitutional:  Negative for chills, diaphoresis, fatigue and fever.   HENT:  Negative for congestion, ear pain, sore throat and trouble swallowing.    Eyes:  Negative for pain, discharge and visual disturbance.   Respiratory:  Negative for cough, chest tightness, shortness of breath and wheezing.    Cardiovascular:  Positive for leg swelling. Negative for chest pain and palpitations.   Gastrointestinal:  Negative for abdominal distention, abdominal pain, blood in stool, constipation, diarrhea, nausea and vomiting.   Endocrine: Negative for polydipsia, polyphagia and polyuria.   Genitourinary:  Negative for dysuria, flank pain, frequency and urgency.   Musculoskeletal:  Positive for arthralgias, joint swelling and myalgias. Negative for back pain, neck pain and neck  stiffness.   Skin:  Negative for rash and wound.   Allergic/Immunologic: Negative for immunocompromised state.   Neurological:  Negative for dizziness, syncope, speech difficulty, weakness, light-headedness, numbness and headaches.   Hematological:  Negative for adenopathy.   Psychiatric/Behavioral:  Negative for confusion and suicidal ideas. The patient is not nervous/anxious.    All other systems reviewed and are negative.  Objective:     Vital Signs (Most Recent):  Temp: 98.2 °F (36.8 °C) (07/01/23 1304)  Pulse: 69 (07/01/23 1704)  Resp: 18 (07/01/23 1304)  BP: (!) 160/75 (07/01/23 1704)  SpO2: (!) 94 % (07/01/23 1704) Vital Signs (24h Range):  Temp:  [98.2 °F (36.8 °C)] 98.2 °F (36.8 °C)  Pulse:  [61-69] 69  Resp:  [18] 18  SpO2:  [93 %-96 %] 94 %  BP: (154-176)/(75-84) 160/75     Weight: 108.9 kg (240 lb)  Body mass index is 32.55 kg/m².     Physical Exam  Vitals and nursing note reviewed.   Constitutional:       Appearance: He is well-developed.   HENT:      Head: Normocephalic and atraumatic.   Eyes:      Conjunctiva/sclera: Conjunctivae normal.      Pupils: Pupils are equal, round, and reactive to light.   Cardiovascular:      Rate and Rhythm: Normal rate and regular rhythm.      Heart sounds: Normal heart sounds.   Pulmonary:      Effort: Pulmonary effort is normal.      Breath sounds: Normal breath sounds.   Abdominal:      General: Bowel sounds are normal.      Palpations: Abdomen is soft.   Musculoskeletal:         General: Normal range of motion.      Cervical back: Normal range of motion and neck supple.      Left lower leg: Edema present.      Comments: LLE edema 3+ nonpitting   Skin:     General: Skin is warm and dry.      Capillary Refill: Capillary refill takes less than 2 seconds.   Neurological:      Mental Status: He is alert and oriented to person, place, and time.   Psychiatric:         Behavior: Behavior normal.         Thought Content: Thought content normal.         Judgment: Judgment  normal.            CRANIAL NERVES     CN III, IV, VI   Pupils are equal, round, and reactive to light.     Significant Labs: All pertinent labs within the past 24 hours have been reviewed.  CBC:   Recent Labs   Lab 07/01/23  1337   WBC 8.18   HGB 12.3*   HCT 37.0*   *     CMP:   Recent Labs   Lab 07/01/23  1337      K 3.7      CO2 25   *   BUN 23   CREATININE 1.0   CALCIUM 8.8   PROT 6.0   ALBUMIN 3.4*   BILITOT 1.1*   ALKPHOS 74   AST 17   ALT 19   ANIONGAP 11       Significant Imaging: I have reviewed all pertinent imaging results/findings within the past 24 hours.    X-Ray Chest PA And Lateral    Result Date: 6/15/2023  EXAMINATION: XR CHEST PA AND LATERAL CLINICAL HISTORY: Chronic cough TECHNIQUE: PA and lateral views of the chest were performed. COMPARISON: Multiple priors, most recent 08/26/2022 FINDINGS: The lungs are clear.  No focal consolidation, pleural effusion or pneumothorax. Normal cardiomediastinal contour. No acute or aggressive osseous abnormality.     No acute cardiopulmonary abnormality. Electronically signed by: Joselyn Damon Date:    06/15/2023 Time:    12:12    US Lower Extremity Veins Left    Result Date: 7/1/2023  EXAMINATION: US LOWER EXTREMITY VEINS LEFT CLINICAL HISTORY: Other specified soft tissue disorders TECHNIQUE: Duplex and color flow Doppler evaluation and graded compression of the left lower extremity veins was performed. COMPARISON: None FINDINGS: Left thigh veins: There is occlusive and nonocclusive thrombus involving the common femoral, superficial femoral and popliteal veins.  Greater saphenous vein is patent without intraluminal thrombus. Left calf veins: There is occlusive thrombus within posterior tibial vein.  Anterior tibial and peroneal veins are patent without intraluminal thrombus. Contralateral CFV: The contralateral (right) common femoral vein is patent and free of thrombus. Miscellaneous: None     Extensive intraluminal thrombus involving the  large veins of the left lower extremity extending into the calf. This report was flagged in Epic as abnormal. Electronically signed by: Jerry Box Date:    07/01/2023 Time:    14:30       Assessment/Plan:     * Acute deep vein thrombosis (DVT) of femoral vein of left lower extremity  Admit to med/tele   Heparin gtt for now- occlusive and nonocclusive involving common femoral, superficial femoral, popliteal, and posterior tibial, leg appears to have mild pallor but no phlegmasia cerulea dolens on exam  Consult heme/onc  Transition to oral anticoagulant as clinically indicated  Started hypercoag work up  Continue pantoprazole for GI prophylaxis     Thrombocytopenia  Mild, 115K  Monitor daily cbc       Mild persistent asthma without complication  Prn duoneb    Myasthenia gravis, AChR antibody positive  Steroid dependent, continue home prednisone      HTN (hypertension), benign  Continue appropriate home antihypertensives      Hyperlipidemia  Continue appropriate home antihyperlipidemics  On atorvastatin and tolerating with myasthenia gravis, continue to monitor      Hypothyroid  Continue home levothyroxine        VTE Risk Mitigation (From admission, onward)         Ordered     IP VTE HIGH RISK PATIENT  Once         07/01/23 1620     heparin 25,000 units in dextrose 5% (100 units/ml) IV bolus from bag - ADDITIONAL PRN BOLUS - 60 units/kg  As needed (PRN)        Question:  Heparin Infusion Adjustment (DO NOT MODIFY ANSWER)  Answer:  [email or web address]\Images\Pharmacy\HeparinInfusions\heparin HIGH INTENSITY nomogram for [facility] [id number]    07/01/23 1442     heparin 25,000 units in dextrose 5% (100 units/ml) IV bolus from bag - ADDITIONAL PRN BOLUS - 30 units/kg  As needed (PRN)        Question:  Heparin Infusion Adjustment (DO NOT MODIFY ANSWER)  Answer:  [email or web address]\Images\Pharmacy\HeparinInfusions\heparin HIGH INTENSITY nomogram for [facility] [id number]    07/01/23 1442     heparin 25,000 units in dextrose 5% 250 mL  (100 units/mL) infusion HIGH INTENSITY nomogram - [facility]  Continuous        Question Answer Comment   Heparin Infusion Adjustment (DO NOT MODIFY ANSWER) [email or web address]\Images\Pharmacy\HeparinInfusions\heparin HIGH INTENSITY nomogram for [facility] [id number]    Begin at (in units/kg/hr) 18        07/01/23 1442                           Corrine Osorio NP  Department of Hospital Medicine  [facility]

## 2023-07-01 NOTE — NURSING
Pt AAO x 3, no distress noted. IV CDI, vital sign WNL. Noted Left lower extremity swelling. Pt denies any chest pain. Updated pt and pt spouse with care, verbalized understanding.

## 2023-07-01 NOTE — SUBJECTIVE & OBJECTIVE
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    Emphysema lung 11/05/2018    Gastritis     Hx of colonic polyp     Hyperlipidemia     Hypertension     Hypothyroidism     Knee fracture     Myasthenia gravis     Neuropathy 01/03/2013    Obesity 01/29/2015    Pneumonia 03/29/2020    Polyneuropathy     PVC (premature ventricular contraction)     Squamous cell carcinoma 2014    left forearm    Thyroid disease        Past Surgical History:   Procedure Laterality Date    APPENDECTOMY      CATARACT EXTRACTION W/  INTRAOCULAR LENS IMPLANT Bilateral     COLONOSCOPY  03/01/2012    Dr Esteban; hyperplastic polyp; repeat in 5 years    COLONOSCOPY N/A 12/7/2021    Procedure: COLONOSCOPY;  Surgeon: Gabriel Hernandez MD;  Location: [facility];  Service: Endoscopy;  Laterality: N/A;    CYSTOSCOPY N/A 2/26/2019    Procedure: CYSTOSCOPY;  Surgeon: Simba Cheung MD;  Location: [facility] OR;  Service: Urology;  Laterality: N/A;    ENDOSCOPIC ULTRASOUND OF UPPER GASTROINTESTINAL TRACT N/A 1/7/2020    Procedure: ULTRASOUND, UPPER GI TRACT, ENDOSCOPIC;  Surgeon: Kati Ryan MD;  [facility]  Service: Endoscopy;  Laterality: N/A;    ESOPHAGOGASTRODUODENOSCOPY N/A 9/12/2018    Procedure: EGD (ESOPHAGOGASTRODUODENOSCOPY);  Surgeon: Gabriel Hernandez MD;  Location: [facility];  Service: Endoscopy;  Laterality: N/A;    ESOPHAGOGASTRODUODENOSCOPY N/A 8/19/2019    Procedure: EGD (ESOPHAGOGASTRODUODENOSCOPY);  Surgeon: Gabriel Hernandez MD;  Location: [facility];  Service: Endoscopy;  Laterality: N/A;    ESOPHAGOGASTRODUODENOSCOPY N/A 10/7/2019    Procedure: EGD (ESOPHAGOGASTRODUODENOSCOPY);  Surgeon: Gabriel Hernandez MD;  Location: [facility];  Service: Endoscopy;  Laterality: N/A;     ESOPHAGOGASTRODUODENOSCOPY N/A 1/7/2020    Procedure: EGD (ESOPHAGOGASTRODUODENOSCOPY);  Surgeon: Kati Ryan MD;  Location: [address]);  Service: Endoscopy;  Laterality: N/A;    HEMORRHOID SURGERY      INJECTION OF ANESTHETIC AGENT AROUND MEDIAL BRANCH NERVES INNERVATING LUMBAR FACET JOINT Bilateral 10/1/2020    Procedure: Block-nerve-medial branch-lumbar Bilateral L 3,4,5;  Surgeon: Devan Watt MD;  Location: [facility];  Service: Pain Management;  Laterality: Bilateral;    INJECTION OF ANESTHETIC AGENT AROUND MEDIAL BRANCH NERVES INNERVATING LUMBAR FACET JOINT Right 10/7/2022    Procedure: Block-nerve-medial branch-lumbar L3,4,5;  Surgeon: Devan Watt MD;  Location: [facility];  Service: Pain Management;  Laterality: Right;    KNEE ARTHROPLASTY Bilateral     LENGTHENING OF ACHILLES TENDON Right 9/11/2020    Procedure: percutaeous tenotomy of right lateral epicondyle (tenex);  Surgeon: Terry Quach MD;  Location: [facility];  Service: Neurosurgery;  Laterality: Right;  tenex to right lateral epicondyle  Tenex machine SN#[id number], total time 1min. 30seconds    NECK SURGERY      POSTERIOR FUSION OF CERVICAL SPINE WITH LAMINECTOMY N/A 11/7/2018    Procedure: C2-C6 Posterior Cervical Laminectomy & Instrumental Fusion;  Surgeon: Kishore Turner MD;  Location: [address];  Service: Neurosurgery;  Laterality: N/A;    TONSILLECTOMY      TRANSFORAMINAL EPIDURAL INJECTION OF STEROID Right 12/20/2019    Procedure: Injection,steroid,epidural,transforaminal approach;  Surgeon: Devan Watt MD;  Location: [facility];  Service: Pain Management;  Laterality: Right;  L3-4, L4-5    TRANSFORAMINAL EPIDURAL INJECTION OF STEROID Bilateral 2/6/2020    Procedure: Injection,steroid,epidural,transforaminal approach;  Surgeon: Devan Watt MD;  Location: [facility];  Service: Pain Management;  Laterality: Bilateral;  L3-L4,L4-L5    TRANSFORAMINAL EPIDURAL INJECTION OF STEROID Bilateral 8/26/2020    Procedure:  Injection,steroid,epidural,transforaminal approach;  Surgeon: Devan Watt MD;  Location: [facility] OR;  Service: Pain Management;  Laterality: Bilateral;  L3-4, L4-5    TRANSRECTAL ULTRASOUND EXAMINATION N/A 2/26/2019    Procedure: ULTRASOUND, RECTAL APPROACH;  Surgeon: Simba Cheung MD;  Location: [facility] OR;  Service: Urology;  Laterality: N/A;    UPPER GASTROINTESTINAL ENDOSCOPY  09/12/2018    Dr Hernandez; gastritis; extensive intestinal metaplasia; repeat in 1-2- years       Review of patient's allergies indicates:   Allergen Reactions    Spironolactone Diarrhea       No current facility-administered medications on file prior to encounter.     Current Outpatient Medications on File Prior to Encounter   Medication Sig    atorvastatin (LIPITOR) 80 MG tablet TAKE 1 TABLET EVERY DAY    chlorthalidone (HYGROTEN) 25 MG Tab TAKE 1 TABLET ONE TIME DAILY    albuterol (PROVENTIL/VENTOLIN HFA) 90 mcg/actuation inhaler INHALE 1 TO 2 PUFFS INTO THE LUNGS EVERY 6 HOURS AS NEEDED FOR WHEEZING OR SHORTNESS OF BREATH. FOR RESCUE.    carvediloL (COREG) 25 MG tablet TAKE 1 TABLET TWICE DAILY WITH MEALS    cetirizine (ZYRTEC) 10 MG tablet Take 1 tablet by mouth daily as needed.    cholecalciferol, vitamin D3, (VITAMIN D3) 50 mcg (2,000 unit) Cap 1 capsule once daily. Every day    coenzyme Q10 (CO Q-10) 100 mg capsule Take 400 mg by mouth once daily.    cyanocobalamin, vitamin B-12, 5,000 mcg Subl Take 5,000 mcg by mouth once daily. Every day    diphenoxylate-atropine 2.5-0.025 mg (LOMOTIL) 2.5-0.025 mg per tablet Take 1 tablet by mouth 4 (four) times daily as needed for Diarrhea.    ezetimibe (ZETIA) 10 mg tablet TAKE 1 TABLET EVERY DAY    FLUAD QUAD 2021-22,65Y UP,,PF, 60 mcg (15 mcg x 4)/0.5 mL Syrg     fluticasone (FLONASE) 50 mcg/actuation nasal spray USE 1 SPRAY IN EACH NOSTRIL TWICE DAILY AS NEEDED  FOR  RHINITIS    gabapentin (NEURONTIN) 600 MG tablet Take 1 tablet (600 mg total) by mouth 3 (three) times daily.     HYDROcodone-acetaminophen (NORCO) 7.5-325 mg per tablet Take 1 tablet by mouth every 6 (six) hours as needed for Pain.    levothyroxine (SYNTHROID) 50 MCG tablet TAKE 1 TABLET EVERY DAY    methocarbamoL (ROBAXIN) 500 MG Tab Take 1 tablet (500 mg total) by mouth 3 (three) times daily.    milk thistle 200 mg Cap Take 200 mg by mouth 2 (two) times daily. Twice a day    mupirocin (BACTROBAN) 2 % ointment Apply topically 3 (three) times daily.    olmesartan (BENICAR) 20 MG tablet TAKE 1 TABLET EVERY DAY    omega-3 fatty acids 1,000 mg Cap Twice a day    pantoprazole (PROTONIX) 40 MG tablet Take 1 tablet (40 mg total) by mouth 2 (two) times daily.    potassium chloride SA (K-DUR,KLOR-CON) 20 MEQ tablet TAKE 4 TABLETS EVERY DAY OR AS DIRECTED    predniSONE (DELTASONE) 1 MG tablet TAKE 2 TABLETS EVERY DAY    predniSONE (DELTASONE) 5 MG tablet TAKE 1 TABLET EVERY DAY (Patient taking differently: 7.5 mg.)    sildenafil (REVATIO) 20 mg Tab Take 1 to 3 tabs daily as needed.    tiotropium (SPIRIVA WITH HANDIHALER) 18 mcg inhalation capsule INHALE THE CONTENTS OF 1 CAPSULE EVERY DAY  VIA HANDIHALER (CONTROLLER)    [DISCONTINUED] esomeprazole (NEXIUM) 40 MG capsule Take 1 capsule (40 mg total) by mouth before breakfast.     Family History       Problem Relation (Age of Onset)    Cataracts Mother, Father    Glaucoma Father    Heart disease Mother, Father    Hyperlipidemia Mother, Sister    Hypertension Mother, Sister    No Known Problems Daughter, Daughter          Tobacco Use    Smoking status: Never    Smokeless tobacco: Never    Tobacco comments:     when a child   Substance and Sexual Activity    Alcohol use: Yes     Comment: rarely    Drug use: No    Sexual activity: Yes     Partners: Female     Review of Systems   Constitutional:  Negative for chills, diaphoresis, fatigue and fever.   HENT:  Negative for congestion, ear pain, sore throat and trouble swallowing.    Eyes:  Negative for pain, discharge and visual disturbance.    Respiratory:  Negative for cough, chest tightness, shortness of breath and wheezing.    Cardiovascular:  Positive for leg swelling. Negative for chest pain and palpitations.   Gastrointestinal:  Negative for abdominal distention, abdominal pain, blood in stool, constipation, diarrhea, nausea and vomiting.   Endocrine: Negative for polydipsia, polyphagia and polyuria.   Genitourinary:  Negative for dysuria, flank pain, frequency and urgency.   Musculoskeletal:  Positive for arthralgias, joint swelling and myalgias. Negative for back pain, neck pain and neck stiffness.   Skin:  Negative for rash and wound.   Allergic/Immunologic: Negative for immunocompromised state.   Neurological:  Negative for dizziness, syncope, speech difficulty, weakness, light-headedness, numbness and headaches.   Hematological:  Negative for adenopathy.   Psychiatric/Behavioral:  Negative for confusion and suicidal ideas. The patient is not nervous/anxious.    All other systems reviewed and are negative.  Objective:     Vital Signs (Most Recent):  Temp: 98.2 °F (36.8 °C) (07/01/23 1304)  Pulse: 69 (07/01/23 1704)  Resp: 18 (07/01/23 1304)  BP: (!) 160/75 (07/01/23 1704)  SpO2: (!) 94 % (07/01/23 1704) Vital Signs (24h Range):  Temp:  [98.2 °F (36.8 °C)] 98.2 °F (36.8 °C)  Pulse:  [61-69] 69  Resp:  [18] 18  SpO2:  [93 %-96 %] 94 %  BP: (154-176)/(75-84) 160/75     Weight: 108.9 kg (240 lb)  Body mass index is 32.55 kg/m².     Physical Exam  Vitals and nursing note reviewed.   Constitutional:       Appearance: He is well-developed.   HENT:      Head: Normocephalic and atraumatic.   Eyes:      Conjunctiva/sclera: Conjunctivae normal.      Pupils: Pupils are equal, round, and reactive to light.   Cardiovascular:      Rate and Rhythm: Normal rate and regular rhythm.      Heart sounds: Normal heart sounds.   Pulmonary:      Effort: Pulmonary effort is normal.      Breath sounds: Normal breath sounds.   Abdominal:      General: Bowel sounds are  normal.      Palpations: Abdomen is soft.   Musculoskeletal:         General: Normal range of motion.      Cervical back: Normal range of motion and neck supple.      Left lower leg: Edema present.      Comments: LLE edema 3+ nonpitting   Skin:     General: Skin is warm and dry.      Capillary Refill: Capillary refill takes less than 2 seconds.   Neurological:      Mental Status: He is alert and oriented to person, place, and time.   Psychiatric:         Behavior: Behavior normal.         Thought Content: Thought content normal.         Judgment: Judgment normal.            CRANIAL NERVES     CN III, IV, VI   Pupils are equal, round, and reactive to light.     Significant Labs: All pertinent labs within the past 24 hours have been reviewed.  CBC:   Recent Labs   Lab 07/01/23  1337   WBC 8.18   HGB 12.3*   HCT 37.0*   *     CMP:   Recent Labs   Lab 07/01/23  1337      K 3.7      CO2 25   *   BUN 23   CREATININE 1.0   CALCIUM 8.8   PROT 6.0   ALBUMIN 3.4*   BILITOT 1.1*   ALKPHOS 74   AST 17   ALT 19   ANIONGAP 11       Significant Imaging: I have reviewed all pertinent imaging results/findings within the past 24 hours.    X-Ray Chest PA And Lateral    Result Date: 6/15/2023  EXAMINATION: XR CHEST PA AND LATERAL CLINICAL HISTORY: Chronic cough TECHNIQUE: PA and lateral views of the chest were performed. COMPARISON: Multiple priors, most recent 08/26/2022 FINDINGS: The lungs are clear.  No focal consolidation, pleural effusion or pneumothorax. Normal cardiomediastinal contour. No acute or aggressive osseous abnormality.     No acute cardiopulmonary abnormality. Electronically signed by: Joselyn Damon Date:    06/15/2023 Time:    12:12    US Lower Extremity Veins Left    Result Date: 7/1/2023  EXAMINATION: US LOWER EXTREMITY VEINS LEFT CLINICAL HISTORY: Other specified soft tissue disorders TECHNIQUE: Duplex and color flow Doppler evaluation and graded compression of the left lower extremity  veins was performed. COMPARISON: None FINDINGS: Left thigh veins: There is occlusive and nonocclusive thrombus involving the common femoral, superficial femoral and popliteal veins.  Greater saphenous vein is patent without intraluminal thrombus. Left calf veins: There is occlusive thrombus within posterior tibial vein.  Anterior tibial and peroneal veins are patent without intraluminal thrombus. Contralateral CFV: The contralateral (right) common femoral vein is patent and free of thrombus. Miscellaneous: None     Extensive intraluminal thrombus involving the large veins of the left lower extremity extending into the calf. This report was flagged in Epic as abnormal. Electronically signed by: Jerry Box Date:    07/01/2023 Time:    14:30

## 2023-07-01 NOTE — ED PROVIDER NOTES
Encounter Date: 7/1/2023       History     Chief Complaint   Patient presents with    Leg Swelling     Left  / hx. Lymphedema      77-year-old male presents with left lower extremity swelling.  Patient with edema.  Associated discomfort.  Moderate to severe severity, worsened over the last few days.  No trauma, fever or chills.      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    Emphysema lung 11/05/2018    Gastritis     Hx of colonic polyp     Hyperlipidemia     Hypertension     Hypothyroidism     Knee fracture     Myasthenia gravis     Neuropathy 01/03/2013    Obesity 01/29/2015    Pneumonia 03/29/2020    Polyneuropathy     PVC (premature ventricular contraction)     Squamous cell carcinoma 2014    left forearm    Thyroid disease      Past Surgical History:   Procedure Laterality Date    APPENDECTOMY      CATARACT EXTRACTION W/  INTRAOCULAR LENS IMPLANT Bilateral     COLONOSCOPY  03/01/2012    Dr Esteban; hyperplastic polyp; repeat in 5 years    COLONOSCOPY N/A 12/7/2021    Procedure: COLONOSCOPY;  Surgeon: Gabriel Hernandez MD;  Location: [facility];  Service: Endoscopy;  Laterality: N/A;    CYSTOSCOPY N/A 2/26/2019    Procedure: CYSTOSCOPY;  Surgeon: Simba Cheung MD;  Location: [facility];  Service: Urology;  Laterality: N/A;    ENDOSCOPIC ULTRASOUND OF UPPER GASTROINTESTINAL TRACT N/A 1/7/2020    Procedure: ULTRASOUND, UPPER GI TRACT, ENDOSCOPIC;  Surgeon: Kati Ryan MD;  Location: [address]);  Service: Endoscopy;  Laterality: N/A;    ESOPHAGOGASTRODUODENOSCOPY N/A 9/12/2018    Procedure: EGD (ESOPHAGOGASTRODUODENOSCOPY);  Surgeon: Gabriel Hernandez MD;   Location: [facility];  Service: Endoscopy;  Laterality: N/A;    ESOPHAGOGASTRODUODENOSCOPY N/A 8/19/2019    Procedure: EGD (ESOPHAGOGASTRODUODENOSCOPY);  Surgeon: Gabriel Hernandez MD;  Location: [facility];  Service: Endoscopy;  Laterality: N/A;    ESOPHAGOGASTRODUODENOSCOPY N/A 10/7/2019    Procedure: EGD (ESOPHAGOGASTRODUODENOSCOPY);  Surgeon: Gabriel Hernandez MD;  Location: [facility];  Service: Endoscopy;  Laterality: N/A;    ESOPHAGOGASTRODUODENOSCOPY N/A 1/7/2020    Procedure: EGD (ESOPHAGOGASTRODUODENOSCOPY);  Surgeon: Kati Ryan MD;  [facility]  Service: Endoscopy;  Laterality: N/A;    HEMORRHOID SURGERY      INJECTION OF ANESTHETIC AGENT AROUND MEDIAL BRANCH NERVES INNERVATING LUMBAR FACET JOINT Bilateral 10/1/2020    Procedure: Block-nerve-medial branch-lumbar Bilateral L 3,4,5;  Surgeon: Devan Watt MD;  Location: [facility];  Service: Pain Management;  Laterality: Bilateral;    INJECTION OF ANESTHETIC AGENT AROUND MEDIAL BRANCH NERVES INNERVATING LUMBAR FACET JOINT Right 10/7/2022    Procedure: Block-nerve-medial branch-lumbar L3,4,5;  Surgeon: Devan Watt MD;  Location: [facility];  Service: Pain Management;  Laterality: Right;    KNEE ARTHROPLASTY Bilateral     LENGTHENING OF ACHILLES TENDON Right 9/11/2020    Procedure: percutaeous tenotomy of right lateral epicondyle (tenex);  Surgeon: Terry Quach MD;  Location: [facility];  Service: Neurosurgery;  Laterality: Right;  tenex to right lateral epicondyle  Tenex machine SN#[id number], total time 1min. 30seconds    NECK SURGERY      POSTERIOR FUSION OF CERVICAL SPINE WITH LAMINECTOMY N/A 11/7/2018    Procedure: C2-C6 Posterior Cervical Laminectomy & Instrumental Fusion;  Surgeon: Kishore Turner MD;  Location: [address];  Service: Neurosurgery;  Laterality: N/A;    TONSILLECTOMY      TRANSFORAMINAL EPIDURAL INJECTION OF STEROID Right 12/20/2019    Procedure: Injection,steroid,epidural,transforaminal approach;  Surgeon: Devan Watt  MD;  Location: [facility];  Service: Pain Management;  Laterality: Right;  L3-4, L4-5    TRANSFORAMINAL EPIDURAL INJECTION OF STEROID Bilateral 2/6/2020    Procedure: Injection,steroid,epidural,transforaminal approach;  Surgeon: Devan Watt MD;  Location: [facility] OR;  Service: Pain Management;  Laterality: Bilateral;  L3-L4,L4-L5    TRANSFORAMINAL EPIDURAL INJECTION OF STEROID Bilateral 8/26/2020    Procedure: Injection,steroid,epidural,transforaminal approach;  Surgeon: Devan Watt MD;  Location: [facility] OR;  Service: Pain Management;  Laterality: Bilateral;  L3-4, L4-5    TRANSRECTAL ULTRASOUND EXAMINATION N/A 2/26/2019    Procedure: ULTRASOUND, RECTAL APPROACH;  Surgeon: Simba Cheung MD;  Location: [facility];  Service: Urology;  Laterality: N/A;    UPPER GASTROINTESTINAL ENDOSCOPY  09/12/2018    Dr Hernandez; gastritis; extensive intestinal metaplasia; repeat in 1-2- years     Family History   Problem Relation Age of Onset    Cataracts Mother     Heart disease Mother         CHF    Hypertension Mother     Hyperlipidemia Mother     Cataracts Father     Glaucoma Father     Heart disease Father     Hyperlipidemia Sister     Hypertension Sister     No Known Problems Daughter     No Known Problems Daughter     Collagen disease Neg Hx     Amblyopia Neg Hx     Blindness Neg Hx     Macular degeneration Neg Hx     Retinal detachment Neg Hx     Strabismus Neg Hx     Cancer Neg Hx     Colon cancer Neg Hx     Esophageal cancer Neg Hx     Stomach cancer Neg Hx     Crohn's disease Neg Hx     Ulcerative colitis Neg Hx      Social History     Tobacco Use    Smoking status: Never    Smokeless tobacco: Never    Tobacco comments:     when a child   Substance Use Topics    Alcohol use: Yes     Comment: rarely    Drug use: No     Review of Systems   All other systems reviewed and are negative.    Physical Exam     Initial Vitals [07/01/23 1304]   BP Pulse Resp Temp SpO2   (!) 154/79 63 18 98.2 °F (36.8 °C) 96 %      MAP       --          Physical Exam    Nursing note and vitals reviewed.  Constitutional: Vital signs are normal. He appears well-developed and well-nourished.  Non-toxic appearance. No distress.   HENT:   Head: Normocephalic and atraumatic.   Eyes: EOM are normal. Right eye exhibits no discharge. Left eye exhibits no discharge.   Neck:   Normal range of motion.  Cardiovascular:  Normal rate and regular rhythm.           Pulmonary/Chest: No stridor. No respiratory distress.   Bilateral chest rise   Abdominal: Abdomen is soft. There is no abdominal tenderness.   Musculoskeletal:         General: Edema present.      Cervical back: Normal range of motion. No rigidity.      Comments: Left lower extremity asymmetrical swelling compared to right     Neurological: He is alert. No sensory deficit.   No focal motor or sensory deficit noted   Skin: Skin is warm and dry. No rash noted.   Psychiatric: His speech is normal. He is not actively hallucinating.   Not anxious  or agitated       ED Course   Procedures  Labs Reviewed   D DIMER, QUANTITATIVE - Abnormal; Notable for the following components:       Result Value    D-Dimer 2.31 (*)     All other components within normal limits    Narrative:     DDimer  critical result(s) called and verbal readback obtained from   Davina Padron RN. by VERONICA 07/01/2023 14:05   CBC W/ AUTO DIFFERENTIAL - Abnormal; Notable for the following components:    RBC 4.08 (*)     Hemoglobin 12.3 (*)     Hematocrit 37.0 (*)     Platelets 115 (*)     Lymph % 16.7 (*)     All other components within normal limits   COMPREHENSIVE METABOLIC PANEL - Abnormal; Notable for the following components:    Glucose 137 (*)     Albumin 3.4 (*)     Total Bilirubin 1.1 (*)     All other components within normal limits   TROPONIN I   B-TYPE NATRIURETIC PEPTIDE   PROTIME-INR   APTT   APTT     EKG Readings: (Independently Interpreted)   EKG interpreted by myself Manny Christiansen DO  Rate 61, normal sinus rhythm, , , no  STEMI     Imaging Results               US Lower Extremity Veins Left (Final result)  Result time 07/01/23 14:30:20      Final result by Jerry Box MD (07/01/23 14:30:20)                   Impression:      Extensive intraluminal thrombus involving the large veins of the left lower extremity extending into the calf.    This report was flagged in Epic as abnormal.      Electronically signed by: Jerry Box  Date:    07/01/2023  Time:    14:30               Narrative:    EXAMINATION:  US LOWER EXTREMITY VEINS LEFT    CLINICAL HISTORY:  Other specified soft tissue disorders    TECHNIQUE:  Duplex and color flow Doppler evaluation and graded compression of the left lower extremity veins was performed.    COMPARISON:  None    FINDINGS:  Left thigh veins: There is occlusive and nonocclusive thrombus involving the common femoral, superficial femoral and popliteal veins.  Greater saphenous vein is patent without intraluminal thrombus.    Left calf veins: There is occlusive thrombus within posterior tibial vein.  Anterior tibial and peroneal veins are patent without intraluminal thrombus.    Contralateral CFV: The contralateral (right) common femoral vein is patent and free of thrombus.    Miscellaneous: None                                       Medications   heparin 25,000 units in dextrose 5% 250 mL (100 units/mL) infusion HIGH INTENSITY nomogram - [facility] (18 Units/kg/hr × 90.1 kg (Adjusted) Intravenous New Bag 7/1/23 1610)   heparin 25,000 units in dextrose 5% (100 units/ml) IV bolus from bag - ADDITIONAL PRN BOLUS - 60 units/kg (has no administration in time range)   heparin 25,000 units in dextrose 5% (100 units/ml) IV bolus from bag - ADDITIONAL PRN BOLUS - 30 units/kg (has no administration in time range)   atorvastatin tablet 80 mg (has no administration in time range)   carvediloL tablet 25 mg (has no administration in time range)   cetirizine tablet 10 mg (has no administration in time range)   ezetimibe  tablet 10 mg (has no administration in time range)   fluticasone propionate 50 mcg/actuation nasal spray 50 mcg (has no administration in time range)   gabapentin capsule 600 mg (has no administration in time range)   HYDROcodone-acetaminophen 7.5-325 mg per tablet 1 tablet (has no administration in time range)   levothyroxine tablet 50 mcg (has no administration in time range)   methocarbamoL tablet 500 mg (has no administration in time range)   losartan tablet 50 mg (has no administration in time range)   pantoprazole EC tablet 40 mg (has no administration in time range)   sodium chloride 0.9% flush 10 mL (has no administration in time range)   melatonin tablet 6 mg (has no administration in time range)   ondansetron injection 4 mg (has no administration in time range)   polyethylene glycol packet 17 g (has no administration in time range)   senna-docusate 8.6-50 mg per tablet 1 tablet (has no administration in time range)   acetaminophen tablet 650 mg (has no administration in time range)   aluminum-magnesium hydroxide-simethicone 200-200-20 mg/5 mL suspension 30 mL (has no administration in time range)   naloxone 0.4 mg/mL injection 0.02 mg (has no administration in time range)   potassium bicarbonate disintegrating tablet 50 mEq (has no administration in time range)   potassium bicarbonate disintegrating tablet 35 mEq (has no administration in time range)   potassium bicarbonate disintegrating tablet 60 mEq (has no administration in time range)   glucose chewable tablet 16 g (has no administration in time range)   glucose chewable tablet 24 g (has no administration in time range)   glucagon (human recombinant) injection 1 mg (has no administration in time range)   dextrose 10% bolus 125 mL 125 mL (has no administration in time range)   dextrose 10% bolus 250 mL 250 mL (has no administration in time range)   morphine injection 4 mg (has no administration in time range)   albuterol-ipratropium 2.5 mg-0.5 mg/3 mL  nebulizer solution 3 mL (has no administration in time range)   heparin 25,000 units in dextrose 5% (100 units/ml) IV bolus from bag INITIAL BOLUS (7,208 Units Intravenous Bolus from Bag 7/1/23 1607)     Medical Decision Making:   Initial Assessment:   77-year-old male presents with left lower extremity swelling and pain.  Atraumatic  Differential Diagnosis:   Will rule out DVT no evidence of any bacterial or viral infection, unlikely any fracture or dislocation no neurovascular deficits  ED Management:  D-dimer elevated, ultrasound imaging confirms extensive left lower extremity DVT.  Lab work obtained and patient started on IV heparin.  Spoke with hospitalist team will admit for further management evaluation.  Patient updated and agrees with plan for admission.          Attending Attestation:         Attending Critical Care:   Critical Care Times:   Direct Patient Care (initial evaluation, reassessments, and time considering the case)................................................................20 minutes.   Ordering, Reviewing, and Interpreting Diagnostic Studies...............................................................................................................10 minutes.   Documentation..................................................................................................................................................................................7 minutes.   Consultation with other Physicians. .................................................................................................................................................4 minutes.   ==============================================================  Total Critical Care Time - exclusive of procedural time: 41 minutes.  ==============================================================  Critical care reasons: Left lower extremity extensive DVT.   Critical care was time spent personally by me on the following activities:  obtaining history from patient or relative, ordering lab, x-rays, and/or EKG, evaluation of patient's response to treatment, discussion with consultants and re-evaluation of patient's conition.   Critical Care Condition: potentially life-threatening         ED Course as of 07/01/23 1700   Sat Jul 01, 2023   1432 D-Dimer(!!): 2.31 [KB]   1442 Imaging with extensive DVT lower extremity.  No respiratory symptoms..  Patient and wife updated, we will start heparin infusion admit to hospitalist team.  Spoke with [facility] hospitalist team [KB]      ED Course User Index  [KB] Manny Christiansen Jr.,                  Clinical Impression:   Final diagnoses:  [M79.89] Limb swelling - Left Lower Extremity  [I82.409] DVT (deep venous thrombosis)  [I82.402] Acute deep vein thrombosis (DVT) of left lower extremity (Primary)        ED Disposition Condition    Admit Stable                Manny Christiansen Jr., DO  07/01/23 1700

## 2023-07-01 NOTE — ASSESSMENT & PLAN NOTE
Admit to med/tele   Heparin gtt for now- occlusive and nonocclusive involving common femoral, superficial femoral, popliteal, and posterior tibial, leg appears to have mild pallor but no phlegmasia cerulea dolens on exam  Consult heme/onc  Transition to oral anticoagulant as clinically indicated  Started hypercoag work up  Continue pantoprazole for GI prophylaxis

## 2023-07-01 NOTE — ASSESSMENT & PLAN NOTE
Continue appropriate home antihyperlipidemics  On atorvastatin and tolerating with myasthenia gravis, continue to monitor

## 2023-07-01 NOTE — PROGRESS NOTES
Subjective:       Patient ID: Maximilian Tavera Jr. is a 77 y.o. male.    Chief Complaint: Follow-up (Cough since having covid last march 2022. Pain in back)    Mr. Tavera is a 77 year old male with HTN, myasthenia gravis,GERD, COPD and hypothyroidism who presents to clinic for follow up of chronic cough./dyspnea TThe patient is  currently being followed by pulmonology.and cardiology. The patient has increase in bili and decrease protein. Fatty liver.He has rt heart failure.  Contempating eye lid surgery by Dr Carroll.  Ddd (degenerative disc disease), lumbar  (primary encounter diagnosis)  Hyperglycemia  Chronic cough  Chronic leg weakness due to spine claudication.      Follow-up  Associated symptoms include arthralgias, coughing (chronic cough), fatigue and numbness. Pertinent negatives include no abdominal pain, chest pain, fever, headaches or myalgias.   Review of patient's allergies indicates:   Allergen Reactions    Spironolactone Diarrhea         Current Outpatient Medications:     albuterol (PROVENTIL/VENTOLIN HFA) 90 mcg/actuation inhaler, INHALE 1 TO 2 PUFFS INTO THE LUNGS EVERY 6 HOURS AS NEEDED FOR WHEEZING OR SHORTNESS OF BREATH. FOR RESCUE., Disp: 25.5 g, Rfl: 0    atorvastatin (LIPITOR) 80 MG tablet, TAKE 1 TABLET EVERY DAY, Disp: 90 tablet, Rfl: 2    carvediloL (COREG) 25 MG tablet, TAKE 1 TABLET TWICE DAILY WITH MEALS, Disp: 180 tablet, Rfl: 1    cetirizine (ZYRTEC) 10 MG tablet, Take 1 tablet by mouth daily as needed., Disp: , Rfl:     chlorthalidone (HYGROTEN) 25 MG Tab, TAKE 1 TABLET ONE TIME DAILY, Disp: 90 tablet, Rfl: 3    cholecalciferol, vitamin D3, (VITAMIN D3) 50 mcg (2,000 unit) Cap, 1 capsule once daily. Every day, Disp: , Rfl:     coenzyme Q10 (CO Q-10) 100 mg capsule, Take 400 mg by mouth once daily., Disp: , Rfl:     cyanocobalamin, vitamin B-12, 5,000 mcg Subl, Take 5,000 mcg by mouth once daily. Every day, Disp: , Rfl:     diphenoxylate-atropine 2.5-0.025 mg (LOMOTIL) 2.5-0.025  mg per tablet, Take 1 tablet by mouth 4 (four) times daily as needed for Diarrhea., Disp: 90 tablet, Rfl: 0    ezetimibe (ZETIA) 10 mg tablet, TAKE 1 TABLET EVERY DAY, Disp: 90 tablet, Rfl: 3    FLUAD QUAD 2021-22,65Y UP,,PF, 60 mcg (15 mcg x 4)/0.5 mL Syrg, , Disp: , Rfl:     fluticasone (FLONASE) 50 mcg/actuation nasal spray, USE 1 SPRAY IN EACH NOSTRIL TWICE DAILY AS NEEDED  FOR  RHINITIS, Disp: 48 g, Rfl: 3    levothyroxine (SYNTHROID) 50 MCG tablet, TAKE 1 TABLET EVERY DAY, Disp: 90 tablet, Rfl: 1    milk thistle 200 mg Cap, Take 200 mg by mouth 2 (two) times daily. Twice a day, Disp: , Rfl:     mupirocin (BACTROBAN) 2 % ointment, Apply topically 3 (three) times daily., Disp: 30 g, Rfl: 0    olmesartan (BENICAR) 20 MG tablet, TAKE 1 TABLET EVERY DAY, Disp: 90 tablet, Rfl: 2    omega-3 fatty acids 1,000 mg Cap, Twice a day, Disp: , Rfl:     pantoprazole (PROTONIX) 40 MG tablet, Take 1 tablet (40 mg total) by mouth 2 (two) times daily., Disp: 180 tablet, Rfl: 3    potassium chloride SA (K-DUR,KLOR-CON) 20 MEQ tablet, TAKE 4 TABLETS EVERY DAY OR AS DIRECTED, Disp: 360 tablet, Rfl: 3    predniSONE (DELTASONE) 1 MG tablet, TAKE 2 TABLETS EVERY DAY, Disp: 180 tablet, Rfl: 3    predniSONE (DELTASONE) 5 MG tablet, TAKE 1 TABLET EVERY DAY, Disp: 90 tablet, Rfl: 3    sildenafil (REVATIO) 20 mg Tab, Take 1 to 3 tabs daily as needed., Disp: 30 tablet, Rfl: 3    tiotropium (SPIRIVA WITH HANDIHALER) 18 mcg inhalation capsule, INHALE THE CONTENTS OF 1 CAPSULE EVERY DAY  VIA HANDIHALER (CONTROLLER), Disp: 90 capsule, Rfl: 3    gabapentin (NEURONTIN) 600 MG tablet, Take 1 tablet (600 mg total) by mouth 3 (three) times daily., Disp: 90 tablet, Rfl: 3    HYDROcodone-acetaminophen (NORCO) 7.5-325 mg per tablet, Take 1 tablet by mouth every 6 (six) hours as needed for Pain., Disp: 28 tablet, Rfl: 0    methocarbamoL (ROBAXIN) 500 MG Tab, Take 1 tablet (500 mg total) by mouth 3 (three) times daily., Disp: 30 tablet, Rfl: 4    Lab  Results   Component Value Date    WBC 10.90 02/22/2023    HGB 13.4 (L) 02/22/2023    HCT 41.2 02/22/2023     (L) 02/22/2023    CHOL 131 02/22/2023    TRIG 131 02/22/2023    HDL 41 02/22/2023    ALT 32 02/22/2023    AST 16 02/22/2023     06/15/2023    K 3.6 06/15/2023     06/15/2023    CREATININE 0.9 06/15/2023    BUN 14 06/15/2023    CO2 29 06/15/2023    TSH 1.253 02/22/2023    PSA 3.5 09/19/2019    INR 1.0 10/30/2018    HGBA1C 6.2 (H) 06/15/2023       Review of Systems   Constitutional:  Positive for activity change and fatigue. Negative for appetite change and fever.   HENT:  Positive for hearing loss. Negative for postnasal drip, rhinorrhea and sinus pressure.    Eyes:  Negative for visual disturbance.   Respiratory:  Positive for cough (chronic cough) and shortness of breath.    Cardiovascular:  Negative for chest pain.   Gastrointestinal:  Negative for abdominal distention and abdominal pain.   Genitourinary:  Positive for urgency. Negative for difficulty urinating and dysuria.   Musculoskeletal:  Positive for arthralgias, back pain, gait problem and neck stiffness. Negative for myalgias.   Neurological:  Positive for light-headedness and numbness. Negative for headaches.   Hematological:  Negative for adenopathy.   Psychiatric/Behavioral:  Positive for decreased concentration. The patient is not nervous/anxious.      Objective:      Physical Exam  Constitutional:       Appearance: He is obese.   HENT:      Head: Normocephalic and atraumatic.   Eyes:      Conjunctiva/sclera: Conjunctivae normal.   Cardiovascular:      Rate and Rhythm: Normal rate and regular rhythm.   Pulmonary:      Effort: Pulmonary effort is normal. No respiratory distress.      Breath sounds: Normal breath sounds. No wheezing.   Abdominal:      General: There is no distension.      Palpations: There is no mass.      Tenderness: There is no abdominal tenderness.   Musculoskeletal:      Cervical back: Rigidity present.  Spinous process tenderness and muscular tenderness present. No pain with movement. Decreased range of motion.      Thoracic back: Decreased range of motion.      Lumbar back: Bony tenderness present. Decreased range of motion.      Right lower leg: No edema.      Left lower leg: No edema.   Skin:     Findings: No erythema.   Neurological:      Mental Status: He is alert and oriented to person, place, and time.   Psychiatric:         Behavior: Behavior normal.     X-Ray Chest PA And Lateral  Narrative: EXAMINATION:  XR CHEST PA AND LATERAL    CLINICAL HISTORY:  Chronic cough    TECHNIQUE:  PA and lateral views of the chest were performed.    COMPARISON:  Multiple priors, most recent 08/26/2022    FINDINGS:  The lungs are clear.  No focal consolidation, pleural effusion or pneumothorax.    Normal cardiomediastinal contour.    No acute or aggressive osseous abnormality.  Impression: No acute cardiopulmonary abnormality.    Electronically signed by: Joselyn Damon  Date:    06/15/2023  Time:    12:12      Assessment:       1. DDD (degenerative disc disease), lumbar    2. Hyperglycemia    3. Chronic cough    4. Aortic valve disease    5. HTN (hypertension), benign    6. Hypothyroidism due to acquired atrophy of thyroid    7. Lumbar radiculitis    8. Cervical stenosis of spinal canal    9. Myasthenia gravis, AChR antibody positive          Plan:   Maximilian was seen today for follow-up.  DDD (degenerative disc disease), lumbar  -     gabapentin (NEURONTIN) 600 MG tablet; Take 1 tablet (600 mg total) by mouth 3 (three) times daily.  Dispense: 90 tablet; Refill: 3  -     HYDROcodone-acetaminophen (NORCO) 7.5-325 mg per tablet; Take 1 tablet by mouth every 6 (six) hours as needed for Pain.  Dispense: 28 tablet; Refill: 0  -     methocarbamoL (ROBAXIN) 500 MG Tab; Take 1 tablet (500 mg total) by mouth 3 (three) times daily.  Dispense: 30 tablet; Refill: 4  -     Ambulatory referral/consult to Physical/Occupational Therapy; Future;  Expected date: 06/22/2023    Hyperglycemia  -     Basic Metabolic Panel; Future; Expected date: 06/15/2023  -     HEMOGLOBIN A1C; Future; Expected date: 06/15/2023    Chronic cough  -     benzonatate (TESSALON) 100 MG capsule; Take 1 capsule (100 mg total) by mouth 3 (three) times daily as needed for Cough.  Dispense: 30 capsule; Refill: 0  -     X-Ray Chest PA And Lateral; Future; Expected date: 06/15/2023    Aortic valve disease    HTN (hypertension), benign    Hypothyroidism due to acquired atrophy of thyroid    Lumbar radiculitis    Cervical stenosis of spinal canal    Myasthenia gravis, AChR antibody positive      The natural history of prostate cancer and ongoing controversy regarding screening and potential treatment outcomes of prostate cancer has been discussed with the patient. The meaning of a false positive PSA and a false negative PSA has been discussed. He indicates understanding of the limitations of this screening test and wishes to proceed with screening PSA testing.  Aortic valve disease    DDD (degenerative disc disease), lumbar  -     gabapentin (NEURONTIN) 300 MG capsule; Take 2 capsules (600 mg total) by mouth every evening.  Dispense: 180 capsule; Refill: 2    HTN (hypertension), benign  -     gabapentin (NEURONTIN) 300 MG capsule; Take 2 capsules (600 mg total) by mouth every evening.  Dispense: 180 capsule; Refill: 2    Myasthenia gravis    Thrombocytopenia    Lymphedema of both lower extremities  -     US Abdomen Complete; Future; Expected date: 12/15/2022  -     IN OFFICE EKG 12-LEAD (to Muse)    Acquired hyperbilirubinemia  -     Lactate dehydrogenase; Future; Expected date: 12/15/2022  -     Haptoglobin; Future; Expected date: 12/15/2022  -     Reticulocytes; Future; Expected date: 12/15/2022  -     Pathologist Interpretation Differential; Future; Expected date: 12/15/2022    Chronic cough  -     Ambulatory referral/consult to Gastroenterology; Future; Expected date: 12/22/2022  -      pantoprazole (PROTONIX) 40 MG tablet; Take 1 tablet (40 mg total) by mouth 2 (two) times daily.  Dispense: 180 tablet; Refill: 3  -     promethazine-dextromethorphan (PROMETHAZINE-DM) 6.25-15 mg/5 mL Syrp; Take 5 mLs by mouth every 6 (six) hours as needed.  Dispense: 240 mL; Refill: 1    Osteopenia , Chronic use glucocorticoids  -     DXA Bone Density Spine And Hip; Future; Expected date: 12/15/2022    BPH with elevated PSA, hx of abnormal PSA at 6. Mild   -     PSA, TOTAL AND FREE; Future; Expected date: 12/15/2022    Advance Care Planning     Date: 12/15/2022                 Diagnoses and all orders for this visit:    HTN (hypertension), benign  Controlled  Low salt diet  Continue current medication  Chronic cough  -     GERD.  Improving  If continued improvement/ resolution does not occur, please contact clinic for additional evaluation.  Myasthenia gravis, AChR antibody positive under prednisone  stable  COPD with asthma  Stable with Spiriva.   Aortic valve disease, no signs of CHF  Follow up with cardiology  Mixed hyperlipidemia  High fiber diet  Continue current medication.     Discussed health maintenance guidelines appropriate for age.  55 face to face encounter. Pending evaluation of the liver before [facility] or [facility].Discussed health maintenance guidelines appropriate for age.

## 2023-07-01 NOTE — ED NOTES
C/o L behind the knee pain for approx 1 week. Denies sob. States it started 1 week ago and has gotten worse. Denies sitting for prolonged time or travel recently. Swelling noted but pt states swelling has been chronic.

## 2023-07-01 NOTE — ED NOTES
Rec'd report from Nando DREW RN Pt is on a heparin gtt. Pt is in semi-fowlers position in bed requesting something for gas. Pt is A & O x 3, denies SOB, respiratory distress and respirations are even and unlabored. Skin is warm and dry w/ pink mucosa. VS. Bed is locked and in the low position w/ the side rails up and locked for safety. Call bell @ BS. Will continue to monitor closely.

## 2023-07-01 NOTE — HPI
"Mr. Tavera is a 77 year old male with history of COPD/asthma, myasthenia gravis - steroid dependent, hypothyroidism, afib (no anticoagulation), obesity BMI 32, peripheral LE lymphedema, and HTN who presents today with complaints of LLE swelling worsening over the last week. It is severe. It is associated with pain and pallor. He denies fever, chills, N/V/D, chest pain, SOB, dizziness, recent long trips, recent surgical procedures, recent covid infection. In the ED, Left LE US revealed: "Extensive intraluminal thrombus involving the large veins of the left lower extremity extending into the calf". D-dimer is 2.3, BNP and troponin are WNL. Hospital medicine is consulted for admission for further work up and treatment.  "

## 2023-07-02 LAB
ALBUMIN SERPL BCP-MCNC: 3.3 G/DL (ref 3.5–5.2)
ALP SERPL-CCNC: 65 U/L (ref 55–135)
ALT SERPL W/O P-5'-P-CCNC: 15 U/L (ref 10–44)
ANION GAP SERPL CALC-SCNC: 11 MMOL/L (ref 8–16)
APTT PPP: 81.7 SEC (ref 21–32)
AST SERPL-CCNC: 15 U/L (ref 10–40)
BASOPHILS # BLD AUTO: 0.03 K/UL (ref 0–0.2)
BASOPHILS # BLD AUTO: 0.03 K/UL (ref 0–0.2)
BASOPHILS NFR BLD: 0.4 % (ref 0–1.9)
BASOPHILS NFR BLD: 0.4 % (ref 0–1.9)
BILIRUB SERPL-MCNC: 1.2 MG/DL (ref 0.1–1)
BUN SERPL-MCNC: 16 MG/DL (ref 8–23)
CALCIUM SERPL-MCNC: 8.6 MG/DL (ref 8.7–10.5)
CHLORIDE SERPL-SCNC: 104 MMOL/L (ref 95–110)
CO2 SERPL-SCNC: 26 MMOL/L (ref 23–29)
CREAT SERPL-MCNC: 0.9 MG/DL (ref 0.5–1.4)
DIFFERENTIAL METHOD: ABNORMAL
DIFFERENTIAL METHOD: ABNORMAL
EOSINOPHIL # BLD AUTO: 0.3 K/UL (ref 0–0.5)
EOSINOPHIL # BLD AUTO: 0.3 K/UL (ref 0–0.5)
EOSINOPHIL NFR BLD: 3.8 % (ref 0–8)
EOSINOPHIL NFR BLD: 3.8 % (ref 0–8)
ERYTHROCYTE [DISTWIDTH] IN BLOOD BY AUTOMATED COUNT: 13.7 % (ref 11.5–14.5)
ERYTHROCYTE [DISTWIDTH] IN BLOOD BY AUTOMATED COUNT: 13.7 % (ref 11.5–14.5)
EST. GFR  (NO RACE VARIABLE): >60 ML/MIN/1.73 M^2
FIBRINOGEN PPP-MCNC: 267 MG/DL (ref 182–400)
GLUCOSE SERPL-MCNC: 93 MG/DL (ref 70–110)
HCT VFR BLD AUTO: 38.5 % (ref 40–54)
HCT VFR BLD AUTO: 38.5 % (ref 40–54)
HGB BLD-MCNC: 12.8 G/DL (ref 14–18)
HGB BLD-MCNC: 12.8 G/DL (ref 14–18)
IMM GRANULOCYTES # BLD AUTO: 0.02 K/UL (ref 0–0.04)
IMM GRANULOCYTES # BLD AUTO: 0.02 K/UL (ref 0–0.04)
IMM GRANULOCYTES NFR BLD AUTO: 0.3 % (ref 0–0.5)
IMM GRANULOCYTES NFR BLD AUTO: 0.3 % (ref 0–0.5)
LYMPHOCYTES # BLD AUTO: 1.8 K/UL (ref 1–4.8)
LYMPHOCYTES # BLD AUTO: 1.8 K/UL (ref 1–4.8)
LYMPHOCYTES NFR BLD: 26.1 % (ref 18–48)
LYMPHOCYTES NFR BLD: 26.1 % (ref 18–48)
MAGNESIUM SERPL-MCNC: 1.9 MG/DL (ref 1.6–2.6)
MCH RBC QN AUTO: 30 PG (ref 27–31)
MCH RBC QN AUTO: 30 PG (ref 27–31)
MCHC RBC AUTO-ENTMCNC: 33.2 G/DL (ref 32–36)
MCHC RBC AUTO-ENTMCNC: 33.2 G/DL (ref 32–36)
MCV RBC AUTO: 90 FL (ref 82–98)
MCV RBC AUTO: 90 FL (ref 82–98)
MONOCYTES # BLD AUTO: 0.6 K/UL (ref 0.3–1)
MONOCYTES # BLD AUTO: 0.6 K/UL (ref 0.3–1)
MONOCYTES NFR BLD: 9.3 % (ref 4–15)
MONOCYTES NFR BLD: 9.3 % (ref 4–15)
NEUTROPHILS # BLD AUTO: 4.1 K/UL (ref 1.8–7.7)
NEUTROPHILS # BLD AUTO: 4.1 K/UL (ref 1.8–7.7)
NEUTROPHILS NFR BLD: 60.1 % (ref 38–73)
NEUTROPHILS NFR BLD: 60.1 % (ref 38–73)
NRBC BLD-RTO: 0 /100 WBC
NRBC BLD-RTO: 0 /100 WBC
PLATELET # BLD AUTO: 102 K/UL (ref 150–450)
PLATELET # BLD AUTO: 102 K/UL (ref 150–450)
PMV BLD AUTO: 10.4 FL (ref 9.2–12.9)
PMV BLD AUTO: 10.4 FL (ref 9.2–12.9)
POTASSIUM SERPL-SCNC: 3.1 MMOL/L (ref 3.5–5.1)
PROT SERPL-MCNC: 5.8 G/DL (ref 6–8.4)
RBC # BLD AUTO: 4.26 M/UL (ref 4.6–6.2)
RBC # BLD AUTO: 4.26 M/UL (ref 4.6–6.2)
SODIUM SERPL-SCNC: 141 MMOL/L (ref 136–145)
WBC # BLD AUTO: 6.86 K/UL (ref 3.9–12.7)
WBC # BLD AUTO: 6.86 K/UL (ref 3.9–12.7)

## 2023-07-02 PROCEDURE — 85730 THROMBOPLASTIN TIME PARTIAL: CPT | Mod: 91 | Performed by: HOSPITALIST

## 2023-07-02 PROCEDURE — 63600175 PHARM REV CODE 636 W HCPCS: Performed by: NURSE PRACTITIONER

## 2023-07-02 PROCEDURE — 99900035 HC TECH TIME PER 15 MIN (STAT)

## 2023-07-02 PROCEDURE — 85025 COMPLETE CBC W/AUTO DIFF WBC: CPT | Performed by: NURSE PRACTITIONER

## 2023-07-02 PROCEDURE — 85730 THROMBOPLASTIN TIME PARTIAL: CPT | Performed by: NURSE PRACTITIONER

## 2023-07-02 PROCEDURE — 81240 F2 GENE: CPT | Performed by: NURSE PRACTITIONER

## 2023-07-02 PROCEDURE — 85384 FIBRINOGEN ACTIVITY: CPT | Performed by: NURSE PRACTITIONER

## 2023-07-02 PROCEDURE — 83735 ASSAY OF MAGNESIUM: CPT | Performed by: NURSE PRACTITIONER

## 2023-07-02 PROCEDURE — 85670 THROMBIN TIME PLASMA: CPT | Performed by: NURSE PRACTITIONER

## 2023-07-02 PROCEDURE — 80053 COMPREHEN METABOLIC PANEL: CPT | Performed by: NURSE PRACTITIONER

## 2023-07-02 PROCEDURE — 81241 F5 GENE: CPT | Performed by: NURSE PRACTITIONER

## 2023-07-02 PROCEDURE — 94761 N-INVAS EAR/PLS OXIMETRY MLT: CPT

## 2023-07-02 PROCEDURE — 85300 ANTITHROMBIN III ACTIVITY: CPT | Performed by: NURSE PRACTITIONER

## 2023-07-02 PROCEDURE — 11000001 HC ACUTE MED/SURG PRIVATE ROOM

## 2023-07-02 PROCEDURE — 36415 COLL VENOUS BLD VENIPUNCTURE: CPT | Performed by: NURSE PRACTITIONER

## 2023-07-02 PROCEDURE — 99233 SBSQ HOSP IP/OBS HIGH 50: CPT | Mod: ,,, | Performed by: INTERNAL MEDICINE

## 2023-07-02 PROCEDURE — 25000003 PHARM REV CODE 250: Performed by: NURSE PRACTITIONER

## 2023-07-02 PROCEDURE — 99233 PR SUBSEQUENT HOSPITAL CARE,LEVL III: ICD-10-PCS | Mod: ,,, | Performed by: INTERNAL MEDICINE

## 2023-07-02 PROCEDURE — 83090 ASSAY OF HOMOCYSTEINE: CPT | Performed by: NURSE PRACTITIONER

## 2023-07-02 PROCEDURE — 85635 REPTILASE TEST: CPT | Performed by: NURSE PRACTITIONER

## 2023-07-02 PROCEDURE — 36415 COLL VENOUS BLD VENIPUNCTURE: CPT | Performed by: HOSPITALIST

## 2023-07-02 RX ADMIN — GABAPENTIN 600 MG: 300 CAPSULE ORAL at 08:07

## 2023-07-02 RX ADMIN — PANTOPRAZOLE SODIUM 40 MG: 40 TABLET, DELAYED RELEASE ORAL at 08:07

## 2023-07-02 RX ADMIN — ATORVASTATIN CALCIUM 80 MG: 40 TABLET, FILM COATED ORAL at 08:07

## 2023-07-02 RX ADMIN — ACETAMINOPHEN 650 MG: 325 TABLET ORAL at 03:07

## 2023-07-02 RX ADMIN — GABAPENTIN 600 MG: 300 CAPSULE ORAL at 02:07

## 2023-07-02 RX ADMIN — PREDNISONE 7.5 MG: 5 TABLET ORAL at 08:07

## 2023-07-02 RX ADMIN — LOSARTAN POTASSIUM 50 MG: 25 TABLET, FILM COATED ORAL at 08:07

## 2023-07-02 RX ADMIN — APIXABAN 10 MG: 2.5 TABLET, FILM COATED ORAL at 08:07

## 2023-07-02 RX ADMIN — LEVOTHYROXINE SODIUM 50 MCG: 50 TABLET ORAL at 06:07

## 2023-07-02 RX ADMIN — EZETIMIBE 10 MG: 10 TABLET ORAL at 08:07

## 2023-07-02 RX ADMIN — HEPARIN SODIUM 14 UNITS/KG/HR: 10000 INJECTION, SOLUTION INTRAVENOUS at 06:07

## 2023-07-02 RX ADMIN — POTASSIUM BICARBONATE 35 MEQ: 391 TABLET, EFFERVESCENT ORAL at 09:07

## 2023-07-02 RX ADMIN — CETIRIZINE HYDROCHLORIDE 10 MG: 10 TABLET, FILM COATED ORAL at 08:07

## 2023-07-02 RX ADMIN — CARVEDILOL 25 MG: 25 TABLET, FILM COATED ORAL at 08:07

## 2023-07-02 RX ADMIN — POTASSIUM BICARBONATE 35 MEQ: 391 TABLET, EFFERVESCENT ORAL at 06:07

## 2023-07-02 RX ADMIN — CARVEDILOL 25 MG: 25 TABLET, FILM COATED ORAL at 04:07

## 2023-07-02 NOTE — NURSING
Nurses Note -- 4 Eyes      7/1/2023   9:38 PM      Skin assessed during: Admit      [x] No Altered Skin Integrity Present    []Prevention Measures Documented      [] Yes- Altered Skin Integrity Present or Discovered   [] LDA Added if Not in Epic (Describe Wound)   [] New Altered Skin Integrity was Present on Admit and Documented in LDA   [] Wound Image Taken    Wound Care Consulted? No    Attending Nurse:  Zahra Dillon RN     Second RN/Staff Member:  Anna Woods RN

## 2023-07-02 NOTE — PLAN OF CARE
Problem: Adult Inpatient Plan of Care  Goal: Plan of Care Review  Outcome: Ongoing, Progressing   Supine with HOB up 45. Tele 8700 in use. Pt with High intensity Heparin infusing into right ac at 14u/kg with out difficulty. DDI. No redness or swelling at site. POC and medications reviewed with pt. Verbalized understanding. Bed in lowest position with brakes locked. SR up x 2. Call light in reach. Will continue to monitor.   Problem: Adult Inpatient Plan of Care  Goal: Optimal Comfort and Wellbeing  Outcome: Ongoing, Progressing   Q 2 hourly rounds made through out shift and IV site, pain and position monitored.

## 2023-07-02 NOTE — ED NOTES
Pt stable for the floor. Heparin infusing for DVT of LLE. Repeat APTT at 2200. NADN and no needs at this time. Will continue to monitor.

## 2023-07-02 NOTE — PLAN OF CARE
[facility] - Med/Surg  Initial Discharge Assessment       Primary Care Provider: Brian Marroquin MD    Admission Diagnosis: Acute deep vein thrombosis (DVT) of left lower extremity [I82.402]    Admission Date: 7/1/2023  Expected Discharge Date:     Transition of Care Barriers: None    Payor: HU[employer] MANAGED MEDICARE / Plan: [employer] MEDICARE HMO / Product Type: Capitation /     Extended Emergency Contact Information  Primary Emergency Contact: Radha Tavera  Address: [address] [place] [address] [facility]  Home Phone: [phone]  Mobile Phone: [phone]  Relation: Spouse  Preferred language: English   needed? No    Discharge Plan A: Home  Discharge Plan B: [place] [facility] [place] [place] [facility] [address]  [address] [facility] [address]  Phone: [phone] Fax: [phone]    [id number] - [place], [place] - [address] AT [facility] [address]  [address]  [place] [address]  Phone: [phone] Fax: [phone]     met with patient at bedside to complete discharge planning assessment.  Patient alert and oriented xs 4.  Patient verified all demographic information on facesheet is correct.  Patient verified PCP is Dr. Marroquin.  Patient verified primary health insurance is [employer].  Patient with NO home health or DME.  Patient with POA (spouse) and Living Will.  Patient not on dialysis or medication coumadin.  Patient with no 30 day admission.  Patient with no financial issues at this time.  Patient family will provide transportation upon discharge from facility.  Patient independent with ADLs, live with spouse, drives self.      Initial Assessment (most recent)       Adult Discharge Assessment - 07/02/23 1045          Discharge Assessment    Assessment Type Discharge Planning Assessment     Confirmed/corrected address, phone number and insurance Yes     Confirmed  Demographics Correct on Facesheet     Source of Information patient     Does patient/caregiver understand observation status Yes     Communicated MEREDITH with patient/caregiver Yes     People in Home spouse     Facility Arrived From: home     Do you expect to return to your current living situation? Yes     Do you have help at home or someone to help you manage your care at home? Yes     Who are your caregiver(s) and their phone number(s)? spouse     Prior to hospitilization cognitive status: Alert/Oriented     Current cognitive status: Alert/Oriented     Equipment Currently Used at Home none     Readmission within 30 days? No     Patient currently being followed by outpatient case management? No     Do you currently have service(s) that help you manage your care at home? No     Do you take prescription medications? Yes     Do you have prescription coverage? Yes     Do you have any problems affording any of your prescribed medications? No     Is the patient taking medications as prescribed? yes     Who is going to help you get home at discharge? spouse     How do you get to doctors appointments? car, drives self     Are you on dialysis? No     Do you take coumadin? No     Discharge Plan A Home     Discharge Plan B Home with family     DME Needed Upon Discharge  none     Discharge Plan discussed with: Patient     Transition of Care Barriers None

## 2023-07-02 NOTE — PROGRESS NOTES
"[facility] Medicine  Progress Note    Patient Name: Maximilian Tavera Jr.  MRN: [id number]  Patient Class: IP- Inpatient   Admission Date: 7/1/2023  Length of Stay: 1 days  Attending Physician: Jaimie Sales MD  Primary Care Provider: Brian Marroquin MD        Subjective:     Principal Problem:Acute deep vein thrombosis (DVT) of femoral vein of left lower extremity        HPI:  Mr. Tavera is a 77 year old male with history of COPD/asthma, myasthenia gravis - steroid dependent, hypothyroidism, afib (no anticoagulation), obesity BMI 32, peripheral LE lymphedema, and HTN who presents today with complaints of LLE swelling worsening over the last week. It is severe. It is associated with pain and pallor. He denies fever, chills, N/V/D, chest pain, SOB, dizziness, recent long trips, recent surgical procedures, recent covid infection. In the ED, Left LE US revealed: "Extensive intraluminal thrombus involving the large veins of the left lower extremity extending into the calf". D-dimer is 2.3, BNP and troponin are WNL. Hospital medicine is consulted for admission for further work up and treatment.      Overview/Hospital Course:  No notes on file    Interval History:  Notes reviewed, no acute events overnight.  Patient reports left lower extremity redness and pain are improved today.  He denies chest pain, shortness of breath or palpitations.  Awaiting Hematology consult.  Patient tolerating heparin infusion well.  Will continue for now and await recs from Hematology.  Patient eager for discharge.  Advised patient will need to await Hematology recommendations and he verbalized understanding.    Review of Systems   Constitutional:  Negative for chills, diaphoresis, fatigue and fever.   HENT:  Negative for congestion, ear pain, sore throat and trouble swallowing.    Eyes:  Negative for pain, discharge and visual disturbance.   Respiratory:  Negative for cough, chest tightness, shortness of " breath and wheezing.    Cardiovascular:  Positive for leg swelling. Negative for chest pain and palpitations.   Gastrointestinal:  Negative for abdominal distention, abdominal pain, blood in stool, constipation, diarrhea, nausea and vomiting.   Endocrine: Negative for polydipsia, polyphagia and polyuria.   Genitourinary:  Negative for dysuria, flank pain, frequency and urgency.   Musculoskeletal:  Positive for arthralgias, joint swelling and myalgias. Negative for back pain, neck pain and neck stiffness.   Skin:  Negative for rash and wound.   Allergic/Immunologic: Negative for immunocompromised state.   Neurological:  Negative for dizziness, syncope, speech difficulty, weakness, light-headedness, numbness and headaches.   Hematological:  Negative for adenopathy.   Psychiatric/Behavioral:  Negative for confusion and suicidal ideas. The patient is not nervous/anxious.    All other systems reviewed and are negative.  Objective:     Vital Signs (Most Recent):  Temp: 97.5 °F (36.4 °C) (07/02/23 0724)  Pulse: 63 (07/02/23 0724)  Resp: 16 (07/02/23 0724)  BP: (!) 147/72 (07/02/23 0724)  SpO2: (!) 93 % (07/02/23 0724) Vital Signs (24h Range):  Temp:  [97.3 °F (36.3 °C)-97.8 °F (36.6 °C)] 97.5 °F (36.4 °C)  Pulse:  [61-69] 63  Resp:  [16-20] 16  SpO2:  [91 %-97 %] 93 %  BP: (147-183)/(72-87) 147/72     Weight: 109.3 kg (240 lb 15.4 oz)  Body mass index is 32.68 kg/m².    Intake/Output Summary (Last 24 hours) at 7/2/2023 1311  Last data filed at 7/2/2023 1200  Gross per 24 hour   Intake 553.4 ml   Output 1750 ml   Net -1196.6 ml         Physical Exam  Vitals and nursing note reviewed.   Constitutional:       General: He is not in acute distress.     Appearance: Normal appearance. He is well-developed. He is not ill-appearing or diaphoretic.   HENT:      Head: Normocephalic and atraumatic.      Right Ear: External ear normal.      Left Ear: External ear normal.      Nose: Nose normal. No congestion or rhinorrhea.       Mouth/Throat:      Mouth: Mucous membranes are moist.      Pharynx: Oropharynx is clear. No oropharyngeal exudate or posterior oropharyngeal erythema.   Eyes:      General: No scleral icterus.     Conjunctiva/sclera: Conjunctivae normal.      Pupils: Pupils are equal, round, and reactive to light.   Neck:      Vascular: No JVD.   Cardiovascular:      Rate and Rhythm: Normal rate and regular rhythm.      Pulses: Normal pulses.      Heart sounds: Normal heart sounds. No murmur heard.  Pulmonary:      Effort: Pulmonary effort is normal. No respiratory distress.      Breath sounds: Normal breath sounds. No stridor. No wheezing, rhonchi or rales.   Abdominal:      General: Bowel sounds are normal. There is no distension.      Palpations: Abdomen is soft.      Tenderness: There is no abdominal tenderness.   Musculoskeletal:         General: No swelling or tenderness. Normal range of motion.      Cervical back: Normal range of motion and neck supple.      Left lower leg: Edema present.      Comments: LLE edema 3+    Skin:     General: Skin is warm and dry.      Capillary Refill: Capillary refill takes less than 2 seconds.      Coloration: Skin is not jaundiced or pale.      Findings: No erythema.   Neurological:      General: No focal deficit present.      Mental Status: He is alert and oriented to person, place, and time.      Cranial Nerves: No cranial nerve deficit.      Sensory: No sensory deficit.   Psychiatric:         Mood and Affect: Mood normal.         Behavior: Behavior normal.         Thought Content: Thought content normal.         Judgment: Judgment normal.           Significant Labs: All pertinent labs within the past 24 hours have been reviewed.  CBC:   Recent Labs   Lab 07/01/23  1337 07/02/23  0355   WBC 8.18 6.86  6.86   HGB 12.3* 12.8*  12.8*   HCT 37.0* 38.5*  38.5*   * 102*  102*     CMP:   Recent Labs   Lab 07/01/23  1337 07/02/23  0355    141   K 3.7 3.1*    104   CO2 25 26    * 93   BUN 23 16   CREATININE 1.0 0.9   CALCIUM 8.8 8.6*   PROT 6.0 5.8*   ALBUMIN 3.4* 3.3*   BILITOT 1.1* 1.2*   ALKPHOS 74 65   AST 17 15   ALT 19 15   ANIONGAP 11 11       Significant Imaging: I have reviewed all pertinent imaging results/findings within the past 24 hours.      Assessment/Plan:      * Acute deep vein thrombosis (DVT) of femoral vein of left lower extremity  Heparin gtt for now- occlusive and nonocclusive involving common femoral, superficial femoral, popliteal, and posterior tibia  Consult heme/onc pending  Started hypercoag work up  Continue pantoprazole for GI prophylaxis     Thrombocytopenia  Mild - stable today  Monitor daily cbc       Mild persistent asthma without complication  Chronic, stable   Prn duoneb  Monitor O2 sat    Myasthenia gravis, AChR antibody positive  Steroid dependent, continue home prednisone      HTN (hypertension), benign  Continue appropriate home antihypertensives      Hyperlipidemia  Continue appropriate home antihyperlipidemics  On atorvastatin and tolerating with myasthenia gravis, continue to monitor      Hypothyroid  Continue home levothyroxine        VTE Risk Mitigation (From admission, onward)         Ordered     IP VTE HIGH RISK PATIENT  Once         07/01/23 1620     heparin 25,000 units in dextrose 5% (100 units/ml) IV bolus from bag - ADDITIONAL PRN BOLUS - 60 units/kg  As needed (PRN)        Question:  Heparin Infusion Adjustment (DO NOT MODIFY ANSWER)  Answer:  [email or web address]\epic\Images\Pharmacy\HeparinInfusions\heparin HIGH INTENSITY nomogram for [facility] [id number]    07/01/23 1442     heparin 25,000 units in dextrose 5% (100 units/ml) IV bolus from bag - ADDITIONAL PRN BOLUS - 30 units/kg  As needed (PRN)        Question:  Heparin Infusion Adjustment (DO NOT MODIFY ANSWER)  Answer:  \[email or web address]\epic\Images\Pharmacy\HeparinInfusions\heparin HIGH INTENSITY nomogram for [facility] [id number].pdf    07/01/23 1442     heparin 25,000 units in dextrose 5% 250 mL  (100 units/mL) infusion HIGH INTENSITY nomogram - [facility]  Continuous        Question Answer Comment   Heparin Infusion Adjustment (DO NOT MODIFY ANSWER) [email or web address]\epic\Images\Pharmacy\HeparinInfusions\heparin HIGH INTENSITY nomogram for [facility] [id number].pdf    Begin at (in units/kg/hr) 18        07/01/23 1442                Discharge Planning   MEREDITH:      Code Status: Full Code   Is the patient medically ready for discharge?:     Reason for patient still in hospital (select all that apply): Patient trending condition, Treatment and Consult recommendations  Discharge Plan A: Home                  Rocio Fitch NP  Department of Hospital Medicine   [facility]/Surg

## 2023-07-02 NOTE — PLAN OF CARE
07/01/23 1940   Patient Assessment/Suction   Level of Consciousness (AVPU) alert   Respiratory Effort Normal;Unlabored   Expansion/Accessory Muscles/Retractions expansion symmetric   All Lung Fields Breath Sounds clear;diminished   Rhythm/Pattern, Respiratory pattern regular   Cough Frequency no cough   PRE-TX-O2   Device (Oxygen Therapy) room air   SpO2 97 %   Pulse Oximetry Type Intermittent   $ Pulse Oximetry - Single Charge Pulse Oximetry - Single   Pulse 64   Aerosol Therapy   $ Aerosol Therapy Charges PRN treatment not required   Respiratory Evaluation   $ Care Plan Tech Time 15 min   Evaluation For New Orders   Admitting Diagnosis DVT   Cardiac Diagnosis A-fib, HTN   Pulmonary Diagnosis COPD, asthma   Home Oxygen   Has Home Oxygen? No   Home Aerosol, MDI, DPI, and Other Treatments/Therapies   Home Respiratory Therapy Per Patient/Review of Chart Yes   MDI Home Meds/Freq albuterol 2 puffs q6prn   DPI Home Meds/Freq spiriva daily   Oxygen Care Plan   Rationale No rational found   Bronchodilator Care Plan   Bronchodilator Care Plan Aerosol   Aerosol Meds w/ frequency Albuterol - Ipratropium (DUO-NEB) 0.5mg-3mg(2.5ml) 3ml Nebulizer Solution2.5mg PRN   Atelectasis Care Plan   Rationale No Rational Found   Airway Clearance Care Plan   Rationale No rationale found

## 2023-07-02 NOTE — PLAN OF CARE
07/02/23 1045   NEAL Message   Medicare Outpatient and Observation Notification regarding financial responsibility Explained to patient/caregiver;Signed/date by patient/caregiver   Date NEAL was signed 07/02/23   Time NEAL was signed 1044

## 2023-07-02 NOTE — ASSESSMENT & PLAN NOTE
Heparin gtt for now- occlusive and nonocclusive involving common femoral, superficial femoral, popliteal, and posterior tibia  Consult heme/onc pending  Started hypercoag work up  Continue pantoprazole for GI prophylaxis

## 2023-07-02 NOTE — SUBJECTIVE & OBJECTIVE
Interval History:  Notes reviewed, no acute events overnight.  Patient reports left lower extremity redness and pain are improved today.  He denies chest pain, shortness of breath or palpitations.  Awaiting Hematology consult.  Patient tolerating heparin infusion well.  Will continue for now and await recs from Hematology.  Patient eager for discharge.  Advised patient will need to await Hematology recommendations and he verbalized understanding.    Review of Systems   Constitutional:  Negative for chills, diaphoresis, fatigue and fever.   HENT:  Negative for congestion, ear pain, sore throat and trouble swallowing.    Eyes:  Negative for pain, discharge and visual disturbance.   Respiratory:  Negative for cough, chest tightness, shortness of breath and wheezing.    Cardiovascular:  Positive for leg swelling. Negative for chest pain and palpitations.   Gastrointestinal:  Negative for abdominal distention, abdominal pain, blood in stool, constipation, diarrhea, nausea and vomiting.   Endocrine: Negative for polydipsia, polyphagia and polyuria.   Genitourinary:  Negative for dysuria, flank pain, frequency and urgency.   Musculoskeletal:  Positive for arthralgias, joint swelling and myalgias. Negative for back pain, neck pain and neck stiffness.   Skin:  Negative for rash and wound.   Allergic/Immunologic: Negative for immunocompromised state.   Neurological:  Negative for dizziness, syncope, speech difficulty, weakness, light-headedness, numbness and headaches.   Hematological:  Negative for adenopathy.   Psychiatric/Behavioral:  Negative for confusion and suicidal ideas. The patient is not nervous/anxious.    All other systems reviewed and are negative.  Objective:     Vital Signs (Most Recent):  Temp: 97.5 °F (36.4 °C) (07/02/23 0724)  Pulse: 63 (07/02/23 0724)  Resp: 16 (07/02/23 0724)  BP: (!) 147/72 (07/02/23 0724)  SpO2: (!) 93 % (07/02/23 0724) Vital Signs (24h Range):  Temp:  [97.3 °F (36.3 °C)-97.8 °F (36.6  °C)] 97.5 °F (36.4 °C)  Pulse:  [61-69] 63  Resp:  [16-20] 16  SpO2:  [91 %-97 %] 93 %  BP: (147-183)/(72-87) 147/72     Weight: 109.3 kg (240 lb 15.4 oz)  Body mass index is 32.68 kg/m².    Intake/Output Summary (Last 24 hours) at 7/2/2023 1311  Last data filed at 7/2/2023 1200  Gross per 24 hour   Intake 553.4 ml   Output 1750 ml   Net -1196.6 ml         Physical Exam  Vitals and nursing note reviewed.   Constitutional:       General: He is not in acute distress.     Appearance: Normal appearance. He is well-developed. He is not ill-appearing or diaphoretic.   HENT:      Head: Normocephalic and atraumatic.      Right Ear: External ear normal.      Left Ear: External ear normal.      Nose: Nose normal. No congestion or rhinorrhea.      Mouth/Throat:      Mouth: Mucous membranes are moist.      Pharynx: Oropharynx is clear. No oropharyngeal exudate or posterior oropharyngeal erythema.   Eyes:      General: No scleral icterus.     Conjunctiva/sclera: Conjunctivae normal.      Pupils: Pupils are equal, round, and reactive to light.   Neck:      Vascular: No JVD.   Cardiovascular:      Rate and Rhythm: Normal rate and regular rhythm.      Pulses: Normal pulses.      Heart sounds: Normal heart sounds. No murmur heard.  Pulmonary:      Effort: Pulmonary effort is normal. No respiratory distress.      Breath sounds: Normal breath sounds. No stridor. No wheezing, rhonchi or rales.   Abdominal:      General: Bowel sounds are normal. There is no distension.      Palpations: Abdomen is soft.      Tenderness: There is no abdominal tenderness.   Musculoskeletal:         General: No swelling or tenderness. Normal range of motion.      Cervical back: Normal range of motion and neck supple.      Left lower leg: Edema present.      Comments: LLE edema 3+    Skin:     General: Skin is warm and dry.      Capillary Refill: Capillary refill takes less than 2 seconds.      Coloration: Skin is not jaundiced or pale.      Findings: No  erythema.   Neurological:      General: No focal deficit present.      Mental Status: He is alert and oriented to person, place, and time.      Cranial Nerves: No cranial nerve deficit.      Sensory: No sensory deficit.   Psychiatric:         Mood and Affect: Mood normal.         Behavior: Behavior normal.         Thought Content: Thought content normal.         Judgment: Judgment normal.           Significant Labs: All pertinent labs within the past 24 hours have been reviewed.  CBC:   Recent Labs   Lab 07/01/23  1337 07/02/23  0355   WBC 8.18 6.86  6.86   HGB 12.3* 12.8*  12.8*   HCT 37.0* 38.5*  38.5*   * 102*  102*     CMP:   Recent Labs   Lab 07/01/23  1337 07/02/23  0355    141   K 3.7 3.1*    104   CO2 25 26   * 93   BUN 23 16   CREATININE 1.0 0.9   CALCIUM 8.8 8.6*   PROT 6.0 5.8*   ALBUMIN 3.4* 3.3*   BILITOT 1.1* 1.2*   ALKPHOS 74 65   AST 17 15   ALT 19 15   ANIONGAP 11 11       Significant Imaging: I have reviewed all pertinent imaging results/findings within the past 24 hours.

## 2023-07-02 NOTE — PLAN OF CARE
Problem: Adult Inpatient Plan of Care  Goal: Plan of Care Review  Outcome: Ongoing, Progressing     Problem: Adult Inpatient Plan of Care  Goal: Patient-Specific Goal (Individualized)  Outcome: Ongoing, Progressing     Problem: Adult Inpatient Plan of Care  Goal: Absence of Hospital-Acquired Illness or Injury  Outcome: Ongoing, Progressing     Problem: Adult Inpatient Plan of Care  Goal: Optimal Comfort and Wellbeing  Outcome: Ongoing, Progressing   POC discussed with patient, verbalized understanding. IV to right arm remains intact and patent with heparin infusing.  Heparin was held for 2 hours this shift due to elevated aPTT then resumed with decreased rate change. Voiding without difficulty. C/o headache, prn tylenol given. Weight shifting encouraged.  Q2H purposeful rounding. Safety measures maintained with side rails up, bed alarm on, slip resistant socks on, call light in reach, pt instructed to call for needs.

## 2023-07-03 VITALS
RESPIRATION RATE: 17 BRPM | SYSTOLIC BLOOD PRESSURE: 116 MMHG | BODY MASS INDEX: 32.63 KG/M2 | OXYGEN SATURATION: 94 % | HEIGHT: 72 IN | HEART RATE: 68 BPM | DIASTOLIC BLOOD PRESSURE: 55 MMHG | WEIGHT: 240.94 LBS | TEMPERATURE: 98 F

## 2023-07-03 LAB
ALBUMIN SERPL BCP-MCNC: 3.1 G/DL (ref 3.5–5.2)
ALP SERPL-CCNC: 68 U/L (ref 55–135)
ALT SERPL W/O P-5'-P-CCNC: 14 U/L (ref 10–44)
ANION GAP SERPL CALC-SCNC: 10 MMOL/L (ref 8–16)
AST SERPL-CCNC: 13 U/L (ref 10–40)
BASOPHILS # BLD AUTO: 0.03 K/UL (ref 0–0.2)
BASOPHILS NFR BLD: 0.4 % (ref 0–1.9)
BILIRUB SERPL-MCNC: 1 MG/DL (ref 0.1–1)
BUN SERPL-MCNC: 17 MG/DL (ref 8–23)
CALCIUM SERPL-MCNC: 8.4 MG/DL (ref 8.7–10.5)
CHLORIDE SERPL-SCNC: 105 MMOL/L (ref 95–110)
CO2 SERPL-SCNC: 27 MMOL/L (ref 23–29)
CREAT SERPL-MCNC: 0.9 MG/DL (ref 0.5–1.4)
DIFFERENTIAL METHOD: ABNORMAL
EOSINOPHIL # BLD AUTO: 0.3 K/UL (ref 0–0.5)
EOSINOPHIL NFR BLD: 3.8 % (ref 0–8)
ERYTHROCYTE [DISTWIDTH] IN BLOOD BY AUTOMATED COUNT: 13.5 % (ref 11.5–14.5)
EST. GFR  (NO RACE VARIABLE): >60 ML/MIN/1.73 M^2
FERRITIN SERPL-MCNC: 194 NG/ML (ref 20–300)
FOLATE SERPL-MCNC: 9.5 NG/ML (ref 4–24)
GLUCOSE SERPL-MCNC: 102 MG/DL (ref 70–110)
HCT VFR BLD AUTO: 37.7 % (ref 40–54)
HGB BLD-MCNC: 11.9 G/DL (ref 14–18)
IMM GRANULOCYTES # BLD AUTO: 0.01 K/UL (ref 0–0.04)
IMM GRANULOCYTES NFR BLD AUTO: 0.1 % (ref 0–0.5)
IRON SERPL-MCNC: 72 UG/DL (ref 45–160)
LYMPHOCYTES # BLD AUTO: 1.6 K/UL (ref 1–4.8)
LYMPHOCYTES NFR BLD: 23.2 % (ref 18–48)
MAGNESIUM SERPL-MCNC: 2 MG/DL (ref 1.6–2.6)
MCH RBC QN AUTO: 28.9 PG (ref 27–31)
MCHC RBC AUTO-ENTMCNC: 31.6 G/DL (ref 32–36)
MCV RBC AUTO: 92 FL (ref 82–98)
MONOCYTES # BLD AUTO: 0.7 K/UL (ref 0.3–1)
MONOCYTES NFR BLD: 10.4 % (ref 4–15)
NEUTROPHILS # BLD AUTO: 4.3 K/UL (ref 1.8–7.7)
NEUTROPHILS NFR BLD: 62.1 % (ref 38–73)
NRBC BLD-RTO: 0 /100 WBC
PLATELET # BLD AUTO: 131 K/UL (ref 150–450)
PMV BLD AUTO: 10.3 FL (ref 9.2–12.9)
POTASSIUM SERPL-SCNC: 3.3 MMOL/L (ref 3.5–5.1)
PROT SERPL-MCNC: 5.4 G/DL (ref 6–8.4)
RBC # BLD AUTO: 4.12 M/UL (ref 4.6–6.2)
SATURATED IRON: 30 % (ref 20–50)
SODIUM SERPL-SCNC: 142 MMOL/L (ref 136–145)
TOTAL IRON BINDING CAPACITY: 239 UG/DL (ref 250–450)
TRANSFERRIN SERPL-MCNC: 171 MG/DL (ref 200–375)
VIT B12 SERPL-MCNC: 1867 PG/ML (ref 210–950)
WBC # BLD AUTO: 6.91 K/UL (ref 3.9–12.7)

## 2023-07-03 PROCEDURE — 82746 ASSAY OF FOLIC ACID SERUM: CPT | Performed by: INTERNAL MEDICINE

## 2023-07-03 PROCEDURE — 87522 HEPATITIS C REVRS TRNSCRPJ: CPT | Performed by: INTERNAL MEDICINE

## 2023-07-03 PROCEDURE — 25000003 PHARM REV CODE 250: Performed by: NURSE PRACTITIONER

## 2023-07-03 PROCEDURE — 99900035 HC TECH TIME PER 15 MIN (STAT)

## 2023-07-03 PROCEDURE — 82728 ASSAY OF FERRITIN: CPT | Performed by: INTERNAL MEDICINE

## 2023-07-03 PROCEDURE — 84466 ASSAY OF TRANSFERRIN: CPT | Performed by: INTERNAL MEDICINE

## 2023-07-03 PROCEDURE — 25000242 PHARM REV CODE 250 ALT 637 W/ HCPCS: Performed by: NURSE PRACTITIONER

## 2023-07-03 PROCEDURE — 80053 COMPREHEN METABOLIC PANEL: CPT | Performed by: NURSE PRACTITIONER

## 2023-07-03 PROCEDURE — 83010 ASSAY OF HAPTOGLOBIN QUANT: CPT | Performed by: INTERNAL MEDICINE

## 2023-07-03 PROCEDURE — 83735 ASSAY OF MAGNESIUM: CPT | Performed by: NURSE PRACTITIONER

## 2023-07-03 PROCEDURE — 36415 COLL VENOUS BLD VENIPUNCTURE: CPT | Performed by: INTERNAL MEDICINE

## 2023-07-03 PROCEDURE — 94761 N-INVAS EAR/PLS OXIMETRY MLT: CPT

## 2023-07-03 PROCEDURE — 85025 COMPLETE CBC W/AUTO DIFF WBC: CPT | Performed by: NURSE PRACTITIONER

## 2023-07-03 PROCEDURE — 63600175 PHARM REV CODE 636 W HCPCS: Performed by: NURSE PRACTITIONER

## 2023-07-03 PROCEDURE — 82607 VITAMIN B-12: CPT | Performed by: INTERNAL MEDICINE

## 2023-07-03 RX ADMIN — APIXABAN 10 MG: 2.5 TABLET, FILM COATED ORAL at 08:07

## 2023-07-03 RX ADMIN — EZETIMIBE 10 MG: 10 TABLET ORAL at 08:07

## 2023-07-03 RX ADMIN — PREDNISONE 7.5 MG: 5 TABLET ORAL at 08:07

## 2023-07-03 RX ADMIN — DOCUSATE SODIUM AND SENNOSIDES 1 TABLET: 8.6; 5 TABLET, FILM COATED ORAL at 08:07

## 2023-07-03 RX ADMIN — FLUTICASONE PROPIONATE 50 MCG: 50 SPRAY, METERED NASAL at 08:07

## 2023-07-03 RX ADMIN — CETIRIZINE HYDROCHLORIDE 10 MG: 10 TABLET, FILM COATED ORAL at 08:07

## 2023-07-03 RX ADMIN — GABAPENTIN 600 MG: 300 CAPSULE ORAL at 08:07

## 2023-07-03 RX ADMIN — LOSARTAN POTASSIUM 50 MG: 25 TABLET, FILM COATED ORAL at 08:07

## 2023-07-03 RX ADMIN — PANTOPRAZOLE SODIUM 40 MG: 40 TABLET, DELAYED RELEASE ORAL at 08:07

## 2023-07-03 RX ADMIN — POTASSIUM BICARBONATE 35 MEQ: 391 TABLET, EFFERVESCENT ORAL at 08:07

## 2023-07-03 RX ADMIN — LEVOTHYROXINE SODIUM 50 MCG: 50 TABLET ORAL at 05:07

## 2023-07-03 RX ADMIN — ATORVASTATIN CALCIUM 80 MG: 40 TABLET, FILM COATED ORAL at 08:07

## 2023-07-03 RX ADMIN — CARVEDILOL 25 MG: 25 TABLET, FILM COATED ORAL at 08:07

## 2023-07-03 NOTE — PLAN OF CARE
Problem: Adult Inpatient Plan of Care  Goal: Plan of Care Review  Outcome: Met  Goal: Patient-Specific Goal (Individualized)  Outcome: Met  Goal: Absence of Hospital-Acquired Illness or Injury  Outcome: Met  Goal: Optimal Comfort and Wellbeing  Outcome: Met  Goal: Readiness for Transition of Care  Outcome: Met   POC has been discussed with pt.  No c/o pain voiced during shift.  No falls during shift.  Pt is allowed to wear ALYX hose to BLE.  Pt is adequate for discharge.

## 2023-07-03 NOTE — CONSULTS
"[facility]/Surg  Hematology/Oncology  Consult Note    Patient Name: Maximilian Tavera Jr.  MRN: [id number]  Admission Date: 7/1/2023  Hospital Length of Stay: 1 days  Code Status: Full Code   Attending Provider: Jaimie Sales MD  Consulting Provider: Adelaide Wills MD  Primary Care Physician: Brian Marroquin MD  Principal Problem:Acute deep vein thrombosis (DVT) of femoral vein of left lower extremity    Inpatient consult to Hematology/Oncology  Consult performed by: Adelaide Wills MD  Consult ordered by: Corrine Osorio NP      Subjective:     HPI: 77 year old male with history of COPD/asthma, myasthenia gravis - steroid dependent, hypothyroidism, afib (no anticoagulation), obesity BMI 32, peripheral LE lymphedema, and HTN who presents with complaints of LLE swelling worsening over the last week. It is severe. It is associated with pain and pallor. He denies fever, chills, N/V/D, chest pain, SOB, dizziness, recent long trips, recent surgical procedures, recent covid infection. In the ED, Left LE US revealed: "Extensive intraluminal thrombus involving the large veins of the left lower extremity extending into the calf". D-dimer is 2.3, BNP and troponin are WNL    No family hx of dvt, pt has lymphedema that makes him less ambulatory. No prev hx of dvt or hypercoag state    Medications:  Continuous Infusions:  Scheduled Meds:   apixaban  10 mg Oral BID    atorvastatin  80 mg Oral Daily    carvediloL  25 mg Oral BID WM    cetirizine  10 mg Oral Daily    ezetimibe  10 mg Oral Daily    fluticasone propionate  1 spray Each Nostril Daily    gabapentin  600 mg Oral TID    levothyroxine  50 mcg Oral Before breakfast    losartan  50 mg Oral Daily    methocarbamoL  500 mg Oral TID    pantoprazole  40 mg Oral BID    predniSONE  7.5 mg Oral Daily    senna-docusate 8.6-50 mg  1 tablet Oral BID     PRN Meds:acetaminophen, albuterol-ipratropium, aluminum-magnesium hydroxide-simethicone, dextrose 10%, " dextrose 10%, glucagon (human recombinant), glucose, glucose, HYDROcodone-acetaminophen, melatonin, morphine, naloxone, ondansetron, polyethylene glycol, potassium bicarbonate, potassium bicarbonate, potassium bicarbonate, sodium chloride 0.9%     Review of patient's allergies indicates:   Allergen Reactions    Spironolactone Diarrhea        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    Emphysema lung 11/05/2018    Gastritis     Hx of colonic polyp     Hyperlipidemia     Hypertension     Hypothyroidism     Knee fracture     Myasthenia gravis     Neuropathy 01/03/2013    Obesity 01/29/2015    Pneumonia 03/29/2020    Polyneuropathy     PVC (premature ventricular contraction)     Squamous cell carcinoma 2014    left forearm    Thyroid disease      Past Surgical History:   Procedure Laterality Date    APPENDECTOMY      CATARACT EXTRACTION W/  INTRAOCULAR LENS IMPLANT Bilateral     COLONOSCOPY  03/01/2012    Dr Esteban; hyperplastic polyp; repeat in 5 years    COLONOSCOPY N/A 12/7/2021    Procedure: COLONOSCOPY;  Surgeon: Gabriel Hernandez MD;  Location: [facility];  Service: Endoscopy;  Laterality: N/A;    CYSTOSCOPY N/A 2/26/2019    Procedure: CYSTOSCOPY;  Surgeon: Simba Cheung MD;  Location: [facility], [facility]1983;  Service: Urology;  Laterality: N/A;    ENDOSCOPIC ULTRASOUND OF UPPER GASTROINTESTINAL TRACT N/A 1/7/2020    Procedure: ULTRASOUND, UPPER GI TRACT, ENDOSCOPIC;  Surgeon: Kati Ryan MD;  Location: [facility] ([address] [place] [address]);  Service: Endoscopy;  Laterality: N/A;    ESOPHAGOGASTRODUODENOSCOPY N/A 9/12/2018    Procedure: EGD (ESOPHAGOGASTRODUODENOSCOPY);  Surgeon: Gabriel Hernandez MD;  Location: [facility];  Service: Endoscopy;  Laterality: N/A;     ESOPHAGOGASTRODUODENOSCOPY N/A 8/19/2019    Procedure: EGD (ESOPHAGOGASTRODUODENOSCOPY);  Surgeon: Gabriel Hernandez MD;  Location: [facility];  Service: Endoscopy;  Laterality: N/A;    ESOPHAGOGASTRODUODENOSCOPY N/A 10/7/2019    Procedure: EGD (ESOPHAGOGASTRODUODENOSCOPY);  Surgeon: Gabriel Hernandez MD;  Location: [facility];  Service: Endoscopy;  Laterality: N/A;    ESOPHAGOGASTRODUODENOSCOPY N/A 1/7/2020    Procedure: EGD (ESOPHAGOGASTRODUODENOSCOPY);  Surgeon: Kati Ryan MD;  Location: [facility] (2ND FLR);  Service: Endoscopy;  Laterality: N/A;    HEMORRHOID SURGERY      INJECTION OF ANESTHETIC AGENT AROUND MEDIAL BRANCH NERVES INNERVATING LUMBAR FACET JOINT Bilateral 10/1/2020    Procedure: Block-nerve-medial branch-lumbar Bilateral L 3,4,5;  Surgeon: Devan [place] MD;  Location: [facility];  Service: Pain Management;  Laterality: Bilateral;    INJECTION OF ANESTHETIC AGENT AROUND MEDIAL BRANCH NERVES INNERVATING LUMBAR FACET JOINT Right 10/7/2022    Procedure: Block-nerve-medial branch-lumbar L3,4,5;  Surgeon: Devan [place] MD;  Location: [facility];  Service: Pain Management;  Laterality: Right;    KNEE ARTHROPLASTY Bilateral     LENGTHENING OF ACHILLES TENDON Right 9/11/2020    Procedure: percutaeous tenotomy of right lateral epicondyle (tenex);  Surgeon: Terry Quach MD;  Location: [facility] OR;  Service: Neurosurgery;  Laterality: Right;  tenex to right lateral epicondyle  Tenex machine SN#[id number], total time 1min. 30seconds    NECK SURGERY      POSTERIOR FUSION OF CERVICAL SPINE WITH LAMINECTOMY N/A 11/7/2018    Procedure: C2-C6 Posterior Cervical Laminectomy & Instrumental Fusion;  Surgeon: Kishore Turner MD;  [address]  Service: Neurosurgery;  Laterality: N/A;    TONSILLECTOMY      TRANSFORAMINAL EPIDURAL INJECTION OF STEROID Right 12/20/2019    Procedure: Injection,steroid,epidural,transforaminal approach;  Surgeon: Devan [place] MD;  Location: [facility];  Service: Pain Management;  Laterality:  Right;  L3-4, L4-5    TRANSFORAMINAL EPIDURAL INJECTION OF STEROID Bilateral 2/6/2020    Procedure: Injection,steroid,epidural,transforaminal approach;  Surgeon: Devan [place] MD;  Location: [facility] OR;  Service: Pain Management;  Laterality: Bilateral;  L3-L4,L4-L5    TRANSFORAMINAL EPIDURAL INJECTION OF STEROID Bilateral 8/26/2020    Procedure: Injection,steroid,epidural,transforaminal approach;  Surgeon: Devan W[place] MD;  Location: [facility] OR;  Service: Pain Management;  Laterality: Bilateral;  L3-4, L4-5    TRANSRECTAL ULTRASOUND EXAMINATION N/A 2/26/2019    Procedure: ULTRASOUND, RECTAL APPROACH;  Surgeon: Simba Cheung MD;  Location: [facility] OR;  Service: Urology;  Laterality: N/A;    UPPER GASTROINTESTINAL ENDOSCOPY  09/12/2018    Dr Hernandez; gastritis; extensive intestinal metaplasia; repeat in 1-2- years     Family History       Problem Relation (Age of Onset)    Cataracts Mother, Father    Glaucoma Father    Heart disease Mother, Father    Hyperlipidemia Mother, Sister    Hypertension Mother, Sister    No Known Problems Daughter, Daughter          Tobacco Use    Smoking status: Never    Smokeless tobacco: Never    Tobacco comments:     when a child   Substance and Sexual Activity    Alcohol use: Yes     Comment: rarely    Drug use: No    Sexual activity: Yes     Partners: Female       Review of Systems  Patient denies issues related to appetite or recent weight change.  Feels well overall.  Denies issues with generalized weakness .  Denies fatigue over above what is normally experienced with day-to-day activities  Denies fever, chills, rigors  Denies issues with ambulation  Denies generalized swelling or new lumps and bumps felt in any part  of body  Denies visual or hearing loss  Denies issues with congestion, sinus issues, cough, sputum production runny nose or itching eyes  Denies chest pain or palpitations, or passing out  Denies abdominal pain, reflux symptoms, nausea vomiting loose stools or  constipation  Denies seizure activity or focal weaknesses or symptoms related to TIA, no head aches or blurred vision reported  Denies issues with skin rash or bruising  +issues with swelling of feet has lymphedema, tingling or numbness   No issues with sleep,   No recent foreign travel   Good family support reported   Objective:     Vital Signs (Most Recent):  Temp: 96.5 °F (35.8 °C) (07/02/23 1917)  Pulse: 63 (07/02/23 1917)  Resp: 17 (07/02/23 1917)  BP: 109/61 (07/02/23 1917)  SpO2: (!) 94 % (07/02/23 1934) Vital Signs (24h Range):  Temp:  [96.4 °F (35.8 °C)-97.8 °F (36.6 °C)] 96.5 °F (35.8 °C)  Pulse:  [63-69] 63  Resp:  [16-20] 17  SpO2:  [91 %-95 %] 94 %  BP: (109-163)/(60-81) 109/61     Weight: 109.3 kg (240 lb 15.4 oz)  Body mass index is 32.68 kg/m².  Body surface area is 2.36 meters squared.      Intake/Output Summary (Last 24 hours) at 7/2/2023 2240  Last data filed at 7/2/2023 1813  Gross per 24 hour   Intake 793.4 ml   Output 1300 ml   Net -506.6 ml       Physical Exam  VITAL SIGNS:  as above   GENERAL: appears well-built,obese well-nourished.  No anxiety, no agitation, and in no distress.  Patient is awake, alert, oriented and cooperative.  HEENT:  Showed no congestion. Trachea is central no obvious icterus or pallor noted no hoarseness. no obvious JVD   NECK:  Supple.  No JVD. No obvious cervical submental or supraclavicular adenopathy.  RS:the visualized portion of  Chest expands well. chest appears symmetric, no audible wheezes.  No dyspnea recognized  ABDOMEN:  abdomen appears undistended.  EXTREMITIES:  ++ edema.  NEUROLOGICAL:  The patient is appropriate, higher functions are normal.  No  obvious neurological deficits.  normal judgement normal thought content  No confusion, no speech impediment. Cranial nerves are intact and show no deficit. No gross motor deficits noted   SKIN MUSCULOSKELETAL: no joint or skeletal deformity, no clubbing of nails.  No visible rash ecchymosis or petechiae    Significant Labs:   Lab Results   Component Value Date    WBC 6.86 07/02/2023    WBC 6.86 07/02/2023    HGB 12.8 (L) 07/02/2023    HGB 12.8 (L) 07/02/2023    HCT 38.5 (L) 07/02/2023    HCT 38.5 (L) 07/02/2023    MCV 90 07/02/2023    MCV 90 07/02/2023     (L) 07/02/2023     (L) 07/02/2023      CMP  Sodium   Date Value Ref Range Status   07/02/2023 141 136 - 145 mmol/L Final     Potassium   Date Value Ref Range Status   07/02/2023 3.1 (L) 3.5 - 5.1 mmol/L Final     Chloride   Date Value Ref Range Status   07/02/2023 104 95 - 110 mmol/L Final     CO2   Date Value Ref Range Status   07/02/2023 26 23 - 29 mmol/L Final     Glucose   Date Value Ref Range Status   07/02/2023 93 70 - 110 mg/dL Final     BUN   Date Value Ref Range Status   07/02/2023 16 8 - 23 mg/dL Final     Creatinine   Date Value Ref Range Status   07/02/2023 0.9 0.5 - 1.4 mg/dL Final     Calcium   Date Value Ref Range Status   07/02/2023 8.6 (L) 8.7 - 10.5 mg/dL Final     Total Protein   Date Value Ref Range Status   07/02/2023 5.8 (L) 6.0 - 8.4 g/dL Final     Albumin   Date Value Ref Range Status   07/02/2023 3.3 (L) 3.5 - 5.2 g/dL Final     Total Bilirubin   Date Value Ref Range Status   07/02/2023 1.2 (H) 0.1 - 1.0 mg/dL Final     Comment:     For infants and newborns, interpretation of results should be based  on gestational age, weight and in agreement with clinical  observations.    Premature Infant recommended reference ranges:  Up to 24 hours.............<8.0 mg/dL  Up to 48 hours............<12.0 mg/dL  3-5 days..................<15.0 mg/dL  6-29 days.................<15.0 mg/dL       Alkaline Phosphatase   Date Value Ref Range Status   07/02/2023 65 55 - 135 U/L Final     AST   Date Value Ref Range Status   07/02/2023 15 10 - 40 U/L Final     ALT   Date Value Ref Range Status   07/02/2023 15 10 - 44 U/L Final     Anion Gap   Date Value Ref Range Status   07/02/2023 11 8 - 16 mmol/L Final     eGFR   Date Value Ref Range Status    07/02/2023 >60 >60 mL/min/1.73 m^2 Final        Impression:     Extensive intraluminal thrombus involving the large veins of the left lower extremity extending into the calf.       Assessment/Plan:     Active Diagnoses:    Diagnosis Date Noted POA    PRINCIPAL PROBLEM:  Acute deep vein thrombosis (DVT) of femoral vein of left lower extremity [I82.412] 07/01/2023 Yes    Thrombocytopenia [D69.6] 02/02/2022 Yes    Mild persistent asthma without complication [J45.30] 08/17/2021 Yes    Myasthenia gravis, AChR antibody positive [G70.00]  Yes    Hyperlipidemia [E78.5] 05/09/2012 Yes    Hypothyroid [E03.9] 05/09/2012 Yes    HTN (hypertension), benign [I10] 05/09/2012 Yes      Problems Resolved During this Admission:       Left LE DVT extensive with thrombus, recommend anticoag with DOA. At dischagre will like to follow pt in OPclinic with cbc, cmp iron, ferritin,b12. Folate, radha, hapto, spep, sonam, flc, hep c, and abd usg  Cont mx of MG with provider in OP setting  Adelaide Wills MD  Hematology/Oncology  [facility]/Surg

## 2023-07-03 NOTE — HOSPITAL COURSE
Patient was admitted with acute DVT.  He was placed on heparin drip and Hematology was consulted.  He was transitioned to oral apixaban.  Was cleared from Hematology standpoint for discharge.  Patient was seen and evaluated on day of discharge and deemed appropriate.  Discharge instructions well as return precautions were discussed with patient good understanding.

## 2023-07-03 NOTE — PLAN OF CARE
The pt is cleared for discharge home from case management and has follow up appointments added to his avs .    07/03/23 1016   Final Note   Assessment Type Final Discharge Note   Anticipated Discharge Disposition Home   What phone number can be called within the next 1-3 days to see how you are doing after discharge? [phone]   Hospital Resources/Appts/Education Provided Appointments scheduled and added to AVS;Appointments scheduled by Navigator/Coordinator   Post-Acute Status   Discharge Delays None known at this time

## 2023-07-03 NOTE — NURSING
Pt discharged to home with wife at side.  Pt rolled down with w/c to vehicle.  Pt given proper instructions on how to take his eliquis.  Pt verbalized understanding of discharge orders.   no c/o pain/nausea/vomitting.  Will CTM.

## 2023-07-03 NOTE — PLAN OF CARE
Problem: Adult Inpatient Plan of Care  Goal: Plan of Care Review  Outcome: Ongoing, Progressing     Problem: Adult Inpatient Plan of Care  Goal: Patient-Specific Goal (Individualized)  Outcome: Ongoing, Progressing     Problem: Adult Inpatient Plan of Care  Goal: Absence of Hospital-Acquired Illness or Injury  Outcome: Ongoing, Progressing     Problem: Adult Inpatient Plan of Care  Goal: Optimal Comfort and Wellbeing  Outcome: Ongoing, Progressing   POC discussed with patient, verbalized understanding. Iv to right arm remains intact and patent. No pain verbalized this shift. Voiding without difficulty. Q2H purposeful rounding. Safety measures maintained with side rails up, bed alarm on, slip resistant socks on, call light in reach, pt instructed to call for needs.

## 2023-07-03 NOTE — PLAN OF CARE
Hospital follow up with Farideh Thompson NP at 8:30 am 7/17/23    CM called Idris Whalen at [phone] and the pts Eliquise prescription is $18.44. BERNABE Marinelli updated.   07/03/23 1008   Discharge Assessment   Assessment Type Discharge Planning Reassessment

## 2023-07-03 NOTE — DISCHARGE SUMMARY
"[facility]  Discharge Summary       Patient Name: Maximilian Tavera Jr.  MRN: [id number]  [employer] [facility]: [id number]  Patient Class: IP- Inpatient  Admission Date: 7/1/2023  Hospital Length of Stay: 2 days  Discharge Date and Time: 7/3/2023  2:30 PM  Attending Physician: Rajendra Winkler MD   Discharging Provider: Isis Goyal NP  Primary Care Provider: Brian Marroquin MD    Primary Care Team: Networked reference to record PCT     HPI:   Mr. Tavera is a 77 year old male with history of COPD/asthma, myasthenia gravis - steroid dependent, hypothyroidism, afib (no anticoagulation), obesity BMI 32, peripheral LE lymphedema, and HTN who presents today with complaints of LLE swelling worsening over the last week. It is severe. It is associated with pain and pallor. He denies fever, chills, N/V/D, chest pain, SOB, dizziness, recent long trips, recent surgical procedures, recent covid infection. In the ED, Left LE US revealed: "Extensive intraluminal thrombus involving the large veins of the left lower extremity extending into the calf". D-dimer is 2.3, BNP and troponin are WNL. Hospital medicine is consulted for admission for further work up and treatment.      * No surgery found *      Hospital Course:   Patient was admitted with acute DVT.  He was placed on heparin drip and Hematology was consulted.  He was transitioned to oral apixaban.  Was cleared from Hematology standpoint for discharge.  Patient was seen and evaluated on day of discharge and deemed appropriate.  Discharge instructions well as return precautions were discussed with patient good understanding.       Goals of Care Treatment Preferences:  Code Status: Full Code    Living Will: Yes              Consults:   Consults (From admission, onward)          Status Ordering Provider     Inpatient consult to Hematology/Oncology  Once        Provider:  Adelaide Wills MD    Completed EVETTE NAIR            No new Assessment & " Plan notes have been filed under this hospital service since the last note was generated.  Service: Hospital Medicine    Final Active Diagnoses:    Diagnosis Date Noted POA    PRINCIPAL PROBLEM:  Acute deep vein thrombosis (DVT) of femoral vein of left lower extremity [I82.412] 07/01/2023 Yes    Thrombocytopenia [D69.6] 02/02/2022 Yes    Mild persistent asthma without complication [J45.30] 08/17/2021 Yes    Myasthenia gravis, AChR antibody positive [G70.00]  Yes    Hyperlipidemia [E78.5] 05/09/2012 Yes    Hypothyroid [E03.9] 05/09/2012 Yes    HTN (hypertension), benign [I10] 05/09/2012 Yes      Problems Resolved During this Admission:       Discharged Condition: good    Disposition: Home or Self Care    Follow Up:   Follow-up Information       Brian Marroquin MD Follow up on 7/17/2023.    Specialty: Family Medicine  Why: Hospital follow up with Farideh Thompson NP at 8:30 am  Contact information:  [address] [facility] [address]  [phone]               Adelaide Wills MD Follow up in 2 week(s).    Specialties: Hematology and Oncology, Hematology, Oncology  Why: Please call to schedule a hospital follow up appointment.  Contact information:  [address]  [address] [address]  [phone]                           Patient Instructions:      Notify your health care provider if you experience any of the following:  difficulty breathing or increased cough     Notify your health care provider if you experience any of the following:  persistent dizziness, light-headedness, or visual disturbances     Notify your health care provider if you experience any of the following:  increased confusion or weakness     Activity as tolerated       Significant Diagnostic Studies: Labs:   CMP   Recent Labs   Lab 07/01/23  1337 07/02/23  0355 07/03/23  0352    141 142   K 3.7 3.1* 3.3*    104 105   CO2 25 26 27   * 93 102   BUN 23 16 17   CREATININE 1.0 0.9 0.9   CALCIUM 8.8 8.6* 8.4*   PROT  6.0 5.8* 5.4*   ALBUMIN 3.4* 3.3* 3.1*   BILITOT 1.1* 1.2* 1.0   ALKPHOS 74 65 68   AST 17 15 13   ALT 19 15 14   ANIONGAP 11 11 10   , CBC   Recent Labs   Lab 07/01/23  1337 07/02/23  0355 07/03/23 0352   WBC 8.18 6.86  6.86 6.91   HGB 12.3* 12.8*  12.8* 11.9*   HCT 37.0* 38.5*  38.5* 37.7*   * 102*  102* 131*    and All labs within the past 24 hours have been reviewed  Radiology: Ultrasound:   US Lower Extremity Veins Left [[id number]] (Abnormal)Resulted: 07/01/23 1430 Order Status: CompletedUpdated: 07/01/23 1432 Narrative:  EXAMINATION:   US LOWER EXTREMITY VEINS LEFT     CLINICAL HISTORY:   Other specified soft tissue disorders     TECHNIQUE:   Duplex and color flow Doppler evaluation and graded compression of the left lower extremity veins was performed.     COMPARISON:   None     FINDINGS:   Left thigh veins: There is occlusive and nonocclusive thrombus involving the common femoral, superficial femoral and popliteal veins.  Greater saphenous vein is patent without intraluminal thrombus.     Left calf veins: There is occlusive thrombus within posterior tibial vein.  Anterior tibial and peroneal veins are patent without intraluminal thrombus.     Contralateral CFV: The contralateral (right) common femoral vein is patent and free of thrombus.     Miscellaneous: None   Impression:    Extensive intraluminal thrombus involving the large veins of the left lower extremity extending into the calf.     This report was flagged in Epic as abnormal.       Electronically signed by: Jerry Box   Date: 07/01/2023   Time: 14:30     Abdominal U/S: pending    Pending Diagnostic Studies:       Procedure Component Value Units Date/Time    Antithrombin III [[id number]] Collected: 07/02/23 0355    Order Status: Sent Lab Status: In process Updated: 07/02/23 0408    Specimen: Blood     Factor 5 leiden [[id number]] Collected: 07/02/23 0355    Order Status: Sent Lab Status: In process Updated: 07/02/23 0408    Specimen:  Blood     Haptoglobin [[id number]] Collected: 07/03/23 0352    Order Status: Sent Lab Status: In process Updated: 07/03/23 0442    Specimen: Blood     Hepatitis C Antibody [[id number]] Collected: 07/03/23 0352    Order Status: Sent Lab Status: In process Updated: 07/03/23 0442    Specimen: Blood     Homocysteine, serum [[id number]] Collected: 07/02/23 0355    Order Status: Sent Lab Status: In process Updated: 07/02/23 0408    Specimen: Blood     Lupus anticoagulant (DRVVT) [[id number]] Collected: 07/02/23 0355    Order Status: Sent Lab Status: In process Updated: 07/02/23 0408    Specimen: Blood     Protein C activity [[id number]]     Order Status: Sent Lab Status: No result     Specimen: Blood     Protein S activity [[id number]]     Order Status: Sent Lab Status: No result     Specimen: Blood     Prothrombin gene mutation [[id number]] Collected: 07/02/23 0355    Order Status: Sent Lab Status: In process Updated: 07/02/23 0408    Specimen: Blood            Medications:  Reconciled Home Medications:      Medication List        START taking these medications      * apixaban 5 mg Tab  Commonly known as: ELIQUIS  Take 2 tablets (10 mg total) by mouth 2 (two) times daily. for 6 days     * apixaban 5 mg Tab  Commonly known as: ELIQUIS  Take 1 tablet (5 mg total) by mouth 2 (two) times daily.  Start taking on: July 8, 2023           * This list has 2 medication(s) that are the same as other medications prescribed for you. Read the directions carefully, and ask your doctor or other care provider to review them with you.                CHANGE how you take these medications      * predniSONE 5 MG tablet  Commonly known as: DELTASONE  TAKE 1 TABLET EVERY DAY  What changed:   how much to take  how to take this  when to take this     * predniSONE 1 MG tablet  Commonly known as: DELTASONE  TAKE 2 TABLETS EVERY DAY  What changed: Another medication with the same name was changed. Make sure you understand how and when to take each.            * This list has 2 medication(s) that are the same as other medications prescribed for you. Read the directions carefully, and ask your doctor or other care provider to review them with you.                CONTINUE taking these medications      albuterol 90 mcg/actuation inhaler  Commonly known as: PROVENTIL/VENTOLIN HFA  INHALE 1 TO 2 PUFFS INTO THE LUNGS EVERY 6 HOURS AS NEEDED FOR WHEEZING OR SHORTNESS OF BREATH. FOR RESCUE.     atorvastatin 80 MG tablet  Commonly known as: LIPITOR  TAKE 1 TABLET EVERY DAY     carvediloL 25 MG tablet  Commonly known as: COREG  TAKE 1 TABLET TWICE DAILY WITH MEALS     cetirizine 10 MG tablet  Commonly known as: ZYRTEC  Take 1 tablet by mouth daily as needed.     chlorthalidone 25 MG Tab  Commonly known as: HYGROTEN  TAKE 1 TABLET ONE TIME DAILY     cholecalciferol (vitamin D3) 50 mcg (2,000 unit) Cap capsule  Commonly known as: VITAMIN D3  1 capsule once daily. Every day     CO Q-10 100 mg capsule  Generic drug: coenzyme Q10  Take 400 mg by mouth once daily.     cyanocobalamin (vitamin B-12) 5,000 mcg Subl  Take 5,000 mcg by mouth once daily. Every day     diphenoxylate-atropine 2.5-0.025 mg 2.5-0.025 mg per tablet  Commonly known as: LOMOTIL  Take 1 tablet by mouth 4 (four) times daily as needed for Diarrhea.     ezetimibe 10 mg tablet  Commonly known as: ZETIA  TAKE 1 TABLET EVERY DAY     FLUAD QUAD 2021-22(65Y UP)(PF) 60 mcg (15 mcg x 4)/0.5 mL Syrg  Generic drug: flu vac 2021 [id number]-[id number](PF)     fluticasone propionate 50 mcg/actuation nasal spray  Commonly known as: FLONASE  USE 1 SPRAY IN EACH NOSTRIL TWICE DAILY AS NEEDED  FOR  RHINITIS     gabapentin 600 MG tablet  Commonly known as: NEURONTIN  Take 1 tablet (600 mg total) by mouth 3 (three) times daily.     HYDROcodone-acetaminophen 7.5-325 mg per tablet  Commonly known as: NORCO  Take 1 tablet by mouth every 6 (six) hours as needed for Pain.     levothyroxine 50 MCG tablet  Commonly known as: SYNTHROID  TAKE 1 TABLET  EVERY DAY     methocarbamoL 500 MG Tab  Commonly known as: ROBAXIN  Take 1 tablet (500 mg total) by mouth 3 (three) times daily.     milk thistle 200 mg Cap  Take 200 mg by mouth 2 (two) times daily. Twice a day     mupirocin 2 % ointment  Commonly known as: BACTROBAN  Apply topically 3 (three) times daily.     olmesartan 20 MG tablet  Commonly known as: BENICAR  TAKE 1 TABLET EVERY DAY     omega-3 fatty acids 1,000 mg Cap  Twice a day     pantoprazole 40 MG tablet  Commonly known as: PROTONIX  Take 1 tablet (40 mg total) by mouth 2 (two) times daily.     potassium chloride SA 20 MEQ tablet  Commonly known as: K-DUR,KLOR-CON  TAKE 4 TABLETS EVERY DAY OR AS DIRECTED     sildenafil 20 mg Tab  Commonly known as: REVATIO  Take 1 to 3 tabs daily as needed.     SPIRIVA WITH HANDIHALER 18 mcg inhalation capsule  Generic drug: tiotropium  INHALE THE CONTENTS OF 1 CAPSULE EVERY DAY  VIA HANDIHALER (CONTROLLER)              Indwelling Lines/Drains at time of discharge:   Lines/Drains/Airways       None                   Time spent on the discharge of patient: 34 minutes         Isis Goyal NP  Department of Hospital Medicine  [facility]/Surg

## 2023-07-03 NOTE — DISCHARGE INSTRUCTIONS
Please follow-up with Hematology as discussed.  For any sudden shortness of breath or chest pain please present to your nearest ED

## 2023-07-05 ENCOUNTER — TELEPHONE (OUTPATIENT)
Dept: HEMATOLOGY/ONCOLOGY | Facility: CLINIC | Age: 78
End: 2023-07-05
Payer: MEDICARE

## 2023-07-05 DIAGNOSIS — D69.6 THROMBOCYTOPENIA: Primary | ICD-10-CM

## 2023-07-05 NOTE — NURSING
Oncology Navigation   Intake  Cancer Type: Benign hem (Thrombocytopenia)  Internal / External Referral: Internal (Isis Goyal NP)  Date of Referral: 07/03/23  Initial Nurse Navigator Contact: 07/05/23  Referral to Initial Contact Timeline (days): 2  Date Worked: 07/05/23  First Appointment Available: 07/11/23  Appointment Date: 07/11/23 (Dr Wills)  First Available Date vs. Scheduled Date (days): 0     Treatment                              Acuity      Follow Up  No follow-ups on file.

## 2023-07-05 NOTE — TELEPHONE ENCOUNTER
----- Message from Erin Rene sent at 7/3/2023  4:11 PM CDT -----  Type: Need Medical Advice   Who Called: Patient  Best callback number: [phone]  Additional Information: Patient called to schedule a one week hfu appointment to see Freedom Wills. He was discharged today 7/3  Please call to further assist with scheduling, Thanks

## 2023-07-06 ENCOUNTER — PATIENT OUTREACH (OUTPATIENT)
Dept: ADMINISTRATIVE | Facility: CLINIC | Age: 78
End: 2023-07-06
Payer: MEDICARE

## 2023-07-06 LAB
APTT IMM NP PPP: 51 SEC (ref 25–37)
APTT PPP: 80 SEC (ref 25–37)
AT III ACT/NOR PPP CHRO: 79 % (ref 80–130)
HAPTOGLOB SERPL NEPH-MCNC: 116 MG/DL (ref 30–200)
INR PPP: 1.1 (ref 0.9–1.1)
LA 3 SCREEN W REFLEX-IMP: ABNORMAL
PROTHROMBIN TIME: 12.5 SEC (ref 9.4–12.5)
REPTILASE TIME: 18.5 SEC (ref 14–23.9)
SCREEN DRVVT/NORMAL: 0.91 RATIO
TT IMM BOVINE THROMBIN PPP: >300 SEC (ref 15.8–24.9)

## 2023-07-06 NOTE — PROGRESS NOTES
C3 nurse attempted to contact Maximilian Tavera Jr. for a TCC post hospital discharge follow up call. The patient is unable to conduct the call @ this time and requested a callback later this afternoon. The patient has a scheduled HOFU with Brian Marroquin MD (Family Medicine) on 7/17/23 at 0830 with Farideh Thompson NP. Will route to Brian Marroquin MD after speaking with the pt.

## 2023-07-06 NOTE — PROGRESS NOTES
C3 nurse spoke with Maximilian Tavera Jr. for a TCC post hospital discharge follow up call. Pt states his leg is still swollen but denies any additional symptoms. The patient has a scheduled HOFU with Brian Marroquin MD (Family Medicine) on 7/17/23 at 0830 with Farideh Thompson NP. Pt denies a sooner NPHV. Message routed to Brian Marroquin MD.

## 2023-07-06 NOTE — PROGRESS NOTES
I think he was hospitalized but now discharged and I see last few K very low-is he on K? If not needs to be . If on , repeat chem 7 in 10 days.    Please contact me if you have any additional concerns.    Sincerely,    Miguel Altamirano

## 2023-07-07 ENCOUNTER — TELEPHONE (OUTPATIENT)
Dept: HEMATOLOGY/ONCOLOGY | Facility: CLINIC | Age: 78
End: 2023-07-07
Payer: MEDICARE

## 2023-07-07 LAB
F2 C.20210G>A GENO BLD/T: NEGATIVE
F5 P.R506Q BLD/T QL: NEGATIVE
HCV IGG SERPL QL IA: NEGATIVE
LA 3 SCREEN W REFLEX-IMP: NORMAL
PROVIDER SIGNING NAME: NORMAL

## 2023-07-07 NOTE — TELEPHONE ENCOUNTER
Called pt to notify that refill has been sent to [facility]. Pt v/u.      ----- Message from Lucero Hanson sent at 7/7/2023 10:55 AM CDT -----  Contact: pt  Type:  RX Refill Request    Who Called: pt  Refill or New Rx:new Rx  RX Name and Strength:apixaban (ELIQUIS) 5 mg Tab  How is the patient currently taking it? (ex. 1XDay):as directed  Is this a 30 day or 90 day RX:30  Preferred Pharmacy with phone number:  [facility] - [place], [place] - [address]  [address] [address]  Phone: [phone] Fax: [phone]    [id number] - [place], [place] - [address] [facility] [address]  [address]  [place] [address]  Phone: [phone] Fax: [phone]    Local or Mail Order:both  Ordering Provider:[facility] dr  Would the patient rather a call back or a response via [facility]? Call back  Best Call Back Number:[phone]  Additional Information: Pt was given upon release from [facility] and was supposed to call into [facility] but it was not and Pt will run out of this med over weekend. Please call in a short script to [facility] but a normal script to [facility].  Please call pt back to advise  thanks

## 2023-07-10 LAB — HCYS SERPL-SCNC: 16.3 NMOL/ML (ref 7.5–16.3)

## 2023-07-11 ENCOUNTER — LAB VISIT (OUTPATIENT)
Dept: LAB | Facility: HOSPITAL | Age: 78
End: 2023-07-11
Attending: INTERNAL MEDICINE
Payer: MEDICARE

## 2023-07-11 DIAGNOSIS — I10 HTN (HYPERTENSION), BENIGN: ICD-10-CM

## 2023-07-11 DIAGNOSIS — E87.6 HYPOKALEMIA: ICD-10-CM

## 2023-07-11 LAB
ANION GAP SERPL CALC-SCNC: 7 MMOL/L (ref 8–16)
BUN SERPL-MCNC: 17 MG/DL (ref 8–23)
CALCIUM SERPL-MCNC: 8.7 MG/DL (ref 8.7–10.5)
CHLORIDE SERPL-SCNC: 104 MMOL/L (ref 95–110)
CO2 SERPL-SCNC: 30 MMOL/L (ref 23–29)
CREAT SERPL-MCNC: 1 MG/DL (ref 0.5–1.4)
EST. GFR  (NO RACE VARIABLE): >60 ML/MIN/1.73 M^2
GLUCOSE SERPL-MCNC: 105 MG/DL (ref 70–110)
POTASSIUM SERPL-SCNC: 3.6 MMOL/L (ref 3.5–5.1)
SODIUM SERPL-SCNC: 141 MMOL/L (ref 136–145)

## 2023-07-11 PROCEDURE — 80048 BASIC METABOLIC PNL TOTAL CA: CPT | Mod: HCNC | Performed by: INTERNAL MEDICINE

## 2023-07-11 PROCEDURE — 36415 COLL VENOUS BLD VENIPUNCTURE: CPT | Mod: HCNC,PO | Performed by: INTERNAL MEDICINE

## 2023-07-12 ENCOUNTER — PATIENT MESSAGE (OUTPATIENT)
Dept: CARDIOLOGY | Facility: CLINIC | Age: 78
End: 2023-07-12
Payer: MEDICARE

## 2023-07-12 NOTE — PROGRESS NOTES
Your results look fine and do not require any change in treatment. K needs to be higher -maybe one more per day    Please contact me if you have any additional concerns.    Sincerely,    Miguel Altamirano

## 2023-07-13 ENCOUNTER — OFFICE VISIT (OUTPATIENT)
Dept: CARDIOLOGY | Facility: CLINIC | Age: 78
End: 2023-07-13
Payer: MEDICARE

## 2023-07-13 VITALS
HEART RATE: 60 BPM | WEIGHT: 240.94 LBS | BODY MASS INDEX: 32.63 KG/M2 | HEIGHT: 72 IN | SYSTOLIC BLOOD PRESSURE: 112 MMHG | DIASTOLIC BLOOD PRESSURE: 60 MMHG

## 2023-07-13 DIAGNOSIS — E78.2 MIXED HYPERLIPIDEMIA: ICD-10-CM

## 2023-07-13 DIAGNOSIS — I82.412 ACUTE DEEP VEIN THROMBOSIS (DVT) OF FEMORAL VEIN OF LEFT LOWER EXTREMITY: Primary | ICD-10-CM

## 2023-07-13 DIAGNOSIS — I87.2 VENOUS STASIS DERMATITIS OF BOTH LOWER EXTREMITIES: ICD-10-CM

## 2023-07-13 DIAGNOSIS — I70.0 ABDOMINAL AORTIC ATHEROSCLEROSIS: ICD-10-CM

## 2023-07-13 DIAGNOSIS — I65.23 BILATERAL CAROTID ARTERY STENOSIS: ICD-10-CM

## 2023-07-13 DIAGNOSIS — M79.89 LEG SWELLING: ICD-10-CM

## 2023-07-13 DIAGNOSIS — E66.9 OBESITY (BMI 30-39.9): ICD-10-CM

## 2023-07-13 DIAGNOSIS — I89.0 LYMPHEDEMA OF BOTH LOWER EXTREMITIES: ICD-10-CM

## 2023-07-13 DIAGNOSIS — I10 HTN (HYPERTENSION), BENIGN: ICD-10-CM

## 2023-07-13 DIAGNOSIS — I77.1 TORTUOUS AORTA: ICD-10-CM

## 2023-07-13 PROCEDURE — 3078F DIAST BP <80 MM HG: CPT | Mod: HCNC,CPTII,S$GLB, | Performed by: INTERNAL MEDICINE

## 2023-07-13 PROCEDURE — 1111F DSCHRG MED/CURRENT MED MERGE: CPT | Mod: HCNC,CPTII,S$GLB, | Performed by: INTERNAL MEDICINE

## 2023-07-13 PROCEDURE — 1157F ADVNC CARE PLAN IN RCRD: CPT | Mod: HCNC,CPTII,S$GLB, | Performed by: INTERNAL MEDICINE

## 2023-07-13 PROCEDURE — 99999 PR PBB SHADOW E&M-EST. PATIENT-LVL III: ICD-10-PCS | Mod: PBBFAC,HCNC,, | Performed by: INTERNAL MEDICINE

## 2023-07-13 PROCEDURE — 3288F FALL RISK ASSESSMENT DOCD: CPT | Mod: HCNC,CPTII,S$GLB, | Performed by: INTERNAL MEDICINE

## 2023-07-13 PROCEDURE — 1125F PR PAIN SEVERITY QUANTIFIED, PAIN PRESENT: ICD-10-PCS | Mod: HCNC,CPTII,S$GLB, | Performed by: INTERNAL MEDICINE

## 2023-07-13 PROCEDURE — 99215 OFFICE O/P EST HI 40 MIN: CPT | Mod: HCNC,S$GLB,, | Performed by: INTERNAL MEDICINE

## 2023-07-13 PROCEDURE — 1159F PR MEDICATION LIST DOCUMENTED IN MEDICAL RECORD: ICD-10-PCS | Mod: HCNC,CPTII,S$GLB, | Performed by: INTERNAL MEDICINE

## 2023-07-13 PROCEDURE — 3078F PR MOST RECENT DIASTOLIC BLOOD PRESSURE < 80 MM HG: ICD-10-PCS | Mod: HCNC,CPTII,S$GLB, | Performed by: INTERNAL MEDICINE

## 2023-07-13 PROCEDURE — 99215 PR OFFICE/OUTPT VISIT, EST, LEVL V, 40-54 MIN: ICD-10-PCS | Mod: HCNC,S$GLB,, | Performed by: INTERNAL MEDICINE

## 2023-07-13 PROCEDURE — 1125F AMNT PAIN NOTED PAIN PRSNT: CPT | Mod: HCNC,CPTII,S$GLB, | Performed by: INTERNAL MEDICINE

## 2023-07-13 PROCEDURE — 1111F PR DISCHARGE MEDS RECONCILED W/ CURRENT OUTPATIENT MED LIST: ICD-10-PCS | Mod: HCNC,CPTII,S$GLB, | Performed by: INTERNAL MEDICINE

## 2023-07-13 PROCEDURE — 1159F MED LIST DOCD IN RCRD: CPT | Mod: HCNC,CPTII,S$GLB, | Performed by: INTERNAL MEDICINE

## 2023-07-13 PROCEDURE — 99999 PR PBB SHADOW E&M-EST. PATIENT-LVL III: CPT | Mod: PBBFAC,HCNC,, | Performed by: INTERNAL MEDICINE

## 2023-07-13 PROCEDURE — 1101F PT FALLS ASSESS-DOCD LE1/YR: CPT | Mod: HCNC,CPTII,S$GLB, | Performed by: INTERNAL MEDICINE

## 2023-07-13 PROCEDURE — 3074F PR MOST RECENT SYSTOLIC BLOOD PRESSURE < 130 MM HG: ICD-10-PCS | Mod: HCNC,CPTII,S$GLB, | Performed by: INTERNAL MEDICINE

## 2023-07-13 PROCEDURE — 1101F PR PT FALLS ASSESS DOC 0-1 FALLS W/OUT INJ PAST YR: ICD-10-PCS | Mod: HCNC,CPTII,S$GLB, | Performed by: INTERNAL MEDICINE

## 2023-07-13 PROCEDURE — 1157F PR ADVANCE CARE PLAN OR EQUIV PRESENT IN MEDICAL RECORD: ICD-10-PCS | Mod: HCNC,CPTII,S$GLB, | Performed by: INTERNAL MEDICINE

## 2023-07-13 PROCEDURE — 3074F SYST BP LT 130 MM HG: CPT | Mod: HCNC,CPTII,S$GLB, | Performed by: INTERNAL MEDICINE

## 2023-07-13 PROCEDURE — 3288F PR FALLS RISK ASSESSMENT DOCUMENTED: ICD-10-PCS | Mod: HCNC,CPTII,S$GLB, | Performed by: INTERNAL MEDICINE

## 2023-07-13 NOTE — PROGRESS NOTES
[facility]      Chief Complaint   Patient presents with    Hospital Follow Up    DVT behind left knee       Patient ID: Maximilian Tavera Jr. is a 77 y.o. male with BLE lymphedema, venous insuffciency, HTN, HLD, myasthenia gravis on chronic prednisone therapy, degenerative joint disease with myelopathy s/p decompression and fusion of C2-6, chronic PVCs, who presents for a follow up appointment.  Pertinent history/events are as follows:     -Pt kindly referred by Dr. Altamirano for evaluation of leg swelling.    -At our initial clinic visit on 2/3/2022, Mr. Tavera reported leg swelling starting 3-4 months ago.  States his PCP wrapped his legs 2 weeks ago and he lost 8 pounds.  He has no claudication, rest pain, or tissue loss.  Plan:   Leg swelling- Due to lymphedema and possible venous insufficiency.  Check BLE venous reflux study and KATTY study.  Refer to lymphedema clinic.  Increase bumex to 1 mg bid every other day (0.5 mg bid on alternate days).  Pt to limit sodium intake to 2,000 mg daily.  Limit volume intake to 1.5 liters daily.  Check cmp in 1 week.    HTN- Continue current medications.   Chronic PVC's- Stable.  Continue current medications and follow up with EP as scheduled.    HLD- LDL is at goal of <70.  Continue current medications.    -At follow up clinic visit on 5/3/2022, Mr. Tavera reported significant improvement in BLE edema since completing lymphedema clinic therapy.  He continues to do lymphedema clinic techniques at home and wear graduated compression hose.  BLE Venous Reflux Study on 2/10/2022 revealed significant RLE venous reflux and no evidence of lower extremity DVT.  KATTY Study on 2/10/2022 revealed noncompressible ABIs bilaterally consistent with medial arterial calcification.  PVR waveforms are mildly abnormal bilaterally.  Pt states he stopped taking bumex due to frequent urination.   Plan:   Leg swelling- Mr. Tavera reports significant improvement in BLE edema since  completing lymphedema clinic therapy.  BLE Venous Reflux Study on 2/10/2022 revealed significant RLE venous reflux and no evidence of lower extremity DVT.  KATTY Study on 2/10/2022 revealed noncompressible ABIs bilaterally consistent with medial arterial calcification.  PVR waveforms are mildly abnormal bilaterally.  Continue lymphedema clinic techniques at home and wear graduated compression hose.  Pt to limit sodium intake to 2,000 mg daily.  Limit volume intake to 1.5 liters daily.  Check cmp in 1 week.    HTN- Continue current medications.   Chronic PVC's- Stable.  Continue current medications and follow up with EP as scheduled.    HLD- LDL is at goal of <70.  Continue atorvastatin 80 mg daily.       -At clinic visit on 2/28/2023, Mr. Tavera reported continued improvement in BLE edema.  He has no claudication or tissue loss.    Plan:   Leg swelling- Mr. Tavera reports continued improvement in BLE edema.  Continue lymphedema clinic techniques at home and wear graduated compression hose.  Pt to limit sodium intake to 2,000 mg daily.  Limit volume intake to 1.5 liters daily.  Check cmp in 1 week.    HTN- Continue current medications.   Chronic PVC's- Stable.  Continue current medications and follow up with EP as scheduled.    HLD- LDL is at goal of <70 (69 on 12/9/2022).  Continue atorvastatin 80 mg daily.       HPI:  Mr. Tavera was admitted to [facility] with LLE edema and found to have extensive LLE DVT.  He was discharged on full dose anticoagulation with Eliquis.      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    Emphysema lung 11/05/2018    Gastritis     Hx of colonic polyp     Hyperlipidemia     Hypertension     Hypothyroidism     Knee fracture     Myasthenia  gravis     Neuropathy 01/03/2013    Obesity 01/29/2015    Pneumonia 03/29/2020    Polyneuropathy     PVC (premature ventricular contraction)     Squamous cell carcinoma 2014    left forearm    Thyroid disease      Past Surgical History:   Procedure Laterality Date    APPENDECTOMY      CATARACT EXTRACTION W/  INTRAOCULAR LENS IMPLANT Bilateral     COLONOSCOPY  03/01/2012    Dr Esteban; hyperplastic polyp; repeat in 5 years    COLONOSCOPY N/A 12/7/2021    Procedure: COLONOSCOPY;  Surgeon: Gabriel Hernandez MD;  Location: [facility];  Service: Endoscopy;  Laterality: N/A;    CYSTOSCOPY N/A 2/26/2019    Procedure: CYSTOSCOPY;  Surgeon: Simba Cheung MD;  Location: [facility];  Service: Urology;  Laterality: N/A;    ENDOSCOPIC ULTRASOUND OF UPPER GASTROINTESTINAL TRACT N/A 1/7/2020    Procedure: ULTRASOUND, UPPER GI TRACT, ENDOSCOPIC;  Surgeon: Kati Ryan MD;  Location: [facility] (2ND FLR);  Service: Endoscopy;  Laterality: N/A;    ESOPHAGOGASTRODUODENOSCOPY N/A 9/12/2018    Procedure: EGD (ESOPHAGOGASTRODUODENOSCOPY);  Surgeon: Gabriel Hernandez MD;  Location: [facility];  Service: Endoscopy;  Laterality: N/A;    ESOPHAGOGASTRODUODENOSCOPY N/A 8/19/2019    Procedure: EGD (ESOPHAGOGASTRODUODENOSCOPY);  Surgeon: Gabriel Hernandez MD;  Location: [facility];  Service: Endoscopy;  Laterality: N/A;    ESOPHAGOGASTRODUODENOSCOPY N/A 10/7/2019    Procedure: EGD (ESOPHAGOGASTRODUODENOSCOPY);  Surgeon: Gabriel Hernandez MD;  Location: [facility];  Service: Endoscopy;  Laterality: N/A;    ESOPHAGOGASTRODUODENOSCOPY N/A 1/7/2020    Procedure: EGD (ESOPHAGOGASTRODUODENOSCOPY);  Surgeon: Kati Ryan MD;  Location: [facility] (2ND FLR);  Service: Endoscopy;  Laterality: N/A;    HEMORRHOID SURGERY      INJECTION OF ANESTHETIC AGENT AROUND MEDIAL BRANCH NERVES INNERVATING LUMBAR FACET JOINT Bilateral 10/1/2020    Procedure: Block-nerve-medial branch-lumbar Bilateral L 3,4,5;  Surgeon: Devan Watt MD;  Location: [facility];   Service: Pain Management;  Laterality: Bilateral;    INJECTION OF ANESTHETIC AGENT AROUND MEDIAL BRANCH NERVES INNERVATING LUMBAR FACET JOINT Right 10/7/2022    Procedure: Block-nerve-medial branch-lumbar L3,4,5;  Surgeon: Devan Watt MD;  Location: [facility];  Service: Pain Management;  Laterality: Right;    KNEE ARTHROPLASTY Bilateral     LENGTHENING OF ACHILLES TENDON Right 9/11/2020    Procedure: percutaeous tenotomy of right lateral epicondyle (tenex);  Surgeon: Terry Quach MD;  Location: [facility];  Service: Neurosurgery;  Laterality: Right;  tenex to right lateral epicondyle  Tenex machine SN#[id number], total time 1min. 30seconds    NECK SURGERY      POSTERIOR FUSION OF CERVICAL SPINE WITH LAMINECTOMY N/A 11/7/2018    Procedure: C2-C6 Posterior Cervical Laminectomy & Instrumental Fusion;  Surgeon: Kishore Turner MD;  Location: [address];  Service: Neurosurgery;  Laterality: N/A;    TONSILLECTOMY      TRANSFORAMINAL EPIDURAL INJECTION OF STEROID Right 12/20/2019    Procedure: Injection,steroid,epidural,transforaminal approach;  Surgeon: Devan Watt MD;  Location: [facility];  Service: Pain Management;  Laterality: Right;  L3-4, L4-5    TRANSFORAMINAL EPIDURAL INJECTION OF STEROID Bilateral 2/6/2020    Procedure: Injection,steroid,epidural,transforaminal approach;  Surgeon: Devan Watt MD;  Location: [facility];  Service: Pain Management;  Laterality: Bilateral;  L3-L4,L4-L5    TRANSFORAMINAL EPIDURAL INJECTION OF STEROID Bilateral 8/26/2020    Procedure: Injection,steroid,epidural,transforaminal approach;  Surgeon: Devan Watt MD;  Location: [facility];  Service: Pain Management;  Laterality: Bilateral;  L3-4, L4-5    TRANSRECTAL ULTRASOUND EXAMINATION N/A 2/26/2019    Procedure: ULTRASOUND, RECTAL APPROACH;  Surgeon: Simba Cheung MD;  Location: [facility];  Service: Urology;  Laterality: N/A;    UPPER GASTROINTESTINAL ENDOSCOPY  09/12/2018    Dr Hernandez; gastritis; extensive intestinal metaplasia; repeat in 1-2-  years     Social History     Socioeconomic History    Marital status:    Tobacco Use    Smoking status: Never    Smokeless tobacco: Never    Tobacco comments:     when a child   Substance and Sexual Activity    Alcohol use: Yes     Comment: rarely    Drug use: No    Sexual activity: Yes     Partners: Female     Social Determinants of Health     Financial Resource Strain: Low Risk     Difficulty of Paying Living Expenses: Not hard at all   Food Insecurity: No Food Insecurity    Worried About Running Out of Food in the Last Year: Never true    Ran Out of Food in the Last Year: Never true   Transportation Needs: No Transportation Needs    Lack of Transportation (Medical): No    Lack of Transportation (Non-Medical): No   Physical Activity: Inactive    Days of Exercise per Week: 0 days    Minutes of Exercise per Session: 0 min   Stress: No Stress Concern Present    Feeling of Stress : Not at all   Social Connections: Socially Integrated    Frequency of Communication with Friends and Family: More than three times a week    Frequency of Social Gatherings with Friends and Family: More than three times a week    Attends Sikhism Services: More than 4 times per year    Active Member of Clubs or Organizations: Yes    Attends Club or Organization Meetings: More than 4 times per year    Marital Status:    Housing Stability: Low Risk     Unable to Pay for Housing in the Last Year: No    Number of Places Lived in the Last Year: 1    Unstable Housing in the Last Year: No     Family History   Problem Relation Age of Onset    Cataracts Mother     Heart disease Mother         CHF    Hypertension Mother     Hyperlipidemia Mother     Cataracts Father     Glaucoma Father     Heart disease Father     Hyperlipidemia Sister     Hypertension Sister     No Known Problems Daughter     No Known Problems Daughter     Collagen disease Neg Hx     Amblyopia Neg Hx     Blindness Neg Hx     Macular degeneration Neg Hx     Retinal  detachment Neg Hx     Strabismus Neg Hx     Cancer Neg Hx     Colon cancer Neg Hx     Esophageal cancer Neg Hx     Stomach cancer Neg Hx     Crohn's disease Neg Hx     Ulcerative colitis Neg Hx        Review of patient's allergies indicates:   Allergen Reactions    Spironolactone Diarrhea       Medication List with Changes/Refills   Current Medications    ALBUTEROL (PROVENTIL/VENTOLIN HFA) 90 MCG/ACTUATION INHALER    INHALE 1 TO 2 PUFFS INTO THE LUNGS EVERY 6 HOURS AS NEEDED FOR WHEEZING OR SHORTNESS OF BREATH. FOR RESCUE.    APIXABAN (ELIQUIS) 5 MG TAB    Take 1 tablet (5 mg total) by mouth 2 (two) times daily.    APIXABAN (ELIQUIS) 5 MG TAB    Take 1 tablet (5 mg total) by mouth 2 (two) times daily.    ATORVASTATIN (LIPITOR) 80 MG TABLET    TAKE 1 TABLET EVERY DAY    CARVEDILOL (COREG) 25 MG TABLET    TAKE 1 TABLET TWICE DAILY WITH MEALS    CETIRIZINE (ZYRTEC) 10 MG TABLET    Take 1 tablet by mouth daily as needed.    CHLORTHALIDONE (HYGROTEN) 25 MG TAB    TAKE 1 TABLET ONE TIME DAILY    CHOLECALCIFEROL, VITAMIN D3, (VITAMIN D3) 50 MCG (2,000 UNIT) CAP    1 capsule once daily. Every day    COENZYME Q10 (CO Q-10) 100 MG CAPSULE    Take 400 mg by mouth once daily.    CYANOCOBALAMIN, VITAMIN B-12, 5,000 MCG SUBL    Take 5,000 mcg by mouth once daily. Every day    DIPHENOXYLATE-ATROPINE 2.5-0.025 MG (LOMOTIL) 2.5-0.025 MG PER TABLET    Take 1 tablet by mouth 4 (four) times daily as needed for Diarrhea.    EZETIMIBE (ZETIA) 10 MG TABLET    TAKE 1 TABLET EVERY DAY    FLUAD QUAD 2021-22,65Y UP,,PF, 60 MCG (15 MCG X 4)/0.5 ML SYRG        FLUTICASONE (FLONASE) 50 MCG/ACTUATION NASAL SPRAY    USE 1 SPRAY IN EACH NOSTRIL TWICE DAILY AS NEEDED  FOR  RHINITIS    GABAPENTIN (NEURONTIN) 600 MG TABLET    Take 1 tablet (600 mg total) by mouth 3 (three) times daily.    HYDROCODONE-ACETAMINOPHEN (NORCO) 7.5-325 MG PER TABLET    Take 1 tablet by mouth every 6 (six) hours as needed for Pain.    LEVOTHYROXINE (SYNTHROID) 50 MCG TABLET     TAKE 1 TABLET EVERY DAY    METHOCARBAMOL (ROBAXIN) 500 MG TAB    Take 1 tablet (500 mg total) by mouth 3 (three) times daily.    MILK THISTLE 200 MG CAP    Take 200 mg by mouth 2 (two) times daily. Twice a day    MUPIROCIN (BACTROBAN) 2 % OINTMENT    Apply topically 3 (three) times daily.    OLMESARTAN (BENICAR) 20 MG TABLET    TAKE 1 TABLET EVERY DAY    OMEGA-3 FATTY ACIDS 1,000 MG CAP    Twice a day    PANTOPRAZOLE (PROTONIX) 40 MG TABLET    Take 1 tablet (40 mg total) by mouth 2 (two) times daily.    POTASSIUM CHLORIDE SA (K-DUR,KLOR-CON) 20 MEQ TABLET    TAKE 4 TABLETS EVERY DAY OR AS DIRECTED    PREDNISONE (DELTASONE) 1 MG TABLET    TAKE 2 TABLETS EVERY DAY    PREDNISONE (DELTASONE) 5 MG TABLET    TAKE 1 TABLET EVERY DAY    SILDENAFIL (REVATIO) 20 MG TAB    Take 1 to 3 tabs daily as needed.    SPIRIVA WITH HANDIHALER 18 MCG INHALATION CAPSULE    INHALE THE CONTENTS OF 1 CAPSULE EVERY DAY  VIA HANDIHALER (CONTROLLER)   Discontinued Medications    APIXABAN (ELIQUIS) 5 MG TAB    Take 1 tablet (5 mg total) by mouth 2 (two) times daily.       Review of Systems  Constitution: Denies chills, fever, and sweats.  HENT: Denies headaches or blurry vision.  Cardiovascular: Denies chest pain or irregular heart beat.  Respiratory: Denies cough or shortness of breath.  Gastrointestinal: Denies abdominal pain, nausea, or vomiting.  Musculoskeletal: Positive for leg swelling.  Neurological: Denies dizziness or focal weakness.  Psychiatric/Behavioral: Normal mental status.  Hematologic/Lymphatic: Denies bleeding problem or easy bruising/bleeding.  Skin: Denies rash or suspicious lesions    Physical Examination  /60   Pulse 60   Ht 6' (1.829 m)   Wt 109.3 kg (240 lb 15.4 oz)   BMI 32.68 kg/m²     Constitutional: No acute distress, conversant  HEENT: Sclera anicteric, Pupils equal, round and reactive to light, extraocular motions intact, Oropharynx clear  Neck: No JVD, no carotid bruits  Cardiovascular: regular rate  and rhythm, no murmur, rubs or gallops, normal S1/S2  Pulmonary: Clear to auscultation bilaterally  Abdominal: Abdomen soft, nontender, nondistended, positive bowel sounds  Extremities: BLE's with 1+ pitting edema, venous stasis dermatitis and changes consistent with lymphedema   Pulses:  Carotid pulses are 2+ on the right side, and 2+ on the left side.  Radial pulses are 2+ on the right side, and 2+ on the left side.   Femoral pulses are 2+ on the right side, and 2+ on the left side.  Popliteal pulses are 2+ on the right side, and 2+ on the left side.   Dorsalis pedis pulses are 2+ on the right side, and 2+ on the left side.   Posterior tibial pulses are 2+ on the right side, and 2+ on the left side.    Skin: No ecchymosis, erythema, or ulcers  Psych: Alert and oriented x 3, appropriate affect  Neuro: CNII-XII intact, no focal deficits    Labs:  Most Recent Data  CBC:   Lab Results   Component Value Date    WBC 6.91 07/03/2023    HGB 11.9 (L) 07/03/2023    HCT 37.7 (L) 07/03/2023     (L) 07/03/2023    MCV 92 07/03/2023    RDW 13.5 07/03/2023     BMP:   Lab Results   Component Value Date     07/11/2023    K 3.6 07/11/2023     07/11/2023    CO2 30 (H) 07/11/2023    BUN 17 07/11/2023    CREATININE 1.0 07/11/2023     07/11/2023    CALCIUM 8.7 07/11/2023    MG 2.0 07/03/2023    PHOS 2.9 03/31/2020     LFTS;   Lab Results   Component Value Date    PROT 5.4 (L) 07/03/2023    ALBUMIN 3.1 (L) 07/03/2023    BILITOT 1.0 07/03/2023    AST 13 07/03/2023    ALKPHOS 68 07/03/2023    ALT 14 07/03/2023     COAGS:   Lab Results   Component Value Date    INR 1.1 07/02/2023     FLP:   Lab Results   Component Value Date    CHOL 131 02/22/2023    HDL 41 02/22/2023    LDLCALC 63.8 02/22/2023    TRIG 131 02/22/2023    CHOLHDL 31.3 02/22/2023     CARDIAC:   Lab Results   Component Value Date    TROPONINI <0.006 07/01/2023    BNP 98 07/01/2023       EKG 9/23/2021:  Normal sinus rhythm  Left axis  deviation  Incomplete right bundle branch block    BLE Venous Ultrasound 7/1/2023:  Extensive intraluminal thrombus involving the large veins of the left lower extremity extending into the calf.    BLE Venous Reflux Study 2/10/2022:  No evidence of deep or superficial venous thrombosis bilaterally.  The right GSV demonstrates focal reflux below the knee.  Bilateral lower extremity varicosities are noted.    KATTY Study 2/10/2022:  Noncompressible ABIs bilaterally consistent with medial arterial calcification.  PVR waveforms are mildly abnormal bilaterally.    BLE Venous Ultrasound 1/10/2022:  Negative for DVT throughout bilateral lower extremities.    Echo 6/2/2021:  Severe left atrial enlargement.  The left ventricle is normal in size with concentric hypertrophy and normal systolic function.  The estimated ejection fraction is 65%.  Indeterminate left ventricular diastolic function.  Normal right ventricular size with normal right ventricular systolic function.  The estimated PA systolic pressure is 28 mmHg.  Normal central venous pressure (3 mmHg).    Assessment/Plan:  Maximilian Tavera Jr. is a 77 y.o. male with BLE lymphedema, venous insuffciency, HTN, HLD, myasthenia gravis on chronic prednisone therapy, degenerative joint disease with myelopathy s/p decompression and fusion of C2-6, chronic PVCs, who presents for a follow up appointment.       1. LLE DVT- Appear unprovoked.  Refer to Heme/Onc for age appropriate cancer screening and hypercoagulable workup.  Continue Eliquis 5 mg bid.      2. BLE Lymphedema/Venous Insufficiency- Continue lymphedema clinic techniques at home and wear graduated compression hose.  Pt to limit sodium intake to 2,000 mg daily.  Limit volume intake to 1.5 liters daily.       3. HTN- Continue current medications.     4. Chronic PVC's- Stable.  Continue current medications and follow up with EP as scheduled.      5. HLD- LDL is at goal of <70 (69 on 12/9/2022).  Continue atorvastatin  80 mg daily.       Follow up in 3 months with BLE venous ultrasound prior    Total duration of face to face visit time 30 minutes.  Total time spent counseling greater than fifty percent of total visit time.  Counseling included discussion regarding imaging findings, diagnosis, possibilities, treatment options, risks and benefits.  The patient had many questions regarding the options and long-term effects.    Devang Amezquita MD, PhD  Interventional Cardiology

## 2023-07-13 NOTE — PATIENT INSTRUCTIONS
Assessment/Plan:  Maximilian Tavera Jr. is a 77 y.o. male with BLE lymphedema, venous insuffciency, HTN, HLD, myasthenia gravis on chronic prednisone therapy, degenerative joint disease with myelopathy s/p decompression and fusion of C2-6, chronic PVCs, who presents for a follow up appointment.       1. LLE DVT- Appear unprovoked.  Refer to Heme/Onc for age appropriate cancer screening and hypercoagulable workup.  Continue Eliquis 5 mg bid.      2. BLE Lymphedema/Venous Insufficiency- Continue lymphedema clinic techniques at home and wear graduated compression hose.  Pt to limit sodium intake to 2,000 mg daily.  Limit volume intake to 1.5 liters daily.       3. HTN- Continue current medications.     4. Chronic PVC's- Stable.  Continue current medications and follow up with EP as scheduled.      5. HLD- LDL is at goal of <70 (69 on 12/9/2022).  Continue atorvastatin 80 mg daily.       Follow up in 3 months with BLE venous ultrasound prior

## 2023-07-17 ENCOUNTER — OFFICE VISIT (OUTPATIENT)
Dept: FAMILY MEDICINE | Facility: CLINIC | Age: 78
End: 2023-07-17
Payer: MEDICARE

## 2023-07-17 VITALS
BODY MASS INDEX: 32.07 KG/M2 | RESPIRATION RATE: 16 BRPM | SYSTOLIC BLOOD PRESSURE: 130 MMHG | DIASTOLIC BLOOD PRESSURE: 66 MMHG | HEIGHT: 72 IN | TEMPERATURE: 99 F | OXYGEN SATURATION: 96 % | WEIGHT: 236.75 LBS | HEART RATE: 63 BPM

## 2023-07-17 DIAGNOSIS — I10 HTN (HYPERTENSION), BENIGN: ICD-10-CM

## 2023-07-17 DIAGNOSIS — E78.2 MIXED HYPERLIPIDEMIA: ICD-10-CM

## 2023-07-17 DIAGNOSIS — G70.00 MYASTHENIA GRAVIS, ACHR ANTIBODY POSITIVE: ICD-10-CM

## 2023-07-17 DIAGNOSIS — I82.412 ACUTE DEEP VEIN THROMBOSIS (DVT) OF FEMORAL VEIN OF LEFT LOWER EXTREMITY: Primary | ICD-10-CM

## 2023-07-17 DIAGNOSIS — E03.4 HYPOTHYROIDISM DUE TO ACQUIRED ATROPHY OF THYROID: ICD-10-CM

## 2023-07-17 PROCEDURE — 1101F PR PT FALLS ASSESS DOC 0-1 FALLS W/OUT INJ PAST YR: ICD-10-PCS | Mod: HCNC,CPTII,S$GLB, | Performed by: PHYSICIAN ASSISTANT

## 2023-07-17 PROCEDURE — 1111F DSCHRG MED/CURRENT MED MERGE: CPT | Mod: HCNC,CPTII,S$GLB, | Performed by: PHYSICIAN ASSISTANT

## 2023-07-17 PROCEDURE — 1111F PR DISCHARGE MEDS RECONCILED W/ CURRENT OUTPATIENT MED LIST: ICD-10-PCS | Mod: HCNC,CPTII,S$GLB, | Performed by: PHYSICIAN ASSISTANT

## 2023-07-17 PROCEDURE — 3078F DIAST BP <80 MM HG: CPT | Mod: HCNC,CPTII,S$GLB, | Performed by: PHYSICIAN ASSISTANT

## 2023-07-17 PROCEDURE — 99495 TCM SERVICES (MODERATE COMPLEXITY): ICD-10-PCS | Mod: HCNC,S$GLB,, | Performed by: PHYSICIAN ASSISTANT

## 2023-07-17 PROCEDURE — 1125F PR PAIN SEVERITY QUANTIFIED, PAIN PRESENT: ICD-10-PCS | Mod: HCNC,CPTII,S$GLB, | Performed by: PHYSICIAN ASSISTANT

## 2023-07-17 PROCEDURE — 3288F FALL RISK ASSESSMENT DOCD: CPT | Mod: HCNC,CPTII,S$GLB, | Performed by: PHYSICIAN ASSISTANT

## 2023-07-17 PROCEDURE — 99495 TRANSJ CARE MGMT MOD F2F 14D: CPT | Mod: HCNC,S$GLB,, | Performed by: PHYSICIAN ASSISTANT

## 2023-07-17 PROCEDURE — 3288F PR FALLS RISK ASSESSMENT DOCUMENTED: ICD-10-PCS | Mod: HCNC,CPTII,S$GLB, | Performed by: PHYSICIAN ASSISTANT

## 2023-07-17 PROCEDURE — 1160F RVW MEDS BY RX/DR IN RCRD: CPT | Mod: HCNC,CPTII,S$GLB, | Performed by: PHYSICIAN ASSISTANT

## 2023-07-17 PROCEDURE — 3075F SYST BP GE 130 - 139MM HG: CPT | Mod: HCNC,CPTII,S$GLB, | Performed by: PHYSICIAN ASSISTANT

## 2023-07-17 PROCEDURE — 1160F PR REVIEW ALL MEDS BY PRESCRIBER/CLIN PHARMACIST DOCUMENTED: ICD-10-PCS | Mod: HCNC,CPTII,S$GLB, | Performed by: PHYSICIAN ASSISTANT

## 2023-07-17 PROCEDURE — 99999 PR PBB SHADOW E&M-EST. PATIENT-LVL IV: ICD-10-PCS | Mod: PBBFAC,HCNC,, | Performed by: PHYSICIAN ASSISTANT

## 2023-07-17 PROCEDURE — 1159F PR MEDICATION LIST DOCUMENTED IN MEDICAL RECORD: ICD-10-PCS | Mod: HCNC,CPTII,S$GLB, | Performed by: PHYSICIAN ASSISTANT

## 2023-07-17 PROCEDURE — 1157F ADVNC CARE PLAN IN RCRD: CPT | Mod: HCNC,CPTII,S$GLB, | Performed by: PHYSICIAN ASSISTANT

## 2023-07-17 PROCEDURE — 3078F PR MOST RECENT DIASTOLIC BLOOD PRESSURE < 80 MM HG: ICD-10-PCS | Mod: HCNC,CPTII,S$GLB, | Performed by: PHYSICIAN ASSISTANT

## 2023-07-17 PROCEDURE — 1159F MED LIST DOCD IN RCRD: CPT | Mod: HCNC,CPTII,S$GLB, | Performed by: PHYSICIAN ASSISTANT

## 2023-07-17 PROCEDURE — 1101F PT FALLS ASSESS-DOCD LE1/YR: CPT | Mod: HCNC,CPTII,S$GLB, | Performed by: PHYSICIAN ASSISTANT

## 2023-07-17 PROCEDURE — 3075F PR MOST RECENT SYSTOLIC BLOOD PRESS GE 130-139MM HG: ICD-10-PCS | Mod: HCNC,CPTII,S$GLB, | Performed by: PHYSICIAN ASSISTANT

## 2023-07-17 PROCEDURE — 99999 PR PBB SHADOW E&M-EST. PATIENT-LVL IV: CPT | Mod: PBBFAC,HCNC,, | Performed by: PHYSICIAN ASSISTANT

## 2023-07-17 PROCEDURE — 1125F AMNT PAIN NOTED PAIN PRSNT: CPT | Mod: HCNC,CPTII,S$GLB, | Performed by: PHYSICIAN ASSISTANT

## 2023-07-17 PROCEDURE — 1157F PR ADVANCE CARE PLAN OR EQUIV PRESENT IN MEDICAL RECORD: ICD-10-PCS | Mod: HCNC,CPTII,S$GLB, | Performed by: PHYSICIAN ASSISTANT

## 2023-07-17 NOTE — PROGRESS NOTES
Subjective:       Patient ID: Maximilian Tavera Jr. is a 77 y.o. male.    Chief Complaint: No chief complaint on file.    Mr. Tavera is a 77 year old male with HLD, HTN and MG who presents to clinic today for hospital follow up. He was recently admitted and diagnosed with an acuate DVT in the left leg. The patient is anticoagulated. The patient was seen by hematology inpatient. He has followed up with cardiology. He would like to see hem/ onc at [facility] for work up of unprovoked blood clot. The patient continues to have welling in the left leg but pain has improved.     Transitional Care Note  Admit date: 07/01/2023  Discharge date: 07/03/2023  Date of interactive contact (2 business days post D/C): 07/06/2023  Hospitalized at: [facility]  Discharge diagnoses: acute dvt    Family and/or Caretaker present at visit?  No.  Diagnostic tests reviewed/disposition: I have reviewed all completed as well as pending diagnostic tests at the time of discharge.  Disease/illness education: patient advised about importance of compliance with blood thinner and follow up with hem/ onc  Medication changes: eliquis started  Home health/community services discussion/referrals: Patient does not have home health established from hospital visit.  They do not need home health.  If needed, we will set up home health for the patient.   Establishment or re-establishment of referral orders for community resources: No other necessary community resources.   Discussion with other health care providers: No discussion with other health care providers necessary.             [facility]  Discharge Summary                   Patient Name: Maximilian Tavera Jr.  MRN: [id number]  [facility]: [id number]  Patient Class: IP- Inpatient  Admission Date: 7/1/2023  Hospital Length of Stay: 2 days  Discharge Date and Time: 7/3/2023  2:30 PM  Attending Physician: Rajendra Winkler MD   Discharging  "Provider: Isis Goyal NP  Primary Care Provider: Brian Marroquin MD     Primary Care Team: Networked reference to record PCT      HPI:   Mr. Tavera is a 77 year old male with history of COPD/asthma, myasthenia gravis - steroid dependent, hypothyroidism, afib (no anticoagulation), obesity BMI 32, peripheral LE lymphedema, and HTN who presents today with complaints of LLE swelling worsening over the last week. It is severe. It is associated with pain and pallor. He denies fever, chills, N/V/D, chest pain, SOB, dizziness, recent long trips, recent surgical procedures, recent covid infection. In the ED, Left LE US revealed: "Extensive intraluminal thrombus involving the large veins of the left lower extremity extending into the calf". D-dimer is 2.3, BNP and troponin are WNL. Hospital medicine is consulted for admission for further work up and treatment.        * No surgery found *       Hospital Course:   Patient was admitted with acute DVT.  He was placed on heparin drip and Hematology was consulted.  He was transitioned to oral apixaban.  Was cleared from Hematology standpoint for discharge.  Patient was seen and evaluated on day of discharge and deemed appropriate.  Discharge instructions well as return precautions were discussed with patient good understanding.        Goals of Care Treatment Preferences:  Code Status: Full Code     Living Will: Yes             Consults:   Consults (From admission, onward)           Status Ordering Provider        Inpatient consult to Hematology/Oncology  Once       Provider:  Adelaide Wills MD   Completed EVETTE NAIR              No new Assessment & Plan notes have been filed under this hospital service since the last note was generated.  Service: Hospital Medicine     Final Active Diagnoses:    Diagnosis Date Noted POA  · PRINCIPAL PROBLEM:  Acute deep vein thrombosis (DVT) of femoral vein of left lower extremity [I82.412] 07/01/2023 Yes  · Thrombocytopenia " [D69.6] 02/02/2022 Yes  · Mild persistent asthma without complication [J45.30] 08/17/2021 Yes  · Myasthenia gravis, AChR antibody positive [G70.00]   Yes  · Hyperlipidemia [E78.5] 05/09/2012 Yes  · Hypothyroid [E03.9] 05/09/2012 Yes  · HTN (hypertension), benign [I10] 05/09/2012 Yes     Problems Resolved During this Admission:        Discharged Condition: good     Disposition: Home or Self Care     Follow Up:      Follow-up Information            Brian Marroquin MD Follow up on 7/17/2023.   Specialty: Family Medicine  Why: Hospital follow up with Farideh Thompson NP at 8:30 am  Contact information:  [address] [facility] [address]  [phone]                        Adelaide Wills MD Follow up in 2 week(s).   Specialties: Hematology and Oncology, Hematology, Oncology  Why: Please call to schedule a hospital follow up appointment.  Contact information:  [address]  [place] [place] [address]  [phone]                               Allergen Reactions    Spironolactone Diarrhea         Current Outpatient Medications:     albuterol (PROVENTIL/VENTOLIN HFA) 90 mcg/actuation inhaler, INHALE 1 TO 2 PUFFS INTO THE LUNGS EVERY 6 HOURS AS NEEDED FOR WHEEZING OR SHORTNESS OF BREATH. FOR RESCUE., Disp: 25.5 g, Rfl: 0    apixaban (ELIQUIS) 5 mg Tab, Take 1 tablet (5 mg total) by mouth 2 (two) times daily., Disp: 60 tablet, Rfl: 11    apixaban (ELIQUIS) 5 mg Tab, Take 1 tablet (5 mg total) by mouth 2 (two) times daily., Disp: 60 tablet, Rfl: 11    atorvastatin (LIPITOR) 80 MG tablet, TAKE 1 TABLET EVERY DAY, Disp: 90 tablet, Rfl: 2    carvediloL (COREG) 25 MG tablet, TAKE 1 TABLET TWICE DAILY WITH MEALS, Disp: 180 tablet, Rfl: 1    cetirizine (ZYRTEC) 10 MG tablet, Take 1 tablet by mouth daily as needed., Disp: , Rfl:     chlorthalidone (HYGROTEN) 25 MG Tab, TAKE 1 TABLET ONE TIME DAILY, Disp: 90 tablet, Rfl: 3    cholecalciferol, vitamin D3, (VITAMIN D3) 50 mcg (2,000 unit) Cap, 1 capsule once daily. Every  day, Disp: , Rfl:     coenzyme Q10 (CO Q-10) 100 mg capsule, Take 400 mg by mouth once daily., Disp: , Rfl:     cyanocobalamin, vitamin B-12, 5,000 mcg Subl, Take 5,000 mcg by mouth once daily. Every day, Disp: , Rfl:     diphenoxylate-atropine 2.5-0.025 mg (LOMOTIL) 2.5-0.025 mg per tablet, Take 1 tablet by mouth 4 (four) times daily as needed for Diarrhea., Disp: 90 tablet, Rfl: 0    ezetimibe (ZETIA) 10 mg tablet, TAKE 1 TABLET EVERY DAY, Disp: 90 tablet, Rfl: 3    FLUAD QUAD 2021-22,65Y UP,,PF, 60 mcg (15 mcg x 4)/0.5 mL Syrg, , Disp: , Rfl:     fluticasone (FLONASE) 50 mcg/actuation nasal spray, USE 1 SPRAY IN EACH NOSTRIL TWICE DAILY AS NEEDED  FOR  RHINITIS, Disp: 48 g, Rfl: 3    gabapentin (NEURONTIN) 600 MG tablet, Take 1 tablet (600 mg total) by mouth 3 (three) times daily., Disp: 90 tablet, Rfl: 3    HYDROcodone-acetaminophen (NORCO) 7.5-325 mg per tablet, Take 1 tablet by mouth every 6 (six) hours as needed for Pain., Disp: 28 tablet, Rfl: 0    levothyroxine (SYNTHROID) 50 MCG tablet, TAKE 1 TABLET EVERY DAY, Disp: 90 tablet, Rfl: 1    milk thistle 200 mg Cap, Take 200 mg by mouth 2 (two) times daily. Twice a day, Disp: , Rfl:     mupirocin (BACTROBAN) 2 % ointment, Apply topically 3 (three) times daily., Disp: 30 g, Rfl: 0    olmesartan (BENICAR) 20 MG tablet, TAKE 1 TABLET EVERY DAY, Disp: 90 tablet, Rfl: 2    omega-3 fatty acids 1,000 mg Cap, Twice a day, Disp: , Rfl:     pantoprazole (PROTONIX) 40 MG tablet, Take 1 tablet (40 mg total) by mouth 2 (two) times daily., Disp: 180 tablet, Rfl: 3    potassium chloride SA (K-DUR,KLOR-CON) 20 MEQ tablet, TAKE 4 TABLETS EVERY DAY OR AS DIRECTED, Disp: 360 tablet, Rfl: 3    predniSONE (DELTASONE) 1 MG tablet, TAKE 2 TABLETS EVERY DAY, Disp: 180 tablet, Rfl: 3    predniSONE (DELTASONE) 5 MG tablet, TAKE 1 TABLET EVERY DAY, Disp: 90 tablet, Rfl: 3    sildenafil (REVATIO) 20 mg Tab, Take 1 to 3 tabs daily as needed., Disp: 30 tablet, Rfl: 3    SPIRIVA WITH  HANDIHALER 18 mcg inhalation capsule, INHALE THE CONTENTS OF 1 CAPSULE EVERY DAY  VIA HANDIHALER (CONTROLLER), Disp: 90 capsule, Rfl: 0    Lab Results   Component Value Date    WBC 6.91 07/03/2023    HGB 11.9 (L) 07/03/2023    HCT 37.7 (L) 07/03/2023     (L) 07/03/2023    CHOL 131 02/22/2023    TRIG 131 02/22/2023    HDL 41 02/22/2023    ALT 14 07/03/2023    AST 13 07/03/2023     07/11/2023    K 3.6 07/11/2023     07/11/2023    CREATININE 1.0 07/11/2023    BUN 17 07/11/2023    CO2 30 (H) 07/11/2023    TSH 1.253 02/22/2023    PSA 3.5 09/19/2019    INR 1.1 07/02/2023    HGBA1C 6.2 (H) 06/15/2023       Review of Systems   Constitutional:  Negative for activity change, appetite change, fatigue and fever.   HENT:  Negative for congestion, ear pain, postnasal drip and rhinorrhea.    Eyes:  Negative for pain, itching and visual disturbance.   Respiratory:  Negative for shortness of breath and wheezing.    Cardiovascular:  Positive for leg swelling. Negative for chest pain.   Gastrointestinal:  Negative for abdominal distention, abdominal pain, constipation, diarrhea and nausea.   Genitourinary:  Negative for difficulty urinating, dysuria, frequency, hematuria and urgency.   Musculoskeletal:  Positive for arthralgias and back pain. Negative for myalgias and neck pain.   Skin:  Negative for color change, pallor and rash.   Neurological:  Negative for dizziness, syncope and headaches.   Hematological:  Negative for adenopathy.   Psychiatric/Behavioral:  Negative for behavioral problems. The patient is not nervous/anxious.      Objective:      Physical Exam  Constitutional:       Appearance: Normal appearance.   HENT:      Head: Normocephalic and atraumatic.   Eyes:      Conjunctiva/sclera: Conjunctivae normal.   Cardiovascular:      Rate and Rhythm: Normal rate and regular rhythm.   Pulmonary:      Effort: Pulmonary effort is normal. No respiratory distress.      Breath sounds: Normal breath sounds. No  wheezing.   Abdominal:      General: There is no distension.      Palpations: There is no mass.      Tenderness: There is no abdominal tenderness.   Musculoskeletal:      Right lower leg: Edema present.      Left lower leg: Edema present.      Comments: LLE>RLE   Lymphadenopathy:      Cervical: No cervical adenopathy.   Skin:     Findings: No erythema.   Neurological:      Mental Status: He is alert and oriented to person, place, and time.   Psychiatric:         Behavior: Behavior normal.       Assessment:       1. Acute deep vein thrombosis (DVT) of femoral vein of left lower extremity    2. Myasthenia gravis, AChR antibody positive    3. HTN (hypertension), benign    4. Mixed hyperlipidemia    5. Hypothyroidism due to acquired atrophy of thyroid        Plan:       Maximilian was seen today for hospital follow up.    Diagnoses and all orders for this visit:    Acute deep vein thrombosis (DVT) of femoral vein of left lower extremity  Continue current medication  Follow up with hem/ onc. Will need repeat ULTRASOUND and blood work scheduled. Will defer to hem/ onc  Myasthenia gravis, AChR antibody positive  Follow up with specialists as scheduled  HTN (hypertension), benign  Controlled  Low salt diet  Continue current medication  Mixed hyperlipidemia  High fiber diet    Hypothyroidism due to acquired atrophy of thyroid  stable

## 2023-07-18 ENCOUNTER — HOSPITAL ENCOUNTER (OUTPATIENT)
Dept: CARDIOLOGY | Facility: CLINIC | Age: 78
Discharge: HOME | End: 2023-07-18
Payer: MEDICARE

## 2023-07-18 ENCOUNTER — OFFICE VISIT (OUTPATIENT)
Dept: ELECTROPHYSIOLOGY | Facility: CLINIC | Age: 78
End: 2023-07-18
Payer: MEDICARE

## 2023-07-18 VITALS
WEIGHT: 238.56 LBS | BODY MASS INDEX: 32.31 KG/M2 | DIASTOLIC BLOOD PRESSURE: 74 MMHG | SYSTOLIC BLOOD PRESSURE: 132 MMHG | HEART RATE: 57 BPM | HEIGHT: 72 IN

## 2023-07-18 DIAGNOSIS — I10 HTN (HYPERTENSION), BENIGN: ICD-10-CM

## 2023-07-18 DIAGNOSIS — I49.3 PVC (PREMATURE VENTRICULAR CONTRACTION): Primary | ICD-10-CM

## 2023-07-18 DIAGNOSIS — I49.3 PVC (PREMATURE VENTRICULAR CONTRACTION): ICD-10-CM

## 2023-07-18 DIAGNOSIS — I49.3 PVC'S (PREMATURE VENTRICULAR CONTRACTIONS): ICD-10-CM

## 2023-07-18 DIAGNOSIS — I47.29 NON-SUSTAINED VENTRICULAR TACHYCARDIA: ICD-10-CM

## 2023-07-18 DIAGNOSIS — G70.00 MYASTHENIA GRAVIS, ACHR ANTIBODY POSITIVE: ICD-10-CM

## 2023-07-18 DIAGNOSIS — I77.1 TORTUOUS AORTA: ICD-10-CM

## 2023-07-18 PROCEDURE — 3288F FALL RISK ASSESSMENT DOCD: CPT | Mod: HCNC,CPTII,S$GLB, | Performed by: INTERNAL MEDICINE

## 2023-07-18 PROCEDURE — 3075F SYST BP GE 130 - 139MM HG: CPT | Mod: HCNC,CPTII,S$GLB, | Performed by: INTERNAL MEDICINE

## 2023-07-18 PROCEDURE — 3288F PR FALLS RISK ASSESSMENT DOCUMENTED: ICD-10-PCS | Mod: HCNC,CPTII,S$GLB, | Performed by: INTERNAL MEDICINE

## 2023-07-18 PROCEDURE — 1125F AMNT PAIN NOTED PAIN PRSNT: CPT | Mod: HCNC,CPTII,S$GLB, | Performed by: INTERNAL MEDICINE

## 2023-07-18 PROCEDURE — 1101F PR PT FALLS ASSESS DOC 0-1 FALLS W/OUT INJ PAST YR: ICD-10-PCS | Mod: HCNC,CPTII,S$GLB, | Performed by: INTERNAL MEDICINE

## 2023-07-18 PROCEDURE — 93005 ELECTROCARDIOGRAM TRACING: CPT | Mod: HCNC,S$GLB,, | Performed by: INTERNAL MEDICINE

## 2023-07-18 PROCEDURE — 99999 PR PBB SHADOW E&M-EST. PATIENT-LVL IV: CPT | Mod: PBBFAC,HCNC,, | Performed by: INTERNAL MEDICINE

## 2023-07-18 PROCEDURE — 1111F DSCHRG MED/CURRENT MED MERGE: CPT | Mod: HCNC,CPTII,S$GLB, | Performed by: INTERNAL MEDICINE

## 2023-07-18 PROCEDURE — 99999 PR PBB SHADOW E&M-EST. PATIENT-LVL IV: ICD-10-PCS | Mod: PBBFAC,HCNC,, | Performed by: INTERNAL MEDICINE

## 2023-07-18 PROCEDURE — 3078F PR MOST RECENT DIASTOLIC BLOOD PRESSURE < 80 MM HG: ICD-10-PCS | Mod: HCNC,CPTII,S$GLB, | Performed by: INTERNAL MEDICINE

## 2023-07-18 PROCEDURE — 1125F PR PAIN SEVERITY QUANTIFIED, PAIN PRESENT: ICD-10-PCS | Mod: HCNC,CPTII,S$GLB, | Performed by: INTERNAL MEDICINE

## 2023-07-18 PROCEDURE — 1157F PR ADVANCE CARE PLAN OR EQUIV PRESENT IN MEDICAL RECORD: ICD-10-PCS | Mod: HCNC,CPTII,S$GLB, | Performed by: INTERNAL MEDICINE

## 2023-07-18 PROCEDURE — 1111F PR DISCHARGE MEDS RECONCILED W/ CURRENT OUTPATIENT MED LIST: ICD-10-PCS | Mod: HCNC,CPTII,S$GLB, | Performed by: INTERNAL MEDICINE

## 2023-07-18 PROCEDURE — 1160F PR REVIEW ALL MEDS BY PRESCRIBER/CLIN PHARMACIST DOCUMENTED: ICD-10-PCS | Mod: HCNC,CPTII,S$GLB, | Performed by: INTERNAL MEDICINE

## 2023-07-18 PROCEDURE — 99214 PR OFFICE/OUTPT VISIT, EST, LEVL IV, 30-39 MIN: ICD-10-PCS | Mod: HCNC,S$GLB,, | Performed by: INTERNAL MEDICINE

## 2023-07-18 PROCEDURE — 3075F PR MOST RECENT SYSTOLIC BLOOD PRESS GE 130-139MM HG: ICD-10-PCS | Mod: HCNC,CPTII,S$GLB, | Performed by: INTERNAL MEDICINE

## 2023-07-18 PROCEDURE — 99214 OFFICE O/P EST MOD 30 MIN: CPT | Mod: HCNC,S$GLB,, | Performed by: INTERNAL MEDICINE

## 2023-07-18 PROCEDURE — 1157F ADVNC CARE PLAN IN RCRD: CPT | Mod: HCNC,CPTII,S$GLB, | Performed by: INTERNAL MEDICINE

## 2023-07-18 PROCEDURE — 93010 RHYTHM STRIP: ICD-10-PCS | Mod: HCNC,S$GLB,, | Performed by: INTERNAL MEDICINE

## 2023-07-18 PROCEDURE — 1159F PR MEDICATION LIST DOCUMENTED IN MEDICAL RECORD: ICD-10-PCS | Mod: HCNC,CPTII,S$GLB, | Performed by: INTERNAL MEDICINE

## 2023-07-18 PROCEDURE — 1101F PT FALLS ASSESS-DOCD LE1/YR: CPT | Mod: HCNC,CPTII,S$GLB, | Performed by: INTERNAL MEDICINE

## 2023-07-18 PROCEDURE — 1160F RVW MEDS BY RX/DR IN RCRD: CPT | Mod: HCNC,CPTII,S$GLB, | Performed by: INTERNAL MEDICINE

## 2023-07-18 PROCEDURE — 3078F DIAST BP <80 MM HG: CPT | Mod: HCNC,CPTII,S$GLB, | Performed by: INTERNAL MEDICINE

## 2023-07-18 PROCEDURE — 93010 ELECTROCARDIOGRAM REPORT: CPT | Mod: HCNC,S$GLB,, | Performed by: INTERNAL MEDICINE

## 2023-07-18 PROCEDURE — 93005 RHYTHM STRIP: ICD-10-PCS | Mod: HCNC,S$GLB,, | Performed by: INTERNAL MEDICINE

## 2023-07-18 PROCEDURE — 1159F MED LIST DOCD IN RCRD: CPT | Mod: HCNC,CPTII,S$GLB, | Performed by: INTERNAL MEDICINE

## 2023-07-18 RX ORDER — CARVEDILOL 25 MG/1
25 TABLET ORAL 2 TIMES DAILY WITH MEALS
Qty: 180 TABLET | Refills: 3 | Status: SHIPPED | OUTPATIENT
Start: 2023-07-18

## 2023-07-18 NOTE — PROGRESS NOTES
Subjective:    Patient ID:  Maximilian Tavera Jr. is a 77 y.o. male who presents for evaluation of Frequent PVCs    Referring Cardiologist: Alphonso Altamirano MD  Primary Care Physician: Brian Marroquin MD  Neurologist: César Hoang MD    HPIPrior Hx:  I had the pleasure of seeing Mr. Tavera today in our electrophysiology clinic in consultation for his palpitations. As you are aware he is a pleasant 77 year-old man with hypertension, myasthenia gravis on chronic prednisone therapy, and degenerative joint disease with myelopathy s/p decompression and fusion of C2-6. He reports being diagnosed with PVCs 5 years ago however over the past 1-2 months have become more prevalent. No syncope but has been having intermittent episodes of symptomatic hypotension with heart rate in the 110s which seemed to increase when started on BPH meds. He saw Dr. Altamirano in cardiology clinic who ordered an exercise stress echo and a 30 day event monitor. Stress test noted a structurally normal heart without ischemia. The ECG portion of his stress test noted sinus rhythm with occasional unifocal PVCs (LBBB but with V2 pattern break, +Rs II/aVF, rS in III, R in 1) at times with PVC couplets. His 30 day event monitor noted frequent PVCs with PVC couplets, triplets and occasional 4 beat NSVT. He denies any near syncope or syncope.    He was on carvedilol for years however this was stopped 2 months ago when he was started on Flomax for BPH (prescribing physician suggested stopping carvedilol to avoid hypotension). He stopped this and changed to alfuzosin however he stopped this due to symptomatic hypotension. For HTN he is on olmesartan and chlorthalidone.    ECG from 10/2018 notes a dimorphic couplet with 1 similar to PVC noted on stress test and the other inferiorly directed PVC in inferior leads. Other ECGs show sinus rhythm with RBBB.    Holter monitor 5/2019 noted 2.4% PVC burden but with 14 runs of nonsustained VT up to 33 beats. We  discussed on the phone and resumed carvedilol 6.25mg q12h. He presented for follow-up shortly after and had no complaints. No MG symptoms since starting carvedilol. He has no symptoms from the ventricular arrhythmias. We increased his carvedilol to 12.5mg q12h.    Mr. Tavera returned for follow-up 10/2019. He denied any issues related to his MG on 25mg carvedilol q12h. Recent holter monitor on this dosage indicated only 604 PVCs and no NSVT. He felt much better on carvedilol.    Mr. Tavera returned for yearly follow-up 5/2021. Recent holter noted a 10.8% PVC burden. He noted palpitations and some dizziness when PVCs are very frequent. He has some worsening in his chronic shortness of breath. Of note AF is listed now as a medical problem. This appears to be from when he was admitted to [facility] in March of 2020 with a febrile illness. No ECGs or telemetry strips scanned into media from that hospitalization indicate AF. Dr. Pacheco saw him on consultation and did not mention AF in his notes.      He initially had great response to carvedilol. Recently his PVC symptoms have increased which correlate with 10% burden on that holter monitor, despite 25mg of carvedilol bid. Discussed concern that other medications that could be used may exacerbate his MG. Also concerned that he was having increased dyspnea on exertion with differential diagnosis including COPD, heart failure, or neuromuscular due to MG. Recommended ECHO. If EF normal then would continue watchful monitoring and would repeat a holter in 3 months. If EF declining then would recommend considering PVC mapping and ablation. Discussed concerns regarding location of his focus (possibly infero-basal septum which would increase his risk of AV block if mapped close to native conduction system).    ECHO 6/2021 noted normal LV function.     Mr. Tavera returned for follow-up 9/2021. Holter 8/2021 noted very rare PVCs. He felt well other than stable chronic dyspnea on  exertion. Plan was to hold the course with beta-blocker therapy.    Mr. Tavera returned for follow-up 5/13/2022. Reported the day prior waking up feeling weak and had hypotension and bradycardia (BP was 70s/30s and measured pulse rate in the 30s). He had a BP of 80s/50s with a normal pulse on April 5, 2022. He did not feel poorly enough to go to the ER. He reports he started spironolactone 3 weeks ago. He spoke with the triage nurse yesterday and has held chlorthalidone, olmesartan and spironolactone. He reports feeling skipped beats a few days prior to this starting. He feels better today. He also has been limiting his fluid intake to 1.5L a day for his lymphedema in his legs. He wears compression stockings. In my opinion it was due to overmedication and dehydration. I told him to hold his olmesartan and diuretic.    6/2022: Mr. Ayse Pan returned for follow-up. 14 day holter noted no significant PVC burden. He did have a 1.5% PAC burden and nonsustained AT. He felt  well. Reported BP was now normal.    Interim Hx:  Mr. Ayse Pan returns for follow-up. Recently admitted to [facility] with an extensive  unprovoked left lower extremity DVT. Initiated on eliquis. Swelling improved. Bardy patch.    My interpretation of today's in clinic ECG is sinus rhythm with RBBB and no ectopy    Review of Systems   Constitutional: Negative for fever and malaise/fatigue.   HENT:  Negative for congestion and nosebleeds.    Eyes:  Negative for blurred vision and visual disturbance.   Cardiovascular:  Negative for chest pain, dyspnea on exertion, irregular heartbeat, leg swelling, near-syncope, orthopnea, palpitations, paroxysmal nocturnal dyspnea and syncope.   Respiratory:  Negative for cough and shortness of breath.    Hematologic/Lymphatic: Negative for bleeding problem. Does not bruise/bleed easily.   Skin: Negative.    Musculoskeletal: Negative.    Gastrointestinal:  Negative for bloating and abdominal pain.    Neurological:  Negative for dizziness, focal weakness and light-headedness.      Objective:    Physical Exam  Vitals reviewed.   Constitutional:       General: He is not in acute distress.     Appearance: He is well-developed. He is not diaphoretic.   HENT:      Head: Normocephalic and atraumatic.   Eyes:      General:         Right eye: No discharge.         Left eye: No discharge.      Conjunctiva/sclera: Conjunctivae normal.   Cardiovascular:      Rate and Rhythm: Normal rate and regular rhythm.      Heart sounds: No murmur heard.    No friction rub. No gallop.   Pulmonary:      Effort: Pulmonary effort is normal. No respiratory distress.      Breath sounds: Normal breath sounds. No wheezing or rales.   Abdominal:      General: Bowel sounds are normal. There is no distension.      Palpations: Abdomen is soft.      Tenderness: There is no abdominal tenderness.   Musculoskeletal:      Cervical back: Neck supple.   Skin:     General: Skin is warm and dry.   Neurological:      Mental Status: He is alert and oriented to person, place, and time.   Psychiatric:         Behavior: Behavior normal.         Thought Content: Thought content normal.         Judgment: Judgment normal.         Assessment:       1. PVC (premature ventricular contraction)    2. Non-sustained ventricular tachycardia    3. Tortuous aorta    4. HTN (hypertension), benign    5. Myasthenia gravis, AChR antibody positive         Plan:       In summary, Mr. Tavera is a pleasant 77 year-old man with hypertension, myasthenia gravis on chronic prednisone therapy, and degenerative joint disease with myelopathy s/p decompression and fusion of C2-6, chronic PVCs however since the beginning of this 2019 had become more prevalent, that was related to stopping his carvedilol. Holter monitor noted ~2% PVC burden but with 14 episodes of NSVT up to 33 beats that were asymptomatic.  No indication that carvedilol worsened his MG symptoms. He had great response to  carvedilol. He feels well. Awaiting extended holter results. Suspect burden remains low. He feels well. Will call with results. Otherwise continue carvedilol.    RTC in 1 year       Thank you for allowing me to participate in the care of this patient. Please do not hesitate to call me with any questions or concerns.    Sathya Zamudio MD, PhD  Cardiac Electrophysiology

## 2023-07-19 NOTE — PROGRESS NOTES
Please notify the patient that his monitor showed rare PVCs. He has a few short runs of extra beats from the upper heart chamber. Nothing to do further about them at this point.

## 2023-08-17 ENCOUNTER — OFFICE VISIT (OUTPATIENT)
Dept: HEMATOLOGY/ONCOLOGY | Facility: CLINIC | Age: 78
End: 2023-08-17
Payer: MEDICARE

## 2023-08-17 VITALS
RESPIRATION RATE: 18 BRPM | HEART RATE: 61 BPM | BODY MASS INDEX: 32.22 KG/M2 | HEIGHT: 72 IN | DIASTOLIC BLOOD PRESSURE: 69 MMHG | TEMPERATURE: 98 F | OXYGEN SATURATION: 94 % | SYSTOLIC BLOOD PRESSURE: 141 MMHG | WEIGHT: 237.88 LBS

## 2023-08-17 DIAGNOSIS — I82.412 ACUTE DEEP VEIN THROMBOSIS (DVT) OF FEMORAL VEIN OF LEFT LOWER EXTREMITY: ICD-10-CM

## 2023-08-17 PROCEDURE — 1157F PR ADVANCE CARE PLAN OR EQUIV PRESENT IN MEDICAL RECORD: ICD-10-PCS | Mod: HCNC,CPTII,S$GLB, | Performed by: INTERNAL MEDICINE

## 2023-08-17 PROCEDURE — 1157F ADVNC CARE PLAN IN RCRD: CPT | Mod: HCNC,CPTII,S$GLB, | Performed by: INTERNAL MEDICINE

## 2023-08-17 PROCEDURE — 99214 PR OFFICE/OUTPT VISIT, EST, LEVL IV, 30-39 MIN: ICD-10-PCS | Mod: HCNC,S$GLB,, | Performed by: INTERNAL MEDICINE

## 2023-08-17 PROCEDURE — 1159F PR MEDICATION LIST DOCUMENTED IN MEDICAL RECORD: ICD-10-PCS | Mod: HCNC,CPTII,S$GLB, | Performed by: INTERNAL MEDICINE

## 2023-08-17 PROCEDURE — 99214 OFFICE O/P EST MOD 30 MIN: CPT | Mod: HCNC,S$GLB,, | Performed by: INTERNAL MEDICINE

## 2023-08-17 PROCEDURE — 3288F PR FALLS RISK ASSESSMENT DOCUMENTED: ICD-10-PCS | Mod: HCNC,CPTII,S$GLB, | Performed by: INTERNAL MEDICINE

## 2023-08-17 PROCEDURE — 3078F PR MOST RECENT DIASTOLIC BLOOD PRESSURE < 80 MM HG: ICD-10-PCS | Mod: HCNC,CPTII,S$GLB, | Performed by: INTERNAL MEDICINE

## 2023-08-17 PROCEDURE — 1125F PR PAIN SEVERITY QUANTIFIED, PAIN PRESENT: ICD-10-PCS | Mod: HCNC,CPTII,S$GLB, | Performed by: INTERNAL MEDICINE

## 2023-08-17 PROCEDURE — 3078F DIAST BP <80 MM HG: CPT | Mod: HCNC,CPTII,S$GLB, | Performed by: INTERNAL MEDICINE

## 2023-08-17 PROCEDURE — 1101F PR PT FALLS ASSESS DOC 0-1 FALLS W/OUT INJ PAST YR: ICD-10-PCS | Mod: HCNC,CPTII,S$GLB, | Performed by: INTERNAL MEDICINE

## 2023-08-17 PROCEDURE — 1101F PT FALLS ASSESS-DOCD LE1/YR: CPT | Mod: HCNC,CPTII,S$GLB, | Performed by: INTERNAL MEDICINE

## 2023-08-17 PROCEDURE — 99999 PR PBB SHADOW E&M-EST. PATIENT-LVL III: ICD-10-PCS | Mod: PBBFAC,HCNC,, | Performed by: INTERNAL MEDICINE

## 2023-08-17 PROCEDURE — 3077F SYST BP >= 140 MM HG: CPT | Mod: HCNC,CPTII,S$GLB, | Performed by: INTERNAL MEDICINE

## 2023-08-17 PROCEDURE — 3077F PR MOST RECENT SYSTOLIC BLOOD PRESSURE >= 140 MM HG: ICD-10-PCS | Mod: HCNC,CPTII,S$GLB, | Performed by: INTERNAL MEDICINE

## 2023-08-17 PROCEDURE — 99999 PR PBB SHADOW E&M-EST. PATIENT-LVL III: CPT | Mod: PBBFAC,HCNC,, | Performed by: INTERNAL MEDICINE

## 2023-08-17 PROCEDURE — 1125F AMNT PAIN NOTED PAIN PRSNT: CPT | Mod: HCNC,CPTII,S$GLB, | Performed by: INTERNAL MEDICINE

## 2023-08-17 PROCEDURE — 1159F MED LIST DOCD IN RCRD: CPT | Mod: HCNC,CPTII,S$GLB, | Performed by: INTERNAL MEDICINE

## 2023-08-17 PROCEDURE — 3288F FALL RISK ASSESSMENT DOCD: CPT | Mod: HCNC,CPTII,S$GLB, | Performed by: INTERNAL MEDICINE

## 2023-08-17 NOTE — PROGRESS NOTES
[facility]ology [facility] at [facility]     Chief Complaint:   Encounter Diagnosis   Name Primary?    Acute deep vein thrombosis (DVT) of femoral vein of left lower extremity            HPI:  Maximilian Tavera Jr. is a 77 y.o. male who presents today for evaluation of need for hypercoagulable work up and age related cancer screening for DVT. Patient referred by Dr Devang Amezquita.     Hematology History   Maximilian Tavera Jr. is a 77 y.o. male with BLE lymphedema, venous insuffciency, HTN, HLD, myasthenia gravis on chronic prednisone therapy, degenerative joint disease with myelopathy s/p decompression and fusion of C2-6, chronic PVCs. HE had been seeing Dr Amezquita for BLE lymphedema since February 2022    Patient presented with worsening LLE edema 7/1/23  Ultrasound LLE 7/1/23:   Impression:  Extensive intraluminal thrombus involving the large veins of the left lower extremity extending into the calf.    Patient discharged on Eliquis, now on 5mg BID.       Last PSA 2023: mild total elevation (9.8), normal free  Colonoscopy 2021: multiple polyps, repeat 3 years (2024)    Patient has had multiple surgeries including orthopedic (knee), neurosurgery (laminectomy), and abdominal (hernia) without prior DVT    No family history of bleeding or clotting disorder     Social History     Tobacco Use    Smoking status: Never    Smokeless tobacco: Never    Tobacco comments:     when a child   Substance Use Topics    Alcohol use: Yes     Comment: rarely    Drug use: No     Family History   Problem Relation Age of Onset    Cataracts Mother     Heart disease Mother         CHF    Hypertension Mother     Hyperlipidemia Mother     Cataracts Father     Glaucoma Father     Heart disease Father     Hyperlipidemia Sister     Hypertension Sister     No Known Problems Daughter     No Known Problems Daughter     Collagen disease Neg Hx     Amblyopia Neg Hx     Blindness Neg Hx      Macular degeneration Neg Hx     Retinal detachment Neg Hx     Strabismus Neg Hx     Cancer Neg Hx     Colon cancer Neg Hx     Esophageal cancer Neg Hx     Stomach cancer Neg Hx     Crohn's disease Neg Hx     Ulcerative colitis Neg Hx      Past Medical History:   Diagnosis Date    A-fib 03/31/2020    Arthritis     Back pain     Carpal tunnel syndrome 02/25/2013    Cataract     Cataract, left eye 11/10/2014    Chest pain, musculoskeletal     COPD (chronic obstructive pulmonary disease) 11/052018    COPD with asthma 08/17/2021    Emphysema lung 11/05/2018    Gastritis     Hx of colonic polyp     Hyperlipidemia     Hypertension     Hypothyroidism     Knee fracture     Myasthenia gravis     Neuropathy 01/03/2013    Obesity 01/29/2015    Pneumonia 03/29/2020    Polyneuropathy     PVC (premature ventricular contraction)     Squamous cell carcinoma 2014    left forearm    Thyroid disease      Past Surgical History:   Procedure Laterality Date    APPENDECTOMY      CATARACT EXTRACTION W/  INTRAOCULAR LENS IMPLANT Bilateral     COLONOSCOPY  03/01/2012    Dr Esteban; hyperplastic polyp; repeat in 5 years    COLONOSCOPY N/A 12/7/2021    Procedure: COLONOSCOPY;  Surgeon: Gabriel Hernandez MD;  Location: [facility];  Service: Endoscopy;  Laterality: N/A;    CYSTOSCOPY N/A 2/26/2019    Procedure: CYSTOSCOPY;  Surgeon: Simba Cheung MD;  Location: [facility];  Service: Urology;  Laterality: N/A;    ENDOSCOPIC ULTRASOUND OF UPPER GASTROINTESTINAL TRACT N/A 1/7/2020    Procedure: ULTRASOUND, UPPER GI TRACT, ENDOSCOPIC;  Surgeon: Kati Ryan MD;  Location: [address]);  Service: Endoscopy;  Laterality: N/A;    ESOPHAGOGASTRODUODENOSCOPY N/A 9/12/2018    Procedure: EGD (ESOPHAGOGASTRODUODENOSCOPY);  Surgeon: Gabriel Hernandez MD;  Location: [facility];  Service: Endoscopy;  Laterality: N/A;    ESOPHAGOGASTRODUODENOSCOPY N/A 8/19/2019    Procedure: EGD (ESOPHAGOGASTRODUODENOSCOPY);   Surgeon: Gabriel Hernandez MD;  Location: [facility];  Service: Endoscopy;  Laterality: N/A;    ESOPHAGOGASTRODUODENOSCOPY N/A 10/7/2019    Procedure: EGD (ESOPHAGOGASTRODUODENOSCOPY);  Surgeon: Gabriel Hernandez MD;  Location: [facility] ENDO;  Service: Endoscopy;  Laterality: N/A;    ESOPHAGOGASTRODUODENOSCOPY N/A 1/7/2020    Procedure: EGD (ESOPHAGOGASTRODUODENOSCOPY);  Surgeon: Kati Ryan MD;  Location: [facility] (2ND FLR);  Service: Endoscopy;  Laterality: N/A;    HEMORRHOID SURGERY      INJECTION OF ANESTHETIC AGENT AROUND MEDIAL BRANCH NERVES INNERVATING LUMBAR FACET JOINT Bilateral 10/1/2020    Procedure: Block-nerve-medial branch-lumbar Bilateral L 3,4,5;  Surgeon: Devan Watt MD;  Location: [facility];  Service: Pain Management;  Laterality: Bilateral;    INJECTION OF ANESTHETIC AGENT AROUND MEDIAL BRANCH NERVES INNERVATING LUMBAR FACET JOINT Right 10/7/2022    Procedure: Block-nerve-medial branch-lumbar L3,4,5;  Surgeon: Devan Watt MD;  Location: [facility];  Service: Pain Management;  Laterality: Right;    KNEE ARTHROPLASTY Bilateral     LENGTHENING OF ACHILLES TENDON Right 9/11/2020    Procedure: percutaeous tenotomy of right lateral epicondyle (tenex);  Surgeon: Terry Quach MD;  Location: [facility];  Service: Neurosurgery;  Laterality: Right;  tenex to right lateral epicondyle  Tenex machine SN#[id number], total time 1min. 30seconds    NECK SURGERY      POSTERIOR FUSION OF CERVICAL SPINE WITH LAMINECTOMY N/A 11/7/2018    Procedure: C2-C6 Posterior Cervical Laminectomy & Instrumental Fusion;  Surgeon: Kishore Turner MD;  Location: [facility] 2ND FLR;  Service: Neurosurgery;  Laterality: N/A;    TONSILLECTOMY      TRANSFORAMINAL EPIDURAL INJECTION OF STEROID Right 12/20/2019    Procedure: Injection,steroid,epidural,transforaminal approach;  Surgeon: Devan Watt MD;  Location: [facility];  Service: Pain Management;  Laterality: Right;  L3-4, L4-5    TRANSFORAMINAL EPIDURAL INJECTION OF STEROID Bilateral  2/6/2020    Procedure: Injection,steroid,epidural,transforaminal approach;  Surgeon: Devan Watt MD;  Location: [facility] OR;  Service: Pain Management;  Laterality: Bilateral;  L3-L4,L4-L5    TRANSFORAMINAL EPIDURAL INJECTION OF STEROID Bilateral 8/26/2020    Procedure: Injection,steroid,epidural,transforaminal approach;  Surgeon: Devan Watt MD;  Location: [facility] OR;  Service: Pain Management;  Laterality: Bilateral;  L3-4, L4-5    TRANSRECTAL ULTRASOUND EXAMINATION N/A 2/26/2019    Procedure: ULTRASOUND, RECTAL APPROACH;  Surgeon: Simba Cheung MD;  Location: [facility] OR;  Service: Urology;  Laterality: N/A;    UPPER GASTROINTESTINAL ENDOSCOPY  09/12/2018    Dr Hernandez; gastritis; extensive intestinal metaplasia; repeat in 1-2- years       Patient Active Problem List   Diagnosis    Hypothyroid    Hyperlipidemia    HTN (hypertension), benign    Neuropathy    ED (erectile dysfunction)    DDD (degenerative disc disease), lumbar    Diplopia    Pseudophakia, right eye    Neurogenic claudication    Myasthenia gravis, AChR antibody positive    Umbilical hernia without obstruction and without gangrene    Agatston CAC score, <100    Aortic valve disease    Primary osteoarthritis of both knees    Abdominal aortic atherosclerosis    RBBB    On prednisone therapy    Carpal tunnel syndrome on both sides    Tortuous aorta    Epigastric pain    Cervical stenosis of spinal canal    S/P cervical spinal fusion    BPH with obstruction/lower urinary tract symptoms    PVC's (premature ventricular contractions)    Non-sustained ventricular tachycardia    Current chronic use of systemic steroids    Bilateral carotid artery stenosis    Lumbar radiculitis    Gastric ulcer    COPD (chronic obstructive pulmonary disease)    Lateral epicondylitis    Other spondylosis, lumbar region    Cervical radiculopathy    PVC (premature ventricular contraction)    COPD with asthma    GOLD (dyspnea on exertion)     Restrictive lung mechanics due to neuromuscular disease    Bronchiectasis without complication    Mild persistent asthma without complication    Myasthenia gravis    Leg swelling    Thrombocytopenia    Venous stasis dermatitis of both lower extremities    Lymphedema of both lower extremities    Obesity (BMI 30-39.9)    Acute deep vein thrombosis (DVT) of femoral vein of left lower extremity       Current Outpatient Medications   Medication Instructions    albuterol (PROVENTIL/VENTOLIN HFA) 90 mcg/actuation inhaler INHALE 1 TO 2 PUFFS INTO THE LUNGS EVERY 6 HOURS AS NEEDED FOR WHEEZING OR SHORTNESS OF BREATH. FOR RESCUE.    apixaban (ELIQUIS) 5 mg, Oral, 2 times daily    apixaban (ELIQUIS) 5 mg, Oral, 2 times daily    atorvastatin (LIPITOR) 80 MG tablet TAKE 1 TABLET EVERY DAY    carvediloL (COREG) 25 mg, Oral, 2 times daily with meals    cetirizine (ZYRTEC) 10 MG tablet 1 tablet, Oral, Daily PRN    chlorthalidone (HYGROTEN) 25 MG Tab TAKE 1 TABLET ONE TIME DAILY    cholecalciferol, vitamin D3, (VITAMIN D3) 50 mcg (2,000 unit) Cap 1 capsule, Daily, Every day    CO Q-10 400 mg, Oral, Daily    cyanocobalamin (vitamin B-12) 5,000 mcg, Oral, Daily, Every day    diphenoxylate-atropine 2.5-0.025 mg (LOMOTIL) 2.5-0.025 mg per tablet 1 tablet, Oral, 4 times daily PRN    ezetimibe (ZETIA) 10 mg tablet TAKE 1 TABLET EVERY DAY    FLUAD QUAD 2021-22,65Y UP,,PF, 60 mcg (15 mcg x 4)/0.5 mL Syrg No dose, route, or frequency recorded.    fluticasone (FLONASE) 50 mcg/actuation nasal spray USE 1 SPRAY IN EACH NOSTRIL TWICE DAILY AS NEEDED  FOR  RHINITIS    gabapentin (NEURONTIN) 600 mg, Oral, 3 times daily    HYDROcodone-acetaminophen (NORCO) 7.5-325 mg per tablet 1 tablet, Oral, Every 6 hours PRN    levothyroxine (SYNTHROID) 50 MCG tablet TAKE 1 TABLET EVERY DAY    milk thistle 200 mg, Oral, 2 times daily, Twice a day    mupirocin (BACTROBAN) 2 % ointment Topical (Top), 3 times daily    olmesartan  (BENICAR) 20 MG tablet TAKE 1 TABLET EVERY DAY    omega-3 fatty acids 1,000 mg Cap Twice a day    pantoprazole (PROTONIX) 40 mg, Oral, 2 times daily    potassium chloride SA (K-DUR,KLOR-CON) 20 MEQ tablet TAKE 4 TABLETS EVERY DAY OR AS DIRECTED    predniSONE (DELTASONE) 1 MG tablet TAKE 2 TABLETS EVERY DAY    predniSONE (DELTASONE) 5 MG tablet TAKE 1 TABLET EVERY DAY    sildenafil (REVATIO) 20 mg Tab Take 1 to 3 tabs daily as needed.    SPIRIVA WITH HANDIHALER 18 mcg inhalation capsule INHALE THE CONTENTS OF 1 CAPSULE EVERY DAY  VIA HANDIHALER (CONTROLLER)       Review of Systems:   Review of Systems   Respiratory:  Negative for cough and shortness of breath.    Cardiovascular:  Negative for chest pain.   Gastrointestinal:  Negative for abdominal pain and diarrhea.   Genitourinary:  Negative for frequency.   Musculoskeletal:  Negative for back pain.   Skin:  Negative for rash.   Neurological:  Negative for headaches.   Psychiatric/Behavioral:  The patient is not nervous/anxious.    All other systems reviewed and are negative.      PHYSICAL EXAM:  BP (!) 141/69   Pulse 61   Temp 98 °F (36.7 °C) (Oral)   Resp 18   Ht 6' (1.829 m)   Wt 107.9 kg (237 lb 14 oz)   SpO2 (!) 94%   BMI 32.26 kg/m²     General Appearance:    Alert, cooperative, no distress, appears stated age   Head:    Normocephalic, without obvious abnormality, atraumatic   Eyes:    PERRL, conjunctiva/corneas clear   Nose:   Nares normal, septum midline   Throat:   Lips, mucosa, and tongue normal; teeth and gums normal   Lungs:     Respirations unlabored   Extremities:   Extremities normal, atraumatic, no cyanosis or edema   Pulses:   2+ and symmetric all extremities   Skin:   Skin color, texture, turgor normal, no rashes or lesions   Neurologic:   CNII-XII intact, normal gait               Pertinent Labs & Imaging:  Recent Labs   Lab Result Units 07/01/23  1337 07/02/23  0355 07/03/23  0352   WBC K/uL 8.18 6.86  6.86 6.91   Hemoglobin g/dL  12.3* 12.8*  12.8* 11.9*   Hematocrit % 37.0* 38.5*  38.5* 37.7*   Platelets K/uL 115* 102*  102* 131*     Recent Labs   Lab Result Units 07/01/23  1337 07/02/23  0355 07/03/23  0352 07/11/23  1000   Creatinine mg/dL 1.0 0.9 0.9 1.0   AST U/L 17 15 13  --    ALT U/L 19 15 14  --      Recent Labs   Lab Result Units 07/03/23  0352   Iron ug/dL 72   Ferritin ng/mL 194       Assessment & Plan:    1. Acute deep vein thrombosis (DVT) of femoral vein of left lower extremity  - Ambulatory referral/consult to Hematology / Oncology    Full chart review of past labs and imaging, referring providers' notes, and consultants' reports.   Patient with new, first unprovoked DVT in the setting of bilateral lymphedema and vascular insufficiency  He is up to date on cancer related screenings given his age.   Patient has had multiple surgeries including orthopedic (knee), neurosurgery (laminectomy), and abdominal (hernia) without prior DVT. Therefore, no indication for hypercoagulable work up at this time     Continue to follow with Dr Amezquita for anticoagulation management         Med Onc Chart Routing      Follow up with physician No follow up needed.   Follow up with LILLIANA    Infusion scheduling note    Injection scheduling note    Labs    Imaging    Pharmacy appointment    Other referrals            MDM includes  :    - Acute or chronic illness or injury that poses a threat to life or bodily function  - Review of prior external notes from unique source  - Independent review and explanation of 3+ results from unique tests  - Extensive discussion of treatment and management          Saima Jensen MD  08/17/2023

## 2023-09-23 ENCOUNTER — IMMUNIZATION (OUTPATIENT)
Dept: FAMILY MEDICINE | Facility: CLINIC | Age: 78
End: 2023-09-23
Payer: MEDICARE

## 2023-09-23 PROCEDURE — G0008 FLU VACCINE - QUADRIVALENT - ADJUVANTED: ICD-10-PCS | Mod: HCNC,S$GLB,, | Performed by: FAMILY MEDICINE

## 2023-09-23 PROCEDURE — 90694 FLU VACCINE - QUADRIVALENT - ADJUVANTED: ICD-10-PCS | Mod: HCNC,S$GLB,, | Performed by: FAMILY MEDICINE

## 2023-09-23 PROCEDURE — 90694 VACC AIIV4 NO PRSRV 0.5ML IM: CPT | Mod: HCNC,S$GLB,, | Performed by: FAMILY MEDICINE

## 2023-09-23 PROCEDURE — G0008 ADMIN INFLUENZA VIRUS VAC: HCPCS | Mod: HCNC,S$GLB,, | Performed by: FAMILY MEDICINE

## 2023-10-06 ENCOUNTER — PATIENT MESSAGE (OUTPATIENT)
Dept: ORTHOPEDICS | Facility: CLINIC | Age: 78
End: 2023-10-06
Payer: MEDICARE

## 2023-10-06 ENCOUNTER — TELEPHONE (OUTPATIENT)
Dept: ORTHOPEDICS | Facility: CLINIC | Age: 78
End: 2023-10-06
Payer: MEDICARE

## 2023-10-06 NOTE — TELEPHONE ENCOUNTER
----- Message from Devan Barr sent at 10/6/2023  8:35 AM CDT -----  Regarding: wants to be seen ASAP to get hip inj, call pt   Contact: pt   wants to be seen ASAP to get hip inj, call pt

## 2023-10-09 ENCOUNTER — OFFICE VISIT (OUTPATIENT)
Dept: ORTHOPEDICS | Facility: CLINIC | Age: 78
End: 2023-10-09
Payer: MEDICARE

## 2023-10-09 VITALS — RESPIRATION RATE: 18 BRPM | BODY MASS INDEX: 32.1 KG/M2 | HEIGHT: 72 IN | WEIGHT: 237 LBS

## 2023-10-09 DIAGNOSIS — M70.62 TROCHANTERIC BURSITIS OF LEFT HIP: Primary | ICD-10-CM

## 2023-10-09 PROCEDURE — 20610 DRAIN/INJ JOINT/BURSA W/O US: CPT | Mod: HCNC,LT,S$GLB, | Performed by: ORTHOPAEDIC SURGERY

## 2023-10-09 PROCEDURE — 1125F AMNT PAIN NOTED PAIN PRSNT: CPT | Mod: HCNC,CPTII,S$GLB, | Performed by: ORTHOPAEDIC SURGERY

## 2023-10-09 PROCEDURE — 1101F PT FALLS ASSESS-DOCD LE1/YR: CPT | Mod: HCNC,CPTII,S$GLB, | Performed by: ORTHOPAEDIC SURGERY

## 2023-10-09 PROCEDURE — 99999 PR PBB SHADOW E&M-EST. PATIENT-LVL III: ICD-10-PCS | Mod: PBBFAC,HCNC,, | Performed by: ORTHOPAEDIC SURGERY

## 2023-10-09 PROCEDURE — 1125F PR PAIN SEVERITY QUANTIFIED, PAIN PRESENT: ICD-10-PCS | Mod: HCNC,CPTII,S$GLB, | Performed by: ORTHOPAEDIC SURGERY

## 2023-10-09 PROCEDURE — 99213 OFFICE O/P EST LOW 20 MIN: CPT | Mod: 25,HCNC,S$GLB, | Performed by: ORTHOPAEDIC SURGERY

## 2023-10-09 PROCEDURE — 1159F MED LIST DOCD IN RCRD: CPT | Mod: HCNC,CPTII,S$GLB, | Performed by: ORTHOPAEDIC SURGERY

## 2023-10-09 PROCEDURE — 3288F PR FALLS RISK ASSESSMENT DOCUMENTED: ICD-10-PCS | Mod: HCNC,CPTII,S$GLB, | Performed by: ORTHOPAEDIC SURGERY

## 2023-10-09 PROCEDURE — 99213 PR OFFICE/OUTPT VISIT, EST, LEVL III, 20-29 MIN: ICD-10-PCS | Mod: 25,HCNC,S$GLB, | Performed by: ORTHOPAEDIC SURGERY

## 2023-10-09 PROCEDURE — 20610 LARGE JOINT ASPIRATION/INJECTION: L GREATER TROCHANTERIC BURSA: ICD-10-PCS | Mod: HCNC,LT,S$GLB, | Performed by: ORTHOPAEDIC SURGERY

## 2023-10-09 PROCEDURE — 3288F FALL RISK ASSESSMENT DOCD: CPT | Mod: HCNC,CPTII,S$GLB, | Performed by: ORTHOPAEDIC SURGERY

## 2023-10-09 PROCEDURE — 1159F PR MEDICATION LIST DOCUMENTED IN MEDICAL RECORD: ICD-10-PCS | Mod: HCNC,CPTII,S$GLB, | Performed by: ORTHOPAEDIC SURGERY

## 2023-10-09 PROCEDURE — 1157F PR ADVANCE CARE PLAN OR EQUIV PRESENT IN MEDICAL RECORD: ICD-10-PCS | Mod: HCNC,CPTII,S$GLB, | Performed by: ORTHOPAEDIC SURGERY

## 2023-10-09 PROCEDURE — 1157F ADVNC CARE PLAN IN RCRD: CPT | Mod: HCNC,CPTII,S$GLB, | Performed by: ORTHOPAEDIC SURGERY

## 2023-10-09 PROCEDURE — 1101F PR PT FALLS ASSESS DOC 0-1 FALLS W/OUT INJ PAST YR: ICD-10-PCS | Mod: HCNC,CPTII,S$GLB, | Performed by: ORTHOPAEDIC SURGERY

## 2023-10-09 PROCEDURE — 99999 PR PBB SHADOW E&M-EST. PATIENT-LVL III: CPT | Mod: PBBFAC,HCNC,, | Performed by: ORTHOPAEDIC SURGERY

## 2023-10-09 RX ORDER — TRIAMCINOLONE ACETONIDE 40 MG/ML
40 INJECTION, SUSPENSION INTRA-ARTICULAR; INTRAMUSCULAR
Status: DISCONTINUED | OUTPATIENT
Start: 2023-10-09 | End: 2023-10-09 | Stop reason: HOSPADM

## 2023-10-09 RX ADMIN — TRIAMCINOLONE ACETONIDE 40 MG: 40 INJECTION, SUSPENSION INTRA-ARTICULAR; INTRAMUSCULAR at 02:10

## 2023-10-09 NOTE — PROGRESS NOTES
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    Emphysema lung 11/05/2018    Gastritis     Hx of colonic polyp     Hyperlipidemia     Hypertension     Hypothyroidism     Knee fracture     Myasthenia gravis     Neuropathy 01/03/2013    Obesity 01/29/2015    Pneumonia 03/29/2020    Polyneuropathy     PVC (premature ventricular contraction)     Squamous cell carcinoma 2014    left forearm    Thyroid disease        Past Surgical History:   Procedure Laterality Date    APPENDECTOMY      CATARACT EXTRACTION W/  INTRAOCULAR LENS IMPLANT Bilateral     COLONOSCOPY  03/01/2012    Dr Esteban; hyperplastic polyp; repeat in 5 years    COLONOSCOPY N/A 12/7/2021    Procedure: COLONOSCOPY;  Surgeon: Gabriel Hernandez MD;  Location: [facility];  Service: Endoscopy;  Laterality: N/A;    CYSTOSCOPY N/A 2/26/2019    Procedure: CYSTOSCOPY;  Surgeon: Simba Cheung MD;  Location: [facility] OR;  Service: Urology;  Laterality: N/A;    ENDOSCOPIC ULTRASOUND OF UPPER GASTROINTESTINAL TRACT N/A 1/7/2020    Procedure: ULTRASOUND, UPPER GI TRACT, ENDOSCOPIC;  Surgeon: Kati Ryan MD;  [facility]  Service: Endoscopy;  Laterality: N/A;    ESOPHAGOGASTRODUODENOSCOPY N/A 9/12/2018    Procedure: EGD (ESOPHAGOGASTRODUODENOSCOPY);  Surgeon: Gabriel Hernandez MD;  Location: [facility];  Service: Endoscopy;  Laterality: N/A;    ESOPHAGOGASTRODUODENOSCOPY N/A 8/19/2019    Procedure: EGD (ESOPHAGOGASTRODUODENOSCOPY);  Surgeon: Gabriel Hernandez MD;  Location: [facility];  Service: Endoscopy;  Laterality: N/A;    ESOPHAGOGASTRODUODENOSCOPY N/A 10/7/2019    Procedure: EGD (ESOPHAGOGASTRODUODENOSCOPY);  Surgeon: Gabriel Hernandez MD;  Location: [facility];  Service: Endoscopy;  Laterality: N/A;     ESOPHAGOGASTRODUODENOSCOPY N/A 1/7/2020    Procedure: EGD (ESOPHAGOGASTRODUODENOSCOPY);  Surgeon: Kati Ryan MD;  Location: [address]);  Service: Endoscopy;  Laterality: N/A;    HEMORRHOID SURGERY      INJECTION OF ANESTHETIC AGENT AROUND MEDIAL BRANCH NERVES INNERVATING LUMBAR FACET JOINT Bilateral 10/1/2020    Procedure: Block-nerve-medial branch-lumbar Bilateral L 3,4,5;  Surgeon: Devan Watt MD;  Location: [facility];  Service: Pain Management;  Laterality: Bilateral;    INJECTION OF ANESTHETIC AGENT AROUND MEDIAL BRANCH NERVES INNERVATING LUMBAR FACET JOINT Right 10/7/2022    Procedure: Block-nerve-medial branch-lumbar L3,4,5;  Surgeon: Devan Watt MD;  Location: [facility];  Service: Pain Management;  Laterality: Right;    KNEE ARTHROPLASTY Bilateral     LENGTHENING OF ACHILLES TENDON Right 9/11/2020    Procedure: percutaeous tenotomy of right lateral epicondyle (tenex);  Surgeon: Terry Quach MD;  Location: [facility];  Service: Neurosurgery;  Laterality: Right;  tenex to right lateral epicondyle  Tenex machine SN#[id number], total time 1min. 30seconds    NECK SURGERY      POSTERIOR FUSION OF CERVICAL SPINE WITH LAMINECTOMY N/A 11/7/2018    Procedure: C2-C6 Posterior Cervical Laminectomy & Instrumental Fusion;  Surgeon: Kishore Turner MD;  Location: [address];  Service: Neurosurgery;  Laterality: N/A;    TONSILLECTOMY      TRANSFORAMINAL EPIDURAL INJECTION OF STEROID Right 12/20/2019    Procedure: Injection,steroid,epidural,transforaminal approach;  Surgeon: Devan Watt MD;  Location: [facility];  Service: Pain Management;  Laterality: Right;  L3-4, L4-5    TRANSFORAMINAL EPIDURAL INJECTION OF STEROID Bilateral 2/6/2020    Procedure: Injection,steroid,epidural,transforaminal approach;  Surgeon: Devan Watt MD;  Location: [facility];  Service: Pain Management;  Laterality: Bilateral;  L3-L4,L4-L5    TRANSFORAMINAL EPIDURAL INJECTION OF STEROID Bilateral 8/26/2020    Procedure:  Injection,steroid,epidural,transforaminal approach;  Surgeon: Devan Watt MD;  [facility];  Service: Pain Management;  Laterality: Bilateral;  L3-4, L4-5    TRANSRECTAL ULTRASOUND EXAMINATION N/A 2/26/2019    Procedure: ULTRASOUND, RECTAL APPROACH;  Surgeon: Simba Cheung MD;  [facility];  Service: Urology;  Laterality: N/A;    UPPER GASTROINTESTINAL ENDOSCOPY  09/12/2018    Dr Hernandez; gastritis; extensive intestinal metaplasia; repeat in 1-2- years       Current Outpatient Medications   Medication Sig    albuterol (PROVENTIL/VENTOLIN HFA) 90 mcg/actuation inhaler INHALE 1 TO 2 PUFFS INTO THE LUNGS EVERY 6 HOURS AS NEEDED FOR WHEEZING OR SHORTNESS OF BREATH. FOR RESCUE.    apixaban (ELIQUIS) 5 mg Tab Take 1 tablet (5 mg total) by mouth 2 (two) times daily. (Patient not taking: Reported on 7/18/2023)    apixaban (ELIQUIS) 5 mg Tab Take 1 tablet (5 mg total) by mouth 2 (two) times daily.    atorvastatin (LIPITOR) 80 MG tablet TAKE 1 TABLET EVERY DAY    carvediloL (COREG) 25 MG tablet Take 1 tablet (25 mg total) by mouth 2 (two) times daily with meals.    cetirizine (ZYRTEC) 10 MG tablet Take 1 tablet by mouth daily as needed.    chlorthalidone (HYGROTEN) 25 MG Tab TAKE 1 TABLET ONE TIME DAILY    cholecalciferol, vitamin D3, (VITAMIN D3) 50 mcg (2,000 unit) Cap 1 capsule once daily. Every day    coenzyme Q10 (CO Q-10) 100 mg capsule Take 400 mg by mouth once daily.    cyanocobalamin, vitamin B-12, 5,000 mcg Subl Take 5,000 mcg by mouth once daily. Every day    diphenoxylate-atropine 2.5-0.025 mg (LOMOTIL) 2.5-0.025 mg per tablet Take 1 tablet by mouth 4 (four) times daily as needed for Diarrhea.    ezetimibe (ZETIA) 10 mg tablet TAKE 1 TABLET EVERY DAY    FLUAD QUAD 2021-22,65Y UP,,PF, 60 mcg (15 mcg x 4)/0.5 mL Syrg     fluticasone (FLONASE) 50 mcg/actuation nasal spray USE 1 SPRAY IN EACH NOSTRIL TWICE DAILY AS NEEDED  FOR  RHINITIS    gabapentin (NEURONTIN) 600 MG tablet Take 1 tablet (600 mg  total) by mouth 3 (three) times daily.    HYDROcodone-acetaminophen (NORCO) 7.5-325 mg per tablet Take 1 tablet by mouth every 6 (six) hours as needed for Pain.    levothyroxine (SYNTHROID) 50 MCG tablet TAKE 1 TABLET EVERY DAY    milk thistle 200 mg Cap Take 200 mg by mouth 2 (two) times daily. Twice a day    mupirocin (BACTROBAN) 2 % ointment Apply topically 3 (three) times daily.    olmesartan (BENICAR) 20 MG tablet TAKE 1 TABLET EVERY DAY    omega-3 fatty acids 1,000 mg Cap Twice a day    pantoprazole (PROTONIX) 40 MG tablet TAKE 1 TABLET TWICE DAILY    potassium chloride SA (K-DUR,KLOR-CON) 20 MEQ tablet TAKE 4 TABLETS EVERY DAY OR AS DIRECTED    predniSONE (DELTASONE) 1 MG tablet TAKE 2 TABLETS EVERY DAY    predniSONE (DELTASONE) 5 MG tablet TAKE 1 TABLET EVERY DAY    sildenafil (REVATIO) 20 mg Tab Take 1 to 3 tabs daily as needed.    SPIRIVA WITH HANDIHALER 18 mcg inhalation capsule INHALE THE CONTENTS OF 1 CAPSULE EVERY DAY  VIA HANDIHALER (CONTROLLER)     No current facility-administered medications for this visit.       Review of patient's allergies indicates:   Allergen Reactions    No known drug allergies        Family History   Problem Relation Age of Onset    Cataracts Mother     Heart disease Mother         CHF    Hypertension Mother     Hyperlipidemia Mother     Cataracts Father     Glaucoma Father     Heart disease Father     Hyperlipidemia Sister     Hypertension Sister     No Known Problems Daughter     No Known Problems Daughter     Collagen disease Neg Hx     Amblyopia Neg Hx     Blindness Neg Hx     Macular degeneration Neg Hx     Retinal detachment Neg Hx     Strabismus Neg Hx     Cancer Neg Hx     Colon cancer Neg Hx     Esophageal cancer Neg Hx     Stomach cancer Neg Hx     Crohn's disease Neg Hx     Ulcerative colitis Neg Hx        Social History     Socioeconomic History    Marital status:    Tobacco Use    Smoking status: Never    Smokeless tobacco: Never    Tobacco comments:      when a child   Substance and Sexual Activity    Alcohol use: Yes     Comment: rarely    Drug use: No    Sexual activity: Yes     Partners: Female     Social Determinants of Health     Financial Resource Strain: Low Risk  (8/10/2023)    Overall Financial Resource Strain (CARDIA)     Difficulty of Paying Living Expenses: Not hard at all   Food Insecurity: No Food Insecurity (8/10/2023)    Hunger Vital Sign     Worried About Running Out of Food in the Last Year: Never true     Ran Out of Food in the Last Year: Never true   Transportation Needs: No Transportation Needs (8/10/2023)    PRAPARE - Transportation     Lack of Transportation (Medical): No     Lack of Transportation (Non-Medical): No   Physical Activity: Inactive (8/10/2023)    Exercise Vital Sign     Days of Exercise per Week: 0 days     Minutes of Exercise per Session: 0 min   Stress: No Stress Concern Present (8/10/2023)    [employer] [place] [employer] - Occupational Stress Questionnaire     Feeling of Stress : Not at all   Social Connections: Socially Integrated (8/10/2023)    Social Connection and Isolation Panel [NHANES]     Frequency of Communication with Friends and Family: More than three times a week     Frequency of Social Gatherings with Friends and Family: More than three times a week     Attends Orthodox Services: More than 4 times per year     Active Member of Clubs or Organizations: Yes     Attends Club or Organization Meetings: More than 4 times per year     Marital Status:    Housing Stability: Low Risk  (8/10/2023)    Housing Stability Vital Sign     Unable to Pay for Housing in the Last Year: No     Number of Places Lived in the Last Year: 1     Unstable Housing in the Last Year: No       Chief Complaint:   No chief complaint on file.        History of present illness:  This is a 76-year-old male seen for left hip pain.  Patient has a history of trochanteric bursitis on that side.  Last gotten injection about a year ago.   Started hurting him more over the last week.  [place] and he wanted to make sure that he could be active when they got there.    Answers for HPI/ROS submitted by the patient on 12/23/2019   Leg pain  unexpected weight change: No  appetite change : No  sleep disturbance: No  IMMUNOCOMPROMISED: No  nervous/ anxious: No  dysphoric mood: No  rash: No  visual disturbance: No  eye redness: No  eye pain: No  ear pain: No  tinnitus: Yes  hearing loss: No  sinus pressure : Yes  nosebleeds: Yes  enviro allergies: No  food allergies: No  cough: No  shortness of breath: Yes  sweating: No  frequency: Yes  difficulty urinating: No  hematuria: No  chest pain: No  palpitations: No  nausea: No  vomiting: No  diarrhea: No  blood in stool: No  constipation: No  headaches: Yes  dizziness: No  numbness: No  seizures: No  joint swelling: No  myalgia: No  weakness: No  back pain: Yes  Pain Chronicity: recurrent  History of trauma: No  Onset: more than 1 month ago  Frequency: daily  Progression since onset: waxing and waning  Injury mechanism: twisting  injury location: at home  pain- numeric: 3/10  pain location: left knee  pain quality: aching  Radiating Pain: No  Aggravating factors: bending, extension, twisting  fever: No  inability to bear weight: No  itching: No  joint locking: No  limited range of motion: Yes  stiffness: Yes  tingling: No  Treatments tried: cold, heat, exercise, injection treatment, NSAIDs, OTC ointments  physical therapy: not tried  Improvement on treatment: mild      Physical Examination:    Vital Signs:    There were no vitals filed for this visit.      There is no height or weight on file to calculate BMI.    This a well-developed, well nourished patient in no acute distress.  They are alert and oriented and cooperative to examination.  Pt. walks without an antalgic gait.      Examination of the patient's left hip shows full range of motion with flexion to 160°, extension to 0,  external rotation to 50°, internal rotation of 15°, abduction of 50°, adduction of 15°. Skin has no rashes or bruising. Patient has negative Stinchfield exam. Patient has negative straight leg raise.Negative internal impingement test. Negative TAI test. Negative Oscar's test. Patient has no pain with hip range of motion.  Moderately tender to palpation over the greater trochanteric bursa. Patient is 5 out of 5 motor strength, palpable distal pulses, and intact light touch sensation.       X-rays:  None taken today.    Assessment::  Left trochanteric bursitis    Plan: I discussed the problem with him today.  Patient has done well with injections and he does not need them very frequently.  I agreed to give him another went today before his vacation.    This note was created using dev9k voice recognition software that occasionally misinterpreted phrases or words.    Consult note is delivered via Epic messaging service.

## 2023-10-09 NOTE — PROCEDURES
Large Joint Aspiration/Injection: L greater trochanteric bursa    Date/Time: 10/9/2023 2:00 PM    Performed by: Haroldo Campbell MD  Authorized by: Haroldo Campbell MD    Consent Done?:  Yes (Verbal)  Indications:  Pain  Site marked: the procedure site was marked    Timeout: prior to procedure the correct patient, procedure, and site was verified    Local anesthetic:  Lidocaine 1% without epinephrine and bupivacaine 0.25% without epinephrine  Anesthetic total (ml):  6      Details:  Needle Size:  20 G  Ultrasonic Guidance for needle placement?: No    Approach:  Lateral  Location:  Hip  Site:  L greater trochanteric bursa  Medications:  40 mg triamcinolone acetonide 40 mg/mL  Patient tolerance:  Patient tolerated the procedure well with no immediate complications

## 2023-10-24 ENCOUNTER — OFFICE VISIT (OUTPATIENT)
Dept: FAMILY MEDICINE | Facility: CLINIC | Age: 78
End: 2023-10-24
Payer: MEDICARE

## 2023-10-24 VITALS
OXYGEN SATURATION: 96 % | HEART RATE: 69 BPM | HEIGHT: 72 IN | SYSTOLIC BLOOD PRESSURE: 138 MMHG | RESPIRATION RATE: 17 BRPM | BODY MASS INDEX: 31.26 KG/M2 | WEIGHT: 230.81 LBS | DIASTOLIC BLOOD PRESSURE: 70 MMHG | TEMPERATURE: 99 F

## 2023-10-24 DIAGNOSIS — E03.4 HYPOTHYROIDISM DUE TO ACQUIRED ATROPHY OF THYROID: Primary | ICD-10-CM

## 2023-10-24 DIAGNOSIS — Z28.39 IMMUNIZATION DEFICIENCY: ICD-10-CM

## 2023-10-24 DIAGNOSIS — M51.36 DDD (DEGENERATIVE DISC DISEASE), LUMBAR: ICD-10-CM

## 2023-10-24 DIAGNOSIS — I10 HTN (HYPERTENSION), BENIGN: ICD-10-CM

## 2023-10-24 DIAGNOSIS — J44.89 COPD WITH ASTHMA: ICD-10-CM

## 2023-10-24 DIAGNOSIS — R29.818 NEUROGENIC CLAUDICATION: ICD-10-CM

## 2023-10-24 DIAGNOSIS — E78.2 MIXED HYPERLIPIDEMIA: ICD-10-CM

## 2023-10-24 PROCEDURE — 3075F PR MOST RECENT SYSTOLIC BLOOD PRESS GE 130-139MM HG: ICD-10-PCS | Mod: HCNC,CPTII,S$GLB, | Performed by: PHYSICIAN ASSISTANT

## 2023-10-24 PROCEDURE — 3288F PR FALLS RISK ASSESSMENT DOCUMENTED: ICD-10-PCS | Mod: HCNC,CPTII,S$GLB, | Performed by: PHYSICIAN ASSISTANT

## 2023-10-24 PROCEDURE — 1125F AMNT PAIN NOTED PAIN PRSNT: CPT | Mod: HCNC,CPTII,S$GLB, | Performed by: PHYSICIAN ASSISTANT

## 2023-10-24 PROCEDURE — 1157F PR ADVANCE CARE PLAN OR EQUIV PRESENT IN MEDICAL RECORD: ICD-10-PCS | Mod: HCNC,CPTII,S$GLB, | Performed by: PHYSICIAN ASSISTANT

## 2023-10-24 PROCEDURE — 1160F PR REVIEW ALL MEDS BY PRESCRIBER/CLIN PHARMACIST DOCUMENTED: ICD-10-PCS | Mod: HCNC,CPTII,S$GLB, | Performed by: PHYSICIAN ASSISTANT

## 2023-10-24 PROCEDURE — 1101F PR PT FALLS ASSESS DOC 0-1 FALLS W/OUT INJ PAST YR: ICD-10-PCS | Mod: HCNC,CPTII,S$GLB, | Performed by: PHYSICIAN ASSISTANT

## 2023-10-24 PROCEDURE — 1157F ADVNC CARE PLAN IN RCRD: CPT | Mod: HCNC,CPTII,S$GLB, | Performed by: PHYSICIAN ASSISTANT

## 2023-10-24 PROCEDURE — 1159F MED LIST DOCD IN RCRD: CPT | Mod: HCNC,CPTII,S$GLB, | Performed by: PHYSICIAN ASSISTANT

## 2023-10-24 PROCEDURE — 99214 OFFICE O/P EST MOD 30 MIN: CPT | Mod: HCNC,S$GLB,, | Performed by: PHYSICIAN ASSISTANT

## 2023-10-24 PROCEDURE — 3288F FALL RISK ASSESSMENT DOCD: CPT | Mod: HCNC,CPTII,S$GLB, | Performed by: PHYSICIAN ASSISTANT

## 2023-10-24 PROCEDURE — 1159F PR MEDICATION LIST DOCUMENTED IN MEDICAL RECORD: ICD-10-PCS | Mod: HCNC,CPTII,S$GLB, | Performed by: PHYSICIAN ASSISTANT

## 2023-10-24 PROCEDURE — 3075F SYST BP GE 130 - 139MM HG: CPT | Mod: HCNC,CPTII,S$GLB, | Performed by: PHYSICIAN ASSISTANT

## 2023-10-24 PROCEDURE — 1125F PR PAIN SEVERITY QUANTIFIED, PAIN PRESENT: ICD-10-PCS | Mod: HCNC,CPTII,S$GLB, | Performed by: PHYSICIAN ASSISTANT

## 2023-10-24 PROCEDURE — 99214 PR OFFICE/OUTPT VISIT, EST, LEVL IV, 30-39 MIN: ICD-10-PCS | Mod: HCNC,S$GLB,, | Performed by: PHYSICIAN ASSISTANT

## 2023-10-24 PROCEDURE — 99999 PR PBB SHADOW E&M-EST. PATIENT-LVL III: CPT | Mod: PBBFAC,HCNC,, | Performed by: PHYSICIAN ASSISTANT

## 2023-10-24 PROCEDURE — 99999 PR PBB SHADOW E&M-EST. PATIENT-LVL III: ICD-10-PCS | Mod: PBBFAC,HCNC,, | Performed by: PHYSICIAN ASSISTANT

## 2023-10-24 PROCEDURE — 1160F RVW MEDS BY RX/DR IN RCRD: CPT | Mod: HCNC,CPTII,S$GLB, | Performed by: PHYSICIAN ASSISTANT

## 2023-10-24 PROCEDURE — 3078F PR MOST RECENT DIASTOLIC BLOOD PRESSURE < 80 MM HG: ICD-10-PCS | Mod: HCNC,CPTII,S$GLB, | Performed by: PHYSICIAN ASSISTANT

## 2023-10-24 PROCEDURE — 3078F DIAST BP <80 MM HG: CPT | Mod: HCNC,CPTII,S$GLB, | Performed by: PHYSICIAN ASSISTANT

## 2023-10-24 PROCEDURE — 1101F PT FALLS ASSESS-DOCD LE1/YR: CPT | Mod: HCNC,CPTII,S$GLB, | Performed by: PHYSICIAN ASSISTANT

## 2023-10-24 NOTE — PROGRESS NOTES
Subjective:       Patient ID: Maximilian Tavera Jr. is a 77 y.o. male.    Chief Complaint: Follow-up    Mr. Tavera is a 78 y/o male with HTN, HLP, COPD, and MG who presents to clinic for follow up appointment. Overall, he is doing well. He has had some ongoing issues with his left hip and has been getting steroid injections with Dr. Campbell, which has not resulted in much improvement of the pain. He thinks the pain may be related to his degenerative disc disease. Due to difficulty walking unless he has something to hold on to, he would like a prescription for a walker. He was last in clinic in July 2023 for a following a hospital stay due to DVT. He is still taking Eliquis and says that his lower left leg edema is much improved. He is followed by Cardiology, Dr. Altamirano, and last saw him in January 2023 to adjust blood pressure medications. He is supposed to see him again in April 2024. He has had no syncopal episodes since his medications were adjusted. He is also followed by Neurology, Dr. Hoang, for myasthenia gravis and last saw him in January 2022. Discussed the COVID vaccination and RSV vaccination with him today in clinic.        Review of patient's allergies indicates:   Allergen Reactions    Spironolactone Diarrhea         Current Outpatient Medications:     albuterol (PROVENTIL/VENTOLIN HFA) 90 mcg/actuation inhaler, INHALE 1 TO 2 PUFFS INTO THE LUNGS EVERY 6 HOURS AS NEEDED FOR WHEEZING OR SHORTNESS OF BREATH. FOR RESCUE., Disp: 25.5 g, Rfl: 0    apixaban (ELIQUIS) 5 mg Tab, Take 1 tablet (5 mg total) by mouth 2 (two) times daily., Disp: 60 tablet, Rfl: 11    apixaban (ELIQUIS) 5 mg Tab, Take 1 tablet (5 mg total) by mouth 2 (two) times daily., Disp: 60 tablet, Rfl: 11    atorvastatin (LIPITOR) 80 MG tablet, TAKE 1 TABLET EVERY DAY, Disp: 90 tablet, Rfl: 2    carvediloL (COREG) 25 MG tablet, Take 1 tablet (25 mg total) by mouth 2 (two) times daily with meals., Disp: 180 tablet, Rfl: 3    cetirizine  (ZYRTEC) 10 MG tablet, Take 1 tablet by mouth daily as needed., Disp: , Rfl:     chlorthalidone (HYGROTEN) 25 MG Tab, TAKE 1 TABLET ONE TIME DAILY, Disp: 90 tablet, Rfl: 3    cholecalciferol, vitamin D3, (VITAMIN D3) 50 mcg (2,000 unit) Cap, 1 capsule once daily. Every day, Disp: , Rfl:     coenzyme Q10 (CO Q-10) 100 mg capsule, Take 400 mg by mouth once daily., Disp: , Rfl:     cyanocobalamin, vitamin B-12, 5,000 mcg Subl, Take 5,000 mcg by mouth once daily. Every day, Disp: , Rfl:     diphenoxylate-atropine 2.5-0.025 mg (LOMOTIL) 2.5-0.025 mg per tablet, Take 1 tablet by mouth 4 (four) times daily as needed for Diarrhea., Disp: 90 tablet, Rfl: 0    ezetimibe (ZETIA) 10 mg tablet, TAKE 1 TABLET EVERY DAY, Disp: 90 tablet, Rfl: 3    FLUAD QUAD 2021-22,65Y UP,,PF, 60 mcg (15 mcg x 4)/0.5 mL Syrg, , Disp: , Rfl:     fluticasone (FLONASE) 50 mcg/actuation nasal spray, USE 1 SPRAY IN EACH NOSTRIL TWICE DAILY AS NEEDED  FOR  RHINITIS, Disp: 48 g, Rfl: 3    gabapentin (NEURONTIN) 600 MG tablet, Take 1 tablet (600 mg total) by mouth 3 (three) times daily., Disp: 90 tablet, Rfl: 3    HYDROcodone-acetaminophen (NORCO) 7.5-325 mg per tablet, Take 1 tablet by mouth every 6 (six) hours as needed for Pain., Disp: 28 tablet, Rfl: 0    levothyroxine (SYNTHROID) 50 MCG tablet, TAKE 1 TABLET EVERY DAY, Disp: 90 tablet, Rfl: 0    milk thistle 200 mg Cap, Take 200 mg by mouth 2 (two) times daily. Twice a day, Disp: , Rfl:     mupirocin (BACTROBAN) 2 % ointment, Apply topically 3 (three) times daily., Disp: 30 g, Rfl: 0    olmesartan (BENICAR) 20 MG tablet, TAKE 1 TABLET EVERY DAY, Disp: 90 tablet, Rfl: 0    omega-3 fatty acids 1,000 mg Cap, Twice a day, Disp: , Rfl:     pantoprazole (PROTONIX) 40 MG tablet, TAKE 1 TABLET TWICE DAILY, Disp: 180 tablet, Rfl: 2    potassium chloride SA (K-DUR,KLOR-CON) 20 MEQ tablet, TAKE 4 TABLETS EVERY DAY OR AS DIRECTED, Disp: 360 tablet, Rfl: 3    predniSONE (DELTASONE) 1 MG tablet, TAKE 2 TABLETS  EVERY DAY, Disp: 180 tablet, Rfl: 3    predniSONE (DELTASONE) 5 MG tablet, TAKE 1 TABLET EVERY DAY, Disp: 90 tablet, Rfl: 3    sildenafil (REVATIO) 20 mg Tab, Take 1 to 3 tabs daily as needed., Disp: 30 tablet, Rfl: 3    SPIRIVA WITH HANDIHALER 18 mcg inhalation capsule, INHALE THE CONTENTS OF 1 CAPSULE EVERY DAY  VIA HANDIHALER (CONTROLLER), Disp: 90 capsule, Rfl: 0    Lab Results   Component Value Date    WBC 6.91 07/03/2023    HGB 11.9 (L) 07/03/2023    HCT 37.7 (L) 07/03/2023     (L) 07/03/2023    CHOL 131 02/22/2023    TRIG 131 02/22/2023    HDL 41 02/22/2023    ALT 14 07/03/2023    AST 13 07/03/2023     07/11/2023    K 3.6 07/11/2023     07/11/2023    CREATININE 1.0 07/11/2023    BUN 17 07/11/2023    CO2 30 (H) 07/11/2023    TSH 1.253 02/22/2023    PSA 3.5 09/19/2019    INR 1.1 07/02/2023    HGBA1C 6.2 (H) 06/15/2023       Review of Systems   HENT:  Negative for postnasal drip and rhinorrhea.    Eyes:  Negative for visual disturbance.   Respiratory:  Negative for shortness of breath and wheezing.    Cardiovascular:  Negative for leg swelling. Palpitations: intermittent, infrequent.  Gastrointestinal:  Negative for diarrhea.   Genitourinary:  Negative for dysuria.   Musculoskeletal:  Positive for arthralgias, back pain and gait problem.   Skin:  Negative for pallor.   Neurological:  Negative for dizziness and light-headedness.   Psychiatric/Behavioral:  Negative for confusion. The patient is not nervous/anxious.        Objective:      Physical Exam  Constitutional:       Appearance: Normal appearance.   HENT:      Head: Normocephalic and atraumatic.   Eyes:      Conjunctiva/sclera: Conjunctivae normal.   Cardiovascular:      Rate and Rhythm: Normal rate and regular rhythm.      Pulses: Normal pulses.      Heart sounds: Normal heart sounds.   Pulmonary:      Effort: Pulmonary effort is normal. No respiratory distress.      Breath sounds: Normal breath sounds. No wheezing.   Abdominal:       General: There is no distension.      Palpations: There is no mass.      Tenderness: There is no abdominal tenderness.   Musculoskeletal:      Right lower leg: No edema.      Left lower leg: No edema.   Skin:     General: Skin is warm and dry.      Findings: No erythema.   Neurological:      Mental Status: He is oriented to person, place, and time.   Psychiatric:         Behavior: Behavior normal.         Assessment:       1. Hypothyroidism due to acquired atrophy of thyroid    2. Mixed hyperlipidemia    3. COPD with asthma    4. HTN (hypertension), benign        Plan:     Maximilian was seen today for follow-up.    Diagnoses and all orders for this visit:    Hypothyroidism due to acquired atrophy of thyroid  -     stable  -     Continue Synthroid 50 mg daily    Mixed hyperlipidemia       -      Stable. Continue atorvastatin 80 mg daily and Zetia 10 mg    COPD with asthma       -      Stable. Continue Spiriva as well as albuterol inhaler as needed    HTN (hypertension), benign       -      Stable. Continue Coreg 25 mg, Benicar 20 mg, and chlorothalidone 25 mg       -      Continue to follow up with cardiology    Neurogenic claudication  -     WALKER FOR HOME USE  -     Continue gabapentin 600 mg    DDD (degenerative disc disease), lumbar  -     WALKER FOR HOME USE  -     Continue gabapentin 600 mg    Immunization deficiency        -      Discussed COVID vaccination, RSV vaccination with patient

## 2023-11-01 ENCOUNTER — HOSPITAL ENCOUNTER (OUTPATIENT)
Dept: CARDIOLOGY | Facility: HOSPITAL | Age: 78
Discharge: HOME | End: 2023-11-01
Attending: INTERNAL MEDICINE
Payer: MEDICARE

## 2023-11-01 DIAGNOSIS — I82.412 ACUTE DEEP VEIN THROMBOSIS (DVT) OF FEMORAL VEIN OF LEFT LOWER EXTREMITY: ICD-10-CM

## 2023-11-01 PROCEDURE — 93970 EXTREMITY STUDY: CPT | Mod: HCNC

## 2023-11-01 PROCEDURE — 93970 CV US DOPPLER VENOUS LEGS BILATERAL (CUPID ONLY): ICD-10-PCS | Mod: 26,HCNC,, | Performed by: INTERNAL MEDICINE

## 2023-11-01 PROCEDURE — 93970 EXTREMITY STUDY: CPT | Mod: 26,HCNC,, | Performed by: INTERNAL MEDICINE

## 2023-11-06 DIAGNOSIS — M51.36 DDD (DEGENERATIVE DISC DISEASE), LUMBAR: ICD-10-CM

## 2023-11-06 NOTE — TELEPHONE ENCOUNTER
Refill Routing Note   Medication(s) are not appropriate for processing by [facility] for the following reason(s):      Medication outside of protocol    [facility] action(s):  Route Care Due:  None identified            Appointments  past 12m or future 3m with PCP    Date Provider   Last Visit   6/15/2023 Brian Marroquin MD   Next Visit   1/5/2024 Brian Marroquin MD   ED visits in past 90 days: 0        Note composed:11:09 AM 11/06/2023

## 2023-11-06 NOTE — TELEPHONE ENCOUNTER
No care due was identified.  Health [facility] Embedded Care Due Messages. Reference number: [id number].   11/06/2023 10:22:38 AM CST

## 2023-11-07 RX ORDER — GABAPENTIN 600 MG/1
600 TABLET ORAL 3 TIMES DAILY
Qty: 90 TABLET | Refills: 10 | Status: SHIPPED | OUTPATIENT
Start: 2023-11-07

## 2023-11-09 DIAGNOSIS — J44.89 COPD WITH ASTHMA: ICD-10-CM

## 2023-11-09 NOTE — TELEPHONE ENCOUNTER
No care due was identified.  Health [facility] Embedded Care Due Messages. Reference number: [id number].   11/09/2023 7:09:24 AM CST

## 2023-11-13 RX ORDER — TIOTROPIUM BROMIDE 18 UG/1
CAPSULE ORAL; RESPIRATORY (INHALATION)
Qty: 90 CAPSULE | Refills: 10 | Status: SHIPPED | OUTPATIENT
Start: 2023-11-13

## 2023-11-14 ENCOUNTER — LAB VISIT (OUTPATIENT)
Dept: LAB | Facility: HOSPITAL | Age: 78
End: 2023-11-14
Attending: INTERNAL MEDICINE
Payer: MEDICARE

## 2023-11-14 DIAGNOSIS — I10 HTN (HYPERTENSION), BENIGN: ICD-10-CM

## 2023-11-14 DIAGNOSIS — E87.6 HYPOKALEMIA: ICD-10-CM

## 2023-11-14 LAB
ANION GAP SERPL CALC-SCNC: 6 MMOL/L (ref 8–16)
BUN SERPL-MCNC: 13 MG/DL (ref 8–23)
CALCIUM SERPL-MCNC: 9.3 MG/DL (ref 8.7–10.5)
CHLORIDE SERPL-SCNC: 105 MMOL/L (ref 95–110)
CO2 SERPL-SCNC: 32 MMOL/L (ref 23–29)
CREAT SERPL-MCNC: 1 MG/DL (ref 0.5–1.4)
EST. GFR  (NO RACE VARIABLE): >60 ML/MIN/1.73 M^2
GLUCOSE SERPL-MCNC: 106 MG/DL (ref 70–110)
POTASSIUM SERPL-SCNC: 3.7 MMOL/L (ref 3.5–5.1)
SODIUM SERPL-SCNC: 143 MMOL/L (ref 136–145)

## 2023-11-14 PROCEDURE — 36415 COLL VENOUS BLD VENIPUNCTURE: CPT | Mod: HCNC,PO | Performed by: INTERNAL MEDICINE

## 2023-11-14 PROCEDURE — 80048 BASIC METABOLIC PNL TOTAL CA: CPT | Mod: HCNC | Performed by: INTERNAL MEDICINE

## 2023-11-15 ENCOUNTER — PATIENT MESSAGE (OUTPATIENT)
Dept: CARDIOLOGY | Facility: CLINIC | Age: 78
End: 2023-11-15
Payer: MEDICARE

## 2023-11-16 ENCOUNTER — OFFICE VISIT (OUTPATIENT)
Dept: CARDIOLOGY | Facility: CLINIC | Age: 78
End: 2023-11-16
Payer: MEDICARE

## 2023-11-16 VITALS
HEIGHT: 72 IN | OXYGEN SATURATION: 94 % | WEIGHT: 235.88 LBS | DIASTOLIC BLOOD PRESSURE: 73 MMHG | HEART RATE: 68 BPM | SYSTOLIC BLOOD PRESSURE: 141 MMHG | BODY MASS INDEX: 31.95 KG/M2

## 2023-11-16 DIAGNOSIS — E78.2 MIXED HYPERLIPIDEMIA: ICD-10-CM

## 2023-11-16 DIAGNOSIS — I10 HTN (HYPERTENSION), BENIGN: ICD-10-CM

## 2023-11-16 DIAGNOSIS — M79.89 LEG SWELLING: ICD-10-CM

## 2023-11-16 DIAGNOSIS — I89.0 LYMPHEDEMA OF BOTH LOWER EXTREMITIES: ICD-10-CM

## 2023-11-16 DIAGNOSIS — I82.412 ACUTE DEEP VEIN THROMBOSIS (DVT) OF FEMORAL VEIN OF LEFT LOWER EXTREMITY: Primary | ICD-10-CM

## 2023-11-16 DIAGNOSIS — E66.9 OBESITY (BMI 30-39.9): ICD-10-CM

## 2023-11-16 DIAGNOSIS — I82.4Y9 ACUTE DEEP VEIN THROMBOSIS (DVT) OF PROXIMAL VEIN OF LOWER EXTREMITY, UNSPECIFIED LATERALITY: ICD-10-CM

## 2023-11-16 PROCEDURE — 99999 PR PBB SHADOW E&M-EST. PATIENT-LVL III: ICD-10-PCS | Mod: PBBFAC,HCNC,, | Performed by: INTERNAL MEDICINE

## 2023-11-16 PROCEDURE — 1101F PT FALLS ASSESS-DOCD LE1/YR: CPT | Mod: HCNC,CPTII,S$GLB, | Performed by: INTERNAL MEDICINE

## 2023-11-16 PROCEDURE — 3077F SYST BP >= 140 MM HG: CPT | Mod: HCNC,CPTII,S$GLB, | Performed by: INTERNAL MEDICINE

## 2023-11-16 PROCEDURE — 99999 PR PBB SHADOW E&M-EST. PATIENT-LVL III: CPT | Mod: PBBFAC,HCNC,, | Performed by: INTERNAL MEDICINE

## 2023-11-16 PROCEDURE — 1126F PR PAIN SEVERITY QUANTIFIED, NO PAIN PRESENT: ICD-10-PCS | Mod: HCNC,CPTII,S$GLB, | Performed by: INTERNAL MEDICINE

## 2023-11-16 PROCEDURE — 3078F DIAST BP <80 MM HG: CPT | Mod: HCNC,CPTII,S$GLB, | Performed by: INTERNAL MEDICINE

## 2023-11-16 PROCEDURE — 1101F PR PT FALLS ASSESS DOC 0-1 FALLS W/OUT INJ PAST YR: ICD-10-PCS | Mod: HCNC,CPTII,S$GLB, | Performed by: INTERNAL MEDICINE

## 2023-11-16 PROCEDURE — 1159F PR MEDICATION LIST DOCUMENTED IN MEDICAL RECORD: ICD-10-PCS | Mod: HCNC,CPTII,S$GLB, | Performed by: INTERNAL MEDICINE

## 2023-11-16 PROCEDURE — 99215 PR OFFICE/OUTPT VISIT, EST, LEVL V, 40-54 MIN: ICD-10-PCS | Mod: HCNC,S$GLB,, | Performed by: INTERNAL MEDICINE

## 2023-11-16 PROCEDURE — 3288F PR FALLS RISK ASSESSMENT DOCUMENTED: ICD-10-PCS | Mod: HCNC,CPTII,S$GLB, | Performed by: INTERNAL MEDICINE

## 2023-11-16 PROCEDURE — 99215 OFFICE O/P EST HI 40 MIN: CPT | Mod: HCNC,S$GLB,, | Performed by: INTERNAL MEDICINE

## 2023-11-16 PROCEDURE — 1159F MED LIST DOCD IN RCRD: CPT | Mod: HCNC,CPTII,S$GLB, | Performed by: INTERNAL MEDICINE

## 2023-11-16 PROCEDURE — 3077F PR MOST RECENT SYSTOLIC BLOOD PRESSURE >= 140 MM HG: ICD-10-PCS | Mod: HCNC,CPTII,S$GLB, | Performed by: INTERNAL MEDICINE

## 2023-11-16 PROCEDURE — 1157F PR ADVANCE CARE PLAN OR EQUIV PRESENT IN MEDICAL RECORD: ICD-10-PCS | Mod: HCNC,CPTII,S$GLB, | Performed by: INTERNAL MEDICINE

## 2023-11-16 PROCEDURE — 1157F ADVNC CARE PLAN IN RCRD: CPT | Mod: HCNC,CPTII,S$GLB, | Performed by: INTERNAL MEDICINE

## 2023-11-16 PROCEDURE — 3288F FALL RISK ASSESSMENT DOCD: CPT | Mod: HCNC,CPTII,S$GLB, | Performed by: INTERNAL MEDICINE

## 2023-11-16 PROCEDURE — 3078F PR MOST RECENT DIASTOLIC BLOOD PRESSURE < 80 MM HG: ICD-10-PCS | Mod: HCNC,CPTII,S$GLB, | Performed by: INTERNAL MEDICINE

## 2023-11-16 PROCEDURE — 1126F AMNT PAIN NOTED NONE PRSNT: CPT | Mod: HCNC,CPTII,S$GLB, | Performed by: INTERNAL MEDICINE

## 2023-11-16 NOTE — PATIENT INSTRUCTIONS
Assessment/Plan:  Maximilian Tavera Jr. is a 77 y.o. male with BLE lymphedema, venous insuffciency, HTN, HLD, myasthenia gravis on chronic prednisone therapy, degenerative joint disease with myelopathy s/p decompression and fusion of C2-6, chronic PVCs, who presents for a follow up appointment.       1. LLE DVT- Appear unprovoked.  LLE Venous Ultrasound on 7/1/2023 revealed Left thigh veins: There is occlusive and nonocclusive thrombus involving the common femoral, superficial femoral and popliteal veins.  Greater saphenous vein is patent without intraluminal thrombus. Left calf veins: There is occlusive thrombus within posterior tibial vein.  Anterior tibial and peroneal veins are patent without intraluminal thrombus. BLE Venous Ultrasound  on 11/1/2023 revealed is no evidence of a right lower extremity DVT. Left superficial femoral proximal vein DVT. Left superficial femoral middle vein DVT. Left popliteal vein DVT. Following Heme/Onc. Continue Eliquis 5 mg bid. Will continue Eliquis lifelong, we will also consider decrease the dose of eliquis after total 12 months of full dose therapy.     2. BLE Lymphedema/Venous Insufficiency- Continue lymphedema clinic techniques at home and wear graduated compression hose.  Pt to limit sodium intake to 2,000 mg daily.  Limit volume intake to 1.5 liters daily.       3. HTN- Continue current medications.     4. Chronic PVC's- Stable.  Continue current medications and follow up with EP as scheduled.      5. HLD- LDL is at goal of <70 ( 63 on 2/22/2023 vs 69 on 12/9/2022).  Continue atorvastatin 80 mg daily.       Follow up in 7 months (July 2024) with BLE venous ultrasound prior

## 2023-11-16 NOTE — PROGRESS NOTES
[facility]      Chief Complaint   Patient presents with    Follow-up       Patient ID: Maximilian Tavera Jr. is a 77 y.o. male with BLE lymphedema, venous insuffciency, HTN, HLD, myasthenia gravis on chronic prednisone therapy, degenerative joint disease with myelopathy s/p decompression and fusion of C2-6, chronic PVCs, who presents for a follow up appointment.  Pertinent history/events are as follows:     -Pt kindly referred by Dr. Altamirano for evaluation of leg swelling.    -At our initial clinic visit on 2/3/2022, Mr. Tavera reported leg swelling starting 3-4 months ago.  States his PCP wrapped his legs 2 weeks ago and he lost 8 pounds.  He has no claudication, rest pain, or tissue loss.  Plan:   Leg swelling- Due to lymphedema and possible venous insufficiency.  Check BLE venous reflux study and KATTY study.  Refer to lymphedema clinic.  Increase bumex to 1 mg bid every other day (0.5 mg bid on alternate days).  Pt to limit sodium intake to 2,000 mg daily.  Limit volume intake to 1.5 liters daily.  Check cmp in 1 week.    HTN- Continue current medications.   Chronic PVC's- Stable.  Continue current medications and follow up with EP as scheduled.    HLD- LDL is at goal of <70.  Continue current medications.    -At follow up clinic visit on 5/3/2022, Mr. Tavera reported significant improvement in BLE edema since completing lymphedema clinic therapy.  He continues to do lymphedema clinic techniques at home and wear graduated compression hose.  BLE Venous Reflux Study on 2/10/2022 revealed significant RLE venous reflux and no evidence of lower extremity DVT.  KATTY Study on 2/10/2022 revealed noncompressible ABIs bilaterally consistent with medial arterial calcification.  PVR waveforms are mildly abnormal bilaterally.  Pt states he stopped taking bumex due to frequent urination.   Plan:   Leg swelling- Mr. Tavera reports significant improvement in BLE edema since completing lymphedema clinic therapy.   BLE Venous Reflux Study on 2/10/2022 revealed significant RLE venous reflux and no evidence of lower extremity DVT.  KATTY Study on 2/10/2022 revealed noncompressible ABIs bilaterally consistent with medial arterial calcification.  PVR waveforms are mildly abnormal bilaterally.  Continue lymphedema clinic techniques at home and wear graduated compression hose.  Pt to limit sodium intake to 2,000 mg daily.  Limit volume intake to 1.5 liters daily.  Check cmp in 1 week.    HTN- Continue current medications.   Chronic PVC's- Stable.  Continue current medications and follow up with EP as scheduled.    HLD- LDL is at goal of <70.  Continue atorvastatin 80 mg daily.       -At clinic visit on 2/28/2023, Mr. Tavera reported continued improvement in BLE edema.  He has no claudication or tissue loss.    Plan:   Leg swelling- Mr. Tavera reports continued improvement in BLE edema.  Continue lymphedema clinic techniques at home and wear graduated compression hose.  Pt to limit sodium intake to 2,000 mg daily.  Limit volume intake to 1.5 liters daily.  Check cmp in 1 week.    HTN- Continue current medications.   Chronic PVC's- Stable.  Continue current medications and follow up with EP as scheduled.    HLD- LDL is at goal of <70 (69 on 12/9/2022).  Continue atorvastatin 80 mg daily.       - At clinic visit on 7/13/2022 Mr. Tavera followed after admission to [facility] with LLE edema and found to have extensive LLE DVT.  He was discharged on full dose anticoagulation with Eliquis.   Plan:  LLE DVT- Appear unprovoked.  Refer to Heme/Onc for age appropriate cancer screening and hypercoagulable workup.  Continue Eliquis 5 mg bid.    BLE Lymphedema/Venous Insufficiency- Continue lymphedema clinic techniques at home and wear graduated compression hose.  Pt to limit sodium intake to 2,000 mg daily.  Limit volume intake to 1.5 liters daily.     HTN- Continue current medications.   Chronic PVC's- Stable.  Continue current  medications and follow up with EP as scheduled.    HLD- LDL is at goal of <70 (69 on 12/9/2022).  Continue atorvastatin 80 mg daily.     Follow up in 3 months with BLE venous ultrasound prior    HPI:  Mr. Tavera reports no pain in either legs, Swelling has improved. He also reports he has completed the Lymphedema therapy. He was admitted to [facility] with LLE edema and found to have extensive LLE DVT.  He was discharged on full dose anticoagulation with Eliquis. LLE Venous Ultrasound on 7/1/2023 revealed Left thigh veins: There is occlusive and nonocclusive thrombus involving the common femoral, superficial femoral and popliteal veins.  Greater saphenous vein is patent without intraluminal thrombus. Left calf veins: There is occlusive thrombus within posterior tibial vein.  Anterior tibial and peroneal veins are patent without intraluminal thrombus. BLE Venous Ultrasound  on 11/1/2023 revealed is no evidence of a right lower extremity DVT. Left superficial femoral proximal vein DVT. Left superficial femoral middle vein DVT. 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    Emphysema lung 11/05/2018    Gastritis     Hx of colonic polyp     Hyperlipidemia     Hypertension     Hypothyroidism     Knee fracture     Myasthenia gravis     Neuropathy 01/03/2013    Obesity 01/29/2015    Pneumonia 03/29/2020    Polyneuropathy     PVC (premature ventricular contraction)     Squamous cell carcinoma 2014    left forearm    Thyroid disease      Past Surgical History:   Procedure Laterality Date    APPENDECTOMY      CATARACT EXTRACTION W/  INTRAOCULAR LENS IMPLANT Bilateral     COLONOSCOPY  03/01/2012    Dr Esteban; hyperplastic polyp; repeat in 5 years     COLONOSCOPY N/A 12/7/2021    Procedure: COLONOSCOPY;  Surgeon: Gabriel Hernandez MD;  Location: [facility] ENDO;  Service: Endoscopy;  Laterality: N/A;    CYSTOSCOPY N/A 2/26/2019    Procedure: CYSTOSCOPY;  Surgeon: Simba Cheung MD;  Location: [facility];  Service: Urology;  Laterality: N/A;    ENDOSCOPIC ULTRASOUND OF UPPER GASTROINTESTINAL TRACT N/A 1/7/2020    Procedure: ULTRASOUND, UPPER GI TRACT, ENDOSCOPIC;  Surgeon: Kati Ryan MD;  Location: [facility] (2ND FLR);  Service: Endoscopy;  Laterality: N/A;    ESOPHAGOGASTRODUODENOSCOPY N/A 9/12/2018    Procedure: EGD (ESOPHAGOGASTRODUODENOSCOPY);  Surgeon: Gabriel Hernandez MD;  Location: [facility];  Service: Endoscopy;  Laterality: N/A;    ESOPHAGOGASTRODUODENOSCOPY N/A 8/19/2019    Procedure: EGD (ESOPHAGOGASTRODUODENOSCOPY);  Surgeon: Gabriel Hernandez MD;  Location: [facility];  Service: Endoscopy;  Laterality: N/A;    ESOPHAGOGASTRODUODENOSCOPY N/A 10/7/2019    Procedure: EGD (ESOPHAGOGASTRODUODENOSCOPY);  Surgeon: Gabriel Hernandez MD;  Location: [facility];  Service: Endoscopy;  Laterality: N/A;    ESOPHAGOGASTRODUODENOSCOPY N/A 1/7/2020    Procedure: EGD (ESOPHAGOGASTRODUODENOSCOPY);  Surgeon: Kati Ryan MD;  [facility]  Service: Endoscopy;  Laterality: N/A;    HEMORRHOID SURGERY      INJECTION OF ANESTHETIC AGENT AROUND MEDIAL BRANCH NERVES INNERVATING LUMBAR FACET JOINT Bilateral 10/1/2020    Procedure: Block-nerve-medial branch-lumbar Bilateral L 3,4,5;  Surgeon: Devan Watt MD;  Location: [facility] OR;  Service: Pain Management;  Laterality: Bilateral;    INJECTION OF ANESTHETIC AGENT AROUND MEDIAL BRANCH NERVES INNERVATING LUMBAR FACET JOINT Right 10/7/2022    Procedure: Block-nerve-medial branch-lumbar L3,4,5;  Surgeon: Devan Watt MD;  Location: [facility] OR;  Service: Pain Management;  Laterality: Right;    KNEE ARTHROPLASTY Bilateral     LENGTHENING OF ACHILLES TENDON Right 9/11/2020    Procedure: percutaeous tenotomy of  right lateral epicondyle (tenex);  Surgeon: Terry Quach MD;  Location: [facility];  Service: Neurosurgery;  Laterality: Right;  tenex to right lateral epicondyle  Tenex machine SN#[id number], total time 1min. 30seconds    NECK SURGERY      POSTERIOR FUSION OF CERVICAL SPINE WITH LAMINECTOMY N/A 11/7/2018    Procedure: C2-C6 Posterior Cervical Laminectomy & Instrumental Fusion;  Surgeon: Kishore Turner MD;  Location: [address];  Service: Neurosurgery;  Laterality: N/A;    TONSILLECTOMY      TRANSFORAMINAL EPIDURAL INJECTION OF STEROID Right 12/20/2019    Procedure: Injection,steroid,epidural,transforaminal approach;  Surgeon: Devan Watt MD;  Location: [facility];  Service: Pain Management;  Laterality: Right;  L3-4, L4-5    TRANSFORAMINAL EPIDURAL INJECTION OF STEROID Bilateral 2/6/2020    Procedure: Injection,steroid,epidural,transforaminal approach;  Surgeon: Devan Watt MD;  Location: [facility];  Service: Pain Management;  Laterality: Bilateral;  L3-L4,L4-L5    TRANSFORAMINAL EPIDURAL INJECTION OF STEROID Bilateral 8/26/2020    Procedure: Injection,steroid,epidural,transforaminal approach;  Surgeon: Devan Watt MD;  Location: [facility];  Service: Pain Management;  Laterality: Bilateral;  L3-4, L4-5    TRANSRECTAL ULTRASOUND EXAMINATION N/A 2/26/2019    Procedure: ULTRASOUND, RECTAL APPROACH;  Surgeon: Simba Cheung MD;  Location: [facility];  Service: Urology;  Laterality: N/A;    UPPER GASTROINTESTINAL ENDOSCOPY  09/12/2018    Dr Hernandez; gastritis; extensive intestinal metaplasia; repeat in 1-2- years     Social History     Socioeconomic History    Marital status:    Tobacco Use    Smoking status: Never    Smokeless tobacco: Never    Tobacco comments:     when a child   Substance and Sexual Activity    Alcohol use: Yes     Comment: rarely    Drug use: No    Sexual activity: Yes     Partners: Female     Social Determinants of Health     Financial Resource Strain: Low Risk  (8/10/2023)    Overall Financial  Resource Strain (CARDIA)     Difficulty of Paying Living Expenses: Not hard at all   Food Insecurity: No Food Insecurity (8/10/2023)    Hunger Vital Sign     Worried About Running Out of Food in the Last Year: Never true     Ran Out of Food in the Last Year: Never true   Transportation Needs: No Transportation Needs (8/10/2023)    PRAPARE - Transportation     Lack of Transportation (Medical): No     Lack of Transportation (Non-Medical): No   Physical Activity: Inactive (8/10/2023)    Exercise Vital Sign     Days of Exercise per Week: 0 days     Minutes of Exercise per Session: 0 min   Stress: No Stress Concern Present (8/10/2023)    [employer] - Occupational Stress Questionnaire     Feeling of Stress : Not at all   Social Connections: Socially Integrated (8/10/2023)    Social Connection and Isolation Panel [NHANES]     Frequency of Communication with Friends and Family: More than three times a week     Frequency of Social Gatherings with Friends and Family: More than three times a week     Attends [employer] Services: More than 4 times per year     Active Member of Clubs or Organizations: Yes     Attends Club or Organization Meetings: More than 4 times per year     Marital Status:    Housing Stability: Low Risk  (8/10/2023)    Housing Stability Vital Sign     Unable to Pay for Housing in the Last Year: No     Number of Places Lived in the Last Year: 1     Unstable Housing in the Last Year: No     Family History   Problem Relation Age of Onset    Cataracts Mother     Heart disease Mother         CHF    Hypertension Mother     Hyperlipidemia Mother     Cataracts Father     Glaucoma Father     Heart disease Father     Hyperlipidemia Sister     Hypertension Sister     No Known Problems Daughter     No Known Problems Daughter     Collagen disease Neg Hx     Amblyopia Neg Hx     Blindness Neg Hx     Macular degeneration Neg Hx     Retinal detachment Neg Hx     Strabismus Neg Hx      Cancer Neg Hx     Colon cancer Neg Hx     Esophageal cancer Neg Hx     Stomach cancer Neg Hx     Crohn's disease Neg Hx     Ulcerative colitis Neg Hx        Review of patient's allergies indicates:   Allergen Reactions    Spironolactone Diarrhea       Medication List with Changes/Refills   Current Medications    ALBUTEROL (PROVENTIL/VENTOLIN HFA) 90 MCG/ACTUATION INHALER    INHALE 1 TO 2 PUFFS INTO THE LUNGS EVERY 6 HOURS AS NEEDED FOR WHEEZING OR SHORTNESS OF BREATH. FOR RESCUE.    APIXABAN (ELIQUIS) 5 MG TAB    Take 1 tablet (5 mg total) by mouth 2 (two) times daily.    APIXABAN (ELIQUIS) 5 MG TAB    Take 1 tablet (5 mg total) by mouth 2 (two) times daily.    ATORVASTATIN (LIPITOR) 80 MG TABLET    TAKE 1 TABLET EVERY DAY    CARVEDILOL (COREG) 25 MG TABLET    Take 1 tablet (25 mg total) by mouth 2 (two) times daily with meals.    CETIRIZINE (ZYRTEC) 10 MG TABLET    Take 1 tablet by mouth daily as needed.    CHLORTHALIDONE (HYGROTEN) 25 MG TAB    TAKE 1 TABLET ONE TIME DAILY    CHOLECALCIFEROL, VITAMIN D3, (VITAMIN D3) 50 MCG (2,000 UNIT) CAP    1 capsule once daily. Every day    COENZYME Q10 (CO Q-10) 100 MG CAPSULE    Take 400 mg by mouth once daily.    CYANOCOBALAMIN, VITAMIN B-12, 5,000 MCG SUBL    Take 5,000 mcg by mouth once daily. Every day    DIPHENOXYLATE-ATROPINE 2.5-0.025 MG (LOMOTIL) 2.5-0.025 MG PER TABLET    Take 1 tablet by mouth 4 (four) times daily as needed for Diarrhea.    EZETIMIBE (ZETIA) 10 MG TABLET    TAKE 1 TABLET EVERY DAY    FLUAD QUAD 2021-22,65Y UP,,PF, 60 MCG (15 MCG X 4)/0.5 ML SYRG        FLUTICASONE (FLONASE) 50 MCG/ACTUATION NASAL SPRAY    USE 1 SPRAY IN EACH NOSTRIL TWICE DAILY AS NEEDED  FOR  RHINITIS    GABAPENTIN (NEURONTIN) 600 MG TABLET    TAKE 1 TABLET THREE TIMES DAILY    HYDROCODONE-ACETAMINOPHEN (NORCO) 7.5-325 MG PER TABLET    Take 1 tablet by mouth every 6 (six) hours as needed for Pain.    LEVOTHYROXINE (SYNTHROID) 50 MCG TABLET    TAKE 1 TABLET EVERY DAY    MILK  THISTLE 200 MG CAP    Take 200 mg by mouth 2 (two) times daily. Twice a day    MUPIROCIN (BACTROBAN) 2 % OINTMENT    Apply topically 3 (three) times daily.    OLMESARTAN (BENICAR) 20 MG TABLET    TAKE 1 TABLET EVERY DAY    OMEGA-3 FATTY ACIDS 1,000 MG CAP    Twice a day    PANTOPRAZOLE (PROTONIX) 40 MG TABLET    TAKE 1 TABLET TWICE DAILY    POTASSIUM CHLORIDE SA (K-DUR,KLOR-CON) 20 MEQ TABLET    TAKE 4 TABLETS EVERY DAY OR AS DIRECTED    PREDNISONE (DELTASONE) 1 MG TABLET    TAKE 2 TABLETS EVERY DAY    PREDNISONE (DELTASONE) 5 MG TABLET    TAKE 1 TABLET EVERY DAY    SILDENAFIL (REVATIO) 20 MG TAB    Take 1 to 3 tabs daily as needed.    TIOTROPIUM (SPIRIVA WITH HANDIHALER) 18 MCG INHALATION CAPSULE    INHALE THE CONTENTS OF 1 CAPSULE EVERY DAY (CONTROLLER)       Review of Systems  Constitution: Denies chills, fever, and sweats.  HENT: Denies headaches or blurry vision.  Cardiovascular: Denies chest pain or irregular heart beat.  Respiratory: Denies cough or shortness of breath.  Gastrointestinal: Denies abdominal pain, nausea, or vomiting.  Musculoskeletal: Negative for leg swelling.  Neurological: Denies dizziness or focal weakness.  Psychiatric/Behavioral: Normal mental status.  Hematologic/Lymphatic: Denies bleeding problem or easy bruising/bleeding.  Skin: Denies rash or suspicious lesions    Physical Examination  Ht 6' (1.829 m)   Wt 107 kg (235 lb 14.3 oz)   BMI 31.99 kg/m²     Constitutional: No acute distress, conversant  HEENT: Sclera anicteric, Pupils equal, round and reactive to light, extraocular motions intact, Oropharynx clear  Neck: No JVD, no carotid bruits  Cardiovascular: regular rate and rhythm, no murmur, rubs or gallops, normal S1/S2  Pulmonary: Clear to auscultation bilaterally  Abdominal: Abdomen soft, nontender, nondistended, positive bowel sounds  Extremities: BLE's with 1+ pitting edema  Pulses:  Carotid pulses are 2+ on the right side, and 2+ on the left side.  Radial pulses are 2+ on the  right side, and 2+ on the left side.   Femoral pulses are 2+ on the right side, and 2+ on the left side.  Popliteal pulses are 2+ on the right side, and 2+ on the left side.   Dorsalis pedis pulses are 2+ on the right side, and 2+ on the left side.   Posterior tibial pulses are 2+ on the right side, and 2+ on the left side.    Skin: No ecchymosis, erythema, or ulcers  Psych: Alert and oriented x 3, appropriate affect  Neuro: CNII-XII intact, no focal deficits    Labs:  Most Recent Data  CBC:   Lab Results   Component Value Date    WBC 6.91 07/03/2023    HGB 11.9 (L) 07/03/2023    HCT 37.7 (L) 07/03/2023     (L) 07/03/2023    MCV 92 07/03/2023    RDW 13.5 07/03/2023     BMP:   Lab Results   Component Value Date     11/14/2023    K 3.7 11/14/2023     11/14/2023    CO2 32 (H) 11/14/2023    BUN 13 11/14/2023    CREATININE 1.0 11/14/2023     11/14/2023    CALCIUM 9.3 11/14/2023    MG 2.0 07/03/2023    PHOS 2.9 03/31/2020     LFTS;   Lab Results   Component Value Date    PROT 5.4 (L) 07/03/2023    ALBUMIN 3.1 (L) 07/03/2023    BILITOT 1.0 07/03/2023    AST 13 07/03/2023    ALKPHOS 68 07/03/2023    ALT 14 07/03/2023     COAGS:   Lab Results   Component Value Date    INR 1.1 07/02/2023     FLP:   Lab Results   Component Value Date    CHOL 131 02/22/2023    HDL 41 02/22/2023    LDLCALC 63.8 02/22/2023    TRIG 131 02/22/2023    CHOLHDL 31.3 02/22/2023     CARDIAC:   Lab Results   Component Value Date    TROPONINI <0.006 07/01/2023    BNP 98 07/01/2023       EKG 9/23/2021:  Normal sinus rhythm  Left axis deviation  Incomplete right bundle branch block    BLE Venous Ultrasound 11/1/2023    There is no evidence of a right lower extremity DVT.    Left superficial femoral proximal vein DVT.    Left superficial femoral middle vein DVT.    Left popliteal vein DVT/    BLE Venous Ultrasound 7/1/2023:  Extensive intraluminal thrombus involving the large veins of the left lower extremity extending into the  calf.    BLE Venous Reflux Study 2/10/2022:  No evidence of deep or superficial venous thrombosis bilaterally.  The right GSV demonstrates focal reflux below the knee.  Bilateral lower extremity varicosities are noted.    KATTY Study 2/10/2022:  Noncompressible ABIs bilaterally consistent with medial arterial calcification.  PVR waveforms are mildly abnormal bilaterally.    BLE Venous Ultrasound 1/10/2022:  Negative for DVT throughout bilateral lower extremities.    Echo 6/2/2021:  Severe left atrial enlargement.  The left ventricle is normal in size with concentric hypertrophy and normal systolic function.  The estimated ejection fraction is 65%.  Indeterminate left ventricular diastolic function.  Normal right ventricular size with normal right ventricular systolic function.  The estimated PA systolic pressure is 28 mmHg.  Normal central venous pressure (3 mmHg).    Assessment/Plan:  Maximilian Tavera Jr. is a 77 y.o. male with BLE lymphedema, venous insuffciency, HTN, HLD, myasthenia gravis on chronic prednisone therapy, degenerative joint disease with myelopathy s/p decompression and fusion of C2-6, chronic PVCs, who presents for a follow up appointment.       1. LLE DVT- Appear unprovoked.  LLE Venous Ultrasound on 7/1/2023 revealed Left thigh veins: There is occlusive and nonocclusive thrombus involving the common femoral, superficial femoral and popliteal veins.  Greater saphenous vein is patent without intraluminal thrombus. Left calf veins: There is occlusive thrombus within posterior tibial vein.  Anterior tibial and peroneal veins are patent without intraluminal thrombus. BLE Venous Ultrasound  on 11/1/2023 revealed is no evidence of a right lower extremity DVT. Left superficial femoral proximal vein DVT. Left superficial femoral middle vein DVT. Left popliteal vein DVT. Following Heme/Onc. Continue Eliquis 5 mg bid. Will continue Eliquis lifelong, we will also consider decrease the dose of eliquis  after total 12 months of full dose therapy.     2. BLE Lymphedema/Venous Insufficiency- Continue lymphedema clinic techniques at home and wear graduated compression hose.  Pt to limit sodium intake to 2,000 mg daily.  Limit volume intake to 1.5 liters daily.       3. HTN- Continue current medications.     4. Chronic PVC's- Stable.  Continue current medications and follow up with EP as scheduled.      5. HLD- LDL is at goal of <70 ( 63 on 2/22/2023 vs 69 on 12/9/2022).  Continue atorvastatin 80 mg daily.       Follow up in 7 months (July 2024) with BLE venous ultrasound prior      Mr. Tavera reports no pain in either legs, Swelling has improved. He also reports he has completed the Lymphedema therapy. He was admitted to [facility] with LLE edema and found to have extensive LLE DVT.  He was discharged on full dose anticoagulation with Eliquis. LLE Venous Ultrasound on 7/1/2023 revealed Left thigh veins: There is occlusive and nonocclusive thrombus involving the common femoral, superficial femoral and popliteal veins.  Greater saphenous vein is patent without intraluminal thrombus. Left calf veins: There is occlusive thrombus within posterior tibial vein.  Anterior tibial and peroneal veins are patent without intraluminal thrombus. BLE Venous Ultrasound  on 11/1/2023 revealed is no evidence of a right lower extremity DVT. Left superficial femoral proximal vein DVT. Left superficial femoral middle vein DVT. Left popliteal vein DVT    Total duration of face to face visit time 30 minutes.  Total time spent counseling greater than fifty percent of total visit time.  Counseling included discussion regarding imaging findings, diagnosis, possibilities, treatment options, risks and benefits.  The patient had many questions regarding the options and long-term effects.    Patient seen and discussed with Dr. Amezquita. Further recommendations per the attending addendum.    Ken Jha MD  PGY IV - Vascular Medicine Fellow    [facility]

## 2023-11-20 PROBLEM — I82.4Y9 ACUTE DEEP VEIN THROMBOSIS (DVT) OF PROXIMAL VEIN OF LOWER EXTREMITY: Status: ACTIVE | Noted: 2023-11-20

## 2023-12-12 DIAGNOSIS — R05.3 CHRONIC COUGH: ICD-10-CM

## 2023-12-14 RX ORDER — PROMETHAZINE HYDROCHLORIDE AND DEXTROMETHORPHAN HYDROBROMIDE 6.25; 15 MG/5ML; MG/5ML
5 SYRUP ORAL EVERY 6 HOURS PRN
Qty: 240 ML | Refills: 3 | Status: SHIPPED | OUTPATIENT
Start: 2023-12-14

## 2023-12-30 DIAGNOSIS — E03.4 HYPOTHYROIDISM DUE TO ACQUIRED ATROPHY OF THYROID: ICD-10-CM

## 2023-12-30 DIAGNOSIS — G70.00 MYASTHENIA GRAVIS, ACHR ANTIBODY POSITIVE: ICD-10-CM

## 2023-12-30 DIAGNOSIS — I10 HTN (HYPERTENSION), BENIGN: ICD-10-CM

## 2023-12-30 DIAGNOSIS — I35.9 AORTIC VALVE DISEASE: ICD-10-CM

## 2023-12-30 DIAGNOSIS — E78.2 MIXED HYPERLIPIDEMIA: Primary | ICD-10-CM

## 2023-12-30 DIAGNOSIS — R79.9 ABNORMAL FINDING OF BLOOD CHEMISTRY, UNSPECIFIED: ICD-10-CM

## 2023-12-31 NOTE — TELEPHONE ENCOUNTER
Care Due:                  Date            Visit Type   Department     Provider  --------------------------------------------------------------------------------                                EP -                              PRIMARY      [facility]  Last Visit: 06-      CARE ([facility])   MEDICINE       Brian WINSLOW                              FOLLOWUP/OF  Next Visit: 01-      FICE VISIT   None Found     Brian Marroquin                                                            Last  Test          Frequency    Reason                     Performed    Due Date  --------------------------------------------------------------------------------    Lipid Panel.  12 months..  atorvastatin.............  02- 02-    [facility] Embedded Care Due Messages. Reference number: [id number].   12/30/2023 7:14:56 PM CST

## 2024-01-02 DIAGNOSIS — G70.00 MYASTHENIA GRAVIS: ICD-10-CM

## 2024-01-02 DIAGNOSIS — R05.3 CHRONIC COUGH: ICD-10-CM

## 2024-01-02 RX ORDER — PREDNISONE 1 MG/1
2 TABLET ORAL DAILY
Qty: 180 TABLET | Refills: 3 | Status: CANCELLED | OUTPATIENT
Start: 2024-01-02 | End: 2025-01-01

## 2024-01-02 RX ORDER — PREDNISONE 5 MG/1
5 TABLET ORAL DAILY
Qty: 90 TABLET | Refills: 3 | Status: CANCELLED | OUTPATIENT
Start: 2024-01-02

## 2024-01-02 RX ORDER — OLMESARTAN MEDOXOMIL 20 MG/1
20 TABLET ORAL DAILY
Qty: 90 TABLET | Refills: 1 | Status: SHIPPED | OUTPATIENT
Start: 2024-01-02

## 2024-01-02 RX ORDER — PROMETHAZINE HYDROCHLORIDE AND DEXTROMETHORPHAN HYDROBROMIDE 6.25; 15 MG/5ML; MG/5ML
5 SYRUP ORAL EVERY 6 HOURS PRN
Qty: 240 ML | Refills: 3 | OUTPATIENT
Start: 2024-01-02

## 2024-01-02 RX ORDER — PREDNISONE 1 MG/1
2 TABLET ORAL DAILY
Qty: 180 TABLET | Refills: 3 | Status: SHIPPED | OUTPATIENT
Start: 2024-01-02 | End: 2024-01-12 | Stop reason: SDUPTHER

## 2024-01-02 RX ORDER — PREDNISONE 5 MG/1
5 TABLET ORAL DAILY
Qty: 90 TABLET | Refills: 3 | Status: SHIPPED | OUTPATIENT
Start: 2024-01-02 | End: 2024-01-12 | Stop reason: SDUPTHER

## 2024-01-02 NOTE — TELEPHONE ENCOUNTER
Refill Routing Note   Medication(s) are not appropriate for processing by [facility] for the following reason(s):        ED/Hospital Visit since last OV with provider  Required vitals abnormal: BP (!) 141/73     [facility] action(s):  Defer     Requires labs : Yes    Medication Therapy Plan:  Labs (Lipid panel) due 2.18.2024, Pended labs utilizing BestPracticeAdvisor (BPA) tab due to extra training from LPN      Appointments  past 12m or future 3m with PCP    Date Provider   Last Visit   6/15/2023 Brian Marroquin MD   Next Visit   1/5/2024 Brian Marroquin MD   ED visits in past 90 days: 0        Note composed:3:54 PM 01/02/2024

## 2024-01-05 ENCOUNTER — OFFICE VISIT (OUTPATIENT)
Dept: PRIMARY CARE CLINIC | Facility: CLINIC | Age: 79
End: 2024-01-05
Payer: MEDICARE

## 2024-01-05 VITALS
DIASTOLIC BLOOD PRESSURE: 68 MMHG | HEART RATE: 63 BPM | HEIGHT: 72 IN | OXYGEN SATURATION: 96 % | SYSTOLIC BLOOD PRESSURE: 124 MMHG | TEMPERATURE: 98 F | WEIGHT: 239.19 LBS | BODY MASS INDEX: 32.4 KG/M2

## 2024-01-05 DIAGNOSIS — J43.8 OTHER EMPHYSEMA: ICD-10-CM

## 2024-01-05 DIAGNOSIS — D69.6 THROMBOCYTOPENIA: ICD-10-CM

## 2024-01-05 DIAGNOSIS — I89.0 LYMPHEDEMA OF BOTH LOWER EXTREMITIES: ICD-10-CM

## 2024-01-05 DIAGNOSIS — R05.2 SUBACUTE COUGH: ICD-10-CM

## 2024-01-05 DIAGNOSIS — I47.29 NON-SUSTAINED VENTRICULAR TACHYCARDIA: ICD-10-CM

## 2024-01-05 DIAGNOSIS — E03.4 HYPOTHYROIDISM DUE TO ACQUIRED ATROPHY OF THYROID: ICD-10-CM

## 2024-01-05 DIAGNOSIS — G95.9 DISEASE OF SPINAL CORD, UNSPECIFIED: ICD-10-CM

## 2024-01-05 DIAGNOSIS — I82.432 ACUTE DEEP VEIN THROMBOSIS (DVT) OF POPLITEAL VEIN OF LEFT LOWER EXTREMITY: Primary | ICD-10-CM

## 2024-01-05 DIAGNOSIS — G70.00 MYASTHENIA GRAVIS, ACHR ANTIBODY POSITIVE: ICD-10-CM

## 2024-01-05 DIAGNOSIS — I49.3 PVC (PREMATURE VENTRICULAR CONTRACTION): ICD-10-CM

## 2024-01-05 PROCEDURE — 99999 PR PBB SHADOW E&M-EST. PATIENT-LVL III: CPT | Mod: PBBFAC,HCNC,, | Performed by: FAMILY MEDICINE

## 2024-01-05 PROCEDURE — 99215 OFFICE O/P EST HI 40 MIN: CPT | Mod: 25,HCNC,S$GLB, | Performed by: FAMILY MEDICINE

## 2024-01-05 PROCEDURE — 90677 PCV20 VACCINE IM: CPT | Mod: HCNC,S$GLB,, | Performed by: FAMILY MEDICINE

## 2024-01-05 PROCEDURE — G0009 ADMIN PNEUMOCOCCAL VACCINE: HCPCS | Mod: HCNC,S$GLB,, | Performed by: FAMILY MEDICINE

## 2024-01-05 PROCEDURE — 1159F MED LIST DOCD IN RCRD: CPT | Mod: HCNC,CPTII,S$GLB, | Performed by: FAMILY MEDICINE

## 2024-01-05 PROCEDURE — 1157F ADVNC CARE PLAN IN RCRD: CPT | Mod: HCNC,CPTII,S$GLB, | Performed by: FAMILY MEDICINE

## 2024-01-05 PROCEDURE — 3074F SYST BP LT 130 MM HG: CPT | Mod: HCNC,CPTII,S$GLB, | Performed by: FAMILY MEDICINE

## 2024-01-05 PROCEDURE — 3288F FALL RISK ASSESSMENT DOCD: CPT | Mod: HCNC,CPTII,S$GLB, | Performed by: FAMILY MEDICINE

## 2024-01-05 PROCEDURE — 3078F DIAST BP <80 MM HG: CPT | Mod: HCNC,CPTII,S$GLB, | Performed by: FAMILY MEDICINE

## 2024-01-05 PROCEDURE — 1125F AMNT PAIN NOTED PAIN PRSNT: CPT | Mod: HCNC,CPTII,S$GLB, | Performed by: FAMILY MEDICINE

## 2024-01-05 PROCEDURE — 1101F PT FALLS ASSESS-DOCD LE1/YR: CPT | Mod: HCNC,CPTII,S$GLB, | Performed by: FAMILY MEDICINE

## 2024-01-05 RX ORDER — FAMOTIDINE 20 MG/1
20 TABLET, FILM COATED ORAL NIGHTLY PRN
Qty: 90 TABLET | Refills: 3 | Status: SHIPPED | OUTPATIENT
Start: 2024-01-05 | End: 2025-01-04

## 2024-01-05 NOTE — PATIENT INSTRUCTIONS
DASH diet for Hypertension and Healthy Eating  Provided by the [facility]    Healthy Lifestyle   Nutrition and healthy eating  The DASH diet emphasizes portion size, eating a variety of foods and getting the right amount of nutrients. Discover how DASH can improve your health and lower your blood pressure.  By [facility] Staff   DASH stands for Dietary Approaches to Stop Hypertension. The DASH diet is a lifelong approach to healthy eating that's designed to help treat or prevent high blood pressure (hypertension). The DASH diet encourages you to reduce the sodium in your diet and eat a variety of foods rich in nutrients that help lower blood pressure, such as potassium, calcium and magnesium.  By following the DASH diet, you may be able to reduce your blood pressure by a few points in just two weeks. Over time, your systolic blood pressure could drop by eight to 14 points, which can make a significant difference in your health risks.  Because the DASH diet is a healthy way of eating, it offers health benefits besides just lowering blood pressure. The DASH diet is also in line with dietary recommendations to prevent osteoporosis, cancer, heart disease, stroke and diabetes.  The DASH diet emphasizes vegetables, fruits and low-fat dairy foods -- and moderate amounts of whole grains, fish, poultry and nuts.  In addition to the standard DASH diet, there is also a lower sodium version of the diet. You can choose the version of the diet that meets your health needs:  Standard DASH diet. You can consume up to 2,300 milligrams (mg) of sodium a day.   Lower sodium DASH diet. You can consume up to 1,500 mg of sodium a day.  Both versions of the DASH diet aim to reduce the amount of sodium in your diet compared with what you might get in a typical American diet, which can amount to a whopping 3,400 mg of sodium a day or more.  The standard DASH diet meets the recommendation from the Dietary Guidelines for Americans to keep  "daily sodium intake to less than 2,300 mg a day.  The American Heart Association recommends 1,500 mg a day of sodium as an upper limit for all adults. If you aren't sure what sodium level is right for you, talk to your doctor.  Both versions of the DASH diet include lots of whole grains, fruits, vegetables and low-fat dairy products. The DASH diet also includes some fish, poultry and legumes, and encourages a small amount of nuts and seeds a few times a week.   You can eat red meat, sweets and fats in small amounts. The DASH diet is low in saturated fat, cholesterol and total fat.  Here's a look at the recommended servings from each food group for the 2,932-mbilxuy-s-day DASH diet.  Grains: 6 to 8 servings a day  Grains include bread, cereal, rice and pasta. Examples of one serving of grains include 1 slice whole-wheat bread, 1 ounce dry cereal, or 1/2 cup cooked cereal, rice or pasta.  Focus on whole grains because they have more fiber and nutrients than do refined grains. For instance, use brown rice instead of white rice, whole-wheat pasta instead of regular pasta and whole-grain bread instead of white bread. Look for products labeled "100 percent whole grain" or "100 percent whole wheat."   Grains are naturally low in fat. Keep them this way by avoiding butter, cream and cheese sauces.  Vegetables: 4 to 5 servings a day  Tomatoes, carrots, broccoli, sweet potatoes, greens and other vegetables are full of fiber, vitamins, and such minerals as potassium and magnesium. Examples of one serving include 1 cup raw leafy green vegetables or 1/2 cup cut-up raw or cooked vegetables.  Don't think of vegetables only as side dishes -- a hearty blend of vegetables served over brown rice or whole-wheat noodles can serve as the main dish for a meal.   Fresh and frozen vegetables are both good choices. When buying frozen and canned vegetables, choose those labeled as low sodium or without added salt.   To increase the number of " servings you fit in daily, be creative. In a stir-wang, for instance, cut the amount of meat in half and double up on the vegetables.  Fruits: 4 to 5 servings a day  Many fruits need little preparation to become a healthy part of a meal or snack. Like vegetables, they're packed with fiber, potassium and magnesium and are typically low in fat -- coconuts are an exception. Examples of one serving include one medium fruit, 1/2 cup fresh, frozen or canned fruit, or 4 ounces of juice.  Have a piece of fruit with meals and one as a snack, then round out your day with a dessert of fresh fruits topped with a dollop of low-fat yogurt.   Leave on edible peels whenever possible. The peels of apples, pears and most fruits with pits add interesting texture to recipes and contain healthy nutrients and fiber.   Remember that citrus fruits and juices, such as grapefruit, can interact with certain medications, so check with your doctor or pharmacist to see if they're OK for you.   If you choose canned fruit or juice, make sure no sugar is added.  Dairy: 2 to 3 servings a day  Milk, yogurt, cheese and other dairy products are major sources of calcium, vitamin D and protein. But the key is to make sure that you choose dairy products that are low fat or fat-free because otherwise they can be a major source of fat -- and most of it is saturated. Examples of one serving include 1 cup skim or 1 percent milk, 1 cup low fat yogurt, or 1 1/2 ounces part-skim cheese.  Low-fat or fat-free frozen yogurt can help you boost the amount of dairy products you eat while offering a sweet treat. Add fruit for a healthy twist.   If you have trouble digesting dairy products, choose lactose-free products or consider taking an over-the-counter product that contains the enzyme lactase, which can reduce or prevent the symptoms of lactose intolerance.   Go easy on regular and even fat-free cheeses because they are typically high in sodium.  Lean meat, poultry  and fish: 6 servings or fewer a day  Meat can be a rich source of protein, B vitamins, iron and zinc. Choose lean varieties and aim for no more than 6 ounces a day. Cutting back on your meat portion will allow room for more vegetables.  Trim away skin and fat from poultry and meat and then bake, broil, grill or roast instead of frying in fat.   Eat heart-healthy fish, such as salmon, herring and tuna. These types of fish are high in omega-3 fatty acids, which can help lower your total cholesterol.  Nuts, seeds and legumes: 4 to 5 servings a week  Almonds, sunflower seeds, kidney beans, peas, lentils and other foods in this family are good sources of magnesium, potassium and protein. They're also full of fiber and phytochemicals, which are plant compounds that may protect against some cancers and cardiovascular disease.  Serving sizes are small and are intended to be consumed only a few times a week because these foods are high in calories. Examples of one serving include 1/3 cup nuts, 2 tablespoons seeds, or 1/2 cup cooked beans or peas.   Nuts sometimes get a bad rap because of their fat content, but they contain healthy types of fat -- monounsaturated fat and omega-3 fatty acids. They're high in calories, however, so eat them in moderation. Try adding them to stir-fries, salads or cereals.   Soybean-based products, such as tofu and tempeh, can be a good alternative to meat because they contain all of the amino acids your body needs to make a complete protein, just like meat.  Fats and oils: 2 to 3 servings a day  Fat helps your body absorb essential vitamins and helps your body's immune system. But too much fat increases your risk of heart disease, diabetes and obesity. The DASH diet strives for a healthy balance by limiting total fat to less than 30 percent of daily calories from fat, with a focus on the healthier monounsaturated fats.  Examples of one serving include 1 teaspoon soft margarine, 1 tablespoon  mayonnaise or 2 tablespoons salad dressing.  Saturated fat and trans fat are the main dietary culprits in increasing your risk of coronary artery disease. DASH helps keep your daily saturated fat to less than 6 percent of your total calories by limiting use of meat, butter, cheese, whole milk, cream and eggs in your diet, along with foods made from lard, solid shortenings, and palm and coconut oils.   Avoid trans fat, commonly found in such processed foods as crackers, baked goods and fried items.   Read food labels on margarine and salad dressing so that you can choose those that are lowest in saturated fat and free of trans fat.  Sweets: 5 servings or fewer a week  You don't have to banish sweets entirely while following the DASH diet -- just go easy on them. Examples of one serving include 1 tablespoon sugar, jelly or jam, 1/2 cup sorbet, or 1 cup lemonade.  When you eat sweets, choose those that are fat-free or low-fat, such as sorbets, fruit ices, jelly beans, hard candy, antoine crackers or low-fat cookies.   Artificial sweeteners such as aspartame (NutraSweet, Equal) and sucralose (Splenda) may help satisfy your sweet tooth while sparing the sugar. But remember that you still must use them sensibly. It's OK to swap a diet cola for a regular cola, but not in place of a more nutritious beverage such as low-fat milk or even plain water.   Cut back on added sugar, which has no nutritional value but can pack on calories.  Drinking too much alcohol can increase blood pressure. The Dietary Guidelines for Americans recommends that men limit alcohol to no more than two drinks a day and women to one or less.  The DASH diet doesn't address caffeine consumption. The influence of caffeine on blood pressure remains unclear. But caffeine can cause your blood pressure to rise at least temporarily. If you already have high blood pressure or if you think caffeine is affecting your blood pressure, talk to your doctor about your  "caffeine consumption.  While the DASH diet is not a weight-loss program, you may indeed lose unwanted pounds because it can help guide you toward healthier food choices.  The DASH diet generally includes about 2,000 calories a day. If you're trying to lose weight, you may need to eat fewer calories. You may also need to adjust your serving goals based on your individual circumstances -- something your health care team can help you decide.  The foods at the core of the DASH diet are naturally low in sodium. So just by following the DASH diet, you're likely to reduce your sodium intake. You also reduce sodium further by:  Using sodium-free spices or flavorings with your food instead of salt   Not adding salt when cooking rice, pasta or hot cereal   Rinsing canned foods to remove some of the sodium   Buying foods labeled "no salt added," "sodium-free," "low sodium" or "very low sodium"  One teaspoon of table salt has 2,325 mg of sodium. When you read food labels, you may be surprised at just how much sodium some processed foods contain. Even low-fat soups, canned vegetables, ready-to-eat cereals and sliced turkey from the local deli -- foods you may have considered healthy -- often have lots of sodium.  You may notice a difference in taste when you choose low-sodium food and beverages. If things seem too bland, gradually introduce low-sodium foods and cut back on table salt until you reach your sodium goal. That'll give your palate time to adjust.  Using salt-free seasoning blends or herbs and spices may also ease the transition. It can take several weeks for your taste buds to get used to less salty foods.  Try these strategies to get started on the DASH diet:   Change gradually. If you now eat only one or two servings of fruits or vegetables a day, try to add a serving at lunch and one at dinner. Rather than switching to all whole grains, start by making one or two of your grain servings whole grains. Increasing " fruits, vegetables and whole grains gradually can also help prevent bloating or diarrhea that may occur if you aren't used to eating a diet with lots of fiber. You can also try over-the-counter products to help reduce gas from beans and vegetables.   Reward successes and forgive slip-ups. Reward yourself with a nonfood treat for your accomplishments -- rent a movie, purchase a book or get together with a friend. Everyone slips, especially when learning something new. Remember that changing your lifestyle is a long-term process. Find out what triggered your setback and then just  where you left off with the DASH diet.   Add physical activity. To boost your blood pressure lowering efforts even more, consider increasing your physical activity in addition to following the DASH diet. Combining both the DASH diet and physical activity makes it more likely that you'll reduce your blood pressure.   Get support if you need it. If you're having trouble sticking to your diet, talk to your doctor or dietitian about it. You might get some tips that will help you stick to the DASH diet.  Remember, healthy eating isn't an all-or-nothing proposition. What's most important is that, on average, you eat healthier foods with plenty of variety -- both to keep your diet nutritious and to avoid boredom or extremes. And with the DASH diet, you can have both.

## 2024-01-05 NOTE — PROGRESS NOTES
Subjective:       Patient ID: Maximilian Tavera Jr. is a 78 y.o. male.    Chief Complaint: Follow-up (labs)    Mr. Tavera is a 77 year old male with HTN, myasthenia gravis,GERD, COPD and hypothyroidism who presents to clinic for follow up of chronic cough over 4 months. The patient patient had no acute findings on CHEST X- RAY and BNP was normal. The patient was previously evaluated by pulmonology.     Also Rt popliteal DVT last 4 months, non provoke. Eliquis 5 mg bid. Improved swelling and less pain.  Acute deep vein thrombosis (dvt) of popliteal vein of left lower extremity  (primary encounter diagnosis)  Lymphedema of both lower extremities  Hypothyroidism due to acquired atrophy of thyroid  Pvc (premature ventricular contraction)  Other emphysema  Subacute cough  Disease of spinal cord, unspecified  Myasthenia gravis, achr antibody positive  Non-sustained ventricular tachycardia  Thrombocytopenia      Follow-up  This is a chronic problem. Associated symptoms include arthralgias and coughing (chronic cough). Pertinent negatives include no abdominal pain, chest pain, fever, headaches or myalgias. The symptoms are aggravated by eating and exertion. He has tried acetaminophen and position changes for the symptoms. The treatment provided mild relief.     Review of patient's allergies indicates:   Allergen Reactions    Spironolactone Diarrhea         Current Outpatient Medications:     apixaban (ELIQUIS) 5 mg Tab, Take 1 tablet (5 mg total) by mouth 2 (two) times daily., Disp: 60 tablet, Rfl: 11    atorvastatin (LIPITOR) 80 MG tablet, TAKE 1 TABLET EVERY DAY, Disp: 90 tablet, Rfl: 2    carvediloL (COREG) 25 MG tablet, Take 1 tablet (25 mg total) by mouth 2 (two) times daily with meals., Disp: 180 tablet, Rfl: 3    chlorthalidone (HYGROTEN) 25 MG Tab, TAKE 1 TABLET ONE TIME DAILY, Disp: 90 tablet, Rfl: 3    cholecalciferol, vitamin D3, (VITAMIN D3) 50 mcg (2,000 unit) Cap, 1 capsule once daily. Every day, Disp: ,  Rfl:     coenzyme Q10 (CO Q-10) 100 mg capsule, Take 400 mg by mouth once daily., Disp: , Rfl:     cyanocobalamin, vitamin B-12, 5,000 mcg Subl, Take 5,000 mcg by mouth once daily. Every day, Disp: , Rfl:     ezetimibe (ZETIA) 10 mg tablet, TAKE 1 TABLET EVERY DAY, Disp: 90 tablet, Rfl: 3    gabapentin (NEURONTIN) 600 MG tablet, TAKE 1 TABLET THREE TIMES DAILY, Disp: 90 tablet, Rfl: 10    levothyroxine (SYNTHROID) 50 MCG tablet, TAKE 1 TABLET EVERY DAY, Disp: 90 tablet, Rfl: 0    milk thistle 200 mg Cap, Take 200 mg by mouth 2 (two) times daily. Twice a day, Disp: , Rfl:     olmesartan (BENICAR) 20 MG tablet, Take 1 tablet (20 mg total) by mouth once daily., Disp: 90 tablet, Rfl: 1    omega-3 fatty acids 1,000 mg Cap, Twice a day, Disp: , Rfl:     pantoprazole (PROTONIX) 40 MG tablet, TAKE 1 TABLET TWICE DAILY, Disp: 180 tablet, Rfl: 2    potassium chloride SA (K-DUR,KLOR-CON) 20 MEQ tablet, TAKE 4 TABLETS EVERY DAY OR AS DIRECTED, Disp: 360 tablet, Rfl: 3    predniSONE (DELTASONE) 1 MG tablet, Take 2 tablets (2 mg total) by mouth once daily., Disp: 180 tablet, Rfl: 3    predniSONE (DELTASONE) 5 MG tablet, Take 1 tablet (5 mg total) by mouth once daily., Disp: 90 tablet, Rfl: 3    albuterol (PROVENTIL/VENTOLIN HFA) 90 mcg/actuation inhaler, INHALE 1 TO 2 PUFFS INTO THE LUNGS EVERY 6 HOURS AS NEEDED FOR WHEEZING OR SHORTNESS OF BREATH. FOR RESCUE., Disp: 25.5 g, Rfl: 0    apixaban (ELIQUIS) 5 mg Tab, Take 1 tablet (5 mg total) by mouth 2 (two) times daily., Disp: 60 tablet, Rfl: 11    cetirizine (ZYRTEC) 10 MG tablet, Take 1 tablet by mouth daily as needed., Disp: , Rfl:     diphenoxylate-atropine 2.5-0.025 mg (LOMOTIL) 2.5-0.025 mg per tablet, Take 1 tablet by mouth 4 (four) times daily as needed for Diarrhea., Disp: 90 tablet, Rfl: 0    famotidine (PEPCID) 20 MG tablet, Take 1 tablet (20 mg total) by mouth nightly as needed for Heartburn., Disp: 90 tablet, Rfl: 3    FLUAD QUAD 2021-22,65Y UP,,PF, 60 mcg (15 mcg x  4)/0.5 mL Syrg, , Disp: , Rfl:     fluticasone (FLONASE) 50 mcg/actuation nasal spray, USE 1 SPRAY IN EACH NOSTRIL TWICE DAILY AS NEEDED  FOR  RHINITIS, Disp: 48 g, Rfl: 3    HYDROcodone-acetaminophen (NORCO) 7.5-325 mg per tablet, Take 1 tablet by mouth every 6 (six) hours as needed for Pain., Disp: 28 tablet, Rfl: 0    mupirocin (BACTROBAN) 2 % ointment, Apply topically 3 (three) times daily., Disp: 30 g, Rfl: 0    promethazine-dextromethorphan (PROMETHAZINE-DM) 6.25-15 mg/5 mL Syrp, TAKE 5 MLS BY MOUTH EVERY 6 HOURS AS NEEDED, Disp: 240 mL, Rfl: 3    sildenafil (REVATIO) 20 mg Tab, Take 1 to 3 tabs daily as needed., Disp: 30 tablet, Rfl: 3    tiotropium (SPIRIVA WITH HANDIHALER) 18 mcg inhalation capsule, INHALE THE CONTENTS OF 1 CAPSULE EVERY DAY (CONTROLLER), Disp: 90 capsule, Rfl: 10    Lab Results   Component Value Date    WBC 6.91 07/03/2023    HGB 11.9 (L) 07/03/2023    HCT 37.7 (L) 07/03/2023     (L) 07/03/2023    CHOL 131 02/22/2023    TRIG 131 02/22/2023    HDL 41 02/22/2023    ALT 14 07/03/2023    AST 13 07/03/2023     11/14/2023    K 3.7 11/14/2023     11/14/2023    CREATININE 1.0 11/14/2023    BUN 13 11/14/2023    CO2 32 (H) 11/14/2023    TSH 1.253 02/22/2023    PSA 3.5 09/19/2019    INR 1.1 07/02/2023    HGBA1C 6.2 (H) 06/15/2023       Review of Systems   Constitutional:  Positive for unexpected weight change. Negative for activity change, appetite change and fever.   HENT:  Negative for postnasal drip, rhinorrhea and sinus pressure.    Eyes:  Negative for visual disturbance.   Respiratory:  Positive for cough (chronic cough) and shortness of breath.    Cardiovascular:  Positive for leg swelling. Negative for chest pain.   Gastrointestinal:  Negative for abdominal distention and abdominal pain.   Genitourinary:  Negative for difficulty urinating and dysuria.   Musculoskeletal:  Positive for arthralgias and back pain. Negative for myalgias.   Skin:  Positive for pallor.    Neurological:  Negative for headaches.   Hematological:  Negative for adenopathy.   Psychiatric/Behavioral:  The patient is not nervous/anxious.        Objective:      Physical Exam  Constitutional:       Appearance: He is obese.   HENT:      Head: Normocephalic and atraumatic.   Eyes:      Conjunctiva/sclera: Conjunctivae normal.   Cardiovascular:      Rate and Rhythm: Normal rate and regular rhythm.   Pulmonary:      Effort: Pulmonary effort is normal. No respiratory distress.      Breath sounds: Normal breath sounds. No wheezing.   Abdominal:      General: There is no distension.      Palpations: There is no mass.      Tenderness: There is no abdominal tenderness.   Musculoskeletal:      Cervical back: Decreased range of motion.      Thoracic back: Decreased range of motion.      Lumbar back: Bony tenderness present. Decreased range of motion.      Right lower leg: No edema.      Left lower leg: No edema.   Skin:     Findings: No erythema.   Neurological:      Mental Status: He is alert and oriented to person, place, and time.   Psychiatric:         Behavior: Behavior normal.         Assessment:       1. Acute deep vein thrombosis (DVT) of popliteal vein of left lower extremity    2. Lymphedema of both lower extremities    3. Hypothyroidism due to acquired atrophy of thyroid    4. PVC (premature ventricular contraction)    5. Other emphysema    6. Subacute cough    7. Disease of spinal cord, unspecified    8. Myasthenia gravis, AChR antibody positive    9. Non-sustained ventricular tachycardia    10. Thrombocytopenia          Plan:   Acute deep vein thrombosis (DVT) of popliteal vein of left lower extremity  -     US Lower Extremity Veins Left; Future; Expected date: 01/05/2024    Lymphedema of both lower extremities    Hypothyroidism due to acquired atrophy of thyroid  -     TSH; Future; Expected date: 01/05/2024  -     T4, FREE; Future; Expected date: 01/05/2024    PVC (premature ventricular  contraction)    Other emphysema  -     (In Office Administered) Pneumococcal Conjugate Vaccine (20 Valent) (IM) (Preferred)    Subacute cough  -     famotidine (PEPCID) 20 MG tablet; Take 1 tablet (20 mg total) by mouth nightly as needed for Heartburn.  Dispense: 90 tablet; Refill: 3    Disease of spinal cord, unspecified    Myasthenia gravis, AChR antibody positive    Non-sustained ventricular tachycardia    Thrombocytopenia  -     CBC Auto Differential; Future; Expected date: 01/05/2024        Aortic valve disease    DDD (degenerative disc disease), lumbar  -     gabapentin (NEURONTIN) 300 MG capsule; Take 2 capsules (600 mg total) by mouth every evening.  Dispense: 180 capsule; Refill: 2    HTN (hypertension), benign  -     gabapentin (NEURONTIN) 300 MG capsule; Take 2 capsules (600 mg total) by mouth every evening.  Dispense: 180 capsule; Refill: 2    Myasthenia gravis    Thrombocytopenia    Lymphedema of both lower extremities  Acquired hyperbilirubinemia  -     L       Diagnoses and all orders for this visit:    HTN (hypertension), benign  Controlled  Low salt diet  Continue current medication  Chronic cough  -     GERD.  Improving  If continued improvement/ resolution does not occur, please contact clinic for additional evaluation.  Myasthenia gravis, AChR antibody positive under prednisone  stable  COPD with asthma  Stable with Spiriva.   Aortic valve disease, no signs of CHF  Follow up with cardiology  Mixed hyperlipidemia  High fiber diet  Continue current medication.     Discussed health maintenance guidelines appropriate for age. face to face encounter. .Discussed health maintenance guidelines appropriate for age.

## 2024-01-11 ENCOUNTER — PATIENT MESSAGE (OUTPATIENT)
Dept: CARDIOLOGY | Facility: CLINIC | Age: 79
End: 2024-01-11
Payer: MEDICARE

## 2024-01-11 ENCOUNTER — PATIENT MESSAGE (OUTPATIENT)
Dept: NEUROLOGY | Facility: CLINIC | Age: 79
End: 2024-01-11
Payer: MEDICARE

## 2024-01-12 DIAGNOSIS — G70.00 MYASTHENIA GRAVIS: ICD-10-CM

## 2024-01-12 RX ORDER — PREDNISONE 5 MG/1
5 TABLET ORAL DAILY
Qty: 90 TABLET | Refills: 3 | Status: SHIPPED | OUTPATIENT
Start: 2024-01-12

## 2024-01-12 RX ORDER — PREDNISONE 1 MG/1
2 TABLET ORAL DAILY
Qty: 180 TABLET | Refills: 3 | Status: SHIPPED | OUTPATIENT
Start: 2024-01-12 | End: 2025-01-11

## 2024-01-16 ENCOUNTER — TELEPHONE (OUTPATIENT)
Dept: NEUROLOGY | Facility: CLINIC | Age: 79
End: 2024-01-16
Payer: MEDICARE

## 2024-01-16 DIAGNOSIS — E87.6 HYPOKALEMIA: ICD-10-CM

## 2024-01-16 DIAGNOSIS — E78.2 MIXED HYPERLIPIDEMIA: Primary | ICD-10-CM

## 2024-01-16 DIAGNOSIS — I65.23 BILATERAL CAROTID ARTERY STENOSIS: ICD-10-CM

## 2024-01-16 DIAGNOSIS — Z00.00 PHYSICAL EXAM: ICD-10-CM

## 2024-01-16 DIAGNOSIS — I70.0 ABDOMINAL AORTIC ATHEROSCLEROSIS: ICD-10-CM

## 2024-01-16 DIAGNOSIS — I10 HTN (HYPERTENSION), BENIGN: ICD-10-CM

## 2024-01-16 NOTE — TELEPHONE ENCOUNTER
----- Message from Regi Brito sent at 1/16/2024  5:15 PM CST -----  Type:  Needs Medical Advice    Who Called: pt   Symptoms (please be specific): MG condition  How long has patient had these symptoms:   n/a     Would the patient rather a call back or a response via [facility]? call  Best Call Back Number:  [phone]  Additional Information: Preferred Date Range: 1/23/2024 - 2/13/2024 pt is requesting to be seen by this provider

## 2024-01-16 NOTE — TELEPHONE ENCOUNTER
Hi,  I scheduled you w. dr Altamirano on 4/2 (+ EKG) w. labs and echo on 3/28. You need to fast for 10 hours before your lab work.

## 2024-01-24 ENCOUNTER — TELEPHONE (OUTPATIENT)
Dept: PRIMARY CARE CLINIC | Facility: CLINIC | Age: 79
End: 2024-01-24
Payer: MEDICARE

## 2024-01-24 NOTE — TELEPHONE ENCOUNTER
----- Message from Harpreet Patel sent at 1/24/2024 12:39 PM CST -----  Contact: pt  Type: Needs Medical Advice  Who Called:  pt  Best Call Back Number: [phone]    Additional Information: Pt is calling the office needs to schedule annual wellness pt says he does appt with np.Please call back and advise.

## 2024-02-01 ENCOUNTER — OFFICE VISIT (OUTPATIENT)
Dept: NEUROLOGY | Facility: CLINIC | Age: 79
End: 2024-02-01
Payer: MEDICARE

## 2024-02-01 VITALS
DIASTOLIC BLOOD PRESSURE: 74 MMHG | BODY MASS INDEX: 31.79 KG/M2 | WEIGHT: 239.88 LBS | HEART RATE: 61 BPM | SYSTOLIC BLOOD PRESSURE: 139 MMHG | HEIGHT: 73 IN

## 2024-02-01 DIAGNOSIS — Z79.52 CURRENT CHRONIC USE OF SYSTEMIC STEROIDS: Chronic | ICD-10-CM

## 2024-02-01 DIAGNOSIS — G70.00 MYASTHENIA GRAVIS, ACHR ANTIBODY POSITIVE: Primary | ICD-10-CM

## 2024-02-01 PROCEDURE — 3078F DIAST BP <80 MM HG: CPT | Mod: HCNC,CPTII,S$GLB, | Performed by: PSYCHIATRY & NEUROLOGY

## 2024-02-01 PROCEDURE — 3075F SYST BP GE 130 - 139MM HG: CPT | Mod: HCNC,CPTII,S$GLB, | Performed by: PSYCHIATRY & NEUROLOGY

## 2024-02-01 PROCEDURE — 1101F PT FALLS ASSESS-DOCD LE1/YR: CPT | Mod: HCNC,CPTII,S$GLB, | Performed by: PSYCHIATRY & NEUROLOGY

## 2024-02-01 PROCEDURE — 99999 PR PBB SHADOW E&M-EST. PATIENT-LVL II: CPT | Mod: PBBFAC,HCNC,, | Performed by: PSYCHIATRY & NEUROLOGY

## 2024-02-01 PROCEDURE — 1157F ADVNC CARE PLAN IN RCRD: CPT | Mod: HCNC,CPTII,S$GLB, | Performed by: PSYCHIATRY & NEUROLOGY

## 2024-02-01 PROCEDURE — 1159F MED LIST DOCD IN RCRD: CPT | Mod: HCNC,CPTII,S$GLB, | Performed by: PSYCHIATRY & NEUROLOGY

## 2024-02-01 PROCEDURE — 1125F AMNT PAIN NOTED PAIN PRSNT: CPT | Mod: HCNC,CPTII,S$GLB, | Performed by: PSYCHIATRY & NEUROLOGY

## 2024-02-01 PROCEDURE — 3288F FALL RISK ASSESSMENT DOCD: CPT | Mod: HCNC,CPTII,S$GLB, | Performed by: PSYCHIATRY & NEUROLOGY

## 2024-02-01 PROCEDURE — 1160F RVW MEDS BY RX/DR IN RCRD: CPT | Mod: HCNC,CPTII,S$GLB, | Performed by: PSYCHIATRY & NEUROLOGY

## 2024-02-01 PROCEDURE — 99214 OFFICE O/P EST MOD 30 MIN: CPT | Mod: HCNC,S$GLB,, | Performed by: PSYCHIATRY & NEUROLOGY

## 2024-02-01 NOTE — PROGRESS NOTES
Subjective:     Chief Complaint:  AChR Antibody Positive MG    Interval History 02/01/2024    I have reviewed all relevant history in Epic. The patient presents for routine follow up regarding AChR antibody positive MG. He is currently managed with prednisone 7mg QD and does not use mestinon for relief finds this does not provide benefit. Of note the patient was hospitalized in June 2023 due to DVT which was resolved with Eliquis. He is noted to do well with his MG symptoms. He notes that he is doing well has not had any falls in the past year.     Myasthenia Gravis Activities of Daily Living Scale 02/01/2024     Grade   Function 0 1 2 3   Double Vision None Occasional, not every day Daily, but not constant Constant          Eyelid Droop None Occasional, not every day Daily, but not constant Constant          Talking Normal Intermittent slurring or nasal speech Constant slurring or nasal speech, but can be understood Speech difficult to understand          Chewing Normal Fatigues with solid food Fatigues with soft food Gastric tube          Swallowing Normal Chokes rarely Frequent choking requiring change of diet Gastric tube          Breathing Normal Shortness of breath on exertion Shortness of breath at rest Ventilator          Brushing teeth or hair Normal Requires extra effort but requires no rest period Rest periods needed Cannot do one or more of these functions          Ability to rise from chair or toilet Normal Sometimes uses arms Always uses arms Requires assistance          Total MG ADL Score 2               Interval History 1/13/2022 - I have reviewed relevant medical records in Epic. Here for routine follow up for Ab+ MG, taking Prednisone 7 mg daily. This dose has kept him well-managed for many years. He has had 25# weight gain over the past several months but he suspects this is secondary to decreased activity rather than due to steroids use. Sine the pandemic his routine has slowed down  significantly. He has also got chronic low back pain that impedes activities. He saw [facility] but they declined intervention since his case is complicated by age and complexity of the pathology. No reported ocular changes. He has occasional shortness of breath that he reports is more likely secondary to COPD. No extremity weaknesses, no changes in gait, no falls.     Interval History 3.1.2021 - I have reviewed all relevant medical records in Epic. Here for routine follow up for Ab+ MG. Still doing very well on Prednisone 7 mg daily. Not taking any Mestinon due to GI upset with use. No diplopia, ptosis, dysarthria, or extremity weaknesses. He has been having some shortness of breath, which may be related to baseline COPD. It doesn't fluctuate much. He has not had PFTs done in 2 years. He reported some dysphagia to thin liquids at last visit and has same complaint today. Occurs a few times per week only with liquids. No choking episodes. No complaints of GERD, etc.He is complaining of neck pain and L shoulder pain and low back pain. He saw a neurosurgeon who told him that his L/S spine is not surgical. He went to [facility] and was offered Botox in L cervical paraspinal / shoulder / scapular regions and is asking me for my opinion.    Interval History 6/27/2020 - I have reviewed all relevant medical records in Epic. Doing well in terms of MG control. He has rare dysphagia to water but denies any diplopia, orthopnea, dyspnea (MG-related, he does have baseline COPD), ptosis, dysphonia, dysarthria, or extremity weaknesses. He had a recent URI but was CV19 tested and negative. Full recovery. Neck pain is still present from prior stenosis and surgery but he says that he is overall coping well. Secondary stroke risk factors of HTN and HLD. Monitored for DM2.       History of Present Illness:  I have reviewed all relevant medical records in Epic. Maximilian Kumariana luisa Tavera Jr. is a 78 y.o. male who presents today for initial visit with  me, previously followed by Dr. Gill. 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 Several months ago he had a fall at the [employer] and had a serious neck injury (previously had two ACDF procedures) resulting in spinal stenosis and need for surgery. He is now post-op and is recovering well. He is is c-collar. He has not had MG-related weaknesses post-op and has continued his steroids, currently on 7 mg daily. He has only occasional vertical diplopia that occurs mostly when he is watching TV while lying down. No other bulbar or extremity weakness complaints. Cannot tolerate Mestinon (diarrhea).    From Dr. Gill's Notes:    DX: Ab+ MG dx'ed 3/2015  Thymic status: CT chest 4/15 negative for thymoma   Maintenance: prednisone 7 mg qOD; mestinon SE - diarrhea  Last dose change: 12/20/16: 8mg qOD -> 7mg qOD; 8/16: 10mg qOD to 8mg qOD; 6/15 prednisone 5 mg qd --> 10 mg q OD   Rescue: none   Prior immunemodulatory agents: none      Interval history 8/9/18:  Underwent MRI Cspine and Lspine for new onset neck pain, chronic hand numbness and LBP. 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   Having trouble with head drop, and some mild chewing issues. No diplopia, ptosis or UE/LE weakness. Still on prednisone 7mg qOD. Has numbness in arms that wakes him up at night.     Interval history 2/27/18:  Doing well currently with respect to MG. Mild diplopia at end of day. Waking up with numbness in his hands, bilateral. Relieved with movement. No numbness or weakness during the day. Occasional tongue biting.      Interval history 8/31/17:   Mr Tavera is a 72yo male with Ab+ myasthenia gravis diagnosed in 2015, maintained on 7mg prednisone qOD. Has been doing well since his last visit. Minimal diplopia at the end of the day. Has some generalized fatigue when it's hot, but tries to do all his chores early in the day. Has started exercising.     Interval 3/21/17: Doing  "well from an MG standpoint on low-dose prednisone. He still has a occasional diplopia at night when looking down. Has rare choking - mostly liquids - when he's not paying attention. Had a number of URIs last year. Also noticed that his sense of taste is off, although sense of smell is preserved.      Interval history 12/20/16:  Doing well. Last visit, dropped prednisone from 20 to 8mg qOD. No new symptoms. Still having diplopia on downgaze and only rarely at other times. No chewing or swallowing issues.     Interval history 8/23/16  Doing well. Still with mild baseline diplopia and rare difficulty with swallowing (this does not last more than a few seconds). No issues with speaking, breathing, or generalized weakness. Heat "wears me out".      HPI 5/17/16  Maximilian Tavera Jr. is a 71 yo male with Ab+ myasthenia gravis. Doing well, stable. Occasional diplopia, mostly with laying back too far in his recliner while watching tv. Mentions 6-8 mos ago began noticing rhinorrhea with eating. Denies ptosis, difficulty chewing/swallowing/breathing. Denies weight changes, sweats, alternating diarrhea/constipation.  Prednisone 10 mg q OD     Interval hx  2/12/16:  Maximilian Tavera Jr. is a 71 yo  male with myasthenia gravis. Still has occasional diplopia, but is improved- occuring less frequently. Occurs most when watching TV. Occasional "funny feeling" when drinking soft drinks. Denies weakness, SOB, dysarthria. Taking prednisone 5mg/day. Stopped Mestion- GI side effects      Interval history 6/17/15:  Taking mestinon at 1/2, 1/2, 1 and not finding much improvement in diplopia, and having a decent amount of loose stools and gas.      HPI: Maximilian Tavera Jr. is a 69 y.o. male with 7-8mos of diplopia. Was seen by Dr Jimenez, who did some blood work and found that he was Ab+ for AchR. He was referred here for further care. No prior symptoms. No difficulty chewing/swallowing, breathing. Arm/leg strength is fine. " "No orthopnea. Diplopia is most noticeable when reading or watching tv. Driving is occasionally problematic. No clearly diurnal. No real problem with ptosis. He has not tried medications for this. He has prisms, which help. Looks like he started carvedilol in 3/14.     Review of Systems  Review of Systems   Constitutional:  Negative for activity change, fatigue and fever.   HENT:  Positive for trouble swallowing (occasional with thin liquids). Negative for hearing loss and voice change.    Eyes:  Negative for pain, redness and visual disturbance.   Respiratory:  Negative for choking, chest tightness and shortness of breath.    Cardiovascular:  Negative for chest pain.   Gastrointestinal:  Negative for abdominal pain, nausea and vomiting.   Endocrine: Negative for cold intolerance.   Genitourinary:  Negative for frequency.   Musculoskeletal:  Positive for back pain and neck pain. Negative for arthralgias, gait problem, joint swelling and myalgias.   Skin:  Negative for color change.   Allergic/Immunologic: Negative for immunocompromised state.   Neurological:  Negative for dizziness, seizures, speech difficulty, weakness, numbness and headaches.   Hematological:  Negative for adenopathy.   Psychiatric/Behavioral:  Negative for agitation, behavioral problems, dysphoric mood and suicidal ideas.         Objective:     Vitals:    02/01/24 1435   BP: 139/74   Pulse: 61   Weight: 108.8 kg (239 lb 13.8 oz)   Height: 6' 1" (1.854 m)   PainSc:   5         Neurologic Exam     Mental Status   Oriented to person, place, and time.   Attention: normal.   Speech: speech is normal   Level of consciousness: alert  Knowledge: good.     Cranial Nerves     CN II   Visual fields full to confrontation.     CN III, IV, VI   Pupils are equal, round, and reactive to light.  Extraocular motions are normal.   Diplopia: none    CN V   Facial sensation intact.     CN VII   Facial expression full, symmetric.     CN VIII   Hearing: intact    CN IX, " X   CN IX normal.     CN XI   CN XI normal.     CN XII   CN XII normal.     No ptosis noted     Motor Exam   Muscle bulk: normal  Overall muscle tone: normal    Strength   Strength 5/5 throughout. Pain in the L shoulder and L c-spine regions     Sensory Exam   Light touch normal.   Vibration normal.     Gait, Coordination, and Reflexes     Gait  Gait: normal    Tremor   Resting tremor: absent  Intention tremor: absent  Action tremor: absent    Reflexes   Right brachioradialis: 2+  Left brachioradialis: 2+  Right biceps: 2+  Left biceps: 2+  Right triceps: 2+  Left triceps: 2+  Right patellar: 2+  Left patellar: 2+  Right achilles: 2+  Left achilles: 2+      Physical Exam  Vitals reviewed.   Constitutional:       Appearance: He is well-developed.   HENT:      Head: Normocephalic and atraumatic.   Eyes:      Extraocular Movements: EOM normal.      Pupils: Pupils are equal, round, and reactive to light.   Neck:      Thyroid: No thyromegaly.   Cardiovascular:      Rate and Rhythm: Normal rate.   Pulmonary:      Effort: Pulmonary effort is normal.   Abdominal:      Palpations: Abdomen is soft.   Musculoskeletal:      Cervical back: Normal range of motion and neck supple.   Lymphadenopathy:      Cervical: No cervical adenopathy.   Skin:     General: Skin is warm and dry.   Neurological:      Mental Status: He is oriented to person, place, and time.      Motor: Motor strength is normal.     Gait: Gait is intact.      Deep Tendon Reflexes:      Reflex Scores:       Tricep reflexes are 2+ on the right side and 2+ on the left side.       Bicep reflexes are 2+ on the right side and 2+ on the left side.       Brachioradialis reflexes are 2+ on the right side and 2+ on the left side.       Patellar reflexes are 2+ on the right side and 2+ on the left side.       Achilles reflexes are 2+ on the right side and 2+ on the left side.  Psychiatric:         Speech: Speech normal.         Behavior: Behavior normal.         Thought  Content: Thought content normal.           Lab Results   Component Value Date    WBC 6.91 07/03/2023    HGB 11.9 (L) 07/03/2023    HCT 37.7 (L) 07/03/2023     (L) 07/03/2023    CHOL 131 02/22/2023    TRIG 131 02/22/2023    HDL 41 02/22/2023    ALT 14 07/03/2023    AST 13 07/03/2023     11/14/2023    K 3.7 11/14/2023     11/14/2023    CREATININE 1.0 11/14/2023    BUN 13 11/14/2023    CO2 32 (H) 11/14/2023    TSH 1.253 02/22/2023    HGBA1C 6.2 (H) 06/15/2023    [id number] 1867 (H) 07/03/2023         Assessment and Plan:     Problem List Items Addressed This Visit       Myasthenia gravis, AChR antibody positive - Primary    Overview     Diagnosis in 2015    CT Negative for Thymoma     Prednisone 7mg qOD    Discussion on lowering prednisone however patient does not want to change at this time.     Does not tolerate mestinon does not find that this is beneficial     02/01/2024 MG-ADL- 2         Current Assessment & Plan     Doing very well on his chronic use of Prednisone 7 mg daily. We discussed lowering the dose but he is not interested since he has been stable for so long on this therapy. Mestinon is not used, not helpful since his breakthrough symptoms are not apparent.    Myasthenia Gravis IMPORTANT INFORMATION:     Elective intubation should be considered if serial measurements of the VC show values less than 20 mL/kg or if the NIF is worse than -30 cm H20 or is progressively worsening after serial evaluation.        Absolute Contraindicated Medications (Category 1) Contraindicated Medications (Category 2) Likely to Worsen MG Cautionary Medications  (Category 3) That May Worsen MG but are Usually Tolerated   Curare  Botulinum toxin  D-penicillamine  Interferon alpha     Aminoglycoside (Gentamycin, Kanamycin, Streptomycin, Neomycin, Tobramycin)  Macrolide (Erythromycin, Azithromycin, Telithromycin, Biaxin, Clarithromycin)  Fluoroquinolone - (Ciprofloxacin, Moxifloxacin, Levofloxacin, Delafloxacin,  "Norfloxacin, Prulifloxacin)  Quinine (Use only for Malaria)  Quinidine  Procainamide  Magnesium salts, IV magnesium replacement  Chloroquine  Hydroxychloroquine  Immune checkpoint inhibitors: Pembrolizumab (Keytruda), Nivolumab (Opdivo), Atezolizumab (Tecentriq), Avelumab (Bavencio), Durvalumab (Imfinzi), Ipilimumab (Yervoy)    Atropine   Corticosteroids  Desferrioxamine  Beta blockers  Statins  Iodinated radiologic contrast agents  Lithium  Benzodiazepines   Calcium Channel Blockers (verapamil)   Doxacurium  Neuromuscular blockades (Succinylcholine, Cisatracurium, Rocuronium, Vecuronium, Atracurium)  Diclomine         THIS PATIENT HAS MYASTHENIA GRAVIS     USE THESE DRUGS WITH CAUTION   1. Antibiotics of the Following Classes               A. Aminoglycosides (end in "mycin", "micin" e.g. Neomycin, tobramycin, gentamicin)               B. Flagyl (metronidazole)               C. Macrolides (e.g. Erythromycin, azithromycin (Zithromax), clarithromycin)   2. Beta Blockers (oral, parenteral and ophthalmic preparations)               A. (end in "olol" e.g. Atenolol, labetalol, metoprolol, propranolol, sotalol, timolol, etc)   3. Calcium Channel Blockers (end in "ipine" e.g. Amlodipine (Norvasc), nicardipine, nifedipine (Procardia), felodipine (Plendil))   4. Corticosteroids & ACTH   5. Interferons   6. Magnesium   7. Narcotic analgesics (if there is respiratory compromise e.g. Demerol, morphine)   8. Phenothiazines (end in "zine" e.g. Compazine, chlorpromazine (Thorazine), fluphenazine (Prolixin), perphenazine (Trilafon), Sparine)   9. Respiratory Depressants   10. Sedatives and Hypnotics       THESE DRUGS SHOULD *NOT* BE USED IN PATIENTS WITH MYASTHENIA GRAVIS WITHOUT PRIOR DISCUSSION WITH A NEUROMUSCULAR SPECIALIST   1. Ketolides (e.g. Telithromycin) - FDA BLACK BOX WARNING - Do NOT Use   2. Fluoroquinolones (end in "acin" e.g. Levofloxacin (Levaquin), ciprofloxacin (CIPRO), moxifloxacin (Avelox) - FDA BLACK BOX WARNING "   3. Botulinum toxin   4. D-Penicillamine       NURSES -- TRIPLE CHECK BEFORE GIVING THE FOLLOWING DUE TO THE HIGH PROBABILITY OF PROLONGED WEAKNESS OR MG CRISIS   1. Neuromuscular blocking agent (both depolarizing and non-depolarizing)               A. Succinylcholine-like               B. Curare-like   2. Quinidine   3. Quinine           Current chronic use of systemic steroids (Chronic)    Current Assessment & Plan     His most recent BMD test was normal for his age. He takes vit D supplements daily.            RTC 12 months or as needed.    Ned Hoang MD  [facility]

## 2024-02-01 NOTE — ASSESSMENT & PLAN NOTE
"Doing very well on his chronic use of Prednisone 7 mg daily. We discussed lowering the dose but he is not interested since he has been stable for so long on this therapy. Mestinon is not used, not helpful since his breakthrough symptoms are not apparent.    Myasthenia Gravis IMPORTANT INFORMATION:     Elective intubation should be considered if serial measurements of the VC show values less than 20 mL/kg or if the NIF is worse than -30 cm H20 or is progressively worsening after serial evaluation.        Absolute Contraindicated Medications (Category 1) Contraindicated Medications (Category 2) Likely to Worsen MG Cautionary Medications  (Category 3) That May Worsen MG but are Usually Tolerated   Curare  Botulinum toxin  D-penicillamine  Interferon alpha     Aminoglycoside (Gentamycin, Kanamycin, Streptomycin, Neomycin, Tobramycin)  Macrolide (Erythromycin, Azithromycin, Telithromycin, Biaxin, Clarithromycin)  Fluoroquinolone - (Ciprofloxacin, Moxifloxacin, Levofloxacin, Delafloxacin, Norfloxacin, Prulifloxacin)  Quinine (Use only for Malaria)  Quinidine  Procainamide  Magnesium salts, IV magnesium replacement  Chloroquine  Hydroxychloroquine  Immune checkpoint inhibitors: Pembrolizumab (Keytruda), Nivolumab (Opdivo), Atezolizumab (Tecentriq), Avelumab (Bavencio), Durvalumab (Imfinzi), Ipilimumab (Yervoy)    Atropine   Corticosteroids  Desferrioxamine  Beta blockers  Statins  Iodinated radiologic contrast agents  Lithium  Benzodiazepines   Calcium Channel Blockers (verapamil)   Doxacurium  Neuromuscular blockades (Succinylcholine, Cisatracurium, Rocuronium, Vecuronium, Atracurium)  Diclomine         THIS PATIENT HAS MYASTHENIA GRAVIS     USE THESE DRUGS WITH CAUTION   1. Antibiotics of the Following Classes               A. Aminoglycosides (end in "mycin", "micin" e.g. Neomycin, tobramycin, gentamicin)               B. Flagyl (metronidazole)               C. Macrolides (e.g. Erythromycin, azithromycin (Zithromax), " "clarithromycin)   2. Beta Blockers (oral, parenteral and ophthalmic preparations)               A. (end in "olol" e.g. Atenolol, labetalol, metoprolol, propranolol, sotalol, timolol, etc)   3. Calcium Channel Blockers (end in "ipine" e.g. Amlodipine (Norvasc), nicardipine, nifedipine (Procardia), felodipine (Plendil))   4. Corticosteroids & ACTH   5. Interferons   6. Magnesium   7. Narcotic analgesics (if there is respiratory compromise e.g. Demerol, morphine)   8. Phenothiazines (end in "zine" e.g. Compazine, chlorpromazine (Thorazine), fluphenazine (Prolixin), perphenazine (Trilafon), Sparine)   9. Respiratory Depressants   10. Sedatives and Hypnotics       THESE DRUGS SHOULD *NOT* BE USED IN PATIENTS WITH MYASTHENIA GRAVIS WITHOUT PRIOR DISCUSSION WITH A NEUROMUSCULAR SPECIALIST   1. Ketolides (e.g. Telithromycin) - FDA BLACK BOX WARNING - Do NOT Use   2. Fluoroquinolones (end in "acin" e.g. Levofloxacin (Levaquin), ciprofloxacin (CIPRO), moxifloxacin (Avelox) - FDA BLACK BOX WARNING   3. Botulinum toxin   4. D-Penicillamine       NURSES -- TRIPLE CHECK BEFORE GIVING THE FOLLOWING DUE TO THE HIGH PROBABILITY OF PROLONGED WEAKNESS OR MG CRISIS   1. Neuromuscular blocking agent (both depolarizing and non-depolarizing)               A. Succinylcholine-like               B. Curare-like   2. Quinidine   3. Quinine    "

## 2024-03-04 ENCOUNTER — OFFICE VISIT (OUTPATIENT)
Dept: PRIMARY CARE CLINIC | Facility: CLINIC | Age: 79
End: 2024-03-04
Payer: MEDICARE

## 2024-03-04 VITALS
HEART RATE: 69 BPM | SYSTOLIC BLOOD PRESSURE: 130 MMHG | DIASTOLIC BLOOD PRESSURE: 68 MMHG | TEMPERATURE: 99 F | WEIGHT: 240.31 LBS | OXYGEN SATURATION: 95 % | HEIGHT: 73 IN | BODY MASS INDEX: 31.85 KG/M2

## 2024-03-04 DIAGNOSIS — I10 HTN (HYPERTENSION), BENIGN: ICD-10-CM

## 2024-03-04 DIAGNOSIS — J43.8 OTHER EMPHYSEMA: ICD-10-CM

## 2024-03-04 DIAGNOSIS — R26.9 ABNORMALITY OF GAIT AND MOBILITY: ICD-10-CM

## 2024-03-04 DIAGNOSIS — E78.2 MIXED HYPERLIPIDEMIA: ICD-10-CM

## 2024-03-04 DIAGNOSIS — I65.23 BILATERAL CAROTID ARTERY STENOSIS: ICD-10-CM

## 2024-03-04 DIAGNOSIS — M17.0 PRIMARY OSTEOARTHRITIS OF BOTH KNEES: ICD-10-CM

## 2024-03-04 DIAGNOSIS — Z00.00 ENCOUNTER FOR PREVENTIVE HEALTH EXAMINATION: Primary | ICD-10-CM

## 2024-03-04 DIAGNOSIS — E03.4 HYPOTHYROIDISM DUE TO ACQUIRED ATROPHY OF THYROID: ICD-10-CM

## 2024-03-04 DIAGNOSIS — G70.00 MYASTHENIA GRAVIS, ACHR ANTIBODY POSITIVE: ICD-10-CM

## 2024-03-04 PROCEDURE — 1160F RVW MEDS BY RX/DR IN RCRD: CPT | Mod: HCNC,CPTII,S$GLB, | Performed by: NURSE PRACTITIONER

## 2024-03-04 PROCEDURE — 1157F ADVNC CARE PLAN IN RCRD: CPT | Mod: HCNC,CPTII,S$GLB, | Performed by: NURSE PRACTITIONER

## 2024-03-04 PROCEDURE — 1159F MED LIST DOCD IN RCRD: CPT | Mod: HCNC,CPTII,S$GLB, | Performed by: NURSE PRACTITIONER

## 2024-03-04 PROCEDURE — 1101F PT FALLS ASSESS-DOCD LE1/YR: CPT | Mod: HCNC,CPTII,S$GLB, | Performed by: NURSE PRACTITIONER

## 2024-03-04 PROCEDURE — 3288F FALL RISK ASSESSMENT DOCD: CPT | Mod: HCNC,CPTII,S$GLB, | Performed by: NURSE PRACTITIONER

## 2024-03-04 PROCEDURE — 99999 PR PBB SHADOW E&M-EST. PATIENT-LVL III: CPT | Mod: PBBFAC,HCNC,, | Performed by: NURSE PRACTITIONER

## 2024-03-04 PROCEDURE — G0439 PPPS, SUBSEQ VISIT: HCPCS | Mod: HCNC,S$GLB,, | Performed by: NURSE PRACTITIONER

## 2024-03-04 PROCEDURE — 3078F DIAST BP <80 MM HG: CPT | Mod: HCNC,CPTII,S$GLB, | Performed by: NURSE PRACTITIONER

## 2024-03-04 PROCEDURE — 3075F SYST BP GE 130 - 139MM HG: CPT | Mod: HCNC,CPTII,S$GLB, | Performed by: NURSE PRACTITIONER

## 2024-03-04 RX ORDER — PNEUMOCOCCAL 20-VALENT CONJUGATE VACCINE 2.2; 2.2; 2.2; 2.2; 2.2; 2.2; 2.2; 2.2; 2.2; 2.2; 2.2; 2.2; 2.2; 2.2; 2.2; 2.2; 4.4; 2.2; 2.2; 2.2 UG/.5ML; UG/.5ML; UG/.5ML; UG/.5ML; UG/.5ML; UG/.5ML; UG/.5ML; UG/.5ML; UG/.5ML; UG/.5ML; UG/.5ML; UG/.5ML; UG/.5ML; UG/.5ML; UG/.5ML; UG/.5ML; UG/.5ML; UG/.5ML; UG/.5ML; UG/.5ML
INJECTION, SUSPENSION INTRAMUSCULAR
COMMUNITY
Start: 2024-01-05

## 2024-03-04 NOTE — PATIENT INSTRUCTIONS
Counseling and Referral of Other Preventative  (Italic type indicates deductible and co-insurance are waived)    Patient Name: Maximilian Tavera  Today's Date: 3/4/2024    Health Maintenance       Date Due Completion Date    RSV Vaccine (Age 60+ and Pregnant patients) (1 - 1-dose 60+ series) Never done ---    COVID-19 Vaccine (5 - 2023-24 season) 09/01/2023 10/11/2022    Lipid Panel 02/22/2024 2/22/2023    Colonoscopy 12/07/2024 12/7/2021    TETANUS VACCINE 10/11/2032 10/11/2022        No orders of the defined types were placed in this encounter.      The following information is provided to all patients.  This information is to help you find resources for any of the problems found today that may be affecting your health:                  Living healthy guide: ms.gov    Understanding Diabetes: www.diabetes.org      Eating healthy: www.cdc.gov/healthyweight      [facility] home safety checklist: www.cdc.gov/steadi/patient.html      Agency on Aging: ms.gov    Alcoholics anonymous (AA): www.aa.org      Physical Activity: www.aristides.nih.gov/sg7unfc      Tobacco use: ms.gov      Counseling and Referral of Other Preventative  (Italic type indicates deductible and co-insurance are waived)    Patient Name: Maximilian Tavera  Today's Date: 3/4/2024    Health Maintenance       Date Due Completion Date    RSV Vaccine (Age 60+ and Pregnant patients) (1 - 1-dose 60+ series) Never done ---    COVID-19 Vaccine (5 - 2023-24 season) 09/01/2023 10/11/2022    Lipid Panel 02/22/2024 2/22/2023    Colonoscopy 12/07/2024 12/7/2021    TETANUS VACCINE 10/11/2032 10/11/2022        No orders of the defined types were placed in this encounter.      The following information is provided to all patients.  This information is to help you find resources for any of the problems found today that may be affecting your health:                  Living healthy guide: ms.gov    Understanding Diabetes: www.diabetes.org      Eating healthy: www.cdc.gov/healthyweight       CDC home safety checklist: www.cdc.gov/steadi/patient.html      Agency on Aging: ms.gov    Alcoholics anonymous (AA): www.aa.org      Physical Activity: www.aristides.nih.gov/ut9seet      Tobacco use: ms.gov

## 2024-03-04 NOTE — PROGRESS NOTES
"  Maximilian Tavera presented for a  Medicare AWV and comprehensive Health Risk Assessment today. The following components were reviewed and updated:    Medical history  Family History  Social history  Allergies and Current Medications  Health Risk Assessment  Health Maintenance  Care Team     Last visit with PCP- on 1/5/24. He is schedule to have rht RSV vaccine today at his pharmacy  2/1/24 Neurology-Viktor: Myasthenia gravis, AChR antibody positive, Current chronic use of systemic steroids-Prednisone 7mg qOD   1/16/23 Cardiology-Dr.Bennett Dupree: Acute deep vein thrombosis (DVT) of femoral vein of left lower extremity, Mixed hyperlipidemia, HTN (hypertension), benign, Obesity (BMI 30-39.9), Lymphedema of both lower extremities, Leg swelling,Acute deep vein thrombosis (DVT) of proximal vein of lower extremity, unspecified laterality      ** See Completed Assessments for Annual Wellness Visit within the encounter summary.**         The following assessments were completed:  Living Situation  CAGE  Depression Screening  Timed Get Up and Go  Whisper Test  Cognitive Function Screening  Nutrition Screening  ADL Screening  PAQ Screening      Opioid documentation:      Patient does not have a current opioid prescription.        Vitals:    03/04/24 1022   BP: 130/68   BP Location: Left arm   Patient Position: Sitting   BP Method: Large (Manual)   Pulse: 69   Temp: 98.5 °F (36.9 °C)   TempSrc: Oral   SpO2: 95%   Weight: 109 kg (240 lb 4.8 oz)   Height: 6' 1" (1.854 m)     Body mass index is 31.7 kg/m².                Physical Exam  Constitutional:       Appearance: He is well-developed.   Eyes:      Conjunctiva/sclera: Conjunctivae normal.      Pupils: Pupils are equal, round, and reactive to light.   Cardiovascular:      Rate and Rhythm: Normal rate and regular rhythm.      Heart sounds: Normal heart sounds.   Pulmonary:      Effort: Pulmonary effort is normal.   Musculoskeletal:         General: Normal range of " motion.   Neurological:      Mental Status: He is alert and oriented to person, place, and time.   Psychiatric:         Behavior: Behavior normal.         Thought Content: Thought content normal.         Judgment: Judgment normal.               Diagnoses and health risks identified today and associated recommendations/orders:    1. Encounter for preventive health examination  .Discussed health maintenance guidelines appropriate for age.    Review for Opioid Screening: Patient does not have rx for Opioids.     Review for Substance Use Disorders: Patient does not use substance.       2. Abnormality of gait and mobility  Stable followed  by PCP    3. Other emphysema  Stable, continue to monitor  Followed by PCP      4. Hypothyroidism due to acquired atrophy of thyroid  Stable, continue to monitor  Followed by PCP      5. HTN (hypertension), benign  Stable, continue to monitor  Followed by PCP    6. Mixed hyperlipidemia  Stable, followed by [facility]    7. Myasthenia gravis, AChR antibody positive  Stable, followed by Neurolgy    8. Bilateral carotid artery stenosis  Stable, followed by Cardiology    9. Primary osteoarthritis of both knees  Stable, followed by Ortho      Provided Maximilian with a 5-10 year written screening schedule and personal prevention plan. Recommendations were developed using the USPSTF age appropriate recommendations. Education, counseling, and referrals were provided as needed. After Visit Summary printed and given to patient which includes a list of additional screenings\tests needed.    No follow-ups on file.    Socrates Castanon NP    I offered to discuss advanced care planning, including how to pick a person who would make decisions for you if you were unable to make them for yourself, called a health care power of , and what kind of decisions you might make such as use of life sustaining treatments such as ventilators and tube feeding when faced with a life limiting illness recorded  on a living will that they will need to know. (How you want to be cared for as you near the end of your natural life)     X  Patient has advanced directives on file, which we reviewed, and they do not wish to make changes.

## 2024-03-06 ENCOUNTER — HOSPITAL ENCOUNTER (OUTPATIENT)
Dept: RADIOLOGY | Facility: HOSPITAL | Age: 79
Discharge: HOME | End: 2024-03-06
Attending: ORTHOPAEDIC SURGERY
Payer: MEDICARE

## 2024-03-06 ENCOUNTER — OFFICE VISIT (OUTPATIENT)
Dept: ORTHOPEDICS | Facility: CLINIC | Age: 79
End: 2024-03-06
Payer: MEDICARE

## 2024-03-06 VITALS — WEIGHT: 240 LBS | BODY MASS INDEX: 31.81 KG/M2 | HEIGHT: 73 IN

## 2024-03-06 DIAGNOSIS — M25.521 RIGHT ELBOW PAIN: ICD-10-CM

## 2024-03-06 DIAGNOSIS — M25.521 RIGHT ELBOW PAIN: Primary | ICD-10-CM

## 2024-03-06 DIAGNOSIS — M77.11 LATERAL EPICONDYLITIS, RIGHT ELBOW: Primary | ICD-10-CM

## 2024-03-06 PROCEDURE — 1159F MED LIST DOCD IN RCRD: CPT | Mod: HCNC,CPTII,S$GLB, | Performed by: ORTHOPAEDIC SURGERY

## 2024-03-06 PROCEDURE — 99999 PR PBB SHADOW E&M-EST. PATIENT-LVL II: CPT | Mod: PBBFAC,HCNC,, | Performed by: ORTHOPAEDIC SURGERY

## 2024-03-06 PROCEDURE — 1101F PT FALLS ASSESS-DOCD LE1/YR: CPT | Mod: HCNC,CPTII,S$GLB, | Performed by: ORTHOPAEDIC SURGERY

## 2024-03-06 PROCEDURE — 1160F RVW MEDS BY RX/DR IN RCRD: CPT | Mod: HCNC,CPTII,S$GLB, | Performed by: ORTHOPAEDIC SURGERY

## 2024-03-06 PROCEDURE — 73080 X-RAY EXAM OF ELBOW: CPT | Mod: 26,HCNC,RT, | Performed by: RADIOLOGY

## 2024-03-06 PROCEDURE — 99214 OFFICE O/P EST MOD 30 MIN: CPT | Mod: 25,HCNC,S$GLB, | Performed by: ORTHOPAEDIC SURGERY

## 2024-03-06 PROCEDURE — 3288F FALL RISK ASSESSMENT DOCD: CPT | Mod: HCNC,CPTII,S$GLB, | Performed by: ORTHOPAEDIC SURGERY

## 2024-03-06 PROCEDURE — 73080 X-RAY EXAM OF ELBOW: CPT | Mod: TC,HCNC,PO,RT

## 2024-03-06 PROCEDURE — 1157F ADVNC CARE PLAN IN RCRD: CPT | Mod: HCNC,CPTII,S$GLB, | Performed by: ORTHOPAEDIC SURGERY

## 2024-03-06 PROCEDURE — 1125F AMNT PAIN NOTED PAIN PRSNT: CPT | Mod: HCNC,CPTII,S$GLB, | Performed by: ORTHOPAEDIC SURGERY

## 2024-03-06 PROCEDURE — 20551 NJX 1 TENDON ORIGIN/INSJ: CPT | Mod: HCNC,S$GLB,, | Performed by: ORTHOPAEDIC SURGERY

## 2024-03-06 RX ORDER — TRIAMCINOLONE ACETONIDE 40 MG/ML
40 INJECTION, SUSPENSION INTRA-ARTICULAR; INTRAMUSCULAR
Status: DISCONTINUED | OUTPATIENT
Start: 2024-03-06 | End: 2024-03-06 | Stop reason: HOSPADM

## 2024-03-06 RX ADMIN — TRIAMCINOLONE ACETONIDE 40 MG: 40 INJECTION, SUSPENSION INTRA-ARTICULAR; INTRAMUSCULAR at 03:03

## 2024-03-06 NOTE — PROGRESS NOTES
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    Disease of spinal cord, unspecified 1/5/2024    Emphysema lung 11/05/2018    Gastritis     Hx of colonic polyp     Hyperlipidemia     Hypertension     Hypothyroidism     Knee fracture     Myasthenia gravis     Neuropathy 01/03/2013    Obesity 01/29/2015    Pneumonia 03/29/2020    Polyneuropathy     PVC (premature ventricular contraction)     Squamous cell carcinoma 2014    left forearm    Thyroid disease        Past Surgical History:   Procedure Laterality Date    APPENDECTOMY      CATARACT EXTRACTION W/  INTRAOCULAR LENS IMPLANT Bilateral     COLONOSCOPY  03/01/2012    Dr Esteban; hyperplastic polyp; repeat in 5 years    COLONOSCOPY N/A 12/7/2021    Procedure: COLONOSCOPY;  Surgeon: Gabriel Hernandez MD;  Location: [facility];  Service: Endoscopy;  Laterality: N/A;    CYSTOSCOPY N/A 2/26/2019    Procedure: CYSTOSCOPY;  Surgeon: Simba Cheung MD;  Location: [facility];  Service: Urology;  Laterality: N/A;    ENDOSCOPIC ULTRASOUND OF UPPER GASTROINTESTINAL TRACT N/A 1/7/2020    Procedure: ULTRASOUND, UPPER GI TRACT, ENDOSCOPIC;  Surgeon: Kati Ryan MD;  Location: [facility] ([address] [place] [address]);  Service: Endoscopy;  Laterality: N/A;    ESOPHAGOGASTRODUODENOSCOPY N/A 9/12/2018    Procedure: EGD (ESOPHAGOGASTRODUODENOSCOPY);  Surgeon: Gabriel Hernandez MD;  Location: [facility];  Service: Endoscopy;  Laterality: N/A;    ESOPHAGOGASTRODUODENOSCOPY N/A 8/19/2019    Procedure: EGD (ESOPHAGOGASTRODUODENOSCOPY);  Surgeon: Gabriel Hernandez MD;  Location: [facility];  Service: Endoscopy;  Laterality: N/A;    ESOPHAGOGASTRODUODENOSCOPY N/A 10/7/2019    Procedure: EGD (ESOPHAGOGASTRODUODENOSCOPY);  Surgeon: Gabriel Hernandez MD;  Location:  [facility];  Service: Endoscopy;  Laterality: N/A;    ESOPHAGOGASTRODUODENOSCOPY N/A 1/7/2020    Procedure: EGD (ESOPHAGOGASTRODUODENOSCOPY);  Surgeon: Kati Ryan MD;  Location: [facility] (2ND FLR);  Service: Endoscopy;  Laterality: N/A;    HEMORRHOID SURGERY      INJECTION OF ANESTHETIC AGENT AROUND MEDIAL BRANCH NERVES INNERVATING LUMBAR FACET JOINT Bilateral 10/1/2020    Procedure: Block-nerve-medial branch-lumbar Bilateral L 3,4,5;  Surgeon: Devan Watt MD;  Location: [facility];  Service: Pain Management;  Laterality: Bilateral;    INJECTION OF ANESTHETIC AGENT AROUND MEDIAL BRANCH NERVES INNERVATING LUMBAR FACET JOINT Right 10/7/2022    Procedure: Block-nerve-medial branch-lumbar L3,4,5;  Surgeon: Devan Watt MD;  Location: [facility];  Service: Pain Management;  Laterality: Right;    KNEE ARTHROPLASTY Bilateral     LENGTHENING OF ACHILLES TENDON Right 9/11/2020    Procedure: percutaeous tenotomy of right lateral epicondyle (tenex);  Surgeon: Terry Quach MD;  Location: [facility];  Service: Neurosurgery;  Laterality: Right;  tenex to right lateral epicondyle  Tenex machine SN#[id number], total time 1min. 30seconds    NECK SURGERY      POSTERIOR FUSION OF CERVICAL SPINE WITH LAMINECTOMY N/A 11/7/2018    Procedure: C2-C6 Posterior Cervical Laminectomy & Instrumental Fusion;  Surgeon: Kishore Turner MD;  Location: [address];  Service: Neurosurgery;  Laterality: N/A;    TONSILLECTOMY      TRANSFORAMINAL EPIDURAL INJECTION OF STEROID Right 12/20/2019    Procedure: Injection,steroid,epidural,transforaminal approach;  Surgeon: Devan Watt MD;  Location: [facility];  Service: Pain Management;  Laterality: Right;  L3-4, L4-5    TRANSFORAMINAL EPIDURAL INJECTION OF STEROID Bilateral 2/6/2020    Procedure: Injection,steroid,epidural,transforaminal approach;  Surgeon: Devan Watt MD;  Location: [facility];  Service: Pain Management;  Laterality: Bilateral;  L3-L4,L4-L5    TRANSFORAMINAL EPIDURAL INJECTION OF STEROID  Bilateral 8/26/2020    Procedure: Injection,steroid,epidural,transforaminal approach;  Surgeon: Devan Watt MD;  Location: [facility];  Service: Pain Management;  Laterality: Bilateral;  L3-4, L4-5    TRANSRECTAL ULTRASOUND EXAMINATION N/A 2/26/2019    Procedure: ULTRASOUND, RECTAL APPROACH;  Surgeon: Simba Cheung MD;  Location: [facility] OR;  Service: Urology;  Laterality: N/A;    UPPER GASTROINTESTINAL ENDOSCOPY  09/12/2018    Dr Hernandez; gastritis; extensive intestinal metaplasia; repeat in 1-2- years       Current Outpatient Medications   Medication Sig    albuterol (PROVENTIL/VENTOLIN HFA) 90 mcg/actuation inhaler INHALE 1 TO 2 PUFFS INTO THE LUNGS EVERY 6 HOURS AS NEEDED FOR WHEEZING OR SHORTNESS OF BREATH. FOR RESCUE.    apixaban (ELIQUIS) 5 mg Tab Take 1 tablet (5 mg total) by mouth 2 (two) times daily.    atorvastatin (LIPITOR) 80 MG tablet TAKE 1 TABLET EVERY DAY    carvediloL (COREG) 25 MG tablet Take 1 tablet (25 mg total) by mouth 2 (two) times daily with meals.    cetirizine (ZYRTEC) 10 MG tablet Take 1 tablet by mouth daily as needed.    chlorthalidone (HYGROTEN) 25 MG Tab TAKE 1 TABLET ONE TIME DAILY    cholecalciferol, vitamin D3, (VITAMIN D3) 50 mcg (2,000 unit) Cap 1 capsule once daily. Every day    coenzyme Q10 (CO Q-10) 100 mg capsule Take 400 mg by mouth once daily.    cyanocobalamin, vitamin B-12, 5,000 mcg Subl Take 5,000 mcg by mouth once daily. Every day    diphenoxylate-atropine 2.5-0.025 mg (LOMOTIL) 2.5-0.025 mg per tablet Take 1 tablet by mouth 4 (four) times daily as needed for Diarrhea.    ezetimibe (ZETIA) 10 mg tablet TAKE 1 TABLET EVERY DAY    famotidine (PEPCID) 20 MG tablet Take 1 tablet (20 mg total) by mouth nightly as needed for Heartburn.    fluticasone (FLONASE) 50 mcg/actuation nasal spray USE 1 SPRAY IN EACH NOSTRIL TWICE DAILY AS NEEDED  FOR  RHINITIS    gabapentin (NEURONTIN) 600 MG tablet TAKE 1 TABLET THREE TIMES DAILY    HYDROcodone-acetaminophen (NORCO) 7.5-325 mg per  tablet Take 1 tablet by mouth every 6 (six) hours as needed for Pain.    levothyroxine (SYNTHROID) 50 MCG tablet TAKE 1 TABLET EVERY DAY    milk thistle 200 mg Cap Take 200 mg by mouth 2 (two) times daily. Twice a day    mupirocin (BACTROBAN) 2 % ointment Apply topically 3 (three) times daily.    olmesartan (BENICAR) 20 MG tablet Take 1 tablet (20 mg total) by mouth once daily.    omega-3 fatty acids 1,000 mg Cap Twice a day    pantoprazole (PROTONIX) 40 MG tablet TAKE 1 TABLET TWICE DAILY    potassium chloride SA (K-DUR,KLOR-CON) 20 MEQ tablet TAKE 4 TABLETS EVERY DAY OR AS DIRECTED    predniSONE (DELTASONE) 1 MG tablet Take 2 tablets (2 mg total) by mouth once daily.    predniSONE (DELTASONE) 5 MG tablet Take 1 tablet (5 mg total) by mouth once daily.    PREVNAR 20, PF, 0.5 mL Syrg injection     promethazine-dextromethorphan (PROMETHAZINE-DM) 6.25-15 mg/5 mL Syrp TAKE 5 MLS BY MOUTH EVERY 6 HOURS AS NEEDED    sildenafil (REVATIO) 20 mg Tab Take 1 to 3 tabs daily as needed.    tiotropium (SPIRIVA WITH HANDIHALER) 18 mcg inhalation capsule INHALE THE CONTENTS OF 1 CAPSULE EVERY DAY (CONTROLLER)     No current facility-administered medications for this visit.       Review of patient's allergies indicates:   Allergen Reactions    No known drug allergies        Family History   Problem Relation Age of Onset    Cataracts Mother     Heart disease Mother         CHF    Hypertension Mother     Hyperlipidemia Mother     Cataracts Father     Glaucoma Father     Heart disease Father     Hyperlipidemia Sister     Hypertension Sister     No Known Problems Daughter     No Known Problems Daughter     Collagen disease Neg Hx     Amblyopia Neg Hx     Blindness Neg Hx     Macular degeneration Neg Hx     Retinal detachment Neg Hx     Strabismus Neg Hx     Cancer Neg Hx     Colon cancer Neg Hx     Esophageal cancer Neg Hx     Stomach cancer Neg Hx     Crohn's disease Neg Hx     Ulcerative colitis Neg Hx        Social History      Socioeconomic History    Marital status:    Tobacco Use    Smoking status: Never    Smokeless tobacco: Never    Tobacco comments:     when a child   Substance and Sexual Activity    Alcohol use: Yes     Comment: rarely    Drug use: No    Sexual activity: Yes     Partners: Female     Social Determinants of Health     Financial Resource Strain: Low Risk  (3/4/2024)    Overall Financial Resource Strain (CARDIA)     Difficulty of Paying Living Expenses: Not hard at all   Food Insecurity: No Food Insecurity (3/4/2024)    Hunger Vital Sign     Worried About Running Out of Food in the Last Year: Never true     Ran Out of Food in the Last Year: Never true   Transportation Needs: No Transportation Needs (3/4/2024)    PRAPARE - Transportation     Lack of Transportation (Medical): No     Lack of Transportation (Non-Medical): No   Physical Activity: Inactive (3/4/2024)    Exercise Vital Sign     Days of Exercise per Week: 0 days     Minutes of Exercise per Session: 0 min   Stress: No Stress Concern Present (3/4/2024)    [employer] - Occupational Stress Questionnaire     Feeling of Stress : Only a little   Social Connections: Socially Integrated (3/4/2024)    Social Connection and Isolation Panel [NHANES]     Frequency of Communication with Friends and Family: More than three times a week     Frequency of Social Gatherings with Friends and Family: Three times a week     Attends [employer] Services: 1 to 4 times per year     Active Member of Clubs or Organizations: Yes     Attends Club or Organization Meetings: 1 to 4 times per year     Marital Status:    Housing Stability: Low Risk  (3/4/2024)    Housing Stability Vital Sign     Unable to Pay for Housing in the Last Year: No     Number of Places Lived in the Last Year: 1     Unstable Housing in the Last Year: No       Chief Complaint:   Chief Complaint   Patient presents with    Right Elbow - Pain       History of present illness:   This is a 78-year-old male seen for right lateral elbow and forearm pain. This started back a couple of years ago.  Cannot lift anything and hurts a lot at night.   We injected him 2 years ago.  Pain started again 2 weeks ago.  Pain is a 5/10.  Severe pain at night.   MRI showed no tear.  Did show the lateral epicondylitis.  He tried Tenex already also.      Answers for HPI/ROS submitted by the patient on 12/23/2019   Leg pain  unexpected weight change: No  appetite change : No  sleep disturbance: No  IMMUNOCOMPROMISED: No  nervous/ anxious: No  dysphoric mood: No  rash: No  visual disturbance: No  eye redness: No  eye pain: No  ear pain: No  tinnitus: Yes  hearing loss: No  sinus pressure : Yes  nosebleeds: Yes  enviro allergies: No  food allergies: No  cough: No  shortness of breath: Yes  sweating: No  frequency: Yes  difficulty urinating: No  hematuria: No  chest pain: No  palpitations: No  nausea: No  vomiting: No  diarrhea: No  blood in stool: No  constipation: No  headaches: Yes  dizziness: No  numbness: No  seizures: No  joint swelling: No  myalgia: No  weakness: No  back pain: Yes  Pain Chronicity: recurrent  History of trauma: No  Onset: more than 1 month ago  Frequency: daily  Progression since onset: waxing and waning  Injury mechanism: twisting  injury location: at home  pain- numeric: 3/10  pain location: left knee  pain quality: aching  Radiating Pain: No  Aggravating factors: bending, extension, twisting  fever: No  inability to bear weight: No  itching: No  joint locking: No  limited range of motion: Yes  stiffness: Yes  tingling: No  Treatments tried: cold, heat, exercise, injection treatment, NSAIDs, OTC ointments  physical therapy: not tried  Improvement on treatment: mild      Physical Examination:    Vital Signs:    There were no vitals filed for this visit.      Body mass index is 31.66 kg/m².    This a well-developed, well nourished patient in no acute distress.  They are alert and oriented  and cooperative to examination.  Pt. walks without an antalgic gait.      Examination of the right elbow shows no signs of rashes or erythema. The patient has no masses, ecchymosis, or effusion. The patient has full range of motion from 0-160°. Patient has full pronation and supination. Patient is nontender along the medial epicondyle and moderately tender over the lateral epicondyle. Nontender over the olecranon process.  Nontender along the course of the UCL. Patient has a negative valgus stress test and milking maneuver. Negative Tinel's sign over the cubital tunnel. 2+ radial pulse. Intact light touch sensation.         X-rays:  X-rays of the left knee is reviewed which show severe lateral joint space narrowing on the right and more moderate to severe medial joint space wear on the left.  No significant change noted.  X-rays of the right elbow is ordered and  reviewed which show a small olecranon spur.    MRI of the right elbow from stand-up MRI:  Lateral epicondylitis.  Subcutaneous edema about the elbow.      Assessment:  Right lateral epicondylitis        Plan: I reviewed the findings with him.  I recommended trying another steroid injection to see if we can calm it down again.  \follow up if needed.      This note was created using [employer] voice recognition software that occasionally misinterpreted phrases or words.    Consult note is delivered via Epic messaging service.

## 2024-03-06 NOTE — PROCEDURES
Tendon Origin: R elbow    Date/Time: 3/6/2024 3:30 PM    Performed by: Haroldo Campbell MD  Authorized by: Haroldo Campbell MD    Consent Done?:  Yes (Verbal)  Timeout: prior to procedure the correct patient, procedure, and site was verified    Indications:  Pain  Site marked: the procedure site was marked    Timeout: prior to procedure the correct patient, procedure, and site was verified    Location:  Elbow  Site:  R elbow  Prep: patient was prepped and draped in usual sterile fashion    Ultrasonic Guidance for Needle Placement?: No    Needle size:  22 G  Approach:  Anterolateral  Medications:  40 mg triamcinolone acetonide 40 mg/mL  Patient tolerance:  Patient tolerated the procedure well with no immediate complications

## 2024-03-11 ENCOUNTER — PATIENT MESSAGE (OUTPATIENT)
Dept: CARDIOLOGY | Facility: CLINIC | Age: 79
End: 2024-03-11
Payer: MEDICARE

## 2024-03-11 ENCOUNTER — PATIENT MESSAGE (OUTPATIENT)
Dept: ELECTROPHYSIOLOGY | Facility: CLINIC | Age: 79
End: 2024-03-11
Payer: MEDICARE

## 2024-03-11 DIAGNOSIS — M54.16 LUMBAR RADICULITIS: ICD-10-CM

## 2024-03-11 DIAGNOSIS — M51.36 DDD (DEGENERATIVE DISC DISEASE), LUMBAR: Primary | ICD-10-CM

## 2024-03-13 ENCOUNTER — OFFICE VISIT (OUTPATIENT)
Dept: URGENT CARE | Facility: CLINIC | Age: 79
End: 2024-03-13
Payer: MEDICARE

## 2024-03-13 ENCOUNTER — PATIENT MESSAGE (OUTPATIENT)
Dept: ORTHOPEDICS | Facility: CLINIC | Age: 79
End: 2024-03-13
Payer: MEDICARE

## 2024-03-13 VITALS
WEIGHT: 240 LBS | SYSTOLIC BLOOD PRESSURE: 128 MMHG | OXYGEN SATURATION: 94 % | RESPIRATION RATE: 18 BRPM | DIASTOLIC BLOOD PRESSURE: 72 MMHG | BODY MASS INDEX: 31.81 KG/M2 | TEMPERATURE: 99 F | HEIGHT: 73 IN | HEART RATE: 64 BPM

## 2024-03-13 DIAGNOSIS — S89.91XA INJURY OF RIGHT KNEE, INITIAL ENCOUNTER: ICD-10-CM
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
## 2024-03-13 DIAGNOSIS — M25.461 EFFUSION OF RIGHT KNEE: ICD-10-CM

## 2024-03-13 DIAGNOSIS — S80.211A ABRASION OF RIGHT KNEE, INITIAL ENCOUNTER: ICD-10-CM

## 2024-03-13 PROCEDURE — 99214 OFFICE O/P EST MOD 30 MIN: CPT | Mod: S$GLB,,, | Performed by: STUDENT IN AN ORGANIZED HEALTH CARE EDUCATION/TRAINING PROGRAM

## 2024-03-13 RX ORDER — MELOXICAM 15 MG/1
15 TABLET ORAL DAILY
Qty: 14 TABLET | Refills: 0 | Status: SHIPPED | OUTPATIENT
Start: 2024-03-13 | End: 2024-03-27

## 2024-03-13 NOTE — PROGRESS NOTES
"Subjective:      Patient ID: Maximilian Tavera Jr. is a 78 y.o. male.    Vitals:  height is 6' 1" (1.854 m) and weight is 108.9 kg (240 lb). His temperature is 98.8 °F (37.1 °C). His blood pressure is 128/72 and his pulse is 64. His respiration is 18 and oxygen saturation is 94% (abnormal).     Chief Complaint: Fall and Knee Injury    Patient is an 78-year-old male with a past medical history of COPD with asthma, DDD, osteoarthritis, EMG, hypertension, hyperlipidemia, ED, BPH, hypothyroidism, squamous cell carcinoma, and atrial fibrillation who presents to clinic for evaluation of knee injury.  Patient reports injury occurred yesterday.  Patient states he was walking on a new bulk head that was placed at his house.  Patient states his left foot sunk about 5 inches in red kathi and he fell onto his right knee.  Patient states he has experienced a scratched to the right knee.  Patient states no bleeding or drainage from this area.  Patient states that he is also experienced constant pain and swelling of the right knee.  Patient states does have decreased range of motion because of increased pain.  Patient states pain at rest is rated a 6 on a 10 scale but at its worst is an 8 on 10 scale.  Patient states pain is constant dull or aching type sensation.  Patient states pain does not radiate to any other location.  Patient states that he has not yet noticed any skin discoloration such as redness or bruising.  Patient denies any paresthesias including numbness or tingling of the right lower extremity.  Patient states pain is aggravated by ambulation, weight-bearing, and range of motion of knee.  Patient states pain is eased with rest and Tylenol.  Patient states he has not want to take hard drugs.  Patient states he does have a history of arthritis of both knees.  Patient reports an x-ray of the right knee.  Patient states tetanus is up-to-date.    Fall  The accident occurred 12 to 24 hours ago. The fall occurred while " standing. He landed on Hard floor. The point of impact was the right knee. The pain is present in the right knee. The pain is at a severity of 8/10. The symptoms are aggravated by standing, movement and pressure on injury. Pertinent negatives include no abdominal pain, fever, headaches, nausea, numbness or vomiting. Treatments tried: tylenol. The treatment provided mild relief.   Knee Injury  Associated symptoms include arthralgias (Right knee) and joint swelling (Right knee). Pertinent negatives include no abdominal pain, chest pain, chills, coughing, diaphoresis, fatigue, fever, headaches, nausea, neck pain, numbness or vomiting.       Constitution: Negative. Negative for chills, sweating, fatigue and fever.   HENT: Negative.     Neck: neck negative. Negative for neck pain.   Cardiovascular: Negative.  Negative for chest pain and palpitations.   Eyes: Negative.    Respiratory: Negative.  Negative for chest tightness, cough and shortness of breath.    Gastrointestinal: Negative.  Negative for abdominal pain, nausea, vomiting and diarrhea.   Endocrine: negative.   Genitourinary: Negative.    Musculoskeletal:  Positive for trauma (Fall), joint pain (Right knee), joint swelling (Right knee), abnormal ROM of joint (Right knee) and pain with walking.   Skin:  Positive for abrasion (Right knee). Negative for erythema.   Allergic/Immunologic: Negative.    Neurological: Negative.  Negative for dizziness, headaches, disorientation, altered mental status, numbness and tingling.   Hematologic/Lymphatic: Negative.    Psychiatric/Behavioral: Negative.  Negative for altered mental status, disorientation and confusion.       Objective:     Physical Exam   Constitutional: He is oriented to person, place, and time. He appears well-developed. He is cooperative.  Non-toxic appearance. He does not appear ill. No distress.   HENT:   Head: Normocephalic and atraumatic.   Ears:   Right Ear: Hearing, tympanic membrane, external ear and  ear canal normal.   Left Ear: Hearing, tympanic membrane, external ear and ear canal normal.   Nose: Nose normal. No mucosal edema or nasal deformity. No epistaxis. Right sinus exhibits no maxillary sinus tenderness and no frontal sinus tenderness. Left sinus exhibits no maxillary sinus tenderness and no frontal sinus tenderness.   Mouth/Throat: Uvula is midline, oropharynx is clear and moist and mucous membranes are normal. Mucous membranes are moist. No trismus in the jaw. Normal dentition. No uvula swelling. Oropharynx is clear.   Eyes: Conjunctivae and lids are normal. Pupils are equal, round, and reactive to light. Right eye exhibits no discharge. Left eye exhibits no discharge. No scleral icterus.   Neck: Trachea normal and phonation normal. Neck supple. No neck rigidity present.   Cardiovascular: Normal rate, regular rhythm and normal pulses.   Pulmonary/Chest: Effort normal and breath sounds normal. No respiratory distress. He has no wheezes. He has no rhonchi. He has no rales.   Abdominal: Normal appearance and bowel sounds are normal. He exhibits no distension. Soft. There is no abdominal tenderness.   Musculoskeletal:         General: Signs of injury present.      Right knee: He exhibits decreased range of motion, swelling and bony tenderness. He exhibits no ecchymosis, no erythema, normal alignment and normal patellar mobility. Tenderness found.      Cervical back: He exhibits no tenderness.        Legs:    Neurological: He is alert and oriented to person, place, and time. He exhibits normal muscle tone. Gait abnormal.   Skin: Skin is warm, dry, intact, not diaphoretic, not pale and no rash. Capillary refill takes 2 to 3 seconds. Abrasions - lower ext.:  knee (right)No erythema   Psychiatric: His speech is normal and behavior is normal. Judgment and thought content normal.   Nursing note and vitals reviewed.      Assessment:     1. Fall, initial encounter    2. Injury of right knee, initial encounter     3. Abrasion of right knee, initial encounter    4. Effusion of right knee        Plan:       Fall, initial encounter  -     XR KNEE 3 VIEW RIGHT; Future; Expected date: 03/13/2024    Injury of right knee, initial encounter  -     XR KNEE 3 VIEW RIGHT; Future; Expected date: 03/13/2024    Abrasion of right knee, initial encounter    Effusion of right knee    Other orders  -     meloxicam (MOBIC) 15 MG tablet; Take 1 tablet (15 mg total) by mouth once daily. for 14 days  Dispense: 14 tablet; Refill: 0                X-ray right knee: There is prominent lateral joint space narrowing. Meniscal cartilaginous calcification is noted suggesting CPPD. There is tricompartmental osteophyte formation. There is a large joint effusion. Vascular calcifications are noted. No definite acute fracture seen.   Wound care as directed.    Take medications as prescribed.    Use of no other NSAIDs; may rotate with Tylenol.    Rest.  Ice.  Compression.  Elevation.    Follow-up with PCP in 1-2 days.    Recommend following up with Orthopedics; call to schedule appointment.  Return to clinic as needed.    To ED for any new or acutely worsening symptoms.    Patient in agreement with plan of care.    DISCLAIMER: Please note that my documentation in this Electronic Healthcare Record was produced using speech recognition software and therefore may contain errors related to that software system.These could include grammar, punctuation and spelling errors or the inclusion/exclusion of phrases that were not intended. Garbled syntax, mangled pronouns, and other bizarre constructions may be attributed to that software system.

## 2024-03-14 ENCOUNTER — TELEPHONE (OUTPATIENT)
Dept: ELECTROPHYSIOLOGY | Facility: CLINIC | Age: 79
End: 2024-03-14
Payer: MEDICARE

## 2024-03-14 DIAGNOSIS — I49.3 PVC (PREMATURE VENTRICULAR CONTRACTION): Primary | ICD-10-CM

## 2024-03-26 ENCOUNTER — TELEPHONE (OUTPATIENT)
Dept: FAMILY MEDICINE | Facility: CLINIC | Age: 79
End: 2024-03-26
Payer: MEDICARE

## 2024-03-26 NOTE — TELEPHONE ENCOUNTER
Return call placed to Tarah with [facility] regarding attached request.  Office closed at this time. Will follow up regarding this matter tomorrow.

## 2024-03-26 NOTE — TELEPHONE ENCOUNTER
----- Message from Harpreet Patel sent at 3/26/2024  4:32 PM CDT -----  Contact: raj  Type: Needs Medical Advice  Who Called:  raj barrera  Best Call Back Number: [phone]  Additional Information: Raj is calling regarding Rolator for pt office needs copy of clinical notes.Please call back and advise.  Fax # [phone]

## 2024-03-27 NOTE — TELEPHONE ENCOUNTER
Follow up call placed to Tarah with [facility] who advised she received order from [facility] for rolator walker on 3-25-24.  Mrs. Rascon advises she needs the following information:    --Patient's demographics page  --Clinical notes      Please fax above information to fax number [phone].  Thank you.

## 2024-03-28 ENCOUNTER — HOSPITAL ENCOUNTER (OUTPATIENT)
Dept: CARDIOLOGY | Facility: HOSPITAL | Age: 79
Discharge: HOME | End: 2024-03-28
Attending: INTERNAL MEDICINE
Payer: MEDICARE

## 2024-03-28 ENCOUNTER — PATIENT MESSAGE (OUTPATIENT)
Dept: FAMILY MEDICINE | Facility: CLINIC | Age: 79
End: 2024-03-28
Payer: MEDICARE

## 2024-03-28 VITALS
DIASTOLIC BLOOD PRESSURE: 72 MMHG | HEIGHT: 73 IN | WEIGHT: 240 LBS | SYSTOLIC BLOOD PRESSURE: 139 MMHG | BODY MASS INDEX: 31.81 KG/M2

## 2024-03-28 DIAGNOSIS — I47.29 NON-SUSTAINED VENTRICULAR TACHYCARDIA: ICD-10-CM

## 2024-03-28 DIAGNOSIS — I45.10 RBBB: ICD-10-CM

## 2024-03-28 DIAGNOSIS — I10 HTN (HYPERTENSION), BENIGN: ICD-10-CM

## 2024-03-28 DIAGNOSIS — I44.4 LEFT ANTERIOR FASCICULAR HEMIBLOCK: ICD-10-CM

## 2024-03-28 LAB
ASCENDING AORTA: 3.86 CM
AV INDEX (PROSTH): 1.15
AV MEAN GRADIENT: 2 MMHG
AV PEAK GRADIENT: 3 MMHG
AV VALVE AREA BY VELOCITY RATIO: 3.49 CM²
AV VALVE AREA: 3.83 CM²
AV VELOCITY RATIO: 1.05
BSA FOR ECHO PROCEDURE: 2.37 M2
CV ECHO LV RWT: 0.4 CM
DOP CALC AO PEAK VEL: 0.82 M/S
DOP CALC AO VTI: 19.86 CM
DOP CALC LVOT AREA: 3.3 CM2
DOP CALC LVOT DIAMETER: 2.06 CM
DOP CALC LVOT PEAK VEL: 0.86 M/S
DOP CALC LVOT STROKE VOLUME: 76.15 CM3
DOP CALCLVOT PEAK VEL VTI: 22.86 CM
E WAVE DECELERATION TIME: 311.58 MSEC
E/A RATIO: 0.85
E/E' RATIO: 10 M/S
ECHO LV POSTERIOR WALL: 1 CM (ref 0.6–1.1)
EJECTION FRACTION: 65 %
FRACTIONAL SHORTENING: 50 % (ref 28–44)
INTERVENTRICULAR SEPTUM: 1.3 CM (ref 0.6–1.1)
LA MAJOR: 5.7 CM
LA MINOR: 4.98 CM
LA WIDTH: 4.48 CM
LEFT ATRIUM SIZE: 4.6 CM
LEFT ATRIUM VOLUME INDEX MOD: 36.3 ML/M2
LEFT ATRIUM VOLUME INDEX: 40 ML/M2
LEFT ATRIUM VOLUME MOD: 84.56 CM3
LEFT ATRIUM VOLUME: 93.11 CM3
LEFT INTERNAL DIMENSION IN SYSTOLE: 2.48 CM (ref 2.1–4)
LEFT VENTRICLE DIASTOLIC VOLUME INDEX: 50.21 ML/M2
LEFT VENTRICLE DIASTOLIC VOLUME: 116.98 ML
LEFT VENTRICLE MASS INDEX: 94 G/M2
LEFT VENTRICLE SYSTOLIC VOLUME INDEX: 9.4 ML/M2
LEFT VENTRICLE SYSTOLIC VOLUME: 21.94 ML
LEFT VENTRICULAR INTERNAL DIMENSION IN DIASTOLE: 4.98 CM (ref 3.5–6)
LEFT VENTRICULAR MASS: 218.85 G
LV LATERAL E/E' RATIO: 10 M/S
LV SEPTAL E/E' RATIO: 10 M/S
MV PEAK A VEL: 0.71 M/S
MV PEAK E VEL: 0.6 M/S
RA MAJOR: 4.39 CM
RA PRESSURE ESTIMATED: 3 MMHG
RA WIDTH: 4 CM
SINUS: 3.25 CM
STJ: 3.04 CM
TDI LATERAL: 0.06 M/S
TDI SEPTAL: 0.06 M/S
TDI: 0.06 M/S
TRICUSPID ANNULAR PLANE SYSTOLIC EXCURSION: 2.72 CM
Z-SCORE OF LEFT VENTRICULAR DIMENSION IN END DIASTOLE: -6.4
Z-SCORE OF LEFT VENTRICULAR DIMENSION IN END SYSTOLE: -6.56

## 2024-03-28 PROCEDURE — 93306 TTE W/DOPPLER COMPLETE: CPT | Mod: HCNC

## 2024-03-28 PROCEDURE — 93306 TTE W/DOPPLER COMPLETE: CPT | Mod: 26,HCNC,, | Performed by: INTERNAL MEDICINE

## 2024-03-30 NOTE — PROGRESS NOTES
Subjective:   Patient ID:  Maximilian Tavera Jr. is a 78 y.o. male who presents for follow-up of CAD risk    HPI:  The patient is here for CAD risk factors.  The patient has no chest pain, SOB, TIA, palpitations, syncope or pre-syncope.Patient does not exercise.        Review of Systems   Constitutional: Negative for chills, decreased appetite, diaphoresis, fever, malaise/fatigue, night sweats, weight gain and weight loss.   HENT:  Negative for congestion, hoarse voice, nosebleeds, sore throat and tinnitus.    Eyes:  Negative for blurred vision, double vision, vision loss in left eye, vision loss in right eye, visual disturbance and visual halos.   Cardiovascular:  Negative for chest pain, claudication, cyanosis, dyspnea on exertion, irregular heartbeat, leg swelling, near-syncope, orthopnea, palpitations, paroxysmal nocturnal dyspnea and syncope.   Respiratory:  Negative for cough, hemoptysis, shortness of breath, sleep disturbances due to breathing, snoring, sputum production and wheezing.    Endocrine: Negative for cold intolerance, heat intolerance, polydipsia, polyphagia and polyuria.   Hematologic/Lymphatic: Negative for adenopathy and bleeding problem. Does not bruise/bleed easily.   Skin:  Negative for color change, dry skin, flushing, itching, nail changes, poor wound healing, rash, skin cancer, suspicious lesions and unusual hair distribution.   Musculoskeletal:  Positive for arthritis, back pain, joint pain, myalgias and stiffness. Negative for falls, gout, joint swelling, muscle cramps and muscle weakness.   Gastrointestinal:  Negative for abdominal pain, anorexia, change in bowel habit, constipation, diarrhea, dysphagia, heartburn, hematemesis, hematochezia, melena and vomiting.   Genitourinary:  Negative for decreased libido, dysuria, hematuria, hesitancy and urgency.   Neurological:  Negative for excessive daytime sleepiness, dizziness, focal weakness, headaches, light-headedness, loss of balance,  "numbness, paresthesias, seizures, sensory change, tremors, vertigo and weakness.   Psychiatric/Behavioral:  Negative for altered mental status, depression, hallucinations, memory loss, substance abuse and suicidal ideas. The patient does not have insomnia and is not nervous/anxious.    Allergic/Immunologic: Negative for environmental allergies and hives.       Objective: BP (!) 150/75   Pulse (!) 59   Ht 6' 1" (1.854 m)   Wt 108.8 kg (239 lb 13.8 oz)   BMI 31.65 kg/m²      Physical Exam  Constitutional:       General: He is not in acute distress.     Appearance: He is well-developed. He is not diaphoretic.   HENT:      Head: Normocephalic.   Eyes:      Pupils: Pupils are equal, round, and reactive to light.   Neck:      Thyroid: No thyromegaly.   Cardiovascular:      Rate and Rhythm: Normal rate and regular rhythm.      Pulses: Intact distal pulses.           Carotid pulses are 3+ on the right side and 3+ on the left side.       Radial pulses are 3+ on the right side and 3+ on the left side.        Femoral pulses are 3+ on the right side and 3+ on the left side.       Popliteal pulses are 3+ on the right side and 3+ on the left side.        Dorsalis pedis pulses are 3+ on the right side and 3+ on the left side.        Posterior tibial pulses are 3+ on the right side and 3+ on the left side.      Heart sounds: Normal heart sounds. No murmur heard.     No friction rub. No gallop.   Pulmonary:      Effort: Pulmonary effort is normal. No respiratory distress.      Breath sounds: Normal breath sounds. No wheezing or rales.   Chest:      Chest wall: No tenderness.   Abdominal:      General: There is no distension.      Palpations: Abdomen is soft. There is no mass.      Tenderness: There is no abdominal tenderness.   Musculoskeletal:         General: Normal range of motion.      Cervical back: Normal range of motion.   Lymphadenopathy:      Cervical: No cervical adenopathy.   Skin:     General: Skin is warm.      " Nails: There is no clubbing.   Neurological:      Mental Status: He is alert and oriented to person, place, and time.   Psychiatric:         Speech: Speech normal.         Behavior: Behavior normal.         Thought Content: Thought content normal.         Judgment: Judgment normal.         Assessment:     1. Agatston CAC score, <100    2. Erectile dysfunction due to arterial insufficiency    3. Abdominal aortic atherosclerosis    4. Primary osteoarthritis of both knees    5. RBBB    6. PVC's (premature ventricular contractions)    7. S/P cervical spinal fusion    8. Other emphysema    9. Bilateral carotid artery stenosis    10. Aortic valve disease    11. Cervical radiculopathy    12. Myasthenia gravis    13. Venous stasis dermatitis of both lower extremities    14. Obesity (BMI 30-39.9)    15. Non-sustained ventricular tachycardia    16. Mixed hyperlipidemia    17. Hypothyroidism due to acquired atrophy of thyroid    18. Acute deep vein thrombosis (DVT) of femoral vein of left lower extremity    19. Myasthenia gravis, AChR antibody positive    20. Mitral valve insufficiency, unspecified etiology    21. Aortic valve insufficiency, etiology of cardiac valve disease unspecified    22. HTN (hypertension), benign        Plan:   Discussed diet , achieving and maintaining ideal body weight, and exercise.   We reviewed meds in detail.  Reassured-Discussed goals, options, plan.  Omega-3 > 800 mg/d combined EPA/DHA.  Goal BP< 130/80.  Goal LDL < 70 or at least < 100.  Increase Olmesartan to 30 mg later 40 if needed    Maximilian was seen today for hypertension and hyperlipidemia.    Diagnoses and all orders for this visit:    Agatston CAC score, <100  -     EKG 12-lead; Future; Expected date: 06/02/2025    Erectile dysfunction due to arterial insufficiency    Abdominal aortic atherosclerosis    Primary osteoarthritis of both knees    RBBB  -     EKG 12-lead; Future; Expected date: 06/02/2025    PVC's (premature ventricular  contractions)  -     EKG 12-lead; Future; Expected date: 06/02/2025    S/P cervical spinal fusion    Other emphysema    Bilateral carotid artery stenosis    Aortic valve disease    Cervical radiculopathy    Myasthenia gravis    Venous stasis dermatitis of both lower extremities    Obesity (BMI 30-39.9)    Non-sustained ventricular tachycardia    Mixed hyperlipidemia  -     olmesartan (BENICAR) 20 MG tablet; Take 1 tablet (20 mg total) by mouth 2 (two) times daily.    Hypothyroidism due to acquired atrophy of thyroid    Acute deep vein thrombosis (DVT) of femoral vein of left lower extremity    Myasthenia gravis, AChR antibody positive    Mitral valve insufficiency, unspecified etiology  -     EKG 12-lead; Future; Expected date: 06/02/2025  -     Echo; Future; Expected date: 06/02/2025    Aortic valve insufficiency, etiology of cardiac valve disease unspecified  -     EKG 12-lead; Future; Expected date: 06/02/2025  -     Echo; Future; Expected date: 06/02/2025    HTN (hypertension), benign  -     olmesartan (BENICAR) 20 MG tablet; Take 1 tablet (20 mg total) by mouth 2 (two) times daily.            Follow up in about 15 months (around 7/2/2025) for with ECG and CFD Evelia Altamirano to read.

## 2024-04-02 ENCOUNTER — OFFICE VISIT (OUTPATIENT)
Dept: CARDIOLOGY | Facility: CLINIC | Age: 79
End: 2024-04-02
Payer: MEDICARE

## 2024-04-02 ENCOUNTER — HOSPITAL ENCOUNTER (OUTPATIENT)
Dept: CARDIOLOGY | Facility: CLINIC | Age: 79
Discharge: HOME | End: 2024-04-02
Payer: MEDICARE

## 2024-04-02 VITALS
DIASTOLIC BLOOD PRESSURE: 75 MMHG | WEIGHT: 239.88 LBS | HEIGHT: 73 IN | HEART RATE: 59 BPM | BODY MASS INDEX: 31.79 KG/M2 | SYSTOLIC BLOOD PRESSURE: 150 MMHG

## 2024-04-02 DIAGNOSIS — M17.0 PRIMARY OSTEOARTHRITIS OF BOTH KNEES: ICD-10-CM

## 2024-04-02 DIAGNOSIS — I45.10 RBBB: ICD-10-CM

## 2024-04-02 DIAGNOSIS — I47.29 NON-SUSTAINED VENTRICULAR TACHYCARDIA: ICD-10-CM

## 2024-04-02 DIAGNOSIS — R93.1 AGATSTON CAC SCORE, <100: Primary | ICD-10-CM

## 2024-04-02 DIAGNOSIS — I35.9 AORTIC VALVE DISEASE: ICD-10-CM

## 2024-04-02 DIAGNOSIS — G70.00 MYASTHENIA GRAVIS: ICD-10-CM

## 2024-04-02 DIAGNOSIS — I65.23 BILATERAL CAROTID ARTERY STENOSIS: ICD-10-CM

## 2024-04-02 DIAGNOSIS — I70.0 ABDOMINAL AORTIC ATHEROSCLEROSIS: ICD-10-CM

## 2024-04-02 DIAGNOSIS — E78.2 MIXED HYPERLIPIDEMIA: ICD-10-CM

## 2024-04-02 DIAGNOSIS — E66.9 OBESITY (BMI 30-39.9): ICD-10-CM

## 2024-04-02 DIAGNOSIS — I87.2 VENOUS STASIS DERMATITIS OF BOTH LOWER EXTREMITIES: ICD-10-CM

## 2024-04-02 DIAGNOSIS — I82.412 ACUTE DEEP VEIN THROMBOSIS (DVT) OF FEMORAL VEIN OF LEFT LOWER EXTREMITY: ICD-10-CM

## 2024-04-02 DIAGNOSIS — N52.01 ERECTILE DYSFUNCTION DUE TO ARTERIAL INSUFFICIENCY: ICD-10-CM

## 2024-04-02 DIAGNOSIS — I35.1 AORTIC VALVE INSUFFICIENCY, ETIOLOGY OF CARDIAC VALVE DISEASE UNSPECIFIED: ICD-10-CM

## 2024-04-02 DIAGNOSIS — I10 HTN (HYPERTENSION), BENIGN: ICD-10-CM

## 2024-04-02 DIAGNOSIS — M54.12 CERVICAL RADICULOPATHY: ICD-10-CM

## 2024-04-02 DIAGNOSIS — I44.4 LEFT ANTERIOR FASCICULAR HEMIBLOCK: ICD-10-CM

## 2024-04-02 DIAGNOSIS — I34.0 MITRAL VALVE INSUFFICIENCY, UNSPECIFIED ETIOLOGY: ICD-10-CM

## 2024-04-02 DIAGNOSIS — G70.00 MYASTHENIA GRAVIS, ACHR ANTIBODY POSITIVE: ICD-10-CM

## 2024-04-02 DIAGNOSIS — Z98.1 S/P CERVICAL SPINAL FUSION: ICD-10-CM

## 2024-04-02 DIAGNOSIS — E03.4 HYPOTHYROIDISM DUE TO ACQUIRED ATROPHY OF THYROID: ICD-10-CM

## 2024-04-02 DIAGNOSIS — I49.3 PVC'S (PREMATURE VENTRICULAR CONTRACTIONS): ICD-10-CM

## 2024-04-02 DIAGNOSIS — J43.8 OTHER EMPHYSEMA: ICD-10-CM

## 2024-04-02 LAB
OHS QRS DURATION: 144 MS
OHS QTC CALCULATION: 422 MS

## 2024-04-02 PROCEDURE — 1160F RVW MEDS BY RX/DR IN RCRD: CPT | Mod: HCNC,CPTII,S$GLB, | Performed by: INTERNAL MEDICINE

## 2024-04-02 PROCEDURE — 3077F SYST BP >= 140 MM HG: CPT | Mod: HCNC,CPTII,S$GLB, | Performed by: INTERNAL MEDICINE

## 2024-04-02 PROCEDURE — 3078F DIAST BP <80 MM HG: CPT | Mod: HCNC,CPTII,S$GLB, | Performed by: INTERNAL MEDICINE

## 2024-04-02 PROCEDURE — 1159F MED LIST DOCD IN RCRD: CPT | Mod: HCNC,CPTII,S$GLB, | Performed by: INTERNAL MEDICINE

## 2024-04-02 PROCEDURE — 1157F ADVNC CARE PLAN IN RCRD: CPT | Mod: HCNC,CPTII,S$GLB, | Performed by: INTERNAL MEDICINE

## 2024-04-02 PROCEDURE — 1125F AMNT PAIN NOTED PAIN PRSNT: CPT | Mod: HCNC,CPTII,S$GLB, | Performed by: INTERNAL MEDICINE

## 2024-04-02 PROCEDURE — 99215 OFFICE O/P EST HI 40 MIN: CPT | Mod: HCNC,S$GLB,, | Performed by: INTERNAL MEDICINE

## 2024-04-02 PROCEDURE — 93000 ELECTROCARDIOGRAM COMPLETE: CPT | Mod: HCNC,S$GLB,, | Performed by: INTERNAL MEDICINE

## 2024-04-02 PROCEDURE — 99999 PR PBB SHADOW E&M-EST. PATIENT-LVL III: CPT | Mod: PBBFAC,HCNC,, | Performed by: INTERNAL MEDICINE

## 2024-04-02 RX ORDER — OLMESARTAN MEDOXOMIL 20 MG/1
20 TABLET ORAL 2 TIMES DAILY
Qty: 180 TABLET | Refills: 3 | Status: SHIPPED | OUTPATIENT
Start: 2024-04-02

## 2024-04-02 NOTE — PATIENT INSTRUCTIONS
Discussed diet , achieving and maintaining ideal body weight, and exercise.   We reviewed meds in detail.  Reassured-Discussed goals, options, plan.  Omega-3 > 800 mg/d combined EPA/DHA.  Goal BP< 130/80.  Goal LDL < 70 or at least < 100.  Increase Olmesartan to 30 mg later 40 if needed

## 2024-04-04 ENCOUNTER — TELEPHONE (OUTPATIENT)
Dept: FAMILY MEDICINE | Facility: CLINIC | Age: 79
End: 2024-04-04
Payer: MEDICARE

## 2024-04-04 NOTE — TELEPHONE ENCOUNTER
----- Message from Farideh Jesus sent at 4/4/2024  1:23 PM CDT -----  Contact: Saray  Type:  Needs Medical Advice    Who Called: Saray Fry     Would the patient rather a call back or a response via [facility]? Call     Best Call Back Number:     Additional Information: Saray Fry would like to speak with the nurse in regards to an order for walker. She stated that patient is there waiting for his walker and they never received the order. The fax number [phone].     Please call to advise

## 2024-04-08 DIAGNOSIS — E78.2 MIXED HYPERLIPIDEMIA: ICD-10-CM

## 2024-04-08 DIAGNOSIS — E87.6 HYPOKALEMIA: ICD-10-CM

## 2024-04-08 DIAGNOSIS — I10 HTN (HYPERTENSION), BENIGN: ICD-10-CM

## 2024-04-08 DIAGNOSIS — I82.412 ACUTE DEEP VEIN THROMBOSIS (DVT) OF FEMORAL VEIN OF LEFT LOWER EXTREMITY: Primary | ICD-10-CM

## 2024-04-08 DIAGNOSIS — E03.9 HYPOTHYROIDISM: ICD-10-CM

## 2024-04-08 RX ORDER — POTASSIUM CHLORIDE 20 MEQ/1
TABLET, EXTENDED RELEASE ORAL
Qty: 360 TABLET | Refills: 3 | Status: SHIPPED | OUTPATIENT
Start: 2024-04-08

## 2024-04-08 RX ORDER — EZETIMIBE 10 MG/1
10 TABLET ORAL DAILY
Qty: 90 TABLET | Refills: 3 | Status: SHIPPED | OUTPATIENT
Start: 2024-04-08

## 2024-04-08 RX ORDER — APIXABAN 5 MG/1
5 TABLET, FILM COATED ORAL 2 TIMES DAILY
Qty: 180 TABLET | Refills: 3 | Status: SHIPPED | OUTPATIENT
Start: 2024-04-08

## 2024-04-08 NOTE — TELEPHONE ENCOUNTER
No care due was identified.  [facility] Embedded Care Due Messages. Reference number: [id number].   4/08/2024 10:27:25 AM CDT

## 2024-04-09 ENCOUNTER — TELEPHONE (OUTPATIENT)
Dept: CARDIOLOGY | Facility: CLINIC | Age: 79
End: 2024-04-09
Payer: MEDICARE

## 2024-04-09 RX ORDER — LEVOTHYROXINE SODIUM 50 UG/1
TABLET ORAL
Qty: 90 TABLET | Refills: 2 | Status: SHIPPED | OUTPATIENT
Start: 2024-04-09

## 2024-04-09 RX ORDER — ATORVASTATIN CALCIUM 80 MG/1
TABLET, FILM COATED ORAL
Qty: 90 TABLET | Refills: 2 | Status: SHIPPED | OUTPATIENT
Start: 2024-04-09

## 2024-04-09 NOTE — TELEPHONE ENCOUNTER
The Prior Authorization for olmesartan bid was approved by insurance:    PA Case: [id number], Status: Approved, Coverage Starts on: 1/1/2024 12:00:00 AM, Coverage Ends on: 12/31/2024 12:00:00 AM. Questions? Contact [phone].     Pt was updated per last week per voicemail.

## 2024-04-09 NOTE — TELEPHONE ENCOUNTER
Refill Decision Note   Maximilian Tavera  is requesting a refill authorization.  Brief Assessment and Rationale for Refill:  Approve     Medication Therapy Plan:         Comments:     Note composed:3:30 AM 04/09/2024

## 2024-04-10 DIAGNOSIS — I49.3 PVC (PREMATURE VENTRICULAR CONTRACTION): Primary | ICD-10-CM

## 2024-04-23 ENCOUNTER — OFFICE VISIT (OUTPATIENT)
Dept: PODIATRY | Facility: CLINIC | Age: 79
End: 2024-04-23
Payer: MEDICARE

## 2024-04-23 VITALS — BODY MASS INDEX: 31.79 KG/M2 | HEIGHT: 73 IN | WEIGHT: 239.88 LBS

## 2024-04-23 DIAGNOSIS — L60.0 INGROWN NAIL: ICD-10-CM

## 2024-04-23 DIAGNOSIS — M79.672 FOOT PAIN, BILATERAL: Primary | ICD-10-CM

## 2024-04-23 DIAGNOSIS — L90.9 PLANTAR FAT PAD ATROPHY: ICD-10-CM

## 2024-04-23 DIAGNOSIS — L84 CORN OR CALLUS: ICD-10-CM

## 2024-04-23 DIAGNOSIS — M79.671 FOOT PAIN, BILATERAL: Primary | ICD-10-CM

## 2024-04-23 PROCEDURE — 1125F AMNT PAIN NOTED PAIN PRSNT: CPT | Mod: HCNC,CPTII,S$GLB, | Performed by: PODIATRIST

## 2024-04-23 PROCEDURE — 3288F FALL RISK ASSESSMENT DOCD: CPT | Mod: HCNC,CPTII,S$GLB, | Performed by: PODIATRIST

## 2024-04-23 PROCEDURE — 1159F MED LIST DOCD IN RCRD: CPT | Mod: HCNC,CPTII,S$GLB, | Performed by: PODIATRIST

## 2024-04-23 PROCEDURE — 99999 PR PBB SHADOW E&M-EST. PATIENT-LVL II: CPT | Mod: PBBFAC,HCNC,, | Performed by: PODIATRIST

## 2024-04-23 PROCEDURE — 99213 OFFICE O/P EST LOW 20 MIN: CPT | Mod: HCNC,S$GLB,, | Performed by: PODIATRIST

## 2024-04-23 PROCEDURE — 1157F ADVNC CARE PLAN IN RCRD: CPT | Mod: HCNC,CPTII,S$GLB, | Performed by: PODIATRIST

## 2024-04-23 PROCEDURE — 1101F PT FALLS ASSESS-DOCD LE1/YR: CPT | Mod: HCNC,CPTII,S$GLB, | Performed by: PODIATRIST

## 2024-04-23 NOTE — PROGRESS NOTES
Subjective:     Patient ID: Maximilian Tavera Jr. is a 78 y.o. male.    Chief Complaint: Foot Pain (B/l outer edges) and Ingrown Toenail (Rt great toe)    Maximilian is a 78 y.o. 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 The patient's chief complaint consists of pain in bilateral sub 5th met head secondary to callus build up in these areas.  Rates pain as a 5/10. Symptoms are aggravated with all weight bearing and alleviated with rest.  He has not attempted to self treat.   Also, notes having previous discomfort from the lateral border of the Rt. Hallux toenail.  Denies this being associated with localized signs of infection.  Inquires as to how we should proceed with both issues.  Denies any additional pedal complaints.      Hemoglobin A1C   Date Value Ref Range Status   06/15/2023 6.2 (H) 4.0 - 5.6 % Final     Comment:     ADA Screening Guidelines:  5.7-6.4%  Consistent with prediabetes  >or=6.5%  Consistent with diabetes    High levels of fetal hemoglobin interfere with the HbA1C  assay. Heterozygous hemoglobin variants (HbS, HgC, etc)do  not significantly interfere with this assay.   However, presence of multiple variants may affect accuracy.     11/21/2018 5.5 4.0 - 5.6 % Final     Comment:     ADA Screening Guidelines:  5.7-6.4%  Consistent with prediabetes  >or=6.5%  Consistent with diabetes  High levels of fetal  hemoglobin interfere with the HbA1C  assay. Heterozygous hemoglobin variants (HbS, HgC, etc)do  not significantly interfere with this assay.   However, presence of multiple variants may affect accuracy.       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    Disease of spinal cord, unspecified 1/5/2024    Emphysema lung 11/05/2018    Gastritis     Hx of colonic polyp     Hyperlipidemia     Hypertension     Hypothyroidism     Knee fracture     Myasthenia gravis     Neuropathy 01/03/2013    Obesity 01/29/2015    Pneumonia 03/29/2020    Polyneuropathy     PVC (premature ventricular contraction)     Squamous cell carcinoma 2014    left forearm    Thyroid disease        Past Surgical History:   Procedure Laterality Date    APPENDECTOMY      CATARACT EXTRACTION W/  INTRAOCULAR LENS IMPLANT Bilateral     COLONOSCOPY  03/01/2012    Dr Esteban; hyperplastic polyp; repeat in 5 years    COLONOSCOPY N/A 12/7/2021    Procedure: COLONOSCOPY;  Surgeon: Gabriel Hernandez MD;  Location: [facility];  Service: Endoscopy;  Laterality: N/A;    CYSTOSCOPY N/A 2/26/2019    Procedure: CYSTOSCOPY;  Surgeon: Simba Cheung MD;  Location: [facility];  Service: Urology;  Laterality: N/A;    ENDOSCOPIC ULTRASOUND OF UPPER GASTROINTESTINAL TRACT N/A 1/7/2020    Procedure: ULTRASOUND, UPPER GI TRACT, ENDOSCOPIC;  Surgeon: Kati Ryan MD;  Location: [address]);  Service: Endoscopy;  Laterality: N/A;    ESOPHAGOGASTRODUODENOSCOPY N/A 9/12/2018    Procedure: EGD (ESOPHAGOGASTRODUODENOSCOPY);  Surgeon: Gabriel Hernandez MD;  Location: [facility];  Service: Endoscopy;  Laterality: N/A;    ESOPHAGOGASTRODUODENOSCOPY N/A 8/19/2019    Procedure: EGD (ESOPHAGOGASTRODUODENOSCOPY);  Surgeon: Gabriel GONZALES  MD David;  Location: [facility];  Service: Endoscopy;  Laterality: N/A;    ESOPHAGOGASTRODUODENOSCOPY N/A 10/7/2019    Procedure: EGD (ESOPHAGOGASTRODUODENOSCOPY);  Surgeon: Gabriel Hernandez MD;  Location: [facility];  Service: Endoscopy;  Laterality: N/A;    ESOPHAGOGASTRODUODENOSCOPY N/A 1/7/2020    Procedure: EGD (ESOPHAGOGASTRODUODENOSCOPY);  Surgeon: Kati Ryan MD;  Location: [facility] (2ND FLR);  Service: Endoscopy;  Laterality: N/A;    HEMORRHOID SURGERY      INJECTION OF ANESTHETIC AGENT AROUND MEDIAL BRANCH NERVES INNERVATING LUMBAR FACET JOINT Bilateral 10/1/2020    Procedure: Block-nerve-medial branch-lumbar Bilateral L 3,4,5;  Surgeon: Devan Watt MD;  Location: [facility];  Service: Pain Management;  Laterality: Bilateral;    INJECTION OF ANESTHETIC AGENT AROUND MEDIAL BRANCH NERVES INNERVATING LUMBAR FACET JOINT Right 10/7/2022    Procedure: Block-nerve-medial branch-lumbar L3,4,5;  Surgeon: Devan Watt MD;  Location: [facility];  Service: Pain Management;  Laterality: Right;    KNEE ARTHROPLASTY Bilateral     LENGTHENING OF ACHILLES TENDON Right 9/11/2020    Procedure: percutaeous tenotomy of right lateral epicondyle (tenex);  Surgeon: Terry Quach MD;  Location: [facility];  Service: Neurosurgery;  Laterality: Right;  tenex to right lateral epicondyle  Tenex machine SN#[id number], total time 1min. 30seconds    NECK SURGERY      POSTERIOR FUSION OF CERVICAL SPINE WITH LAMINECTOMY N/A 11/7/2018    Procedure: C2-C6 Posterior Cervical Laminectomy & Instrumental Fusion;  Surgeon: Kishore Turner MD;  Location: [facility] 2ND FLR;  Service: Neurosurgery;  Laterality: N/A;    TONSILLECTOMY      TRANSFORAMINAL EPIDURAL INJECTION OF STEROID Right 12/20/2019    Procedure: Injection,steroid,epidural,transforaminal approach;  Surgeon: Devan Watt MD;  Location: [facility];  Service: Pain Management;  Laterality: Right;  L3-4, L4-5    TRANSFORAMINAL EPIDURAL INJECTION OF STEROID Bilateral 2/6/2020    Procedure:  Injection,steroid,epidural,transforaminal approach;  Surgeon: Devan Watt MD;  [facility];  Service: Pain Management;  Laterality: Bilateral;  L3-L4,L4-L5    TRANSFORAMINAL EPIDURAL INJECTION OF STEROID Bilateral 8/26/2020    Procedure: Injection,steroid,epidural,transforaminal approach;  Surgeon: Devan Watt MD;  [facility];  Service: Pain Management;  Laterality: Bilateral;  L3-4, L4-5    TRANSRECTAL ULTRASOUND EXAMINATION N/A 2/26/2019    Procedure: ULTRASOUND, RECTAL APPROACH;  Surgeon: Simba Cheung MD;  [facility];  Service: Urology;  Laterality: N/A;    UPPER GASTROINTESTINAL ENDOSCOPY  09/12/2018    Dr Hernandez; gastritis; extensive intestinal metaplasia; repeat in 1-2- years       Family History   Problem Relation Name Age of Onset    Cataracts Mother      Heart disease Mother          CHF    Hypertension Mother      Hyperlipidemia Mother      Cataracts Father      Glaucoma Father      Heart disease Father      Hyperlipidemia Sister      Hypertension Sister      No Known Problems Daughter      No Known Problems Daughter      Collagen disease Neg Hx      Amblyopia Neg Hx      Blindness Neg Hx      Macular degeneration Neg Hx      Retinal detachment Neg Hx      Strabismus Neg Hx      Cancer Neg Hx      Colon cancer Neg Hx      Esophageal cancer Neg Hx      Stomach cancer Neg Hx      Crohn's disease Neg Hx      Ulcerative colitis Neg Hx         Social History     Socioeconomic History    Marital status:    Tobacco Use    Smoking status: Never    Smokeless tobacco: Never    Tobacco comments:     when a child   Substance and Sexual Activity    Alcohol use: Yes     Comment: rarely    Drug use: No    Sexual activity: Yes     Partners: Female     Social Determinants of Health     Financial Resource Strain: Low Risk  (3/4/2024)    Overall Financial Resource Strain (CARDIA)     Difficulty of Paying Living Expenses: Not hard at all   Food Insecurity: No Food Insecurity (3/4/2024)     Hunger Vital Sign     Worried About Running Out of Food in the Last Year: Never true     Ran Out of Food in the Last Year: Never true   Transportation Needs: No Transportation Needs (3/4/2024)    PRAPARE - Transportation     Lack of Transportation (Medical): No     Lack of Transportation (Non-Medical): No   Physical Activity: Inactive (3/4/2024)    Exercise Vital Sign     Days of Exercise per Week: 0 days     Minutes of Exercise per Session: 0 min   Stress: No Stress Concern Present (3/4/2024)    [employer] - Occupational Stress Questionnaire     Feeling of Stress : Only a little   Social Connections: Socially Integrated (3/4/2024)    Social Connection and Isolation Panel [NHANES]     Frequency of Communication with Friends and Family: More than three times a week     Frequency of Social Gatherings with Friends and Family: Three times a week     Attends Orthodoxy Services: 1 to 4 times per year     Active Member of Clubs or Organizations: Yes     Attends Club or Organization Meetings: 1 to 4 times per year     Marital Status:    Housing Stability: Low Risk  (3/4/2024)    Housing Stability Vital Sign     Unable to Pay for Housing in the Last Year: No     Number of Places Lived in the Last Year: 1     Unstable Housing in the Last Year: No       Current Outpatient Medications   Medication Sig Dispense Refill    albuterol (PROVENTIL/VENTOLIN HFA) 90 mcg/actuation inhaler INHALE 1 TO 2 PUFFS INTO THE LUNGS EVERY 6 HOURS AS NEEDED FOR WHEEZING OR SHORTNESS OF BREATH. FOR RESCUE. 25.5 g 0    atorvastatin (LIPITOR) 80 MG tablet TAKE 1 TABLET EVERY DAY 90 tablet 2    carvediloL (COREG) 25 MG tablet Take 1 tablet (25 mg total) by mouth 2 (two) times daily with meals. 180 tablet 3    cetirizine (ZYRTEC) 10 MG tablet Take 1 tablet by mouth daily as needed.      chlorthalidone (HYGROTEN) 25 MG Tab TAKE 1 TABLET ONE TIME DAILY 90 tablet 3    cholecalciferol, vitamin D3, (VITAMIN D3) 50 mcg  (2,000 unit) Cap 1 capsule once daily. Every day      coenzyme Q10 (CO Q-10) 100 mg capsule Take 400 mg by mouth once daily.      cyanocobalamin, vitamin B-12, 5,000 mcg Subl Take 5,000 mcg by mouth once daily. Every day      diphenoxylate-atropine 2.5-0.025 mg (LOMOTIL) 2.5-0.025 mg per tablet Take 1 tablet by mouth 4 (four) times daily as needed for Diarrhea. 90 tablet 0    ELIQUIS 5 mg Tab TAKE 1 TABLET TWICE DAILY 180 tablet 3    ezetimibe (ZETIA) 10 mg tablet Take 1 tablet (10 mg total) by mouth once daily. 90 tablet 3    famotidine (PEPCID) 20 MG tablet Take 1 tablet (20 mg total) by mouth nightly as needed for Heartburn. 90 tablet 3    fluticasone (FLONASE) 50 mcg/actuation nasal spray USE 1 SPRAY IN EACH NOSTRIL TWICE DAILY AS NEEDED  FOR  RHINITIS 48 g 3    gabapentin (NEURONTIN) 600 MG tablet TAKE 1 TABLET THREE TIMES DAILY 90 tablet 10    HYDROcodone-acetaminophen (NORCO) 7.5-325 mg per tablet Take 1 tablet by mouth every 6 (six) hours as needed for Pain. 28 tablet 0    levothyroxine (SYNTHROID) 50 MCG tablet TAKE 1 TABLET EVERY DAY 90 tablet 2    milk thistle 200 mg Cap Take 200 mg by mouth 2 (two) times daily. Twice a day      mupirocin (BACTROBAN) 2 % ointment Apply topically 3 (three) times daily. 30 g 0    olmesartan (BENICAR) 20 MG tablet Take 1 tablet (20 mg total) by mouth 2 (two) times daily. 180 tablet 3    omega-3 fatty acids 1,000 mg Cap Twice a day      pantoprazole (PROTONIX) 40 MG tablet TAKE 1 TABLET TWICE DAILY 180 tablet 2    potassium chloride SA (K-DUR,KLOR-CON) 20 MEQ tablet TAKE 4 TABLETS by mouth EVERY DAY  OR AS DIRECTED 360 tablet 3    predniSONE (DELTASONE) 1 MG tablet Take 2 tablets (2 mg total) by mouth once daily. 180 tablet 3    predniSONE (DELTASONE) 5 MG tablet Take 1 tablet (5 mg total) by mouth once daily. 90 tablet 3    PREVNAR 20, PF, 0.5 mL Syrg injection       promethazine-dextromethorphan (PROMETHAZINE-DM) 6.25-15 mg/5 mL Syrp TAKE 5 MLS BY MOUTH EVERY 6 HOURS AS  NEEDED 240 mL 3    sildenafil (REVATIO) 20 mg Tab Take 1 to 3 tabs daily as needed. 30 tablet 3    tiotropium (SPIRIVA WITH HANDIHALER) 18 mcg inhalation capsule INHALE THE CONTENTS OF 1 CAPSULE EVERY DAY (CONTROLLER) 90 capsule 10     No current facility-administered medications for this visit.       Review of patient's allergies indicates:   Allergen Reactions    Spironolactone Diarrhea      Review of Systems   Constitutional: Negative for chills and fever.   Cardiovascular:  Negative for claudication.   Skin:  Positive for nail changes. Negative for color change and suspicious lesions.   Musculoskeletal:  Positive for arthritis and back pain. Negative for joint swelling, muscle cramps and muscle weakness.   Neurological:  Negative for numbness and paresthesias.   Psychiatric/Behavioral:  Negative for altered mental status.         Objective:     Physical Exam  Constitutional:       General: He is not in acute distress.     Appearance: He is well-developed. He is not diaphoretic.   Cardiovascular:      Pulses:           Dorsalis pedis pulses are 2+ on the right side and 2+ on the left side.        Posterior tibial pulses are 2+ on the right side and 2+ on the left side.      Comments: CFT is < 3 seconds bilateral.  Pedal hair growth is present bilateral.  Varicosities noted bilateral.  Moderate nonpitting lower extremity edema noted bilateral.  Toes are warm to touch bilateral.    Musculoskeletal:         General: Tenderness present.      Comments: Muscle strength 5/5 in all muscle groups bilateral.  No tenderness nor crepitation with ROM of foot/ankle joints bilateral.  Pain with palpation to the lateral nail fold of the Rt. Hallux and medial nail fold of the Lt. Hallux.   Skin:     General: Skin is warm and dry.      Capillary Refill: Capillary refill takes 2 to 3 seconds.      Coloration: Skin is not pale.      Findings: Lesion present. No abrasion, bruising, burn, ecchymosis, erythema, laceration or rash.       Comments: Pedal skin has normal turgor, temperature, and texture bilateral.  Incurvation noted to the medial border of the Lt. Hallux toenail and both borders of the Rt. Hallux toenail.   No erythema, edema, fluctuance, purulence, or malodor noted.  Remaining toenails x 8 appear normotrophic.  Hyperkeratotic lesions noted to bilateral sub 5th met head.    Neurological:      Mental Status: He is alert and oriented to person, place, and time.      Sensory: No sensory deficit.      Comments: Light touch is intact bilateral.           Assessment:      Encounter Diagnoses   Name Primary?    Foot pain, bilateral Yes    Plantar fat pad atrophy     Corn or callus     Ingrown nail      Plan:     Maximilian was seen today for foot pain and ingrown toenail.    Diagnoses and all orders for this visit:    Foot pain, bilateral    Plantar fat pad atrophy    Corn or callus    Ingrown nail      I counseled the patient on his conditions, their implications and medical management.    Based on today's exam, the patient has fat pad atrophy of bilateral sub 5th heads with subsequent callus build up.    Advised to begin applying ebanel lotion to these areas QD.    Advised to offload with gel insoles with an aperture cut out about the 5th met heads to better offload.    Advised to apply along the medial and lateral nail grooves of bilateral hallux toe discourage incurvation of the nail plates.    We can consider a partial matrixectomy of both borders of the Rt. Hallux toenail and medial border of the Lt. Hallux toenail going forward should this continue to be an issue.    RTC prn.    Khris Valentin DPM

## 2024-04-24 DIAGNOSIS — G95.9 DISEASE OF SPINAL CORD, UNSPECIFIED: ICD-10-CM

## 2024-04-24 DIAGNOSIS — G70.00 MYASTHENIA GRAVIS, ACHR ANTIBODY POSITIVE: Primary | ICD-10-CM

## 2024-04-24 NOTE — PROGRESS NOTES
Orders Placed This Encounter   Procedures    WALKER FOR HOME USE     Order Specific Question:   Type of Walker:     Answer:   Rollator with brakes and/or seat     Order Specific Question:   With wheels?     Answer:   Yes     Order Specific Question:   Height:     Answer:   74 inches     Order Specific Question:   Weight:     Answer:   108 kg     Order Specific Question:   Length of need (1-99 months):     Answer:   99     Order Specific Question:   Please check all that apply:     Answer:   Patient's condition impairs ambulation.     Order Specific Question:   Please check all that apply:     Answer:   Patient needs help to get in and out of chair.     Order Specific Question:   Please check all that apply:     Answer:   Altered sensory perception.     Order Specific Question:   Please check all that apply:     Answer:   Patient is unable to safely ambulate without equipment.     Order Specific Question:   Please check all that apply:     Answer:   Walker will be used for gait training.

## 2024-05-09 NOTE — TELEPHONE ENCOUNTER
Attempt made to contact patient's wife using number listed in chart and was unable to make contact. Will try again.   Results of recent holter given to patient through [facility].    Notes recorded by Sathya Zamudio MD   Please notify the patient that his holter monitor noted very rare PVCs now that he is on carvedilol.  ------    Notes recorded by Miguel Altamirano MD   Release -tell him nothing bad on Holter.

## 2024-05-27 DIAGNOSIS — I49.3 PVC'S (PREMATURE VENTRICULAR CONTRACTIONS): ICD-10-CM

## 2024-05-27 DIAGNOSIS — I10 HTN (HYPERTENSION), BENIGN: ICD-10-CM

## 2024-05-27 RX ORDER — CARVEDILOL 25 MG/1
25 TABLET ORAL 2 TIMES DAILY WITH MEALS
Qty: 180 TABLET | Refills: 3 | Status: SHIPPED | OUTPATIENT
Start: 2024-05-27

## 2024-05-29 ENCOUNTER — LAB VISIT (OUTPATIENT)
Dept: LAB | Facility: HOSPITAL | Age: 79
End: 2024-05-29
Attending: FAMILY MEDICINE
Payer: MEDICARE

## 2024-05-29 ENCOUNTER — HOSPITAL ENCOUNTER (OUTPATIENT)
Dept: RADIOLOGY | Facility: HOSPITAL | Age: 79
Discharge: HOME | End: 2024-05-29
Attending: FAMILY MEDICINE
Payer: MEDICARE

## 2024-05-29 DIAGNOSIS — E03.4 HYPOTHYROIDISM DUE TO ACQUIRED ATROPHY OF THYROID: ICD-10-CM

## 2024-05-29 DIAGNOSIS — D69.6 THROMBOCYTOPENIA: ICD-10-CM

## 2024-05-29 DIAGNOSIS — I82.432 ACUTE DEEP VEIN THROMBOSIS (DVT) OF POPLITEAL VEIN OF LEFT LOWER EXTREMITY: ICD-10-CM

## 2024-05-29 LAB
BASOPHILS # BLD AUTO: 0.04 K/UL (ref 0–0.2)
BASOPHILS NFR BLD: 0.7 % (ref 0–1.9)
DIFFERENTIAL METHOD BLD: ABNORMAL
EOSINOPHIL # BLD AUTO: 0.2 K/UL (ref 0–0.5)
EOSINOPHIL NFR BLD: 3.5 % (ref 0–8)
ERYTHROCYTE [DISTWIDTH] IN BLOOD BY AUTOMATED COUNT: 14.6 % (ref 11.5–14.5)
HCT VFR BLD AUTO: 41.2 % (ref 40–54)
HGB BLD-MCNC: 13.3 G/DL (ref 14–18)
IMM GRANULOCYTES # BLD AUTO: 0.02 K/UL (ref 0–0.04)
IMM GRANULOCYTES NFR BLD AUTO: 0.3 % (ref 0–0.5)
LYMPHOCYTES # BLD AUTO: 1.7 K/UL (ref 1–4.8)
LYMPHOCYTES NFR BLD: 28.4 % (ref 18–48)
MCH RBC QN AUTO: 30.4 PG (ref 27–31)
MCHC RBC AUTO-ENTMCNC: 32.3 G/DL (ref 32–36)
MCV RBC AUTO: 94 FL (ref 82–98)
MONOCYTES # BLD AUTO: 0.7 K/UL (ref 0.3–1)
MONOCYTES NFR BLD: 11.4 % (ref 4–15)
NEUTROPHILS # BLD AUTO: 3.3 K/UL (ref 1.8–7.7)
NEUTROPHILS NFR BLD: 55.7 % (ref 38–73)
NRBC BLD-RTO: 0 /100 WBC
PLATELET # BLD AUTO: 118 K/UL (ref 150–450)
PMV BLD AUTO: 10.9 FL (ref 9.2–12.9)
RBC # BLD AUTO: 4.37 M/UL (ref 4.6–6.2)
T4 FREE SERPL-MCNC: 0.92 NG/DL (ref 0.71–1.51)
TSH SERPL DL<=0.005 MIU/L-ACNC: 1.48 UIU/ML (ref 0.4–4)
WBC # BLD AUTO: 5.99 K/UL (ref 3.9–12.7)

## 2024-05-29 PROCEDURE — 84443 ASSAY THYROID STIM HORMONE: CPT | Mod: HCNC | Performed by: FAMILY MEDICINE

## 2024-05-29 PROCEDURE — 36415 COLL VENOUS BLD VENIPUNCTURE: CPT | Mod: HCNC,PO | Performed by: FAMILY MEDICINE

## 2024-05-29 PROCEDURE — 85025 COMPLETE CBC W/AUTO DIFF WBC: CPT | Mod: HCNC | Performed by: FAMILY MEDICINE

## 2024-05-29 PROCEDURE — 93971 EXTREMITY STUDY: CPT | Mod: TC,LT

## 2024-05-29 PROCEDURE — 84439 ASSAY OF FREE THYROXINE: CPT | Mod: HCNC | Performed by: FAMILY MEDICINE

## 2024-05-29 PROCEDURE — 93971 EXTREMITY STUDY: CPT | Mod: 26,LT,, | Performed by: RADIOLOGY

## 2024-05-30 ENCOUNTER — TELEPHONE (OUTPATIENT)
Dept: PRIMARY CARE CLINIC | Facility: CLINIC | Age: 79
End: 2024-05-30
Payer: MEDICARE

## 2024-05-30 NOTE — TELEPHONE ENCOUNTER
----- Message from KEYA Leslie sent at 5/29/2024  3:39 PM CDT -----    Please let patient know I have reviewed the results of his ultrasound of his left lower leg.  The big blood clot is no longer seen but there are small amounts of residual clot in the vein.  Continue all medications.  I am going to leave this in Dr. Marroquin's box for him to review when he gets back into the office

## 2024-06-12 ENCOUNTER — PATIENT MESSAGE (OUTPATIENT)
Dept: PRIMARY CARE CLINIC | Facility: CLINIC | Age: 79
End: 2024-06-12
Payer: MEDICARE

## 2024-06-13 ENCOUNTER — OFFICE VISIT (OUTPATIENT)
Dept: PRIMARY CARE CLINIC | Facility: CLINIC | Age: 79
End: 2024-06-13
Payer: MEDICARE

## 2024-06-13 VITALS
HEIGHT: 73 IN | TEMPERATURE: 98 F | BODY MASS INDEX: 32.08 KG/M2 | OXYGEN SATURATION: 95 % | DIASTOLIC BLOOD PRESSURE: 68 MMHG | HEART RATE: 65 BPM | SYSTOLIC BLOOD PRESSURE: 114 MMHG | WEIGHT: 242.06 LBS

## 2024-06-13 DIAGNOSIS — D69.6 THROMBOCYTOPENIA: ICD-10-CM

## 2024-06-13 DIAGNOSIS — J44.89 COPD WITH ASTHMA: ICD-10-CM

## 2024-06-13 DIAGNOSIS — M51.36 DDD (DEGENERATIVE DISC DISEASE), LUMBAR: Primary | ICD-10-CM

## 2024-06-13 PROCEDURE — 99999 PR PBB SHADOW E&M-EST. PATIENT-LVL IV: CPT | Mod: PBBFAC,HCNC,, | Performed by: FAMILY MEDICINE

## 2024-06-13 PROCEDURE — 99214 OFFICE O/P EST MOD 30 MIN: CPT | Mod: HCNC,S$GLB,, | Performed by: FAMILY MEDICINE

## 2024-06-13 PROCEDURE — 3074F SYST BP LT 130 MM HG: CPT | Mod: HCNC,CPTII,S$GLB, | Performed by: FAMILY MEDICINE

## 2024-06-13 PROCEDURE — 1160F RVW MEDS BY RX/DR IN RCRD: CPT | Mod: HCNC,CPTII,S$GLB, | Performed by: FAMILY MEDICINE

## 2024-06-13 PROCEDURE — 1159F MED LIST DOCD IN RCRD: CPT | Mod: HCNC,CPTII,S$GLB, | Performed by: FAMILY MEDICINE

## 2024-06-13 PROCEDURE — 1157F ADVNC CARE PLAN IN RCRD: CPT | Mod: HCNC,CPTII,S$GLB, | Performed by: FAMILY MEDICINE

## 2024-06-13 PROCEDURE — 3078F DIAST BP <80 MM HG: CPT | Mod: HCNC,CPTII,S$GLB, | Performed by: FAMILY MEDICINE

## 2024-06-13 RX ORDER — TIOTROPIUM BROMIDE 18 UG/1
CAPSULE ORAL; RESPIRATORY (INHALATION)
Qty: 90 CAPSULE | Refills: 3 | Status: SHIPPED | OUTPATIENT
Start: 2024-06-13

## 2024-06-13 NOTE — PATIENT INSTRUCTIONS
Patient Education       Type 2 Diabetes Discharge Instructions   About this topic   Type 2 diabetes is the most common type of diabetes. If you have this illness, your body does not make enough insulin or does not correctly use the insulin it does make. When you eat, your body breaks down all sugars and starches into glucose. Your body needs insulin to use glucose for energy. The insulin takes the glucose from your blood into your cells. If you do not have enough insulin, or your body does not use the insulin well, the glucose or sugar stays in your blood instead of going into your cells. This causes your blood sugar levels to be too high. Over time, this can damage your heart, nerves, eyes, kidneys, and other organs.     What care is needed at home?   Learn how to take care of your diabetes.  Ask your doctor what you need to do when you go home. Make sure you ask questions if you do not understand what the doctor says. This way you will know what you need to do.  Based on what diabetes drugs you take, you might need to check your blood sugar regularly at home. But not everyone with type 2 diabetes needs to do this. If you need to, your doctor will teach you how to check your blood sugar. Ask what the goal numbers are for your blood sugar. Keep a list of your blood sugar levels. This will help you learn what causes high or low readings and help you manage your diabetes.     Take your diabetes drugs as directed. Ask what to do if you miss a dose of your diabetes drugs.  Learn when, what, and how much to eat.  Be active. Walk, garden, or do something active for 30 minutes or more on most days of the week. This will also help you control your weight. Ask your doctor for proper food and exercise programs to follow.  Check your feet often. Look for blisters or sores. Always wear socks and shoes. Never walk barefoot, especially outdoors. Take special care around the pool and at the beach, as these surfaces may be  extremely hot and burn your feet. Report any problems with your feet to your doctor.  Wear a medical ID.  Limit or avoid beer, wine, and mixed drinks (alcohol).  If you smoke, try to quit. Your doctor or nurse can help.  Talk with your doctor about if you need to check your blood sugar at home.  If you are overweight, ask your doctor if you would be a good candidate for bariatric surgery as this can reverse diabetes in some cases.  What follow-up care is needed?   Your doctor may ask you to make visits to the office to check on your progress. Be sure to keep these visits.  What drugs may be needed?   Diabetes drugs will help control your blood sugar. You may have more than one diabetes drug. Your doctor may order drugs for you to take by mouth or insulin as a shot. You will be trained on how to give insulin shots, if needed. Talk to your doctor about your diabetes drugs and what you need to do when you go home.  Will physical activity be limited?   Being active can lower blood sugar and blood pressure. It can also help control weight. Be sure to check with your doctor before starting any exercise program.  Try to walk, bike, or swim every day. Start with 5 to 10 minutes each day. Work up to about 30 minutes most days.  Drink lots of water during workouts.  What changes to diet are needed?   Eating a healthy diet is important. This means you need to eat regularly throughout the day. You need to include a variety of foods such as fruits and vegetables, whole grains, nonfat dairy products, and lean meats. Do not eat too much food at one time and do not skip meals. Limit foods high in sugar like sweets, desserts, and fruit juices. Ask your doctor about what kind of diet is right for you.  What problems could happen?   Heart, kidney, and nerve problems  Foot problems. Sores and infection may also happen.  Eye problems  Dangerously high blood sugar levels requiring emergency treatment  Severe infections  Talk with your  doctor often. Other drugs or care may be needed to treat or prevent these problems.  When do I need to call the doctor?   You have signs of high blood sugar like you:  Are more thirsty  Are urinating more often  Have new shortness of breath  Have belly pain, an upset stomach, or are throwing up.  Are more tired than normal  Have a very dry mouth or a fruity breath odor  Signs of infection. These include a fever of 100.4°F (38°C) or higher, chills, or a wound that will not heal.  Blurred vision  Chest pain  Swelling in your legs  Change in color and odor of your feet  Blood sugar that remains high and does not respond to treatment  Helpful tips   Talk to your doctor about getting a flu shot and pneumonia shot.  Teach Back: Helping You Understand   The Teach Back Method helps you understand the information we are giving you. After you talk with the staff, tell them in your own words what you learned. This helps to make sure the staff has described each thing clearly. It also helps to explain things that may have been confusing. Before going home, make sure you can do these:  I can tell you about my condition.  I can tell you what I need to do to control my blood sugar.  I can tell you what I will do if I have signs of high blood sugar.  Where can I learn more?   Center for Disease Control and Prevention  https://www.cdc.gov/diabetes/basics/type2.html   International Diabetes Foundation  https://www.idf.org/aboutdiabetes/type-2-diabetes.html   UpToDate  https://www.[employer].com/contents/type-2-diabetes-overview-beyond-the-basics   Last Reviewed Date   2021-05-04  Consumer Information Use and Disclaimer   This information is not specific medical advice and does not replace information you receive from your health care provider. This is only a brief summary of general information. It does NOT include all information about conditions, illnesses, injuries, tests, procedures, treatments, therapies, discharge instructions or  life-style choices that may apply to you. You must talk with your health care provider for complete information about your health and treatment options. This information should not be used to decide whether or not to accept your health care providers advice, instructions or recommendations. Only your health care provider has the knowledge and training to provide advice that is right for you.  Copyright   Copyright © 2021 [employer] and its affiliates and/or licensors. All rights reserved.

## 2024-06-26 ENCOUNTER — PATIENT MESSAGE (OUTPATIENT)
Dept: PRIMARY CARE CLINIC | Facility: CLINIC | Age: 79
End: 2024-06-26
Payer: MEDICARE

## 2024-06-26 DIAGNOSIS — I89.0 LYMPHEDEMA DUE TO VENOUS INSUFFICIENCY: ICD-10-CM

## 2024-06-26 DIAGNOSIS — I82.5Z9 CHRONIC DEEP VEIN THROMBOSIS (DVT) OF LOWER LEG: Primary | ICD-10-CM

## 2024-06-26 DIAGNOSIS — I87.2 LYMPHEDEMA DUE TO VENOUS INSUFFICIENCY: ICD-10-CM

## 2024-06-26 NOTE — PROGRESS NOTES
Discussed case with his cardiologist.  Please inform Mr. Yao that he will benefit from seeing Dr. Amezquita in vascular Medicine.  He should consider vena cava filter since present blood thinners were not effective to resolve previous clots completely.

## 2024-06-26 NOTE — PROGRESS NOTES
Subjective:       Patient ID: Maximilian aTvera Jr. is a 78 y.o. male.    Chief Complaint: Follow-up (Labs/US results. Physical therapy.)    Mr. Tavera is a 78year old male with HTN, myasthenia gravis,GERD, COPD and hypothyroidism who presents to clinic for follow up of chronic cough over 4 months. The patient patient had no acute findings on CHEST X- RAY and BNP was normal. Also Rt popliteal DVT last 4 months, non provoke. Eliquis 5 mg bid. Improved swelling and less pain.  Acute deep vein thrombosis (dvt) of popliteal vein of left lower extremity  (primary encounter diagnosis) less than 18 months  Lymphedema of both lower extremities. Chronic venous insufficiency.  Hypothyroidism due to acquired atrophy of thyroid.Lab Results       Component                Value               Date                       TSH                      1.481               05/29/2024              Pvc (premature ventricular contraction) Not present  Other emphysema  Subacute cough  Disease of spinal cord, unspecified  Myasthenia gravis, achr antibody positive  Non-sustained ventricular tachycardia  Thrombocytopenia Lab Results       Component                Value               Date                       WBC                      5.99                05/29/2024                 HGB                      13.3 (L)            05/29/2024                 HCT                      41.2                05/29/2024                 MCV                      94                  05/29/2024                 PLT                      118 (L)             05/29/2024                        Follow-up  This is a chronic problem. Associated symptoms include arthralgias and coughing (chronic cough). Pertinent negatives include no abdominal pain, chest pain, fever, headaches or myalgias. The symptoms are aggravated by eating and exertion. He has tried acetaminophen and position changes for the symptoms. The treatment provided mild relief.     Review of patient's  allergies indicates:   Allergen Reactions    Spironolactone Diarrhea         Current Outpatient Medications:     atorvastatin (LIPITOR) 80 MG tablet, TAKE 1 TABLET EVERY DAY, Disp: 90 tablet, Rfl: 2    carvediloL (COREG) 25 MG tablet, TAKE 1 TABLET TWICE DAILY WITH MEALS, Disp: 180 tablet, Rfl: 3    chlorthalidone (HYGROTEN) 25 MG Tab, TAKE 1 TABLET ONE TIME DAILY, Disp: 90 tablet, Rfl: 3    cholecalciferol, vitamin D3, (VITAMIN D3) 50 mcg (2,000 unit) Cap, 1 capsule once daily. Every day, Disp: , Rfl:     coenzyme Q10 (CO Q-10) 100 mg capsule, Take 400 mg by mouth once daily., Disp: , Rfl:     cyanocobalamin, vitamin B-12, 5,000 mcg Subl, Take 5,000 mcg by mouth once daily. Every day, Disp: , Rfl:     ELIQUIS 5 mg Tab, TAKE 1 TABLET TWICE DAILY, Disp: 180 tablet, Rfl: 3    ezetimibe (ZETIA) 10 mg tablet, Take 1 tablet (10 mg total) by mouth once daily., Disp: 90 tablet, Rfl: 3    gabapentin (NEURONTIN) 600 MG tablet, TAKE 1 TABLET THREE TIMES DAILY, Disp: 90 tablet, Rfl: 10    levothyroxine (SYNTHROID) 50 MCG tablet, TAKE 1 TABLET EVERY DAY, Disp: 90 tablet, Rfl: 2    milk thistle 200 mg Cap, Take 200 mg by mouth 2 (two) times daily. Twice a day, Disp: , Rfl:     olmesartan (BENICAR) 20 MG tablet, Take 1 tablet (20 mg total) by mouth 2 (two) times daily., Disp: 180 tablet, Rfl: 3    omega-3 fatty acids 1,000 mg Cap, Twice a day, Disp: , Rfl:     pantoprazole (PROTONIX) 40 MG tablet, TAKE 1 TABLET TWICE DAILY, Disp: 180 tablet, Rfl: 2    potassium chloride SA (K-DUR,KLOR-CON) 20 MEQ tablet, TAKE 4 TABLETS by mouth EVERY DAY  OR AS DIRECTED, Disp: 360 tablet, Rfl: 3    predniSONE (DELTASONE) 1 MG tablet, Take 2 tablets (2 mg total) by mouth once daily., Disp: 180 tablet, Rfl: 3    predniSONE (DELTASONE) 5 MG tablet, Take 1 tablet (5 mg total) by mouth once daily., Disp: 90 tablet, Rfl: 3    albuterol (PROVENTIL/VENTOLIN HFA) 90 mcg/actuation inhaler, INHALE 1 TO 2 PUFFS INTO THE LUNGS EVERY 6 HOURS AS NEEDED FOR  WHEEZING OR SHORTNESS OF BREATH. FOR RESCUE., Disp: 25.5 g, Rfl: 0    cetirizine (ZYRTEC) 10 MG tablet, Take 1 tablet by mouth daily as needed., Disp: , Rfl:     diphenoxylate-atropine 2.5-0.025 mg (LOMOTIL) 2.5-0.025 mg per tablet, Take 1 tablet by mouth 4 (four) times daily as needed for Diarrhea., Disp: 90 tablet, Rfl: 0    famotidine (PEPCID) 20 MG tablet, Take 1 tablet (20 mg total) by mouth nightly as needed for Heartburn., Disp: 90 tablet, Rfl: 3    fluticasone (FLONASE) 50 mcg/actuation nasal spray, USE 1 SPRAY IN EACH NOSTRIL TWICE DAILY AS NEEDED  FOR  RHINITIS, Disp: 48 g, Rfl: 3    HYDROcodone-acetaminophen (NORCO) 7.5-325 mg per tablet, Take 1 tablet by mouth every 6 (six) hours as needed for Pain., Disp: 28 tablet, Rfl: 0    mupirocin (BACTROBAN) 2 % ointment, Apply topically 3 (three) times daily., Disp: 30 g, Rfl: 0    PREVNAR 20, PF, 0.5 mL Syrg injection, , Disp: , Rfl:     promethazine-dextromethorphan (PROMETHAZINE-DM) 6.25-15 mg/5 mL Syrp, TAKE 5 MLS BY MOUTH EVERY 6 HOURS AS NEEDED, Disp: 240 mL, Rfl: 3    sildenafil (REVATIO) 20 mg Tab, Take 1 to 3 tabs daily as needed., Disp: 30 tablet, Rfl: 3    tiotropium (SPIRIVA WITH HANDIHALER) 18 mcg inhalation capsule, INHALE THE CONTENTS OF 1 CAPSULE EVERY DAY (CONTROLLER), Disp: 90 capsule, Rfl: 3    Lab Results   Component Value Date    WBC 5.99 05/29/2024    HGB 13.3 (L) 05/29/2024    HCT 41.2 05/29/2024     (L) 05/29/2024    CHOL 119 (L) 03/28/2024    TRIG 53 03/28/2024    HDL 39 (L) 03/28/2024    ALT 22 03/28/2024    AST 23 03/28/2024     03/28/2024    K 4.1 03/28/2024     03/28/2024    CREATININE 1.0 03/28/2024    BUN 17 03/28/2024    CO2 32 (H) 03/28/2024    TSH 1.481 05/29/2024    PSA 3.5 09/19/2019    INR 1.1 07/02/2023    HGBA1C 6.2 (H) 06/15/2023       Review of Systems   Constitutional:  Positive for unexpected weight change. Negative for activity change, appetite change and fever.   HENT:  Negative for postnasal drip,  rhinorrhea and sinus pressure.    Eyes:  Negative for visual disturbance.   Respiratory:  Positive for cough (chronic cough) and shortness of breath.    Cardiovascular:  Positive for leg swelling. Negative for chest pain.   Gastrointestinal:  Negative for abdominal distention and abdominal pain.   Genitourinary:  Negative for difficulty urinating and dysuria.   Musculoskeletal:  Positive for arthralgias and back pain. Negative for myalgias.   Skin:  Positive for pallor.   Neurological:  Negative for headaches.   Hematological:  Negative for adenopathy.   Psychiatric/Behavioral:  The patient is not nervous/anxious.        Objective:      Physical Exam  Constitutional:       Appearance: He is obese.   HENT:      Head: Normocephalic and atraumatic.   Eyes:      Conjunctiva/sclera: Conjunctivae normal.   Cardiovascular:      Rate and Rhythm: Normal rate and regular rhythm.   Pulmonary:      Effort: Pulmonary effort is normal. No respiratory distress.      Breath sounds: Normal breath sounds. No wheezing.   Abdominal:      General: There is no distension.      Palpations: There is no mass.      Tenderness: There is no abdominal tenderness.   Musculoskeletal:      Cervical back: Decreased range of motion.      Thoracic back: Decreased range of motion.      Lumbar back: Bony tenderness present. Decreased range of motion.      Right lower leg: No edema.      Left lower leg: No edema.   Skin:     Findings: No erythema.   Neurological:      Mental Status: He is alert and oriented to person, place, and time.   Psychiatric:         Behavior: Behavior normal.         Assessment:       1. DDD (degenerative disc disease), lumbar    2. COPD with asthma    3. Thrombocytopenia          Plan:   DDD (degenerative disc disease), lumbar  -     Ambulatory referral/consult to Physical/Occupational Therapy; Future; Expected date: 06/20/2024    COPD with asthma  -     tiotropium (SPIRIVA WITH HANDIHALER) 18 mcg inhalation capsule; INHALE THE  CONTENTS OF 1 CAPSULE EVERY DAY (CONTROLLER)  Dispense: 90 capsule; Refill: 3    Thrombocytopenia  -     CBC W/ AUTO DIFFERENTIAL; Future; Expected date: 06/13/2024        Aortic valve disease    DDD (degenerative disc disease), lumbar  -     gabapentin (NEURONTIN) 300 MG capsule; Take 2 capsules (600 mg total) by mouth every evening.  Dispense: 180 capsule; Refill: 2    HTN (hypertension), benign  -     gabapentin (NEURONTIN) 300 MG capsule; Take 2 capsules (600 mg total) by mouth every evening.  Dispense: 180 capsule; Refill: 2    Myasthenia gravis    Thrombocytopenia    Lymphedema of both lower extremities  Acquired hyperbilirubinemia  -     L   Us resolving DVT but has hx venous insufficiency. No need for aspirin.    Diagnoses and all orders for this visit:    HTN (hypertension), benign  Controlled  Low salt diet  Continue current medication  Chronic cough  -     GERD.  Improving  If continued improvement/ resolution does not occur, please contact clinic for additional evaluation.  Myasthenia gravis, AChR antibody positive under prednisone  stable  COPD with asthma  Stable with Spiriva.   Aortic valve disease, no signs of CHF  Follow up with cardiology  Mixed hyperlipidemia  High fiber diet  Continue current medication.     Discussed health maintenance guidelines appropriate for age. face to face encounter. .Discussed health maintenance guidelines appropriate for age.  Discussed health maintenance guidelines appropriate for age.

## 2024-06-27 NOTE — PROGRESS NOTES
Call placed to patient for notification. Patient verbalized understanding, and states he has scheduled appointment with Dr. Amezquita.

## 2024-06-28 RX ORDER — CHLORTHALIDONE 25 MG/1
TABLET ORAL
Qty: 90 TABLET | Refills: 3 | Status: SHIPPED | OUTPATIENT
Start: 2024-06-28

## 2024-07-15 ENCOUNTER — TELEPHONE (OUTPATIENT)
Dept: ELECTROPHYSIOLOGY | Facility: CLINIC | Age: 79
End: 2024-07-15
Payer: MEDICARE

## 2024-07-16 ENCOUNTER — OFFICE VISIT (OUTPATIENT)
Dept: CARDIOLOGY | Facility: CLINIC | Age: 79
End: 2024-07-16
Payer: MEDICARE

## 2024-07-16 ENCOUNTER — HOSPITAL ENCOUNTER (OUTPATIENT)
Dept: CARDIOLOGY | Facility: CLINIC | Age: 79
Discharge: HOME | End: 2024-07-16
Payer: MEDICARE

## 2024-07-16 ENCOUNTER — OFFICE VISIT (OUTPATIENT)
Dept: ELECTROPHYSIOLOGY | Facility: CLINIC | Age: 79
End: 2024-07-16
Payer: MEDICARE

## 2024-07-16 VITALS
DIASTOLIC BLOOD PRESSURE: 72 MMHG | HEART RATE: 64 BPM | HEIGHT: 73 IN | BODY MASS INDEX: 32.26 KG/M2 | WEIGHT: 243.38 LBS | SYSTOLIC BLOOD PRESSURE: 136 MMHG

## 2024-07-16 VITALS
HEART RATE: 59 BPM | SYSTOLIC BLOOD PRESSURE: 149 MMHG | DIASTOLIC BLOOD PRESSURE: 74 MMHG | WEIGHT: 242.94 LBS | HEIGHT: 73 IN | BODY MASS INDEX: 32.2 KG/M2

## 2024-07-16 DIAGNOSIS — I10 HTN (HYPERTENSION), BENIGN: ICD-10-CM

## 2024-07-16 DIAGNOSIS — I82.512 CHRONIC DEEP VEIN THROMBOSIS (DVT) OF FEMORAL VEIN OF LEFT LOWER EXTREMITY: ICD-10-CM

## 2024-07-16 DIAGNOSIS — M79.89 LEG SWELLING: ICD-10-CM

## 2024-07-16 DIAGNOSIS — G70.00 MYASTHENIA GRAVIS: ICD-10-CM

## 2024-07-16 DIAGNOSIS — I70.0 ABDOMINAL AORTIC ATHEROSCLEROSIS: ICD-10-CM

## 2024-07-16 DIAGNOSIS — I82.5Z9 CHRONIC DEEP VEIN THROMBOSIS (DVT) OF LOWER LEG: ICD-10-CM

## 2024-07-16 DIAGNOSIS — I87.2 LYMPHEDEMA DUE TO VENOUS INSUFFICIENCY: ICD-10-CM

## 2024-07-16 DIAGNOSIS — I49.3 PVC (PREMATURE VENTRICULAR CONTRACTION): Primary | ICD-10-CM

## 2024-07-16 DIAGNOSIS — I35.9 AORTIC VALVE DISEASE: ICD-10-CM

## 2024-07-16 DIAGNOSIS — I47.29 NON-SUSTAINED VENTRICULAR TACHYCARDIA: ICD-10-CM

## 2024-07-16 DIAGNOSIS — I45.10 RBBB: ICD-10-CM

## 2024-07-16 DIAGNOSIS — I49.3 PVC'S (PREMATURE VENTRICULAR CONTRACTIONS): ICD-10-CM

## 2024-07-16 DIAGNOSIS — I89.0 LYMPHEDEMA DUE TO VENOUS INSUFFICIENCY: ICD-10-CM

## 2024-07-16 DIAGNOSIS — I87.2 VENOUS STASIS DERMATITIS OF BOTH LOWER EXTREMITIES: ICD-10-CM

## 2024-07-16 DIAGNOSIS — I49.3 PVC (PREMATURE VENTRICULAR CONTRACTION): ICD-10-CM

## 2024-07-16 DIAGNOSIS — E78.2 MIXED HYPERLIPIDEMIA: Primary | ICD-10-CM

## 2024-07-16 DIAGNOSIS — I89.0 LYMPHEDEMA OF BOTH LOWER EXTREMITIES: ICD-10-CM

## 2024-07-16 DIAGNOSIS — E66.9 OBESITY (BMI 30-39.9): ICD-10-CM

## 2024-07-16 DIAGNOSIS — I65.23 BILATERAL CAROTID ARTERY STENOSIS: ICD-10-CM

## 2024-07-16 DIAGNOSIS — I77.1 TORTUOUS AORTA: ICD-10-CM

## 2024-07-16 LAB
OHS QRS DURATION: 148 MS
OHS QTC CALCULATION: 445 MS

## 2024-07-16 PROCEDURE — 93005 ELECTROCARDIOGRAM TRACING: CPT | Mod: HCNC,S$GLB,, | Performed by: NURSE PRACTITIONER

## 2024-07-16 PROCEDURE — 1157F ADVNC CARE PLAN IN RCRD: CPT | Mod: HCNC,CPTII,S$GLB, | Performed by: INTERNAL MEDICINE

## 2024-07-16 PROCEDURE — 99999 PR PBB SHADOW E&M-EST. PATIENT-LVL III: CPT | Mod: PBBFAC,HCNC,, | Performed by: INTERNAL MEDICINE

## 2024-07-16 PROCEDURE — 1126F AMNT PAIN NOTED NONE PRSNT: CPT | Mod: HCNC,CPTII,S$GLB, | Performed by: INTERNAL MEDICINE

## 2024-07-16 PROCEDURE — 3078F DIAST BP <80 MM HG: CPT | Mod: HCNC,CPTII,S$GLB, | Performed by: INTERNAL MEDICINE

## 2024-07-16 PROCEDURE — 3288F FALL RISK ASSESSMENT DOCD: CPT | Mod: HCNC,CPTII,S$GLB, | Performed by: INTERNAL MEDICINE

## 2024-07-16 PROCEDURE — 93010 ELECTROCARDIOGRAM REPORT: CPT | Mod: HCNC,S$GLB,, | Performed by: INTERNAL MEDICINE

## 2024-07-16 PROCEDURE — 1125F AMNT PAIN NOTED PAIN PRSNT: CPT | Mod: HCNC,CPTII,S$GLB, | Performed by: INTERNAL MEDICINE

## 2024-07-16 PROCEDURE — 3075F SYST BP GE 130 - 139MM HG: CPT | Mod: HCNC,CPTII,S$GLB, | Performed by: INTERNAL MEDICINE

## 2024-07-16 PROCEDURE — 99215 OFFICE O/P EST HI 40 MIN: CPT | Mod: HCNC,S$GLB,, | Performed by: INTERNAL MEDICINE

## 2024-07-16 PROCEDURE — 1159F MED LIST DOCD IN RCRD: CPT | Mod: HCNC,CPTII,S$GLB, | Performed by: INTERNAL MEDICINE

## 2024-07-16 PROCEDURE — 3077F SYST BP >= 140 MM HG: CPT | Mod: HCNC,CPTII,S$GLB, | Performed by: INTERNAL MEDICINE

## 2024-07-16 PROCEDURE — 1101F PT FALLS ASSESS-DOCD LE1/YR: CPT | Mod: HCNC,CPTII,S$GLB, | Performed by: INTERNAL MEDICINE

## 2024-07-16 PROCEDURE — 1100F PTFALLS ASSESS-DOCD GE2>/YR: CPT | Mod: HCNC,CPTII,S$GLB, | Performed by: INTERNAL MEDICINE

## 2024-07-16 PROCEDURE — 1160F RVW MEDS BY RX/DR IN RCRD: CPT | Mod: HCNC,CPTII,S$GLB, | Performed by: INTERNAL MEDICINE

## 2024-07-16 PROCEDURE — 99213 OFFICE O/P EST LOW 20 MIN: CPT | Mod: HCNC,S$GLB,, | Performed by: INTERNAL MEDICINE

## 2024-07-16 NOTE — PROGRESS NOTES
Subjective:    Patient ID:  Maximilian Tavera Jr. is a 78 y.o. male who presents for evaluation of Frequent PVCs    Referring Cardiologist: Alphonso Altamirano MD  Primary Care Physician: Brian Marroquin MD  Neurologist: César Hoang MD    HPIPrior Hx:  I had the pleasure of seeing Mr. Tavera today in our electrophysiology clinic in consultation for his palpitations. As you are aware he is a pleasant 78 year-old man with hypertension, myasthenia gravis on chronic prednisone therapy, and degenerative joint disease with myelopathy s/p decompression and fusion of C2-6. He reports being diagnosed with PVCs 5 years ago however over the past 1-2 months have become more prevalent. No syncope but has been having intermittent episodes of symptomatic hypotension with heart rate in the 110s which seemed to increase when started on BPH meds. He saw Dr. Altamirano in cardiology clinic who ordered an exercise stress echo and a 30 day event monitor. Stress test noted a structurally normal heart without ischemia. The ECG portion of his stress test noted sinus rhythm with occasional unifocal PVCs (LBBB but with V2 pattern break, +Rs II/aVF, rS in III, R in 1) at times with PVC couplets. His 30 day event monitor noted frequent PVCs with PVC couplets, triplets and occasional 4 beat NSVT. He denies any near syncope or syncope.    He was on carvedilol for years however this was stopped 2 months ago when he was started on Flomax for BPH (prescribing physician suggested stopping carvedilol to avoid hypotension). He stopped this and changed to alfuzosin however he stopped this due to symptomatic hypotension. For HTN he is on olmesartan and chlorthalidone.    ECG from 10/2018 notes a dimorphic couplet with 1 similar to PVC noted on stress test and the other inferiorly directed PVC in inferior leads. Other ECGs show sinus rhythm with RBBB.    Holter monitor 5/2019 noted 2.4% PVC burden but with 14 runs of nonsustained VT up to 33 beats. We  discussed on the phone and resumed carvedilol 6.25mg q12h. He presented for follow-up shortly after and had no complaints. No MG symptoms since starting carvedilol. He has no symptoms from the ventricular arrhythmias. We increased his carvedilol to 12.5mg q12h.    Mr. Tavera returned for follow-up 10/2019. He denied any issues related to his MG on 25mg carvedilol q12h. Recent holter monitor on this dosage indicated only 604 PVCs and no NSVT. He felt much better on carvedilol.    Mr. Tavera returned for yearly follow-up 5/2021. Recent holter noted a 10.8% PVC burden. He noted palpitations and some dizziness when PVCs are very frequent. He has some worsening in his chronic shortness of breath. Of note AF is listed now as a medical problem. This appears to be from when he was admitted to [facility] in March of 2020 with a febrile illness. No ECGs or telemetry strips scanned into media from that hospitalization indicate AF. Dr. Pacheco saw him on consultation and did not mention AF in his notes.      He initially had great response to carvedilol. Recently his PVC symptoms have increased which correlate with 10% burden on that holter monitor, despite 25mg of carvedilol bid. Discussed concern that other medications that could be used may exacerbate his MG. Also concerned that he was having increased dyspnea on exertion with differential diagnosis including COPD, heart failure, or neuromuscular due to MG. Recommended ECHO. If EF normal then would continue watchful monitoring and would repeat a holter in 3 months. If EF declining then would recommend considering PVC mapping and ablation. Discussed concerns regarding location of his focus (possibly infero-basal septum which would increase his risk of AV block if mapped close to native conduction system).    ECHO 6/2021 noted normal LV function.     Mr. Tavera returned for follow-up 9/2021. Holter 8/2021 noted very rare PVCs. He felt well other than stable chronic dyspnea on  exertion. Plan was to hold the course with beta-blocker therapy.    Mr. Tavera returned for follow-up 5/13/2022. Reported the day prior waking up feeling weak and had hypotension and bradycardia (BP was 70s/30s and measured pulse rate in the 30s). He had a BP of 80s/50s with a normal pulse on April 5, 2022. He did not feel poorly enough to go to the ER. He reports he started spironolactone 3 weeks ago. He spoke with the triage nurse yesterday and has held chlorthalidone, olmesartan and spironolactone. He reports feeling skipped beats a few days prior to this starting. He feels better today. He also has been limiting his fluid intake to 1.5L a day for his lymphedema in his legs. He wears compression stockings. In my opinion it was due to overmedication and dehydration. I told him to hold his olmesartan and diuretic.    6/2022: Mr. Ayse Pan returned for follow-up. 14 day holter noted no significant PVC burden. He did have a 1.5% PAC burden and nonsustained AT. He felt  well. Reported BP was now normal.    7/2023: Mr. Ayse Pan returns for follow-up. Recently admitted to [facility] with an extensive  unprovoked left lower extremity DVT. Initiated on eliquis. Swelling improved. Bardy patch noted rare PVCs, some runs of nonsustained AT.    Interim Hx:  Mr. Ayse Pan returns for follow-up. No symptomatic palpitations. Main complaints are related to sciatica. No palpitations    My interpretation of today's in clinic ECG is sinus rhythm with RBBB and no ectopy    Review of Systems   Constitutional: Negative for fever and malaise/fatigue.   HENT:  Negative for congestion and nosebleeds.    Eyes:  Negative for blurred vision and visual disturbance.   Cardiovascular:  Negative for chest pain, dyspnea on exertion, irregular heartbeat, leg swelling, near-syncope, orthopnea, palpitations, paroxysmal nocturnal dyspnea and syncope.   Respiratory:  Negative for cough and shortness of breath.    Hematologic/Lymphatic:  Negative for bleeding problem. Does not bruise/bleed easily.   Skin: Negative.    Musculoskeletal: Negative.    Gastrointestinal:  Negative for bloating and abdominal pain.   Neurological:  Negative for dizziness, focal weakness and light-headedness.        Objective:    Physical Exam  Vitals reviewed.   Constitutional:       General: He is not in acute distress.     Appearance: He is well-developed. He is not diaphoretic.   HENT:      Head: Normocephalic and atraumatic.   Eyes:      General:         Right eye: No discharge.         Left eye: No discharge.      Conjunctiva/sclera: Conjunctivae normal.   Cardiovascular:      Rate and Rhythm: Normal rate and regular rhythm.      Heart sounds: No murmur heard.     No friction rub. No gallop.   Pulmonary:      Effort: Pulmonary effort is normal. No respiratory distress.      Breath sounds: Normal breath sounds. No wheezing or rales.   Abdominal:      General: Bowel sounds are normal. There is no distension.      Palpations: Abdomen is soft.      Tenderness: There is no abdominal tenderness.   Musculoskeletal:      Cervical back: Neck supple.   Skin:     General: Skin is warm and dry.   Neurological:      Mental Status: He is alert and oriented to person, place, and time.   Psychiatric:         Behavior: Behavior normal.         Thought Content: Thought content normal.         Judgment: Judgment normal.           Assessment:       1. PVC (premature ventricular contraction)    2. RBBB    3. HTN (hypertension), benign    4. Abdominal aortic atherosclerosis    5. Myasthenia gravis         Plan:       In summary, Mr. Tavera is a pleasant 78 year-old man with hypertension, myasthenia gravis on chronic prednisone therapy, and degenerative joint disease with myelopathy s/p decompression and fusion of C2-6, chronic PVCs however since the beginning of this 2019 had become more prevalent, that was related to stopping his carvedilol. No indication that carvedilol worsened his MG  symptoms. He had great response to carvedilol. He feels well.     RTC in 1 year       Thank you for allowing me to participate in the care of this patient. Please do not hesitate to call me with any questions or concerns.    Sathya Zamudio MD, PhD  Cardiac Electrophysiology

## 2024-07-16 NOTE — PATIENT INSTRUCTIONS
Assessment/Plan:  Maximilian Tavera Jr. is a 78 y.o. male with BLE lymphedema, venous insuffciency, HTN, HLD, myasthenia gravis on chronic prednisone therapy, degenerative joint disease with myelopathy s/p decompression and fusion of C2-6, chronic PVCs, who presents for a follow up appointment.       1. LLE DVT- Appears unprovoked.  Exam shows LLE with no evidence of phlegmasia cerulea dolens.  LLE Venous Ultrasound on 5/29/2024 revealed occlusive deep vein thrombosis is not presently seen with small amounts of residual thrombus in the distal femoral and popliteal veins noted. Continue Eliquis 5 mg bid.    2. BLE Lymphedema/Venous Insufficiency- Continue lymphedema clinic techniques at home and wear graduated compression hose.  Pt to limit sodium intake to 2,000 mg daily.  Limit volume intake to 1.5 liters daily.       3. HTN- Continue current medications.     4. Chronic PVC's- Stable.  Continue current medications and follow up with EP as scheduled.      5. HLD- LDL is at goal of <70 ( 69 on 3/28/2024).  Continue atorvastatin 80 mg daily.       Follow up in 6 months

## 2024-07-16 NOTE — PROGRESS NOTES
[facility]      Chief Complaint   Patient presents with    Acute deep vein thrombosis (DVT) of femoral vein of left lo       Patient ID: Maximilian Tavera Jr. is a 78 y.o. male with BLE lymphedema, venous insuffciency, HTN, HLD, myasthenia gravis on chronic prednisone therapy, degenerative joint disease with myelopathy s/p decompression and fusion of C2-6, chronic PVCs, who presents for a follow up appointment.  Pertinent history/events are as follows:     -Pt kindly referred by Dr. Altamirano for evaluation of leg swelling.    -At our initial clinic visit on 2/3/2022, Mr. Tavera reported leg swelling starting 3-4 months ago.  States his PCP wrapped his legs 2 weeks ago and he lost 8 pounds.  He has no claudication, rest pain, or tissue loss.  Plan:   Leg swelling- Due to lymphedema and possible venous insufficiency.  Check BLE venous reflux study and KATTY study.  Refer to lymphedema clinic.  Increase bumex to 1 mg bid every other day (0.5 mg bid on alternate days).  Pt to limit sodium intake to 2,000 mg daily.  Limit volume intake to 1.5 liters daily.  Check cmp in 1 week.    HTN- Continue current medications.   Chronic PVC's- Stable.  Continue current medications and follow up with EP as scheduled.    HLD- LDL is at goal of <70.  Continue current medications.    -At follow up clinic visit on 5/3/2022, Mr. Tavera reported significant improvement in BLE edema since completing lymphedema clinic therapy.  He continues to do lymphedema clinic techniques at home and wear graduated compression hose.  BLE Venous Reflux Study on 2/10/2022 revealed significant RLE venous reflux and no evidence of lower extremity DVT.  KATTY Study on 2/10/2022 revealed noncompressible ABIs bilaterally consistent with medial arterial calcification.  PVR waveforms are mildly abnormal bilaterally.  Pt states he stopped taking bumex due to frequent urination.   Plan:   Leg swelling- Mr. Tavera reports significant improvement in BLE  edema since completing lymphedema clinic therapy.  BLE Venous Reflux Study on 2/10/2022 revealed significant RLE venous reflux and no evidence of lower extremity DVT.  KATTY Study on 2/10/2022 revealed noncompressible ABIs bilaterally consistent with medial arterial calcification.  PVR waveforms are mildly abnormal bilaterally.  Continue lymphedema clinic techniques at home and wear graduated compression hose.  Pt to limit sodium intake to 2,000 mg daily.  Limit volume intake to 1.5 liters daily.  Check cmp in 1 week.    HTN- Continue current medications.   Chronic PVC's- Stable.  Continue current medications and follow up with EP as scheduled.    HLD- LDL is at goal of <70.  Continue atorvastatin 80 mg daily.       -At clinic visit on 2/28/2023, Mr. Tavera reported continued improvement in BLE edema.  He has no claudication or tissue loss.    Plan:   Leg swelling- Mr. Tavera reports continued improvement in BLE edema.  Continue lymphedema clinic techniques at home and wear graduated compression hose.  Pt to limit sodium intake to 2,000 mg daily.  Limit volume intake to 1.5 liters daily.  Check cmp in 1 week.    HTN- Continue current medications.   Chronic PVC's- Stable.  Continue current medications and follow up with EP as scheduled.    HLD- LDL is at goal of <70 (69 on 12/9/2022).  Continue atorvastatin 80 mg daily.       - At clinic visit on 7/13/2023 Mr. Tavera followed after admission to [facility] with LLE edema and found to have extensive LLE DVT.  He was discharged on full dose anticoagulation with Eliquis.   Plan:  LLE DVT- Appear unprovoked.  Refer to Heme/Onc for age appropriate cancer screening and hypercoagulable workup.  Continue Eliquis 5 mg bid.    BLE Lymphedema/Venous Insufficiency- Continue lymphedema clinic techniques at home and wear graduated compression hose.  Pt to limit sodium intake to 2,000 mg daily.  Limit volume intake to 1.5 liters daily.     HTN- Continue current medications.    Chronic PVC's- Stable.  Continue current medications and follow up with EP as scheduled.    HLD- LDL is at goal of <70 (69 on 12/9/2022).  Continue atorvastatin 80 mg daily.     Follow up in 3 months with BLE venous ultrasound prior.    11/16/2023 clinic visit: Mr. Tavera reports no pain in either legs, Swelling has improved. He also reports he has completed the Lymphedema therapy. He was admitted to [facility] with LLE edema and found to have extensive LLE DVT.  He was discharged on full dose anticoagulation with Eliquis. LLE Venous Ultrasound on 7/1/2023 revealed Left thigh veins: There is occlusive and nonocclusive thrombus involving the common femoral, superficial femoral and popliteal veins.  Greater saphenous vein is patent without intraluminal thrombus. Left calf veins: There is occlusive thrombus within posterior tibial vein.  Anterior tibial and peroneal veins are patent without intraluminal thrombus. BLE Venous Ultrasound  on 11/1/2023 revealed is no evidence of a right lower extremity DVT. Left superficial femoral proximal vein DVT. Left superficial femoral middle vein DVT. Left popliteal vein DVT  Plan:    LLE DVT- Appear unprovoked.  LLE Venous Ultrasound on 7/1/2023 revealed Left thigh veins: There is occlusive and nonocclusive thrombus involving the common femoral, superficial femoral and popliteal veins.  Greater saphenous vein is patent without intraluminal thrombus. Left calf veins: There is occlusive thrombus within posterior tibial vein.  Anterior tibial and peroneal veins are patent without intraluminal thrombus. BLE Venous Ultrasound  on 11/1/2023 revealed is no evidence of a right lower extremity DVT. Left superficial femoral proximal vein DVT. Left superficial femoral middle vein DVT. Left popliteal vein DVT. Following Heme/Onc. Continue Eliquis 5 mg bid. Will continue Eliquis lifelong, we will also consider decrease the dose of eliquis after total 12 months of full dose therapy.    BLE Lymphedema/Venous Insufficiency- Continue lymphedema clinic techniques at home and wear graduated compression hose.  Pt to limit sodium intake to 2,000 mg daily.  Limit volume intake to 1.5 liters daily.     HTN- Continue current medications.   Chronic PVC's- Stable.  Continue current medications and follow up with EP as scheduled.    HLD- LDL is at goal of <70 ( 63 on 2/22/2023 vs 69 on 12/9/2022).  Continue atorvastatin 80 mg daily.       HPI:  Mr. Tavera reports doing well with no chest pain or SOB.  Exam shows LLE with no evidence of phlegmasia cerulea dolens.  LLE Venous Ultrasound on 5/29/2024 revealed occlusive deep vein thrombosis is not presently seen with small amounts of residual thrombus in the distal femoral and popliteal veins noted.    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    Disease of spinal cord, unspecified 1/5/2024    Emphysema lung 11/05/2018    Gastritis     Hx of colonic polyp     Hyperlipidemia     Hypertension     Hypothyroidism     Knee fracture     Myasthenia gravis     Neuropathy 01/03/2013    Obesity 01/29/2015    Pneumonia 03/29/2020    Polyneuropathy     PVC (premature ventricular contraction)     Squamous cell carcinoma 2014    left forearm    Thyroid disease      Past Surgical History:   Procedure Laterality Date    APPENDECTOMY      CATARACT EXTRACTION W/  INTRAOCULAR LENS IMPLANT Bilateral     COLONOSCOPY  03/01/2012    Dr Esteban; hyperplastic polyp; repeat in 5 years    COLONOSCOPY N/A 12/7/2021    Procedure: COLONOSCOPY;  Surgeon: Gabriel Hernandez MD;  Location: [facility];  Service: Endoscopy;  Laterality: N/A;    CYSTOSCOPY N/A 2/26/2019    Procedure: CYSTOSCOPY;  Surgeon: Simba Cheung MD;  Location: [facility] OR;   Service: Urology;  Laterality: N/A;    ENDOSCOPIC ULTRASOUND OF UPPER GASTROINTESTINAL TRACT N/A 1/7/2020    Procedure: ULTRASOUND, UPPER GI TRACT, ENDOSCOPIC;  Surgeon: Kati Ryan MD;  Location: [facility] (2ND FLR);  Service: Endoscopy;  Laterality: N/A;    ESOPHAGOGASTRODUODENOSCOPY N/A 9/12/2018    Procedure: EGD (ESOPHAGOGASTRODUODENOSCOPY);  Surgeon: Gabriel Hernandez MD;  Location: [facility];  Service: Endoscopy;  Laterality: N/A;    ESOPHAGOGASTRODUODENOSCOPY N/A 8/19/2019    Procedure: EGD (ESOPHAGOGASTRODUODENOSCOPY);  Surgeon: Gabriel Hernandez MD;  Location: [facility] ENDO;  Service: Endoscopy;  Laterality: N/A;    ESOPHAGOGASTRODUODENOSCOPY N/A 10/7/2019    Procedure: EGD (ESOPHAGOGASTRODUODENOSCOPY);  Surgeon: Gabriel Hernandez MD;  Location: [facility];  Service: Endoscopy;  Laterality: N/A;    ESOPHAGOGASTRODUODENOSCOPY N/A 1/7/2020    Procedure: EGD (ESOPHAGOGASTRODUODENOSCOPY);  Surgeon: Kati Ryan MD;  Location: [facility] (2ND FLR);  Service: Endoscopy;  Laterality: N/A;    HEMORRHOID SURGERY      INJECTION OF ANESTHETIC AGENT AROUND MEDIAL BRANCH NERVES INNERVATING LUMBAR FACET JOINT Bilateral 10/1/2020    Procedure: Block-nerve-medial branch-lumbar Bilateral L 3,4,5;  Surgeon: Devan Watt MD;  Location: [facility] OR;  Service: Pain Management;  Laterality: Bilateral;    INJECTION OF ANESTHETIC AGENT AROUND MEDIAL BRANCH NERVES INNERVATING LUMBAR FACET JOINT Right 10/7/2022    Procedure: Block-nerve-medial branch-lumbar L3,4,5;  Surgeon: Devan Watt MD;  Location: [facility] OR;  Service: Pain Management;  Laterality: Right;    KNEE ARTHROPLASTY Bilateral     LENGTHENING OF ACHILLES TENDON Right 9/11/2020    Procedure: percutaeous tenotomy of right lateral epicondyle (tenex);  Surgeon: Terry Quach MD;  Location: [facility] OR;  Service: Neurosurgery;  Laterality: Right;  tenex to right lateral epicondyle  Tenex machine SN#[id number], total time 1min. 30seconds    NECK SURGERY      POSTERIOR FUSION OF  CERVICAL SPINE WITH LAMINECTOMY N/A 11/7/2018    Procedure: C2-C6 Posterior Cervical Laminectomy & Instrumental Fusion;  Surgeon: Kishore Turner MD;  Location: [address];  Service: Neurosurgery;  Laterality: N/A;    TONSILLECTOMY      TRANSFORAMINAL EPIDURAL INJECTION OF STEROID Right 12/20/2019    Procedure: Injection,steroid,epidural,transforaminal approach;  Surgeon: Devan Watt MD;  Location: [facility];  Service: Pain Management;  Laterality: Right;  L3-4, L4-5    TRANSFORAMINAL EPIDURAL INJECTION OF STEROID Bilateral 2/6/2020    Procedure: Injection,steroid,epidural,transforaminal approach;  Surgeon: Devan Watt MD;  Location: [facility];  Service: Pain Management;  Laterality: Bilateral;  L3-L4,L4-L5    TRANSFORAMINAL EPIDURAL INJECTION OF STEROID Bilateral 8/26/2020    Procedure: Injection,steroid,epidural,transforaminal approach;  Surgeon: Devan Watt MD;  Location: [facility];  Service: Pain Management;  Laterality: Bilateral;  L3-4, L4-5    TRANSRECTAL ULTRASOUND EXAMINATION N/A 2/26/2019    Procedure: ULTRASOUND, RECTAL APPROACH;  Surgeon: Simba Cheung MD;  Location: [facility];  Service: Urology;  Laterality: N/A;    UPPER GASTROINTESTINAL ENDOSCOPY  09/12/2018    Dr Hernandez; gastritis; extensive intestinal metaplasia; repeat in 1-2- years     Social History     Socioeconomic History    Marital status:    Tobacco Use    Smoking status: Never    Smokeless tobacco: Never    Tobacco comments:     when a child   Substance and Sexual Activity    Alcohol use: Yes     Comment: rarely    Drug use: No    Sexual activity: Yes     Partners: Female     Social Determinants of Health     Financial Resource Strain: Low Risk  (3/4/2024)    Overall Financial Resource Strain (CARDIA)     Difficulty of Paying Living Expenses: Not hard at all   Food Insecurity: No Food Insecurity (3/4/2024)    Hunger Vital Sign     Worried About Running Out of Food in the Last Year: Never true     Ran Out of Food in the Last Year:  Never true   Transportation Needs: No Transportation Needs (3/4/2024)    PRAPARE - Transportation     Lack of Transportation (Medical): No     Lack of Transportation (Non-Medical): No   Physical Activity: Inactive (3/4/2024)    Exercise Vital Sign     Days of Exercise per Week: 0 days     Minutes of Exercise per Session: 0 min   Stress: No Stress Concern Present (3/4/2024)    [employer] - Occupational Stress Questionnaire     Feeling of Stress : Only a little   Housing Stability: Low Risk  (3/4/2024)    Housing Stability Vital Sign     Unable to Pay for Housing in the Last Year: No     Number of Places Lived in the Last Year: 1     Unstable Housing in the Last Year: No     Family History   Problem Relation Name Age of Onset    Cataracts Mother      Heart disease Mother          CHF    Hypertension Mother      Hyperlipidemia Mother      Cataracts Father      Glaucoma Father      Heart disease Father      Hyperlipidemia Sister      Hypertension Sister      No Known Problems Daughter      No Known Problems Daughter      Collagen disease Neg Hx      Amblyopia Neg Hx      Blindness Neg Hx      Macular degeneration Neg Hx      Retinal detachment Neg Hx      Strabismus Neg Hx      Cancer Neg Hx      Colon cancer Neg Hx      Esophageal cancer Neg Hx      Stomach cancer Neg Hx      Crohn's disease Neg Hx      Ulcerative colitis Neg Hx         Review of patient's allergies indicates:   Allergen Reactions    Spironolactone Diarrhea       Medication List with Changes/Refills   Current Medications    ALBUTEROL (PROVENTIL/VENTOLIN HFA) 90 MCG/ACTUATION INHALER    INHALE 1 TO 2 PUFFS INTO THE LUNGS EVERY 6 HOURS AS NEEDED FOR WHEEZING OR SHORTNESS OF BREATH. FOR RESCUE.    ATORVASTATIN (LIPITOR) 80 MG TABLET    TAKE 1 TABLET EVERY DAY    CARVEDILOL (COREG) 25 MG TABLET    TAKE 1 TABLET TWICE DAILY WITH MEALS    CETIRIZINE (ZYRTEC) 10 MG TABLET    Take 1 tablet by mouth daily as needed.     CHLORTHALIDONE (HYGROTEN) 25 MG TAB    TAKE 1 TABLET ONE TIME DAILY    CHOLECALCIFEROL, VITAMIN D3, (VITAMIN D3) 50 MCG (2,000 UNIT) CAP    1 capsule once daily. Every day    COENZYME Q10 (CO Q-10) 100 MG CAPSULE    Take 400 mg by mouth once daily.    CYANOCOBALAMIN, VITAMIN B-12, 5,000 MCG SUBL    Take 5,000 mcg by mouth once daily. Every day    DIPHENOXYLATE-ATROPINE 2.5-0.025 MG (LOMOTIL) 2.5-0.025 MG PER TABLET    Take 1 tablet by mouth 4 (four) times daily as needed for Diarrhea.    ELIQUIS 5 MG TAB    TAKE 1 TABLET TWICE DAILY    EZETIMIBE (ZETIA) 10 MG TABLET    Take 1 tablet (10 mg total) by mouth once daily.    FAMOTIDINE (PEPCID) 20 MG TABLET    Take 1 tablet (20 mg total) by mouth nightly as needed for Heartburn.    FLUTICASONE (FLONASE) 50 MCG/ACTUATION NASAL SPRAY    USE 1 SPRAY IN EACH NOSTRIL TWICE DAILY AS NEEDED  FOR  RHINITIS    GABAPENTIN (NEURONTIN) 600 MG TABLET    TAKE 1 TABLET THREE TIMES DAILY    HYDROCODONE-ACETAMINOPHEN (NORCO) 7.5-325 MG PER TABLET    Take 1 tablet by mouth every 6 (six) hours as needed for Pain.    LEVOTHYROXINE (SYNTHROID) 50 MCG TABLET    TAKE 1 TABLET EVERY DAY    MILK THISTLE 200 MG CAP    Take 200 mg by mouth 2 (two) times daily. Twice a day    MUPIROCIN (BACTROBAN) 2 % OINTMENT    Apply topically 3 (three) times daily.    OLMESARTAN (BENICAR) 20 MG TABLET    Take 1 tablet (20 mg total) by mouth 2 (two) times daily.    OMEGA-3 FATTY ACIDS 1,000 MG CAP    Twice a day    PANTOPRAZOLE (PROTONIX) 40 MG TABLET    TAKE 1 TABLET TWICE DAILY    POTASSIUM CHLORIDE SA (K-DUR,KLOR-CON) 20 MEQ TABLET    TAKE 4 TABLETS by mouth EVERY DAY  OR AS DIRECTED    PREDNISONE (DELTASONE) 1 MG TABLET    Take 2 tablets (2 mg total) by mouth once daily.    PREDNISONE (DELTASONE) 5 MG TABLET    Take 1 tablet (5 mg total) by mouth once daily.    PROMETHAZINE-DEXTROMETHORPHAN (PROMETHAZINE-DM) 6.25-15 MG/5 ML SYRP    TAKE 5 MLS BY MOUTH EVERY 6 HOURS AS NEEDED    SILDENAFIL (REVATIO) 20 MG TAB   "  Take 1 to 3 tabs daily as needed.    TIOTROPIUM (SPIRIVA WITH HANDIHALER) 18 MCG INHALATION CAPSULE    INHALE THE CONTENTS OF 1 CAPSULE EVERY DAY (CONTROLLER)   Discontinued Medications    PREVNAR 20, PF, 0.5 ML SYRG INJECTION           Review of Systems  Constitution: Denies chills, fever, and sweats.  HENT: Denies headaches or blurry vision.  Cardiovascular: Denies chest pain or irregular heart beat.  Respiratory: Denies cough or shortness of breath.  Gastrointestinal: Denies abdominal pain, nausea, or vomiting.  Musculoskeletal: Negative for leg swelling.  Neurological: Denies dizziness or focal weakness.  Psychiatric/Behavioral: Normal mental status.  Hematologic/Lymphatic: Denies bleeding problem or easy bruising/bleeding.  Skin: Denies rash or suspicious lesions    Physical Examination  BP (!) 149/74   Pulse (!) 59   Ht 6' 1" (1.854 m)   Wt 110.2 kg (242 lb 15.2 oz)   BMI 32.05 kg/m²     Constitutional: No acute distress, conversant  HEENT: Sclera anicteric, Pupils equal, round and reactive to light, extraocular motions intact, Oropharynx clear  Neck: No JVD, no carotid bruits  Cardiovascular: regular rate and rhythm, no murmur, rubs or gallops, normal S1/S2  Pulmonary: Clear to auscultation bilaterally  Abdominal: Abdomen soft, nontender, nondistended, positive bowel sounds  Extremities: BLE's with 1+ pitting edema  Pulses:  Carotid pulses are 2+ on the right side, and 2+ on the left side.  Radial pulses are 2+ on the right side, and 2+ on the left side.   Femoral pulses are 2+ on the right side, and 2+ on the left side.  Popliteal pulses are 2+ on the right side, and 2+ on the left side.   Dorsalis pedis pulses are 2+ on the right side, and 2+ on the left side.   Posterior tibial pulses are 2+ on the right side, and 2+ on the left side.    Skin: No ecchymosis, erythema, or ulcers  Psych: Alert and oriented x 3, appropriate affect  Neuro: CNII-XII intact, no focal deficits    Labs:  Most Recent " Data  CBC:   Lab Results   Component Value Date    WBC 5.99 05/29/2024    HGB 13.3 (L) 05/29/2024    HCT 41.2 05/29/2024     (L) 05/29/2024    MCV 94 05/29/2024    RDW 14.6 (H) 05/29/2024     BMP:   Lab Results   Component Value Date     03/28/2024    K 4.1 03/28/2024     03/28/2024    CO2 32 (H) 03/28/2024    BUN 17 03/28/2024    CREATININE 1.0 03/28/2024     (H) 03/28/2024    CALCIUM 9.6 03/28/2024    MG 2.0 07/03/2023    PHOS 2.9 03/31/2020     LFTS;   Lab Results   Component Value Date    PROT 6.0 03/28/2024    ALBUMIN 3.7 03/28/2024    BILITOT 1.4 (H) 03/28/2024    AST 23 03/28/2024    ALKPHOS 80 03/28/2024    ALT 22 03/28/2024     COAGS:   Lab Results   Component Value Date    INR 1.1 07/02/2023     FLP:   Lab Results   Component Value Date    CHOL 119 (L) 03/28/2024    HDL 39 (L) 03/28/2024    LDLCALC 69.4 03/28/2024    TRIG 53 03/28/2024    CHOLHDL 32.8 03/28/2024     CARDIAC:   Lab Results   Component Value Date    TROPONINI <0.006 07/01/2023    BNP 66 03/28/2024       EKG 9/23/2021:  Normal sinus rhythm  Left axis deviation  Incomplete right bundle branch block    LLE Venous Ultrasound 5/29/2024:  Occlusive deep vein thrombosis is not presently seen with small amounts of residual thrombus in the distal femoral and popliteal veins noted     BLE Venous Ultrasound 11/1/2023    There is no evidence of a right lower extremity DVT.    Left superficial femoral proximal vein DVT.    Left superficial femoral middle vein DVT.    Left popliteal vein DVT/    BLE Venous Ultrasound 7/1/2023:  Extensive intraluminal thrombus involving the large veins of the left lower extremity extending into the calf.    BLE Venous Reflux Study 2/10/2022:  No evidence of deep or superficial venous thrombosis bilaterally.  The right GSV demonstrates focal reflux below the knee.  Bilateral lower extremity varicosities are noted.    KATTY Study 2/10/2022:  Noncompressible ABIs bilaterally consistent with medial  arterial calcification.  PVR waveforms are mildly abnormal bilaterally.    BLE Venous Ultrasound 1/10/2022:  Negative for DVT throughout bilateral lower extremities.    Echo 6/2/2021:  Severe left atrial enlargement.  The left ventricle is normal in size with concentric hypertrophy and normal systolic function.  The estimated ejection fraction is 65%.  Indeterminate left ventricular diastolic function.  Normal right ventricular size with normal right ventricular systolic function.  The estimated PA systolic pressure is 28 mmHg.  Normal central venous pressure (3 mmHg).    Assessment/Plan:  Maximilian Tavera Jr. is a 78 y.o. male with BLE lymphedema, venous insuffciency, HTN, HLD, myasthenia gravis on chronic prednisone therapy, degenerative joint disease with myelopathy s/p decompression and fusion of C2-6, chronic PVCs, who presents for a follow up appointment.       1. LLE DVT- Appears unprovoked.  Exam shows LLE with no evidence of phlegmasia cerulea dolens.  LLE Venous Ultrasound on 5/29/2024 revealed occlusive deep vein thrombosis is not presently seen with small amounts of residual thrombus in the distal femoral and popliteal veins noted. Continue Eliquis 5 mg bid.    2. BLE Lymphedema/Venous Insufficiency- Continue lymphedema clinic techniques at home and wear graduated compression hose.  Pt to limit sodium intake to 2,000 mg daily.  Limit volume intake to 1.5 liters daily.       3. HTN- Continue current medications.     4. Chronic PVC's- Stable.  Continue current medications and follow up with EP as scheduled.      5. HLD- LDL is at goal of <70 ( 69 on 3/28/2024).  Continue atorvastatin 80 mg daily.       Follow up in 6 months    Total duration of face to face visit time 30 minutes.  Total time spent counseling greater than fifty percent of total visit time.  Counseling included discussion regarding imaging findings, diagnosis, possibilities, treatment options, risks and benefits.  The patient had many  questions regarding the options and long-term effects.    Devang Amezquita MD, PhD  Interventional Cardiology

## 2024-07-23 DIAGNOSIS — R05.3 CHRONIC COUGH: ICD-10-CM

## 2024-07-23 NOTE — TELEPHONE ENCOUNTER
No care due was identified.  [facility] Embedded Care Due Messages. Reference number: [id number].   7/23/2024 5:12:30 PM CDT

## 2024-07-24 RX ORDER — PANTOPRAZOLE SODIUM 40 MG/1
40 TABLET, DELAYED RELEASE ORAL 2 TIMES DAILY
Qty: 180 TABLET | Refills: 3 | Status: SHIPPED | OUTPATIENT
Start: 2024-07-24

## 2024-07-24 NOTE — TELEPHONE ENCOUNTER
Refill Routing Note   Medication(s) are not appropriate for processing by [facility] for the following reason(s):        Outside of protocol: total daily dose for med oop    [facility] action(s):  Route             Appointments  past 12m or future 3m with PCP    Date Provider   Last Visit   6/13/2024 Brian Marroquin MD   Next Visit   Visit date not found Brian Marroquin MD   ED visits in past 90 days: 0        Note composed:11:19 PM 07/23/2024

## 2024-09-18 DIAGNOSIS — M25.522 LEFT ELBOW PAIN: Primary | ICD-10-CM

## 2024-09-21 ENCOUNTER — IMMUNIZATION (OUTPATIENT)
Dept: FAMILY MEDICINE | Facility: CLINIC | Age: 79
End: 2024-09-21
Payer: MEDICARE

## 2024-09-21 DIAGNOSIS — Z23 NEED FOR VACCINATION: Primary | ICD-10-CM

## 2024-09-21 PROCEDURE — 90653 IIV ADJUVANT VACCINE IM: CPT | Mod: HCNC,S$GLB,, | Performed by: FAMILY MEDICINE

## 2024-09-21 PROCEDURE — G0008 ADMIN INFLUENZA VIRUS VAC: HCPCS | Mod: HCNC,S$GLB,, | Performed by: FAMILY MEDICINE

## 2024-09-26 ENCOUNTER — OFFICE VISIT (OUTPATIENT)
Dept: ORTHOPEDICS | Facility: CLINIC | Age: 79
End: 2024-09-26
Payer: MEDICARE

## 2024-09-26 ENCOUNTER — HOSPITAL ENCOUNTER (OUTPATIENT)
Dept: RADIOLOGY | Facility: HOSPITAL | Age: 79
Discharge: HOME | End: 2024-09-26
Attending: ORTHOPAEDIC SURGERY
Payer: MEDICARE

## 2024-09-26 VITALS — BODY MASS INDEX: 32.26 KG/M2 | WEIGHT: 243.38 LBS | HEIGHT: 73 IN

## 2024-09-26 DIAGNOSIS — M77.12 LATERAL EPICONDYLITIS OF LEFT ELBOW: ICD-10-CM

## 2024-09-26 DIAGNOSIS — M17.10 ARTHRITIS OF KNEE: Primary | ICD-10-CM

## 2024-09-26 DIAGNOSIS — M17.11 PRIMARY OSTEOARTHRITIS OF RIGHT KNEE: Primary | ICD-10-CM

## 2024-09-26 DIAGNOSIS — M25.522 LEFT ELBOW PAIN: ICD-10-CM

## 2024-09-26 PROCEDURE — 99999 PR PBB SHADOW E&M-EST. PATIENT-LVL II: CPT | Mod: PBBFAC,HCNC,, | Performed by: ORTHOPAEDIC SURGERY

## 2024-09-26 PROCEDURE — 73080 X-RAY EXAM OF ELBOW: CPT | Mod: 26,LT,, | Performed by: RADIOLOGY

## 2024-09-26 PROCEDURE — 73080 X-RAY EXAM OF ELBOW: CPT | Mod: TC,LT

## 2024-09-26 RX ORDER — TRIAMCINOLONE ACETONIDE 40 MG/ML
40 INJECTION, SUSPENSION INTRA-ARTICULAR; INTRAMUSCULAR
Status: DISCONTINUED | OUTPATIENT
Start: 2024-09-26 | End: 2024-09-26 | Stop reason: HOSPADM

## 2024-09-26 RX ADMIN — TRIAMCINOLONE ACETONIDE 40 MG: 40 INJECTION, SUSPENSION INTRA-ARTICULAR; INTRAMUSCULAR at 03:09

## 2024-09-26 NOTE — PROCEDURES
Tendon Origin: L elbow    Date/Time: 9/26/2024 3:00 PM    Performed by: Haroldo Campbell MD  Authorized by: Haroldo Campbell MD    Consent Done?:  Yes (Verbal)  Timeout: prior to procedure the correct patient, procedure, and site was verified    Indications:  Pain  Site marked: the procedure site was marked    Timeout: prior to procedure the correct patient, procedure, and site was verified    Location:  Elbow  Site:  L elbow  Prep: patient was prepped and draped in usual sterile fashion    Ultrasonic Guidance for Needle Placement?: No    Needle size:  22 G  Approach:  Anterolateral  Medications:  40 mg triamcinolone acetonide 40 mg/mL  Patient tolerance:  Patient tolerated the procedure well with no immediate complications

## 2024-09-26 NOTE — PROGRESS NOTES
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    Disease of spinal cord, unspecified 1/5/2024    Emphysema lung 11/05/2018    Gastritis     Hx of colonic polyp     Hyperlipidemia     Hypertension     Hypothyroidism     Knee fracture     Myasthenia gravis     Neuropathy 01/03/2013    Obesity 01/29/2015    Pneumonia 03/29/2020    Polyneuropathy     PVC (premature ventricular contraction)     Squamous cell carcinoma 2014    left forearm    Thyroid disease        Past Surgical History:   Procedure Laterality Date    APPENDECTOMY      CATARACT EXTRACTION W/  INTRAOCULAR LENS IMPLANT Bilateral     COLONOSCOPY  03/01/2012    Dr Esteban; hyperplastic polyp; repeat in 5 years    COLONOSCOPY N/A 12/7/2021    Procedure: COLONOSCOPY;  Surgeon: Gabriel Hernandez MD;  Location: [facility];  Service: Endoscopy;  Laterality: N/A;    CYSTOSCOPY N/A 2/26/2019    Procedure: CYSTOSCOPY;  Surgeon: Simba Cheung MD;  Location: [facility];  Service: Urology;  Laterality: N/A;    ENDOSCOPIC ULTRASOUND OF UPPER GASTROINTESTINAL TRACT N/A 1/7/2020    Procedure: ULTRASOUND, UPPER GI TRACT, ENDOSCOPIC;  Surgeon: Kati Ryan MD;  Location: [facility] ([address] [place] [address]);  Service: Endoscopy;  Laterality: N/A;    ESOPHAGOGASTRODUODENOSCOPY N/A 9/12/2018    Procedure: EGD (ESOPHAGOGASTRODUODENOSCOPY);  Surgeon: Gabriel Hernandez MD;  Location: [facility];  Service: Endoscopy;  Laterality: N/A;    ESOPHAGOGASTRODUODENOSCOPY N/A 8/19/2019    Procedure: EGD (ESOPHAGOGASTRODUODENOSCOPY);  Surgeon: Gabriel Hernandez MD;  Location: [facility];  Service: Endoscopy;  Laterality: N/A;    ESOPHAGOGASTRODUODENOSCOPY N/A 10/7/2019    Procedure: EGD (ESOPHAGOGASTRODUODENOSCOPY);  Surgeon: Gabriel Hernandez MD;  Location:  [facility];  Service: Endoscopy;  Laterality: N/A;    ESOPHAGOGASTRODUODENOSCOPY N/A 1/7/2020    Procedure: EGD (ESOPHAGOGASTRODUODENOSCOPY);  Surgeon: Kati Ryan MD;  Location: [facility] (2ND FLR);  Service: Endoscopy;  Laterality: N/A;    HEMORRHOID SURGERY      INJECTION OF ANESTHETIC AGENT AROUND MEDIAL BRANCH NERVES INNERVATING LUMBAR FACET JOINT Bilateral 10/1/2020    Procedure: Block-nerve-medial branch-lumbar Bilateral L 3,4,5;  Surgeon: Devan Watt MD;  Location: [facility];  Service: Pain Management;  Laterality: Bilateral;    INJECTION OF ANESTHETIC AGENT AROUND MEDIAL BRANCH NERVES INNERVATING LUMBAR FACET JOINT Right 10/7/2022    Procedure: Block-nerve-medial branch-lumbar L3,4,5;  Surgeon: Devan Watt MD;  Location: [facility];  Service: Pain Management;  Laterality: Right;    KNEE ARTHROPLASTY Bilateral     LENGTHENING OF ACHILLES TENDON Right 9/11/2020    Procedure: percutaeous tenotomy of right lateral epicondyle (tenex);  Surgeon: Terry Quach MD;  Location: [facility];  Service: Neurosurgery;  Laterality: Right;  tenex to right lateral epicondyle  Tenex machine SN#[id number], total time 1min. 30seconds    NECK SURGERY      POSTERIOR FUSION OF CERVICAL SPINE WITH LAMINECTOMY N/A 11/7/2018    Procedure: C2-C6 Posterior Cervical Laminectomy & Instrumental Fusion;  Surgeon: Kishore Turner MD;  Location: [address];  Service: Neurosurgery;  Laterality: N/A;    TONSILLECTOMY      TRANSFORAMINAL EPIDURAL INJECTION OF STEROID Right 12/20/2019    Procedure: Injection,steroid,epidural,transforaminal approach;  Surgeon: Devan Watt MD;  Location: [facility];  Service: Pain Management;  Laterality: Right;  L3-4, L4-5    TRANSFORAMINAL EPIDURAL INJECTION OF STEROID Bilateral 2/6/2020    Procedure: Injection,steroid,epidural,transforaminal approach;  Surgeon: Devan Watt MD;  Location: [facility];  Service: Pain Management;  Laterality: Bilateral;  L3-L4,L4-L5    TRANSFORAMINAL EPIDURAL INJECTION OF STEROID  Bilateral 8/26/2020    Procedure: Injection,steroid,epidural,transforaminal approach;  Surgeon: Devan Watt MD;  Location: [facility];  Service: Pain Management;  Laterality: Bilateral;  L3-4, L4-5    TRANSRECTAL ULTRASOUND EXAMINATION N/A 2/26/2019    Procedure: ULTRASOUND, RECTAL APPROACH;  Surgeon: Simba Cheung MD;  Location: [facility] OR;  Service: Urology;  Laterality: N/A;    UPPER GASTROINTESTINAL ENDOSCOPY  09/12/2018    Dr Hernandez; gastritis; extensive intestinal metaplasia; repeat in 1-2- years       Current Outpatient Medications   Medication Sig    albuterol (PROVENTIL/VENTOLIN HFA) 90 mcg/actuation inhaler INHALE 1 TO 2 PUFFS INTO THE LUNGS EVERY 6 HOURS AS NEEDED FOR WHEEZING OR SHORTNESS OF BREATH. FOR RESCUE.    atorvastatin (LIPITOR) 80 MG tablet TAKE 1 TABLET EVERY DAY    carvediloL (COREG) 25 MG tablet TAKE 1 TABLET TWICE DAILY WITH MEALS    cetirizine (ZYRTEC) 10 MG tablet Take 1 tablet by mouth daily as needed.    chlorthalidone (HYGROTEN) 25 MG Tab TAKE 1 TABLET ONE TIME DAILY    cholecalciferol, vitamin D3, (VITAMIN D3) 50 mcg (2,000 unit) Cap 1 capsule once daily. Every day    coenzyme Q10 (CO Q-10) 100 mg capsule Take 400 mg by mouth once daily.    cyanocobalamin, vitamin B-12, 5,000 mcg Subl Take 5,000 mcg by mouth once daily. Every day    diphenoxylate-atropine 2.5-0.025 mg (LOMOTIL) 2.5-0.025 mg per tablet Take 1 tablet by mouth 4 (four) times daily as needed for Diarrhea.    ELIQUIS 5 mg Tab TAKE 1 TABLET TWICE DAILY    ezetimibe (ZETIA) 10 mg tablet Take 1 tablet (10 mg total) by mouth once daily.    famotidine (PEPCID) 20 MG tablet Take 1 tablet (20 mg total) by mouth nightly as needed for Heartburn.    fluticasone (FLONASE) 50 mcg/actuation nasal spray USE 1 SPRAY IN EACH NOSTRIL TWICE DAILY AS NEEDED  FOR  RHINITIS    gabapentin (NEURONTIN) 600 MG tablet TAKE 1 TABLET THREE TIMES DAILY    HYDROcodone-acetaminophen (NORCO) 7.5-325 mg per tablet Take 1 tablet by mouth every 6 (six) hours  as needed for Pain.    levothyroxine (SYNTHROID) 50 MCG tablet TAKE 1 TABLET EVERY DAY    milk thistle 200 mg Cap Take 200 mg by mouth 2 (two) times daily. Twice a day    mupirocin (BACTROBAN) 2 % ointment Apply topically 3 (three) times daily. (Patient taking differently: Apply topically as needed.)    olmesartan (BENICAR) 20 MG tablet Take 1 tablet (20 mg total) by mouth 2 (two) times daily.    omega-3 fatty acids 1,000 mg Cap Twice a day    pantoprazole (PROTONIX) 40 MG tablet TAKE 1 TABLET TWICE DAILY    potassium chloride SA (K-DUR,KLOR-CON) 20 MEQ tablet TAKE 4 TABLETS by mouth EVERY DAY  OR AS DIRECTED    predniSONE (DELTASONE) 1 MG tablet Take 2 tablets (2 mg total) by mouth once daily.    predniSONE (DELTASONE) 5 MG tablet Take 1 tablet (5 mg total) by mouth once daily.    promethazine-dextromethorphan (PROMETHAZINE-DM) 6.25-15 mg/5 mL Syrp TAKE 5 MLS BY MOUTH EVERY 6 HOURS AS NEEDED    sildenafil (REVATIO) 20 mg Tab Take 1 to 3 tabs daily as needed.    tiotropium (SPIRIVA WITH HANDIHALER) 18 mcg inhalation capsule INHALE THE CONTENTS OF 1 CAPSULE EVERY DAY (CONTROLLER)     No current facility-administered medications for this visit.       Review of patient's allergies indicates:   Allergen Reactions    No known drug allergies        Family History   Problem Relation Name Age of Onset    Cataracts Mother      Heart disease Mother          CHF    Hypertension Mother      Hyperlipidemia Mother      Cataracts Father      Glaucoma Father      Heart disease Father      Hyperlipidemia Sister      Hypertension Sister      No Known Problems Daughter      No Known Problems Daughter      Collagen disease Neg Hx      Amblyopia Neg Hx      Blindness Neg Hx      Macular degeneration Neg Hx      Retinal detachment Neg Hx      Strabismus Neg Hx      Cancer Neg Hx      Colon cancer Neg Hx      Esophageal cancer Neg Hx      Stomach cancer Neg Hx      Crohn's disease Neg Hx      Ulcerative colitis Neg Hx         Social  History     Socioeconomic History    Marital status:    Tobacco Use    Smoking status: Never    Smokeless tobacco: Never    Tobacco comments:     when a child   Substance and Sexual Activity    Alcohol use: Yes     Comment: rarely    Drug use: No    Sexual activity: Yes     Partners: Female     Social Determinants of Health     Financial Resource Strain: Low Risk  (3/4/2024)    Overall Financial Resource Strain (CARDIA)     Difficulty of Paying Living Expenses: Not hard at all   Food Insecurity: No Food Insecurity (3/4/2024)    Hunger Vital Sign     Worried About Running Out of Food in the Last Year: Never true     Ran Out of Food in the Last Year: Never true   Transportation Needs: No Transportation Needs (3/4/2024)    PRAPARE - Transportation     Lack of Transportation (Medical): No     Lack of Transportation (Non-Medical): No   Physical Activity: Inactive (3/4/2024)    Exercise Vital Sign     Days of Exercise per Week: 0 days     Minutes of Exercise per Session: 0 min   Stress: No Stress Concern Present (3/4/2024)    [employer] - Occupational Stress Questionnaire     Feeling of Stress : Only a little   Housing Stability: Low Risk  (3/4/2024)    Housing Stability Vital Sign     Unable to Pay for Housing in the Last Year: No     Number of Places Lived in the Last Year: 1     Unstable Housing in the Last Year: No       Chief Complaint:   Chief Complaint   Patient presents with    Right Knee - Pain    Left Elbow - Pain       History of present illness:  This is a 78-year-old male seen for left elbow and right knee pain.  Patient slipped on his bulk head a few months ago.  Had pain that returned about a month ago.  Pain in the elbow in the knee is 7/10.    Answers for HPI/ROS submitted by the patient on 12/23/2019   Leg pain  unexpected weight change: No  appetite change : No  sleep disturbance: No  IMMUNOCOMPROMISED: No  nervous/ anxious: No  dysphoric mood: No  rash: No  visual  disturbance: No  eye redness: No  eye pain: No  ear pain: No  tinnitus: Yes  hearing loss: No  sinus pressure : Yes  nosebleeds: Yes  enviro allergies: No  food allergies: No  cough: No  shortness of breath: Yes  sweating: No  frequency: Yes  difficulty urinating: No  hematuria: No  chest pain: No  palpitations: No  nausea: No  vomiting: No  diarrhea: No  blood in stool: No  constipation: No  headaches: Yes  dizziness: No  numbness: No  seizures: No  joint swelling: No  myalgia: No  weakness: No  back pain: Yes  Pain Chronicity: recurrent  History of trauma: No  Onset: more than 1 month ago  Frequency: daily  Progression since onset: waxing and waning  Injury mechanism: twisting  injury location: at home  pain- numeric: 3/10  pain location: left knee  pain quality: aching  Radiating Pain: No  Aggravating factors: bending, extension, twisting  fever: No  inability to bear weight: No  itching: No  joint locking: No  limited range of motion: Yes  stiffness: Yes  tingling: No  Treatments tried: cold, heat, exercise, injection treatment, NSAIDs, OTC ointments  physical therapy: not tried  Improvement on treatment: mild      Physical Examination:    Vital Signs:    There were no vitals filed for this visit.      Body mass index is 32.11 kg/m².    This a well-developed, well nourished patient in no acute distress.  They are alert and oriented and cooperative to examination.  Pt. walks without an antalgic gait.      Examination of the right elbow shows no signs of rashes or erythema. The patient has no masses, ecchymosis, or effusion. The patient has full range of motion from 0-160°. Patient has full pronation and supination. Patient is nontender along the medial epicondyle and moderately tender over the lateral epicondyle. Nontender over the olecranon process.  Nontender along the course of the UCL. Patient has a negative valgus stress test and milking maneuver. Negative Tinel's sign over the cubital tunnel. 2+ radial  pulse. Intact light touch sensation.         X-rays:  X-rays of the left knee is reviewed which show severe lateral joint space narrowing on the right and more moderate to severe medial joint space wear on the left.  No significant change noted.  X-rays of the left elbow is ordered and  reviewed which show a small olecranon spur.  X-rays of the right knee are available for review which show complete medial joint space narrowing    MRI of the right elbow from stand-up MRI:  Lateral epicondylitis.  Subcutaneous edema about the elbow.      Assessment:  Left lateral epicondylitis   Right knee arthritis        Plan: I reviewed the findings with him.  I recommended trying another steroid injection for both the elbow in the knee.  We will also get hyaluronic acid for the right knee approved.      This note was created using [employer] voice recognition software that occasionally misinterpreted phrases or words.    Consult note is delivered via Epic messaging service.

## 2024-09-26 NOTE — PROCEDURES
Large Joint Aspiration/Injection: R knee    Date/Time: 9/26/2024 3:00 PM    Performed by: Haroldo Campbell MD  Authorized by: Haroldo Campbell MD    Consent Done?:  Yes (Verbal)  Indications:  Pain  Site marked: the procedure site was marked    Timeout: prior to procedure the correct patient, procedure, and site was verified    Local anesthetic: Ropivicaine.  Anesthetic total (ml):  3      Details:  Needle Size:  20 G  Approach:  Anterolateral  Location:  Knee  Site:  R knee  Medications:  40 mg triamcinolone acetonide 40 mg/mL  Patient tolerance:  Patient tolerated the procedure well with no immediate complications

## 2024-10-07 ENCOUNTER — LAB VISIT (OUTPATIENT)
Dept: LAB | Facility: HOSPITAL | Age: 79
End: 2024-10-07
Attending: FAMILY MEDICINE
Payer: MEDICARE

## 2024-10-07 ENCOUNTER — OFFICE VISIT (OUTPATIENT)
Dept: PRIMARY CARE CLINIC | Facility: CLINIC | Age: 79
End: 2024-10-07
Payer: MEDICARE

## 2024-10-07 VITALS
OXYGEN SATURATION: 95 % | TEMPERATURE: 99 F | SYSTOLIC BLOOD PRESSURE: 132 MMHG | HEART RATE: 75 BPM | WEIGHT: 241.19 LBS | BODY MASS INDEX: 31.96 KG/M2 | DIASTOLIC BLOOD PRESSURE: 68 MMHG | HEIGHT: 73 IN

## 2024-10-07 DIAGNOSIS — Z12.5 ENCOUNTER FOR SCREENING FOR MALIGNANT NEOPLASM OF PROSTATE: ICD-10-CM

## 2024-10-07 DIAGNOSIS — I87.2 LYMPHEDEMA DUE TO VENOUS INSUFFICIENCY: ICD-10-CM

## 2024-10-07 DIAGNOSIS — E03.4 HYPOTHYROIDISM DUE TO ACQUIRED ATROPHY OF THYROID: ICD-10-CM

## 2024-10-07 DIAGNOSIS — R79.9 ABNORMAL FINDING OF BLOOD CHEMISTRY, UNSPECIFIED: ICD-10-CM

## 2024-10-07 DIAGNOSIS — E66.811 OBESITY (BMI 30.0-34.9): ICD-10-CM

## 2024-10-07 DIAGNOSIS — I89.0 LYMPHEDEMA DUE TO VENOUS INSUFFICIENCY: ICD-10-CM

## 2024-10-07 DIAGNOSIS — E78.2 MIXED HYPERLIPIDEMIA: ICD-10-CM

## 2024-10-07 DIAGNOSIS — J44.89 COPD WITH ASTHMA: ICD-10-CM

## 2024-10-07 DIAGNOSIS — I35.9 AORTIC VALVE DISEASE: ICD-10-CM

## 2024-10-07 DIAGNOSIS — G70.00 MYASTHENIA GRAVIS, ACHR ANTIBODY POSITIVE: ICD-10-CM

## 2024-10-07 DIAGNOSIS — E78.5 HYPERLIPIDEMIA, UNSPECIFIED HYPERLIPIDEMIA TYPE: ICD-10-CM

## 2024-10-07 DIAGNOSIS — E53.8 B12 DEFICIENCY: ICD-10-CM

## 2024-10-07 DIAGNOSIS — D69.6 THROMBOCYTOPENIA: ICD-10-CM

## 2024-10-07 DIAGNOSIS — I10 HTN (HYPERTENSION), BENIGN: Primary | ICD-10-CM

## 2024-10-07 DIAGNOSIS — N52.9 ERECTILE DYSFUNCTION, UNSPECIFIED ERECTILE DYSFUNCTION TYPE: ICD-10-CM

## 2024-10-07 LAB
ALBUMIN SERPL BCP-MCNC: 3.3 G/DL (ref 3.5–5.2)
ALP SERPL-CCNC: 77 U/L (ref 55–135)
ALT SERPL W/O P-5'-P-CCNC: 24 U/L (ref 10–44)
ANION GAP SERPL CALC-SCNC: 5 MMOL/L (ref 8–16)
AST SERPL-CCNC: 19 U/L (ref 10–40)
BASOPHILS # BLD AUTO: 0.02 K/UL (ref 0–0.2)
BASOPHILS NFR BLD: 0.3 % (ref 0–1.9)
BILIRUB SERPL-MCNC: 1.5 MG/DL (ref 0.1–1)
BUN SERPL-MCNC: 19 MG/DL (ref 8–23)
CALCIUM SERPL-MCNC: 8.9 MG/DL (ref 8.7–10.5)
CHLORIDE SERPL-SCNC: 103 MMOL/L (ref 95–110)
CHOLEST SERPL-MCNC: 109 MG/DL (ref 120–199)
CHOLEST/HDLC SERPL: 2.7 {RATIO} (ref 2–5)
CO2 SERPL-SCNC: 29 MMOL/L (ref 23–29)
CREAT SERPL-MCNC: 1 MG/DL (ref 0.5–1.4)
DIFFERENTIAL METHOD BLD: ABNORMAL
EOSINOPHIL # BLD AUTO: 0.1 K/UL (ref 0–0.5)
EOSINOPHIL NFR BLD: 1.5 % (ref 0–8)
ERYTHROCYTE [DISTWIDTH] IN BLOOD BY AUTOMATED COUNT: 13.9 % (ref 11.5–14.5)
EST. GFR  (NO RACE VARIABLE): >60 ML/MIN/1.73 M^2
ESTIMATED AVG GLUCOSE: 140 MG/DL (ref 68–131)
GLUCOSE SERPL-MCNC: 119 MG/DL (ref 70–110)
HBA1C MFR BLD: 6.5 % (ref 4–5.6)
HCT VFR BLD AUTO: 42.6 % (ref 40–54)
HDLC SERPL-MCNC: 40 MG/DL (ref 40–75)
HDLC SERPL: 36.7 % (ref 20–50)
HGB BLD-MCNC: 14 G/DL (ref 14–18)
IMM GRANULOCYTES # BLD AUTO: 0.04 K/UL (ref 0–0.04)
IMM GRANULOCYTES NFR BLD AUTO: 0.5 % (ref 0–0.5)
LDLC SERPL CALC-MCNC: 56.4 MG/DL (ref 63–159)
LYMPHOCYTES # BLD AUTO: 1.6 K/UL (ref 1–4.8)
LYMPHOCYTES NFR BLD: 19.5 % (ref 18–48)
MCH RBC QN AUTO: 30 PG (ref 27–31)
MCHC RBC AUTO-ENTMCNC: 32.9 G/DL (ref 32–36)
MCV RBC AUTO: 91 FL (ref 82–98)
MONOCYTES # BLD AUTO: 0.8 K/UL (ref 0.3–1)
MONOCYTES NFR BLD: 9.9 % (ref 4–15)
NEUTROPHILS # BLD AUTO: 5.4 K/UL (ref 1.8–7.7)
NEUTROPHILS NFR BLD: 68.3 % (ref 38–73)
NONHDLC SERPL-MCNC: 69 MG/DL
NRBC BLD-RTO: 0 /100 WBC
PLATELET # BLD AUTO: 121 K/UL (ref 150–450)
PMV BLD AUTO: 11.2 FL (ref 9.2–12.9)
POTASSIUM SERPL-SCNC: 3.9 MMOL/L (ref 3.5–5.1)
PROT SERPL-MCNC: 5.8 G/DL (ref 6–8.4)
RBC # BLD AUTO: 4.67 M/UL (ref 4.6–6.2)
SODIUM SERPL-SCNC: 137 MMOL/L (ref 136–145)
TRIGL SERPL-MCNC: 63 MG/DL (ref 30–150)
WBC # BLD AUTO: 7.96 K/UL (ref 3.9–12.7)

## 2024-10-07 PROCEDURE — 80061 LIPID PANEL: CPT | Mod: HCNC | Performed by: FAMILY MEDICINE

## 2024-10-07 PROCEDURE — 3075F SYST BP GE 130 - 139MM HG: CPT | Mod: HCNC,CPTII,S$GLB, | Performed by: NURSE PRACTITIONER

## 2024-10-07 PROCEDURE — 99214 OFFICE O/P EST MOD 30 MIN: CPT | Mod: HCNC,S$GLB,, | Performed by: NURSE PRACTITIONER

## 2024-10-07 PROCEDURE — 1101F PT FALLS ASSESS-DOCD LE1/YR: CPT | Mod: HCNC,CPTII,S$GLB, | Performed by: NURSE PRACTITIONER

## 2024-10-07 PROCEDURE — 36415 COLL VENOUS BLD VENIPUNCTURE: CPT | Mod: HCNC,PO | Performed by: FAMILY MEDICINE

## 2024-10-07 PROCEDURE — 85025 COMPLETE CBC W/AUTO DIFF WBC: CPT | Mod: HCNC | Performed by: FAMILY MEDICINE

## 2024-10-07 PROCEDURE — 3078F DIAST BP <80 MM HG: CPT | Mod: HCNC,CPTII,S$GLB, | Performed by: NURSE PRACTITIONER

## 2024-10-07 PROCEDURE — 1159F MED LIST DOCD IN RCRD: CPT | Mod: HCNC,CPTII,S$GLB, | Performed by: NURSE PRACTITIONER

## 2024-10-07 PROCEDURE — 1160F RVW MEDS BY RX/DR IN RCRD: CPT | Mod: HCNC,CPTII,S$GLB, | Performed by: NURSE PRACTITIONER

## 2024-10-07 PROCEDURE — 3288F FALL RISK ASSESSMENT DOCD: CPT | Mod: HCNC,CPTII,S$GLB, | Performed by: NURSE PRACTITIONER

## 2024-10-07 PROCEDURE — 80053 COMPREHEN METABOLIC PANEL: CPT | Mod: HCNC | Performed by: FAMILY MEDICINE

## 2024-10-07 PROCEDURE — 83036 HEMOGLOBIN GLYCOSYLATED A1C: CPT | Mod: HCNC | Performed by: FAMILY MEDICINE

## 2024-10-07 PROCEDURE — 1125F AMNT PAIN NOTED PAIN PRSNT: CPT | Mod: HCNC,CPTII,S$GLB, | Performed by: NURSE PRACTITIONER

## 2024-10-07 PROCEDURE — 99999 PR PBB SHADOW E&M-EST. PATIENT-LVL V: CPT | Mod: PBBFAC,HCNC,, | Performed by: NURSE PRACTITIONER

## 2024-10-07 PROCEDURE — 1157F ADVNC CARE PLAN IN RCRD: CPT | Mod: HCNC,CPTII,S$GLB, | Performed by: NURSE PRACTITIONER

## 2024-10-07 RX ORDER — ALBUTEROL SULFATE 90 UG/1
2 INHALANT RESPIRATORY (INHALATION) EVERY 6 HOURS PRN
Qty: 25.5 G | Refills: 4 | Status: SHIPPED | OUTPATIENT
Start: 2024-10-07

## 2024-10-07 RX ORDER — ALBUTEROL SULFATE 90 UG/1
2 INHALANT RESPIRATORY (INHALATION) EVERY 6 HOURS PRN
Qty: 25.5 G | Refills: 4 | Status: SHIPPED | OUTPATIENT
Start: 2024-10-07 | End: 2024-10-07

## 2024-10-07 NOTE — PROGRESS NOTES
Subjective:       Patient ID: Maximilian Tavera Jr. is a 78 y.o. male.    Chief Complaint: Follow-up (4 month follow up)    HPI    Patient presents today for follow up visit. hypertension, myasthenia gravis on chronic prednisone therapy, and degenerative joint disease with myelopathy s/p decompression and fusion of C2-6, chronic PVCs. Last visit with PCP- on 6/13/24. Labs done today    9/16/24 : Arthritis of knee, Lateral epicondylitis of left elbow-two steroid injection      7/25/24 PT/OT Other intervertebral disc degeneration, lumbar region,Radiculopathy, lumbar region    7/16/24 Electrophysiology- Sathya Zamudio: PVC (premature ventricular contraction),RBBB,HTN (hypertension), benign  Abdominal aortic atherosclerosis      7/16/24 Cardiology-Dr. Devang Amezquita:atherosclerosis: Aortic valve disease, Tortuous aorta, PVC's (premature ventricular contractions), Non-sustained ventricular tachycardia, Bilateral carotid artery stenosis    2/1/24 Neurology-Viktor: Myasthenia gravis, AChR antibody positive, Current chronic use of systemic steroids-Prednisone 7mg qOD     1/16/23 Cardiology-Dr.Bennett Dupree: Acute deep vein thrombosis (DVT) of femoral vein of left lower extremity, Mixed hyperlipidemia, HTN (hypertension), benign, Obesity (BMI 30-39.9), Lymphedema of both lower extremities, Leg swelling,Acute deep vein thrombosis (DVT) of proximal vein of lower extremity, unspecified laterality      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    Disease of spinal cord, unspecified 1/5/2024    Emphysema lung 11/05/2018    Gastritis     Hx of colonic polyp     Hyperlipidemia     Hypertension     Hypothyroidism     Knee fracture     Myasthenia gravis     Neuropathy  01/03/2013    Obesity 01/29/2015    Pneumonia 03/29/2020    Polyneuropathy     PVC (premature ventricular contraction)     Squamous cell carcinoma 2014    left forearm    Thyroid disease        Review of patient's allergies indicates:   Allergen Reactions    Spironolactone Diarrhea         Current Outpatient Medications:     atorvastatin (LIPITOR) 80 MG tablet, TAKE 1 TABLET EVERY DAY, Disp: 90 tablet, Rfl: 2    carvediloL (COREG) 25 MG tablet, TAKE 1 TABLET TWICE DAILY WITH MEALS, Disp: 180 tablet, Rfl: 3    cetirizine (ZYRTEC) 10 MG tablet, Take 1 tablet by mouth daily as needed., Disp: , Rfl:     chlorthalidone (HYGROTEN) 25 MG Tab, TAKE 1 TABLET ONE TIME DAILY, Disp: 90 tablet, Rfl: 3    cholecalciferol, vitamin D3, (VITAMIN D3) 50 mcg (2,000 unit) Cap, 1 capsule once daily. Every day, Disp: , Rfl:     coenzyme Q10 (CO Q-10) 100 mg capsule, Take 400 mg by mouth once daily., Disp: , Rfl:     cyanocobalamin, vitamin B-12, 5,000 mcg Subl, Take 5,000 mcg by mouth once daily. Every day, Disp: , Rfl:     diphenoxylate-atropine 2.5-0.025 mg (LOMOTIL) 2.5-0.025 mg per tablet, Take 1 tablet by mouth 4 (four) times daily as needed for Diarrhea., Disp: 90 tablet, Rfl: 0    ELIQUIS 5 mg Tab, TAKE 1 TABLET TWICE DAILY, Disp: 180 tablet, Rfl: 3    ezetimibe (ZETIA) 10 mg tablet, Take 1 tablet (10 mg total) by mouth once daily., Disp: 90 tablet, Rfl: 3    famotidine (PEPCID) 20 MG tablet, Take 1 tablet (20 mg total) by mouth nightly as needed for Heartburn., Disp: 90 tablet, Rfl: 3    fluticasone (FLONASE) 50 mcg/actuation nasal spray, USE 1 SPRAY IN EACH NOSTRIL TWICE DAILY AS NEEDED  FOR  RHINITIS, Disp: 48 g, Rfl: 3    gabapentin (NEURONTIN) 600 MG tablet, TAKE 1 TABLET THREE TIMES DAILY, Disp: 90 tablet, Rfl: 10    HYDROcodone-acetaminophen (NORCO) 7.5-325 mg per tablet, Take 1 tablet by mouth every 6 (six) hours as needed for Pain., Disp: 28 tablet, Rfl: 0    levothyroxine (SYNTHROID) 50 MCG tablet, TAKE 1 TABLET EVERY  DAY, Disp: 90 tablet, Rfl: 2    milk thistle 200 mg Cap, Take 200 mg by mouth 2 (two) times daily. Twice a day, Disp: , Rfl:     mupirocin (BACTROBAN) 2 % ointment, Apply topically 3 (three) times daily. (Patient taking differently: Apply topically as needed.), Disp: 30 g, Rfl: 0    olmesartan (BENICAR) 20 MG tablet, Take 1 tablet (20 mg total) by mouth 2 (two) times daily., Disp: 180 tablet, Rfl: 3    omega-3 fatty acids 1,000 mg Cap, Twice a day, Disp: , Rfl:     pantoprazole (PROTONIX) 40 MG tablet, TAKE 1 TABLET TWICE DAILY, Disp: 180 tablet, Rfl: 3    potassium chloride SA (K-DUR,KLOR-CON) 20 MEQ tablet, TAKE 4 TABLETS by mouth EVERY DAY  OR AS DIRECTED, Disp: 360 tablet, Rfl: 3    predniSONE (DELTASONE) 1 MG tablet, Take 2 tablets (2 mg total) by mouth once daily., Disp: 180 tablet, Rfl: 3    predniSONE (DELTASONE) 5 MG tablet, Take 1 tablet (5 mg total) by mouth once daily., Disp: 90 tablet, Rfl: 3    promethazine-dextromethorphan (PROMETHAZINE-DM) 6.25-15 mg/5 mL Syrp, TAKE 5 MLS BY MOUTH EVERY 6 HOURS AS NEEDED, Disp: 240 mL, Rfl: 3    sildenafil (REVATIO) 20 mg Tab, Take 1 to 3 tabs daily as needed., Disp: 30 tablet, Rfl: 3    tiotropium (SPIRIVA WITH HANDIHALER) 18 mcg inhalation capsule, INHALE THE CONTENTS OF 1 CAPSULE EVERY DAY (CONTROLLER), Disp: 90 capsule, Rfl: 3    albuterol (PROVENTIL/VENTOLIN HFA) 90 mcg/actuation inhaler, Inhale 2 puffs into the lungs every 6 (six) hours as needed for Wheezing. Rescue, Disp: 25.5 g, Rfl: 4    Review of Systems   Constitutional:  Negative for unexpected weight change.   HENT:  Negative for trouble swallowing.    Eyes:  Negative for visual disturbance.   Respiratory:  Negative for shortness of breath.    Cardiovascular:  Negative for chest pain, palpitations and leg swelling.   Gastrointestinal:  Negative for blood in stool.   Genitourinary:  Negative for hematuria.   Skin:  Negative for rash.   Allergic/Immunologic: Negative for immunocompromised state.  "  Neurological:  Negative for headaches.   Hematological:  Does not bruise/bleed easily.   Psychiatric/Behavioral:  Negative for agitation. The patient is not nervous/anxious.        Objective:      /68 (BP Location: Left arm, Patient Position: Sitting)   Pulse 75   Temp 98.5 °F (36.9 °C) (Oral)   Ht 6' 1" (1.854 m)   Wt 109.4 kg (241 lb 2.9 oz)   SpO2 95%   BMI 31.82 kg/m²   Physical Exam  Constitutional:       Appearance: He is well-developed.   Eyes:      Conjunctiva/sclera: Conjunctivae normal.      Pupils: Pupils are equal, round, and reactive to light.   Cardiovascular:      Rate and Rhythm: Normal rate and regular rhythm.      Heart sounds: Normal heart sounds.   Pulmonary:      Effort: Pulmonary effort is normal.   Musculoskeletal:         General: Normal range of motion.   Neurological:      Mental Status: He is alert and oriented to person, place, and time.   Psychiatric:         Behavior: Behavior normal.         Thought Content: Thought content normal.         Judgment: Judgment normal.         Assessment:       1. HTN (hypertension), benign    2. B12 deficiency    3. Encounter for screening for malignant neoplasm of prostate    4. Myasthenia gravis, AChR antibody positive    5. Lymphedema due to venous insufficiency    6. COPD with asthma    7. Hyperlipidemia, unspecified hyperlipidemia type    8. Hypothyroidism due to acquired atrophy of thyroid    9. Erectile dysfunction, unspecified erectile dysfunction type    10. Obesity (BMI 30.0-34.9)        Plan:       HTN (hypertension), benign  Stable, continue medication  Low sodium diet  BP Readings from Last 3 Encounters:   10/07/24 132/68   07/16/24 136/72   07/16/24 (!) 149/74      B12 deficiency  -     VITAMIN B12; Future; Expected date: 10/07/2024  -     PSA, SCREENING; Future; Expected date: 10/07/2024  Stable, continue management  Encounter for screening for malignant neoplasm of prostate  -     PSA, SCREENING; Future; Expected date: " "10/07/2024    Myasthenia gravis, AChR antibody positive  Stable, continue follow up  Lymphedema due to venous insufficiency  Stable, wear compression stocks. Elevated legs while sitting, low sodium diet  COPD with asthma  -     albuterol (PROVENTIL/VENTOLIN HFA) 90 mcg/actuation inhaler; Inhale 2 puffs into the lungs every 6 (six) hours as needed for Wheezing. Rescue  Dispense: 25.5 g; Refill: 4  Stable, continue resp inhaler as prescribed  Hyperlipidemia, unspecified hyperlipidemia type  Stable, on Zetia and Lipitor  Hypothyroidism due to acquired atrophy of thyroid  Stable, on Levothyroxine  Erectile dysfunction, unspecified erectile dysfunction type  Stable, on sildenafil  Obesity (BMI 30.0-34.9)  Counseled patient on his ideal body weight, health consequences of being obese and current recommendations including weekly exercise and a heart healthy diet.  Current BMI is:Estimated body mass index is 31.82 kg/m² as calculated from the following:    Height as of this encounter: 6' 1" (1.854 m).    Weight as of this encounter: 109.4 kg (241 lb 2.9 oz)..  Patient is aware that ideal BMI < 25 or Weight in (lb) to have BMI = 25: 189.1.           Patient readiness: acceptance and barriers:none    During the course of the visit the patient was educated and counseled about the following:     Hypertension:   Dietary sodium restriction.  Regular aerobic exercise.  Obesity:   General weight loss/lifestyle modification strategies discussed (elicit support from others; identify saboteurs; non-food rewards, etc).  Regular aerobic exercise program discussed.    Goals: Hypertension: Reduce Blood Pressure and Obesity: Reduce calorie intake and BMI    Did patient meet goals/outcomes: Yes    The following self management tools provided: declined    Patient Instructions (the written plan) was given to the patient/family.     Time spent with patient: 30 minutes    Barriers to medications present (no )    Adverse reactions to current " medications (no)    Over the counter medications reviewed (Yes)

## 2024-10-07 NOTE — PROGRESS NOTES
Your results look fine and do not require any change in treatment.     Please contact me if you have any additional concerns.    Sincerely,    Miguel Altamirano

## 2024-10-31 ENCOUNTER — OFFICE VISIT (OUTPATIENT)
Dept: ORTHOPEDICS | Facility: CLINIC | Age: 79
End: 2024-10-31
Payer: MEDICARE

## 2024-10-31 VITALS — BODY MASS INDEX: 31.96 KG/M2 | HEIGHT: 73 IN | WEIGHT: 241.19 LBS

## 2024-10-31 DIAGNOSIS — M17.11 PRIMARY OSTEOARTHRITIS OF RIGHT KNEE: Primary | ICD-10-CM

## 2024-10-31 PROCEDURE — 99999 PR PBB SHADOW E&M-EST. PATIENT-LVL II: CPT | Mod: PBBFAC,HCNC,, | Performed by: ORTHOPAEDIC SURGERY

## 2024-11-05 ENCOUNTER — PATIENT MESSAGE (OUTPATIENT)
Dept: ORTHOPEDICS | Facility: CLINIC | Age: 79
End: 2024-11-05
Payer: MEDICARE

## 2024-11-07 ENCOUNTER — OFFICE VISIT (OUTPATIENT)
Dept: ORTHOPEDICS | Facility: CLINIC | Age: 79
End: 2024-11-07
Payer: MEDICARE

## 2024-11-07 ENCOUNTER — LAB VISIT (OUTPATIENT)
Dept: LAB | Facility: HOSPITAL | Age: 79
End: 2024-11-07
Attending: NURSE PRACTITIONER
Payer: MEDICARE

## 2024-11-07 DIAGNOSIS — Z12.5 ENCOUNTER FOR SCREENING FOR MALIGNANT NEOPLASM OF PROSTATE: ICD-10-CM

## 2024-11-07 DIAGNOSIS — E53.8 B12 DEFICIENCY: ICD-10-CM

## 2024-11-07 DIAGNOSIS — M17.11 PRIMARY OSTEOARTHRITIS OF RIGHT KNEE: Primary | ICD-10-CM

## 2024-11-07 LAB
COMPLEXED PSA SERPL-MCNC: 16.2 NG/ML (ref 0–4)
VIT B12 SERPL-MCNC: 1457 PG/ML (ref 210–950)

## 2024-11-07 PROCEDURE — 84153 ASSAY OF PSA TOTAL: CPT | Mod: HCNC | Performed by: NURSE PRACTITIONER

## 2024-11-07 PROCEDURE — 20610 DRAIN/INJ JOINT/BURSA W/O US: CPT | Mod: HCNC,RT,S$GLB, | Performed by: ORTHOPAEDIC SURGERY

## 2024-11-07 PROCEDURE — 99999 PR PBB SHADOW E&M-EST. PATIENT-LVL I: CPT | Mod: PBBFAC,HCNC,, | Performed by: ORTHOPAEDIC SURGERY

## 2024-11-07 PROCEDURE — 36415 COLL VENOUS BLD VENIPUNCTURE: CPT | Mod: HCNC,PO | Performed by: NURSE PRACTITIONER

## 2024-11-07 PROCEDURE — 82607 VITAMIN B-12: CPT | Mod: HCNC | Performed by: NURSE PRACTITIONER

## 2024-11-07 PROCEDURE — 99499 UNLISTED E&M SERVICE: CPT | Mod: HCNC,S$GLB,, | Performed by: ORTHOPAEDIC SURGERY

## 2024-11-07 NOTE — PROCEDURES
Large Joint Aspiration/Injection: R knee joint    Date/Time: 11/7/2024 10:00 AM    Performed by: Haroldo Campbell MD  Authorized by: Haroldo Campbell MD    Consent Done?:  Yes (Verbal)  Indications:  Pain  Site marked: the procedure site was marked    Timeout: prior to procedure the correct patient, procedure, and site was verified      Details:  Needle Size:  20 G  Approach:  Anterolateral  Location:  Knee  Site:  R knee joint  Medications:  30 mg sodium hyaluronate (orthovisc) 30 mg/2 mL  Patient tolerance:  Patient tolerated the procedure well with no immediate complications

## 2024-11-07 NOTE — PROGRESS NOTES
Your results show PSA is toshia high- does Urologist get these?    Please contact me if you have any additional concerns.    Sincerely,    Miguel Altamirano

## 2024-11-07 NOTE — PROGRESS NOTES
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    Disease of spinal cord, unspecified 1/5/2024    Emphysema lung 11/05/2018    Gastritis     Hx of colonic polyp     Hyperlipidemia     Hypertension     Hypothyroidism     Knee fracture     Myasthenia gravis     Neuropathy 01/03/2013    Obesity 01/29/2015    Pneumonia 03/29/2020    Polyneuropathy     PVC (premature ventricular contraction)     Squamous cell carcinoma 2014    left forearm    Thyroid disease        Past Surgical History:   Procedure Laterality Date    APPENDECTOMY      CATARACT EXTRACTION W/  INTRAOCULAR LENS IMPLANT Bilateral     COLONOSCOPY  03/01/2012    Dr Esteban; hyperplastic polyp; repeat in 5 years    COLONOSCOPY N/A 12/7/2021    Procedure: COLONOSCOPY;  Surgeon: Gabriel Hernandez MD;  Location: [facility];  Service: Endoscopy;  Laterality: N/A;    CYSTOSCOPY N/A 2/26/2019    Procedure: CYSTOSCOPY;  Surgeon: Simba Cheung MD;  Location: [facility];  Service: Urology;  Laterality: N/A;    ENDOSCOPIC ULTRASOUND OF UPPER GASTROINTESTINAL TRACT N/A 1/7/2020    Procedure: ULTRASOUND, UPPER GI TRACT, ENDOSCOPIC;  Surgeon: Kati Ryan MD;  Location: [facility] ([address] [place] [address]);  Service: Endoscopy;  Laterality: N/A;    ESOPHAGOGASTRODUODENOSCOPY N/A 9/12/2018    Procedure: EGD (ESOPHAGOGASTRODUODENOSCOPY);  Surgeon: Gabriel Hernandez MD;  Location: [facility];  Service: Endoscopy;  Laterality: N/A;    ESOPHAGOGASTRODUODENOSCOPY N/A 8/19/2019    Procedure: EGD (ESOPHAGOGASTRODUODENOSCOPY);  Surgeon: Gabriel Hernandez MD;  Location: [facility];  Service: Endoscopy;  Laterality: N/A;    ESOPHAGOGASTRODUODENOSCOPY N/A 10/7/2019    Procedure: EGD (ESOPHAGOGASTRODUODENOSCOPY);  Surgeon: Gabriel Hernandez MD;  Location:  [facility];  Service: Endoscopy;  Laterality: N/A;    ESOPHAGOGASTRODUODENOSCOPY N/A 1/7/2020    Procedure: EGD (ESOPHAGOGASTRODUODENOSCOPY);  Surgeon: Kati Ryan MD;  Location: [facility] (2ND FLR);  Service: Endoscopy;  Laterality: N/A;    HEMORRHOID SURGERY      INJECTION OF ANESTHETIC AGENT AROUND MEDIAL BRANCH NERVES INNERVATING LUMBAR FACET JOINT Bilateral 10/1/2020    Procedure: Block-nerve-medial branch-lumbar Bilateral L 3,4,5;  Surgeon: Devan Watt MD;  Location: [facility];  Service: Pain Management;  Laterality: Bilateral;    INJECTION OF ANESTHETIC AGENT AROUND MEDIAL BRANCH NERVES INNERVATING LUMBAR FACET JOINT Right 10/7/2022    Procedure: Block-nerve-medial branch-lumbar L3,4,5;  Surgeon: Devan Watt MD;  Location: [facility];  Service: Pain Management;  Laterality: Right;    KNEE ARTHROPLASTY Bilateral     LENGTHENING OF ACHILLES TENDON Right 9/11/2020    Procedure: percutaeous tenotomy of right lateral epicondyle (tenex);  Surgeon: Terry Quach MD;  Location: [facility];  Service: Neurosurgery;  Laterality: Right;  tenex to right lateral epicondyle  Tenex machine SN#[id number], total time 1min. 30seconds    NECK SURGERY      POSTERIOR FUSION OF CERVICAL SPINE WITH LAMINECTOMY N/A 11/7/2018    Procedure: C2-C6 Posterior Cervical Laminectomy & Instrumental Fusion;  Surgeon: Kishore Turner MD;  Location: [address];  Service: Neurosurgery;  Laterality: N/A;    TONSILLECTOMY      TRANSFORAMINAL EPIDURAL INJECTION OF STEROID Right 12/20/2019    Procedure: Injection,steroid,epidural,transforaminal approach;  Surgeon: Devan Watt MD;  Location: [facility];  Service: Pain Management;  Laterality: Right;  L3-4, L4-5    TRANSFORAMINAL EPIDURAL INJECTION OF STEROID Bilateral 2/6/2020    Procedure: Injection,steroid,epidural,transforaminal approach;  Surgeon: Devan Watt MD;  Location: [facility];  Service: Pain Management;  Laterality: Bilateral;  L3-L4,L4-L5    TRANSFORAMINAL EPIDURAL INJECTION OF STEROID  Bilateral 8/26/2020    Procedure: Injection,steroid,epidural,transforaminal approach;  Surgeon: Devan Watt MD;  Location: [facility];  Service: Pain Management;  Laterality: Bilateral;  L3-4, L4-5    TRANSRECTAL ULTRASOUND EXAMINATION N/A 2/26/2019    Procedure: ULTRASOUND, RECTAL APPROACH;  Surgeon: Simba Cheung MD;  Location: [facility];  Service: Urology;  Laterality: N/A;    UPPER GASTROINTESTINAL ENDOSCOPY  09/12/2018    Dr Hernandez; gastritis; extensive intestinal metaplasia; repeat in 1-2- years       Current Outpatient Medications   Medication Sig    albuterol (PROVENTIL/VENTOLIN HFA) 90 mcg/actuation inhaler Inhale 2 puffs into the lungs every 6 (six) hours as needed for Wheezing. Rescue    atorvastatin (LIPITOR) 80 MG tablet TAKE 1 TABLET EVERY DAY    carvediloL (COREG) 25 MG tablet TAKE 1 TABLET TWICE DAILY WITH MEALS    cetirizine (ZYRTEC) 10 MG tablet Take 1 tablet by mouth daily as needed.    chlorthalidone (HYGROTEN) 25 MG Tab TAKE 1 TABLET ONE TIME DAILY    cholecalciferol, vitamin D3, (VITAMIN D3) 50 mcg (2,000 unit) Cap 1 capsule once daily. Every day    coenzyme Q10 (CO Q-10) 100 mg capsule Take 400 mg by mouth once daily.    cyanocobalamin, vitamin B-12, 5,000 mcg Subl Take 5,000 mcg by mouth once daily. Every day    diphenoxylate-atropine 2.5-0.025 mg (LOMOTIL) 2.5-0.025 mg per tablet Take 1 tablet by mouth 4 (four) times daily as needed for Diarrhea.    ELIQUIS 5 mg Tab TAKE 1 TABLET TWICE DAILY    ezetimibe (ZETIA) 10 mg tablet Take 1 tablet (10 mg total) by mouth once daily.    famotidine (PEPCID) 20 MG tablet Take 1 tablet (20 mg total) by mouth nightly as needed for Heartburn.    fluticasone (FLONASE) 50 mcg/actuation nasal spray USE 1 SPRAY IN EACH NOSTRIL TWICE DAILY AS NEEDED  FOR  RHINITIS    gabapentin (NEURONTIN) 600 MG tablet TAKE 1 TABLET THREE TIMES DAILY    HYDROcodone-acetaminophen (NORCO) 7.5-325 mg per tablet Take 1 tablet by mouth every 6 (six) hours as needed for Pain.     levothyroxine (SYNTHROID) 50 MCG tablet TAKE 1 TABLET EVERY DAY    milk thistle 200 mg Cap Take 200 mg by mouth 2 (two) times daily. Twice a day    mupirocin (BACTROBAN) 2 % ointment Apply topically 3 (three) times daily. (Patient taking differently: Apply topically as needed.)    olmesartan (BENICAR) 20 MG tablet Take 1 tablet (20 mg total) by mouth 2 (two) times daily.    omega-3 fatty acids 1,000 mg Cap Twice a day    pantoprazole (PROTONIX) 40 MG tablet TAKE 1 TABLET TWICE DAILY    potassium chloride SA (K-DUR,KLOR-CON) 20 MEQ tablet TAKE 4 TABLETS by mouth EVERY DAY  OR AS DIRECTED    predniSONE (DELTASONE) 1 MG tablet Take 2 tablets (2 mg total) by mouth once daily.    predniSONE (DELTASONE) 5 MG tablet Take 1 tablet (5 mg total) by mouth once daily.    promethazine-dextromethorphan (PROMETHAZINE-DM) 6.25-15 mg/5 mL Syrp TAKE 5 MLS BY MOUTH EVERY 6 HOURS AS NEEDED    sildenafil (REVATIO) 20 mg Tab Take 1 to 3 tabs daily as needed.    tiotropium (SPIRIVA WITH HANDIHALER) 18 mcg inhalation capsule INHALE THE CONTENTS OF 1 CAPSULE EVERY DAY (CONTROLLER)     No current facility-administered medications for this visit.       Review of patient's allergies indicates:   Allergen Reactions    No known drug allergies        Family History   Problem Relation Name Age of Onset    Cataracts Mother      Heart disease Mother          CHF    Hypertension Mother      Hyperlipidemia Mother      Cataracts Father      Glaucoma Father      Heart disease Father      Hyperlipidemia Sister      Hypertension Sister      No Known Problems Daughter      No Known Problems Daughter      Collagen disease Neg Hx      Amblyopia Neg Hx      Blindness Neg Hx      Macular degeneration Neg Hx      Retinal detachment Neg Hx      Strabismus Neg Hx      Cancer Neg Hx      Colon cancer Neg Hx      Esophageal cancer Neg Hx      Stomach cancer Neg Hx      Crohn's disease Neg Hx      Ulcerative colitis Neg Hx         Social History     Socioeconomic  History    Marital status:    Tobacco Use    Smoking status: Never    Smokeless tobacco: Never    Tobacco comments:     when a child   Substance and Sexual Activity    Alcohol use: Yes     Comment: rarely    Drug use: No    Sexual activity: Yes     Partners: Female     Social Drivers of Health     Financial Resource Strain: Low Risk  (3/4/2024)    Overall Financial Resource Strain (CARDIA)     Difficulty of Paying Living Expenses: Not hard at all   Food Insecurity: No Food Insecurity (3/4/2024)    Hunger Vital Sign     Worried About Running Out of Food in the Last Year: Never true     Ran Out of Food in the Last Year: Never true   Transportation Needs: No Transportation Needs (3/4/2024)    PRAPARE - Transportation     Lack of Transportation (Medical): No     Lack of Transportation (Non-Medical): No   Physical Activity: Inactive (3/4/2024)    Exercise Vital Sign     Days of Exercise per Week: 0 days     Minutes of Exercise per Session: 0 min   Stress: No Stress Concern Present (3/4/2024)    [employer] - Occupational Stress Questionnaire     Feeling of Stress : Only a little   Housing Stability: Low Risk  (3/4/2024)    Housing Stability Vital Sign     Unable to Pay for Housing in the Last Year: No     Number of Places Lived in the Last Year: 1     Unstable Housing in the Last Year: No       Chief Complaint:   No chief complaint on file.      History of present illness:  This is a 78-year-old male seen for left elbow and right knee pain.  Patient slipped on his bulk head a few months ago.  Had pain that returned about a month ago.  Pain in the elbow in the knee is 7/10.    Answers for HPI/ROS submitted by the patient on 12/23/2019   Leg pain  unexpected weight change: No  appetite change : No  sleep disturbance: No  IMMUNOCOMPROMISED: No  nervous/ anxious: No  dysphoric mood: No  rash: No  visual disturbance: No  eye redness: No  eye pain: No  ear pain: No  tinnitus: Yes  hearing  loss: No  sinus pressure : Yes  nosebleeds: Yes  enviro allergies: No  food allergies: No  cough: No  shortness of breath: Yes  sweating: No  frequency: Yes  difficulty urinating: No  hematuria: No  chest pain: No  palpitations: No  nausea: No  vomiting: No  diarrhea: No  blood in stool: No  constipation: No  headaches: Yes  dizziness: No  numbness: No  seizures: No  joint swelling: No  myalgia: No  weakness: No  back pain: Yes  Pain Chronicity: recurrent  History of trauma: No  Onset: more than 1 month ago  Frequency: daily  Progression since onset: waxing and waning  Injury mechanism: twisting  injury location: at home  pain- numeric: 3/10  pain location: left knee  pain quality: aching  Radiating Pain: No  Aggravating factors: bending, extension, twisting  fever: No  inability to bear weight: No  itching: No  joint locking: No  limited range of motion: Yes  stiffness: Yes  tingling: No  Treatments tried: cold, heat, exercise, injection treatment, NSAIDs, OTC ointments  physical therapy: not tried  Improvement on treatment: mild      Physical Examination:    Vital Signs:    There were no vitals filed for this visit.      There is no height or weight on file to calculate BMI.    This a well-developed, well nourished patient in no acute distress.  They are alert and oriented and cooperative to examination.  Pt. walks without an antalgic gait.      Examination of the right elbow shows no signs of rashes or erythema. The patient has no masses, ecchymosis, or effusion. The patient has full range of motion from 0-160°. Patient has full pronation and supination. Patient is nontender along the medial epicondyle and moderately tender over the lateral epicondyle. Nontender over the olecranon process.  Nontender along the course of the UCL. Patient has a negative valgus stress test and milking maneuver. Negative Tinel's sign over the cubital tunnel. 2+ radial pulse. Intact light touch sensation.         X-rays:  X-rays of  the left knee is reviewed which show severe lateral joint space narrowing on the right and more moderate to severe medial joint space wear on the left.  No significant change noted.  X-rays of the left elbow is ordered and  reviewed which show a small olecranon spur.  X-rays of the right knee are available for review which show complete medial joint space narrowing    MRI of the right elbow from stand-up MRI:  Lateral epicondylitis.  Subcutaneous edema about the elbow.      Assessment:  Left lateral epicondylitis   Right knee arthritis        Plan: I reviewed the findings with him.  I injected his right knee with Orthovisc 2 of 3.  Follow up next week.      This note was created using [employer] voice recognition software that occasionally misinterpreted phrases or words.    Consult note is delivered via Epic messaging service.

## 2024-11-08 ENCOUNTER — PATIENT MESSAGE (OUTPATIENT)
Dept: PRIMARY CARE CLINIC | Facility: CLINIC | Age: 79
End: 2024-11-08
Payer: MEDICARE

## 2024-11-08 DIAGNOSIS — R97.20 ABNORMAL PSA: Primary | ICD-10-CM

## 2024-11-08 NOTE — TELEPHONE ENCOUNTER
I spoke with pt via phone. He states that he cannot perform MRIs due to claustrophobia. He states that nothing helps even medication. He would like your advice on what you want to do. Please advise. Thank you !

## 2024-11-11 ENCOUNTER — TELEPHONE (OUTPATIENT)
Dept: PRIMARY CARE CLINIC | Facility: CLINIC | Age: 79
End: 2024-11-11
Payer: MEDICARE

## 2024-11-11 DIAGNOSIS — R97.20 ELEVATED PSA: Primary | ICD-10-CM

## 2024-11-14 ENCOUNTER — TELEPHONE (OUTPATIENT)
Dept: PRIMARY CARE CLINIC | Facility: CLINIC | Age: 79
End: 2024-11-14
Payer: MEDICARE

## 2024-11-14 ENCOUNTER — OFFICE VISIT (OUTPATIENT)
Dept: ORTHOPEDICS | Facility: CLINIC | Age: 79
End: 2024-11-14
Payer: MEDICARE

## 2024-11-14 VITALS — BODY MASS INDEX: 31.96 KG/M2 | HEIGHT: 73 IN | WEIGHT: 241.19 LBS

## 2024-11-14 DIAGNOSIS — M17.11 PRIMARY OSTEOARTHRITIS OF RIGHT KNEE: Primary | ICD-10-CM

## 2024-11-14 PROCEDURE — 99999 PR PBB SHADOW E&M-EST. PATIENT-LVL II: CPT | Mod: PBBFAC,HCNC,, | Performed by: ORTHOPAEDIC SURGERY

## 2024-11-14 NOTE — TELEPHONE ENCOUNTER
Patient wife Germaine Garrido called in stating patient had a lot of blood in his urine last night  Today is a little better but still has blood  Germaine Garrido states patient is not having any other symptoms at this time  [place] per [facility] notes to increase his water intake    Germaine Garrido can be reached at [phone] Please call [facility] in [place] to checks the status of the standing MRI prostate. Patient needs this done before he is seen by Urology on 11/20/24.

## 2024-11-14 NOTE — PROGRESS NOTES
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    Disease of spinal cord, unspecified 1/5/2024    Emphysema lung 11/05/2018    Gastritis     Hx of colonic polyp     Hyperlipidemia     Hypertension     Hypothyroidism     Knee fracture     Myasthenia gravis     Neuropathy 01/03/2013    Obesity 01/29/2015    Pneumonia 03/29/2020    Polyneuropathy     PVC (premature ventricular contraction)     Squamous cell carcinoma 2014    left forearm    Thyroid disease        Past Surgical History:   Procedure Laterality Date    APPENDECTOMY      CATARACT EXTRACTION W/  INTRAOCULAR LENS IMPLANT Bilateral     COLONOSCOPY  03/01/2012    Dr Esteban; hyperplastic polyp; repeat in 5 years    COLONOSCOPY N/A 12/7/2021    Procedure: COLONOSCOPY;  Surgeon: Gabriel Hernandez MD;  Location: [facility];  Service: Endoscopy;  Laterality: N/A;    CYSTOSCOPY N/A 2/26/2019    Procedure: CYSTOSCOPY;  Surgeon: Simba Cheung MD;  Location: [facility];  Service: Urology;  Laterality: N/A;    ENDOSCOPIC ULTRASOUND OF UPPER GASTROINTESTINAL TRACT N/A 1/7/2020    Procedure: ULTRASOUND, UPPER GI TRACT, ENDOSCOPIC;  Surgeon: Kati Ryan MD;  Location: [facility] ([address] [place] [address]);  Service: Endoscopy;  Laterality: N/A;    ESOPHAGOGASTRODUODENOSCOPY N/A 9/12/2018    Procedure: EGD (ESOPHAGOGASTRODUODENOSCOPY);  Surgeon: Gabriel Hernandez MD;  Location: [facility];  Service: Endoscopy;  Laterality: N/A;    ESOPHAGOGASTRODUODENOSCOPY N/A 8/19/2019    Procedure: EGD (ESOPHAGOGASTRODUODENOSCOPY);  Surgeon: Gabriel Hernandez MD;  Location: [facility];  Service: Endoscopy;  Laterality: N/A;    ESOPHAGOGASTRODUODENOSCOPY N/A 10/7/2019    Procedure: EGD (ESOPHAGOGASTRODUODENOSCOPY);  Surgeon: Gabriel Hernandez MD;  Location:  [facility];  Service: Endoscopy;  Laterality: N/A;    ESOPHAGOGASTRODUODENOSCOPY N/A 1/7/2020    Procedure: EGD (ESOPHAGOGASTRODUODENOSCOPY);  Surgeon: Kati Ryan MD;  Location: [facility] (2ND FLR);  Service: Endoscopy;  Laterality: N/A;    HEMORRHOID SURGERY      INJECTION OF ANESTHETIC AGENT AROUND MEDIAL BRANCH NERVES INNERVATING LUMBAR FACET JOINT Bilateral 10/1/2020    Procedure: Block-nerve-medial branch-lumbar Bilateral L 3,4,5;  Surgeon: Devan Watt MD;  Location: [facility];  Service: Pain Management;  Laterality: Bilateral;    INJECTION OF ANESTHETIC AGENT AROUND MEDIAL BRANCH NERVES INNERVATING LUMBAR FACET JOINT Right 10/7/2022    Procedure: Block-nerve-medial branch-lumbar L3,4,5;  Surgeon: Devan Watt MD;  Location: [facility];  Service: Pain Management;  Laterality: Right;    KNEE ARTHROPLASTY Bilateral     LENGTHENING OF ACHILLES TENDON Right 9/11/2020    Procedure: percutaeous tenotomy of right lateral epicondyle (tenex);  Surgeon: Terry Quach MD;  Location: [facility];  Service: Neurosurgery;  Laterality: Right;  tenex to right lateral epicondyle  Tenex machine SN#[id number], total time 1min. 30seconds    NECK SURGERY      POSTERIOR FUSION OF CERVICAL SPINE WITH LAMINECTOMY N/A 11/7/2018    Procedure: C2-C6 Posterior Cervical Laminectomy & Instrumental Fusion;  Surgeon: Kishore Turner MD;  Location: [address];  Service: Neurosurgery;  Laterality: N/A;    TONSILLECTOMY      TRANSFORAMINAL EPIDURAL INJECTION OF STEROID Right 12/20/2019    Procedure: Injection,steroid,epidural,transforaminal approach;  Surgeon: Devan Watt MD;  Location: [facility];  Service: Pain Management;  Laterality: Right;  L3-4, L4-5    TRANSFORAMINAL EPIDURAL INJECTION OF STEROID Bilateral 2/6/2020    Procedure: Injection,steroid,epidural,transforaminal approach;  Surgeon: Devan Watt MD;  Location: [facility];  Service: Pain Management;  Laterality: Bilateral;  L3-L4,L4-L5    TRANSFORAMINAL EPIDURAL INJECTION OF STEROID  Bilateral 8/26/2020    Procedure: Injection,steroid,epidural,transforaminal approach;  Surgeon: Devan Watt MD;  Location: [facility];  Service: Pain Management;  Laterality: Bilateral;  L3-4, L4-5    TRANSRECTAL ULTRASOUND EXAMINATION N/A 2/26/2019    Procedure: ULTRASOUND, RECTAL APPROACH;  Surgeon: Simba Cheung MD;  Location: [facility];  Service: Urology;  Laterality: N/A;    UPPER GASTROINTESTINAL ENDOSCOPY  09/12/2018    Dr Hernandez; gastritis; extensive intestinal metaplasia; repeat in 1-2- years       Current Outpatient Medications   Medication Sig    albuterol (PROVENTIL/VENTOLIN HFA) 90 mcg/actuation inhaler Inhale 2 puffs into the lungs every 6 (six) hours as needed for Wheezing. Rescue    atorvastatin (LIPITOR) 80 MG tablet TAKE 1 TABLET EVERY DAY    carvediloL (COREG) 25 MG tablet TAKE 1 TABLET TWICE DAILY WITH MEALS    cetirizine (ZYRTEC) 10 MG tablet Take 1 tablet by mouth daily as needed.    chlorthalidone (HYGROTEN) 25 MG Tab TAKE 1 TABLET ONE TIME DAILY    cholecalciferol, vitamin D3, (VITAMIN D3) 50 mcg (2,000 unit) Cap 1 capsule once daily. Every day    coenzyme Q10 (CO Q-10) 100 mg capsule Take 400 mg by mouth once daily.    cyanocobalamin, vitamin B-12, 5,000 mcg Subl Take 5,000 mcg by mouth once daily. Every day    diphenoxylate-atropine 2.5-0.025 mg (LOMOTIL) 2.5-0.025 mg per tablet Take 1 tablet by mouth 4 (four) times daily as needed for Diarrhea.    ELIQUIS 5 mg Tab TAKE 1 TABLET TWICE DAILY    ezetimibe (ZETIA) 10 mg tablet Take 1 tablet (10 mg total) by mouth once daily.    famotidine (PEPCID) 20 MG tablet Take 1 tablet (20 mg total) by mouth nightly as needed for Heartburn.    fluticasone (FLONASE) 50 mcg/actuation nasal spray USE 1 SPRAY IN EACH NOSTRIL TWICE DAILY AS NEEDED  FOR  RHINITIS    gabapentin (NEURONTIN) 600 MG tablet TAKE 1 TABLET THREE TIMES DAILY    HYDROcodone-acetaminophen (NORCO) 7.5-325 mg per tablet Take 1 tablet by mouth every 6 (six) hours as needed for Pain.     levothyroxine (SYNTHROID) 50 MCG tablet TAKE 1 TABLET EVERY DAY    milk thistle 200 mg Cap Take 200 mg by mouth 2 (two) times daily. Twice a day    mupirocin (BACTROBAN) 2 % ointment Apply topically 3 (three) times daily. (Patient taking differently: Apply topically as needed.)    olmesartan (BENICAR) 20 MG tablet Take 1 tablet (20 mg total) by mouth 2 (two) times daily.    omega-3 fatty acids 1,000 mg Cap Twice a day    pantoprazole (PROTONIX) 40 MG tablet TAKE 1 TABLET TWICE DAILY    potassium chloride SA (K-DUR,KLOR-CON) 20 MEQ tablet TAKE 4 TABLETS by mouth EVERY DAY  OR AS DIRECTED    predniSONE (DELTASONE) 1 MG tablet Take 2 tablets (2 mg total) by mouth once daily.    predniSONE (DELTASONE) 5 MG tablet Take 1 tablet (5 mg total) by mouth once daily.    promethazine-dextromethorphan (PROMETHAZINE-DM) 6.25-15 mg/5 mL Syrp TAKE 5 MLS BY MOUTH EVERY 6 HOURS AS NEEDED    sildenafil (REVATIO) 20 mg Tab Take 1 to 3 tabs daily as needed.    tiotropium (SPIRIVA WITH HANDIHALER) 18 mcg inhalation capsule INHALE THE CONTENTS OF 1 CAPSULE EVERY DAY (CONTROLLER)     No current facility-administered medications for this visit.       Review of patient's allergies indicates:   Allergen Reactions    No known drug allergies        Family History   Problem Relation Name Age of Onset    Cataracts Mother      Heart disease Mother          CHF    Hypertension Mother      Hyperlipidemia Mother      Cataracts Father      Glaucoma Father      Heart disease Father      Hyperlipidemia Sister      Hypertension Sister      No Known Problems Daughter      No Known Problems Daughter      Collagen disease Neg Hx      Amblyopia Neg Hx      Blindness Neg Hx      Macular degeneration Neg Hx      Retinal detachment Neg Hx      Strabismus Neg Hx      Cancer Neg Hx      Colon cancer Neg Hx      Esophageal cancer Neg Hx      Stomach cancer Neg Hx      Crohn's disease Neg Hx      Ulcerative colitis Neg Hx         Social History     Socioeconomic  History    Marital status:    Tobacco Use    Smoking status: Never    Smokeless tobacco: Never    Tobacco comments:     when a child   Substance and Sexual Activity    Alcohol use: Yes     Comment: rarely    Drug use: No    Sexual activity: Yes     Partners: Female     Social Drivers of Health     Financial Resource Strain: Low Risk  (3/4/2024)    Overall Financial Resource Strain (CARDIA)     Difficulty of Paying Living Expenses: Not hard at all   Food Insecurity: No Food Insecurity (3/4/2024)    Hunger Vital Sign     Worried About Running Out of Food in the Last Year: Never true     Ran Out of Food in the Last Year: Never true   Transportation Needs: No Transportation Needs (3/4/2024)    PRAPARE - Transportation     Lack of Transportation (Medical): No     Lack of Transportation (Non-Medical): No   Physical Activity: Inactive (3/4/2024)    Exercise Vital Sign     Days of Exercise per Week: 0 days     Minutes of Exercise per Session: 0 min   Stress: No Stress Concern Present (3/4/2024)    [employer] - Occupational Stress Questionnaire     Feeling of Stress : Only a little   Housing Stability: Low Risk  (3/4/2024)    Housing Stability Vital Sign     Unable to Pay for Housing in the Last Year: No     Number of Places Lived in the Last Year: 1     Unstable Housing in the Last Year: No       Chief Complaint:   No chief complaint on file.      History of present illness:  This is a 78-year-old male seen for left elbow and right knee pain.  Patient slipped on his bulk head a few months ago.  Had pain that returned about a month ago.  Pain in the elbow in the knee is 7/10.    Answers for HPI/ROS submitted by the patient on 12/23/2019   Leg pain  unexpected weight change: No  appetite change : No  sleep disturbance: No  IMMUNOCOMPROMISED: No  nervous/ anxious: No  dysphoric mood: No  rash: No  visual disturbance: No  eye redness: No  eye pain: No  ear pain: No  tinnitus: Yes  hearing  loss: No  sinus pressure : Yes  nosebleeds: Yes  enviro allergies: No  food allergies: No  cough: No  shortness of breath: Yes  sweating: No  frequency: Yes  difficulty urinating: No  hematuria: No  chest pain: No  palpitations: No  nausea: No  vomiting: No  diarrhea: No  blood in stool: No  constipation: No  headaches: Yes  dizziness: No  numbness: No  seizures: No  joint swelling: No  myalgia: No  weakness: No  back pain: Yes  Pain Chronicity: recurrent  History of trauma: No  Onset: more than 1 month ago  Frequency: daily  Progression since onset: waxing and waning  Injury mechanism: twisting  injury location: at home  pain- numeric: 3/10  pain location: left knee  pain quality: aching  Radiating Pain: No  Aggravating factors: bending, extension, twisting  fever: No  inability to bear weight: No  itching: No  joint locking: No  limited range of motion: Yes  stiffness: Yes  tingling: No  Treatments tried: cold, heat, exercise, injection treatment, NSAIDs, OTC ointments  physical therapy: not tried  Improvement on treatment: mild      Physical Examination:    Vital Signs:    There were no vitals filed for this visit.      There is no height or weight on file to calculate BMI.    This a well-developed, well nourished patient in no acute distress.  They are alert and oriented and cooperative to examination.  Pt. walks without an antalgic gait.      Examination of the right elbow shows no signs of rashes or erythema. The patient has no masses, ecchymosis, or effusion. The patient has full range of motion from 0-160°. Patient has full pronation and supination. Patient is nontender along the medial epicondyle and moderately tender over the lateral epicondyle. Nontender over the olecranon process.  Nontender along the course of the UCL. Patient has a negative valgus stress test and milking maneuver. Negative Tinel's sign over the cubital tunnel. 2+ radial pulse. Intact light touch sensation.         X-rays:  X-rays of  the left knee is reviewed which show severe lateral joint space narrowing on the right and more moderate to severe medial joint space wear on the left.  No significant change noted.  X-rays of the left elbow is reviewed which show a small olecranon spur.  X-rays of the right knee are available for review which show complete medial joint space narrowing    MRI of the right elbow from stand-up MRI:  Lateral epicondylitis.  Subcutaneous edema about the elbow.      Assessment:  Left lateral epicondylitis   Right knee arthritis        Plan: I reviewed the findings with him.  I injected his right knee with Orthovisc 3 of 3.  We talked about possibly doing a knee replacement if it hurts him enough or other non operative modalities such as nerve ablation.      This note was created using Robert Applebaum MD voice recognition software that occasionally misinterpreted phrases or words.    Consult note is delivered via Epic messaging service.

## 2024-11-14 NOTE — TELEPHONE ENCOUNTER
Contacted [facility]. Confirmed order for MRI prostate was received. Appt has not been rescheduled. Representative states the patient has not been contacted to schedule.     Contacted pt via phone. Gave him number to schedule MRI. Pt verbalized understanding

## 2024-11-14 NOTE — PROCEDURES
Large Joint Aspiration/Injection: R knee joint    Date/Time: 11/14/2024 10:00 AM    Performed by: Haroldo Campbell MD  Authorized by: Haroldo Campbell MD    Consent Done?:  Yes (Verbal)  Indications:  Pain  Site marked: the procedure site was marked    Timeout: prior to procedure the correct patient, procedure, and site was verified      Details:  Needle Size:  20 G  Approach:  Anterolateral  Location:  Knee  Site:  R knee joint  Medications:  30 mg sodium hyaluronate (orthovisc) 30 mg/2 mL  Patient tolerance:  Patient tolerated the procedure well with no immediate complications

## 2024-11-20 ENCOUNTER — TELEPHONE (OUTPATIENT)
Dept: PRIMARY CARE CLINIC | Facility: CLINIC | Age: 79
End: 2024-11-20
Payer: MEDICARE

## 2024-11-20 NOTE — TELEPHONE ENCOUNTER
----- Message from [employer] sent at 11/20/2024 10:16 AM CST -----  Type:  Needs Medical Advice    Who Called: [facility]  Symptoms (please be specific):Abnormal PSA [R97.20]  How long has patient had these symptoms:    Pharmacy name and phone #:    Would the patient rather a call back or a response via [facility]? call back/  Best Call Back Number: [phone] ext [phone]  Fax [phone] jorje Zavala  Additional Information: need notes for MRI Prostate W W/O Contrast for approval  Pt appt 11/21  Thanks

## 2024-11-20 NOTE — TELEPHONE ENCOUNTER
[facility] department at [facility]. Requesting LOV notes and most recent PSA sent via fax for insurance approval. Sent via fax

## 2024-12-09 NOTE — PROGRESS NOTES
Clinic Note  12/9/2024      Subjective:         Chief Complaint:   HPI  Maximilian Tavera Jr. is a 78 y.o. male with an elevated PSA of 16.2. Here with his wife Radha Schafer.  Retired [employer] and  at [employer]. Radha Schafer worked at [employer] also.  FH-.negative.  MRI 11/21/2024- no report, no ADC map. Suspicious PI-RADS 5 anterior lesion apex/mid based on T2 and DWI.  PCPT-    Lab Results   Component Value Date    PSA 16.2 (H) 11/07/2024    PSA 3.5 09/19/2019    PSA 3.3 05/02/2019    PSA 2.4 03/29/2018    PSA 1.1 11/18/2014    PSA 0.60 05/15/2013    PSA 0.61 05/09/2012    PSA 0.44 03/30/2011    PSADIAG 6.9 (H) 06/30/2021    PSADIAG 1.7 09/12/2016    PSADIAG 1.7 09/02/2015    PSATOTAL 9.8 (H) 12/15/2022    PSATOTAL 6.5 (H) 09/15/2021    PSATOTAL 3.0 08/02/2019    PSAFREE 1.27 12/15/2022    PSAFREE 0.96 09/15/2021    PSAFREE 0.42 08/02/2019    PSAFREEPCT 12.96 12/15/2022    PSAFREEPCT 14.77 09/15/2021    PSAFREEPCT 14.00 08/02/2019      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    Disease of spinal cord, unspecified 1/5/2024    Emphysema lung 11/05/2018    Gastritis     Hx of colonic polyp     Hyperlipidemia     Hypertension     Hypothyroidism     Knee fracture     Myasthenia gravis     Neuropathy 01/03/2013    Obesity 01/29/2015    Pneumonia 03/29/2020    Polyneuropathy     PVC (premature ventricular contraction)     Squamous cell carcinoma 2014    left forearm    Thyroid disease      Family History   Problem Relation Name Age of Onset    Cataracts Mother      Heart disease Mother          CHF    Hypertension Mother      Hyperlipidemia Mother      Cataracts Father      Glaucoma Father      Heart disease Father      Hyperlipidemia Sister      Hypertension Sister      No Known  Problems Daughter      No Known Problems Daughter      Collagen disease Neg Hx      Amblyopia Neg Hx      Blindness Neg Hx      Macular degeneration Neg Hx      Retinal detachment Neg Hx      Strabismus Neg Hx      Cancer Neg Hx      Colon cancer Neg Hx      Esophageal cancer Neg Hx      Stomach cancer Neg Hx      Crohn's disease Neg Hx      Ulcerative colitis Neg Hx       Social History     Socioeconomic History    Marital status:    Tobacco Use    Smoking status: Never    Smokeless tobacco: Never    Tobacco comments:     when a child   Substance and Sexual Activity    Alcohol use: Yes     Comment: rarely    Drug use: No    Sexual activity: Yes     Partners: Female     Social Drivers of Health     Financial Resource Strain: Low Risk  (3/4/2024)    Overall Financial Resource Strain (CARDIA)     Difficulty of Paying Living Expenses: Not hard at all   Food Insecurity: No Food Insecurity (3/4/2024)    Hunger Vital Sign     Worried About Running Out of Food in the Last Year: Never true     Ran Out of Food in the Last Year: Never true   Transportation Needs: No Transportation Needs (3/4/2024)    PRAPARE - Transportation     Lack of Transportation (Medical): No     Lack of Transportation (Non-Medical): No   Physical Activity: Inactive (3/4/2024)    Exercise Vital Sign     Days of Exercise per Week: 0 days     Minutes of Exercise per Session: 0 min   Stress: No Stress Concern Present (3/4/2024)    [employer] [place] [employer] - Occupational Stress Questionnaire     Feeling of Stress : Only a little   Housing Stability: Low Risk  (3/4/2024)    Housing Stability Vital Sign     Unable to Pay for Housing in the Last Year: No     Number of Places Lived in the Last Year: 1     Unstable Housing in the Last Year: No     Past Surgical History:   Procedure Laterality Date    APPENDECTOMY      CATARACT EXTRACTION W/  INTRAOCULAR LENS IMPLANT Bilateral     COLONOSCOPY  03/01/2012    Dr Esteban; hyperplastic polyp;  repeat in 5 years    COLONOSCOPY N/A 12/7/2021    Procedure: COLONOSCOPY;  Surgeon: Gabriel Hernandez MD;  Location: [facility] ENDO;  Service: Endoscopy;  Laterality: N/A;    CYSTOSCOPY N/A 2/26/2019    Procedure: CYSTOSCOPY;  Surgeon: Simba Cheung MD;  [facility];  Service: Urology;  Laterality: N/A;    ENDOSCOPIC ULTRASOUND OF UPPER GASTROINTESTINAL TRACT N/A 1/7/2020    Procedure: ULTRASOUND, UPPER GI TRACT, ENDOSCOPIC;  Surgeon: Kati Ryan MD;  Location: [facility] (2ND FLR);  Service: Endoscopy;  Laterality: N/A;    ESOPHAGOGASTRODUODENOSCOPY N/A 9/12/2018    Procedure: EGD (ESOPHAGOGASTRODUODENOSCOPY);  Surgeon: Gabriel Hernandez MD;  Location: [facility];  Service: Endoscopy;  Laterality: N/A;    ESOPHAGOGASTRODUODENOSCOPY N/A 8/19/2019    Procedure: EGD (ESOPHAGOGASTRODUODENOSCOPY);  Surgeon: Gabriel Hernandez MD;  Location: [facility];  Service: Endoscopy;  Laterality: N/A;    ESOPHAGOGASTRODUODENOSCOPY N/A 10/7/2019    Procedure: EGD (ESOPHAGOGASTRODUODENOSCOPY);  Surgeon: Gabriel Hernandez MD;  Location: [facility];  Service: Endoscopy;  Laterality: N/A;    ESOPHAGOGASTRODUODENOSCOPY N/A 1/7/2020    Procedure: EGD (ESOPHAGOGASTRODUODENOSCOPY);  Surgeon: Kati Ryan MD;  Location: [facility] (2ND FLR);  Service: Endoscopy;  Laterality: N/A;    HEMORRHOID SURGERY      INJECTION OF ANESTHETIC AGENT AROUND MEDIAL BRANCH NERVES INNERVATING LUMBAR FACET JOINT Bilateral 10/1/2020    Procedure: Block-nerve-medial branch-lumbar Bilateral L 3,4,5;  Surgeon: Devan Watt MD;  [facility];  Service: Pain Management;  Laterality: Bilateral;    INJECTION OF ANESTHETIC AGENT AROUND MEDIAL BRANCH NERVES INNERVATING LUMBAR FACET JOINT Right 10/7/2022    Procedure: Block-nerve-medial branch-lumbar L3,4,5;  Surgeon: Devan Watt MD;  [facility];  Service: Pain Management;  Laterality: Right;    KNEE ARTHROPLASTY Bilateral     LENGTHENING OF ACHILLES TENDON Right 9/11/2020    Procedure:  percutaeous tenotomy of right lateral epicondyle (tenex);  Surgeon: Terry Quach MD;  Location: [facility];  Service: Neurosurgery;  Laterality: Right;  tenex to right lateral epicondyle  Tenex machine SN#[id number], total time 1min. 30seconds    NECK SURGERY      POSTERIOR FUSION OF CERVICAL SPINE WITH LAMINECTOMY N/A 11/7/2018    Procedure: C2-C6 Posterior Cervical Laminectomy & Instrumental Fusion;  Surgeon: Kishore Turner MD;  Location: [address];  Service: Neurosurgery;  Laterality: N/A;    TONSILLECTOMY      TRANSFORAMINAL EPIDURAL INJECTION OF STEROID Right 12/20/2019    Procedure: Injection,steroid,epidural,transforaminal approach;  Surgeon: Devan Watt MD;  Location: [facility];  Service: Pain Management;  Laterality: Right;  L3-4, L4-5    TRANSFORAMINAL EPIDURAL INJECTION OF STEROID Bilateral 2/6/2020    Procedure: Injection,steroid,epidural,transforaminal approach;  Surgeon: Devan Watt MD;  Location: [facility];  Service: Pain Management;  Laterality: Bilateral;  L3-L4,L4-L5    TRANSFORAMINAL EPIDURAL INJECTION OF STEROID Bilateral 8/26/2020    Procedure: Injection,steroid,epidural,transforaminal approach;  Surgeon: Devan Watt MD;  Location: [facility];  Service: Pain Management;  Laterality: Bilateral;  L3-4, L4-5    TRANSRECTAL ULTRASOUND EXAMINATION N/A 2/26/2019    Procedure: ULTRASOUND, RECTAL APPROACH;  Surgeon: Simba Cheung MD;  Location: [facility];  Service: Urology;  Laterality: N/A;    UPPER GASTROINTESTINAL ENDOSCOPY  09/12/2018    Dr Hernandez; gastritis; extensive intestinal metaplasia; repeat in 1-2- years     Patient Active Problem List   Diagnosis    Hypothyroid    Hyperlipidemia    HTN (hypertension), benign    Neuropathy    ED (erectile dysfunction)    DDD (degenerative disc disease), lumbar    Diplopia    Pseudophakia, right eye    Neurogenic claudication    Myasthenia gravis, AChR antibody positive    Umbilical hernia without obstruction and without gangrene    Agatston CAC score, <100     "Aortic valve disease    Primary osteoarthritis of both knees    Abdominal aortic atherosclerosis    RBBB    On prednisone therapy    Carpal tunnel syndrome on both sides    Tortuous aorta    Epigastric pain    Cervical stenosis of spinal canal    S/P cervical spinal fusion    BPH with obstruction/lower urinary tract symptoms    PVC's (premature ventricular contractions)    Non-sustained ventricular tachycardia    Current chronic use of systemic steroids    Bilateral carotid artery stenosis    Lumbar radiculitis    Gastric ulcer    COPD (chronic obstructive pulmonary disease)    Lateral epicondylitis    Other spondylosis, lumbar region    Cervical radiculopathy    PVC (premature ventricular contraction)    COPD with asthma    GOLD (dyspnea on exertion)    Restrictive lung mechanics due to neuromuscular disease    Bronchiectasis without complication    Mild persistent asthma without complication    Myasthenia gravis    Leg swelling    Thrombocytopenia    Venous stasis dermatitis of both lower extremities    Lymphedema of both lower extremities    Obesity (BMI 30-39.9)    Acute deep vein thrombosis (DVT) of femoral vein of left lower extremity    Acute deep vein thrombosis (DVT) of proximal vein of lower extremity    Disease of spinal cord, unspecified    Chronic deep vein thrombosis (DVT) of femoral vein of left lower extremity     Review of Systems      Objective:      There were no vitals taken for this visit.  Estimated body mass index is 31.82 kg/m² as calculated from the following:    Height as of 11/14/24: 6' 1" (1.854 m).    Weight as of 11/14/24: 109.4 kg (241 lb 2.9 oz).  Physical Exam      Assessment and Plan:           Problem List Items Addressed This Visit    None      Follow up:   UroNav biopsy. Will need to stop Eliquis.    Brian An"

## 2024-12-10 ENCOUNTER — OFFICE VISIT (OUTPATIENT)
Dept: UROLOGY | Facility: CLINIC | Age: 79
End: 2024-12-10
Payer: MEDICARE

## 2024-12-10 VITALS
DIASTOLIC BLOOD PRESSURE: 78 MMHG | HEIGHT: 73 IN | HEART RATE: 63 BPM | WEIGHT: 245.38 LBS | SYSTOLIC BLOOD PRESSURE: 150 MMHG | BODY MASS INDEX: 32.52 KG/M2

## 2024-12-10 DIAGNOSIS — R97.20 ELEVATED PSA: ICD-10-CM

## 2024-12-10 PROCEDURE — 3078F DIAST BP <80 MM HG: CPT | Mod: HCNC,CPTII,S$GLB, | Performed by: UROLOGY

## 2024-12-10 PROCEDURE — 3288F FALL RISK ASSESSMENT DOCD: CPT | Mod: HCNC,CPTII,S$GLB, | Performed by: UROLOGY

## 2024-12-10 PROCEDURE — 99999 PR PBB SHADOW E&M-EST. PATIENT-LVL III: CPT | Mod: PBBFAC,HCNC,, | Performed by: UROLOGY

## 2024-12-10 PROCEDURE — 3077F SYST BP >= 140 MM HG: CPT | Mod: HCNC,CPTII,S$GLB, | Performed by: UROLOGY

## 2024-12-10 PROCEDURE — 99205 OFFICE O/P NEW HI 60 MIN: CPT | Mod: HCNC,S$GLB,, | Performed by: UROLOGY

## 2024-12-10 PROCEDURE — 1159F MED LIST DOCD IN RCRD: CPT | Mod: HCNC,CPTII,S$GLB, | Performed by: UROLOGY

## 2024-12-10 PROCEDURE — 1157F ADVNC CARE PLAN IN RCRD: CPT | Mod: HCNC,CPTII,S$GLB, | Performed by: UROLOGY

## 2024-12-10 PROCEDURE — 1101F PT FALLS ASSESS-DOCD LE1/YR: CPT | Mod: HCNC,CPTII,S$GLB, | Performed by: UROLOGY

## 2024-12-10 PROCEDURE — 1126F AMNT PAIN NOTED NONE PRSNT: CPT | Mod: HCNC,CPTII,S$GLB, | Performed by: UROLOGY

## 2024-12-10 RX ORDER — CEFTRIAXONE 1 G/1
1 INJECTION, POWDER, FOR SOLUTION INTRAMUSCULAR; INTRAVENOUS
Status: SHIPPED | OUTPATIENT
Start: 2024-12-10 | End: 2024-12-11

## 2024-12-10 RX ORDER — CIPROFLOXACIN 500 MG/1
500 TABLET ORAL ONCE
Qty: 1 TABLET | Refills: 0 | Status: SHIPPED | OUTPATIENT
Start: 2024-12-10 | End: 2024-12-10

## 2024-12-20 DIAGNOSIS — E78.2 MIXED HYPERLIPIDEMIA: ICD-10-CM

## 2024-12-20 DIAGNOSIS — M51.369 DDD (DEGENERATIVE DISC DISEASE), LUMBAR: ICD-10-CM

## 2024-12-20 DIAGNOSIS — R05.2 SUBACUTE COUGH: ICD-10-CM

## 2024-12-20 DIAGNOSIS — G70.00 MYASTHENIA GRAVIS: ICD-10-CM

## 2024-12-20 RX ORDER — PREDNISONE 5 MG/1
5 TABLET ORAL
Qty: 90 TABLET | Refills: 3 | Status: SHIPPED | OUTPATIENT
Start: 2024-12-20

## 2024-12-20 RX ORDER — ATORVASTATIN CALCIUM 80 MG/1
TABLET, FILM COATED ORAL
Qty: 90 TABLET | Refills: 1 | Status: SHIPPED | OUTPATIENT
Start: 2024-12-20

## 2024-12-20 RX ORDER — GABAPENTIN 600 MG/1
600 TABLET ORAL 3 TIMES DAILY
Qty: 90 TABLET | Refills: 3 | Status: SHIPPED | OUTPATIENT
Start: 2024-12-20

## 2024-12-20 RX ORDER — FAMOTIDINE 20 MG/1
TABLET, FILM COATED ORAL
Qty: 90 TABLET | Refills: 1 | Status: SHIPPED | OUTPATIENT
Start: 2024-12-20

## 2024-12-20 RX ORDER — PREDNISONE 1 MG/1
2 TABLET ORAL
Qty: 180 TABLET | Refills: 3 | Status: SHIPPED | OUTPATIENT
Start: 2024-12-20

## 2024-12-20 NOTE — TELEPHONE ENCOUNTER
Refill Routing Note   Medication(s) are not appropriate for processing by [facility] for the following reason(s):        Outside of protocol    [facility] action(s):  Route  Approve               Appointments  past 12m or future 3m with PCP    Date Provider   Last Visit   6/13/2024 Brian Marroquin MD   Next Visit   4/7/2025 Brian Marroquin MD   ED visits in past 90 days: 0        Note composed:2:42 PM 12/20/2024

## 2024-12-20 NOTE — TELEPHONE ENCOUNTER
No care due was identified.  [facility] Embedded Care Due Messages. Reference number: [id number].   12/20/2024 1:17:50 PM CST

## 2025-01-07 ENCOUNTER — TELEPHONE (OUTPATIENT)
Dept: UROLOGY | Facility: CLINIC | Age: 80
End: 2025-01-07
Payer: MEDICARE

## 2025-01-07 ENCOUNTER — PATIENT MESSAGE (OUTPATIENT)
Dept: CARDIOLOGY | Facility: CLINIC | Age: 80
End: 2025-01-07
Payer: MEDICARE

## 2025-01-08 ENCOUNTER — PATIENT MESSAGE (OUTPATIENT)
Dept: UROLOGY | Facility: CLINIC | Age: 80
End: 2025-01-08
Payer: MEDICARE

## 2025-01-08 ENCOUNTER — TELEPHONE (OUTPATIENT)
Dept: UROLOGY | Facility: CLINIC | Age: 80
End: 2025-01-08
Payer: MEDICARE

## 2025-01-17 ENCOUNTER — TELEPHONE (OUTPATIENT)
Dept: UROLOGY | Facility: CLINIC | Age: 80
End: 2025-01-17
Payer: MEDICARE

## 2025-02-03 ENCOUNTER — TELEPHONE (OUTPATIENT)
Facility: CLINIC | Age: 80
End: 2025-02-03
Payer: MEDICARE

## 2025-02-03 ENCOUNTER — TELEPHONE (OUTPATIENT)
Facility: CLINIC | Age: 80
End: 2025-02-03

## 2025-02-03 NOTE — TELEPHONE ENCOUNTER
Spoke with patient confirming appointment on 02/04/25 @3989 at [facility], 2nd floor Urology. Please arrive 15 min prior. Patient has cipro and enema and has held blood thinners 72 hours.

## 2025-02-04 ENCOUNTER — PROCEDURE VISIT (OUTPATIENT)
Dept: UROLOGY | Facility: CLINIC | Age: 80
End: 2025-02-04
Payer: MEDICARE

## 2025-02-04 ENCOUNTER — HOSPITAL ENCOUNTER (OUTPATIENT)
Dept: RADIOLOGY | Facility: HOSPITAL | Age: 80
Discharge: HOME | End: 2025-02-04
Attending: UROLOGY
Payer: MEDICARE

## 2025-02-04 VITALS
TEMPERATURE: 98 F | HEART RATE: 54 BPM | SYSTOLIC BLOOD PRESSURE: 135 MMHG | RESPIRATION RATE: 17 BRPM | DIASTOLIC BLOOD PRESSURE: 69 MMHG | BODY MASS INDEX: 31.99 KG/M2 | WEIGHT: 242.5 LBS

## 2025-02-04 DIAGNOSIS — R97.20 ELEVATED PSA: ICD-10-CM

## 2025-02-04 PROCEDURE — 76140 X-RAY CONSULTATION: CPT | Mod: 26,HCNC,, | Performed by: RADIOLOGY

## 2025-02-04 PROCEDURE — 99499 UNLISTED E&M SERVICE: CPT | Mod: S$GLB,,, | Performed by: UROLOGY

## 2025-02-04 RX ORDER — CEFTRIAXONE 1 G/1
1 INJECTION, POWDER, FOR SOLUTION INTRAMUSCULAR; INTRAVENOUS
Status: DISCONTINUED | OUTPATIENT
Start: 2025-02-04 | End: 2025-02-25

## 2025-02-04 RX ORDER — DIAZEPAM 5 MG/1
10 TABLET ORAL
Qty: 1 TABLET | Refills: 0 | Status: SHIPPED | OUTPATIENT
Start: 2025-02-04

## 2025-02-04 RX ORDER — LIDOCAINE HYDROCHLORIDE 10 MG/ML
20 INJECTION, SOLUTION INFILTRATION; PERINEURAL
Status: SHIPPED | OUTPATIENT
Start: 2025-02-04

## 2025-02-04 RX ORDER — CEFTRIAXONE 1 G/1
1 INJECTION, POWDER, FOR SOLUTION INTRAMUSCULAR; INTRAVENOUS
Status: SHIPPED | OUTPATIENT
Start: 2025-02-04 | End: 2025-02-05

## 2025-02-04 RX ORDER — CIPROFLOXACIN 500 MG/1
500 TABLET ORAL ONCE
Qty: 1 TABLET | Refills: 0 | Status: SHIPPED | OUTPATIENT
Start: 2025-02-04 | End: 2025-02-04

## 2025-02-04 RX ORDER — LIDOCAINE HYDROCHLORIDE 20 MG/ML
JELLY TOPICAL
Status: SHIPPED | OUTPATIENT
Start: 2025-02-04

## 2025-02-04 NOTE — PATIENT INSTRUCTIONS
What to Expect After a Prostate Biopsy    You may have mild bleeding from the rectum or urine for about 1 week to 1 month, or in your ejaculate for several months. This bleeding is normal and expected, and it will stop. You may have mild discomfort in your rectal or urethral area for 24-48 hours.    You cannot do any strenuous lifting, straining, or exercising for 24 hours. You may return to full activity the day after the biopsy.    You may continue to take all your regular medications after the procedure except for the blood thinners.    You may resume all blood-thinning medications once you no longer see any bleeding or whenever your physician prescribing the medication says it is all right to do so. You may take Tylenol if you have a fever and your temperature is less than 100° F or if you have some discomfort.    You will receive a call from the Urology Department at [facility] with the results of your prostate biopsy within one week.    Signs and Symptoms to Report    Call your [facility] urologist at [phone] if you develop any of the following:  Temperature greater than 101°  F  Inability to urinate  A large amount of bleeding from the rectum or in the urine  Persistent or severe pain    After hours or on weekends, you may reach a urology resident on call at this number: [phone].

## 2025-02-05 DIAGNOSIS — E03.9 HYPOTHYROIDISM: ICD-10-CM

## 2025-02-05 DIAGNOSIS — E78.2 MIXED HYPERLIPIDEMIA: ICD-10-CM

## 2025-02-05 DIAGNOSIS — I10 HTN (HYPERTENSION), BENIGN: ICD-10-CM

## 2025-02-05 RX ORDER — LEVOTHYROXINE SODIUM 50 UG/1
TABLET ORAL
Qty: 90 TABLET | Refills: 1 | Status: SHIPPED | OUTPATIENT
Start: 2025-02-05

## 2025-02-05 NOTE — TELEPHONE ENCOUNTER
No care due was identified.  [facility] Embedded Care Due Messages. Reference number: [id number].   2/05/2025 5:20:23 PM CST

## 2025-02-06 NOTE — TELEPHONE ENCOUNTER
Refill Decision Note   Maximilian Tavera  is requesting a refill authorization.  Brief Assessment and Rationale for Refill:  Approve     Medication Therapy Plan:         Comments:     Note composed:6:09 PM 02/05/2025

## 2025-02-07 RX ORDER — OLMESARTAN MEDOXOMIL 20 MG/1
20 TABLET ORAL 2 TIMES DAILY
Qty: 180 TABLET | Refills: 3 | Status: SHIPPED | OUTPATIENT
Start: 2025-02-07

## 2025-02-11 ENCOUNTER — OFFICE VISIT (OUTPATIENT)
Dept: CARDIOLOGY | Facility: CLINIC | Age: 80
End: 2025-02-11
Payer: MEDICARE

## 2025-02-11 VITALS
DIASTOLIC BLOOD PRESSURE: 71 MMHG | SYSTOLIC BLOOD PRESSURE: 135 MMHG | HEIGHT: 73 IN | WEIGHT: 246.94 LBS | HEART RATE: 71 BPM | BODY MASS INDEX: 32.73 KG/M2

## 2025-02-11 DIAGNOSIS — I65.23 BILATERAL CAROTID ARTERY STENOSIS: ICD-10-CM

## 2025-02-11 DIAGNOSIS — I82.512 CHRONIC DEEP VEIN THROMBOSIS (DVT) OF FEMORAL VEIN OF LEFT LOWER EXTREMITY: Primary | ICD-10-CM

## 2025-02-11 DIAGNOSIS — I70.0 ABDOMINAL AORTIC ATHEROSCLEROSIS: ICD-10-CM

## 2025-02-11 DIAGNOSIS — I89.0 LYMPHEDEMA OF BOTH LOWER EXTREMITIES: ICD-10-CM

## 2025-02-11 DIAGNOSIS — M79.89 LEG SWELLING: ICD-10-CM

## 2025-02-11 DIAGNOSIS — I87.2 VENOUS STASIS DERMATITIS OF BOTH LOWER EXTREMITIES: ICD-10-CM

## 2025-02-11 DIAGNOSIS — I47.29 NON-SUSTAINED VENTRICULAR TACHYCARDIA: ICD-10-CM

## 2025-02-11 DIAGNOSIS — I10 HTN (HYPERTENSION), BENIGN: ICD-10-CM

## 2025-02-11 DIAGNOSIS — E78.2 MIXED HYPERLIPIDEMIA: ICD-10-CM

## 2025-02-11 PROCEDURE — 99999 PR PBB SHADOW E&M-EST. PATIENT-LVL V: CPT | Mod: PBBFAC,,, | Performed by: INTERNAL MEDICINE

## 2025-02-11 RX ORDER — BUMETANIDE 0.5 MG/1
0.5 TABLET ORAL EVERY OTHER DAY
Qty: 45 TABLET | Refills: 3 | Status: SHIPPED | OUTPATIENT
Start: 2025-02-11 | End: 2025-02-11 | Stop reason: SDUPTHER

## 2025-02-11 RX ORDER — BUMETANIDE 0.5 MG/1
0.5 TABLET ORAL EVERY OTHER DAY
Qty: 45 TABLET | Refills: 3 | Status: SHIPPED | OUTPATIENT
Start: 2025-02-11 | End: 2026-02-11

## 2025-02-11 NOTE — PATIENT INSTRUCTIONS
Assessment/Plan:  Maximilian Tavera Jr. is a 79 y.o. male with BLE lymphedema, venous insuffciency, HTN, HLD, myasthenia gravis on chronic prednisone therapy, degenerative joint disease with myelopathy s/p decompression and fusion of C2-6, chronic PVCs, who presents for a follow up appointment.       1. LLE DVT- Appears unprovoked.  Mr. Tavera reports worsening swelling of the LLE which started  5-6 weeks ago. He has no chest pain or SOB.  Exam shows LLE with 2 pitting edema with no evidence of phlegmasia cerulea dolens. Check BLE venous ultrasound today. Continue Eliquis 5 mg bid. Start bumex 0.5 mg every other day. Check cmp in 1 week.     2. BLE Lymphedema/Venous Insufficiency- Continue lymphedema clinic techniques at home and wear graduated compression hose.  Pt to limit sodium intake to 2,000 mg daily.  Limit volume intake to 1.5 liters daily.       3. HTN- Continue current medications.     4. Chronic PVC's- Stable.  Continue current medications and follow up with EP as scheduled.      5. HLD- Continue atorvastatin 80 mg daily.       Will call pt with results of venous ultrasound  Otherwise, follow up in 3 months

## 2025-02-11 NOTE — PROGRESS NOTES
[facility]      Chief Complaint   Patient presents with    Mixed hyperlipidemia    Acute deep vein thrombosis (DVT) of femoral vein of left lo       Patient ID: Maximilian Tavera Jr. is a 79 y.o. male with BLE lymphedema, venous insuffciency, HTN, HLD, myasthenia gravis on chronic prednisone therapy, degenerative joint disease with myelopathy s/p decompression and fusion of C2-6, chronic PVCs, who presents for a follow up appointment.  Pertinent history/events are as follows:     -Pt kindly referred by Dr. Altamirano for evaluation of leg swelling.    -At our initial clinic visit on 2/3/2022, Mr. Tavera reported leg swelling starting 3-4 months ago.  States his PCP wrapped his legs 2 weeks ago and he lost 8 pounds.  He has no claudication, rest pain, or tissue loss.  Plan:   Leg swelling- Due to lymphedema and possible venous insufficiency.  Check BLE venous reflux study and KATTY study.  Refer to lymphedema clinic.  Increase bumex to 1 mg bid every other day (0.5 mg bid on alternate days).  Pt to limit sodium intake to 2,000 mg daily.  Limit volume intake to 1.5 liters daily.  Check cmp in 1 week.    HTN- Continue current medications.   Chronic PVC's- Stable.  Continue current medications and follow up with EP as scheduled.    HLD- LDL is at goal of <70.  Continue current medications.    -At follow up clinic visit on 5/3/2022, Mr. Tavera reported significant improvement in BLE edema since completing lymphedema clinic therapy.  He continues to do lymphedema clinic techniques at home and wear graduated compression hose.  BLE Venous Reflux Study on 2/10/2022 revealed significant RLE venous reflux and no evidence of lower extremity DVT.  KTATY Study on 2/10/2022 revealed noncompressible ABIs bilaterally consistent with medial arterial calcification.  PVR waveforms are mildly abnormal bilaterally.  Pt states he stopped taking bumex due to frequent urination.   Plan:   Leg swelling- Mr. Tavera reports  significant improvement in BLE edema since completing lymphedema clinic therapy.  BLE Venous Reflux Study on 2/10/2022 revealed significant RLE venous reflux and no evidence of lower extremity DVT.  KATTY Study on 2/10/2022 revealed noncompressible ABIs bilaterally consistent with medial arterial calcification.  PVR waveforms are mildly abnormal bilaterally.  Continue lymphedema clinic techniques at home and wear graduated compression hose.  Pt to limit sodium intake to 2,000 mg daily.  Limit volume intake to 1.5 liters daily.  Check cmp in 1 week.    HTN- Continue current medications.   Chronic PVC's- Stable.  Continue current medications and follow up with EP as scheduled.    HLD- LDL is at goal of <70.  Continue atorvastatin 80 mg daily.       -At clinic visit on 2/28/2023, Mr. Tavera reported continued improvement in BLE edema.  He has no claudication or tissue loss.    Plan:   Leg swelling- Mr. Tavera reports continued improvement in BLE edema.  Continue lymphedema clinic techniques at home and wear graduated compression hose.  Pt to limit sodium intake to 2,000 mg daily.  Limit volume intake to 1.5 liters daily.  Check cmp in 1 week.    HTN- Continue current medications.   Chronic PVC's- Stable.  Continue current medications and follow up with EP as scheduled.    HLD- LDL is at goal of <70 (69 on 12/9/2022).  Continue atorvastatin 80 mg daily.       - At clinic visit on 7/13/2023 Mr. Tavera followed after admission to [facility] with LLE edema and found to have extensive LLE DVT.  He was discharged on full dose anticoagulation with Eliquis.   Plan:  LLE DVT- Appear unprovoked.  Refer to Heme/Onc for age appropriate cancer screening and hypercoagulable workup.  Continue Eliquis 5 mg bid.    BLE Lymphedema/Venous Insufficiency- Continue lymphedema clinic techniques at home and wear graduated compression hose.  Pt to limit sodium intake to 2,000 mg daily.  Limit volume intake to 1.5 liters daily.     HTN-  Continue current medications.   Chronic PVC's- Stable.  Continue current medications and follow up with EP as scheduled.    HLD- LDL is at goal of <70 (69 on 12/9/2022).  Continue atorvastatin 80 mg daily.     Follow up in 3 months with BLE venous ultrasound prior.    11/16/2023 clinic visit: Mr. Tavera reports no pain in either legs, Swelling has improved. He also reports he has completed the Lymphedema therapy. He was admitted to [facility] with LLE edema and found to have extensive LLE DVT.  He was discharged on full dose anticoagulation with Eliquis. LLE Venous Ultrasound on 7/1/2023 revealed Left thigh veins: There is occlusive and nonocclusive thrombus involving the common femoral, superficial femoral and popliteal veins.  Greater saphenous vein is patent without intraluminal thrombus. Left calf veins: There is occlusive thrombus within posterior tibial vein.  Anterior tibial and peroneal veins are patent without intraluminal thrombus. BLE Venous Ultrasound  on 11/1/2023 revealed is no evidence of a right lower extremity DVT. Left superficial femoral proximal vein DVT. Left superficial femoral middle vein DVT. Left popliteal vein DVT  Plan:    LLE DVT- Appear unprovoked.  LLE Venous Ultrasound on 7/1/2023 revealed Left thigh veins: There is occlusive and nonocclusive thrombus involving the common femoral, superficial femoral and popliteal veins.  Greater saphenous vein is patent without intraluminal thrombus. Left calf veins: There is occlusive thrombus within posterior tibial vein.  Anterior tibial and peroneal veins are patent without intraluminal thrombus. BLE Venous Ultrasound  on 11/1/2023 revealed is no evidence of a right lower extremity DVT. Left superficial femoral proximal vein DVT. Left superficial femoral middle vein DVT. Left popliteal vein DVT. Following Heme/Onc. Continue Eliquis 5 mg bid. Will continue Eliquis lifelong, we will also consider decrease the dose of eliquis after total 12  months of full dose therapy.   BLE Lymphedema/Venous Insufficiency- Continue lymphedema clinic techniques at home and wear graduated compression hose.  Pt to limit sodium intake to 2,000 mg daily.  Limit volume intake to 1.5 liters daily.     HTN- Continue current medications.   Chronic PVC's- Stable.  Continue current medications and follow up with EP as scheduled.    HLD- LDL is at goal of <70 ( 63 on 2/22/2023 vs 69 on 12/9/2022).  Continue atorvastatin 80 mg daily.       7/16/2024 clinic visit: Mr. Tavera reports doing well with no chest pain or SOB.  Exam shows LLE with no evidence of phlegmasia cerulea dolens.  LLE Venous Ultrasound on 5/29/2024 revealed occlusive deep vein thrombosis is not presently seen with small amounts of residual thrombus in the distal femoral and popliteal veins noted.  Plan:  LLE DVT- Appears unprovoked.  Exam shows LLE with no evidence of phlegmasia cerulea dolens.  LLE Venous Ultrasound on 5/29/2024 revealed occlusive deep vein thrombosis is not presently seen with small amounts of residual thrombus in the distal femoral and popliteal veins noted. Continue Eliquis 5 mg bid.  BLE Lymphedema/Venous Insufficiency- Continue lymphedema clinic techniques at home and wear graduated compression hose.  Pt to limit sodium intake to 2,000 mg daily.  Limit volume intake to 1.5 liters daily.     HTN- Continue current medications.   Chronic PVC's- Stable.  Continue current medications and follow up with EP as scheduled.    HLD- LDL is at goal of <70 ( 69 on 3/28/2024).  Continue atorvastatin 80 mg daily.       HPI:  Mr. Tavera reports worsening swelling of the LLE which started  5-6 weeks ago. He has no chest pain or SOB.  Exam shows LLE with 2 pitting edema with no evidence of phlegmasia cerulea dolens.      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    Disease of spinal cord, unspecified 1/5/2024    Emphysema lung 11/05/2018    Gastritis     Hx of colonic polyp     Hyperlipidemia     Hypertension     Hypothyroidism     Knee fracture     Myasthenia gravis     Neuropathy 01/03/2013    Obesity 01/29/2015    Pneumonia 03/29/2020    Polyneuropathy     PVC (premature ventricular contraction)     Squamous cell carcinoma 2014    left forearm    Thyroid disease      Past Surgical History:   Procedure Laterality Date    APPENDECTOMY      CATARACT EXTRACTION W/  INTRAOCULAR LENS IMPLANT Bilateral     COLONOSCOPY  03/01/2012    Dr Esteban; hyperplastic polyp; repeat in 5 years    COLONOSCOPY N/A 12/7/2021    Procedure: COLONOSCOPY;  Surgeon: Gabriel Hernandez MD;  Location: [facility];  Service: Endoscopy;  Laterality: N/A;    CYSTOSCOPY N/A 2/26/2019    Procedure: CYSTOSCOPY;  Surgeon: Simba Cheung MD;  Location: [facility] OR;  Service: Urology;  Laterality: N/A;    ENDOSCOPIC ULTRASOUND OF UPPER GASTROINTESTINAL TRACT N/A 1/7/2020    Procedure: ULTRASOUND, UPPER GI TRACT, ENDOSCOPIC;  Surgeon: Kati Ryan MD;  Location: [facility] ([address] [place] [address]);  Service: Endoscopy;  Laterality: N/A;    ESOPHAGOGASTRODUODENOSCOPY N/A 9/12/2018    Procedure: EGD (ESOPHAGOGASTRODUODENOSCOPY);  Surgeon: Gabriel Hernandez MD;  Location: [facility];  Service: Endoscopy;  Laterality: N/A;    ESOPHAGOGASTRODUODENOSCOPY N/A 8/19/2019    Procedure: EGD (ESOPHAGOGASTRODUODENOSCOPY);  Surgeon: Gabriel Hernandez MD;  Location: [facility];  Service: Endoscopy;  Laterality: N/A;    ESOPHAGOGASTRODUODENOSCOPY N/A 10/7/2019    Procedure: EGD (ESOPHAGOGASTRODUODENOSCOPY);  Surgeon: Gabriel Hernandez MD;  Location: [facility];  Service: Endoscopy;  Laterality: N/A;    ESOPHAGOGASTRODUODENOSCOPY N/A 1/7/2020    Procedure: EGD (ESOPHAGOGASTRODUODENOSCOPY);  Surgeon: Kati Ryan MD;  Location:  [facility] ENDO (2ND FLR);  Service: Endoscopy;  Laterality: N/A;    HEMORRHOID SURGERY      INJECTION OF ANESTHETIC AGENT AROUND MEDIAL BRANCH NERVES INNERVATING LUMBAR FACET JOINT Bilateral 10/1/2020    Procedure: Block-nerve-medial branch-lumbar Bilateral L 3,4,5;  Surgeon: Devan Watt MD;  Location: [facility];  Service: Pain Management;  Laterality: Bilateral;    INJECTION OF ANESTHETIC AGENT AROUND MEDIAL BRANCH NERVES INNERVATING LUMBAR FACET JOINT Right 10/7/2022    Procedure: Block-nerve-medial branch-lumbar L3,4,5;  Surgeon: Devan Watt MD;  Location: [facility] OR;  Service: Pain Management;  Laterality: Right;    KNEE ARTHROPLASTY Bilateral     LENGTHENING OF ACHILLES TENDON Right 9/11/2020    Procedure: percutaeous tenotomy of right lateral epicondyle (tenex);  Surgeon: Terry Quach MD;  Location: [facility] OR;  Service: Neurosurgery;  Laterality: Right;  tenex to right lateral epicondyle  Tenex machine SN#[id number], total time 1min. 30seconds    NECK SURGERY      POSTERIOR FUSION OF CERVICAL SPINE WITH LAMINECTOMY N/A 11/7/2018    Procedure: C2-C6 Posterior Cervical Laminectomy & Instrumental Fusion;  Surgeon: Kishore Turner MD;  Location: [facility] OR 2ND FLR;  Service: Neurosurgery;  Laterality: N/A;    TONSILLECTOMY      TRANSFORAMINAL EPIDURAL INJECTION OF STEROID Right 12/20/2019    Procedure: Injection,steroid,epidural,transforaminal approach;  Surgeon: Devan Watt MD;  Location: [facility];  Service: Pain Management;  Laterality: Right;  L3-4, L4-5    TRANSFORAMINAL EPIDURAL INJECTION OF STEROID Bilateral 2/6/2020    Procedure: Injection,steroid,epidural,transforaminal approach;  Surgeon: Devan Watt MD;  Location: [facility];  Service: Pain Management;  Laterality: Bilateral;  L3-L4,L4-L5    TRANSFORAMINAL EPIDURAL INJECTION OF STEROID Bilateral 8/26/2020    Procedure: Injection,steroid,epidural,transforaminal approach;  Surgeon: Devan Watt MD;  Location: [facility] OR;  Service: Pain Management;  Laterality: Bilateral;  L3-4,  L4-5    TRANSRECTAL ULTRASOUND EXAMINATION N/A 2/26/2019    Procedure: ULTRASOUND, RECTAL APPROACH;  Surgeon: Simba Cheung MD;  Location: [facility];  Service: Urology;  Laterality: N/A;    UPPER GASTROINTESTINAL ENDOSCOPY  09/12/2018    Dr Hernandez; gastritis; extensive intestinal metaplasia; repeat in 1-2- years     Social History     Socioeconomic History    Marital status:    Tobacco Use    Smoking status: Never    Smokeless tobacco: Never    Tobacco comments:     when a child   Substance and Sexual Activity    Alcohol use: Yes     Comment: rarely    Drug use: No    Sexual activity: Yes     Partners: Female     Social Drivers of Health     Financial Resource Strain: Low Risk  (3/4/2024)    Overall Financial Resource Strain (CARDIA)     Difficulty of Paying Living Expenses: Not hard at all   Food Insecurity: No Food Insecurity (3/4/2024)    Hunger Vital Sign     Worried About Running Out of Food in the Last Year: Never true     Ran Out of Food in the Last Year: Never true   Transportation Needs: No Transportation Needs (3/4/2024)    PRAPARE - Transportation     Lack of Transportation (Medical): No     Lack of Transportation (Non-Medical): No   Physical Activity: Inactive (3/4/2024)    Exercise Vital Sign     Days of Exercise per Week: 0 days     Minutes of Exercise per Session: 0 min   Stress: No Stress Concern Present (3/4/2024)    [employer] - Occupational Stress Questionnaire     Feeling of Stress : Only a little   Housing Stability: Low Risk  (3/4/2024)    Housing Stability Vital Sign     Unable to Pay for Housing in the Last Year: No     Number of Places Lived in the Last Year: 1     Unstable Housing in the Last Year: No     Family History   Problem Relation Name Age of Onset    Cataracts Mother      Heart disease Mother          CHF    Hypertension Mother      Hyperlipidemia Mother      Cataracts Father      Glaucoma Father      Heart disease Father      Hyperlipidemia  Sister      Hypertension Sister      No Known Problems Daughter      No Known Problems Daughter      Collagen disease Neg Hx      Amblyopia Neg Hx      Blindness Neg Hx      Macular degeneration Neg Hx      Retinal detachment Neg Hx      Strabismus Neg Hx      Cancer Neg Hx      Colon cancer Neg Hx      Esophageal cancer Neg Hx      Stomach cancer Neg Hx      Crohn's disease Neg Hx      Ulcerative colitis Neg Hx         Review of patient's allergies indicates:   Allergen Reactions    Spironolactone Diarrhea       Medication List with Changes/Refills   Current Medications    ALBUTEROL (PROVENTIL/VENTOLIN HFA) 90 MCG/ACTUATION INHALER    Inhale 2 puffs into the lungs every 6 (six) hours as needed for Wheezing. Rescue    ATORVASTATIN (LIPITOR) 80 MG TABLET    TAKE 1 TABLET EVERY DAY    CARVEDILOL (COREG) 25 MG TABLET    TAKE 1 TABLET TWICE DAILY WITH MEALS    CETIRIZINE (ZYRTEC) 10 MG TABLET    Take 1 tablet by mouth daily as needed.    CHLORTHALIDONE (HYGROTEN) 25 MG TAB    TAKE 1 TABLET ONE TIME DAILY    CHOLECALCIFEROL, VITAMIN D3, (VITAMIN D3) 50 MCG (2,000 UNIT) CAP    1 capsule once daily. Every day    COENZYME Q10 (CO Q-10) 100 MG CAPSULE    Take 400 mg by mouth once daily.    CYANOCOBALAMIN, VITAMIN B-12, 5,000 MCG SUBL    Take 5,000 mcg by mouth once daily. Every day    DIAZEPAM (VALIUM) 5 MG TABLET    Take 2 tablets (10 mg total) by mouth On call Procedure for Anxiety. Take one hour prior to procedure    DIPHENOXYLATE-ATROPINE 2.5-0.025 MG (LOMOTIL) 2.5-0.025 MG PER TABLET    Take 1 tablet by mouth 4 (four) times daily as needed for Diarrhea.    ELIQUIS 5 MG TAB    TAKE 1 TABLET TWICE DAILY    EZETIMIBE (ZETIA) 10 MG TABLET    Take 1 tablet (10 mg total) by mouth once daily.    FAMOTIDINE (PEPCID) 20 MG TABLET    TAKE 1 TABLET EVERY NIGHT AS NEEDED FOR HEARTBURN    FLUTICASONE (FLONASE) 50 MCG/ACTUATION NASAL SPRAY    USE 1 SPRAY IN EACH NOSTRIL TWICE DAILY AS NEEDED  FOR  RHINITIS    GABAPENTIN (NEURONTIN)  "600 MG TABLET    TAKE 1 TABLET THREE TIMES DAILY    HYDROCODONE-ACETAMINOPHEN (NORCO) 7.5-325 MG PER TABLET    Take 1 tablet by mouth every 6 (six) hours as needed for Pain.    LEVOTHYROXINE (SYNTHROID) 50 MCG TABLET    TAKE 1 TABLET EVERY DAY    MILK THISTLE 200 MG CAP    Take 200 mg by mouth 2 (two) times daily. Twice a day    MUPIROCIN (BACTROBAN) 2 % OINTMENT    Apply topically 3 (three) times daily.    OLMESARTAN (BENICAR) 20 MG TABLET    Take 1 tablet (20 mg total) by mouth 2 (two) times daily.    OMEGA-3 FATTY ACIDS 1,000 MG CAP    Twice a day    PANTOPRAZOLE (PROTONIX) 40 MG TABLET    TAKE 1 TABLET TWICE DAILY    POTASSIUM CHLORIDE SA (K-DUR,KLOR-CON) 20 MEQ TABLET    TAKE 4 TABLETS by mouth EVERY DAY  OR AS DIRECTED    PREDNISONE (DELTASONE) 1 MG TABLET    TAKE 2 TABLETS ONE TIME DAILY    PREDNISONE (DELTASONE) 5 MG TABLET    TAKE 1 TABLET ONE TIME DAILY    PROMETHAZINE-DEXTROMETHORPHAN (PROMETHAZINE-DM) 6.25-15 MG/5 ML SYRP    TAKE 5 MLS BY MOUTH EVERY 6 HOURS AS NEEDED    SILDENAFIL (REVATIO) 20 MG TAB    Take 1 to 3 tabs daily as needed.    TIOTROPIUM (SPIRIVA WITH HANDIHALER) 18 MCG INHALATION CAPSULE    INHALE THE CONTENTS OF 1 CAPSULE EVERY DAY (CONTROLLER)       Review of Systems  Constitution: Denies chills, fever, and sweats.  HENT: Denies headaches or blurry vision.  Cardiovascular: Denies chest pain or irregular heart beat.  Respiratory: Denies cough or shortness of breath.  Gastrointestinal: Denies abdominal pain, nausea, or vomiting.  Musculoskeletal: Negative for leg swelling.  Neurological: Denies dizziness or focal weakness.  Psychiatric/Behavioral: Normal mental status.  Hematologic/Lymphatic: Denies bleeding problem or easy bruising/bleeding.  Skin: Denies rash or suspicious lesions    Physical Examination  /71 (BP Location: Left arm, Patient Position: Sitting)   Pulse 71   Ht 6' 1" (1.854 m)   Wt 112 kg (246 lb 14.6 oz)   BMI 32.58 kg/m²     Constitutional: No acute distress, " conversant  HEENT: Sclera anicteric, Pupils equal, round and reactive to light, extraocular motions intact, Oropharynx clear  Neck: No JVD, no carotid bruits  Cardiovascular: regular rate and rhythm, no murmur, rubs or gallops, normal S1/S2  Pulmonary: Clear to auscultation bilaterally  Abdominal: Abdomen soft, nontender, nondistended, positive bowel sounds  Extremities: BLE's with 1+ pitting edema  Pulses:  Carotid pulses are 2+ on the right side, and 2+ on the left side.  Radial pulses are 2+ on the right side, and 2+ on the left side.   Femoral pulses are 2+ on the right side, and 2+ on the left side.  Popliteal pulses are 2+ on the right side, and 2+ on the left side.   Dorsalis pedis pulses are 2+ on the right side, and 2+ on the left side.   Posterior tibial pulses are 2+ on the right side, and 2+ on the left side.    Skin: No ecchymosis, erythema, or ulcers  Psych: Alert and oriented x 3, appropriate affect  Neuro: CNII-XII intact, no focal deficits    Labs:  Most Recent Data  CBC:   Lab Results   Component Value Date    WBC 7.96 10/07/2024    HGB 14.0 10/07/2024    HCT 42.6 10/07/2024     (L) 10/07/2024    MCV 91 10/07/2024    RDW 13.9 10/07/2024     BMP:   Lab Results   Component Value Date     10/07/2024    K 3.9 10/07/2024     10/07/2024    CO2 29 10/07/2024    BUN 19 10/07/2024    CREATININE 1.0 10/07/2024     (H) 10/07/2024    CALCIUM 8.9 10/07/2024    MG 2.0 07/03/2023    PHOS 2.9 03/31/2020     LFTS;   Lab Results   Component Value Date    PROT 5.8 (L) 10/07/2024    ALBUMIN 3.3 (L) 10/07/2024    BILITOT 1.5 (H) 10/07/2024    AST 19 10/07/2024    ALKPHOS 77 10/07/2024    ALT 24 10/07/2024     COAGS:   Lab Results   Component Value Date    INR 1.1 07/02/2023     FLP:   Lab Results   Component Value Date    CHOL 109 (L) 10/07/2024    HDL 40 10/07/2024    LDLCALC 56.4 (L) 10/07/2024    TRIG 63 10/07/2024    CHOLHDL 36.7 10/07/2024     CARDIAC:   Lab Results   Component Value Date     TROPONINI <0.006 07/01/2023    BNP 66 03/28/2024       EKG 9/23/2021:  Normal sinus rhythm  Left axis deviation  Incomplete right bundle branch block    LLE Venous Ultrasound 5/29/2024:  Occlusive deep vein thrombosis is not presently seen with small amounts of residual thrombus in the distal femoral and popliteal veins noted     BLE Venous Ultrasound 11/1/2023    There is no evidence of a right lower extremity DVT.    Left superficial femoral proximal vein DVT.    Left superficial femoral middle vein DVT.    Left popliteal vein DVT/    BLE Venous Ultrasound 7/1/2023:  Extensive intraluminal thrombus involving the large veins of the left lower extremity extending into the calf.    BLE Venous Reflux Study 2/10/2022:  No evidence of deep or superficial venous thrombosis bilaterally.  The right GSV demonstrates focal reflux below the knee.  Bilateral lower extremity varicosities are noted.    KATTY Study 2/10/2022:  Noncompressible ABIs bilaterally consistent with medial arterial calcification.  PVR waveforms are mildly abnormal bilaterally.    BLE Venous Ultrasound 1/10/2022:  Negative for DVT throughout bilateral lower extremities.    Echo 6/2/2021:  Severe left atrial enlargement.  The left ventricle is normal in size with concentric hypertrophy and normal systolic function.  The estimated ejection fraction is 65%.  Indeterminate left ventricular diastolic function.  Normal right ventricular size with normal right ventricular systolic function.  The estimated PA systolic pressure is 28 mmHg.  Normal central venous pressure (3 mmHg).    Assessment/Plan:  Maximilian Tavera Jr. is a 79 y.o. male with BLE lymphedema, venous insuffciency, HTN, HLD, myasthenia gravis on chronic prednisone therapy, degenerative joint disease with myelopathy s/p decompression and fusion of C2-6, chronic PVCs, who presents for a follow up appointment.       1. LLE DVT- Appears unprovoked.  Mr. Tavera reports worsening swelling of the  LLE which started  5-6 weeks ago. He has no chest pain or SOB.  Exam shows LLE with 2 pitting edema with no evidence of phlegmasia cerulea dolens. Check BLE venous ultrasound today. Continue Eliquis 5 mg bid. Start bumex 0.5 mg every other day. Check cmp in 1 week.     2. BLE Lymphedema/Venous Insufficiency- Continue lymphedema clinic techniques at home and wear graduated compression hose.  Pt to limit sodium intake to 2,000 mg daily.  Limit volume intake to 1.5 liters daily.       3. HTN- Continue current medications.     4. Chronic PVC's- Stable.  Continue current medications and follow up with EP as scheduled.      5. HLD- Continue atorvastatin 80 mg daily.       Will call pt with results of venous ultrasound  Otherwise, follow up in 3 months    Total duration of face to face visit time 15 minutes.  Total time spent counseling greater than fifty percent of total visit time.  Counseling included discussion regarding imaging findings, diagnosis, possibilities, treatment options, risks and benefits.  The patient had many questions regarding the options and long-term effects.    Devang Amezquita MD, PhD  Interventional Cardiology

## 2025-02-12 ENCOUNTER — TELEPHONE (OUTPATIENT)
Dept: UROLOGY | Facility: CLINIC | Age: 80
End: 2025-02-12
Payer: MEDICARE

## 2025-02-13 ENCOUNTER — OFFICE VISIT (OUTPATIENT)
Dept: URGENT CARE | Facility: CLINIC | Age: 80
End: 2025-02-13
Payer: MEDICARE

## 2025-02-13 VITALS
TEMPERATURE: 98 F | OXYGEN SATURATION: 95 % | BODY MASS INDEX: 32.6 KG/M2 | HEIGHT: 73 IN | SYSTOLIC BLOOD PRESSURE: 154 MMHG | HEART RATE: 69 BPM | RESPIRATION RATE: 19 BRPM | DIASTOLIC BLOOD PRESSURE: 74 MMHG | WEIGHT: 246 LBS

## 2025-02-13 DIAGNOSIS — B96.89 BACTERIAL SINUSITIS: Primary | ICD-10-CM

## 2025-02-13 DIAGNOSIS — R05.9 COUGH, UNSPECIFIED TYPE: ICD-10-CM

## 2025-02-13 DIAGNOSIS — J32.9 BACTERIAL SINUSITIS: Primary | ICD-10-CM

## 2025-02-13 LAB
CTP QC/QA: YES
CTP QC/QA: YES
FLUAV AG NPH QL: NEGATIVE
FLUBV AG NPH QL: NEGATIVE
SARS CORONAVIRUS 2 ANTIGEN: NEGATIVE

## 2025-02-13 RX ORDER — AMOXICILLIN 500 MG/1
500 CAPSULE ORAL EVERY 8 HOURS
Qty: 30 CAPSULE | Refills: 0 | Status: SHIPPED | OUTPATIENT
Start: 2025-02-13 | End: 2025-02-23

## 2025-02-13 RX ORDER — BENZONATATE 100 MG/1
100 CAPSULE ORAL 3 TIMES DAILY PRN
Qty: 15 CAPSULE | Refills: 0 | Status: SHIPPED | OUTPATIENT
Start: 2025-02-13 | End: 2025-02-18

## 2025-02-13 NOTE — PROGRESS NOTES
"Subjective:      Patient ID: Maximilian Tavera Jr. is a 79 y.o. male.    Vitals:  height is 6' 1" (1.854 m) and weight is 111.6 kg (246 lb). His oral temperature is 98.1 °F (36.7 °C). His blood pressure is 154/74 (abnormal) and his pulse is 69. His respiration is 19 and oxygen saturation is 95%.     Chief Complaint: Sinus Problem    80yo afebrile male with c/o sinus congestion, sinus pressure, intermittent bilateral frontal headaches, and cough for the past seven days.    Sinus Problem  This is a new problem. The current episode started in the past 7 days. The problem is unchanged. There has been no fever. Associated symptoms include congestion, coughing, headaches and sinus pressure.       HENT:  Positive for congestion and sinus pressure.    Respiratory:  Positive for cough.    Neurological:  Positive for headaches.      Objective:     Physical Exam   Constitutional: He is oriented to person, place, and time.  Non-toxic appearance. He does not appear ill. No distress. obesity  HENT:   Head: Normocephalic and atraumatic.   Ears:   Right Ear: Tympanic membrane, external ear and ear canal normal.   Left Ear: Tympanic membrane, external ear and ear canal normal.   Nose: Congestion present. Right sinus exhibits maxillary sinus tenderness. Left sinus exhibits maxillary sinus tenderness.   Mouth/Throat: Mucous membranes are moist. No posterior oropharyngeal erythema. Oropharynx is clear.   Eyes: Conjunctivae are normal. Extraocular movement intact   Neck: Neck supple. No neck rigidity present.   Cardiovascular: Normal rate, regular rhythm, normal heart sounds and normal pulses.   Pulmonary/Chest: Effort normal and breath sounds normal.   Abdominal: Normal appearance.   Musculoskeletal: Normal range of motion.         General: Normal range of motion.      Cervical back: He exhibits no tenderness.   Lymphadenopathy:     He has no cervical adenopathy.   Neurological: He is alert and oriented to person, place, and time. "   Skin: Skin is warm, dry, not diaphoretic and no rash.   Psychiatric: His behavior is normal.   Vitals reviewed.      Assessment:     1. Bacterial sinusitis    2. Cough, unspecified type        Plan:       Bacterial sinusitis  -     amoxicillin (AMOXIL) 500 MG capsule; Take 1 capsule (500 mg total) by mouth every 8 (eight) hours. for 10 days  Dispense: 30 capsule; Refill: 0    Cough, unspecified type  -     POCT Influenza A/B Rapid Antigen  -     SARS Coronavirus 2 Antigen, POCT Manual Read  -     benzonatate (TESSALON) 100 MG capsule; Take 1 capsule (100 mg total) by mouth 3 (three) times daily as needed for Cough.  Dispense: 15 capsule; Refill: 0      INSTRUCTIONS  Meds as prescribed. Rest. Increase oral fluids. Follow up as advised. To ER for worsening of symptoms, or for any new symptoms as discussed.

## 2025-02-17 ENCOUNTER — TELEPHONE (OUTPATIENT)
Dept: UROLOGY | Facility: CLINIC | Age: 80
End: 2025-02-17
Payer: MEDICARE

## 2025-02-21 DIAGNOSIS — Z00.00 ENCOUNTER FOR MEDICARE ANNUAL WELLNESS EXAM: ICD-10-CM

## 2025-02-25 ENCOUNTER — OFFICE VISIT (OUTPATIENT)
Dept: PRIMARY CARE CLINIC | Facility: CLINIC | Age: 80
End: 2025-02-25
Payer: MEDICARE

## 2025-02-25 ENCOUNTER — LAB VISIT (OUTPATIENT)
Dept: LAB | Facility: HOSPITAL | Age: 80
End: 2025-02-25
Attending: INTERNAL MEDICINE
Payer: MEDICARE

## 2025-02-25 ENCOUNTER — RESULTS FOLLOW-UP (OUTPATIENT)
Dept: CARDIOLOGY | Facility: CLINIC | Age: 80
End: 2025-02-25

## 2025-02-25 ENCOUNTER — TELEPHONE (OUTPATIENT)
Dept: PRIMARY CARE CLINIC | Facility: CLINIC | Age: 80
End: 2025-02-25

## 2025-02-25 ENCOUNTER — TELEPHONE (OUTPATIENT)
Dept: PRIMARY CARE CLINIC | Facility: CLINIC | Age: 80
End: 2025-02-25
Payer: MEDICARE

## 2025-02-25 VITALS
SYSTOLIC BLOOD PRESSURE: 124 MMHG | BODY MASS INDEX: 32.3 KG/M2 | DIASTOLIC BLOOD PRESSURE: 72 MMHG | OXYGEN SATURATION: 95 % | HEART RATE: 65 BPM | WEIGHT: 244.81 LBS

## 2025-02-25 DIAGNOSIS — I87.2 LYMPHEDEMA DUE TO VENOUS INSUFFICIENCY: ICD-10-CM

## 2025-02-25 DIAGNOSIS — E66.811 OBESITY (BMI 30.0-34.9): ICD-10-CM

## 2025-02-25 DIAGNOSIS — J44.89 COPD WITH ASTHMA: ICD-10-CM

## 2025-02-25 DIAGNOSIS — E78.5 HYPERLIPIDEMIA, UNSPECIFIED HYPERLIPIDEMIA TYPE: ICD-10-CM

## 2025-02-25 DIAGNOSIS — E03.4 HYPOTHYROIDISM DUE TO ACQUIRED ATROPHY OF THYROID: ICD-10-CM

## 2025-02-25 DIAGNOSIS — J44.89 COPD WITH ASTHMA: Primary | ICD-10-CM

## 2025-02-25 DIAGNOSIS — I10 HTN (HYPERTENSION), BENIGN: ICD-10-CM

## 2025-02-25 DIAGNOSIS — I89.0 LYMPHEDEMA DUE TO VENOUS INSUFFICIENCY: ICD-10-CM

## 2025-02-25 DIAGNOSIS — G70.00 MYASTHENIA GRAVIS, ACHR ANTIBODY POSITIVE: ICD-10-CM

## 2025-02-25 DIAGNOSIS — M79.89 LEG SWELLING: ICD-10-CM

## 2025-02-25 LAB
ALBUMIN SERPL BCP-MCNC: 3.3 G/DL (ref 3.5–5.2)
ALP SERPL-CCNC: 75 U/L (ref 40–150)
ALT SERPL W/O P-5'-P-CCNC: 22 U/L (ref 10–44)
ANION GAP SERPL CALC-SCNC: 8 MMOL/L (ref 8–16)
AST SERPL-CCNC: 29 U/L (ref 10–40)
BILIRUB SERPL-MCNC: 1 MG/DL (ref 0.1–1)
BUN SERPL-MCNC: 14 MG/DL (ref 8–23)
CALCIUM SERPL-MCNC: 8.9 MG/DL (ref 8.7–10.5)
CHLORIDE SERPL-SCNC: 102 MMOL/L (ref 95–110)
CO2 SERPL-SCNC: 30 MMOL/L (ref 23–29)
CREAT SERPL-MCNC: 1.1 MG/DL (ref 0.5–1.4)
EST. GFR  (NO RACE VARIABLE): >60 ML/MIN/1.73 M^2
GLUCOSE SERPL-MCNC: 117 MG/DL (ref 70–110)
POTASSIUM SERPL-SCNC: 3.8 MMOL/L (ref 3.5–5.1)
PROT SERPL-MCNC: 6.2 G/DL (ref 6–8.4)
SODIUM SERPL-SCNC: 140 MMOL/L (ref 136–145)

## 2025-02-25 PROCEDURE — 80053 COMPREHEN METABOLIC PANEL: CPT | Performed by: INTERNAL MEDICINE

## 2025-02-25 PROCEDURE — 36415 COLL VENOUS BLD VENIPUNCTURE: CPT | Mod: PO | Performed by: INTERNAL MEDICINE

## 2025-02-25 PROCEDURE — 99999 PR PBB SHADOW E&M-EST. PATIENT-LVL IV: CPT | Mod: PBBFAC,,, | Performed by: NURSE PRACTITIONER

## 2025-02-25 RX ORDER — CODEINE PHOSPHATE AND GUAIFENESIN 10; 100 MG/5ML; MG/5ML
5 SOLUTION ORAL 3 TIMES DAILY PRN
Qty: 237 ML | Refills: 0 | Status: SHIPPED | OUTPATIENT
Start: 2025-02-25 | End: 2025-03-13

## 2025-02-25 RX ORDER — IPRATROPIUM BROMIDE AND ALBUTEROL SULFATE 2.5; .5 MG/3ML; MG/3ML
3 SOLUTION RESPIRATORY (INHALATION) EVERY 6 HOURS PRN
Qty: 75 ML | Refills: 3 | Status: SHIPPED | OUTPATIENT
Start: 2025-02-25 | End: 2026-02-25

## 2025-02-25 RX ORDER — CODEINE PHOSPHATE AND GUAIFENESIN 10; 100 MG/5ML; MG/5ML
5 SOLUTION ORAL 3 TIMES DAILY PRN
Qty: 237 ML | Refills: 0 | Status: SHIPPED | OUTPATIENT
Start: 2025-02-25 | End: 2025-02-25 | Stop reason: SDUPTHER

## 2025-02-25 RX ORDER — DEXAMETHASONE SODIUM PHOSPHATE 4 MG/ML
4 INJECTION, SOLUTION INTRA-ARTICULAR; INTRALESIONAL; INTRAMUSCULAR; INTRAVENOUS; SOFT TISSUE
Status: COMPLETED | OUTPATIENT
Start: 2025-02-25 | End: 2025-02-25

## 2025-02-25 RX ORDER — PROMETHAZINE HYDROCHLORIDE AND CODEINE PHOSPHATE 6.25; 1 MG/5ML; MG/5ML
5 SOLUTION ORAL EVERY 4 HOURS PRN
Qty: 240 ML | Refills: 0 | Status: SHIPPED | OUTPATIENT
Start: 2025-02-25 | End: 2025-03-07

## 2025-02-25 RX ADMIN — DEXAMETHASONE SODIUM PHOSPHATE 4 MG: 4 INJECTION, SOLUTION INTRA-ARTICULAR; INTRALESIONAL; INTRAMUSCULAR; INTRAVENOUS; SOFT TISSUE at 10:02

## 2025-02-25 NOTE — TELEPHONE ENCOUNTER
----- Message from [employer] sent at 2/25/2025 12:07 PM CST -----  Type:  Pharmacy Calling to Clarify an RXName of Caller:  pharmPharmacy Name:  [facility] - [facility] [address] [facility] [address]Uylbg: [phone] Fax: [id number] - [place], [place] - [address] [address] [address] [facility] [address] [facility] [address] [place] [place] [address]Cogwl: [phone] Fax: [phone] Name:  promethazine-codeine 6.25-10 mg/5 ml (PHENERGAN WITH CODEINE) 6.25-10 mg/5 mL syrupWhat do they need to clarify?:  do not carry rxBest Call Back Number:  Phone: [phone] Fax: [phone] Information:  Thanks

## 2025-02-25 NOTE — PROGRESS NOTES
Subjective:       Patient ID: Maximilian Tavera Jr. is a 79 y.o. male.    Chief Complaint: Cough (12 days)    Patient presents today for follow up visit. Last visit with Cara 9/21/24. Patient was seen in urgent care on 2/13/25 for bacterial sinusitis-COVID and Flu Negative-Amoxicillin. He c/o persist dry cough today-has and history of COPD.      Cough  This is a chronic problem. The current episode started 1 to 4 weeks ago. The problem has been unchanged. The cough is Productive of sputum. Associated symptoms include headaches, shortness of breath and wheezing. Pertinent negatives include no ear congestion, nasal congestion, rhinorrhea or sore throat. The symptoms are aggravated by lying down. He has tried prescription cough suppressant, steroid inhaler and a beta-agonist inhaler for the symptoms. His past medical history is significant for emphysema.       2/11/25 Cardiology- Dr. Amezquita: -BLE lymphedema, venous insuffciency, HTN, HLD, myasthenia gravis on chronic prednisone therapy, degenerative joint disease with myelopathy s/p decompression and fusion of C2-6, chronic PVCs -Continue Eliquis 5 mg bid. Start bumex 0.5 mg every other day.  Pt to limit sodium intake to 2,000 mg daily. Limit volume intake to 1.5 liters daily.     12/10/24 Urology-Dr. Brian An- elevated PSA-transrectal US-cipro and ceftriaxone  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    Disease of spinal cord, unspecified 1/5/2024    Emphysema lung 11/05/2018    Gastritis     Hx of colonic polyp     Hyperlipidemia     Hypertension     Hypothyroidism     Knee fracture     Myasthenia gravis     Neuropathy 01/03/2013    Obesity 01/29/2015    Pneumonia 03/29/2020    Polyneuropathy     PVC (premature  ventricular contraction)     Squamous cell carcinoma 2014    left forearm    Thyroid disease        Review of patient's allergies indicates:   Allergen Reactions    Spironolactone Diarrhea       Current Medications[1]    Review of Systems   HENT:  Negative for rhinorrhea and sore throat.    Respiratory:  Positive for cough, shortness of breath and wheezing.    Neurological:  Positive for headaches.       Objective:      /72 (BP Location: Left arm, Patient Position: Sitting)   Pulse 65   Wt 111 kg (244 lb 13.1 oz)   SpO2 95%   BMI 32.30 kg/m²   Physical Exam  Constitutional:       Appearance: He is well-developed.   Eyes:      Conjunctiva/sclera: Conjunctivae normal.      Pupils: Pupils are equal, round, and reactive to light.   Cardiovascular:      Rate and Rhythm: Normal rate and regular rhythm.      Heart sounds: Normal heart sounds.   Pulmonary:      Effort: Pulmonary effort is normal.      Breath sounds: Decreased breath sounds present. No wheezing.   Musculoskeletal:         General: Normal range of motion.   Neurological:      Mental Status: He is alert and oriented to person, place, and time.   Psychiatric:         Behavior: Behavior normal.         Thought Content: Thought content normal.         Judgment: Judgment normal.         Assessment:       1. COPD with asthma    2. HTN (hypertension), benign    3. Myasthenia gravis, AChR antibody positive    4. Hyperlipidemia, unspecified hyperlipidemia type    5. Hypothyroidism due to acquired atrophy of thyroid    6. Lymphedema due to venous insufficiency    7. Obesity (BMI 30.0-34.9)        Plan:       COPD with asthma  -     dexAMETHasone injection 4 mg  -     promethazine-codeine 6.25-10 mg/5 ml (PHENERGAN WITH CODEINE) 6.25-10 mg/5 mL syrup; Take 5 mLs by mouth every 4 (four) hours as needed for Cough.  Dispense: 240 mL; Refill: 0  -     NEBULIZER FOR HOME USE  -     albuterol-ipratropium (DUO-NEB) 2.5 mg-0.5 mg/3 mL nebulizer solution; Take 3 mLs by  "nebulization every 6 (six) hours as needed for Wheezing. Rescue  Dispense: 75 mL; Refill: 3    HTN (hypertension), benign  Stable reading today  Continue medication  Low sodium diet  BP Readings from Last 3 Encounters:   02/25/25 124/72   02/13/25 (!) 154/74   02/11/25 135/71      Myasthenia gravis, AChR antibody positive  Stable, continue Neurology-Dr. César Hoang follow up  Hyperlipidemia, unspecified hyperlipidemia type  Stable, continue management  Hypothyroidism due to acquired atrophy of thyroid  Stable, on Levothyroxine  Continue follow up  Lymphedema due to venous insufficiency  Stable, continue follow up  Obesity (BMI 30.0-34.9)  Counseled patient on his ideal body weight, health consequences of being obese and current recommendations including weekly exercise and a heart healthy diet.  Current BMI is:Estimated body mass index is 32.3 kg/m² as calculated from the following:    Height as of 2/13/25: 6' 1" (1.854 m).    Weight as of this encounter: 111 kg (244 lb 13.1 oz)..  Patient is aware that ideal BMI < 25 or  .           Patient readiness: acceptance and barriers:none    During the course of the visit the patient was educated and counseled about the following:     Hypertension:   Dietary sodium restriction.  Regular aerobic exercise.  Obesity:   General weight loss/lifestyle modification strategies discussed (elicit support from others; identify saboteurs; non-food rewards, etc).  Regular aerobic exercise program discussed.    Goals: Hypertension: Reduce Blood Pressure and Obesity: Reduce calorie intake and BMI    Did patient meet goals/outcomes: Yes    The following self management tools provided: blood pressure log    Patient Instructions (the written plan) was given to the patient/family.     Time spent with patient: 30 minutes    Barriers to medications present (no )    Adverse reactions to current medications (no)    Over the counter medications reviewed (Yes)               [1]   Current " Outpatient Medications:     albuterol (PROVENTIL/VENTOLIN HFA) 90 mcg/actuation inhaler, Inhale 2 puffs into the lungs every 6 (six) hours as needed for Wheezing. Rescue, Disp: 25.5 g, Rfl: 4    atorvastatin (LIPITOR) 80 MG tablet, TAKE 1 TABLET EVERY DAY, Disp: 90 tablet, Rfl: 1    bumetanide (BUMEX) 0.5 MG Tab, Take 1 tablet (0.5 mg total) by mouth every other day., Disp: 45 tablet, Rfl: 3    carvediloL (COREG) 25 MG tablet, TAKE 1 TABLET TWICE DAILY WITH MEALS, Disp: 180 tablet, Rfl: 3    cetirizine (ZYRTEC) 10 MG tablet, Take 1 tablet by mouth daily as needed., Disp: , Rfl:     chlorthalidone (HYGROTEN) 25 MG Tab, TAKE 1 TABLET ONE TIME DAILY, Disp: 90 tablet, Rfl: 3    cholecalciferol, vitamin D3, (VITAMIN D3) 50 mcg (2,000 unit) Cap, 1 capsule once daily. Every day, Disp: , Rfl:     coenzyme Q10 (CO Q-10) 100 mg capsule, Take 400 mg by mouth once daily., Disp: , Rfl:     cyanocobalamin, vitamin B-12, 5,000 mcg Subl, Take 5,000 mcg by mouth once daily. Every day, Disp: , Rfl:     diazePAM (VALIUM) 5 MG tablet, Take 2 tablets (10 mg total) by mouth On call Procedure for Anxiety. Take one hour prior to procedure, Disp: 1 tablet, Rfl: 0    diphenoxylate-atropine 2.5-0.025 mg (LOMOTIL) 2.5-0.025 mg per tablet, Take 1 tablet by mouth 4 (four) times daily as needed for Diarrhea., Disp: 90 tablet, Rfl: 0    ELIQUIS 5 mg Tab, TAKE 1 TABLET TWICE DAILY, Disp: 180 tablet, Rfl: 3    ezetimibe (ZETIA) 10 mg tablet, Take 1 tablet (10 mg total) by mouth once daily., Disp: 90 tablet, Rfl: 3    famotidine (PEPCID) 20 MG tablet, TAKE 1 TABLET EVERY NIGHT AS NEEDED FOR HEARTBURN, Disp: 90 tablet, Rfl: 1    fluticasone (FLONASE) 50 mcg/actuation nasal spray, USE 1 SPRAY IN EACH NOSTRIL TWICE DAILY AS NEEDED  FOR  RHINITIS, Disp: 48 g, Rfl: 3    gabapentin (NEURONTIN) 600 MG tablet, TAKE 1 TABLET THREE TIMES DAILY, Disp: 90 tablet, Rfl: 3    HYDROcodone-acetaminophen (NORCO) 7.5-325 mg per tablet, Take 1 tablet by mouth every 6  (six) hours as needed for Pain., Disp: 28 tablet, Rfl: 0    levothyroxine (SYNTHROID) 50 MCG tablet, TAKE 1 TABLET EVERY DAY, Disp: 90 tablet, Rfl: 1    milk thistle 200 mg Cap, Take 200 mg by mouth 2 (two) times daily. Twice a day, Disp: , Rfl:     mupirocin (BACTROBAN) 2 % ointment, Apply topically 3 (three) times daily., Disp: 30 g, Rfl: 0    olmesartan (BENICAR) 20 MG tablet, Take 1 tablet (20 mg total) by mouth 2 (two) times daily., Disp: 180 tablet, Rfl: 3    omega-3 fatty acids 1,000 mg Cap, Twice a day, Disp: , Rfl:     pantoprazole (PROTONIX) 40 MG tablet, TAKE 1 TABLET TWICE DAILY, Disp: 180 tablet, Rfl: 3    potassium chloride SA (K-DUR,KLOR-CON) 20 MEQ tablet, TAKE 4 TABLETS by mouth EVERY DAY  OR AS DIRECTED, Disp: 360 tablet, Rfl: 3    predniSONE (DELTASONE) 1 MG tablet, TAKE 2 TABLETS ONE TIME DAILY, Disp: 180 tablet, Rfl: 3    predniSONE (DELTASONE) 5 MG tablet, TAKE 1 TABLET ONE TIME DAILY, Disp: 90 tablet, Rfl: 3    promethazine-codeine 6.25-10 mg/5 ml (PHENERGAN WITH CODEINE) 6.25-10 mg/5 mL syrup, Take 5 mLs by mouth every 4 (four) hours as needed for Cough., Disp: 240 mL, Rfl: 0    sildenafil (REVATIO) 20 mg Tab, Take 1 to 3 tabs daily as needed., Disp: 30 tablet, Rfl: 3    tiotropium (SPIRIVA WITH HANDIHALER) 18 mcg inhalation capsule, INHALE THE CONTENTS OF 1 CAPSULE EVERY DAY (CONTROLLER), Disp: 90 capsule, Rfl: 3    albuterol-ipratropium (DUO-NEB) 2.5 mg-0.5 mg/3 mL nebulizer solution, Take 3 mLs by nebulization every 6 (six) hours as needed for Wheezing. Rescue, Disp: 75 mL, Rfl: 3    guaiFENesin-codeine 100-10 mg/5 ml (TUSSI-ORGANIDIN NR)  mg/5 mL syrup, Take 5 mLs by mouth 3 (three) times daily as needed for Cough., Disp: 237 mL, Rfl: 0    Current Facility-Administered Medications:     cefTRIAXone injection 1 g, 1 g, Intramuscular, 1 time in Clinic/HOD, Brian An MD    LIDOcaine HCL 10 mg/ml (1%) injection 20 mL, 20 mL, Other, 1 time in Clinic/HOD, Brian An MD     LIDOcaine HCl 2% urojet, , Rectal, 1 time in Clinic/HOD, Brian An MD

## 2025-02-25 NOTE — PROGRESS NOTES
Your results look fine and do not require any change in treatment. K less than ideal so take one more per day    Please contact me if you have any additional concerns.    Sincerely,  Miguel Altamirano

## 2025-02-25 NOTE — TELEPHONE ENCOUNTER
Pharmacy requesting alternative. Sts they do not carry   promethazine-codeine 6.25-10 mg/5 ml (PHENERGAN WITH CODEINE) 6.25-10 mg/5 mL syrup

## 2025-02-25 NOTE — TELEPHONE ENCOUNTER
Pt is requesting guaiFENesin-codeine 100-10 mg/5 ml (TUSSI-ORGANIDIN NR)  mg/5 mL [id number] - [place], [place] - [address] AT [facility] [address]

## 2025-02-26 RX ORDER — POTASSIUM CHLORIDE 750 MG/1
TABLET, EXTENDED RELEASE ORAL
Qty: 900 TABLET | Refills: 3 | Status: SHIPPED | OUTPATIENT
Start: 2025-02-26

## 2025-02-26 NOTE — PROGRESS NOTES
patient updated on dr Altamirano's response and verbalized understanding. He currently takes 4 x 20.meq kcl/ day and is having problems swallowing them (takes it 3 times a day 20  then 40 then 20 meq. Told not to take more than 40 meq at a time.  He is interested in changing to the smaller 10 meq pill and is aware that he will take twice as many. He knows to contact us if insurance does not pay for the smaller pills. Prescription sent to provider.

## 2025-02-27 ENCOUNTER — TELEPHONE (OUTPATIENT)
Dept: UROLOGY | Facility: CLINIC | Age: 80
End: 2025-02-27
Payer: MEDICARE

## 2025-03-07 DIAGNOSIS — J44.89 COPD WITH ASTHMA: ICD-10-CM

## 2025-03-07 RX ORDER — CODEINE PHOSPHATE AND GUAIFENESIN 10; 100 MG/5ML; MG/5ML
5 SOLUTION ORAL 3 TIMES DAILY PRN
Qty: 237 ML | Refills: 0 | Status: SHIPPED | OUTPATIENT
Start: 2025-03-07 | End: 2025-03-23

## 2025-03-07 NOTE — TELEPHONE ENCOUNTER
Pt calling VB nurse line. He is requesting refill on guaiFENesin-codeine 100-10 mg/5 ml (TUSSI-ORGANIDIN NR)  mg/5 mL syrup.    LOV 02/25/25  Next OV 03/24/25

## 2025-03-13 ENCOUNTER — HOSPITAL ENCOUNTER (OUTPATIENT)
Dept: CARDIOLOGY | Facility: HOSPITAL | Age: 80
Discharge: HOME | End: 2025-03-13
Attending: INTERNAL MEDICINE
Payer: MEDICARE

## 2025-03-13 DIAGNOSIS — M79.89 LEG SWELLING: ICD-10-CM

## 2025-03-13 DIAGNOSIS — I82.512 CHRONIC DEEP VEIN THROMBOSIS (DVT) OF FEMORAL VEIN OF LEFT LOWER EXTREMITY: ICD-10-CM

## 2025-03-13 PROCEDURE — 93970 EXTREMITY STUDY: CPT | Mod: 26,HCNC,, | Performed by: INTERNAL MEDICINE

## 2025-03-13 PROCEDURE — 93970 EXTREMITY STUDY: CPT | Mod: HCNC

## 2025-03-17 ENCOUNTER — HOSPITAL ENCOUNTER (OUTPATIENT)
Dept: RADIOLOGY | Facility: HOSPITAL | Age: 80
Discharge: HOME | End: 2025-03-17
Attending: UROLOGY
Payer: MEDICARE

## 2025-03-17 ENCOUNTER — TELEPHONE (OUTPATIENT)
Dept: UROLOGY | Facility: CLINIC | Age: 80
End: 2025-03-17
Payer: MEDICARE

## 2025-03-17 DIAGNOSIS — R97.20 ELEVATED PSA: ICD-10-CM

## 2025-03-17 PROCEDURE — 72197 MRI PELVIS W/O & W/DYE: CPT | Mod: TC

## 2025-03-17 PROCEDURE — 25500020 PHARM REV CODE 255: Performed by: UROLOGY

## 2025-03-17 PROCEDURE — A9585 GADOBUTROL INJECTION: HCPCS | Performed by: UROLOGY

## 2025-03-17 PROCEDURE — 72197 MRI PELVIS W/O & W/DYE: CPT | Mod: 26,,, | Performed by: RADIOLOGY

## 2025-03-17 RX ORDER — GADOBUTROL 604.72 MG/ML
10 INJECTION INTRAVENOUS
Status: COMPLETED | OUTPATIENT
Start: 2025-03-17 | End: 2025-03-17

## 2025-03-17 RX ADMIN — GADOBUTROL 10 ML: 604.72 INJECTION INTRAVENOUS at 11:03

## 2025-03-17 NOTE — TELEPHONE ENCOUNTER
CASS to confirm appointment for 03/18/25 @1030, at [facility], 2nd floor Urology. Patient is to take Cipro and perform enema prior to appointment, as well as arrive at least 15 min prior.

## 2025-03-18 ENCOUNTER — PROCEDURE VISIT (OUTPATIENT)
Dept: UROLOGY | Facility: CLINIC | Age: 80
End: 2025-03-18
Payer: MEDICARE

## 2025-03-18 VITALS
BODY MASS INDEX: 31.92 KG/M2 | TEMPERATURE: 99 F | WEIGHT: 241.94 LBS | RESPIRATION RATE: 17 BRPM | SYSTOLIC BLOOD PRESSURE: 156 MMHG | HEART RATE: 72 BPM | DIASTOLIC BLOOD PRESSURE: 74 MMHG

## 2025-03-18 DIAGNOSIS — R97.20 ELEVATED PSA: Primary | ICD-10-CM

## 2025-03-18 PROCEDURE — 88305 TISSUE EXAM BY PATHOLOGIST: CPT | Mod: 59,HCNC | Performed by: STUDENT IN AN ORGANIZED HEALTH CARE EDUCATION/TRAINING PROGRAM

## 2025-03-18 PROCEDURE — 88305 TISSUE EXAM BY PATHOLOGIST: CPT | Mod: 26,HCNC,, | Performed by: STUDENT IN AN ORGANIZED HEALTH CARE EDUCATION/TRAINING PROGRAM

## 2025-03-18 RX ORDER — CEFTRIAXONE 1 G/1
1 INJECTION, POWDER, FOR SOLUTION INTRAMUSCULAR; INTRAVENOUS
Status: COMPLETED | OUTPATIENT
Start: 2025-03-18 | End: 2025-03-18

## 2025-03-18 RX ADMIN — LIDOCAINE HYDROCHLORIDE 20 ML: 10 INJECTION, SOLUTION INFILTRATION; PERINEURAL at 10:03

## 2025-03-18 RX ADMIN — CEFTRIAXONE 1 G: 1 INJECTION, POWDER, FOR SOLUTION INTRAMUSCULAR; INTRAVENOUS at 10:03

## 2025-03-18 RX ADMIN — LIDOCAINE HYDROCHLORIDE: 20 JELLY TOPICAL at 10:03

## 2025-03-18 NOTE — PROCEDURES
"Transrectal Ultrasound w/ Biopsy    Date/Time: 3/18/2025 10:34 AM    Performed by: Brian An MD  Authorized by: Brian An MD    Consent Done?:  Yes (Written)  Time out: Immediately prior to procedure a "time out" was called to verify the correct patient, procedure, equipment, support staff and site/side marked as required.    Indications: Elevated PSA    Preparation: Patient was prepped and draped in usual sterile fashion    Position:  Left lateral  Anesthesia:  Pudendal nerve block and 20cc's 1% Lidocaine  Prostate Size:  55 ccs  Left Base Biopsies: 2  Left Mid Biopsies: 2  Left [place] Biopsies: 2  Right Base Biopsies: 2  Right Mid Biopsies: 3  Right [place] Biopsies: 3  Total Biopsies:  14    Patient tolerance:  Patient tolerated the procedure well with no immediate complications      The risks and benefits of the procedure were explained to the patient and consent was obtained.  The patient laid in the lateral decubitus position.    The ultrasound probe was advanced into the rectum. The prostate was visualized.    20cc of 1% lidocaine without epi was used for a prostatic nerve block.    At this point, a sweep of the prostate was performed from base to apex in the transverse plane using the ultrasound and URONAV platform.  Landmarks were then labeled with anterior, posterior, right, left, base and apex were labeled in the axial as well as sagittal planes.  Segmentation was then performed and manual adjustments were made as needed starting in the sagittal plane followed by the axial plane.  At this point, alignment of the ultrasound with MRI images was ensured using the toggle between ultrasound and MRI.     At this point elastic deformation was computed.  Our images were satisfactory.    Target 1-right apex-3 targeted biopsies  Target 2-right middle-3 targeted biopsies  "

## 2025-03-18 NOTE — PATIENT INSTRUCTIONS
What to Expect After a Prostate Biopsy    You may have mild bleeding from the rectum or urine for about 1 week to 1 month, or in your ejaculate for several months. This bleeding is normal and expected, and it will stop. You may have mild discomfort in your rectal or urethral area for 24-48 hours.    You cannot do any strenuous lifting, straining, or exercising for 24 hours. You may return to full activity the day after the biopsy.    You may continue to take all your regular medications after the procedure except for the blood thinners.    You may resume all blood-thinning medications once you no longer see any bleeding or whenever your physician prescribing the medication says it is all right to do so. You may take Tylenol if you have a fever and your temperature is less than 100° F or if you have some discomfort.    You will receive a call from the Urology Department at [facility] with the results of your prostate biopsy within one week.    Signs and Symptoms to Report    Call your [facility] urologist at [phone] if you develop any of the following:  Temperature greater than 101°  F  Inability to urinate  A large amount of bleeding from the rectum or in the urine  Persistent or severe pain    After hours or on weekends, you may reach a urology resident on call at this number: [phone].

## 2025-03-20 ENCOUNTER — TELEPHONE (OUTPATIENT)
Dept: UROLOGY | Facility: CLINIC | Age: 80
End: 2025-03-20
Payer: MEDICARE

## 2025-03-20 ENCOUNTER — RESULTS FOLLOW-UP (OUTPATIENT)
Dept: UROLOGY | Facility: CLINIC | Age: 80
End: 2025-03-20

## 2025-03-20 DIAGNOSIS — C61 PROSTATE CANCER: Primary | ICD-10-CM

## 2025-03-20 LAB
FINAL PATHOLOGIC DIAGNOSIS: NORMAL
GROSS: NORMAL
Lab: NORMAL
MICROSCOPIC EXAM: NORMAL

## 2025-03-21 ENCOUNTER — TELEPHONE (OUTPATIENT)
Dept: UROLOGY | Facility: CLINIC | Age: 80
End: 2025-03-21
Payer: MEDICARE

## 2025-03-24 ENCOUNTER — TELEPHONE (OUTPATIENT)
Dept: HEMATOLOGY/ONCOLOGY | Facility: CLINIC | Age: 80
End: 2025-03-24
Payer: MEDICARE

## 2025-03-24 ENCOUNTER — OFFICE VISIT (OUTPATIENT)
Dept: PRIMARY CARE CLINIC | Facility: CLINIC | Age: 80
End: 2025-03-24
Payer: MEDICARE

## 2025-03-24 VITALS
HEART RATE: 72 BPM | SYSTOLIC BLOOD PRESSURE: 124 MMHG | BODY MASS INDEX: 30.58 KG/M2 | OXYGEN SATURATION: 96 % | WEIGHT: 231.81 LBS | DIASTOLIC BLOOD PRESSURE: 72 MMHG

## 2025-03-24 DIAGNOSIS — I10 HTN (HYPERTENSION), BENIGN: ICD-10-CM

## 2025-03-24 DIAGNOSIS — E66.811 OBESITY (BMI 30.0-34.9): ICD-10-CM

## 2025-03-24 DIAGNOSIS — R29.818 NEUROGENIC CLAUDICATION: ICD-10-CM

## 2025-03-24 DIAGNOSIS — C61 PROSTATE CANCER: ICD-10-CM

## 2025-03-24 DIAGNOSIS — E03.4 HYPOTHYROIDISM DUE TO ACQUIRED ATROPHY OF THYROID: ICD-10-CM

## 2025-03-24 DIAGNOSIS — E78.5 HYPERLIPIDEMIA, UNSPECIFIED HYPERLIPIDEMIA TYPE: ICD-10-CM

## 2025-03-24 DIAGNOSIS — G70.00 MYASTHENIA GRAVIS, ACHR ANTIBODY POSITIVE: ICD-10-CM

## 2025-03-24 DIAGNOSIS — Z00.00 ENCOUNTER FOR MEDICARE ANNUAL WELLNESS EXAM: Primary | ICD-10-CM

## 2025-03-24 DIAGNOSIS — M79.605 LEFT LEG PAIN: ICD-10-CM

## 2025-03-24 DIAGNOSIS — G95.9 DISEASE OF SPINAL CORD, UNSPECIFIED: ICD-10-CM

## 2025-03-24 PROCEDURE — 1170F FXNL STATUS ASSESSED: CPT | Mod: CPTII,S$GLB,, | Performed by: NURSE PRACTITIONER

## 2025-03-24 PROCEDURE — 1123F ACP DISCUSS/DSCN MKR DOCD: CPT | Mod: CPTII,S$GLB,, | Performed by: NURSE PRACTITIONER

## 2025-03-24 PROCEDURE — 3078F DIAST BP <80 MM HG: CPT | Mod: CPTII,S$GLB,, | Performed by: NURSE PRACTITIONER

## 2025-03-24 PROCEDURE — G0439 PPPS, SUBSEQ VISIT: HCPCS | Mod: S$GLB,,, | Performed by: NURSE PRACTITIONER

## 2025-03-24 PROCEDURE — 1100F PTFALLS ASSESS-DOCD GE2>/YR: CPT | Mod: CPTII,S$GLB,, | Performed by: NURSE PRACTITIONER

## 2025-03-24 PROCEDURE — 1125F AMNT PAIN NOTED PAIN PRSNT: CPT | Mod: CPTII,S$GLB,, | Performed by: NURSE PRACTITIONER

## 2025-03-24 PROCEDURE — 3288F FALL RISK ASSESSMENT DOCD: CPT | Mod: CPTII,S$GLB,, | Performed by: NURSE PRACTITIONER

## 2025-03-24 PROCEDURE — 3074F SYST BP LT 130 MM HG: CPT | Mod: CPTII,S$GLB,, | Performed by: NURSE PRACTITIONER

## 2025-03-24 PROCEDURE — 1159F MED LIST DOCD IN RCRD: CPT | Mod: CPTII,S$GLB,, | Performed by: NURSE PRACTITIONER

## 2025-03-24 PROCEDURE — 99999 PR PBB SHADOW E&M-EST. PATIENT-LVL V: CPT | Mod: PBBFAC,,, | Performed by: NURSE PRACTITIONER

## 2025-03-24 NOTE — NURSING
Nurse navigator spoke with patient to coordinate [facility] appts on 4/17/25.  Patient states understanding.  All questions answered and contact info given for any future questions.

## 2025-03-24 NOTE — PATIENT INSTRUCTIONS
Counseling and Referral of Other Preventative  (Italic type indicates deductible and co-insurance are waived)    Patient Name: Maximilian Tavera  Today's Date: 3/24/2025    Health Maintenance       Date Due Completion Date    RSV Vaccine (Age 60+ and Pregnant patients) (1 - 1-dose 75+ series) Never done ---    COVID-19 Vaccine (5 - 2024-25 season) 09/01/2024 10/11/2022    Colonoscopy 12/07/2024 12/7/2021    Lipid Panel 10/07/2025 10/7/2024    TETANUS VACCINE 10/11/2032 10/11/2022        Orders Placed This Encounter   Procedures    US Lower Extremity Veins Left       The following information is provided to all patients.  This information is to help you find resources for any of the problems found today that may be affecting your health:                  Living healthy guide: [place].gov    Understanding Diabetes: www.diabetes.org      Eating healthy: www.cdc.gov/healthyweight      CDC home safety checklist: www.cdc.gov/steadi/patient.html      Agency on Aging: [place].gov    Alcoholics anonymous (AA): www.aa.org      Physical Activity: www.aristides.nih.gov/nn2rsou      Tobacco use: [place].gov

## 2025-03-24 NOTE — PROGRESS NOTES
Maximilian Salesjuan presented for a follow-up Medicare AWV today. The following components were reviewed and updated:    Medical history  Family History  Social history  Allergies and Current Medications  Health Risk Assessment  Health Maintenance  Care Team    **See Completed Assessments for Annual Wellness visit with in the encounter summary    The following assessments were completed:  Depression Screening  Cognitive function Screening  Timed Get Up Test  Whisper Test      Opioid documentation:      Patient does not have a current opioid prescription.          Vitals:    03/24/25 1058   BP: 124/72   BP Location: Left arm   Patient Position: Sitting   Pulse: 72   SpO2: 96%   Weight: 105.2 kg (231 lb 13 oz)     Body mass index is 30.58 kg/m².       Physical Exam  Constitutional:       Appearance: He is obese.   HENT:      Head: Normocephalic.      Nose: Nose normal.   Eyes:      Pupils: Pupils are equal, round, and reactive to light.   Musculoskeletal:         General: Normal range of motion.      Cervical back: Normal range of motion.   Skin:     General: Skin is warm and dry.   Neurological:      Mental Status: He is alert.           Diagnoses and health risks identified today and associated recommendations/orders:  1. Encounter for Medicare annual wellness exam  .Discussed health maintenance guidelines appropriate for age.    Review for Opioid Screening: Patient does not have rx for Opioids.     Review for Substance Use Disorders: Patient does not use substance.    2. Left leg pain  - US Lower Extremity Veins Left; Future    3. Prostate cancer  Stable, MRI done 3/25, is scheduled for PET scan 4/25  Followed by Urology/ Rad Onc    4. Disease of spinal cord, unspecified  Stable, continue management  Followed by PCP    5. HTN (hypertension), benign  Stable, reading today  Continue management  Low sodium diet  Followed by PCP  BP Readings from Last 3 Encounters:   03/24/25 124/72   03/18/25 (!) 156/74   02/25/25 124/72  "       6. Hyperlipidemia, unspecified hyperlipidemia type  Stable, Lipitor  Followed by PCP    7. Myasthenia gravis, AChR antibody positive  Stable, followed by Neurolgy     8. Hypothyroidism due to acquired atrophy of thyroid  Stable, on levothyroxine  Followed by PCP    9. Neurogenic claudication  Stable, on gabapentin  Followed by PCP    10. Obesity (BMI 30.0-34.9)  Uncontrolled, Counseled patient on his ideal body weight, health consequences of being obese and current recommendations including weekly exercise and a heart healthy diet.  Current BMI is:Estimated body mass index is 30.58 kg/m² as calculated from the following:    Height as of 2/13/25: 6' 1" (1.854 m).    Weight as of this encounter: 105.2 kg (231 lb 13 oz)..  Patient is aware that ideal BMI < 25 or  .         Provided Maximilian with a 5-10 year written screening schedule and personal prevention plan. Recommendations were developed using the USPSTF age appropriate recommendations. Education, counseling, and referrals were provided as needed.  After Visit Summary printed and given to patient which includes a list of additional screenings\tests needed.    No follow-ups on file.      Socrates Castanon NP    I offered to discuss advanced care planning, including how to pick a person who would make decisions for you if you were unable to make them for yourself, called a health care power of , and what kind of decisions you might make such as use of life sustaining treatments such as ventilators and tube feeding when faced with a life limiting illness recorded on a living will that they will need to know. (How you want to be cared for as you near the end of your natural life)     X Patient is interested in learning more about how to make advanced directives.  I provided them paperwork and offered to discuss this with them.  "

## 2025-03-25 ENCOUNTER — RESULTS FOLLOW-UP (OUTPATIENT)
Dept: PRIMARY CARE CLINIC | Facility: CLINIC | Age: 80
End: 2025-03-25

## 2025-03-25 ENCOUNTER — HOSPITAL ENCOUNTER (OUTPATIENT)
Dept: RADIOLOGY | Facility: HOSPITAL | Age: 80
Discharge: HOME | End: 2025-03-25
Attending: NURSE PRACTITIONER
Payer: MEDICARE

## 2025-03-25 DIAGNOSIS — M79.605 LEFT LEG PAIN: ICD-10-CM

## 2025-03-25 PROCEDURE — 93971 EXTREMITY STUDY: CPT | Mod: TC,LT

## 2025-03-25 PROCEDURE — 93971 EXTREMITY STUDY: CPT | Mod: 26,LT,, | Performed by: RADIOLOGY

## 2025-03-28 NOTE — PROGRESS NOTES
Call placed to pt regarding US results, pt all understanding. Pt declined seeing  again . Pt stated that he started back  Norco 325mg from old RX

## 2025-04-04 ENCOUNTER — TELEPHONE (OUTPATIENT)
Dept: PRIMARY CARE CLINIC | Facility: CLINIC | Age: 80
End: 2025-04-04
Payer: MEDICARE

## 2025-04-04 DIAGNOSIS — M51.369 DDD (DEGENERATIVE DISC DISEASE), LUMBAR: ICD-10-CM

## 2025-04-04 DIAGNOSIS — R29.818 NEUROGENIC CLAUDICATION: ICD-10-CM

## 2025-04-04 DIAGNOSIS — M54.32 LEFT SIDED SCIATICA: Primary | ICD-10-CM

## 2025-04-04 RX ORDER — HYDROCODONE BITARTRATE AND ACETAMINOPHEN 7.5; 325 MG/1; MG/1
1 TABLET ORAL EVERY 6 HOURS PRN
Qty: 28 TABLET | Refills: 0 | Status: SHIPPED | OUTPATIENT
Start: 2025-04-04 | End: 2025-04-11

## 2025-04-04 NOTE — TELEPHONE ENCOUNTER
I called the  phone line-he reports a call from office to reschedule office visit with  on Monday-He c/o chronic left sciatic pain-has seen  and Neurosx in the past-reports neurosx advised not a surgery candidate-requesting Norco refill-I placed an order to PT/OT for acupuncture

## 2025-04-04 NOTE — TELEPHONE ENCOUNTER
Contacted pt via phone. Appt with Dr. Marroquin rescheduled. He has declined to see Dr. Watt for pain management. He states he will discuss pain management options with Dr. Marroquin at scheduled appt

## 2025-04-07 DIAGNOSIS — M51.369 DDD (DEGENERATIVE DISC DISEASE), LUMBAR: ICD-10-CM

## 2025-04-07 RX ORDER — HYDROCODONE BITARTRATE AND ACETAMINOPHEN 7.5; 325 MG/1; MG/1
1 TABLET ORAL EVERY 6 HOURS PRN
Qty: 28 TABLET | Refills: 0 | Status: CANCELLED | OUTPATIENT
Start: 2025-04-07 | End: 2025-04-14

## 2025-04-07 NOTE — TELEPHONE ENCOUNTER
No care due was identified.  [facility] Embedded Care Due Messages. Reference number: [id number].   4/07/2025 12:47:18 PM CDT

## 2025-04-07 NOTE — TELEPHONE ENCOUNTER
On December 12, 2022 the [facility] Board of Nursing approved an amendment of charter 45 title 46. The FDA and The ADA approved LILLIANA to prescribe controlled substances for chronic pain or intractable pain and obesity. [facility] needs to update there [place] LILLIANA prescriptive role-they also need to call the patient to inform him that he will not get his pain medication because of this.  His PCP- will not be in office unit next Monday.

## 2025-04-07 NOTE — TELEPHONE ENCOUNTER
----- Message from ClaireJeanMavispatrick sent at 4/7/2025 10:44 AM CDT -----  Type: Pharmacy Calling to Clarify an RXWho called: Meseret CheathamPharmdaria Name: [facility] - [facility] [id number] [facility] [address]Gjjhg: [phone] Fax: [phone] Name:HYDROcodone-acetaminophen (NORCO) 7.5-325 mg per tabletWhat do they need to clarify? The script says that it is for chronic pain and NP in [place] can only prescribe for acute pain ( unless cancer). If it is for chronic pain, it has to be sent it by an MD .  Best Call Back Number: [phone] , reference # [id number] Information:

## 2025-04-07 NOTE — TELEPHONE ENCOUNTER
I spoke to pharmacist named Meseret who states there laws and protocols are reviewed every spring and according to their laws for mail order they can not fill this. She then states unless you want to tell me it is for acute pain. I informed pharmacist I was not going to lie and give a false diagnosis. She states then she can't fill it and pt will need to have it filled at a local pharmacy.    Spoke to pt informed and verbalizes understanding. [facility]

## 2025-04-07 NOTE — TELEPHONE ENCOUNTER
Albin Castanon Staff  Caller: Unspecified (Today, 10:44 AM)  Type: Pharmacy Calling to Clarify an RX      Who called: Meseret Cheatham      Pharmacy Name:  [facility] - [place], [place] - [address]  [address] [facility] [address] [facility] [address]  Phone: [phone] Fax: [phone]        Prescription Name:  HYDROcodone-acetaminophen (NORCO) 7.5-325 mg per tablet      What do they need to clarify? The script says that it is for chronic pain and NP in [place] can only prescribe for acute pain ( unless cancer). If it is for chronic pain, it has to be sent it by an MD .          Best Call Back Number: [phone] , reference # [id number]      Additional Information:

## 2025-04-08 RX ORDER — HYDROCODONE BITARTRATE AND ACETAMINOPHEN 7.5; 325 MG/1; MG/1
1 TABLET ORAL EVERY 6 HOURS PRN
Qty: 28 TABLET | Refills: 0 | Status: SHIPPED | OUTPATIENT
Start: 2025-04-08 | End: 2025-04-15

## 2025-04-10 ENCOUNTER — TELEPHONE (OUTPATIENT)
Dept: PRIMARY CARE CLINIC | Facility: CLINIC | Age: 80
End: 2025-04-10
Payer: MEDICARE

## 2025-04-10 NOTE — TELEPHONE ENCOUNTER
LVM in regards to r/s appt with Dr. Marroquin. Advised with new appt time and date and to call back with any questions or concerns

## 2025-04-10 NOTE — TELEPHONE ENCOUNTER
Pt calling to move appt with Dr. Marroquin to a later time. He has confirmed new appt time and date

## 2025-04-16 ENCOUNTER — HOSPITAL ENCOUNTER (OUTPATIENT)
Dept: RADIOLOGY | Facility: HOSPITAL | Age: 80
Discharge: HOME | End: 2025-04-16
Attending: UROLOGY
Payer: MEDICARE

## 2025-04-16 DIAGNOSIS — C61 PROSTATE CANCER: ICD-10-CM

## 2025-04-16 PROCEDURE — 78815 PET IMAGE W/CT SKULL-THIGH: CPT | Mod: 26,HCNC,PI, | Performed by: STUDENT IN AN ORGANIZED HEALTH CARE EDUCATION/TRAINING PROGRAM

## 2025-04-16 PROCEDURE — A9596 HC GALLIUM GA-68 GOZETOTIDE, DX (ILLUCCIX), PER 1 MCI: HCPCS | Mod: TB,HCNC | Performed by: UROLOGY

## 2025-04-16 PROCEDURE — 78815 PET IMAGE W/CT SKULL-THIGH: CPT | Mod: TC,HCNC

## 2025-04-16 RX ADMIN — KIT FOR THE PREPARATION OF GALLIUM GA 68 GOZETOTIDE INJECTION 5.77 MILLICURIE: KIT INTRAVENOUS at 11:04

## 2025-04-16 NOTE — PROGRESS NOTES
Clinic Note  4/16/2025      Subjective:         Chief Complaint:   HPI  Maximilian Tavera Jr. is a 79 y.o. male diagnosed with prostate cancer.  Retired [employer] and  at [employer]. Radha Schafer worked at [employer] also.  Two grandchildren 15 and 10 yo.     FH -negative  PSA - 16.2  AJCC Stage - T1C  STAR CAP stage-T2C  Volume - 56 ccs  MRI - 3/17/2025- L1- 1.5 LMPZ PI-RADS 4, cap bulge w/o extra prostatic extension. L2- 1.5 cm anterior TZ PI-RADS 5 lesion with cap bulge w/o extra prostatic extension.MRI negative for EPE (extraprostatic extension), NVBI (neurovascular bundle involvement), SVI (seminal vesicle involvement), or nodes.   Biopsy -3/18/2025- [place] 4+3 (GG3) left middle 1/2 30%; [place] 3+4 right apex (target 1) 3/3 70%, right middle (target 2) 3/3 70%, right base 2/2 30%. Overall- 4/6 sites, 9/14 cores.  GA68 PSMA scan-4/16/2025- avid prostate, no avid nodes or metastasis.  TRISHA Score - 7 high risk  NCCN - unfavorable intermediate risk  Germline testing -not indicated  Somatic testing - discussed   STAR CAP-    Lab Results   Component Value Date    PSA 16.2 (H) 11/07/2024    PSA 3.5 09/19/2019    PSA 3.3 05/02/2019    PSA 2.4 03/29/2018    PSA 1.1 11/18/2014    PSA 0.60 05/15/2013    PSA 0.61 05/09/2012    PSA 0.44 03/30/2011    PSADIAG 6.9 (H) 06/30/2021    PSADIAG 1.7 09/12/2016    PSADIAG 1.7 09/02/2015    PSATOTAL 9.8 (H) 12/15/2022    PSATOTAL 6.5 (H) 09/15/2021    PSATOTAL 3.0 08/02/2019    PSAFREE 1.27 12/15/2022    PSAFREE 0.96 09/15/2021    PSAFREE 0.42 08/02/2019    PSAFREEPCT 12.96 12/15/2022    PSAFREEPCT 14.77 09/15/2021    PSAFREEPCT 14.00 08/02/2019      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    Disease of spinal cord, unspecified 1/5/2024     Emphysema lung 11/05/2018    Gastritis     Hx of colonic polyp     Hyperlipidemia     Hypertension     Hypothyroidism     Knee fracture     Myasthenia gravis     Neuropathy 01/03/2013    Obesity 01/29/2015    Pneumonia 03/29/2020    Polyneuropathy     Prostate cancer 3/20/2025    PVC (premature ventricular contraction)     Squamous cell carcinoma 2014    left forearm    Thyroid disease      Family History   Problem Relation Name Age of Onset    Cataracts Mother      Heart disease Mother          CHF    Hypertension Mother      Hyperlipidemia Mother      Cataracts Father      Glaucoma Father      Heart disease Father      Hyperlipidemia Sister      Hypertension Sister      No Known Problems Daughter      No Known Problems Daughter      Collagen disease Neg Hx      Amblyopia Neg Hx      Blindness Neg Hx      Macular degeneration Neg Hx      Retinal detachment Neg Hx      Strabismus Neg Hx      Cancer Neg Hx      Colon cancer Neg Hx      Esophageal cancer Neg Hx      Stomach cancer Neg Hx      Crohn's disease Neg Hx      Ulcerative colitis Neg Hx       Social History[1]  Past Surgical History:   Procedure Laterality Date    APPENDECTOMY      CATARACT EXTRACTION W/  INTRAOCULAR LENS IMPLANT Bilateral     COLONOSCOPY  03/01/2012    Dr Esteban; hyperplastic polyp; repeat in 5 years    COLONOSCOPY N/A 12/7/2021    Procedure: COLONOSCOPY;  Surgeon: Gabriel Hernandez MD;  Location: [facility];  Service: Endoscopy;  Laterality: N/A;    CYSTOSCOPY N/A 2/26/2019    Procedure: CYSTOSCOPY;  Surgeon: Simba Cheung MD;  Location: [facility];  Service: Urology;  Laterality: N/A;    ENDOSCOPIC ULTRASOUND OF UPPER GASTROINTESTINAL TRACT N/A 1/7/2020    Procedure: ULTRASOUND, UPPER GI TRACT, ENDOSCOPIC;  Surgeon: Kati Ryan MD;  [facility]  Service: Endoscopy;  Laterality: N/A;    ESOPHAGOGASTRODUODENOSCOPY N/A 9/12/2018    Procedure: EGD (ESOPHAGOGASTRODUODENOSCOPY);  Surgeon: Gabriel Hernandez MD;   Location: [facility] ENDO;  Service: Endoscopy;  Laterality: N/A;    ESOPHAGOGASTRODUODENOSCOPY N/A 8/19/2019    Procedure: EGD (ESOPHAGOGASTRODUODENOSCOPY);  Surgeon: Gabriel Hernandez MD;  Location: [facility];  Service: Endoscopy;  Laterality: N/A;    ESOPHAGOGASTRODUODENOSCOPY N/A 10/7/2019    Procedure: EGD (ESOPHAGOGASTRODUODENOSCOPY);  Surgeon: Gabriel Hernandez MD;  Location: [facility];  Service: Endoscopy;  Laterality: N/A;    ESOPHAGOGASTRODUODENOSCOPY N/A 1/7/2020    Procedure: EGD (ESOPHAGOGASTRODUODENOSCOPY);  Surgeon: Kati Ryan MD;  [facility]  Service: Endoscopy;  Laterality: N/A;    HEMORRHOID SURGERY      INJECTION OF ANESTHETIC AGENT AROUND MEDIAL BRANCH NERVES INNERVATING LUMBAR FACET JOINT Bilateral 10/1/2020    Procedure: Block-nerve-medial branch-lumbar Bilateral L 3,4,5;  Surgeon: Devan Watt MD;  Location: [facility];  Service: Pain Management;  Laterality: Bilateral;    INJECTION OF ANESTHETIC AGENT AROUND MEDIAL BRANCH NERVES INNERVATING LUMBAR FACET JOINT Right 10/7/2022    Procedure: Block-nerve-medial branch-lumbar L3,4,5;  Surgeon: Devan Watt MD;  Location: [facility];  Service: Pain Management;  Laterality: Right;    KNEE ARTHROPLASTY Bilateral     LENGTHENING OF ACHILLES TENDON Right 9/11/2020    Procedure: percutaeous tenotomy of right lateral epicondyle (tenex);  Surgeon: Terry Quach MD;  Location: [facility];  Service: Neurosurgery;  Laterality: Right;  tenex to right lateral epicondyle  Tenex machine SN#[id number], total time 1min. 30seconds    NECK SURGERY      POSTERIOR FUSION OF CERVICAL SPINE WITH LAMINECTOMY N/A 11/7/2018    Procedure: C2-C6 Posterior Cervical Laminectomy & Instrumental Fusion;  Surgeon: Kishore Turner MD;  Location: [address];  Service: Neurosurgery;  Laterality: N/A;    TONSILLECTOMY      TRANSFORAMINAL EPIDURAL INJECTION OF STEROID Right 12/20/2019    Procedure: Injection,steroid,epidural,transforaminal approach;  Surgeon: Devan Watt  "MD;  Location: [facility] OR;  Service: Pain Management;  Laterality: Right;  L3-4, L4-5    TRANSFORAMINAL EPIDURAL INJECTION OF STEROID Bilateral 2/6/2020    Procedure: Injection,steroid,epidural,transforaminal approach;  Surgeon: Devan Watt MD;  Location: [facility] OR;  Service: Pain Management;  Laterality: Bilateral;  L3-L4,L4-L5    TRANSFORAMINAL EPIDURAL INJECTION OF STEROID Bilateral 8/26/2020    Procedure: Injection,steroid,epidural,transforaminal approach;  Surgeon: Devan Watt MD;  Location: [facility] OR;  Service: Pain Management;  Laterality: Bilateral;  L3-4, L4-5    TRANSRECTAL ULTRASOUND EXAMINATION N/A 2/26/2019    Procedure: ULTRASOUND, RECTAL APPROACH;  Surgeon: Simba Cheung MD;  Location: [facility] OR;  Service: Urology;  Laterality: N/A;    UPPER GASTROINTESTINAL ENDOSCOPY  09/12/2018    Dr Hernandez; gastritis; extensive intestinal metaplasia; repeat in 1-2- years     Problem List[2]  Review of Systems   Constitutional:  Negative for chills, fever and unexpected weight change.   HENT:  Negative for congestion and sore throat.    Respiratory:  Negative for cough and shortness of breath.    Cardiovascular:  Negative for chest pain and leg swelling.   Gastrointestinal:  Negative for abdominal pain, nausea and vomiting.   Genitourinary:  Positive for nocturia. Negative for difficulty urinating and hematuria.   Neurological:  Negative for weakness.   Psychiatric/Behavioral:  Negative for agitation and confusion.          Objective:      There were no vitals taken for this visit.  Estimated body mass index is 30.58 kg/m² as calculated from the following:    Height as of 2/13/25: 6' 1" (1.854 m).    Weight as of 3/24/25: 105.2 kg (231 lb 13 oz).  Physical Exam  Constitutional:       General: He is not in acute distress.  HENT:      Head: Normocephalic.   Cardiovascular:      Rate and Rhythm: Normal rate.   Pulmonary:      Effort: Pulmonary effort is normal.   Abdominal:      General: There is no distension.      " Palpations: Abdomen is soft.      Tenderness: There is no abdominal tenderness.   Musculoskeletal:         General: Normal range of motion.   Skin:     General: Skin is warm and dry.   Neurological:      Mental Status: He is alert.      Coordination: Coordination normal.   Psychiatric:         Behavior: Behavior normal.           Assessment and Plan:   Today's visit was spent almost entirely on counseling. We reviewed his diagnosis, stage, grade, risk group, and prognosis. We discussed [place] (NCCN) and TRISHA risk stratification  We discussed the concept of low risk, intermediate risk, and high risk disease. We also reviewed the NCCN treatment nomogram.We discussed the different treatment options including active surveillance (as well as the surveillance protocol), prostate brachytherapy, EBRT, SBRT (stereotactic body radiation therapy),cryotherapy, HIFU and both open and robotic prostatectomy.We also discussed the advantages, disadvantages, risks and benefits, as well as complications of each option. Regarding radiation therapy we discussed treatment planning, the different techniques, short and long term complications. These included radiation cystitis, radiation proctitis, and impotence. We discussed success, failure, and salvage therapeutic options.Also discussed the use of SpaceOAR for brachytherapy and EBRT and fiducials for EBRT.   We discussed surgical therapy in depth including preoperative preparation, surgical technique (including bladder neck and nerve-sparing techniques), postoperative recuperation and recovery, and short and long term complications including UTI, bleeding, blood clots,catheter dislodgement, etc. We discussed the risks of reoperation, incontinence, impotence, and recurrence. We discussed preop and postop Kegels, post op penile rehab, and treatment options for incontinence and impotence. We discussed rates of cancer free survival and recurrence, as well as salvage therapeutic options.  We discussed the possible  indications for adjuvant radiation therapy.   I answered questions and addressed concerns. Also discussed somatic genetic testing (Prolaris/Decipher,etc) to assess this tumors potential behavior and prognosis with AS (active surveillance) or definitive therapy.    He would like to pursue radiation therapy.   Tegan ordered.  He will need to hold eliquis for 3 days for SpaceOAR.        Problem List Items Addressed This Visit       Prostate cancer - Primary       Follow up:       Brian An               [1]   Social History  Socioeconomic History    Marital status:    Tobacco Use    Smoking status: Never    Smokeless tobacco: Never    Tobacco comments:     when a child   Substance and Sexual Activity    Alcohol use: Yes     Comment: rarely    Drug use: No    Sexual activity: Yes     Partners: Female     Social Drivers of Health     Financial Resource Strain: Low Risk  (3/24/2025)    Overall Financial Resource Strain (CARDIA)     Difficulty of Paying Living Expenses: Not hard at all   Food Insecurity: No Food Insecurity (3/24/2025)    Hunger Vital Sign     Worried About Running Out of Food in the Last Year: Never true     Ran Out of Food in the Last Year: Never true   Transportation Needs: No Transportation Needs (3/24/2025)    PRAPARE - Transportation     Lack of Transportation (Medical): No     Lack of Transportation (Non-Medical): No   Physical Activity: Inactive (3/24/2025)    Exercise Vital Sign     Days of Exercise per Week: 0 days     Minutes of Exercise per Session: 0 min   Stress: No Stress Concern Present (3/24/2025)    [employer] - Occupational Stress Questionnaire     Feeling of Stress : Not at all   Housing Stability: Low Risk  (3/24/2025)    Housing Stability Vital Sign     Unable to Pay for Housing in the Last Year: No     Number of Times Moved in the Last Year: 0     Homeless in the Last Year: No   [2]   Patient Active Problem  List  Diagnosis    Hypothyroid    Hyperlipidemia    HTN (hypertension), benign    Neuropathy    ED (erectile dysfunction)    DDD (degenerative disc disease), lumbar    Diplopia    Pseudophakia, right eye    Neurogenic claudication    Myasthenia gravis, AChR antibody positive    Umbilical hernia without obstruction and without gangrene    Agatston CAC score, <100    Aortic valve disease    Primary osteoarthritis of both knees    Abdominal aortic atherosclerosis    RBBB    On prednisone therapy    Carpal tunnel syndrome on both sides    Tortuous aorta    Epigastric pain    Cervical stenosis of spinal canal    S/P cervical spinal fusion    BPH with obstruction/lower urinary tract symptoms    PVC's (premature ventricular contractions)    Non-sustained ventricular tachycardia    Current chronic use of systemic steroids    Bilateral carotid artery stenosis    Lumbar radiculitis    Gastric ulcer    COPD (chronic obstructive pulmonary disease)    Lateral epicondylitis    Other spondylosis, lumbar region    Cervical radiculopathy    PVC (premature ventricular contraction)    COPD with asthma    GOLD (dyspnea on exertion)    Restrictive lung mechanics due to neuromuscular disease    Bronchiectasis without complication    Mild persistent asthma without complication    Myasthenia gravis    Leg swelling    Thrombocytopenia    Venous stasis dermatitis of both lower extremities    Lymphedema of both lower extremities    Obesity (BMI 30-39.9)    Acute deep vein thrombosis (DVT) of femoral vein of left lower extremity    Acute deep vein thrombosis (DVT) of proximal vein of lower extremity    Disease of spinal cord, unspecified    Chronic deep vein thrombosis (DVT) of femoral vein of left lower extremity    Elevated PSA    Prostate cancer

## 2025-04-17 ENCOUNTER — OFFICE VISIT (OUTPATIENT)
Dept: RADIATION ONCOLOGY | Facility: CLINIC | Age: 80
End: 2025-04-17
Payer: MEDICARE

## 2025-04-17 ENCOUNTER — OFFICE VISIT (OUTPATIENT)
Dept: UROLOGY | Facility: CLINIC | Age: 80
End: 2025-04-17
Payer: MEDICARE

## 2025-04-17 VITALS
HEART RATE: 64 BPM | DIASTOLIC BLOOD PRESSURE: 72 MMHG | BODY MASS INDEX: 32.23 KG/M2 | SYSTOLIC BLOOD PRESSURE: 135 MMHG | WEIGHT: 243.19 LBS | HEIGHT: 73 IN

## 2025-04-17 VITALS
DIASTOLIC BLOOD PRESSURE: 72 MMHG | BODY MASS INDEX: 32.23 KG/M2 | SYSTOLIC BLOOD PRESSURE: 135 MMHG | OXYGEN SATURATION: 95 % | HEART RATE: 64 BPM | HEIGHT: 73 IN | WEIGHT: 243.19 LBS

## 2025-04-17 DIAGNOSIS — C61 PROSTATE CANCER: ICD-10-CM

## 2025-04-17 DIAGNOSIS — C61 PROSTATE CANCER: Primary | ICD-10-CM

## 2025-04-17 PROCEDURE — 3075F SYST BP GE 130 - 139MM HG: CPT | Mod: HCNC,CPTII,S$GLB, | Performed by: UROLOGY

## 2025-04-17 PROCEDURE — 99215 OFFICE O/P EST HI 40 MIN: CPT | Mod: HCNC,S$GLB,, | Performed by: UROLOGY

## 2025-04-17 PROCEDURE — 1157F ADVNC CARE PLAN IN RCRD: CPT | Mod: HCNC,CPTII,S$GLB, | Performed by: UROLOGY

## 2025-04-17 PROCEDURE — 99999 PR PBB SHADOW E&M-EST. PATIENT-LVL III: CPT | Mod: PBBFAC,HCNC,, | Performed by: RADIOLOGY

## 2025-04-17 PROCEDURE — G2211 COMPLEX E/M VISIT ADD ON: HCPCS | Mod: HCNC,S$GLB,, | Performed by: UROLOGY

## 2025-04-17 PROCEDURE — 1125F AMNT PAIN NOTED PAIN PRSNT: CPT | Mod: HCNC,CPTII,S$GLB, | Performed by: UROLOGY

## 2025-04-17 PROCEDURE — 3078F DIAST BP <80 MM HG: CPT | Mod: HCNC,CPTII,S$GLB, | Performed by: UROLOGY

## 2025-04-17 PROCEDURE — 1100F PTFALLS ASSESS-DOCD GE2>/YR: CPT | Mod: HCNC,CPTII,S$GLB, | Performed by: UROLOGY

## 2025-04-17 PROCEDURE — 3288F FALL RISK ASSESSMENT DOCD: CPT | Mod: HCNC,CPTII,S$GLB, | Performed by: UROLOGY

## 2025-04-17 PROCEDURE — 99999 PR PBB SHADOW E&M-EST. PATIENT-LVL IV: CPT | Mod: PBBFAC,HCNC,, | Performed by: UROLOGY

## 2025-04-17 RX ORDER — HYDROCORTISONE 5 MG/1
5 TABLET ORAL DAILY
COMMUNITY

## 2025-04-17 NOTE — PROGRESS NOTES
Multidisciplinary Uro-Oncology Clinic  [facility] - Radiation Oncology     HISTORY OF PRESENT ILLNESS:   This patient presents for discussion of treatment options for a recently diagnosed prostate cancer.      Mr. Tavera has a history of an elevated PSA since 2021 when it returned at 6.9 ng/ml.  Repeat PSA in November of 2024 increased to 16.2 ng/ml.  Review of outside MRI from [facility] on 11/21/24 revealed a 47.6 cc prostate with a 2.1 cm PI-RADS 5 lesion in the mid peripheral zone with no extraprostatic extension.  The seminal vesicles and neurovascular bundles were unremarkable.  There was no adenopathy.  Repeat MRI at [facility] on 3/17/25 confirmed a 56 cc prostate with a 1.5 cm PI-RADS 4 lesion in the mid/apex of the Rt. peripheral zone with no extraprostatic extension. There was also a 3.2 cm PI-RADS 5 lesion in the mid anterior transitional zone with no extraprostatic extension.  Biopsies on 3/18/25 revealed Alannah 7 (4+3) adenocarcinoma involving 30% of 1/2 cores from the Lt. mid gland.  The Alannah pattern 4 accounted for 60% of the tumor.  There was Cameron 7 (3+4) adenocarcinoma involving 70% of 3/3 cores from the Rt. apex (Target #1), 70% of 3/3 cores from the Rt. mid (Target #2, and 30% of 2/2 cores from the Rt. base.  The Alannah pattern 4 accounted for 20 - 40% of the tumor.  PSMA scan revealed increased uptake in the prostate with no evidence of regional or distant metastatic disease.     REVIEW OF SYSTEMS:   Review of Systems   Constitutional:  Negative for chills, fever, malaise/fatigue and weight loss.   Respiratory:  Negative for cough and shortness of breath.    Cardiovascular:  Negative for chest pain and palpitations.   Gastrointestinal:  Negative for constipation and diarrhea.   Genitourinary:  Positive for frequency and urgency. Negative for dysuria and hematuria.        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    Disease of spinal cord, unspecified 1/5/2024    Emphysema lung 11/05/2018    Gastritis     Hx of colonic polyp     Hyperlipidemia     Hypertension     Hypothyroidism     Knee fracture     Myasthenia gravis     Neuropathy 01/03/2013    Obesity 01/29/2015    Pneumonia 03/29/2020    Polyneuropathy     Prostate cancer 3/20/2025    PVC (premature ventricular contraction)     Squamous cell carcinoma 2014    left forearm    Thyroid disease        PAST SURGICAL HISTORY:  Past Surgical History:   Procedure Laterality Date    APPENDECTOMY      CATARACT EXTRACTION W/  INTRAOCULAR LENS IMPLANT Bilateral     COLONOSCOPY  03/01/2012    Dr Esteban; hyperplastic polyp; repeat in 5 years    COLONOSCOPY N/A 12/7/2021    Procedure: COLONOSCOPY;  Surgeon: Gabriel Hernandez MD;  Location: [facility];  Service: Endoscopy;  Laterality: N/A;    CYSTOSCOPY N/A 2/26/2019    Procedure: CYSTOSCOPY;  Surgeon: Simba Cheung MD;  Location: [facility];  Service: Urology;  Laterality: N/A;    ENDOSCOPIC ULTRASOUND OF UPPER GASTROINTESTINAL TRACT N/A 1/7/2020    Procedure: ULTRASOUND, UPPER GI TRACT, ENDOSCOPIC;  Surgeon: Kati Ryan MD;  Location: [facility] ([address] [place] [address]);  Service: Endoscopy;  Laterality: N/A;    ESOPHAGOGASTRODUODENOSCOPY N/A 9/12/2018    Procedure: EGD (ESOPHAGOGASTRODUODENOSCOPY);  Surgeon: Gabriel Hernandez MD;  Location: [facility];  Service: Endoscopy;  Laterality: N/A;    ESOPHAGOGASTRODUODENOSCOPY N/A 8/19/2019    Procedure: EGD (ESOPHAGOGASTRODUODENOSCOPY);  Surgeon: Gabriel Hernandez MD;  Location: [facility];  Service: Endoscopy;  Laterality: N/A;    ESOPHAGOGASTRODUODENOSCOPY N/A 10/7/2019    Procedure: EGD (ESOPHAGOGASTRODUODENOSCOPY);  Surgeon: Gabriel Hernandez MD;   Location: [facility];  Service: Endoscopy;  Laterality: N/A;    ESOPHAGOGASTRODUODENOSCOPY N/A 1/7/2020    Procedure: EGD (ESOPHAGOGASTRODUODENOSCOPY);  Surgeon: Kati Ryan MD;  Location: [facility] (2ND FLR);  Service: Endoscopy;  Laterality: N/A;    HEMORRHOID SURGERY      INJECTION OF ANESTHETIC AGENT AROUND MEDIAL BRANCH NERVES INNERVATING LUMBAR FACET JOINT Bilateral 10/1/2020    Procedure: Block-nerve-medial branch-lumbar Bilateral L 3,4,5;  Surgeon: Devan Watt MD;  Location: [facility];  Service: Pain Management;  Laterality: Bilateral;    INJECTION OF ANESTHETIC AGENT AROUND MEDIAL BRANCH NERVES INNERVATING LUMBAR FACET JOINT Right 10/7/2022    Procedure: Block-nerve-medial branch-lumbar L3,4,5;  Surgeon: Devan Watt MD;  Location: [facility];  Service: Pain Management;  Laterality: Right;    KNEE ARTHROPLASTY Bilateral     LENGTHENING OF ACHILLES TENDON Right 9/11/2020    Procedure: percutaeous tenotomy of right lateral epicondyle (tenex);  Surgeon: Terry Quach MD;  Location: [facility];  Service: Neurosurgery;  Laterality: Right;  tenex to right lateral epicondyle  Tenex machine SN#[id number], total time 1min. 30seconds    NECK SURGERY      POSTERIOR FUSION OF CERVICAL SPINE WITH LAMINECTOMY N/A 11/7/2018    Procedure: C2-C6 Posterior Cervical Laminectomy & Instrumental Fusion;  Surgeon: Kishore Turner MD;  Location: [facility] 2ND FLR;  Service: Neurosurgery;  Laterality: N/A;    TONSILLECTOMY      TRANSFORAMINAL EPIDURAL INJECTION OF STEROID Right 12/20/2019    Procedure: Injection,steroid,epidural,transforaminal approach;  Surgeon: Devan Watt MD;  Location: [facility];  Service: Pain Management;  Laterality: Right;  L3-4, L4-5    TRANSFORAMINAL EPIDURAL INJECTION OF STEROID Bilateral 2/6/2020    Procedure: Injection,steroid,epidural,transforaminal approach;  Surgeon: Devan Watt MD;  Location: [facility];  Service: Pain Management;  Laterality: Bilateral;  L3-L4,L4-L5    TRANSFORAMINAL EPIDURAL INJECTION OF  STEROID Bilateral 8/26/2020    Procedure: Injection,steroid,epidural,transforaminal approach;  Surgeon: Devan Watt MD;  Location: [facility] OR;  Service: Pain Management;  Laterality: Bilateral;  L3-4, L4-5    TRANSRECTAL ULTRASOUND EXAMINATION N/A 2/26/2019    Procedure: ULTRASOUND, RECTAL APPROACH;  Surgeon: Simba Cheung MD;  Location: [facility] OR;  Service: Urology;  Laterality: N/A;    UPPER GASTROINTESTINAL ENDOSCOPY  09/12/2018    Dr Hrenandez; gastritis; extensive intestinal metaplasia; repeat in 1-2- years       ALLERGIES:   Review of patient's allergies indicates:   Allergen Reactions    Spironolactone Diarrhea       MEDICATIONS:  Current Outpatient Medications   Medication Sig    albuterol (PROVENTIL/VENTOLIN HFA) 90 mcg/actuation inhaler Inhale 2 puffs into the lungs every 6 (six) hours as needed for Wheezing. Rescue    albuterol-ipratropium (DUO-NEB) 2.5 mg-0.5 mg/3 mL nebulizer solution Take 3 mLs by nebulization every 6 (six) hours as needed for Wheezing. Rescue    atorvastatin (LIPITOR) 80 MG tablet TAKE 1 TABLET EVERY DAY    bumetanide (BUMEX) 0.5 MG Tab Take 1 tablet (0.5 mg total) by mouth every other day.    carvediloL (COREG) 25 MG tablet TAKE 1 TABLET TWICE DAILY WITH MEALS    cetirizine (ZYRTEC) 10 MG tablet Take 1 tablet by mouth daily as needed.    chlorthalidone (HYGROTEN) 25 MG Tab TAKE 1 TABLET ONE TIME DAILY    cholecalciferol, vitamin D3, (VITAMIN D3) 50 mcg (2,000 unit) Cap 1 capsule once daily. Every day    coenzyme Q10 (CO Q-10) 100 mg capsule Take 400 mg by mouth once daily.    cyanocobalamin, vitamin B-12, 5,000 mcg Subl Take 5,000 mcg by mouth once daily. Every day    diazePAM (VALIUM) 5 MG tablet Take 2 tablets (10 mg total) by mouth On call Procedure for Anxiety. Take one hour prior to procedure    diphenoxylate-atropine 2.5-0.025 mg (LOMOTIL) 2.5-0.025 mg per tablet Take 1 tablet by mouth 4 (four) times daily as needed for Diarrhea.    ELIQUIS 5 mg Tab TAKE 1 TABLET TWICE DAILY     ezetimibe (ZETIA) 10 mg tablet Take 1 tablet (10 mg total) by mouth once daily.    famotidine (PEPCID) 20 MG tablet TAKE 1 TABLET EVERY NIGHT AS NEEDED FOR HEARTBURN    fluticasone (FLONASE) 50 mcg/actuation nasal spray USE 1 SPRAY IN EACH NOSTRIL TWICE DAILY AS NEEDED  FOR  RHINITIS    gabapentin (NEURONTIN) 600 MG tablet TAKE 1 TABLET THREE TIMES DAILY    hydrocortisone (CORTEF) 5 MG Tab Take 5 mg by mouth once daily. As needed    levothyroxine (SYNTHROID) 50 MCG tablet TAKE 1 TABLET EVERY DAY    milk thistle 200 mg Cap Take 200 mg by mouth 2 (two) times daily. Twice a day    mupirocin (BACTROBAN) 2 % ointment Apply topically 3 (three) times daily.    olmesartan (BENICAR) 20 MG tablet Take 1 tablet (20 mg total) by mouth 2 (two) times daily.    omega-3 fatty acids 1,000 mg Cap Twice a day    pantoprazole (PROTONIX) 40 MG tablet TAKE 1 TABLET TWICE DAILY    potassium chloride SA (K-DUR,KLOR-CON M) 10 MEQ tablet Take 2 tablets (20 mEq total) by mouth every morning AND 4 tablets (40 mEq total) with lunch AND 4 tablets (40 mEq total) daily with dinner or evening meal.    predniSONE (DELTASONE) 1 MG tablet TAKE 2 TABLETS ONE TIME DAILY    predniSONE (DELTASONE) 5 MG tablet TAKE 1 TABLET ONE TIME DAILY    sildenafil (REVATIO) 20 mg Tab Take 1 to 3 tabs daily as needed.    tiotropium (SPIRIVA WITH HANDIHALER) 18 mcg inhalation capsule INHALE THE CONTENTS OF 1 CAPSULE EVERY DAY (CONTROLLER)     No current facility-administered medications for this visit.       SOCIAL HISTORY:  Social History[1]    FAMILY HISTORY:  Family History   Problem Relation Name Age of Onset    Cataracts Mother      Heart disease Mother          CHF    Hypertension Mother      Hyperlipidemia Mother      Cataracts Father      Glaucoma Father      Heart disease Father      Hyperlipidemia Sister      Hypertension Sister      No Known Problems Daughter      No Known Problems Daughter      Collagen disease Neg Hx      Amblyopia Neg Hx      Blindness Neg  Hx      Macular degeneration Neg Hx      Retinal detachment Neg Hx      Strabismus Neg Hx      Cancer Neg Hx      Colon cancer Neg Hx      Esophageal cancer Neg Hx      Stomach cancer Neg Hx      Crohn's disease Neg Hx      Ulcerative colitis Neg Hx           PHYSICAL EXAMINATION:  Vitals:    25 0936   BP: 135/72   Pulse: 64     Physical Exam  Constitutional:       General: He is not in acute distress.     Appearance: Normal appearance.   Neurological:      Mental Status: He is alert and oriented to person, place, and time.   Psychiatric:         Mood and Affect: Mood normal.         Judgment: Judgment normal.       ASSESSMENT/PLAN:  Clinical stage IIC (T1c, N0, M0, GG3, PSA 10 - 20) prostate cancer    ECO    I had a long discussion with the patient and his wife.  We reviewed his presentation and pathology.  Explained he is considered to have unfavorable intermediate risk prostate cancer.  Discussed the implications of this classification.  Explained given his PSA and Alannah score, we would recommend he consider some form of definitive therapy.  Given his age and general health, I recommended consideration of definitive external beam radiotherapy via IMRT / IGRT delivering 70 Gy in 28 fractions to the prostate and seminal vesicles.  We reviewed the rational for treatment and the procedures, risks and benefits of radiotherapy.  Discussed the acute and long term side effects of radiotherapy.  Explained with unfavorable intermediate risk disease, we also consider combining radiotherapy with short course hormonal deprivation therapy.  Discussed the rational for combined modality therapy.  Reviewed the acute and long term side effects of hormonal deprivation therapy.  Recommend obtaining a Polaris molecular score to determine if he benefits from combined modality therapy.  Will plan follow discussions once his Polaris score returns.  From a radiotherapy standpoint.  He will likely undergo placement of markers  and Space OAR at [facility] followed by treatment in [place].  Thank you for allowing us to participate in the care of this patient.     I spent approximately 50 minutes reviewing the available records and evaluating the patient, out of which over 50% of the time was spent face to face with the patient in counseling and coordinating this patient's care.             [1]   Social History  Socioeconomic History    Marital status:    Tobacco Use    Smoking status: Never    Smokeless tobacco: Never    Tobacco comments:     when a child   Substance and Sexual Activity    Alcohol use: Yes     Comment: rarely    Drug use: No    Sexual activity: Yes     Partners: Female     Social Drivers of Health     Financial Resource Strain: Low Risk  (3/24/2025)    Overall Financial Resource Strain (CARDIA)     Difficulty of Paying Living Expenses: Not hard at all   Food Insecurity: No Food Insecurity (3/24/2025)    Hunger Vital Sign     Worried About Running Out of Food in the Last Year: Never true     Ran Out of Food in the Last Year: Never true   Transportation Needs: No Transportation Needs (3/24/2025)    PRAPARE - Transportation     Lack of Transportation (Medical): No     Lack of Transportation (Non-Medical): No   Physical Activity: Inactive (3/24/2025)    Exercise Vital Sign     Days of Exercise per Week: 0 days     Minutes of Exercise per Session: 0 min   Stress: No Stress Concern Present (3/24/2025)    [employer] - Occupational Stress Questionnaire     Feeling of Stress : Not at all   Housing Stability: Low Risk  (3/24/2025)    Housing Stability Vital Sign     Unable to Pay for Housing in the Last Year: No     Number of Times Moved in the Last Year: 0     Homeless in the Last Year: No

## 2025-04-17 NOTE — LETTER
April 17, 2025        Brian Marroquin MD  [address] [facility] [address]             [facility] - Urology 2nd Fl  [address]  [facility] [address]  Phone: [phone]   Patient: Maximilian Tavera Jr.   MR Number: [id number]   YOB: 1945   Date of Visit: 4/17/2025       Dear Dr. Marroquin:    Thank you for referring Maximilian Tavera to me for evaluation. Attached you will find relevant portions of my assessment and plan of care.    If you have questions, please do not hesitate to call me. I look forward to following Maximilian Tavera along with you.    Sincerely,      Brian An MD            CC  No Recipients    Enclosure

## 2025-04-17 NOTE — Clinical Note
check with Dr. An's nurse to see if they ordered Polaris.  Schedule virtual visit to discuss the results.

## 2025-04-23 ENCOUNTER — PATIENT MESSAGE (OUTPATIENT)
Dept: CARDIOLOGY | Facility: CLINIC | Age: 80
End: 2025-04-23
Payer: MEDICARE

## 2025-04-25 ENCOUNTER — TELEPHONE (OUTPATIENT)
Dept: NEUROLOGY | Facility: CLINIC | Age: 80
End: 2025-04-25
Payer: MEDICARE

## 2025-04-25 DIAGNOSIS — E03.9 HYPOTHYROIDISM: ICD-10-CM

## 2025-04-25 RX ORDER — EZETIMIBE 10 MG/1
10 TABLET ORAL
Qty: 90 TABLET | Refills: 0 | Status: SHIPPED | OUTPATIENT
Start: 2025-04-25

## 2025-04-25 RX ORDER — LEVOTHYROXINE SODIUM 50 UG/1
50 TABLET ORAL
Qty: 90 TABLET | Refills: 0 | Status: SHIPPED | OUTPATIENT
Start: 2025-04-25

## 2025-04-25 NOTE — TELEPHONE ENCOUNTER
Pt called to request an appointment with Dr. Conn after transferring from Dr. Hoang to manage his myasthenia gravis. Pt scheduled for appointment on 06/11/2025 at 8am.

## 2025-04-25 NOTE — TELEPHONE ENCOUNTER
No care due was identified.  [facility] Embedded Care Due Messages. Reference number: [id number].   4/25/2025 2:40:04 PM CDT

## 2025-04-25 NOTE — TELEPHONE ENCOUNTER
----- Message from Mikayla sent at 4/25/2025  1:03 PM CDT -----  Regarding: Sooner Appointment Request  Contact: patient at [phone]  Type:  Sooner Appointment RequestName of Caller:  patient at [phone] is the first available appointment?  NoneSymptoms:  MG, was seeing Dr. César HoangAdditional Information:  last seen by dr. Hoang in 2/2024. Please call and advise. Thank you

## 2025-04-25 NOTE — TELEPHONE ENCOUNTER
Refill Decision Note   Maximilian Tavera  is requesting a refill authorization.  Brief Assessment and Rationale for Refill:  Approve     Medication Therapy Plan:  FOVS      Comments:     Note composed:1:04 PM 04/25/2025

## 2025-04-25 NOTE — TELEPHONE ENCOUNTER
Refill Decision Note   Maximilian Tavera  is requesting a refill authorization.  Brief Assessment and Rationale for Refill:  Approve     Medication Therapy Plan:        Comments:     Note composed:2:47 PM 04/25/2025

## 2025-04-28 ENCOUNTER — TELEPHONE (OUTPATIENT)
Dept: PRIMARY CARE CLINIC | Facility: CLINIC | Age: 80
End: 2025-04-28
Payer: MEDICARE

## 2025-04-28 NOTE — TELEPHONE ENCOUNTER
Duncan pt via phone in regards to r/s appt with Dr. Olmedo. Pt has agreed to new appt time and date with Mrs. Castanon

## 2025-04-29 ENCOUNTER — OFFICE VISIT (OUTPATIENT)
Dept: PRIMARY CARE CLINIC | Facility: CLINIC | Age: 80
End: 2025-04-29
Payer: MEDICARE

## 2025-04-29 VITALS
OXYGEN SATURATION: 96 % | HEIGHT: 73 IN | TEMPERATURE: 98 F | WEIGHT: 239.63 LBS | SYSTOLIC BLOOD PRESSURE: 110 MMHG | HEART RATE: 61 BPM | BODY MASS INDEX: 31.76 KG/M2 | DIASTOLIC BLOOD PRESSURE: 66 MMHG

## 2025-04-29 DIAGNOSIS — E03.4 HYPOTHYROIDISM DUE TO ACQUIRED ATROPHY OF THYROID: ICD-10-CM

## 2025-04-29 DIAGNOSIS — J44.89 COPD WITH ASTHMA: ICD-10-CM

## 2025-04-29 DIAGNOSIS — G70.00 MYASTHENIA GRAVIS, ACHR ANTIBODY POSITIVE: ICD-10-CM

## 2025-04-29 DIAGNOSIS — E78.5 HYPERLIPIDEMIA, UNSPECIFIED HYPERLIPIDEMIA TYPE: Primary | ICD-10-CM

## 2025-04-29 DIAGNOSIS — I10 HTN (HYPERTENSION), BENIGN: ICD-10-CM

## 2025-04-29 DIAGNOSIS — E66.811 OBESITY (BMI 30.0-34.9): ICD-10-CM

## 2025-04-29 DIAGNOSIS — M54.32 LEFT SIDED SCIATICA: ICD-10-CM

## 2025-04-29 DIAGNOSIS — R79.9 ABNORMAL FINDING OF BLOOD CHEMISTRY, UNSPECIFIED: ICD-10-CM

## 2025-04-29 DIAGNOSIS — C61 PROSTATE CANCER: ICD-10-CM

## 2025-04-29 DIAGNOSIS — M54.16 LUMBAR RADICULITIS: ICD-10-CM

## 2025-04-29 PROCEDURE — 1157F ADVNC CARE PLAN IN RCRD: CPT | Mod: CPTII,HCNC,S$GLB, | Performed by: NURSE PRACTITIONER

## 2025-04-29 PROCEDURE — 1126F AMNT PAIN NOTED NONE PRSNT: CPT | Mod: CPTII,HCNC,S$GLB, | Performed by: NURSE PRACTITIONER

## 2025-04-29 PROCEDURE — 99999 PR PBB SHADOW E&M-EST. PATIENT-LVL V: CPT | Mod: PBBFAC,HCNC,, | Performed by: NURSE PRACTITIONER

## 2025-04-29 PROCEDURE — 3288F FALL RISK ASSESSMENT DOCD: CPT | Mod: CPTII,HCNC,S$GLB, | Performed by: NURSE PRACTITIONER

## 2025-04-29 PROCEDURE — 1101F PT FALLS ASSESS-DOCD LE1/YR: CPT | Mod: CPTII,HCNC,S$GLB, | Performed by: NURSE PRACTITIONER

## 2025-04-29 PROCEDURE — 1160F RVW MEDS BY RX/DR IN RCRD: CPT | Mod: CPTII,HCNC,S$GLB, | Performed by: NURSE PRACTITIONER

## 2025-04-29 PROCEDURE — 1159F MED LIST DOCD IN RCRD: CPT | Mod: CPTII,HCNC,S$GLB, | Performed by: NURSE PRACTITIONER

## 2025-04-29 PROCEDURE — 3074F SYST BP LT 130 MM HG: CPT | Mod: CPTII,HCNC,S$GLB, | Performed by: NURSE PRACTITIONER

## 2025-04-29 PROCEDURE — 99214 OFFICE O/P EST MOD 30 MIN: CPT | Mod: HCNC,S$GLB,, | Performed by: NURSE PRACTITIONER

## 2025-04-29 PROCEDURE — 3078F DIAST BP <80 MM HG: CPT | Mod: CPTII,HCNC,S$GLB, | Performed by: NURSE PRACTITIONER

## 2025-04-29 NOTE — PROGRESS NOTES
Subjective:       Patient ID: Maximilian Tavera Jr. is a 79 y.o. male.    Chief Complaint: Follow-up    HPI    Patient presents today for follow up. Last visit with PCP- on     4/17/25 Radiation Oncology-Dr. Giancarlo Aranda: Explained he is considered to have unfavorable intermediate risk prostate cancer. Discussed the implications of this classification. Explained given his PSA and Alannah score, we would recommend he consider some form of definitive therapy. Given his age and general health, I recommended consideration of definitive external beam radiotherapy via IMRT / IGRT delivering 70 Gy in 28 fractions to the prostate and seminal vesicles.   Recommend obtaining a Polaris molecular score to determine if he benefits from combined modality therapy. Will plan follow discussions once his Polaris score returns. From a radiotherapy standpoint       2/11/25 Cardiology- Dr. Amezquita: -BLE lymphedema, venous insuffciency, HTN, HLD, myasthenia gravis on chronic prednisone therapy, degenerative joint disease with myelopathy s/p decompression and fusion of C2-6, chronic PVCs -Continue Eliquis 5 mg bid. Start bumex 0.5 mg every other day.  Pt to limit sodium intake to 2,000 mg daily. Limit volume intake to 1.5 liters daily.      12/10/24 Urology-Dr. Brian An- elevated PSA-transrectal US-cipro and ceftriaxone  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    Disease of spinal cord, unspecified 1/5/2024    Emphysema lung 11/05/2018    Gastritis     Hx of colonic polyp     Hyperlipidemia     Hypertension     Hypothyroidism     Knee fracture     Myasthenia gravis     Neuropathy 01/03/2013    Obesity 01/29/2015    Pneumonia 03/29/2020    Polyneuropathy     Prostate cancer  "3/20/2025    PVC (premature ventricular contraction)     Squamous cell carcinoma 2014    left forearm    Thyroid disease        Review of patient's allergies indicates:   Allergen Reactions    Spironolactone Diarrhea       Current Medications[1]    Review of Systems   Constitutional:  Negative for unexpected weight change.   HENT:  Negative for trouble swallowing.    Eyes:  Negative for visual disturbance.   Respiratory:  Negative for shortness of breath.    Cardiovascular:  Negative for chest pain, palpitations and leg swelling.   Gastrointestinal:  Negative for blood in stool.   Genitourinary:  Negative for hematuria.   Skin:  Negative for rash.   Allergic/Immunologic: Negative for immunocompromised state.   Neurological:  Negative for headaches.   Hematological:  Does not bruise/bleed easily.   Psychiatric/Behavioral:  Negative for agitation. The patient is not nervous/anxious.        Objective:      /66 (BP Location: Left arm, Patient Position: Sitting)   Pulse 61   Temp 98.1 °F (36.7 °C) (Oral)   Ht 6' 1" (1.854 m)   Wt 108.7 kg (239 lb 10.2 oz)   SpO2 96%   BMI 31.62 kg/m²   Physical Exam  Constitutional:       Appearance: He is well-developed. He is obese.   Eyes:      Conjunctiva/sclera: Conjunctivae normal.      Pupils: Pupils are equal, round, and reactive to light.   Cardiovascular:      Rate and Rhythm: Normal rate and regular rhythm.      Heart sounds: Normal heart sounds.   Pulmonary:      Effort: Pulmonary effort is normal.   Musculoskeletal:         General: Normal range of motion.   Neurological:      Mental Status: He is alert and oriented to person, place, and time.   Psychiatric:         Behavior: Behavior normal.         Thought Content: Thought content normal.         Judgment: Judgment normal.         Assessment:       1. Hyperlipidemia, unspecified hyperlipidemia type    2. Left sided sciatica    3. Hypothyroidism due to acquired atrophy of thyroid    4. Prostate cancer    5. " "Abnormal finding of blood chemistry, unspecified    6. Lumbar radiculitis    7. Myasthenia gravis, AChR antibody positive    8. COPD with asthma    9. HTN (hypertension), benign    10. Obesity (BMI 30.0-34.9)        Plan:       Hyperlipidemia, unspecified hyperlipidemia type  -     Hemoglobin A1C; Future; Expected date: 04/29/2025  -     Microalbumin/Creatinine Ratio, Urine; Future; Expected date: 04/29/2025  -     Lipid Panel; Future; Expected date: 04/29/2025  On Lipitor  Stable, continue management  Left sided sciatica  Stable, continue management  Hypothyroidism due to acquired atrophy of thyroid  -     Comprehensive Metabolic Panel; Future; Expected date: 04/29/2025  -     CBC W/ AUTO DIFFERENTIAL; Future; Expected date: 04/29/2025  Stable, on Levothyroxine  Prostate cancer  Stable, continue follow up  Abnormal finding of blood chemistry, unspecified  -     Hemoglobin A1C; Future; Expected date: 04/29/2025    Lumbar radiculitis  Stable, continue management  Myasthenia gravis, AChR antibody positive  Continue follow up  COPD with asthma  Stable, continue management  HTN (hypertension), benign  Stable, continue management  Low sodium  BP Readings from Last 3 Encounters:   04/29/25 110/66   04/17/25 135/72   04/17/25 135/72      Obesity (BMI 30.0-34.9)  Counseled patient on his ideal body weight, health consequences of being obese and current recommendations including weekly exercise and a heart healthy diet.  Current BMI is:Estimated body mass index is 31.62 kg/m² as calculated from the following:    Height as of this encounter: 6' 1" (1.854 m).    Weight as of this encounter: 108.7 kg (239 lb 10.2 oz)..  Patient is aware that ideal BMI < 25 or Weight in (lb) to have BMI = 25: 189.1.           Patient readiness: acceptance and barriers:none    During the course of the visit the patient was educated and counseled about the following:     Hypertension:   Dietary sodium restriction.  Regular aerobic " exercise.  Obesity:   General weight loss/lifestyle modification strategies discussed (elicit support from others; identify saboteurs; non-food rewards, etc).  Regular aerobic exercise program discussed.    Goals: Hypertension: Reduce Blood Pressure and Obesity: Reduce calorie intake and BMI    Did patient meet goals/outcomes: Yes    The following self management tools provided: declined    Patient Instructions (the written plan) was given to the patient/family.     Time spent with patient: 30 minutes    Barriers to medications present (no )    Adverse reactions to current medications (no)    Over the counter medications reviewed (Yes)               [1]   Current Outpatient Medications:     albuterol (PROVENTIL/VENTOLIN HFA) 90 mcg/actuation inhaler, Inhale 2 puffs into the lungs every 6 (six) hours as needed for Wheezing. Rescue, Disp: 25.5 g, Rfl: 4    albuterol-ipratropium (DUO-NEB) 2.5 mg-0.5 mg/3 mL nebulizer solution, Take 3 mLs by nebulization every 6 (six) hours as needed for Wheezing. Rescue, Disp: 75 mL, Rfl: 3    atorvastatin (LIPITOR) 80 MG tablet, TAKE 1 TABLET EVERY DAY, Disp: 90 tablet, Rfl: 1    bumetanide (BUMEX) 0.5 MG Tab, Take 1 tablet (0.5 mg total) by mouth every other day., Disp: 45 tablet, Rfl: 3    carvediloL (COREG) 25 MG tablet, TAKE 1 TABLET TWICE DAILY WITH MEALS, Disp: 180 tablet, Rfl: 3    cetirizine (ZYRTEC) 10 MG tablet, Take 1 tablet by mouth daily as needed., Disp: , Rfl:     chlorthalidone (HYGROTEN) 25 MG Tab, TAKE 1 TABLET ONE TIME DAILY, Disp: 90 tablet, Rfl: 3    cholecalciferol, vitamin D3, (VITAMIN D3) 50 mcg (2,000 unit) Cap, 1 capsule once daily. Every day, Disp: , Rfl:     coenzyme Q10 (CO Q-10) 100 mg capsule, Take 400 mg by mouth once daily., Disp: , Rfl:     cyanocobalamin, vitamin B-12, 5,000 mcg Subl, Take 5,000 mcg by mouth once daily. Every day, Disp: , Rfl:     diphenoxylate-atropine 2.5-0.025 mg (LOMOTIL) 2.5-0.025 mg per tablet, Take 1 tablet by mouth 4 (four)  times daily as needed for Diarrhea., Disp: 90 tablet, Rfl: 0    ELIQUIS 5 mg Tab, TAKE 1 TABLET TWICE DAILY, Disp: 180 tablet, Rfl: 3    ezetimibe (ZETIA) 10 mg tablet, TAKE 1 TABLET EVERY DAY, Disp: 90 tablet, Rfl: 0    famotidine (PEPCID) 20 MG tablet, TAKE 1 TABLET EVERY NIGHT AS NEEDED FOR HEARTBURN, Disp: 90 tablet, Rfl: 1    fluticasone (FLONASE) 50 mcg/actuation nasal spray, USE 1 SPRAY IN EACH NOSTRIL TWICE DAILY AS NEEDED  FOR  RHINITIS, Disp: 48 g, Rfl: 3    gabapentin (NEURONTIN) 600 MG tablet, TAKE 1 TABLET THREE TIMES DAILY, Disp: 90 tablet, Rfl: 3    hydrocortisone (CORTEF) 5 MG Tab, Take 5 mg by mouth once daily. As needed, Disp: , Rfl:     levothyroxine (SYNTHROID) 50 MCG tablet, TAKE 1 TABLET EVERY DAY, Disp: 90 tablet, Rfl: 0    milk thistle 200 mg Cap, Take 200 mg by mouth 2 (two) times daily. Twice a day, Disp: , Rfl:     mupirocin (BACTROBAN) 2 % ointment, Apply topically 3 (three) times daily., Disp: 30 g, Rfl: 0    olmesartan (BENICAR) 20 MG tablet, Take 1 tablet (20 mg total) by mouth 2 (two) times daily., Disp: 180 tablet, Rfl: 3    omega-3 fatty acids 1,000 mg Cap, Twice a day, Disp: , Rfl:     pantoprazole (PROTONIX) 40 MG tablet, TAKE 1 TABLET TWICE DAILY, Disp: 180 tablet, Rfl: 3    potassium chloride SA (K-DUR,KLOR-CON M) 10 MEQ tablet, Take 2 tablets (20 mEq total) by mouth every morning AND 4 tablets (40 mEq total) with lunch AND 4 tablets (40 mEq total) daily with dinner or evening meal., Disp: 900 tablet, Rfl: 3    predniSONE (DELTASONE) 1 MG tablet, TAKE 2 TABLETS ONE TIME DAILY, Disp: 180 tablet, Rfl: 3    predniSONE (DELTASONE) 5 MG tablet, TAKE 1 TABLET ONE TIME DAILY, Disp: 90 tablet, Rfl: 3    sildenafil (REVATIO) 20 mg Tab, Take 1 to 3 tabs daily as needed., Disp: 30 tablet, Rfl: 3    tiotropium (SPIRIVA WITH HANDIHALER) 18 mcg inhalation capsule, INHALE THE CONTENTS OF 1 CAPSULE EVERY DAY (CONTROLLER), Disp: 90 capsule, Rfl: 3    diazePAM (VALIUM) 5 MG tablet, Take 2 tablets  (10 mg total) by mouth On call Procedure for Anxiety. Take one hour prior to procedure (Patient not taking: Reported on 4/29/2025), Disp: 1 tablet, Rfl: 0

## 2025-05-06 DIAGNOSIS — M54.32 LEFT SIDED SCIATICA: Primary | ICD-10-CM

## 2025-05-06 NOTE — TELEPHONE ENCOUNTER
Pt calling VB nurse line requesting refill on Hydrocodone     LOV 04/29/25  LAB 02/25/25  Next OV 10/29/25

## 2025-05-07 DIAGNOSIS — I10 HTN (HYPERTENSION), BENIGN: ICD-10-CM

## 2025-05-07 DIAGNOSIS — I49.3 PVC'S (PREMATURE VENTRICULAR CONTRACTIONS): ICD-10-CM

## 2025-05-07 RX ORDER — CARVEDILOL 25 MG/1
25 TABLET ORAL 2 TIMES DAILY WITH MEALS
Qty: 180 TABLET | Refills: 3 | Status: SHIPPED | OUTPATIENT
Start: 2025-05-07

## 2025-05-07 RX ORDER — CHLORTHALIDONE 25 MG/1
25 TABLET ORAL
Qty: 90 TABLET | Refills: 0 | Status: SHIPPED | OUTPATIENT
Start: 2025-05-07

## 2025-05-07 NOTE — TELEPHONE ENCOUNTER
Refill Decision Note   Maximilian Tavera  is requesting a refill authorization.  Brief Assessment and Rationale for Refill:  Approve     Medication Therapy Plan:        Comments:     Note composed:3:46 PM 05/07/2025

## 2025-05-08 ENCOUNTER — OFFICE VISIT (OUTPATIENT)
Dept: ORTHOPEDICS | Facility: CLINIC | Age: 80
End: 2025-05-08
Payer: MEDICARE

## 2025-05-08 VITALS — WEIGHT: 239.63 LBS | HEIGHT: 73 IN | BODY MASS INDEX: 31.76 KG/M2

## 2025-05-08 DIAGNOSIS — M77.11 LATERAL EPICONDYLITIS, RIGHT ELBOW: Primary | ICD-10-CM

## 2025-05-08 PROCEDURE — 99999 PR PBB SHADOW E&M-EST. PATIENT-LVL II: CPT | Mod: PBBFAC,HCNC,, | Performed by: ORTHOPAEDIC SURGERY

## 2025-05-08 RX ORDER — TRIAMCINOLONE ACETONIDE 40 MG/ML
40 INJECTION, SUSPENSION INTRA-ARTICULAR; INTRAMUSCULAR
Status: DISCONTINUED | OUTPATIENT
Start: 2025-05-08 | End: 2025-05-08 | Stop reason: HOSPADM

## 2025-05-08 RX ADMIN — TRIAMCINOLONE ACETONIDE 40 MG: 40 INJECTION, SUSPENSION INTRA-ARTICULAR; INTRAMUSCULAR at 09:05

## 2025-05-08 NOTE — PROGRESS NOTES
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    Disease of spinal cord, unspecified 1/5/2024    Emphysema lung 11/05/2018    Gastritis     Hx of colonic polyp     Hyperlipidemia     Hypertension     Hypothyroidism     Knee fracture     Myasthenia gravis     Neuropathy 01/03/2013    Obesity 01/29/2015    Pneumonia 03/29/2020    Polyneuropathy     Prostate cancer 3/20/2025    PVC (premature ventricular contraction)     Squamous cell carcinoma 2014    left forearm    Thyroid disease        Past Surgical History:   Procedure Laterality Date    APPENDECTOMY      CATARACT EXTRACTION W/  INTRAOCULAR LENS IMPLANT Bilateral     COLONOSCOPY  03/01/2012    Dr Esteban; hyperplastic polyp; repeat in 5 years    COLONOSCOPY N/A 12/7/2021    Procedure: COLONOSCOPY;  Surgeon: Gabriel Hernandez MD;  Location: [facility];  Service: Endoscopy;  Laterality: N/A;    CYSTOSCOPY N/A 2/26/2019    Procedure: CYSTOSCOPY;  Surgeon: Simba Cheung MD;  Location: [facility];  Service: Urology;  Laterality: N/A;    ENDOSCOPIC ULTRASOUND OF UPPER GASTROINTESTINAL TRACT N/A 1/7/2020    Procedure: ULTRASOUND, UPPER GI TRACT, ENDOSCOPIC;  Surgeon: Kati Ryan MD;  Location: [facility] ([address] [place] [address]);  Service: Endoscopy;  Laterality: N/A;    ESOPHAGOGASTRODUODENOSCOPY N/A 9/12/2018    Procedure: EGD (ESOPHAGOGASTRODUODENOSCOPY);  Surgeon: Gabriel Hernandez MD;  Location: [facility];  Service: Endoscopy;  Laterality: N/A;    ESOPHAGOGASTRODUODENOSCOPY N/A 8/19/2019    Procedure: EGD (ESOPHAGOGASTRODUODENOSCOPY);  Surgeon: Gabriel Hernandez MD;  Location: [facility];  Service: Endoscopy;  Laterality: N/A;    ESOPHAGOGASTRODUODENOSCOPY N/A 10/7/2019    Procedure: EGD (ESOPHAGOGASTRODUODENOSCOPY);  Surgeon: Gabriel  CHRISTIAN Hernandez MD;  Location: [facility];  Service: Endoscopy;  Laterality: N/A;    ESOPHAGOGASTRODUODENOSCOPY N/A 1/7/2020    Procedure: EGD (ESOPHAGOGASTRODUODENOSCOPY);  Surgeon: Kati Ryan MD;  Location: [facility] (2ND FLR);  Service: Endoscopy;  Laterality: N/A;    HEMORRHOID SURGERY      INJECTION OF ANESTHETIC AGENT AROUND MEDIAL BRANCH NERVES INNERVATING LUMBAR FACET JOINT Bilateral 10/1/2020    Procedure: Block-nerve-medial branch-lumbar Bilateral L 3,4,5;  Surgeon: Devan Watt MD;  Location: [facility];  Service: Pain Management;  Laterality: Bilateral;    INJECTION OF ANESTHETIC AGENT AROUND MEDIAL BRANCH NERVES INNERVATING LUMBAR FACET JOINT Right 10/7/2022    Procedure: Block-nerve-medial branch-lumbar L3,4,5;  Surgeon: Devan Watt MD;  Location: [facility];  Service: Pain Management;  Laterality: Right;    KNEE ARTHROPLASTY Bilateral     LENGTHENING OF ACHILLES TENDON Right 9/11/2020    Procedure: percutaeous tenotomy of right lateral epicondyle (tenex);  Surgeon: Terry Quach MD;  Location: [facility];  Service: Neurosurgery;  Laterality: Right;  tenex to right lateral epicondyle  Tenex machine SN#[id number], total time 1min. 30seconds    NECK SURGERY      POSTERIOR FUSION OF CERVICAL SPINE WITH LAMINECTOMY N/A 11/7/2018    Procedure: C2-C6 Posterior Cervical Laminectomy & Instrumental Fusion;  Surgeon: Kishore Turner MD;  Location: [facility] 2ND FLR;  Service: Neurosurgery;  Laterality: N/A;    TONSILLECTOMY      TRANSFORAMINAL EPIDURAL INJECTION OF STEROID Right 12/20/2019    Procedure: Injection,steroid,epidural,transforaminal approach;  Surgeon: Devan Watt MD;  Location: [facility];  Service: Pain Management;  Laterality: Right;  L3-4, L4-5    TRANSFORAMINAL EPIDURAL INJECTION OF STEROID Bilateral 2/6/2020    Procedure: Injection,steroid,epidural,transforaminal approach;  Surgeon: Devan Watt MD;  Location: [facility];  Service: Pain Management;  Laterality: Bilateral;  L3-L4,L4-L5    TRANSFORAMINAL EPIDURAL  INJECTION OF STEROID Bilateral 8/26/2020    Procedure: Injection,steroid,epidural,transforaminal approach;  Surgeon: Devan Watt MD;  Location: [facility] OR;  Service: Pain Management;  Laterality: Bilateral;  L3-4, L4-5    TRANSRECTAL ULTRASOUND EXAMINATION N/A 2/26/2019    Procedure: ULTRASOUND, RECTAL APPROACH;  Surgeon: Simba Cheung MD;  Location: [facility] OR;  Service: Urology;  Laterality: N/A;    UPPER GASTROINTESTINAL ENDOSCOPY  09/12/2018    Dr Hernandez; gastritis; extensive intestinal metaplasia; repeat in 1-2- years       Current Outpatient Medications   Medication Sig    albuterol (PROVENTIL/VENTOLIN HFA) 90 mcg/actuation inhaler Inhale 2 puffs into the lungs every 6 (six) hours as needed for Wheezing. Rescue    albuterol-ipratropium (DUO-NEB) 2.5 mg-0.5 mg/3 mL nebulizer solution Take 3 mLs by nebulization every 6 (six) hours as needed for Wheezing. Rescue    atorvastatin (LIPITOR) 80 MG tablet TAKE 1 TABLET EVERY DAY    bumetanide (BUMEX) 0.5 MG Tab Take 1 tablet (0.5 mg total) by mouth every other day.    carvediloL (COREG) 25 MG tablet TAKE 1 TABLET TWICE DAILY WITH MEALS    cetirizine (ZYRTEC) 10 MG tablet Take 1 tablet by mouth daily as needed.    chlorthalidone (HYGROTEN) 25 MG Tab TAKE 1 TABLET EVERY DAY    cholecalciferol, vitamin D3, (VITAMIN D3) 50 mcg (2,000 unit) Cap 1 capsule once daily. Every day    coenzyme Q10 (CO Q-10) 100 mg capsule Take 400 mg by mouth once daily.    cyanocobalamin, vitamin B-12, 5,000 mcg Subl Take 5,000 mcg by mouth once daily. Every day    diazePAM (VALIUM) 5 MG tablet Take 2 tablets (10 mg total) by mouth On call Procedure for Anxiety. Take one hour prior to procedure (Patient not taking: Reported on 4/29/2025)    diphenoxylate-atropine 2.5-0.025 mg (LOMOTIL) 2.5-0.025 mg per tablet Take 1 tablet by mouth 4 (four) times daily as needed for Diarrhea.    ELIQUIS 5 mg Tab TAKE 1 TABLET TWICE DAILY    ezetimibe (ZETIA) 10 mg tablet TAKE 1 TABLET EVERY DAY    famotidine  (PEPCID) 20 MG tablet TAKE 1 TABLET EVERY NIGHT AS NEEDED FOR HEARTBURN    fluticasone (FLONASE) 50 mcg/actuation nasal spray USE 1 SPRAY IN EACH NOSTRIL TWICE DAILY AS NEEDED  FOR  RHINITIS    gabapentin (NEURONTIN) 600 MG tablet TAKE 1 TABLET THREE TIMES DAILY    hydrocortisone (CORTEF) 5 MG Tab Take 5 mg by mouth once daily. As needed    levothyroxine (SYNTHROID) 50 MCG tablet TAKE 1 TABLET EVERY DAY    milk thistle 200 mg Cap Take 200 mg by mouth 2 (two) times daily. Twice a day    mupirocin (BACTROBAN) 2 % ointment Apply topically 3 (three) times daily.    olmesartan (BENICAR) 20 MG tablet Take 1 tablet (20 mg total) by mouth 2 (two) times daily.    omega-3 fatty acids 1,000 mg Cap Twice a day    pantoprazole (PROTONIX) 40 MG tablet TAKE 1 TABLET TWICE DAILY    potassium chloride SA (K-DUR,KLOR-CON M) 10 MEQ tablet Take 2 tablets (20 mEq total) by mouth every morning AND 4 tablets (40 mEq total) with lunch AND 4 tablets (40 mEq total) daily with dinner or evening meal.    predniSONE (DELTASONE) 1 MG tablet TAKE 2 TABLETS ONE TIME DAILY    predniSONE (DELTASONE) 5 MG tablet TAKE 1 TABLET ONE TIME DAILY    sildenafil (REVATIO) 20 mg Tab Take 1 to 3 tabs daily as needed.    tiotropium (SPIRIVA WITH HANDIHALER) 18 mcg inhalation capsule INHALE THE CONTENTS OF 1 CAPSULE EVERY DAY (CONTROLLER)     No current facility-administered medications for this visit.       Review of patient's allergies indicates:   Allergen Reactions    No known drug allergies        Family History   Problem Relation Name Age of Onset    Cataracts Mother      Heart disease Mother          CHF    Hypertension Mother      Hyperlipidemia Mother      Cataracts Father      Glaucoma Father      Heart disease Father      Hyperlipidemia Sister      Hypertension Sister      No Known Problems Daughter      No Known Problems Daughter      Collagen disease Neg Hx      Amblyopia Neg Hx      Blindness Neg Hx      Macular degeneration Neg Hx      Retinal  detachment Neg Hx      Strabismus Neg Hx      Cancer Neg Hx      Colon cancer Neg Hx      Esophageal cancer Neg Hx      Stomach cancer Neg Hx      Crohn's disease Neg Hx      Ulcerative colitis Neg Hx         Social History     Socioeconomic History    Marital status:    Tobacco Use    Smoking status: Never    Smokeless tobacco: Never    Tobacco comments:     when a child   Substance and Sexual Activity    Alcohol use: Yes     Comment: rarely    Drug use: No    Sexual activity: Yes     Partners: Female     Social Drivers of Health     Financial Resource Strain: Low Risk  (3/24/2025)    Overall Financial Resource Strain (CARDIA)     Difficulty of Paying Living Expenses: Not hard at all   Food Insecurity: No Food Insecurity (3/24/2025)    Hunger Vital Sign     Worried About Running Out of Food in the Last Year: Never true     Ran Out of Food in the Last Year: Never true   Transportation Needs: No Transportation Needs (3/24/2025)    PRAPARE - Transportation     Lack of Transportation (Medical): No     Lack of Transportation (Non-Medical): No   Physical Activity: Inactive (3/24/2025)    Exercise Vital Sign     Days of Exercise per Week: 0 days     Minutes of Exercise per Session: 0 min   Stress: No Stress Concern Present (3/24/2025)    [employer] [place] [employer] - Occupational Stress Questionnaire     Feeling of Stress : Not at all   Housing Stability: Low Risk  (3/24/2025)    Housing Stability Vital Sign     Unable to Pay for Housing in the Last Year: No     Number of Times Moved in the Last Year: 0     Homeless in the Last Year: No       Chief Complaint:   Chief Complaint   Patient presents with    Right Elbow - Pain       History of present illness:  This is a 79-year-old male seen for right lateral elbow and forearm pain. This started back a couple of years ago.  Cannot lift anything and hurts a lot at night.   We injected him 2 years ago.  Pain started again 2 weeks ago.  Pain is a 5/10.   Severe pain at night.   MRI showed no tear.  Did show the lateral epicondylitis.  He tried Tenex already also.      Answers for HPI/ROS submitted by the patient on 12/23/2019   Leg pain  unexpected weight change: No  appetite change : No  sleep disturbance: No  IMMUNOCOMPROMISED: No  nervous/ anxious: No  dysphoric mood: No  rash: No  visual disturbance: No  eye redness: No  eye pain: No  ear pain: No  tinnitus: Yes  hearing loss: No  sinus pressure : Yes  nosebleeds: Yes  enviro allergies: No  food allergies: No  cough: No  shortness of breath: Yes  sweating: No  frequency: Yes  difficulty urinating: No  hematuria: No  chest pain: No  palpitations: No  nausea: No  vomiting: No  diarrhea: No  blood in stool: No  constipation: No  headaches: Yes  dizziness: No  numbness: No  seizures: No  joint swelling: No  myalgia: No  weakness: No  back pain: Yes  Pain Chronicity: recurrent  History of trauma: No  Onset: more than 1 month ago  Frequency: daily  Progression since onset: waxing and waning  Injury mechanism: twisting  injury location: at home  pain- numeric: 3/10  pain location: left knee  pain quality: aching  Radiating Pain: No  Aggravating factors: bending, extension, twisting  fever: No  inability to bear weight: No  itching: No  joint locking: No  limited range of motion: Yes  stiffness: Yes  tingling: No  Treatments tried: cold, heat, exercise, injection treatment, NSAIDs, OTC ointments  physical therapy: not tried  Improvement on treatment: mild      Physical Examination:    Vital Signs:    There were no vitals filed for this visit.      Body mass index is 31.62 kg/m².    This a well-developed, well nourished patient in no acute distress.  They are alert and oriented and cooperative to examination.  Pt. walks without an antalgic gait.      Examination of the right elbow shows no signs of rashes or erythema. The patient has no masses, ecchymosis, or effusion. The patient has full range of motion from 0-160°.  Patient has full pronation and supination. Patient is nontender along the medial epicondyle and moderately tender over the lateral epicondyle. Nontender over the olecranon process.  Nontender along the course of the UCL. Patient has a negative valgus stress test and milking maneuver. Negative Tinel's sign over the cubital tunnel. 2+ radial pulse. Intact light touch sensation.         X-rays:  X-rays of the left knee is reviewed which show severe lateral joint space narrowing on the right and more moderate to severe medial joint space wear on the left.  No significant change noted.  X-rays of the right elbow is  reviewed which show a small olecranon spur.    MRI of the right elbow from stand-up MRI:  Lateral epicondylitis.  Subcutaneous edema about the elbow.      Assessment:  Right lateral epicondylitis        Plan: I reviewed the findings with him.  I agreed to try another steroid injection to see if we can calm it down again.  follow up if needed.      This note was created using [email or web address] voice recognition software that occasionally misinterpreted phrases or words.    Consult note is delivered via Epic messaging service.

## 2025-05-08 NOTE — PROCEDURES
Tendon Origin: R elbow    Date/Time: 5/8/2025 9:30 AM    Performed by: Haroldo Campbell MD  Authorized by: Haroldo Campbell MD    Consent Done?:  Yes (Verbal)  Timeout: prior to procedure the correct patient, procedure, and site was verified    Indications:  Pain  Site marked: the procedure site was marked    Timeout: prior to procedure the correct patient, procedure, and site was verified    Location:  Elbow  Site:  R elbow  Prep: patient was prepped and draped in usual sterile fashion    Ultrasonic Guidance for Needle Placement?: No    Needle size:  22 G  Approach:  Anterolateral  Medications:  40 mg triamcinolone acetonide 40 mg/mL  Patient tolerance:  Patient tolerated the procedure well with no immediate complications

## 2025-05-09 RX ORDER — HYDROCODONE BITARTRATE AND ACETAMINOPHEN 7.5; 325 MG/1; MG/1
1 TABLET ORAL EVERY 6 HOURS PRN
Refills: 0 | OUTPATIENT
Start: 2025-05-09

## 2025-05-09 NOTE — TELEPHONE ENCOUNTER
[id number] - [place], [place] - [address] AT [facility] [address]

## 2025-05-12 ENCOUNTER — TELEPHONE (OUTPATIENT)
Dept: PRIMARY CARE CLINIC | Facility: CLINIC | Age: 80
End: 2025-05-12
Payer: MEDICARE

## 2025-05-12 NOTE — TELEPHONE ENCOUNTER
----- Message from Viviane sent at 5/12/2025  8:58 AM CDT -----  Contact: self  Type:  Needs Medical AdviceWho Called: selfSymptoms (please be specific): pt is needing refill on rx, HYDROcodone-acetaminophen (NORCO) 7.5-325 mg per tablet. Pharmacy name and phone #:  [facility] #[id number] - [place], [place] - [address] [facility] [address] AT [facility] [address] [facility] [id number] [address]Zbtlb: [phone] Fax: [phone] the patient rather a call back or a response via [facility]? callBest Call Back Number: [phone] Information: please advise and thank you

## 2025-05-13 ENCOUNTER — OFFICE VISIT (OUTPATIENT)
Dept: CARDIOLOGY | Facility: CLINIC | Age: 80
End: 2025-05-13
Payer: MEDICARE

## 2025-05-13 VITALS
HEIGHT: 73 IN | BODY MASS INDEX: 31.62 KG/M2 | SYSTOLIC BLOOD PRESSURE: 129 MMHG | HEART RATE: 63 BPM | DIASTOLIC BLOOD PRESSURE: 70 MMHG | OXYGEN SATURATION: 95 %

## 2025-05-13 DIAGNOSIS — I87.2 VENOUS STASIS DERMATITIS OF BOTH LOWER EXTREMITIES: ICD-10-CM

## 2025-05-13 DIAGNOSIS — I77.1 TORTUOUS AORTA: ICD-10-CM

## 2025-05-13 DIAGNOSIS — M79.89 LEG SWELLING: ICD-10-CM

## 2025-05-13 DIAGNOSIS — E78.2 MIXED HYPERLIPIDEMIA: ICD-10-CM

## 2025-05-13 DIAGNOSIS — I70.0 ABDOMINAL AORTIC ATHEROSCLEROSIS: ICD-10-CM

## 2025-05-13 DIAGNOSIS — I82.512 CHRONIC DEEP VEIN THROMBOSIS (DVT) OF FEMORAL VEIN OF LEFT LOWER EXTREMITY: Primary | ICD-10-CM

## 2025-05-13 DIAGNOSIS — I65.23 BILATERAL CAROTID ARTERY STENOSIS: ICD-10-CM

## 2025-05-13 DIAGNOSIS — I10 HTN (HYPERTENSION), BENIGN: ICD-10-CM

## 2025-05-13 DIAGNOSIS — C61 PROSTATE CANCER: ICD-10-CM

## 2025-05-13 DIAGNOSIS — I89.0 LYMPHEDEMA OF BOTH LOWER EXTREMITIES: ICD-10-CM

## 2025-05-13 DIAGNOSIS — I49.3 PVC'S (PREMATURE VENTRICULAR CONTRACTIONS): ICD-10-CM

## 2025-05-13 PROCEDURE — 3288F FALL RISK ASSESSMENT DOCD: CPT | Mod: CPTII,S$GLB,, | Performed by: INTERNAL MEDICINE

## 2025-05-13 PROCEDURE — 3074F SYST BP LT 130 MM HG: CPT | Mod: CPTII,S$GLB,, | Performed by: INTERNAL MEDICINE

## 2025-05-13 PROCEDURE — 99999 PR PBB SHADOW E&M-EST. PATIENT-LVL V: CPT | Mod: PBBFAC,HCNC,, | Performed by: INTERNAL MEDICINE

## 2025-05-13 PROCEDURE — 3078F DIAST BP <80 MM HG: CPT | Mod: CPTII,S$GLB,, | Performed by: INTERNAL MEDICINE

## 2025-05-13 PROCEDURE — 1159F MED LIST DOCD IN RCRD: CPT | Mod: CPTII,S$GLB,, | Performed by: INTERNAL MEDICINE

## 2025-05-13 PROCEDURE — 1100F PTFALLS ASSESS-DOCD GE2>/YR: CPT | Mod: CPTII,S$GLB,, | Performed by: INTERNAL MEDICINE

## 2025-05-13 PROCEDURE — 99215 OFFICE O/P EST HI 40 MIN: CPT | Mod: S$GLB,,, | Performed by: INTERNAL MEDICINE

## 2025-05-13 NOTE — PATIENT INSTRUCTIONS
Assessment/Plan:  Maximilian Tavera Jr. is a 79 y.o. male with BLE lymphedema, venous insuffciency, HTN, HLD, myasthenia gravis on chronic prednisone therapy, degenerative joint disease with myelopathy s/p decompression and fusion of C2-6, chronic PVCs, who presents for a follow up appointment.       1. LLE DVT- Likely provoked by underlying prostate cancer. He has now been diagnosed with prostate cancer (Bunn 4+3), and will start treatment soon. BLE Venous Ultrasound 3/13/2025 showed chronic DVT of the left superficial proximal/mid veins, and popliteal vein. Continue Eliquis 5 mg bid.      2. BLE Lymphedema/Venous Insufficiency- Refer back to lymphedema clinic. Continue bumex 0.5 mg half pill daily for leg swelling. Continue graduated compression hose.  Pt to limit sodium intake to 2,000 mg daily.  Limit volume intake to 1.5 liters daily.       3. HTN- Continue current medications.     4. Chronic PVC's- Stable.  Continue current medications and follow up with EP as scheduled.      5. HLD- Continue atorvastatin 80 mg daily.       Follow up in 3 months

## 2025-05-13 NOTE — PROGRESS NOTES
[facility]      Chief Complaint   Patient presents with    Chronic deep veinCChronic deep vein thrombosis (DVT) of fem       Patient ID: Maximilian Tavera Jr. is a 79 y.o. male with BLE lymphedema, venous insuffciency, HTN, HLD, myasthenia gravis on chronic prednisone therapy, degenerative joint disease with myelopathy s/p decompression and fusion of C2-6, chronic PVCs, who presents for a follow up appointment.  Pertinent history/events are as follows:     -Pt kindly referred by Dr. Altamirano for evaluation of leg swelling.    -At our initial clinic visit on 2/3/2022, Mr. Tavera reported leg swelling starting 3-4 months ago.  States his PCP wrapped his legs 2 weeks ago and he lost 8 pounds.  He has no claudication, rest pain, or tissue loss.  Plan:   Leg swelling- Due to lymphedema and possible venous insufficiency.  Check BLE venous reflux study and KATTY study.  Refer to lymphedema clinic.  Increase bumex to 1 mg bid every other day (0.5 mg bid on alternate days).  Pt to limit sodium intake to 2,000 mg daily.  Limit volume intake to 1.5 liters daily.  Check cmp in 1 week.    HTN- Continue current medications.   Chronic PVC's- Stable.  Continue current medications and follow up with EP as scheduled.    HLD- LDL is at goal of <70.  Continue current medications.    -At follow up clinic visit on 5/3/2022, Mr. Tavera reported significant improvement in BLE edema since completing lymphedema clinic therapy.  He continues to do lymphedema clinic techniques at home and wear graduated compression hose.  BLE Venous Reflux Study on 2/10/2022 revealed significant RLE venous reflux and no evidence of lower extremity DVT.  KATTY Study on 2/10/2022 revealed noncompressible ABIs bilaterally consistent with medial arterial calcification.  PVR waveforms are mildly abnormal bilaterally.  Pt states he stopped taking bumex due to frequent urination.   Plan:   Leg swelling- Mr. Tavera reports significant improvement in BLE  edema since completing lymphedema clinic therapy.  BLE Venous Reflux Study on 2/10/2022 revealed significant RLE venous reflux and no evidence of lower extremity DVT.  KATTY Study on 2/10/2022 revealed noncompressible ABIs bilaterally consistent with medial arterial calcification.  PVR waveforms are mildly abnormal bilaterally.  Continue lymphedema clinic techniques at home and wear graduated compression hose.  Pt to limit sodium intake to 2,000 mg daily.  Limit volume intake to 1.5 liters daily.  Check cmp in 1 week.    HTN- Continue current medications.   Chronic PVC's- Stable.  Continue current medications and follow up with EP as scheduled.    HLD- LDL is at goal of <70.  Continue atorvastatin 80 mg daily.       -At clinic visit on 2/28/2023, Mr. Tavera reported continued improvement in BLE edema.  He has no claudication or tissue loss.    Plan:   Leg swelling- Mr. Tavera reports continued improvement in BLE edema.  Continue lymphedema clinic techniques at home and wear graduated compression hose.  Pt to limit sodium intake to 2,000 mg daily.  Limit volume intake to 1.5 liters daily.  Check cmp in 1 week.    HTN- Continue current medications.   Chronic PVC's- Stable.  Continue current medications and follow up with EP as scheduled.    HLD- LDL is at goal of <70 (69 on 12/9/2022).  Continue atorvastatin 80 mg daily.       -At clinic visit on 7/13/2023 Mr. Tavera followed after admission to [facility] with LLE edema and found to have extensive LLE DVT.  He was discharged on full dose anticoagulation with Eliquis.   Plan:  LLE DVT- Appear unprovoked.  Refer to Heme/Onc for age appropriate cancer screening and hypercoagulable workup.  Continue Eliquis 5 mg bid.    BLE Lymphedema/Venous Insufficiency- Continue lymphedema clinic techniques at home and wear graduated compression hose.  Pt to limit sodium intake to 2,000 mg daily.  Limit volume intake to 1.5 liters daily.     HTN- Continue current medications.    Chronic PVC's- Stable.  Continue current medications and follow up with EP as scheduled.    HLD- LDL is at goal of <70 (69 on 12/9/2022).  Continue atorvastatin 80 mg daily.     Follow up in 3 months with BLE venous ultrasound prior.    11/16/2023 clinic visit: Mr. Tavera reports no pain in either legs, Swelling has improved. He also reports he has completed the Lymphedema therapy. He was admitted to [facility] with LLE edema and found to have extensive LLE DVT.  He was discharged on full dose anticoagulation with Eliquis. LLE Venous Ultrasound on 7/1/2023 revealed Left thigh veins: There is occlusive and nonocclusive thrombus involving the common femoral, superficial femoral and popliteal veins.  Greater saphenous vein is patent without intraluminal thrombus. Left calf veins: There is occlusive thrombus within posterior tibial vein.  Anterior tibial and peroneal veins are patent without intraluminal thrombus. BLE Venous Ultrasound  on 11/1/2023 revealed is no evidence of a right lower extremity DVT. Left superficial femoral proximal vein DVT. Left superficial femoral middle vein DVT. Left popliteal vein DVT  Plan:    LLE DVT- Appear unprovoked.  LLE Venous Ultrasound on 7/1/2023 revealed Left thigh veins: There is occlusive and nonocclusive thrombus involving the common femoral, superficial femoral and popliteal veins.  Greater saphenous vein is patent without intraluminal thrombus. Left calf veins: There is occlusive thrombus within posterior tibial vein.  Anterior tibial and peroneal veins are patent without intraluminal thrombus. BLE Venous Ultrasound  on 11/1/2023 revealed is no evidence of a right lower extremity DVT. Left superficial femoral proximal vein DVT. Left superficial femoral middle vein DVT. Left popliteal vein DVT. Following Heme/Onc. Continue Eliquis 5 mg bid. Will continue Eliquis lifelong, we will also consider decrease the dose of eliquis after total 12 months of full dose therapy.    BLE Lymphedema/Venous Insufficiency- Continue lymphedema clinic techniques at home and wear graduated compression hose.  Pt to limit sodium intake to 2,000 mg daily.  Limit volume intake to 1.5 liters daily.     HTN- Continue current medications.   Chronic PVC's- Stable.  Continue current medications and follow up with EP as scheduled.    HLD- LDL is at goal of <70 ( 63 on 2/22/2023 vs 69 on 12/9/2022).  Continue atorvastatin 80 mg daily.       7/16/2024 clinic visit: Mr. Tavera reports doing well with no chest pain or SOB.  Exam shows LLE with no evidence of phlegmasia cerulea dolens.  LLE Venous Ultrasound on 5/29/2024 revealed occlusive deep vein thrombosis is not presently seen with small amounts of residual thrombus in the distal femoral and popliteal veins noted.  Plan:  LLE DVT- Appears unprovoked.  Exam shows LLE with no evidence of phlegmasia cerulea dolens.  LLE Venous Ultrasound on 5/29/2024 revealed occlusive deep vein thrombosis is not presently seen with small amounts of residual thrombus in the distal femoral and popliteal veins noted. Continue Eliquis 5 mg bid.  BLE Lymphedema/Venous Insufficiency- Continue lymphedema clinic techniques at home and wear graduated compression hose.  Pt to limit sodium intake to 2,000 mg daily.  Limit volume intake to 1.5 liters daily.     HTN- Continue current medications.   Chronic PVC's- Stable.  Continue current medications and follow up with EP as scheduled.    HLD- LDL is at goal of <70 ( 69 on 3/28/2024).  Continue atorvastatin 80 mg daily.       2/11/2025 clinic visit: Mr. Tavera reports worsening swelling of the LLE which started  5-6 weeks ago. He has no chest pain or SOB.  Exam shows LLE with 2 pitting edema with no evidence of phlegmasia cerulea dolens.   Plan:  LLE DVT- Appears unprovoked.  Mr. Tavera reports worsening swelling of the LLE which started  5-6 weeks ago. He has no chest pain or SOB.  Exam shows LLE with 2 pitting edema with no evidence of  phlegmasia cerulea dolens. Check BLE venous ultrasound today. Continue Eliquis 5 mg bid. Start bumex 0.5 mg every other day. Check cmp in 1 week.   BLE Lymphedema/Venous Insufficiency- Continue lymphedema clinic techniques at home and wear graduated compression hose.  Pt to limit sodium intake to 2,000 mg daily.  Limit volume intake to 1.5 liters daily.     HTN- Continue current medications.   Chronic PVC's- Stable.  Continue current medications and follow up with EP as scheduled.    HLD- Continue atorvastatin 80 mg daily.       HPI:  Mr. Tavera reports continued leg swelling. He is tolerating bumex 0.5 mg half pill daily. He has now been diagnosed with prostate cancer ([place] 4+3), and will start treatment soon. Exam shows LLE with 2 pitting edema with no evidence of phlegmasia cerulea dolens.  BLE Venous Ultrasound 3/13/2025 showed chronic DVT of the left superficial proximal/mid veins, and popliteal vein.     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    Disease of spinal cord, unspecified 1/5/2024    Emphysema lung 11/05/2018    Gastritis     Hx of colonic polyp     Hyperlipidemia     Hypertension     Hypothyroidism     Knee fracture     Myasthenia gravis     Neuropathy 01/03/2013    Obesity 01/29/2015    Pneumonia 03/29/2020    Polyneuropathy     Prostate cancer 3/20/2025    PVC (premature ventricular contraction)     Squamous cell carcinoma 2014    left forearm    Thyroid disease      Past Surgical History:   Procedure Laterality Date    APPENDECTOMY      CATARACT EXTRACTION W/  INTRAOCULAR LENS IMPLANT Bilateral     COLONOSCOPY  03/01/2012    Dr Esteban; hyperplastic polyp; repeat in 5 years    COLONOSCOPY N/A 12/7/2021    Procedure: COLONOSCOPY;  Surgeon: Gabriel GONZALES  MD David;  Location: [facility];  Service: Endoscopy;  Laterality: N/A;    CYSTOSCOPY N/A 2/26/2019    Procedure: CYSTOSCOPY;  Surgeon: Simba Cheung MD;  Location: [facility];  Service: Urology;  Laterality: N/A;    ENDOSCOPIC ULTRASOUND OF UPPER GASTROINTESTINAL TRACT N/A 1/7/2020    Procedure: ULTRASOUND, UPPER GI TRACT, ENDOSCOPIC;  Surgeon: Kati Ryan MD;  Location: [facility] (2ND FLR);  Service: Endoscopy;  Laterality: N/A;    ESOPHAGOGASTRODUODENOSCOPY N/A 9/12/2018    Procedure: EGD (ESOPHAGOGASTRODUODENOSCOPY);  Surgeon: Gabriel Hernandez MD;  Location: [facility];  Service: Endoscopy;  Laterality: N/A;    ESOPHAGOGASTRODUODENOSCOPY N/A 8/19/2019    Procedure: EGD (ESOPHAGOGASTRODUODENOSCOPY);  Surgeon: Gabriel Hernandez MD;  Location: [facility];  Service: Endoscopy;  Laterality: N/A;    ESOPHAGOGASTRODUODENOSCOPY N/A 10/7/2019    Procedure: EGD (ESOPHAGOGASTRODUODENOSCOPY);  Surgeon: Gabriel Hernandez MD;  Location: [facility];  Service: Endoscopy;  Laterality: N/A;    ESOPHAGOGASTRODUODENOSCOPY N/A 1/7/2020    Procedure: EGD (ESOPHAGOGASTRODUODENOSCOPY);  Surgeon: Kati Ryan MD;  [facility]  Service: Endoscopy;  Laterality: N/A;    HEMORRHOID SURGERY      INJECTION OF ANESTHETIC AGENT AROUND MEDIAL BRANCH NERVES INNERVATING LUMBAR FACET JOINT Bilateral 10/1/2020    Procedure: Block-nerve-medial branch-lumbar Bilateral L 3,4,5;  Surgeon: Devan Watt MD;  Location: [facility];  Service: Pain Management;  Laterality: Bilateral;    INJECTION OF ANESTHETIC AGENT AROUND MEDIAL BRANCH NERVES INNERVATING LUMBAR FACET JOINT Right 10/7/2022    Procedure: Block-nerve-medial branch-lumbar L3,4,5;  Surgeon: Devan Watt MD;  Location: [facility];  Service: Pain Management;  Laterality: Right;    KNEE ARTHROPLASTY Bilateral     LENGTHENING OF ACHILLES TENDON Right 9/11/2020    Procedure: percutaeous tenotomy of right lateral epicondyle (tenex);  Surgeon: Terry Quach MD;  Location: [facility]  OR;  Service: Neurosurgery;  Laterality: Right;  tenex to right lateral epicondyle  Tenex machine SN#[id number], total time 1min. 30seconds    NECK SURGERY      POSTERIOR FUSION OF CERVICAL SPINE WITH LAMINECTOMY N/A 11/7/2018    Procedure: C2-C6 Posterior Cervical Laminectomy & Instrumental Fusion;  Surgeon: Kishore Turner MD;  Location: [address];  Service: Neurosurgery;  Laterality: N/A;    TONSILLECTOMY      TRANSFORAMINAL EPIDURAL INJECTION OF STEROID Right 12/20/2019    Procedure: Injection,steroid,epidural,transforaminal approach;  Surgeon: eDvan Watt MD;  [facility];  Service: Pain Management;  Laterality: Right;  L3-4, L4-5    TRANSFORAMINAL EPIDURAL INJECTION OF STEROID Bilateral 2/6/2020    Procedure: Injection,steroid,epidural,transforaminal approach;  Surgeon: Devan Watt MD;  [facility];  Service: Pain Management;  Laterality: Bilateral;  L3-L4,L4-L5    TRANSFORAMINAL EPIDURAL INJECTION OF STEROID Bilateral 8/26/2020    Procedure: Injection,steroid,epidural,transforaminal approach;  Surgeon: Devan Watt MD;  [facility];  Service: Pain Management;  Laterality: Bilateral;  L3-4, L4-5    TRANSRECTAL ULTRASOUND EXAMINATION N/A 2/26/2019    Procedure: ULTRASOUND, RECTAL APPROACH;  Surgeon: Simba Cheung MD;  [facility];  Service: Urology;  Laterality: N/A;    UPPER GASTROINTESTINAL ENDOSCOPY  09/12/2018    Dr Hernandez; gastritis; extensive intestinal metaplasia; repeat in 1-2- years     Social History     Socioeconomic History    Marital status:    Tobacco Use    Smoking status: Never    Smokeless tobacco: Never    Tobacco comments:     when a child   Substance and Sexual Activity    Alcohol use: Yes     Comment: rarely    Drug use: No    Sexual activity: Yes     Partners: Female     Social Drivers of Health     Financial Resource Strain: Low Risk  (3/24/2025)    Overall Financial Resource Strain (CARDIA)     Difficulty of Paying Living Expenses: Not hard at all    Food Insecurity: No Food Insecurity (3/24/2025)    Hunger Vital Sign     Worried About Running Out of Food in the Last Year: Never true     Ran Out of Food in the Last Year: Never true   Transportation Needs: No Transportation Needs (3/24/2025)    PRAPARE - Transportation     Lack of Transportation (Medical): No     Lack of Transportation (Non-Medical): No   Physical Activity: Inactive (3/24/2025)    Exercise Vital Sign     Days of Exercise per Week: 0 days     Minutes of Exercise per Session: 0 min   Stress: No Stress Concern Present (3/24/2025)    [employer] - Occupational Stress Questionnaire     Feeling of Stress : Not at all   Housing Stability: Low Risk  (3/24/2025)    Housing Stability Vital Sign     Unable to Pay for Housing in the Last Year: No     Number of Times Moved in the Last Year: 0     Homeless in the Last Year: No     Family History   Problem Relation Name Age of Onset    Cataracts Mother      Heart disease Mother          CHF    Hypertension Mother      Hyperlipidemia Mother      Cataracts Father      Glaucoma Father      Heart disease Father      Hyperlipidemia Sister      Hypertension Sister      No Known Problems Daughter      No Known Problems Daughter      Collagen disease Neg Hx      Amblyopia Neg Hx      Blindness Neg Hx      Macular degeneration Neg Hx      Retinal detachment Neg Hx      Strabismus Neg Hx      Cancer Neg Hx      Colon cancer Neg Hx      Esophageal cancer Neg Hx      Stomach cancer Neg Hx      Crohn's disease Neg Hx      Ulcerative colitis Neg Hx         Review of patient's allergies indicates:   Allergen Reactions    Spironolactone Diarrhea       Medication List with Changes/Refills   Current Medications    ALBUTEROL (PROVENTIL/VENTOLIN HFA) 90 MCG/ACTUATION INHALER    Inhale 2 puffs into the lungs every 6 (six) hours as needed for Wheezing. Rescue    ALBUTEROL-IPRATROPIUM (DUO-NEB) 2.5 MG-0.5 MG/3 ML NEBULIZER SOLUTION    Take 3 mLs by  nebulization every 6 (six) hours as needed for Wheezing. Rescue    ATORVASTATIN (LIPITOR) 80 MG TABLET    TAKE 1 TABLET EVERY DAY    BUMETANIDE (BUMEX) 0.5 MG TAB    Take 1 tablet (0.5 mg total) by mouth every other day.    CARVEDILOL (COREG) 25 MG TABLET    TAKE 1 TABLET TWICE DAILY WITH MEALS    CETIRIZINE (ZYRTEC) 10 MG TABLET    Take 1 tablet by mouth daily as needed.    CHLORTHALIDONE (HYGROTEN) 25 MG TAB    TAKE 1 TABLET EVERY DAY    CHOLECALCIFEROL, VITAMIN D3, (VITAMIN D3) 50 MCG (2,000 UNIT) CAP    1 capsule once daily. Every day    COENZYME Q10 (CO Q-10) 100 MG CAPSULE    Take 400 mg by mouth once daily.    CYANOCOBALAMIN, VITAMIN B-12, 5,000 MCG SUBL    Take 5,000 mcg by mouth once daily. Every day    DIAZEPAM (VALIUM) 5 MG TABLET    Take 2 tablets (10 mg total) by mouth On call Procedure for Anxiety. Take one hour prior to procedure    DIPHENOXYLATE-ATROPINE 2.5-0.025 MG (LOMOTIL) 2.5-0.025 MG PER TABLET    Take 1 tablet by mouth 4 (four) times daily as needed for Diarrhea.    ELIQUIS 5 MG TAB    TAKE 1 TABLET TWICE DAILY    EZETIMIBE (ZETIA) 10 MG TABLET    TAKE 1 TABLET EVERY DAY    FAMOTIDINE (PEPCID) 20 MG TABLET    TAKE 1 TABLET EVERY NIGHT AS NEEDED FOR HEARTBURN    FLUTICASONE (FLONASE) 50 MCG/ACTUATION NASAL SPRAY    USE 1 SPRAY IN EACH NOSTRIL TWICE DAILY AS NEEDED  FOR  RHINITIS    GABAPENTIN (NEURONTIN) 600 MG TABLET    TAKE 1 TABLET THREE TIMES DAILY    HYDROCODONE-ACETAMINOPHEN (NORCO) 7.5-325 MG PER TABLET    Take 1 tablet by mouth every 6 (six) hours as needed for Pain.    HYDROCORTISONE (CORTEF) 5 MG TAB    Take 5 mg by mouth once daily. As needed    LEVOTHYROXINE (SYNTHROID) 50 MCG TABLET    TAKE 1 TABLET EVERY DAY    MILK THISTLE 200 MG CAP    Take 200 mg by mouth 2 (two) times daily. Twice a day    MUPIROCIN (BACTROBAN) 2 % OINTMENT    Apply topically 3 (three) times daily.    OLMESARTAN (BENICAR) 20 MG TABLET    Take 1 tablet (20 mg total) by mouth 2 (two) times daily.    OMEGA-3  "FATTY ACIDS 1,000 MG CAP    Twice a day    PANTOPRAZOLE (PROTONIX) 40 MG TABLET    TAKE 1 TABLET TWICE DAILY    POTASSIUM CHLORIDE SA (K-DUR,KLOR-CON M) 10 MEQ TABLET    Take 2 tablets (20 mEq total) by mouth every morning AND 4 tablets (40 mEq total) with lunch AND 4 tablets (40 mEq total) daily with dinner or evening meal.    PREDNISONE (DELTASONE) 1 MG TABLET    TAKE 2 TABLETS ONE TIME DAILY    PREDNISONE (DELTASONE) 5 MG TABLET    TAKE 1 TABLET ONE TIME DAILY    SILDENAFIL (REVATIO) 20 MG TAB    Take 1 to 3 tabs daily as needed.    TIOTROPIUM (SPIRIVA WITH HANDIHALER) 18 MCG INHALATION CAPSULE    INHALE THE CONTENTS OF 1 CAPSULE EVERY DAY (CONTROLLER)       Review of Systems  Constitution: Denies chills, fever, and sweats.  HENT: Denies headaches or blurry vision.  Cardiovascular: Denies chest pain or irregular heart beat.  Respiratory: Denies cough or shortness of breath.  Gastrointestinal: Denies abdominal pain, nausea, or vomiting.  Musculoskeletal: Negative for leg swelling.  Neurological: Denies dizziness or focal weakness.  Psychiatric/Behavioral: Normal mental status.  Hematologic/Lymphatic: Denies bleeding problem or easy bruising/bleeding.  Skin: Denies rash or suspicious lesions    Physical Examination  /70 (Patient Position: Sitting)   Pulse 63   Ht 6' 1" (1.854 m)   SpO2 95%   BMI 31.62 kg/m²     Constitutional: No acute distress, conversant  HEENT: Sclera anicteric, Pupils equal, round and reactive to light, extraocular motions intact, Oropharynx clear  Neck: No JVD, no carotid bruits  Cardiovascular: regular rate and rhythm, no murmur, rubs or gallops, normal S1/S2  Pulmonary: Clear to auscultation bilaterally  Abdominal: Abdomen soft, nontender, nondistended, positive bowel sounds  Extremities: BLE's with 1+ pitting edema  Pulses:  Carotid pulses are 2+ on the right side, and 2+ on the left side.  Radial pulses are 2+ on the right side, and 2+ on the left side.   Femoral pulses are 2+ on " the right side, and 2+ on the left side.  Popliteal pulses are 2+ on the right side, and 2+ on the left side.   Dorsalis pedis pulses are 2+ on the right side, and 2+ on the left side.   Posterior tibial pulses are 2+ on the right side, and 2+ on the left side.    Skin: No ecchymosis, erythema, or ulcers  Psych: Alert and oriented x 3, appropriate affect  Neuro: CNII-XII intact, no focal deficits    Labs:  Most Recent Data  CBC:   Lab Results   Component Value Date    WBC 7.96 10/07/2024    HGB 14.0 10/07/2024    HCT 42.6 10/07/2024     (L) 10/07/2024    MCV 91 10/07/2024    RDW 13.9 10/07/2024     BMP:   Lab Results   Component Value Date     02/25/2025    K 3.8 02/25/2025     02/25/2025    CO2 30 (H) 02/25/2025    BUN 14 02/25/2025    CREATININE 1.1 02/25/2025     (H) 02/25/2025    CALCIUM 8.9 02/25/2025    MG 2.0 07/03/2023    PHOS 2.9 03/31/2020     LFTS;   Lab Results   Component Value Date    PROT 6.2 02/25/2025    ALBUMIN 3.3 (L) 02/25/2025    BILITOT 1.0 02/25/2025    AST 29 02/25/2025    ALKPHOS 75 02/25/2025    ALT 22 02/25/2025     COAGS:   Lab Results   Component Value Date    INR 1.1 07/02/2023     FLP:   Lab Results   Component Value Date    CHOL 109 (L) 10/07/2024    HDL 40 10/07/2024    LDLCALC 56.4 (L) 10/07/2024    TRIG 63 10/07/2024    CHOLHDL 36.7 10/07/2024     CARDIAC:   Lab Results   Component Value Date    TROPONINI <0.006 07/01/2023    BNP 66 03/28/2024       EKG 9/23/2021:  Normal sinus rhythm  Left axis deviation  Incomplete right bundle branch block    BLE Venous Ultrasound 3/13/2025:    There is no evidence of a right lower extremity DVT.    There is left superficial femoral proximal vein chronic DVT.    There is left superficial femoral middle vein chronic DVT.    There is left popliteal vein chronic DVT.    LLE Venous Ultrasound 5/29/2024:  Occlusive deep vein thrombosis is not presently seen with small amounts of residual thrombus in the distal femoral and  popliteal veins noted     BLE Venous Ultrasound 11/1/2023    There is no evidence of a right lower extremity DVT.    Left superficial femoral proximal vein DVT.    Left superficial femoral middle vein DVT.    Left popliteal vein DVT/    BLE Venous Ultrasound 7/1/2023:  Extensive intraluminal thrombus involving the large veins of the left lower extremity extending into the calf.    BLE Venous Reflux Study 2/10/2022:  No evidence of deep or superficial venous thrombosis bilaterally.  The right GSV demonstrates focal reflux below the knee.  Bilateral lower extremity varicosities are noted.    KATTY Study 2/10/2022:  Noncompressible ABIs bilaterally consistent with medial arterial calcification.  PVR waveforms are mildly abnormal bilaterally.    BLE Venous Ultrasound 1/10/2022:  Negative for DVT throughout bilateral lower extremities.    Echo 6/2/2021:  Severe left atrial enlargement.  The left ventricle is normal in size with concentric hypertrophy and normal systolic function.  The estimated ejection fraction is 65%.  Indeterminate left ventricular diastolic function.  Normal right ventricular size with normal right ventricular systolic function.  The estimated PA systolic pressure is 28 mmHg.  Normal central venous pressure (3 mmHg).    Assessment/Plan:  Maximilian Hernandez Ayse Baca is a 79 y.o. male with BLE lymphedema, venous insuffciency, HTN, HLD, myasthenia gravis on chronic prednisone therapy, degenerative joint disease with myelopathy s/p decompression and fusion of C2-6, chronic PVCs, who presents for a follow up appointment.       1. LLE DVT- Likely provoked by underlying prostate cancer. He has now been diagnosed with prostate cancer ([place] 4+3), and will start treatment soon. BLE Venous Ultrasound 3/13/2025 showed chronic DVT of the left superficial proximal/mid veins, and popliteal vein. Continue Eliquis 5 mg bid.      2. BLE Lymphedema/Venous Insufficiency- Refer back to lymphedema clinic. Continue bumex  0.5 mg half pill daily for leg swelling. Continue graduated compression hose.  Pt to limit sodium intake to 2,000 mg daily.  Limit volume intake to 1.5 liters daily.       3. HTN- Continue current medications.     4. Chronic PVC's- Stable.  Continue current medications and follow up with EP as scheduled.      5. HLD- Continue atorvastatin 80 mg daily.       Follow up in 3 months    Total duration of face to face visit time 20 minutes.  Total time spent counseling greater than fifty percent of total visit time.  Counseling included discussion regarding imaging findings, diagnosis, possibilities, treatment options, risks and benefits.  The patient had many questions regarding the options and long-term effects.    Devang Amezquita MD, PhD  Interventional Cardiology

## 2025-05-16 ENCOUNTER — TELEPHONE (OUTPATIENT)
Dept: PRIMARY CARE CLINIC | Facility: CLINIC | Age: 80
End: 2025-05-16
Payer: MEDICARE

## 2025-05-16 DIAGNOSIS — G95.9 DISEASE OF SPINAL CORD, UNSPECIFIED: ICD-10-CM

## 2025-05-16 DIAGNOSIS — M51.360 DEGENERATION OF INTERVERTEBRAL DISC OF LUMBAR REGION WITH DISCOGENIC BACK PAIN: Primary | ICD-10-CM

## 2025-05-16 NOTE — TELEPHONE ENCOUNTER
----- Message from Nurse Diggs sent at 5/15/2025  9:27 AM CDT -----  Regarding: FW: Acupuncture Referral    ----- Message -----  From: Indira Perez  Sent: 5/15/2025   8:45 AM CDT  To: [facility] Staff  Subject: Acupuncture Referral                             Good Morning, I see where an order for PT was requested but the referral stated acupuncture.   If you feel this is an appropriate course of treatment could you please add a referral using order PRO1 for acupuncture.  In order for Medicare to authorize the treatment at least one of his diagnoses needs to be for back pain.  If you have any questions at all please feel free to call me [phone], Lindsey

## 2025-05-19 ENCOUNTER — PATIENT MESSAGE (OUTPATIENT)
Dept: FAMILY MEDICINE | Facility: CLINIC | Age: 80
End: 2025-05-19
Payer: MEDICARE

## 2025-05-22 ENCOUNTER — OFFICE VISIT (OUTPATIENT)
Dept: RADIATION ONCOLOGY | Facility: CLINIC | Age: 80
End: 2025-05-22
Payer: MEDICARE

## 2025-05-22 DIAGNOSIS — C61 PROSTATE CANCER: Primary | ICD-10-CM

## 2025-05-22 RX ORDER — BICALUTAMIDE 50 MG/1
50 TABLET, FILM COATED ORAL DAILY
Qty: 30 TABLET | Refills: 0 | Status: SHIPPED | OUTPATIENT
Start: 2025-05-22 | End: 2026-05-22

## 2025-05-22 RX ORDER — EZETIMIBE 10 MG/1
10 TABLET ORAL
Qty: 90 TABLET | Refills: 0 | Status: SHIPPED | OUTPATIENT
Start: 2025-05-22

## 2025-05-22 NOTE — TELEPHONE ENCOUNTER
Refill Decision Note   Maximilian Tavera  is requesting a refill authorization.  Brief Assessment and Rationale for Refill:  Approve     Medication Therapy Plan:         Comments:     Note composed:5:59 AM 05/22/2025

## 2025-05-27 ENCOUNTER — TELEPHONE (OUTPATIENT)
Dept: HEMATOLOGY/ONCOLOGY | Facility: CLINIC | Age: 80
End: 2025-05-27
Payer: MEDICARE

## 2025-05-27 DIAGNOSIS — C61 PROSTATE CANCER: Primary | ICD-10-CM

## 2025-05-27 NOTE — PROGRESS NOTES
The patient location is: home  The chief complaint leading to consultation is: prostate cancer     Visit type: audiovisual    30 minutes of total time spent on the encounter, which includes face to face time and non-face to face time preparing to see the patient (eg, review of tests), Obtaining and/or reviewing separately obtained history, Documenting clinical information in the electronic or other health record, Independently interpreting results (not separately reported) and communicating results to the patient/family/caregiver, or Care coordination (not separately reported).     Each patient to whom he or she provides medical services by telemedicine is:  (1) informed of the relationship between the physician and patient and the respective role of any other health care provider with respect to management of the patient; and (2) notified that he or she may decline to receive medical services by telemedicine and may withdraw from such care at any time.    Notes:  [facility] [place] - Radiation Oncology     Patient ID: Maximilian Tavera Jr. is a 79 y.o. male.    Chief Complaint: No chief complaint on file.    Mr. Tavera was recently diagnosed with Stage IIC (T1c, N0, M0, PSA 10 - 20, GG3) prostate cancer.  He has a history of an elevated PSA since 2021. PSA in November of 2024 increased to 16.2 ng/ml.  MRI from [facility] on 11/21/24 revealed a 47.6 cc prostate with a 2.1 cm PI-RADS 5 lesion in the mid peripheral zone with no extraprostatic extension.  Repeat MRI at [facility] on 3/17/25 confirmed a 56 cc prostate with a 1.5 cm PI-RADS 4 lesion in the mid/apex of the Rt. peripheral zone with no extraprostatic extension. There was also a 3.2 cm PI-RADS 5 lesion in the mid anterior transitional zone with no extraprostatic extension.  Seminal vesicles and neurovascular bundles were unremarkable.  There was no adenopathy. Biopsies on 3/18/25 revealed Alannah 7 (4+3) adenocarcinoma involving 30% of 1/2 cores  from the Lt. mid gland.  The Roland pattern 4 accounted for 60% of the tumor.  There was Alannah 7 (3+4) adenocarcinoma involving 70% of 3/3 cores from the Rt. apex (Target #1), 70% of 3/3 cores from the Rt. mid (Target #2, and 30% of 2/2 cores from the Rt. base.  The Alannah pattern 4 accounted for 20 - 40% of the tumor.  PSMA scan revealed increased uptake in the prostate with no evidence of regional or distant metastatic disease. We elected to continue his work up a Polaris molecular evaluation.  He presents to discuss the results.        Review of Systems  Constitutional:  Negative for chills, fever, malaise/fatigue and weight loss.   Respiratory:  Negative for cough and shortness of breath.    Cardiovascular:  Negative for chest pain and palpitations.   Gastrointestinal:  Negative for constipation and diarrhea.   Genitourinary:  Positive for frequency and urgency. Negative for dysuria and hematuria.        AUA 1,2,3,3,2,1,2, Mixed     Physical Exam  Constitutional:       General: He is not in acute distress.  Neurological:      Mental Status: He is alert and oriented to person, place, and time.   Psychiatric:         Mood and Affect: Mood normal.         Judgment: Judgment normal.            Assessment and Plan    Stage IIC prostate cancer.  The patient was sent a copy of his Polaris results in the mail.  We reviewed the entire report and I explained the significance of the finding.  Explained we would recommend combined modality therapy with external beam radiotherapy combined with hormonal deprivation therapy.  Discussed the procedures, risks and benefits of therapy.  Explained we would recommend initiating hormonal deprivation therapy followed by radiotherapy to begin in 2 - 3 months. Recommend 70 Gy to prostate and seminal vesicles with 50.4 Gy to the pelvic nodes.  Recommend continued hormonal therapy for a total of 6 - 12 months depending on PSA response and tolerance.  The patient is anxious to start  therapy.  He would like to receive his therapy in [place].  Will arrange for consultation.

## 2025-05-27 NOTE — NURSING
Chart reviewed. Recent dx prostate ca, unfavorable intermediate risk per MDA algorithm. Mr. Tavera has already established care with Dr. An and Dr. Aranda but desires to transfer call care to [place]. Per Dr. Aranda, Mr. Tavera needs to begin ADT prior to initiation of radiation therapy. Referral to Dr. Carreon placed per Dr. An.     Called Mr. Tavera - introduced myself and explained my role as oncology navigator. Scheduled 1st available new patient visit with Dr. Carreon on 5/30/25. Reviewed date/time/location of this visit.     Will notify Dr. Hansen's clinic staff once Mr. Tavera has begun ADT.     My contact information provided. Encouraged Mr. Tavera to contact me with any questions/concerns moving forward. He v/u and thanked me for my call.     Added self to care team. Barry tool updated. Will follow for [facility] navigation needs.     Oncology Navigation   Intake  Date of Diagnosis: 05/19/25  Cancer Type:   Type of Referral: Internal  Initial Nurse Navigator Contact: 05/27/25  First Appointment Available: 05/30/25  Appointment Date: 05/30/25  First Available Date vs. Scheduled Date (days): 0  Multiple appointments: No  Reason if booked > 7 days after scheduling: Additional tests/procedures     Treatment  Current Status: Active    Surgical Oncologist: Brian An    Radiation Therapy: Planned  Radiation Oncologist: Dr. Omaira Hanesn    Procedures: Biopsy; PET scan; MRI  Biopsy Schedule Date: 05/19/25  MRI Schedule Date: 03/17/25  PET Scan Schedule Date: 04/16/25    Radiation Oncologist: Dr. Omaira Hansen    Concerns: Unfavorable intermediate risk prostate ca.     Acuity  Comorbidities in Medical History: 2  Hospitalization Within the Past Month: 0   Needed: 0  Transportation: 0  History of noncompliance/frequent no shows and cancellations: 0  Verbalizes the need for more education: 1  Navigation Acuity: 3     Follow Up  Follow up for [facility] oncology navigation needs.

## 2025-05-27 NOTE — TELEPHONE ENCOUNTER
----- Message from Omaira Hansen MD sent at 5/27/2025 10:14 AM CDT -----  , can you pls coordinate transfer of care to [facility]?Tomy,FW  ----- Message -----  From: Giancarlo Aranda Jr., MD  Sent: 5/27/2025   9:31 AM CDT  To: Leroy Nguyen MD; Omaira Hansen MD;#    This patient needs to start hormonal therapy followed by radiotherapy in a couple months. Thanks.  Giancarlo    You have been provided with an order for durable medical equipment that you may  at an outside facility as our office does not carry the equipment you need. You may pick it up at any medical supply company you like. Listed below are a few different locations for your convenience:    [address] [place] [address],[address]  [address], [address]  Phone: [phone] Prosthetics  Phone: [phone]:   [address], [address]  [place], [address] [place]:  [address] [place] [place], [address]  [place]:  [facility] [place], [address]    [facility], [address]  [place], [address]  Phone: [phone]       Flatfoot: Care Instructions  Your Care Instructions  A flatfoot means that the bottom of your foot does not have the usual arch. One or both of your feet may be flat. You may have inherited flatfeet. Having an injury, being very overweight, or having a condition such as rheumatoid arthritis, stroke, or diabetes also can cause the arch to flatten. Flatfoot usually is not a serious problem. But some people do have pain if they gain weight or stand a lot. You also can have pain when walking or running. You can do exercises and wear pads and roomy shoes to help support your feet. Follow-up care is a key part of your treatment and safety. Be sure to make and go to all appointments, and call your doctor if you are having problems. It's also a good idea to know your test results and keep a list of the medicines you take. How can you care for yourself at home? · Wear shoes with good arch support and lots of room in the toes. · Put heel padding (called a heel cup) or inserts (called orthotics) in your shoes to raise the heel. Orthotics are molded pieces of rubber, leather, or other material that can help cushion and balance your feet.   · Try these exercises to stretch your feet and make them stronger, if your doctor says it is okay. ? Stretch your calf muscles: Stand about 1 foot from a wall and place the palms of both hands against the wall at chest level. Step back with one foot. Keep that leg straight at the knee, and keep both feet flat on the floor. Your feet should point at the wall or slightly toward the center of your body. Bend your front leg at the knee, and press the wall with both hands until you feel a gentle stretch in your back leg. Hold for at least 15 seconds. Increase to 30 seconds over time. Switch legs and repeat. Do this 2 to 4 times a day. ? Stretch your feet: Sit on the floor or a mat with your feet stretched in front of you. Roll up a towel lengthwise and loop it around the ball of one foot. Hold one end of the towel in each hand and gently pull the towel toward your body. Hold for at least 15 to 30 seconds. Repeat with the other foot. Do this 2 to 4 times a day. ? Make your feet stronger: Place a towel on the floor. Sit in a chair in front of the towel with both feet flat on the towel at one end.  the towel with the toes of one foot while keeping the heel of that foot on the floor. (Use your other foot to anchor the towel). Curl your toes to pull the towel toward you. Repeat with the other foot. Do this 2 to 4 times a day. · Take anti-inflammatory medicines such as ibuprofen (Advil, Motrin) and naproxen (Aleve) to reduce pain if your feet or legs hurt. Read and follow all instructions on the label. · Try heat or massage on the area that is causing you pain. Use a heating pad set on low or a warm cloth. Put a thin cloth between the heating pad and your skin. When should you call for help? Watch closely for changes in your health, and be sure to contact your doctor if:    · You have pain in your feet or legs.     · You want help to find orthotics to fit your feet. Where can you learn more?   Go to http://angie-dayanara.info/. Enter J220 in the search box to learn more about \"Flatfoot: Care Instructions. \"  Current as of: June 26, 2019  Content Version: 12.2  © 2037-4367 [employer]. Care instructions adapted under license by [employer] (which disclaims liability or warranty for this information). If you have questions about a medical condition or this instruction, always ask your healthcare professional. [address] any warranty or liability for your use of this information.

## 2025-05-28 ENCOUNTER — CLINICAL SUPPORT (OUTPATIENT)
Facility: HOSPITAL | Age: 80
End: 2025-05-28
Payer: MEDICARE

## 2025-05-28 ENCOUNTER — CLINICAL SUPPORT (OUTPATIENT)
Dept: REHABILITATION | Facility: HOSPITAL | Age: 80
End: 2025-05-28
Attending: INTERNAL MEDICINE
Payer: MEDICARE

## 2025-05-28 DIAGNOSIS — M51.360 DEGENERATION OF INTERVERTEBRAL DISC OF LUMBAR REGION WITH DISCOGENIC BACK PAIN: Primary | Chronic | ICD-10-CM

## 2025-05-28 DIAGNOSIS — I89.0 LYMPHEDEMA OF BOTH LOWER EXTREMITIES: ICD-10-CM

## 2025-05-28 DIAGNOSIS — G95.9 DISEASE OF SPINAL CORD, UNSPECIFIED: ICD-10-CM

## 2025-05-28 DIAGNOSIS — I89.0 LYMPHEDEMA OF BOTH LOWER EXTREMITIES: Primary | ICD-10-CM

## 2025-05-28 PROCEDURE — 97811 ACUP 1/> W/O ESTIM EA ADD 15: CPT | Mod: PO

## 2025-05-28 PROCEDURE — 97810 ACUP 1/> WO ESTIM 1ST 15 MIN: CPT | Mod: PO

## 2025-05-28 PROCEDURE — 97530 THERAPEUTIC ACTIVITIES: CPT | Mod: PO

## 2025-05-28 PROCEDURE — 97166 OT EVAL MOD COMPLEX 45 MIN: CPT | Mod: PO

## 2025-05-29 NOTE — PROGRESS NOTES
Outpatient Rehab    Occupational Therapy Evaluation    Patient Name: Maximilian Tavera Jr.  MRN: [id number]  YOB: 1945  Encounter Date: 5/28/2025    Therapy Diagnosis:   Encounter Diagnosis   Name Primary?    Lymphedema of both lower extremities Yes     Physician: Devang Amezquita MD*    Physician Orders: Eval and Treat  Medical Diagnosis: Lymphedema of both lower extremities    Visit # / Visits Authorized: 1 / 1  Insurance Authorization Period: 5/13/2025 to 5/13/2026  Date of Evaluation: 5/28/2025  Plan of Care Certification: 5/28/2025 to 9/28/2025     Time In: 1300   Time Out: 1400  Total Time (in minutes): 60   Total Billable Time (in minutes): 60    Intake Outcome Measure for FOTO Survey    Therapist reviewed FOTO scores for Maximilian Tavera Jr. on 5/28/2025.   FOTO report - see Media section or FOTO account episode details.     Intake Score:  %    Precautions:       Subjective   Additional Lymphedema History  The patient's medical history relevant to lymphedema includes Abdominal surgery, Traumatic injury to region, Surgery in region, and History of blood clot in at-risk limb.   Treatment history potentially impacting lymphedema includes Radiation therapy.      Activities of Daily Living  Social history was obtained from Patient.    General Prior Level of Function Comments: modified independent , uses cane for balance. minimal assistance for donning compression socks  General Current Level of Function Comments: modified independent , uses cane for balance. minimal assistance for donning compression socks  Patient Responsibilities: Community mobility, Driving, Financial management, Health management, Home management, Personal ADL, Yard work    Previously independent with activities of daily living? No  Activities previously needing assistance include Dressing - lower body and Functional mobility.   Currently independent with activities of daily living? No  Activities currently needing  assistance include Dressing - lower body and Functional mobility.   Minimal help needed: cane for balance and wife helps with compression stockings    Previously independent with instrumental activities of daily living? Yes     Currently independent with instrumental activities of daily living? No  Activities currently needing assistance include: Community mobility.            Pain     Patient reports a current pain level of 2/10. Pain at best is reported as 1/10. Pain at worst is reported as 4/10.   Location: left lowel leg  Clinical Progression (since onset): Worsening  Pain Qualities: Aching, Tenderness, Tightness  Pain-Relieving Factors: Activity modification, Lying down  Pain-Aggravating Factors: Walking, Standing         Review of Systems  Patient reports: Cardiac History        Treatment History  Treatments  Previously Received Treatments: Yes  Previous Treatments: Occupational therapy, Complete decongestive therapy, Manual lymphatic treatment  Previously Maintained Complete Decongestive Therapy: Yes  Additional Treatment Details: Treatment from this writer in 2022, has managed lymphedema well until recently, with new health concerns    Living Arrangements  Living Situation  Housing: Home independently  Living Arrangements: Spouse/significant other        Employment  Employment Status: Retired          Past Medical History/Physical Systems Review:   Maximilian Tavera Jr.  has a past medical history of A-fib, Arthritis, Back pain, Carpal tunnel syndrome, Cataract, Cataract, left eye, Chest pain, musculoskeletal, COPD (chronic obstructive pulmonary disease), COPD with asthma, Disease of spinal cord, unspecified, Emphysema lung, Gastritis, colonic polyp, Hyperlipidemia, Hypertension, Hypothyroidism, Knee fracture, Myasthenia gravis, Neuropathy, Obesity, Pneumonia, Polyneuropathy, Prostate cancer, PVC (premature ventricular contraction), Squamous cell carcinoma, and Thyroid disease.    Maximilian Tavera    has a past surgical history that includes Appendectomy; Tonsillectomy; Neck surgery; Hemorrhoid surgery; Knee Arthroplasty (Bilateral); Cataract extraction w/  intraocular lens implant (Bilateral); Esophagogastroduodenoscopy (N/A, 9/12/2018); Posterior fusion of cervical spine with laminectomy (N/A, 11/7/2018); Cystoscopy (N/A, 2/26/2019); Transrectal ultrasound examination (N/A, 2/26/2019); Upper gastrointestinal endoscopy (09/12/2018); Colonoscopy (03/01/2012); Esophagogastroduodenoscopy (N/A, 8/19/2019); Esophagogastroduodenoscopy (N/A, 10/7/2019); Transforaminal epidural injection of steroid (Right, 12/20/2019); Esophagogastroduodenoscopy (N/A, 1/7/2020); Endoscopic ultrasound of upper gastrointestinal tract (N/A, 1/7/2020); Transforaminal epidural injection of steroid (Bilateral, 2/6/2020); Transforaminal epidural injection of steroid (Bilateral, 8/26/2020); Lengthening of Achilles tendon (Right, 9/11/2020); Injection of anesthetic agent around medial branch nerves innervating lumbar facet joint (Bilateral, 10/1/2020); Colonoscopy (N/A, 12/7/2021); and Injection of anesthetic agent around medial branch nerves innervating lumbar facet joint (Right, 10/7/2022).    Maximilian has a current medication list which includes the following prescription(s): albuterol, albuterol-ipratropium, atorvastatin, bicalutamide, bumetanide, carvedilol, cetirizine, chlorthalidone, cholecalciferol (vitamin d3), co q-10, cyanocobalamin (vitamin b-12), diazepam, diphenoxylate-atropine 2.5-0.025 mg, eliquis, ezetimibe, famotidine, fluticasone propionate, gabapentin, hydrocodone-acetaminophen, hydrocortisone, levothyroxine, milk thistle, mupirocin, olmesartan, omega-3 fatty acids, pantoprazole, potassium chloride sa, prednisone, prednisone, sildenafil, tiotropium, and [DISCONTINUED] esomeprazole.    Review of patient's allergies indicates:   Allergen Reactions    Spironolactone Diarrhea        Objective   Lower Extremity Skin  Observations  Right Lower Extremity Skin Observations  Right Lower Extremity Edema Non-pitting or Pitting: None  Right Lower Extremity Scar Observation: None    Left Lower Extremity Skin Observations  Left Lower Extremity Skin Appearance: Dry/flaky, Tissue congestion  Left Lower Extremity Edema Non-pitting or Pitting: Pitting  Left Lower Extremity Pitting Edema Severity: Mild pitting, indentation lasts less than 15 sec  Left Lower Extremity Scar Observation: None  Left Lower Extremity Skin Temperature: Normal                Treatment:  Therapeutic Activity  TA 1: compression bandages applied to left lower extremity with instructions to replace with stockings after 24 hours or more    Time Entry(in minutes):  OT Evaluation (Moderate) Time Entry: 30  Therapeutic Activity Time Entry: 30    Assessment & Plan   Assessment  Maximilian presents with a condition of Moderate complexity.   Presentation of Symptoms: Changing  Will Comorbidities Impact Care: No                         Occupational profile: Maximilian presents with exacerbated lymphedema left lower extremity. Treatment may be delayed as he was recently diagnosed with prostrate cancer and will start treatment soon. Advised to order new compression stockings and to schedule when ready.   Evaluation/Treatment Response: Patient responded to treatment well  Patient Goal for Therapy (OT): reduction  Prognosis: Excellent  Prognosis Details: Maximilian has successfully managed his lymphedema for 3 years and will return to that once we achieve decongestion.    Plan  From an occupational therapy perspective, the patient would benefit from: Skilled Rehab Services    Planned therapy interventions include: Therapeutic activities, Manual therapy, and Lymphatic compression wrapping.            Visit Frequency: 2 times Per Week for 8 Weeks.       This plan was discussed with Patient.   Discussion participants: Agreed Upon Plan of Care  Plan details: 2x week for up to 8 weeks, will delay  treatment during treatment for cancer          The patient's spiritual, cultural, and educational needs were considered, and the patient is agreeable to the plan of care and goals.     Education  Education was done with Patient. The patient's learning style includes Demonstration, Listening, and Reading.           1. Educated on definition of lymphedema. 2. Explained the Complete Decongestive Therapy protocol in depth 3. Educated on Phase 1 and 2 of protocol. 4. Reviewed treatment frequency and likely duration of weeks 5.Contraindications for treatment. 6. Plan of care and goals. 7. Educated on home management protocols.         Goals:   Active       Occupational Therapy       Short Term Goals:    Maximilian will be educated on lymphedema precautions and signs of infection.   Maximilian will perform deep abdominal breathing TID  Maximilian will tolerate daily activities with multilayered bandaging.  Appropriate compression garments to be ordered/delivered         Occupational Therapy Goal       Start:  05/28/25    Expected End:  09/30/25       Long Term Goals:   Maximilian will be independent with home management of this chronic condition.  Maximilian will demonstrate compliance with all home management recommendations.,  Maximilian will demonstrate compliance with home management protocol  Complete decongestion of left lower extremity               MILE Hardy, SAMI/JENNA

## 2025-05-30 ENCOUNTER — OFFICE VISIT (OUTPATIENT)
Dept: UROLOGY | Facility: CLINIC | Age: 80
End: 2025-05-30
Payer: MEDICARE

## 2025-05-30 VITALS
TEMPERATURE: 98 F | HEART RATE: 75 BPM | SYSTOLIC BLOOD PRESSURE: 138 MMHG | BODY MASS INDEX: 35.23 KG/M2 | HEIGHT: 69 IN | RESPIRATION RATE: 16 BRPM | WEIGHT: 237.88 LBS | DIASTOLIC BLOOD PRESSURE: 78 MMHG | OXYGEN SATURATION: 97 %

## 2025-05-30 DIAGNOSIS — C61 PROSTATE CANCER: ICD-10-CM

## 2025-05-30 PROCEDURE — 99999 PR PBB SHADOW E&M-EST. PATIENT-LVL V: CPT | Mod: PBBFAC,,, | Performed by: UROLOGY

## 2025-05-30 NOTE — PROGRESS NOTES
Subjective:       Patient ID: Maximilian Tavera Jr. is a 79 y.o. male.    Chief Complaint: NEW PATIENT - prostate cancer, discuss ADT prio    HPI    History of Present Illness    CHIEF COMPLAINT:  Patient presents today for follow up of recently diagnosed prostate cancer.    PROSTATE CANCER:  PSA increased to 16.2 last year. Prostate MRI showed PIRADS 5 lesion. Needle biopsy in March 2025 showed prostate volume of 56 mL with seven positive cores - one core grade group 3 and remainder grade group 2. PET scan showed no metastatic disease. Prolaris test score was 4.4 with 10-year disease specific mortality risk of 17%. He started Casodex 1 week ago.    URINARY SYMPTOMS:  He denies any bothersome urinary symptoms and is not taking any medications for BPH.      ROS:  General: -fever, -chills, -fatigue, -weight gain, -weight loss  Eyes: -vision changes, -redness, -discharge  ENT: -ear pain, -nasal congestion, -sore throat  Cardiovascular: -chest pain, -palpitations, -lower extremity edema  Respiratory: -cough, -shortness of breath  Gastrointestinal: -abdominal pain, -nausea, -vomiting, -diarrhea, -constipation, -blood in stool  Genitourinary: -dysuria, -hematuria, -frequency  Musculoskeletal: -joint pain, -muscle pain  Skin: -rash, -lesion  Neurological: -headache, -dizziness, -numbness, -tingling  Psychiatric: -anxiety, -depression, -sleep difficulty           Objective:        Physical Exam  Vitals reviewed.   Constitutional:       Appearance: He is well-developed.   Pulmonary:      Effort: Pulmonary effort is normal.   Neurological:      Mental Status: He is alert and oriented to person, place, and time.           Assessment & Plan    C61 Malignant neoplasm of prostate  N40.0 Benign prostatic hyperplasia without lower urinary tract symptoms  Z79.811 Long term (current) use of aromatase inhibitors    MALIGNANT NEOPLASM OF PROSTATE:  - 79-year-old with recent prostate cancer diagnosis.  - PSA increased to 16.2 last  year.  - Prostate MRI showed PIRADS 5 lesion.  - Needle biopsy by Dr. Ruiz in March 2025 revealed: Prostate volume 56 mL. 7 cores positive: 1 core grade group 3, rest grade group 2.  - Risk group: unfavorable intermediate.  - PET scan negative for metastatic disease.  - Prolaris test score 4.4, indicating 17% 10-year disease-specific mortality risk.  - Elected radiation therapy.  - Dr. Aranda recommends 6-12 months of ADT.  - Started Casodex last week.    BENIGN PROSTATIC HYPERPLASIA:  - No bothersome urinary symptoms, no BPH medications.        Assessment:         1. Prostate cancer          This note was generated with the assistance of ambient listening technology.  I attest to having reviewed and edited the generated note for accuracy, though some syntax or spelling errors may persist. Please contact the author of this note for any clarification.    Plan:       Prostate cancer  -     Ambulatory referral/consult to Urology    Other orders  -     leuprolide acetate (6 month) injection 45 mg

## 2025-05-30 NOTE — PROGRESS NOTES
Acupuncture Evaluation Note     Name: Maximilian Tavera Jr.  Clinic Number: [id number]    Traditional Chinese Medicine (TCM) Diagnosis: Qi Stagnation  Medical Diagnosis:   Encounter Diagnoses   Name Primary?    Degeneration of intervertebral disc of lumbar region with discogenic back pain Yes    Disease of spinal cord, unspecified     Lymphedema of both lower extremities         Evaluation Date: 5/28/25    Visit #/Visits authorized: 1/12-?    Precautions: Standard    Subjective     Chief Concern: degeneration of discs, neck and back pain, 80 yo M    Medical necessity is demonstrated by the following IMPAIRMENTS: Medical Necessity: Decreased mobility limits day to day activities, social, and emergent situations              Aggravating Factors:  movement and pressure   Relieving Factors:  rest    Symptom Description:     Quality:  Aching  Severity:  5 or varies  Frequency:  continuously    Previous Treatments Tried:  Medication    HEENT:   not noted     Chest:  not noted    Digestion:     Diet: normal   Fluids: normal  Taste/Appetite: normal   Symptoms: no blood in stool    Sleep: could improve    Energy Levels:  could improve    Psychological Symptoms:  not noted    Other Symptoms: not noted    GYN Symptoms: none    Objective     Observation: clean, calm    Tongue:      Body:  normal   Color:  pink   Coating:  thin,     Pulse:        wiry       New Findings:  none    Treatment     Treatment Principles:  move Qi    Acupuncture points used:  K3, UB 62, SI3/4, TB 5, GB 20   Needles In: 10  Needles Out: 10  Needles W/O STIM placed: 11am  Needles W/O STIM removed: 11:30am      Other Traditional Chinese Medicine Modalities - none    Assessment     After treatment, patient felt more relaxed and less strain     Patient prognosis is Good.     Patient will continue to benefit from acupuncture treatment to address the deficits listed in the problem list box on initial evaluation, provide patient family education and to  maximize pt's level of independence in the home and community environment.     Patient's spiritual, cultural and educational needs considered and pt agreeable to plan of care and goals.     Anticipated barriers to treatment: none    Plan     Recommend every week or two for 12 sessions with midway re-assess.      Education:  Patient is aware of cumulative benefit of acupuncture

## 2025-06-03 ENCOUNTER — DOCUMENTATION ONLY (OUTPATIENT)
Dept: HEMATOLOGY/ONCOLOGY | Facility: CLINIC | Age: 80
End: 2025-06-03
Payer: MEDICARE

## 2025-06-04 ENCOUNTER — CLINICAL SUPPORT (OUTPATIENT)
Dept: REHABILITATION | Facility: HOSPITAL | Age: 80
End: 2025-06-04
Attending: INTERNAL MEDICINE
Payer: MEDICARE

## 2025-06-04 DIAGNOSIS — I89.0 LYMPHEDEMA OF BOTH LOWER EXTREMITIES: Primary | ICD-10-CM

## 2025-06-04 PROCEDURE — 97530 THERAPEUTIC ACTIVITIES: CPT | Mod: PO

## 2025-06-04 PROCEDURE — 97140 MANUAL THERAPY 1/> REGIONS: CPT | Mod: PO

## 2025-06-05 ENCOUNTER — OFFICE VISIT (OUTPATIENT)
Dept: PODIATRY | Facility: CLINIC | Age: 80
End: 2025-06-05
Payer: MEDICARE

## 2025-06-05 VITALS — WEIGHT: 237.88 LBS | HEIGHT: 69 IN | BODY MASS INDEX: 35.23 KG/M2

## 2025-06-05 DIAGNOSIS — M79.671 FOOT PAIN, BILATERAL: Primary | ICD-10-CM

## 2025-06-05 DIAGNOSIS — L84 CORN OR CALLUS: ICD-10-CM

## 2025-06-05 DIAGNOSIS — L90.9 PLANTAR FAT PAD ATROPHY: ICD-10-CM

## 2025-06-05 DIAGNOSIS — M79.672 FOOT PAIN, BILATERAL: Primary | ICD-10-CM

## 2025-06-05 PROCEDURE — 1159F MED LIST DOCD IN RCRD: CPT | Mod: CPTII,S$GLB,, | Performed by: PODIATRIST

## 2025-06-05 PROCEDURE — 1125F AMNT PAIN NOTED PAIN PRSNT: CPT | Mod: CPTII,S$GLB,, | Performed by: PODIATRIST

## 2025-06-05 PROCEDURE — 99999 PR PBB SHADOW E&M-EST. PATIENT-LVL II: CPT | Mod: PBBFAC,,, | Performed by: PODIATRIST

## 2025-06-05 PROCEDURE — 99213 OFFICE O/P EST LOW 20 MIN: CPT | Mod: S$GLB,,, | Performed by: PODIATRIST

## 2025-06-05 PROCEDURE — 3288F FALL RISK ASSESSMENT DOCD: CPT | Mod: CPTII,S$GLB,, | Performed by: PODIATRIST

## 2025-06-05 PROCEDURE — 1101F PT FALLS ASSESS-DOCD LE1/YR: CPT | Mod: CPTII,S$GLB,, | Performed by: PODIATRIST

## 2025-06-06 ENCOUNTER — TELEPHONE (OUTPATIENT)
Dept: HEMATOLOGY/ONCOLOGY | Facility: CLINIC | Age: 80
End: 2025-06-06
Payer: MEDICARE

## 2025-06-08 NOTE — PROGRESS NOTES
Subjective:     Patient ID: Maximilian Tavera Jr. is a 79 y.o. male.    Chief Complaint: Foot Pain and Callouses    Maximilian is a 79 y.o. 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 The patient's chief complaint consists of pain along bilateral sub 5th met head.  Symptoms are present only with walking barefoot.  Denies pain while in supportive shoe gear.  Denies sustaining trauma to the affected areas.   Denies any additional pedal complaints.      Hemoglobin A1C   Date Value Ref Range Status   10/07/2024 6.5 (H) 4.0 - 5.6 % Final     Comment:     ADA Screening Guidelines:  5.7-6.4%  Consistent with prediabetes  >or=6.5%  Consistent with diabetes    High levels of fetal hemoglobin interfere with the HbA1C  assay. Heterozygous hemoglobin variants (HbS, HgC, etc)do  not significantly interfere with this assay.   However, presence of multiple variants may affect accuracy.     06/15/2023 6.2 (H) 4.0 - 5.6 % Final     Comment:     ADA Screening Guidelines:  5.7-6.4%  Consistent with prediabetes  >or=6.5%  Consistent with diabetes    High levels of fetal hemoglobin interfere with the HbA1C  assay. Heterozygous hemoglobin variants (HbS, HgC, etc)do  not significantly interfere with this assay.   However, presence of multiple variants may affect accuracy.     11/21/2018 5.5 4.0 - 5.6 % Final     Comment:     ADA Screening  Guidelines:  5.7-6.4%  Consistent with prediabetes  >or=6.5%  Consistent with diabetes  High levels of fetal hemoglobin interfere with the HbA1C  assay. Heterozygous hemoglobin variants (HbS, HgC, etc)do  not significantly interfere with this assay.   However, presence of multiple variants may affect accuracy.       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    Disease of spinal cord, unspecified 1/5/2024    Emphysema lung 11/05/2018    Gastritis     Hx of colonic polyp     Hyperlipidemia     Hypertension     Hypothyroidism     Knee fracture     Myasthenia gravis     Neuropathy 01/03/2013    Obesity 01/29/2015    Pneumonia 03/29/2020    Polyneuropathy     Prostate cancer 3/20/2025    PVC (premature ventricular contraction)     Squamous cell carcinoma 2014    left forearm    Thyroid disease        Past Surgical History:   Procedure Laterality Date    APPENDECTOMY      CATARACT EXTRACTION W/  INTRAOCULAR LENS IMPLANT Bilateral     COLONOSCOPY  03/01/2012    Dr Esteban; hyperplastic polyp; repeat in 5 years    COLONOSCOPY N/A 12/7/2021    Procedure: COLONOSCOPY;  Surgeon: Gabriel Hernandez MD;  Location: [facility];  Service: Endoscopy;  Laterality: N/A;    CYSTOSCOPY N/A 2/26/2019    Procedure: CYSTOSCOPY;  Surgeon: Simba Cheung MD;  Location: [facility];  Service: Urology;  Laterality: N/A;    ENDOSCOPIC ULTRASOUND OF UPPER GASTROINTESTINAL TRACT N/A 1/7/2020    Procedure: ULTRASOUND, UPPER GI TRACT, ENDOSCOPIC;  Surgeon: Kati Ryan MD;  Location: [facility] ([address] [place] [address]);  Service: Endoscopy;  Laterality: N/A;    ESOPHAGOGASTRODUODENOSCOPY N/A 9/12/2018    Procedure: EGD (ESOPHAGOGASTRODUODENOSCOPY);  Surgeon: Gabriel Hernandez MD;  Location: [facility];  Service:  Endoscopy;  Laterality: N/A;    ESOPHAGOGASTRODUODENOSCOPY N/A 8/19/2019    Procedure: EGD (ESOPHAGOGASTRODUODENOSCOPY);  Surgeon: Gabriel Hernandez MD;  Location: [facility];  Service: Endoscopy;  Laterality: N/A;    ESOPHAGOGASTRODUODENOSCOPY N/A 10/7/2019    Procedure: EGD (ESOPHAGOGASTRODUODENOSCOPY);  Surgeon: Gabriel Hernandez MD;  Location: [facility];  Service: Endoscopy;  Laterality: N/A;    ESOPHAGOGASTRODUODENOSCOPY N/A 1/7/2020    Procedure: EGD (ESOPHAGOGASTRODUODENOSCOPY);  Surgeon: Kati Ryan MD;  Location: [facility] (2ND FLR);  Service: Endoscopy;  Laterality: N/A;    HEMORRHOID SURGERY      INJECTION OF ANESTHETIC AGENT AROUND MEDIAL BRANCH NERVES INNERVATING LUMBAR FACET JOINT Bilateral 10/1/2020    Procedure: Block-nerve-medial branch-lumbar Bilateral L 3,4,5;  Surgeon: Devan Watt MD;  Location: [facility];  Service: Pain Management;  Laterality: Bilateral;    INJECTION OF ANESTHETIC AGENT AROUND MEDIAL BRANCH NERVES INNERVATING LUMBAR FACET JOINT Right 10/7/2022    Procedure: Block-nerve-medial branch-lumbar L3,4,5;  Surgeon: Devan Watt MD;  Location: [facility];  Service: Pain Management;  Laterality: Right;    KNEE ARTHROPLASTY Bilateral     LENGTHENING OF ACHILLES TENDON Right 9/11/2020    Procedure: percutaeous tenotomy of right lateral epicondyle (tenex);  Surgeon: Terry Quach MD;  Location: [facility];  Service: Neurosurgery;  Laterality: Right;  tenex to right lateral epicondyle  Tenex machine SN#[id number], total time 1min. 30seconds    NECK SURGERY      POSTERIOR FUSION OF CERVICAL SPINE WITH LAMINECTOMY N/A 11/7/2018    Procedure: C2-C6 Posterior Cervical Laminectomy & Instrumental Fusion;  Surgeon: Kishore Turner MD;  Location: [address];  Service: Neurosurgery;  Laterality: N/A;    TONSILLECTOMY      TRANSFORAMINAL EPIDURAL INJECTION OF STEROID Right 12/20/2019    Procedure: Injection,steroid,epidural,transforaminal approach;  Surgeon: Devan Watt MD;  Location: [facility];   Service: Pain Management;  Laterality: Right;  L3-4, L4-5    TRANSFORAMINAL EPIDURAL INJECTION OF STEROID Bilateral 2/6/2020    Procedure: Injection,steroid,epidural,transforaminal approach;  Surgeon: Devan Watt MD;  Location: [facility];  Service: Pain Management;  Laterality: Bilateral;  L3-L4,L4-L5    TRANSFORAMINAL EPIDURAL INJECTION OF STEROID Bilateral 8/26/2020    Procedure: Injection,steroid,epidural,transforaminal approach;  Surgeon: Devan Watt MD;  Location: [facility];  Service: Pain Management;  Laterality: Bilateral;  L3-4, L4-5    TRANSRECTAL ULTRASOUND EXAMINATION N/A 2/26/2019    Procedure: ULTRASOUND, RECTAL APPROACH;  Surgeon: Simba Cheung MD;  Location: [facility];  Service: Urology;  Laterality: N/A;    UPPER GASTROINTESTINAL ENDOSCOPY  09/12/2018    Dr Hernandez; gastritis; extensive intestinal metaplasia; repeat in 1-2- years       Family History   Problem Relation Name Age of Onset    Cataracts Mother      Heart disease Mother          CHF    Hypertension Mother      Hyperlipidemia Mother      Cataracts Father      Glaucoma Father      Heart disease Father      Hyperlipidemia Sister      Hypertension Sister      No Known Problems Daughter      No Known Problems Daughter      Collagen disease Neg Hx      Amblyopia Neg Hx      Blindness Neg Hx      Macular degeneration Neg Hx      Retinal detachment Neg Hx      Strabismus Neg Hx      Cancer Neg Hx      Colon cancer Neg Hx      Esophageal cancer Neg Hx      Stomach cancer Neg Hx      Crohn's disease Neg Hx      Ulcerative colitis Neg Hx         Social History     Socioeconomic History    Marital status:    Tobacco Use    Smoking status: Never    Smokeless tobacco: Never    Tobacco comments:     when a child   Substance and Sexual Activity    Alcohol use: Yes     Comment: rarely    Drug use: No    Sexual activity: Yes     Partners: Female     Social Drivers of Health     Financial Resource Strain: Low Risk  (3/24/2025)    Overall Financial  Resource Strain (CARDIA)     Difficulty of Paying Living Expenses: Not hard at all   Food Insecurity: No Food Insecurity (3/24/2025)    Hunger Vital Sign     Worried About Running Out of Food in the Last Year: Never true     Ran Out of Food in the Last Year: Never true   Transportation Needs: No Transportation Needs (3/24/2025)    PRAPARE - Transportation     Lack of Transportation (Medical): No     Lack of Transportation (Non-Medical): No   Physical Activity: Inactive (3/24/2025)    Exercise Vital Sign     Days of Exercise per Week: 0 days     Minutes of Exercise per Session: 0 min   Stress: No Stress Concern Present (3/24/2025)    [employer] - Occupational Stress Questionnaire     Feeling of Stress : Not at all   Housing Stability: Low Risk  (3/24/2025)    Housing Stability Vital Sign     Unable to Pay for Housing in the Last Year: No     Number of Times Moved in the Last Year: 0     Homeless in the Last Year: No       Current Outpatient Medications   Medication Sig Dispense Refill    albuterol (PROVENTIL/VENTOLIN HFA) 90 mcg/actuation inhaler Inhale 2 puffs into the lungs every 6 (six) hours as needed for Wheezing. Rescue 25.5 g 4    albuterol-ipratropium (DUO-NEB) 2.5 mg-0.5 mg/3 mL nebulizer solution Take 3 mLs by nebulization every 6 (six) hours as needed for Wheezing. Rescue 75 mL 3    atorvastatin (LIPITOR) 80 MG tablet TAKE 1 TABLET EVERY DAY 90 tablet 0    bicalutamide (CASODEX) 50 MG Tab Take 1 tablet (50 mg total) by mouth once daily. 30 tablet 0    bumetanide (BUMEX) 0.5 MG Tab Take 1 tablet (0.5 mg total) by mouth every other day. 45 tablet 3    carvediloL (COREG) 25 MG tablet TAKE 1 TABLET TWICE DAILY WITH MEALS 180 tablet 3    cetirizine (ZYRTEC) 10 MG tablet Take 1 tablet by mouth daily as needed.      chlorthalidone (HYGROTEN) 25 MG Tab TAKE 1 TABLET EVERY DAY 90 tablet 0    cholecalciferol, vitamin D3, (VITAMIN D3) 50 mcg (2,000 unit) Cap 1 capsule once daily. Every  day      coenzyme Q10 (CO Q-10) 100 mg capsule Take 400 mg by mouth once daily.      cyanocobalamin, vitamin B-12, 5,000 mcg Subl Take 5,000 mcg by mouth once daily. Every day      diazePAM (VALIUM) 5 MG tablet Take 2 tablets (10 mg total) by mouth On call Procedure for Anxiety. Take one hour prior to procedure 1 tablet 0    diphenoxylate-atropine 2.5-0.025 mg (LOMOTIL) 2.5-0.025 mg per tablet Take 1 tablet by mouth 4 (four) times daily as needed for Diarrhea. 90 tablet 0    ELIQUIS 5 mg Tab TAKE 1 TABLET TWICE DAILY 180 tablet 3    ezetimibe (ZETIA) 10 mg tablet TAKE 1 TABLET EVERY DAY 90 tablet 0    famotidine (PEPCID) 20 MG tablet TAKE 1 TABLET EVERY NIGHT AS NEEDED FOR HEARTBURN 90 tablet 1    fluticasone (FLONASE) 50 mcg/actuation nasal spray USE 1 SPRAY IN EACH NOSTRIL TWICE DAILY AS NEEDED  FOR  RHINITIS 48 g 3    gabapentin (NEURONTIN) 600 MG tablet TAKE 1 TABLET THREE TIMES DAILY 90 tablet 3    HYDROcodone-acetaminophen (NORCO) 7.5-325 mg per tablet Take 1 tablet by mouth every 6 (six) hours as needed for Pain. 30 tablet 0    hydrocortisone (CORTEF) 5 MG Tab Take 5 mg by mouth once daily. As needed      levothyroxine (SYNTHROID) 50 MCG tablet TAKE 1 TABLET EVERY DAY 90 tablet 0    milk thistle 200 mg Cap Take 200 mg by mouth 2 (two) times daily. Twice a day      mupirocin (BACTROBAN) 2 % ointment Apply topically 3 (three) times daily. 30 g 0    olmesartan (BENICAR) 20 MG tablet Take 1 tablet (20 mg total) by mouth 2 (two) times daily. 180 tablet 3    omega-3 fatty acids 1,000 mg Cap Twice a day      pantoprazole (PROTONIX) 40 MG tablet TAKE 1 TABLET TWICE DAILY 180 tablet 3    potassium chloride SA (K-DUR,KLOR-CON M) 10 MEQ tablet Take 2 tablets (20 mEq total) by mouth every morning AND 4 tablets (40 mEq total) with lunch AND 4 tablets (40 mEq total) daily with dinner or evening meal. 900 tablet 3    predniSONE (DELTASONE) 1 MG tablet TAKE 2 TABLETS ONE TIME DAILY 180 tablet 3    predniSONE (DELTASONE) 5 MG  tablet TAKE 1 TABLET ONE TIME DAILY 90 tablet 3    sildenafil (REVATIO) 20 mg Tab Take 1 to 3 tabs daily as needed. 30 tablet 3    tiotropium (SPIRIVA WITH HANDIHALER) 18 mcg inhalation capsule INHALE THE CONTENTS OF 1 CAPSULE EVERY DAY (CONTROLLER) 90 capsule 3     No current facility-administered medications for this visit.       Review of patient's allergies indicates:   Allergen Reactions    Spironolactone Diarrhea      Review of Systems   Constitutional: Negative for chills and fever.   Cardiovascular:  Negative for claudication.   Skin:  Positive for nail changes. Negative for color change and suspicious lesions.   Musculoskeletal:  Positive for arthritis and back pain. Negative for joint swelling, muscle cramps and muscle weakness.   Neurological:  Negative for numbness and paresthesias.   Psychiatric/Behavioral:  Negative for altered mental status.         Objective:     Physical Exam  Constitutional:       General: He is not in acute distress.     Appearance: He is well-developed. He is not diaphoretic.   Cardiovascular:      Pulses:           Dorsalis pedis pulses are 2+ on the right side and 2+ on the left side.        Posterior tibial pulses are 2+ on the right side and 2+ on the left side.      Comments: CFT is < 3 seconds bilateral.  Pedal hair growth is present bilateral.  Varicosities noted bilateral.  Moderate nonpitting lower extremity edema noted bilateral.  Toes are warm to touch bilateral.    Musculoskeletal:         General: Tenderness present.      Comments: Muscle strength 5/5 in all muscle groups bilateral.  No tenderness nor crepitation with ROM of foot/ankle joints bilateral.  Pain with palpation to lesions of bilateral sub 5th met head.  Forefoot fat pad atrophy bilateral.     Skin:     General: Skin is warm and dry.      Capillary Refill: Capillary refill takes 2 to 3 seconds.      Coloration: Skin is not pale.      Findings: Lesion present. No abrasion, bruising, burn, ecchymosis,  erythema, laceration or rash.      Comments: Pedal skin has normal turgor, temperature, and texture bilateral.  Toenails x 10 appear normotrophic.  Hyperkeratotic lesions noted to bilateral sub 5th met head.    Neurological:      Mental Status: He is alert and oriented to person, place, and time.      Sensory: No sensory deficit.      Comments: Light touch is intact bilateral.           Assessment:      Encounter Diagnoses   Name Primary?    Foot pain, bilateral Yes    Plantar fat pad atrophy     Corn or callus        Plan:     Maximilian was seen today for foot pain and callouses.    Diagnoses and all orders for this visit:    Foot pain, bilateral    Plantar fat pad atrophy    Corn or callus        I counseled the patient on his conditions, their implications and medical management.    Based on today's exam, the patient has fat pad atrophy of bilateral sub 5th heads with subsequent callus build up.    Advised to begin applying ebanel lotion and an occlusive dressing to these areas QD.    Advised to consider custom molded orthotics with donut holes about bilateral sub 5th met head.      RTC prn.    Khris Valentin DPM

## 2025-06-09 ENCOUNTER — CLINICAL SUPPORT (OUTPATIENT)
Dept: REHABILITATION | Facility: HOSPITAL | Age: 80
End: 2025-06-09
Payer: MEDICARE

## 2025-06-09 ENCOUNTER — CLINICAL SUPPORT (OUTPATIENT)
Facility: HOSPITAL | Age: 80
End: 2025-06-09
Payer: MEDICARE

## 2025-06-09 DIAGNOSIS — I89.0 LYMPHEDEMA OF BOTH LOWER EXTREMITIES: Primary | ICD-10-CM

## 2025-06-09 PROCEDURE — 97530 THERAPEUTIC ACTIVITIES: CPT | Mod: PO

## 2025-06-09 PROCEDURE — 97140 MANUAL THERAPY 1/> REGIONS: CPT | Mod: PO

## 2025-06-09 PROCEDURE — 97810 ACUP 1/> WO ESTIM 1ST 15 MIN: CPT | Mod: PO

## 2025-06-09 PROCEDURE — 97811 ACUP 1/> W/O ESTIM EA ADD 15: CPT | Mod: PO

## 2025-06-09 NOTE — CONSULTS
[facility]  Cardiology  Consult    Patient Name: Maximilian Tavera Jr.  MRN: [id number]  Admission Date: 3/29/2020  Code Status: Full Code   Attending Provider: Ashley Vallecillo MD   Primary Care Physician: Brian Marroquin MD  Principal Problem:<principal problem not specified>    Patient information was obtained from medical record and ER records..   [facility] and discussed the care with the nursing staff.  Reviewed all the information    Subjective:     Chief Complaint:  Fever    HPI:   74-year-old gentleman with history of complex medical problems including myasthenia gravis, arterial hypertension, hypothyroidism, obesity, COPD polyneuropathy and dyslipidemia reportedly developed the elevated temperatures.  He has been having fever for about 3 days duration associated with malaise and fatigue prior to presentation he was admitted with COVID protocol currently in airborne and droplet isolation.  Cardiology consult was called for evaluating the patient who has stable aortic valve disease, nonspecific troponin changes and borderline elevation of BNP probably in association with COVID pathophysiology and hypoxemia.  Currently he denies having any chest discomfort no shortness of breath he feels generalized weakness and extreme fatigue.  He has been sweating at night arm appetite has been markedly diminished.  Denies having any nausea or vomiting but unable to take liquid potassium.      From admitting physician's history and physical  HPI: Patient is 74-year-old man with a past medical history of COPD, myasthenia gravis, hypertension, hypothyroidism hyperlipidemia cataract left eye carpal tunnel syndrome arthritis obesity polyneuropathy presented to the emergency room due to fever with the recorded temperature over 103.  Patient states that he has been having 3 days of fever with generalized malaise and fatigue and was the concern about possible COVID.    in  addition he has had associated dysuria and suprapubic abdominal pain.  He denies any cough or new shortness of breath from his baseline COPD symptoms.  Patient states that he had a history of pneumonia 2 years ago during which he had a prolonged hospital stay.   He last took Tylenol this morning for his symptoms.  He endorses decreased activity and oral intake.  Denies any sick contacts.  Denies any recent travel       Past Medical History:   Diagnosis Date    Arthritis     Back pain     Carpal tunnel syndrome 2/25/2013    Cataract     Cataract, left eye 11/10/2014    Chest pain, musculoskeletal     COPD (chronic obstructive pulmonary disease) 11/052018    Emphysema lung 11/05/2018    Gastritis     Hx of colonic polyp     Hyperlipidemia     Hypertension     Hypothyroidism     Knee fracture     Myasthenia gravis     Neuropathy 1/3/2013    Obesity 1/29/2015    Pneumonia 3/29/2020    Polyneuropathy     PVC (premature ventricular contraction)     Squamous cell carcinoma 2014    left forearm    Thyroid disease        Past Surgical History:   Procedure Laterality Date    APPENDECTOMY      CATARACT EXTRACTION W/  INTRAOCULAR LENS IMPLANT Bilateral     COLONOSCOPY  03/01/2012    Dr Esteban; hyperplastic polyp; repeat in 5 years    CYSTOSCOPY N/A 2/26/2019    Procedure: CYSTOSCOPY;  Surgeon: Simba Cheung MD;  Location: [facility];  Service: Urology;  Laterality: N/A;    ENDOSCOPIC ULTRASOUND OF UPPER GASTROINTESTINAL TRACT N/A 1/7/2020    Procedure: ULTRASOUND, UPPER GI TRACT, ENDOSCOPIC;  Surgeon: Kati Ryan MD;  Location: [facility] ([address] [place] [address]);  Service: Endoscopy;  Laterality: N/A;    ESOPHAGOGASTRODUODENOSCOPY N/A 9/12/2018    Procedure: EGD (ESOPHAGOGASTRODUODENOSCOPY);  Surgeon: Gabriel Hernandez MD;  Location: [facility];  Service: Endoscopy;  Laterality: N/A;    ESOPHAGOGASTRODUODENOSCOPY N/A 8/19/2019    Procedure: EGD (ESOPHAGOGASTRODUODENOSCOPY);  Surgeon: Gabriel Hernandez,  MD;  Location: [facility];  Service: Endoscopy;  Laterality: N/A;    ESOPHAGOGASTRODUODENOSCOPY N/A 10/7/2019    Procedure: EGD (ESOPHAGOGASTRODUODENOSCOPY);  Surgeon: Gabriel Hernandez MD;  Location: [facility];  Service: Endoscopy;  Laterality: N/A;    ESOPHAGOGASTRODUODENOSCOPY N/A 1/7/2020    Procedure: EGD (ESOPHAGOGASTRODUODENOSCOPY);  Surgeon: Kati Ryan MD;  Location: [facility] (2ND FLR);  Service: Endoscopy;  Laterality: N/A;    HEMORRHOID SURGERY      KNEE ARTHROPLASTY Bilateral     NECK SURGERY      POSTERIOR FUSION OF CERVICAL SPINE WITH LAMINECTOMY N/A 11/7/2018    Procedure: C2-C6 Posterior Cervical Laminectomy & Instrumental Fusion;  Surgeon: Kishore Turner MD;  Location: [address] F[address];  Service: Neurosurgery;  Laterality: N/A;    TONSILLECTOMY      TRANSFORAMINAL EPIDURAL INJECTION OF STEROID Right 12/20/2019    Procedure: Injection,steroid,epidural,transforaminal approach;  Surgeon: Devan Watt MD;  Location: [facility];  Service: Pain Management;  Laterality: Right;  L3-4, L4-5    TRANSFORAMINAL EPIDURAL INJECTION OF STEROID Bilateral 2/6/2020    Procedure: Injection,steroid,epidural,transforaminal approach;  Surgeon: Devan Watt MD;  Location: [facility];  Service: Pain Management;  Laterality: Bilateral;  L3-L4,L4-L5    TRANSRECTAL ULTRASOUND EXAMINATION N/A 2/26/2019    Procedure: ULTRASOUND, RECTAL APPROACH;  Surgeon: Simba Cheung MD;  Location: [facility];  Service: Urology;  Laterality: N/A;    UPPER GASTROINTESTINAL ENDOSCOPY  09/12/2018    Dr Hernandez; gastritis; extensive intestinal metaplasia; repeat in 1-2- years       Review of patient's allergies indicates:   Allergen Reactions    No known drug allergies        Current Facility-Administered Medications on File Prior to Encounter   Medication    0.9%  NaCl infusion     Current Outpatient Medications on File Prior to Encounter   Medication Sig    atorvastatin (LIPITOR) 80 MG tablet TAKE 1 TABLET EVERY DAY     [de-identified] : Lumbar Exam  CONSTITUTIONAL: The patient is a very pleasant individual who is well-nourished and who appears stated age. PSYCHIATRIC: The patient is alert and oriented X 3 and in no apparent distress, and participates with orthopedic evaluation well. HEAD: Atraumatic and is nonsyndromic in appearance. EENT: No visible thyromegaly, EOMI. RESPIRATORY: Respiratory rate is regular, not dyspneic on examination. LYMPHATICS: There is no inguinal lymphadenopathy INTEGUMENTARY: Skin is clean, dry, and intact about the bilateral lower extremities and lumbar spine. VASCULAR: There is brisk capillary refill about the bilateral lower extremities. NEUROLOGIC: There are no pathologic reflexes. There is no decrease in sensation of the bilateral lower extremities on Wartenberg pinwheel examination. Deep tendon reflexes are well maintained at 2+/4 of the bilateral lower extremities and are symmetric. MUSCULOSKELETAL: There is no visible muscular atrophy. Manual motor strength is well maintained in the bilateral lower extremities. Range of motion of lumbar spine is well maintained. The patient ambulates in a non-myelopathic manner. Negative tension sign and straight leg raise bilaterally. Quad extension, ankle dorsiflexion, EHL, plantar flexion, and ankle eversion are well preserved. Normal secondary orthopaedic exam of bilateral hips, greater trochanteric area, knees and ankles.  Tenderness palpation along the left lumbar paraspinal musculature and supragluteal region. carvediloL (COREG) 25 MG tablet TAKE 1 TABLET TWICE DAILY WITH MEALS    cetirizine (ZYRTEC) 10 MG tablet Take 1 tablet by mouth daily as needed.    chlorthalidone (HYGROTEN) 25 MG Tab TAKE 1 TABLET EVERY DAY    cholecalciferol, vitamin D3, (VITAMIN D) 2,000 unit Cap 1 capsule once daily. Every day    coenzyme Q10 (CO Q-10) 100 mg capsule Take 400 mg by mouth once daily.     cyanocobalamin, vitamin B-12, (VITAMIN B-12) 5,000 mcg Subl Take 5,000 mcg by mouth once daily. Every day    ezetimibe (ZETIA) 10 mg tablet Take 1 tablet (10 mg total) by mouth once daily.    fluticasone (FLONASE) 50 mcg/actuation nasal spray USE 1 SPRAY IN EACH NOSTRIL TWICE DAILY AS NEEDED  FOR  RHINITIS    gabapentin (NEURONTIN) 300 MG capsule TAKE 1 CAPSULE THREE TIMES DAILY    levothyroxine (SYNTHROID) 50 MCG tablet Take 1 tablet (50 mcg total) by mouth once daily.    methocarbamol (ROBAXIN) 750 MG Tab TAKE 1 TABLET (750 MG TOTAL) BY MOUTH 3 (THREE) TIMES DAILY.    methylsulfonylmethane (MSM) 1,000 mg Tab Take 1,000 mg by mouth once daily. Every day    milk thistle 200 mg Cap Take 200 mg by mouth 2 (two) times daily. Twice a day    olmesartan (BENICAR) 20 MG tablet TAKE 1 TABLET EVERY DAY    omega-3 fatty acids 1,000 mg Cap Twice a day    pantoprazole (PROTONIX) 40 MG tablet Take 1 tablet (40 mg total) by mouth 2 (two) times daily.    potassium chloride SA (K-DUR,KLOR-CON) 20 MEQ tablet Take 1 tablet (20 mEq total) by mouth 3 (three) times daily. Take 4 tablets daily for 5 days, then 3 daily for 2 weeks, then 2 daily thereafter.    predniSONE (DELTASONE) 1 MG tablet TAKE 2 TABLETS EVERY DAY    predniSONE (DELTASONE) 5 MG tablet TAKE 1 TABLET EVERY DAY    sildenafil (REVATIO) 20 mg Tab Take 1 to 3 tabs daily as needed.    sucralfate (CARAFATE) 1 gram tablet Take 1 tablet (1 g total) by mouth 4 (four) times daily before meals and nightly.    VENTOLIN HFA 90 mcg/actuation inhaler Inhale 2 puffs into the lungs every 6 (six)  [de-identified] : Previous x-rays again reviewed in the office today, 4 views of the lumbar spine demonstrating an L4-L5 lumbar spondylosis with spondylolisthesis. hours as needed for Wheezing. Rescue    [DISCONTINUED] esomeprazole (NEXIUM) 40 MG capsule Take 1 capsule (40 mg total) by mouth before breakfast.     Family History     Problem Relation (Age of Onset)    Cataracts Mother, Father    Glaucoma Father    Heart disease Mother, Father    Hyperlipidemia Mother, Sister    Hypertension Mother, Sister    No Known Problems Daughter, Daughter        Tobacco Use    Smoking status: Never Smoker    Smokeless tobacco: Never Used    Tobacco comment: when a child   Substance and Sexual Activity    Alcohol use: Yes     Frequency: Never     Binge frequency: Never     Comment: rarely    Drug use: No    Sexual activity: Yes     Partners: Female       REVIEW OF SYSTEMS:  Per admitting physician    Needing intubation intubationReview of Systems   Constitutional: Positive for fatigue and fever. Negative for appetite change and chills.  In addition had some sweating at night time and this is improved now   HENT: Negative for congestion, hearing loss, rhinorrhea, sore throat, trouble swallowing and voice change.    Eyes: Negative for visual disturbance.   Respiratory: Positive for cough and shortness of breath. Negative for chest tightness and wheezing.    Cardiovascular: Negative for chest pain, palpitations and leg swelling.   Gastrointestinal: Negative for abdominal pain, blood in stool, diarrhea, nausea and vomiting.  Mild anorexia is persisting.  Genitourinary: Negative for difficulty urinating, frequency, hematuria and urgency.   Musculoskeletal: Positive for arthralgias, back pain and myalgias. Negative for joint swelling and neck stiffness.   Skin: Negative for pallor and rash.   Neurological: Negative for tremors, seizures, syncope, speech difficulty, weakness, numbness and headaches.   Hematological: Negative for adenopathy.   Psychiatric/Behavioral: Negative for agitation, behavioral problems, confusion and sleep disturbance.     Objective:     Vital Signs (Most  Recent):  Temp: 99 °F (37.2 °C) (03/30/20 1634)  Pulse: 68 (03/30/20 1634)  Resp: 18 (03/30/20 1634)  BP: (!) 116/59 (03/30/20 1634)  SpO2: (!) 94 % (03/30/20 1634) Vital Signs (24h Range):  Temp:  [98.9 °F (37.2 °C)-99.5 °F (37.5 °C)] 99 °F (37.2 °C)  Pulse:  [65-82] 68  Resp:  [18-19] 18  SpO2:  [92 %-95 %] 94 %  BP: ()/(54-73) 116/59     Weight: 108.3 kg (238 lb 12.1 oz)  Body mass index is 31.5 kg/m².    SpO2: (!) 94 %  O2 Device (Oxygen Therapy): nasal cannula      Intake/Output Summary (Last 24 hours) at 3/30/2020 1731  Last data filed at 3/30/2020 1655  Gross per 24 hour   Intake 530 ml   Output 660 ml   Net -130 ml       Lines/Drains/Airways     Peripheral Intravenous Line                 Peripheral IV - Single Lumen 03/30/20 1010 20 G Right Antecubital less than 1 day                PHYSICAL EXAM:  As noted by admit physician and is not repeated by me personally     Constitutional: He appears well-developed and well-nourished.  Non-toxic appearance. No distress.   HENT:   Head: Normocephalic and atraumatic.   Eyes: EOM are normal. Pupils are equal, round, and reactive to light.   Neck: Normal range of motion. Neck supple. No neck rigidity. No JVD present.   Cardiovascular: Normal rate, regular rhythm, normal heart sounds and intact distal pulses. Exam reveals no gallop and no friction rub.    No murmur heard.  Pulmonary/Chest: Breath sounds normal. He has no wheezes. He has no rhonchi. He has no rales.   Abdominal: Soft. Bowel sounds are normal. He exhibits no distension. There is tenderness (suprapubic). There is no rebound and no guarding.   Musculoskeletal: Normal range of motion.   Neurological: He is alert and oriented to person, place, and time. He has normal strength and normal reflexes. No cranial nerve deficit or sensory deficit. He exhibits normal muscle tone. Coordination normal. GCS eye subscore is 4. GCS verbal subscore is 5. GCS motor subscore is 6.   Skin: Skin is warm and dry.    Psychiatric: He has a normal mood and affect. His speech is normal and behavior is normal. He is not actively hallucinating.     Significant Labs:   Results for ROXANNA DELGADILLO JR. (MRN [id number]) as of 3/30/2020 17:37   Ref. Range 3/29/2020 11:52 3/29/2020 15:46 3/29/2020 17:16   WBC Latest Ref Range: 3.90 - 12.70 K/uL 11.37 9.47    RBC Latest Ref Range: 4.60 - 6.20 M/uL 4.57 (L) 4.32 (L)    Hemoglobin Latest Ref Range: 14.0 - 18.0 g/dL 13.5 (L) 12.8 (L)    Hematocrit Latest Ref Range: 40.0 - 54.0 % 43.0 40.3    MCV Latest Ref Range: 82 - 98 fL 94 93    MCH Latest Ref Range: 27.0 - 31.0 pg 29.5 29.6    MCHC Latest Ref Range: 32.0 - 36.0 g/dL 31.4 (L) 31.8 (L)    RDW Latest Ref Range: 11.5 - 14.5 % 13.3 13.5    Platelets Latest Ref Range: 150 - 350 K/uL 118 (L) 109 (L)    MPV Latest Ref Range: 9.2 - 12.9 fL 10.5 10.3    Gran% Latest Ref Range: 38.0 - 73.0 % 83.2 (H) 84.0 (H)    Gran # (ANC) Latest Ref Range: 1.8 - 7.7 K/uL 9.5 (H) 8.0 (H)    Lymph% Latest Ref Range: 18.0 - 48.0 % 7.0 (L) 7.9 (L)    Lymph # Latest Ref Range: 1.0 - 4.8 K/uL 0.8 (L) 0.8 (L)    Mono% Latest Ref Range: 4.0 - 15.0 % 9.0 7.2    Mono # Latest Ref Range: 0.3 - 1.0 K/uL 1.0 0.7    Eosinophil% Latest Ref Range: 0.0 - 8.0 % 0.1 0.1    Eos # Latest Ref Range: 0.0 - 0.5 K/uL 0.0 0.0    Basophil% Latest Ref Range: 0.0 - 1.9 % 0.3 0.2    Baso # Latest Ref Range: 0.00 - 0.20 K/uL 0.03 0.02    nRBC Latest Ref Range: 0 /100 WBC 0 0    Differential Method Unknown Automated Automated    Immature Grans (Abs) Latest Ref Range: 0.00 - 0.04 K/uL 0.05 (H) 0.06 (H)    Immature Granulocytes Latest Ref Range: 0.0 - 0.5 % 0.4 0.6 (H)    Sed Rate Latest Ref Range: 0 - 10 mm/Hr  8    Sodium Latest Ref Range: 136 - 145 mmol/L 140     Potassium Latest Ref Range: 3.5 - 5.1 mmol/L 3.7     Chloride Latest Ref Range: 95 - 110 mmol/L 100     CO2 Latest Ref Range: 23 - 29 mmol/L 29     Anion Gap Latest Ref Range: 8 - 16 mmol/L 11     BUN, Bld Latest Ref Range: 8 -  23 mg/dL 32 (H)     Creatinine Latest Ref Range: 0.5 - 1.4 mg/dL 1.6 (H)     eGFR if non African American Latest Ref Range: >60 mL/min/1.73 m^2 41 (A)     eGFR if African American Latest Ref Range: >60 mL/min/1.73 m^2 48 (A)     Glucose Latest Ref Range: 70 - 110 mg/dL 144 (H)     Calcium Latest Ref Range: 8.7 - 10.5 mg/dL 8.9     Phosphorus Latest Ref Range: 2.7 - 4.5 mg/dL 4.5 3.4    Magnesium Latest Ref Range: 1.6 - 2.6 mg/dL 1.9 2.0    Alkaline Phosphatase Latest Ref Range: 55 - 135 U/L 72     PROTEIN TOTAL Latest Ref Range: 6.0 - 8.4 g/dL 6.3     Albumin Latest Ref Range: 3.5 - 5.2 g/dL 3.2 (L)     BILIRUBIN TOTAL Latest Ref Range: 0.1 - 1.0 mg/dL 1.8 (H)     AST Latest Ref Range: 10 - 40 U/L 14     ALT Latest Ref Range: 10 - 44 U/L 17     CRP Latest Ref Range: 0.0 - 8.2 mg/L 194.5 (H)     BNP Latest Ref Range: 0 - 99 pg/mL 274 (H)     CPK Latest Ref Range: 20 - 200 U/L  137    LD Latest Ref Range: 110 - 260 U/L 168     Troponin I Latest Ref Range: 0.000 - 0.026 ng/mL 0.044 (H)     Lactate, Nicholas Latest Ref Range: 0.5 - 2.2 mmol/L  1.3    Procalcitonin Latest Ref Range: <0.25 ng/mL  0.38 (H)    Specimen UA Unknown   Urine, Clean Catch   Color, UA Latest Ref Range: Yellow, Straw, Christina    Yellow   Appearance, UA Latest Ref Range: Clear    Clear   Specific [place], UA Latest Ref Range: 1.005 - 1.030    >=1.030 (A)   pH, UA Latest Ref Range: 5.0 - 8.0    6.0   Protein, UA Latest Ref Range: Negative    Negative   Glucose, UA Latest Ref Range: Negative    Negative   Ketones, UA Latest Ref Range: Negative    Negative   Occult Blood UA Latest Ref Range: Negative    Negative   NITRITE UA Latest Ref Range: Negative    Positive (A)   UROBILINOGEN UA Latest Ref Range: <2.0 EU/dL   1.0   Bilirubin (UA) Latest Ref Range: Negative    Negative   Leukocytes, UA Latest Ref Range: Negative    Negative   RBC, UA Latest Ref Range: 0 - 4 /hpf   2   WBC, UA Latest Ref Range: 0 - 5 /hpf   30 (H)   Bacteria, UA Latest Ref Range:  None-Occ /hpf   Moderate (A)   Squam Epithel, UA Latest Units: /hpf   3   Microscopic Comment Unknown   SEE COMMENT   CULTURE, URINE Unknown   Rpt   Results for ROXANNA DELGADILLO JR. (MRN [id number]) as of 3/31/2020 12:03   Ref. Range 3/29/2020 17:16 3/31/2020 00:29   WBC Latest Ref Range: 3.90 - 12.70 K/uL  8.05   RBC Latest Ref Range: 4.60 - 6.20 M/uL  4.22 (L)   Hemoglobin Latest Ref Range: 14.0 - 18.0 g/dL  12.6 (L)   Hematocrit Latest Ref Range: 40.0 - 54.0 %  38.6 (L)   MCV Latest Ref Range: 82 - 98 fL  92   MCH Latest Ref Range: 27.0 - 31.0 pg  29.9   MCHC Latest Ref Range: 32.0 - 36.0 g/dL  32.6   RDW Latest Ref Range: 11.5 - 14.5 %  13.4   Platelets Latest Ref Range: 150 - 350 K/uL  118 (L)   MPV Latest Ref Range: 9.2 - 12.9 fL  10.8   Gran% Latest Ref Range: 38.0 - 73.0 %  72.7   Gran # (ANC) Latest Ref Range: 1.8 - 7.7 K/uL  5.9   Lymph% Latest Ref Range: 18.0 - 48.0 %  12.5 (L)   Lymph # Latest Ref Range: 1.0 - 4.8 K/uL  1.0   Mono% Latest Ref Range: 4.0 - 15.0 %  12.2   Mono # Latest Ref Range: 0.3 - 1.0 K/uL  1.0   Eosinophil% Latest Ref Range: 0.0 - 8.0 %  2.0   Eos # Latest Ref Range: 0.0 - 0.5 K/uL  0.2   Basophil% Latest Ref Range: 0.0 - 1.9 %  0.2   Baso # Latest Ref Range: 0.00 - 0.20 K/uL  0.02   nRBC Latest Ref Range: 0 /100 WBC  0   Differential Method Unknown  Automated   Immature Grans (Abs) Latest Ref Range: 0.00 - 0.04 K/uL  0.03   Immature Granulocytes Latest Ref Range: 0.0 - 0.5 %  0.4   Sodium Latest Ref Range: 136 - 145 mmol/L  138   Potassium Latest Ref Range: 3.5 - 5.1 mmol/L  3.3 (L)   Chloride Latest Ref Range: 95 - 110 mmol/L  100   CO2 Latest Ref Range: 23 - 29 mmol/L  28   Anion Gap Latest Ref Range: 8 - 16 mmol/L  10   BUN, Bld Latest Ref Range: 8 - 23 mg/dL  23   Creatinine Latest Ref Range: 0.5 - 1.4 mg/dL  0.9   eGFR if non African American Latest Ref Range: >60 mL/min/1.73 m^2  >60   eGFR if  Latest Ref Range: >60 mL/min/1.73 m^2  >60   Glucose Latest Ref  Range: 70 - 110 mg/dL  100   Calcium Latest Ref Range: 8.7 - 10.5 mg/dL  8.4 (L)   Alkaline Phosphatase Latest Ref Range: 55 - 135 U/L  60   PROTEIN TOTAL Latest Ref Range: 6.0 - 8.4 g/dL  5.7 (L)   Albumin Latest Ref Range: 3.5 - 5.2 g/dL  2.8 (L)   BILIRUBIN TOTAL Latest Ref Range: 0.1 - 1.0 mg/dL  0.8   AST Latest Ref Range: 10 - 40 U/L  21   ALT Latest Ref Range: 10 - 44 U/L  16   CRP Latest Ref Range: 0.0 - 8.2 mg/L  132.7 (H)   Specimen UA Unknown Urine, Clean Catch    Color, UA Latest Ref Range: Yellow, Straw, Christina  Yellow    Appearance, UA Latest Ref Range: Clear  Clear    Specific [place], UA Latest Ref Range: 1.005 - 1.030  >=1.030 (A)    pH, UA Latest Ref Range: 5.0 - 8.0  6.0    Protein, UA Latest Ref Range: Negative  Negative    Glucose, UA Latest Ref Range: Negative  Negative    Ketones, UA Latest Ref Range: Negative  Negative    Occult Blood UA Latest Ref Range: Negative  Negative    NITRITE UA Latest Ref Range: Negative  Positive (A)    UROBILINOGEN UA Latest Ref Range: <2.0 EU/dL 1.0    Bilirubin (UA) Latest Ref Range: Negative  Negative    Leukocytes, UA Latest Ref Range: Negative  Negative    RBC, UA Latest Ref Range: 0 - 4 /hpf 2    WBC, UA Latest Ref Range: 0 - 5 /hpf 30 (H)    Bacteria, UA Latest Ref Range: None-Occ /hpf Moderate (A)    Squam Epithel, UA Latest Units: /hpf 3    Microscopic Comment Unknown SEE COMMENT    CULTURE, URINE Unknown Rpt        Significant Imaging:   Imaging Results          X-Ray Chest AP Portable (Final result)  Result time 03/29/20 11:20:19    Final result by Judy Aden MD (03/29/20 11:20:19)                 Impression:      Mildly prominent markings right lung base compared to the prior exam suggesting mild infiltrate.  Mild atelectatic like stranding left lung base      Electronically signed by: Judy Aden MD  Date:    03/29/2020  Time:    11:20             Narrative:    EXAMINATION:  XR CHEST AP PORTABLE    CLINICAL HISTORY:  Shortness of  breath    TECHNIQUE:  Single frontal view of the chest was performed.    COMPARISON:  05/14/2018    FINDINGS:  The cardiomediastinal silhouette is stable.  There is very slight atelectatic stranding left lung base.  There are mildly prominent markings in the right lung base suggesting mild right basilar infiltrate and peribronchial thickening.  No pleural effusion.                                    I HAVE REVIEWED :    The vital signs, nurses notes, and all the pertinent radiology and labs.     Assessment and Plan:     History of arterial hypertension:  Early this morning blood pressure was 114/59.  Currently fairly well controlled on Coreg 25 mg p.o. B.i.d.,  May consider to discontinue chlorthalidone and use MAXZIDE 37.5/25 mg daily.    Hypothyroid   Will continue home medication home dose of levothyroxine      Hyperlipidemia  Will continue home statin dose as a liver enzymes are stable.    however if there is abnormal LFTs that consider holding both Lipitor as well as Zetia          DDD (degenerative disc disease), lumbar  Continue Tylenol for pain      Myasthenia gravis, AChR antibody positive  Management per hospitalist  It Will continue home dose of prednisone 7 mg daily      Umbilical hernia without obstruction and without gangrene  History noted, not active      BPH with obstruction/lower urinary tract symptoms  Will continue to monitor urine output      Bilateral carotid artery stenosis  History noted      COPD (chronic obstructive pulmonary disease)  Will continue albuterol inhaler as needed      Suspected Covid-19 Virus Infection    Covid-19 Virus Infection  Management per hospitalist protocol  - Infection Control notified    - Isolation:   - Airborne and Droplet Precautions  - N95 masks must be fit tested, wear eye protection  - 20 second hand hygiene   - Limit visitors per hospital policy   - Consolidating lab draws, nursing care, and interventions    - Diagnostics: (rising CRP, persistent lymphopenia,  hyponatremia, hyperferritinemia, elevated troponin, elevated d-dimer, age, and comorbidities are significant predictors of poor clinical outcome)   - CBC:   trend Q48hrs  - CMP:        trend Q48hrs  - Procalcitonin:  - D-dimer:  trend Q48hrs  - Ferritin:  repeat prior to discharge  - CRP:        trend Q48hrs  - LDH:  - BNP:  - Troponin:    - ECG:   - rapid Flu:   - RIP only if BMT/solid transplant:   - Legionella antigen:   - Blood culture x2:   - Sputum culture:   - CXR:   - UA and culture:      - Management:   - Bundle care as able to minimize in/out of room   - Supplemental O2 to maintain SpO2 >92%,   if requiring 6L NC or higher, place on nonrebreather and discuss case with MICU   - Telemetry & continuous Pulse Ox   - albuterol INHALER PRN 4puff Q6hr approximates a nebulizer (avoid nebulization of secretions)   - apap PRN fever   - Avoiding NIPPV to prevent aerosolization   (including home CPAP/BiPAP unless on a case-by-case basis and only in negative pressure room)   - Cautious use of NSAIDS for fever per WHO recommendations (3/16/2020)   - No new ACEi/ARB start or discontinuation of chronic med unless hypotensive (Esler et al. Journal of Hypertension 2020, 38:000-000)   - Careful use of steroids in the absence of other indications   - unless septic shock due to increased viral replication   - Fluid sparing resuscitation   - Empiric antibiotics per likely source & patient allergies    - CAP: x 5 day course  Ceftriaxone 1g IV Q24hrs            Azithromycin 500mg IV day #1, then 250mg PO daily x4 days                 If MRSA risk factors, add Vancomycin IV (PharmD consult)   - If patient meets criteria per Hospital Protocol    - start statin (if CPK WNL)    - start HCQ 400mg PO BID x1 day, then 400mg PO daily x 4 days (check G6PD, ECG, and start Qshift POCT glucose)    Goals of care, counseling/discussion  - Reviewed the typical clinical course of COVID19  (patient name or relationship to patient), including the  potential for acute decompensation requiring intubation and mechanical ventilation  - Discussed again as part of routine daily evaluation, patient/POA maintains code status of full code    VTE High Risk Prophylaxis: enoxaparin 40mg sq QHS @ 2100 (bundled care) if GFR >30    Patient's chronic/stable medical conditions noted in the problem list above will be managed with the patient's home medications as tolerated.           Pneumonia  Will continue treating as community-acquired pneumonia  Will follow-up with the culture result  COVID results pending    Thank you for the consultation     Active Diagnoses:    Diagnosis Date Noted POA    SOB (shortness of breath) [R06.02] 03/29/2020 Yes    COPD (chronic obstructive pulmonary disease) [J44.9] 03/29/2020 Yes    Suspected Covid-19 Virus Infection [R68.89] 03/29/2020 Yes    Pneumonia [J18.9] 03/29/2020 Unknown    Lumbar radiculitis [M54.16] 12/20/2019 Yes    Bilateral carotid artery stenosis [I65.23] 10/23/2019 Yes    Nonsustained ventricular tachycardia [I47.2] 04/11/2019 Yes    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, N13.8] 02/26/2019 Yes    Aortic valve disease [I35.9] 02/15/2017 Yes    Umbilical hernia without obstruction and without gangrene [K42.9] 08/31/2015 Yes    Myasthenia gravis, AChR antibody positive [G70.00]  Yes    DDD (degenerative disc disease), lumbar [M51.36] 10/09/2014 Yes    Hypothyroid [E03.9] 05/09/2012 Yes    Hyperlipidemia [E78.5] 05/09/2012 Yes    HTN (hypertension), benign [I10] 05/09/2012 Yes      Problems Resolved During this Admission:           VTE Risk Mitigation (From admission, onward)         Ordered     enoxaparin injection 40 mg  Daily      03/29/20 1528     IP VTE HIGH RISK PATIENT  Once      03/29/20 1528                Zachariah Pacheco MD  Cardiology   [facility]        This consultation has been completed virtual visit with[employer] and with the patient complete review  patient's data.

## 2025-06-11 ENCOUNTER — OFFICE VISIT (OUTPATIENT)
Dept: NEUROLOGY | Facility: CLINIC | Age: 80
End: 2025-06-11
Payer: MEDICARE

## 2025-06-11 VITALS
WEIGHT: 235.56 LBS | BODY MASS INDEX: 34.79 KG/M2 | SYSTOLIC BLOOD PRESSURE: 147 MMHG | HEART RATE: 59 BPM | DIASTOLIC BLOOD PRESSURE: 64 MMHG

## 2025-06-11 DIAGNOSIS — Z79.60 ENCOUNTER FOR MONITORING IMMUNOSUPPRESSIVE MEDICATION THERAPY CAUSING IMMUNODEFICIENCY: ICD-10-CM

## 2025-06-11 DIAGNOSIS — G70.00 MYASTHENIA GRAVIS, ACHR ANTIBODY POSITIVE: Primary | ICD-10-CM

## 2025-06-11 DIAGNOSIS — Z79.52 CURRENT CHRONIC USE OF SYSTEMIC STEROIDS: ICD-10-CM

## 2025-06-11 DIAGNOSIS — Z51.81 ENCOUNTER FOR MONITORING IMMUNOSUPPRESSIVE MEDICATION THERAPY CAUSING IMMUNODEFICIENCY: ICD-10-CM

## 2025-06-11 DIAGNOSIS — D84.821 ENCOUNTER FOR MONITORING IMMUNOSUPPRESSIVE MEDICATION THERAPY CAUSING IMMUNODEFICIENCY: ICD-10-CM

## 2025-06-11 PROCEDURE — 99417 PROLNG OP E/M EACH 15 MIN: CPT | Mod: S$GLB,,, | Performed by: PSYCHIATRY & NEUROLOGY

## 2025-06-11 PROCEDURE — 1125F AMNT PAIN NOTED PAIN PRSNT: CPT | Mod: CPTII,S$GLB,, | Performed by: PSYCHIATRY & NEUROLOGY

## 2025-06-11 PROCEDURE — 1159F MED LIST DOCD IN RCRD: CPT | Mod: CPTII,S$GLB,, | Performed by: PSYCHIATRY & NEUROLOGY

## 2025-06-11 PROCEDURE — 1160F RVW MEDS BY RX/DR IN RCRD: CPT | Mod: CPTII,S$GLB,, | Performed by: PSYCHIATRY & NEUROLOGY

## 2025-06-11 PROCEDURE — 3077F SYST BP >= 140 MM HG: CPT | Mod: CPTII,S$GLB,, | Performed by: PSYCHIATRY & NEUROLOGY

## 2025-06-11 PROCEDURE — 1100F PTFALLS ASSESS-DOCD GE2>/YR: CPT | Mod: CPTII,S$GLB,, | Performed by: PSYCHIATRY & NEUROLOGY

## 2025-06-11 PROCEDURE — G2211 COMPLEX E/M VISIT ADD ON: HCPCS | Mod: S$GLB,,, | Performed by: PSYCHIATRY & NEUROLOGY

## 2025-06-11 PROCEDURE — 3078F DIAST BP <80 MM HG: CPT | Mod: CPTII,S$GLB,, | Performed by: PSYCHIATRY & NEUROLOGY

## 2025-06-11 PROCEDURE — 99215 OFFICE O/P EST HI 40 MIN: CPT | Mod: S$GLB,,, | Performed by: PSYCHIATRY & NEUROLOGY

## 2025-06-11 PROCEDURE — 99999 PR PBB SHADOW E&M-EST. PATIENT-LVL V: CPT | Mod: PBBFAC,,, | Performed by: PSYCHIATRY & NEUROLOGY

## 2025-06-11 PROCEDURE — 3288F FALL RISK ASSESSMENT DOCD: CPT | Mod: CPTII,S$GLB,, | Performed by: PSYCHIATRY & NEUROLOGY

## 2025-06-11 RX ORDER — IBUPROFEN 200 MG
1 CAPSULE ORAL DAILY
COMMUNITY

## 2025-06-11 NOTE — PROGRESS NOTES
"NEUROLOGY  Outpatient CONSULT  [facility]  [address] [facility], [place] [address]  [phone] (office) / [phone] (fax)    Patient Name:  Maximilian Tavera Jr.  :  1945  MR #:  [id number]  Acct #:  [id number]    Date of Neurology Consult: 2025  Name of Neurologist: Suellen Conn D.O, ABPN, AOBNP, ABEM    Other Physicians:  Brian Marroquin MD (Primary Care Physician); No ref. provider found (Referring)      Chief Complaint: Myasthenia Gravis      History of Present Illness (HPI):  Maximilian Tavera Jr. is a 79 y.o. male here to establish care for MG after his neurologist moved.    MYASTHENIA GRAVIS:  He was diagnosed with Myasthenia Gravis in 2015, with symptoms initially presenting in . His initial symptom was double vision, which he still experiences occasionally, lasting 30-40 minutes, with the most recent occurrence over a month ago. He reports episodes of choking, more frequently with liquids than food, with the last episode approximately two months ago. He experienced neck weakness 6-7 years ago, described as difficulty controlling the head like having a "bowling ball on the shoulders", which was successfully managed with high-dose prednisone for about 30 days. He denies any significant myasthenia symptoms in the past month.    MEDICAL HISTORY:  He has COPD with shortness of breath during exertion, particularly when walking while carrying items. He was recently diagnosed with prostate cancer. He has a chronic blood clot behind left knee present for two years that may not resolve completely. He has undergone three neck surgeries resulting in placement of titanium rods and screws. He was diagnosed with osteopenia in .    MEDICATIONS:  He takes Prednisone 7 mg daily (not every other day as often mentioned in his progress notes), He is on Pantoprazole daily for gastric protection.      Treatment to date:    Prednisone 7 mg QOD   Mestinon - stopped " due to side effect of diarrhea    Review of Systems:   General: Weight gain: Yes, Weight Loss: No, Fatigue: Yes,   Fever: No, Chills: No, Night Sweats: No, Insomnia: No, Excessive sleeping: No   Respiratory:  Cough: No, Shortness of Breath: Yes,   Wheezing: No, Excessive Snoring: Yes, Coughing up blood: No  Endocrine: Heat Intolerance: Yes, Cold Intolerance: No,   Excessive Thirst: No, Excessive Hunger: No,   Eyes:  Blurred Vision: No, Double Vision: No,   Light Sensitivity: Yes, Eye pain: No  Musculoskeletal: Muscle Aches/Pain: Yes, Joint Pain/Swelling: Yes, Muscle Cramps: Yes, Muscle Weakness: Yes, Neck Pain: No, Back Pain: Yes   Neurological: Difficulty Walking/Falls: Yes, Headache Migraine: Yes, Dizziness/Vertigo: Yes, Fainting: No, Difficulty with Speech: No, Weakness: Yes, Tingling/Numbness: Yes, Tremors: No, Memory Problems: No, Seizures: No, Difficulty Swallowing: Yes, Altered Taste: No.  Cardiovascular: Chest Pain: No, Shortness of Breath: Yes,   Palpitations: No,  Gastrointestinal: Nausea/Vomiting: No, Constipation: No, Diarrhea: No, Bloody Stools: No   Psych/Cog:  Depression: No, Anxiety: No, Hallucinations: No, Problems Concentrating: No  : Frequent Urination: Yes, Incontinence: No, Urinary Infections: No, Blood of Urine: No,   ENT:Hearing Loss: No, Earache: No, Ringing in Ears: Yes,   Facial Pain: No, Chronic Congestion: No   Immune: Seasonal Allergies: Yes, Hives and/or Rashes: No  The remainder of the review of twelve body systems was reviewed and normal.    Past Medical, Surgical, Family & Social History:   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    Disease of spinal cord, unspecified 1/5/2024    Emphysema lung 11/05/2018    Gastritis     Hx of colonic  polyp     Hyperlipidemia     Hypertension     Hypothyroidism     Knee fracture     Myasthenia gravis     Neuropathy 01/03/2013    Obesity 01/29/2015    Pneumonia 03/29/2020    Polyneuropathy     Prostate cancer 3/20/2025    PVC (premature ventricular contraction)     Squamous cell carcinoma 2014    left forearm    Thyroid disease      Past Surgical History:   Procedure Laterality Date    APPENDECTOMY      CATARACT EXTRACTION W/  INTRAOCULAR LENS IMPLANT Bilateral     COLONOSCOPY  03/01/2012    Dr Esteban; hyperplastic polyp; repeat in 5 years    COLONOSCOPY N/A 12/7/2021    Procedure: COLONOSCOPY;  Surgeon: Gabriel Hernandez MD;  Location: [facility];  Service: Endoscopy;  Laterality: N/A;    CYSTOSCOPY N/A 2/26/2019    Procedure: CYSTOSCOPY;  Surgeon: Simba Cheung MD;  Location: [facility];  Service: Urology;  Laterality: N/A;    ENDOSCOPIC ULTRASOUND OF UPPER GASTROINTESTINAL TRACT N/A 1/7/2020    Procedure: ULTRASOUND, UPPER GI TRACT, ENDOSCOPIC;  Surgeon: Kati Ryan MD;  Location: [facility] (2ND FLR);  Service: Endoscopy;  Laterality: N/A;    ESOPHAGOGASTRODUODENOSCOPY N/A 9/12/2018    Procedure: EGD (ESOPHAGOGASTRODUODENOSCOPY);  Surgeon: Gabriel Hernandez MD;  Location: [facility];  Service: Endoscopy;  Laterality: N/A;    ESOPHAGOGASTRODUODENOSCOPY N/A 8/19/2019    Procedure: EGD (ESOPHAGOGASTRODUODENOSCOPY);  Surgeon: Gabriel Hernandez MD;  Location: [facility];  Service: Endoscopy;  Laterality: N/A;    ESOPHAGOGASTRODUODENOSCOPY N/A 10/7/2019    Procedure: EGD (ESOPHAGOGASTRODUODENOSCOPY);  Surgeon: Gabriel Hernandez MD;  Location: [facility];  Service: Endoscopy;  Laterality: N/A;    ESOPHAGOGASTRODUODENOSCOPY N/A 1/7/2020    Procedure: EGD (ESOPHAGOGASTRODUODENOSCOPY);  Surgeon: Kati Ryan MD;  Location: [facility] (2ND FLR);  Service: Endoscopy;  Laterality: N/A;    HEMORRHOID SURGERY      INJECTION OF ANESTHETIC AGENT AROUND MEDIAL BRANCH NERVES INNERVATING LUMBAR FACET JOINT Bilateral  10/1/2020    Procedure: Block-nerve-medial branch-lumbar Bilateral L 3,4,5;  Surgeon: Devan Watt MD;  Location: [facility];  Service: Pain Management;  Laterality: Bilateral;    INJECTION OF ANESTHETIC AGENT AROUND MEDIAL BRANCH NERVES INNERVATING LUMBAR FACET JOINT Right 10/7/2022    Procedure: Block-nerve-medial branch-lumbar L3,4,5;  Surgeon: Devan Watt MD;  Location: [facility] OR;  Service: Pain Management;  Laterality: Right;    KNEE ARTHROPLASTY Bilateral     LENGTHENING OF ACHILLES TENDON Right 9/11/2020    Procedure: percutaeous tenotomy of right lateral epicondyle (tenex);  Surgeon: Terry Quach MD;  Location: [facility] OR;  Service: Neurosurgery;  Laterality: Right;  tenex to right lateral epicondyle  Tenex machine SN#[id number], total time 1min. 30seconds    NECK SURGERY      POSTERIOR FUSION OF CERVICAL SPINE WITH LAMINECTOMY N/A 11/7/2018    Procedure: C2-C6 Posterior Cervical Laminectomy & Instrumental Fusion;  Surgeon: Kishore Turner MD;  Location: [address];  Service: Neurosurgery;  Laterality: N/A;    TONSILLECTOMY      TRANSFORAMINAL EPIDURAL INJECTION OF STEROID Right 12/20/2019    Procedure: Injection,steroid,epidural,transforaminal approach;  Surgeon: Devan Watt MD;  Location: [facility];  Service: Pain Management;  Laterality: Right;  L3-4, L4-5    TRANSFORAMINAL EPIDURAL INJECTION OF STEROID Bilateral 2/6/2020    Procedure: Injection,steroid,epidural,transforaminal approach;  Surgeon: Devan Watt MD;  Location: [facility];  Service: Pain Management;  Laterality: Bilateral;  L3-L4,L4-L5    TRANSFORAMINAL EPIDURAL INJECTION OF STEROID Bilateral 8/26/2020    Procedure: Injection,steroid,epidural,transforaminal approach;  Surgeon: Devan Watt MD;  Location: [facility] OR;  Service: Pain Management;  Laterality: Bilateral;  L3-4, L4-5    TRANSRECTAL ULTRASOUND EXAMINATION N/A 2/26/2019    Procedure: ULTRASOUND, RECTAL APPROACH;  Surgeon: Simba Cheung MD;  Location: [facility];  Service: Urology;  Laterality: N/A;     UPPER GASTROINTESTINAL ENDOSCOPY  09/12/2018    Dr Hernandez; gastritis; extensive intestinal metaplasia; repeat in 1-2- years     Family History   Problem Relation Name Age of Onset    Cataracts Mother      Heart disease Mother          CHF    Hypertension Mother      Hyperlipidemia Mother      Cataracts Father      Glaucoma Father      Heart disease Father      Hyperlipidemia Sister      Hypertension Sister      No Known Problems Daughter      No Known Problems Daughter      Collagen disease Neg Hx      Amblyopia Neg Hx      Blindness Neg Hx      Macular degeneration Neg Hx      Retinal detachment Neg Hx      Strabismus Neg Hx      Cancer Neg Hx      Colon cancer Neg Hx      Esophageal cancer Neg Hx      Stomach cancer Neg Hx      Crohn's disease Neg Hx      Ulcerative colitis Neg Hx       Alcohol use:  reports current alcohol use.   (Of note, 0.6 oz = 1 beer or 6 oz = 10 beers).  Tobacco use:  reports that he has never smoked. He has never used smokeless tobacco.  Street drug use:  reports no history of drug use.  Allergies: Spironolactone.    Home Medications:   Current Medications[1]    Physical Examination:  BP (!) 147/64 (BP Location: Right arm, Patient Position: Sitting)   Pulse (!) 59   Wt 106.8 kg (235 lb 9 oz)   BMI 34.79 kg/m²     GENERAL:  General appearance: Well, non-toxic appearing.  No apparent distress.  Extremities: normal.    MENTAL STATUS:  Alertness, attention span & concentration: normal.  Language: normal.  Orientation to self, place & time:  normal.  Memory, recent & remote: normal.  Fund of knowledge: normal.    SPEECH:  Clear and fluent.  Follows complex commands.    CRANIAL NERVES:  Cranial Nerves II-XII were examined.  II - Visual fields: normal.  III, IV, VI: PERRL, EOMI, No ptosis, No nystagmus.  V - Facial sensation: normal.  VII - Face symmetry & mobility: normal.  VIII - Hearing: normal.  IX, X - Palate: mobile & midline.  XI - Shoulder shrug: normal.  XII - Tongue protrusion:  normal.    GROSS MOTOR:  Gait & station: normal.  Tone: normal.  Abnormal movements: none.  Finger-nose & Heel-knee-shin: normal.  Rapid alternating movements & drift: normal.    MUSCLE STRENGTH:       Fascics Atrophy RIGHT    LEFT Atrophy Fascics     5 Deltoids 5       5 Biceps 5       5 Triceps 5       5 Forearm.Pr. 5       5 Inteross. 5                         5 Iliopsoas 5       5 Quads 5       5 Hams 5       5 Dorsiflex 5       5 Plantar Flex 5          REFLEXES:    RIGHT Reflex   LEFT   2+ Biceps 2+   2+ Brachiorad. 2+   2+ Triceps 2+        2+ Patellar 2+   1 Ankle 1        Down PLANTAR Down     SENSORY:  Light touch: Normal throughout.    Myasthenia Gravis Activities of Daily Living Scale     Grade   Function 0 1 2 3   1. Talking Normal Intermittent slurring or nasal speech Constant slurring or nasal, but can be understood Difficult to understand speech    x      2. Chewing Normal Fatigues with solid food Fatigues with soft food Gastric tube    x      3. Swallowing Normal Chokes rarely Frequent choking requiring change of diet Gastric tube    x      4. Breathing Normal Shortness of breath on exertion Shortness of breath at rest Ventilator     x     5. Impairment of ability to brush teeth or comb hair Normal Requires extra effort but requires no rest period Rest periods needed Cannot do one or more of these functions    x      6. Impairment of ability to rise from a chair Normal Sometimes uses arms Always uses arms Requires assistance    x      7. Double vision Normal Occasional, not every day Daily, but not constant Constant    x      8. Eyelid droop Normal Occasional, not every day Daily, but not constant Constant    x      Total MG ADL Score 1       Myasthenia Gravis- Quality of Life Score (revised) - MG QoL-15r  Please indicate how true each statement has been (over the past four weeks).   Not at all Somewhat Very Much    0 1 2   1. I am frustrated by my MG   x     2. I have trouble with my eyes because  of my MG (e.g. double vision)    x    3. I have trouble eating because of MG    x    4. I have limited my social activity because of my MG   x     5. My MG limits my ability to enjoy hobbies and fun activities   x     6. I have trouble meeting the needs of my family because of my MG   x     7. I have to make plans around my MG   x     8. I am bothered by limitations in performing my work (include work at home) because of my MG x     9. I have difficulty speaking due to MG   x     10. I have lost some personal independence because of my MG (e.g. driving, shopping, running errands) x     11. I am depressed about my MG   x     12. I have trouble walking due to MG   x     13. I have trouble getting around public places because of my MG   x     14. I feel overwhelmed by my MG   x     15. I have trouble performing my personal grooming needs due to MG   x      Total MG-QoL15r Score 2       Diagnostic Data Reviewed:   Summary of Dr. Hoang records:    02/01/2024  I have reviewed all relevant history in Epic. The patient presents for routine follow up regarding AChR antibody positive MG. He is currently managed with prednisone 7mg QD and does not use mestinon for relief finds this does not provide benefit. Of note the patient was hospitalized in June 2023 due to DVT which was resolved with Eliquis. He is noted to do well with his MG symptoms. He notes that he is doing well has not had any falls in the past year.     1/13/2022  I have reviewed relevant medical records in Epic. Here for routine follow up for Ab+ MG, taking Prednisone 7 mg daily. This dose has kept him well-managed for many years. He has had 25# weight gain over the past several months but he suspects this is secondary to decreased activity rather than due to steroids use. Sine the pandemic his routine has slowed down significantly. He has also got chronic low back pain that impedes activities. He saw [facility] but they declined intervention since his case is  complicated by age and complexity of the pathology. No reported ocular changes. He has occasional shortness of breath that he reports is more likely secondary to COPD. No extremity weaknesses, no changes in gait, no falls.     3.1.2021  I have reviewed all relevant medical records in Epic. Here for routine follow up for Ab+ MG. Still doing very well on Prednisone 7 mg daily. Not taking any Mestinon due to GI upset with use. No diplopia, ptosis, dysarthria, or extremity weaknesses. He has been having some shortness of breath, which may be related to baseline COPD. It doesn't fluctuate much. He has not had PFTs done in 2 years. He reported some dysphagia to thin liquids at last visit and has same complaint today. Occurs a few times per week only with liquids. No choking episodes. No complaints of GERD, etc.He is complaining of neck pain and L shoulder pain and low back pain. He saw a neurosurgeon who told him that his L/S spine is not surgical. He went to [facility] and was offered Botox in L cervical paraspinal / shoulder / scapular regions and is asking me for my opinion.    6/27/2020  I have reviewed all relevant medical records in Epic. Doing well in terms of MG control. He has rare dysphagia to water but denies any diplopia, orthopnea, dyspnea (MG-related, he does have baseline COPD), ptosis, dysphonia, dysarthria, or extremity weaknesses. He had a recent URI but was CV19 tested and negative. Full recovery. Neck pain is still present from prior stenosis and surgery but he says that he is overall coping well. Secondary stroke risk factors of HTN and HLD. Monitored for DM2.     5/15/19  I have reviewed all relevant medical records in Epic. Maximilian Hernandez Ayse Baca is a 78 y.o. male who presents today for initial visit with me, previously followed by Dr. Gill. 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 Several months ago he had a fall at the [employer] and had a serious neck injury (previously  had two ACDF procedures) resulting in spinal stenosis and need for surgery. He is now post-op and is recovering well. He is is c-collar. He has not had MG-related weaknesses post-op and has continued his steroids, currently on 7 mg daily. He has only occasional vertical diplopia that occurs mostly when he is watching TV while lying down. No other bulbar or extremity weakness complaints. Cannot tolerate Mestinon (diarrhea).    Summary of Dr. Gill's records    8/9/18:  Underwent MRI Cspine and Lspine for new onset neck pain, chronic hand numbness and LBP. 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   Having trouble with head drop, and some mild chewing issues. No diplopia, ptosis or UE/LE weakness. Still on prednisone 7mg qOD. Has numbness in arms that wakes him up at night.     2/27/18:  Doing well currently with respect to MG. Mild diplopia at end of day. Waking up with numbness in his hands, bilateral. Relieved with movement. No numbness or weakness during the day. Occasional tongue biting.      8/31/17:   Mr Tavera is a 72yo male with Ab+ myasthenia gravis diagnosed in 2015, maintained on 7mg prednisone qOD. Has been doing well since his last visit. Minimal diplopia at the end of the day. Has some generalized fatigue when it's hot, but tries to do all his chores early in the day. Has started exercising.     3/21/17  Doing well from an MG standpoint on low-dose prednisone. He still has a occasional diplopia at night when looking down. Has rare choking - mostly liquids - when he's not paying attention. Had a number of URIs last year. Also noticed that his sense of taste is off, although sense of smell is preserved.      12/20/16  Doing well. Last visit, dropped prednisone from 20 to 8mg qOD. No new symptoms. Still having diplopia on downgaze and only rarely at other times. No chewing or swallowing issues.     8/23/16  Doing well. Still with mild baseline diplopia and rare  "difficulty with swallowing (this does not last more than a few seconds). No issues with speaking, breathing, or generalized weakness. Heat "wears me out".      5/17/16  Maximilian Tavera Jr. is a 71 yo male with Ab+ myasthenia gravis. Doing well, stable. Occasional diplopia, mostly with laying back too far in his recliner while watching tv. Mentions 6-8 mos ago began noticing rhinorrhea with eating. Denies ptosis, difficulty chewing/swallowing/breathing. Denies weight changes, sweats, alternating diarrhea/constipation.  Prednisone 10 mg q OD     2/12/16:  Maximilian Tavera Jr. is a 71 yo  male with myasthenia gravis. Still has occasional diplopia, but is improved- occuring less frequently. Occurs most when watching TV. Occasional "funny feeling" when drinking soft drinks. Denies weakness, SOB, dysarthria. Taking prednisone 5mg/day. Stopped Mestion- GI side effects      6/17/15:  Taking mestinon at 1/2, 1/2, 1 and not finding much improvement in diplopia, and having a decent amount of loose stools and gas.      4/27/15  Maximilian Tavera Jr. is a 69 y.o. male with 7-8mos of diplopia. Was seen by Dr Jimenez, who did some blood work and found that he was Ab+ for AchR. He was referred here for further care. No prior symptoms. No difficulty chewing/swallowing, breathing. Arm/leg strength is fine. No orthopnea. Diplopia is most noticeable when reading or watching tv. Driving is occasionally problematic. No clearly diurnal. No real problem with ptosis. He has not tried medications for this. He has prisms, which help. Looks like he started carvedilol in 3/14.     Component      Latest Ref Rng 3/24/2015   AChR Binding Ab, Serum      <=0.02 nmol/L 1.44 (H)    Acetylchol Modul Ab      % 94 (H)    STRIATED MUSCLE AB      <1:120 titer Positive 1:[id number] (H)         CT chest 11/7/20  The provided clinical history indicates moderate risk for PE, however this study was performed without IV contrast, and therefore pulmonary " embolism cannot be assessed.  Minimal atelectasis/scarring at the left lung base.  No confluent airspace disease.  No suspicious pulmonary nodule or mass.  No pleural effusion or pneumothorax.  Atherosclerotic calcification of the aorta and coronary arteries.  Normal heart size.  Central airways are patent.  Esophagus is unremarkable.  Normal size mediastinal lymph nodes.  Bone window images demonstrate no acute or aggressive osseous abnormality.  Limited images through the upper abdomen show no acute pathology.  Impression:  No noncontrast CT evidence of acute pulmonary pathology.  Pulmonary embolism cannot be assessed on this unenhanced exam.    Bone density 12/16/22  The L1 to L4 vertebral bone mineral density is equal to 1.300 g/cm squared with a T score of 1.9.  The left femoral neck bone mineral density is equal to 0.783 g/cm squared with a T score of -1.1.  The total hip bone mineral density is equal to 1.021 g/cm squared with a T score of -0.1.  There is a 8.8% risk of a major osteoporotic fracture and a 2.2% risk of hip fracture in the next 10 years (FRAX).  Impression:  Osteopenia  Consider FDA approved medical therapies in postmenopausal women and men aged 50 years and older, based on the following:  *A hip or vertebral (clinical or morphometric) fracture  *T score less than or equal to -2.5 at the femoral neck or spine after appropriate evaluation to exclude secondary causes.  *Low bone mass -- also known as osteopenia (T score between -1.0 and -2.5 at the femoral neck or spine) and a 10 year probability of hip fracture greater than or equal to 3% or a 10 year probability of major osteoporosis-related fracture greater than or equal to 20% based on the [place]-adapted WHO algorithm.  *Clinicians judgment and/or patient preference may indicate treatment for people with 10 year fracture probabilities is above or below these levels.    Assessment and Plan:  Maximilian Hernandez Ayse Pan. is a 79 y.o. man with Ach  positive myasthenia gravis diagnosed in March 2015.  He has been stable on low dose prednisone since diagnosis.  Unfortunately, he has not had the opportunity to transition to an alternative therapy over the years.  However, it seems that has been extremely stable.  In addition, there seems to be some miscommunication about how much he is taking.  All of his records have him as prednisone every other day, but he reports he is taking it daily.  Unclear if his doctors were ever aware of this issue.      Will try weaning every other day dosing to see how low he can tolerate without significant breakthrough symptoms.  We reviewed potential prednisone withdrawal symptoms, which are different than MG symptoms.    Fortunately he has had bone density studies in the recent past.  He is not due again until 2026.    IMPRESSION:  Assessed current prednisone regimen; determined every other day dosing beneficial to minimize long-term side effects while maintaining immunosuppression for myasthenia gravis management. Decreased prednisone from 7 mg daily to alternating doses: Weeks 1-2: 7 mg and 6 mg on alternate days, Weeks 3-4: 7 mg and 5 mg on alternate days, Weeks 5-6: 7 mg and 4 mg on alternate days. Continue decreasing alternate day dose by 1 mg every 2 weeks until reaching 7 mg every other day.  Infrequent episodes of double vision and occasional choking, primarily with liquids.  Bone density study from 2022 showing osteopenia; already taking vitamin D and calcium supplements.  Recent prostate cancer diagnosis and upcoming radiation therapy likely will not affect bone density or myasthenia gravis.  Slightly elevated HbA1c of 6.5 in October; anticipate potential improvement with prednisone reduction and weight loss.    PLAN SUMMARY:   Explained difference between prednisone's anti-inflammatory effects (short-term) and immunosuppressive effects (long-term) in relation to dosing frequency.   Continue prednisone taper, decreasing  alternate day dose by 1 mg every 2 weeks until reaching 7 mg every other day   Discussed potential side effects of prednisone withdrawal, including possible joint pain.   Continue pantoprazole daily while on prednisone   Adjust prednisone taper if needed based on patient response      Information on patient AVS:  Your next bone density study should be performed in 2026.  You have mild to moderate osteopenia and are on chronic steroids, so will want to monitor this closely.    Will wean prednisone to every other day to minimize long term side effects of prednisone but continue to get the immunosuppression benefits.  You have been very controlled for so long, you will likely do much better with this.  If you need any more 1mg prednisone, just let us know, but it should balance out since you will only be taking medicine every other day.    Week AM   1 - 2 Take 7mg every other day.  On the opposite days take 6mg.   3 - 4 Take 7mg every other day.  On the opposite days take 5mg.   5 - 6 Take 7mg every other day.  On the opposite days take 4mg.   7 - 8 Take 7mg every other day.  On the opposite days take 3mg.   9 - 10 Take 7mg every other day.  On the opposite days take 2mg.   11 - 12 Take 7mg every other day.  On the opposite days take 1mg.   13 and on Take 7mg every other day.  On the opposite days take nothing.     Please reach out if you develop any issues with joint pain.  Please reach out if you notice any increasing frequency of breakthrough MG symptoms (eyes, speech, swallowing, chewing).  The schedule can be adjusted to account for this and still keep you at every other day dosing to minimize long term side effects of prednisone.    Will arrange for a follow up in 6 months, if you are doing great we can stretch this out to a year.        Visit today is associated with current or anticipated ongoing medical care related to this patient's single serious condition/complex condition (MG). Plan to followup in 6 months  for ongoing management.     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    Time Spent: I spent a total of 69 minutes on the day of the visit.This includes face to face time and non-face to face time preparing to see the patient (eg, review of tests), Obtaining and/or reviewing separately obtained history, Documenting clinical information in the electronic or other health record, Independently interpreting resultsand communicating results to the patient/family/caregiver, or Care coordination.       Suellen Conn D.O, ABPN, AOBNP, ABEM    This note was generated with the assistance of ambient listening technology. Verbal consent was obtained by the patient and accompanying visitor(s) for the recording of patient appointment to facilitate this note. I attest to having reviewed and edited the generated note for accuracy, though some syntax or spelling errors may persist. Please contact the author of this note for any clarification.           [1]   Current Outpatient Medications:     albuterol (PROVENTIL/VENTOLIN HFA) 90 mcg/actuation inhaler, Inhale 2 puffs into the lungs every 6 (six) hours as needed for Wheezing. Rescue, Disp: 25.5 g, Rfl: 4    albuterol-ipratropium (DUO-NEB) 2.5 mg-0.5 mg/3 mL nebulizer solution, Take 3 mLs by nebulization every 6 (six) hours as needed for  Wheezing. Rescue, Disp: 75 mL, Rfl: 3    atorvastatin (LIPITOR) 80 MG tablet, TAKE 1 TABLET EVERY DAY, Disp: 90 tablet, Rfl: 0    bumetanide (BUMEX) 0.5 MG Tab, Take 1 tablet (0.5 mg total) by mouth every other day., Disp: 45 tablet, Rfl: 3    carvediloL (COREG) 25 MG tablet, TAKE 1 TABLET TWICE DAILY WITH MEALS, Disp: 180 tablet, Rfl: 3    cetirizine (ZYRTEC) 10 MG tablet, Take 1 tablet by mouth daily as needed., Disp: , Rfl:     chlorthalidone (HYGROTEN) 25 MG Tab, TAKE 1 TABLET EVERY DAY, Disp: 90 tablet, Rfl: 0    cholecalciferol, vitamin D3, (VITAMIN D3) 50 mcg (2,000 unit) Cap, 1 capsule once daily. Every day, Disp: , Rfl:     coenzyme Q10 (CO Q-10) 100 mg capsule, Take 400 mg by mouth once daily., Disp: , Rfl:     cyanocobalamin, vitamin B-12, 5,000 mcg Subl, Take 5,000 mcg by mouth once daily. Every day, Disp: , Rfl:     diphenoxylate-atropine 2.5-0.025 mg (LOMOTIL) 2.5-0.025 mg per tablet, Take 1 tablet by mouth 4 (four) times daily as needed for Diarrhea., Disp: 90 tablet, Rfl: 0    ezetimibe (ZETIA) 10 mg tablet, TAKE 1 TABLET EVERY DAY, Disp: 90 tablet, Rfl: 0    famotidine (PEPCID) 20 MG tablet, TAKE 1 TABLET EVERY NIGHT AS NEEDED FOR HEARTBURN, Disp: 90 tablet, Rfl: 1    fluticasone (FLONASE) 50 mcg/actuation nasal spray, USE 1 SPRAY IN EACH NOSTRIL TWICE DAILY AS NEEDED  FOR  RHINITIS, Disp: 48 g, Rfl: 3    gabapentin (NEURONTIN) 600 MG tablet, TAKE 1 TABLET THREE TIMES DAILY, Disp: 90 tablet, Rfl: 3    HYDROcodone-acetaminophen (NORCO) 7.5-325 mg per tablet, Take 1 tablet by mouth every 6 (six) hours as needed for Pain., Disp: 30 tablet, Rfl: 0    hydrocortisone (CORTEF) 5 MG Tab, Take 5 mg by mouth once daily. As needed, Disp: , Rfl:     levothyroxine (SYNTHROID) 50 MCG tablet, TAKE 1 TABLET EVERY DAY, Disp: 90 tablet, Rfl: 0    milk thistle 200 mg Cap, Take 200 mg by mouth 2 (two) times daily. Twice a day, Disp: , Rfl:     mupirocin (BACTROBAN) 2 % ointment, Apply topically 3 (three) times daily.,  Disp: 30 g, Rfl: 0    olmesartan (BENICAR) 20 MG tablet, Take 1 tablet (20 mg total) by mouth 2 (two) times daily., Disp: 180 tablet, Rfl: 3    omega-3 fatty acids 1,000 mg Cap, Twice a day, Disp: , Rfl:     pantoprazole (PROTONIX) 40 MG tablet, TAKE 1 TABLET TWICE DAILY, Disp: 180 tablet, Rfl: 3    potassium chloride SA (K-DUR,KLOR-CON M) 10 MEQ tablet, Take 2 tablets (20 mEq total) by mouth every morning AND 4 tablets (40 mEq total) with lunch AND 4 tablets (40 mEq total) daily with dinner or evening meal., Disp: 900 tablet, Rfl: 3    predniSONE (DELTASONE) 1 MG tablet, TAKE 2 TABLETS ONE TIME DAILY, Disp: 180 tablet, Rfl: 3    predniSONE (DELTASONE) 5 MG tablet, TAKE 1 TABLET ONE TIME DAILY, Disp: 90 tablet, Rfl: 3    sildenafil (REVATIO) 20 mg Tab, Take 1 to 3 tabs daily as needed., Disp: 30 tablet, Rfl: 3    tiotropium (SPIRIVA WITH HANDIHALER) 18 mcg inhalation capsule, INHALE THE CONTENTS OF 1 CAPSULE EVERY DAY (CONTROLLER), Disp: 90 capsule, Rfl: 3    bicalutamide (CASODEX) 50 MG Tab, Take 1 tablet (50 mg total) by mouth once daily. (Patient not taking: Reported on 6/11/2025.), Disp: 30 tablet, Rfl: 0    diazePAM (VALIUM) 5 MG tablet, Take 2 tablets (10 mg total) by mouth On call Procedure for Anxiety. Take one hour prior to procedure (Patient not taking: Reported on 6/11/2025), Disp: 1 tablet, Rfl: 0    ELIQUIS 5 mg Tab, TAKE 1 TABLET TWICE DAILY, Disp: 180 tablet, Rfl: 3

## 2025-06-11 NOTE — PROGRESS NOTES
"  Outpatient Rehab    Occupational Therapy Visit    Patient Name: Maximilian Tavera Jr.  MRN: [id number]  YOB: 1945  Encounter Date: 6/9/2025    Therapy Diagnosis:   Encounter Diagnosis   Name Primary?    Lymphedema of both lower extremities Yes     Physician: Devang Amezquita MD*    Physician Orders: Eval and Treat  Medical Diagnosis: Lymphedema of both lower extremities  Surgical Diagnosis: Not applicable for this Episode   Surgical Date: Not applicable for this Episode    Visit # / Visits Authorized: 2 / 10  Insurance Authorization Period: 6/3/2025 to 8/3/2025  Date of Evaluation: 5/28/2025  Plan of Care Certification: 5/29/2025 to 9/30/2025      Time In: 1230   Time Out: 1330  Total Time (in minutes): 60   Total Billable Time (in minutes): 60    FOTO:  Intake Score:  %  Survey Score 2:  %  Survey Score 3:  %    Precautions:       Subjective      Pain reported as 0/10.      Objective        Girth Measurements (in centimeters)  LANDMARK R Leg L Leg 5/28, 6/10     forefoot 24.5 cm 25/24 cm     ankle  29 cm 31/28.5 cm     4" 32 cm 31/28.5 cm     calf 42 cm 42/39 cm     Below knee 40 cm 39/38 cm          Treatment:  Therapeutic Activity  TA 1: Measurements documented, skin care completed, compression bandages applied to left lower extremity  Manual Therapy  MT 1: MANUAL LYMPHATIC DRAINAGE:    Seated with lower extremities elevated:   a. Short Neck   b. Axillary lymph nodes    c. Activate and work over the INGUINAL-AXILLARY ANASTOMOSES    d. Deep abdominal techniques   e. Rework both INGUINAL-AXILLARY ANASTOMOSES   f. Activate "bulk flow" on lateral thigh   g. Leg treatment (anterior/lateral thigh, femoral vein vasa-vasorum, knee, lower leg, ankle, dorsum of foot)   h. Rework: leg, inguinal lymph nodes, INGUINAL-AXILLARY ANASTOMOSES, axillary lymph nodes    Time Entry(in minutes):  Manual Therapy Time Entry: 30  Therapeutic Activity Time Entry: 30    Assessment & Plan   Assessment: doing well, new " stockings on order  Evaluation/Treatment Tolerance: Patient tolerated treatment well    The patient will continue to benefit from skilled outpatient occupational therapy in order to address the deficits listed in the problem list on the initial evaluation, provide patient and family education, and maximize the patients level of independence in the home and community environments.     The patient's spiritual, cultural, and educational needs were considered, and the patient is agreeable to the plan of care and goals.           Plan: 2x week as able considering cancer treatments    Goals:   Active       Occupational Therapy       Short Term Goals:    Maximilian will be educated on lymphedema precautions and signs of infection.   Maximilian will perform deep abdominal breathing TID  Maximilian will tolerate daily activities with multilayered bandaging.  Appropriate compression garments to be ordered/delivered         Occupational Therapy Goal (Ongoing)       Start:  05/28/25    Expected End:  09/30/25       Long Term Goals:   Maximilian will be independent with home management of this chronic condition.  Maximilian will demonstrate compliance with all home management recommendations.,  Maximilian will demonstrate compliance with home management protocol  Complete decongestion of left lower extremity             SAMI Du/JENNA

## 2025-06-11 NOTE — PATIENT INSTRUCTIONS
Your next bone density study should be performed in 2026.  You have mild to moderate osteopenia and are on chronic steroids, so will want to monitor this closely.    Will wean prednisone to every other day to minimize long term side effects of prednisone but continue to get the immunosuppression benefits.  You have been very controlled for so long, you will likely do much better with this.  If you need any more 1mg prednisone, just let us know, but it should balance out since you will only be taking medicine every other day.    Week AM   1 - 2 Take 7mg every other day.  On the opposite days take 6mg.   3 - 4 Take 7mg every other day.  On the opposite days take 5mg.   5 - 6 Take 7mg every other day.  On the opposite days take 4mg.   7 - 8 Take 7mg every other day.  On the opposite days take 3mg.   9 - 10 Take 7mg every other day.  On the opposite days take 2mg.   11 - 12 Take 7mg every other day.  On the opposite days take 1mg.   13 and on Take 7mg every other day.  On the opposite days take nothing.     Please reach out if you develop any issues with joint pain.  Please reach out if you notice any increasing frequency of breakthrough MG symptoms (eyes, speech, swallowing, chewing).  The schedule can be adjusted to account for this and still keep you at every other day dosing to minimize long term side effects of prednisone.    Will arrange for a follow up in 6 months, if you are doing great we can stretch this out to a year.      
no

## 2025-06-12 ENCOUNTER — TELEPHONE (OUTPATIENT)
Dept: HEMATOLOGY/ONCOLOGY | Facility: CLINIC | Age: 80
End: 2025-06-12
Payer: MEDICARE

## 2025-06-12 NOTE — NURSING
"Rec'd incoming call from Mr. Tavera. He states he has 9 Casodex pills left and would like to move up his Lupron injection to 6/20 if possible. We discussed radiation simulation scheduled in July. Explained that simulation is "treatment mapping" and not the actual start of radiation treatment. Mr. Tavera v/u.     Infusion clinic messaged via Teams regarding Lupron schedule.   "

## 2025-06-13 NOTE — PROGRESS NOTES
Acupuncture Evaluation Note     Name: Maximilian Tavera Jr.  Clinic Number: [id number]    Traditional Chinese Medicine (TCM) Diagnosis: Qi Stagnation  Medical Diagnosis:   Encounter Diagnosis   Name Primary?    Lymphedema of both lower extremities Yes        Evaluation Date: 6/9/25    Visit #/Visits authorized: 2/12-?    Precautions: Standard    Subjective     Chief Concern: degeneration of discs, neck and back pain, 78 yo M    Medical necessity is demonstrated by the following IMPAIRMENTS: Medical Necessity: Decreased mobility limits day to day activities, social, and emergent situations              Aggravating Factors:  movement and pressure   Relieving Factors:  rest    Symptom Description:     Quality:  Aching  Severity:  5 or varies  Frequency:  continuously    Previous Treatments Tried:  Medication    HEENT:   not noted     Chest:  not noted    Digestion:     Diet: normal   Fluids: normal  Taste/Appetite: normal   Symptoms: no blood in stool    Sleep: could improve    Energy Levels:  could improve    Psychological Symptoms:  not noted    Other Symptoms: not noted    GYN Symptoms: none    Objective     Observation: clean, calm    Tongue:      Body:  normal   Color:  pink   Coating:  thin,     Pulse:        wiry       New Findings:  none    Treatment     Treatment Principles:  move Qi    Acupuncture points used:  K3, UB 62, SI3/4, TB 5, GB 20   Needles In: 10  Needles Out: 10  Needles W/O STIM placed: 1:30pm  [place] W/O STIM removed: 2pm      Other Traditional Chinese Medicine Modalities - none    Assessment     After treatment, patient felt more relaxed and less strain     Patient prognosis is Good.     Patient will continue to benefit from acupuncture treatment to address the deficits listed in the problem list box on initial evaluation, provide patient family education and to maximize pt's level of independence in the home and community environment.     Patient's spiritual, cultural and educational needs  considered and pt agreeable to plan of care and goals.     Anticipated barriers to treatment: none    Plan     Recommend every week or two for 12 sessions with midway re-assess.      Education:  Patient is aware of cumulative benefit of acupuncture

## 2025-06-18 ENCOUNTER — CLINICAL SUPPORT (OUTPATIENT)
Facility: HOSPITAL | Age: 80
End: 2025-06-18
Payer: MEDICARE

## 2025-06-18 ENCOUNTER — CLINICAL SUPPORT (OUTPATIENT)
Dept: REHABILITATION | Facility: HOSPITAL | Age: 80
End: 2025-06-18
Payer: MEDICARE

## 2025-06-18 DIAGNOSIS — I89.0 LYMPHEDEMA OF BOTH LOWER EXTREMITIES: ICD-10-CM

## 2025-06-18 DIAGNOSIS — I89.0 LYMPHEDEMA OF BOTH LOWER EXTREMITIES: Primary | ICD-10-CM

## 2025-06-18 DIAGNOSIS — M51.360 DEGENERATION OF INTERVERTEBRAL DISC OF LUMBAR REGION WITH DISCOGENIC BACK PAIN: Primary | ICD-10-CM

## 2025-06-18 PROCEDURE — 97530 THERAPEUTIC ACTIVITIES: CPT | Mod: PO

## 2025-06-18 PROCEDURE — 97810 ACUP 1/> WO ESTIM 1ST 15 MIN: CPT | Mod: PO

## 2025-06-18 PROCEDURE — 97811 ACUP 1/> W/O ESTIM EA ADD 15: CPT | Mod: PO

## 2025-06-18 NOTE — PROGRESS NOTES
"  Outpatient Rehab    Occupational Therapy Discharge    Patient Name: Maximilian Tavera Jr.  MRN: [id number]  YOB: 1945  Encounter Date: 6/18/2025    Therapy Diagnosis:   Encounter Diagnosis   Name Primary?    Lymphedema of both lower extremities Yes     Physician: Devang Amezquita MD*    Physician Orders: Eval and Treat  Medical Diagnosis: Lymphedema of both lower extremities  Surgical Diagnosis: Not applicable for this Episode   Surgical Date: Not applicable for this Episode  Days Since Last Surgery: Not applicable for this Episode    Visit # / Visits Authorized: 3 / 10  Insurance Authorization Period: 6/3/2025 to 8/3/2025  Date of Evaluation: 5/28/2025  Plan of Care Certification: 5/29/2025 to 9/30/2025      Time In: 1100   Time Out: 1200  Total Time (in minutes): 60   Total Billable Time (in minutes): 45    FOTO:  Intake Score:  %82  Survey Score 2:  %77  Survey Score 3:  %    Precautions:       Subjective   my legs are smaller than they have ever been.  Pain reported as 0/10.      Objective        Girth Measurements (in centimeters)  LANDMARK R Leg L Leg     forefoot 24.5 cm 25 cm     ankle  29 cm 31 cm     4" 32 cm 31 cm     calf 42 cm 42 cm     Below knee 40 cm 39 cm          Treatment:  Therapeutic Activity  TA 1: measurements taken. reviewed steps to follow for home management, good return understanding of protocol.    Time Entry(in minutes):  Therapeutic Activity Time Entry: 45    Assessment & Plan   Assessment: Maximilian has done very well, has met goals and will contact this writer if he has concerns or needs in the future.  Evaluation/Treatment Tolerance: Patient tolerated treatment well    The patient's spiritual, cultural, and educational needs were considered, and the patient is agreeable to the plan of care and goals.           Plan: discharge services    Goals:   Active       Occupational Therapy       Short Term Goals:    Maximilian will be educated on lymphedema precautions and " signs of infection. MET  Maximilian will perform deep abdominal breathing TIDMET  Maximilian will tolerate daily activities with multilayered bandaging.MET  Appropriate compression garments to be ordered/deliveredMET         Occupational Therapy Goal (Ongoing)       Start:  05/28/25    Expected End:  09/30/25       Long Term Goals:   Maximilian will be independent with home management of this chronic condition.MET  Maximilian will demonstrate compliance with all home management recommendations.,MET  Maximilian will demonstrate compliance with home management protocolMET  Complete decongestion of left lower extremityMET               SAMI Du/JENNA

## 2025-06-19 NOTE — PROGRESS NOTES
Acupuncture Evaluation Note     Name: Maximilian Tavera Jr.  Clinic Number: [id number]    Traditional Chinese Medicine (TCM) Diagnosis: Qi Stagnation  Medical Diagnosis:   Encounter Diagnoses   Name Primary?    Degeneration of intervertebral disc of lumbar region with discogenic back pain Yes    Lymphedema of both lower extremities         Evaluation Date: 6/18/25    Visit #/Visits authorized: 3/12-?    Precautions: Standard    Subjective     Chief Concern: degeneration of discs, neck and back pain, 78 yo M    Medical necessity is demonstrated by the following IMPAIRMENTS: Medical Necessity: Decreased mobility limits day to day activities, social, and emergent situations              Aggravating Factors:  movement and pressure   Relieving Factors:  rest    Symptom Description:     Quality:  Aching  Severity:  5 or varies  Frequency:  continuously    Previous Treatments Tried:  Medication    HEENT:   not noted     Chest:  not noted    Digestion:     Diet: normal   Fluids: normal  Taste/Appetite: normal   Symptoms: no blood in stool    Sleep: could improve    Energy Levels:  could improve    Psychological Symptoms:  not noted    Other Symptoms: not noted    GYN Symptoms: none    Objective     Observation: clean, calm    Tongue:      Body:  normal   Color:  pink   Coating:  thin,     Pulse:        wiry       New Findings:  none    Treatment     Treatment Principles:  move Qi    Acupuncture points used:  K3, UB 62, SI3/4, TB 5, GB 20   Needles In: 10  Needles Out: 10  Needles W/O STIM placed: 10am  [place] W/O STIM removed: 10:30am      Other Traditional Chinese Medicine Modalities - none    Assessment     After treatment, patient felt more relaxed and less strain     Patient prognosis is Good.     Patient will continue to benefit from acupuncture treatment to address the deficits listed in the problem list box on initial evaluation, provide patient family education and to maximize pt's level of independence in the  home and community environment.     Patient's spiritual, cultural and educational needs considered and pt agreeable to plan of care and goals.     Anticipated barriers to treatment: none    Plan     Recommend every week or two for 12 sessions with midway re-assess.      Education:  Patient is aware of cumulative benefit of acupuncture

## 2025-06-20 ENCOUNTER — INFUSION (OUTPATIENT)
Dept: INFUSION THERAPY | Facility: HOSPITAL | Age: 80
End: 2025-06-20
Attending: UROLOGY
Payer: MEDICARE

## 2025-06-20 VITALS
DIASTOLIC BLOOD PRESSURE: 67 MMHG | OXYGEN SATURATION: 97 % | RESPIRATION RATE: 16 BRPM | HEART RATE: 77 BPM | TEMPERATURE: 98 F | SYSTOLIC BLOOD PRESSURE: 121 MMHG

## 2025-06-20 DIAGNOSIS — C61 PROSTATE CANCER: Primary | ICD-10-CM

## 2025-06-20 PROCEDURE — 63600175 PHARM REV CODE 636 W HCPCS: Mod: JZ,TB,PN | Performed by: UROLOGY

## 2025-06-20 PROCEDURE — 96402 CHEMO HORMON ANTINEOPL SQ/IM: CPT | Mod: PN

## 2025-06-20 RX ADMIN — LEUPROLIDE ACETATE 45 MG: KIT at 04:06

## 2025-06-23 ENCOUNTER — TELEPHONE (OUTPATIENT)
Dept: ORTHOPEDICS | Facility: CLINIC | Age: 80
End: 2025-06-23
Payer: MEDICARE

## 2025-06-23 DIAGNOSIS — M17.12 PRIMARY OSTEOARTHRITIS OF LEFT KNEE: Primary | ICD-10-CM

## 2025-06-23 NOTE — TELEPHONE ENCOUNTER
----- Message from Med Assistant Wei sent at 6/23/2025 11:31 AM CDT -----  Contact: pt  Request gel injection in L Knee and  R elbow injection   Call back

## 2025-06-23 NOTE — TELEPHONE ENCOUNTER
Called and spoke to patient.  Scheduled for HA left knee and follow up elbow.  Might be too soon for repeat elbow injection.  Patient understood.      Asa    Protocol For Photochemotherapy For Severe Photoresponsive Dermatoses: Petrolatum And Nbuvb: The patient received Photochemotherapy for severe photoresponsive dermatoses: Petrolatum and NBUVB requiring at least 4 to 8 hours of care under direct physician supervision.

## 2025-07-07 ENCOUNTER — OFFICE VISIT (OUTPATIENT)
Dept: RADIATION ONCOLOGY | Facility: CLINIC | Age: 80
End: 2025-07-07
Payer: MEDICARE

## 2025-07-07 ENCOUNTER — HOSPITAL ENCOUNTER (OUTPATIENT)
Dept: RADIATION THERAPY | Facility: HOSPITAL | Age: 80
Discharge: HOME | End: 2025-07-07
Attending: RADIOLOGY
Payer: MEDICARE

## 2025-07-07 DIAGNOSIS — C61 PROSTATE CANCER: Primary | ICD-10-CM

## 2025-07-07 NOTE — PROGRESS NOTES
07/07/2025    [facility]  [facility]   Radiation Oncology Follow Up    Assessment   This is a 79 y.o. 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  He presents today to discuss definitive treatment with radiation therapy.        Plan     Treatment options were discussed with the patient including radical prostatectomy, radiation therapy and androgen deprivation.  We discussed the goals of treatment to be curative.  The risks, benefits, scheduling, alternatives to and rationale of radiation therapy were explained in detail.    Indication, course, and potential toxicities of pelvic radiation therapy reviewed in detail, including but not limited to fatigue, increased frequency, urgency, or pain with urination, increased frequency or urgency of bowel movements, diarrhea, pelvic bone fragility, erectile dysfunction, decreased volume of ejaculate, remote risk of secondary malignancy.   After this discussion, he elected to proceed with [facility] eval.    Follow up ASAP w/Dr. An for [facility]  Consent was obtained and all questions were answered to the best of my ability  A CT simulation will be performed ~ 1 week after Benjamin to begin the planning process for the patient's radiation therapy.     He was given our contact information, and he was told that he could call our clinic at any time if he has any questions or concerns.      Radiation Treatment Details:   We plan to treat the prostate/SVs to a dose of 70 Gy in 28 fractions at 2.5 Gy per fraction delivered daily with 50.4Gy to the larry basins  We will utilize a(n) IMRT technique.   IMRT is medically necessary to treat complex dose painted target volumes in the prostate region with concave and convex isodose lines with steep isodose gradients to spare multiple adjacent organs at risk including the rectum, bladder, penile bulb   We will utilize daily CT or orthogonal image guidance due  to the need for accurate daily patient alignment to treat the target volumes accurately and avoid radiation overdose to multiple regional organs at risk since we are treating the patient with complex target volumes with multiple steep isodose gradients.          No chief complaint on file.        Oncology History   Prostate cancer   3/20/2025 Initial Diagnosis    Prostate cancer     4/17/2025 Cancer Staged    Staging form: Prostate, AJCC 8th Edition  - Clinical stage from 4/17/2025: Stage IIC (cT1c, cN0, cM0, PSA: 16.2, Grade Group: 3)         HPI: The patient is a 79 year old male with a diagnosis of prostate cancer (seen 5/22/25 by Dr. Aranda).  He was noted to have an elevated PSA of 16.2 on 11/7/24.  Previous PSA history as follows:   Prostate Specific Antigen PSA Diagnostic   Latest Ref Rng 0.00 - 4.00 ng/mL 0.00 - 4.00 ng/mL   3/29/2018 2.4     5/2/2019 3.3     9/19/2019 3.5     6/30/2021  6.9 (H)    11/7/2024 16.2 (H)        Legend:  (H) High    11/21/24 MRI prostate noted:  47.6 cc gland  2.1cm lesion ant PZ, PI-RADS 5    3/17/25 MRI Prostate:  55.5ccm gland  Focal T2 hypointense lesion right post-lat PZ, mid gland to apex, PI-RADS 4  3.2cm midline TZ, PI-RADS 5  Bulging contour, no calvin EPE     On 3/18/25 he underwent TRUS-guided biopsy of the prostate, with pathology as follows:  LA: focal hg PIN  LM: GG 3 (60% pat 4) in 1 of 2, 30%  Lesion 1/RA: GG 2 (20% pat 4) in 3 of 3, 70%  RM: GG 2 (40% pat 4) in 3 of 3, 70%  RB: GG 2 (40% pat 4) in 2 of 2, 30%    4/16/25 PSMA/PET:  Focal uptake in the prostate  No evidence of metastatic disease    4/21/25 Prolaris: recommendation for multi-modal treatment    6/20/25 Initiated ADT    Prostate size estimated at 55.5cc.    He presents today to discuss the potential role of radiation therapy in his cancer care.    IPSS is 10, with +frequency, ++urgency, and nocturia x 2.    He reports moderate erectile difficulty at baseline (SHENA 10).     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    Disease of spinal cord, unspecified 1/5/2024    Emphysema lung 11/05/2018    Gastritis     Hx of colonic polyp     Hyperlipidemia     Hypertension     Hypothyroidism     Knee fracture     Myasthenia gravis     Neuropathy 01/03/2013    Obesity 01/29/2015    Pneumonia 03/29/2020    Polyneuropathy     Prostate cancer 3/20/2025    PVC (premature ventricular contraction)     Squamous cell carcinoma 2014    left forearm    Thyroid disease        Past Surgical History:   Procedure Laterality Date    APPENDECTOMY      CATARACT EXTRACTION W/  INTRAOCULAR LENS IMPLANT Bilateral     COLONOSCOPY  03/01/2012    Dr Esteban; hyperplastic polyp; repeat in 5 years    COLONOSCOPY N/A 12/7/2021    Procedure: COLONOSCOPY;  Surgeon: Gabriel Hernandez MD;  Location: [facility];  Service: Endoscopy;  Laterality: N/A;    CYSTOSCOPY N/A 2/26/2019    Procedure: CYSTOSCOPY;  Surgeon: Simba Cheung MD;  Location: [facility] OR;  Service: Urology;  Laterality: N/A;    ENDOSCOPIC ULTRASOUND OF UPPER GASTROINTESTINAL TRACT N/A 1/7/2020    Procedure: ULTRASOUND, UPPER GI TRACT, ENDOSCOPIC;  Surgeon: Kati Ryan MD;  Location: [facility] ([address] [place] [address]);  Service: Endoscopy;  Laterality: N/A;    ESOPHAGOGASTRODUODENOSCOPY N/A 9/12/2018    Procedure: EGD (ESOPHAGOGASTRODUODENOSCOPY);  Surgeon: Gabriel Hernandez MD;  Location: [facility];  Service: Endoscopy;  Laterality: N/A;    ESOPHAGOGASTRODUODENOSCOPY N/A 8/19/2019    Procedure: EGD (ESOPHAGOGASTRODUODENOSCOPY);  Surgeon: Gabriel GONZALES  MD David;  Location: [facility];  Service: Endoscopy;  Laterality: N/A;    ESOPHAGOGASTRODUODENOSCOPY N/A 10/7/2019    Procedure: EGD (ESOPHAGOGASTRODUODENOSCOPY);  Surgeon: Gabriel Hernandez MD;  Location: [facility];  Service: Endoscopy;  Laterality: N/A;    ESOPHAGOGASTRODUODENOSCOPY N/A 1/7/2020    Procedure: EGD (ESOPHAGOGASTRODUODENOSCOPY);  Surgeon: Kati Ryan MD;  Location: [facility] (2ND FLR);  Service: Endoscopy;  Laterality: N/A;    HEMORRHOID SURGERY      INJECTION OF ANESTHETIC AGENT AROUND MEDIAL BRANCH NERVES INNERVATING LUMBAR FACET JOINT Bilateral 10/1/2020    Procedure: Block-nerve-medial branch-lumbar Bilateral L 3,4,5;  Surgeon: Devan Watt MD;  Location: [facility];  Service: Pain Management;  Laterality: Bilateral;    INJECTION OF ANESTHETIC AGENT AROUND MEDIAL BRANCH NERVES INNERVATING LUMBAR FACET JOINT Right 10/7/2022    Procedure: Block-nerve-medial branch-lumbar L3,4,5;  Surgeon: Devan Watt MD;  Location: [facility];  Service: Pain Management;  Laterality: Right;    KNEE ARTHROPLASTY Bilateral     LENGTHENING OF ACHILLES TENDON Right 9/11/2020    Procedure: percutaeous tenotomy of right lateral epicondyle (tenex);  Surgeon: Terry Quach MD;  Location: [facility];  Service: Neurosurgery;  Laterality: Right;  tenex to right lateral epicondyle  Tenex machine SN#[id number], total time 1min. 30seconds    NECK SURGERY      POSTERIOR FUSION OF CERVICAL SPINE WITH LAMINECTOMY N/A 11/7/2018    Procedure: C2-C6 Posterior Cervical Laminectomy & Instrumental Fusion;  Surgeon: Kishore Turner MD;  Location: [facility] 2ND FLR;  Service: Neurosurgery;  Laterality: N/A;    TONSILLECTOMY      TRANSFORAMINAL EPIDURAL INJECTION OF STEROID Right 12/20/2019    Procedure: Injection,steroid,epidural,transforaminal approach;  Surgeon: Devan Watt MD;  Location: [facility];  Service: Pain Management;  Laterality: Right;  L3-4, L4-5    TRANSFORAMINAL EPIDURAL INJECTION OF STEROID Bilateral 2/6/2020    Procedure:  Injection,steroid,epidural,transforaminal approach;  Surgeon: Devan Watt MD;  Location: [facility] OR;  Service: Pain Management;  Laterality: Bilateral;  L3-L4,L4-L5    TRANSFORAMINAL EPIDURAL INJECTION OF STEROID Bilateral 8/26/2020    Procedure: Injection,steroid,epidural,transforaminal approach;  Surgeon: Devan Watt MD;  Location: [facility] OR;  Service: Pain Management;  Laterality: Bilateral;  L3-4, L4-5    TRANSRECTAL ULTRASOUND EXAMINATION N/A 2/26/2019    Procedure: ULTRASOUND, RECTAL APPROACH;  Surgeon: Simba Cheung MD;  Location: [facility] OR;  Service: Urology;  Laterality: N/A;    UPPER GASTROINTESTINAL ENDOSCOPY  09/12/2018    Dr Hernandez; gastritis; extensive intestinal metaplasia; repeat in 1-2- years       Social History[1]    Cancer-related family history is negative for Cancer and Esophageal cancer.    Medications Ordered Prior to Encounter[2]    Review of patient's allergies indicates:   Allergen Reactions    Spironolactone Diarrhea       Review of Systems   Constitutional:  Positive for diaphoresis (possibly 1 hot flash) and malaise/fatigue (since starting ADT). Negative for chills and fever.   HENT:  Positive for sinus pain.    Eyes:  Positive for double vision (rare episodes, several years).   Gastrointestinal:  Negative for blood in stool, constipation, diarrhea, nausea and vomiting.   Genitourinary:  Positive for frequency (mild) and urgency (mild). Negative for dysuria and hematuria.   Musculoskeletal:  Positive for falls (over the course of the last year) and joint pain (tennis elbow).   Neurological:  Positive for dizziness, sensory change (2/2 tennis elbow) and headaches (sinus).        Vital Signs: There were no vitals taken for this visit.    ECOG Performance Status: 1 - Ambulates, capable of light work    Physical Exam  Vitals and nursing note reviewed.   Constitutional:       General: He is not in acute distress.     Appearance: Normal appearance. He is not ill-appearing or toxic-appearing.    HENT:      Head: Normocephalic and atraumatic.      Nose: Nose normal.   Eyes:      Extraocular Movements: Extraocular movements intact.      Conjunctiva/sclera: Conjunctivae normal.      Pupils: Pupils are equal, round, and reactive to light.   Pulmonary:      Effort: Pulmonary effort is normal.   Musculoskeletal:         General: Normal range of motion.      Cervical back: Normal range of motion and neck supple.   Skin:     General: Skin is warm.   Neurological:      General: No focal deficit present.      Mental Status: He is alert and oriented to person, place, and time.      Cranial Nerves: No cranial nerve deficit.      Sensory: No sensory deficit.      Motor: No weakness.      Gait: Gait abnormal (w/cane).   Psychiatric:         Mood and Affect: Mood normal.         Behavior: Behavior normal.         Thought Content: Thought content normal.         Judgment: Judgment normal.            Imaging: I have personally reviewed the patient's available images and reports and summarized pertinent findings above in HPI.     Pathology: I have personally reviewed the patient's available pathology and summarized pertinent findings above in HPI.     This case was discussed with Dr. An      - Thank you for allowing me to participate in the care of your patient.    Omaira Hansen MD    Total time today for this encounter was 46 minutes. This includes preparing to see the patient (eg, review of tests), obtaining and/or reviewing separately obtained history, documenting clinical information in the electronic or other health record, independently interpreting results and communicating results to the patient/family/caregiver, and discussing the plan with other providers when necessary.            [1]   Social History  Tobacco Use    Smoking status: Never    Smokeless tobacco: Never    Tobacco comments:     when a child   Substance Use Topics    Alcohol use: Yes     Comment: rarely    Drug use: No   [2]   Current  Outpatient Medications on File Prior to Visit   Medication Sig Dispense Refill    albuterol (PROVENTIL/VENTOLIN HFA) 90 mcg/actuation inhaler Inhale 2 puffs into the lungs every 6 (six) hours as needed for Wheezing. Rescue 25.5 g 4    albuterol-ipratropium (DUO-NEB) 2.5 mg-0.5 mg/3 mL nebulizer solution Take 3 mLs by nebulization every 6 (six) hours as needed for Wheezing. Rescue 75 mL 3    atorvastatin (LIPITOR) 80 MG tablet TAKE 1 TABLET EVERY DAY 90 tablet 0    bicalutamide (CASODEX) 50 MG Tab Take 1 tablet (50 mg total) by mouth once daily. (Patient not taking: Reported on 6/11/2025.) 30 tablet 0    bumetanide (BUMEX) 0.5 MG Tab Take 1 tablet (0.5 mg total) by mouth every other day. 45 tablet 3    calcium citrate (CALCITRATE) 200 mg (950 mg) tablet Take 1 tablet by mouth once daily.      carvediloL (COREG) 25 MG tablet TAKE 1 TABLET TWICE DAILY WITH MEALS 180 tablet 3    cetirizine (ZYRTEC) 10 MG tablet Take 1 tablet by mouth daily as needed.      chlorthalidone (HYGROTEN) 25 MG Tab TAKE 1 TABLET EVERY DAY 90 tablet 0    cholecalciferol, vitamin D3, (VITAMIN D3) 50 mcg (2,000 unit) Cap 1 capsule once daily. Every day      coenzyme Q10 (CO Q-10) 100 mg capsule Take 400 mg by mouth once daily.      cyanocobalamin, vitamin B-12, 5,000 mcg Subl Take 5,000 mcg by mouth once daily. Every day      diphenoxylate-atropine 2.5-0.025 mg (LOMOTIL) 2.5-0.025 mg per tablet Take 1 tablet by mouth 4 (four) times daily as needed for Diarrhea. 90 tablet 0    ELIQUIS 5 mg Tab TAKE 1 TABLET TWICE DAILY 180 tablet 3    ezetimibe (ZETIA) 10 mg tablet TAKE 1 TABLET EVERY DAY 90 tablet 0    famotidine (PEPCID) 20 MG tablet TAKE 1 TABLET EVERY NIGHT AS NEEDED FOR HEARTBURN 90 tablet 1    fluticasone (FLONASE) 50 mcg/actuation nasal spray USE 1 SPRAY IN EACH NOSTRIL TWICE DAILY AS NEEDED  FOR  RHINITIS 48 g 3    gabapentin (NEURONTIN) 600 MG tablet TAKE 1 TABLET THREE TIMES DAILY 90 tablet 3    hydrocortisone (CORTEF) 5 MG Tab Take 5 mg  by mouth once daily. As needed      levothyroxine (SYNTHROID) 50 MCG tablet TAKE 1 TABLET EVERY DAY 90 tablet 0    milk thistle 200 mg Cap Take 200 mg by mouth 2 (two) times daily. Twice a day      mupirocin (BACTROBAN) 2 % ointment Apply topically 3 (three) times daily. 30 g 0    olmesartan (BENICAR) 20 MG tablet Take 1 tablet (20 mg total) by mouth 2 (two) times daily. 180 tablet 3    omega-3 fatty acids 1,000 mg Cap Twice a day      pantoprazole (PROTONIX) 40 MG tablet TAKE 1 TABLET TWICE DAILY 180 tablet 3    potassium chloride SA (K-DUR,KLOR-CON M) 10 MEQ tablet Take 2 tablets (20 mEq total) by mouth every morning AND 4 tablets (40 mEq total) with lunch AND 4 tablets (40 mEq total) daily with dinner or evening meal. 900 tablet 3    predniSONE (DELTASONE) 1 MG tablet TAKE 2 TABLETS ONE TIME DAILY 180 tablet 3    predniSONE (DELTASONE) 5 MG tablet TAKE 1 TABLET ONE TIME DAILY 90 tablet 3    sildenafil (REVATIO) 20 mg Tab Take 1 to 3 tabs daily as needed. 30 tablet 3    tiotropium (SPIRIVA WITH HANDIHALER) 18 mcg inhalation capsule INHALE THE CONTENTS OF 1 CAPSULE EVERY DAY (CONTROLLER) 90 capsule 3    [DISCONTINUED] esomeprazole (NEXIUM) 40 MG capsule Take 1 capsule (40 mg total) by mouth before breakfast. 30 capsule 0     No current facility-administered medications on file prior to visit.

## 2025-07-08 ENCOUNTER — TELEPHONE (OUTPATIENT)
Dept: NEUROLOGY | Facility: CLINIC | Age: 80
End: 2025-07-08
Payer: MEDICARE

## 2025-07-08 ENCOUNTER — PATIENT MESSAGE (OUTPATIENT)
Dept: UROLOGY | Facility: CLINIC | Age: 80
End: 2025-07-08
Payer: MEDICARE

## 2025-07-08 DIAGNOSIS — C61 PROSTATE CANCER: Primary | ICD-10-CM

## 2025-07-08 NOTE — TELEPHONE ENCOUNTER
----- Message from ECHO Thurman sent at 7/8/2025  3:35 PM CDT -----  Good afternoon .     Your patient indicated above is scheduled for a Transperineal Insertion of Periprostatic Gel  with Dr. An  on 07/23/2025(MAC  anesthesia, 45 minutes).  Anesthesia review is in progress.    Are there any recommendations or contraindications for anesthesia?      Thank you,  Cuca Marvin RN  [facility] [place]

## 2025-07-08 NOTE — ANESTHESIA PAT ROS NOTE
"                                                                                                             07/08/2025  Maximilian Tavera Jr. is a 79 y.o., male.      Pre-op Assessment    I have reviewed the NPO Status.   I have reviewed the Medications.   Prednisone, Hydrocortisone    Review of Systems  Anesthesia Hx:  No problems with previous Anesthesia   History of prior surgery of interest to airway management or planning: (s/p decompression and fusion of C3-6) cervical fusion, radical neck. Previous anesthesia: General 12/07/21 COLONOSCOPY (Abdomen) with general anesthesia.  Procedure performed at an [facility].       Denies Family Hx of Anesthesia complications.    Denies Personal Hx of Anesthesia complications.                    Social:  Non-Smoker, No Alcohol Use       Hematology/Oncology:       -- Denies Anemia:                  Current/Recent Cancer.            Oncology Comments: Prostate Ca      EENT/Dental:  chronic allergic rhinitis           Cardiovascular:  Exercise tolerance: good   Hypertension   Denies MI.        Denies Angina.  Denies CHF.      Denies GOLD. (pt states " only with heavy activities")             Deep Venous Thrombosis (DVT), left lower extremity              Disorder of Cardiac Rhythm, Atrial Fibrillation (03/2020), Premature Ventricular Contraction (PVC)     Pulmonary:   COPD Asthma   Denies Shortness of breath.   Denies Sleep Apnea.                Renal/:     Neoplasm/Tumor, Prostate Cancer.           Hepatic/GI:   PUD,                  Musculoskeletal:   Musculoskeletal General/Symptoms:     Joint Disease:  Arthritis, Osteoarthritis     Spine Disorders: cervical  Degenerative Joint Disease      Cervical Spine Disorder     Neurological:  Denies TIA.  Denies CVA.    Denies Seizures.        Osteoarthritis, Low Back Pain                         Neuromuscular Disease, Myasthenia Gravis   Endocrine:   Hypothyroidism        Obesity / BMI > 30           Anesthesia " Assessment: Preoperative EQUATION    Planned Procedure: Procedure(s) (LRB):  TRANSPERINEAL INSERTION OF PERIPROSTATIC GEL (N/A)  Requested Anesthesia Type:Monitor Anesthesia Care  Surgeon: Brian An MD  Service: Urology  Known or anticipated Date of Surgery:7/23/2025    Surgeon notes: reviewed    Electronic QUestionnaire Assessment completed via nurse interview with patient.        Triage considerations:     The patient has no apparent active cardiac condition (No unstable coronary Syndrome such as severe unstable angina or recent [<1 month] myocardial infarction, decompensated CHF, severe valvular   disease or significant arrhythmia)    Previous anesthesia records:GETA and No problems  12/07/2021 Colonoscopy   Airway/Jaw/Neck:  Airway Findings: Mouth Opening: Normal Tongue: Normal  General Airway Assessment: Adult  Oropharynx Findings:  Mallampati: II  Jaw/Neck Findings:  Neck ROM: Normal ROM    Last PCP note: 3-6 months ago , [facility]   Subspecialty notes: Cardiology: EP and General, Dermatology, Neurology, Ortho, Radiology/ONC, Urology    Other important co-morbidities: A FIB, HLD, HTN, Hypothyroid, Obesity, and COPD, Prostate Ca, Myasthenia Gravis, PVCs, DVT, Afib 3/2020, DJD w/myelopathy, BLE Lymphedema      Tests already available:  No recent tests.             Instructions given. (See in Nurse's note)    Optimization:  Anesthesia Preop Clinic Assessment  Indicated phone screen     Medical Opinion Indicated       Sub-specialist consult indicated:   cardiology and neurology       Plan:    Testing:  BMP, EKG, and Hematology Profile      Consultation:Patient's PCP for a statement of optimization cards clearance      Patient  has previously scheduled Medical Appointment:    Navigation: Tests Scheduled.              Consults scheduled.             Results will be tracked by Preop Clinic.       0711/025   Cards e consult received from Dr. SATINDER Amezquita   Consults     After evaluation of your eConsult  clinical questions, I believe the patient should be scheduled for an office visit in our specialty due to need for appropriate preoperative cardiac risk stratification to be performed prior to surgery  Devang Amezquita MD PhD  [facility] - Cardiology - [address] [place] [address] Clearance and Eliquis instructions per Dr. Amezquita note on 07/15/2025  . Preoperative Cardiac Risk Stratification Prior to Urologic Surgery- Mr. Tavera has no chest pain and no heart failure symptoms. He can perform greater than 4 METS without difficulty. EKG today shows sinus bradycardia with right bundle branch block and left anterior fascicular block. Today's EKG is similar to previous tracings. He is low risk (0.5%) for a perioperative major adverse cardiac event during this intermediate risk procedure. No further testing indicated. Proceed with surgery. Hold Eliquis for 48 hours prior to surgery. Restart after surgery when safe from a bleeding perspective.

## 2025-07-08 NOTE — TELEPHONE ENCOUNTER
Medications likely to worsen MG symptoms:  Aminoglycoside (Gentamycin, Kanamycin, Streptomycin, Neomycin, Tobramycin)  Macrolide (Erythromycin, Azithromycin, Telithromycin, Biaxin, Clarithromycin)  Fluoroquinolone - (Ciprofloxacin, Moxifloxacin, Levofloxacin, Delafloxacin, Norfloxacin, Prulifloxacin)  Procainamide  Magnesium salts, IV magnesium replacement    Medications that might worsen MG symptoms but generally tolerated:  Atropine   Corticosteroids  Beta blockers  Iodinated radiologic contrast agents  Benzodiazepines   Calcium Channel Blockers (verapamil)   Doxacurium  Neuromuscular blockades (Succinylcholine, Cisatracurium, Rocuronium, Vecuronium, Atracurium)  Dicyclomine

## 2025-07-09 DIAGNOSIS — M51.360 DEGENERATION OF INTERVERTEBRAL DISC OF LUMBAR REGION WITH DISCOGENIC BACK PAIN: Primary | ICD-10-CM

## 2025-07-09 NOTE — TELEPHONE ENCOUNTER
LOV:05/06/25  NOV:10/29/25    Unable to pin refill request for medication     Copied from CRM #[id number]. Topic: Medications - Medication Refill  >> Jul 9, 2025 11:39 AM Konrad wrote:  Type:  RX Refill Request    Who Called: patient  Refill or New Rx:refill  RX Name and Strength:Hydrocodone 3.25/ 7.5  How is the patient currently taking it? (ex. 1XDay):  Is this a 30 day or 90 day RX:  Preferred Pharmacy with phone number:  [id number] - [place], [place] - [address] [facility] [address] [facility] [address]  [address]  [place] [address]  Phone: [phone] Fax: [phone]  Local or Mail Order:local  Ordering Provider:ZOE Castanon  Would the patient rather a call back or a response via [facility]?   Best Call Back Number:[phone]  Additional Information:

## 2025-07-10 ENCOUNTER — TELEPHONE (OUTPATIENT)
Dept: PRIMARY CARE CLINIC | Facility: CLINIC | Age: 80
End: 2025-07-10
Payer: MEDICARE

## 2025-07-10 ENCOUNTER — E-CONSULT (OUTPATIENT)
Dept: CARDIOLOGY | Facility: CLINIC | Age: 80
End: 2025-07-10
Payer: MEDICARE

## 2025-07-10 DIAGNOSIS — Z01.810 PREOP CARDIOVASCULAR EXAM: Primary | ICD-10-CM

## 2025-07-10 RX ORDER — HYDROCODONE BITARTRATE AND ACETAMINOPHEN 7.5; 325 MG/1; MG/1
1 TABLET ORAL EVERY 6 HOURS PRN
Qty: 30 TABLET | Refills: 0 | OUTPATIENT
Start: 2025-07-10 | End: 2025-07-17

## 2025-07-10 RX ORDER — HYDROCODONE BITARTRATE AND ACETAMINOPHEN 7.5; 325 MG/1; MG/1
1 TABLET ORAL EVERY 6 HOURS PRN
Qty: 30 TABLET | Refills: 0 | Status: SHIPPED | OUTPATIENT
Start: 2025-07-10 | End: 2025-07-11

## 2025-07-10 NOTE — TELEPHONE ENCOUNTER
----- Message from ECHO Thurman sent at 7/8/2025  3:39 PM CDT -----  Desirae Castanon       Your patient indicated above requires Pre-Op Clearance/ Risk Stratification for a Transperineal Insertion of Periprostatic Gel with Dr. An on 07/23/2025 (MAC  anesthesia, 45 minutes).  Anesthesia review is in progress.     If a chart review is appropriate for clearance, please indicate in a note in the EMR.       If not, please advise timely, so that your clinic may schedule an appointment.  The following labs/tests have been ordered:   BMP  EKG  Hematology Profile    If further diagnostics are required please feel free to initiate.         Thank you,   Cuca Marvin RN   [facility] [place]  
Cardiology  recommend an cardiac office visit  
No

## 2025-07-10 NOTE — CONSULTS
[facility] [address]  Response for E-Consult     Patient Name: Maximilian Tavera Jr.  MRN: [id number]  Primary Care Provider: Brian Marroquin MD   Requesting Provider: Nguyen Verdin MD  Consults    After evaluation of your eConsult clinical questions, I believe the patient should be scheduled for an office visit in our specialty due to need for appropriate preoperative cardiac risk stratification to be performed prior to surgery    Total time of Consultation: 10 minute    *This eConsult is based on the clinical data available to me and is furnished without benefit of a physician examination.  The eConsult will need to be interpreted in light of any clinical issues of changes in patient status not available to me at the time rime of filing this eConsults.  Significant changes in patient condition of level of acuity should result in a referral for in person consultation and reevaluation.  Please alert me if you have any furth questions.     Thank you for this eConsult referral.     Devang Amezquita MD PhD  Brian [facility] [address]

## 2025-07-11 RX ORDER — HYDROCODONE BITARTRATE AND ACETAMINOPHEN 7.5; 325 MG/1; MG/1
1 TABLET ORAL EVERY 6 HOURS PRN
Qty: 30 TABLET | Refills: 0 | Status: SHIPPED | OUTPATIENT
Start: 2025-07-11 | End: 2025-07-18

## 2025-07-11 RX ORDER — HYDROCODONE BITARTRATE AND ACETAMINOPHEN 7.5; 325 MG/1; MG/1
1 TABLET ORAL EVERY 6 HOURS PRN
Qty: 30 TABLET | Refills: 0 | OUTPATIENT
Start: 2025-07-11 | End: 2025-07-18

## 2025-07-13 DIAGNOSIS — E78.2 MIXED HYPERLIPIDEMIA: ICD-10-CM

## 2025-07-13 DIAGNOSIS — R97.20 ELEVATED PSA: ICD-10-CM

## 2025-07-13 DIAGNOSIS — M51.369 DDD (DEGENERATIVE DISC DISEASE), LUMBAR: ICD-10-CM

## 2025-07-13 NOTE — TELEPHONE ENCOUNTER
Care Due:                  Date            Visit Type   Department     Provider  --------------------------------------------------------------------------------                                EP -                              PRIMARY  Last Visit: 06-      CARE ([facility])   None Found     Brian Marroquin  Next Visit: None Scheduled  None         None Found                                                            Last  Test          Frequency    Reason                     Performed    Due Date  --------------------------------------------------------------------------------    Office Visit  15 months..  bumetanide,                06- 09-                             chlorthalidone,                             ezetimibe, olmesartan,                             pantoprazole, potassium,                             tiotropium...............    Lipid Panel.  12 months..  ezetimibe................  10-   10-    [facility] Embedded Care Due Messages. Reference number: [id number].   7/13/2025 4:17:19 PM CDT

## 2025-07-14 RX ORDER — CIPROFLOXACIN 500 MG/1
TABLET, FILM COATED ORAL
Qty: 1 TABLET | Refills: 0 | Status: SHIPPED | OUTPATIENT
Start: 2025-07-14

## 2025-07-14 RX ORDER — CHLORTHALIDONE 25 MG/1
25 TABLET ORAL
Qty: 90 TABLET | Refills: 3 | Status: SHIPPED | OUTPATIENT
Start: 2025-07-14

## 2025-07-14 RX ORDER — ATORVASTATIN CALCIUM 80 MG/1
80 TABLET, FILM COATED ORAL
Qty: 90 TABLET | Refills: 0 | Status: SHIPPED | OUTPATIENT
Start: 2025-07-14

## 2025-07-14 RX ORDER — EZETIMIBE 10 MG/1
10 TABLET ORAL
Qty: 90 TABLET | Refills: 3 | Status: SHIPPED | OUTPATIENT
Start: 2025-07-14

## 2025-07-14 NOTE — TELEPHONE ENCOUNTER
Refill Decision Note   Maximilian Tavera  is requesting a refill authorization.  Brief Assessment and Rationale for Refill:  Approve     Medication Therapy Plan:  LOV 5/13/25 with Dr. Amezquita      Comments:     Note composed:9:45 AM 07/14/2025

## 2025-07-15 ENCOUNTER — OFFICE VISIT (OUTPATIENT)
Dept: CARDIOLOGY | Facility: CLINIC | Age: 80
End: 2025-07-15
Payer: MEDICARE

## 2025-07-15 VITALS
WEIGHT: 235.25 LBS | HEART RATE: 63 BPM | SYSTOLIC BLOOD PRESSURE: 161 MMHG | HEIGHT: 69 IN | DIASTOLIC BLOOD PRESSURE: 82 MMHG | OXYGEN SATURATION: 95 % | BODY MASS INDEX: 34.84 KG/M2

## 2025-07-15 DIAGNOSIS — M79.89 LEG SWELLING: ICD-10-CM

## 2025-07-15 DIAGNOSIS — E78.2 MIXED HYPERLIPIDEMIA: ICD-10-CM

## 2025-07-15 DIAGNOSIS — I70.0 ABDOMINAL AORTIC ATHEROSCLEROSIS: ICD-10-CM

## 2025-07-15 DIAGNOSIS — I77.1 TORTUOUS AORTA: ICD-10-CM

## 2025-07-15 DIAGNOSIS — I82.512 CHRONIC DEEP VEIN THROMBOSIS (DVT) OF FEMORAL VEIN OF LEFT LOWER EXTREMITY: ICD-10-CM

## 2025-07-15 DIAGNOSIS — I87.2 VENOUS STASIS DERMATITIS OF BOTH LOWER EXTREMITIES: ICD-10-CM

## 2025-07-15 DIAGNOSIS — I45.10 RBBB: ICD-10-CM

## 2025-07-15 DIAGNOSIS — I89.0 LYMPHEDEMA OF BOTH LOWER EXTREMITIES: ICD-10-CM

## 2025-07-15 DIAGNOSIS — I10 HTN (HYPERTENSION), BENIGN: ICD-10-CM

## 2025-07-15 DIAGNOSIS — E66.9 OBESITY (BMI 30-39.9): ICD-10-CM

## 2025-07-15 DIAGNOSIS — Z01.810 PREOP CARDIOVASCULAR EXAM: Primary | ICD-10-CM

## 2025-07-15 LAB
OHS QRS DURATION: 150 MS
OHS QTC CALCULATION: 445 MS

## 2025-07-15 PROCEDURE — 1125F AMNT PAIN NOTED PAIN PRSNT: CPT | Mod: CPTII,HCNC,S$GLB, | Performed by: INTERNAL MEDICINE

## 2025-07-15 PROCEDURE — 93010 ELECTROCARDIOGRAM REPORT: CPT | Mod: HCNC,S$GLB,, | Performed by: INTERNAL MEDICINE

## 2025-07-15 PROCEDURE — 99215 OFFICE O/P EST HI 40 MIN: CPT | Mod: HCNC,25,S$GLB, | Performed by: INTERNAL MEDICINE

## 2025-07-15 PROCEDURE — 3288F FALL RISK ASSESSMENT DOCD: CPT | Mod: CPTII,HCNC,S$GLB, | Performed by: INTERNAL MEDICINE

## 2025-07-15 PROCEDURE — 3077F SYST BP >= 140 MM HG: CPT | Mod: CPTII,HCNC,S$GLB, | Performed by: INTERNAL MEDICINE

## 2025-07-15 PROCEDURE — 1159F MED LIST DOCD IN RCRD: CPT | Mod: CPTII,HCNC,S$GLB, | Performed by: INTERNAL MEDICINE

## 2025-07-15 PROCEDURE — 1100F PTFALLS ASSESS-DOCD GE2>/YR: CPT | Mod: CPTII,HCNC,S$GLB, | Performed by: INTERNAL MEDICINE

## 2025-07-15 PROCEDURE — 3079F DIAST BP 80-89 MM HG: CPT | Mod: CPTII,HCNC,S$GLB, | Performed by: INTERNAL MEDICINE

## 2025-07-15 PROCEDURE — 93000 ELECTROCARDIOGRAM COMPLETE: CPT | Mod: HCNC,S$GLB,, | Performed by: INTERNAL MEDICINE

## 2025-07-15 PROCEDURE — 99999 PR PBB SHADOW E&M-EST. PATIENT-LVL V: CPT | Mod: PBBFAC,HCNC,, | Performed by: INTERNAL MEDICINE

## 2025-07-15 RX ORDER — GABAPENTIN 600 MG/1
600 TABLET ORAL 3 TIMES DAILY
Qty: 90 TABLET | Refills: 11 | Status: SHIPPED | OUTPATIENT
Start: 2025-07-15

## 2025-07-15 NOTE — PROGRESS NOTES
[facility]      Chief Complaint   Patient presents with    Chronic deep vein thrombosis (DVT) of femoral vein of left        Patient ID: Maximilian Tavera Jr. is a 79 y.o. male with BLE lymphedema, venous insuffciency, HTN, HLD, myasthenia gravis on chronic prednisone therapy, degenerative joint disease with myelopathy s/p decompression and fusion of C2-6, chronic PVCs, who presents for a follow up appointment.  Pertinent history/events are as follows:     -Pt kindly referred by Dr. Altamirano for evaluation of leg swelling.    -At our initial clinic visit on 2/3/2022, Mr. Tavera reported leg swelling starting 3-4 months ago.  States his PCP wrapped his legs 2 weeks ago and he lost 8 pounds.  He has no claudication, rest pain, or tissue loss.  Plan:   Leg swelling- Due to lymphedema and possible venous insufficiency.  Check BLE venous reflux study and KATTY study.  Refer to lymphedema clinic.  Increase bumex to 1 mg bid every other day (0.5 mg bid on alternate days).  Pt to limit sodium intake to 2,000 mg daily.  Limit volume intake to 1.5 liters daily.  Check cmp in 1 week.    HTN- Continue current medications.   Chronic PVC's- Stable.  Continue current medications and follow up with EP as scheduled.    HLD- LDL is at goal of <70.  Continue current medications.    -At follow up clinic visit on 5/3/2022, Mr. Tavera reported significant improvement in BLE edema since completing lymphedema clinic therapy.  He continues to do lymphedema clinic techniques at home and wear graduated compression hose.  BLE Venous Reflux Study on 2/10/2022 revealed significant RLE venous reflux and no evidence of lower extremity DVT.  KATTY Study on 2/10/2022 revealed noncompressible ABIs bilaterally consistent with medial arterial calcification.  PVR waveforms are mildly abnormal bilaterally.  Pt states he stopped taking bumex due to frequent urination.   Plan:   Leg swelling- Mr. Tavera reports significant improvement in BLE  edema since completing lymphedema clinic therapy.  BLE Venous Reflux Study on 2/10/2022 revealed significant RLE venous reflux and no evidence of lower extremity DVT.  KATTY Study on 2/10/2022 revealed noncompressible ABIs bilaterally consistent with medial arterial calcification.  PVR waveforms are mildly abnormal bilaterally.  Continue lymphedema clinic techniques at home and wear graduated compression hose.  Pt to limit sodium intake to 2,000 mg daily.  Limit volume intake to 1.5 liters daily.  Check cmp in 1 week.    HTN- Continue current medications.   Chronic PVC's- Stable.  Continue current medications and follow up with EP as scheduled.    HLD- LDL is at goal of <70.  Continue atorvastatin 80 mg daily.       -At clinic visit on 2/28/2023, Mr. Tavera reported continued improvement in BLE edema.  He has no claudication or tissue loss.    Plan:   Leg swelling- Mr. Tavera reports continued improvement in BLE edema.  Continue lymphedema clinic techniques at home and wear graduated compression hose.  Pt to limit sodium intake to 2,000 mg daily.  Limit volume intake to 1.5 liters daily.  Check cmp in 1 week.    HTN- Continue current medications.   Chronic PVC's- Stable.  Continue current medications and follow up with EP as scheduled.    HLD- LDL is at goal of <70 (69 on 12/9/2022).  Continue atorvastatin 80 mg daily.       -At clinic visit on 7/13/2023 Mr. Tavera followed after admission to [facility] with LLE edema and found to have extensive LLE DVT.  He was discharged on full dose anticoagulation with Eliquis.   Plan:  LLE DVT- Appear unprovoked.  Refer to Heme/Onc for age appropriate cancer screening and hypercoagulable workup.  Continue Eliquis 5 mg bid.    BLE Lymphedema/Venous Insufficiency- Continue lymphedema clinic techniques at home and wear graduated compression hose.  Pt to limit sodium intake to 2,000 mg daily.  Limit volume intake to 1.5 liters daily.     HTN- Continue current medications.    Chronic PVC's- Stable.  Continue current medications and follow up with EP as scheduled.    HLD- LDL is at goal of <70 (69 on 12/9/2022).  Continue atorvastatin 80 mg daily.     Follow up in 3 months with BLE venous ultrasound prior.    11/16/2023 clinic visit: Mr. Tavera reports no pain in either legs, Swelling has improved. He also reports he has completed the Lymphedema therapy. He was admitted to [facility] with LLE edema and found to have extensive LLE DVT.  He was discharged on full dose anticoagulation with Eliquis. LLE Venous Ultrasound on 7/1/2023 revealed Left thigh veins: There is occlusive and nonocclusive thrombus involving the common femoral, superficial femoral and popliteal veins.  Greater saphenous vein is patent without intraluminal thrombus. Left calf veins: There is occlusive thrombus within posterior tibial vein.  Anterior tibial and peroneal veins are patent without intraluminal thrombus. BLE Venous Ultrasound  on 11/1/2023 revealed is no evidence of a right lower extremity DVT. Left superficial femoral proximal vein DVT. Left superficial femoral middle vein DVT. Left popliteal vein DVT  Plan:    LLE DVT- Appear unprovoked.  LLE Venous Ultrasound on 7/1/2023 revealed Left thigh veins: There is occlusive and nonocclusive thrombus involving the common femoral, superficial femoral and popliteal veins.  Greater saphenous vein is patent without intraluminal thrombus. Left calf veins: There is occlusive thrombus within posterior tibial vein.  Anterior tibial and peroneal veins are patent without intraluminal thrombus. BLE Venous Ultrasound  on 11/1/2023 revealed is no evidence of a right lower extremity DVT. Left superficial femoral proximal vein DVT. Left superficial femoral middle vein DVT. Left popliteal vein DVT. Following Heme/Onc. Continue Eliquis 5 mg bid. Will continue Eliquis lifelong, we will also consider decrease the dose of eliquis after total 12 months of full dose therapy.    BLE Lymphedema/Venous Insufficiency- Continue lymphedema clinic techniques at home and wear graduated compression hose.  Pt to limit sodium intake to 2,000 mg daily.  Limit volume intake to 1.5 liters daily.     HTN- Continue current medications.   Chronic PVC's- Stable.  Continue current medications and follow up with EP as scheduled.    HLD- LDL is at goal of <70 ( 63 on 2/22/2023 vs 69 on 12/9/2022).  Continue atorvastatin 80 mg daily.       7/16/2024 clinic visit: Mr. Tavera reports doing well with no chest pain or SOB.  Exam shows LLE with no evidence of phlegmasia cerulea dolens.  LLE Venous Ultrasound on 5/29/2024 revealed occlusive deep vein thrombosis is not presently seen with small amounts of residual thrombus in the distal femoral and popliteal veins noted.  Plan:  LLE DVT- Appears unprovoked.  Exam shows LLE with no evidence of phlegmasia cerulea dolens.  LLE Venous Ultrasound on 5/29/2024 revealed occlusive deep vein thrombosis is not presently seen with small amounts of residual thrombus in the distal femoral and popliteal veins noted. Continue Eliquis 5 mg bid.  BLE Lymphedema/Venous Insufficiency- Continue lymphedema clinic techniques at home and wear graduated compression hose.  Pt to limit sodium intake to 2,000 mg daily.  Limit volume intake to 1.5 liters daily.     HTN- Continue current medications.   Chronic PVC's- Stable.  Continue current medications and follow up with EP as scheduled.    HLD- LDL is at goal of <70 ( 69 on 3/28/2024).  Continue atorvastatin 80 mg daily.       2/11/2025 clinic visit: Mr. Tavera reports worsening swelling of the LLE which started  5-6 weeks ago. He has no chest pain or SOB.  Exam shows LLE with 2 pitting edema with no evidence of phlegmasia cerulea dolens.   Plan:  LLE DVT- Appears unprovoked.  Mr. Tavera reports worsening swelling of the LLE which started  5-6 weeks ago. He has no chest pain or SOB.  Exam shows LLE with 2 pitting edema with no evidence of  phlegmasia cerulea dolens. Check BLE venous ultrasound today. Continue Eliquis 5 mg bid. Start bumex 0.5 mg every other day. Check cmp in 1 week.   BLE Lymphedema/Venous Insufficiency- Continue lymphedema clinic techniques at home and wear graduated compression hose.  Pt to limit sodium intake to 2,000 mg daily.  Limit volume intake to 1.5 liters daily.     HTN- Continue current medications.   Chronic PVC's- Stable.  Continue current medications and follow up with EP as scheduled.    HLD- Continue atorvastatin 80 mg daily.       5/13/2025 clinic visit: Mr. Tavera reports continued leg swelling. He is tolerating bumex 0.5 mg half pill daily. He has now been diagnosed with prostate cancer ([place] 4+3), and will start treatment soon. Exam shows LLE with 2 pitting edema with no evidence of phlegmasia cerulea dolens.  BLE Venous Ultrasound 3/13/2025 showed chronic DVT of the left superficial proximal/mid veins, and popliteal vein.   Plan:  LLE DVT- Likely provoked by underlying prostate cancer. He has now been diagnosed with prostate cancer ([place] 4+3), and will start treatment soon. BLE Venous Ultrasound 3/13/2025 showed chronic DVT of the left superficial proximal/mid veins, and popliteal vein. Continue Eliquis 5 mg bid.    BLE Lymphedema/Venous Insufficiency- Refer back to lymphedema clinic. Continue bumex 0.5 mg half pill daily for leg swelling. Continue graduated compression hose.  Pt to limit sodium intake to 2,000 mg daily.  Limit volume intake to 1.5 liters daily.     HTN- Continue current medications.   Chronic PVC's- Stable.  Continue current medications and follow up with EP as scheduled.    HLD- Continue atorvastatin 80 mg daily.     HPI:  Mr. Tavera is scheduled for urologic surgery on 7/23/2025 with Dr. An. He has no chest pain and no heart failure symptoms. He can perform greater than 4 METS without difficulty. Leg swelling is well controled. EKG today shows  Sinus bradycardia with right bundle  branch block and left anterior fascicular block. Today's EKG is similar to previous tracings.      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    Disease of spinal cord, unspecified 1/5/2024    Emphysema lung 11/05/2018    Gastritis     Hx of colonic polyp     Hyperlipidemia     Hypertension     Hypothyroidism     Knee fracture     Myasthenia gravis     Neuropathy 01/03/2013    Obesity 01/29/2015    Pneumonia 03/29/2020    Polyneuropathy     Prostate cancer 3/20/2025    PVC (premature ventricular contraction)     Squamous cell carcinoma 2014    left forearm    Thyroid disease      Past Surgical History:   Procedure Laterality Date    APPENDECTOMY      CATARACT EXTRACTION W/  INTRAOCULAR LENS IMPLANT Bilateral     COLONOSCOPY  03/01/2012    Dr Esteban; hyperplastic polyp; repeat in 5 years    COLONOSCOPY N/A 12/7/2021    Procedure: COLONOSCOPY;  Surgeon: Gabriel Hernandez MD;  Location: [facility];  Service: Endoscopy;  Laterality: N/A;    CYSTOSCOPY N/A 2/26/2019    Procedure: CYSTOSCOPY;  Surgeon: Simba Cheung MD;  Location: [facility];  Service: Urology;  Laterality: N/A;    ENDOSCOPIC ULTRASOUND OF UPPER GASTROINTESTINAL TRACT N/A 1/7/2020    Procedure: ULTRASOUND, UPPER GI TRACT, ENDOSCOPIC;  Surgeon: Kati Ryan MD;  Location: [facility] ([address] [place] [address]);  Service: Endoscopy;  Laterality: N/A;    ESOPHAGOGASTRODUODENOSCOPY N/A 9/12/2018    Procedure: EGD (ESOPHAGOGASTRODUODENOSCOPY);  Surgeon: Gabriel Hernandez MD;  Location: [facility];  Service: Endoscopy;  Laterality: N/A;    ESOPHAGOGASTRODUODENOSCOPY N/A 8/19/2019    Procedure: EGD (ESOPHAGOGASTRODUODENOSCOPY);  Surgeon: Gabriel Hernandez MD;  Location: [facility];  Service: Endoscopy;  Laterality: N/A;     ESOPHAGOGASTRODUODENOSCOPY N/A 10/7/2019    Procedure: EGD (ESOPHAGOGASTRODUODENOSCOPY);  Surgeon: Gabriel Hernandez MD;  Location: [facility];  Service: Endoscopy;  Laterality: N/A;    ESOPHAGOGASTRODUODENOSCOPY N/A 1/7/2020    Procedure: EGD (ESOPHAGOGASTRODUODENOSCOPY);  Surgeon: Kati Ryan MD;  Location: [facility] (2ND FLR);  Service: Endoscopy;  Laterality: N/A;    HEMORRHOID SURGERY      INJECTION OF ANESTHETIC AGENT AROUND MEDIAL BRANCH NERVES INNERVATING LUMBAR FACET JOINT Bilateral 10/1/2020    Procedure: Block-nerve-medial branch-lumbar Bilateral L 3,4,5;  Surgeon: Devan Watt MD;  Location: [facility];  Service: Pain Management;  Laterality: Bilateral;    INJECTION OF ANESTHETIC AGENT AROUND MEDIAL BRANCH NERVES INNERVATING LUMBAR FACET JOINT Right 10/7/2022    Procedure: Block-nerve-medial branch-lumbar L3,4,5;  Surgeon: Devan Watt MD;  Location: [facility];  Service: Pain Management;  Laterality: Right;    KNEE ARTHROPLASTY Bilateral     LENGTHENING OF ACHILLES TENDON Right 9/11/2020    Procedure: percutaeous tenotomy of right lateral epicondyle (tenex);  Surgeon: Terry Quach MD;  Location: [facility];  Service: Neurosurgery;  Laterality: Right;  tenex to right lateral epicondyle  Tenex machine SN#[id number], total time 1min. 30seconds    NECK SURGERY      POSTERIOR FUSION OF CERVICAL SPINE WITH LAMINECTOMY N/A 11/7/2018    Procedure: C2-C6 Posterior Cervical Laminectomy & Instrumental Fusion;  Surgeon: Kishore Turner MD;  Location: [facility] 2ND FLR;  Service: Neurosurgery;  Laterality: N/A;    TONSILLECTOMY      TRANSFORAMINAL EPIDURAL INJECTION OF STEROID Right 12/20/2019    Procedure: Injection,steroid,epidural,transforaminal approach;  Surgeon: Devan Watt MD;  Location: [facility];  Service: Pain Management;  Laterality: Right;  L3-4, L4-5    TRANSFORAMINAL EPIDURAL INJECTION OF STEROID Bilateral 2/6/2020    Procedure: Injection,steroid,epidural,transforaminal approach;  Surgeon: Devan Watt MD;   Location: [facility];  Service: Pain Management;  Laterality: Bilateral;  L3-L4,L4-L5    TRANSFORAMINAL EPIDURAL INJECTION OF STEROID Bilateral 8/26/2020    Procedure: Injection,steroid,epidural,transforaminal approach;  Surgeon: Devan Watt MD;  Location: [facility];  Service: Pain Management;  Laterality: Bilateral;  L3-4, L4-5    TRANSRECTAL ULTRASOUND EXAMINATION N/A 2/26/2019    Procedure: ULTRASOUND, RECTAL APPROACH;  Surgeon: Simba Cheung MD;  Location: [facility];  Service: Urology;  Laterality: N/A;    UPPER GASTROINTESTINAL ENDOSCOPY  09/12/2018    Dr Hernandez; gastritis; extensive intestinal metaplasia; repeat in 1-2- years     Social History     Socioeconomic History    Marital status:    Tobacco Use    Smoking status: Never    Smokeless tobacco: Never    Tobacco comments:     when a child   Substance and Sexual Activity    Alcohol use: Yes     Comment: rarely    Drug use: No    Sexual activity: Yes     Partners: Female     Social Drivers of Health     Financial Resource Strain: Low Risk  (3/24/2025)    Overall Financial Resource Strain (CARDIA)     Difficulty of Paying Living Expenses: Not hard at all   Food Insecurity: No Food Insecurity (3/24/2025)    Hunger Vital Sign     Worried About Running Out of Food in the Last Year: Never true     Ran Out of Food in the Last Year: Never true   Transportation Needs: No Transportation Needs (3/24/2025)    PRAPARE - Transportation     Lack of Transportation (Medical): No     Lack of Transportation (Non-Medical): No   Physical Activity: Inactive (3/24/2025)    Exercise Vital Sign     Days of Exercise per Week: 0 days     Minutes of Exercise per Session: 0 min   Stress: No Stress Concern Present (3/24/2025)    [employer] [place] [employer] - Occupational Stress Questionnaire     Feeling of Stress : Not at all   Housing Stability: Low Risk  (3/24/2025)    Housing Stability Vital Sign     Unable to Pay for Housing in the Last Year: No     Number of  Times Moved in the Last Year: 0     Homeless in the Last Year: No     Family History   Problem Relation Name Age of Onset    Cataracts Mother      Heart disease Mother          CHF    Hypertension Mother      Hyperlipidemia Mother      Cataracts Father      Glaucoma Father      Heart disease Father      Hyperlipidemia Sister      Hypertension Sister      No Known Problems Daughter      No Known Problems Daughter      Collagen disease Neg Hx      Amblyopia Neg Hx      Blindness Neg Hx      Macular degeneration Neg Hx      Retinal detachment Neg Hx      Strabismus Neg Hx      Cancer Neg Hx      Colon cancer Neg Hx      Esophageal cancer Neg Hx      Stomach cancer Neg Hx      Crohn's disease Neg Hx      Ulcerative colitis Neg Hx         Review of patient's allergies indicates:   Allergen Reactions    Spironolactone Diarrhea       Medication List with Changes/Refills   Current Medications    ALBUTEROL (PROVENTIL/VENTOLIN HFA) 90 MCG/ACTUATION INHALER    Inhale 2 puffs into the lungs every 6 (six) hours as needed for Wheezing. Rescue    ALBUTEROL-IPRATROPIUM (DUO-NEB) 2.5 MG-0.5 MG/3 ML NEBULIZER SOLUTION    Take 3 mLs by nebulization every 6 (six) hours as needed for Wheezing. Rescue    ATORVASTATIN (LIPITOR) 80 MG TABLET    TAKE 1 TABLET EVERY DAY    BICALUTAMIDE (CASODEX) 50 MG TAB    Take 1 tablet (50 mg total) by mouth once daily.    BUMETANIDE (BUMEX) 0.5 MG TAB    Take 1 tablet (0.5 mg total) by mouth every other day.    CALCIUM CITRATE (CALCITRATE) 200 MG (950 MG) TABLET    Take 1 tablet by mouth once daily.    CARVEDILOL (COREG) 25 MG TABLET    TAKE 1 TABLET TWICE DAILY WITH MEALS    CETIRIZINE (ZYRTEC) 10 MG TABLET    Take 1 tablet by mouth daily as needed.    CHLORTHALIDONE (HYGROTEN) 25 MG TAB    TAKE 1 TABLET EVERY DAY    CHOLECALCIFEROL, VITAMIN D3, (VITAMIN D3) 50 MCG (2,000 UNIT) CAP    1 capsule once daily. Every day    CIPROFLOXACIN HCL (CIPRO) 500 MG TABLET    TAKE 1 TABLET 1 HOUR PRIOR TO PROCEDURE.  FOR 1 DOSE    COENZYME Q10 (CO Q-10) 100 MG CAPSULE    Take 400 mg by mouth once daily.    CYANOCOBALAMIN, VITAMIN B-12, 5,000 MCG SUBL    Take 5,000 mcg by mouth once daily. Every day    DIPHENOXYLATE-ATROPINE 2.5-0.025 MG (LOMOTIL) 2.5-0.025 MG PER TABLET    Take 1 tablet by mouth 4 (four) times daily as needed for Diarrhea.    ELIQUIS 5 MG TAB    TAKE 1 TABLET TWICE DAILY    EZETIMIBE (ZETIA) 10 MG TABLET    TAKE 1 TABLET EVERY DAY    FAMOTIDINE (PEPCID) 20 MG TABLET    TAKE 1 TABLET EVERY NIGHT AS NEEDED FOR HEARTBURN    FLUTICASONE (FLONASE) 50 MCG/ACTUATION NASAL SPRAY    USE 1 SPRAY IN EACH NOSTRIL TWICE DAILY AS NEEDED  FOR  RHINITIS    GABAPENTIN (NEURONTIN) 600 MG TABLET    TAKE 1 TABLET THREE TIMES DAILY    HYDROCODONE-ACETAMINOPHEN (NORCO) 7.5-325 MG PER TABLET    Take 1 tablet by mouth every 6 (six) hours as needed for Pain.    HYDROCORTISONE (CORTEF) 5 MG TAB    Take 5 mg by mouth once daily. As needed    LEVOTHYROXINE (SYNTHROID) 50 MCG TABLET    TAKE 1 TABLET EVERY DAY    MILK THISTLE 200 MG CAP    Take 200 mg by mouth 2 (two) times daily. Twice a day    MUPIROCIN (BACTROBAN) 2 % OINTMENT    Apply topically 3 (three) times daily.    OLMESARTAN (BENICAR) 20 MG TABLET    Take 1 tablet (20 mg total) by mouth 2 (two) times daily.    OMEGA-3 FATTY ACIDS 1,000 MG CAP    Twice a day    PANTOPRAZOLE (PROTONIX) 40 MG TABLET    TAKE 1 TABLET TWICE DAILY    POTASSIUM CHLORIDE SA (K-DUR,KLOR-CON M) 10 MEQ TABLET    Take 2 tablets (20 mEq total) by mouth every morning AND 4 tablets (40 mEq total) with lunch AND 4 tablets (40 mEq total) daily with dinner or evening meal.    PREDNISONE (DELTASONE) 1 MG TABLET    TAKE 2 TABLETS ONE TIME DAILY    PREDNISONE (DELTASONE) 5 MG TABLET    TAKE 1 TABLET ONE TIME DAILY    SILDENAFIL (REVATIO) 20 MG TAB    Take 1 to 3 tabs daily as needed.    TIOTROPIUM (SPIRIVA WITH HANDIHALER) 18 MCG INHALATION CAPSULE    INHALE THE CONTENTS OF 1 CAPSULE EVERY DAY (CONTROLLER)       Review  "of Systems  Constitution: Denies chills, fever, and sweats.  HENT: Denies headaches or blurry vision.  Cardiovascular: Denies chest pain or irregular heart beat.  Respiratory: Denies cough or shortness of breath.  Gastrointestinal: Denies abdominal pain, nausea, or vomiting.  Musculoskeletal: Negative for leg swelling.  Neurological: Denies dizziness or focal weakness.  Psychiatric/Behavioral: Normal mental status.  Hematologic/Lymphatic: Denies bleeding problem or easy bruising/bleeding.  Skin: Denies rash or suspicious lesions    Physical Examination  BP (!) 161/82 (BP Location: Left arm, Patient Position: Sitting)   Pulse 63   Ht 5' 9" (1.753 m)   Wt 106.7 kg (235 lb 3.7 oz)   SpO2 95%   BMI 34.74 kg/m²     Constitutional: No acute distress, conversant  HEENT: Sclera anicteric, Pupils equal, round and reactive to light, extraocular motions intact, Oropharynx clear  Neck: No JVD, no carotid bruits  Cardiovascular: regular rate and rhythm, no murmur, rubs or gallops, normal S1/S2  Pulmonary: Clear to auscultation bilaterally  Abdominal: Abdomen soft, nontender, nondistended, positive bowel sounds  Extremities: BLE's with 1+ pitting edema  Pulses:  Carotid pulses are 2+ on the right side, and 2+ on the left side.  Radial pulses are 2+ on the right side, and 2+ on the left side.   Femoral pulses are 2+ on the right side, and 2+ on the left side.  Popliteal pulses are 2+ on the right side, and 2+ on the left side.   Dorsalis pedis pulses are 2+ on the right side, and 2+ on the left side.   Posterior tibial pulses are 2+ on the right side, and 2+ on the left side.    Skin: No ecchymosis, erythema, or ulcers  Psych: Alert and oriented x 3, appropriate affect  Neuro: CNII-XII intact, no focal deficits    Labs:  Most Recent Data  CBC:   Lab Results   Component Value Date    WBC 7.96 10/07/2024    HGB 14.0 10/07/2024    HCT 42.6 10/07/2024     (L) 10/07/2024    MCV 91 10/07/2024    RDW 13.9 10/07/2024     BMP: "   Lab Results   Component Value Date     02/25/2025    K 3.8 02/25/2025     02/25/2025    CO2 30 (H) 02/25/2025    BUN 14 02/25/2025    CREATININE 1.1 02/25/2025     (H) 02/25/2025    CALCIUM 8.9 02/25/2025    MG 2.0 07/03/2023    PHOS 2.9 03/31/2020     LFTS;   Lab Results   Component Value Date    PROT 6.2 02/25/2025    ALBUMIN 3.3 (L) 02/25/2025    BILITOT 1.0 02/25/2025    AST 29 02/25/2025    ALKPHOS 75 02/25/2025    ALT 22 02/25/2025     COAGS:   Lab Results   Component Value Date    INR 1.1 07/02/2023     FLP:   Lab Results   Component Value Date    CHOL 109 (L) 10/07/2024    HDL 40 10/07/2024    LDLCALC 56.4 (L) 10/07/2024    TRIG 63 10/07/2024    CHOLHDL 36.7 10/07/2024     CARDIAC:   Lab Results   Component Value Date    TROPONINI <0.006 07/01/2023    BNP 66 03/28/2024       EKG 9/23/2021:  Normal sinus rhythm  Left axis deviation  Incomplete right bundle branch block    Echo 3/28/2024:    Left Ventricle: The left ventricle is normal in size. Ventricular mass is normal. Mild septal thickening. There is normal systolic function with a visually estimated ejection fraction of 60 - 65%. Ejection fraction by visual approximation is 65%. There is indeterminate diastolic function.    Right Ventricle: Normal right ventricular cavity size. Wall thickness is normal. Right ventricle wall motion  is normal. Systolic function is normal.    Left Atrium: Left atrium is mildly dilated.    Aortic Valve: There is moderate aortic valve sclerosis. There is trace aortic regurgitation.    Mitral Valve: There is probably only mild to at most mild- moderate regurgitation.    IVC/SVC: Normal venous pressure at 3 mmHg.    BLE Venous Ultrasound 3/13/2025:    There is no evidence of a right lower extremity DVT.    There is left superficial femoral proximal vein chronic DVT.    There is left superficial femoral middle vein chronic DVT.    There is left popliteal vein chronic DVT.    LLE Venous Ultrasound  5/29/2024:  Occlusive deep vein thrombosis is not presently seen with small amounts of residual thrombus in the distal femoral and popliteal veins noted     BLE Venous Ultrasound 11/1/2023    There is no evidence of a right lower extremity DVT.    Left superficial femoral proximal vein DVT.    Left superficial femoral middle vein DVT.    Left popliteal vein DVT/    BLE Venous Ultrasound 7/1/2023:  Extensive intraluminal thrombus involving the large veins of the left lower extremity extending into the calf.    BLE Venous Reflux Study 2/10/2022:  No evidence of deep or superficial venous thrombosis bilaterally.  The right GSV demonstrates focal reflux below the knee.  Bilateral lower extremity varicosities are noted.    KATTY Study 2/10/2022:  Noncompressible ABIs bilaterally consistent with medial arterial calcification.  PVR waveforms are mildly abnormal bilaterally.    BLE Venous Ultrasound 1/10/2022:  Negative for DVT throughout bilateral lower extremities.    Echo 6/2/2021:  Severe left atrial enlargement.  The left ventricle is normal in size with concentric hypertrophy and normal systolic function.  The estimated ejection fraction is 65%.  Indeterminate left ventricular diastolic function.  Normal right ventricular size with normal right ventricular systolic function.  The estimated PA systolic pressure is 28 mmHg.  Normal central venous pressure (3 mmHg).    Assessment/Plan:  Maximilian Tavera Jr. is a 79 y.o. male with BLE lymphedema, venous insuffciency, HTN, HLD, myasthenia gravis on chronic prednisone therapy, degenerative joint disease with myelopathy s/p decompression and fusion of C2-6, chronic PVCs, who presents for a follow up appointment.       1. LLE DVT- Likely provoked by underlying prostate cancer. He has now been diagnosed with prostate cancer ([place] 4+3), and will start treatment soon. BLE Venous Ultrasound 3/13/2025 showed chronic DVT of the left superficial proximal/mid veins, and  popliteal vein. Check updated ultrasound. Continue Eliquis 5 mg bid.      2. BLE Lymphedema/Venous Insufficiency- Refer back to lymphedema clinic. Continue bumex 0.5 mg half pill daily for leg swelling. Continue graduated compression hose.  Pt to limit sodium intake to 2,000 mg daily.  Limit volume intake to 1.5 liters daily.       3. HTN- Continue current medications.     4. Chronic PVC's- Stable. Continue current medications and follow up with EP as scheduled.      5. HLD- Continue atorvastatin 80 mg daily.       6. Preoperative Cardiac Risk Stratification Prior to Urologic Surgery- Mr. Tavera has no chest pain and no heart failure symptoms. He can perform greater than 4 METS without difficulty. EKG today shows sinus bradycardia with right bundle branch block and left anterior fascicular block. Today's EKG is similar to previous tracings. He is low risk (0.5%) for a perioperative major adverse cardiac event during this intermediate risk procedure. No further testing indicated. Proceed with surgery. Hold Eliquis for 48 hours prior to surgery. Restart after surgery when safe from a bleeding perspective.     Follow up in 5 months    Total duration of face to face visit time 30 minutes.  Total time spent counseling greater than fifty percent of total visit time.  Counseling included discussion regarding imaging findings, diagnosis, possibilities, treatment options, risks and benefits.  The patient had many questions regarding the options and long-term effects.    Devang Amezquita MD, PhD  Interventional Cardiology

## 2025-07-15 NOTE — PATIENT INSTRUCTIONS
Assessment/Plan:  Maximilian Tavera Jr. is a 79 y.o. male with BLE lymphedema, venous insuffciency, HTN, HLD, myasthenia gravis on chronic prednisone therapy, degenerative joint disease with myelopathy s/p decompression and fusion of C2-6, chronic PVCs, who presents for a follow up appointment.       1. LLE DVT- Likely provoked by underlying prostate cancer. He has now been diagnosed with prostate cancer ([place] 4+3), and will start treatment soon. BLE Venous Ultrasound 3/13/2025 showed chronic DVT of the left superficial proximal/mid veins, and popliteal vein. Check updated ultrasound. Continue Eliquis 5 mg bid.      2. BLE Lymphedema/Venous Insufficiency- Refer back to lymphedema clinic. Continue bumex 0.5 mg half pill daily for leg swelling. Continue graduated compression hose.  Pt to limit sodium intake to 2,000 mg daily.  Limit volume intake to 1.5 liters daily.       3. HTN- Continue current medications.     4. Chronic PVC's- Stable. Continue current medications and follow up with EP as scheduled.      5. HLD- Continue atorvastatin 80 mg daily.       6. Preoperative Cardiac Risk Stratification Prior to Urologic Surgery- Mr. Tavera has no chest pain and no heart failure symptoms. He can perform greater than 4 METS without difficulty. EKG today shows sinus bradycardia with right bundle branch block and left anterior fascicular block. Today's EKG is similar to previous tracings. He is low risk (0.5%) for a perioperative major adverse cardiac event during this intermediate risk procedure. No further testing indicated. Proceed with surgery. Hold Eliquis for 48 hours prior to surgery. Restart after surgery when safe from a bleeding perspective.     Follow up in 5 months

## 2025-07-15 NOTE — TELEPHONE ENCOUNTER
Refill Routing Note   Medication(s) are not appropriate for processing by [facility] for the following reason(s):        Outside of protocol    [facility] action(s):  Approve  Route   Requires appointment : Yes   Requires labs : Yes             Appointments  past 12m or future 3m with PCP    Date Provider   Last Visit   6/13/2024 Brian Marroquin MD   Next Visit   Visit date not found Brian Marroquin MD   ED visits in past 90 days: 0        Note composed:8:48 PM 07/14/2025

## 2025-07-21 ENCOUNTER — OFFICE VISIT (OUTPATIENT)
Dept: ORTHOPEDICS | Facility: CLINIC | Age: 80
End: 2025-07-21
Payer: MEDICARE

## 2025-07-21 ENCOUNTER — HOSPITAL ENCOUNTER (OUTPATIENT)
Dept: CARDIOLOGY | Facility: HOSPITAL | Age: 80
Discharge: HOME | End: 2025-07-21
Attending: UROLOGY
Payer: MEDICARE

## 2025-07-21 VITALS — RESPIRATION RATE: 16 BRPM

## 2025-07-21 DIAGNOSIS — Z01.818 PREOPERATIVE TESTING: ICD-10-CM

## 2025-07-21 DIAGNOSIS — M17.12 PRIMARY OSTEOARTHRITIS OF LEFT KNEE: Primary | ICD-10-CM

## 2025-07-21 DIAGNOSIS — M54.12 RIGHT CERVICAL RADICULOPATHY: ICD-10-CM

## 2025-07-21 DIAGNOSIS — M77.11 LATERAL EPICONDYLITIS, RIGHT ELBOW: Primary | ICD-10-CM

## 2025-07-21 DIAGNOSIS — M77.11 LATERAL EPICONDYLITIS, RIGHT ELBOW: ICD-10-CM

## 2025-07-21 PROCEDURE — 20610 DRAIN/INJ JOINT/BURSA W/O US: CPT | Mod: HCNC,LT,S$GLB, | Performed by: ORTHOPAEDIC SURGERY

## 2025-07-21 PROCEDURE — 99999 PR PBB SHADOW E&M-EST. PATIENT-LVL II: CPT | Mod: PBBFAC,HCNC,, | Performed by: ORTHOPAEDIC SURGERY

## 2025-07-21 PROCEDURE — 93010 ELECTROCARDIOGRAM REPORT: CPT | Mod: ,,, | Performed by: GENERAL PRACTICE

## 2025-07-21 PROCEDURE — 1160F RVW MEDS BY RX/DR IN RCRD: CPT | Mod: CPTII,HCNC,S$GLB, | Performed by: ORTHOPAEDIC SURGERY

## 2025-07-21 PROCEDURE — 93005 ELECTROCARDIOGRAM TRACING: CPT

## 2025-07-21 PROCEDURE — 1159F MED LIST DOCD IN RCRD: CPT | Mod: CPTII,HCNC,S$GLB, | Performed by: ORTHOPAEDIC SURGERY

## 2025-07-21 PROCEDURE — 99214 OFFICE O/P EST MOD 30 MIN: CPT | Mod: 25,HCNC,S$GLB, | Performed by: ORTHOPAEDIC SURGERY

## 2025-07-21 NOTE — PROGRESS NOTES
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    Disease of spinal cord, unspecified 1/5/2024    Emphysema lung 11/05/2018    Gastritis     Hx of colonic polyp     Hyperlipidemia     Hypertension     Hypothyroidism     Knee fracture     Myasthenia gravis     Neuropathy 01/03/2013    Obesity 01/29/2015    Pneumonia 03/29/2020    Polyneuropathy     Prostate cancer 3/20/2025    PVC (premature ventricular contraction)     Squamous cell carcinoma 2014    left forearm    Thyroid disease        Past Surgical History:   Procedure Laterality Date    APPENDECTOMY      CATARACT EXTRACTION W/  INTRAOCULAR LENS IMPLANT Bilateral     COLONOSCOPY  03/01/2012    Dr Esteban; hyperplastic polyp; repeat in 5 years    COLONOSCOPY N/A 12/7/2021    Procedure: COLONOSCOPY;  Surgeon: Gabriel Hernandez MD;  Location: [facility];  Service: Endoscopy;  Laterality: N/A;    CYSTOSCOPY N/A 2/26/2019    Procedure: CYSTOSCOPY;  Surgeon: Simba Cheung MD;  Location: [facility];  Service: Urology;  Laterality: N/A;    ENDOSCOPIC ULTRASOUND OF UPPER GASTROINTESTINAL TRACT N/A 1/7/2020    Procedure: ULTRASOUND, UPPER GI TRACT, ENDOSCOPIC;  Surgeon: Kati Ryan MD;  Location: [facility] ([address] [place] [address]);  Service: Endoscopy;  Laterality: N/A;    ESOPHAGOGASTRODUODENOSCOPY N/A 9/12/2018    Procedure: EGD (ESOPHAGOGASTRODUODENOSCOPY);  Surgeon: Gabriel Hernandez MD;  Location: [facility];  Service: Endoscopy;  Laterality: N/A;    ESOPHAGOGASTRODUODENOSCOPY N/A 8/19/2019    Procedure: EGD (ESOPHAGOGASTRODUODENOSCOPY);  Surgeon: Gabriel Hernandez MD;  Location: [facility];  Service: Endoscopy;  Laterality: N/A;    ESOPHAGOGASTRODUODENOSCOPY N/A 10/7/2019    Procedure: EGD (ESOPHAGOGASTRODUODENOSCOPY);  Surgeon: Gabriel  CHRISTIAN Hernandez MD;  Location: [facility] ENDO;  Service: Endoscopy;  Laterality: N/A;    ESOPHAGOGASTRODUODENOSCOPY N/A 1/7/2020    Procedure: EGD (ESOPHAGOGASTRODUODENOSCOPY);  Surgeon: Kati Ryan MD;  Location: [facility] (2ND FLR);  Service: Endoscopy;  Laterality: N/A;    HEMORRHOID SURGERY      INJECTION OF ANESTHETIC AGENT AROUND MEDIAL BRANCH NERVES INNERVATING LUMBAR FACET JOINT Bilateral 10/1/2020    Procedure: Block-nerve-medial branch-lumbar Bilateral L 3,4,5;  Surgeon: Devan Watt MD;  Location: [facility];  Service: Pain Management;  Laterality: Bilateral;    INJECTION OF ANESTHETIC AGENT AROUND MEDIAL BRANCH NERVES INNERVATING LUMBAR FACET JOINT Right 10/7/2022    Procedure: Block-nerve-medial branch-lumbar L3,4,5;  Surgeon: Devan Watt MD;  Location: [facility];  Service: Pain Management;  Laterality: Right;    KNEE ARTHROPLASTY Bilateral     LENGTHENING OF ACHILLES TENDON Right 9/11/2020    Procedure: percutaeous tenotomy of right lateral epicondyle (tenex);  Surgeon: Terry Quach MD;  Location: [facility];  Service: Neurosurgery;  Laterality: Right;  tenex to right lateral epicondyle  Tenex machine SN#[id number], total time 1min. 30seconds    NECK SURGERY      POSTERIOR FUSION OF CERVICAL SPINE WITH LAMINECTOMY N/A 11/7/2018    Procedure: C2-C6 Posterior Cervical Laminectomy & Instrumental Fusion;  Surgeon: Kishore Turner MD;  Location: [facility] 2ND FLR;  Service: Neurosurgery;  Laterality: N/A;    TONSILLECTOMY      TRANSFORAMINAL EPIDURAL INJECTION OF STEROID Right 12/20/2019    Procedure: Injection,steroid,epidural,transforaminal approach;  Surgeon: Devan Watt MD;  Location: [facility];  Service: Pain Management;  Laterality: Right;  L3-4, L4-5    TRANSFORAMINAL EPIDURAL INJECTION OF STEROID Bilateral 2/6/2020    Procedure: Injection,steroid,epidural,transforaminal approach;  Surgeon: Devan Watt MD;  Location: [facility];  Service: Pain Management;  Laterality: Bilateral;  L3-L4,L4-L5    TRANSFORAMINAL EPIDURAL  INJECTION OF STEROID Bilateral 8/26/2020    Procedure: Injection,steroid,epidural,transforaminal approach;  Surgeon: Devan Watt MD;  Location: [facility] OR;  Service: Pain Management;  Laterality: Bilateral;  L3-4, L4-5    TRANSRECTAL ULTRASOUND EXAMINATION N/A 2/26/2019    Procedure: ULTRASOUND, RECTAL APPROACH;  Surgeon: Simba Cheung MD;  Location: [facility] OR;  Service: Urology;  Laterality: N/A;    UPPER GASTROINTESTINAL ENDOSCOPY  09/12/2018    Dr Hernandez; gastritis; extensive intestinal metaplasia; repeat in 1-2- years       Current Outpatient Medications   Medication Sig    albuterol (PROVENTIL/VENTOLIN HFA) 90 mcg/actuation inhaler Inhale 2 puffs into the lungs every 6 (six) hours as needed for Wheezing. Rescue    albuterol-ipratropium (DUO-NEB) 2.5 mg-0.5 mg/3 mL nebulizer solution Take 3 mLs by nebulization every 6 (six) hours as needed for Wheezing. Rescue    atorvastatin (LIPITOR) 80 MG tablet TAKE 1 TABLET EVERY DAY    bicalutamide (CASODEX) 50 MG Tab Take 1 tablet (50 mg total) by mouth once daily. (Patient not taking: Reported on 7/15/2025)    bumetanide (BUMEX) 0.5 MG Tab Take 1 tablet (0.5 mg total) by mouth every other day.    calcium citrate (CALCITRATE) 200 mg (950 mg) tablet Take 1 tablet by mouth once daily.    carvediloL (COREG) 25 MG tablet TAKE 1 TABLET TWICE DAILY WITH MEALS    cetirizine (ZYRTEC) 10 MG tablet Take 1 tablet by mouth daily as needed.    chlorthalidone (HYGROTEN) 25 MG Tab TAKE 1 TABLET EVERY DAY    cholecalciferol, vitamin D3, (VITAMIN D3) 50 mcg (2,000 unit) Cap 1 capsule once daily. Every day    ciprofloxacin HCl (CIPRO) 500 MG tablet TAKE 1 TABLET 1 HOUR PRIOR TO PROCEDURE. FOR 1 DOSE    coenzyme Q10 (CO Q-10) 100 mg capsule Take 400 mg by mouth once daily.    cyanocobalamin, vitamin B-12, 5,000 mcg Subl Take 5,000 mcg by mouth once daily. Every day    diphenoxylate-atropine 2.5-0.025 mg (LOMOTIL) 2.5-0.025 mg per tablet Take 1 tablet by mouth 4 (four) times daily as needed  for Diarrhea.    ELIQUIS 5 mg Tab TAKE 1 TABLET TWICE DAILY    ezetimibe (ZETIA) 10 mg tablet TAKE 1 TABLET EVERY DAY    famotidine (PEPCID) 20 MG tablet TAKE 1 TABLET EVERY NIGHT AS NEEDED FOR HEARTBURN    fluticasone (FLONASE) 50 mcg/actuation nasal spray USE 1 SPRAY IN EACH NOSTRIL TWICE DAILY AS NEEDED  FOR  RHINITIS    gabapentin (NEURONTIN) 600 MG tablet TAKE 1 TABLET THREE TIMES DAILY    hydrocortisone (CORTEF) 5 MG Tab Take 5 mg by mouth once daily. As needed    levothyroxine (SYNTHROID) 50 MCG tablet TAKE 1 TABLET EVERY DAY    milk thistle 200 mg Cap Take 200 mg by mouth 2 (two) times daily. Twice a day    mupirocin (BACTROBAN) 2 % ointment Apply topically 3 (three) times daily.    olmesartan (BENICAR) 20 MG tablet Take 1 tablet (20 mg total) by mouth 2 (two) times daily.    omega-3 fatty acids 1,000 mg Cap Twice a day    pantoprazole (PROTONIX) 40 MG tablet TAKE 1 TABLET TWICE DAILY    potassium chloride SA (K-DUR,KLOR-CON M) 10 MEQ tablet Take 2 tablets (20 mEq total) by mouth every morning AND 4 tablets (40 mEq total) with lunch AND 4 tablets (40 mEq total) daily with dinner or evening meal.    predniSONE (DELTASONE) 1 MG tablet TAKE 2 TABLETS ONE TIME DAILY    predniSONE (DELTASONE) 5 MG tablet TAKE 1 TABLET ONE TIME DAILY    sildenafil (REVATIO) 20 mg Tab Take 1 to 3 tabs daily as needed.    tiotropium (SPIRIVA WITH HANDIHALER) 18 mcg inhalation capsule INHALE THE CONTENTS OF 1 CAPSULE EVERY DAY (CONTROLLER)     No current facility-administered medications for this visit.       Review of patient's allergies indicates:   Allergen Reactions    No known drug allergies        Family History   Problem Relation Name Age of Onset    Cataracts Mother      Heart disease Mother          CHF    Hypertension Mother      Hyperlipidemia Mother      Cataracts Father      Glaucoma Father      Heart disease Father      Hyperlipidemia Sister      Hypertension Sister      No Known Problems Daughter      No Known Problems  Daughter      Collagen disease Neg Hx      Amblyopia Neg Hx      Blindness Neg Hx      Macular degeneration Neg Hx      Retinal detachment Neg Hx      Strabismus Neg Hx      Cancer Neg Hx      Colon cancer Neg Hx      Esophageal cancer Neg Hx      Stomach cancer Neg Hx      Crohn's disease Neg Hx      Ulcerative colitis Neg Hx         Social History     Socioeconomic History    Marital status:    Tobacco Use    Smoking status: Never    Smokeless tobacco: Never    Tobacco comments:     when a child   Substance and Sexual Activity    Alcohol use: Yes     Comment: rarely    Drug use: No    Sexual activity: Yes     Partners: Female     Social Drivers of Health     Financial Resource Strain: Low Risk  (3/24/2025)    Overall Financial Resource Strain (CARDIA)     Difficulty of Paying Living Expenses: Not hard at all   Food Insecurity: No Food Insecurity (3/24/2025)    Hunger Vital Sign     Worried About Running Out of Food in the Last Year: Never true     Ran Out of Food in the Last Year: Never true   Transportation Needs: No Transportation Needs (3/24/2025)    PRAPARE - Transportation     Lack of Transportation (Medical): No     Lack of Transportation (Non-Medical): No   Physical Activity: Inactive (3/24/2025)    Exercise Vital Sign     Days of Exercise per Week: 0 days     Minutes of Exercise per Session: 0 min   Stress: No Stress Concern Present (3/24/2025)    [employer] [place] [employer] - Occupational Stress Questionnaire     Feeling of Stress : Not at all   Housing Stability: Low Risk  (3/24/2025)    Housing Stability Vital Sign     Unable to Pay for Housing in the Last Year: No     Number of Times Moved in the Last Year: 0     Homeless in the Last Year: No       Chief Complaint:   No chief complaint on file.      History of present illness:  This is a 79-year-old male seen for right lateral elbow and forearm pain. This started back a couple of years ago.  Cannot lift anything and hurts a lot at  night.   We injected him 2 years ago.  Pain started again 2 weeks ago.  Pain is a 5/10.  Severe pain at night.   MRI showed no tear.  Did show the lateral epicondylitis.  Having more numbness and tingling down into the hand now.  Involves the whole hand.  This has been ongoing now for a couple months.  He tried Tenex already also.  Knee is giving him more trouble again.    Answers for HPI/ROS submitted by the patient on 12/23/2019   Leg pain  unexpected weight change: No  appetite change : No  sleep disturbance: No  IMMUNOCOMPROMISED: No  nervous/ anxious: No  dysphoric mood: No  rash: No  visual disturbance: No  eye redness: No  eye pain: No  ear pain: No  tinnitus: Yes  hearing loss: No  sinus pressure : Yes  nosebleeds: Yes  enviro allergies: No  food allergies: No  cough: No  shortness of breath: Yes  sweating: No  frequency: Yes  difficulty urinating: No  hematuria: No  chest pain: No  palpitations: No  nausea: No  vomiting: No  diarrhea: No  blood in stool: No  constipation: No  headaches: Yes  dizziness: No  numbness: No  seizures: No  joint swelling: No  myalgia: No  weakness: No  back pain: Yes  Pain Chronicity: recurrent  History of trauma: No  Onset: more than 1 month ago  Frequency: daily  Progression since onset: waxing and waning  Injury mechanism: twisting  injury location: at home  pain- numeric: 3/10  pain location: left knee  pain quality: aching  Radiating Pain: No  Aggravating factors: bending, extension, twisting  fever: No  inability to bear weight: No  itching: No  joint locking: No  limited range of motion: Yes  stiffness: Yes  tingling: No  Treatments tried: cold, heat, exercise, injection treatment, NSAIDs, OTC ointments  physical therapy: not tried  Improvement on treatment: mild      Physical Examination:    Vital Signs:    There were no vitals filed for this visit.      There is no height or weight on file to calculate BMI.    This a well-developed, well nourished patient in no acute  distress.  They are alert and oriented and cooperative to examination.  Pt. walks without an antalgic gait.      Examination of the right elbow shows no signs of rashes or erythema. The patient has no masses, ecchymosis, or effusion. The patient has full range of motion from 0-160°. Patient has full pronation and supination. Patient is nontender along the medial epicondyle and moderately tender over the lateral epicondyle. Nontender over the olecranon process.  Nontender along the course of the UCL. Patient has a negative valgus stress test and milking maneuver. Negative Tinel's sign over the cubital tunnel. 2+ radial pulse. Intact light touch sensation.         X-rays:  X-rays of the left knee is reviewed which show severe lateral joint space narrowing on the right and more moderate to severe medial joint space wear on the left.  No significant change noted.  X-rays of the right elbow is  reviewed which show a small olecranon spur.    MRI of the right elbow from stand-up MRI:  Lateral epicondylitis.  Subcutaneous edema about the elbow.      Assessment:  Right lateral epicondylitis  Right hand numbness  Left knee arthritis      Plan: I reviewed the findings with him.  Injected his left knee with Durolane today.  We also talked about getting a bilateral upper extremity EMG to evaluate what is causing the numbness.  We talked about how it could be at the elbow or even up in the neck.      This note was created using [employer] voice recognition software that occasionally misinterpreted phrases or words.    Consult note is delivered via Epic messaging service.

## 2025-07-21 NOTE — PROCEDURES
Large Joint Aspiration/Injection: L knee joint    Date/Time: 7/21/2025 11:30 AM    Performed by: Haroldo Campbell MD  Authorized by: Haroldo Campbell MD    Consent Done?:  Yes (Verbal)  Indications:  Pain  Site marked: the procedure site was marked    Timeout: prior to procedure the correct patient, procedure, and site was verified      Details:  Needle Size:  18 G  Approach:  Anterolateral  Location:  Knee  Site:  L knee joint  Medications:  60 mg hyaluronate sodium, stabilized (DUROLANE) 60 mg/3 mL  Patient tolerance:  Patient tolerated the procedure well with no immediate complications

## 2025-07-22 ENCOUNTER — TELEPHONE (OUTPATIENT)
Dept: UROLOGY | Facility: CLINIC | Age: 80
End: 2025-07-22
Payer: MEDICARE

## 2025-07-22 ENCOUNTER — PATIENT MESSAGE (OUTPATIENT)
Dept: UROLOGY | Facility: CLINIC | Age: 80
End: 2025-07-22
Payer: MEDICARE

## 2025-07-23 ENCOUNTER — ANESTHESIA EVENT (OUTPATIENT)
Dept: SURGERY | Facility: HOSPITAL | Age: 80
End: 2025-07-23
Payer: MEDICARE

## 2025-07-23 ENCOUNTER — ANESTHESIA (OUTPATIENT)
Dept: SURGERY | Facility: HOSPITAL | Age: 80
End: 2025-07-23
Payer: MEDICARE

## 2025-07-23 ENCOUNTER — HOSPITAL ENCOUNTER (OUTPATIENT)
Facility: HOSPITAL | Age: 80
Discharge: HOME | End: 2025-07-23
Attending: UROLOGY | Admitting: UROLOGY
Payer: MEDICARE

## 2025-07-23 VITALS
HEIGHT: 71 IN | OXYGEN SATURATION: 97 % | HEART RATE: 63 BPM | SYSTOLIC BLOOD PRESSURE: 139 MMHG | DIASTOLIC BLOOD PRESSURE: 73 MMHG | TEMPERATURE: 98 F | WEIGHT: 236 LBS | BODY MASS INDEX: 33.04 KG/M2 | RESPIRATION RATE: 17 BRPM

## 2025-07-23 DIAGNOSIS — C61 PROSTATE CANCER: Primary | ICD-10-CM

## 2025-07-23 DIAGNOSIS — I45.10 RBBB: ICD-10-CM

## 2025-07-23 DIAGNOSIS — Z01.818 PREOPERATIVE TESTING: ICD-10-CM

## 2025-07-23 DIAGNOSIS — I49.3 PVC'S (PREMATURE VENTRICULAR CONTRACTIONS): Primary | ICD-10-CM

## 2025-07-23 PROCEDURE — 25000003 PHARM REV CODE 250: Mod: HCNC | Performed by: UROLOGY

## 2025-07-23 PROCEDURE — 71000015 HC POSTOP RECOV 1ST HR: Mod: HCNC | Performed by: UROLOGY

## 2025-07-23 PROCEDURE — 55874 TPRNL PLMT BIODEGRDABL MATRL: CPT | Mod: HCNC,,, | Performed by: UROLOGY

## 2025-07-23 PROCEDURE — 63600175 PHARM REV CODE 636 W HCPCS: Mod: HCNC

## 2025-07-23 PROCEDURE — 71000044 HC DOSC ROUTINE RECOVERY FIRST HOUR: Mod: HCNC | Performed by: UROLOGY

## 2025-07-23 PROCEDURE — 36000706: Mod: HCNC | Performed by: UROLOGY

## 2025-07-23 PROCEDURE — 63600175 PHARM REV CODE 636 W HCPCS: Mod: HCNC | Performed by: UROLOGY

## 2025-07-23 PROCEDURE — 37000009 HC ANESTHESIA EA ADD 15 MINS: Mod: HCNC | Performed by: UROLOGY

## 2025-07-23 PROCEDURE — 63600175 PHARM REV CODE 636 W HCPCS: Mod: HCNC | Performed by: NURSE ANESTHETIST, CERTIFIED REGISTERED

## 2025-07-23 PROCEDURE — C1889 IMPLANT/INSERT DEVICE, NOC: HCPCS | Mod: HCNC | Performed by: UROLOGY

## 2025-07-23 PROCEDURE — 37000008 HC ANESTHESIA 1ST 15 MINUTES: Mod: HCNC | Performed by: UROLOGY

## 2025-07-23 PROCEDURE — 25000003 PHARM REV CODE 250: Mod: HCNC | Performed by: NURSE ANESTHETIST, CERTIFIED REGISTERED

## 2025-07-23 PROCEDURE — 36000707: Mod: HCNC | Performed by: UROLOGY

## 2025-07-23 DEVICE — SPACEOAR SYSTEMS
Type: IMPLANTABLE DEVICE | Site: PROSTATE | Status: FUNCTIONAL
Brand: SPACEOAR VUE™ SYSTEM - 10ML

## 2025-07-23 RX ORDER — CEFAZOLIN 2 G/1
2 INJECTION, POWDER, FOR SOLUTION INTRAMUSCULAR; INTRAVENOUS
Status: COMPLETED | OUTPATIENT
Start: 2025-07-23 | End: 2025-07-23

## 2025-07-23 RX ORDER — ONDANSETRON HYDROCHLORIDE 2 MG/ML
4 INJECTION, SOLUTION INTRAVENOUS DAILY PRN
Status: DISCONTINUED | OUTPATIENT
Start: 2025-07-23 | End: 2025-07-23 | Stop reason: HOSPADM

## 2025-07-23 RX ORDER — OXYCODONE AND ACETAMINOPHEN 5; 325 MG/1; MG/1
1 TABLET ORAL
Status: DISCONTINUED | OUTPATIENT
Start: 2025-07-23 | End: 2025-07-23 | Stop reason: HOSPADM

## 2025-07-23 RX ORDER — HYDROCODONE BITARTRATE AND ACETAMINOPHEN 5; 325 MG/1; MG/1
1 TABLET ORAL EVERY 6 HOURS PRN
COMMUNITY

## 2025-07-23 RX ORDER — PROPOFOL 10 MG/ML
VIAL (ML) INTRAVENOUS
Status: DISCONTINUED | OUTPATIENT
Start: 2025-07-23 | End: 2025-07-23

## 2025-07-23 RX ORDER — LIDOCAINE HYDROCHLORIDE 20 MG/ML
JELLY TOPICAL
Status: DISCONTINUED | OUTPATIENT
Start: 2025-07-23 | End: 2025-07-23 | Stop reason: HOSPADM

## 2025-07-23 RX ORDER — GLUCAGON 1 MG
1 KIT INJECTION
Status: DISCONTINUED | OUTPATIENT
Start: 2025-07-23 | End: 2025-07-23 | Stop reason: HOSPADM

## 2025-07-23 RX ORDER — LIDOCAINE HYDROCHLORIDE 10 MG/ML
INJECTION, SOLUTION INFILTRATION; PERINEURAL
Status: DISCONTINUED | OUTPATIENT
Start: 2025-07-23 | End: 2025-07-23 | Stop reason: HOSPADM

## 2025-07-23 RX ORDER — LIDOCAINE HYDROCHLORIDE 20 MG/ML
INJECTION INTRAVENOUS
Status: DISCONTINUED | OUTPATIENT
Start: 2025-07-23 | End: 2025-07-23

## 2025-07-23 RX ADMIN — SODIUM CHLORIDE: 0.9 INJECTION, SOLUTION INTRAVENOUS at 08:07

## 2025-07-23 RX ADMIN — CEFAZOLIN 2 G: 2 INJECTION, POWDER, FOR SOLUTION INTRAMUSCULAR; INTRAVENOUS at 08:07

## 2025-07-23 RX ADMIN — LIDOCAINE HYDROCHLORIDE 100 MG: 20 INJECTION INTRAVENOUS at 08:07

## 2025-07-23 RX ADMIN — PROPOFOL 150 MCG/KG/MIN: 10 INJECTION, EMULSION INTRAVENOUS at 08:07

## 2025-07-23 RX ADMIN — PROPOFOL 30 MG: 10 INJECTION, EMULSION INTRAVENOUS at 08:07

## 2025-07-23 NOTE — DISCHARGE INSTRUCTIONS
Only do light activities for 24 to 48 hours after your Space OAR    Finish your antibiotic for its full course if one was prescribed.    You might also:    Feel slight soreness and have some light bleeding from your rectum.  Have blood in your urine or stools for a few days.  Notice that your semen has a red or rust-colored tint caused by a small amount of blood in your semen. This can last for several weeks.    Call urology if you have:    Fever  Difficulty urinating  Prolonged or heavy bleeding  Pain that gets worse    During regular business hours: call the urology clinic at [phone]  During nights and weekends: call the  at [phone] and ask for the urology resident

## 2025-07-23 NOTE — PLAN OF CARE
Patient tolerating oral liquids without difficulty. No apparent s&s of distress noted at this time, no complaints voiced at this time. Discharge instructions reviewed with patient/spouse with good verbal feedback received. IV removed. Patient able to void, without blood or discoloration. Patient ready for discharge.

## 2025-07-23 NOTE — OP NOTE
[facility]     Operative Note     DATE OF PROCEDURE:   7/23/2025    PRE-OP DIAGNOSES:   1) Prostate Cancer     POST-OP DIAGNOSES AND OPERATIVE FINDINGS:   1) Prostate Cancer    PROCEDURES:  1) Placement of Periprostatic Gel (CPT 17248)  2)Transrectal ultrasound of the prostate (CPT 01440)  3) Ultrasound Guidance for Needle Placement (CPT 51830)    SURGEONS:   Surgeons and Role:     * Brian An MD - Primary     * Hugh Villarreal MD - Resident - Assisting    ANESTHESIA:   Anesthesiologist: Alex Foss MD  CRNA: Nuha Mcknight CRNA  Student Nurse Anesthetist: Chelsea Adam    STAFF:   Circulator: Soco Pereira RN; Priyanka Andres RN    ANESTHESIA TYPE: General    ESTIMATED BLOOD LOSS: Minimal    COMPLICATIONS: None    ANTIBIOTICS: Ancef    INTRAOPERATIVE THROMBOEMBOLISM PROPHYLAXIS:  Pneumatic compression stockings    PROCEDURE SUMMARY:   Patient was brought to the operative room. Anesthesia was induced without complication. Patient placed in lithotomy position and prepped and draped usual sterile fashion. After a timeout and consent was verified in conjunction with [facility] institutional policy the procedure was begun. Scrotum was pulled out of the field. Using lidocaine gel the transrectal ultrasound probe inserted and prostate visualized.     Next using transverse and sagittal images the SpaceOAR instillation needle was advanced into the landing zone at desire-rectal fat for SpaceOAR. No tenting of the rectum or periprostatic tissue occurred, giving us good confidence that the rectum was unharmed. 2 ccs of saline were instilled to confirm location in the sagittal and transverse planes to ensure adequate hydrodissection and symmetry. 10 ccs of SpaceOAR gel were then instilled over 15 seconds.    No evidence of bleeding or hematoma.    The patient tolerated the procedure well. He will follow up with radiation oncology for simulation

## 2025-07-23 NOTE — H&P
Pre-Op H&P  7/23/2025      Subjective:         Chief Complaint:   HPI  Maximilian Tavera Jr. is a 79 y.o. male diagnosed with prostate cancer.  Retired [employer] and  at [employer]. Radha Schafer worked at [employer] also.  Two grandchildren 15 and 8 yo.     FH -negative  PSA - 16.2  AJCC Stage - T1C  STAR CAP stage-T2C  Volume - 56 ccs  MRI - 3/17/2025- L1- 1.5 LMPZ PI-RADS 4, cap bulge w/o extra prostatic extension. L2- 1.5 cm anterior TZ PI-RADS 5 lesion with cap bulge w/o extra prostatic extension.MRI negative for EPE (extraprostatic extension), NVBI (neurovascular bundle involvement), SVI (seminal vesicle involvement), or nodes.   Biopsy -3/18/2025- Alannah 4+3 (GG3) left middle 1/2 30%; Alannah 3+4 right apex (target 1) 3/3 70%, right middle (target 2) 3/3 70%, right base 2/2 30%. Overall- 4/6 sites, 9/14 cores.  GA68 PSMA scan-4/16/2025- avid prostate, no avid nodes or metastasis.  TRISHA Score - 7 high risk  NCCN - unfavorable intermediate risk  Germline testing -not indicated  Somatic testing - discussed   STAR CAP-    Lab Results   Component Value Date    PSA 16.2 (H) 11/07/2024    PSA 3.5 09/19/2019    PSA 3.3 05/02/2019    PSA 2.4 03/29/2018    PSA 1.1 11/18/2014    PSA 0.60 05/15/2013    PSA 0.61 05/09/2012    PSA 0.44 03/30/2011    PSADIAG 6.9 (H) 06/30/2021    PSADIAG 1.7 09/12/2016    PSADIAG 1.7 09/02/2015    PSATOTAL 9.8 (H) 12/15/2022    PSATOTAL 6.5 (H) 09/15/2021    PSATOTAL 3.0 08/02/2019    PSAFREE 1.27 12/15/2022    PSAFREE 0.96 09/15/2021    PSAFREE 0.42 08/02/2019    PSAFREEPCT 12.96 12/15/2022    PSAFREEPCT 14.77 09/15/2021    PSAFREEPCT 14.00 08/02/2019      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    Disease of spinal cord, unspecified 1/5/2024     Emphysema lung 11/05/2018    Gastritis     Hx of colonic polyp     Hyperlipidemia     Hypertension     Hypothyroidism     Knee fracture     Myasthenia gravis     Neuropathy 01/03/2013    Obesity 01/29/2015    Pneumonia 03/29/2020    Polyneuropathy     Prostate cancer 3/20/2025    PVC (premature ventricular contraction)     Squamous cell carcinoma 2014    left forearm    Thyroid disease      Family History   Problem Relation Name Age of Onset    Cataracts Mother      Heart disease Mother          CHF    Hypertension Mother      Hyperlipidemia Mother      Cataracts Father      Glaucoma Father      Heart disease Father      Hyperlipidemia Sister      Hypertension Sister      No Known Problems Daughter      No Known Problems Daughter      Collagen disease Neg Hx      Amblyopia Neg Hx      Blindness Neg Hx      Macular degeneration Neg Hx      Retinal detachment Neg Hx      Strabismus Neg Hx      Cancer Neg Hx      Colon cancer Neg Hx      Esophageal cancer Neg Hx      Stomach cancer Neg Hx      Crohn's disease Neg Hx      Ulcerative colitis Neg Hx       Social History[1]  Past Surgical History:   Procedure Laterality Date    APPENDECTOMY      CATARACT EXTRACTION W/  INTRAOCULAR LENS IMPLANT Bilateral     COLONOSCOPY  03/01/2012    Dr Esteban; hyperplastic polyp; repeat in 5 years    COLONOSCOPY N/A 12/7/2021    Procedure: COLONOSCOPY;  Surgeon: Gabriel Hernandez MD;  Location: [facility];  Service: Endoscopy;  Laterality: N/A;    CYSTOSCOPY N/A 2/26/2019    Procedure: CYSTOSCOPY;  Surgeon: Simba Cheung MD;  Location: [facility];  Service: Urology;  Laterality: N/A;    ENDOSCOPIC ULTRASOUND OF UPPER GASTROINTESTINAL TRACT N/A 1/7/2020    Procedure: ULTRASOUND, UPPER GI TRACT, ENDOSCOPIC;  Surgeon: Kati Ryan MD;  [facility]  Service: Endoscopy;  Laterality: N/A;    ESOPHAGOGASTRODUODENOSCOPY N/A 9/12/2018    Procedure: EGD (ESOPHAGOGASTRODUODENOSCOPY);  Surgeon: Gabriel Hernandez MD;   Location: [facility] ENDO;  Service: Endoscopy;  Laterality: N/A;    ESOPHAGOGASTRODUODENOSCOPY N/A 8/19/2019    Procedure: EGD (ESOPHAGOGASTRODUODENOSCOPY);  Surgeon: Gabriel Hernandez MD;  Location: [facility];  Service: Endoscopy;  Laterality: N/A;    ESOPHAGOGASTRODUODENOSCOPY N/A 10/7/2019    Procedure: EGD (ESOPHAGOGASTRODUODENOSCOPY);  Surgeon: Gabriel Hernandez MD;  Location: [facility];  Service: Endoscopy;  Laterality: N/A;    ESOPHAGOGASTRODUODENOSCOPY N/A 1/7/2020    Procedure: EGD (ESOPHAGOGASTRODUODENOSCOPY);  Surgeon: Kati Ryan MD;  [facility]  Service: Endoscopy;  Laterality: N/A;    HEMORRHOID SURGERY      INJECTION OF ANESTHETIC AGENT AROUND MEDIAL BRANCH NERVES INNERVATING LUMBAR FACET JOINT Bilateral 10/1/2020    Procedure: Block-nerve-medial branch-lumbar Bilateral L 3,4,5;  Surgeon: Devan Watt MD;  Location: [facility];  Service: Pain Management;  Laterality: Bilateral;    INJECTION OF ANESTHETIC AGENT AROUND MEDIAL BRANCH NERVES INNERVATING LUMBAR FACET JOINT Right 10/7/2022    Procedure: Block-nerve-medial branch-lumbar L3,4,5;  Surgeon: Devan Watt MD;  Location: [facility];  Service: Pain Management;  Laterality: Right;    KNEE ARTHROPLASTY Bilateral     LENGTHENING OF ACHILLES TENDON Right 9/11/2020    Procedure: percutaeous tenotomy of right lateral epicondyle (tenex);  Surgeon: Terry Quach MD;  Location: [facility];  Service: Neurosurgery;  Laterality: Right;  tenex to right lateral epicondyle  Tenex machine SN#[id number], total time 1min. 30seconds    NECK SURGERY      POSTERIOR FUSION OF CERVICAL SPINE WITH LAMINECTOMY N/A 11/7/2018    Procedure: C2-C6 Posterior Cervical Laminectomy & Instrumental Fusion;  Surgeon: Kishore Turner MD;  Location: [address];  Service: Neurosurgery;  Laterality: N/A;    TONSILLECTOMY      TRANSFORAMINAL EPIDURAL INJECTION OF STEROID Right 12/20/2019    Procedure: Injection,steroid,epidural,transforaminal approach;  Surgeon: Devan Watt  "MD;  Location: [facility];  Service: Pain Management;  Laterality: Right;  L3-4, L4-5    TRANSFORAMINAL EPIDURAL INJECTION OF STEROID Bilateral 2/6/2020    Procedure: Injection,steroid,epidural,transforaminal approach;  Surgeon: Devan Watt MD;  Location: [facility] OR;  Service: Pain Management;  Laterality: Bilateral;  L3-L4,L4-L5    TRANSFORAMINAL EPIDURAL INJECTION OF STEROID Bilateral 8/26/2020    Procedure: Injection,steroid,epidural,transforaminal approach;  Surgeon: Devan Watt MD;  Location: [facility] OR;  Service: Pain Management;  Laterality: Bilateral;  L3-4, L4-5    TRANSRECTAL ULTRASOUND EXAMINATION N/A 2/26/2019    Procedure: ULTRASOUND, RECTAL APPROACH;  Surgeon: Simba Cheung MD;  Location: [facility] OR;  Service: Urology;  Laterality: N/A;    UPPER GASTROINTESTINAL ENDOSCOPY  09/12/2018    Dr Hernandez; gastritis; extensive intestinal metaplasia; repeat in 1-2- years     Problem List[2]  Review of Systems   Constitutional:  Negative for chills, fever and unexpected weight change.   HENT:  Negative for congestion and sore throat.    Respiratory:  Negative for cough and shortness of breath.    Cardiovascular:  Negative for chest pain and leg swelling.   Gastrointestinal:  Negative for abdominal pain, nausea and vomiting.   Genitourinary:  Positive for nocturia. Negative for difficulty urinating and hematuria.   Neurological:  Negative for weakness.   Psychiatric/Behavioral:  Negative for agitation and confusion.          Objective:      BP (!) 178/83 (BP Location: Left arm, Patient Position: Lying)   Pulse (!) 56   Temp 98 °F (36.7 °C) (Temporal)   Resp 18   Ht 5' 11" (1.803 m)   Wt 107 kg (236 lb)   SpO2 98%   BMI 32.92 kg/m²   Estimated body mass index is 32.92 kg/m² as calculated from the following:    Height as of this encounter: 5' 11" (1.803 m).    Weight as of this encounter: 107 kg (236 lb).  Physical Exam  Constitutional:       General: He is not in acute distress.  HENT:      Head: Normocephalic. "   Cardiovascular:      Rate and Rhythm: Normal rate.   Pulmonary:      Effort: Pulmonary effort is normal.   Abdominal:      General: There is no distension.      Palpations: Abdomen is soft.      Tenderness: There is no abdominal tenderness.   Musculoskeletal:         General: Normal range of motion.   Skin:     General: Skin is warm and dry.   Neurological:      Mental Status: He is alert.      Coordination: Coordination normal.   Psychiatric:         Behavior: Behavior normal.           Assessment and Plan:   OR today for [facility]    Eliquis held    I have explained the indication, risks, benefits, and alternatives of the procedure in detail. I discussed the rationale for the procedure and the typical management and expectations. I discussed the risks associated with the procedure. We discussed alternative management options. He voiced understanding following the discussion. He was given the opportunity to ask questions and all these questions were all answered to his satisfaction. He has elected to proceed as planned. Consent was obtained.          KVNG WALLACE                 [1]   Social History  Socioeconomic History    Marital status:    Tobacco Use    Smoking status: Never    Smokeless tobacco: Never    Tobacco comments:     when a child   Substance and Sexual Activity    Alcohol use: Yes     Comment: rarely    Drug use: No    Sexual activity: Yes     Partners: Female     Social Drivers of Health     Financial Resource Strain: Low Risk  (3/24/2025)    Overall Financial Resource Strain (CARDIA)     Difficulty of Paying Living Expenses: Not hard at all   Food Insecurity: No Food Insecurity (3/24/2025)    Hunger Vital Sign     Worried About Running Out of Food in the Last Year: Never true     Ran Out of Food in the Last Year: Never true   Transportation Needs: No Transportation Needs (3/24/2025)    PRAPARE - Transportation     Lack of Transportation (Medical): No     Lack of Transportation  (Non-Medical): No   Physical Activity: Inactive (3/24/2025)    Exercise Vital Sign     Days of Exercise per Week: 0 days     Minutes of Exercise per Session: 0 min   Stress: No Stress Concern Present (3/24/2025)    [employer] [place] [employer] - Occupational Stress Questionnaire     Feeling of Stress : Not at all   Housing Stability: Low Risk  (3/24/2025)    Housing Stability Vital Sign     Unable to Pay for Housing in the Last Year: No     Number of Times Moved in the Last Year: 0     Homeless in the Last Year: No   [2]   Patient Active Problem List  Diagnosis    Hypothyroid    Hyperlipidemia    HTN (hypertension), benign    Neuropathy    ED (erectile dysfunction)    DDD (degenerative disc disease), lumbar    Diplopia    Pseudophakia, right eye    Neurogenic claudication    Myasthenia gravis, AChR antibody positive    Umbilical hernia without obstruction and without gangrene    Agatston CAC score, <100    Aortic valve disease    Primary osteoarthritis of both knees    Abdominal aortic atherosclerosis    RBBB    On prednisone therapy    Carpal tunnel syndrome on both sides    Tortuous aorta    Epigastric pain    Cervical stenosis of spinal canal    S/P cervical spinal fusion    BPH with obstruction/lower urinary tract symptoms    PVC's (premature ventricular contractions)    Non-sustained ventricular tachycardia    Current chronic use of systemic steroids    Bilateral carotid artery stenosis    Lumbar radiculitis    Gastric ulcer    COPD (chronic obstructive pulmonary disease)    Lateral epicondylitis    Other spondylosis, lumbar region    Cervical radiculopathy    PVC (premature ventricular contraction)    COPD with asthma    GOLD (dyspnea on exertion)    Restrictive lung mechanics due to neuromuscular disease    Bronchiectasis without complication    Mild persistent asthma without complication    Myasthenia gravis    Leg swelling    Thrombocytopenia    Venous stasis dermatitis of both lower extremities     Lymphedema of both lower extremities    Obesity (BMI 30-39.9)    Acute deep vein thrombosis (DVT) of femoral vein of left lower extremity    Acute deep vein thrombosis (DVT) of proximal vein of lower extremity    Disease of spinal cord, unspecified    Chronic deep vein thrombosis (DVT) of femoral vein of left lower extremity    Elevated PSA    Prostate cancer    Preop cardiovascular exam

## 2025-07-23 NOTE — ANESTHESIA PREPROCEDURE EVALUATION
07/23/2025  Maximilian Tavera Jr. is a 79 y.o., male.  Pre-operative evaluation for TRANSPERINEAL INSERTION OF PERIPROSTATIC GEL (N/A), ULTRASOUND, RECTAL APPROACH (N/A)    Chief Complaint:prostate CA    PMH:  BLE lymphedema, venous insufficiency  HTN   HLD  myasthenia gravis on chronic prednisone   degenerative joint disease with myelopathy s/p decompression and fusion of C2-6   Chronic DVT  Planned rad tx  Past Surgical History:   Procedure Laterality Date    APPENDECTOMY      CATARACT EXTRACTION W/  INTRAOCULAR LENS IMPLANT Bilateral     COLONOSCOPY  03/01/2012    Dr Esteban; hyperplastic polyp; repeat in 5 years    COLONOSCOPY N/A 12/7/2021    Procedure: COLONOSCOPY;  Surgeon: Gabriel Hernandez MD;  Location: [facility];  Service: Endoscopy;  Laterality: N/A;    CYSTOSCOPY N/A 2/26/2019    Procedure: CYSTOSCOPY;  Surgeon: Simba Cheung MD;  Location: [facility] OR;  Service: Urology;  Laterality: N/A;    ENDOSCOPIC ULTRASOUND OF UPPER GASTROINTESTINAL TRACT N/A 1/7/2020    Procedure: ULTRASOUND, UPPER GI TRACT, ENDOSCOPIC;  Surgeon: Kati Ryan MD;  [facility]  Service: Endoscopy;  Laterality: N/A;    ESOPHAGOGASTRODUODENOSCOPY N/A 9/12/2018    Procedure: EGD (ESOPHAGOGASTRODUODENOSCOPY);  Surgeon: Gabriel Hernandez MD;  Location: [facility];  Service: Endoscopy;  Laterality: N/A;    ESOPHAGOGASTRODUODENOSCOPY N/A 8/19/2019    Procedure: EGD (ESOPHAGOGASTRODUODENOSCOPY);  Surgeon: Gabriel Hernandez MD;  Location: [facility];  Service: Endoscopy;  Laterality: N/A;    ESOPHAGOGASTRODUODENOSCOPY N/A 10/7/2019    Procedure: EGD (ESOPHAGOGASTRODUODENOSCOPY);  Surgeon: Gabriel Hernandez MD;  Location: [facility];  Service: Endoscopy;  Laterality: N/A;    ESOPHAGOGASTRODUODENOSCOPY N/A 1/7/2020    Procedure: EGD (ESOPHAGOGASTRODUODENOSCOPY);  Surgeon: Kati Ryan MD;   [facility] ENDO (2ND FLR);  Service: Endoscopy;  Laterality: N/A;    HEMORRHOID SURGERY      INJECTION OF ANESTHETIC AGENT AROUND MEDIAL BRANCH NERVES INNERVATING LUMBAR FACET JOINT Bilateral 10/1/2020    Procedure: Block-nerve-medial branch-lumbar Bilateral L 3,4,5;  Surgeon: Devan Watt MD;  Location: [facility];  Service: Pain Management;  Laterality: Bilateral;    INJECTION OF ANESTHETIC AGENT AROUND MEDIAL BRANCH NERVES INNERVATING LUMBAR FACET JOINT Right 10/7/2022    Procedure: Block-nerve-medial branch-lumbar L3,4,5;  Surgeon: Devan Watt MD;  Location: [facility];  Service: Pain Management;  Laterality: Right;    KNEE ARTHROPLASTY Bilateral     LENGTHENING OF ACHILLES TENDON Right 9/11/2020    Procedure: percutaeous tenotomy of right lateral epicondyle (tenex);  Surgeon: Terry Quach MD;  Location: [facility] OR;  Service: Neurosurgery;  Laterality: Right;  tenex to right lateral epicondyle  Tenex machine SN#[id number], total time 1min. 30seconds    NECK SURGERY      POSTERIOR FUSION OF CERVICAL SPINE WITH LAMINECTOMY N/A 11/7/2018    Procedure: C2-C6 Posterior Cervical Laminectomy & Instrumental Fusion;  Surgeon: Kishore Turner MD;  Location: [facility] 2ND FLR;  Service: Neurosurgery;  Laterality: N/A;    TONSILLECTOMY      TRANSFORAMINAL EPIDURAL INJECTION OF STEROID Right 12/20/2019    Procedure: Injection,steroid,epidural,transforaminal approach;  Surgeon: Devan Watt MD;  Location: [facility];  Service: Pain Management;  Laterality: Right;  L3-4, L4-5    TRANSFORAMINAL EPIDURAL INJECTION OF STEROID Bilateral 2/6/2020    Procedure: Injection,steroid,epidural,transforaminal approach;  Surgeon: Devan Watt MD;  Location: [facility];  Service: Pain Management;  Laterality: Bilateral;  L3-L4,L4-L5    TRANSFORAMINAL EPIDURAL INJECTION OF STEROID Bilateral 8/26/2020    Procedure: Injection,steroid,epidural,transforaminal approach;  Surgeon: Devan Watt MD;  Location: [facility];  Service: Pain Management;  Laterality:  "Bilateral;  L3-4, L4-5    TRANSRECTAL ULTRASOUND EXAMINATION N/A 2/26/2019    Procedure: ULTRASOUND, RECTAL APPROACH;  Surgeon: Simba Cheung MD;  Location: [facility];  Service: Urology;  Laterality: N/A;    UPPER GASTROINTESTINAL ENDOSCOPY  09/12/2018    Dr Hernandez; gastritis; extensive intestinal metaplasia; repeat in 1-2- years         Vital Signs Range (Last 24H):  Temp:  [36.7 °C (98 °F)]   Pulse:  [56-59]   Resp:  [18]   BP: (178)/(83)   SpO2:  [98 %]       CBC:     Recent Labs   Lab 07/21/25  1239   WBC 8.29   RBC 4.61   HGB 13.8*   HCT 41.8   *   MCV 91   MCH 29.9   MCHC 33.0       CMP:   Recent Labs   Lab 07/21/25  1239      K 3.7      CO2 31*   BUN 16   CREATININE 1.0      CALCIUM 9.0       INR:  No results for input(s): "PT", "INR", "PROTIME", "APTT" in the last 720 hours.      Diagnostic Studies:      EKG:  EKG shows  Sinus bradycardia with right bundle branch block and left anterior fascicular block     2D Echo:     Pre-op Assessment    I have reviewed the Patient Summary Reports.     I have reviewed the Nursing Notes. I have reviewed the NPO Status.   I have reviewed the Medications.     Review of Systems  Anesthesia Hx:  No problems with previous Anesthesia                Social:  Non-Smoker, No Alcohol Use       Endocrine:        Obesity / BMI > 30      Physical Exam  General: Well nourished, Cooperative, Alert and Oriented    Airway:  Mallampati: III / II  Mouth Opening: Normal  TM Distance: Normal  Tongue: Normal  Neck ROM: Normal ROM    Dental:  Intact    Chest/Lungs:  Normal Respiratory Rate    Anesthesia Plan  Type of Anesthesia, risks & benefits discussed:    Anesthesia Type: Gen Natural Airway, MAC  Intra-op Monitoring Plan: Standard ASA Monitors  Post Op Pain Control Plan: multimodal analgesia  Induction:  IV  Informed Consent: Informed consent signed with the Patient and all parties understand the risks and agree with anesthesia plan.  All questions answered. "   ASA Score: 3  Day of Surgery Review of History & Physical: H&P Update referred to the surgeon/provider.    Ready For Surgery From Anesthesia Perspective.   .

## 2025-07-23 NOTE — TRANSFER OF CARE
"Anesthesia Transfer of Care Note    Patient: Maximilian Tavera Jr.    Procedure(s) Performed: Procedure(s) (LRB):  TRANSPERINEAL INSERTION OF PERIPROSTATIC GEL (N/A)  ULTRASOUND, RECTAL APPROACH (N/A)    Patient location: PACU    Anesthesia Type: general    Transport from OR: Transported from OR on 6-10 L/min O2 by face mask with adequate spontaneous ventilation    Post pain: adequate analgesia    Post assessment: no apparent anesthetic complications and tolerated procedure well    Post vital signs: stable    Level of consciousness: awake and alert    Nausea/Vomiting: no nausea/vomiting    Complications: none    Transfer of care protocol was followed    Last vitals: Visit Vitals  BP (!) 178/83 (BP Location: Left arm, Patient Position: Lying)   Pulse (!) 56   Temp 36.7 °C (98 °F) (Temporal)   Resp 18   Ht 5' 11" (1.803 m)   Wt 107 kg (236 lb)   SpO2 98%   BMI 32.92 kg/m²     "

## 2025-07-23 NOTE — ANESTHESIA POSTPROCEDURE EVALUATION
Anesthesia Post Evaluation    Patient: Maximilian Tavera JrMaye    Procedure(s) Performed: Procedure(s) (LRB):  TRANSPERINEAL INSERTION OF PERIPROSTATIC GEL (N/A)  ULTRASOUND, RECTAL APPROACH (N/A)    Final Anesthesia Type: MAC      Patient location during evaluation: PACU  Patient participation: Yes- Able to Participate  Level of consciousness: awake and alert and oriented  Post-procedure vital signs: reviewed and stable  Pain management: adequate  Airway patency: patent    PONV status at discharge: No PONV  Anesthetic complications: no      Cardiovascular status: hemodynamically stable  Respiratory status: unassisted, spontaneous ventilation and room air  Hydration status: euvolemic  Follow-up not needed.              Vitals Value Taken Time   /73 07/23/25 09:45   Temp 36.7 °C (98.1 °F) 07/23/25 09:15   Pulse 62 07/23/25 09:45   Resp 17 07/23/25 09:45   SpO2 96 % 07/23/25 09:45         No case tracking events are documented in the log.      Pain/Regina Score: Pain Rating Prior to Med Admin: 0 (7/23/2025  7:38 AM)  Pain Rating Post Med Admin: 0 (7/23/2025  7:38 AM)  Regina Score: 10 (7/23/2025 10:00 AM)

## 2025-07-23 NOTE — DISCHARGE SUMMARY
[facility]  Discharge Note  Short Stay    Admit Date: 7/23/2025    Discharge Date and Time: 07/23/2025 7:50 AM      Attending Physician: Brian An MD     Discharge Provider: KVNG WALLACE MD    Diagnoses:  There are no hospital problems to display for this patient.      Discharged Condition: stable    Hospital Course: Patient was admitted for Space OAR and tolerated the procedure well with no complications. He was discharged home in good condition on the same day.       Final Diagnoses: Same as principal problem.    Disposition: Home or Self Care    Follow up/Patient Instructions:    Medications:  Reconciled Home Medications:   Current Discharge Medication List        CONTINUE these medications which have NOT CHANGED    Details   albuterol (PROVENTIL/VENTOLIN HFA) 90 mcg/actuation inhaler Inhale 2 puffs into the lungs every 6 (six) hours as needed for Wheezing. Rescue  Qty: 25.5 g, Refills: 4    Comments: **Patient requests 90 days supply**  Associated Diagnoses: COPD with asthma      atorvastatin (LIPITOR) 80 MG tablet TAKE 1 TABLET EVERY DAY  Qty: 90 tablet, Refills: 0    Associated Diagnoses: Mixed hyperlipidemia      calcium citrate (CALCITRATE) 200 mg (950 mg) tablet Take 1 tablet by mouth once daily.      carvediloL (COREG) 25 MG tablet TAKE 1 TABLET TWICE DAILY WITH MEALS  Qty: 180 tablet, Refills: 3    Comments: .  Associated Diagnoses: HTN (hypertension), benign; PVC's (premature ventricular contractions)      chlorthalidone (HYGROTEN) 25 MG Tab TAKE 1 TABLET EVERY DAY  Qty: 90 tablet, Refills: 3    Comments: .      cholecalciferol, vitamin D3, (VITAMIN D3) 50 mcg (2,000 unit) Cap 1 capsule once daily. Every day      ciprofloxacin HCl (CIPRO) 500 MG tablet TAKE 1 TABLET 1 HOUR PRIOR TO PROCEDURE. FOR 1 DOSE  Qty: 1 tablet, Refills: 0    Associated Diagnoses: Elevated PSA      coenzyme Q10 (CO Q-10) 100 mg capsule Take 400 mg by mouth once daily.      cyanocobalamin, vitamin B-12, 5,000  mcg Subl Take 5,000 mcg by mouth once daily. Every day      ELIQUIS 5 mg Tab TAKE 1 TABLET TWICE DAILY  Qty: 180 tablet, Refills: 3    Associated Diagnoses: Acute deep vein thrombosis (DVT) of femoral vein of left lower extremity      ezetimibe (ZETIA) 10 mg tablet TAKE 1 TABLET EVERY DAY  Qty: 90 tablet, Refills: 3      gabapentin (NEURONTIN) 600 MG tablet TAKE 1 TABLET THREE TIMES DAILY  Qty: 90 tablet, Refills: 11    Associated Diagnoses: DDD (degenerative disc disease), lumbar      levothyroxine (SYNTHROID) 50 MCG tablet TAKE 1 TABLET EVERY DAY  Qty: 90 tablet, Refills: 0    Associated Diagnoses: Hypothyroidism      milk thistle 200 mg Cap Take 200 mg by mouth 2 (two) times daily. Twice a day      olmesartan (BENICAR) 20 MG tablet Take 1 tablet (20 mg total) by mouth 2 (two) times daily.  Qty: 180 tablet, Refills: 3    Comments: .  Associated Diagnoses: Mixed hyperlipidemia; HTN (hypertension), benign      omega-3 fatty acids 1,000 mg Cap Twice a day      pantoprazole (PROTONIX) 40 MG tablet TAKE 1 TABLET TWICE DAILY  Qty: 180 tablet, Refills: 3    Associated Diagnoses: Chronic cough      potassium chloride SA (K-DUR,KLOR-CON M) 10 MEQ tablet Take 2 tablets (20 mEq total) by mouth every morning AND 4 tablets (40 mEq total) with lunch AND 4 tablets (40 mEq total) daily with dinner or evening meal.  Qty: 900 tablet, Refills: 3      !! predniSONE (DELTASONE) 1 MG tablet TAKE 2 TABLETS ONE TIME DAILY  Qty: 180 tablet, Refills: 3    Associated Diagnoses: Myasthenia gravis      tiotropium (SPIRIVA WITH HANDIHALER) 18 mcg inhalation capsule INHALE THE CONTENTS OF 1 CAPSULE EVERY DAY (CONTROLLER)  Qty: 90 capsule, Refills: 3    Associated Diagnoses: COPD with asthma      albuterol-ipratropium (DUO-NEB) 2.5 mg-0.5 mg/3 mL nebulizer solution Take 3 mLs by nebulization every 6 (six) hours as needed for Wheezing. Rescue  Qty: 75 mL, Refills: 3    Associated Diagnoses: COPD with asthma      bicalutamide (CASODEX) 50 MG Tab  Take 1 tablet (50 mg total) by mouth once daily.  Qty: 30 tablet, Refills: 0    Associated Diagnoses: Prostate cancer      bumetanide (BUMEX) 0.5 MG Tab Take 1 tablet (0.5 mg total) by mouth every other day.  Qty: 45 tablet, Refills: 3      cetirizine (ZYRTEC) 10 MG tablet Take 1 tablet by mouth daily as needed.      diphenoxylate-atropine 2.5-0.025 mg (LOMOTIL) 2.5-0.025 mg per tablet Take 1 tablet by mouth 4 (four) times daily as needed for Diarrhea.  Qty: 90 tablet, Refills: 0      famotidine (PEPCID) 20 MG tablet TAKE 1 TABLET EVERY NIGHT AS NEEDED FOR HEARTBURN  Qty: 90 tablet, Refills: 1    Associated Diagnoses: Subacute cough      fluticasone (FLONASE) 50 mcg/actuation nasal spray USE 1 SPRAY IN EACH NOSTRIL TWICE DAILY AS NEEDED  FOR  RHINITIS  Qty: 48 g, Refills: 3    Associated Diagnoses: Allergic rhinitis      HYDROcodone-acetaminophen (NORCO) 5-325 mg per tablet Take 1 tablet by mouth every 6 (six) hours as needed for Pain.      hydrocortisone (CORTEF) 5 MG Tab Take 5 mg by mouth once daily. As needed      mupirocin (BACTROBAN) 2 % ointment Apply topically 3 (three) times daily.  Qty: 30 g, Refills: 0      !! predniSONE (DELTASONE) 5 MG tablet TAKE 1 TABLET ONE TIME DAILY  Qty: 90 tablet, Refills: 3    Associated Diagnoses: Myasthenia gravis      sildenafil (REVATIO) 20 mg Tab Take 1 to 3 tabs daily as needed.  Qty: 30 tablet, Refills: 3    Associated Diagnoses: Erectile disorder due to medical condition in male patient       !! - Potential duplicate medications found. Please discuss with provider.        Discharge Procedure Orders   Basic Metabolic Panel   Standing Status: Future Number of Occurrences: 1 Standing Exp. Date: 09/06/26     Order Specific Question Answer Comments   Send normal result to authorizing provider's In Basket if patient is active on MyChart: Yes      CBC Without Differential   Standing Status: Future Number of Occurrences: 1 Standing Exp. Date: 09/06/26     Order Specific Question  Answer Comments   Send normal result to authorizing provider's In Basket if patient is active on MyChart: Yes      Diet Adult Regular     Lifting restrictions     Notify your health care provider if you experience any of the following:  temperature >100.4     Notify your health care provider if you experience any of the following:  persistent nausea and vomiting or diarrhea     Notify your health care provider if you experience any of the following:  severe uncontrolled pain     Notify your health care provider if you experience any of the following:  redness, tenderness, or signs of infection (pain, swelling, redness, odor or green/yellow discharge around incision site)     Notify your health care provider if you experience any of the following:  difficulty breathing or increased cough     Notify your health care provider if you experience any of the following:  severe persistent headache     Notify your health care provider if you experience any of the following:  worsening rash     Notify your health care provider if you experience any of the following:  persistent dizziness, light-headedness, or visual disturbances     Notify your health care provider if you experience any of the following:  increased confusion or weakness     Notify your health care provider if you experience any of the following:     EKG 12-lead   Standing Status: Future Number of Occurrences: 1 Standing Exp. Date: 07/08/26     Activity as tolerated

## 2025-07-25 LAB
OHS QRS DURATION: 146 MS
OHS QTC CALCULATION: 448 MS

## 2025-07-30 ENCOUNTER — OFFICE VISIT (OUTPATIENT)
Dept: ELECTROPHYSIOLOGY | Facility: CLINIC | Age: 80
End: 2025-07-30
Payer: MEDICARE

## 2025-07-30 VITALS
HEART RATE: 65 BPM | HEIGHT: 71 IN | DIASTOLIC BLOOD PRESSURE: 78 MMHG | BODY MASS INDEX: 33.34 KG/M2 | SYSTOLIC BLOOD PRESSURE: 136 MMHG | WEIGHT: 238.13 LBS

## 2025-07-30 DIAGNOSIS — I10 HTN (HYPERTENSION), BENIGN: ICD-10-CM

## 2025-07-30 DIAGNOSIS — I49.3 PVC'S (PREMATURE VENTRICULAR CONTRACTIONS): Primary | ICD-10-CM

## 2025-07-30 DIAGNOSIS — R93.1 AGATSTON CAC SCORE, <100: ICD-10-CM

## 2025-07-30 DIAGNOSIS — J43.8 OTHER EMPHYSEMA: ICD-10-CM

## 2025-07-30 DIAGNOSIS — I34.0 MITRAL VALVE INSUFFICIENCY, UNSPECIFIED ETIOLOGY: ICD-10-CM

## 2025-07-30 DIAGNOSIS — I35.1 AORTIC VALVE INSUFFICIENCY, ETIOLOGY OF CARDIAC VALVE DISEASE UNSPECIFIED: ICD-10-CM

## 2025-07-30 DIAGNOSIS — I82.512 CHRONIC DEEP VEIN THROMBOSIS (DVT) OF FEMORAL VEIN OF LEFT LOWER EXTREMITY: ICD-10-CM

## 2025-07-30 DIAGNOSIS — I47.29 NON-SUSTAINED VENTRICULAR TACHYCARDIA: ICD-10-CM

## 2025-07-30 DIAGNOSIS — I45.10 RBBB: ICD-10-CM

## 2025-07-30 DIAGNOSIS — G70.00 MYASTHENIA GRAVIS, ACHR ANTIBODY POSITIVE: ICD-10-CM

## 2025-07-30 DIAGNOSIS — I70.0 ABDOMINAL AORTIC ATHEROSCLEROSIS: ICD-10-CM

## 2025-07-30 LAB
OHS QRS DURATION: 146 MS
OHS QTC CALCULATION: 451 MS

## 2025-07-30 PROCEDURE — 3075F SYST BP GE 130 - 139MM HG: CPT | Mod: CPTII,HCNC,S$GLB, | Performed by: INTERNAL MEDICINE

## 2025-07-30 PROCEDURE — 1159F MED LIST DOCD IN RCRD: CPT | Mod: CPTII,HCNC,S$GLB, | Performed by: INTERNAL MEDICINE

## 2025-07-30 PROCEDURE — 99214 OFFICE O/P EST MOD 30 MIN: CPT | Mod: HCNC,S$GLB,, | Performed by: INTERNAL MEDICINE

## 2025-07-30 PROCEDURE — G2211 COMPLEX E/M VISIT ADD ON: HCPCS | Mod: HCNC,S$GLB,, | Performed by: INTERNAL MEDICINE

## 2025-07-30 PROCEDURE — 1160F RVW MEDS BY RX/DR IN RCRD: CPT | Mod: CPTII,HCNC,S$GLB, | Performed by: INTERNAL MEDICINE

## 2025-07-30 PROCEDURE — 3078F DIAST BP <80 MM HG: CPT | Mod: CPTII,HCNC,S$GLB, | Performed by: INTERNAL MEDICINE

## 2025-07-30 PROCEDURE — 1101F PT FALLS ASSESS-DOCD LE1/YR: CPT | Mod: CPTII,HCNC,S$GLB, | Performed by: INTERNAL MEDICINE

## 2025-07-30 PROCEDURE — 93010 ELECTROCARDIOGRAM REPORT: CPT | Mod: HCNC,S$GLB,, | Performed by: STUDENT IN AN ORGANIZED HEALTH CARE EDUCATION/TRAINING PROGRAM

## 2025-07-30 PROCEDURE — 1126F AMNT PAIN NOTED NONE PRSNT: CPT | Mod: CPTII,HCNC,S$GLB, | Performed by: INTERNAL MEDICINE

## 2025-07-30 PROCEDURE — 99999 PR PBB SHADOW E&M-EST. PATIENT-LVL V: CPT | Mod: PBBFAC,HCNC,, | Performed by: INTERNAL MEDICINE

## 2025-07-30 PROCEDURE — 3288F FALL RISK ASSESSMENT DOCD: CPT | Mod: CPTII,HCNC,S$GLB, | Performed by: INTERNAL MEDICINE

## 2025-07-30 NOTE — PROGRESS NOTES
Subjective:    Patient ID:  Maximilian Tavera Jr. is a 79 y.o. male who presents for evaluation of PVCs    Referring Cardiologist: Alphonso Altamirano MD  Primary Care Physician: Brian Marroquin MD  Neurologist: César Hoang MD    HPI  Prior Hx:  I had the pleasure of seeing Mr. Tavera today in our electrophysiology clinic in consultation for his palpitations. As you are aware he is a pleasant 79 year-old man with hypertension, myasthenia gravis on chronic prednisone therapy, and degenerative joint disease with myelopathy s/p decompression and fusion of C2-6. He reports being diagnosed with PVCs 5 years ago however over the past 1-2 months have become more prevalent. No syncope but has been having intermittent episodes of symptomatic hypotension with heart rate in the 110s which seemed to increase when started on BPH meds. He saw Dr. Altamirano in cardiology clinic who ordered an exercise stress echo and a 30 day event monitor. Stress test noted a structurally normal heart without ischemia. The ECG portion of his stress test noted sinus rhythm with occasional unifocal PVCs (LBBB but with V2 pattern break, +Rs II/aVF, rS in III, R in 1) at times with PVC couplets. His 30 day event monitor noted frequent PVCs with PVC couplets, triplets and occasional 4 beat NSVT. He denies any near syncope or syncope.    He was on carvedilol for years however this was stopped 2 months ago when he was started on Flomax for BPH (prescribing physician suggested stopping carvedilol to avoid hypotension). He stopped this and changed to alfuzosin however he stopped this due to symptomatic hypotension. For HTN he is on olmesartan and chlorthalidone.    ECG from 10/2018 notes a dimorphic couplet with 1 similar to PVC noted on stress test and the other inferiorly directed PVC in inferior leads. Other ECGs show sinus rhythm with RBBB.    Holter monitor 5/2019 noted 2.4% PVC burden but with 14 runs of nonsustained VT up to 33 beats. We discussed  on the phone and resumed carvedilol 6.25mg q12h. He presented for follow-up shortly after and had no complaints. No MG symptoms since starting carvedilol. He has no symptoms from the ventricular arrhythmias. We increased his carvedilol to 12.5mg q12h.    Mr. Tavera returned for follow-up 10/2019. He denied any issues related to his MG on 25mg carvedilol q12h. Recent holter monitor on this dosage indicated only 604 PVCs and no NSVT. He felt much better on carvedilol.    Mr. Tavera returned for yearly follow-up 5/2021. Recent holter noted a 10.8% PVC burden. He noted palpitations and some dizziness when PVCs are very frequent. He has some worsening in his chronic shortness of breath. Of note AF is listed now as a medical problem. This appears to be from when he was admitted to [facility] in March of 2020 with a febrile illness. No ECGs or telemetry strips scanned into media from that hospitalization indicate AF. Dr. Pacheco saw him on consultation and did not mention AF in his notes.      He initially had great response to carvedilol. Recently his PVC symptoms have increased which correlate with 10% burden on that holter monitor, despite 25mg of carvedilol bid. Discussed concern that other medications that could be used may exacerbate his MG. Also concerned that he was having increased dyspnea on exertion with differential diagnosis including COPD, heart failure, or neuromuscular due to MG. Recommended ECHO. If EF normal then would continue watchful monitoring and would repeat a holter in 3 months. If EF declining then would recommend considering PVC mapping and ablation. Discussed concerns regarding location of his focus (possibly infero-basal septum which would increase his risk of AV block if mapped close to native conduction system).    ECHO 6/2021 noted normal LV function.     Mr. Tavera returned for follow-up 9/2021. Holter 8/2021 noted very rare PVCs. He felt well other than stable chronic dyspnea on exertion. Plan  was to hold the course with beta-blocker therapy.    Mr. Tavera returned for follow-up 5/13/2022. Reported the day prior waking up feeling weak and had hypotension and bradycardia (BP was 70s/30s and measured pulse rate in the 30s). He had a BP of 80s/50s with a normal pulse on April 5, 2022. He did not feel poorly enough to go to the ER. He reports he started spironolactone 3 weeks ago. He spoke with the triage nurse yesterday and has held chlorthalidone, olmesartan and spironolactone. He reports feeling skipped beats a few days prior to this starting. He feels better today. He also has been limiting his fluid intake to 1.5L a day for his lymphedema in his legs. He wears compression stockings. In my opinion it was due to overmedication and dehydration. I told him to hold his olmesartan and diuretic.    6/2022: Mr. Ayse Pan returned for follow-up. 14 day holter noted no significant PVC burden. He did have a 1.5% PAC burden and nonsustained AT. He felt  well. Reported BP was now normal.    7/2023: Mr. Ayse Pan returns for follow-up. Recently admitted to [facility] with an extensive  unprovoked left lower extremity DVT. Initiated on eliquis. Swelling improved. Bardy patch noted rare PVCs, some runs of nonsustained AT.    7/2024: Mr. Ayse Pan returns for follow-up. No symptomatic palpitations. Main complaints are related to sciatica. No palpitations    Interim Hx:  Mr Ayse Pan returns for yearly follow-up. Recently diagnosed with prostate cancer. Treated by Dr. An. Will undergo XRT. No symptomatic palpitations.    My interpretation of today's in clinic ECG is sinus rhythm with RBBB and no ectopy      Review of Systems   Constitutional: Negative for fever and malaise/fatigue.   HENT:  Negative for congestion and nosebleeds.    Eyes:  Negative for blurred vision and visual disturbance.   Cardiovascular:  Negative for chest pain, dyspnea on exertion, irregular heartbeat, leg swelling, near-syncope,  orthopnea, palpitations, paroxysmal nocturnal dyspnea and syncope.   Respiratory:  Negative for cough and shortness of breath.    Hematologic/Lymphatic: Negative for bleeding problem. Does not bruise/bleed easily.   Skin: Negative.    Musculoskeletal: Negative.    Gastrointestinal:  Negative for bloating and abdominal pain.   Neurological:  Negative for dizziness, focal weakness and light-headedness.        Objective:    Physical Exam  Vitals reviewed.   Constitutional:       General: He is not in acute distress.     Appearance: He is well-developed. He is not diaphoretic.   HENT:      Head: Normocephalic and atraumatic.   Eyes:      General:         Right eye: No discharge.         Left eye: No discharge.      Conjunctiva/sclera: Conjunctivae normal.   Cardiovascular:      Rate and Rhythm: Normal rate and regular rhythm.      Heart sounds: No murmur heard.     No friction rub. No gallop.   Pulmonary:      Effort: Pulmonary effort is normal. No respiratory distress.      Breath sounds: Normal breath sounds. No wheezing or rales.   Abdominal:      General: Bowel sounds are normal. There is no distension.      Palpations: Abdomen is soft.      Tenderness: There is no abdominal tenderness.   Musculoskeletal:      Cervical back: Neck supple.   Skin:     General: Skin is warm and dry.   Neurological:      Mental Status: He is alert and oriented to person, place, and time.   Psychiatric:         Behavior: Behavior normal.         Thought Content: Thought content normal.         Judgment: Judgment normal.           Assessment:       1. PVC's (premature ventricular contractions)    2. HTN (hypertension), benign    3. RBBB    4. Abdominal aortic atherosclerosis    5. Non-sustained ventricular tachycardia    6. Other emphysema    7. Chronic deep vein thrombosis (DVT) of femoral vein of left lower extremity    8. Myasthenia gravis, AChR antibody positive    9. Agatston CAC score, <100    10. Mitral valve insufficiency,  unspecified etiology    11. Aortic valve insufficiency, etiology of cardiac valve disease unspecified         Plan:       In summary, Mr. Tavera is a pleasant 79 year-old man with hypertension (on carvedilol, chlorthalidone, olmesartan), myasthenia gravis on chronic prednisone therapy, abdominal atherosclerotic disease (on atorvastatin), degenerative joint disease with myelopathy s/p decompression and fusion of C2-6, chronic COPD (on spiriva), chronic left leg DVT (on chronic eliquis now), and chronic PVCs/nsVT. His ectopy burden has responded well to carvedilol and has had no indication that carvedilol worsens his MG symptoms. 24hr holter.     RTC in 1 year     Visit today included increased complexity associated with the care of the episodic problem(s) addressed above in addition to managing the longitudinal care of the patient due to the serious and/or complex managed problem(s) listed above.    Thank you for allowing me to participate in the care of this patient. Please do not hesitate to call me with any questions or concerns.    Sathya Zamudio MD, PhD  Cardiac Electrophysiology

## 2025-07-31 ENCOUNTER — PATIENT MESSAGE (OUTPATIENT)
Dept: CARDIOLOGY | Facility: CLINIC | Age: 80
End: 2025-07-31
Payer: MEDICARE

## 2025-07-31 ENCOUNTER — DOCUMENTATION ONLY (OUTPATIENT)
Dept: HEMATOLOGY/ONCOLOGY | Facility: CLINIC | Age: 80
End: 2025-07-31
Payer: MEDICARE

## 2025-07-31 NOTE — TELEPHONE ENCOUNTER
Spoke with pt - he has a LE vascular study 8/7 at 10:00 AM- Will tryto schedule EKG and echo after as pt said it's difficult for him to get here before 10AM coming from [place].

## 2025-08-01 ENCOUNTER — HOSPITAL ENCOUNTER (OUTPATIENT)
Dept: RADIATION THERAPY | Facility: HOSPITAL | Age: 80
Discharge: HOME | End: 2025-08-01
Attending: RADIOLOGY
Payer: MEDICARE

## 2025-08-01 DIAGNOSIS — I82.412 ACUTE DEEP VEIN THROMBOSIS (DVT) OF FEMORAL VEIN OF LEFT LOWER EXTREMITY: ICD-10-CM

## 2025-08-06 DIAGNOSIS — I10 HTN (HYPERTENSION), BENIGN: Primary | ICD-10-CM

## 2025-08-06 DIAGNOSIS — I49.3 PVC (PREMATURE VENTRICULAR CONTRACTION): ICD-10-CM

## 2025-08-07 ENCOUNTER — HOSPITAL ENCOUNTER (OUTPATIENT)
Dept: CARDIOLOGY | Facility: CLINIC | Age: 80
Discharge: HOME | End: 2025-08-07
Payer: MEDICARE

## 2025-08-07 ENCOUNTER — HOSPITAL ENCOUNTER (OUTPATIENT)
Dept: RADIOLOGY | Facility: HOSPITAL | Age: 80
Discharge: HOME | End: 2025-08-07
Attending: RADIOLOGY
Payer: MEDICARE

## 2025-08-07 ENCOUNTER — HOSPITAL ENCOUNTER (OUTPATIENT)
Dept: CARDIOLOGY | Facility: HOSPITAL | Age: 80
Discharge: HOME | End: 2025-08-07
Attending: INTERNAL MEDICINE
Payer: MEDICARE

## 2025-08-07 ENCOUNTER — TELEPHONE (OUTPATIENT)
Dept: UROLOGY | Facility: CLINIC | Age: 80
End: 2025-08-07
Payer: MEDICARE

## 2025-08-07 DIAGNOSIS — I82.512 CHRONIC DEEP VEIN THROMBOSIS (DVT) OF FEMORAL VEIN OF LEFT LOWER EXTREMITY: ICD-10-CM

## 2025-08-07 DIAGNOSIS — I10 HTN (HYPERTENSION), BENIGN: ICD-10-CM

## 2025-08-07 DIAGNOSIS — I49.3 PVC (PREMATURE VENTRICULAR CONTRACTION): ICD-10-CM

## 2025-08-07 DIAGNOSIS — C61 PROSTATE CANCER: ICD-10-CM

## 2025-08-07 LAB
OHS QRS DURATION: 144 MS
OHS QTC CALCULATION: 448 MS

## 2025-08-07 PROCEDURE — 93970 EXTREMITY STUDY: CPT

## 2025-08-07 PROCEDURE — 93970 EXTREMITY STUDY: CPT | Mod: 26,,, | Performed by: INTERNAL MEDICINE

## 2025-08-07 PROCEDURE — 93000 ELECTROCARDIOGRAM COMPLETE: CPT | Mod: S$GLB,,, | Performed by: INTERNAL MEDICINE

## 2025-08-12 DIAGNOSIS — E03.9 HYPOTHYROIDISM: ICD-10-CM

## 2025-08-12 DIAGNOSIS — R05.3 CHRONIC COUGH: ICD-10-CM

## 2025-08-13 RX ORDER — PANTOPRAZOLE SODIUM 40 MG/1
40 TABLET, DELAYED RELEASE ORAL 2 TIMES DAILY
Qty: 180 TABLET | Refills: 3 | Status: SHIPPED | OUTPATIENT
Start: 2025-08-13

## 2025-08-13 RX ORDER — LEVOTHYROXINE SODIUM 50 UG/1
50 TABLET ORAL
Qty: 90 TABLET | Refills: 0 | Status: SHIPPED | OUTPATIENT
Start: 2025-08-13

## 2025-08-14 ENCOUNTER — OFFICE VISIT (OUTPATIENT)
Dept: UROLOGY | Facility: CLINIC | Age: 80
End: 2025-08-14
Payer: MEDICARE

## 2025-08-14 VITALS
DIASTOLIC BLOOD PRESSURE: 75 MMHG | WEIGHT: 240.06 LBS | BODY MASS INDEX: 33.61 KG/M2 | SYSTOLIC BLOOD PRESSURE: 141 MMHG | HEIGHT: 71 IN | HEART RATE: 63 BPM

## 2025-08-14 DIAGNOSIS — C61 PROSTATE CANCER: Primary | ICD-10-CM

## 2025-08-14 PROCEDURE — 99213 OFFICE O/P EST LOW 20 MIN: CPT | Mod: S$GLB,,, | Performed by: UROLOGY

## 2025-08-14 PROCEDURE — 1159F MED LIST DOCD IN RCRD: CPT | Mod: CPTII,S$GLB,, | Performed by: UROLOGY

## 2025-08-14 PROCEDURE — 3288F FALL RISK ASSESSMENT DOCD: CPT | Mod: CPTII,S$GLB,, | Performed by: UROLOGY

## 2025-08-14 PROCEDURE — 3077F SYST BP >= 140 MM HG: CPT | Mod: CPTII,S$GLB,, | Performed by: UROLOGY

## 2025-08-14 PROCEDURE — 1125F AMNT PAIN NOTED PAIN PRSNT: CPT | Mod: CPTII,S$GLB,, | Performed by: UROLOGY

## 2025-08-14 PROCEDURE — 99999 PR PBB SHADOW E&M-EST. PATIENT-LVL IV: CPT | Mod: PBBFAC,,, | Performed by: UROLOGY

## 2025-08-14 PROCEDURE — 3078F DIAST BP <80 MM HG: CPT | Mod: CPTII,S$GLB,, | Performed by: UROLOGY

## 2025-08-14 PROCEDURE — 1101F PT FALLS ASSESS-DOCD LE1/YR: CPT | Mod: CPTII,S$GLB,, | Performed by: UROLOGY

## 2025-08-15 ENCOUNTER — OFFICE VISIT (OUTPATIENT)
Dept: PHYSICAL MEDICINE AND REHAB | Facility: CLINIC | Age: 80
End: 2025-08-15
Payer: MEDICARE

## 2025-08-15 DIAGNOSIS — G56.01 RIGHT CARPAL TUNNEL SYNDROME: ICD-10-CM

## 2025-08-15 DIAGNOSIS — G56.21 CUBITAL TUNNEL SYNDROME ON RIGHT: ICD-10-CM

## 2025-08-15 PROCEDURE — 99999 PR PBB SHADOW E&M-EST. PATIENT-LVL II: CPT | Mod: PBBFAC,,, | Performed by: PHYSICAL MEDICINE & REHABILITATION

## 2025-08-19 ENCOUNTER — HOSPITAL ENCOUNTER (OUTPATIENT)
Dept: RADIATION THERAPY | Facility: HOSPITAL | Age: 80
Discharge: HOME | End: 2025-08-19
Attending: INTERNAL MEDICINE
Payer: MEDICARE

## 2025-08-19 PROCEDURE — 77014 PR  CT GUIDANCE PLACEMENT RAD THERAPY FIELDS: CPT | Mod: 26,,, | Performed by: RADIOLOGY

## 2025-08-19 PROCEDURE — 77263 THER RADIOLOGY TX PLNG CPLX: CPT | Mod: ,,, | Performed by: RADIOLOGY

## 2025-08-19 PROCEDURE — 77014 HC CT GUIDANCE RADIATION THERAPY FLDS PLACEMENT: CPT | Mod: TC,PN | Performed by: RADIOLOGY

## 2025-08-19 PROCEDURE — 77334 RADIATION TREATMENT AID(S): CPT | Mod: 26,,, | Performed by: RADIOLOGY

## 2025-08-19 PROCEDURE — 77334 RADIATION TREATMENT AID(S): CPT | Mod: TC,PN | Performed by: RADIOLOGY

## 2025-08-20 ENCOUNTER — PATIENT MESSAGE (OUTPATIENT)
Dept: ORTHOPEDICS | Facility: CLINIC | Age: 80
End: 2025-08-20
Payer: MEDICARE

## 2025-08-27 ENCOUNTER — CLINICAL SUPPORT (OUTPATIENT)
Dept: REHABILITATION | Facility: HOSPITAL | Age: 80
End: 2025-08-27
Attending: RADIOLOGY
Payer: MEDICARE

## 2025-08-27 DIAGNOSIS — I89.0 LYMPHEDEMA OF BOTH LOWER EXTREMITIES: Primary | ICD-10-CM

## 2025-08-27 PROCEDURE — 97140 MANUAL THERAPY 1/> REGIONS: CPT | Mod: HCNC,PO

## 2025-08-27 PROCEDURE — 97530 THERAPEUTIC ACTIVITIES: CPT | Mod: HCNC,PO

## 2025-08-29 ENCOUNTER — HOSPITAL ENCOUNTER (OUTPATIENT)
Dept: CARDIOLOGY | Facility: HOSPITAL | Age: 80
Discharge: HOME | End: 2025-08-29
Attending: INTERNAL MEDICINE
Payer: MEDICARE

## 2025-08-29 ENCOUNTER — OFFICE VISIT (OUTPATIENT)
Dept: CARDIOLOGY | Facility: CLINIC | Age: 80
End: 2025-08-29
Payer: MEDICARE

## 2025-08-29 VITALS
HEIGHT: 71 IN | HEART RATE: 63 BPM | DIASTOLIC BLOOD PRESSURE: 61 MMHG | OXYGEN SATURATION: 96 % | WEIGHT: 224.88 LBS | BODY MASS INDEX: 31.48 KG/M2 | SYSTOLIC BLOOD PRESSURE: 123 MMHG

## 2025-08-29 DIAGNOSIS — I45.10 RBBB: ICD-10-CM

## 2025-08-29 DIAGNOSIS — Z79.01 CHRONIC ANTICOAGULATION: ICD-10-CM

## 2025-08-29 DIAGNOSIS — R53.83 FATIGUE, UNSPECIFIED TYPE: ICD-10-CM

## 2025-08-29 DIAGNOSIS — I82.4Y9 ACUTE DEEP VEIN THROMBOSIS (DVT) OF PROXIMAL VEIN OF LOWER EXTREMITY, UNSPECIFIED LATERALITY: ICD-10-CM

## 2025-08-29 DIAGNOSIS — J43.8 OTHER EMPHYSEMA: ICD-10-CM

## 2025-08-29 DIAGNOSIS — I82.412 ACUTE DEEP VEIN THROMBOSIS (DVT) OF FEMORAL VEIN OF LEFT LOWER EXTREMITY: ICD-10-CM

## 2025-08-29 DIAGNOSIS — I65.23 BILATERAL CAROTID ARTERY STENOSIS: ICD-10-CM

## 2025-08-29 DIAGNOSIS — I70.0 ABDOMINAL AORTIC ATHEROSCLEROSIS: ICD-10-CM

## 2025-08-29 DIAGNOSIS — G70.00 MYASTHENIA GRAVIS: ICD-10-CM

## 2025-08-29 DIAGNOSIS — R93.1 AGATSTON CAC SCORE, <100: Primary | ICD-10-CM

## 2025-08-29 DIAGNOSIS — I82.4Y9 DEEP VEIN THROMBOSIS (DVT) OF PROXIMAL LOWER EXTREMITY, UNSPECIFIED CHRONICITY, UNSPECIFIED LATERALITY: ICD-10-CM

## 2025-08-29 DIAGNOSIS — I35.9 AORTIC VALVE DISEASE: ICD-10-CM

## 2025-08-29 DIAGNOSIS — I49.3 PVC'S (PREMATURE VENTRICULAR CONTRACTIONS): ICD-10-CM

## 2025-08-29 PROCEDURE — 99999 PR PBB SHADOW E&M-EST. PATIENT-LVL V: CPT | Mod: PBBFAC,HCNC,, | Performed by: INTERNAL MEDICINE

## 2025-09-02 ENCOUNTER — HOSPITAL ENCOUNTER (OUTPATIENT)
Dept: RADIATION THERAPY | Facility: HOSPITAL | Age: 80
Discharge: HOME | End: 2025-09-02
Attending: RADIOLOGY
Payer: MEDICARE

## 2025-09-02 ENCOUNTER — DOCUMENTATION ONLY (OUTPATIENT)
Dept: RADIATION ONCOLOGY | Facility: CLINIC | Age: 80
End: 2025-09-02
Payer: MEDICARE

## 2025-09-03 ENCOUNTER — PATIENT MESSAGE (OUTPATIENT)
Dept: PRIMARY CARE CLINIC | Facility: CLINIC | Age: 80
End: 2025-09-03
Payer: MEDICARE

## 2025-09-03 DIAGNOSIS — M51.360 DEGENERATION OF INTERVERTEBRAL DISC OF LUMBAR REGION WITH DISCOGENIC BACK PAIN: Primary | ICD-10-CM

## 2025-09-03 RX ORDER — HYDROCODONE BITARTRATE AND ACETAMINOPHEN 5; 325 MG/1; MG/1
1 TABLET ORAL EVERY 6 HOURS PRN
Qty: 28 TABLET | Refills: 0 | Status: SHIPPED | OUTPATIENT
Start: 2025-09-03 | End: 2025-09-10

## 2025-09-04 ENCOUNTER — OFFICE VISIT (OUTPATIENT)
Dept: ORTHOPEDICS | Facility: CLINIC | Age: 80
End: 2025-09-04
Payer: MEDICARE

## 2025-09-04 VITALS — HEIGHT: 71 IN | WEIGHT: 224.88 LBS | RESPIRATION RATE: 18 BRPM | BODY MASS INDEX: 31.48 KG/M2

## 2025-09-04 DIAGNOSIS — G56.01 CARPAL TUNNEL SYNDROME OF RIGHT WRIST: ICD-10-CM

## 2025-09-04 DIAGNOSIS — G56.01 CARPAL TUNNEL SYNDROME OF RIGHT WRIST: Primary | ICD-10-CM

## 2025-09-04 DIAGNOSIS — M25.552 LEFT HIP PAIN: Primary | ICD-10-CM

## 2025-09-04 DIAGNOSIS — G56.21 CUBITAL TUNNEL SYNDROME ON RIGHT: Primary | ICD-10-CM

## 2025-09-04 PROCEDURE — 1125F AMNT PAIN NOTED PAIN PRSNT: CPT | Mod: CPTII,HCNC,S$GLB, | Performed by: ORTHOPAEDIC SURGERY

## 2025-09-04 PROCEDURE — 99214 OFFICE O/P EST MOD 30 MIN: CPT | Mod: HCNC,S$GLB,, | Performed by: ORTHOPAEDIC SURGERY

## 2025-09-04 PROCEDURE — 1159F MED LIST DOCD IN RCRD: CPT | Mod: CPTII,HCNC,S$GLB, | Performed by: ORTHOPAEDIC SURGERY

## 2025-09-04 PROCEDURE — 99999 PR PBB SHADOW E&M-EST. PATIENT-LVL IV: CPT | Mod: PBBFAC,HCNC,, | Performed by: ORTHOPAEDIC SURGERY

## 2025-09-05 ENCOUNTER — OFFICE VISIT (OUTPATIENT)
Dept: ORTHOPEDICS | Facility: CLINIC | Age: 80
End: 2025-09-05
Payer: MEDICARE

## 2025-09-05 ENCOUNTER — HOSPITAL ENCOUNTER (OUTPATIENT)
Dept: RADIOLOGY | Facility: HOSPITAL | Age: 80
Discharge: HOME | End: 2025-09-05
Payer: MEDICARE

## 2025-09-05 VITALS — HEIGHT: 71 IN | RESPIRATION RATE: 16 BRPM | BODY MASS INDEX: 31.48 KG/M2 | WEIGHT: 224.88 LBS

## 2025-09-05 DIAGNOSIS — M25.552 LEFT HIP PAIN: ICD-10-CM

## 2025-09-05 DIAGNOSIS — M70.62 GREATER TROCHANTERIC BURSITIS, LEFT: Primary | ICD-10-CM

## 2025-09-05 PROCEDURE — 99999 PR PBB SHADOW E&M-EST. PATIENT-LVL IV: CPT | Mod: PBBFAC,HCNC,,

## 2025-09-05 PROCEDURE — 73502 X-RAY EXAM HIP UNI 2-3 VIEWS: CPT | Mod: TC,HCNC,PO,LT

## 2025-09-05 PROCEDURE — 73502 X-RAY EXAM HIP UNI 2-3 VIEWS: CPT | Mod: 26,HCNC,LT, | Performed by: RADIOLOGY

## 2025-09-05 RX ORDER — TRIAMCINOLONE ACETONIDE 40 MG/ML
40 INJECTION, SUSPENSION INTRA-ARTICULAR; INTRAMUSCULAR
Status: DISCONTINUED | OUTPATIENT
Start: 2025-09-05 | End: 2025-09-05 | Stop reason: HOSPADM

## 2025-09-05 RX ADMIN — TRIAMCINOLONE ACETONIDE 40 MG: 40 INJECTION, SUSPENSION INTRA-ARTICULAR; INTRAMUSCULAR at 09:09

## (undated) DEVICE — NDL SPINAL SPINOCAN 22GX3.5

## (undated) DEVICE — SYS LABEL CORRECT MED

## (undated) DEVICE — GLOVE SURG ULTRA TOUCH 6

## (undated) DEVICE — SYR 10CC LUER LOCK

## (undated) DEVICE — TUBING MINIBORE EXTENSION

## (undated) DEVICE — SYR DISP LL 5CC

## (undated) DEVICE — GLOVE SURG ULTRA TOUCH 7.5

## (undated) DEVICE — PACK TX1 TISSUE REMOVAL SYS

## (undated) DEVICE — KIT SURGIFLO HEMOSTATIC MATRIX

## (undated) DEVICE — TUBE FRAZIER 5MM 2FT SOFT TIP

## (undated) DEVICE — DRESSING AQUACEL AG ADV 3.5X12

## (undated) DEVICE — DRAPE STERI-DRAPE 1000 17X11IN

## (undated) DEVICE — NDL SAFETY 25G X 1.5 ECLIPSE

## (undated) DEVICE — LUBRICANT SURGILUBE 2 OZ

## (undated) DEVICE — SEALER AQUAMANTYS 2.3 BIPOLAR

## (undated) DEVICE — DRESSING AQUACEL FOAM 5 X 5

## (undated) DEVICE — STAPLER SKIN PROXIMATE WIDE

## (undated) DEVICE — DRESSING AQUACEL FOAM 3 X 3

## (undated) DEVICE — CHLORAPREP 10.5 ML APPLICATOR

## (undated) DEVICE — SHEET DRAPE MEDIUM

## (undated) DEVICE — GLOVE SURGEONS ULTRA TOUCH 6.5

## (undated) DEVICE — SPONGE GAUZE 16PLY 4X4

## (undated) DEVICE — DRESSING AQUACEL SACRAL 9 X 9

## (undated) DEVICE — BLADE MILL BONE MEDIUM

## (undated) DEVICE — NDL SPINAL 25GX3.5 SPINOCAN

## (undated) DEVICE — GAUZE SPONGE 4X4 12PLY

## (undated) DEVICE — DRAPE C ARM 42 X 120 10/BX

## (undated) DEVICE — COVER TRANSDUCER LATEX N/STERI

## (undated) DEVICE — DURAPREP SURG SCRUB 26ML

## (undated) DEVICE — SEE MEDLINE ITEM 146292

## (undated) DEVICE — SUT STRATAFIX 1 PDS CT-1

## (undated) DEVICE — APPLICATOR CHLORAPREP CLR 10.5

## (undated) DEVICE — SEE MEDLINE ITEM 156905

## (undated) DEVICE — 3.5MM TAP

## (undated) DEVICE — NDL HYPODERMIC BLUNT 18G 1.5IN

## (undated) DEVICE — MARKER SKIN STND TIP BLUE BARR

## (undated) DEVICE — KIT EVACUATOR 3-SPRING 1/8 DRN

## (undated) DEVICE — SUT 3/0 30IN ETHILON BLK M

## (undated) DEVICE — GUIDE BIOPSY BIPLANAR 18G

## (undated) DEVICE — PINS SKULL ADULT MAYFIELD
Type: IMPLANTABLE DEVICE | Site: SPINE CERVICAL | Status: NON-FUNCTIONAL
Removed: 2018-11-07

## (undated) DEVICE — SUT VICRYL PLUS 0 CT1 18IN

## (undated) DEVICE — SYR LUER-LOCK STERILE 5ML

## (undated) DEVICE — BLADE ELECTRO EDGE INSULATED

## (undated) DEVICE — DRAPE C-ARMOR EQUIPMENT COVER

## (undated) DEVICE — DRAPE INCISE IOBAN 2 23X33IN

## (undated) DEVICE — PACK SET UP CONVERTORS

## (undated) DEVICE — GLOVE PROTEXIS PI CLASSIC 7.0

## (undated) DEVICE — BIT DRILL VAR MOUNTAINEER 24MM

## (undated) DEVICE — WATER STERILE INJ 500ML BAG

## (undated) DEVICE — DRESSING MEPILEX BORDR AG 4X12

## (undated) DEVICE — DRAPE MINOR PROCEDURE

## (undated) DEVICE — DIFFUSER

## (undated) DEVICE — CLIPPER BLADE MOD 4406 (CAREF)

## (undated) DEVICE — SUT VICRYL PLUS 2-0 CT1 18

## (undated) DEVICE — DRESSING AQUACEL AG FOAM 4X4

## (undated) DEVICE — NDL ONLY ECLIPSE 25G X5/8

## (undated) DEVICE — SUT VICRYL 2-0 8-18 CP-2

## (undated) DEVICE — DRAPE THYROID WITH ARMBOARD

## (undated) DEVICE — GLOVE PROTEXIS PI CLASSIC 6.5

## (undated) DEVICE — SET CYSTO IRRIGATION UNIV SPIK

## (undated) DEVICE — NDL SPINAL 18GX3.5 SPINOCAN

## (undated) DEVICE — KIT POWDER ABSORBABLE GELATIN

## (undated) DEVICE — CORD BIPOLAR 12 FOOT

## (undated) DEVICE — SCRUB 10% POVIDONE IODINE 4OZ

## (undated) DEVICE — SEE MEDLINE ITEM 157150

## (undated) DEVICE — SEE MEDLINE ITEM 152651

## (undated) DEVICE — APPLICATOR CHLORAPREP ORN 26ML

## (undated) DEVICE — CARTRIDGE OIL